# Patient Record
Sex: MALE | Race: WHITE | NOT HISPANIC OR LATINO | Employment: OTHER | URBAN - METROPOLITAN AREA
[De-identification: names, ages, dates, MRNs, and addresses within clinical notes are randomized per-mention and may not be internally consistent; named-entity substitution may affect disease eponyms.]

---

## 2020-01-22 ENCOUNTER — HOSPITAL ENCOUNTER (OUTPATIENT)
Dept: CARDIOLOGY | Facility: HOSPITAL | Age: 70
Discharge: HOME | End: 2020-01-22
Attending: NURSE PRACTITIONER

## 2021-07-23 ENCOUNTER — APPOINTMENT (OUTPATIENT)
Dept: GENERAL RADIOLOGY | Facility: HOSPITAL | Age: 71
End: 2021-07-23

## 2021-07-23 ENCOUNTER — HOSPITAL ENCOUNTER (INPATIENT)
Facility: HOSPITAL | Age: 71
LOS: 3 days | Discharge: HOME | End: 2021-07-26
Attending: EMERGENCY MEDICINE | Admitting: INTERNAL MEDICINE

## 2021-07-23 DIAGNOSIS — J44.9 CHRONIC OBSTRUCTIVE PULMONARY DISEASE, UNSPECIFIED COPD TYPE (HCC): Primary | ICD-10-CM

## 2021-07-23 DIAGNOSIS — A41.9 SEPSIS, DUE TO UNSPECIFIED ORGANISM, UNSPECIFIED WHETHER ACUTE ORGAN DYSFUNCTION PRESENT (HCC): ICD-10-CM

## 2021-07-23 PROBLEM — E11.9 TYPE 2 DIABETES MELLITUS: Status: ACTIVE | Noted: 2021-07-23

## 2021-07-23 PROBLEM — J96.21 ACUTE ON CHRONIC RESPIRATORY FAILURE WITH HYPOXIA AND HYPERCAPNIA: Status: ACTIVE | Noted: 2021-07-23

## 2021-07-23 PROBLEM — J96.22 ACUTE ON CHRONIC RESPIRATORY FAILURE WITH HYPOXIA AND HYPERCAPNIA: Status: ACTIVE | Noted: 2021-07-23

## 2021-07-23 PROBLEM — D72.829 LEUKOCYTOSIS: Status: ACTIVE | Noted: 2021-07-23

## 2021-07-23 PROBLEM — R53.83 LETHARGY: Status: ACTIVE | Noted: 2021-07-23

## 2021-07-23 PROBLEM — J44.1 COPD WITH ACUTE EXACERBATION (HCC): Status: ACTIVE | Noted: 2021-07-23

## 2021-07-23 LAB
ALBUMIN SERPL-MCNC: 3.8 G/DL (ref 3.5–5.2)
ALBUMIN/GLOB SERPL: 1.1 G/DL
ALP SERPL-CCNC: 101 U/L (ref 39–117)
ALT SERPL W P-5'-P-CCNC: 24 U/L (ref 1–41)
ANION GAP SERPL CALCULATED.3IONS-SCNC: 8.5 MMOL/L (ref 5–15)
ARTERIAL PATENCY WRIST A: POSITIVE
AST SERPL-CCNC: 30 U/L (ref 1–40)
BASE EXCESS BLDA CALC-SCNC: -0.2 MMOL/L (ref -2–2)
BASOPHILS # BLD AUTO: 0.09 10*3/MM3 (ref 0–0.2)
BASOPHILS NFR BLD AUTO: 0.7 % (ref 0–1.5)
BDY SITE: ABNORMAL
BILIRUB SERPL-MCNC: 0.5 MG/DL (ref 0–1.2)
BUN SERPL-MCNC: 17 MG/DL (ref 8–23)
BUN/CREAT SERPL: 17.2 (ref 7–25)
CALCIUM SPEC-SCNC: 8.7 MG/DL (ref 8.6–10.5)
CHLORIDE SERPL-SCNC: 100 MMOL/L (ref 98–107)
CO2 SERPL-SCNC: 28.5 MMOL/L (ref 22–29)
COHGB MFR BLD: 0.9 % (ref 0–1.5)
CREAT SERPL-MCNC: 0.99 MG/DL (ref 0.76–1.27)
D-LACTATE SERPL-SCNC: 0.9 MMOL/L (ref 0.5–2)
DEPRECATED RDW RBC AUTO: 46.4 FL (ref 37–54)
EOSINOPHIL # BLD AUTO: 1.72 10*3/MM3 (ref 0–0.4)
EOSINOPHIL NFR BLD AUTO: 12.8 % (ref 0.3–6.2)
ERYTHROCYTE [DISTWIDTH] IN BLOOD BY AUTOMATED COUNT: 14.5 % (ref 12.3–15.4)
FHHB: 4 % (ref 0–5)
GAS FLOW AIRWAY: 6 LPM
GFR SERPL CREATININE-BSD FRML MDRD: 74 ML/MIN/1.73
GLOBULIN UR ELPH-MCNC: 3.6 GM/DL
GLUCOSE SERPL-MCNC: 114 MG/DL (ref 65–99)
HCO3 BLDA-SCNC: 28.5 MMOL/L (ref 22–26)
HCT VFR BLD AUTO: 47.2 % (ref 37.5–51)
HGB BLD-MCNC: 14.1 G/DL (ref 13–17.7)
HGB BLDA-MCNC: 14.5 G/DL (ref 13.8–16.4)
HOLD SPECIMEN: NORMAL
HOLD SPECIMEN: NORMAL
IMM GRANULOCYTES # BLD AUTO: 0.06 10*3/MM3 (ref 0–0.05)
IMM GRANULOCYTES NFR BLD AUTO: 0.4 % (ref 0–0.5)
INHALED O2 CONCENTRATION: 44 %
LACTATE BLDA-SCNC: ABNORMAL MMOL/L
LYMPHOCYTES # BLD AUTO: 1.25 10*3/MM3 (ref 0.7–3.1)
LYMPHOCYTES NFR BLD AUTO: 9.3 % (ref 19.6–45.3)
M PNEUMO IGM SER QL: NEGATIVE
MCH RBC QN AUTO: 26.5 PG (ref 26.6–33)
MCHC RBC AUTO-ENTMCNC: 29.9 G/DL (ref 31.5–35.7)
MCV RBC AUTO: 88.6 FL (ref 79–97)
METHGB BLD QL: 0.3 % (ref 0–1.5)
MODALITY: ABNORMAL
MONOCYTES # BLD AUTO: 0.72 10*3/MM3 (ref 0.1–0.9)
MONOCYTES NFR BLD AUTO: 5.4 % (ref 5–12)
MRSA DNA SPEC QL NAA+PROBE: NORMAL
NEUTROPHILS NFR BLD AUTO: 71.4 % (ref 42.7–76)
NEUTROPHILS NFR BLD AUTO: 9.57 10*3/MM3 (ref 1.7–7)
NRBC BLD AUTO-RTO: 0 /100 WBC (ref 0–0.2)
NT-PROBNP SERPL-MCNC: 365.3 PG/ML (ref 0–900)
OXYHGB MFR BLDV: 94.8 % (ref 94–99)
PCO2 BLDA: 64.9 MM HG (ref 35–45)
PH BLDA: 7.26 PH UNITS (ref 7.35–7.45)
PLATELET # BLD AUTO: 217 10*3/MM3 (ref 140–450)
PMV BLD AUTO: 10.5 FL (ref 6–12)
PO2 BLD: 208 MM[HG] (ref 0–500)
PO2 BLDA: 91.6 MM HG (ref 80–100)
POTASSIUM SERPL-SCNC: 4.9 MMOL/L (ref 3.5–5.2)
PROT SERPL-MCNC: 7.4 G/DL (ref 6–8.5)
RBC # BLD AUTO: 5.33 10*6/MM3 (ref 4.14–5.8)
SAO2 % BLDCOA: 96 % (ref 95–99)
SODIUM SERPL-SCNC: 137 MMOL/L (ref 136–145)
TROPONIN I SERPL-MCNC: 0 NG/ML (ref 0–0.6)
TROPONIN T SERPL-MCNC: 0.01 NG/ML (ref 0–0.03)
WBC # BLD AUTO: 13.41 10*3/MM3 (ref 3.4–10.8)
WHOLE BLOOD HOLD SPECIMEN: NORMAL

## 2021-07-23 PROCEDURE — 87899 AGENT NOS ASSAY W/OPTIC: CPT | Performed by: INTERNAL MEDICINE

## 2021-07-23 PROCEDURE — 82805 BLOOD GASES W/O2 SATURATION: CPT | Performed by: EMERGENCY MEDICINE

## 2021-07-23 PROCEDURE — 87070 CULTURE OTHR SPECIMN AEROBIC: CPT | Performed by: INTERNAL MEDICINE

## 2021-07-23 PROCEDURE — 94799 UNLISTED PULMONARY SVC/PX: CPT

## 2021-07-23 PROCEDURE — 82375 ASSAY CARBOXYHB QUANT: CPT | Performed by: INTERNAL MEDICINE

## 2021-07-23 PROCEDURE — 36600 WITHDRAWAL OF ARTERIAL BLOOD: CPT | Performed by: INTERNAL MEDICINE

## 2021-07-23 PROCEDURE — 99284 EMERGENCY DEPT VISIT MOD MDM: CPT

## 2021-07-23 PROCEDURE — 83050 HGB METHEMOGLOBIN QUAN: CPT | Performed by: INTERNAL MEDICINE

## 2021-07-23 PROCEDURE — 82375 ASSAY CARBOXYHB QUANT: CPT | Performed by: EMERGENCY MEDICINE

## 2021-07-23 PROCEDURE — 83605 ASSAY OF LACTIC ACID: CPT | Performed by: EMERGENCY MEDICINE

## 2021-07-23 PROCEDURE — 87040 BLOOD CULTURE FOR BACTERIA: CPT | Performed by: EMERGENCY MEDICINE

## 2021-07-23 PROCEDURE — 87635 SARS-COV-2 COVID-19 AMP PRB: CPT | Performed by: INTERNAL MEDICINE

## 2021-07-23 PROCEDURE — 25010000002 AZITHROMYCIN PER 500 MG: Performed by: EMERGENCY MEDICINE

## 2021-07-23 PROCEDURE — 25010000002 ENOXAPARIN PER 10 MG: Performed by: INTERNAL MEDICINE

## 2021-07-23 PROCEDURE — 83050 HGB METHEMOGLOBIN QUAN: CPT | Performed by: EMERGENCY MEDICINE

## 2021-07-23 PROCEDURE — 83880 ASSAY OF NATRIURETIC PEPTIDE: CPT | Performed by: EMERGENCY MEDICINE

## 2021-07-23 PROCEDURE — 84484 ASSAY OF TROPONIN QUANT: CPT | Performed by: EMERGENCY MEDICINE

## 2021-07-23 PROCEDURE — 71045 X-RAY EXAM CHEST 1 VIEW: CPT

## 2021-07-23 PROCEDURE — 80053 COMPREHEN METABOLIC PANEL: CPT | Performed by: EMERGENCY MEDICINE

## 2021-07-23 PROCEDURE — 93010 ELECTROCARDIOGRAM REPORT: CPT | Performed by: INTERNAL MEDICINE

## 2021-07-23 PROCEDURE — 93005 ELECTROCARDIOGRAM TRACING: CPT | Performed by: INTERNAL MEDICINE

## 2021-07-23 PROCEDURE — 94660 CPAP INITIATION&MGMT: CPT

## 2021-07-23 PROCEDURE — 87641 MR-STAPH DNA AMP PROBE: CPT | Performed by: INTERNAL MEDICINE

## 2021-07-23 PROCEDURE — 87205 SMEAR GRAM STAIN: CPT | Performed by: INTERNAL MEDICINE

## 2021-07-23 PROCEDURE — 25010000002 METHYLPREDNISOLONE PER 125 MG: Performed by: EMERGENCY MEDICINE

## 2021-07-23 PROCEDURE — 94644 CONT INHLJ TX 1ST HOUR: CPT

## 2021-07-23 PROCEDURE — 36600 WITHDRAWAL OF ARTERIAL BLOOD: CPT | Performed by: EMERGENCY MEDICINE

## 2021-07-23 PROCEDURE — 25010000002 CEFTRIAXONE PER 250 MG: Performed by: EMERGENCY MEDICINE

## 2021-07-23 PROCEDURE — 99223 1ST HOSP IP/OBS HIGH 75: CPT | Performed by: INTERNAL MEDICINE

## 2021-07-23 PROCEDURE — 25010000002 CEFEPIME PER 500 MG: Performed by: INTERNAL MEDICINE

## 2021-07-23 PROCEDURE — 86738 MYCOPLASMA ANTIBODY: CPT | Performed by: INTERNAL MEDICINE

## 2021-07-23 PROCEDURE — 85025 COMPLETE CBC W/AUTO DIFF WBC: CPT | Performed by: EMERGENCY MEDICINE

## 2021-07-23 PROCEDURE — 84484 ASSAY OF TROPONIN QUANT: CPT

## 2021-07-23 PROCEDURE — 82805 BLOOD GASES W/O2 SATURATION: CPT | Performed by: INTERNAL MEDICINE

## 2021-07-23 PROCEDURE — 93005 ELECTROCARDIOGRAM TRACING: CPT | Performed by: EMERGENCY MEDICINE

## 2021-07-23 RX ORDER — ATORVASTATIN CALCIUM 10 MG/1
10 TABLET, FILM COATED ORAL DAILY
COMMUNITY
End: 2023-03-30 | Stop reason: HOSPADM

## 2021-07-23 RX ORDER — ALBUTEROL SULFATE 90 UG/1
2 AEROSOL, METERED RESPIRATORY (INHALATION) EVERY 6 HOURS PRN
Status: ON HOLD | COMMUNITY
Start: 2021-05-13 | End: 2021-12-11

## 2021-07-23 RX ORDER — METHYLPREDNISOLONE SODIUM SUCCINATE 125 MG/2ML
125 INJECTION, POWDER, LYOPHILIZED, FOR SOLUTION INTRAMUSCULAR; INTRAVENOUS ONCE
Status: COMPLETED | OUTPATIENT
Start: 2021-07-23 | End: 2021-07-23

## 2021-07-23 RX ORDER — BENZONATATE 100 MG/1
200 CAPSULE ORAL 3 TIMES DAILY PRN
Status: DISCONTINUED | OUTPATIENT
Start: 2021-07-23 | End: 2021-07-26 | Stop reason: HOSPADM

## 2021-07-23 RX ORDER — OMEPRAZOLE 20 MG/1
20 CAPSULE, DELAYED RELEASE ORAL DAILY
COMMUNITY
Start: 2021-05-13 | End: 2021-11-09

## 2021-07-23 RX ORDER — ASPIRIN 81 MG/1
81 TABLET, CHEWABLE ORAL DAILY
COMMUNITY

## 2021-07-23 RX ORDER — BUDESONIDE 0.5 MG/2ML
0.5 INHALANT ORAL
Status: DISCONTINUED | OUTPATIENT
Start: 2021-07-23 | End: 2021-07-26 | Stop reason: HOSPADM

## 2021-07-23 RX ORDER — DEXTROSE MONOHYDRATE 100 MG/ML
25 INJECTION, SOLUTION INTRAVENOUS
Status: DISCONTINUED | OUTPATIENT
Start: 2021-07-23 | End: 2021-07-26 | Stop reason: HOSPADM

## 2021-07-23 RX ORDER — IPRATROPIUM BROMIDE AND ALBUTEROL SULFATE 2.5; .5 MG/3ML; MG/3ML
3 SOLUTION RESPIRATORY (INHALATION)
Status: DISCONTINUED | OUTPATIENT
Start: 2021-07-23 | End: 2021-07-25

## 2021-07-23 RX ORDER — FAMOTIDINE 20 MG/1
20 TABLET, FILM COATED ORAL
Status: DISCONTINUED | OUTPATIENT
Start: 2021-07-24 | End: 2021-07-26 | Stop reason: HOSPADM

## 2021-07-23 RX ORDER — SODIUM CHLORIDE, SODIUM LACTATE, POTASSIUM CHLORIDE, CALCIUM CHLORIDE 600; 310; 30; 20 MG/100ML; MG/100ML; MG/100ML; MG/100ML
100 INJECTION, SOLUTION INTRAVENOUS CONTINUOUS
Status: DISCONTINUED | OUTPATIENT
Start: 2021-07-23 | End: 2021-07-24

## 2021-07-23 RX ORDER — AZITHROMYCIN 250 MG/1
250 TABLET, FILM COATED ORAL
Status: DISCONTINUED | OUTPATIENT
Start: 2021-07-24 | End: 2021-07-26 | Stop reason: HOSPADM

## 2021-07-23 RX ORDER — IPRATROPIUM BROMIDE AND ALBUTEROL SULFATE 2.5; .5 MG/3ML; MG/3ML
3 SOLUTION RESPIRATORY (INHALATION) EVERY 4 HOURS PRN
Status: ON HOLD | COMMUNITY
Start: 2021-05-13 | End: 2021-12-11

## 2021-07-23 RX ORDER — ARFORMOTEROL TARTRATE 15 UG/2ML
15 SOLUTION RESPIRATORY (INHALATION)
Status: DISCONTINUED | OUTPATIENT
Start: 2021-07-23 | End: 2021-07-26 | Stop reason: HOSPADM

## 2021-07-23 RX ORDER — GUAIFENESIN 600 MG/1
1200 TABLET, EXTENDED RELEASE ORAL EVERY 12 HOURS SCHEDULED
Status: DISCONTINUED | OUTPATIENT
Start: 2021-07-23 | End: 2021-07-26 | Stop reason: HOSPADM

## 2021-07-23 RX ORDER — NICOTINE POLACRILEX 4 MG
15 LOZENGE BUCCAL
Status: DISCONTINUED | OUTPATIENT
Start: 2021-07-23 | End: 2021-07-26 | Stop reason: HOSPADM

## 2021-07-23 RX ORDER — ALBUTEROL SULFATE 2.5 MG/3ML
7.5 SOLUTION RESPIRATORY (INHALATION) ONCE
Status: COMPLETED | OUTPATIENT
Start: 2021-07-23 | End: 2021-07-23

## 2021-07-23 RX ORDER — IPRATROPIUM BROMIDE AND ALBUTEROL SULFATE 2.5; .5 MG/3ML; MG/3ML
3 SOLUTION RESPIRATORY (INHALATION) EVERY 4 HOURS PRN
Status: DISCONTINUED | OUTPATIENT
Start: 2021-07-23 | End: 2021-07-25

## 2021-07-23 RX ORDER — METHYLPREDNISOLONE SODIUM SUCCINATE 40 MG/ML
40 INJECTION, POWDER, LYOPHILIZED, FOR SOLUTION INTRAMUSCULAR; INTRAVENOUS EVERY 6 HOURS SCHEDULED
Status: COMPLETED | OUTPATIENT
Start: 2021-07-24 | End: 2021-07-24

## 2021-07-23 RX ORDER — SODIUM CHLORIDE 0.9 % (FLUSH) 0.9 %
10 SYRINGE (ML) INJECTION AS NEEDED
Status: DISCONTINUED | OUTPATIENT
Start: 2021-07-23 | End: 2021-07-23

## 2021-07-23 RX ORDER — INSULIN DEGLUDEC INJECTION 100 U/ML
17 INJECTION, SOLUTION SUBCUTANEOUS DAILY
Status: ON HOLD | COMMUNITY
Start: 2021-05-13 | End: 2021-12-11

## 2021-07-23 RX ORDER — LISINOPRIL 2.5 MG/1
2.5 TABLET ORAL DAILY
COMMUNITY
End: 2023-01-12

## 2021-07-23 RX ADMIN — SODIUM CHLORIDE, POTASSIUM CHLORIDE, SODIUM LACTATE AND CALCIUM CHLORIDE 100 ML/HR: 600; 310; 30; 20 INJECTION, SOLUTION INTRAVENOUS at 21:23

## 2021-07-23 RX ADMIN — ENOXAPARIN SODIUM 40 MG: 40 INJECTION, SOLUTION INTRAVENOUS; SUBCUTANEOUS at 21:23

## 2021-07-23 RX ADMIN — GUAIFENESIN 1200 MG: 600 TABLET, EXTENDED RELEASE ORAL at 21:24

## 2021-07-23 RX ADMIN — CEFEPIME HYDROCHLORIDE 2 G: 2 INJECTION, POWDER, FOR SOLUTION INTRAVENOUS at 21:23

## 2021-07-23 RX ADMIN — AZITHROMYCIN 500 MG: 500 INJECTION, POWDER, LYOPHILIZED, FOR SOLUTION INTRAVENOUS at 17:23

## 2021-07-23 RX ADMIN — SODIUM CHLORIDE 1 G: 9 INJECTION INTRAMUSCULAR; INTRAVENOUS; SUBCUTANEOUS at 17:18

## 2021-07-23 RX ADMIN — METHYLPREDNISOLONE SODIUM SUCCINATE 125 MG: 125 INJECTION, POWDER, FOR SOLUTION INTRAMUSCULAR; INTRAVENOUS at 17:15

## 2021-07-23 RX ADMIN — ALBUTEROL SULFATE 7.5 MG: 2.5 SOLUTION RESPIRATORY (INHALATION) at 17:10

## 2021-07-23 NOTE — H&P
[facility]   HOSPITALIST HISTORY AND PHYSICAL  Date: 2021   Patient Name: Dilip Faulkner  : 1950  MRN: [id number]  Primary Care Physician:  Provider, No Known  Date of admission: 2021    Subjective   Subjective     Chief Complaint: Shortness of her for couple of days    HPI:    Dilip Faulkner is a 71 y.o. male with past medical history of diabetes mellitus, chronic hypoxemic respiratory failure on home oxygen and COPD although no formal PFTs done.  Patient is bit lethargic and history obtained by talking to the ED physician and the patient along with reviewing the old record.  Patient has been having shortness of air for couple of days gradually getting worse to the point that even at rest he is short of breath.  Does have cough with thick mucus not able to bring it up.  Denies any chest pain or fever or chills.  He  Has been wheezing with no relief from home nebulizer treatment.  Patient does not want to be vaccinated for Covid 19.  Denies any exposure to COVID-19.  Denies any loss of taste or smell, vomiting diarrhea or urinary complaints.  No dizziness.  Patient was found to be hypoxemic by EMS with saturation in low 80s.  In ER he was in respiratory distress with a rate of 34.  ABG showed pH of 7.2 with PCO2 of 65, PO2 of 228 on nonrebreather 15 L/min at 100% FiO2.  CMP and C BC within normal range.  Chest x-ray no acute finding, troponin T and BNP is negative.  Patient was given steroids, nebulizer treatment and IV Rocephin and Zithromax in the ED.  Hospitalist has been called to admit him further evaluation.  Patient quit smoking about a year ago but still dips.  Patient lives with his sister at home and is independent.    Personal History     Past Medical History:  Past Medical History:   Diagnosis Date   • Hernia, umbilical    • Requires continuous at home supplemental oxygen        Past Surgical History:  History reviewed. No pertinent surgical history.    Family History:   family  history is not on file.    Social History:    reports that he quit smoking about a year ago. His smoking use included cigarettes. He does not have any smokeless tobacco history on file. He reports that he does not drink alcohol and does not use drugs.    Home Medications:  albuterol sulfate HFA, insulin degludec, ipratropium-albuterol, metFORMIN, metoprolol tartrate, and omeprazole    Allergies:  No Known Allergies    Review of Systems   All systems were reviewed and negative except for: As per H&P    Objective   Objective     Vitals:   Temp:  [99 °F (37.2 °C)] 99 °F (37.2 °C)  Heart Rate:  [76-86] 76  Resp:  [28-34] 30  BP: (102-123)/(62-97) 102/62  Flow (L/min):  [15-60] 60    Physical Exam    Constitutional: Lethargic oriented x2.  Mild respiratory distress   Eyes: Pupils equal, sclerae anicteric, positive conjunctival injection.  Patient not opening his eyes   HENT: NCAT, dry mucosa with thick mucus coated tongue due to to pain   Neck: Supple, no thyromegaly, no lymphadenopathy, trachea midline   Respiratory: Extensive wheezing bilaterally, positive labored respirations    Cardiovascular: Tachycardic, no murmurs, rubs, or gallops, palpable pedal pulses bilaterally   Gastrointestinal: Positive bowel sounds, soft, nontender, nondistended   Musculoskeletal: No bilateral ankle edema, no clubbing or cyanosis to extremities   Psychiatric: Appropriate affect, cooperative   Neurologic: Oriented x 2, did not know the name of the place.  Strength symmetric in all extremities, Cranial Nerves grossly intact to confrontation, speech clear   Skin: No rashes     Result Review    Result Review:  I have personally reviewed the results from the time of this admission to 7/23/2021 18:57 EDT and agree with these findings:  [x]  Laboratory  [x]  Microbiology  [x]  Radiology  [x]  EKG/Telemetry   []  Cardiology/Vascular   []  Pathology  [x]  Old records  [x]  Other: Medications    Assessment/Plan   Assessment / Plan      Assessment:  Acute on chronic hypoxemic hypercapnic respiratory failure.  Patient on home oxygen but 2 L nasal cannula.  Acute metabolic encephalopathy due to above.  Acute exacerbation of COPD.  No formal PFT done.  NIDDM P  Mild leukocytosis.  Hypertension.  GERD  Rule out COVID-19    Plan:   Admit to unit for close monitoring and isolation.  NIPPV continues for now.  Repeat ABG in 2 hours.  Keep saturation 88 to 90%.  IV steroids.  IV cefepime and Zithromax.  CT of the chest.  DuoNeb, Pulmicort and Brovana neb.  Bronchopulmonary hygiene and bronchodilator protocol.  Sliding scale insulin.  IV fluids to bring the mucus  Mucinex  May need bronchoscopy  Discussed with pulmonary critical care  Discussed with ED physician  Lovenox for DVT prophylaxis  Sputum culture, blood culture, mycoplasma IgM, Legionella and strep pneumo antigen.  MRSA DNA PCR.  Discussed with nursing            DVT prophylaxis:  No DVT prophylaxis order currently exists.  Lovenox  CODE STATUS:    Level Of Support Discussed With: Patient  Code Status: CPR  Medical Interventions (Level of Support Prior to Arrest): Full      Admission Status:  I believe this patient meets inpatient status.    Part of this note may be an electronic transcription/translation of spoken language to printed text using the Dragon Dictation System.     Electronically signed by Sigifredo Mendes MD, 07/23/21, 6:57 PM EDT.

## 2021-07-23 NOTE — ED NOTES
"Pt presents to the ED via EMS from home today. SOA started yesterday and worsened today. States that he is on 2 L NC O2 all the time, but is unable to say why other than \"for my breathing.\" Denies hx of COPD and CHF. Pt was on his home o2 with sats in the 80s at home. Pt was placed on nonrebreather by EMS. Sats up to 100% at time of triage. Pt switched to Airvo. Iv initiated and labs drawn. ABG obtained by Rt. Chest xray performed. Pt appears more comfortable than initially, but still tachypneic with labored breathing.     Rosa Hidalgo, RN  07/23/21 [id number]    "

## 2021-07-23 NOTE — ED PROVIDER NOTES
Time: 4:46 PM EDT  Arrived by: EMS   Chief Complaint: SOB  History provided by: pt     History of Present Illness:  Patient is a 71 y.o. year old male that presents to the emergency department with SOB. This started today and is still present and constant It was acute in onset. It is described as moderate in severity. Nothing improves or exacerbates symptoms.     Pt has had cough. No fever or abdominal pain.     He states that he has home O2.       History provided by:  Patient  Shortness of Breath  Severity:  Moderate  Onset quality:  Sudden  Duration: today   Timing:  Constant  Progression:  Unchanged  Relieved by:  Nothing  Worsened by:  Nothing  Associated symptoms: cough    Associated symptoms: no abdominal pain, no chest pain, no fever, no headaches, no sore throat and no vomiting          Similar Symptoms Previously: Pt has had similar symptoms previously.   Recently seen: Pt was last evaluated in this ED on 5/4/21 for dyspnea. He was admitted for COPD exacerbation and dischagred on 5/5/21.     Patient Care Team  Primary Care Provider: Provider, No Known     Past Medical History:     No Known Allergies  Past Medical History:   Diagnosis Date   • Hernia, umbilical    • Requires continuous at home supplemental oxygen      History reviewed. No pertinent surgical history.  History reviewed. No pertinent family history.    Home Medications:  Prior to Admission medications    Not on File        Social History:   PT  reports that he quit smoking about a year ago. His smoking use included cigarettes. He does not have any smokeless tobacco history on file. He reports that he does not drink alcohol and does not use drugs.    Record Review:  I have reviewed the patient's records in [employer].     Review of Systems  Review of Systems   Constitutional: Negative for chills and fever.   HENT: Negative for congestion, rhinorrhea and sore throat.    Eyes: Negative for pain and visual disturbance.   Respiratory: Positive for cough  "and shortness of breath. Negative for apnea and chest tightness.    Cardiovascular: Negative for chest pain and palpitations.   Gastrointestinal: Negative for abdominal pain, diarrhea, nausea and vomiting.   Genitourinary: Negative for difficulty urinating and dysuria.   Musculoskeletal: Negative for joint swelling and myalgias.   Skin: Negative for color change.   Neurological: Negative for seizures and headaches.   Psychiatric/Behavioral: Negative.    All other systems reviewed and are negative.     Physical Exam  /62   Pulse 76   Temp 99 °F (37.2 °C) (Oral)   Resp (!) 30   Ht 185.4 cm (73\")   Wt 109 kg (240 lb 1.3 oz)   SpO2 95%   BMI 31.67 kg/m²     Physical Exam  Vitals and nursing note reviewed.   Constitutional:       General: He is not in acute distress.     Appearance: Normal appearance. He is not toxic-appearing.   HENT:      Head: Normocephalic and atraumatic.      Jaw: There is normal jaw occlusion.   Eyes:      General: Lids are normal.      Extraocular Movements: Extraocular movements intact.      Conjunctiva/sclera: Conjunctivae normal.      Pupils: Pupils are equal, round, and reactive to light.   Cardiovascular:      Rate and Rhythm: Normal rate and regular rhythm.      Pulses: Normal pulses.      Heart sounds: Normal heart sounds.   Pulmonary:      Effort: Pulmonary effort is normal. No respiratory distress.      Breath sounds: Normal breath sounds. No wheezing or rhonchi.   Abdominal:      General: Abdomen is flat.      Palpations: Abdomen is soft.      Tenderness: There is no abdominal tenderness. There is no guarding or rebound.   Musculoskeletal:         General: Normal range of motion.      Cervical back: Normal range of motion and neck supple.      Right lower leg: No edema.      Left lower leg: No edema.   Skin:     General: Skin is warm and dry.   Neurological:      Mental Status: He is alert and oriented to person, place, and time. Mental status is at baseline.   Psychiatric:  " "       Mood and Affect: Mood normal.                  ED Course  /62   Pulse 76   Temp 99 °F (37.2 °C) (Oral)   Resp (!) 30   Ht 185.4 cm (73\")   Wt 109 kg (240 lb 1.3 oz)   SpO2 95%   BMI 31.67 kg/m²   Results for orders placed or performed during the hospital encounter of 07/23/21   Comprehensive Metabolic Panel    Specimen: Blood   Result Value Ref Range    Glucose 114 (H) 65 - 99 mg/dL    BUN 17 8 - 23 mg/dL    Creatinine 0.99 0.76 - 1.27 mg/dL    Sodium 137 136 - 145 mmol/L    Potassium 4.9 3.5 - 5.2 mmol/L    Chloride 100 98 - 107 mmol/L    CO2 28.5 22.0 - 29.0 mmol/L    Calcium 8.7 8.6 - 10.5 mg/dL    Total Protein 7.4 6.0 - 8.5 g/dL    Albumin 3.80 3.50 - 5.20 g/dL    ALT (SGPT) 24 1 - 41 U/L    AST (SGOT) 30 1 - 40 U/L    Alkaline Phosphatase 101 39 - 117 U/L    Total Bilirubin 0.5 0.0 - 1.2 mg/dL    eGFR Non African Amer 74 >60 mL/min/1.73    Globulin 3.6 gm/dL    A/G Ratio 1.1 g/dL    BUN/Creatinine Ratio 17.2 7.0 - 25.0    Anion Gap 8.5 5.0 - 15.0 mmol/L   BNP    Specimen: Blood   Result Value Ref Range    proBNP 365.3 0.0 - 900.0 pg/mL   Troponin    Specimen: Blood   Result Value Ref Range    Troponin T 0.013 0.000 - 0.030 ng/mL   Blood Gas, Arterial -With Co-Ox Panel: Yes    Specimen: Arm, Right; Arterial Blood   Result Value Ref Range    pH, Arterial 7.273 (C) 7.350 - 7.450 pH units    pCO2, Arterial 65.7 (C) 35.0 - 45.0 mm Hg    pO2, Arterial 228.2 (H) 80.0 - 100.0 mm Hg    HCO3, Arterial 29.7 (H) 22.0 - 26.0 mmol/L    Base Excess, Arterial 1.0 -2.0 - 2.0 mmol/L    O2 Saturation, Arterial 99.2 (H) 95.0 - 99.0 %    Hemoglobin, Blood Gas 14.7 13.8 - 16.4 g/dL    Carboxyhemoglobin 1.4 0 - 1.5 %    Methemoglobin 0.20 0.00 - 1.50 %    Oxyhemoglobin 97.6 94 - 99 %    FHHB 0.8 0.0 - 5.0 %    Site Arterial: right brachial     Modality Non Rebreather     FIO2 100 %    Flow Rate 15 lpm    PO2/FIO2 228 0 - 500    Nael's Test N/A    CBC Auto Differential    Specimen: Blood   Result Value Ref Range "    WBC 13.41 (H) 3.40 - 10.80 10*3/mm3    RBC 5.33 4.14 - 5.80 10*6/mm3    Hemoglobin 14.1 13.0 - 17.7 g/dL    Hematocrit 47.2 37.5 - 51.0 %    MCV 88.6 79.0 - 97.0 fL    MCH 26.5 (L) 26.6 - 33.0 pg    MCHC 29.9 (L) 31.5 - 35.7 g/dL    RDW 14.5 12.3 - 15.4 %    RDW-SD 46.4 37.0 - 54.0 fl    MPV 10.5 6.0 - 12.0 fL    Platelets 217 140 - 450 10*3/mm3    Neutrophil % 71.4 42.7 - 76.0 %    Lymphocyte % 9.3 (L) 19.6 - 45.3 %    Monocyte % 5.4 5.0 - 12.0 %    Eosinophil % 12.8 (H) 0.3 - 6.2 %    Basophil % 0.7 0.0 - 1.5 %    Immature Grans % 0.4 0.0 - 0.5 %    Neutrophils, Absolute 9.57 (H) 1.70 - 7.00 10*3/mm3    Lymphocytes, Absolute 1.25 0.70 - 3.10 10*3/mm3    Monocytes, Absolute 0.72 0.10 - 0.90 10*3/mm3    Eosinophils, Absolute 1.72 (H) 0.00 - 0.40 10*3/mm3    Basophils, Absolute 0.09 0.00 - 0.20 10*3/mm3    Immature Grans, Absolute 0.06 (H) 0.00 - 0.05 10*3/mm3    nRBC 0.0 0.0 - 0.2 /100 WBC   Lactic Acid, Plasma    Specimen: Blood   Result Value Ref Range    Lactate 0.9 0.5 - 2.0 mmol/L   POC Troponin I    Specimen: Blood   Result Value Ref Range    Troponin I 0.00 0.00 - 0.60 ng/mL   ECG 12 Lead   Result Value Ref Range    QT Interval 434 ms   Green Top (Gel)   Result Value Ref Range    Extra Tube Hold for add-ons.    Lavender Top   Result Value Ref Range    Extra Tube hold for add-on    Gold Top - SST   Result Value Ref Range    Extra Tube Hold for add-ons.      Medications   sodium chloride 0.9 % flush 10 mL (has no administration in time range)   cefTRIAXone (ROCEPHIN) in NS 1 gram/10ml IV PUSH syringe (1 g Intravenous Given 7/23/21 1718)   azithromycin (ZITHROMAX) 500 mg/250 mL NS (500 mg Intravenous New Bag 7/23/21 1723)   methylPREDNISolone sodium succinate (SOLU-Medrol) injection 125 mg (125 mg Intravenous Given 7/23/21 1715)   albuterol (PROVENTIL) nebulizer solution 0.083% 2.5 mg/3mL (7.5 mg Nebulization Given 7/23/21 1710)     XR Chest 1 View    Result Date: 7/23/2021  Narrative: PROCEDURE: XR CHEST 1  VW  COMPARISON: Psychiatric, CR, CHEST AP/PA 1 VIEW, 5/04/2021, 10:05.  INDICATIONS: SOA Triage Protocol  FINDINGS:  The heart size is normal.  The pulmonary vascular markings are normal.  There is chronic left basilar pleural/parenchymal scarring.  The right lung and pleural space are clear.  There are chronic age-related changes involving the bony thorax and thoracic aorta.  CONCLUSION:  1. Chronic left basilar pleural/parenchymal scarring. 2. No superimposed active disease.       LALA GARIBAY MD       Electronically Signed and Approved By: LALA GARIBAY MD on 7/23/2021 at 16:47               Procedures/EKGs:  Procedures  EKG:    Rhythm: sinus  Rate: 79  Intervals: RBBB  T-wave: no changes  ST Segment: no elevations    EKG Comparison: unchanged    Interpreted by me    Medical Decision Making:                     MDM  Number of Diagnoses or Management Options  Diagnosis management comments: The patient´s CBC was reviewed and shows no abnormalities of critical concern. Of note, there is no anemia requiring a blood transfusion and the platelet count is acceptable.  The patient´s CMP was reviewed and shows no abnormalities of critical concern. Of note, the patient´s sodium and potassium are acceptable. The patient´s liver enzymes are unremarkable. The patient´s renal function (creatinine) is preserved. The patient has a normal anion gap.  ABG shows a pH of 7.2 and a PCO2 of 65.7.  Lactic acid is 0.9.  Patient was placed on a hour-long breathing treatment and given Solu-Medrol.  Case was discussed with Dr. Mendes who requested BiPAP.  Patient also given Rocephin and Zithromax.    Total Critical Care time of 45 minutes. Total critical care time documented does not include time spent on separately billed procedures for services of nurses or physician assistants. I personally saw and examined the patient. I have reviewed all diagnostic interpretations and treatment plans as written. I was present for the key  portions of any procedures performed and the inclusive time noted in any critical care statement. Critical care time includes patient management by me, time spent at the patients bedside,  time to review lab and imaging results, discussing patient care, documentation in the medical record, and time spent with family or caregiver.       Amount and/or Complexity of Data Reviewed  Clinical lab tests: reviewed  Tests in the radiology section of CPT®: reviewed  Tests in the medicine section of CPT®: reviewed  Discussion of test results with the performing providers: yes  Decide to obtain previous medical records or to obtain history from someone other than the patient: yes  Discuss the patient with other providers: yes  Independent visualization of images, tracings, or specimens: yes    Risk of Complications, Morbidity, and/or Mortality  Presenting problems: moderate  Management options: moderate    Patient Progress  Patient progress: stable       Final diagnoses:   Chronic obstructive pulmonary disease, unspecified COPD type (CMS/[facility])   Sepsis, due to unspecified organism, unspecified whether acute organ dysfunction present (CMS/[facility])        Disposition:  ED Disposition     ED Disposition Condition Comment    Decision to Admit  Level of Care: Critical Care [6]   Diagnosis: COPD with acute exacerbation (CMS/[facility]) [295680]   Isolate for COVID?: Yes [1]   Certification: I Certify That Inpatient Hospital Services Are Medically Necessary For Greater Than 2 Midnights            Documentation assistance provided by Key Rosas acting as scribe for Devon Moyer MD. Information recorded by the scribe was done at my direction and has been verified and validated by me.       Key Rosas  07/23/21 1701       Devon Moyer MD  07/23/21 [id number]

## 2021-07-24 ENCOUNTER — APPOINTMENT (OUTPATIENT)
Dept: CT IMAGING | Facility: HOSPITAL | Age: 71
End: 2021-07-24

## 2021-07-24 PROBLEM — R53.83 LETHARGY: Status: RESOLVED | Noted: 2021-07-23 | Resolved: 2021-07-24

## 2021-07-24 LAB
ANION GAP SERPL CALCULATED.3IONS-SCNC: 9.6 MMOL/L (ref 5–15)
ARTERIAL PATENCY WRIST A: POSITIVE
BASE EXCESS BLDA CALC-SCNC: 0.1 MMOL/L (ref -2–2)
BASOPHILS # BLD AUTO: 0.02 10*3/MM3 (ref 0–0.2)
BASOPHILS NFR BLD AUTO: 0.2 % (ref 0–1.5)
BDY SITE: ABNORMAL
BUN SERPL-MCNC: 21 MG/DL (ref 8–23)
BUN/CREAT SERPL: 19.1 (ref 7–25)
CALCIUM SPEC-SCNC: 8.7 MG/DL (ref 8.6–10.5)
CHLORIDE SERPL-SCNC: 100 MMOL/L (ref 98–107)
CK SERPL-CCNC: 183 U/L (ref 20–200)
CO2 SERPL-SCNC: 28.4 MMOL/L (ref 22–29)
COHGB MFR BLD: 1 % (ref 0–1.5)
CREAT SERPL-MCNC: 1.1 MG/DL (ref 0.76–1.27)
D-LACTATE SERPL-SCNC: 1.4 MMOL/L (ref 0.5–2)
DEPRECATED RDW RBC AUTO: 46 FL (ref 37–54)
EOSINOPHIL # BLD AUTO: 0.01 10*3/MM3 (ref 0–0.4)
EOSINOPHIL NFR BLD AUTO: 0.1 % (ref 0.3–6.2)
ERYTHROCYTE [DISTWIDTH] IN BLOOD BY AUTOMATED COUNT: 14.4 % (ref 12.3–15.4)
FHHB: 7.2 % (ref 0–5)
GAS FLOW AIRWAY: 4 LPM
GFR SERPL CREATININE-BSD FRML MDRD: 66 ML/MIN/1.73
GLUCOSE BLDC GLUCOMTR-MCNC: 106 MG/DL (ref 70–99)
GLUCOSE BLDC GLUCOMTR-MCNC: 107 MG/DL (ref 70–99)
GLUCOSE BLDC GLUCOMTR-MCNC: 139 MG/DL (ref 70–99)
GLUCOSE SERPL-MCNC: 151 MG/DL (ref 65–99)
HBA1C MFR BLD: 6.68 % (ref 4.8–5.6)
HCO3 BLDA-SCNC: 27.6 MMOL/L (ref 22–26)
HCT VFR BLD AUTO: 43.3 % (ref 37.5–51)
HGB BLD-MCNC: 12.7 G/DL (ref 13–17.7)
HGB BLDA-MCNC: 13.5 G/DL (ref 13.8–16.4)
IMM GRANULOCYTES # BLD AUTO: 0.04 10*3/MM3 (ref 0–0.05)
IMM GRANULOCYTES NFR BLD AUTO: 0.5 % (ref 0–0.5)
INHALED O2 CONCENTRATION: 36 %
L PNEUMO1 AG UR QL IA: NEGATIVE
LYMPHOCYTES # BLD AUTO: 0.64 10*3/MM3 (ref 0.7–3.1)
LYMPHOCYTES NFR BLD AUTO: 7.7 % (ref 19.6–45.3)
MAGNESIUM SERPL-MCNC: 2 MG/DL (ref 1.6–2.4)
MCH RBC QN AUTO: 25.9 PG (ref 26.6–33)
MCHC RBC AUTO-ENTMCNC: 29.3 G/DL (ref 31.5–35.7)
MCV RBC AUTO: 88.4 FL (ref 79–97)
METHGB BLD QL: 0.3 % (ref 0–1.5)
MODALITY: ABNORMAL
MONOCYTES # BLD AUTO: 0.04 10*3/MM3 (ref 0.1–0.9)
MONOCYTES NFR BLD AUTO: 0.5 % (ref 5–12)
NEUTROPHILS NFR BLD AUTO: 7.56 10*3/MM3 (ref 1.7–7)
NEUTROPHILS NFR BLD AUTO: 91 % (ref 42.7–76)
NRBC BLD AUTO-RTO: 0 /100 WBC (ref 0–0.2)
OXYHGB MFR BLDV: 91.5 % (ref 94–99)
PCO2 BLDA: 57.1 MM HG (ref 35–45)
PH BLDA: 7.3 PH UNITS (ref 7.35–7.45)
PHOSPHATE SERPL-MCNC: 3.7 MG/DL (ref 2.5–4.5)
PLATELET # BLD AUTO: 183 10*3/MM3 (ref 140–450)
PMV BLD AUTO: 10.3 FL (ref 6–12)
PO2 BLD: 194 MM[HG] (ref 0–500)
PO2 BLDA: 70 MM HG (ref 80–100)
POTASSIUM SERPL-SCNC: 5.2 MMOL/L (ref 3.5–5.2)
PROCALCITONIN SERPL-MCNC: 0.1 NG/ML (ref 0–0.25)
QT INTERVAL: 430 MS
QT INTERVAL: 434 MS
RBC # BLD AUTO: 4.9 10*6/MM3 (ref 4.14–5.8)
S PNEUM AG SPEC QL LA: NEGATIVE
SAO2 % BLDCOA: 92.7 % (ref 95–99)
SARS-COV-2 N GENE RESP QL NAA+PROBE: NOT DETECTED
SODIUM SERPL-SCNC: 138 MMOL/L (ref 136–145)
WBC # BLD AUTO: 8.31 10*3/MM3 (ref 3.4–10.8)

## 2021-07-24 PROCEDURE — 25010000002 ENOXAPARIN PER 10 MG: Performed by: INTERNAL MEDICINE

## 2021-07-24 PROCEDURE — 25010000002 METHYLPREDNISOLONE PER 40 MG: Performed by: INTERNAL MEDICINE

## 2021-07-24 PROCEDURE — 94799 UNLISTED PULMONARY SVC/PX: CPT

## 2021-07-24 PROCEDURE — 82550 ASSAY OF CK (CPK): CPT | Performed by: INTERNAL MEDICINE

## 2021-07-24 PROCEDURE — 84145 PROCALCITONIN (PCT): CPT | Performed by: INTERNAL MEDICINE

## 2021-07-24 PROCEDURE — 71250 CT THORAX DX C-: CPT

## 2021-07-24 PROCEDURE — 25010000002 CEFEPIME PER 500 MG: Performed by: INTERNAL MEDICINE

## 2021-07-24 PROCEDURE — 83050 HGB METHEMOGLOBIN QUAN: CPT | Performed by: PHYSICIAN ASSISTANT

## 2021-07-24 PROCEDURE — 94760 N-INVAS EAR/PLS OXIMETRY 1: CPT

## 2021-07-24 PROCEDURE — 94644 CONT INHLJ TX 1ST HOUR: CPT

## 2021-07-24 PROCEDURE — 36600 WITHDRAWAL OF ARTERIAL BLOOD: CPT | Performed by: PHYSICIAN ASSISTANT

## 2021-07-24 PROCEDURE — 83735 ASSAY OF MAGNESIUM: CPT | Performed by: INTERNAL MEDICINE

## 2021-07-24 PROCEDURE — 84100 ASSAY OF PHOSPHORUS: CPT | Performed by: INTERNAL MEDICINE

## 2021-07-24 PROCEDURE — 83036 HEMOGLOBIN GLYCOSYLATED A1C: CPT | Performed by: INTERNAL MEDICINE

## 2021-07-24 PROCEDURE — 80048 BASIC METABOLIC PNL TOTAL CA: CPT | Performed by: INTERNAL MEDICINE

## 2021-07-24 PROCEDURE — 82805 BLOOD GASES W/O2 SATURATION: CPT | Performed by: PHYSICIAN ASSISTANT

## 2021-07-24 PROCEDURE — 82962 GLUCOSE BLOOD TEST: CPT

## 2021-07-24 PROCEDURE — 85025 COMPLETE CBC W/AUTO DIFF WBC: CPT | Performed by: INTERNAL MEDICINE

## 2021-07-24 PROCEDURE — 83605 ASSAY OF LACTIC ACID: CPT | Performed by: INTERNAL MEDICINE

## 2021-07-24 PROCEDURE — 99291 CRITICAL CARE FIRST HOUR: CPT | Performed by: INTERNAL MEDICINE

## 2021-07-24 PROCEDURE — 99233 SBSQ HOSP IP/OBS HIGH 50: CPT | Performed by: INTERNAL MEDICINE

## 2021-07-24 PROCEDURE — 82375 ASSAY CARBOXYHB QUANT: CPT | Performed by: PHYSICIAN ASSISTANT

## 2021-07-24 PROCEDURE — 94640 AIRWAY INHALATION TREATMENT: CPT

## 2021-07-24 RX ORDER — PREDNISONE 20 MG/1
40 TABLET ORAL
Status: DISCONTINUED | OUTPATIENT
Start: 2021-07-25 | End: 2021-07-26 | Stop reason: HOSPADM

## 2021-07-24 RX ORDER — ASPIRIN 81 MG/1
81 TABLET, CHEWABLE ORAL DAILY
Status: DISCONTINUED | OUTPATIENT
Start: 2021-07-24 | End: 2021-07-26 | Stop reason: HOSPADM

## 2021-07-24 RX ORDER — ATORVASTATIN CALCIUM 10 MG/1
10 TABLET, FILM COATED ORAL NIGHTLY
Status: DISCONTINUED | OUTPATIENT
Start: 2021-07-24 | End: 2021-07-26 | Stop reason: HOSPADM

## 2021-07-24 RX ADMIN — IPRATROPIUM BROMIDE AND ALBUTEROL SULFATE 3 ML: .5; 2.5 SOLUTION RESPIRATORY (INHALATION) at 00:45

## 2021-07-24 RX ADMIN — BUDESONIDE 0.5 MG: 0.5 INHALANT ORAL at 06:47

## 2021-07-24 RX ADMIN — ARFORMOTEROL TARTRATE 15 MCG: 15 SOLUTION RESPIRATORY (INHALATION) at 06:47

## 2021-07-24 RX ADMIN — AZITHROMYCIN MONOHYDRATE 250 MG: 250 TABLET ORAL at 08:16

## 2021-07-24 RX ADMIN — ARFORMOTEROL TARTRATE 15 MCG: 15 SOLUTION RESPIRATORY (INHALATION) at 00:44

## 2021-07-24 RX ADMIN — IPRATROPIUM BROMIDE AND ALBUTEROL SULFATE 3 ML: .5; 2.5 SOLUTION RESPIRATORY (INHALATION) at 03:03

## 2021-07-24 RX ADMIN — CEFEPIME HYDROCHLORIDE 2 G: 2 INJECTION, POWDER, FOR SOLUTION INTRAVENOUS at 08:07

## 2021-07-24 RX ADMIN — IPRATROPIUM BROMIDE AND ALBUTEROL SULFATE 3 ML: .5; 2.5 SOLUTION RESPIRATORY (INHALATION) at 06:47

## 2021-07-24 RX ADMIN — ATORVASTATIN CALCIUM 10 MG: 10 TABLET, FILM COATED ORAL at 21:02

## 2021-07-24 RX ADMIN — GUAIFENESIN 1200 MG: 600 TABLET, EXTENDED RELEASE ORAL at 08:06

## 2021-07-24 RX ADMIN — IPRATROPIUM BROMIDE AND ALBUTEROL SULFATE 3 ML: .5; 2.5 SOLUTION RESPIRATORY (INHALATION) at 18:59

## 2021-07-24 RX ADMIN — BUDESONIDE 0.5 MG: 0.5 INHALANT ORAL at 18:59

## 2021-07-24 RX ADMIN — IPRATROPIUM BROMIDE AND ALBUTEROL SULFATE 3 ML: .5; 2.5 SOLUTION RESPIRATORY (INHALATION) at 15:43

## 2021-07-24 RX ADMIN — METHYLPREDNISOLONE SODIUM SUCCINATE 40 MG: 40 INJECTION, POWDER, FOR SOLUTION INTRAMUSCULAR; INTRAVENOUS at 00:53

## 2021-07-24 RX ADMIN — ENOXAPARIN SODIUM 40 MG: 40 INJECTION, SOLUTION INTRAVENOUS; SUBCUTANEOUS at 21:02

## 2021-07-24 RX ADMIN — FAMOTIDINE 20 MG: 20 TABLET ORAL at 08:07

## 2021-07-24 RX ADMIN — IPRATROPIUM BROMIDE AND ALBUTEROL SULFATE 3 ML: .5; 2.5 SOLUTION RESPIRATORY (INHALATION) at 11:28

## 2021-07-24 RX ADMIN — METOPROLOL TARTRATE 25 MG: 25 TABLET, FILM COATED ORAL at 14:06

## 2021-07-24 RX ADMIN — GUAIFENESIN 1200 MG: 600 TABLET, EXTENDED RELEASE ORAL at 21:02

## 2021-07-24 RX ADMIN — ASPIRIN 81 MG: 81 TABLET, CHEWABLE ORAL at 14:06

## 2021-07-24 RX ADMIN — ARFORMOTEROL TARTRATE 15 MCG: 15 SOLUTION RESPIRATORY (INHALATION) at 18:59

## 2021-07-24 RX ADMIN — BUDESONIDE 0.5 MG: 0.5 INHALANT ORAL at 00:44

## 2021-07-24 RX ADMIN — CEFEPIME HYDROCHLORIDE 2 G: 2 INJECTION, POWDER, FOR SOLUTION INTRAVENOUS at 21:02

## 2021-07-24 RX ADMIN — IPRATROPIUM BROMIDE AND ALBUTEROL SULFATE 3 ML: .5; 2.5 SOLUTION RESPIRATORY (INHALATION) at 23:23

## 2021-07-24 RX ADMIN — FAMOTIDINE 20 MG: 20 TABLET ORAL at 16:54

## 2021-07-24 RX ADMIN — METOPROLOL TARTRATE 25 MG: 25 TABLET, FILM COATED ORAL at 21:02

## 2021-07-24 RX ADMIN — SODIUM CHLORIDE, POTASSIUM CHLORIDE, SODIUM LACTATE AND CALCIUM CHLORIDE 100 ML/HR: 600; 310; 30; 20 INJECTION, SOLUTION INTRAVENOUS at 11:24

## 2021-07-24 NOTE — PLAN OF CARE
Goal Outcome Evaluation:  Plan of Care Reviewed With: patient        Progress: improving     Patient originally presented to ER with Increased Co2 levels requiring continuous bipap.  Once pt improved overnight pt refused to wear any longer.  Pt is tolerating 4 lpm nasal cannula well and ABG's were improved from admission.

## 2021-07-24 NOTE — PROGRESS NOTES
[facility]   Hospitalist Progress Note  Date: 2021  Patient Name: Dilip Faulkner  : 1950  MRN: [id number]  Date of admission: 2021      Subjective   Subjective     Chief Complaint: Shortness of air for couple of days    Summary:     Dilip Faulkner is a 71 y.o. male with past medical history of diabetes mellitus, chronic hypoxemic respiratory failure on home oxygen and COPD although no formal PFTs done.  Patient is bit lethargic and history obtained by talking to the ED physician and the patient along with reviewing the old record.  Patient has been having shortness of air for couple of days gradually getting worse to the point that even at rest he is short of breath.  Does have cough with thick mucus not able to bring it up.  Denies any chest pain or fever or chills.  He  Has been wheezing with no relief from home nebulizer treatment.  Patient does not want to be vaccinated for Covid 19.  Denies any exposure to COVID-19.  Denies any loss of taste or smell, vomiting diarrhea or urinary complaints.  No dizziness.  Patient was found to be hypoxemic by EMS with saturation in low 80s.  In ER he was in respiratory distress with a rate of 34.  ABG showed pH of 7.2 with PCO2 of 65, PO2 of 228 on nonrebreather 15 L/min at 100% FiO2.  CMP and C BC within normal range.  Chest x-ray no acute finding, troponin T and BNP is negative.  Patient was given steroids, nebulizer treatment and IV Rocephin and Zithromax in the ED.  Hospitalist has been called to admit him further evaluation.  Patient quit smoking about a year ago but still dips.  Patient lives with his sister at home and is independent.     Interval Followup:   Blood pressure soft.  Patient not dizzy.  On 3 L nasal cannula.  Patient does not want BiPAP and did not use it as it rick him.  Patient feels better with less shortness of air and cough.  Wondering when he can go home.    Review of Systems   All systems were reviewed and negative  except for: As per in summary and interval follow-up    Objective   Objective     Vitals:   Temp:  [97.4 °F (36.3 °C)-97.9 °F (36.6 °C)] 97.9 °F (36.6 °C)  Heart Rate:  [70-85] 74  Resp:  [16-28] 20  BP: ()/() 185/160  Flow (L/min):  [3-6] 4  Physical Exam      Constitutional:  Awake alert,     No respiratory distress              Eyes: Pupils equal, sclerae anicteric, positive conjunctival injection.  Patient not opening his eyes              HENT: NCAT, dry mucosa with thick mucus coated tongue due to to pain              Neck: Supple, no thyromegaly, no lymphadenopathy, trachea midline              Respiratory: Some wheezing bilaterally, positive labored respirations               Cardiovascular: Tachycardic, no murmurs, rubs, or gallops, palpable pedal pulses bilaterally              Gastrointestinal: Positive bowel sounds, soft, nontender, nondistended              Musculoskeletal: No bilateral ankle edema, no clubbing or cyanosis to extremities              Psychiatric: Appropriate affect, cooperative              Neurologic: Oriented x 3 Strength symmetric in all extremities, Cranial Nerves grossly intact to confrontation, speech clear              Skin: No rashes   Result Review    Result Review:  I have personally reviewed the results from the time of this admission to 7/24/2021 19:35 EDT and agree with these findings:  [x]  Laboratory  [x]  Microbiology  [x]  Radiology  [x]  EKG/Telemetry   []  Cardiology/Vascular   []  Pathology  [x]  Old records  [x]  Other: Medications    Assessment/Plan   Assessment / Plan     Assessment:  · Acute on chronic hypoxemic hypercapnic respiratory failure.  Patient on home oxygen but 2 L nasal cannula.  Improved  · Acute metabolic encephalopathy due to above.  Resolved  · Acute exacerbation of COPD.  No formal PFT done.  · NIDDM .  · Mild leukocytosis.  · Hypertension.  · GERD with air-fluid level in the esophagus  · Rule out COVID-19.  Negative         Plan:  Discussed with pulmonary critical care.  Appreciate input  CT chest noted no acute infiltrate  Continue IV cefepime and Zithromax  Await culture data  Agree with p.o. prednisone  Nebulizer treatment  Bronchopulmonary hygiene protocol  Patient refusing BiPAP.  Will not use it at home.  COVID-19 negative patient be isolated.  Sliding scale insulin DC IV fluid  Transfer out of the unit    Discussed plan with RN.    DVT prophylaxis:  Medical DVT prophylaxis orders are present.    CODE STATUS:   Level Of Support Discussed With: Patient  Code Status: CPR  Medical Interventions (Level of Support Prior to Arrest): Full      Part of this note may be an electronic transcription/translation of spoken language to printed text using the Dragon Dictation System.     Electronically signed by Sigifredo Mendes MD, 07/24/21, 7:35 PM EDT.

## 2021-07-24 NOTE — CONSULTS
Pulmonary / Critical Care Consult Note      Patient Name: Dilip Faulkner  : 1950  MRN: [id number]  Primary Care Physician:  Provider, No Known  Referring Physician: No ref. provider found  Date of admission: 2021    Subjective   Subjective     Reason for Consult/ Chief Complaint:   Hypoxic/hypercapnic respiratory failure    HPI:  Dilip Faulkner is a 71 y.o. male Past medical history significant for chronic tobacco abuse, no formal diagnosis of COPD, at home was having shortness of breath ongoing for the past few days, was having O2 saturations in the 80s at home and ended up calling EMS.  EMS found the patient with low saturations, placed him on nonrebreather and did have improvement in sats.  Patient has had associated cough with thick mucus and difficulties with producing it.  He had also been wheezing at home and tried taking nebulizer for improvement but did not help.  Patient has not been vaccinated for COVID-19.  ABGs from ED showed patient to have pH of 7.2, CO2 of 65 on nonrebreather.    Patient was placed on BiPAP, which he refused after a few hours.  He was then transitioned over to air Vo this morning and is tolerating well.  Chest x-ray was unremarkable, he was started on steroids, nebulizers, Rocephin and azithromycin and he was admitted by the hospitalist service.  Our service was consulted for critical care management of the patient.     Review of Systems  Constitutional symptoms: Fatigue, otherwise denied complaints   Ear, nose, throat: Denied complaints  Cardiovascular:  Denied complaints  Respiratory: Shortness of breath, cough, wheeze, otherwise denied complaints  Gastrointestinal: Denied complaints  Musculoskeletal: Weakness, otherwise denied complaints  Hematologic: Denied complaints  Neurologic: Denied complaints       Personal History     Past Medical History:   Diagnosis Date   • Hernia, umbilical    • Requires continuous at home supplemental oxygen        History reviewed.  No pertinent surgical history.    Family History: Cannot obtain due to confusion and lethargy with altered mental status    Social History:  reports that he quit smoking about a year ago. His smoking use included cigarettes. He does not have any smokeless tobacco history on file. He reports that he does not drink alcohol and does not use drugs.    Home Medications:  albuterol sulfate HFA, aspirin, atorvastatin, fluticasone-salmeterol, insulin degludec, ipratropium-albuterol, lisinopril, metFORMIN, metoprolol tartrate, and omeprazole    Allergies:  No Known Allergies    Objective    Objective     Vitals:   Temp:  [97.4 °F (36.3 °C)-99 °F (37.2 °C)] 97.9 °F (36.6 °C)  Heart Rate:  [70-86] 76  Resp:  [16-34] 20  BP: ()/(47-97) 100/70  Flow (L/min):  [3-60] 3    Physical Exam:  Vital Signs Reviewed   WDWN, Alert, NAD.    Groggy, easily falling asleep   HEENT:   PERRL, EOMI.  OP, nares clear  Neck:  Supple, no JVD, no thyromegaly  Chest: Diminished breath sounds bilaterally, some scattered crackles noted, tympanic to percussion bilaterally, no work of breathing noted on air Vo  CV: RRR, no MGR, pulses 2+, equal.  Abd:  Soft, NT, ND, + BS, no HSM  EXT:  no clubbing, no cyanosis, no edema  Neuro:  A&Ox3, CN grossly intact, no focal deficits.  Skin: No rashes or lesions noted      Result Review    Result Review:  I have personally reviewed the results from the time of this admission to 7/24/2021 13:50 EDT and agree with these findings:  [x]  Laboratory  [x]  Microbiology  [x]  Radiology  [x]  EKG/Telemetry   []  Cardiology/Vascular   []  Pathology  [x]  Old records  []  Other:    Assessment/Plan   Assessment / Plan     Active Hospital Problems:  Active Hospital Problems    Diagnosis    • **Acute on chronic respiratory failure with hypoxia and hypercapnia (CMS/HCC)    • COPD with acute exacerbation (CMS/HCC)    • Type 2 diabetes mellitus (CMS/HCC)    • Lethargy    • Leukocytosis         IMPRESSION  Acute hypoxic and  hypercapnic respiratory failure requiring NIPPV and air Vo  Altered mental status  Toxic/metabolic cephalopathy acute  COPD exacerbation, no formal PFTs performed  Non-insulin-dependent diabetes mellitus with hyperglycemia  Hypertension  GERD  Rule out COVID-19  ?  Esophageal dysmotility     PLAN  Antibiotics: Azithromycin and cefepime, day 1  Pancultures, de-escalate antibiotics based on cultures  Continue BiPAP 14/7 as needed, may use air Vo and wean to high flow, has been refusing BiPAP.  Continue air Vo.  Wean O2 to keep SPO2 greater than 90%  Nebulizers: DuoNeb, Pulmicort, Brovana  IV fluids: LR at 100 cc/h  Urine antigens negative, mycoplasma antibody, negative  Start prednisone 40 mg daily x7 days   Airway clearance: Bronchopulmonary hygiene protocol  Sliding scale insulin  GI prophylaxis: Pepcid        DVT prophylaxis:  Medical DVT prophylaxis orders are present.     Code Status and Medical Interventions:   Ordered at: 07/23/21 1854     Level Of Support Discussed With:    Patient     Code Status:    CPR     Medical Interventions (Level of Support Prior to Arrest):    Full      Okay to move out of ICU      The patient is critically ill in the ICU with COPD exacerbation, acute on chronic hypoxic and hypercapnic respiratory failure. Multidisciplinary bedside critical care rounds were performed with nursing staff, respiratory therapy, pharmacy, nutritional services, social work. I have personally reviewed the chart, labs and any pertinent imaging available.  I have spent 30 minutes of critical care time, excluding procedures, in the care of this patient.    Electronically signed by RIMA Lares, 07/24/21, 12:27 PM EDT.  Electronically signed by Mu Guardado MD, 07/24/21, 2:00 PM EDT.

## 2021-07-25 LAB
ALBUMIN SERPL-MCNC: 3.1 G/DL (ref 3.5–5.2)
ALBUMIN/GLOB SERPL: 1 G/DL
ALP SERPL-CCNC: 67 U/L (ref 39–117)
ALT SERPL W P-5'-P-CCNC: 14 U/L (ref 1–41)
ANION GAP SERPL CALCULATED.3IONS-SCNC: 9 MMOL/L (ref 5–15)
AST SERPL-CCNC: 19 U/L (ref 1–40)
BASOPHILS # BLD AUTO: 0.03 10*3/MM3 (ref 0–0.2)
BASOPHILS NFR BLD AUTO: 0.3 % (ref 0–1.5)
BILIRUB SERPL-MCNC: 0.3 MG/DL (ref 0–1.2)
BUN SERPL-MCNC: 25 MG/DL (ref 8–23)
BUN/CREAT SERPL: 23.8 (ref 7–25)
CALCIUM SPEC-SCNC: 8.7 MG/DL (ref 8.6–10.5)
CHLORIDE SERPL-SCNC: 102 MMOL/L (ref 98–107)
CO2 SERPL-SCNC: 28 MMOL/L (ref 22–29)
CREAT SERPL-MCNC: 1.05 MG/DL (ref 0.76–1.27)
DEPRECATED RDW RBC AUTO: 46.3 FL (ref 37–54)
EOSINOPHIL # BLD AUTO: 0.2 10*3/MM3 (ref 0–0.4)
EOSINOPHIL NFR BLD AUTO: 2 % (ref 0.3–6.2)
ERYTHROCYTE [DISTWIDTH] IN BLOOD BY AUTOMATED COUNT: 14.4 % (ref 12.3–15.4)
GFR SERPL CREATININE-BSD FRML MDRD: 69 ML/MIN/1.73
GLOBULIN UR ELPH-MCNC: 3 GM/DL
GLUCOSE BLDC GLUCOMTR-MCNC: 108 MG/DL (ref 70–99)
GLUCOSE BLDC GLUCOMTR-MCNC: 124 MG/DL (ref 70–99)
GLUCOSE BLDC GLUCOMTR-MCNC: 92 MG/DL (ref 70–99)
GLUCOSE SERPL-MCNC: 95 MG/DL (ref 65–99)
HCT VFR BLD AUTO: 39.9 % (ref 37.5–51)
HGB BLD-MCNC: 11.9 G/DL (ref 13–17.7)
IMM GRANULOCYTES # BLD AUTO: 0.03 10*3/MM3 (ref 0–0.05)
IMM GRANULOCYTES NFR BLD AUTO: 0.3 % (ref 0–0.5)
LYMPHOCYTES # BLD AUTO: 1.39 10*3/MM3 (ref 0.7–3.1)
LYMPHOCYTES NFR BLD AUTO: 13.8 % (ref 19.6–45.3)
MAGNESIUM SERPL-MCNC: 2.3 MG/DL (ref 1.6–2.4)
MCH RBC QN AUTO: 26.3 PG (ref 26.6–33)
MCHC RBC AUTO-ENTMCNC: 29.8 G/DL (ref 31.5–35.7)
MCV RBC AUTO: 88.3 FL (ref 79–97)
MONOCYTES # BLD AUTO: 0.69 10*3/MM3 (ref 0.1–0.9)
MONOCYTES NFR BLD AUTO: 6.8 % (ref 5–12)
NEUTROPHILS NFR BLD AUTO: 7.74 10*3/MM3 (ref 1.7–7)
NEUTROPHILS NFR BLD AUTO: 76.8 % (ref 42.7–76)
NRBC BLD AUTO-RTO: 0 /100 WBC (ref 0–0.2)
PLATELET # BLD AUTO: 173 10*3/MM3 (ref 140–450)
PMV BLD AUTO: 10.7 FL (ref 6–12)
POTASSIUM SERPL-SCNC: 4.2 MMOL/L (ref 3.5–5.2)
PROT SERPL-MCNC: 6.1 G/DL (ref 6–8.5)
RBC # BLD AUTO: 4.52 10*6/MM3 (ref 4.14–5.8)
SODIUM SERPL-SCNC: 139 MMOL/L (ref 136–145)
WBC # BLD AUTO: 10.08 10*3/MM3 (ref 3.4–10.8)

## 2021-07-25 PROCEDURE — 25010000002 ENOXAPARIN PER 10 MG: Performed by: INTERNAL MEDICINE

## 2021-07-25 PROCEDURE — 94799 UNLISTED PULMONARY SVC/PX: CPT

## 2021-07-25 PROCEDURE — 80053 COMPREHEN METABOLIC PANEL: CPT | Performed by: INTERNAL MEDICINE

## 2021-07-25 PROCEDURE — 63710000001 PREDNISONE PER 1 MG: Performed by: INTERNAL MEDICINE

## 2021-07-25 PROCEDURE — 82962 GLUCOSE BLOOD TEST: CPT

## 2021-07-25 PROCEDURE — 99233 SBSQ HOSP IP/OBS HIGH 50: CPT | Performed by: INTERNAL MEDICINE

## 2021-07-25 PROCEDURE — 99232 SBSQ HOSP IP/OBS MODERATE 35: CPT | Performed by: INTERNAL MEDICINE

## 2021-07-25 PROCEDURE — 83735 ASSAY OF MAGNESIUM: CPT | Performed by: INTERNAL MEDICINE

## 2021-07-25 PROCEDURE — 85025 COMPLETE CBC W/AUTO DIFF WBC: CPT | Performed by: INTERNAL MEDICINE

## 2021-07-25 RX ORDER — IPRATROPIUM BROMIDE AND ALBUTEROL SULFATE 2.5; .5 MG/3ML; MG/3ML
3 SOLUTION RESPIRATORY (INHALATION) EVERY 4 HOURS PRN
Status: DISCONTINUED | OUTPATIENT
Start: 2021-07-25 | End: 2021-07-26 | Stop reason: HOSPADM

## 2021-07-25 RX ADMIN — BUDESONIDE 0.5 MG: 0.5 INHALANT ORAL at 19:43

## 2021-07-25 RX ADMIN — ARFORMOTEROL TARTRATE 15 MCG: 15 SOLUTION RESPIRATORY (INHALATION) at 19:43

## 2021-07-25 RX ADMIN — BUDESONIDE 0.5 MG: 0.5 INHALANT ORAL at 07:06

## 2021-07-25 RX ADMIN — GUAIFENESIN 1200 MG: 600 TABLET, EXTENDED RELEASE ORAL at 21:32

## 2021-07-25 RX ADMIN — ASPIRIN 81 MG: 81 TABLET, CHEWABLE ORAL at 07:53

## 2021-07-25 RX ADMIN — FAMOTIDINE 20 MG: 20 TABLET ORAL at 17:17

## 2021-07-25 RX ADMIN — ARFORMOTEROL TARTRATE 15 MCG: 15 SOLUTION RESPIRATORY (INHALATION) at 07:05

## 2021-07-25 RX ADMIN — AZITHROMYCIN MONOHYDRATE 250 MG: 250 TABLET ORAL at 09:03

## 2021-07-25 RX ADMIN — ENOXAPARIN SODIUM 40 MG: 40 INJECTION, SOLUTION INTRAVENOUS; SUBCUTANEOUS at 21:31

## 2021-07-25 RX ADMIN — GUAIFENESIN 1200 MG: 600 TABLET, EXTENDED RELEASE ORAL at 09:03

## 2021-07-25 RX ADMIN — FAMOTIDINE 20 MG: 20 TABLET ORAL at 07:52

## 2021-07-25 RX ADMIN — METOPROLOL TARTRATE 25 MG: 25 TABLET, FILM COATED ORAL at 21:32

## 2021-07-25 RX ADMIN — PREDNISONE 40 MG: 20 TABLET ORAL at 07:52

## 2021-07-25 RX ADMIN — METOPROLOL TARTRATE 25 MG: 25 TABLET, FILM COATED ORAL at 07:52

## 2021-07-25 RX ADMIN — IPRATROPIUM BROMIDE AND ALBUTEROL SULFATE 3 ML: .5; 2.5 SOLUTION RESPIRATORY (INHALATION) at 07:06

## 2021-07-25 RX ADMIN — ATORVASTATIN CALCIUM 10 MG: 10 TABLET, FILM COATED ORAL at 21:31

## 2021-07-25 NOTE — PROGRESS NOTES
[facility]   Hospitalist Progress Note  Date: 2021  Patient Name: Dilip Faulkner  : 1950  MRN: [id number]  Date of admission: 2021      Subjective   Subjective     Chief Complaint: Shortness of air for couple of days    Summary:     Dilip Faulkner is a 71 y.o. male with past medical history of diabetes mellitus, chronic hypoxemic respiratory failure on home oxygen and COPD although no formal PFTs done.  Patient is bit lethargic and history obtained by talking to the ED physician and the patient along with reviewing the old record.  Patient has been having shortness of air for couple of days gradually getting worse to the point that even at rest he is short of breath.  Does have cough with thick mucus not able to bring it up.  Denies any chest pain or fever or chills.  He  Has been wheezing with no relief from home nebulizer treatment.  Patient does not want to be vaccinated for Covid 19.  Denies any exposure to COVID-19.  Denies any loss of taste or smell, vomiting diarrhea or urinary complaints.  No dizziness.  Patient was found to be hypoxemic by EMS with saturation in low 80s.  In ER he was in respiratory distress with a rate of 34.  ABG showed pH of 7.2 with PCO2 of 65, PO2 of 228 on nonrebreather 15 L/min at 100% FiO2.  CMP and C BC within normal range.  Chest x-ray no acute finding, troponin T and BNP is negative.  Patient was given steroids, nebulizer treatment and IV Rocephin and Zithromax in the ED.  Hospitalist has been called to admit him further evaluation.  Patient quit smoking about a year ago but still dips.  Patient lives with his sister at home and is independent.     Interval Followup:   Blood pressure soft.  Patient not dizzy.  On 3 L nasal cannula.  Patient does not want BiPAP and did not use it as it rick him.  Patient feels better with less shortness of air and cough.  Wondering when he can go home.    Review of Systems   All systems were reviewed and negative  except for: As per in summary and interval follow-up    Objective   Objective     Vitals:   Temp:  [97.5 °F (36.4 °C)-98.2 °F (36.8 °C)] 97.5 °F (36.4 °C)  Heart Rate:  [55-77] 65  Resp:  [16-30] 20  BP: ()/() 131/83  Flow (L/min):  [4] 4  Physical Exam      Constitutional:  Awake alert,     No respiratory distress              Eyes: Pupils equal, sclerae anicteric, positive conjunctival injection.  Patient not opening his eyes              HENT: NCAT, dry mucosa with thick mucus coated tongue due to to pain              Neck: Supple, no thyromegaly, no lymphadenopathy, trachea midline              Respiratory: Some wheezing bilaterally, positive labored respirations               Cardiovascular: Tachycardic, no murmurs, rubs, or gallops, palpable pedal pulses bilaterally              Gastrointestinal: Positive bowel sounds, soft, nontender, nondistended              Musculoskeletal: No bilateral ankle edema, no clubbing or cyanosis to extremities              Psychiatric: Appropriate affect, cooperative              Neurologic: Oriented x 3 Strength symmetric in all extremities, Cranial Nerves grossly intact to confrontation, speech clear              Skin: No rashes   Result Review    Result Review:  I have personally reviewed the results from the time of this admission to 7/25/2021 17:14 EDT and agree with these findings:  [x]  Laboratory  [x]  Microbiology  [x]  Radiology  [x]  EKG/Telemetry   []  Cardiology/Vascular   []  Pathology  [x]  Old records  [x]  Other: Medications    Assessment/Plan   Assessment / Plan     Assessment:  · Acute on chronic hypoxemic hypercapnic respiratory failure.  Patient on home oxygen but 2 L nasal cannula.  Improved  · Acute metabolic encephalopathy due to above.  Resolved  · Acute exacerbation of COPD.  No formal PFT done.  · NIDDM .  Hemoglobin A1c of 6.6%  · Mild leukocytosis.  · Hypertension.  · GERD with air-fluid level in the esophagus  · Rule out COVID-19.   Negative        Plan:  Discussed with pulmonary critical care.  Appreciate input  CT chest noted no acute infiltrate  Continue  Zithromax.  Cefepime DC'd  Await culture data.  Sputum culture pending   p.o. prednisone  Nebulizer treatment  Bronchopulmonary hygiene protocol  Patient refusing BiPAP.  Will not use it at home.  DC RT case management consult  COVID-19 negative patient be isolated.  Sliding scale insulin   Transfer out of the unit    Discussed plan with RN.  Likely DC home in the morning    DVT prophylaxis:  Medical DVT prophylaxis orders are present.    CODE STATUS:   Level Of Support Discussed With: Patient  Code Status: CPR  Medical Interventions (Level of Support Prior to Arrest): Full      Part of this note may be an electronic transcription/translation of spoken language to printed text using the Dragon Dictation System.       Electronically signed by Sigifredo Mendes MD, 07/25/21, 5:15 PM EDT.

## 2021-07-25 NOTE — PLAN OF CARE
Goal Outcome Evaluation:    Patient was transferred to floor during this shift. Has had no complaints. Currently sitting up in bed, on 4 liters NC. Will continue to monitor.

## 2021-07-25 NOTE — PROGRESS NOTES
Pulmonary / Critical Care Progress Note      Patient Name: Dilip Faulkner  : 1950  MRN: [id number]  Attending:  Sigifredo Mendes MD  Date of admission: 2021    Subjective   Subjective   Follow-up for COPD exacerbation, shortness of breath    Has been refusing bipap.   Sats around 94% this morning on 4L nasal cannula.   All clx’s negative thus far.   CT chest w/ some atelectasis/emphysema in lungs.   Had air fluid level in esophagus on CT.  Awaiting floor bed.  Dyspnea improved  Has scant dry cough no sputum production or hemoptysis  No chest pain or hemoptysis  No nausea, fevers or chills       Review of Systems  General: Denied complaints  Cardiovascular:  Denied complaints  Respiratory: Dyspnea, cough denied complaints  Gastrointestinal: Denied complaints      Objective   Objective     Vitals:   Temp:  [98.2 °F (36.8 °C)] 98.2 °F (36.8 °C)  Heart Rate:  [55-77] 58  Resp:  [16-30] 24  BP: ()/() 103/63  Flow (L/min):  [4] 4    Physical Exam   Vital Signs Reviewed   WDWN, Alert, NAD.   Sitting up in bed getting blood sugar taken.  HEENT:   PERRL, EOMI.  OP, nares clear  Neck:  Supple, no JVD, no thyromegaly  Chest: Barrel chested, diminished breath sounds bilaterally, some scattered crackles noted, tympanic to percussion bilaterally, on nasal cannula, pursed lip breathing noted  CV: RRR, no MGR, pulses 2+, equal.  Abd:  Soft, NT, ND, + BS, no HSM  EXT:  no clubbing, no cyanosis, no edema  Neuro:  A&Ox3, CN grossly intact, no focal deficits.  Skin: No rashes or lesions note      Result Review    Result Review:  I have personally reviewed the results from the time of this admission to 2021 14:03 EDT and agree with these findings:  [x]  Laboratory  [x]  Microbiology  [x]  Radiology  [x]  EKG/Telemetry   []  Cardiology/Vascular   []  Pathology  []  Old records  []  Other:      Assessment/Plan   Assessment / Plan     Active Hospital Problems:  Active Hospital Problems    Diagnosis    •  **Acute on chronic respiratory failure with hypoxia and hypercapnia (CMS/HCC)    • COPD with acute exacerbation (CMS/HCC)    • Type 2 diabetes mellitus (CMS/HCC)    • Leukocytosis      IMPRESSION  Acute hypoxic and hypercapnic respiratory failure requiring NIPPV and air Vo  Altered mental status  Toxic/metabolic cephalopathy acute  COPD exacerbation, no formal PFTs performed  Non-insulin-dependent diabetes mellitus with hyperglycemia  Hypertension  GERD  Rule out COVID-19  ?  Esophageal dysmotility     PLAN  · Antibiotics: Can stop cefepime, continue with azithromycin.  · Supplemental O2 to keep sats greater than 90%.  · Nebulizers: DuoNeb, Pulmicort, Brovana  · DC IV fluids  · Urine antigens negative, mycoplasma antibody, negative  · Continue prednisone 40 mg daily x7 days   · Airway clearance: Bronchopulmonary hygiene protocol  · Sliding scale insulin  · GI prophylaxis: Pepcid  · Can go out of the ICU at the discretion of the primary service.    DVT prophylaxis:  Medical DVT prophylaxis orders are present.    CODE STATUS:   Level Of Support Discussed With: Patient  Code Status: CPR  Medical Interventions (Level of Support Prior to Arrest): Full      Labs, microbiology, imaging and notes personally reviewed  Discussed on multidisciplinary critical care rounds    Electronically signed by RIMA Lares, 07/25/21, 8:46 AM EDT.  Electronically signed by Mu Guardado MD, 07/25/21, 2:05 PM EDT.

## 2021-07-26 VITALS
HEIGHT: 73 IN | WEIGHT: 234.79 LBS | DIASTOLIC BLOOD PRESSURE: 73 MMHG | SYSTOLIC BLOOD PRESSURE: 129 MMHG | BODY MASS INDEX: 31.12 KG/M2 | RESPIRATION RATE: 20 BRPM | TEMPERATURE: 97.3 F | HEART RATE: 54 BPM | OXYGEN SATURATION: 93 %

## 2021-07-26 PROBLEM — J96.22 ACUTE ON CHRONIC RESPIRATORY FAILURE WITH HYPOXIA AND HYPERCAPNIA: Status: RESOLVED | Noted: 2021-07-23 | Resolved: 2021-07-26

## 2021-07-26 PROBLEM — J44.1 COPD WITH ACUTE EXACERBATION: Status: RESOLVED | Noted: 2021-07-23 | Resolved: 2021-07-26

## 2021-07-26 PROBLEM — D72.829 LEUKOCYTOSIS: Status: RESOLVED | Noted: 2021-07-23 | Resolved: 2021-07-26

## 2021-07-26 PROBLEM — J96.21 ACUTE ON CHRONIC RESPIRATORY FAILURE WITH HYPOXIA AND HYPERCAPNIA: Status: RESOLVED | Noted: 2021-07-23 | Resolved: 2021-07-26

## 2021-07-26 LAB
ALBUMIN SERPL-MCNC: 3.6 G/DL (ref 3.5–5.2)
ANION GAP SERPL CALCULATED.3IONS-SCNC: 7.3 MMOL/L (ref 5–15)
BACTERIA SPEC RESP CULT: NORMAL
BASOPHILS # BLD AUTO: 0.02 10*3/MM3 (ref 0–0.2)
BASOPHILS NFR BLD AUTO: 0.3 % (ref 0–1.5)
BUN SERPL-MCNC: 21 MG/DL (ref 8–23)
BUN/CREAT SERPL: 17.6 (ref 7–25)
CALCIUM SPEC-SCNC: 9.1 MG/DL (ref 8.6–10.5)
CHLORIDE SERPL-SCNC: 101 MMOL/L (ref 98–107)
CO2 SERPL-SCNC: 30.7 MMOL/L (ref 22–29)
CREAT SERPL-MCNC: 1.19 MG/DL (ref 0.76–1.27)
DEPRECATED RDW RBC AUTO: 45.6 FL (ref 37–54)
EOSINOPHIL # BLD AUTO: 0.03 10*3/MM3 (ref 0–0.4)
EOSINOPHIL NFR BLD AUTO: 0.4 % (ref 0.3–6.2)
ERYTHROCYTE [DISTWIDTH] IN BLOOD BY AUTOMATED COUNT: 14.4 % (ref 12.3–15.4)
GFR SERPL CREATININE-BSD FRML MDRD: 60 ML/MIN/1.73
GLUCOSE BLDC GLUCOMTR-MCNC: 119 MG/DL (ref 70–99)
GLUCOSE BLDC GLUCOMTR-MCNC: 152 MG/DL (ref 70–99)
GLUCOSE SERPL-MCNC: 109 MG/DL (ref 65–99)
GRAM STN SPEC: NORMAL
GRAM STN SPEC: NORMAL
HCT VFR BLD AUTO: 43.5 % (ref 37.5–51)
HGB BLD-MCNC: 13.1 G/DL (ref 13–17.7)
IMM GRANULOCYTES # BLD AUTO: 0.03 10*3/MM3 (ref 0–0.05)
IMM GRANULOCYTES NFR BLD AUTO: 0.4 % (ref 0–0.5)
LYMPHOCYTES # BLD AUTO: 1.07 10*3/MM3 (ref 0.7–3.1)
LYMPHOCYTES NFR BLD AUTO: 13.8 % (ref 19.6–45.3)
MAGNESIUM SERPL-MCNC: 2.3 MG/DL (ref 1.6–2.4)
MCH RBC QN AUTO: 26.4 PG (ref 26.6–33)
MCHC RBC AUTO-ENTMCNC: 30.1 G/DL (ref 31.5–35.7)
MCV RBC AUTO: 87.5 FL (ref 79–97)
MONOCYTES # BLD AUTO: 0.52 10*3/MM3 (ref 0.1–0.9)
MONOCYTES NFR BLD AUTO: 6.7 % (ref 5–12)
NEUTROPHILS NFR BLD AUTO: 6.08 10*3/MM3 (ref 1.7–7)
NEUTROPHILS NFR BLD AUTO: 78.4 % (ref 42.7–76)
NRBC BLD AUTO-RTO: 0 /100 WBC (ref 0–0.2)
PHOSPHATE SERPL-MCNC: 2.6 MG/DL (ref 2.5–4.5)
PLATELET # BLD AUTO: 199 10*3/MM3 (ref 140–450)
PMV BLD AUTO: 10.9 FL (ref 6–12)
POTASSIUM SERPL-SCNC: 4.5 MMOL/L (ref 3.5–5.2)
RBC # BLD AUTO: 4.97 10*6/MM3 (ref 4.14–5.8)
SODIUM SERPL-SCNC: 139 MMOL/L (ref 136–145)
WBC # BLD AUTO: 7.75 10*3/MM3 (ref 3.4–10.8)

## 2021-07-26 PROCEDURE — 85025 COMPLETE CBC W/AUTO DIFF WBC: CPT | Performed by: PHYSICIAN ASSISTANT

## 2021-07-26 PROCEDURE — 63710000001 PREDNISONE PER 1 MG: Performed by: INTERNAL MEDICINE

## 2021-07-26 PROCEDURE — 63710000001 INSULIN LISPRO (HUMAN) PER 5 UNITS: Performed by: INTERNAL MEDICINE

## 2021-07-26 PROCEDURE — 94799 UNLISTED PULMONARY SVC/PX: CPT

## 2021-07-26 PROCEDURE — 99239 HOSP IP/OBS DSCHRG MGMT >30: CPT | Performed by: INTERNAL MEDICINE

## 2021-07-26 PROCEDURE — 25010000002 FUROSEMIDE PER 20 MG: Performed by: INTERNAL MEDICINE

## 2021-07-26 PROCEDURE — 99232 SBSQ HOSP IP/OBS MODERATE 35: CPT | Performed by: INTERNAL MEDICINE

## 2021-07-26 PROCEDURE — 82962 GLUCOSE BLOOD TEST: CPT

## 2021-07-26 PROCEDURE — 83735 ASSAY OF MAGNESIUM: CPT | Performed by: PHYSICIAN ASSISTANT

## 2021-07-26 PROCEDURE — 80069 RENAL FUNCTION PANEL: CPT | Performed by: PHYSICIAN ASSISTANT

## 2021-07-26 RX ORDER — PREDNISONE 20 MG/1
40 TABLET ORAL
Qty: 10 TABLET | Refills: 0 | Status: SHIPPED | OUTPATIENT
Start: 2021-07-27 | End: 2021-08-01

## 2021-07-26 RX ORDER — FUROSEMIDE 10 MG/ML
40 INJECTION INTRAMUSCULAR; INTRAVENOUS ONCE
Status: COMPLETED | OUTPATIENT
Start: 2021-07-26 | End: 2021-07-26

## 2021-07-26 RX ORDER — AZITHROMYCIN 250 MG/1
TABLET, FILM COATED ORAL
Qty: 3 TABLET | Refills: 0 | Status: SHIPPED | OUTPATIENT
Start: 2021-07-27 | End: 2021-10-22

## 2021-07-26 RX ADMIN — FUROSEMIDE 40 MG: 10 INJECTION, SOLUTION INTRAMUSCULAR; INTRAVENOUS at 07:50

## 2021-07-26 RX ADMIN — BUDESONIDE 0.5 MG: 0.5 INHALANT ORAL at 07:31

## 2021-07-26 RX ADMIN — FAMOTIDINE 20 MG: 20 TABLET ORAL at 07:50

## 2021-07-26 RX ADMIN — PREDNISONE 40 MG: 20 TABLET ORAL at 10:58

## 2021-07-26 RX ADMIN — INSULIN LISPRO 2 UNITS: 100 INJECTION, SOLUTION INTRAVENOUS; SUBCUTANEOUS at 12:27

## 2021-07-26 RX ADMIN — ARFORMOTEROL TARTRATE 15 MCG: 15 SOLUTION RESPIRATORY (INHALATION) at 07:31

## 2021-07-26 RX ADMIN — GUAIFENESIN 1200 MG: 600 TABLET, EXTENDED RELEASE ORAL at 10:59

## 2021-07-26 RX ADMIN — ASPIRIN 81 MG: 81 TABLET, CHEWABLE ORAL at 10:58

## 2021-07-26 RX ADMIN — METOPROLOL TARTRATE 25 MG: 25 TABLET, FILM COATED ORAL at 10:58

## 2021-07-26 RX ADMIN — AZITHROMYCIN MONOHYDRATE 250 MG: 250 TABLET ORAL at 10:58

## 2021-07-26 NOTE — PROGRESS NOTES
Pulmonary / Critical Care Progress Note      Patient Name: Dilip Faulkner  : 1950  MRN: [id number]  Attending:  Sigifredo Mendes MD  Date of admission: 2021    Subjective   Subjective   Follow-up for COPD exacerbation, shortness of breath    Moved out of ICU yesterday  Refusing BiPAP  Doing well on 4 L of oxygen  Has hacking cough with thin clear sputum  Dyspnea improved  No wheezing  Weak and fatigued  No chest pain or hemoptysis  No nausea, fevers or chills       Review of Systems  General: Denied complaints  Cardiovascular:  Denied complaints  Respiratory: Dyspnea, cough denied complaints  Gastrointestinal: Denied complaints      Objective   Objective     Vitals:   Temp:  [97.4 °F (36.3 °C)-97.8 °F (36.6 °C)] 97.8 °F (36.6 °C)  Heart Rate:  [53-66] 56  Resp:  [18-20] 18  BP: (115-145)/(72-83) 126/76  Flow (L/min):  [2.5-4] 4    Physical Exam   Vital Signs Reviewed   WDWN, Alert, NAD.   Sitting up in bed getting blood sugar taken.  HEENT:   PERRL, EOMI.  OP, nares clear  Neck:  Supple, no JVD, no thyromegaly  Chest: Barrel chested, diminished breath sounds bilaterally, rhonchi and crackles noted, tympanic to percussion bilaterally, on nasal cannula, pursed lip breathing noted  CV: RRR, no MGR, pulses 2+, equal.  Abd:  Soft, NT, ND, + BS, no HSM  EXT:  no clubbing, no cyanosis, no edema  Neuro:  A&Ox3, CN grossly intact, no focal deficits.  Skin: No rashes or lesions note      Result Review    Result Review:  I have personally reviewed the results from the time of this admission to 2021 10:54 EDT and agree with these findings:  [x]  Laboratory  [x]  Microbiology  []  Radiology  []  EKG/Telemetry   []  Cardiology/Vascular   []  Pathology  []  Old records  []  Other:      Assessment/Plan   Assessment / Plan     Active Hospital Problems:  Active Hospital Problems    Diagnosis    • **Acute on chronic respiratory failure with hypoxia and hypercapnia (CMS/HCC)    • COPD with acute exacerbation  (CMS/[facility])    • Type 2 diabetes mellitus (CMS/[facility])    • Leukocytosis      IMPRESSION  Acute hypoxic and hypercapnic respiratory failure requiring NIPPV and air Vo  Altered mental status  Toxic/metabolic cephalopathy acute  COPD exacerbation, no formal PFTs performed  Non-insulin-dependent diabetes mellitus with hyperglycemia  Hypertension  GERD  Rule out COVID-19  ?  Esophageal dysmotility     PLAN  · Finish Z-Farrukh finish 7 days of prednisone 40 mg daily  ·   · Give 40 mg IV Lasix x1 and monitor response  · Wean O2 to keep SPO2 greater than 90%  · Continue nebulizers and bronchopulmonary hygiene  · Uses BiPAP.  Refuses assessment for home NIPPV  · Encourage activity    DVT prophylaxis:  Medical DVT prophylaxis orders are present.    CODE STATUS:   Level Of Support Discussed With: Patient  Code Status: CPR  Medical Interventions (Level of Support Prior to Arrest): Full      Labs, microbiology and notes personally reviewed  Discussed with primary    Electronically signed by Mu Guardado MD, 07/26/21, 10:55 AM EDT.

## 2021-07-26 NOTE — PLAN OF CARE
Goal Outcome Evaluation:  Plan of Care Reviewed With: patient           Outcome Summary: Patient on home O2 requirement of 4l/nc.  no compalints this shift.  States he is ready for discharge.

## 2021-07-26 NOTE — DISCHARGE SUMMARY
[facility]        HOSPITALIST  DISCHARGE SUMMARY    Patient Name: Dilip Faulkner  : 1950  MRN: [id number]    Date of Admission: 2021  Date of Discharge:  2021  Primary Care Physician: Provider, No Known    Consults     Date and Time Order Name Status Description    2021  8:23 PM Inpatient Pulmonology Consult Completed     2021  5:22 PM Inpatient Hospitalist Consult Completed           Active and Resolved Hospital Problems:  Active Hospital Problems    Diagnosis POA   • Type 2 diabetes mellitus (CMS/HCC) [E11.9] Yes      Resolved Hospital Problems    Diagnosis POA   • **Acute on chronic respiratory failure with hypoxia and hypercapnia (CMS/HCC) [J96.21, J96.22] Yes   • COPD with acute exacerbation (CMS/HCC) [J44.1] Yes   • Lethargy [R53.83] Yes   • Leukocytosis [D72.829] Yes       Hospital Course     Hospital Course:  Dilip Faulkner is a 71 y.o. male with past medical history of diabetes mellitus, chronic hypoxemic respiratory failure on home oxygen and COPD although no formal PFTs done.  Patient is bit lethargic and history obtained by talking to the ED physician and the patient along with reviewing the old record.  Patient has been having shortness of air for couple of days gradually getting worse to the point that even at rest he is short of breath.  Does have cough with thick mucus not able to bring it up.  Denies any chest pain or fever or chills.  He  Has been wheezing with no relief from home nebulizer treatment.  Patient does not want to be vaccinated for Covid 19.  Denies any exposure to COVID-19.  Denies any loss of taste or smell, vomiting diarrhea or urinary complaints.  No dizziness.  Patient was found to be hypoxemic by EMS with saturation in low 80s.  In ER he was in respiratory distress with a rate of 34.  ABG showed pH of 7.2 with PCO2 of 65, PO2 of 228 on nonrebreather 15 L/min at 100% FiO2.  CMP and C BC within normal range.  Chest x-ray no acute  finding, troponin T and BNP is negative.  Patient was given steroids, nebulizer treatment and IV Rocephin and Zithromax in the ED.  Hospitalist has been called to admit him further evaluation.  Patient quit smoking about a year ago but still dips.  Patient lives with his sister at home and is independent.  Patient was admitted to CCU and did fine overnight.  Patient absolutely refused to use BiPAP as his mother him.  His ABG improved he was awake alert oriented cough and shortness of breath got better and was moved out of the unit  Interval Followup:   Blood pressure soft.  Patient not dizzy.  On 3 L nasal cannula.  Patient does not want BiPAP and did not use it as it rick him.  Patient feels better with less shortness of air and cough.  Patient is back to baseline  Wanting to go home.  Patient seen by pulmonary care.  IV antibiotics has been DC'd IV steroids been changed to p.o.  All culture remain negative including sputum culture           DISCHARGE Follow Up Recommendations for labs and diagnostics: PCP and lung doctor      Day of Discharge     Vital Signs:  Temp:  [97.3 °F (36.3 °C)-97.8 °F (36.6 °C)] 97.3 °F (36.3 °C)  Heart Rate:  [53-66] 54  Resp:  [18-20] 20  BP: (114-145)/(73-78) 129/73  Flow (L/min):  [2.5-4] 2.5    Physical Exam:   Constitutional:  Awake alert,     No respiratory distress              Eyes: Pupils equal, sclerae anicteric, positive conjunctival injection.  Patient not opening his eyes              HENT: NCAT, dry mucosa with thick mucus coated tongue due to to pain              Neck: Supple, no thyromegaly, no lymphadenopathy, trachea midline              Respiratory: Some wheezing bilaterally, positive labored respirations               Cardiovascular: Tachycardic, no murmurs, rubs, or gallops, palpable pedal pulses bilaterally              Gastrointestinal: Positive bowel sounds, soft, nontender, nondistended              Musculoskeletal: No bilateral ankle edema, no clubbing or  cyanosis to extremities              Psychiatric: Appropriate affect, cooperative              Neurologic: Oriented x 3 Strength symmetric in all extremities, Cranial Nerves grossly intact to confrontation, speech clear              Skin: No rashes     Discharge Details        Discharge Medications      New Medications      Instructions Start Date   azithromycin 250 MG tablet  Commonly known as: ZITHROMAX   Once a day for 3 days   Start Date: July 27, 2021     predniSONE 20 MG tablet  Commonly known as: DELTASONE   40 mg, Oral, Daily With Breakfast   Start Date: July 27, 2021        Continue These Medications      Instructions Start Date   Advair Diskus 250-50 MCG/DOSE DISKUS  Generic drug: fluticasone-salmeterol   1 puff, Inhalation, 2 Times Daily - RT      albuterol sulfate  (90 Base) MCG/ACT inhaler  Commonly known as: PROVENTIL HFA;VENTOLIN HFA;PROAIR HFA   2 puffs, Inhalation, Every 6 Hours PRN      aspirin 81 MG chewable tablet   81 mg, Oral, Daily      atorvastatin 10 MG tablet  Commonly known as: LIPITOR   10 mg, Oral, Daily      ipratropium-albuterol 0.5-2.5 mg/3 ml nebulizer  Commonly known as: DUO-NEB   3 mL, Inhalation, Every 4 Hours PRN      lisinopril 2.5 MG tablet  Commonly known as: PRINIVIL,ZESTRIL   2.5 mg, Oral, Daily      metFORMIN 500 MG tablet  Commonly known as: GLUCOPHAGE   500 mg, Oral, 2 times daily      metoprolol tartrate 25 MG tablet  Commonly known as: LOPRESSOR   25 mg, Oral, 2 times daily      omeprazole 20 MG capsule  Commonly known as: priLOSEC   20 mg, Oral, Daily      Tresiba FlexTouch 100 UNIT/ML solution pen-injector injection  Generic drug: insulin degludec   17 Units, Subcutaneous, Daily             No Known Allergies    Discharge Disposition:  Home or Self Care in a cab    Diet:  Diet Instructions     Diet: Consistent Carbohydrate      Discharge Diet: Consistent Carbohydrate          Discharge Activity:   Activity Instructions     Activity as Tolerated            CODE  STATUS:  Code Status and Medical Interventions:   Ordered at: 07/23/21 1854     Level Of Support Discussed With:    Patient     Code Status:    CPR     Medical Interventions (Level of Support Prior to Arrest):    Full         Future Appointments   Date Time Provider Department Center   11/22/2021  8:30 AM Leandro Galeano, DO [facility]       Additional Instructions for the Follow-ups that You Need to Schedule     Discharge Follow-up with PCP   As directed       Currently Documented PCP:    Provider, No Known    PCP Phone Number:    None     Follow Up Details: 1 week         Discharge Follow-up with Specialty: Pulmonary; 2 Weeks   As directed      Specialty: Pulmonary    Follow Up: 2 Weeks    Follow Up Details: COPD               Pertinent  and/or Most Recent Results     PROCEDURES:   * Cannot find OR case *     LAB RESULTS:      Lab 07/26/21  0406 07/25/21  0310 07/24/21  0350 07/23/21  1636 07/23/21  1634   WBC 7.75 10.08 8.31  --  13.41*   HEMOGLOBIN 13.1 11.9* 12.7*  --  14.1   HEMATOCRIT 43.5 39.9 43.3  --  47.2   PLATELETS 199 173 183  --  217   NEUTROS ABS 6.08 7.74* 7.56*  --  9.57*   IMMATURE GRANS (ABS) 0.03 0.03 0.04  --  0.06*   LYMPHS ABS 1.07 1.39 0.64*  --  1.25   MONOS ABS 0.52 0.69 0.04*  --  0.72   EOS ABS 0.03 0.20 0.01  --  1.72*   MCV 87.5 88.3 88.4  --  88.6   PROCALCITONIN  --   --  0.10  --   --    LACTATE  --   --  1.4 0.9  --          Lab 07/26/21  0406 07/25/21  0310 07/24/21  0350 07/23/21  1634   SODIUM 139 139 138 137   POTASSIUM 4.5 4.2 5.2 4.9   CHLORIDE 101 102 100 100   CO2 30.7* 28.0 28.4 28.5   ANION GAP 7.3 9.0 9.6 8.5   BUN 21 25* 21 17   CREATININE 1.19 1.05 1.10 0.99   GLUCOSE 109* 95 151* 114*   CALCIUM 9.1 8.7 8.7 8.7   MAGNESIUM 2.3 2.3 2.0  --    PHOSPHORUS 2.6  --  3.7  --    HEMOGLOBIN A1C  --   --  6.68*  --          Lab 07/26/21  0406 07/25/21  0310 07/23/21  1634   TOTAL PROTEIN  --  6.1 7.4   ALBUMIN 3.60 3.10* 3.80   GLOBULIN  --  3.0 3.6   ALT (SGPT)   --  14 24   AST (SGOT)  --  19 30   BILIRUBIN  --  0.3 0.5   ALK PHOS  --  67 101         Lab 07/23/21  1634   PROBNP 365.3   TROPONIN T 0.013                 Lab 07/24/21  0627 07/23/21  2115 07/23/21  1638   PH, ARTERIAL 7.302* 7.260* 7.273*   PCO2, ARTERIAL 57.1* 64.9* 65.7*   PO2 ART 70.0* 91.6 228.2*   O2 SATURATION ART 92.7* 96.0 99.2*   FIO2 36 44 100   HCO3 ART 27.6* 28.5* 29.7*   BASE EXCESS ART 0.1 -0.2 1.0   CARBOXYHEMOGLOBIN 1.0 0.9 1.4     Brief Urine Lab Results     None        Microbiology Results (last 10 days)     Procedure Component Value - Date/Time    Legionella Antigen, Urine - Urine, Urine, Clean Catch [[id number]]  (Normal) Collected: 07/23/21 2034    Lab Status: Final result Specimen: Urine, Clean Catch Updated: 07/24/21 0843     LEGIONELLA ANTIGEN, URINE Negative    S. Pneumo Ag Urine or CSF - Urine, Urine, Clean Catch [[id number]]  (Normal) Collected: 07/23/21 2034    Lab Status: Final result Specimen: Urine, Clean Catch Updated: 07/24/21 0844     Strep Pneumo Ag Negative    Respiratory Culture - Sputum, Cough [[id number]] Collected: 07/23/21 2030    Lab Status: Final result Specimen: Sputum from Cough Updated: 07/26/21 0927     Respiratory Culture Scant growth (1+) Normal Respiratory Dominga: NO S.aureus/MRSA or Pseudomonas aeruginosa     Gram Stain Greater than 25 WBCs per low power field      Less than 10 Epithelial cells per low power field    COVID-19, [facility] IN-HOUSE, NP SWAB IN TRANSPORT MEDIA 8-10 HR TAT - Swab, Nasopharynx [[id number]]  (Normal) Collected: 07/23/21 2029    Lab Status: Final result Specimen: Swab from Nasopharynx Updated: 07/24/21 0824     COVID19 Not Detected    Narrative:      Testing performed by Real Time RT-PCR  This test has not been approved by the [facility] but is authorized under the Emergency Use Act (EUA)    Fact sheet for providers: https://www.fda.gov/media/652118/download    Fact sheet for patients: https://www.fda.gov/media/278827/download        MRSA  Screen, PCR (Inpatient) - Swab, Nares [[id number]]  (Normal) Collected: 07/23/21 2029    Lab Status: Final result Specimen: Swab from Nares Updated: 07/23/21 2238     MRSA PCR No MRSA Detected    Blood Culture - Blood, Arm, Right [[id number]] Collected: 07/23/21 1636    Lab Status: Preliminary result Specimen: Blood from Arm, Right Updated: 07/25/21 1700     Blood Culture No growth at 2 days    Blood Culture - Blood, Arm, Left [[id number]] Collected: 07/23/21 1636    Lab Status: Preliminary result Specimen: Blood from Arm, Left Updated: 07/25/21 1700     Blood Culture No growth at 2 days    Mycoplasma Pneumoniae Antibody, IgM - Blood, [[id number]]  (Normal) Collected: 07/23/21 1634    Lab Status: Final result Specimen: Blood Updated: 07/23/21 2216     Mycoplasma pneumo IgM Negative                           Imaging Results (All)     Procedure Component Value Units Date/Time    CT Chest Without Contrast Diagnostic [[id number]] Collected: 07/24/21 0434     Updated: 07/24/21 0438    Narrative:      PROCEDURE: CT CHEST WO CONTRAST DIAGNOSTIC     COMPARISONS: [facility], CT, CHEST W/ CONTRAST, 11/21/2018, 21:40.  [facility], CR, XR CHEST 1 VW, 7/23/2021, 16:40.     INDICATIONS: Respiratory failure.     TECHNIQUE: 580 CT images were created without the administration of contrast material.       PROTOCOL:   Standard imaging protocol performed      RADIATION:   Automated exposure control was utilized to minimize radiation dose.      FINDINGS: Motion artifact obscures detail.  There are mild centrilobular emphysematous changes of   the lungs.  Atelectasis and/or fibrosis is (are) seen bilaterally, especially in the lung bases.    There is an air-fluid level involving the esophagus, as seen on image 21 of series 201 and adjacent   images.  The finding is nonspecific.  It may be related to esophageal dysmotility.    Gastroesophageal reflux disease (GERD) is possible.  There are postoperative changes  involving the   stomach, including those near the gastroesophageal (GE) junction.  These findings are thought to be   partially imaged on the study.  Diffuse hepatic steatosis is seen.  Cirrhosis cannot be excluded.    No splenomegaly is appreciated.  The liver is not completely imaged on the study.  No adrenal mass.    There may be some degree of pancreatic lipomatosis.  Surgical clips are seen near the pancreatic   head.  Please correlate with the surgical history.  No definite gallstones or acute cholecystitis.    The gallbladder is partially imaged, as well.  Coronary artery calcifications are seen.  There are   nonspecific small to moderate sized mediastinal lymph nodes.  Atherosclerotic change involves the   aortic arch.  Motion artifact particularly obscures the base the neck.  No pneumothorax or   pneumomediastinum is seen.  There is prominent extrapleural fat, greater on the left, as before.    No pleural effusion.  The previously seen small left pleural effusion has resolved.  No pericardial   effusion.  The central tracheobronchial tree is well aerated without filling defect.  No definite   suspicious pulmonary nodules are seen.  The degree of motion artifact may obscure subtle pulmonary   nodules.  There is thought to be stable ground-glass pulmonary nodule involving the minor   (right-sided) fissure, as seen on image 40 of series 204, image 64 of series 203, and image 59 of   series 202.  This finding was seen previously and is not significantly changed.  Consider low-dose   chest CT (LDCT) examination follow-up in 12 months for further imaging assessment if clinically   indicated.  Extensive ossification of the anterior longitudinal ligament is seen and may represent   diffuse idiopathic skeletal hyperostosis (DISH).  Degenerative changes of the thoracic spine are   suspected, as well.     CONCLUSION:      1. No acute infiltrate is identified.       2. Mild emphysematous changes of the lungs are  suspected.       3. There may be mild atelectasis and/or fibrosis in the lung bases.       4. Coronary artery calcifications are seen.       5. No cardiac enlargement.       6. No pleural or pericardial effusion is identified.       7. There is an air-fluid level in the esophagus.       8. There may be esophageal dysmotility and/or gastroesophageal reflux disease (GERD).       9. Please see above comments for further detail.                SEGUNDO MORA JR, MD         Electronically Signed and Approved By: SEGUNDO MORA JR, MD on 7/24/2021 at 4:34                     XR Chest 1 View [[id number]] Collected: 07/23/21 1647     Updated: 07/23/21 1651    Narrative:      PROCEDURE: XR CHEST 1 VW     COMPARISON: [facility], , CHEST AP/PA 1 VIEW, 5/04/2021, 10:05.     INDICATIONS: SOA Triage Protocol     FINDINGS:   The heart size is normal.  The pulmonary vascular markings are normal.  There is chronic left   basilar pleural/parenchymal scarring.  The right lung and pleural space are clear.  There are   chronic age-related changes involving the bony thorax and thoracic aorta.     CONCLUSION:   1. Chronic left basilar pleural/parenchymal scarring.  2. No superimposed active disease.                  LALA GARIBAY MD         Electronically Signed and Approved By: LALA GARIBAY MD on 7/23/2021 at 16:47                            Labs Pending at Discharge:  Pending Labs     Order Current Status    Blood Culture - Blood, Arm, Left Preliminary result    Blood Culture - Blood, Arm, Right Preliminary result              Time spent on Discharge including face to face service: More than 30 minutes  Part of this note may be an electronic transcription/translation of spoken language to printed text using the Dragon Dictation System.     TElectronically signed by Sigifredo Mendes MD, 07/26/21, 3:32 PM EDT.

## 2021-07-27 ENCOUNTER — READMISSION MANAGEMENT (OUTPATIENT)
Dept: CALL CENTER | Facility: HOSPITAL | Age: 71
End: 2021-07-27

## 2021-07-27 LAB
ARTERIAL PATENCY WRIST A: ABNORMAL
BASE EXCESS BLDA CALC-SCNC: 1 MMOL/L (ref -2–2)
BDY SITE: ABNORMAL
COHGB MFR BLD: 1.4 % (ref 0–1.5)
FHHB: 0.8 % (ref 0–5)
GAS FLOW AIRWAY: 15 LPM
HCO3 BLDA-SCNC: 29.7 MMOL/L (ref 22–26)
HGB BLDA-MCNC: 14.7 G/DL (ref 13.8–16.4)
INHALED O2 CONCENTRATION: 100 %
METHGB BLD QL: 0.2 % (ref 0–1.5)
MODALITY: ABNORMAL
OXYHGB MFR BLDV: 97.6 % (ref 94–99)
PCO2 BLDA: 65.7 MM HG (ref 35–45)
PH BLDA: 7.27 PH UNITS (ref 7.35–7.45)
PO2 BLD: 228 MM[HG] (ref 0–500)
PO2 BLDA: 228.2 MM HG (ref 80–100)
SAO2 % BLDCOA: 99.2 % (ref 95–99)

## 2021-07-27 NOTE — OUTREACH NOTE
Prep Survey      Responses   [facility] facility patient discharged from?  [facility]   Is LACE score < 7 ?  No   Emergency Room discharge w/ pulse ox?  No   Eligibility  Readm Mgmt   Discharge diagnosis  Acute on chronic respiratory failure with hypoxia and hypercapnia,  COPD with acute exacerbation   Does the patient have one of the following disease processes/diagnoses(primary or secondary)?  COPD/Pneumonia   Does the patient have Home health ordered?  No   Is there a DME ordered?  No   Prep survey completed?  Yes          Jennifer Tejeda RN

## 2021-07-28 LAB
BACTERIA SPEC AEROBE CULT: NORMAL
BACTERIA SPEC AEROBE CULT: NORMAL

## 2021-08-06 ENCOUNTER — READMISSION MANAGEMENT (OUTPATIENT)
Dept: CALL CENTER | Facility: HOSPITAL | Age: 71
End: 2021-08-06

## 2021-08-06 NOTE — OUTREACH NOTE
COPD/PN Week 2 Survey      Responses   [facility] patient discharged from?  [facility]   Does the patient have one of the following disease processes/diagnoses(primary or secondary)?  COPD/Pneumonia   Was the primary reason for admission:  COPD exacerbation   Week 2 attempt successful?  Yes   Call start time  1639   Call end time  1641   Discharge diagnosis  Acute on chronic respiratory failure with hypoxia and hypercapnia,  COPD with acute exacerbation   Meds reviewed with patient/caregiver?  Yes   Is the patient taking all medications as directed (includes completed medication regime)?  Yes   Has the patient kept scheduled appointments due by today?  N/A   Comments  appt coming up   Pulse Ox monitoring  None   What is the patient's perception of their health status since discharge?  Improving   Week 2 call completed?  Yes   Revoked  No further contact(revokes)-requires comment   Is the patient interested in additional calls from an ambulatory ?  NOTE:  applies to high risk patients requiring additional follow-up.  No   Graduated/Revoked comments  Very brief call, patient states that he is fine and has no needs.          Niurka Khoury RN

## 2021-09-15 ENCOUNTER — HOSPITAL ENCOUNTER (EMERGENCY)
Facility: HOSPITAL | Age: 71
Discharge: HOME | End: 2021-09-16
Attending: EMERGENCY MEDICINE | Admitting: EMERGENCY MEDICINE

## 2021-09-15 ENCOUNTER — APPOINTMENT (OUTPATIENT)
Dept: GENERAL RADIOLOGY | Facility: HOSPITAL | Age: 71
End: 2021-09-15

## 2021-09-15 DIAGNOSIS — J44.1 COPD WITH ACUTE EXACERBATION (HCC): Primary | ICD-10-CM

## 2021-09-15 LAB
ALBUMIN SERPL-MCNC: 3.5 G/DL (ref 3.5–5.2)
ALBUMIN/GLOB SERPL: 1 G/DL
ALP SERPL-CCNC: 95 U/L (ref 39–117)
ALT SERPL W P-5'-P-CCNC: 9 U/L (ref 1–41)
ANION GAP SERPL CALCULATED.3IONS-SCNC: 10.8 MMOL/L (ref 5–15)
ARTERIAL PATENCY WRIST A: POSITIVE
AST SERPL-CCNC: 13 U/L (ref 1–40)
BASE EXCESS BLDA CALC-SCNC: 1.8 MMOL/L (ref -2–2)
BASOPHILS # BLD AUTO: 0.08 10*3/MM3 (ref 0–0.2)
BASOPHILS NFR BLD AUTO: 0.5 % (ref 0–1.5)
BDY SITE: ABNORMAL
BILIRUB SERPL-MCNC: 0.4 MG/DL (ref 0–1.2)
BUN SERPL-MCNC: 21 MG/DL (ref 8–23)
BUN/CREAT SERPL: 23.1 (ref 7–25)
CA-I BLDA-SCNC: 1.11 MMOL/L (ref 1.13–1.32)
CALCIUM SPEC-SCNC: 8.6 MG/DL (ref 8.6–10.5)
CHLORIDE BLDA-SCNC: 103 MMOL/L (ref 98–106)
CHLORIDE SERPL-SCNC: 102 MMOL/L (ref 98–107)
CO2 SERPL-SCNC: 26.2 MMOL/L (ref 22–29)
COHGB MFR BLD: 1.1 % (ref 0–1.5)
CREAT SERPL-MCNC: 0.91 MG/DL (ref 0.76–1.27)
D-LACTATE SERPL-SCNC: 1.2 MMOL/L (ref 0.5–2)
DEPRECATED RDW RBC AUTO: 46.9 FL (ref 37–54)
EOSINOPHIL # BLD AUTO: 1.53 10*3/MM3 (ref 0–0.4)
EOSINOPHIL NFR BLD AUTO: 10.1 % (ref 0.3–6.2)
ERYTHROCYTE [DISTWIDTH] IN BLOOD BY AUTOMATED COUNT: 14.7 % (ref 12.3–15.4)
FHHB: 5.2 % (ref 0–5)
GAS FLOW AIRWAY: 4 LPM
GFR SERPL CREATININE-BSD FRML MDRD: 82 ML/MIN/1.73
GLOBULIN UR ELPH-MCNC: 3.5 GM/DL
GLUCOSE BLDA-MCNC: 105 MMOL/L (ref 70–99)
GLUCOSE SERPL-MCNC: 102 MG/DL (ref 65–99)
HCO3 BLDA-SCNC: 28.8 MMOL/L (ref 22–26)
HCT VFR BLD AUTO: 43.5 % (ref 37.5–51)
HGB BLD-MCNC: 13.1 G/DL (ref 13–17.7)
HGB BLDA-MCNC: 12.9 G/DL (ref 13.8–16.4)
HOLD SPECIMEN: NORMAL
HOLD SPECIMEN: NORMAL
IMM GRANULOCYTES # BLD AUTO: 0.04 10*3/MM3 (ref 0–0.05)
IMM GRANULOCYTES NFR BLD AUTO: 0.3 % (ref 0–0.5)
INHALED O2 CONCENTRATION: 36 %
LACTATE BLDA-SCNC: 2.19 MMOL/L (ref 0.5–2)
LYMPHOCYTES # BLD AUTO: 1.37 10*3/MM3 (ref 0.7–3.1)
LYMPHOCYTES NFR BLD AUTO: 9 % (ref 19.6–45.3)
MCH RBC QN AUTO: 26.5 PG (ref 26.6–33)
MCHC RBC AUTO-ENTMCNC: 30.1 G/DL (ref 31.5–35.7)
MCV RBC AUTO: 88.1 FL (ref 79–97)
METHGB BLD QL: 0.1 % (ref 0–1.5)
MODALITY: ABNORMAL
MONOCYTES # BLD AUTO: 1.13 10*3/MM3 (ref 0.1–0.9)
MONOCYTES NFR BLD AUTO: 7.5 % (ref 5–12)
NEUTROPHILS NFR BLD AUTO: 10.99 10*3/MM3 (ref 1.7–7)
NEUTROPHILS NFR BLD AUTO: 72.6 % (ref 42.7–76)
NRBC BLD AUTO-RTO: 0 /100 WBC (ref 0–0.2)
NT-PROBNP SERPL-MCNC: 359.8 PG/ML (ref 0–900)
OXYHGB MFR BLDV: 93.6 % (ref 94–99)
PCO2 BLDA: 55.3 MM HG (ref 35–45)
PH BLDA: 7.33 PH UNITS (ref 7.35–7.45)
PLATELET # BLD AUTO: 204 10*3/MM3 (ref 140–450)
PMV BLD AUTO: 10.1 FL (ref 6–12)
PO2 BLD: 226 MM[HG] (ref 0–500)
PO2 BLDA: 81.3 MM HG (ref 80–100)
POTASSIUM BLDA-SCNC: 4.52 MMOL/L (ref 3.5–5)
POTASSIUM SERPL-SCNC: 4.9 MMOL/L (ref 3.5–5.2)
PROT SERPL-MCNC: 7 G/DL (ref 6–8.5)
RBC # BLD AUTO: 4.94 10*6/MM3 (ref 4.14–5.8)
SAO2 % BLDCOA: 94.7 % (ref 95–99)
SODIUM BLDA-SCNC: 137.3 MMOL/L (ref 136–146)
SODIUM SERPL-SCNC: 139 MMOL/L (ref 136–145)
TROPONIN T SERPL-MCNC: 0.02 NG/ML (ref 0–0.03)
WBC # BLD AUTO: 15.14 10*3/MM3 (ref 3.4–10.8)
WHOLE BLOOD HOLD SPECIMEN: NORMAL
WHOLE BLOOD HOLD SPECIMEN: NORMAL

## 2021-09-15 PROCEDURE — 83605 ASSAY OF LACTIC ACID: CPT | Performed by: EMERGENCY MEDICINE

## 2021-09-15 PROCEDURE — 85025 COMPLETE CBC W/AUTO DIFF WBC: CPT

## 2021-09-15 PROCEDURE — 93005 ELECTROCARDIOGRAM TRACING: CPT

## 2021-09-15 PROCEDURE — 87040 BLOOD CULTURE FOR BACTERIA: CPT | Performed by: EMERGENCY MEDICINE

## 2021-09-15 PROCEDURE — 87635 SARS-COV-2 COVID-19 AMP PRB: CPT | Performed by: EMERGENCY MEDICINE

## 2021-09-15 PROCEDURE — 96374 THER/PROPH/DIAG INJ IV PUSH: CPT

## 2021-09-15 PROCEDURE — 83880 ASSAY OF NATRIURETIC PEPTIDE: CPT | Performed by: EMERGENCY MEDICINE

## 2021-09-15 PROCEDURE — 94640 AIRWAY INHALATION TREATMENT: CPT

## 2021-09-15 PROCEDURE — 93010 ELECTROCARDIOGRAM REPORT: CPT | Performed by: INTERNAL MEDICINE

## 2021-09-15 PROCEDURE — 80053 COMPREHEN METABOLIC PANEL: CPT | Performed by: EMERGENCY MEDICINE

## 2021-09-15 PROCEDURE — 36600 WITHDRAWAL OF ARTERIAL BLOOD: CPT | Performed by: EMERGENCY MEDICINE

## 2021-09-15 PROCEDURE — 99284 EMERGENCY DEPT VISIT MOD MDM: CPT

## 2021-09-15 PROCEDURE — 94799 UNLISTED PULMONARY SVC/PX: CPT

## 2021-09-15 PROCEDURE — 82805 BLOOD GASES W/O2 SATURATION: CPT | Performed by: EMERGENCY MEDICINE

## 2021-09-15 PROCEDURE — 84484 ASSAY OF TROPONIN QUANT: CPT | Performed by: EMERGENCY MEDICINE

## 2021-09-15 PROCEDURE — 82375 ASSAY CARBOXYHB QUANT: CPT | Performed by: EMERGENCY MEDICINE

## 2021-09-15 PROCEDURE — 83050 HGB METHEMOGLOBIN QUAN: CPT | Performed by: EMERGENCY MEDICINE

## 2021-09-15 PROCEDURE — 93005 ELECTROCARDIOGRAM TRACING: CPT | Performed by: EMERGENCY MEDICINE

## 2021-09-15 PROCEDURE — 71045 X-RAY EXAM CHEST 1 VIEW: CPT

## 2021-09-15 RX ORDER — METHYLPREDNISOLONE SODIUM SUCCINATE 125 MG/2ML
125 INJECTION, POWDER, LYOPHILIZED, FOR SOLUTION INTRAMUSCULAR; INTRAVENOUS ONCE
Status: COMPLETED | OUTPATIENT
Start: 2021-09-16 | End: 2021-09-16

## 2021-09-15 RX ORDER — IPRATROPIUM BROMIDE AND ALBUTEROL SULFATE 2.5; .5 MG/3ML; MG/3ML
3 SOLUTION RESPIRATORY (INHALATION)
Status: COMPLETED | OUTPATIENT
Start: 2021-09-15 | End: 2021-09-15

## 2021-09-15 RX ORDER — SODIUM CHLORIDE 0.9 % (FLUSH) 0.9 %
10 SYRINGE (ML) INJECTION AS NEEDED
Status: DISCONTINUED | OUTPATIENT
Start: 2021-09-15 | End: 2021-09-16 | Stop reason: HOSPADM

## 2021-09-15 RX ADMIN — IPRATROPIUM BROMIDE AND ALBUTEROL SULFATE 3 ML: .5; 3 SOLUTION RESPIRATORY (INHALATION) at 23:02

## 2021-09-15 RX ADMIN — IPRATROPIUM BROMIDE AND ALBUTEROL SULFATE 3 ML: .5; 3 SOLUTION RESPIRATORY (INHALATION) at 22:52

## 2021-09-15 RX ADMIN — IPRATROPIUM BROMIDE AND ALBUTEROL SULFATE 3 ML: .5; 3 SOLUTION RESPIRATORY (INHALATION) at 22:57

## 2021-09-16 VITALS
DIASTOLIC BLOOD PRESSURE: 68 MMHG | HEART RATE: 70 BPM | SYSTOLIC BLOOD PRESSURE: 100 MMHG | HEIGHT: 73 IN | BODY MASS INDEX: 30.98 KG/M2 | TEMPERATURE: 98 F | OXYGEN SATURATION: 94 % | RESPIRATION RATE: 24 BRPM

## 2021-09-16 LAB
QT INTERVAL: 410 MS
SARS-COV-2 N GENE RESP QL NAA+PROBE: NOT DETECTED

## 2021-09-16 PROCEDURE — 96374 THER/PROPH/DIAG INJ IV PUSH: CPT

## 2021-09-16 PROCEDURE — 25010000002 METHYLPREDNISOLONE PER 125 MG: Performed by: EMERGENCY MEDICINE

## 2021-09-16 RX ORDER — PREDNISONE 20 MG/1
60 TABLET ORAL DAILY
Qty: 12 TABLET | Refills: 0 | Status: SHIPPED | OUTPATIENT
Start: 2021-09-16 | End: 2021-09-20

## 2021-09-16 RX ADMIN — METHYLPREDNISOLONE SODIUM SUCCINATE 125 MG: 125 INJECTION, POWDER, FOR SOLUTION INTRAMUSCULAR; INTRAVENOUS at 00:05

## 2021-09-16 NOTE — ED PROVIDER NOTES
Subjective   Patient states he was well until this evening when he began to feel significant shortness of breath.  Had what he described as a coughing fit productive of green mucus.  He denies any fevers or chills.  No nausea or vomiting.  He denies any sick contacts.  Nursing documentation states recent COVID-19 infection although patient denies this and states he has never had Covid nor the vaccine.      Shortness of Breath  Severity:  Moderate  Onset quality:  Sudden  Timing:  Constant  Progression:  Partially resolved  Chronicity:  Recurrent  Context: activity    Relieved by:  Inhaler  Worsened by:  Activity  Ineffective treatments:  Rest  Associated symptoms: cough    Associated symptoms: no chest pain, no fever and no vomiting        Review of Systems   Constitutional: Positive for fatigue. Negative for fever.   HENT: Negative for congestion and rhinorrhea.    Eyes: Negative for pain and visual disturbance.   Respiratory: Positive for cough and shortness of breath.    Cardiovascular: Negative for chest pain and leg swelling.   Gastrointestinal: Negative for diarrhea, nausea and vomiting.   Genitourinary: Negative for dysuria.   All other systems reviewed and are negative.      Past Medical History:   Diagnosis Date   • Asthma    • COPD (chronic obstructive pulmonary disease) (CMS/[facility])    • Diabetes mellitus (CMS/[facility])    • Hernia, umbilical    • Hypertension    • Requires continuous at home supplemental oxygen        No Known Allergies    Past Surgical History:   Procedure Laterality Date   • ABDOMINAL SURGERY         No family history on file.    Social History     Socioeconomic History   • Marital status: Single     Spouse name: Not on file   • Number of children: Not on file   • Years of education: Not on file   • Highest education level: Not on file   Tobacco Use   • Smoking status: Former Smoker     Types: Cigarettes     Quit date: 2020     Years since quittin.1   Substance and Sexual Activity   •  Alcohol use: Never   • Drug use: Never           Objective   Physical Exam  Vitals and nursing note reviewed.   Constitutional:       General: He is not in acute distress.     Appearance: Normal appearance. He is not toxic-appearing.   HENT:      Head: Normocephalic and atraumatic.      Jaw: There is normal jaw occlusion.   Eyes:      General: Lids are normal.      Extraocular Movements: Extraocular movements intact.      Conjunctiva/sclera: Conjunctivae normal.      Pupils: Pupils are equal, round, and reactive to light.   Cardiovascular:      Rate and Rhythm: Normal rate and regular rhythm.      Pulses: Normal pulses.      Heart sounds: Normal heart sounds.   Pulmonary:      Effort: Tachypnea and respiratory distress present.      Breath sounds: Examination of the right-upper field reveals wheezing. Examination of the left-upper field reveals wheezing. Examination of the right-middle field reveals wheezing. Examination of the left-middle field reveals wheezing. Examination of the right-lower field reveals wheezing. Examination of the left-lower field reveals wheezing. Decreased breath sounds and wheezing present.   Abdominal:      General: Abdomen is flat.      Palpations: Abdomen is soft.      Tenderness: There is no abdominal tenderness. There is no guarding or rebound.   Musculoskeletal:         General: Normal range of motion.      Cervical back: Normal range of motion and neck supple.      Right lower leg: No edema.      Left lower leg: No edema.   Skin:     General: Skin is warm and dry.   Neurological:      Mental Status: He is alert and oriented to person, place, and time. Mental status is at baseline.   Psychiatric:         Mood and Affect: Mood normal.         Procedures           ED Course                                           MDM  CBC remarkable for leukocytosis of 15.  BNP and troponin within normal limits.ECG shows significant artifact and uninterpretable. ABG shows pH at baseline of 7.3 and PCO2  at 55 again at patient's baseline.  The patient´s CMP was reviewed and shows no abnormalities of critical concern. Of note, the patient´s sodium and potassium are acceptable. The patient´s liver enzymes are unremarkable. The patient´s renal function (creatinine) is preserved. The patient has a normal anion gap.  Patient had significant improvement in his respiratory exam after nebulizer treatment in the ED.  Chest x-ray shows no acute cardiopulmonary process.  Patient feels comfortable plan for discharge home with continued corticosteroid burst therapy and nebulizer treatments at home.  Patient agrees to follow-up with his PCP.  We discussed return precautions including worsening symptoms or any additional concerns.        Final diagnoses:   COPD with acute exacerbation (CMS/HCC)       ED Disposition  ED Disposition     ED Disposition Condition Comment    Discharge Stable           Provider, No Known  [facility]  [place] [id number]    Schedule an appointment as soon as possible for a visit            Medication List      New Prescriptions    predniSONE 20 MG tablet  Commonly known as: DELTASONE  Take 3 tablets by mouth Daily for 4 days.           Where to Get Your Medications      These medications were sent to [id number] - [place] - [address] AT [facility] - [phone]  - [phone]  [address] [address]    Phone: [phone]   · predniSONE 20 MG tablet          Roel Saldivar MD  09/16/21 2534

## 2021-09-20 LAB
BACTERIA SPEC AEROBE CULT: NORMAL
BACTERIA SPEC AEROBE CULT: NORMAL

## 2021-10-22 ENCOUNTER — HOSPITAL ENCOUNTER (INPATIENT)
Facility: HOSPITAL | Age: 71
LOS: 3 days | Discharge: HOME | End: 2021-10-25
Attending: EMERGENCY MEDICINE | Admitting: INTERNAL MEDICINE

## 2021-10-22 ENCOUNTER — APPOINTMENT (OUTPATIENT)
Dept: GENERAL RADIOLOGY | Facility: HOSPITAL | Age: 71
End: 2021-10-22

## 2021-10-22 DIAGNOSIS — J96.20 ACUTE ON CHRONIC RESPIRATORY FAILURE, UNSPECIFIED WHETHER WITH HYPOXIA OR HYPERCAPNIA (HCC): Primary | ICD-10-CM

## 2021-10-22 DIAGNOSIS — J18.9 MULTIFOCAL PNEUMONIA: ICD-10-CM

## 2021-10-22 DIAGNOSIS — J44.1 ACUTE EXACERBATION OF CHRONIC OBSTRUCTIVE PULMONARY DISEASE (COPD) (HCC): ICD-10-CM

## 2021-10-22 DIAGNOSIS — R26.2 DIFFICULTY WALKING: ICD-10-CM

## 2021-10-22 LAB
ALBUMIN SERPL-MCNC: 3.7 G/DL (ref 3.5–5.2)
ALBUMIN/GLOB SERPL: 1 G/DL
ALP SERPL-CCNC: 91 U/L (ref 39–117)
ALT SERPL W P-5'-P-CCNC: 12 U/L (ref 1–41)
ANION GAP SERPL CALCULATED.3IONS-SCNC: 9.4 MMOL/L (ref 5–15)
AST SERPL-CCNC: 23 U/L (ref 1–40)
BASOPHILS # BLD AUTO: 0.07 10*3/MM3 (ref 0–0.2)
BASOPHILS NFR BLD AUTO: 0.7 % (ref 0–1.5)
BILIRUB SERPL-MCNC: 0.4 MG/DL (ref 0–1.2)
BUN SERPL-MCNC: 19 MG/DL (ref 8–23)
BUN/CREAT SERPL: 20.4 (ref 7–25)
CALCIUM SPEC-SCNC: 8.6 MG/DL (ref 8.6–10.5)
CHLORIDE SERPL-SCNC: 100 MMOL/L (ref 98–107)
CO2 SERPL-SCNC: 28.6 MMOL/L (ref 22–29)
CREAT SERPL-MCNC: 0.93 MG/DL (ref 0.76–1.27)
D-LACTATE SERPL-SCNC: 2 MMOL/L (ref 0.5–2)
DEPRECATED RDW RBC AUTO: 48.9 FL (ref 37–54)
EOSINOPHIL # BLD AUTO: 1.24 10*3/MM3 (ref 0–0.4)
EOSINOPHIL NFR BLD AUTO: 13 % (ref 0.3–6.2)
ERYTHROCYTE [DISTWIDTH] IN BLOOD BY AUTOMATED COUNT: 15 % (ref 12.3–15.4)
FLUAV AG NPH QL: NEGATIVE
FLUBV AG NPH QL IA: NEGATIVE
GFR SERPL CREATININE-BSD FRML MDRD: 80 ML/MIN/1.73
GLOBULIN UR ELPH-MCNC: 3.6 GM/DL
GLUCOSE SERPL-MCNC: 125 MG/DL (ref 65–99)
HCT VFR BLD AUTO: 46.4 % (ref 37.5–51)
HGB BLD-MCNC: 13.6 G/DL (ref 13–17.7)
HOLD SPECIMEN: NORMAL
HOLD SPECIMEN: NORMAL
IMM GRANULOCYTES # BLD AUTO: 0.02 10*3/MM3 (ref 0–0.05)
IMM GRANULOCYTES NFR BLD AUTO: 0.2 % (ref 0–0.5)
LYMPHOCYTES # BLD AUTO: 1.39 10*3/MM3 (ref 0.7–3.1)
LYMPHOCYTES NFR BLD AUTO: 14.6 % (ref 19.6–45.3)
MCH RBC QN AUTO: 26 PG (ref 26.6–33)
MCHC RBC AUTO-ENTMCNC: 29.3 G/DL (ref 31.5–35.7)
MCV RBC AUTO: 88.7 FL (ref 79–97)
MONOCYTES # BLD AUTO: 0.73 10*3/MM3 (ref 0.1–0.9)
MONOCYTES NFR BLD AUTO: 7.7 % (ref 5–12)
NEUTROPHILS NFR BLD AUTO: 6.07 10*3/MM3 (ref 1.7–7)
NEUTROPHILS NFR BLD AUTO: 63.8 % (ref 42.7–76)
NRBC BLD AUTO-RTO: 0 /100 WBC (ref 0–0.2)
NT-PROBNP SERPL-MCNC: 270.1 PG/ML (ref 0–900)
PLATELET # BLD AUTO: 203 10*3/MM3 (ref 140–450)
PMV BLD AUTO: 10.1 FL (ref 6–12)
POTASSIUM SERPL-SCNC: 4.5 MMOL/L (ref 3.5–5.2)
PROT SERPL-MCNC: 7.3 G/DL (ref 6–8.5)
RBC # BLD AUTO: 5.23 10*6/MM3 (ref 4.14–5.8)
SODIUM SERPL-SCNC: 138 MMOL/L (ref 136–145)
TROPONIN T SERPL-MCNC: 0.02 NG/ML (ref 0–0.03)
WBC # BLD AUTO: 9.52 10*3/MM3 (ref 3.4–10.8)
WHOLE BLOOD HOLD SPECIMEN: NORMAL
WHOLE BLOOD HOLD SPECIMEN: NORMAL

## 2021-10-22 PROCEDURE — 83880 ASSAY OF NATRIURETIC PEPTIDE: CPT | Performed by: EMERGENCY MEDICINE

## 2021-10-22 PROCEDURE — 99223 1ST HOSP IP/OBS HIGH 75: CPT | Performed by: HOSPITALIST

## 2021-10-22 PROCEDURE — 87804 INFLUENZA ASSAY W/OPTIC: CPT | Performed by: EMERGENCY MEDICINE

## 2021-10-22 PROCEDURE — 84484 ASSAY OF TROPONIN QUANT: CPT | Performed by: EMERGENCY MEDICINE

## 2021-10-22 PROCEDURE — 82805 BLOOD GASES W/O2 SATURATION: CPT | Performed by: EMERGENCY MEDICINE

## 2021-10-22 PROCEDURE — 94799 UNLISTED PULMONARY SVC/PX: CPT

## 2021-10-22 PROCEDURE — 25010000002 CEFTRIAXONE PER 250 MG: Performed by: EMERGENCY MEDICINE

## 2021-10-22 PROCEDURE — 83605 ASSAY OF LACTIC ACID: CPT | Performed by: EMERGENCY MEDICINE

## 2021-10-22 PROCEDURE — 99285 EMERGENCY DEPT VISIT HI MDM: CPT

## 2021-10-22 PROCEDURE — 85025 COMPLETE CBC W/AUTO DIFF WBC: CPT | Performed by: EMERGENCY MEDICINE

## 2021-10-22 PROCEDURE — 83050 HGB METHEMOGLOBIN QUAN: CPT | Performed by: EMERGENCY MEDICINE

## 2021-10-22 PROCEDURE — 82375 ASSAY CARBOXYHB QUANT: CPT | Performed by: EMERGENCY MEDICINE

## 2021-10-22 PROCEDURE — 93005 ELECTROCARDIOGRAM TRACING: CPT | Performed by: EMERGENCY MEDICINE

## 2021-10-22 PROCEDURE — 94640 AIRWAY INHALATION TREATMENT: CPT

## 2021-10-22 PROCEDURE — 25010000002 AZITHROMYCIN PER 500 MG: Performed by: EMERGENCY MEDICINE

## 2021-10-22 PROCEDURE — 36600 WITHDRAWAL OF ARTERIAL BLOOD: CPT | Performed by: EMERGENCY MEDICINE

## 2021-10-22 PROCEDURE — 71045 X-RAY EXAM CHEST 1 VIEW: CPT

## 2021-10-22 PROCEDURE — 25010000002 METHYLPREDNISOLONE PER 125 MG: Performed by: EMERGENCY MEDICINE

## 2021-10-22 PROCEDURE — 87147 CULTURE TYPE IMMUNOLOGIC: CPT | Performed by: EMERGENCY MEDICINE

## 2021-10-22 PROCEDURE — 87040 BLOOD CULTURE FOR BACTERIA: CPT | Performed by: EMERGENCY MEDICINE

## 2021-10-22 PROCEDURE — 94660 CPAP INITIATION&MGMT: CPT

## 2021-10-22 PROCEDURE — 80053 COMPREHEN METABOLIC PANEL: CPT | Performed by: EMERGENCY MEDICINE

## 2021-10-22 PROCEDURE — 87635 SARS-COV-2 COVID-19 AMP PRB: CPT | Performed by: EMERGENCY MEDICINE

## 2021-10-22 RX ORDER — CEFTRIAXONE SODIUM 1 G/50ML
1 INJECTION, SOLUTION INTRAVENOUS ONCE
Status: COMPLETED | OUTPATIENT
Start: 2021-10-22 | End: 2021-10-22

## 2021-10-22 RX ORDER — IPRATROPIUM BROMIDE AND ALBUTEROL SULFATE 2.5; .5 MG/3ML; MG/3ML
3 SOLUTION RESPIRATORY (INHALATION)
Status: COMPLETED | OUTPATIENT
Start: 2021-10-22 | End: 2021-10-22

## 2021-10-22 RX ORDER — SODIUM CHLORIDE 0.9 % (FLUSH) 0.9 %
10 SYRINGE (ML) INJECTION AS NEEDED
Status: DISCONTINUED | OUTPATIENT
Start: 2021-10-22 | End: 2021-10-25 | Stop reason: HOSPADM

## 2021-10-22 RX ORDER — METHYLPREDNISOLONE SODIUM SUCCINATE 125 MG/2ML
125 INJECTION, POWDER, LYOPHILIZED, FOR SOLUTION INTRAMUSCULAR; INTRAVENOUS ONCE
Status: COMPLETED | OUTPATIENT
Start: 2021-10-22 | End: 2021-10-22

## 2021-10-22 RX ADMIN — METHYLPREDNISOLONE SODIUM SUCCINATE 125 MG: 125 INJECTION, POWDER, FOR SOLUTION INTRAMUSCULAR; INTRAVENOUS at 20:24

## 2021-10-22 RX ADMIN — IPRATROPIUM BROMIDE AND ALBUTEROL SULFATE 3 ML: .5; 3 SOLUTION RESPIRATORY (INHALATION) at 19:46

## 2021-10-22 RX ADMIN — CEFTRIAXONE SODIUM 1 G: 1 INJECTION, SOLUTION INTRAVENOUS at 22:09

## 2021-10-22 RX ADMIN — AZITHROMYCIN 500 MG: 500 INJECTION, POWDER, LYOPHILIZED, FOR SOLUTION INTRAVENOUS at 22:47

## 2021-10-22 RX ADMIN — IPRATROPIUM BROMIDE AND ALBUTEROL SULFATE 3 ML: .5; 3 SOLUTION RESPIRATORY (INHALATION) at 19:41

## 2021-10-22 RX ADMIN — IPRATROPIUM BROMIDE AND ALBUTEROL SULFATE 3 ML: .5; 3 SOLUTION RESPIRATORY (INHALATION) at 19:50

## 2021-10-23 PROBLEM — I10 ESSENTIAL HYPERTENSION: Status: ACTIVE | Noted: 2021-10-23

## 2021-10-23 PROBLEM — J18.9 PNEUMONIA DUE TO INFECTIOUS ORGANISM: Status: ACTIVE | Noted: 2021-10-23

## 2021-10-23 PROBLEM — R05.9 COUGH: Status: ACTIVE | Noted: 2021-10-23

## 2021-10-23 PROBLEM — E78.5 HYPERLIPIDEMIA: Status: ACTIVE | Noted: 2021-10-23

## 2021-10-23 LAB
ANION GAP SERPL CALCULATED.3IONS-SCNC: 8.2 MMOL/L (ref 5–15)
ARTERIAL PATENCY WRIST A: POSITIVE
B PARAPERT DNA SPEC QL NAA+PROBE: NOT DETECTED
B PERT DNA SPEC QL NAA+PROBE: NOT DETECTED
BASE EXCESS BLDA CALC-SCNC: 0.4 MMOL/L (ref -2–2)
BASOPHILS # BLD AUTO: 0.01 10*3/MM3 (ref 0–0.2)
BASOPHILS NFR BLD AUTO: 0.2 % (ref 0–1.5)
BDY SITE: ABNORMAL
BUN SERPL-MCNC: 19 MG/DL (ref 8–23)
BUN/CREAT SERPL: 20.2 (ref 7–25)
C PNEUM DNA NPH QL NAA+NON-PROBE: NOT DETECTED
CALCIUM SPEC-SCNC: 8.9 MG/DL (ref 8.6–10.5)
CHLORIDE SERPL-SCNC: 98 MMOL/L (ref 98–107)
CO2 SERPL-SCNC: 28.8 MMOL/L (ref 22–29)
COHGB MFR BLD: 0.7 % (ref 0–1.5)
CREAT SERPL-MCNC: 0.94 MG/DL (ref 0.76–1.27)
CRP SERPL-MCNC: 0.95 MG/DL (ref 0–0.5)
DEPRECATED RDW RBC AUTO: 47.8 FL (ref 37–54)
EOSINOPHIL # BLD AUTO: 0.01 10*3/MM3 (ref 0–0.4)
EOSINOPHIL NFR BLD AUTO: 0.2 % (ref 0.3–6.2)
ERYTHROCYTE [DISTWIDTH] IN BLOOD BY AUTOMATED COUNT: 14.9 % (ref 12.3–15.4)
FERRITIN SERPL-MCNC: 46.17 NG/ML (ref 30–400)
FHHB: 7.8 % (ref 0–5)
FLUAV SUBTYP SPEC NAA+PROBE: NOT DETECTED
FLUBV RNA ISLT QL NAA+PROBE: NOT DETECTED
GAS FLOW AIRWAY: 4 LPM
GFR SERPL CREATININE-BSD FRML MDRD: 79 ML/MIN/1.73
GLUCOSE BLDC GLUCOMTR-MCNC: 138 MG/DL (ref 70–99)
GLUCOSE BLDC GLUCOMTR-MCNC: 144 MG/DL (ref 70–99)
GLUCOSE BLDC GLUCOMTR-MCNC: 150 MG/DL (ref 70–99)
GLUCOSE BLDC GLUCOMTR-MCNC: 151 MG/DL (ref 70–99)
GLUCOSE SERPL-MCNC: 134 MG/DL (ref 65–99)
HADV DNA SPEC NAA+PROBE: NOT DETECTED
HBA1C MFR BLD: 6.87 % (ref 4.8–5.6)
HCO3 BLDA-SCNC: 27.5 MMOL/L (ref 22–26)
HCOV 229E RNA SPEC QL NAA+PROBE: NOT DETECTED
HCOV HKU1 RNA SPEC QL NAA+PROBE: NOT DETECTED
HCOV NL63 RNA SPEC QL NAA+PROBE: NOT DETECTED
HCOV OC43 RNA SPEC QL NAA+PROBE: NOT DETECTED
HCT VFR BLD AUTO: 45 % (ref 37.5–51)
HGB BLD-MCNC: 13.6 G/DL (ref 13–17.7)
HGB BLDA-MCNC: 13.6 G/DL (ref 13.8–16.4)
HMPV RNA NPH QL NAA+NON-PROBE: NOT DETECTED
HPIV1 RNA SPEC QL NAA+PROBE: NOT DETECTED
HPIV2 RNA SPEC QL NAA+PROBE: NOT DETECTED
HPIV3 RNA NPH QL NAA+PROBE: NOT DETECTED
HPIV4 P GENE NPH QL NAA+PROBE: NOT DETECTED
IMM GRANULOCYTES # BLD AUTO: 0.02 10*3/MM3 (ref 0–0.05)
IMM GRANULOCYTES NFR BLD AUTO: 0.4 % (ref 0–0.5)
INHALED O2 CONCENTRATION: 36 %
L PNEUMO1 AG UR QL IA: NEGATIVE
LACTATE BLDA-SCNC: 2.59 MMOL/L (ref 0.5–2)
LYMPHOCYTES # BLD AUTO: 0.63 10*3/MM3 (ref 0.7–3.1)
LYMPHOCYTES NFR BLD AUTO: 11.7 % (ref 19.6–45.3)
M PNEUMO IGG SER IA-ACNC: NOT DETECTED
MAGNESIUM SERPL-MCNC: 2 MG/DL (ref 1.6–2.4)
MCH RBC QN AUTO: 26.3 PG (ref 26.6–33)
MCHC RBC AUTO-ENTMCNC: 30.2 G/DL (ref 31.5–35.7)
MCV RBC AUTO: 87 FL (ref 79–97)
METHGB BLD QL: 0.2 % (ref 0–1.5)
MODALITY: ABNORMAL
MONOCYTES # BLD AUTO: 0.07 10*3/MM3 (ref 0.1–0.9)
MONOCYTES NFR BLD AUTO: 1.3 % (ref 5–12)
NEUTROPHILS NFR BLD AUTO: 4.66 10*3/MM3 (ref 1.7–7)
NEUTROPHILS NFR BLD AUTO: 86.2 % (ref 42.7–76)
NRBC BLD AUTO-RTO: 0 /100 WBC (ref 0–0.2)
OXYHGB MFR BLDV: 91.3 % (ref 94–99)
PCO2 BLDA: 54.8 MM HG (ref 35–45)
PH BLDA: 7.32 PH UNITS (ref 7.35–7.45)
PLATELET # BLD AUTO: 164 10*3/MM3 (ref 140–450)
PMV BLD AUTO: 11.1 FL (ref 6–12)
PO2 BLD: 189 MM[HG] (ref 0–500)
PO2 BLDA: 68.1 MM HG (ref 80–100)
POTASSIUM SERPL-SCNC: 5.3 MMOL/L (ref 3.5–5.2)
PROCALCITONIN SERPL-MCNC: 0.07 NG/ML (ref 0–0.25)
QT INTERVAL: 411 MS
QT INTERVAL: 436 MS
RBC # BLD AUTO: 5.17 10*6/MM3 (ref 4.14–5.8)
RHINOVIRUS RNA SPEC NAA+PROBE: NOT DETECTED
RSV RNA NPH QL NAA+NON-PROBE: NOT DETECTED
S PNEUM AG SPEC QL LA: NEGATIVE
SAO2 % BLDCOA: 92.1 % (ref 95–99)
SARS-COV-2 N GENE RESP QL NAA+PROBE: NOT DETECTED
SODIUM SERPL-SCNC: 135 MMOL/L (ref 136–145)
WBC # BLD AUTO: 5.4 10*3/MM3 (ref 3.4–10.8)

## 2021-10-23 PROCEDURE — 93005 ELECTROCARDIOGRAM TRACING: CPT | Performed by: INTERNAL MEDICINE

## 2021-10-23 PROCEDURE — 83050 HGB METHEMOGLOBIN QUAN: CPT | Performed by: HOSPITALIST

## 2021-10-23 PROCEDURE — 83036 HEMOGLOBIN GLYCOSYLATED A1C: CPT | Performed by: HOSPITALIST

## 2021-10-23 PROCEDURE — 82375 ASSAY CARBOXYHB QUANT: CPT | Performed by: HOSPITALIST

## 2021-10-23 PROCEDURE — 25010000002 ENOXAPARIN PER 10 MG: Performed by: HOSPITALIST

## 2021-10-23 PROCEDURE — 63710000001 INSULIN LISPRO (HUMAN) PER 5 UNITS: Performed by: HOSPITALIST

## 2021-10-23 PROCEDURE — 25010000002 AZITHROMYCIN PER 500 MG: Performed by: HOSPITALIST

## 2021-10-23 PROCEDURE — 99232 SBSQ HOSP IP/OBS MODERATE 35: CPT | Performed by: INTERNAL MEDICINE

## 2021-10-23 PROCEDURE — 83735 ASSAY OF MAGNESIUM: CPT | Performed by: HOSPITALIST

## 2021-10-23 PROCEDURE — 82805 BLOOD GASES W/O2 SATURATION: CPT | Performed by: HOSPITALIST

## 2021-10-23 PROCEDURE — 94799 UNLISTED PULMONARY SVC/PX: CPT

## 2021-10-23 PROCEDURE — 94640 AIRWAY INHALATION TREATMENT: CPT

## 2021-10-23 PROCEDURE — 86140 C-REACTIVE PROTEIN: CPT | Performed by: HOSPITALIST

## 2021-10-23 PROCEDURE — 87633 RESP VIRUS 12-25 TARGETS: CPT | Performed by: HOSPITALIST

## 2021-10-23 PROCEDURE — 82728 ASSAY OF FERRITIN: CPT | Performed by: HOSPITALIST

## 2021-10-23 PROCEDURE — 84145 PROCALCITONIN (PCT): CPT | Performed by: HOSPITALIST

## 2021-10-23 PROCEDURE — 80048 BASIC METABOLIC PNL TOTAL CA: CPT | Performed by: HOSPITALIST

## 2021-10-23 PROCEDURE — 25010000002 CEFTRIAXONE PER 250 MG: Performed by: HOSPITALIST

## 2021-10-23 PROCEDURE — 87899 AGENT NOS ASSAY W/OPTIC: CPT | Performed by: HOSPITALIST

## 2021-10-23 PROCEDURE — 82962 GLUCOSE BLOOD TEST: CPT

## 2021-10-23 PROCEDURE — 94760 N-INVAS EAR/PLS OXIMETRY 1: CPT

## 2021-10-23 PROCEDURE — 36600 WITHDRAWAL OF ARTERIAL BLOOD: CPT | Performed by: HOSPITALIST

## 2021-10-23 PROCEDURE — 25010000002 METHYLPREDNISOLONE PER 40 MG: Performed by: HOSPITALIST

## 2021-10-23 PROCEDURE — 85025 COMPLETE CBC W/AUTO DIFF WBC: CPT | Performed by: HOSPITALIST

## 2021-10-23 RX ORDER — ACETAMINOPHEN 160 MG/5ML
650 SOLUTION ORAL EVERY 4 HOURS PRN
Status: DISCONTINUED | OUTPATIENT
Start: 2021-10-23 | End: 2021-10-25 | Stop reason: HOSPADM

## 2021-10-23 RX ORDER — POLYETHYLENE GLYCOL 3350 17 G/17G
17 POWDER, FOR SOLUTION ORAL DAILY PRN
Status: DISCONTINUED | OUTPATIENT
Start: 2021-10-23 | End: 2021-10-25 | Stop reason: HOSPADM

## 2021-10-23 RX ORDER — METHYLPREDNISOLONE SODIUM SUCCINATE 40 MG/ML
40 INJECTION, POWDER, LYOPHILIZED, FOR SOLUTION INTRAMUSCULAR; INTRAVENOUS EVERY 12 HOURS
Status: DISCONTINUED | OUTPATIENT
Start: 2021-10-23 | End: 2021-10-25 | Stop reason: HOSPADM

## 2021-10-23 RX ORDER — DEXTROSE MONOHYDRATE 100 MG/ML
25 INJECTION, SOLUTION INTRAVENOUS
Status: DISCONTINUED | OUTPATIENT
Start: 2021-10-23 | End: 2021-10-25 | Stop reason: HOSPADM

## 2021-10-23 RX ORDER — DEXTROSE MONOHYDRATE 100 MG/ML
25 INJECTION, SOLUTION INTRAVENOUS
Status: DISCONTINUED | OUTPATIENT
Start: 2021-10-23 | End: 2021-10-23 | Stop reason: SDUPTHER

## 2021-10-23 RX ORDER — GUAIFENESIN/DEXTROMETHORPHAN 100-10MG/5
10 SYRUP ORAL EVERY 4 HOURS PRN
Status: DISCONTINUED | OUTPATIENT
Start: 2021-10-23 | End: 2021-10-25 | Stop reason: HOSPADM

## 2021-10-23 RX ORDER — ATORVASTATIN CALCIUM 10 MG/1
10 TABLET, FILM COATED ORAL DAILY
Status: DISCONTINUED | OUTPATIENT
Start: 2021-10-23 | End: 2021-10-25 | Stop reason: HOSPADM

## 2021-10-23 RX ORDER — ACETAMINOPHEN 325 MG/1
650 TABLET ORAL EVERY 4 HOURS PRN
Status: DISCONTINUED | OUTPATIENT
Start: 2021-10-23 | End: 2021-10-25 | Stop reason: HOSPADM

## 2021-10-23 RX ORDER — PANTOPRAZOLE SODIUM 40 MG/1
40 TABLET, DELAYED RELEASE ORAL EVERY MORNING
Status: DISCONTINUED | OUTPATIENT
Start: 2021-10-23 | End: 2021-10-25 | Stop reason: HOSPADM

## 2021-10-23 RX ORDER — CHOLECALCIFEROL (VITAMIN D3) 125 MCG
5 CAPSULE ORAL NIGHTLY PRN
Status: DISCONTINUED | OUTPATIENT
Start: 2021-10-23 | End: 2021-10-25 | Stop reason: HOSPADM

## 2021-10-23 RX ORDER — SODIUM CHLORIDE 0.9 % (FLUSH) 0.9 %
10 SYRINGE (ML) INJECTION EVERY 12 HOURS SCHEDULED
Status: DISCONTINUED | OUTPATIENT
Start: 2021-10-23 | End: 2021-10-25 | Stop reason: HOSPADM

## 2021-10-23 RX ORDER — IPRATROPIUM BROMIDE AND ALBUTEROL SULFATE 2.5; .5 MG/3ML; MG/3ML
3 SOLUTION RESPIRATORY (INHALATION)
Status: DISCONTINUED | OUTPATIENT
Start: 2021-10-23 | End: 2021-10-25 | Stop reason: HOSPADM

## 2021-10-23 RX ORDER — ONDANSETRON 4 MG/1
4 TABLET, FILM COATED ORAL EVERY 6 HOURS PRN
Status: DISCONTINUED | OUTPATIENT
Start: 2021-10-23 | End: 2021-10-25 | Stop reason: HOSPADM

## 2021-10-23 RX ORDER — LISINOPRIL 2.5 MG/1
2.5 TABLET ORAL DAILY
Status: DISCONTINUED | OUTPATIENT
Start: 2021-10-23 | End: 2021-10-25 | Stop reason: HOSPADM

## 2021-10-23 RX ORDER — ASPIRIN 81 MG/1
81 TABLET, CHEWABLE ORAL DAILY
Status: DISCONTINUED | OUTPATIENT
Start: 2021-10-23 | End: 2021-10-25 | Stop reason: HOSPADM

## 2021-10-23 RX ORDER — BISACODYL 10 MG
10 SUPPOSITORY, RECTAL RECTAL DAILY PRN
Status: DISCONTINUED | OUTPATIENT
Start: 2021-10-23 | End: 2021-10-25 | Stop reason: HOSPADM

## 2021-10-23 RX ORDER — CEFTRIAXONE SODIUM 1 G/50ML
1 INJECTION, SOLUTION INTRAVENOUS NIGHTLY
Status: DISCONTINUED | OUTPATIENT
Start: 2021-10-23 | End: 2021-10-25 | Stop reason: HOSPADM

## 2021-10-23 RX ORDER — ALBUTEROL SULFATE 90 UG/1
2 AEROSOL, METERED RESPIRATORY (INHALATION) EVERY 6 HOURS PRN
Status: DISCONTINUED | OUTPATIENT
Start: 2021-10-23 | End: 2021-10-25 | Stop reason: HOSPADM

## 2021-10-23 RX ORDER — NICOTINE POLACRILEX 4 MG
15 LOZENGE BUCCAL
Status: DISCONTINUED | OUTPATIENT
Start: 2021-10-23 | End: 2021-10-25 | Stop reason: HOSPADM

## 2021-10-23 RX ORDER — SODIUM CHLORIDE 9 MG/ML
100 INJECTION, SOLUTION INTRAVENOUS CONTINUOUS
Status: ACTIVE | OUTPATIENT
Start: 2021-10-23 | End: 2021-10-23

## 2021-10-23 RX ORDER — CEFTRIAXONE SODIUM 1 G/50ML
1 INJECTION, SOLUTION INTRAVENOUS NIGHTLY
Status: DISCONTINUED | OUTPATIENT
Start: 2021-10-23 | End: 2021-10-23

## 2021-10-23 RX ORDER — BISACODYL 5 MG/1
5 TABLET, DELAYED RELEASE ORAL DAILY PRN
Status: DISCONTINUED | OUTPATIENT
Start: 2021-10-23 | End: 2021-10-25 | Stop reason: HOSPADM

## 2021-10-23 RX ORDER — NICOTINE POLACRILEX 4 MG
15 LOZENGE BUCCAL
Status: DISCONTINUED | OUTPATIENT
Start: 2021-10-23 | End: 2021-10-23 | Stop reason: SDUPTHER

## 2021-10-23 RX ORDER — SODIUM CHLORIDE 0.9 % (FLUSH) 0.9 %
10 SYRINGE (ML) INJECTION AS NEEDED
Status: DISCONTINUED | OUTPATIENT
Start: 2021-10-23 | End: 2021-10-25 | Stop reason: HOSPADM

## 2021-10-23 RX ORDER — ACETAMINOPHEN 650 MG/1
650 SUPPOSITORY RECTAL EVERY 4 HOURS PRN
Status: DISCONTINUED | OUTPATIENT
Start: 2021-10-23 | End: 2021-10-25 | Stop reason: HOSPADM

## 2021-10-23 RX ORDER — AMOXICILLIN 250 MG
2 CAPSULE ORAL 2 TIMES DAILY
Status: DISCONTINUED | OUTPATIENT
Start: 2021-10-23 | End: 2021-10-25 | Stop reason: HOSPADM

## 2021-10-23 RX ORDER — ONDANSETRON 2 MG/ML
4 INJECTION INTRAMUSCULAR; INTRAVENOUS EVERY 6 HOURS PRN
Status: DISCONTINUED | OUTPATIENT
Start: 2021-10-23 | End: 2021-10-25 | Stop reason: HOSPADM

## 2021-10-23 RX ADMIN — INSULIN LISPRO 2 UNITS: 100 INJECTION, SOLUTION INTRAVENOUS; SUBCUTANEOUS at 12:13

## 2021-10-23 RX ADMIN — METOPROLOL TARTRATE 25 MG: 25 TABLET, FILM COATED ORAL at 20:42

## 2021-10-23 RX ADMIN — ASPIRIN 81 MG: 81 TABLET, CHEWABLE ORAL at 08:45

## 2021-10-23 RX ADMIN — IPRATROPIUM BROMIDE AND ALBUTEROL SULFATE 3 ML: .5; 2.5 SOLUTION RESPIRATORY (INHALATION) at 06:41

## 2021-10-23 RX ADMIN — METHYLPREDNISOLONE SODIUM SUCCINATE 40 MG: 40 INJECTION, POWDER, FOR SOLUTION INTRAMUSCULAR; INTRAVENOUS at 20:42

## 2021-10-23 RX ADMIN — SODIUM CHLORIDE, PRESERVATIVE FREE 10 ML: 5 INJECTION INTRAVENOUS at 20:45

## 2021-10-23 RX ADMIN — ENOXAPARIN SODIUM 40 MG: 40 INJECTION SUBCUTANEOUS at 08:45

## 2021-10-23 RX ADMIN — SODIUM CHLORIDE, PRESERVATIVE FREE 10 ML: 5 INJECTION INTRAVENOUS at 08:45

## 2021-10-23 RX ADMIN — IPRATROPIUM BROMIDE AND ALBUTEROL SULFATE 3 ML: .5; 2.5 SOLUTION RESPIRATORY (INHALATION) at 11:30

## 2021-10-23 RX ADMIN — LISINOPRIL 2.5 MG: 2.5 TABLET ORAL at 08:45

## 2021-10-23 RX ADMIN — ARFORMOTEROL TARTRATE: 15 SOLUTION RESPIRATORY (INHALATION) at 06:40

## 2021-10-23 RX ADMIN — CEFTRIAXONE SODIUM 1 G: 1 INJECTION, SOLUTION INTRAVENOUS at 22:24

## 2021-10-23 RX ADMIN — METOPROLOL TARTRATE 25 MG: 25 TABLET, FILM COATED ORAL at 01:59

## 2021-10-23 RX ADMIN — PANTOPRAZOLE SODIUM 40 MG: 40 TABLET, DELAYED RELEASE ORAL at 06:10

## 2021-10-23 RX ADMIN — SODIUM CHLORIDE 100 ML/HR: 9 INJECTION, SOLUTION INTRAVENOUS at 12:41

## 2021-10-23 RX ADMIN — IPRATROPIUM BROMIDE AND ALBUTEROL SULFATE 3 ML: .5; 2.5 SOLUTION RESPIRATORY (INHALATION) at 19:17

## 2021-10-23 RX ADMIN — SODIUM CHLORIDE, PRESERVATIVE FREE 10 ML: 5 INJECTION INTRAVENOUS at 02:00

## 2021-10-23 RX ADMIN — ARFORMOTEROL TARTRATE: 15 SOLUTION RESPIRATORY (INHALATION) at 07:00

## 2021-10-23 RX ADMIN — IPRATROPIUM BROMIDE AND ALBUTEROL SULFATE 3 ML: .5; 2.5 SOLUTION RESPIRATORY (INHALATION) at 14:34

## 2021-10-23 RX ADMIN — METHYLPREDNISOLONE SODIUM SUCCINATE 40 MG: 40 INJECTION, POWDER, FOR SOLUTION INTRAMUSCULAR; INTRAVENOUS at 08:44

## 2021-10-23 RX ADMIN — ARFORMOTEROL TARTRATE: 15 SOLUTION RESPIRATORY (INHALATION) at 19:17

## 2021-10-23 RX ADMIN — IPRATROPIUM BROMIDE AND ALBUTEROL SULFATE 3 ML: .5; 2.5 SOLUTION RESPIRATORY (INHALATION) at 03:03

## 2021-10-23 RX ADMIN — AZITHROMYCIN 500 MG: 500 INJECTION, POWDER, LYOPHILIZED, FOR SOLUTION INTRAVENOUS at 20:23

## 2021-10-23 RX ADMIN — SODIUM CHLORIDE 100 ML/HR: 9 INJECTION, SOLUTION INTRAVENOUS at 02:25

## 2021-10-23 RX ADMIN — ATORVASTATIN CALCIUM 10 MG: 10 TABLET, FILM COATED ORAL at 08:45

## 2021-10-23 RX ADMIN — METOPROLOL TARTRATE 25 MG: 25 TABLET, FILM COATED ORAL at 08:45

## 2021-10-24 LAB
ALBUMIN SERPL-MCNC: 3.6 G/DL (ref 3.5–5.2)
ALBUMIN/GLOB SERPL: 1.1 G/DL
ALP SERPL-CCNC: 70 U/L (ref 39–117)
ALT SERPL W P-5'-P-CCNC: 14 U/L (ref 1–41)
ANION GAP SERPL CALCULATED.3IONS-SCNC: 6.4 MMOL/L (ref 5–15)
AST SERPL-CCNC: 23 U/L (ref 1–40)
BASOPHILS # BLD AUTO: 0.01 10*3/MM3 (ref 0–0.2)
BASOPHILS NFR BLD AUTO: 0.1 % (ref 0–1.5)
BILIRUB SERPL-MCNC: <0.2 MG/DL (ref 0–1.2)
BUN SERPL-MCNC: 15 MG/DL (ref 8–23)
BUN/CREAT SERPL: 14.4 (ref 7–25)
CALCIUM SPEC-SCNC: 8.9 MG/DL (ref 8.6–10.5)
CHLORIDE SERPL-SCNC: 101 MMOL/L (ref 98–107)
CO2 SERPL-SCNC: 26.6 MMOL/L (ref 22–29)
CREAT SERPL-MCNC: 1.04 MG/DL (ref 0.76–1.27)
DEPRECATED RDW RBC AUTO: 47.5 FL (ref 37–54)
EOSINOPHIL # BLD AUTO: 0.01 10*3/MM3 (ref 0–0.4)
EOSINOPHIL NFR BLD AUTO: 0.1 % (ref 0.3–6.2)
ERYTHROCYTE [DISTWIDTH] IN BLOOD BY AUTOMATED COUNT: 15 % (ref 12.3–15.4)
GFR SERPL CREATININE-BSD FRML MDRD: 70 ML/MIN/1.73
GLOBULIN UR ELPH-MCNC: 3.3 GM/DL
GLUCOSE BLDC GLUCOMTR-MCNC: 126 MG/DL (ref 70–99)
GLUCOSE BLDC GLUCOMTR-MCNC: 132 MG/DL (ref 70–99)
GLUCOSE BLDC GLUCOMTR-MCNC: 173 MG/DL (ref 70–99)
GLUCOSE BLDC GLUCOMTR-MCNC: 179 MG/DL (ref 70–99)
GLUCOSE SERPL-MCNC: 174 MG/DL (ref 65–99)
HCT VFR BLD AUTO: 42.7 % (ref 37.5–51)
HGB BLD-MCNC: 12.8 G/DL (ref 13–17.7)
IMM GRANULOCYTES # BLD AUTO: 0.07 10*3/MM3 (ref 0–0.05)
IMM GRANULOCYTES NFR BLD AUTO: 0.6 % (ref 0–0.5)
LYMPHOCYTES # BLD AUTO: 0.7 10*3/MM3 (ref 0.7–3.1)
LYMPHOCYTES NFR BLD AUTO: 6.3 % (ref 19.6–45.3)
MAGNESIUM SERPL-MCNC: 2.3 MG/DL (ref 1.6–2.4)
MCH RBC QN AUTO: 26 PG (ref 26.6–33)
MCHC RBC AUTO-ENTMCNC: 30 G/DL (ref 31.5–35.7)
MCV RBC AUTO: 86.6 FL (ref 79–97)
MONOCYTES # BLD AUTO: 0.42 10*3/MM3 (ref 0.1–0.9)
MONOCYTES NFR BLD AUTO: 3.8 % (ref 5–12)
NEUTROPHILS NFR BLD AUTO: 89.1 % (ref 42.7–76)
NEUTROPHILS NFR BLD AUTO: 9.93 10*3/MM3 (ref 1.7–7)
NRBC BLD AUTO-RTO: 0 /100 WBC (ref 0–0.2)
PHOSPHATE SERPL-MCNC: 2.7 MG/DL (ref 2.5–4.5)
PLATELET # BLD AUTO: 183 10*3/MM3 (ref 140–450)
PMV BLD AUTO: 10.7 FL (ref 6–12)
POTASSIUM SERPL-SCNC: 4.6 MMOL/L (ref 3.5–5.2)
PROT SERPL-MCNC: 6.9 G/DL (ref 6–8.5)
RBC # BLD AUTO: 4.93 10*6/MM3 (ref 4.14–5.8)
SODIUM SERPL-SCNC: 134 MMOL/L (ref 136–145)
WBC # BLD AUTO: 11.14 10*3/MM3 (ref 3.4–10.8)

## 2021-10-24 PROCEDURE — 25010000002 METHYLPREDNISOLONE PER 40 MG: Performed by: HOSPITALIST

## 2021-10-24 PROCEDURE — 94760 N-INVAS EAR/PLS OXIMETRY 1: CPT

## 2021-10-24 PROCEDURE — 85025 COMPLETE CBC W/AUTO DIFF WBC: CPT | Performed by: INTERNAL MEDICINE

## 2021-10-24 PROCEDURE — 25010000002 CEFTRIAXONE PER 250 MG: Performed by: HOSPITALIST

## 2021-10-24 PROCEDURE — 82962 GLUCOSE BLOOD TEST: CPT

## 2021-10-24 PROCEDURE — 84100 ASSAY OF PHOSPHORUS: CPT | Performed by: INTERNAL MEDICINE

## 2021-10-24 PROCEDURE — 25010000002 AZITHROMYCIN PER 500 MG: Performed by: HOSPITALIST

## 2021-10-24 PROCEDURE — 63710000001 INSULIN LISPRO (HUMAN) PER 5 UNITS: Performed by: HOSPITALIST

## 2021-10-24 PROCEDURE — 94799 UNLISTED PULMONARY SVC/PX: CPT

## 2021-10-24 PROCEDURE — 80053 COMPREHEN METABOLIC PANEL: CPT | Performed by: INTERNAL MEDICINE

## 2021-10-24 PROCEDURE — 83735 ASSAY OF MAGNESIUM: CPT | Performed by: INTERNAL MEDICINE

## 2021-10-24 PROCEDURE — 25010000002 ENOXAPARIN PER 10 MG: Performed by: HOSPITALIST

## 2021-10-24 PROCEDURE — 99232 SBSQ HOSP IP/OBS MODERATE 35: CPT | Performed by: INTERNAL MEDICINE

## 2021-10-24 RX ADMIN — IPRATROPIUM BROMIDE AND ALBUTEROL SULFATE 3 ML: .5; 2.5 SOLUTION RESPIRATORY (INHALATION) at 14:58

## 2021-10-24 RX ADMIN — IPRATROPIUM BROMIDE AND ALBUTEROL SULFATE 3 ML: .5; 2.5 SOLUTION RESPIRATORY (INHALATION) at 18:42

## 2021-10-24 RX ADMIN — METHYLPREDNISOLONE SODIUM SUCCINATE 40 MG: 40 INJECTION, POWDER, FOR SOLUTION INTRAMUSCULAR; INTRAVENOUS at 20:10

## 2021-10-24 RX ADMIN — ARFORMOTEROL TARTRATE: 15 SOLUTION RESPIRATORY (INHALATION) at 06:36

## 2021-10-24 RX ADMIN — INSULIN LISPRO 2 UNITS: 100 INJECTION, SOLUTION INTRAVENOUS; SUBCUTANEOUS at 08:31

## 2021-10-24 RX ADMIN — ATORVASTATIN CALCIUM 10 MG: 10 TABLET, FILM COATED ORAL at 08:32

## 2021-10-24 RX ADMIN — DOCUSATE SODIUM 50MG AND SENNOSIDES 8.6MG 2 TABLET: 8.6; 5 TABLET, FILM COATED ORAL at 08:32

## 2021-10-24 RX ADMIN — METHYLPREDNISOLONE SODIUM SUCCINATE 40 MG: 40 INJECTION, POWDER, FOR SOLUTION INTRAMUSCULAR; INTRAVENOUS at 08:31

## 2021-10-24 RX ADMIN — PANTOPRAZOLE SODIUM 40 MG: 40 TABLET, DELAYED RELEASE ORAL at 05:56

## 2021-10-24 RX ADMIN — SODIUM CHLORIDE, PRESERVATIVE FREE 10 ML: 5 INJECTION INTRAVENOUS at 20:12

## 2021-10-24 RX ADMIN — PANTOPRAZOLE SODIUM 40 MG: 40 TABLET, DELAYED RELEASE ORAL at 08:30

## 2021-10-24 RX ADMIN — CEFTRIAXONE SODIUM 1 G: 1 INJECTION, SOLUTION INTRAVENOUS at 20:11

## 2021-10-24 RX ADMIN — ENOXAPARIN SODIUM 40 MG: 40 INJECTION SUBCUTANEOUS at 08:31

## 2021-10-24 RX ADMIN — AZITHROMYCIN 500 MG: 500 INJECTION, POWDER, LYOPHILIZED, FOR SOLUTION INTRAVENOUS at 22:27

## 2021-10-24 RX ADMIN — LISINOPRIL 2.5 MG: 2.5 TABLET ORAL at 08:31

## 2021-10-24 RX ADMIN — ARFORMOTEROL TARTRATE: 15 SOLUTION RESPIRATORY (INHALATION) at 18:39

## 2021-10-24 RX ADMIN — INSULIN LISPRO 2 UNITS: 100 INJECTION, SOLUTION INTRAVENOUS; SUBCUTANEOUS at 17:11

## 2021-10-24 RX ADMIN — IPRATROPIUM BROMIDE AND ALBUTEROL SULFATE 3 ML: .5; 2.5 SOLUTION RESPIRATORY (INHALATION) at 14:53

## 2021-10-24 RX ADMIN — METOPROLOL TARTRATE 25 MG: 25 TABLET, FILM COATED ORAL at 20:10

## 2021-10-24 RX ADMIN — METOPROLOL TARTRATE 25 MG: 25 TABLET, FILM COATED ORAL at 08:32

## 2021-10-24 RX ADMIN — SODIUM CHLORIDE, PRESERVATIVE FREE 10 ML: 5 INJECTION INTRAVENOUS at 08:32

## 2021-10-24 RX ADMIN — IPRATROPIUM BROMIDE AND ALBUTEROL SULFATE 3 ML: .5; 2.5 SOLUTION RESPIRATORY (INHALATION) at 00:04

## 2021-10-24 RX ADMIN — IPRATROPIUM BROMIDE AND ALBUTEROL SULFATE 3 ML: .5; 2.5 SOLUTION RESPIRATORY (INHALATION) at 03:25

## 2021-10-24 RX ADMIN — DOCUSATE SODIUM 50MG AND SENNOSIDES 8.6MG 2 TABLET: 8.6; 5 TABLET, FILM COATED ORAL at 20:11

## 2021-10-24 RX ADMIN — IPRATROPIUM BROMIDE AND ALBUTEROL SULFATE 3 ML: .5; 2.5 SOLUTION RESPIRATORY (INHALATION) at 23:52

## 2021-10-24 RX ADMIN — ASPIRIN 81 MG: 81 TABLET, CHEWABLE ORAL at 08:32

## 2021-10-24 RX ADMIN — IPRATROPIUM BROMIDE AND ALBUTEROL SULFATE 3 ML: .5; 2.5 SOLUTION RESPIRATORY (INHALATION) at 06:36

## 2021-10-25 VITALS
BODY MASS INDEX: 31.18 KG/M2 | HEART RATE: 78 BPM | SYSTOLIC BLOOD PRESSURE: 150 MMHG | TEMPERATURE: 97.8 F | WEIGHT: 235.23 LBS | DIASTOLIC BLOOD PRESSURE: 84 MMHG | OXYGEN SATURATION: 91 % | HEIGHT: 73 IN | RESPIRATION RATE: 18 BRPM

## 2021-10-25 LAB
ALBUMIN SERPL-MCNC: 3.4 G/DL (ref 3.5–5.2)
ALBUMIN/GLOB SERPL: 1.1 G/DL
ALP SERPL-CCNC: 65 U/L (ref 39–117)
ALT SERPL W P-5'-P-CCNC: 15 U/L (ref 1–41)
ANION GAP SERPL CALCULATED.3IONS-SCNC: 12.8 MMOL/L (ref 5–15)
AST SERPL-CCNC: 21 U/L (ref 1–40)
BACTERIA SPEC AEROBE CULT: ABNORMAL
BASOPHILS # BLD AUTO: 0.01 10*3/MM3 (ref 0–0.2)
BASOPHILS NFR BLD AUTO: 0.1 % (ref 0–1.5)
BILIRUB SERPL-MCNC: 0.2 MG/DL (ref 0–1.2)
BUN SERPL-MCNC: 17 MG/DL (ref 8–23)
BUN/CREAT SERPL: 16.2 (ref 7–25)
CALCIUM SPEC-SCNC: 8.6 MG/DL (ref 8.6–10.5)
CHLORIDE SERPL-SCNC: 101 MMOL/L (ref 98–107)
CO2 SERPL-SCNC: 23.2 MMOL/L (ref 22–29)
CREAT SERPL-MCNC: 1.05 MG/DL (ref 0.76–1.27)
DEPRECATED RDW RBC AUTO: 50.8 FL (ref 37–54)
EOSINOPHIL # BLD AUTO: 0 10*3/MM3 (ref 0–0.4)
EOSINOPHIL NFR BLD AUTO: 0 % (ref 0.3–6.2)
ERYTHROCYTE [DISTWIDTH] IN BLOOD BY AUTOMATED COUNT: 15.2 % (ref 12.3–15.4)
GFR SERPL CREATININE-BSD FRML MDRD: 69 ML/MIN/1.73
GLOBULIN UR ELPH-MCNC: 3 GM/DL
GLUCOSE BLDC GLUCOMTR-MCNC: 148 MG/DL (ref 70–99)
GLUCOSE BLDC GLUCOMTR-MCNC: 190 MG/DL (ref 70–99)
GLUCOSE SERPL-MCNC: 196 MG/DL (ref 65–99)
GRAM STN SPEC: ABNORMAL
HCT VFR BLD AUTO: 42.9 % (ref 37.5–51)
HGB BLD-MCNC: 12.6 G/DL (ref 13–17.7)
IMM GRANULOCYTES # BLD AUTO: 0.08 10*3/MM3 (ref 0–0.05)
IMM GRANULOCYTES NFR BLD AUTO: 0.7 % (ref 0–0.5)
ISOLATED FROM: ABNORMAL
LYMPHOCYTES # BLD AUTO: 0.6 10*3/MM3 (ref 0.7–3.1)
LYMPHOCYTES NFR BLD AUTO: 5.4 % (ref 19.6–45.3)
MAGNESIUM SERPL-MCNC: 2.1 MG/DL (ref 1.6–2.4)
MCH RBC QN AUTO: 26.8 PG (ref 26.6–33)
MCHC RBC AUTO-ENTMCNC: 29.4 G/DL (ref 31.5–35.7)
MCV RBC AUTO: 91.3 FL (ref 79–97)
MONOCYTES # BLD AUTO: 0.33 10*3/MM3 (ref 0.1–0.9)
MONOCYTES NFR BLD AUTO: 3 % (ref 5–12)
NEUTROPHILS NFR BLD AUTO: 10.02 10*3/MM3 (ref 1.7–7)
NEUTROPHILS NFR BLD AUTO: 90.8 % (ref 42.7–76)
NRBC BLD AUTO-RTO: 0 /100 WBC (ref 0–0.2)
PHOSPHATE SERPL-MCNC: 3 MG/DL (ref 2.5–4.5)
PLATELET # BLD AUTO: 179 10*3/MM3 (ref 140–450)
PMV BLD AUTO: 10.5 FL (ref 6–12)
POTASSIUM SERPL-SCNC: 5 MMOL/L (ref 3.5–5.2)
PROT SERPL-MCNC: 6.4 G/DL (ref 6–8.5)
RBC # BLD AUTO: 4.7 10*6/MM3 (ref 4.14–5.8)
SODIUM SERPL-SCNC: 137 MMOL/L (ref 136–145)
WBC # BLD AUTO: 11.04 10*3/MM3 (ref 3.4–10.8)

## 2021-10-25 PROCEDURE — 99239 HOSP IP/OBS DSCHRG MGMT >30: CPT | Performed by: INTERNAL MEDICINE

## 2021-10-25 PROCEDURE — 94799 UNLISTED PULMONARY SVC/PX: CPT

## 2021-10-25 PROCEDURE — 25010000002 ENOXAPARIN PER 10 MG: Performed by: HOSPITALIST

## 2021-10-25 PROCEDURE — 80053 COMPREHEN METABOLIC PANEL: CPT | Performed by: INTERNAL MEDICINE

## 2021-10-25 PROCEDURE — 85025 COMPLETE CBC W/AUTO DIFF WBC: CPT | Performed by: INTERNAL MEDICINE

## 2021-10-25 PROCEDURE — 25010000002 METHYLPREDNISOLONE PER 40 MG: Performed by: HOSPITALIST

## 2021-10-25 PROCEDURE — 63710000001 INSULIN LISPRO (HUMAN) PER 5 UNITS: Performed by: HOSPITALIST

## 2021-10-25 PROCEDURE — 94760 N-INVAS EAR/PLS OXIMETRY 1: CPT

## 2021-10-25 PROCEDURE — 83735 ASSAY OF MAGNESIUM: CPT | Performed by: INTERNAL MEDICINE

## 2021-10-25 PROCEDURE — 97161 PT EVAL LOW COMPLEX 20 MIN: CPT

## 2021-10-25 PROCEDURE — 82962 GLUCOSE BLOOD TEST: CPT

## 2021-10-25 PROCEDURE — 84100 ASSAY OF PHOSPHORUS: CPT | Performed by: INTERNAL MEDICINE

## 2021-10-25 RX ORDER — LEVOFLOXACIN 750 MG/1
750 TABLET ORAL DAILY
Qty: 5 TABLET | Refills: 0 | Status: SHIPPED | OUTPATIENT
Start: 2021-10-25 | End: 2021-10-30

## 2021-10-25 RX ORDER — PREDNISONE 20 MG/1
40 TABLET ORAL DAILY
Qty: 6 TABLET | Refills: 0 | Status: SHIPPED | OUTPATIENT
Start: 2021-10-25 | End: 2021-10-28

## 2021-10-25 RX ADMIN — ARFORMOTEROL TARTRATE: 15 SOLUTION RESPIRATORY (INHALATION) at 06:24

## 2021-10-25 RX ADMIN — ASPIRIN 81 MG: 81 TABLET, CHEWABLE ORAL at 08:37

## 2021-10-25 RX ADMIN — METOPROLOL TARTRATE 25 MG: 25 TABLET, FILM COATED ORAL at 08:37

## 2021-10-25 RX ADMIN — LISINOPRIL 2.5 MG: 2.5 TABLET ORAL at 08:37

## 2021-10-25 RX ADMIN — PANTOPRAZOLE SODIUM 40 MG: 40 TABLET, DELAYED RELEASE ORAL at 05:03

## 2021-10-25 RX ADMIN — SODIUM CHLORIDE, PRESERVATIVE FREE 10 ML: 5 INJECTION INTRAVENOUS at 08:37

## 2021-10-25 RX ADMIN — ATORVASTATIN CALCIUM 10 MG: 10 TABLET, FILM COATED ORAL at 08:37

## 2021-10-25 RX ADMIN — DOCUSATE SODIUM 50MG AND SENNOSIDES 8.6MG 2 TABLET: 8.6; 5 TABLET, FILM COATED ORAL at 08:37

## 2021-10-25 RX ADMIN — ENOXAPARIN SODIUM 40 MG: 40 INJECTION SUBCUTANEOUS at 08:36

## 2021-10-25 RX ADMIN — IPRATROPIUM BROMIDE AND ALBUTEROL SULFATE 3 ML: .5; 2.5 SOLUTION RESPIRATORY (INHALATION) at 06:25

## 2021-10-25 RX ADMIN — INSULIN LISPRO 2 UNITS: 100 INJECTION, SOLUTION INTRAVENOUS; SUBCUTANEOUS at 12:04

## 2021-10-25 RX ADMIN — IPRATROPIUM BROMIDE AND ALBUTEROL SULFATE 3 ML: .5; 2.5 SOLUTION RESPIRATORY (INHALATION) at 11:22

## 2021-10-25 RX ADMIN — METHYLPREDNISOLONE SODIUM SUCCINATE 40 MG: 40 INJECTION, POWDER, FOR SOLUTION INTRAMUSCULAR; INTRAVENOUS at 08:37

## 2021-10-26 ENCOUNTER — READMISSION MANAGEMENT (OUTPATIENT)
Dept: CALL CENTER | Facility: HOSPITAL | Age: 71
End: 2021-10-26

## 2021-10-26 NOTE — OUTREACH NOTE
Prep Survey      Responses   [employer] facility patient discharged from? [facility]   Is LACE score < 7 ? No   Emergency Room discharge w/ pulse ox? No   Eligibility Readm Mgmt   Discharge diagnosis Acute on chronic respiratory failure hypoxia and hypercapnia   Does the patient have one of the following disease processes/diagnoses(primary or secondary)? COPD/Pneumonia   Does the patient have Home health ordered? No   Is there a DME ordered? No   Comments regarding appointments Appts needed   Prep survey completed? Yes          Tamanna Hansen RN

## 2021-10-27 ENCOUNTER — READMISSION MANAGEMENT (OUTPATIENT)
Dept: CALL CENTER | Facility: HOSPITAL | Age: 71
End: 2021-10-27

## 2021-10-27 LAB — BACTERIA SPEC AEROBE CULT: NORMAL

## 2021-10-27 NOTE — OUTREACH NOTE
COPD/PN Week 1 Survey      Responses   [facility] patient discharged from? [facility]   Does the patient have one of the following disease processes/diagnoses(primary or secondary)? COPD/Pneumonia   Was the primary reason for admission: COPD exacerbation   Week 1 attempt successful? Yes   Call start time 1258   Call end time 1302   Discharge diagnosis Acute on chronic respiratory failure hypoxia and hypercapnia   Is patient permission given to speak with other caregiver? Yes   List who call center can speak with Sister Juana Russo reviewed with patient/caregiver? Yes   Is the patient having any side effects they believe may be caused by any medication additions or changes? No   Does the patient have all medications ordered at discharge? No   What is keeping the patient from filling the prescriptions? Transportation   Prescription comments Going to pick them up today   Is the patient taking all medications as directed (includes completed medication regime)? Yes   Does the patient have a primary care provider?  Yes   Does the patient have an appointment with their PCP or specialist within 7 days of discharge? No   Comments regarding PCP PCP office is to call pt.    What is preventing the patient from scheduling follow up appointments within 7 days of discharge? Waiting on return call   Has the patient kept scheduled appointments due by today? N/A   Pulse Ox monitoring None   Did the patient receive a copy of their discharge instructions? Yes   Nursing interventions Reviewed instructions with patient   What is the patient's perception of their health status since discharge? Improving   Are the patient's immunizations up to date?  No   Nursing interventions Educated on importance of maintaining up to date immunizations as advised by provider   If the patient is a current smoker, are they able to teach back resources for cessation? Not a smoker   Is the patient/caregiver able to teach back the hierarchy of who to  call/visit for symptoms/problems? PCP, Specialist, Home health nurse, Urgent Care, ED, 911 Yes   Is the patient able to teach back COPD zones? Yes   Nursing interventions Education provided on various zones   Patient reports what zone on this call? Green Zone   Green Zone Reports doing well,  Breathing without shortness of breath,  Usual activity and exercise level,  Usual amount of phlegm/mucus without difficulty coughing up   Green Zone interventions: Take daily medications,  Continue regular exercise/diet plan   Week 1 call completed? Yes   Wrap up additional comments States he is improving. No needs identified.           Yudelka Tanner, RN

## 2021-10-28 LAB
BASE EXCESS BLDA CALC-SCNC: -2 MMOL/L (ref -2–2)
BDY SITE: ABNORMAL
COHGB MFR BLD: 1.3 % (ref 0–1.5)
FHHB: 8.3 % (ref 0–5)
GAS FLOW AIRWAY: 5 LPM
HCO3 BLDA-SCNC: 26.1 MMOL/L (ref 22–26)
HGB BLDA-MCNC: 13.8 G/DL (ref 13.8–16.4)
METHGB BLD QL: 0.2 % (ref 0–1.5)
MODALITY: ABNORMAL
OXYHGB MFR BLDV: 90.2 % (ref 94–99)
PCO2 BLDA: 59.2 MM HG (ref 35–45)
PH BLDA: 7.26 PH UNITS (ref 7.35–7.45)
PO2 BLDA: 69.2 MM HG (ref 80–100)
SAO2 % BLDCOA: 91.6 % (ref 95–99)

## 2021-11-03 ENCOUNTER — READMISSION MANAGEMENT (OUTPATIENT)
Dept: CALL CENTER | Facility: HOSPITAL | Age: 71
End: 2021-11-03

## 2021-11-03 NOTE — OUTREACH NOTE
COPD/PN Week 2 Survey      Responses   [facility] patient discharged from? [facility]   Does the patient have one of the following disease processes/diagnoses(primary or secondary)? COPD/Pneumonia   Was the primary reason for admission: COPD exacerbation   Week 2 attempt successful? No   Unsuccessful attempts Attempt 1          Mariana Taylor LPN

## 2021-11-05 ENCOUNTER — READMISSION MANAGEMENT (OUTPATIENT)
Dept: CALL CENTER | Facility: HOSPITAL | Age: 71
End: 2021-11-05

## 2021-11-05 NOTE — OUTREACH NOTE
COPD/PN Week 2 Survey      Responses   [facility] patient discharged from? [facility]   Does the patient have one of the following disease processes/diagnoses(primary or secondary)? COPD/Pneumonia   Was the primary reason for admission: COPD exacerbation   Week 2 attempt successful? Yes   Call start time 1704   Call end time 1708   Discharge diagnosis Acute on chronic respiratory failure hypoxia and hypercapnia   Is patient permission given to speak with other caregiver? Yes   List who call center can speak with Sister Juana Russo reviewed with patient/caregiver? Yes   Is the patient having any side effects they believe may be caused by any medication additions or changes? No   Does the patient have all medications ordered at discharge? Yes   Is the patient taking all medications as directed (includes completed medication regime)? Yes   Does the patient have a primary care provider?  Yes   Does the patient have an appointment with their PCP or specialist within 7 days of discharge? No   What is preventing the patient from scheduling follow up appointments within 7 days of discharge? Haven't had time   Nursing Interventions Advised patient to make appointment,  Educated patient on importance of making appointment   Has the patient kept scheduled appointments due by today? N/A   Has home health visited the patient within 72 hours of discharge? N/A   Pulse Ox monitoring None   Psychosocial issues? No   Did the patient receive a copy of their discharge instructions? Yes   Nursing interventions Reviewed instructions with patient   What is the patient's perception of their health status since discharge? Improving   Nursing Interventions Nurse provided patient education   Are the patient's immunizations up to date?  No   Nursing interventions Educated on importance of maintaining up to date immunizations as advised by provider   If the patient is a current smoker, are they able to teach back resources for cessation?  Not a smoker   Is the patient/caregiver able to teach back the hierarchy of who to call/visit for symptoms/problems? PCP, Specialist, Home health nurse, Urgent Care, ED, 911 Yes   Is the patient able to teach back COPD zones? Yes   Nursing interventions Education provided on various zones   Patient reports what zone on this call? Green Zone   Green Zone Reports doing well,  Breathing without shortness of breath,  Usual activity and exercise level,  Usual amount of phlegm/mucus without difficulty coughing up   Green Zone interventions: Take daily medications,  Continue regular exercise/diet plan   Is the patient/caregiver able to teach back signs and symptoms of worsening condition: Fever/chills,  Shortness of breath,  Chest pain   Is the patient/caregiver able to teach back importance of completing antibiotic course of treatment? Yes   Week 2 call completed? Yes   Wrap up additional comments States he is improving. No needs identified.           Jennifer Tejeda RN

## 2021-11-11 ENCOUNTER — READMISSION MANAGEMENT (OUTPATIENT)
Dept: CALL CENTER | Facility: HOSPITAL | Age: 71
End: 2021-11-11

## 2021-11-11 NOTE — OUTREACH NOTE
COPD/PN Week 3 Survey      Responses   [facility] [place] patient discharged from? [facility]   Does the patient have one of the following disease processes/diagnoses(primary or secondary)? COPD/Pneumonia   Was the primary reason for admission: COPD exacerbation   Week 3 attempt successful? No   Unsuccessful attempts Attempt 1          Mariana Taylor LPN

## 2021-11-16 ENCOUNTER — READMISSION MANAGEMENT (OUTPATIENT)
Dept: CALL CENTER | Facility: HOSPITAL | Age: 71
End: 2021-11-16

## 2021-11-16 NOTE — OUTREACH NOTE
COPD/PN Week 3 Survey      Responses   [facility] patient discharged from? [facility]   Does the patient have one of the following disease processes/diagnoses(primary or secondary)? COPD/Pneumonia   Was the primary reason for admission: COPD exacerbation   Week 3 attempt successful? Yes   Call start time 1053   Call end time 1054   Discharge diagnosis Acute on chronic respiratory failure hypoxia and hypercapnia   Is the patient taking all medications as directed (includes completed medication regime)? Yes   Does the patient have a primary care provider?  Yes   Has the patient kept scheduled appointments due by today? N/A   Has home health visited the patient within 72 hours of discharge? N/A   Psychosocial issues? No   What is the patient's perception of their health status since discharge? Improving   Nursing Interventions Nurse provided patient education   Is the patient/caregiver able to teach back the hierarchy of who to call/visit for symptoms/problems? PCP, Specialist, Home health nurse, Urgent Care, ED, 911 Yes   Is the patient/caregiver able to teach back signs and symptoms of worsening condition: Fever/chills,  Shortness of breath,  Chest pain   Week 3 call completed? Yes   Revoked No further contact(revokes)-requires comment   Is the patient interested in additional calls from an ambulatory ?  NOTE:  applies to high risk patients requiring additional follow-up. No   Graduated/Revoked comments Says he is doing well, no further calls needed.          Tianna Lepe RN

## 2021-12-10 ENCOUNTER — APPOINTMENT (OUTPATIENT)
Dept: GENERAL RADIOLOGY | Facility: HOSPITAL | Age: 71
End: 2021-12-10

## 2021-12-10 ENCOUNTER — HOSPITAL ENCOUNTER (INPATIENT)
Facility: HOSPITAL | Age: 71
LOS: 2 days | Discharge: HOME | End: 2021-12-13
Attending: EMERGENCY MEDICINE | Admitting: STUDENT IN AN ORGANIZED HEALTH CARE EDUCATION/TRAINING PROGRAM

## 2021-12-10 DIAGNOSIS — J44.1 CHRONIC OBSTRUCTIVE PULMONARY DISEASE WITH ACUTE EXACERBATION (HCC): ICD-10-CM

## 2021-12-10 DIAGNOSIS — J96.21 ACUTE ON CHRONIC RESPIRATORY FAILURE WITH HYPOXIA AND HYPERCAPNIA (HCC): ICD-10-CM

## 2021-12-10 DIAGNOSIS — J44.1 ACUTE EXACERBATION OF CHRONIC OBSTRUCTIVE PULMONARY DISEASE (COPD) (HCC): Primary | ICD-10-CM

## 2021-12-10 DIAGNOSIS — E87.29 RESPIRATORY ACIDOSIS: ICD-10-CM

## 2021-12-10 DIAGNOSIS — J96.22 ACUTE ON CHRONIC RESPIRATORY FAILURE WITH HYPOXIA AND HYPERCAPNIA (HCC): ICD-10-CM

## 2021-12-10 LAB
ALBUMIN SERPL-MCNC: 4.1 G/DL (ref 3.5–5.2)
ALBUMIN/GLOB SERPL: 1.3 G/DL
ALP SERPL-CCNC: 102 U/L (ref 39–117)
ALT SERPL W P-5'-P-CCNC: 16 U/L (ref 1–41)
ANION GAP SERPL CALCULATED.3IONS-SCNC: 9 MMOL/L (ref 5–15)
AST SERPL-CCNC: 23 U/L (ref 1–40)
BASE EXCESS BLDA CALC-SCNC: -0.3 MMOL/L (ref -2–2)
BASOPHILS # BLD AUTO: 0.07 10*3/MM3 (ref 0–0.2)
BASOPHILS NFR BLD AUTO: 0.6 % (ref 0–1.5)
BDY SITE: ABNORMAL
BILIRUB SERPL-MCNC: 0.4 MG/DL (ref 0–1.2)
BUN SERPL-MCNC: 20 MG/DL (ref 8–23)
BUN/CREAT SERPL: 20 (ref 7–25)
CALCIUM SPEC-SCNC: 8.9 MG/DL (ref 8.6–10.5)
CHLORIDE SERPL-SCNC: 102 MMOL/L (ref 98–107)
CO2 SERPL-SCNC: 29 MMOL/L (ref 22–29)
COHGB MFR BLD: 1.3 % (ref 0–1.5)
CREAT SERPL-MCNC: 1 MG/DL (ref 0.76–1.27)
D-LACTATE SERPL-SCNC: 1 MMOL/L (ref 0.5–2)
DEPRECATED RDW RBC AUTO: 47.2 FL (ref 37–54)
EOSINOPHIL # BLD AUTO: 1.8 10*3/MM3 (ref 0–0.4)
EOSINOPHIL NFR BLD AUTO: 14.9 % (ref 0.3–6.2)
ERYTHROCYTE [DISTWIDTH] IN BLOOD BY AUTOMATED COUNT: 14.9 % (ref 12.3–15.4)
FHHB: 2.2 % (ref 0–5)
GAS FLOW AIRWAY: 6 LPM
GFR SERPL CREATININE-BSD FRML MDRD: 73 ML/MIN/1.73
GLOBULIN UR ELPH-MCNC: 3.2 GM/DL
GLUCOSE SERPL-MCNC: 125 MG/DL (ref 65–99)
HCO3 BLDA-SCNC: 28.9 MMOL/L (ref 22–26)
HCT VFR BLD AUTO: 46 % (ref 37.5–51)
HGB BLD-MCNC: 13.8 G/DL (ref 13–17.7)
HGB BLDA-MCNC: 13.9 G/DL (ref 13.8–16.4)
HOLD SPECIMEN: NORMAL
HOLD SPECIMEN: NORMAL
IMM GRANULOCYTES # BLD AUTO: 0.05 10*3/MM3 (ref 0–0.05)
IMM GRANULOCYTES NFR BLD AUTO: 0.4 % (ref 0–0.5)
INHALED O2 CONCENTRATION: 44 %
LACTATE BLDA-SCNC: ABNORMAL MMOL/L
LYMPHOCYTES # BLD AUTO: 1.43 10*3/MM3 (ref 0.7–3.1)
LYMPHOCYTES NFR BLD AUTO: 11.8 % (ref 19.6–45.3)
MCH RBC QN AUTO: 26.5 PG (ref 26.6–33)
MCHC RBC AUTO-ENTMCNC: 30 G/DL (ref 31.5–35.7)
MCV RBC AUTO: 88.3 FL (ref 79–97)
METHGB BLD QL: 0.3 % (ref 0–1.5)
MODALITY: ABNORMAL
MONOCYTES # BLD AUTO: 0.8 10*3/MM3 (ref 0.1–0.9)
MONOCYTES NFR BLD AUTO: 6.6 % (ref 5–12)
NEUTROPHILS NFR BLD AUTO: 65.7 % (ref 42.7–76)
NEUTROPHILS NFR BLD AUTO: 7.97 10*3/MM3 (ref 1.7–7)
NRBC BLD AUTO-RTO: 0 /100 WBC (ref 0–0.2)
NT-PROBNP SERPL-MCNC: 186 PG/ML (ref 0–900)
OXYHGB MFR BLDV: 96.2 % (ref 94–99)
PCO2 BLDA: 69.2 MM HG (ref 35–45)
PH BLDA: 7.24 PH UNITS (ref 7.35–7.45)
PLATELET # BLD AUTO: 179 10*3/MM3 (ref 140–450)
PMV BLD AUTO: 10.9 FL (ref 6–12)
PO2 BLD: 302 MM[HG] (ref 0–500)
PO2 BLDA: 133 MM HG (ref 80–100)
POTASSIUM SERPL-SCNC: 4.7 MMOL/L (ref 3.5–5.2)
PROT SERPL-MCNC: 7.3 G/DL (ref 6–8.5)
QT INTERVAL: 381 MS
RBC # BLD AUTO: 5.21 10*6/MM3 (ref 4.14–5.8)
SAO2 % BLDCOA: 97.8 % (ref 95–99)
SODIUM SERPL-SCNC: 140 MMOL/L (ref 136–145)
TROPONIN T SERPL-MCNC: 0.02 NG/ML (ref 0–0.03)
WBC NRBC COR # BLD: 12.12 10*3/MM3 (ref 3.4–10.8)
WHOLE BLOOD HOLD SPECIMEN: NORMAL
WHOLE BLOOD HOLD SPECIMEN: NORMAL

## 2021-12-10 PROCEDURE — 94640 AIRWAY INHALATION TREATMENT: CPT

## 2021-12-10 PROCEDURE — 84484 ASSAY OF TROPONIN QUANT: CPT | Performed by: EMERGENCY MEDICINE

## 2021-12-10 PROCEDURE — 83050 HGB METHEMOGLOBIN QUAN: CPT | Performed by: EMERGENCY MEDICINE

## 2021-12-10 PROCEDURE — 87635 SARS-COV-2 COVID-19 AMP PRB: CPT | Performed by: EMERGENCY MEDICINE

## 2021-12-10 PROCEDURE — 93005 ELECTROCARDIOGRAM TRACING: CPT | Performed by: EMERGENCY MEDICINE

## 2021-12-10 PROCEDURE — 85025 COMPLETE CBC W/AUTO DIFF WBC: CPT | Performed by: EMERGENCY MEDICINE

## 2021-12-10 PROCEDURE — 99285 EMERGENCY DEPT VISIT HI MDM: CPT

## 2021-12-10 PROCEDURE — 36600 WITHDRAWAL OF ARTERIAL BLOOD: CPT | Performed by: EMERGENCY MEDICINE

## 2021-12-10 PROCEDURE — 93010 ELECTROCARDIOGRAM REPORT: CPT | Performed by: INTERNAL MEDICINE

## 2021-12-10 PROCEDURE — 71045 X-RAY EXAM CHEST 1 VIEW: CPT

## 2021-12-10 PROCEDURE — 82805 BLOOD GASES W/O2 SATURATION: CPT | Performed by: EMERGENCY MEDICINE

## 2021-12-10 PROCEDURE — 83880 ASSAY OF NATRIURETIC PEPTIDE: CPT | Performed by: EMERGENCY MEDICINE

## 2021-12-10 PROCEDURE — 83605 ASSAY OF LACTIC ACID: CPT | Performed by: EMERGENCY MEDICINE

## 2021-12-10 PROCEDURE — 94799 UNLISTED PULMONARY SVC/PX: CPT

## 2021-12-10 PROCEDURE — 87040 BLOOD CULTURE FOR BACTERIA: CPT | Performed by: EMERGENCY MEDICINE

## 2021-12-10 PROCEDURE — 25010000002 METHYLPREDNISOLONE PER 125 MG: Performed by: EMERGENCY MEDICINE

## 2021-12-10 PROCEDURE — 80053 COMPREHEN METABOLIC PANEL: CPT | Performed by: EMERGENCY MEDICINE

## 2021-12-10 PROCEDURE — 82375 ASSAY CARBOXYHB QUANT: CPT | Performed by: EMERGENCY MEDICINE

## 2021-12-10 RX ORDER — IPRATROPIUM BROMIDE AND ALBUTEROL SULFATE 2.5; .5 MG/3ML; MG/3ML
3 SOLUTION RESPIRATORY (INHALATION) ONCE
Status: COMPLETED | OUTPATIENT
Start: 2021-12-10 | End: 2021-12-10

## 2021-12-10 RX ORDER — METHYLPREDNISOLONE SODIUM SUCCINATE 125 MG/2ML
125 INJECTION, POWDER, LYOPHILIZED, FOR SOLUTION INTRAMUSCULAR; INTRAVENOUS ONCE
Status: COMPLETED | OUTPATIENT
Start: 2021-12-10 | End: 2021-12-10

## 2021-12-10 RX ORDER — IPRATROPIUM BROMIDE AND ALBUTEROL SULFATE 2.5; .5 MG/3ML; MG/3ML
3 SOLUTION RESPIRATORY (INHALATION) ONCE
Status: COMPLETED | OUTPATIENT
Start: 2021-12-10 | End: 2021-12-11

## 2021-12-10 RX ORDER — SODIUM CHLORIDE 0.9 % (FLUSH) 0.9 %
10 SYRINGE (ML) INJECTION AS NEEDED
Status: DISCONTINUED | OUTPATIENT
Start: 2021-12-10 | End: 2021-12-13 | Stop reason: HOSPADM

## 2021-12-10 RX ADMIN — IPRATROPIUM BROMIDE AND ALBUTEROL SULFATE 3 ML: .5; 3 SOLUTION RESPIRATORY (INHALATION) at 19:39

## 2021-12-10 RX ADMIN — IPRATROPIUM BROMIDE AND ALBUTEROL SULFATE 3 ML: .5; 3 SOLUTION RESPIRATORY (INHALATION) at 19:38

## 2021-12-10 RX ADMIN — METHYLPREDNISOLONE SODIUM SUCCINATE 125 MG: 125 INJECTION, POWDER, FOR SOLUTION INTRAMUSCULAR; INTRAVENOUS at 20:13

## 2021-12-11 PROBLEM — J44.1 COPD EXACERBATION (HCC): Status: ACTIVE | Noted: 2021-12-11

## 2021-12-11 PROBLEM — E66.9 OBESITY (BMI 30-39.9): Status: ACTIVE | Noted: 2021-12-11

## 2021-12-11 LAB
ARTERIAL PATENCY WRIST A: POSITIVE
BASE EXCESS BLDA CALC-SCNC: 0.2 MMOL/L (ref -2–2)
BDY SITE: ABNORMAL
COHGB MFR BLD: 1.3 % (ref 0–1.5)
FHHB: 3.6 % (ref 0–5)
GLUCOSE BLDC GLUCOMTR-MCNC: 120 MG/DL (ref 70–99)
GLUCOSE BLDC GLUCOMTR-MCNC: 149 MG/DL (ref 70–99)
GLUCOSE BLDC GLUCOMTR-MCNC: 188 MG/DL (ref 70–99)
HCO3 BLDA-SCNC: 28.9 MMOL/L (ref 22–26)
HGB BLDA-MCNC: 13.8 G/DL (ref 13.8–16.4)
INHALED O2 CONCENTRATION: 32 %
LACTATE BLDA-SCNC: ABNORMAL MMOL/L
METHGB BLD QL: 0.2 % (ref 0–1.5)
MODALITY: ABNORMAL
NOTE: ABNORMAL
OXYHGB MFR BLDV: 94.9 % (ref 94–99)
PCO2 BLDA: 65.9 MM HG (ref 35–45)
PH BLDA: 7.26 PH UNITS (ref 7.35–7.45)
PO2 BLD: 300 MM[HG] (ref 0–500)
PO2 BLDA: 96 MM HG (ref 80–100)
SAO2 % BLDCOA: 96.3 % (ref 95–99)
SARS-COV-2 N GENE RESP QL NAA+PROBE: NOT DETECTED

## 2021-12-11 PROCEDURE — 36600 WITHDRAWAL OF ARTERIAL BLOOD: CPT | Performed by: PHYSICIAN ASSISTANT

## 2021-12-11 PROCEDURE — 94799 UNLISTED PULMONARY SVC/PX: CPT

## 2021-12-11 PROCEDURE — 82375 ASSAY CARBOXYHB QUANT: CPT | Performed by: PHYSICIAN ASSISTANT

## 2021-12-11 PROCEDURE — 25010000002 AZITHROMYCIN PER 500 MG: Performed by: PHYSICIAN ASSISTANT

## 2021-12-11 PROCEDURE — 82805 BLOOD GASES W/O2 SATURATION: CPT | Performed by: PHYSICIAN ASSISTANT

## 2021-12-11 PROCEDURE — 63710000001 INSULIN LISPRO (HUMAN) PER 5 UNITS: Performed by: STUDENT IN AN ORGANIZED HEALTH CARE EDUCATION/TRAINING PROGRAM

## 2021-12-11 PROCEDURE — 94660 CPAP INITIATION&MGMT: CPT

## 2021-12-11 PROCEDURE — 83050 HGB METHEMOGLOBIN QUAN: CPT | Performed by: PHYSICIAN ASSISTANT

## 2021-12-11 PROCEDURE — 82962 GLUCOSE BLOOD TEST: CPT

## 2021-12-11 PROCEDURE — 25010000002 HEPARIN (PORCINE) PER 1000 UNITS: Performed by: FAMILY MEDICINE

## 2021-12-11 PROCEDURE — 94760 N-INVAS EAR/PLS OXIMETRY 1: CPT

## 2021-12-11 PROCEDURE — 25010000002 METHYLPREDNISOLONE PER 125 MG: Performed by: STUDENT IN AN ORGANIZED HEALTH CARE EDUCATION/TRAINING PROGRAM

## 2021-12-11 PROCEDURE — 99223 1ST HOSP IP/OBS HIGH 75: CPT | Performed by: FAMILY MEDICINE

## 2021-12-11 PROCEDURE — 25010000002 LORAZEPAM PER 2 MG: Performed by: FAMILY MEDICINE

## 2021-12-11 PROCEDURE — 94761 N-INVAS EAR/PLS OXIMETRY MLT: CPT

## 2021-12-11 RX ORDER — ALBUTEROL SULFATE 2.5 MG/3ML
2.5 SOLUTION RESPIRATORY (INHALATION)
Status: DISCONTINUED | OUTPATIENT
Start: 2021-12-11 | End: 2021-12-11

## 2021-12-11 RX ORDER — BUDESONIDE 0.25 MG/2ML
0.25 INHALANT ORAL
Status: DISCONTINUED | OUTPATIENT
Start: 2021-12-11 | End: 2021-12-13 | Stop reason: HOSPADM

## 2021-12-11 RX ORDER — LORAZEPAM 2 MG/ML
0.25 INJECTION INTRAMUSCULAR ONCE
Status: COMPLETED | OUTPATIENT
Start: 2021-12-11 | End: 2021-12-11

## 2021-12-11 RX ORDER — ASPIRIN 81 MG/1
81 TABLET, CHEWABLE ORAL DAILY
Status: DISCONTINUED | OUTPATIENT
Start: 2021-12-11 | End: 2021-12-13 | Stop reason: HOSPADM

## 2021-12-11 RX ORDER — NICOTINE POLACRILEX 4 MG
15 LOZENGE BUCCAL
Status: DISCONTINUED | OUTPATIENT
Start: 2021-12-11 | End: 2021-12-13 | Stop reason: HOSPADM

## 2021-12-11 RX ORDER — DEXTROSE MONOHYDRATE 100 MG/ML
25 INJECTION, SOLUTION INTRAVENOUS
Status: DISCONTINUED | OUTPATIENT
Start: 2021-12-11 | End: 2021-12-13 | Stop reason: HOSPADM

## 2021-12-11 RX ORDER — IPRATROPIUM BROMIDE AND ALBUTEROL SULFATE 2.5; .5 MG/3ML; MG/3ML
3 SOLUTION RESPIRATORY (INHALATION)
Status: DISCONTINUED | OUTPATIENT
Start: 2021-12-11 | End: 2021-12-13 | Stop reason: HOSPADM

## 2021-12-11 RX ORDER — METHYLPREDNISOLONE SODIUM SUCCINATE 125 MG/2ML
80 INJECTION, POWDER, LYOPHILIZED, FOR SOLUTION INTRAMUSCULAR; INTRAVENOUS EVERY 6 HOURS
Status: DISCONTINUED | OUTPATIENT
Start: 2021-12-11 | End: 2021-12-13 | Stop reason: HOSPADM

## 2021-12-11 RX ORDER — BUDESONIDE 0.5 MG/2ML
0.5 INHALANT ORAL
Status: DISCONTINUED | OUTPATIENT
Start: 2021-12-11 | End: 2021-12-11

## 2021-12-11 RX ORDER — ATORVASTATIN CALCIUM 10 MG/1
10 TABLET, FILM COATED ORAL NIGHTLY
Status: DISCONTINUED | OUTPATIENT
Start: 2021-12-11 | End: 2021-12-13 | Stop reason: HOSPADM

## 2021-12-11 RX ORDER — HEPARIN SODIUM 5000 [USP'U]/ML
5000 INJECTION, SOLUTION INTRAVENOUS; SUBCUTANEOUS EVERY 8 HOURS SCHEDULED
Status: DISCONTINUED | OUTPATIENT
Start: 2021-12-11 | End: 2021-12-13 | Stop reason: HOSPADM

## 2021-12-11 RX ORDER — IPRATROPIUM BROMIDE AND ALBUTEROL SULFATE 2.5; .5 MG/3ML; MG/3ML
3 SOLUTION RESPIRATORY (INHALATION)
Status: DISCONTINUED | OUTPATIENT
Start: 2021-12-11 | End: 2021-12-11

## 2021-12-11 RX ADMIN — IPRATROPIUM BROMIDE AND ALBUTEROL SULFATE 3 ML: .5; 3 SOLUTION RESPIRATORY (INHALATION) at 01:18

## 2021-12-11 RX ADMIN — HEPARIN SODIUM 5000 UNITS: 5000 INJECTION, SOLUTION INTRAVENOUS; SUBCUTANEOUS at 21:16

## 2021-12-11 RX ADMIN — LORAZEPAM 0.25 MG: 2 INJECTION INTRAMUSCULAR; INTRAVENOUS at 01:48

## 2021-12-11 RX ADMIN — METOPROLOL TARTRATE 25 MG: 25 TABLET, FILM COATED ORAL at 21:15

## 2021-12-11 RX ADMIN — AZITHROMYCIN DIHYDRATE 500 MG: 500 INJECTION, POWDER, LYOPHILIZED, FOR SOLUTION INTRAVENOUS at 01:48

## 2021-12-11 RX ADMIN — METHYLPREDNISOLONE SODIUM SUCCINATE 80 MG: 125 INJECTION, POWDER, FOR SOLUTION INTRAMUSCULAR; INTRAVENOUS at 21:16

## 2021-12-11 RX ADMIN — INSULIN LISPRO 3 UNITS: 100 INJECTION, SOLUTION INTRAVENOUS; SUBCUTANEOUS at 17:06

## 2021-12-11 RX ADMIN — HEPARIN SODIUM 5000 UNITS: 5000 INJECTION, SOLUTION INTRAVENOUS; SUBCUTANEOUS at 08:44

## 2021-12-11 RX ADMIN — IPRATROPIUM BROMIDE AND ALBUTEROL SULFATE 3 ML: .5; 3 SOLUTION RESPIRATORY (INHALATION) at 19:40

## 2021-12-11 RX ADMIN — ALBUTEROL SULFATE 2.5 MG: 2.5 SOLUTION RESPIRATORY (INHALATION) at 06:31

## 2021-12-11 RX ADMIN — METHYLPREDNISOLONE SODIUM SUCCINATE 80 MG: 125 INJECTION, POWDER, FOR SOLUTION INTRAMUSCULAR; INTRAVENOUS at 11:30

## 2021-12-11 RX ADMIN — BUDESONIDE 0.25 MG: 0.25 INHALANT RESPIRATORY (INHALATION) at 19:40

## 2021-12-11 RX ADMIN — METHYLPREDNISOLONE SODIUM SUCCINATE 80 MG: 125 INJECTION, POWDER, FOR SOLUTION INTRAMUSCULAR; INTRAVENOUS at 16:32

## 2021-12-11 RX ADMIN — HEPARIN SODIUM 5000 UNITS: 5000 INJECTION, SOLUTION INTRAVENOUS; SUBCUTANEOUS at 14:45

## 2021-12-11 RX ADMIN — BUDESONIDE 0.25 MG: 0.25 INHALANT RESPIRATORY (INHALATION) at 06:31

## 2021-12-11 RX ADMIN — IPRATROPIUM BROMIDE AND ALBUTEROL SULFATE 3 ML: .5; 3 SOLUTION RESPIRATORY (INHALATION) at 12:55

## 2021-12-11 RX ADMIN — ATORVASTATIN CALCIUM 10 MG: 10 TABLET, FILM COATED ORAL at 21:15

## 2021-12-11 RX ADMIN — SODIUM CHLORIDE, PRESERVATIVE FREE 10 ML: 5 INJECTION INTRAVENOUS at 21:15

## 2021-12-11 NOTE — ED PROVIDER NOTES
Arrived by: EMS  Chief Complaint: I cannot breathe  History provided by: Patient    History of Present Illness:  Patient is a 71 y.o.  male that presents to the emergency department with difficulty breathing.  Patient states that his breathing became bad last night.  Patient is that he cannot get his breath.  Patient is still coughing and wheezing.  He denies any fevers.  Patient states he has oxygen at home but only wears it sometimes.  Patient denies any productive cough he denies being around anybody who has had Covid.  He has not been vaccinated for COVID-19 however.  He denies any chest pain he also denies any abdominal pain, vomiting or diarrhea.    HPI    Patient Care Team  Primary Care Provider: Rosalba Edwards APRN    Past Medical History:     No Known Allergies  Past Medical History:   Diagnosis Date   • Asthma    • COPD (chronic obstructive pulmonary disease) (HCC)    • Diabetes mellitus (HCC)    • Hernia, umbilical    • Hypertension    • Requires continuous at home supplemental oxygen      Past Surgical History:   Procedure Laterality Date   • ABDOMINAL SURGERY       History reviewed. No pertinent family history.    Home Medications:  Prior to Admission medications    Medication Sig Start Date End Date Taking? Authorizing Provider   albuterol sulfate  (90 Base) MCG/ACT inhaler Inhale 2 puffs Every 6 (Six) Hours As Needed. 5/13/21   Roosevelt Gaston MD   aspirin 81 MG chewable tablet Chew 81 mg Daily.    ProviderRoosevelt MD   atorvastatin (LIPITOR) 10 MG tablet Take 10 mg by mouth Daily.    Roosevelt Gaston MD   fluticasone-salmeterol (Advair Diskus) 250-50 MCG/DOSE DISKUS Inhale 1 puff 2 (Two) Times a Day.    Roosevelt Gaston MD   insulin degludec (Tresiba FlexTouch) 100 UNIT/ML solution pen-injector injection Inject 17 Units under the skin into the appropriate area as directed Daily. 5/13/21   Roosevelt Gaston MD   ipratropium-albuterol (DUO-NEB) 0.5-2.5 mg/3 ml  "nebulizer Inhale 3 mL Every 4 (Four) Hours As Needed. 21  Roosevelt Gaston MD   lisinopril (PRINIVIL,ZESTRIL) 2.5 MG tablet Take 2.5 mg by mouth Daily.    Roosevelt Gaston MD   metFORMIN (GLUCOPHAGE) 500 MG tablet Take 500 mg by mouth 2 (two) times a day. 21   Roosevelt Gaston MD   metoprolol tartrate (LOPRESSOR) 25 MG tablet Take 25 mg by mouth 2 (two) times a day. 21   Roosevelt Gaston MD        Social History:   Social History     Tobacco Use   • Smoking status: Former Smoker     Types: Cigarettes     Quit date: 2020     Years since quittin.3   • Smokeless tobacco: Current User     Types: Snuff   Vaping Use   • Vaping Use: Never used   Substance Use Topics   • Alcohol use: Never   • Drug use: Never       Review of Systems:  Review of Systems   Constitutional: Negative for chills and fever.   HENT: Negative for congestion, ear pain and sore throat.    Eyes: Negative for pain.   Respiratory: Negative for cough, chest tightness and shortness of breath.    Cardiovascular: Negative for chest pain.   Gastrointestinal: Negative for abdominal pain, diarrhea, nausea and vomiting.   Genitourinary: Negative for flank pain and hematuria.   Musculoskeletal: Negative for joint swelling.   Skin: Negative for pallor.   Neurological: Negative for seizures and headaches.   All other systems reviewed and are negative.       Physical Exam:  /65 (BP Location: Left arm, Patient Position: Sitting)   Pulse 81   Temp 97.3 °F (36.3 °C) (Oral)   Resp 18   Ht 185.4 cm (73\")   Wt 108 kg (238 lb 1.6 oz)   SpO2 92%   BMI 31.41 kg/m²     Physical Exam       Vital signs were reviewed under triage note.  General appearance - Patient appears well-developed and well-nourished.  Patient is in acute respiratory distress.  Head - Normocephalic, atraumatic.  Pupils - Equal, round, reactive to light.  Extraocular muscles are intact.  Conjunctive is clear.  Nasal - Normal inspection.  No " evidence of trauma or epistaxis.  Tympanic membranes - Gray, intact without erythema or retractions.  Oral mucosa - Pink and moist without lesions or erythema.  Uvula is midline.  Chest wall - Atraumatic.  Chest wall is nontender.  There is no vesicular rashes noted.  Neck - Supple.  Trachea was midline.  There is no palpable lymphadenopathy or thyromegaly.  There are no meningeal signs  Lungs -patient is in obvious respiratory distress with accessory muscle usage.  Patient also this time pain with respirations.  Auscultation of the lungs reveal coarse diffuse inspiratory wheezes.  Heart - Regular rate and rhythm without any murmurs, clicks, or gallops.  Abdomen - Soft.  Bowel sounds are present.  There is no palpable tenderness.  There is no rebound, guarding, or rigidity.  There are no palpable masses.  There are no pulsatile masses.  Back - Spine is straight and midline.  There is no CVA tenderness.  Extremities - Intact x4 with full range of motion.  There is no palpable edema.  Pulses are intact x4 and equal.  Neurologic - Patient is awake, alert, and oriented x3.  Cranial nerves II through XII are grossly intact.  Motor and sensory functions grossly intact.  Cerebellar function was normal.  Integument - There are no rashes.  There are no petechia or purpura lesions noted.  There are no vesicular lesions noted.      Medications in the Emergency Department:  Medications   sodium chloride 0.9 % flush 10 mL (has no administration in time range)   AZITHROMYCIN 500 MG/250 ML 0.9% NS IVPB (vial-mate) (0 mg Intravenous Stopped 12/11/21 0614)   budesonide (PULMICORT) nebulizer solution 0.25 mg (0.25 mg Nebulization Given 12/11/21 0631)   heparin (porcine) 5000 UNIT/ML injection 5,000 Units (5,000 Units Subcutaneous Given 12/11/21 1445)   aspirin chewable tablet 81 mg (0 mg Oral Hold 12/11/21 1129)   atorvastatin (LIPITOR) tablet 10 mg (has no administration in time range)   metoprolol tartrate (LOPRESSOR) tablet 25 mg  (0 mg Oral Hold 12/11/21 1130)   dextrose (GLUTOSE) oral gel 15 g (has no administration in time range)   dextrose 10 % infusion (has no administration in time range)   glucagon (human recombinant) (GLUCAGEN DIAGNOSTIC) injection 1 mg (has no administration in time range)   insulin lispro (humaLOG) injection 0-14 Units (0 Units Subcutaneous Not Given 12/11/21 1129)   methylPREDNISolone sodium succinate (SOLU-Medrol) injection 80 mg (80 mg Intravenous Given 12/11/21 1130)   ipratropium-albuterol (DUO-NEB) nebulizer solution 3 mL (3 mL Nebulization Given 12/11/21 1255)   methylPREDNISolone sodium succinate (SOLU-Medrol) injection 125 mg (125 mg Intravenous Given 12/10/21 2013)   ipratropium-albuterol (DUO-NEB) nebulizer solution 3 mL (3 mL Nebulization Given 12/10/21 1939)   ipratropium-albuterol (DUO-NEB) nebulizer solution 3 mL (3 mL Nebulization Given 12/10/21 1938)   ipratropium-albuterol (DUO-NEB) nebulizer solution 3 mL (3 mL Nebulization Given 12/11/21 0118)   ipratropium-albuterol (DUO-NEB) nebulizer solution 3 mL (3 mL Nebulization Given 12/11/21 0118)   LORazepam (ATIVAN) injection 0.25 mg (0.25 mg Intravenous Given 12/11/21 0148)        Labs  Lab Results (last 24 hours)     Procedure Component Value Units Date/Time    CBC & Differential [[id number]]  (Abnormal) Collected: 12/10/21 1904    Specimen: Blood Updated: 12/10/21 1916    Narrative:      The following orders were created for panel order CBC & Differential.  Procedure                               Abnormality         Status                     ---------                               -----------         ------                     CBC Auto Differential[[id number]]        Abnormal            Final result                 Please view results for these tests on the individual orders.    Comprehensive Metabolic Panel [[id number]]  (Abnormal) Collected: 12/10/21 1904    Specimen: Blood Updated: 12/10/21 1944     Glucose 125 mg/dL      BUN 20 mg/dL       Creatinine 1.00 mg/dL      Sodium 140 mmol/L      Potassium 4.7 mmol/L      Chloride 102 mmol/L      CO2 29.0 mmol/L      Calcium 8.9 mg/dL      Total Protein 7.3 g/dL      Albumin 4.10 g/dL      ALT (SGPT) 16 U/L      AST (SGOT) 23 U/L      Alkaline Phosphatase 102 U/L      Total Bilirubin 0.4 mg/dL      eGFR Non African Amer 73 mL/min/1.73      Globulin 3.2 gm/dL      A/G Ratio 1.3 g/dL      BUN/Creatinine Ratio 20.0     Anion Gap 9.0 mmol/L     Narrative:      GFR Normal >60  Chronic Kidney Disease <60  Kidney Failure <15      BNP [[id number]]  (Normal) Collected: 12/10/21 1904    Specimen: Blood Updated: 12/10/21 1943     proBNP 186.0 pg/mL     Narrative:      Among patients with dyspnea, NT-proBNP is highly sensitive for the detection of acute congestive heart failure. In addition NT-proBNP of <300 pg/ml effectively rules out acute congestive heart failure with 99% negative predictive value.    Results may be falsely decreased if patient taking Biotin.      Troponin [[id number]]  (Normal) Collected: 12/10/21 1904    Specimen: Blood Updated: 12/10/21 1944     Troponin T 0.021 ng/mL     Narrative:      Troponin T Reference Range:  <= 0.03 ng/mL-   Negative for AMI  >0.03 ng/mL-     Abnormal for myocardial necrosis.  Clinicians would have to utilize clinical acumen, EKG, Troponin and serial changes to determine if it is an Acute Myocardial Infarction or myocardial injury due to an underlying chronic condition.       Results may be falsely decreased if patient taking Biotin.      CBC Auto Differential [[id number]]  (Abnormal) Collected: 12/10/21 1904    Specimen: Blood Updated: 12/10/21 1916     WBC 12.12 10*3/mm3      RBC 5.21 10*6/mm3      Hemoglobin 13.8 g/dL      Hematocrit 46.0 %      MCV 88.3 fL      MCH 26.5 pg      MCHC 30.0 g/dL      RDW 14.9 %      RDW-SD 47.2 fl      MPV 10.9 fL      Platelets 179 10*3/mm3      Neutrophil % 65.7 %      Lymphocyte % 11.8 %      Monocyte % 6.6 %      Eosinophil % 14.9 %       Basophil % 0.6 %      Immature Grans % 0.4 %      Neutrophils, Absolute 7.97 10*3/mm3      Lymphocytes, Absolute 1.43 10*3/mm3      Monocytes, Absolute 0.80 10*3/mm3      Eosinophils, Absolute 1.80 10*3/mm3      Basophils, Absolute 0.07 10*3/mm3      Immature Grans, Absolute 0.05 10*3/mm3      nRBC 0.0 /100 WBC     Lactic Acid, Plasma [[id number]]  (Normal) Collected: 12/10/21 1906    Specimen: Blood Updated: 12/10/21 1942     Lactate 1.0 mmol/L     Blood Culture - Blood, Arm, Right [[id number]] Collected: 12/10/21 1906    Specimen: Blood from Arm, Right Updated: 12/10/21 1910    Blood Gas, Arterial -With Co-Ox Panel: Yes [[id number]]  (Abnormal) Collected: 12/10/21 1944    Specimen: Arterial Blood from Arm, Right Updated: 12/10/21 1945     pH, Arterial 7.238 pH units      pCO2, Arterial 69.2 mm Hg      pO2, Arterial 133.0 mm Hg      HCO3, Arterial 28.9 mmol/L      Base Excess, Arterial -0.3 mmol/L      O2 Saturation, Arterial 97.8 %      Hemoglobin, Blood Gas 13.9 g/dL      Carboxyhemoglobin 1.3 %      Methemoglobin 0.30 %      Oxyhemoglobin 96.2 %      FHHB 2.2 %      Site Arterial: right brachial     Modality Cannula - Nasal     FIO2 44 %      Flow Rate 6 lpm      PO2/FIO2 302     Lactate, Arterial --    Blood Culture - Blood, Arm, Left [[id number]] Collected: 12/10/21 2012    Specimen: Blood from Arm, Left Updated: 12/10/21 2258    COVID-19,CEPHEID/PERI/BDMAX,COR/KYLEE/PAD/CARMEN IN-HOUSE(OR EMERGENT/ADD-ON),NP SWAB IN TRANSPORT MEDIA 3-4 HR TAT, RT-PCR - Swab, Nasopharynx [[id number]]  (Normal) Collected: 12/10/21 2012    Specimen: Swab from Nasopharynx Updated: 12/11/21 1138     COVID19 Not Detected    Narrative:      Fact sheet for providers: https://www.fda.gov/media/724614/download     Fact sheet for patients: https://www.fda.gov/media/674754/download  Presumptive Positive: additional testing may be indicated if it is necessary to differentiate between SARS-CoV-2 and other Sarbecovirus, for epidemiological purposes,  or clinical management.    Blood Gas, Arterial -With Co-Ox Panel: Yes [[id number]]  (Abnormal) Collected: 12/11/21 0629    Specimen: Arterial Blood Updated: 12/11/21 0648     pH, Arterial 7.260 pH units      pCO2, Arterial 65.9 mm Hg      pO2, Arterial 96.0 mm Hg      HCO3, Arterial 28.9 mmol/L      Base Excess, Arterial 0.2 mmol/L      O2 Saturation, Arterial 96.3 %      Hemoglobin, Blood Gas 13.8 g/dL      Carboxyhemoglobin 1.3 %      Methemoglobin 0.20 %      Oxyhemoglobin 94.9 %      FHHB 3.6 %      Site Arterial: right radial     Modality BiPap     FIO2 32 %      PO2/FIO2 300     Nael's Test Positive     Note Bipap on 12/6      Lactate, Arterial --    POC Glucose Once [[id number]]  (Abnormal) Collected: 12/11/21 0846    Specimen: Blood Updated: 12/11/21 1137     Glucose 149 mg/dL      Comment: Serial Number: [id number]:  [id number]       POC Glucose Once [[id number]]  (Abnormal) Collected: 12/11/21 1128    Specimen: Blood Updated: 12/11/21 1137     Glucose 120 mg/dL      Comment: Serial Number: [id number]:  [id number]              Imaging:  XR Chest 1 View    Result Date: 12/10/2021  PROCEDURE: XR CHEST 1 VW  COMPARISON: [facility], CT, CT CHEST WO CONTRAST DIAGNOSTIC, 7/24/2021, 4:14.  [facility], CR, XR CHEST 1 VW, 7/23/2021, 16:40.  [facility], CR, XR CHEST 1 VW, 10/22/2021, 20:03.  INDICATIONS: SOA Triage Protocol  FINDINGS:  The patient is rotated.  There is stable blunting of the left lateral costophrenic angle and left basilar airspace opacities.  Lungs otherwise appear clear.  No pneumothorax is seen.  Cardiomediastinal contours are unchanged.  CONCLUSION: Stable chronic changes at the left lung base.  No acute cardiopulmonary abnormality or significant change.       MANAS TREVINO MD       Electronically Signed and Approved By: MANAS TREVINO MD on 12/10/2021 at 19:54                EKG:      Procedures:  Procedures    Progress                       The patient was seen and evaluated in the ED by me.  The above history and physical examination was performed as document.  Patient was noted to be in obvious respiratory distress.  Patient was given IV Solu-Medrol.  Patient was also given multiple breathing treatments in the ED.  Patient's blood gas showed a respiratory acidosis as well as hypercapnia.  Patient initially was refusing BiPAP.  After multiple breathing treatments patient continued to have labored respirations and even at times.  He is somewhat confused.  Eventually patient consented to BiPAP.  Patient had no pneumonia and is not septic.  Hospitalist service was consulted for admission.  Patient was seen in the ED by the hospitalist service.    40 minutes of critical care provided. This time excludes other billable procedures. Time does include preparation of documents, medical consultations, review of old records, and direct bedside care. Patient was at high risk for life-threatening deterioration due to acute respiratory failure with hypercapnia and respiratory acidosis due to exacerbation of COPD..         Medical Decision Making:  [facility]     Final diagnoses:   Acute exacerbation of chronic obstructive pulmonary disease (COPD) (HCC)   Respiratory acidosis   Acute on chronic respiratory failure with hypoxia and hypercapnia (HCC)        Disposition:  ED Disposition     ED Disposition Condition Comment    Decision to Admit  Level of Care: Telemetry [5]   Diagnosis: COPD exacerbation (HCC) [515326]   Admitting Physician: SHRUTI GONZALZE [[id number]]   Attending Physician: SHRUTI GONZALEZ [[id number]]   Isolate for COVID?: Yes [1]   Certification: I Certify That Inpatient Hospital Services Are Medically Necessary For Greater Than 2 Midnights            Documentation assistance provided by Alexi Mcleod DO acting as scribe for Dr. Alexi Mcleod DO. Information recorded by the scribe was done at my direction and has been verified and validated by me.      Alexi Mcleod  DO  12/11/21 1502

## 2021-12-11 NOTE — ED NOTES
Respiratory at bedside at this time to place pt on BiPAP. Pt tolerating well at this time.      Roseline Sky RN  12/11/21 0603

## 2021-12-11 NOTE — H&P
[facility]   HOSPITALIST HISTORY AND PHYSICAL  Date: 2021   Patient Name: Dilip Faulkner  : 1950  MRN: [id number]  Primary Care Physician:  Rosalba Edwards APRN  Date of admission: 12/10/2021    Subjective   Subjective     Chief Complaint: Shortness of air  HPI:    Dilip Faulkner is a 71 y.o. male with past medical history history significant for COPD, diabetes mellitus, hypertension, relieving, and chronic hypoxic respiratory failure requiring 2 L at home presents to the ED complaining of shortness of breath.  States that this started today and has progressively gotten worse.  Upon arrival to the ED patient was found to be in the low 80s on room air.  He was placed on 6 L of nasal cannula with improvement.  He was turned down to his home 2 L and dropped his oxygen saturations again.  ABG obtained in the ED showed the following results 7.23/69.2/133.  Chest x-ray did not show any acute findings.  Patient denies cough, sputum production, or fevers.  States that he is not vaccinated against COVID-19 but denies recent sick contacts.  Patient was given Solu-Medrol and duo nebs in the ED.  The hospitalist service was contacted for admission.  Requested the patient be started on BiPAP.  He was swabbed for COVID-19 in the ED and results are pending.    Personal History     Past Medical History:  Past Medical History:   Diagnosis Date   • Asthma    • COPD (chronic obstructive pulmonary disease) (HCC)    • Diabetes mellitus (HCC)    • Hernia, umbilical    • Hypertension    • Requires continuous at home supplemental oxygen        Past Surgical History:  Past Surgical History:   Procedure Laterality Date   • ABDOMINAL SURGERY         Family History:   History reviewed. No pertinent family history.      Social History:   Social History     Tobacco Use   • Smoking status: Former Smoker     Types: Cigarettes     Quit date: 2020     Years since quittin.3   • Smokeless tobacco: Not on file    Substance Use Topics   • Alcohol use: Never   • Drug use: Never       Home Medications:  albuterol sulfate HFA, aspirin, atorvastatin, fluticasone-salmeterol, insulin degludec, ipratropium-albuterol, lisinopril, metFORMIN, and metoprolol tartrate    Allergies:  No Known Allergies    Review of Systems  Constitutional: No fever, unintentional weight loss, malaise  HEENT: No vision changes, loss of vision, double vision, difficulty swallowing, painful swallowing, or tinnitus  Respiratory: Positive for shortness of breath, denies sputum production, or hemoptysis  Cardiovascular: No chest pain, palpitations, orthopnea, or paroxysmal nocturnal dyspnea  Gastrointestinal: No nausea, vomiting, diarrhea, hematochezia, bright red blood per rectum, dark tarry stools, bowel incontinence or constipation  Genitourinary: No dysuria, hematuria, bladder incontinence, frequency, nocturia, or hesitancy  Musculoskeletal: No arthritis, joint swelling, deformities or joint pain  Endocrine: No fatigue, heat or cold intolerance  Hematologic: No excessive bruising or bleeding  Psychiatric: No Anxiety or depression  Neurologic: No Confusion, numbness, or weakness  Skin: No rash or open wounds    Objective   Objective     Vitals:   Temp:  [98.1 °F (36.7 °C)] 98.1 °F (36.7 °C)  Heart Rate:  [] 72  Resp:  [18-42] 20  BP: ()/(53-87) 104/55  Flow (L/min):  [6] 6    Physical Exam    Constitutional: Awake, alert, respiratory distress   eyes: Pupils equal, sclerae anicteric, no conjunctival injection   HENT: NCAT, mucous membranes moist   Neck: Supple, no thyromegaly,  trachea midline   Respiratory: Diminished bilaterally with wheezing   Cardiovascular: RRR, no murmurs, rubs, or gallops, palpable pedal pulses bilaterally   Gastrointestinal: soft, nontender, nondistended   Musculoskeletal: No bilateral ankle edema, no clubbing or cyanosis to extremities   Psychiatric: Appropriate affect, cooperative   Neurologic: Oriented x 3, moves all  extremities spontaneously, speech clear   Skin: No rashes         Assessment/Plan   Assessment / Plan     Assessment:  Acute on chronic hypoxic hypercapnic respiratory failure  Acute COPD exacerbation  Respiratory acidosis  Rule out COVID-19  Diabetes mellitus  Hypertension  Hyperlipidemia    Plan:   -Admit to the hospitalist service  -Continue with BiPAP  -Repeat ABG in the a.m.  -Continue IV Solu-Medrol  -IV azithromycin  -Scheduled Brovana, Pulmicort, and duo nebs  -Patient swabbed for COVID-19 in the ED, results are pending, to remain in enhanced airborne isolation until this is ruled out  -Sliding scale insulin for diabetes mellitus  -Resume appropriate home medications once reconciled        Admission Status:  I believe this patient meets inpatient status.    Electronically signed by RIMA Ortez, 12/11/21, 1:22 AM EST.    I have interviewed and examined the patient independently and discussed with the physician assistant and I agree with the history and physical and have established the assessment as outlined above.

## 2021-12-12 LAB
ALBUMIN SERPL-MCNC: 3.6 G/DL (ref 3.5–5.2)
ALBUMIN/GLOB SERPL: 1.2 G/DL
ALP SERPL-CCNC: 76 U/L (ref 39–117)
ALT SERPL W P-5'-P-CCNC: 11 U/L (ref 1–41)
ANION GAP SERPL CALCULATED.3IONS-SCNC: 7.4 MMOL/L (ref 5–15)
AST SERPL-CCNC: 18 U/L (ref 1–40)
BASOPHILS # BLD AUTO: 0.01 10*3/MM3 (ref 0–0.2)
BASOPHILS NFR BLD AUTO: 0.1 % (ref 0–1.5)
BILIRUB SERPL-MCNC: 0.3 MG/DL (ref 0–1.2)
BUN SERPL-MCNC: 26 MG/DL (ref 8–23)
BUN/CREAT SERPL: 27.4 (ref 7–25)
CALCIUM SPEC-SCNC: 9 MG/DL (ref 8.6–10.5)
CHLORIDE SERPL-SCNC: 100 MMOL/L (ref 98–107)
CO2 SERPL-SCNC: 28.6 MMOL/L (ref 22–29)
CREAT SERPL-MCNC: 0.95 MG/DL (ref 0.76–1.27)
DEPRECATED RDW RBC AUTO: 44.8 FL (ref 37–54)
EOSINOPHIL # BLD AUTO: 0 10*3/MM3 (ref 0–0.4)
EOSINOPHIL NFR BLD AUTO: 0 % (ref 0.3–6.2)
ERYTHROCYTE [DISTWIDTH] IN BLOOD BY AUTOMATED COUNT: 14.6 % (ref 12.3–15.4)
GFR SERPL CREATININE-BSD FRML MDRD: 78 ML/MIN/1.73
GLOBULIN UR ELPH-MCNC: 3 GM/DL
GLUCOSE BLDC GLUCOMTR-MCNC: 147 MG/DL (ref 70–99)
GLUCOSE BLDC GLUCOMTR-MCNC: 168 MG/DL (ref 70–99)
GLUCOSE BLDC GLUCOMTR-MCNC: 171 MG/DL (ref 70–99)
GLUCOSE BLDC GLUCOMTR-MCNC: 187 MG/DL (ref 70–99)
GLUCOSE BLDC GLUCOMTR-MCNC: 197 MG/DL (ref 70–99)
GLUCOSE SERPL-MCNC: 196 MG/DL (ref 65–99)
HCT VFR BLD AUTO: 41.4 % (ref 37.5–51)
HGB BLD-MCNC: 12.5 G/DL (ref 13–17.7)
IMM GRANULOCYTES # BLD AUTO: 0.14 10*3/MM3 (ref 0–0.05)
IMM GRANULOCYTES NFR BLD AUTO: 1.1 % (ref 0–0.5)
LYMPHOCYTES # BLD AUTO: 0.65 10*3/MM3 (ref 0.7–3.1)
LYMPHOCYTES NFR BLD AUTO: 5 % (ref 19.6–45.3)
MAGNESIUM SERPL-MCNC: 2.4 MG/DL (ref 1.6–2.4)
MCH RBC QN AUTO: 25.7 PG (ref 26.6–33)
MCHC RBC AUTO-ENTMCNC: 30.2 G/DL (ref 31.5–35.7)
MCV RBC AUTO: 85 FL (ref 79–97)
MONOCYTES # BLD AUTO: 0.23 10*3/MM3 (ref 0.1–0.9)
MONOCYTES NFR BLD AUTO: 1.8 % (ref 5–12)
NEUTROPHILS NFR BLD AUTO: 11.86 10*3/MM3 (ref 1.7–7)
NEUTROPHILS NFR BLD AUTO: 92 % (ref 42.7–76)
NRBC BLD AUTO-RTO: 0 /100 WBC (ref 0–0.2)
PHOSPHATE SERPL-MCNC: 2.3 MG/DL (ref 2.5–4.5)
PLATELET # BLD AUTO: 170 10*3/MM3 (ref 140–450)
PMV BLD AUTO: 11.3 FL (ref 6–12)
POTASSIUM SERPL-SCNC: 4.8 MMOL/L (ref 3.5–5.2)
PROCALCITONIN SERPL-MCNC: 0.05 NG/ML (ref 0–0.25)
PROT SERPL-MCNC: 6.6 G/DL (ref 6–8.5)
RBC # BLD AUTO: 4.87 10*6/MM3 (ref 4.14–5.8)
SODIUM SERPL-SCNC: 136 MMOL/L (ref 136–145)
WBC NRBC COR # BLD: 12.89 10*3/MM3 (ref 3.4–10.8)

## 2021-12-12 PROCEDURE — 85025 COMPLETE CBC W/AUTO DIFF WBC: CPT | Performed by: STUDENT IN AN ORGANIZED HEALTH CARE EDUCATION/TRAINING PROGRAM

## 2021-12-12 PROCEDURE — 80053 COMPREHEN METABOLIC PANEL: CPT | Performed by: STUDENT IN AN ORGANIZED HEALTH CARE EDUCATION/TRAINING PROGRAM

## 2021-12-12 PROCEDURE — 99233 SBSQ HOSP IP/OBS HIGH 50: CPT | Performed by: STUDENT IN AN ORGANIZED HEALTH CARE EDUCATION/TRAINING PROGRAM

## 2021-12-12 PROCEDURE — 94799 UNLISTED PULMONARY SVC/PX: CPT

## 2021-12-12 PROCEDURE — 82962 GLUCOSE BLOOD TEST: CPT

## 2021-12-12 PROCEDURE — 83735 ASSAY OF MAGNESIUM: CPT | Performed by: STUDENT IN AN ORGANIZED HEALTH CARE EDUCATION/TRAINING PROGRAM

## 2021-12-12 PROCEDURE — 25010000002 HEPARIN (PORCINE) PER 1000 UNITS: Performed by: FAMILY MEDICINE

## 2021-12-12 PROCEDURE — 63710000001 INSULIN LISPRO (HUMAN) PER 5 UNITS: Performed by: STUDENT IN AN ORGANIZED HEALTH CARE EDUCATION/TRAINING PROGRAM

## 2021-12-12 PROCEDURE — 25010000002 AZITHROMYCIN PER 500 MG: Performed by: PHYSICIAN ASSISTANT

## 2021-12-12 PROCEDURE — 25010000002 METHYLPREDNISOLONE PER 125 MG: Performed by: STUDENT IN AN ORGANIZED HEALTH CARE EDUCATION/TRAINING PROGRAM

## 2021-12-12 PROCEDURE — 84100 ASSAY OF PHOSPHORUS: CPT | Performed by: STUDENT IN AN ORGANIZED HEALTH CARE EDUCATION/TRAINING PROGRAM

## 2021-12-12 PROCEDURE — 84145 PROCALCITONIN (PCT): CPT | Performed by: STUDENT IN AN ORGANIZED HEALTH CARE EDUCATION/TRAINING PROGRAM

## 2021-12-12 RX ADMIN — ATORVASTATIN CALCIUM 10 MG: 10 TABLET, FILM COATED ORAL at 21:03

## 2021-12-12 RX ADMIN — SODIUM CHLORIDE, PRESERVATIVE FREE 10 ML: 5 INJECTION INTRAVENOUS at 21:03

## 2021-12-12 RX ADMIN — IPRATROPIUM BROMIDE AND ALBUTEROL SULFATE 3 ML: .5; 3 SOLUTION RESPIRATORY (INHALATION) at 00:41

## 2021-12-12 RX ADMIN — IPRATROPIUM BROMIDE AND ALBUTEROL SULFATE 3 ML: .5; 3 SOLUTION RESPIRATORY (INHALATION) at 18:59

## 2021-12-12 RX ADMIN — IPRATROPIUM BROMIDE AND ALBUTEROL SULFATE 3 ML: .5; 3 SOLUTION RESPIRATORY (INHALATION) at 12:11

## 2021-12-12 RX ADMIN — METHYLPREDNISOLONE SODIUM SUCCINATE 80 MG: 125 INJECTION, POWDER, FOR SOLUTION INTRAMUSCULAR; INTRAVENOUS at 10:24

## 2021-12-12 RX ADMIN — ASPIRIN 81 MG: 81 TABLET, CHEWABLE ORAL at 08:31

## 2021-12-12 RX ADMIN — HEPARIN SODIUM 5000 UNITS: 5000 INJECTION, SOLUTION INTRAVENOUS; SUBCUTANEOUS at 21:03

## 2021-12-12 RX ADMIN — METHYLPREDNISOLONE SODIUM SUCCINATE 80 MG: 125 INJECTION, POWDER, FOR SOLUTION INTRAMUSCULAR; INTRAVENOUS at 21:03

## 2021-12-12 RX ADMIN — INSULIN LISPRO 3 UNITS: 100 INJECTION, SOLUTION INTRAVENOUS; SUBCUTANEOUS at 16:43

## 2021-12-12 RX ADMIN — HEPARIN SODIUM 5000 UNITS: 5000 INJECTION, SOLUTION INTRAVENOUS; SUBCUTANEOUS at 14:24

## 2021-12-12 RX ADMIN — AZITHROMYCIN DIHYDRATE 500 MG: 500 INJECTION, POWDER, LYOPHILIZED, FOR SOLUTION INTRAVENOUS at 00:46

## 2021-12-12 RX ADMIN — METHYLPREDNISOLONE SODIUM SUCCINATE 80 MG: 125 INJECTION, POWDER, FOR SOLUTION INTRAMUSCULAR; INTRAVENOUS at 16:27

## 2021-12-12 RX ADMIN — HEPARIN SODIUM 5000 UNITS: 5000 INJECTION, SOLUTION INTRAVENOUS; SUBCUTANEOUS at 05:11

## 2021-12-12 RX ADMIN — BUDESONIDE 0.25 MG: 0.25 INHALANT RESPIRATORY (INHALATION) at 19:00

## 2021-12-12 RX ADMIN — METHYLPREDNISOLONE SODIUM SUCCINATE 80 MG: 125 INJECTION, POWDER, FOR SOLUTION INTRAMUSCULAR; INTRAVENOUS at 05:11

## 2021-12-12 RX ADMIN — BUDESONIDE 0.25 MG: 0.25 INHALANT RESPIRATORY (INHALATION) at 07:39

## 2021-12-12 RX ADMIN — METOPROLOL TARTRATE 25 MG: 25 TABLET, FILM COATED ORAL at 08:31

## 2021-12-12 RX ADMIN — IPRATROPIUM BROMIDE AND ALBUTEROL SULFATE 3 ML: .5; 3 SOLUTION RESPIRATORY (INHALATION) at 07:39

## 2021-12-12 RX ADMIN — INSULIN LISPRO 3 UNITS: 100 INJECTION, SOLUTION INTRAVENOUS; SUBCUTANEOUS at 08:31

## 2021-12-12 RX ADMIN — METOPROLOL TARTRATE 25 MG: 25 TABLET, FILM COATED ORAL at 21:03

## 2021-12-12 NOTE — PLAN OF CARE
"Goal Outcome Evaluation:           Progress: improving  Outcome Summary: patient had no complaints of pain. he refused to wear bipap stating it \"almost killed him last time\" educated patient on importance of wearing it while RT was in room, he still refused. Will continue to educate patient. VSS. Call light within reach.  Bettina Nickerson RN   "

## 2021-12-12 NOTE — PROGRESS NOTES
[facility]   Hospitalist Progress Note  Date: 2021  Patient Name: Dilip Faulkner  : 1950  MRN: [id number]  Date of admission: 12/10/2021      Subjective   Subjective     Chief Complaint: Shortness of breath    Summary: 71-year-old male presented with shortness of breath simply being treated for acute respiratory failure with hypercapnia and hypoxia  and COPD exacerbation.    Interval Followup: Patient has been weaned off BiPAP and is currently on nasal cannula.  He is very happy today because he has been able to tolerate oral intake.  He currently denies any chest pain, chest pressure, palpitations, fever, chills.  He does tell me that he feels his CPAP machine is broken at home.  No headache or visual changes.      Review of Systems  All systems reviewed are negative except as above in HPI.    Objective   Objective     Vitals:   Temp:  [97.2 °F (36.2 °C)-97.7 °F (36.5 °C)] 97.7 °F (36.5 °C)  Heart Rate:  [64-90] 68  Resp:  [18-22] 20  BP: (101-135)/(64-87) 135/87  Flow (L/min):  [3] 3  Physical Exam   Constitutional: Alert, awake, appears acutely ill, no acute distress  HEENT:  PERRLA, EOMI; No Scleral icterus  Neck:  Supple; No Mass, No Lymphadenopathy  Cardiovascular:  No Rubs, No Edema, No JVD  Respiratory: Diffuse expiratory wheezing, rhonchi, diminished breath sounds, on nasal cannula   Abdomen:  Normal BS all 4 Quadrants; No Guarding, No Tenderness  Musculoskeletal:  Pulses Positive all 4 Ext; No Cyanosis, No Edema  Neurological:  Alert+Ox3, Mental status WNL; No Dysarthria  Skin:  No Rash, No Cellulitis, No Erythema    Result Review    Result Review:  I have personally reviewed the results from the time of this admission to 2021 08:49 EST and agree with these findings:  [x]  Laboratory  [x]  Microbiology  [x]  Radiology  [x]  EKG/Telemetry   []  Cardiology/Vascular   []  Pathology  [x]  Old records  []  Other:    Assessment/Plan   Assessment / Plan     Assessment:  Acute on chronic  hypoxic and hypercapnic respiratory failure  Acute COPD exacerbation  Respiratory acidosis  Type 2 diabetes mellitus  Essential hypertension  Hyperlipidemia  Morbid obesity, BMI 31.41    Plan:  Continue BiPAP as needed at night and with naps along with nasal cannula, maintain O2 saturation greater 90%  Continue with methylprednisolone 80 mg every 6 hours  As needed dry, Pulmicort, DuoNebs as needed  Continue azithromycin for now monitor, patient remains afebrile, no signs of bacterial pneumonia  Continue with heparin for DVT prophylaxis  Continue with correctional insulin, leukosis remained stable  Heart rate remains controlled, continue metoprolol 25 mg twice daily  Added physical therapy  Labs reviewed, records reviewed, image review, vital signs reviewed, medications reviewed  Other recommendations pending clinical course      Discussed plan with RN.    DVT prophylaxis:  Medical DVT prophylaxis orders are present.    CODE STATUS:   Code Status (Patient has no pulse and is not breathing): CPR (Attempt to Resuscitate)  Medical Interventions (Patient has pulse or is breathing): Full Support        Electronically signed by Ricci Trinidad DO, 12/12/21, 8:49 AM EST.

## 2021-12-12 NOTE — PLAN OF CARE
Goal Outcome Evaluation:  Plan of Care Reviewed With: patient        Progress: no change  Outcome Summary: Patient remains on 3 liters nasal cannula, baseline. His blood sugars have been controlled. He is looking forward to going home tomorrow.VSS

## 2021-12-13 VITALS
WEIGHT: 238.1 LBS | TEMPERATURE: 97.7 F | RESPIRATION RATE: 18 BRPM | HEART RATE: 65 BPM | OXYGEN SATURATION: 92 % | DIASTOLIC BLOOD PRESSURE: 74 MMHG | BODY MASS INDEX: 31.56 KG/M2 | SYSTOLIC BLOOD PRESSURE: 130 MMHG | HEIGHT: 73 IN

## 2021-12-13 LAB
GLUCOSE BLDC GLUCOMTR-MCNC: 183 MG/DL (ref 70–99)
GLUCOSE BLDC GLUCOMTR-MCNC: 216 MG/DL (ref 70–99)

## 2021-12-13 PROCEDURE — 99239 HOSP IP/OBS DSCHRG MGMT >30: CPT | Performed by: STUDENT IN AN ORGANIZED HEALTH CARE EDUCATION/TRAINING PROGRAM

## 2021-12-13 PROCEDURE — 94799 UNLISTED PULMONARY SVC/PX: CPT

## 2021-12-13 PROCEDURE — 82962 GLUCOSE BLOOD TEST: CPT

## 2021-12-13 PROCEDURE — 25010000002 HEPARIN (PORCINE) PER 1000 UNITS: Performed by: FAMILY MEDICINE

## 2021-12-13 PROCEDURE — 25010000002 METHYLPREDNISOLONE PER 125 MG: Performed by: STUDENT IN AN ORGANIZED HEALTH CARE EDUCATION/TRAINING PROGRAM

## 2021-12-13 PROCEDURE — 63710000001 INSULIN LISPRO (HUMAN) PER 5 UNITS: Performed by: STUDENT IN AN ORGANIZED HEALTH CARE EDUCATION/TRAINING PROGRAM

## 2021-12-13 RX ORDER — PREDNISONE 20 MG/1
40 TABLET ORAL DAILY
Qty: 10 TABLET | Refills: 0 | Status: SHIPPED | OUTPATIENT
Start: 2021-12-13 | End: 2021-12-18

## 2021-12-13 RX ORDER — IPRATROPIUM BROMIDE AND ALBUTEROL SULFATE 2.5; .5 MG/3ML; MG/3ML
3 SOLUTION RESPIRATORY (INHALATION) EVERY 4 HOURS PRN
Qty: 360 ML | Refills: 0 | Status: SHIPPED | OUTPATIENT
Start: 2021-12-13

## 2021-12-13 RX ADMIN — METOPROLOL TARTRATE 25 MG: 25 TABLET, FILM COATED ORAL at 09:08

## 2021-12-13 RX ADMIN — IPRATROPIUM BROMIDE AND ALBUTEROL SULFATE 3 ML: .5; 3 SOLUTION RESPIRATORY (INHALATION) at 12:25

## 2021-12-13 RX ADMIN — INSULIN LISPRO 3 UNITS: 100 INJECTION, SOLUTION INTRAVENOUS; SUBCUTANEOUS at 09:07

## 2021-12-13 RX ADMIN — METHYLPREDNISOLONE SODIUM SUCCINATE 80 MG: 125 INJECTION, POWDER, FOR SOLUTION INTRAMUSCULAR; INTRAVENOUS at 09:09

## 2021-12-13 RX ADMIN — INSULIN LISPRO 5 UNITS: 100 INJECTION, SOLUTION INTRAVENOUS; SUBCUTANEOUS at 12:23

## 2021-12-13 RX ADMIN — BUDESONIDE 0.25 MG: 0.25 INHALANT RESPIRATORY (INHALATION) at 07:19

## 2021-12-13 RX ADMIN — HEPARIN SODIUM 5000 UNITS: 5000 INJECTION, SOLUTION INTRAVENOUS; SUBCUTANEOUS at 06:10

## 2021-12-13 RX ADMIN — IPRATROPIUM BROMIDE AND ALBUTEROL SULFATE 3 ML: .5; 3 SOLUTION RESPIRATORY (INHALATION) at 07:19

## 2021-12-13 RX ADMIN — METHYLPREDNISOLONE SODIUM SUCCINATE 80 MG: 125 INJECTION, POWDER, FOR SOLUTION INTRAMUSCULAR; INTRAVENOUS at 05:06

## 2021-12-13 RX ADMIN — ASPIRIN 81 MG: 81 TABLET, CHEWABLE ORAL at 09:08

## 2021-12-13 NOTE — DISCHARGE SUMMARY
[facility]         HOSPITALIST  DISCHARGE SUMMARY    Patient Name: Dilip Faulkner  : 1950  MRN: [id number]    Date of Admission: 12/10/2021  Date of Discharge: 2021  Primary Care Physician: Rosalba Edwards APRN    Consults     Date and Time Order Name Status Description    12/10/2021 11:36 PM Hospitalist (on-call MD unless specified)            Active and Resolved Hospital Problems:  Active Hospital Problems    Diagnosis POA   • COPD exacerbation (HCC) [J44.1] Yes   • Obesity (BMI 30-39.9) [E66.9] Yes      Resolved Hospital Problems   No resolved problems to display.       Hospital Course     Hospital Course:  Dilip Faulkner is a 71 y.o. male who presented with chief plan shortness of breath.  Patient self-admitted upon initial work-up found to have evidence of acute on chronic hypoxic respiratory failure and acute COPD exacerbation.  Patient was started on corticosteroids and he was continued on submental oxygen.  Additionally he was given bronchodilators which improved his symptoms.  Upon review of the patient's home medication list he has a history of COPD, but did not appear to be on any type of bronchodilator therapy.  Ultimately patient's symptoms improved and he was discharged to continue with prednisone 40 mg for total 5 days.  He was given Advair and also was given a nebulizer machine with DuoNeb breathing treatments.  Patient will continue his other home medications as before.  He will follow-up with PCP in 1 week.  Patient has appointment with pulmonology in the next couple months and he will see them as scheduled. At the time of discharge patient had no chest pain, chest pressure, palpitations, fever, or chills.  He was discharged in stable condition.      DISCHARGE Follow Up Recommendations for labs and diagnostics: PCP 1 week.      Day of Discharge     Vital Signs:  Temp:  [97.3 °F (36.3 °C)-97.7 °F (36.5 °C)] 97.3 °F (36.3 °C)  Heart Rate:  [64-80] 64  Resp:   [18-24] 18  BP: (123-150)/(51-84) 126/65  Flow (L/min):  [3-3.5] 3.5  Physical Exam:   Constitutional: Alert, awake, no acute distress  HEENT:  PERRLA, EOMI; No Scleral icterus  Neck:  Supple; No Mass, No Lymphadenopathy  Cardiovascular:  No Rubs, No Edema, No JVD  Respiratory: No respiratory distress, unlabored breathing, saturating well on nasal cannula  Abdomen:  Normal BS all 4 Quadrants; No Guarding, No Tenderness  Musculoskeletal:  Pulses Positive all 4 Ext; No Cyanosis, No Edema  Neurological:  Alert+Ox3, Mental status WNL; No Dysarthria  Skin:  No Rash, No Cellulitis, No Erythema      Discharge Details        Discharge Medications      New Medications      Instructions Start Date   fluticasone-salmeterol 250-50 MCG/DOSE DISKUS  Commonly known as: Advair Diskus   1 puff, Inhalation, 2 Times Daily - RT      ipratropium-albuterol 0.5-2.5 mg/3 ml nebulizer  Commonly known as: DUO-NEB   3 mL, Nebulization, Every 4 Hours PRN      predniSONE 20 MG tablet  Commonly known as: DELTASONE   40 mg, Oral, Daily         Continue These Medications      Instructions Start Date   aspirin 81 MG chewable tablet   81 mg, Oral, Daily      atorvastatin 10 MG tablet  Commonly known as: LIPITOR   10 mg, Oral, Daily      lisinopril 2.5 MG tablet  Commonly known as: PRINIVIL,ZESTRIL   2.5 mg, Oral, Daily      metFORMIN 500 MG tablet  Commonly known as: GLUCOPHAGE   500 mg, Oral, 2 times daily      metoprolol tartrate 25 MG tablet  Commonly known as: LOPRESSOR   25 mg, Oral, 2 times daily             No Known Allergies    Discharge Disposition:  Home or Self Care    Diet:  Hospital:  Diet Order   Procedures   • Diet Regular; Consistent Carbohydrate       Discharge Activity:       CODE STATUS:  Code Status and Medical Interventions:   Ordered at: 12/11/21 0654     Code Status (Patient has no pulse and is not breathing):    CPR (Attempt to Resuscitate)     Medical Interventions (Patient has pulse or is breathing):    Full Support          Future Appointments   Date Time Provider Department Center   3/9/2022  2:00 PM Leandro Galeano, DO [facility]       Additional Instructions for the Follow-ups that You Need to Schedule     Discharge Follow-up with PCP   As directed       Currently Documented PCP:    Rosalba Edwards APRN    PCP Phone Number:    [phone]     Follow Up Details: 1 wk               Pertinent  and/or Most Recent Results     PROCEDURES:   XR Chest 1 View    Result Date: 12/10/2021  Narrative: PROCEDURE: XR CHEST 1 VW  COMPARISON: [facility], CT, CT CHEST WO CONTRAST DIAGNOSTIC, 7/24/2021, 4:14.  [facility], CR, XR CHEST 1 VW, 7/23/2021, 16:40.  [facility], CR, XR CHEST 1 VW, 10/22/2021, 20:03.  INDICATIONS: SOA Triage Protocol  FINDINGS:  The patient is rotated.  There is stable blunting of the left lateral costophrenic angle and left basilar airspace opacities.  Lungs otherwise appear clear.  No pneumothorax is seen.  Cardiomediastinal contours are unchanged.  CONCLUSION: Stable chronic changes at the left lung base.  No acute cardiopulmonary abnormality or significant change.       MANAS TREVINO MD       Electronically Signed and Approved By: MANAS TREVINO MD on 12/10/2021 at 19:54                 LAB RESULTS:      Lab 12/12/21  0940 12/10/21  1906 12/10/21  1904   WBC 12.89*  --  12.12*   HEMOGLOBIN 12.5*  --  13.8   HEMATOCRIT 41.4  --  46.0   PLATELETS 170  --  179   NEUTROS ABS 11.86*  --  7.97*   IMMATURE GRANS (ABS) 0.14*  --  0.05   LYMPHS ABS 0.65*  --  1.43   MONOS ABS 0.23  --  0.80   EOS ABS 0.00  --  1.80*   MCV 85.0  --  88.3   PROCALCITONIN 0.05  --   --    LACTATE  --  1.0  --          Lab 12/12/21  0940 12/10/21  1904   SODIUM 136 140   POTASSIUM 4.8 4.7   CHLORIDE 100 102   CO2 28.6 29.0   ANION GAP 7.4 9.0   BUN 26* 20   CREATININE 0.95 1.00   GLUCOSE 196* 125*   CALCIUM 9.0 8.9   MAGNESIUM 2.4  --    PHOSPHORUS 2.3*  --          Lab  12/12/21  0940 12/10/21  1904   TOTAL PROTEIN 6.6 7.3   ALBUMIN 3.60 4.10   GLOBULIN 3.0 3.2   ALT (SGPT) 11 16   AST (SGOT) 18 23   BILIRUBIN 0.3 0.4   ALK PHOS 76 102         Lab 12/10/21  1904   PROBNP 186.0   TROPONIN T 0.021                 Lab 12/11/21  0629 12/10/21  1944   PH, ARTERIAL 7.260* 7.238*   PCO2, ARTERIAL 65.9* 69.2*   PO2 ART 96.0 133.0*   O2 SATURATION ART 96.3 97.8   FIO2 32 44   HCO3 ART 28.9* 28.9*   BASE EXCESS ART 0.2 -0.3   CARBOXYHEMOGLOBIN 1.3 1.3     Brief Urine Lab Results     None        Microbiology Results (last 10 days)     Procedure Component Value - Date/Time    Blood Culture - Blood, Arm, Left [[id number]]  (Normal) Collected: 12/10/21 2012    Lab Status: Preliminary result Specimen: Blood from Arm, Left Updated: 12/12/21 2300     Blood Culture No growth at 2 days    COVID-19,CEPHEID/PERI/BDMAX,COR/KYLEE/PAD/CARMEN IN-HOUSE(OR EMERGENT/ADD-ON),NP SWAB IN TRANSPORT MEDIA 3-4 HR TAT, RT-PCR - Swab, Nasopharynx [[id number]]  (Normal) Collected: 12/10/21 2012    Lab Status: Final result Specimen: Swab from Nasopharynx Updated: 12/11/21 1138     COVID19 Not Detected    Narrative:      Fact sheet for providers: https://www.fda.gov/media/230369/download     Fact sheet for patients: https://www.fda.gov/media/443076/download  Presumptive Positive: additional testing may be indicated if it is necessary to differentiate between SARS-CoV-2 and other Sarbecovirus, for epidemiological purposes, or clinical management.    Blood Culture - Blood, Arm, Right [[id number]]  (Normal) Collected: 12/10/21 1906    Lab Status: Preliminary result Specimen: Blood from Arm, Right Updated: 12/12/21 1915     Blood Culture No growth at 2 days                           Labs Pending at Discharge:  Pending Labs     Order Current Status    Blood Culture - Blood, Arm, Left Preliminary result    Blood Culture - Blood, Arm, Right Preliminary result            Time spent on Discharge including face to face service:  32  minutes    Electronically signed by Ricci Trinidad DO, 12/13/21, 9:21 AM EST.

## 2021-12-13 NOTE — CASE MANAGEMENT/SOCIAL WORK
"RT CM briefly visited with pt for COPD education.  DPI administration and COPD action plan  was reviewed with pt. Pt declined \"Understanding COPD\" booklet, stating he already has one.  RT CM urged pt to keep his pulmonary appt  (3/09).  RT CM will email pulmonary office to see if appt can be moved to a closer date.   "

## 2021-12-14 ENCOUNTER — READMISSION MANAGEMENT (OUTPATIENT)
Dept: CALL CENTER | Facility: HOSPITAL | Age: 71
End: 2021-12-14

## 2021-12-14 NOTE — OUTREACH NOTE
Prep Survey      Responses   [facility] patient discharged from? [facility]   Is LACE score < 7 ? No   Emergency Room discharge w/ pulse ox? No   Eligibility Readm Mgmt   Discharge diagnosis COPD exacerbation   Does the patient have one of the following disease processes/diagnoses(primary or secondary)? COPD/Pneumonia   Does the patient have Home health ordered? No   Is there a DME ordered? Yes   What DME was ordered? Nebulizer   Comments regarding appointments Listed   Prep survey completed? Yes          Tamanna Hansen RN

## 2021-12-14 NOTE — PAYOR COMM NOTE
"Dilip Laws (71 y.o. Male)             Date of Birth Social Security Number Address Home Phone MRN    1950  [address]   [facility] [address] [phone] [id number]    M[employer]onism Marital Status             None Single       Admission Date Admission Type Admitting Provider Attending Provider Department, Room/Bed    12/10/21 [facility]    Discharge Date Discharge Disposition Discharge Destination          12/13/2021 Home or Self Care              Attending Provider: (none)   Allergies: No Known Allergies    Isolation: None   Infection: None   Code Status: Prior   Advance Care Planning Activity    Ht: 185.4 cm (73\")   Wt: 108 kg (238 lb 1.6 oz)    Admission Cmt: None   Principal Problem: None                Active Insurance as of 12/10/2021     Primary Coverage     Payor Plan Insurance Group Employer/Plan Group    ANTH MEDICARE REPLACEMENT ANTH MEDICARE ADVANTAGE [id number]     Payor Plan Address Payor Plan Phone Number Payor Plan Fax Number Effective Dates    [address] [phone]  1/1/2020 - None Entered    [facility] [address]       Subscriber Name Subscriber Birth Date Member ID       DILIP LWAS 1950 [id number]           Secondary Coverage     Payor Plan Insurance Group Employer/Plan Group    [employer] [place] WELL[employer] MEDICAID      Payor Plan Address Payor Plan Phone Number Payor Plan Fax Number Effective Dates    [address] [address] [phone]  7/23/2021 - None Entered    [facility] [id number]       Subscriber Name Subscriber Birth Date Member ID       DILIP LAWS 1950 [id number]                 Emergency Contacts      (Rel.) Home Phone Work Phone Mobile Phone    JOJO STALLINGS (Sister) -- -- [phone]        MA 12/13  #[id number] PENDING WITH DEIRDRE [facility]  #[id number] [facility] AUTH       CONTACT   GAVINO S UTILIZATION REVIEW    [facility]  [address] " [place] [address]  TAX ID [id number]  [facility]  [phone]       [phone]   [phone]   Discharge Summary      Ricci Trinidad DO at 21 0920                         [facility]         HOSPITALIST  DISCHARGE SUMMARY    Patient Name: Dilip Faulkner  : 1950  MRN: [id number]    Date of Admission: 12/10/2021  Date of Discharge: 2021  Primary Care Physician: Rosalba Edwards APRN    Consults     Date and Time Order Name Status Description    12/10/2021 11:36 PM Hospitalist (on-call MD unless specified)            Active and Resolved Hospital Problems:  Active Hospital Problems    Diagnosis POA   • COPD exacerbation (HCC) [J44.1] Yes   • Obesity (BMI 30-39.9) [E66.9] Yes      Resolved Hospital Problems   No resolved problems to display.       Hospital Course     Hospital Course:  Dilip Faulkner is a 71 y.o. male who presented with chief plan shortness of breath.  Patient self-admitted upon initial work-up found to have evidence of acute on chronic hypoxic respiratory failure and acute COPD exacerbation.  Patient was started on corticosteroids and he was continued on submental oxygen.  Additionally he was given bronchodilators which improved his symptoms.  Upon review of the patient's home medication list he has a history of COPD, but did not appear to be on any type of bronchodilator therapy.  Ultimately patient's symptoms improved and he was discharged to continue with prednisone 40 mg for total 5 days.  He was given Advair and also was given a nebulizer machine with DuoNeb breathing treatments.  Patient will continue his other home medications as before.  He will follow-up with PCP in 1 week.  Patient has appointment with pulmonology in the next couple months and he will see them as scheduled. At the time of discharge patient had no chest pain, chest pressure, palpitations, fever, or chills.  He was discharged in stable condition.      DISCHARGE Follow Up Recommendations  for labs and diagnostics: PCP 1 week.      Day of Discharge     Vital Signs:  Temp:  [97.3 °F (36.3 °C)-97.7 °F (36.5 °C)] 97.3 °F (36.3 °C)  Heart Rate:  [64-80] 64  Resp:  [18-24] 18  BP: (123-150)/(51-84) 126/65  Flow (L/min):  [3-3.5] 3.5  Physical Exam:   Constitutional: Alert, awake, no acute distress  HEENT:  PERRLA, EOMI; No Scleral icterus  Neck:  Supple; No Mass, No Lymphadenopathy  Cardiovascular:  No Rubs, No Edema, No JVD  Respiratory: No respiratory distress, unlabored breathing, saturating well on nasal cannula  Abdomen:  Normal BS all 4 Quadrants; No Guarding, No Tenderness  Musculoskeletal:  Pulses Positive all 4 Ext; No Cyanosis, No Edema  Neurological:  Alert+Ox3, Mental status WNL; No Dysarthria  Skin:  No Rash, No Cellulitis, No Erythema      Discharge Details        Discharge Medications      New Medications      Instructions Start Date   fluticasone-salmeterol 250-50 MCG/DOSE DISKUS  Commonly known as: Advair Diskus   1 puff, Inhalation, 2 Times Daily - RT      ipratropium-albuterol 0.5-2.5 mg/3 ml nebulizer  Commonly known as: DUO-NEB   3 mL, Nebulization, Every 4 Hours PRN      predniSONE 20 MG tablet  Commonly known as: DELTASONE   40 mg, Oral, Daily         Continue These Medications      Instructions Start Date   aspirin 81 MG chewable tablet   81 mg, Oral, Daily      atorvastatin 10 MG tablet  Commonly known as: LIPITOR   10 mg, Oral, Daily      lisinopril 2.5 MG tablet  Commonly known as: PRINIVIL,ZESTRIL   2.5 mg, Oral, Daily      metFORMIN 500 MG tablet  Commonly known as: GLUCOPHAGE   500 mg, Oral, 2 times daily      metoprolol tartrate 25 MG tablet  Commonly known as: LOPRESSOR   25 mg, Oral, 2 times daily             No Known Allergies    Discharge Disposition:  Home or Self Care    Diet:  Hospital:  Diet Order   Procedures   • Diet Regular; Consistent Carbohydrate       Discharge Activity:       CODE STATUS:  Code Status and Medical Interventions:   Ordered at: 12/11/21 0654      Code Status (Patient has no pulse and is not breathing):    CPR (Attempt to Resuscitate)     Medical Interventions (Patient has pulse or is breathing):    Full Support         Future Appointments   Date Time Provider Department Center   3/9/2022  2:00 PM Leandro Galeano,  [facility]       Additional Instructions for the Follow-ups that You Need to Schedule     Discharge Follow-up with PCP   As directed       Currently Documented PCP:    Rosalba Edwards APRN    PCP Phone Number:    [phone]     Follow Up Details: 1 wk               Pertinent  and/or Most Recent Results     PROCEDURES:   XR Chest 1 View    Result Date: 12/10/2021  Narrative: PROCEDURE: XR CHEST 1 VW  COMPARISON: [facility], CT, CT CHEST WO CONTRAST DIAGNOSTIC, 7/24/2021, 4:14.  [facility], CR, XR CHEST 1 VW, 7/23/2021, 16:40.  [facility], CR, XR CHEST 1 VW, 10/22/2021, 20:03.  INDICATIONS: SOA Triage Protocol  FINDINGS:  The patient is rotated.  There is stable blunting of the left lateral costophrenic angle and left basilar airspace opacities.  Lungs otherwise appear clear.  No pneumothorax is seen.  Cardiomediastinal contours are unchanged.  CONCLUSION: Stable chronic changes at the left lung base.  No acute cardiopulmonary abnormality or significant change.       MANAS TREVINO MD       Electronically Signed and Approved By: MANAS TREVINO MD on 12/10/2021 at 19:54                 LAB RESULTS:      Lab 12/12/21  0940 12/10/21  1906 12/10/21  1904   WBC 12.89*  --  12.12*   HEMOGLOBIN 12.5*  --  13.8   HEMATOCRIT 41.4  --  46.0   PLATELETS 170  --  179   NEUTROS ABS 11.86*  --  7.97*   IMMATURE GRANS (ABS) 0.14*  --  0.05   LYMPHS ABS 0.65*  --  1.43   MONOS ABS 0.23  --  0.80   EOS ABS 0.00  --  1.80*   MCV 85.0  --  88.3   PROCALCITONIN 0.05  --   --    LACTATE  --  1.0  --          Lab 12/12/21  0940 12/10/21  1904   SODIUM 136 140   POTASSIUM 4.8 4.7   CHLORIDE 100 102   CO2  28.6 29.0   ANION GAP 7.4 9.0   BUN 26* 20   CREATININE 0.95 1.00   GLUCOSE 196* 125*   CALCIUM 9.0 8.9   MAGNESIUM 2.4  --    PHOSPHORUS 2.3*  --          Lab 12/12/21  0940 12/10/21  1904   TOTAL PROTEIN 6.6 7.3   ALBUMIN 3.60 4.10   GLOBULIN 3.0 3.2   ALT (SGPT) 11 16   AST (SGOT) 18 23   BILIRUBIN 0.3 0.4   ALK PHOS 76 102         Lab 12/10/21  1904   PROBNP 186.0   TROPONIN T 0.021                 Lab 12/11/21  0629 12/10/21  1944   PH, ARTERIAL 7.260* 7.238*   PCO2, ARTERIAL 65.9* 69.2*   PO2 ART 96.0 133.0*   O2 SATURATION ART 96.3 97.8   FIO2 32 44   HCO3 ART 28.9* 28.9*   BASE EXCESS ART 0.2 -0.3   CARBOXYHEMOGLOBIN 1.3 1.3     Brief Urine Lab Results     None        Microbiology Results (last 10 days)     Procedure Component Value - Date/Time    Blood Culture - Blood, Arm, Left [[id number]]  (Normal) Collected: 12/10/21 2012    Lab Status: Preliminary result Specimen: Blood from Arm, Left Updated: 12/12/21 2300     Blood Culture No growth at 2 days    COVID-19,CEPHEID/PERI/BDMAX,COR/KYLEE/PAD/CARMEN IN-HOUSE(OR EMERGENT/ADD-ON),NP SWAB IN TRANSPORT MEDIA 3-4 HR TAT, RT-PCR - Swab, Nasopharynx [[id number]]  (Normal) Collected: 12/10/21 2012    Lab Status: Final result Specimen: Swab from Nasopharynx Updated: 12/11/21 1138     COVID19 Not Detected    Narrative:      Fact sheet for providers: https://www.fda.gov/media/823324/download     Fact sheet for patients: https://www.fda.gov/media/801450/download  Presumptive Positive: additional testing may be indicated if it is necessary to differentiate between SARS-CoV-2 and other Sarbecovirus, for epidemiological purposes, or clinical management.    Blood Culture - Blood, Arm, Right [[id number]]  (Normal) Collected: 12/10/21 1906    Lab Status: Preliminary result Specimen: Blood from Arm, Right Updated: 12/12/21 1915     Blood Culture No growth at 2 days                           Labs Pending at Discharge:  Pending Labs     Order Current Status    Blood Culture - Blood,  Arm, Left Preliminary result    Blood Culture - Blood, Arm, Right Preliminary result            Time spent on Discharge including face to face service:  32 minutes    Electronically signed by Ricci Trinidad DO, 12/13/21, 9:21 AM EST.        Electronically signed by Ricci Trinidad DO at 12/13/21 1713

## 2021-12-15 LAB
BACTERIA SPEC AEROBE CULT: NORMAL
BACTERIA SPEC AEROBE CULT: NORMAL

## 2021-12-20 ENCOUNTER — READMISSION MANAGEMENT (OUTPATIENT)
Dept: CALL CENTER | Facility: HOSPITAL | Age: 71
End: 2021-12-20

## 2021-12-20 NOTE — OUTREACH NOTE
"COPD/PN Week 1 Survey      Responses   [facility] patient discharged from? [facility]   Does the patient have one of the following disease processes/diagnoses(primary or secondary)? COPD/Pneumonia   Was the primary reason for admission: COPD exacerbation   Week 1 attempt successful? Yes   Call start time 0917   Call end time 0941   Discharge diagnosis COPD exacerbation   Meds reviewed with patient/caregiver? Yes   Is the patient having any side effects they believe may be caused by any medication additions or changes? No   Does the patient have all medications ordered at discharge? Yes   Is the patient taking all medications as directed (includes completed medication regime)? Yes   Does the patient have a primary care provider?  Yes   Does the patient have an appointment with their PCP or specialist within 7 days of discharge? Greater than 7 days   Comments regarding PCP PATIENT STATES HE HAS A FOLLOW UP APPOINTMENT WITH HIS PCP AT THE END OF THE MONTH.    What is preventing the patient from scheduling follow up appointments within 7 days of discharge? Earlier appointment not available   Has the patient kept scheduled appointments due by today? N/A   Has home health visited the patient within 72 hours of discharge? N/A   What DME was ordered? Nebulizer   DME comments PATIENT HAS HOME O2, BUT STATES, \"IT'S BROKE.\" HE DOES NOT REMEMBER THE NAME OF THE O2 COMPANY. THIS NURSE ASKED IF THERE IS A STICKER ON THE EQUIPMENT WITH THE NAME OF THE COMPANY, AND PATIENT STATES, \"NO.\" THIS NURSE SEARCHED HOSPITAL RECORDS INTENSIVELY FOR THE NAME OF THE COMPANY WITH NO SUCCESS. THIS NURSE THEN INTERNET SEARCHED MEDICAL SUPPLY COMPANIES IN [facility] AND READ THE NAMES TO PATIENT AND HE REMEMBERED IT IS [employer]. CALL TO [employer] FOR PATIENT AND THEY WILL GO OUT TO FIX HIS O2.    Pulse Ox monitoring None   Psychosocial issues? No   Did the patient receive a copy of their discharge instructions? Yes   Nursing interventions Reviewed " instructions with patient   What is the patient's perception of their health status since discharge? Improving   Nursing Interventions Nurse provided patient education   If the patient is a current smoker, are they able to teach back resources for cessation? Not a smoker  [PATIENT STATES HE QUIT SMOKING A YEAR AGO. ]   Is the patient/caregiver able to teach back the hierarchy of who to call/visit for symptoms/problems? PCP, Specialist, Home health nurse, Urgent Care, ED, 911 Yes   Is the patient able to teach back COPD zones? Yes   Nursing interventions Education provided on various zones   Patient reports what zone on this call? Green Zone   Green Zone Reports doing well,  Breathing without shortness of breath,  Usual activity and exercise level,  Usual amount of phlegm/mucus without difficulty coughing up,  Sleeping well,  Appetite is good   Green Zone interventions: Take daily medications,  Avoid indoor/outdoor triggers,  Use oxygen as prescribed,  Do not smoke   Week 1 call completed? Yes          Mariana Taylor LPN

## 2021-12-29 ENCOUNTER — READMISSION MANAGEMENT (OUTPATIENT)
Dept: CALL CENTER | Facility: HOSPITAL | Age: 71
End: 2021-12-29

## 2021-12-29 NOTE — OUTREACH NOTE
COPD/PN Week 2 Survey      Responses   [facility] patient discharged from? [facility]   Does the patient have one of the following disease processes/diagnoses(primary or secondary)? COPD/Pneumonia   Was the primary reason for admission: COPD exacerbation   Week 2 attempt successful? Yes   Call start time 1445   Call end time 1449   Discharge diagnosis COPD exacerbation   Is patient permission given to speak with other caregiver? No   Meds reviewed with patient/caregiver? Yes   Does the patient have all medications ordered at discharge? Yes   Is the patient taking all medications as directed (includes completed medication regime)? Yes   Does the patient have a primary care provider?  Yes   Comments regarding PCP PCP Rosalba SHORE.    Has the patient kept scheduled appointments due by today? No   What is preventing the patient from keeping their appointments? Transportation  [Patient reports that his car broke down. ]   Nursing Interventions Advised to reschedule appointment   Has home health visited the patient within 72 hours of discharge? N/A   What DME was ordered? Nebulizer   Has all DME been delivered? Yes   DME comments Patient has home nebulizer and home O2.    Pulse Ox monitoring None   Psychosocial issues? No   Did the patient receive a copy of their discharge instructions? Yes   Nursing interventions Reviewed instructions with patient   What is the patient's perception of their health status since discharge? Improving   If the patient is a current smoker, are they able to teach back resources for cessation? Not a smoker   Is the patient/caregiver able to teach back the hierarchy of who to call/visit for symptoms/problems? PCP, Specialist, Home health nurse, Urgent Care, ED, 911 Yes   Week 2 call completed? Yes   Wrap up additional comments Patient reports that he is doing well.           Yudelka Quiñonez RN

## 2022-01-07 ENCOUNTER — READMISSION MANAGEMENT (OUTPATIENT)
Dept: CALL CENTER | Facility: HOSPITAL | Age: 72
End: 2022-01-07

## 2022-01-07 NOTE — OUTREACH NOTE
COPD/PN Week 3 Survey      Responses   [facility] patient discharged from? [facility]   Does the patient have one of the following disease processes/diagnoses(primary or secondary)? COPD/Pneumonia   Was the primary reason for admission: COPD exacerbation   Week 3 attempt successful? No   Unsuccessful attempts Attempt 1          Terrie Rea RN

## 2022-01-11 ENCOUNTER — HOSPITAL ENCOUNTER (INPATIENT)
Facility: HOSPITAL | Age: 72
LOS: 1 days | Discharge: HOME | End: 2022-01-13
Attending: EMERGENCY MEDICINE | Admitting: GENERAL PRACTICE

## 2022-01-11 ENCOUNTER — APPOINTMENT (OUTPATIENT)
Dept: GENERAL RADIOLOGY | Facility: HOSPITAL | Age: 72
End: 2022-01-11

## 2022-01-11 DIAGNOSIS — R26.2 DIFFICULTY WALKING: ICD-10-CM

## 2022-01-11 DIAGNOSIS — J44.1 COPD EXACERBATION: Primary | ICD-10-CM

## 2022-01-11 DIAGNOSIS — J96.02 ACUTE RESPIRATORY FAILURE WITH HYPOXIA AND HYPERCAPNIA: ICD-10-CM

## 2022-01-11 DIAGNOSIS — J18.9 PNEUMONIA OF BOTH LUNGS DUE TO INFECTIOUS ORGANISM, UNSPECIFIED PART OF LUNG: ICD-10-CM

## 2022-01-11 DIAGNOSIS — J96.01 ACUTE RESPIRATORY FAILURE WITH HYPOXIA AND HYPERCAPNIA: ICD-10-CM

## 2022-01-11 LAB
ALBUMIN SERPL-MCNC: 4.1 G/DL (ref 3.5–5.2)
ALBUMIN/GLOB SERPL: 1.2 G/DL
ALP SERPL-CCNC: 97 U/L (ref 39–117)
ALT SERPL W P-5'-P-CCNC: 11 U/L (ref 1–41)
ANION GAP SERPL CALCULATED.3IONS-SCNC: 10.1 MMOL/L (ref 5–15)
ARTERIAL PATENCY WRIST A: POSITIVE
AST SERPL-CCNC: 15 U/L (ref 1–40)
BASE EXCESS BLDA CALC-SCNC: -0.9 MMOL/L (ref -2–2)
BASE EXCESS BLDA CALC-SCNC: -5.7 MMOL/L (ref -2–2)
BASOPHILS # BLD AUTO: 0.05 10*3/MM3 (ref 0–0.2)
BASOPHILS NFR BLD AUTO: 0.5 % (ref 0–1.5)
BDY SITE: ABNORMAL
BDY SITE: ABNORMAL
BILIRUB SERPL-MCNC: 0.3 MG/DL (ref 0–1.2)
BUN SERPL-MCNC: 21 MG/DL (ref 8–23)
BUN/CREAT SERPL: 22.1 (ref 7–25)
CALCIUM SPEC-SCNC: 8.7 MG/DL (ref 8.6–10.5)
CHLORIDE SERPL-SCNC: 101 MMOL/L (ref 98–107)
CO2 SERPL-SCNC: 26.9 MMOL/L (ref 22–29)
COHGB MFR BLD: 1.2 % (ref 0–1.5)
COHGB MFR BLD: 1.3 % (ref 0–1.5)
CREAT SERPL-MCNC: 0.95 MG/DL (ref 0.76–1.27)
D-LACTATE SERPL-SCNC: 1.5 MMOL/L (ref 0.5–2)
D-LACTATE SERPL-SCNC: 2.3 MMOL/L (ref 0.5–2)
DEPRECATED RDW RBC AUTO: 47.3 FL (ref 37–54)
EOSINOPHIL # BLD AUTO: 1.25 10*3/MM3 (ref 0–0.4)
EOSINOPHIL NFR BLD AUTO: 13 % (ref 0.3–6.2)
ERYTHROCYTE [DISTWIDTH] IN BLOOD BY AUTOMATED COUNT: 14.8 % (ref 12.3–15.4)
FHHB: 12.1 % (ref 0–5)
FHHB: 5.1 % (ref 0–5)
GAS FLOW AIRWAY: 6 LPM
GFR SERPL CREATININE-BSD FRML MDRD: 78 ML/MIN/1.73
GLOBULIN UR ELPH-MCNC: 3.3 GM/DL
GLUCOSE SERPL-MCNC: 153 MG/DL (ref 65–99)
HCO3 BLDA-SCNC: 24 MMOL/L (ref 22–26)
HCO3 BLDA-SCNC: 30.4 MMOL/L (ref 22–26)
HCT VFR BLD AUTO: 44.7 % (ref 37.5–51)
HGB BLD-MCNC: 13.4 G/DL (ref 13–17.7)
HGB BLDA-MCNC: 12.8 G/DL (ref 13.8–16.4)
HGB BLDA-MCNC: 13.8 G/DL (ref 13.8–16.4)
HOLD SPECIMEN: NORMAL
HOLD SPECIMEN: NORMAL
IMM GRANULOCYTES # BLD AUTO: 0.04 10*3/MM3 (ref 0–0.05)
IMM GRANULOCYTES NFR BLD AUTO: 0.4 % (ref 0–0.5)
INHALED O2 CONCENTRATION: 35 %
LACTATE BLDA-SCNC: ABNORMAL MMOL/L
LACTATE BLDA-SCNC: ABNORMAL MMOL/L
LYMPHOCYTES # BLD AUTO: 1.67 10*3/MM3 (ref 0.7–3.1)
LYMPHOCYTES NFR BLD AUTO: 17.4 % (ref 19.6–45.3)
MCH RBC QN AUTO: 26 PG (ref 26.6–33)
MCHC RBC AUTO-ENTMCNC: 30 G/DL (ref 31.5–35.7)
MCV RBC AUTO: 86.6 FL (ref 79–97)
METHGB BLD QL: 0.1 % (ref 0–1.5)
METHGB BLD QL: 0.1 % (ref 0–1.5)
MODALITY: ABNORMAL
MODALITY: ABNORMAL
MONOCYTES # BLD AUTO: 0.8 10*3/MM3 (ref 0.1–0.9)
MONOCYTES NFR BLD AUTO: 8.4 % (ref 5–12)
NEUTROPHILS NFR BLD AUTO: 5.77 10*3/MM3 (ref 1.7–7)
NEUTROPHILS NFR BLD AUTO: 60.3 % (ref 42.7–76)
NOTE: ABNORMAL
NRBC BLD AUTO-RTO: 0 /100 WBC (ref 0–0.2)
NT-PROBNP SERPL-MCNC: 108.9 PG/ML (ref 0–900)
OXYHGB MFR BLDV: 86.5 % (ref 94–99)
OXYHGB MFR BLDV: 93.6 % (ref 94–99)
PCO2 BLDA: 68.2 MM HG (ref 35–45)
PCO2 BLDA: 88 MM HG (ref 35–45)
PH BLDA: 7.16 PH UNITS (ref 7.35–7.45)
PH BLDA: 7.16 PH UNITS (ref 7.35–7.45)
PLATELET # BLD AUTO: 190 10*3/MM3 (ref 140–450)
PMV BLD AUTO: 10.3 FL (ref 6–12)
PO2 BLD: 261 MM[HG] (ref 0–500)
PO2 BLDA: 65.9 MM HG (ref 80–100)
PO2 BLDA: 91.3 MM HG (ref 80–100)
POTASSIUM SERPL-SCNC: 4.4 MMOL/L (ref 3.5–5.2)
PROT SERPL-MCNC: 7.4 G/DL (ref 6–8.5)
QT INTERVAL: 374 MS
RBC # BLD AUTO: 5.16 10*6/MM3 (ref 4.14–5.8)
SAO2 % BLDCOA: 87.7 % (ref 95–99)
SAO2 % BLDCOA: 94.8 % (ref 95–99)
SODIUM SERPL-SCNC: 138 MMOL/L (ref 136–145)
TROPONIN T SERPL-MCNC: <0.01 NG/ML (ref 0–0.03)
WBC NRBC COR # BLD: 9.58 10*3/MM3 (ref 3.4–10.8)
WHOLE BLOOD HOLD SPECIMEN: NORMAL
WHOLE BLOOD HOLD SPECIMEN: NORMAL

## 2022-01-11 PROCEDURE — 87150 DNA/RNA AMPLIFIED PROBE: CPT | Performed by: EMERGENCY MEDICINE

## 2022-01-11 PROCEDURE — 25010000002 METHYLPREDNISOLONE PER 125 MG: Performed by: EMERGENCY MEDICINE

## 2022-01-11 PROCEDURE — U0004 COV-19 TEST NON-CDC HGH THRU: HCPCS | Performed by: EMERGENCY MEDICINE

## 2022-01-11 PROCEDURE — 87040 BLOOD CULTURE FOR BACTERIA: CPT | Performed by: EMERGENCY MEDICINE

## 2022-01-11 PROCEDURE — 71045 X-RAY EXAM CHEST 1 VIEW: CPT

## 2022-01-11 PROCEDURE — 83880 ASSAY OF NATRIURETIC PEPTIDE: CPT | Performed by: EMERGENCY MEDICINE

## 2022-01-11 PROCEDURE — 83050 HGB METHEMOGLOBIN QUAN: CPT | Performed by: EMERGENCY MEDICINE

## 2022-01-11 PROCEDURE — 94640 AIRWAY INHALATION TREATMENT: CPT

## 2022-01-11 PROCEDURE — 36600 WITHDRAWAL OF ARTERIAL BLOOD: CPT | Performed by: EMERGENCY MEDICINE

## 2022-01-11 PROCEDURE — 87147 CULTURE TYPE IMMUNOLOGIC: CPT | Performed by: EMERGENCY MEDICINE

## 2022-01-11 PROCEDURE — 99223 1ST HOSP IP/OBS HIGH 75: CPT | Performed by: GENERAL PRACTICE

## 2022-01-11 PROCEDURE — 94799 UNLISTED PULMONARY SVC/PX: CPT

## 2022-01-11 PROCEDURE — 25010000002 CEFTRIAXONE PER 250 MG: Performed by: EMERGENCY MEDICINE

## 2022-01-11 PROCEDURE — 93010 ELECTROCARDIOGRAM REPORT: CPT | Performed by: INTERNAL MEDICINE

## 2022-01-11 PROCEDURE — 83605 ASSAY OF LACTIC ACID: CPT | Performed by: EMERGENCY MEDICINE

## 2022-01-11 PROCEDURE — 86738 MYCOPLASMA ANTIBODY: CPT | Performed by: GENERAL PRACTICE

## 2022-01-11 PROCEDURE — 94660 CPAP INITIATION&MGMT: CPT

## 2022-01-11 PROCEDURE — 5A09357 ASSISTANCE WITH RESPIRATORY VENTILATION, LESS THAN 24 CONSECUTIVE HOURS, CONTINUOUS POSITIVE AIRWAY PRESSURE: ICD-10-PCS | Performed by: EMERGENCY MEDICINE

## 2022-01-11 PROCEDURE — 85025 COMPLETE CBC W/AUTO DIFF WBC: CPT | Performed by: EMERGENCY MEDICINE

## 2022-01-11 PROCEDURE — 25010000002 AZITHROMYCIN PER 500 MG: Performed by: EMERGENCY MEDICINE

## 2022-01-11 PROCEDURE — 99285 EMERGENCY DEPT VISIT HI MDM: CPT

## 2022-01-11 PROCEDURE — 25010000002 LORAZEPAM PER 2 MG: Performed by: GENERAL PRACTICE

## 2022-01-11 PROCEDURE — 84484 ASSAY OF TROPONIN QUANT: CPT | Performed by: EMERGENCY MEDICINE

## 2022-01-11 PROCEDURE — 82375 ASSAY CARBOXYHB QUANT: CPT | Performed by: EMERGENCY MEDICINE

## 2022-01-11 PROCEDURE — 93005 ELECTROCARDIOGRAM TRACING: CPT

## 2022-01-11 PROCEDURE — 80053 COMPREHEN METABOLIC PANEL: CPT | Performed by: EMERGENCY MEDICINE

## 2022-01-11 PROCEDURE — 93005 ELECTROCARDIOGRAM TRACING: CPT | Performed by: EMERGENCY MEDICINE

## 2022-01-11 PROCEDURE — 82805 BLOOD GASES W/O2 SATURATION: CPT | Performed by: EMERGENCY MEDICINE

## 2022-01-11 RX ORDER — METHYLPREDNISOLONE SODIUM SUCCINATE 125 MG/2ML
125 INJECTION, POWDER, LYOPHILIZED, FOR SOLUTION INTRAMUSCULAR; INTRAVENOUS ONCE
Status: COMPLETED | OUTPATIENT
Start: 2022-01-11 | End: 2022-01-11

## 2022-01-11 RX ORDER — INSULIN DEGLUDEC INJECTION 100 U/ML
20 INJECTION, SOLUTION SUBCUTANEOUS DAILY
COMMUNITY

## 2022-01-11 RX ORDER — IPRATROPIUM BROMIDE AND ALBUTEROL SULFATE 2.5; .5 MG/3ML; MG/3ML
3 SOLUTION RESPIRATORY (INHALATION)
Status: COMPLETED | OUTPATIENT
Start: 2022-01-11 | End: 2022-01-11

## 2022-01-11 RX ORDER — OMEPRAZOLE 20 MG/1
20 CAPSULE, DELAYED RELEASE ORAL DAILY
COMMUNITY

## 2022-01-11 RX ORDER — LORAZEPAM 2 MG/ML
0.5 INJECTION INTRAMUSCULAR ONCE
Status: COMPLETED | OUTPATIENT
Start: 2022-01-11 | End: 2022-01-11

## 2022-01-11 RX ORDER — CEFTRIAXONE SODIUM 1 G/50ML
1 INJECTION, SOLUTION INTRAVENOUS ONCE
Status: COMPLETED | OUTPATIENT
Start: 2022-01-11 | End: 2022-01-11

## 2022-01-11 RX ORDER — IPRATROPIUM BROMIDE AND ALBUTEROL SULFATE 2.5; .5 MG/3ML; MG/3ML
SOLUTION RESPIRATORY (INHALATION)
Status: COMPLETED
Start: 2022-01-11 | End: 2022-01-11

## 2022-01-11 RX ORDER — IPRATROPIUM BROMIDE AND ALBUTEROL SULFATE 2.5; .5 MG/3ML; MG/3ML
6 SOLUTION RESPIRATORY (INHALATION) ONCE
Status: COMPLETED | OUTPATIENT
Start: 2022-01-11 | End: 2022-01-11

## 2022-01-11 RX ORDER — SODIUM CHLORIDE 0.9 % (FLUSH) 0.9 %
10 SYRINGE (ML) INJECTION AS NEEDED
Status: DISCONTINUED | OUTPATIENT
Start: 2022-01-11 | End: 2022-01-13 | Stop reason: HOSPADM

## 2022-01-11 RX ADMIN — SODIUM CHLORIDE 2397 ML: 9 INJECTION, SOLUTION INTRAVENOUS at 19:17

## 2022-01-11 RX ADMIN — CEFTRIAXONE SODIUM 1 G: 1 INJECTION, SOLUTION INTRAVENOUS at 19:18

## 2022-01-11 RX ADMIN — IPRATROPIUM BROMIDE AND ALBUTEROL SULFATE 3 ML: .5; 3 SOLUTION RESPIRATORY (INHALATION) at 19:15

## 2022-01-11 RX ADMIN — LORAZEPAM 0.5 MG: 2 INJECTION INTRAMUSCULAR; INTRAVENOUS at 20:32

## 2022-01-11 RX ADMIN — IPRATROPIUM BROMIDE AND ALBUTEROL SULFATE 3 ML: .5; 3 SOLUTION RESPIRATORY (INHALATION) at 19:22

## 2022-01-11 RX ADMIN — IPRATROPIUM BROMIDE AND ALBUTEROL SULFATE 6 ML: 2.5; .5 SOLUTION RESPIRATORY (INHALATION) at 16:47

## 2022-01-11 RX ADMIN — AZITHROMYCIN DIHYDRATE 500 MG: 500 INJECTION, POWDER, LYOPHILIZED, FOR SOLUTION INTRAVENOUS at 19:35

## 2022-01-11 RX ADMIN — METHYLPREDNISOLONE SODIUM SUCCINATE 125 MG: 125 INJECTION, POWDER, FOR SOLUTION INTRAMUSCULAR; INTRAVENOUS at 19:15

## 2022-01-11 RX ADMIN — IPRATROPIUM BROMIDE AND ALBUTEROL SULFATE 3 ML: .5; 3 SOLUTION RESPIRATORY (INHALATION) at 19:39

## 2022-01-11 NOTE — ED PROVIDER NOTES
Time: 6:48 PM EST  Arrived by: private car  Chief Complaint: Short of breath     history provided by: Patient and EMS     history is limited by: Nothing      History of Present Illness:  Patient is a 71 y.o. year old male that presents to the emergency department with a complaint of severe shortness of breath.  The patient has a history of COPD and he is on home oxygen at 2 L.  Today the patient has had increasing coughing which is nonproductive but his primary complaint is shortness of breath.  He denies fever he denies chest pain.  The patient is not vaccinated for coronavirus    HPI    Similar Symptoms Previously: Yes   recently seen: Admitted 1 month ago.    Patient Care Team  Primary Care Provider: Rosalba Edwards APRN    Past Medical History:     No Known Allergies  Past Medical History:   Diagnosis Date   • Asthma    • COPD (chronic obstructive pulmonary disease) (HCC)    • Diabetes mellitus (HCC)    • Hernia, umbilical    • Hypertension    • Requires continuous at home supplemental oxygen      Past Surgical History:   Procedure Laterality Date   • ABDOMINAL SURGERY       History reviewed. No pertinent family history.    Home Medications:  Prior to Admission medications    Medication Sig Start Date End Date Taking? Authorizing Provider   aspirin 81 MG chewable tablet Chew 81 mg Daily.    ProviderRoosevelt MD   atorvastatin (LIPITOR) 10 MG tablet Take 10 mg by mouth Daily.    Provider, MD Roosevelt   fluticasone-salmeterol (Advair Diskus) 250-50 MCG/DOSE DISKUS Inhale 1 puff 2 (Two) Times a Day for 30 days. 12/13/21 1/12/22  Ricci Trinidad DO   ipratropium-albuterol (DUO-NEB) 0.5-2.5 mg/3 ml nebulizer Take 3 mL by nebulization Every 4 (Four) Hours As Needed for Wheezing or Shortness of Air. 12/13/21   Ricci Trinidad DO   lisinopril (PRINIVIL,ZESTRIL) 2.5 MG tablet Take 2.5 mg by mouth Daily.    ProviderRoosevelt MD   metFORMIN (GLUCOPHAGE) 500 MG tablet Take 500 mg by mouth 2 (two) times a  "day. 21   ProviderRoosevelt MD   metoprolol tartrate (LOPRESSOR) 25 MG tablet Take 25 mg by mouth 2 (two) times a day. 21   ProviderRoosevelt MD        Social History:   Social History     Tobacco Use   • Smoking status: Former Smoker     Types: Cigarettes     Quit date: 2020     Years since quittin.4   • Smokeless tobacco: Current User     Types: Snuff   Vaping Use   • Vaping Use: Never used   Substance Use Topics   • Alcohol use: Never   • Drug use: Never     Recent travel: no     Review of Systems:  Review of Systems   Constitutional: Positive for activity change and fatigue. Negative for appetite change, chills, diaphoresis and fever.   HENT: Negative for ear pain, mouth sores, nosebleeds, sinus pressure, sore throat, trouble swallowing and voice change.    Eyes: Negative for pain, discharge and redness.   Respiratory: Positive for cough, shortness of breath and wheezing. Negative for choking and chest tightness.    Cardiovascular: Negative for chest pain, palpitations and leg swelling.   Gastrointestinal: Negative for abdominal pain, constipation, diarrhea, nausea and vomiting.   Endocrine: Negative for cold intolerance, polydipsia and polyuria.   Genitourinary: Negative for difficulty urinating, flank pain, hematuria and urgency.   Musculoskeletal: Negative for arthralgias, gait problem, myalgias and neck stiffness.   Skin: Negative.    Allergic/Immunologic: Negative.    Neurological: Positive for weakness. Negative for dizziness, facial asymmetry, light-headedness and headaches.   Hematological: Negative.    Psychiatric/Behavioral: Negative.         Physical Exam:  /63   Pulse 92   Temp 98.2 °F (36.8 °C) (Oral)   Resp (!) 34   Ht 185.4 cm (73\")   Wt 113 kg (250 lb)   SpO2 93%   BMI 32.98 kg/m²     Physical Exam  Vitals and nursing note reviewed.   Constitutional:       General: He is in acute distress.      Appearance: He is well-developed. He is ill-appearing.   HENT: "      Head: Normocephalic and atraumatic.      Mouth/Throat:      Mouth: Mucous membranes are moist.      Pharynx: Oropharynx is clear.   Eyes:      Extraocular Movements: Extraocular movements intact.      Pupils: Pupils are equal, round, and reactive to light.   Cardiovascular:      Rate and Rhythm: Regular rhythm. Tachycardia present.   Pulmonary:      Effort: Tachypnea, accessory muscle usage and respiratory distress present.      Breath sounds: Examination of the right-middle field reveals wheezing. Examination of the left-middle field reveals wheezing. Wheezing present.   Chest:      Chest wall: No mass, deformity, tenderness or crepitus.   Abdominal:      General: Bowel sounds are normal.      Palpations: Abdomen is soft.   Musculoskeletal:         General: Normal range of motion.      Cervical back: Normal range of motion and neck supple.   Skin:     General: Skin is warm and dry.      Capillary Refill: Capillary refill takes less than 2 seconds.   Neurological:      General: No focal deficit present.      Mental Status: He is alert and oriented to person, place, and time.   Psychiatric:         Mood and Affect: Mood normal.         Behavior: Behavior normal.                Medications in the Emergency Department:  Medications   sodium chloride 0.9 % flush 10 mL (has no administration in time range)   ipratropium-albuterol (DUO-NEB) nebulizer solution 6 mL (6 mL Nebulization Given 1/11/22 1647)   sodium chloride 0.9% - IBW for BMI > 30 bolus 2,397 mL (0 mL/kg × 79.9 kg (Ideal) Intravenous Stopped 1/11/22 2024)   cefTRIAXone (ROCEPHIN) IVPB 1 g (0 g Intravenous Stopped 1/11/22 1936)   AZITHROMYCIN 500 MG/250 ML 0.9% NS IVPB (vial-mate) (0 mg Intravenous Stopped 1/11/22 2035)   ipratropium-albuterol (DUO-NEB) nebulizer solution 3 mL (3 mL Nebulization Given 1/11/22 1939)   methylPREDNISolone sodium succinate (SOLU-Medrol) injection 125 mg (125 mg Intravenous Given 1/11/22 1915)   LORazepam (ATIVAN) injection  0.5 mg (0.5 mg Intravenous Given 1/11/22 2032)        Labs  Lab Results (last 24 hours)     Procedure Component Value Units Date/Time    CBC & Differential [[id number]]  (Abnormal) Collected: 01/11/22 1645    Specimen: Blood Updated: 01/11/22 1711    Narrative:      The following orders were created for panel order CBC & Differential.  Procedure                               Abnormality         Status                     ---------                               -----------         ------                     CBC Auto Differential[[id number]]        Abnormal            Final result                 Please view results for these tests on the individual orders.    Comprehensive Metabolic Panel [[id number]]  (Abnormal) Collected: 01/11/22 1645    Specimen: Blood Updated: 01/11/22 1738     Glucose 153 mg/dL      BUN 21 mg/dL      Creatinine 0.95 mg/dL      Sodium 138 mmol/L      Potassium 4.4 mmol/L      Chloride 101 mmol/L      CO2 26.9 mmol/L      Calcium 8.7 mg/dL      Total Protein 7.4 g/dL      Albumin 4.10 g/dL      ALT (SGPT) 11 U/L      AST (SGOT) 15 U/L      Alkaline Phosphatase 97 U/L      Total Bilirubin 0.3 mg/dL      eGFR Non African Amer 78 mL/min/1.73      Globulin 3.3 gm/dL      A/G Ratio 1.2 g/dL      BUN/Creatinine Ratio 22.1     Anion Gap 10.1 mmol/L     Narrative:      GFR Normal >60  Chronic Kidney Disease <60  Kidney Failure <15      BNP [[id number]]  (Normal) Collected: 01/11/22 1645    Specimen: Blood Updated: 01/11/22 1734     proBNP 108.9 pg/mL     Narrative:      Among patients with dyspnea, NT-proBNP is highly sensitive for the detection of acute congestive heart failure. In addition NT-proBNP of <300 pg/ml effectively rules out acute congestive heart failure with 99% negative predictive value.    Results may be falsely decreased if patient taking Biotin.      Troponin [[id number]]  (Normal) Collected: 01/11/22 1645    Specimen: Blood Updated: 01/11/22 1738     Troponin T <0.010 ng/mL     Narrative:       Troponin T Reference Range:  <= 0.03 ng/mL-   Negative for AMI  >0.03 ng/mL-     Abnormal for myocardial necrosis.  Clinicians would have to utilize clinical acumen, EKG, Troponin and serial changes to determine if it is an Acute Myocardial Infarction or myocardial injury due to an underlying chronic condition.       Results may be falsely decreased if patient taking Biotin.      CBC Auto Differential [[id number]]  (Abnormal) Collected: 01/11/22 1645    Specimen: Blood Updated: 01/11/22 1711     WBC 9.58 10*3/mm3      RBC 5.16 10*6/mm3      Hemoglobin 13.4 g/dL      Hematocrit 44.7 %      MCV 86.6 fL      MCH 26.0 pg      MCHC 30.0 g/dL      RDW 14.8 %      RDW-SD 47.3 fl      MPV 10.3 fL      Platelets 190 10*3/mm3      Neutrophil % 60.3 %      Lymphocyte % 17.4 %      Monocyte % 8.4 %      Eosinophil % 13.0 %      Basophil % 0.5 %      Immature Grans % 0.4 %      Neutrophils, Absolute 5.77 10*3/mm3      Lymphocytes, Absolute 1.67 10*3/mm3      Monocytes, Absolute 0.80 10*3/mm3      Eosinophils, Absolute 1.25 10*3/mm3      Basophils, Absolute 0.05 10*3/mm3      Immature Grans, Absolute 0.04 10*3/mm3      nRBC 0.0 /100 WBC     Lactic Acid, Plasma [[id number]]  (Abnormal) Collected: 01/11/22 1915    Specimen: Blood Updated: 01/11/22 2010     Lactate 2.3 mmol/L     Blood Culture - Blood, Arm, Left [[id number]] Collected: 01/11/22 1915    Specimen: Blood from Arm, Left Updated: 01/11/22 1927    Blood Culture - Blood, Blood, Central Line [[id number]] Collected: 01/11/22 1915    Specimen: Blood, Central Line Updated: 01/11/22 1928    Blood Gas, Arterial -With Co-Ox Panel: Yes [[id number]]  (Abnormal) Collected: 01/11/22 1918    Specimen: Arterial Blood from Arm, Right Updated: 01/11/22 1921     pH, Arterial 7.156 pH units      pCO2, Arterial 88.0 mm Hg      pO2, Arterial 65.9 mm Hg      HCO3, Arterial 30.4 mmol/L      Base Excess, Arterial -0.9 mmol/L      O2 Saturation, Arterial 87.7 %      Hemoglobin, Blood Gas 13.8 g/dL       Carboxyhemoglobin 1.3 %      Methemoglobin 0.10 %      Oxyhemoglobin 86.5 %      FHHB 12.1 %      Site Arterial: right radial     Modality Nasal Cannula     Flow Rate 6 lpm      Lactate, Arterial --    COVID-19,APTIMA PANTHER(SRAVANTHI),[facility], NP/OP SWAB IN UTM/VTM/[facility] TRANSPORT MEDIA,24 HR TAT - Swab, Nasopharynx [[id number]] Collected: 01/11/22 1921    Specimen: Swab from Nasopharynx Updated: 01/11/22 1927    Blood Gas, Arterial -With Co-Ox Panel: Yes [[id number]]  (Abnormal) Collected: 01/11/22 2053    Specimen: Arterial Blood Updated: 01/11/22 2056     pH, Arterial 7.164 pH units      pCO2, Arterial 68.2 mm Hg      pO2, Arterial 91.3 mm Hg      HCO3, Arterial 24.0 mmol/L      Base Excess, Arterial -5.7 mmol/L      O2 Saturation, Arterial 94.8 %      Hemoglobin, Blood Gas 12.8 g/dL      Carboxyhemoglobin 1.2 %      Methemoglobin 0.10 %      Oxyhemoglobin 93.6 %      FHHB 5.1 %      Site Arterial: right radial     Modality BiPap     FIO2 35 %      PO2/FIO2 261     Nael's Test Positive     Note 18/10     Lactate, Arterial --    STAT Lactic Acid, Reflex [[id number]]  (Normal) Collected: 01/11/22 2235    Specimen: Blood Updated: 01/11/22 2304     Lactate 1.5 mmol/L           Sepsis was noted at 1850 and the patient will be ordered IV antibiotics and IV fluid bolus.  The patient will also have a COVID test performed.       Imaging:  XR Chest 1 View    Result Date: 1/11/2022  PROCEDURE: XR CHEST 1 VW  COMPARISON: [facility], CT, CT CHEST WO CONTRAST DIAGNOSTIC, 7/24/2021, 4:14.  [facility], CR, XR CHEST 1 VW, 9/15/2021, 22:06.  [facility], CR, XR CHEST 1 VW, 12/10/2021, 19:19.  INDICATIONS: SOA Triage Protocol  FINDINGS:  Moderate airspace opacity is noted in the mid and lower lung fields bilaterally, worse in the interval.  Chronic opacity is noted in the left lung base suggesting underlying scarring or atelectasis.  Left pleural thickening near the base is suspected.   The cardiac and mediastinal silhouettes appear normal.        1. Chronic left basilar opacity consistent with scarring or atelectasis 2. Worsening airspace opacity predominantly in the right lung base.  Pulmonary edema and pneumonia are both in the differential.       Chirag Lopez M.D.       Electronically Signed and Approved By: Chirag Lopez M.D. on 1/11/2022 at 17:43               Procedures:  Procedures    Progress                            Medical Decision Making:  MDM  Number of Diagnoses or Management Options  COPD exacerbation (HCC)  Pneumonia of both lungs due to infectious organism, unspecified part of lung  Diagnosis management comments: The patient was started on BiPAP in the emergency department due to acute respiratory acidosis.       Amount and/or Complexity of Data Reviewed  Clinical lab tests: reviewed  Tests in the radiology section of CPT®: reviewed  Tests in the medicine section of CPT®: reviewed  Decide to obtain previous medical records or to obtain history from someone other than the patient: yes  Review and summarize past medical records: yes  Discuss the patient with other providers: yes  Independent visualization of images, tracings, or specimens: yes    Critical Care  Total time providing critical care: 30-74 minutes (50 minutes including direct patient care, review of medical records, review of ancillary studies, discussion with admitting physician and patient.)    Patient Progress  Patient progress: improved       Final diagnoses:   COPD exacerbation (HCC)   Pneumonia of both lungs due to infectious organism, unspecified part of lung   Acute respiratory failure with hypoxia and hypercapnia (HCC)        Disposition:  ED Disposition     ED Disposition Condition Comment    Decision to Admit            This medical record created using voice recognition software and may contain unintended errors.           Khoa Rodgers,   01/11/22 5589

## 2022-01-12 ENCOUNTER — READMISSION MANAGEMENT (OUTPATIENT)
Dept: CALL CENTER | Facility: HOSPITAL | Age: 72
End: 2022-01-12

## 2022-01-12 PROBLEM — J96.90 RESPIRATORY FAILURE: Status: ACTIVE | Noted: 2022-01-12

## 2022-01-12 LAB
BACTERIA BLD CULT: ABNORMAL
BOTTLE TYPE: ABNORMAL
GLUCOSE BLDC GLUCOMTR-MCNC: 178 MG/DL (ref 70–99)
GLUCOSE BLDC GLUCOMTR-MCNC: 190 MG/DL (ref 70–99)
GLUCOSE BLDC GLUCOMTR-MCNC: 207 MG/DL (ref 70–99)
GLUCOSE BLDC GLUCOMTR-MCNC: 214 MG/DL (ref 70–99)
M PNEUMO IGM SER QL: NEGATIVE
PROCALCITONIN SERPL-MCNC: 0.07 NG/ML (ref 0–0.25)
S PNEUM AG SPEC QL LA: NEGATIVE
SARS-COV-2 RNA PNL SPEC NAA+PROBE: NOT DETECTED

## 2022-01-12 PROCEDURE — 25010000002 METHYLPREDNISOLONE PER 125 MG: Performed by: GENERAL PRACTICE

## 2022-01-12 PROCEDURE — 82962 GLUCOSE BLOOD TEST: CPT

## 2022-01-12 PROCEDURE — 86713 LEGIONELLA ANTIBODY: CPT | Performed by: GENERAL PRACTICE

## 2022-01-12 PROCEDURE — 63710000001 INSULIN DETEMIR PER 5 UNITS: Performed by: INTERNAL MEDICINE

## 2022-01-12 PROCEDURE — 36415 COLL VENOUS BLD VENIPUNCTURE: CPT | Performed by: GENERAL PRACTICE

## 2022-01-12 PROCEDURE — 99233 SBSQ HOSP IP/OBS HIGH 50: CPT | Performed by: INTERNAL MEDICINE

## 2022-01-12 PROCEDURE — 94799 UNLISTED PULMONARY SVC/PX: CPT

## 2022-01-12 PROCEDURE — 25010000002 METHYLPREDNISOLONE PER 125 MG: Performed by: INTERNAL MEDICINE

## 2022-01-12 PROCEDURE — 63710000001 INSULIN LISPRO (HUMAN) PER 5 UNITS: Performed by: GENERAL PRACTICE

## 2022-01-12 PROCEDURE — 87899 AGENT NOS ASSAY W/OPTIC: CPT | Performed by: GENERAL PRACTICE

## 2022-01-12 PROCEDURE — 25010000002 CEFTRIAXONE PER 250 MG: Performed by: GENERAL PRACTICE

## 2022-01-12 PROCEDURE — 84145 PROCALCITONIN (PCT): CPT | Performed by: INTERNAL MEDICINE

## 2022-01-12 PROCEDURE — 94660 CPAP INITIATION&MGMT: CPT

## 2022-01-12 RX ORDER — METHYLPREDNISOLONE SODIUM SUCCINATE 125 MG/2ML
80 INJECTION, POWDER, LYOPHILIZED, FOR SOLUTION INTRAMUSCULAR; INTRAVENOUS EVERY 8 HOURS
Status: DISCONTINUED | OUTPATIENT
Start: 2022-01-12 | End: 2022-01-13 | Stop reason: HOSPADM

## 2022-01-12 RX ORDER — METHYLPREDNISOLONE SODIUM SUCCINATE 125 MG/2ML
80 INJECTION, POWDER, LYOPHILIZED, FOR SOLUTION INTRAMUSCULAR; INTRAVENOUS EVERY 6 HOURS
Status: DISCONTINUED | OUTPATIENT
Start: 2022-01-12 | End: 2022-01-12

## 2022-01-12 RX ORDER — IPRATROPIUM BROMIDE AND ALBUTEROL SULFATE 2.5; .5 MG/3ML; MG/3ML
3 SOLUTION RESPIRATORY (INHALATION) EVERY 4 HOURS PRN
Status: DISCONTINUED | OUTPATIENT
Start: 2022-01-12 | End: 2022-01-13 | Stop reason: HOSPADM

## 2022-01-12 RX ORDER — ASPIRIN 81 MG/1
81 TABLET, CHEWABLE ORAL DAILY
Status: DISCONTINUED | OUTPATIENT
Start: 2022-01-12 | End: 2022-01-13 | Stop reason: HOSPADM

## 2022-01-12 RX ORDER — BUDESONIDE 0.5 MG/2ML
0.5 INHALANT ORAL
Status: DISCONTINUED | OUTPATIENT
Start: 2022-01-12 | End: 2022-01-13 | Stop reason: HOSPADM

## 2022-01-12 RX ORDER — ATORVASTATIN CALCIUM 10 MG/1
10 TABLET, FILM COATED ORAL NIGHTLY
Status: DISCONTINUED | OUTPATIENT
Start: 2022-01-12 | End: 2022-01-13 | Stop reason: HOSPADM

## 2022-01-12 RX ORDER — LISINOPRIL 2.5 MG/1
2.5 TABLET ORAL DAILY
Status: DISCONTINUED | OUTPATIENT
Start: 2022-01-12 | End: 2022-01-13 | Stop reason: HOSPADM

## 2022-01-12 RX ORDER — DEXTROSE MONOHYDRATE 100 MG/ML
25 INJECTION, SOLUTION INTRAVENOUS
Status: DISCONTINUED | OUTPATIENT
Start: 2022-01-12 | End: 2022-01-13 | Stop reason: HOSPADM

## 2022-01-12 RX ORDER — NICOTINE POLACRILEX 4 MG
15 LOZENGE BUCCAL
Status: DISCONTINUED | OUTPATIENT
Start: 2022-01-12 | End: 2022-01-13 | Stop reason: HOSPADM

## 2022-01-12 RX ORDER — IPRATROPIUM BROMIDE AND ALBUTEROL SULFATE 2.5; .5 MG/3ML; MG/3ML
3 SOLUTION RESPIRATORY (INHALATION)
Status: DISCONTINUED | OUTPATIENT
Start: 2022-01-12 | End: 2022-01-13 | Stop reason: HOSPADM

## 2022-01-12 RX ORDER — CEFTRIAXONE SODIUM 1 G/50ML
1 INJECTION, SOLUTION INTRAVENOUS EVERY 12 HOURS
Status: DISCONTINUED | OUTPATIENT
Start: 2022-01-12 | End: 2022-01-13 | Stop reason: HOSPADM

## 2022-01-12 RX ORDER — ARFORMOTEROL TARTRATE 15 UG/2ML
15 SOLUTION RESPIRATORY (INHALATION)
Status: DISCONTINUED | OUTPATIENT
Start: 2022-01-12 | End: 2022-01-13 | Stop reason: HOSPADM

## 2022-01-12 RX ORDER — PANTOPRAZOLE SODIUM 40 MG/1
40 TABLET, DELAYED RELEASE ORAL EVERY MORNING
Status: DISCONTINUED | OUTPATIENT
Start: 2022-01-12 | End: 2022-01-13 | Stop reason: HOSPADM

## 2022-01-12 RX ADMIN — INSULIN DETEMIR 15 UNITS: 100 INJECTION, SOLUTION SUBCUTANEOUS at 09:38

## 2022-01-12 RX ADMIN — METOPROLOL TARTRATE 25 MG: 25 TABLET, FILM COATED ORAL at 09:37

## 2022-01-12 RX ADMIN — ARFORMOTEROL TARTRATE 15 MCG: 15 SOLUTION RESPIRATORY (INHALATION) at 19:00

## 2022-01-12 RX ADMIN — INSULIN LISPRO 2 UNITS: 100 INJECTION, SOLUTION INTRAVENOUS; SUBCUTANEOUS at 17:38

## 2022-01-12 RX ADMIN — INSULIN LISPRO 2 UNITS: 100 INJECTION, SOLUTION INTRAVENOUS; SUBCUTANEOUS at 12:05

## 2022-01-12 RX ADMIN — ASPIRIN 81 MG CHEWABLE TABLET 81 MG: 81 TABLET CHEWABLE at 09:37

## 2022-01-12 RX ADMIN — ATORVASTATIN CALCIUM 10 MG: 10 TABLET, FILM COATED ORAL at 23:47

## 2022-01-12 RX ADMIN — IPRATROPIUM BROMIDE AND ALBUTEROL SULFATE 3 ML: .5; 3 SOLUTION RESPIRATORY (INHALATION) at 12:26

## 2022-01-12 RX ADMIN — METOPROLOL TARTRATE 25 MG: 25 TABLET, FILM COATED ORAL at 23:47

## 2022-01-12 RX ADMIN — BUDESONIDE 0.5 MG: 0.5 SUSPENSION RESPIRATORY (INHALATION) at 19:00

## 2022-01-12 RX ADMIN — CEFTRIAXONE SODIUM 1 G: 1 INJECTION, SOLUTION INTRAVENOUS at 23:47

## 2022-01-12 RX ADMIN — METHYLPREDNISOLONE SODIUM SUCCINATE 80 MG: 125 INJECTION, POWDER, FOR SOLUTION INTRAMUSCULAR; INTRAVENOUS at 17:38

## 2022-01-12 RX ADMIN — CEFTRIAXONE SODIUM 1 G: 1 INJECTION, SOLUTION INTRAVENOUS at 06:25

## 2022-01-12 RX ADMIN — IPRATROPIUM BROMIDE AND ALBUTEROL SULFATE 3 ML: .5; 3 SOLUTION RESPIRATORY (INHALATION) at 19:00

## 2022-01-12 RX ADMIN — LISINOPRIL 2.5 MG: 2.5 TABLET ORAL at 09:37

## 2022-01-12 RX ADMIN — METHYLPREDNISOLONE SODIUM SUCCINATE 80 MG: 125 INJECTION, POWDER, FOR SOLUTION INTRAMUSCULAR; INTRAVENOUS at 09:37

## 2022-01-12 RX ADMIN — ARFORMOTEROL TARTRATE 15 MCG: 15 SOLUTION RESPIRATORY (INHALATION) at 06:29

## 2022-01-12 RX ADMIN — IPRATROPIUM BROMIDE AND ALBUTEROL SULFATE 3 ML: .5; 3 SOLUTION RESPIRATORY (INHALATION) at 06:29

## 2022-01-12 RX ADMIN — METHYLPREDNISOLONE SODIUM SUCCINATE 80 MG: 125 INJECTION, POWDER, FOR SOLUTION INTRAMUSCULAR; INTRAVENOUS at 05:31

## 2022-01-12 RX ADMIN — BUDESONIDE 0.5 MG: 0.5 SUSPENSION RESPIRATORY (INHALATION) at 06:29

## 2022-01-12 RX ADMIN — INSULIN LISPRO 3 UNITS: 100 INJECTION, SOLUTION INTRAVENOUS; SUBCUTANEOUS at 09:38

## 2022-01-12 NOTE — H&P
[facility]  Date: [facility]   Patient Name: Dilip Faulkner  : 1950  MRN: [id number]  Primary Care Physician:  Rosalba Edwards, MONE  Date of admission: 2022    Subjective   Subjective     Chief Complaint:     HPI: Dilip Faulkner is a 71 y.o. male who presents to the hospital complaining of shortness of breath.  Patient has a history of COPD he is on home oxygen 2 L.  Patient states he has been coughing, nonproductive, no fevers no chills, patient is not vaccinated for COVID-19.  He denies shortness of breath nausea vomiting on arrival to the ED patient was having difficulty providing a history secondary to severe respiratory distress.  His respiratory rate was over 40 and he was lethargic.  His pH was 7.164 his CO2 was 88 his oxygen was 65.9.  Patient was placed on BiPAP in the emergency room and had severe improvement on the blood gas and his mental status.  During my initial examination patient was lethargic and difficult to arouse.  Repeat blood gas was consistent with a pH of 7.164, CO2 of 68.2 and oxygen of 91.3.  On repeat exam patient was breathing more comfortable and provided further history.  He states that for the last few days he has had more difficulty with shortness of breath and increased difficulty with ambulation.  We were asked to admit the patient for further monitoring.    Personal History   ROS     Constitutional: No fever, unintentional weight loss, malaise  HEENT: No vision changes, loss of vision, double vision, difficulty swallowing, painful swallowing, or tinnitus  Respiratory: Severe respiratory distress decreased breath sounds bilateral, bilateral wheezes  cardiovascular: No chest pain, palpitations, orthopnea, or paroxysmal nocturnal dyspnea  Gastrointestinal: No nausea, vomiting, diarrhea, hematochezia, bright red blood per rectum, dark tarry stools, bowel incontinence or constipation  Genitourinary: No dysuria, hematuria,  bladder incontinence, frequency, nocturia, or hesitancy  Musculoskeletal: No arthritis, joint swelling, deformities or joint pain  Endocrine: No fatigue, heat or cold intolerance  Hematologic: No excessive bruising or bleeding  Psychiatric: No Anxiety or depression  Neurologic: No Confusion, numbness, or weakness  Skin: No rash or open wounds         PMH  Past Medical History:   Diagnosis Date   • Asthma    • COPD (chronic obstructive pulmonary disease) (HCC)    • Diabetes mellitus (HCC)    • Hernia, umbilical    • Hypertension    • Requires continuous at home supplemental oxygen          PSH  Past Surgical History:   Procedure Laterality Date   • ABDOMINAL SURGERY         FH  History reviewed. No pertinent family history.    SOCIAL HISTORY   reports that he quit smoking about 17 months ago. His smoking use included cigarettes. His smokeless tobacco use includes snuff. He reports that he does not drink alcohol and does not use drugs.     ALLERGIES   No Known Allergies    HOME MEDICINES  Prior to Admission Medications   Prescriptions Last Dose Informant Patient Reported? Taking?   aspirin 81 MG chewable tablet Unknown at Unknown time Family Member Yes No   Sig: Chew 81 mg Daily.   atorvastatin (LIPITOR) 10 MG tablet Unknown at Unknown time Family Member Yes No   Sig: Take 10 mg by mouth Daily.   fluticasone-salmeterol (Advair Diskus) 250-50 MCG/DOSE DISKUS Unknown at Unknown time Family Member No No   Sig: Inhale 1 puff 2 (Two) Times a Day for 30 days.   insulin degludec (Tresiba FlexTouch) 100 UNIT/ML solution pen-injector injection Unknown at Unknown time Family Member Yes No   Sig: Inject 17 Units under the skin into the appropriate area as directed Daily.   ipratropium-albuterol (DUO-NEB) 0.5-2.5 mg/3 ml nebulizer Unknown at Unknown time Family Member No No   Sig: Take 3 mL by nebulization Every 4 (Four) Hours As Needed for Wheezing or Shortness of Air.   lisinopril (PRINIVIL,ZESTRIL) 2.5 MG tablet Unknown at  Unknown time Family Member Yes No   Sig: Take 2.5 mg by mouth Daily.   metFORMIN (GLUCOPHAGE) 500 MG tablet Unknown at Unknown time Family Member Yes No   Sig: Take 500 mg by mouth 2 (two) times a day.   metoprolol tartrate (LOPRESSOR) 25 MG tablet Unknown at Unknown time Family Member Yes No   Sig: Take 25 mg by mouth 2 (two) times a day.   omeprazole (priLOSEC) 20 MG capsule Unknown at Unknown time Family Member Yes No   Sig: Take 20 mg by mouth Daily.      Facility-Administered Medications: None                     Objective     Vitals:   Temp:  [98.2 °F (36.8 °C)] 98.2 °F (36.8 °C)  Heart Rate:  [] 90  Resp:  [24-34] 24  BP: ()/() 121/68  Flow (L/min):  [2-6] 6    Physical Exam  Vital signs were reviewed.  General appearance - Patient appears well-developed and well-nourished.    Head - Normocephalic, atraumatic.  Pupils - Equal, round, reactive to light.  Extraocular muscles are intact.  Conjunctiva is clear  Oral mucosa - Pink and moist without lesions or erythema.  Uvula is midline.  Neck - Supple.  Trachea was midline.  There is no palpable lymphadenopathy or thyromegaly.  There are no meningeal signs  Lungs -Breath sounds equal bilateral.  No crackles no rales.  Heart -Regular rate and rhythm no murmurs no gallops.  Abdomen -     There is no rebound, guarding, or rigidity.  There are no palpable masses.  There are no pulsatile masses.  Back - Spine is straight and midline.  There is no CVA tenderness.  Extremities - Intact x4 with full range of motion.  There is no palpable edema.  Neurologic - Cranial nerves II through XII are grossly intact.    Integument - There are no rashes.  There are no petechia or purpura lesions noted.  There are no vesicular lesions noted.           Assessment / Plan     Assessment/Plan:  Acute on chronic hypoxic hypercapnic respiratory failure  -Labs/images/EKG viewed.  -Patient requires admission   -Continue IV steroids for now  -STAT ABG ordered and  reviewed  -Continue BiPAP  -Scheduled and prn nebulizer  -Antibiotics Rocephin and azithromycin      Respiratory acidosis  -Continue BiPAP    Rule out COVID-19  -COVID-19 studies ordered     History of diabetes mellitus  -Sliding scale insulin  -Diabetes mellitus.  -Continue lisinopril    Hypertension  -Restart home medicines for hypertension , continue lisinopril and metoprolol    Hyperlipidemia  -Restart statin        DVT prophylaxis:  No DVT prophylaxis order currently exists.    CODE STATUS:       Result Review:    I have personally reviewed the results from the time of this admission to 6/11/2021 06:16 EDT and agree with these findings:  [x]  Laboratory  [x]  Microbiology  [x]  Radiology  [x]  EKG/Telemetry   []  Cardiology/Vascular   []  Pathology  []  Old records  []  Other:    Admission Status:  I believe this patient meets inpatient status.    Electronically signed by Beti Mccall MD, 01/12/22, 3:19 AM EST.

## 2022-01-12 NOTE — OUTREACH NOTE
COPD/PN Week 2 Survey      Responses   [facility] patient discharged from? [facility]   Does the patient have one of the following disease processes/diagnoses(primary or secondary)? COPD/Pneumonia   Was the primary reason for admission: COPD exacerbation   Revoke Readmitted          Anabella Sena RN

## 2022-01-12 NOTE — PLAN OF CARE
Goal Outcome Evaluation:   Patient was a new ER admit this shift. Currently on Bipap, tolerating well. Awaiting diet orders from Hospitialist. Covid is pending. No complaints or follow up needs at this time.

## 2022-01-12 NOTE — PROGRESS NOTES
[facility]   Hospitalist Progress Note  Date: 2022  Patient Name: Dilip Faulkner  : 1950  MRN: [id number]  Date of admission: 2022      Subjective   Subjective     Chief Complaint: follow up for shortness of breath    Summary: 70 y/o M with COPD, chronic oxygen use 2 L presented with shortness of breath and cough.  PCO2 88 with pH 7.1.  Met SIRS criteria.  Chest x-ray with bibasilar opacities.  Treating for COPD exacerbation.  CO2 levels improving after BiPAP.  On empiric antibiotics.    Interval Followup: No fevers.  Tolerated BiPAP overnight.  Switched to nasal cannula 6 L this morning with O2 saturation 91%.  Feeling better.  Reports less SOA, wheezing.  Denies productive cough or chest pain.  Reports he has never seen a pulmonologist nor will follow up with one and discussed the possible use of NIPPV at home but he adamantly stated he would not use this at home despite educating him about the increased likelihood of exacerbations and trouble breathing with need to return to the hospital for treatment.    Review of Systems  Constitutional:  No Fever, No Chills  HEENT: Denied complaints  Cardiovascular:  No Chest Pain,  No Palpitations  Respiratory:  +Cough, +Dyspnea, +Wheezing, No Hemoptysis  Gastrointestinal:  No Nausea, No Vomiting  Genitourinary:  Denied complaints  Musculoskeletal:  Denied complaints  Neuro:  Denied complaints  Heme/Lymph:  Denied complaints  Endocrine: Denied complaints    Objective   Objective     Vitals:   Temp:  [97.5 °F (36.4 °C)-98.2 °F (36.8 °C)] 97.5 °F (36.4 °C)  Heart Rate:  [] 88  Resp:  [22-34] 26  BP: ()/() 126/83  Flow (L/min):  [2-6] 6  Physical Exam    Constitutional: Elderly  male, alert and conversant, NAD   Eyes: Pupils equal and reactive, no conjunctival injection   HENT: NCAT, nares patent, moist mucous membranes   Neck: Supple, trachea midline   Respiratory: 2L NC, equal aeration with bilateral rhonchi and expiratory  wheezings, nonlabored respirations   Cardiovascular: RRR, no murmurs, no pedal edema   Gastrointestinal: Positive bowel sounds, soft, nontender, nondistended   Musculoskeletal: No gross deformities, no joint swelling   Neurologic: Oriented x 3, Cranial Nerves grossly intact speech clear   Skin: Warm and dry, no rashes     Result Review    Result Review:  I have personally reviewed the results from the time of this admission to 1/12/2022 07:10 EST and agree with these findings:  [x]  Laboratory   CBC    CBC 12/10/21 12/12/21 1/11/22   WBC 12.12 (A) 12.89 (A) 9.58   RBC 5.21 4.87 5.16   Hemoglobin 13.8 12.5 (A) 13.4   Hematocrit 46.0 41.4 44.7   MCV 88.3 85.0 86.6   MCH 26.5 (A) 25.7 (A) 26.0 (A)   MCHC 30.0 (A) 30.2 (A) 30.0 (A)   RDW 14.9 14.6 14.8   Platelets 179 170 190   (A) Abnormal value            BMP    BMP 12/10/21 12/12/21 1/11/22   BUN 20 26 (A) 21   Creatinine 1.00 0.95 0.95   Sodium 140 136 138   Potassium 4.7 4.8 4.4   Chloride 102 100 101   CO2 29.0 28.6 26.9   Calcium 8.9 9.0 8.7   (A) Abnormal value              [x]  Microbiology COVID-19 pending  []  Radiology  [x]  EKG/Telemetry NSR, no events  []  Cardiology/Vascular   []  Pathology  []  Old records  []  Other:    Assessment/Plan   Assessment / Plan     Assessment:  Active Hospital Problems    Diagnosis  POA   • **Acute on chronic respiratory failure with hypoxia and hypercapnia (HCC) [J96.21, J96.22]  Unknown   • COPD with acute exacerbation (HCC) [J44.1]  Unknown   • Essential hypertension [I10]  Yes   • Hyperlipidemia [E78.5]  Yes   • Type 2 diabetes mellitus (HCC) [E11.9]  Yes       Plan:    Continue enhanced airborne isolate pending results of COVID swab  Continue Brovana and Pulmicort nebs twice a day  Continue scheduled DuoNebs  Adjust IV methylprednisone to 80 mg every 8 hours  Wean supplemental oxygen to maintain SPO2 greater than 90%  Check procalcitonin.  Continue azithromycin and Rocephin.  Add Levemir 15 units daily.  Continue  low-dose SSI   Continue lisinopril 2.5 mg daily  Continue Lopressor 25 mg every 12 hours  Continue statin  Activity ad juliocesar.  Up to chair daily  Place SCDs  Discontinue telemetry  A.m. labs    Discussed plan with RN.    DVT prophylaxis:  No DVT prophylaxis order currently exists.    CODE STATUS:   Level Of Support Discussed With: Patient  Code Status (Patient has no pulse and is not breathing): CPR (Attempt to Resuscitate)  Medical Interventions (Patient has pulse or is breathing): Full Support      Electronically signed by Arthur Arzola DO, 01/12/22, 7:10 AM EST.

## 2022-01-12 NOTE — PLAN OF CARE
Goal Outcome Evaluation: Vss, weaned to room air, up in chair, positive blood cultures.

## 2022-01-12 NOTE — CASE MANAGEMENT/SOCIAL WORK
Discharge Planning Assessment   [facility]     Patient Name: Dilip Faulkner  MRN: [id number]  Today's Date: 1/12/2022    Admit Date: 1/11/2022     Discharge Needs Assessment     Row Name 01/12/22 1418       Living Environment    Lives With sibling(s)    Unique Family Situation Pt lives with younger sisters    Current Living Arrangements home/apartment/condo    Primary Care Provided by self    Family Caregiver if Needed none    Quality of Family Relationships helpful    Able to Return to Prior Arrangements yes       Resource/Environmental Concerns    Resource/Environmental Concerns reliable transportation  Pt states his car recentlly broke down and has no other transportation.    Transportation Concerns other (see comments)  Pts care recently broke down.       Transition Planning    Patient/Family Anticipates Transition to home    Patient/Family Anticipated Services at Transition none    Transportation Anticipated public transportation  Pt states he will need cab at [place]. He states he can pay himself.       Discharge Needs Assessment    Readmission Within the Last 30 Days other (see comments)  See re-admission assessment    Current Outpatient/Agency/Support Group other (see comments)  [facility] for home O2.    Equipment Currently Used at Home nebulizer; oxygen; glucometer    Concerns to be Addressed discharge planning    Equipment Needed After Discharge none  Pt denies any dme needs.    Outpatient/Agency/Support Group Needs --  Pt refuses hhc though pt would benefit from services.               Discharge Plan     Row Name 01/12/22 3767       Plan    Plan Covid negative. Pt alert and oriented. Pt lives with his sisters, pt states he is independent at home. Pt has home O2 through [facility]. Pt states his concentrator has been broken and has reached out to [facility]. CM confirmed that [facility] is aware that device needs to be looked at. [facility] states work order has been placed before pt came into the hosptial. RT cm  "following for COPD. Pt refuses hhc or rehab at [place]. Pt is re-admission. Pt has had frenquent admissions in the last few months. Pt recently admitted 12/10-12/13 for COPD. Cm asked pt what he thinks is causing frenquent admissions, pt states \"I don't know\". Pt states his car recently broke down and will need a cab at [place]. Pt sates he can afford cab fare himself. Cm changed pharmacy to [facility] due to needing cab at [place].              Continued Care and Services - Admitted Since 1/11/2022    Coordination has not been started for this encounter.          Demographic Summary     Row Name 01/12/22 1414       General Information    Admission Type inpatient    Arrived From emergency department    Reason for Consult discharge planning    Preferred Language English     Used During This Interaction no       Contact Information    Permission Granted to Share Info With family/designee               Functional Status     Row Name 01/12/22 1414       Functional Status    Usual Activity Tolerance good    Current Activity Tolerance good       Functional Status, IADL    Medications independent    Meal Preparation independent    Housekeeping independent    Laundry independent    Shopping independent       Mental Status Summary    Recent Changes in Mental Status/Cognitive Functioning no changes               Psychosocial    No documentation.                Abuse/Neglect    No documentation.                Legal    No documentation.                Substance Abuse    No documentation.                Patient Forms    No documentation.                   Jennifer Cordova RN    "

## 2022-01-12 NOTE — CASE MANAGEMENT/SOCIAL WORK
RT CM triggered to see readmitted pt with LACE of 13, 4 admissions over past year and hx of COPD.  Pt states he lives with his 2 sisters and does the cooking and the driving.  PT quit smoking a year ago but does have a 53 pack yr hx.  He is not under the care of a pulmonologist, and has never had a PFT or PSG.  Pt has chronic hypercapnic respiratory failure with PCO2 levels steadily increasing with each admission.  RT CM has spoken with pt on several occasions about the need to treat his hypercapnia with NIV, but pt is adamant that he does not want this treatment.  RT CM explained that he is at high risk of respiratory failure and may require intubation and mechanical ventilation in the future if pt continues to refuse treatment with NIV. Pt states she would not want that.  RT CM urged pt to have advance directives in place and offered palliative consult; however, pt declined at this time.  RT CM also urged pt to consider COVID vaccination, as he has so many comorbidities.

## 2022-01-13 VITALS
OXYGEN SATURATION: 90 % | RESPIRATION RATE: 16 BRPM | HEIGHT: 73 IN | SYSTOLIC BLOOD PRESSURE: 141 MMHG | TEMPERATURE: 97.7 F | HEART RATE: 79 BPM | BODY MASS INDEX: 33.13 KG/M2 | WEIGHT: 250 LBS | DIASTOLIC BLOOD PRESSURE: 72 MMHG

## 2022-01-13 LAB
ANION GAP SERPL CALCULATED.3IONS-SCNC: 9.5 MMOL/L (ref 5–15)
BACTERIA SPEC AEROBE CULT: ABNORMAL
BUN SERPL-MCNC: 24 MG/DL (ref 8–23)
BUN/CREAT SERPL: 24.7 (ref 7–25)
CALCIUM SPEC-SCNC: 8.4 MG/DL (ref 8.6–10.5)
CHLORIDE SERPL-SCNC: 102 MMOL/L (ref 98–107)
CO2 SERPL-SCNC: 25.5 MMOL/L (ref 22–29)
CREAT SERPL-MCNC: 0.97 MG/DL (ref 0.76–1.27)
DEPRECATED RDW RBC AUTO: 45.2 FL (ref 37–54)
ERYTHROCYTE [DISTWIDTH] IN BLOOD BY AUTOMATED COUNT: 14.6 % (ref 12.3–15.4)
GFR SERPL CREATININE-BSD FRML MDRD: 76 ML/MIN/1.73
GLUCOSE BLDC GLUCOMTR-MCNC: 210 MG/DL (ref 70–99)
GLUCOSE BLDC GLUCOMTR-MCNC: 289 MG/DL (ref 70–99)
GLUCOSE SERPL-MCNC: 228 MG/DL (ref 65–99)
GRAM STN SPEC: ABNORMAL
HCT VFR BLD AUTO: 38.3 % (ref 37.5–51)
HGB BLD-MCNC: 11.8 G/DL (ref 13–17.7)
ISOLATED FROM: ABNORMAL
MCH RBC QN AUTO: 26.3 PG (ref 26.6–33)
MCHC RBC AUTO-ENTMCNC: 30.8 G/DL (ref 31.5–35.7)
MCV RBC AUTO: 85.3 FL (ref 79–97)
PLATELET # BLD AUTO: 167 10*3/MM3 (ref 140–450)
PMV BLD AUTO: 10.5 FL (ref 6–12)
POTASSIUM SERPL-SCNC: 4.7 MMOL/L (ref 3.5–5.2)
RBC # BLD AUTO: 4.49 10*6/MM3 (ref 4.14–5.8)
SODIUM SERPL-SCNC: 137 MMOL/L (ref 136–145)
WBC NRBC COR # BLD: 12.82 10*3/MM3 (ref 3.4–10.8)

## 2022-01-13 PROCEDURE — 99239 HOSP IP/OBS DSCHRG MGMT >30: CPT | Performed by: INTERNAL MEDICINE

## 2022-01-13 PROCEDURE — 80048 BASIC METABOLIC PNL TOTAL CA: CPT | Performed by: INTERNAL MEDICINE

## 2022-01-13 PROCEDURE — 94799 UNLISTED PULMONARY SVC/PX: CPT

## 2022-01-13 PROCEDURE — 25010000002 CEFTRIAXONE PER 250 MG: Performed by: GENERAL PRACTICE

## 2022-01-13 PROCEDURE — 85027 COMPLETE CBC AUTOMATED: CPT | Performed by: INTERNAL MEDICINE

## 2022-01-13 PROCEDURE — 25010000002 METHYLPREDNISOLONE PER 125 MG: Performed by: INTERNAL MEDICINE

## 2022-01-13 PROCEDURE — 97161 PT EVAL LOW COMPLEX 20 MIN: CPT

## 2022-01-13 PROCEDURE — 63710000001 INSULIN LISPRO (HUMAN) PER 5 UNITS: Performed by: GENERAL PRACTICE

## 2022-01-13 PROCEDURE — 63710000001 INSULIN DETEMIR PER 5 UNITS: Performed by: INTERNAL MEDICINE

## 2022-01-13 PROCEDURE — 82962 GLUCOSE BLOOD TEST: CPT

## 2022-01-13 PROCEDURE — 25010000002 AZITHROMYCIN PER 500 MG: Performed by: GENERAL PRACTICE

## 2022-01-13 RX ORDER — PREDNISONE 10 MG/1
TABLET ORAL
Qty: 30 TABLET | Refills: 0 | Status: SHIPPED | OUTPATIENT
Start: 2022-01-13 | End: 2022-01-25

## 2022-01-13 RX ADMIN — AZITHROMYCIN 500 MG: 500 INJECTION, POWDER, LYOPHILIZED, FOR SOLUTION INTRAVENOUS at 00:28

## 2022-01-13 RX ADMIN — LISINOPRIL 2.5 MG: 2.5 TABLET ORAL at 08:17

## 2022-01-13 RX ADMIN — PANTOPRAZOLE SODIUM 40 MG: 40 TABLET, DELAYED RELEASE ORAL at 06:18

## 2022-01-13 RX ADMIN — ASPIRIN 81 MG CHEWABLE TABLET 81 MG: 81 TABLET CHEWABLE at 08:17

## 2022-01-13 RX ADMIN — ARFORMOTEROL TARTRATE 15 MCG: 15 SOLUTION RESPIRATORY (INHALATION) at 06:33

## 2022-01-13 RX ADMIN — METOPROLOL TARTRATE 25 MG: 25 TABLET, FILM COATED ORAL at 08:17

## 2022-01-13 RX ADMIN — INSULIN LISPRO 4 UNITS: 100 INJECTION, SOLUTION INTRAVENOUS; SUBCUTANEOUS at 12:12

## 2022-01-13 RX ADMIN — CEFTRIAXONE SODIUM 1 G: 1 INJECTION, SOLUTION INTRAVENOUS at 06:18

## 2022-01-13 RX ADMIN — INSULIN DETEMIR 15 UNITS: 100 INJECTION, SOLUTION SUBCUTANEOUS at 08:17

## 2022-01-13 RX ADMIN — INSULIN LISPRO 3 UNITS: 100 INJECTION, SOLUTION INTRAVENOUS; SUBCUTANEOUS at 08:17

## 2022-01-13 RX ADMIN — IPRATROPIUM BROMIDE AND ALBUTEROL SULFATE 3 ML: .5; 3 SOLUTION RESPIRATORY (INHALATION) at 11:41

## 2022-01-13 RX ADMIN — BUDESONIDE 0.5 MG: 0.5 SUSPENSION RESPIRATORY (INHALATION) at 06:33

## 2022-01-13 RX ADMIN — IPRATROPIUM BROMIDE AND ALBUTEROL SULFATE 3 ML: .5; 3 SOLUTION RESPIRATORY (INHALATION) at 06:33

## 2022-01-13 RX ADMIN — METHYLPREDNISOLONE SODIUM SUCCINATE 80 MG: 125 INJECTION, POWDER, FOR SOLUTION INTRAMUSCULAR; INTRAVENOUS at 11:06

## 2022-01-13 RX ADMIN — IPRATROPIUM BROMIDE AND ALBUTEROL SULFATE 3 ML: .5; 3 SOLUTION RESPIRATORY (INHALATION) at 00:25

## 2022-01-13 RX ADMIN — METHYLPREDNISOLONE SODIUM SUCCINATE 80 MG: 125 INJECTION, POWDER, FOR SOLUTION INTRAMUSCULAR; INTRAVENOUS at 02:14

## 2022-01-13 NOTE — PLAN OF CARE
Goal Outcome Evaluation:  Plan of Care Reviewed With: (P) patient        Progress: (P) no change  Outcome Summary: (P) The patient is independent with ambulation and transfers.  There are no problems identified that require skilled physical therapy.  The patient will be discharged at this time.

## 2022-01-13 NOTE — SIGNIFICANT NOTE
01/13/22 1316   Plan   Final Discharge Disposition Code 01 - home or self-care   Final Note Pt discharging home today. Pt states he will be paying for a cab ride home. Pt needing oxygen tank for transport. Rahat provided pt with oxygen tank.

## 2022-01-13 NOTE — THERAPY EVALUATION
Acute Care - Physical Therapy Initial Evaluation   [facility]     Patient Name: Dilip Faulkner  : 1950  MRN: [id number]  Today's Date: 2022      Visit Dx:     ICD-10-CM ICD-9-CM   1. COPD exacerbation (HCC)  J44.1 491.21   2. Pneumonia of both lungs due to infectious organism, unspecified part of lung  J18.9 483.8   3. Acute respiratory failure with hypoxia and hypercapnia (HCC)  J96.01 518.81    J96.02    4. Difficulty walking  R26.2 719.7     Patient Active Problem List   Diagnosis   • Chronic obstructive pulmonary disease with acute exacerbation (HCC)   • Acute on chronic respiratory failure with hypoxia and hypercapnia (HCC)   • Type 2 diabetes mellitus (HCC)   • Acute on chronic respiratory failure (HCC)   • Essential hypertension   • Hyperlipidemia   • Cough   • Pneumonia due to infectious organism   • COPD with acute exacerbation (HCC)   • Obesity (BMI 30-39.9)     Past Medical History:   Diagnosis Date   • Asthma    • COPD (chronic obstructive pulmonary disease) (HCC)    • Diabetes mellitus (HCC)    • Hernia, umbilical    • Hypertension    • Requires continuous at home supplemental oxygen      Past Surgical History:   Procedure Laterality Date   • ABDOMINAL SURGERY       PT Assessment (last 12 hours)     PT Evaluation and Treatment     Row Name 22 1420          Physical Therapy Time and Intention    Subjective Information no complaints (P)   -RV     Document Type evaluation (P)   -RV     Mode of Treatment individual therapy; physical therapy (P)   -RV     Patient Effort excellent (P)   -RV     Row Name 22 1420          General Information    Patient Profile Reviewed yes (P)   -RV     Patient Observations alert; cooperative; agree to therapy (P)   -RV     Prior Level of Function independent:; gait; transfer; ADL's; bed mobility (P)   -RV     Equipment Currently Used at Home none (P)   -RV     Row Name 22 1420          Living Environment    Current Living Arrangements  home/apartment/condo (P)   -RV     Lives With sibling(s) (P)   -RV     Row Name 01/13/22 1420          Range of Motion (ROM)    Range of Motion ROM is WFL (P)   -RV     [facility] Name 01/13/22 1420          Strength (Manual Muscle Testing)    Strength (Manual Muscle Testing) strength is WFL (P)   -RV     Row Name 01/13/22 1420          Bed Mobility    Comment (Bed Mobility) Pt was already in seated (P)   -RV     Row Name 01/13/22 1420          Transfers    Transfers sit-stand transfer; stand-sit transfer (P)   -RV     Sit-Stand [place] (Transfers) independent (P)   -RV     Stand-Sit [place] (Transfers) independent (P)   -RV     Row Name 01/13/22 1420          Sit-Stand Transfer    Assistive Device (Sit-Stand Transfers) other (see comments) (P)   None  -RV     Row Name 01/13/22 1420          Stand-Sit Transfer    Assistive Device (Stand-Sit Transfers) other (see comments) (P)   No AD  -RV     Row Name 01/13/22 1420          Gait/Stairs (Locomotion)    Gait/Stairs Locomotion gait/ambulation independence (P)   -RV     [place] Level (Gait) independent (P)   -RV     Assistive Device (Gait) other (see comments) (P)   No AD  -RV     Distance in Feet (Gait) 50 (P)   -RV     Row Name 01/13/22 1420          Plan of Care Review    Plan of Care Reviewed With patient (P)   -RV     Progress no change (P)   -RV     Outcome Summary The patient is independent with ambulation and transfers.  There are no problems identified that require skilled physical therapy.  The patient will be discharged at this time. (P)   -RV     Row Name 01/13/22 1420          PT Evaluation Complexity    History, PT Evaluation Complexity no personal factors and/or comorbidities (P)   -RV     Examination of Body Systems (PT Eval Complexity) total of 4 or more elements (P)   -RV     Clinical Presentation (PT Evaluation Complexity) stable (P)   -RV     Clinical Decision Making (PT Evaluation Complexity) low complexity (P)   -RV     Overall Complexity  (PT Evaluation Complexity) low complexity (P)   -RV     Row Name 01/13/22 1420          Therapy Plan Review/Discharge Plan (PT)    Therapy Plan Review (PT) patient (P)   -           User Key  (r) = Recorded By, (t) = Taken By, (c) = Cosigned By    Initials Name Provider Type    RV Renate Melgar, PT Student PT Student                Physical Therapy Education                 Title: PT OT SLP Therapies (Done)     Topic: Physical Therapy (Done)     Point: Mobility training (Done)     Learning Progress Summary           Patient Acceptance, E,TB, VU by  at 1/13/2022 1426                   Point: Home exercise program (Done)     Learning Progress Summary           Patient Acceptance, E,TB, VU by  at 1/13/2022 1426                   Point: Body mechanics (Done)     Learning Progress Summary           Patient Acceptance, E,TB, VU by  at 1/13/2022 1426                   Point: Precautions (Done)     Learning Progress Summary           Patient Acceptance, E,TB, VU by  at 1/13/2022 1426                               User Key     Initials Effective Dates Name Provider Type Discipline     08/19/21 -  Renate Melgar, PT Student PT Student PT              PT Recommendation and Plan  Anticipated Discharge Disposition (PT): (P) home  Plan of Care Reviewed With: (P) patient  Progress: (P) no change  Outcome Summary: (P) The patient is independent with ambulation and transfers.  There are no problems identified that require skilled physical therapy.  The patient will be discharged at this time.   Outcome Measures     Row Name 01/13/22 1426             How much help from another person do you currently need...    Turning from your back to your side while in flat bed without using bedrails? 4 (P)   -RV      Moving from lying on back to sitting on the side of a flat bed without bedrails? 4 (P)   -RV      Moving to and from a bed to a chair (including a wheelchair)? 4 (P)   -RV      Standing up from a chair using  your arms (e.g., wheelchair, bedside chair)? 4 (P)   -RV      Climbing 3-5 steps with a railing? 4 (P)   -RV      To walk in hospital room? 4 (P)   -RV      AM-PAC 6 Clicks Score (PT) 24 (P)   -RV              Functional Assessment    Outcome Measure Options AM-PAC 6 Clicks Basic Mobility (PT) (P)   -RV            User Key  (r) = Recorded By, (t) = Taken By, (c) = Cosigned By    Initials Name Provider Type    Renate Whitlock, PT Student PT Student                 Time Calculation:    PT Charges     Row Name 01/13/22 1418             Time Calculation    PT Received On 01/13/22 (P)   -RV      PT Goal Re-Cert Due Date 01/22/22 (P)   -RV              Untimed Charges    PT Eval/Re-eval Minutes 25 (P)   -RV              Total Minutes    Untimed Charges Total Minutes 25 (P)   -RV       Total Minutes 25 (P)   -RV            User Key  (r) = Recorded By, (t) = Taken By, (c) = Cosigned By    Initials Name Provider Type    Renate Whitlock, PT Student PT Student              Therapy Charges for Today     Code Description Service Date Service Provider Modifiers Qty    [id number] HC PT EVAL LOW COMPLEXITY 2 1/13/2022 Renate Melgar, PT Student GP 1          PT G-Codes  Outcome Measure Options: (P) AM-PAC 6 Clicks Basic Mobility (PT)  AM-PAC 6 Clicks Score (PT): (P) 24    Renate Melgar PT Student  1/13/2022

## 2022-01-13 NOTE — DISCHARGE SUMMARY
[facility]         HOSPITALIST  DISCHARGE SUMMARY    Patient Name: Dilip Faulkner  : 1950  MRN: [id number]    Date of Admission: 2022  Date of Discharge:  2022  Primary Care Physician: Rosalba Edwards APRN    Consults     Date and Time Order Name Status Description    2022  7:34 PM Hospitalist (on-call MD unless specified)            Active and Resolved Hospital Problems:  Active Hospital Problems    Diagnosis POA   • **Acute on chronic respiratory failure with hypoxia and hypercapnia (HCC) [J96.21, J96.22] Unknown   • COPD with acute exacerbation (HCC) [J44.1] Unknown   • Essential hypertension [I10] Yes   • Hyperlipidemia [E78.5] Yes   • Type 2 diabetes mellitus (HCC) [E11.9] Yes   Noncompliant with NIPPV  Hospital Course     Hospital Course:  Dilip Faulkner is a 71 y.o. male with chronic obstructive pulmonary disease, chronic hypoxic respiratory failure on 2 L at baseline who presented with shortness of breath, cough. He was in respiratory distress on presentation with pH of 7.156 and PCO2 of 88 with O2 saturation 88% on 6 L nasal cannula. COPD exacerbation was suspected given wheezing on exam. He was placed on BiPAP, steroids, antibiotics, nebulizers. Infectious work-up was negative, COVID-negative, procalcitonin low. No evidence of volume overload, proBNP low. The following day patient was feeling better. He denied respiratory distress and was weaned to baseline 2 L nasal cannula. Tolerating oral intake and ambulating independently. He was requesting to be discharged.    I discussed with him in detail the need for NIPPV at home given CO2 retention and previous hospital admission for similar symptoms. Patient refused to have this set up and was adamant that he would not wear this at home. I educated him that without this device he will continue to have episodes of difficulty breathing with CO2 retention which could lead him to be confused, stop breathing and die.  He understood these risks and despite this still did not want to have this set up. I recommended he continue to wear 2 L of oxygen at all times as well as complete prednisone and continue inhaler/nebulizer regimen. Return to hospital precautions were discussed. He has a follow-up with pulmonology on 1/22 which I encouraged him to keep. He has high risk for frequent hospital admissions as well as increased morbidity/mortality in the setting of his underlying lung disease and noncompliance.    Day of Discharge     Vital Signs:  Temp:  [97.3 °F (36.3 °C)-97.9 °F (36.6 °C)] 97.7 °F (36.5 °C)  Heart Rate:  [75-86] 79  Resp:  [16-22] 16  BP: (107-141)/(51-83) 141/72  Flow (L/min):  [1-4] 2  Physical Exam:   GENERAL: The patient is elderly, conversant and nontoxic.  HEENT: PERRLA, EOMI. Oropharynx clear. Moist mucous membranes.   NECK: Supple, trachea midline  HEART: Regular rate and rhythm without murmurs.  LUNGS: 2 L nasal cannula, good aeration bilaterally, mild expiratory wheezing, nonlabored respirations  ABDOMEN: Soft, positive bowel sounds, nontender, nondistended  SKIN: No rash or open wounds   NEUROLOGIC: Alert, cranial nerves grossly intact    Discharge Details        Discharge Medications      New Medications      Instructions Start Date   predniSONE 10 MG tablet  Commonly known as: DELTASONE   Take 4 tablets by mouth Daily for 3 days, THEN 3 tablets Daily for 3 days, THEN 2 tablets Daily for 3 days, THEN 1 tablet Daily for 3 days.   Start Date: January 13, 2022        Continue These Medications      Instructions Start Date   aspirin 81 MG chewable tablet   81 mg, Oral, Daily      atorvastatin 10 MG tablet  Commonly known as: LIPITOR   10 mg, Oral, Daily      fluticasone-salmeterol 250-50 MCG/DOSE DISKUS  Commonly known as: Advair Diskus   1 puff, Inhalation, 2 Times Daily - RT      ipratropium-albuterol 0.5-2.5 mg/3 ml nebulizer  Commonly known as: DUO-NEB   3 mL, Nebulization, Every 4 Hours PRN      lisinopril  2.5 MG tablet  Commonly known as: PRINIVIL,ZESTRIL   2.5 mg, Oral, Daily      metFORMIN 500 MG tablet  Commonly known as: GLUCOPHAGE   500 mg, Oral, 2 times daily      metoprolol tartrate 25 MG tablet  Commonly known as: LOPRESSOR   25 mg, Oral, 2 times daily      omeprazole 20 MG capsule  Commonly known as: priLOSEC   20 mg, Oral, Daily      Tresiba FlexTouch 100 UNIT/ML solution pen-injector injection  Generic drug: insulin degludec   17 Units, Subcutaneous, Daily             No Known Allergies    Discharge Disposition:  Home or Self Care    Diet:  Hospital:  Diet Order   Procedures   • Diet Regular; Consistent Carbohydrate       Discharge Activity:   Activity Instructions     Activity as Tolerated            CODE STATUS:  Code Status and Medical Interventions:   Ordered at: 01/12/22 0344     Level Of Support Discussed With:    Patient     Code Status (Patient has no pulse and is not breathing):    CPR (Attempt to Resuscitate)     Medical Interventions (Patient has pulse or is breathing):    Full Support         Future Appointments   Date Time Provider Department Center   1/24/2022  3:00 PM Chandu Vail MD [facility]       Additional Instructions for the Follow-ups that You Need to Schedule     Discharge Follow-up with PCP   As directed       Currently Documented PCP:    Rosalba Edwards APRN    PCP Phone Number:    [phone]     Follow Up Details: 1 WEEK         Discharge Follow-up with Specified Provider: Dr Galeano; 2 Months   As directed      To: Dr Galeano    Follow Up: 2 Months               Pertinent  and/or Most Recent Results     PROCEDURES: NONE    IMAGING:    LAB RESULTS:      Lab 01/13/22  0415 01/12/22  1120 01/11/22  2235 01/11/22  1915 01/11/22  1645   WBC 12.82*  --   --   --  9.58   HEMOGLOBIN 11.8*  --   --   --  13.4   HEMATOCRIT 38.3  --   --   --  44.7   PLATELETS 167  --   --   --  190   NEUTROS ABS  --   --   --   --  5.77   IMMATURE GRANS (ABS)  --   --   --   --  0.04    LYMPHS ABS  --   --   --   --  1.67   MONOS ABS  --   --   --   --  0.80   EOS ABS  --   --   --   --  1.25*   MCV 85.3  --   --   --  86.6   PROCALCITONIN  --  0.07  --   --   --    LACTATE  --   --  1.5 2.3*  --          Lab 01/13/22  0415 01/11/22  1645   SODIUM 137 138   POTASSIUM 4.7 4.4   CHLORIDE 102 101   CO2 25.5 26.9   ANION GAP 9.5 10.1   BUN 24* 21   CREATININE 0.97 0.95   GLUCOSE 228* 153*   CALCIUM 8.4* 8.7         Lab 01/11/22  1645   TOTAL PROTEIN 7.4   ALBUMIN 4.10   GLOBULIN 3.3   ALT (SGPT) 11   AST (SGOT) 15   BILIRUBIN 0.3   ALK PHOS 97         Lab 01/11/22  1645   PROBNP 108.9   TROPONIN T <0.010                 Lab 01/11/22 2053 01/11/22 1918   PH, ARTERIAL 7.164* 7.156*   PCO2, ARTERIAL 68.2* 88.0*   PO2 ART 91.3 65.9*   O2 SATURATION ART 94.8* 87.7*   FIO2 35  --    HCO3 ART 24.0 30.4*   BASE EXCESS ART -5.7* -0.9   CARBOXYHEMOGLOBIN 1.2 1.3     Brief Urine Lab Results     None        Microbiology Results (last 10 days)     Procedure Component Value - Date/Time    S. Pneumo Ag Urine or CSF - Urine, Urine, Clean Catch [[id number]]  (Normal) Collected: 01/12/22 1145    Lab Status: Final result Specimen: Urine, Clean Catch Updated: 01/12/22 1253     Strep Pneumo Ag Negative    COVID-19,APTIMA PANTHER(SRAVANTHI),[facility], NP/OP SWAB IN UTM/VTM/[facility] TRANSPORT MEDIA,24 HR TAT - Swab, Nasopharynx [[id number]]  (Normal) Collected: 01/11/22 1921    Lab Status: Final result Specimen: Swab from Nasopharynx Updated: 01/12/22 1136     COVID19 Not Detected    Narrative:      Fact sheet for providers: https://www.fda.gov/media/903986/download     Fact sheet for patients: https://www.fda.gov/media/714316/download    Test performed by RT PCR.    Blood Culture - Blood, Arm, Left [[id number]]  (Normal) Collected: 01/11/22 1915    Lab Status: Preliminary result Specimen: Blood from Arm, Left Updated: 01/12/22 1931     Blood Culture No growth at 24 hours    Blood Culture - Blood, Blood, Central Line  [[id number]]  (Abnormal) Collected: 01/11/22 1915    Lab Status: Final result Specimen: Blood, Central Line Updated: 01/13/22 0618     Blood Culture Staphylococcus, coagulase negative     Isolated from Aerobic Bottle     Gram Stain Aerobic Bottle Gram positive cocci in clusters    Narrative:      Probable contaminant requires clinical correlation, susceptibility not performed unless requested by physician.      Blood Culture ID, PCR - Blood, Blood, Central Line [[id number]]  (Abnormal) Collected: 01/11/22 1915    Lab Status: Final result Specimen: Blood, Central Line Updated: 01/12/22 1623     BCID, PCR Staph spp, not aureus or lugdunesis. Identification by BCID2 PCR.     BOTTLE TYPE Aerobic Bottle    Mycoplasma Pneumoniae Antibody, IgM - Blood, [[id number]]  (Normal) Collected: 01/11/22 1645    Lab Status: Final result Specimen: Blood Updated: 01/12/22 0758     Mycoplasma pneumo IgM Negative          XR Chest 1 View    Result Date: 1/11/2022  Impression:   1. Chronic left basilar opacity consistent with scarring or atelectasis 2. Worsening airspace opacity predominantly in the right lung base.  Pulmonary edema and pneumonia are both in the differential.       Chirag Lopez M.D.       Electronically Signed and Approved By: Chirag Lopez M.D. on 1/11/2022 at 17:43                 Labs Pending at Discharge:  Pending Labs     Order Current Status    Legionella Pneumophila 1 - 6,IgM In process    Blood Culture - Blood, Arm, Left Preliminary result            Time spent on Discharge including face to face service: >30 minutes    Electronically signed by Arthur Arzola DO, 01/13/22, 12:14 PM EST.

## 2022-01-13 NOTE — PLAN OF CARE
Goal Outcome Evaluation:  Plan of Care Reviewed With: patient           Outcome Summary: Patient has had no actue issues since arriving to the unit from [address] [place] [address], patient has been sleeping, no other changes to report, will continue to monitor.

## 2022-01-14 ENCOUNTER — READMISSION MANAGEMENT (OUTPATIENT)
Dept: CALL CENTER | Facility: HOSPITAL | Age: 72
End: 2022-01-14

## 2022-01-14 NOTE — OUTREACH NOTE
Prep Survey      Responses   [facility] facility patient discharged from? [facility]   Is LACE score < 7 ? No   Emergency Room discharge w/ pulse ox? No   Eligibility Readm Mgmt   Discharge diagnosis COPD with acute exacerbation, Acute on chronic respiratory failure with hypoxia and hypercapnia    Does the patient have one of the following disease processes/diagnoses(primary or secondary)? COPD/Pneumonia   Does the patient have Home health ordered? No   Is there a DME ordered? Yes   What DME was ordered? current with [employer] for home O2   Prep survey completed? Yes          Yessica Root RN

## 2022-01-16 LAB — BACTERIA SPEC AEROBE CULT: NORMAL

## 2022-01-18 ENCOUNTER — READMISSION MANAGEMENT (OUTPATIENT)
Dept: CALL CENTER | Facility: HOSPITAL | Age: 72
End: 2022-01-18

## 2022-01-18 LAB — L PNEUMO POOL 1-6 IGM SER QL: NORMAL

## 2022-01-18 NOTE — OUTREACH NOTE
COPD/PN Week 1 Survey      Responses   [facility] [place] patient discharged from? [facility]   Does the patient have one of the following disease processes/diagnoses(primary or secondary)? COPD/Pneumonia   Was the primary reason for admission: COPD exacerbation   Week 1 attempt successful? Yes   Call start time 0911   Call end time 0916   Discharge diagnosis COPD with acute exacerbation, Acute on chronic respiratory failure with hypoxia and hypercapnia    Is patient permission given to speak with other caregiver? No   Meds reviewed with patient/caregiver? Yes  [taking steroid taper]   Does the patient have all medications ordered at discharge? Yes   Is the patient taking all medications as directed (includes completed medication regime)? Yes   Does the patient have a primary care provider?  Yes   Does the patient have an appointment with their PCP or specialist within 7 days of discharge? No   Comments regarding PCP PCP Rosalba SHORE. Patient has not followed up yet with PCP or called to make appt.    Nursing Interventions Educated patient on importance of making appointment,  Advised patient to make appointment   Has the patient kept scheduled appointments due by today? N/A   Comments New Patient with Chandu Vail MD PULMONARY Monday Jan 24, 2022 3:00 PM   Has home health visited the patient within 72 hours of discharge? N/A   What DME was ordered? current with [employer] for home O2   Has all DME been delivered? Yes   DME comments Patient has home nebulizer and home O2.    Pulse Ox monitoring None   Psychosocial issues? No   Did the patient receive a copy of their discharge instructions? Yes   Nursing interventions Reviewed instructions with patient   What is the patient's perception of their health status since discharge? Improving  [Patient reports breathing is easy. ]   Is the patient/caregiver able to teach back the hierarchy of who to call/visit for symptoms/problems? PCP, Specialist, Home health nurse,  Urgent Care, ED, 911 Yes   Is the patient able to teach back COPD zones? --  [Unable to engage patient to do COPD zone teaching. Patient with only short yes/no answers to any survey questions. ]   Week 1 call completed? Yes          Yudelka Quiñonez RN

## 2022-01-25 ENCOUNTER — READMISSION MANAGEMENT (OUTPATIENT)
Dept: CALL CENTER | Facility: HOSPITAL | Age: 72
End: 2022-01-25

## 2022-01-25 NOTE — OUTREACH NOTE
COPD/PN Week 2 Survey      Responses   [facility] patient discharged from? [facility]   Does the patient have one of the following disease processes/diagnoses(primary or secondary)? COPD/Pneumonia   Week 2 attempt successful? Yes   Call start time 1457   Call end time 1500   Meds reviewed with patient/caregiver? Yes   Is the patient taking all medications as directed (includes completed medication regime)? Yes   Has the patient kept scheduled appointments due by today? N/A   Pulse Ox monitoring None   What is the patient's perception of their health status since discharge? Improving   Are the patient's immunizations up to date?  Yes   If the patient is a current smoker, are they able to teach back resources for cessation? Not a smoker   Is the patient/caregiver able to teach back the hierarchy of who to call/visit for symptoms/problems? PCP, Specialist, Home health nurse, Urgent Care, ED, 911 Yes   Is the patient able to teach back COPD zones? No  [He says yes and no, not much conversations ]   Patient reports what zone on this call? --  [He does not wan tto talk about zones days is better, no distress]   Is the patient/caregiver able to teach back signs and symptoms of worsening condition: Fever/chills,  Shortness of breath,  Chest pain   Is the patient/caregiver able to teach back importance of completing antibiotic course of treatment? Yes   Week 2 call completed? Yes   Wrap up additional comments Pt. says he si feeling better, nothing happening righ tnow, no distress          Ruthann Regan RN

## 2022-01-31 ENCOUNTER — HOSPITAL ENCOUNTER (EMERGENCY)
Facility: HOSPITAL | Age: 72
Discharge: HOME | End: 2022-01-31
Attending: EMERGENCY MEDICINE | Admitting: EMERGENCY MEDICINE

## 2022-01-31 ENCOUNTER — APPOINTMENT (OUTPATIENT)
Dept: GENERAL RADIOLOGY | Facility: HOSPITAL | Age: 72
End: 2022-01-31

## 2022-01-31 VITALS
HEART RATE: 64 BPM | TEMPERATURE: 97.8 F | OXYGEN SATURATION: 92 % | BODY MASS INDEX: 30.27 KG/M2 | WEIGHT: 228.4 LBS | SYSTOLIC BLOOD PRESSURE: 102 MMHG | DIASTOLIC BLOOD PRESSURE: 55 MMHG | HEIGHT: 73 IN | RESPIRATION RATE: 18 BRPM

## 2022-01-31 DIAGNOSIS — R10.84 GENERALIZED ABDOMINAL PAIN: Primary | ICD-10-CM

## 2022-01-31 LAB
ALBUMIN SERPL-MCNC: 3.3 G/DL (ref 3.5–5.2)
ALBUMIN/GLOB SERPL: 1 G/DL
ALP SERPL-CCNC: 77 U/L (ref 39–117)
ALT SERPL W P-5'-P-CCNC: 12 U/L (ref 1–41)
ANION GAP SERPL CALCULATED.3IONS-SCNC: 10.5 MMOL/L (ref 5–15)
AST SERPL-CCNC: 14 U/L (ref 1–40)
BASOPHILS # BLD AUTO: 0.01 10*3/MM3 (ref 0–0.2)
BASOPHILS NFR BLD AUTO: 0.2 % (ref 0–1.5)
BILIRUB SERPL-MCNC: 0.6 MG/DL (ref 0–1.2)
BUN SERPL-MCNC: 16 MG/DL (ref 8–23)
BUN/CREAT SERPL: 17 (ref 7–25)
CALCIUM SPEC-SCNC: 8.6 MG/DL (ref 8.6–10.5)
CHLORIDE SERPL-SCNC: 102 MMOL/L (ref 98–107)
CO2 SERPL-SCNC: 25.5 MMOL/L (ref 22–29)
CREAT SERPL-MCNC: 0.94 MG/DL (ref 0.76–1.27)
DEPRECATED RDW RBC AUTO: 45.8 FL (ref 37–54)
EOSINOPHIL # BLD AUTO: 0.08 10*3/MM3 (ref 0–0.4)
EOSINOPHIL NFR BLD AUTO: 1.4 % (ref 0.3–6.2)
ERYTHROCYTE [DISTWIDTH] IN BLOOD BY AUTOMATED COUNT: 15 % (ref 12.3–15.4)
GFR SERPL CREATININE-BSD FRML MDRD: 79 ML/MIN/1.73
GLOBULIN UR ELPH-MCNC: 3.3 GM/DL
GLUCOSE SERPL-MCNC: 113 MG/DL (ref 65–99)
HCT VFR BLD AUTO: 40.3 % (ref 37.5–51)
HGB BLD-MCNC: 12.4 G/DL (ref 13–17.7)
HOLD SPECIMEN: NORMAL
HOLD SPECIMEN: NORMAL
IMM GRANULOCYTES # BLD AUTO: 0.02 10*3/MM3 (ref 0–0.05)
IMM GRANULOCYTES NFR BLD AUTO: 0.4 % (ref 0–0.5)
LIPASE SERPL-CCNC: 17 U/L (ref 13–60)
LYMPHOCYTES # BLD AUTO: 0.86 10*3/MM3 (ref 0.7–3.1)
LYMPHOCYTES NFR BLD AUTO: 15.4 % (ref 19.6–45.3)
MCH RBC QN AUTO: 25.8 PG (ref 26.6–33)
MCHC RBC AUTO-ENTMCNC: 30.8 G/DL (ref 31.5–35.7)
MCV RBC AUTO: 84 FL (ref 79–97)
MONOCYTES # BLD AUTO: 0.42 10*3/MM3 (ref 0.1–0.9)
MONOCYTES NFR BLD AUTO: 7.5 % (ref 5–12)
NEUTROPHILS NFR BLD AUTO: 4.2 10*3/MM3 (ref 1.7–7)
NEUTROPHILS NFR BLD AUTO: 75.1 % (ref 42.7–76)
NRBC BLD AUTO-RTO: 0 /100 WBC (ref 0–0.2)
PLATELET # BLD AUTO: 175 10*3/MM3 (ref 140–450)
PMV BLD AUTO: 9.9 FL (ref 6–12)
POTASSIUM SERPL-SCNC: 3.8 MMOL/L (ref 3.5–5.2)
PROT SERPL-MCNC: 6.6 G/DL (ref 6–8.5)
RBC # BLD AUTO: 4.8 10*6/MM3 (ref 4.14–5.8)
SARS-COV-2 RNA PNL SPEC NAA+PROBE: DETECTED
SODIUM SERPL-SCNC: 138 MMOL/L (ref 136–145)
WBC NRBC COR # BLD: 5.59 10*3/MM3 (ref 3.4–10.8)
WHOLE BLOOD HOLD SPECIMEN: NORMAL
WHOLE BLOOD HOLD SPECIMEN: NORMAL

## 2022-01-31 PROCEDURE — U0004 COV-19 TEST NON-CDC HGH THRU: HCPCS

## 2022-01-31 PROCEDURE — C9803 HOPD COVID-19 SPEC COLLECT: HCPCS

## 2022-01-31 PROCEDURE — 85025 COMPLETE CBC W/AUTO DIFF WBC: CPT

## 2022-01-31 PROCEDURE — 99283 EMERGENCY DEPT VISIT LOW MDM: CPT

## 2022-01-31 PROCEDURE — 80053 COMPREHEN METABOLIC PANEL: CPT

## 2022-01-31 PROCEDURE — 83690 ASSAY OF LIPASE: CPT

## 2022-01-31 RX ORDER — ONDANSETRON 8 MG/1
8 TABLET, ORALLY DISINTEGRATING ORAL EVERY 8 HOURS PRN
Qty: 20 TABLET | Refills: 0 | Status: SHIPPED | OUTPATIENT
Start: 2022-01-31 | End: 2022-06-14

## 2022-01-31 RX ORDER — SODIUM CHLORIDE 0.9 % (FLUSH) 0.9 %
10 SYRINGE (ML) INJECTION AS NEEDED
Status: DISCONTINUED | OUTPATIENT
Start: 2022-01-31 | End: 2022-01-31 | Stop reason: HOSPADM

## 2022-02-01 ENCOUNTER — READMISSION MANAGEMENT (OUTPATIENT)
Dept: CALL CENTER | Facility: HOSPITAL | Age: 72
End: 2022-02-01

## 2022-02-01 NOTE — OUTREACH NOTE
COPD/PN Week 3 Survey      Responses   [facility] patient discharged from? [facility]   Does the patient have one of the following disease processes/diagnoses(primary or secondary)? COPD/Pneumonia   Was the primary reason for admission: COPD exacerbation   Week 3 attempt successful? No   Unsuccessful attempts Attempt 1          Yudelka Quiñonez RN

## 2022-02-03 ENCOUNTER — READMISSION MANAGEMENT (OUTPATIENT)
Dept: CALL CENTER | Facility: HOSPITAL | Age: 72
End: 2022-02-03

## 2022-02-03 NOTE — OUTREACH NOTE
COPD/PN Week 3 Survey      Responses   [facility] patient discharged from? [facility]   Does the patient have one of the following disease processes/diagnoses(primary or secondary)? COPD/Pneumonia   Week 3 attempt successful? Yes   Call start time 1132   Call end time 1135   Meds reviewed with patient/caregiver? Yes   Is the patient taking all medications as directed (includes completed medication regime)? Yes   Has the patient kept scheduled appointments due by today? N/A   Comments Making new appts. today, sister helping him   Pulse Ox monitoring None   Comments 2 liters oxygen   What is the patient's perception of their health status since discharge? Improving   Is the patient able to teach back COPD zones? Yes   Patient reports what zone on this call? Green Zone   Green Zone Reports doing well   Green Zone interventions: Use oxygen as prescribed,  Take daily medications,  Continue regular exercise/diet plan,  Avoid indoor/outdoor triggers   Week 3 call completed? Yes   Revoked No further contact(revokes)-requires comment   Is the patient interested in additional calls from an ambulatory ?  NOTE:  applies to high risk patients requiring additional follow-up. No   Graduated/Revoked comments He says he is doing fine does not need calls, his sister is helping him.           Ruthann Regan RN

## 2022-03-19 ENCOUNTER — APPOINTMENT (OUTPATIENT)
Dept: GENERAL RADIOLOGY | Facility: HOSPITAL | Age: 72
End: 2022-03-19

## 2022-03-19 ENCOUNTER — HOSPITAL ENCOUNTER (INPATIENT)
Facility: HOSPITAL | Age: 72
LOS: 2 days | Discharge: HOME | End: 2022-03-21
Attending: EMERGENCY MEDICINE | Admitting: INTERNAL MEDICINE

## 2022-03-19 ENCOUNTER — APPOINTMENT (OUTPATIENT)
Dept: CT IMAGING | Facility: HOSPITAL | Age: 72
End: 2022-03-19

## 2022-03-19 DIAGNOSIS — R13.12 OROPHARYNGEAL DYSPHAGIA: ICD-10-CM

## 2022-03-19 DIAGNOSIS — J96.22 ACUTE ON CHRONIC RESPIRATORY FAILURE WITH HYPOXIA AND HYPERCAPNIA: Primary | ICD-10-CM

## 2022-03-19 DIAGNOSIS — J44.1 CHRONIC OBSTRUCTIVE PULMONARY DISEASE WITH ACUTE EXACERBATION: ICD-10-CM

## 2022-03-19 DIAGNOSIS — J96.21 ACUTE ON CHRONIC RESPIRATORY FAILURE WITH HYPOXIA AND HYPERCAPNIA: Primary | ICD-10-CM

## 2022-03-19 DIAGNOSIS — R26.2 DIFFICULTY WALKING: ICD-10-CM

## 2022-03-19 PROBLEM — D72.829 LEUKOCYTOSIS: Status: ACTIVE | Noted: 2022-03-19

## 2022-03-19 PROBLEM — I45.10 RIGHT BUNDLE BRANCH BLOCK: Status: ACTIVE | Noted: 2022-03-19

## 2022-03-19 PROBLEM — J96.02 ACUTE HYPERCAPNIC RESPIRATORY FAILURE: Status: ACTIVE | Noted: 2022-03-19

## 2022-03-19 LAB
ALBUMIN SERPL-MCNC: 4 G/DL (ref 3.5–5.2)
ALBUMIN/GLOB SERPL: 1.1 G/DL
ALP SERPL-CCNC: 88 U/L (ref 39–117)
ALT SERPL W P-5'-P-CCNC: 10 U/L (ref 1–41)
ANION GAP SERPL CALCULATED.3IONS-SCNC: 8.5 MMOL/L (ref 5–15)
ARTERIAL PATENCY WRIST A: ABNORMAL
ARTERIAL PATENCY WRIST A: POSITIVE
AST SERPL-CCNC: 18 U/L (ref 1–40)
BASE EXCESS BLDA CALC-SCNC: -4.5 MMOL/L (ref -2–2)
BASE EXCESS BLDA CALC-SCNC: -6.8 MMOL/L (ref -2–2)
BASOPHILS # BLD AUTO: 0.12 10*3/MM3 (ref 0–0.2)
BASOPHILS NFR BLD AUTO: 0.8 % (ref 0–1.5)
BDY SITE: ABNORMAL
BDY SITE: ABNORMAL
BILIRUB SERPL-MCNC: 0.3 MG/DL (ref 0–1.2)
BUN SERPL-MCNC: 19 MG/DL (ref 8–23)
BUN/CREAT SERPL: 17 (ref 7–25)
CA-I BLDA-SCNC: 1.18 MMOL/L (ref 1.13–1.32)
CALCIUM SPEC-SCNC: 8.9 MG/DL (ref 8.6–10.5)
CHLORIDE BLDA-SCNC: 104 MMOL/L (ref 98–106)
CHLORIDE SERPL-SCNC: 103 MMOL/L (ref 98–107)
CO2 SERPL-SCNC: 26.5 MMOL/L (ref 22–29)
COHGB MFR BLD: 1.1 % (ref 0–1.5)
COHGB MFR BLD: 1.1 % (ref 0–1.5)
CREAT SERPL-MCNC: 1.12 MG/DL (ref 0.76–1.27)
D-LACTATE SERPL-SCNC: 1.5 MMOL/L (ref 0.5–2)
DEPRECATED RDW RBC AUTO: 48 FL (ref 37–54)
EGFRCR SERPLBLD CKD-EPI 2021: 69.8 ML/MIN/1.73
EOSINOPHIL # BLD AUTO: 2.06 10*3/MM3 (ref 0–0.4)
EOSINOPHIL NFR BLD AUTO: 14.2 % (ref 0.3–6.2)
ERYTHROCYTE [DISTWIDTH] IN BLOOD BY AUTOMATED COUNT: 15.1 % (ref 12.3–15.4)
FHHB: 7.5 % (ref 0–5)
FHHB: 9.6 % (ref 0–5)
FLUAV AG NPH QL: NEGATIVE
FLUBV AG NPH QL IA: NEGATIVE
GAS FLOW AIRWAY: 2 LPM
GAS FLOW AIRWAY: ABNORMAL L/MIN
GLOBULIN UR ELPH-MCNC: 3.7 GM/DL
GLUCOSE BLDA-MCNC: 139 MMOL/L (ref 70–99)
GLUCOSE BLDC GLUCOMTR-MCNC: 201 MG/DL (ref 70–99)
GLUCOSE SERPL-MCNC: 173 MG/DL (ref 65–99)
HCO3 BLDA-SCNC: 21.8 MMOL/L (ref 22–26)
HCO3 BLDA-SCNC: 21.9 MMOL/L (ref 22–26)
HCT VFR BLD AUTO: 44.2 % (ref 37.5–51)
HGB BLD-MCNC: 13.1 G/DL (ref 13–17.7)
HGB BLDA-MCNC: 13.6 G/DL (ref 13.8–16.4)
HGB BLDA-MCNC: 13.6 G/DL (ref 13.8–16.4)
HOLD SPECIMEN: NORMAL
HOLD SPECIMEN: NORMAL
IMM GRANULOCYTES # BLD AUTO: 0.05 10*3/MM3 (ref 0–0.05)
IMM GRANULOCYTES NFR BLD AUTO: 0.3 % (ref 0–0.5)
INHALED O2 CONCENTRATION: 28 %
INHALED O2 CONCENTRATION: 28 %
LACTATE BLDA-SCNC: 0.77 MMOL/L (ref 0.5–2)
LYMPHOCYTES # BLD AUTO: 1.95 10*3/MM3 (ref 0.7–3.1)
LYMPHOCYTES NFR BLD AUTO: 13.5 % (ref 19.6–45.3)
MCH RBC QN AUTO: 26.1 PG (ref 26.6–33)
MCHC RBC AUTO-ENTMCNC: 29.6 G/DL (ref 31.5–35.7)
MCV RBC AUTO: 88.2 FL (ref 79–97)
METHGB BLD QL: 0 % (ref 0–1.5)
METHGB BLD QL: 0.2 % (ref 0–1.5)
MODALITY: ABNORMAL
MODALITY: ABNORMAL
MONOCYTES # BLD AUTO: 0.96 10*3/MM3 (ref 0.1–0.9)
MONOCYTES NFR BLD AUTO: 6.6 % (ref 5–12)
NEUTROPHILS NFR BLD AUTO: 64.6 % (ref 42.7–76)
NEUTROPHILS NFR BLD AUTO: 9.33 10*3/MM3 (ref 1.7–7)
NOTE: ABNORMAL
NRBC BLD AUTO-RTO: 0 /100 WBC (ref 0–0.2)
NT-PROBNP SERPL-MCNC: 163.2 PG/ML (ref 0–900)
OXYHGB MFR BLDV: 89.1 % (ref 94–99)
OXYHGB MFR BLDV: 91.4 % (ref 94–99)
PCO2 BLDA: 45.3 MM HG (ref 35–45)
PCO2 BLDA: 56.8 MM HG (ref 35–45)
PH BLDA: 7.2 PH UNITS (ref 7.35–7.45)
PH BLDA: 7.3 PH UNITS (ref 7.35–7.45)
PLATELET # BLD AUTO: 224 10*3/MM3 (ref 140–450)
PMV BLD AUTO: 10.8 FL (ref 6–12)
PO2 BLD: 239 MM[HG] (ref 0–500)
PO2 BLD: 241 MM[HG] (ref 0–500)
PO2 BLDA: 67 MM HG (ref 80–100)
PO2 BLDA: 67.6 MM HG (ref 80–100)
POTASSIUM BLDA-SCNC: 4.54 MMOL/L (ref 3.5–5)
POTASSIUM SERPL-SCNC: 4.8 MMOL/L (ref 3.5–5.2)
PROT SERPL-MCNC: 7.7 G/DL (ref 6–8.5)
QT INTERVAL: 390 MS
RBC # BLD AUTO: 5.01 10*6/MM3 (ref 4.14–5.8)
SAO2 % BLDCOA: 90.3 % (ref 95–99)
SAO2 % BLDCOA: 92.4 % (ref 95–99)
SARS-COV-2 RNA PNL SPEC NAA+PROBE: NOT DETECTED
SODIUM BLDA-SCNC: 138.8 MMOL/L (ref 136–146)
SODIUM SERPL-SCNC: 138 MMOL/L (ref 136–145)
TROPONIN T SERPL-MCNC: 0.02 NG/ML (ref 0–0.03)
WBC NRBC COR # BLD: 14.47 10*3/MM3 (ref 3.4–10.8)
WHOLE BLOOD HOLD SPECIMEN: NORMAL
WHOLE BLOOD HOLD SPECIMEN: NORMAL

## 2022-03-19 PROCEDURE — 99223 1ST HOSP IP/OBS HIGH 75: CPT | Performed by: INTERNAL MEDICINE

## 2022-03-19 PROCEDURE — 82375 ASSAY CARBOXYHB QUANT: CPT | Performed by: INTERNAL MEDICINE

## 2022-03-19 PROCEDURE — 25010000002 ENOXAPARIN PER 10 MG: Performed by: INTERNAL MEDICINE

## 2022-03-19 PROCEDURE — 83050 HGB METHEMOGLOBIN QUAN: CPT | Performed by: INTERNAL MEDICINE

## 2022-03-19 PROCEDURE — 94799 UNLISTED PULMONARY SVC/PX: CPT

## 2022-03-19 PROCEDURE — 94660 CPAP INITIATION&MGMT: CPT

## 2022-03-19 PROCEDURE — 25010000002 METHYLPREDNISOLONE PER 125 MG: Performed by: INTERNAL MEDICINE

## 2022-03-19 PROCEDURE — 25010000002 CEFTRIAXONE PER 250 MG: Performed by: EMERGENCY MEDICINE

## 2022-03-19 PROCEDURE — 85025 COMPLETE CBC W/AUTO DIFF WBC: CPT | Performed by: EMERGENCY MEDICINE

## 2022-03-19 PROCEDURE — 84484 ASSAY OF TROPONIN QUANT: CPT | Performed by: EMERGENCY MEDICINE

## 2022-03-19 PROCEDURE — 82805 BLOOD GASES W/O2 SATURATION: CPT | Performed by: EMERGENCY MEDICINE

## 2022-03-19 PROCEDURE — 0 IOPAMIDOL PER 1 ML: Performed by: INTERNAL MEDICINE

## 2022-03-19 PROCEDURE — 94640 AIRWAY INHALATION TREATMENT: CPT

## 2022-03-19 PROCEDURE — U0004 COV-19 TEST NON-CDC HGH THRU: HCPCS | Performed by: EMERGENCY MEDICINE

## 2022-03-19 PROCEDURE — 36600 WITHDRAWAL OF ARTERIAL BLOOD: CPT | Performed by: EMERGENCY MEDICINE

## 2022-03-19 PROCEDURE — 83605 ASSAY OF LACTIC ACID: CPT | Performed by: EMERGENCY MEDICINE

## 2022-03-19 PROCEDURE — 87040 BLOOD CULTURE FOR BACTERIA: CPT | Performed by: EMERGENCY MEDICINE

## 2022-03-19 PROCEDURE — 25010000002 AZITHROMYCIN PER 500 MG: Performed by: EMERGENCY MEDICINE

## 2022-03-19 PROCEDURE — 36415 COLL VENOUS BLD VENIPUNCTURE: CPT

## 2022-03-19 PROCEDURE — 71275 CT ANGIOGRAPHY CHEST: CPT

## 2022-03-19 PROCEDURE — 93005 ELECTROCARDIOGRAM TRACING: CPT

## 2022-03-19 PROCEDURE — 93005 ELECTROCARDIOGRAM TRACING: CPT | Performed by: EMERGENCY MEDICINE

## 2022-03-19 PROCEDURE — 63710000001 INSULIN LISPRO (HUMAN) PER 5 UNITS: Performed by: INTERNAL MEDICINE

## 2022-03-19 PROCEDURE — 80053 COMPREHEN METABOLIC PANEL: CPT | Performed by: EMERGENCY MEDICINE

## 2022-03-19 PROCEDURE — 82375 ASSAY CARBOXYHB QUANT: CPT | Performed by: EMERGENCY MEDICINE

## 2022-03-19 PROCEDURE — 83050 HGB METHEMOGLOBIN QUAN: CPT | Performed by: EMERGENCY MEDICINE

## 2022-03-19 PROCEDURE — 83880 ASSAY OF NATRIURETIC PEPTIDE: CPT | Performed by: EMERGENCY MEDICINE

## 2022-03-19 PROCEDURE — 82805 BLOOD GASES W/O2 SATURATION: CPT | Performed by: INTERNAL MEDICINE

## 2022-03-19 PROCEDURE — 71045 X-RAY EXAM CHEST 1 VIEW: CPT

## 2022-03-19 PROCEDURE — 99285 EMERGENCY DEPT VISIT HI MDM: CPT

## 2022-03-19 PROCEDURE — 82962 GLUCOSE BLOOD TEST: CPT

## 2022-03-19 PROCEDURE — 87804 INFLUENZA ASSAY W/OPTIC: CPT | Performed by: INTERNAL MEDICINE

## 2022-03-19 RX ORDER — METHYLPREDNISOLONE SODIUM SUCCINATE 125 MG/2ML
62.5 INJECTION, POWDER, LYOPHILIZED, FOR SOLUTION INTRAMUSCULAR; INTRAVENOUS DAILY
Status: DISCONTINUED | OUTPATIENT
Start: 2022-03-19 | End: 2022-03-21 | Stop reason: HOSPADM

## 2022-03-19 RX ORDER — IPRATROPIUM BROMIDE AND ALBUTEROL SULFATE 2.5; .5 MG/3ML; MG/3ML
3 SOLUTION RESPIRATORY (INHALATION) EVERY 4 HOURS PRN
Status: DISCONTINUED | OUTPATIENT
Start: 2022-03-19 | End: 2022-03-21 | Stop reason: HOSPADM

## 2022-03-19 RX ORDER — BENZONATATE 100 MG/1
200 CAPSULE ORAL 3 TIMES DAILY PRN
Status: DISCONTINUED | OUTPATIENT
Start: 2022-03-19 | End: 2022-03-21 | Stop reason: HOSPADM

## 2022-03-19 RX ORDER — IPRATROPIUM BROMIDE AND ALBUTEROL SULFATE 2.5; .5 MG/3ML; MG/3ML
3 SOLUTION RESPIRATORY (INHALATION) ONCE
Status: COMPLETED | OUTPATIENT
Start: 2022-03-19 | End: 2022-03-19

## 2022-03-19 RX ORDER — GUAIFENESIN 600 MG/1
1200 TABLET, EXTENDED RELEASE ORAL EVERY 12 HOURS SCHEDULED
Status: DISCONTINUED | OUTPATIENT
Start: 2022-03-19 | End: 2022-03-21 | Stop reason: HOSPADM

## 2022-03-19 RX ORDER — CEFTRIAXONE SODIUM 1 G/50ML
1 INJECTION, SOLUTION INTRAVENOUS ONCE
Status: COMPLETED | OUTPATIENT
Start: 2022-03-19 | End: 2022-03-19

## 2022-03-19 RX ORDER — ALBUTEROL SULFATE 90 UG/1
2 AEROSOL, METERED RESPIRATORY (INHALATION) EVERY 6 HOURS PRN
COMMUNITY

## 2022-03-19 RX ORDER — SODIUM CHLORIDE 9 MG/ML
75 INJECTION, SOLUTION INTRAVENOUS CONTINUOUS
Status: DISCONTINUED | OUTPATIENT
Start: 2022-03-19 | End: 2022-03-20

## 2022-03-19 RX ORDER — FAMOTIDINE 20 MG/1
20 TABLET, FILM COATED ORAL NIGHTLY
Status: DISCONTINUED | OUTPATIENT
Start: 2022-03-19 | End: 2022-03-21 | Stop reason: HOSPADM

## 2022-03-19 RX ORDER — ONDANSETRON 4 MG/1
4 TABLET, FILM COATED ORAL EVERY 8 HOURS PRN
COMMUNITY
End: 2022-05-20

## 2022-03-19 RX ORDER — OMEPRAZOLE 20 MG/1
20 CAPSULE, DELAYED RELEASE ORAL DAILY
COMMUNITY
End: 2022-05-20

## 2022-03-19 RX ORDER — ONDANSETRON 4 MG/1
4 TABLET, FILM COATED ORAL EVERY 8 HOURS PRN
Status: DISCONTINUED | OUTPATIENT
Start: 2022-03-19 | End: 2022-03-21 | Stop reason: HOSPADM

## 2022-03-19 RX ORDER — AZITHROMYCIN 250 MG/1
500 TABLET, FILM COATED ORAL EVERY 24 HOURS
Status: DISCONTINUED | OUTPATIENT
Start: 2022-03-20 | End: 2022-03-21 | Stop reason: HOSPADM

## 2022-03-19 RX ORDER — SODIUM CHLORIDE 0.9 % (FLUSH) 0.9 %
10 SYRINGE (ML) INJECTION AS NEEDED
Status: DISCONTINUED | OUTPATIENT
Start: 2022-03-19 | End: 2022-03-19

## 2022-03-19 RX ORDER — SODIUM CHLORIDE FOR INHALATION 3 %
4 VIAL, NEBULIZER (ML) INHALATION
Status: DISCONTINUED | OUTPATIENT
Start: 2022-03-19 | End: 2022-03-21 | Stop reason: HOSPADM

## 2022-03-19 RX ORDER — NICOTINE POLACRILEX 4 MG
15 LOZENGE BUCCAL
Status: DISCONTINUED | OUTPATIENT
Start: 2022-03-19 | End: 2022-03-21 | Stop reason: HOSPADM

## 2022-03-19 RX ORDER — IPRATROPIUM BROMIDE AND ALBUTEROL SULFATE 2.5; .5 MG/3ML; MG/3ML
3 SOLUTION RESPIRATORY (INHALATION)
Status: DISCONTINUED | OUTPATIENT
Start: 2022-03-19 | End: 2022-03-21 | Stop reason: HOSPADM

## 2022-03-19 RX ORDER — DEXTROSE MONOHYDRATE 100 MG/ML
25 INJECTION, SOLUTION INTRAVENOUS
Status: DISCONTINUED | OUTPATIENT
Start: 2022-03-19 | End: 2022-03-21 | Stop reason: HOSPADM

## 2022-03-19 RX ADMIN — INSULIN LISPRO 3 UNITS: 100 INJECTION, SOLUTION INTRAVENOUS; SUBCUTANEOUS at 17:18

## 2022-03-19 RX ADMIN — SODIUM CHLORIDE SOLN NEBU 3% 4 ML: 3 NEBU SOLN at 18:43

## 2022-03-19 RX ADMIN — CEFTRIAXONE SODIUM 1 G: 1 INJECTION, SOLUTION INTRAVENOUS at 09:40

## 2022-03-19 RX ADMIN — IPRATROPIUM BROMIDE AND ALBUTEROL SULFATE 3 ML: 2.5; .5 SOLUTION RESPIRATORY (INHALATION) at 08:28

## 2022-03-19 RX ADMIN — GUAIFENESIN 1200 MG: 600 TABLET ORAL at 21:59

## 2022-03-19 RX ADMIN — ENOXAPARIN SODIUM 40 MG: 100 INJECTION SUBCUTANEOUS at 15:39

## 2022-03-19 RX ADMIN — AZITHROMYCIN 500 MG: 500 INJECTION, POWDER, LYOPHILIZED, FOR SOLUTION INTRAVENOUS at 09:43

## 2022-03-19 RX ADMIN — METHYLPREDNISOLONE SODIUM SUCCINATE 62.5 MG: 125 INJECTION, POWDER, FOR SOLUTION INTRAMUSCULAR; INTRAVENOUS at 15:39

## 2022-03-19 RX ADMIN — SODIUM CHLORIDE 75 ML/HR: 9 INJECTION, SOLUTION INTRAVENOUS at 16:32

## 2022-03-19 RX ADMIN — FAMOTIDINE 20 MG: 20 TABLET ORAL at 21:59

## 2022-03-19 RX ADMIN — IOPAMIDOL 100 ML: 755 INJECTION, SOLUTION INTRAVENOUS at 14:39

## 2022-03-19 RX ADMIN — IPRATROPIUM BROMIDE AND ALBUTEROL SULFATE 3 ML: 2.5; .5 SOLUTION RESPIRATORY (INHALATION) at 18:42

## 2022-03-19 RX ADMIN — IPRATROPIUM BROMIDE AND ALBUTEROL SULFATE 3 ML: 2.5; .5 SOLUTION RESPIRATORY (INHALATION) at 23:37

## 2022-03-19 RX ADMIN — IPRATROPIUM BROMIDE AND ALBUTEROL SULFATE 3 ML: 2.5; .5 SOLUTION RESPIRATORY (INHALATION) at 16:07

## 2022-03-20 LAB
ANION GAP SERPL CALCULATED.3IONS-SCNC: 8.6 MMOL/L (ref 5–15)
BASOPHILS # BLD AUTO: 0.02 10*3/MM3 (ref 0–0.2)
BASOPHILS NFR BLD AUTO: 0.2 % (ref 0–1.5)
BILIRUB UR QL STRIP: NEGATIVE
BUN SERPL-MCNC: 21 MG/DL (ref 8–23)
BUN/CREAT SERPL: 20 (ref 7–25)
CALCIUM SPEC-SCNC: 8.7 MG/DL (ref 8.6–10.5)
CHLORIDE SERPL-SCNC: 104 MMOL/L (ref 98–107)
CLARITY UR: CLEAR
CO2 SERPL-SCNC: 27.4 MMOL/L (ref 22–29)
COLOR UR: YELLOW
CREAT SERPL-MCNC: 1.05 MG/DL (ref 0.76–1.27)
DEPRECATED RDW RBC AUTO: 46.2 FL (ref 37–54)
EGFRCR SERPLBLD CKD-EPI 2021: 75.4 ML/MIN/1.73
EOSINOPHIL # BLD AUTO: 0.02 10*3/MM3 (ref 0–0.4)
EOSINOPHIL NFR BLD AUTO: 0.2 % (ref 0.3–6.2)
ERYTHROCYTE [DISTWIDTH] IN BLOOD BY AUTOMATED COUNT: 14.7 % (ref 12.3–15.4)
GLUCOSE BLDC GLUCOMTR-MCNC: 131 MG/DL (ref 70–99)
GLUCOSE BLDC GLUCOMTR-MCNC: 153 MG/DL (ref 70–99)
GLUCOSE BLDC GLUCOMTR-MCNC: 157 MG/DL (ref 70–99)
GLUCOSE SERPL-MCNC: 139 MG/DL (ref 65–99)
GLUCOSE UR STRIP-MCNC: NEGATIVE MG/DL
HBA1C MFR BLD: 7.5 % (ref 4.8–5.6)
HCT VFR BLD AUTO: 41.1 % (ref 37.5–51)
HGB BLD-MCNC: 12 G/DL (ref 13–17.7)
HGB UR QL STRIP.AUTO: NEGATIVE
IMM GRANULOCYTES # BLD AUTO: 0.07 10*3/MM3 (ref 0–0.05)
IMM GRANULOCYTES NFR BLD AUTO: 0.6 % (ref 0–0.5)
KETONES UR QL STRIP: NEGATIVE
LEUKOCYTE ESTERASE UR QL STRIP.AUTO: NEGATIVE
LYMPHOCYTES # BLD AUTO: 1.05 10*3/MM3 (ref 0.7–3.1)
LYMPHOCYTES NFR BLD AUTO: 9.2 % (ref 19.6–45.3)
MAGNESIUM SERPL-MCNC: 2.3 MG/DL (ref 1.6–2.4)
MCH RBC QN AUTO: 24.9 PG (ref 26.6–33)
MCHC RBC AUTO-ENTMCNC: 29.2 G/DL (ref 31.5–35.7)
MCV RBC AUTO: 85.4 FL (ref 79–97)
MONOCYTES # BLD AUTO: 1 10*3/MM3 (ref 0.1–0.9)
MONOCYTES NFR BLD AUTO: 8.8 % (ref 5–12)
NEUTROPHILS NFR BLD AUTO: 81 % (ref 42.7–76)
NEUTROPHILS NFR BLD AUTO: 9.22 10*3/MM3 (ref 1.7–7)
NITRITE UR QL STRIP: NEGATIVE
NRBC BLD AUTO-RTO: 0 /100 WBC (ref 0–0.2)
PH UR STRIP.AUTO: 7 [PH] (ref 5–8)
PHOSPHATE SERPL-MCNC: 2.5 MG/DL (ref 2.5–4.5)
PLATELET # BLD AUTO: 179 10*3/MM3 (ref 140–450)
PMV BLD AUTO: 10.7 FL (ref 6–12)
POTASSIUM SERPL-SCNC: 4.8 MMOL/L (ref 3.5–5.2)
PROCALCITONIN SERPL-MCNC: 0.07 NG/ML (ref 0–0.25)
PROT UR QL STRIP: NEGATIVE
RBC # BLD AUTO: 4.81 10*6/MM3 (ref 4.14–5.8)
SODIUM SERPL-SCNC: 140 MMOL/L (ref 136–145)
SP GR UR STRIP: 1.01 (ref 1–1.03)
UROBILINOGEN UR QL STRIP: NORMAL
WBC NRBC COR # BLD: 11.38 10*3/MM3 (ref 3.4–10.8)

## 2022-03-20 PROCEDURE — 80048 BASIC METABOLIC PNL TOTAL CA: CPT | Performed by: INTERNAL MEDICINE

## 2022-03-20 PROCEDURE — 87070 CULTURE OTHR SPECIMN AEROBIC: CPT | Performed by: INTERNAL MEDICINE

## 2022-03-20 PROCEDURE — 63710000001 INSULIN LISPRO (HUMAN) PER 5 UNITS: Performed by: INTERNAL MEDICINE

## 2022-03-20 PROCEDURE — 84145 PROCALCITONIN (PCT): CPT | Performed by: INTERNAL MEDICINE

## 2022-03-20 PROCEDURE — 94799 UNLISTED PULMONARY SVC/PX: CPT

## 2022-03-20 PROCEDURE — 82962 GLUCOSE BLOOD TEST: CPT

## 2022-03-20 PROCEDURE — 87205 SMEAR GRAM STAIN: CPT | Performed by: INTERNAL MEDICINE

## 2022-03-20 PROCEDURE — 99233 SBSQ HOSP IP/OBS HIGH 50: CPT | Performed by: INTERNAL MEDICINE

## 2022-03-20 PROCEDURE — 83735 ASSAY OF MAGNESIUM: CPT | Performed by: INTERNAL MEDICINE

## 2022-03-20 PROCEDURE — 81003 URINALYSIS AUTO W/O SCOPE: CPT | Performed by: INTERNAL MEDICINE

## 2022-03-20 PROCEDURE — 85025 COMPLETE CBC W/AUTO DIFF WBC: CPT | Performed by: INTERNAL MEDICINE

## 2022-03-20 PROCEDURE — 25010000002 METHYLPREDNISOLONE PER 125 MG: Performed by: INTERNAL MEDICINE

## 2022-03-20 PROCEDURE — 25010000002 ENOXAPARIN PER 10 MG: Performed by: INTERNAL MEDICINE

## 2022-03-20 PROCEDURE — 83036 HEMOGLOBIN GLYCOSYLATED A1C: CPT | Performed by: INTERNAL MEDICINE

## 2022-03-20 PROCEDURE — 84100 ASSAY OF PHOSPHORUS: CPT | Performed by: INTERNAL MEDICINE

## 2022-03-20 RX ORDER — BUDESONIDE 0.5 MG/2ML
0.5 INHALANT ORAL
Status: DISCONTINUED | OUTPATIENT
Start: 2022-03-20 | End: 2022-03-21 | Stop reason: HOSPADM

## 2022-03-20 RX ORDER — ARFORMOTEROL TARTRATE 15 UG/2ML
15 SOLUTION RESPIRATORY (INHALATION)
Status: DISCONTINUED | OUTPATIENT
Start: 2022-03-20 | End: 2022-03-21 | Stop reason: HOSPADM

## 2022-03-20 RX ADMIN — SODIUM CHLORIDE SOLN NEBU 3% 4 ML: 3 NEBU SOLN at 07:32

## 2022-03-20 RX ADMIN — BUDESONIDE 0.5 MG: 0.5 SUSPENSION RESPIRATORY (INHALATION) at 11:33

## 2022-03-20 RX ADMIN — GUAIFENESIN 1200 MG: 600 TABLET ORAL at 20:01

## 2022-03-20 RX ADMIN — GUAIFENESIN 1200 MG: 600 TABLET ORAL at 08:17

## 2022-03-20 RX ADMIN — IPRATROPIUM BROMIDE AND ALBUTEROL SULFATE 3 ML: 2.5; .5 SOLUTION RESPIRATORY (INHALATION) at 03:47

## 2022-03-20 RX ADMIN — IPRATROPIUM BROMIDE AND ALBUTEROL SULFATE 3 ML: 2.5; .5 SOLUTION RESPIRATORY (INHALATION) at 07:32

## 2022-03-20 RX ADMIN — ENOXAPARIN SODIUM 40 MG: 100 INJECTION SUBCUTANEOUS at 15:11

## 2022-03-20 RX ADMIN — METHYLPREDNISOLONE SODIUM SUCCINATE 62.5 MG: 125 INJECTION, POWDER, FOR SOLUTION INTRAMUSCULAR; INTRAVENOUS at 08:17

## 2022-03-20 RX ADMIN — INSULIN LISPRO 2 UNITS: 100 INJECTION, SOLUTION INTRAVENOUS; SUBCUTANEOUS at 17:08

## 2022-03-20 RX ADMIN — IPRATROPIUM BROMIDE AND ALBUTEROL SULFATE 3 ML: 2.5; .5 SOLUTION RESPIRATORY (INHALATION) at 14:46

## 2022-03-20 RX ADMIN — LINAGLIPTIN 5 MG: 5 TABLET, FILM COATED ORAL at 15:11

## 2022-03-20 RX ADMIN — BENZONATATE 200 MG: 100 CAPSULE ORAL at 03:12

## 2022-03-20 RX ADMIN — ARFORMOTEROL TARTRATE 15 MCG: 15 SOLUTION RESPIRATORY (INHALATION) at 11:33

## 2022-03-20 RX ADMIN — ARFORMOTEROL TARTRATE 15 MCG: 15 SOLUTION RESPIRATORY (INHALATION) at 19:43

## 2022-03-20 RX ADMIN — IPRATROPIUM BROMIDE AND ALBUTEROL SULFATE 3 ML: 2.5; .5 SOLUTION RESPIRATORY (INHALATION) at 11:32

## 2022-03-20 RX ADMIN — IPRATROPIUM BROMIDE AND ALBUTEROL SULFATE 3 ML: 2.5; .5 SOLUTION RESPIRATORY (INHALATION) at 23:55

## 2022-03-20 RX ADMIN — SODIUM CHLORIDE SOLN NEBU 3% 4 ML: 3 NEBU SOLN at 19:43

## 2022-03-20 RX ADMIN — IPRATROPIUM BROMIDE AND ALBUTEROL SULFATE 3 ML: 2.5; .5 SOLUTION RESPIRATORY (INHALATION) at 19:43

## 2022-03-20 RX ADMIN — BUDESONIDE 0.5 MG: 0.5 SUSPENSION RESPIRATORY (INHALATION) at 19:43

## 2022-03-20 RX ADMIN — FAMOTIDINE 20 MG: 20 TABLET ORAL at 20:01

## 2022-03-20 RX ADMIN — INSULIN LISPRO 2 UNITS: 100 INJECTION, SOLUTION INTRAVENOUS; SUBCUTANEOUS at 11:16

## 2022-03-20 RX ADMIN — AZITHROMYCIN MONOHYDRATE 500 MG: 250 TABLET ORAL at 08:17

## 2022-03-21 VITALS
HEART RATE: 74 BPM | WEIGHT: 223.33 LBS | HEIGHT: 72 IN | SYSTOLIC BLOOD PRESSURE: 133 MMHG | BODY MASS INDEX: 30.25 KG/M2 | OXYGEN SATURATION: 89 % | RESPIRATION RATE: 15 BRPM | TEMPERATURE: 97.7 F | DIASTOLIC BLOOD PRESSURE: 85 MMHG

## 2022-03-21 PROBLEM — J44.1 ACUTE EXACERBATION OF CHRONIC OBSTRUCTIVE PULMONARY DISEASE (COPD): Status: RESOLVED | Noted: 2022-03-19 | Resolved: 2022-03-21

## 2022-03-21 PROBLEM — J96.22 ACUTE ON CHRONIC RESPIRATORY FAILURE WITH HYPERCAPNIA: Status: RESOLVED | Noted: 2022-03-19 | Resolved: 2022-03-21

## 2022-03-21 PROBLEM — H02.403: Status: ACTIVE | Noted: 2022-03-21

## 2022-03-21 PROBLEM — Z79.4 TYPE 2 DIABETES MELLITUS, WITH LONG-TERM CURRENT USE OF INSULIN: Status: ACTIVE | Noted: 2022-03-21

## 2022-03-21 PROBLEM — J96.22 ACUTE ON CHRONIC RESPIRATORY FAILURE WITH HYPOXIA AND HYPERCAPNIA: Status: RESOLVED | Noted: 2022-03-19 | Resolved: 2022-03-21

## 2022-03-21 PROBLEM — J96.21 ACUTE ON CHRONIC RESPIRATORY FAILURE WITH HYPOXIA AND HYPERCAPNIA: Status: RESOLVED | Noted: 2022-03-19 | Resolved: 2022-03-21

## 2022-03-21 PROBLEM — D72.829 LEUKOCYTOSIS: Status: RESOLVED | Noted: 2022-03-19 | Resolved: 2022-03-21

## 2022-03-21 PROBLEM — J44.1 COPD EXACERBATION: Status: RESOLVED | Noted: 2022-03-19 | Resolved: 2022-03-21

## 2022-03-21 PROBLEM — J96.02 ACUTE HYPERCAPNIC RESPIRATORY FAILURE: Status: RESOLVED | Noted: 2022-03-19 | Resolved: 2022-03-21

## 2022-03-21 PROBLEM — E11.9 TYPE 2 DIABETES MELLITUS, WITH LONG-TERM CURRENT USE OF INSULIN: Status: ACTIVE | Noted: 2022-03-21

## 2022-03-21 LAB
GLUCOSE BLDC GLUCOMTR-MCNC: 124 MG/DL (ref 70–99)
GLUCOSE BLDC GLUCOMTR-MCNC: 162 MG/DL (ref 70–99)
HOLD SPECIMEN: NORMAL
WHOLE BLOOD HOLD SPECIMEN: NORMAL

## 2022-03-21 PROCEDURE — 92610 EVALUATE SWALLOWING FUNCTION: CPT

## 2022-03-21 PROCEDURE — 86255 FLUORESCENT ANTIBODY SCREEN: CPT | Performed by: INTERNAL MEDICINE

## 2022-03-21 PROCEDURE — 63710000001 INSULIN LISPRO (HUMAN) PER 5 UNITS: Performed by: INTERNAL MEDICINE

## 2022-03-21 PROCEDURE — 82962 GLUCOSE BLOOD TEST: CPT

## 2022-03-21 PROCEDURE — 25010000002 METHYLPREDNISOLONE PER 125 MG: Performed by: INTERNAL MEDICINE

## 2022-03-21 PROCEDURE — 94799 UNLISTED PULMONARY SVC/PX: CPT

## 2022-03-21 PROCEDURE — 97161 PT EVAL LOW COMPLEX 20 MIN: CPT

## 2022-03-21 PROCEDURE — 83519 RIA NONANTIBODY: CPT | Performed by: INTERNAL MEDICINE

## 2022-03-21 PROCEDURE — 99239 HOSP IP/OBS DSCHRG MGMT >30: CPT | Performed by: INTERNAL MEDICINE

## 2022-03-21 RX ORDER — PREDNISONE 20 MG/1
40 TABLET ORAL DAILY
Qty: 10 TABLET | Refills: 0 | Status: SHIPPED | OUTPATIENT
Start: 2022-03-21 | End: 2022-03-26

## 2022-03-21 RX ORDER — BUDESONIDE AND FORMOTEROL FUMARATE DIHYDRATE 160; 4.5 UG/1; UG/1
2 AEROSOL RESPIRATORY (INHALATION)
Qty: 1 EACH | Refills: 0 | Status: SHIPPED | OUTPATIENT
Start: 2022-03-21 | End: 2022-06-14

## 2022-03-21 RX ORDER — AZITHROMYCIN 250 MG/1
250 TABLET, FILM COATED ORAL DAILY
Qty: 4 TABLET | Refills: 0 | Status: SHIPPED | OUTPATIENT
Start: 2022-03-22 | End: 2022-03-26

## 2022-03-21 RX ADMIN — LINAGLIPTIN 5 MG: 5 TABLET, FILM COATED ORAL at 09:39

## 2022-03-21 RX ADMIN — SODIUM CHLORIDE SOLN NEBU 3% 4 ML: 3 NEBU SOLN at 09:05

## 2022-03-21 RX ADMIN — INSULIN LISPRO 2 UNITS: 100 INJECTION, SOLUTION INTRAVENOUS; SUBCUTANEOUS at 12:20

## 2022-03-21 RX ADMIN — ARFORMOTEROL TARTRATE 15 MCG: 15 SOLUTION RESPIRATORY (INHALATION) at 07:03

## 2022-03-21 RX ADMIN — METHYLPREDNISOLONE SODIUM SUCCINATE 62.5 MG: 125 INJECTION, POWDER, FOR SOLUTION INTRAMUSCULAR; INTRAVENOUS at 09:39

## 2022-03-21 RX ADMIN — IPRATROPIUM BROMIDE AND ALBUTEROL SULFATE 3 ML: 2.5; .5 SOLUTION RESPIRATORY (INHALATION) at 02:57

## 2022-03-21 RX ADMIN — AZITHROMYCIN MONOHYDRATE 500 MG: 250 TABLET ORAL at 09:40

## 2022-03-21 RX ADMIN — IPRATROPIUM BROMIDE AND ALBUTEROL SULFATE 3 ML: 2.5; .5 SOLUTION RESPIRATORY (INHALATION) at 11:36

## 2022-03-21 RX ADMIN — BUDESONIDE 0.5 MG: 0.5 SUSPENSION RESPIRATORY (INHALATION) at 07:03

## 2022-03-21 RX ADMIN — GUAIFENESIN 1200 MG: 600 TABLET ORAL at 09:39

## 2022-03-21 RX ADMIN — IPRATROPIUM BROMIDE AND ALBUTEROL SULFATE 3 ML: 2.5; .5 SOLUTION RESPIRATORY (INHALATION) at 07:03

## 2022-03-22 ENCOUNTER — READMISSION MANAGEMENT (OUTPATIENT)
Dept: CALL CENTER | Facility: HOSPITAL | Age: 72
End: 2022-03-22

## 2022-03-22 LAB
BACTERIA SPEC RESP CULT: NORMAL
GRAM STN SPEC: NORMAL

## 2022-03-22 NOTE — OUTREACH NOTE
Prep Survey    Flowsheet Row Responses   [facility] patient discharged from? [facility]   Is LACE score < 7 ? No   Emergency Room discharge w/ pulse ox? No   Eligibility Readm Mgmt   Discharge diagnosis COPD exacerbation    Does the patient have one of the following disease processes/diagnoses(primary or secondary)? COPD/Pneumonia   Does the patient have Home health ordered? No   Is there a DME ordered? No   Prep survey completed? Yes          MARIANNA HERNANDEZ - Registered Nurse

## 2022-03-24 LAB
BACTERIA SPEC AEROBE CULT: NORMAL
BACTERIA SPEC AEROBE CULT: NORMAL

## 2022-03-25 ENCOUNTER — READMISSION MANAGEMENT (OUTPATIENT)
Dept: CALL CENTER | Facility: HOSPITAL | Age: 72
End: 2022-03-25

## 2022-03-25 NOTE — OUTREACH NOTE
COPD/PN Week 1 Survey    Flowsheet Row Responses   [facility] patient discharged from? [facility]   Does the patient have one of the following disease processes/diagnoses(primary or secondary)? COPD/Pneumonia   Was the primary reason for admission: COPD exacerbation   Week 1 attempt successful? Yes   Call start time 0946   Call end time 0950   Discharge diagnosis COPD exacerbation    Meds reviewed with patient/caregiver? Yes   Is the patient having any side effects they believe may be caused by any medication additions or changes? No   Does the patient have all medications ordered at discharge? Yes   Is the patient taking all medications as directed (includes completed medication regime)? Yes   Does the patient have a primary care provider?  Yes   Does the patient have an appointment with their PCP or specialist within 7 days of discharge? No   What is preventing the patient from scheduling follow up appointments within 7 days of discharge? Haven't had time   Nursing Interventions Urgent appointment needed   Has the patient kept scheduled appointments due by today? N/A   Has home health visited the patient within 72 hours of discharge? N/A   Pulse Ox monitoring None   Psychosocial issues? No   Did the patient receive a copy of their discharge instructions? Yes   Nursing interventions Reviewed instructions with patient   What is the patient's perception of their health status since discharge? Improving   Nursing Interventions Nurse provided patient education   Is the patient/caregiver able to teach back the hierarchy of who to call/visit for symptoms/problems? PCP, Specialist, Home health nurse, Urgent Care, ED, 911 Yes   Additional teach back comments states free of SOB, wears O2 as needed   Is the patient able to teach back COPD zones? Yes   Patient reports what zone on this call? Green Zone   Green Zone Reports doing well, Breathing without shortness of breath, Usual activity and exercise level, Usual amount of  phlegm/mucus without difficulty coughing up, Sleeping well, Appetite is good   Green Zone interventions: Take daily medications, Use oxygen as prescribed, Avoid indoor/outdoor triggers, Continue regular exercise/diet plan   Week 1 call completed? Yes          SANJU BECKAHM - Registered Nurse

## 2022-03-28 LAB — ACETYLCHOLINE MODULATING AB: 11 %

## 2022-03-31 LAB
ACHR BIND AB SER-SCNC: <0.03 NMOL/L (ref 0–0.24)
ACHR BLOCK AB SER-ACNC: 21 % (ref 0–25)
ACHR MOD AB/ACHR TOTAL SFR SER: NORMAL %
MUSK AB SER IA-ACNC: <1 U/ML
REFLEX INFORMATION: NORMAL
STRIA MUS AB TITR SER IF: NORMAL {TITER}

## 2022-04-04 ENCOUNTER — READMISSION MANAGEMENT (OUTPATIENT)
Dept: CALL CENTER | Facility: HOSPITAL | Age: 72
End: 2022-04-04

## 2022-04-04 NOTE — OUTREACH NOTE
COPD/PN Week 2 Survey    Flowsheet Row Responses   [facility] patient discharged from? [facility]   Does the patient have one of the following disease processes/diagnoses(primary or secondary)? COPD/Pneumonia   Was the primary reason for admission: COPD exacerbation   Week 2 attempt successful? Yes   Call start time 1539   Call end time 1541   Discharge diagnosis COPD exacerbation    Meds reviewed with patient/caregiver? Yes   Is the patient having any side effects they believe may be caused by any medication additions or changes? No   Is the patient taking all medications as directed (includes completed medication regime)? Yes   Does the patient have a primary care provider?  Yes   Does the patient have an appointment with their PCP or specialist within 7 days of discharge? No   Has the patient kept scheduled appointments due by today? N/A   Has home health visited the patient within 72 hours of discharge? N/A   DME comments home oxygen prior to this admission, wears 2LO2 prn   Pulse Ox monitoring None   Psychosocial issues? No   What is the patient's perception of their health status since discharge? Same   Nursing Interventions Nurse provided patient education   If the patient is a current smoker, are they able to teach back resources for cessation? Not a smoker   Is the patient/caregiver able to teach back the hierarchy of who to call/visit for symptoms/problems? PCP, Specialist, Home health nurse, Urgent Care, ED, 911 Yes   Is the patient able to teach back COPD zones? Yes   Patient reports what zone on this call? Green Zone   Green Zone Reports doing well, Breathing without shortness of breath, Usual activity and exercise level, Appetite is good   Green Zone interventions: Take daily medications, Use oxygen as prescribed, Avoid indoor/outdoor triggers   Week 2 call completed? Yes          NEFTALI BECKHAM - Registered Nurse

## 2022-04-13 ENCOUNTER — READMISSION MANAGEMENT (OUTPATIENT)
Dept: CALL CENTER | Facility: HOSPITAL | Age: 72
End: 2022-04-13

## 2022-04-13 NOTE — OUTREACH NOTE
COPD/PN Week 3 Survey    Flowsheet Row Responses   [facility] [place] patient discharged from? [facility]   Does the patient have one of the following disease processes/diagnoses(primary or secondary)? COPD/Pneumonia   Was the primary reason for admission: COPD exacerbation   Week 3 attempt successful? Yes   Call start time 1321   Call end time 1327   Meds reviewed with patient/caregiver? Yes   Is the patient having any side effects they believe may be caused by any medication additions or changes? No   Does the patient have all medications ordered at discharge? Yes   Is the patient taking all medications as directed (includes completed medication regime)? Yes   Does the patient have a primary care provider?  Yes   Does the patient have an appointment with their PCP or specialist within 7 days of discharge? No   What is preventing the patient from scheduling follow up appointments within 7 days of discharge? Haven't had time   Nursing Interventions Advised patient to make appointment, Educated patient on importance of making appointment   Has the patient kept scheduled appointments due by today? N/A   Has home health visited the patient within 72 hours of discharge? N/A   Pulse Ox monitoring None   Psychosocial issues? No   What is the patient's perception of their health status since discharge? Improving   Nursing Interventions Nurse provided patient education   Are the patient's immunizations up to date?  Yes   Nursing interventions Educated on importance of maintaining up to date immunizations as advised by provider, Advised patient to discuss with provider at next visit   If the patient is a current smoker, are they able to teach back resources for cessation? Not a smoker   Is the patient/caregiver able to teach back the hierarchy of who to call/visit for symptoms/problems? PCP, Specialist, Home health nurse, Urgent Care, ED, 911 Yes   Is the patient able to teach back COPD zones? No   Nursing interventions Education  provided on various zones   Patient reports what zone on this call? Green Zone   Green Zone Reports doing well, Appetite is good, Sleeping well, Usual activity and exercise level, Breathing without shortness of breath, Usual amount of phlegm/mucus without difficulty coughing up   Green Zone interventions: Take daily medications, Use oxygen as prescribed, Continue regular exercise/diet plan, Avoid indoor/outdoor triggers   Week 3 call completed? Yes   Wrap up additional comments States is feeling better-denies any concerns/needs today. Encouraged to call PCP and pulmonologist for appts as soon as possible.          KORY MILLER - Registered Nurse

## 2022-04-26 ENCOUNTER — TRANSCRIBE ORDERS (OUTPATIENT)
Dept: ADMINISTRATIVE | Facility: HOSPITAL | Age: 72
End: 2022-04-26

## 2022-04-26 DIAGNOSIS — I71.40 AAA (ABDOMINAL AORTIC ANEURYSM) WITHOUT RUPTURE: Primary | ICD-10-CM

## 2022-05-20 ENCOUNTER — APPOINTMENT (OUTPATIENT)
Dept: GENERAL RADIOLOGY | Facility: HOSPITAL | Age: 72
End: 2022-05-20

## 2022-05-20 ENCOUNTER — HOSPITAL ENCOUNTER (INPATIENT)
Facility: HOSPITAL | Age: 72
LOS: 3 days | Discharge: HOME | End: 2022-05-23
Attending: EMERGENCY MEDICINE | Admitting: FAMILY MEDICINE

## 2022-05-20 DIAGNOSIS — J45.901 ACUTE EXACERBATION OF COPD WITH ASTHMA: Primary | ICD-10-CM

## 2022-05-20 DIAGNOSIS — R26.2 DIFFICULTY WALKING: ICD-10-CM

## 2022-05-20 DIAGNOSIS — J44.1 ACUTE EXACERBATION OF COPD WITH ASTHMA: Primary | ICD-10-CM

## 2022-05-20 LAB
ALBUMIN SERPL-MCNC: 4.1 G/DL (ref 3.5–5.2)
ALBUMIN/GLOB SERPL: 1.3 G/DL
ALP SERPL-CCNC: 102 U/L (ref 39–117)
ALT SERPL W P-5'-P-CCNC: 9 U/L (ref 1–41)
ANION GAP SERPL CALCULATED.3IONS-SCNC: 9.9 MMOL/L (ref 5–15)
ARTERIAL PATENCY WRIST A: POSITIVE
AST SERPL-CCNC: 15 U/L (ref 1–40)
BASE EXCESS BLDA CALC-SCNC: -3.1 MMOL/L (ref -2–2)
BASOPHILS # BLD AUTO: 0.1 10*3/MM3 (ref 0–0.2)
BASOPHILS NFR BLD AUTO: 1 % (ref 0–1.5)
BDY SITE: ABNORMAL
BILIRUB SERPL-MCNC: 0.4 MG/DL (ref 0–1.2)
BUN SERPL-MCNC: 20 MG/DL (ref 8–23)
BUN/CREAT SERPL: 19 (ref 7–25)
CALCIUM SPEC-SCNC: 8.8 MG/DL (ref 8.6–10.5)
CHLORIDE SERPL-SCNC: 102 MMOL/L (ref 98–107)
CO2 SERPL-SCNC: 26.1 MMOL/L (ref 22–29)
COHGB MFR BLD: 1 % (ref 0–1.5)
CREAT SERPL-MCNC: 1.05 MG/DL (ref 0.76–1.27)
D-LACTATE SERPL-SCNC: 1.2 MMOL/L (ref 0.5–2)
DEPRECATED RDW RBC AUTO: 45 FL (ref 37–54)
EGFRCR SERPLBLD CKD-EPI 2021: 75 ML/MIN/1.73
EOSINOPHIL # BLD AUTO: 1.76 10*3/MM3 (ref 0–0.4)
EOSINOPHIL NFR BLD AUTO: 16.7 % (ref 0.3–6.2)
ERYTHROCYTE [DISTWIDTH] IN BLOOD BY AUTOMATED COUNT: 14.6 % (ref 12.3–15.4)
FHHB: 9.4 % (ref 0–5)
GAS FLOW AIRWAY: 6 LPM
GLOBULIN UR ELPH-MCNC: 3.2 GM/DL
GLUCOSE SERPL-MCNC: 128 MG/DL (ref 65–99)
HCO3 BLDA-SCNC: 24 MMOL/L (ref 22–26)
HCT VFR BLD AUTO: 43 % (ref 37.5–51)
HGB BLD-MCNC: 12.7 G/DL (ref 13–17.7)
HGB BLDA-MCNC: 13.8 G/DL (ref 13.8–16.4)
HOLD SPECIMEN: NORMAL
HOLD SPECIMEN: NORMAL
IMM GRANULOCYTES # BLD AUTO: 0.02 10*3/MM3 (ref 0–0.05)
IMM GRANULOCYTES NFR BLD AUTO: 0.2 % (ref 0–0.5)
INHALED O2 CONCENTRATION: 31 %
LYMPHOCYTES # BLD AUTO: 2.22 10*3/MM3 (ref 0.7–3.1)
LYMPHOCYTES NFR BLD AUTO: 21.1 % (ref 19.6–45.3)
MCH RBC QN AUTO: 25 PG (ref 26.6–33)
MCHC RBC AUTO-ENTMCNC: 29.5 G/DL (ref 31.5–35.7)
MCV RBC AUTO: 84.5 FL (ref 79–97)
METHGB BLD QL: 0 % (ref 0–1.5)
MODALITY: ABNORMAL
MONOCYTES # BLD AUTO: 0.91 10*3/MM3 (ref 0.1–0.9)
MONOCYTES NFR BLD AUTO: 8.7 % (ref 5–12)
NEUTROPHILS NFR BLD AUTO: 5.51 10*3/MM3 (ref 1.7–7)
NEUTROPHILS NFR BLD AUTO: 52.3 % (ref 42.7–76)
NOTE: ABNORMAL
NRBC BLD AUTO-RTO: 0 /100 WBC (ref 0–0.2)
NT-PROBNP SERPL-MCNC: 134.9 PG/ML (ref 0–900)
OXYHGB MFR BLDV: 89.6 % (ref 94–99)
PCO2 BLDA: 51.3 MM HG (ref 35–45)
PH BLDA: 7.29 PH UNITS (ref 7.35–7.45)
PLATELET # BLD AUTO: 177 10*3/MM3 (ref 140–450)
PMV BLD AUTO: 9.7 FL (ref 6–12)
PO2 BLD: 211 MM[HG] (ref 0–500)
PO2 BLDA: 65.5 MM HG (ref 80–100)
POTASSIUM SERPL-SCNC: 4.2 MMOL/L (ref 3.5–5.2)
PROT SERPL-MCNC: 7.3 G/DL (ref 6–8.5)
RBC # BLD AUTO: 5.09 10*6/MM3 (ref 4.14–5.8)
SAO2 % BLDCOA: 90.5 % (ref 95–99)
SODIUM SERPL-SCNC: 138 MMOL/L (ref 136–145)
TROPONIN I SERPL-MCNC: 0.02 NG/ML (ref 0–0.6)
TROPONIN T SERPL-MCNC: 0.02 NG/ML (ref 0–0.03)
TROPONIN T SERPL-MCNC: 0.04 NG/ML (ref 0–0.03)
WBC NRBC COR # BLD: 10.52 10*3/MM3 (ref 3.4–10.8)
WHOLE BLOOD HOLD COAG: NORMAL
WHOLE BLOOD HOLD SPECIMEN: NORMAL

## 2022-05-20 PROCEDURE — 83036 HEMOGLOBIN GLYCOSYLATED A1C: CPT | Performed by: INTERNAL MEDICINE

## 2022-05-20 PROCEDURE — 93005 ELECTROCARDIOGRAM TRACING: CPT | Performed by: EMERGENCY MEDICINE

## 2022-05-20 PROCEDURE — 36415 COLL VENOUS BLD VENIPUNCTURE: CPT

## 2022-05-20 PROCEDURE — 85025 COMPLETE CBC W/AUTO DIFF WBC: CPT

## 2022-05-20 PROCEDURE — 82375 ASSAY CARBOXYHB QUANT: CPT | Performed by: EMERGENCY MEDICINE

## 2022-05-20 PROCEDURE — 84484 ASSAY OF TROPONIN QUANT: CPT

## 2022-05-20 PROCEDURE — 94640 AIRWAY INHALATION TREATMENT: CPT

## 2022-05-20 PROCEDURE — 93005 ELECTROCARDIOGRAM TRACING: CPT

## 2022-05-20 PROCEDURE — 99285 EMERGENCY DEPT VISIT HI MDM: CPT

## 2022-05-20 PROCEDURE — 83880 ASSAY OF NATRIURETIC PEPTIDE: CPT

## 2022-05-20 PROCEDURE — 94799 UNLISTED PULMONARY SVC/PX: CPT

## 2022-05-20 PROCEDURE — 99222 1ST HOSP IP/OBS MODERATE 55: CPT | Performed by: INTERNAL MEDICINE

## 2022-05-20 PROCEDURE — 93010 ELECTROCARDIOGRAM REPORT: CPT | Performed by: INTERNAL MEDICINE

## 2022-05-20 PROCEDURE — 80053 COMPREHEN METABOLIC PANEL: CPT

## 2022-05-20 PROCEDURE — 83050 HGB METHEMOGLOBIN QUAN: CPT | Performed by: EMERGENCY MEDICINE

## 2022-05-20 PROCEDURE — 82805 BLOOD GASES W/O2 SATURATION: CPT | Performed by: EMERGENCY MEDICINE

## 2022-05-20 PROCEDURE — 83605 ASSAY OF LACTIC ACID: CPT

## 2022-05-20 PROCEDURE — 87040 BLOOD CULTURE FOR BACTERIA: CPT

## 2022-05-20 PROCEDURE — 36600 WITHDRAWAL OF ARTERIAL BLOOD: CPT | Performed by: EMERGENCY MEDICINE

## 2022-05-20 PROCEDURE — 84484 ASSAY OF TROPONIN QUANT: CPT | Performed by: EMERGENCY MEDICINE

## 2022-05-20 PROCEDURE — 71045 X-RAY EXAM CHEST 1 VIEW: CPT

## 2022-05-20 RX ORDER — HEPARIN SODIUM 5000 [USP'U]/ML
5000 INJECTION, SOLUTION INTRAVENOUS; SUBCUTANEOUS EVERY 8 HOURS SCHEDULED
Status: DISCONTINUED | OUTPATIENT
Start: 2022-05-20 | End: 2022-05-23 | Stop reason: HOSPADM

## 2022-05-20 RX ORDER — IPRATROPIUM BROMIDE AND ALBUTEROL SULFATE 2.5; .5 MG/3ML; MG/3ML
3 SOLUTION RESPIRATORY (INHALATION) EVERY 4 HOURS PRN
Status: DISCONTINUED | OUTPATIENT
Start: 2022-05-20 | End: 2022-05-23

## 2022-05-20 RX ORDER — SODIUM CHLORIDE 0.9 % (FLUSH) 0.9 %
10 SYRINGE (ML) INJECTION AS NEEDED
Status: DISCONTINUED | OUTPATIENT
Start: 2022-05-20 | End: 2022-05-23 | Stop reason: HOSPADM

## 2022-05-20 RX ORDER — NICOTINE POLACRILEX 4 MG
24 LOZENGE BUCCAL
Status: DISCONTINUED | OUTPATIENT
Start: 2022-05-20 | End: 2022-05-23 | Stop reason: HOSPADM

## 2022-05-20 RX ORDER — GUAIFENESIN 600 MG/1
600 TABLET, EXTENDED RELEASE ORAL EVERY 12 HOURS SCHEDULED
Status: COMPLETED | OUTPATIENT
Start: 2022-05-20 | End: 2022-05-23

## 2022-05-20 RX ORDER — IPRATROPIUM BROMIDE AND ALBUTEROL SULFATE 2.5; .5 MG/3ML; MG/3ML
3 SOLUTION RESPIRATORY (INHALATION) ONCE
Status: COMPLETED | OUTPATIENT
Start: 2022-05-20 | End: 2022-05-20

## 2022-05-20 RX ORDER — DEXTROSE MONOHYDRATE 25 G/50ML
25 INJECTION, SOLUTION INTRAVENOUS
Status: DISCONTINUED | OUTPATIENT
Start: 2022-05-20 | End: 2022-05-23 | Stop reason: HOSPADM

## 2022-05-20 RX ORDER — INSULIN LISPRO 100 [IU]/ML
0-7 INJECTION, SOLUTION INTRAVENOUS; SUBCUTANEOUS
Status: DISCONTINUED | OUTPATIENT
Start: 2022-05-21 | End: 2022-05-23 | Stop reason: HOSPADM

## 2022-05-20 RX ORDER — METHYLPREDNISOLONE SODIUM SUCCINATE 125 MG/2ML
60 INJECTION, POWDER, LYOPHILIZED, FOR SOLUTION INTRAMUSCULAR; INTRAVENOUS EVERY 6 HOURS
Status: DISCONTINUED | OUTPATIENT
Start: 2022-05-20 | End: 2022-05-21

## 2022-05-20 RX ADMIN — IPRATROPIUM BROMIDE AND ALBUTEROL SULFATE 3 ML: .5; 3 SOLUTION RESPIRATORY (INHALATION) at 20:42

## 2022-05-21 ENCOUNTER — APPOINTMENT (OUTPATIENT)
Dept: CT IMAGING | Facility: HOSPITAL | Age: 72
End: 2022-05-21

## 2022-05-21 LAB
GLUCOSE BLDC GLUCOMTR-MCNC: 149 MG/DL (ref 70–99)
GLUCOSE BLDC GLUCOMTR-MCNC: 185 MG/DL (ref 70–99)
GLUCOSE BLDC GLUCOMTR-MCNC: 192 MG/DL (ref 70–99)
GLUCOSE BLDC GLUCOMTR-MCNC: 203 MG/DL (ref 70–99)
HBA1C MFR BLD: 8.1 % (ref 4.8–5.6)
TROPONIN T SERPL-MCNC: 0.01 NG/ML (ref 0–0.03)
TROPONIN T SERPL-MCNC: 0.01 NG/ML (ref 0–0.03)

## 2022-05-21 PROCEDURE — 94799 UNLISTED PULMONARY SVC/PX: CPT

## 2022-05-21 PROCEDURE — 25010000002 METHYLPREDNISOLONE PER 40 MG: Performed by: FAMILY MEDICINE

## 2022-05-21 PROCEDURE — 82962 GLUCOSE BLOOD TEST: CPT

## 2022-05-21 PROCEDURE — 84484 ASSAY OF TROPONIN QUANT: CPT | Performed by: FAMILY MEDICINE

## 2022-05-21 PROCEDURE — 25010000002 FUROSEMIDE PER 20 MG: Performed by: FAMILY MEDICINE

## 2022-05-21 PROCEDURE — 71250 CT THORAX DX C-: CPT

## 2022-05-21 PROCEDURE — 99233 SBSQ HOSP IP/OBS HIGH 50: CPT | Performed by: FAMILY MEDICINE

## 2022-05-21 PROCEDURE — 63710000001 INSULIN LISPRO (HUMAN) PER 5 UNITS: Performed by: INTERNAL MEDICINE

## 2022-05-21 PROCEDURE — 25010000002 HEPARIN (PORCINE) PER 1000 UNITS: Performed by: INTERNAL MEDICINE

## 2022-05-21 PROCEDURE — 25010000002 METHYLPREDNISOLONE PER 125 MG: Performed by: INTERNAL MEDICINE

## 2022-05-21 PROCEDURE — 99223 1ST HOSP IP/OBS HIGH 75: CPT | Performed by: INTERNAL MEDICINE

## 2022-05-21 RX ORDER — LISINOPRIL 2.5 MG/1
2.5 TABLET ORAL DAILY
Status: DISCONTINUED | OUTPATIENT
Start: 2022-05-21 | End: 2022-05-23 | Stop reason: HOSPADM

## 2022-05-21 RX ORDER — IPRATROPIUM BROMIDE AND ALBUTEROL SULFATE 2.5; .5 MG/3ML; MG/3ML
3 SOLUTION RESPIRATORY (INHALATION)
Status: DISCONTINUED | OUTPATIENT
Start: 2022-05-21 | End: 2022-05-23

## 2022-05-21 RX ORDER — METHYLPREDNISOLONE SODIUM SUCCINATE 40 MG/ML
40 INJECTION, POWDER, LYOPHILIZED, FOR SOLUTION INTRAMUSCULAR; INTRAVENOUS EVERY 8 HOURS
Status: DISCONTINUED | OUTPATIENT
Start: 2022-05-21 | End: 2022-05-22

## 2022-05-21 RX ORDER — ASPIRIN 81 MG/1
81 TABLET, CHEWABLE ORAL DAILY
Status: DISCONTINUED | OUTPATIENT
Start: 2022-05-21 | End: 2022-05-23 | Stop reason: HOSPADM

## 2022-05-21 RX ORDER — ARFORMOTEROL TARTRATE 15 UG/2ML
15 SOLUTION RESPIRATORY (INHALATION)
Status: DISCONTINUED | OUTPATIENT
Start: 2022-05-21 | End: 2022-05-23 | Stop reason: HOSPADM

## 2022-05-21 RX ORDER — BUDESONIDE 0.5 MG/2ML
0.5 INHALANT ORAL
Status: DISCONTINUED | OUTPATIENT
Start: 2022-05-21 | End: 2022-05-23 | Stop reason: HOSPADM

## 2022-05-21 RX ORDER — ATORVASTATIN CALCIUM 10 MG/1
10 TABLET, FILM COATED ORAL DAILY
Status: DISCONTINUED | OUTPATIENT
Start: 2022-05-21 | End: 2022-05-23 | Stop reason: HOSPADM

## 2022-05-21 RX ORDER — PANTOPRAZOLE SODIUM 40 MG/1
40 TABLET, DELAYED RELEASE ORAL
Status: DISCONTINUED | OUTPATIENT
Start: 2022-05-21 | End: 2022-05-23 | Stop reason: HOSPADM

## 2022-05-21 RX ORDER — FUROSEMIDE 10 MG/ML
40 INJECTION INTRAMUSCULAR; INTRAVENOUS
Status: DISCONTINUED | OUTPATIENT
Start: 2022-05-21 | End: 2022-05-23 | Stop reason: HOSPADM

## 2022-05-21 RX ADMIN — LISINOPRIL 2.5 MG: 2.5 TABLET ORAL at 09:32

## 2022-05-21 RX ADMIN — IPRATROPIUM BROMIDE AND ALBUTEROL SULFATE 3 ML: .5; 3 SOLUTION RESPIRATORY (INHALATION) at 15:17

## 2022-05-21 RX ADMIN — HEPARIN SODIUM 5000 UNITS: 5000 INJECTION, SOLUTION INTRAVENOUS; SUBCUTANEOUS at 21:02

## 2022-05-21 RX ADMIN — INSULIN LISPRO 3 UNITS: 100 INJECTION, SOLUTION INTRAVENOUS; SUBCUTANEOUS at 12:49

## 2022-05-21 RX ADMIN — IPRATROPIUM BROMIDE AND ALBUTEROL SULFATE 3 ML: .5; 3 SOLUTION RESPIRATORY (INHALATION) at 11:12

## 2022-05-21 RX ADMIN — HEPARIN SODIUM 5000 UNITS: 5000 INJECTION, SOLUTION INTRAVENOUS; SUBCUTANEOUS at 01:17

## 2022-05-21 RX ADMIN — METOPROLOL TARTRATE 25 MG: 25 TABLET, FILM COATED ORAL at 20:58

## 2022-05-21 RX ADMIN — ASPIRIN 81 MG 81 MG: 81 TABLET ORAL at 09:32

## 2022-05-21 RX ADMIN — HEPARIN SODIUM 5000 UNITS: 5000 INJECTION, SOLUTION INTRAVENOUS; SUBCUTANEOUS at 14:54

## 2022-05-21 RX ADMIN — FUROSEMIDE 40 MG: 10 INJECTION, SOLUTION INTRAMUSCULAR; INTRAVENOUS at 17:05

## 2022-05-21 RX ADMIN — IPRATROPIUM BROMIDE AND ALBUTEROL SULFATE 3 ML: .5; 3 SOLUTION RESPIRATORY (INHALATION) at 19:09

## 2022-05-21 RX ADMIN — BUDESONIDE 0.5 MG: 0.5 SUSPENSION RESPIRATORY (INHALATION) at 19:09

## 2022-05-21 RX ADMIN — ATORVASTATIN CALCIUM 10 MG: 10 TABLET, FILM COATED ORAL at 09:32

## 2022-05-21 RX ADMIN — METHYLPREDNISOLONE SODIUM SUCCINATE 40 MG: 40 INJECTION, POWDER, FOR SOLUTION INTRAMUSCULAR; INTRAVENOUS at 21:20

## 2022-05-21 RX ADMIN — PANTOPRAZOLE SODIUM 40 MG: 40 TABLET, DELAYED RELEASE ORAL at 09:32

## 2022-05-21 RX ADMIN — METHYLPREDNISOLONE SODIUM SUCCINATE 40 MG: 40 INJECTION, POWDER, FOR SOLUTION INTRAMUSCULAR; INTRAVENOUS at 14:54

## 2022-05-21 RX ADMIN — GUAIFENESIN 600 MG: 600 TABLET ORAL at 01:18

## 2022-05-21 RX ADMIN — GUAIFENESIN 600 MG: 600 TABLET ORAL at 20:58

## 2022-05-21 RX ADMIN — ARFORMOTEROL TARTRATE 15 MCG: 15 SOLUTION RESPIRATORY (INHALATION) at 19:09

## 2022-05-21 RX ADMIN — INSULIN LISPRO 2 UNITS: 100 INJECTION, SOLUTION INTRAVENOUS; SUBCUTANEOUS at 08:21

## 2022-05-21 RX ADMIN — METHYLPREDNISOLONE SODIUM SUCCINATE 60 MG: 125 INJECTION, POWDER, FOR SOLUTION INTRAMUSCULAR; INTRAVENOUS at 01:17

## 2022-05-21 RX ADMIN — GUAIFENESIN 600 MG: 600 TABLET ORAL at 08:21

## 2022-05-21 RX ADMIN — METHYLPREDNISOLONE SODIUM SUCCINATE 60 MG: 125 INJECTION, POWDER, FOR SOLUTION INTRAMUSCULAR; INTRAVENOUS at 06:15

## 2022-05-21 RX ADMIN — METOPROLOL TARTRATE 25 MG: 25 TABLET, FILM COATED ORAL at 09:32

## 2022-05-21 RX ADMIN — Medication 10 ML: at 08:21

## 2022-05-21 RX ADMIN — DOXYCYCLINE 100 MG: 100 INJECTION, POWDER, LYOPHILIZED, FOR SOLUTION INTRAVENOUS at 09:32

## 2022-05-21 RX ADMIN — DOXYCYCLINE 100 MG: 100 INJECTION, POWDER, LYOPHILIZED, FOR SOLUTION INTRAVENOUS at 20:57

## 2022-05-22 LAB
ALBUMIN SERPL-MCNC: 4 G/DL (ref 3.5–5.2)
ALP SERPL-CCNC: 94 U/L (ref 39–117)
ALT SERPL W P-5'-P-CCNC: 10 U/L (ref 1–41)
ANION GAP SERPL CALCULATED.3IONS-SCNC: 10.3 MMOL/L (ref 5–15)
AST SERPL-CCNC: 16 U/L (ref 1–40)
BASOPHILS # BLD AUTO: 0.02 10*3/MM3 (ref 0–0.2)
BASOPHILS NFR BLD AUTO: 0.1 % (ref 0–1.5)
BILIRUB CONJ SERPL-MCNC: <0.2 MG/DL (ref 0–0.3)
BILIRUB INDIRECT SERPL-MCNC: NORMAL MG/DL
BILIRUB SERPL-MCNC: 0.3 MG/DL (ref 0–1.2)
BUN SERPL-MCNC: 30 MG/DL (ref 8–23)
BUN/CREAT SERPL: 23.4 (ref 7–25)
CALCIUM SPEC-SCNC: 9.3 MG/DL (ref 8.6–10.5)
CHLORIDE SERPL-SCNC: 97 MMOL/L (ref 98–107)
CO2 SERPL-SCNC: 26.7 MMOL/L (ref 22–29)
CREAT SERPL-MCNC: 1.28 MG/DL (ref 0.76–1.27)
DEPRECATED RDW RBC AUTO: 43.8 FL (ref 37–54)
EGFRCR SERPLBLD CKD-EPI 2021: 59.1 ML/MIN/1.73
EOSINOPHIL # BLD AUTO: 0 10*3/MM3 (ref 0–0.4)
EOSINOPHIL NFR BLD AUTO: 0 % (ref 0.3–6.2)
ERYTHROCYTE [DISTWIDTH] IN BLOOD BY AUTOMATED COUNT: 14.5 % (ref 12.3–15.4)
GLUCOSE BLDC GLUCOMTR-MCNC: 159 MG/DL (ref 70–99)
GLUCOSE BLDC GLUCOMTR-MCNC: 203 MG/DL (ref 70–99)
GLUCOSE BLDC GLUCOMTR-MCNC: 216 MG/DL (ref 70–99)
GLUCOSE SERPL-MCNC: 176 MG/DL (ref 65–99)
HCT VFR BLD AUTO: 42.6 % (ref 37.5–51)
HGB BLD-MCNC: 12.6 G/DL (ref 13–17.7)
IMM GRANULOCYTES # BLD AUTO: 0.13 10*3/MM3 (ref 0–0.05)
IMM GRANULOCYTES NFR BLD AUTO: 0.8 % (ref 0–0.5)
LYMPHOCYTES # BLD AUTO: 0.88 10*3/MM3 (ref 0.7–3.1)
LYMPHOCYTES NFR BLD AUTO: 5.2 % (ref 19.6–45.3)
MAGNESIUM SERPL-MCNC: 2.4 MG/DL (ref 1.6–2.4)
MCH RBC QN AUTO: 24.9 PG (ref 26.6–33)
MCHC RBC AUTO-ENTMCNC: 29.6 G/DL (ref 31.5–35.7)
MCV RBC AUTO: 84 FL (ref 79–97)
MONOCYTES # BLD AUTO: 0.73 10*3/MM3 (ref 0.1–0.9)
MONOCYTES NFR BLD AUTO: 4.3 % (ref 5–12)
NEUTROPHILS NFR BLD AUTO: 15.16 10*3/MM3 (ref 1.7–7)
NEUTROPHILS NFR BLD AUTO: 89.6 % (ref 42.7–76)
NRBC BLD AUTO-RTO: 0 /100 WBC (ref 0–0.2)
PHOSPHATE SERPL-MCNC: 3.4 MG/DL (ref 2.5–4.5)
PLATELET # BLD AUTO: 167 10*3/MM3 (ref 140–450)
PMV BLD AUTO: 10.3 FL (ref 6–12)
POTASSIUM SERPL-SCNC: 4.3 MMOL/L (ref 3.5–5.2)
PROT SERPL-MCNC: 7.1 G/DL (ref 6–8.5)
RBC # BLD AUTO: 5.07 10*6/MM3 (ref 4.14–5.8)
SODIUM SERPL-SCNC: 134 MMOL/L (ref 136–145)
TROPONIN T SERPL-MCNC: 0.01 NG/ML (ref 0–0.03)
WBC NRBC COR # BLD: 16.92 10*3/MM3 (ref 3.4–10.8)

## 2022-05-22 PROCEDURE — 25010000002 HEPARIN (PORCINE) PER 1000 UNITS: Performed by: INTERNAL MEDICINE

## 2022-05-22 PROCEDURE — 25010000002 FUROSEMIDE PER 20 MG: Performed by: FAMILY MEDICINE

## 2022-05-22 PROCEDURE — 99232 SBSQ HOSP IP/OBS MODERATE 35: CPT | Performed by: FAMILY MEDICINE

## 2022-05-22 PROCEDURE — 63710000001 INSULIN LISPRO (HUMAN) PER 5 UNITS: Performed by: INTERNAL MEDICINE

## 2022-05-22 PROCEDURE — 94799 UNLISTED PULMONARY SVC/PX: CPT

## 2022-05-22 PROCEDURE — 83735 ASSAY OF MAGNESIUM: CPT | Performed by: FAMILY MEDICINE

## 2022-05-22 PROCEDURE — 82962 GLUCOSE BLOOD TEST: CPT

## 2022-05-22 PROCEDURE — 80076 HEPATIC FUNCTION PANEL: CPT | Performed by: FAMILY MEDICINE

## 2022-05-22 PROCEDURE — 99232 SBSQ HOSP IP/OBS MODERATE 35: CPT | Performed by: INTERNAL MEDICINE

## 2022-05-22 PROCEDURE — 80048 BASIC METABOLIC PNL TOTAL CA: CPT | Performed by: FAMILY MEDICINE

## 2022-05-22 PROCEDURE — 25010000002 METHYLPREDNISOLONE PER 40 MG: Performed by: FAMILY MEDICINE

## 2022-05-22 PROCEDURE — 85025 COMPLETE CBC W/AUTO DIFF WBC: CPT | Performed by: FAMILY MEDICINE

## 2022-05-22 PROCEDURE — 84100 ASSAY OF PHOSPHORUS: CPT | Performed by: FAMILY MEDICINE

## 2022-05-22 RX ORDER — PREDNISONE 20 MG/1
40 TABLET ORAL
Status: DISCONTINUED | OUTPATIENT
Start: 2022-05-23 | End: 2022-05-23 | Stop reason: HOSPADM

## 2022-05-22 RX ADMIN — IPRATROPIUM BROMIDE AND ALBUTEROL SULFATE 3 ML: .5; 3 SOLUTION RESPIRATORY (INHALATION) at 07:21

## 2022-05-22 RX ADMIN — METHYLPREDNISOLONE SODIUM SUCCINATE 40 MG: 40 INJECTION, POWDER, FOR SOLUTION INTRAMUSCULAR; INTRAVENOUS at 06:12

## 2022-05-22 RX ADMIN — ASPIRIN 81 MG 81 MG: 81 TABLET ORAL at 08:08

## 2022-05-22 RX ADMIN — METOPROLOL TARTRATE 25 MG: 25 TABLET, FILM COATED ORAL at 08:08

## 2022-05-22 RX ADMIN — GUAIFENESIN 600 MG: 600 TABLET ORAL at 21:59

## 2022-05-22 RX ADMIN — INSULIN LISPRO 2 UNITS: 100 INJECTION, SOLUTION INTRAVENOUS; SUBCUTANEOUS at 08:06

## 2022-05-22 RX ADMIN — DOXYCYCLINE 100 MG: 100 INJECTION, POWDER, LYOPHILIZED, FOR SOLUTION INTRAVENOUS at 08:08

## 2022-05-22 RX ADMIN — IPRATROPIUM BROMIDE AND ALBUTEROL SULFATE 3 ML: .5; 3 SOLUTION RESPIRATORY (INHALATION) at 11:08

## 2022-05-22 RX ADMIN — ATORVASTATIN CALCIUM 10 MG: 10 TABLET, FILM COATED ORAL at 08:08

## 2022-05-22 RX ADMIN — HEPARIN SODIUM 5000 UNITS: 5000 INJECTION, SOLUTION INTRAVENOUS; SUBCUTANEOUS at 13:17

## 2022-05-22 RX ADMIN — FUROSEMIDE 40 MG: 10 INJECTION, SOLUTION INTRAMUSCULAR; INTRAVENOUS at 08:07

## 2022-05-22 RX ADMIN — BUDESONIDE 0.5 MG: 0.5 SUSPENSION RESPIRATORY (INHALATION) at 20:37

## 2022-05-22 RX ADMIN — ARFORMOTEROL TARTRATE 15 MCG: 15 SOLUTION RESPIRATORY (INHALATION) at 20:37

## 2022-05-22 RX ADMIN — HEPARIN SODIUM 5000 UNITS: 5000 INJECTION, SOLUTION INTRAVENOUS; SUBCUTANEOUS at 21:59

## 2022-05-22 RX ADMIN — BUDESONIDE 0.5 MG: 0.5 SUSPENSION RESPIRATORY (INHALATION) at 07:16

## 2022-05-22 RX ADMIN — IPRATROPIUM BROMIDE AND ALBUTEROL SULFATE 3 ML: .5; 3 SOLUTION RESPIRATORY (INHALATION) at 14:20

## 2022-05-22 RX ADMIN — HEPARIN SODIUM 5000 UNITS: 5000 INJECTION, SOLUTION INTRAVENOUS; SUBCUTANEOUS at 06:12

## 2022-05-22 RX ADMIN — ARFORMOTEROL TARTRATE 15 MCG: 15 SOLUTION RESPIRATORY (INHALATION) at 07:26

## 2022-05-22 RX ADMIN — FUROSEMIDE 40 MG: 10 INJECTION, SOLUTION INTRAMUSCULAR; INTRAVENOUS at 17:09

## 2022-05-22 RX ADMIN — IPRATROPIUM BROMIDE AND ALBUTEROL SULFATE 3 ML: .5; 3 SOLUTION RESPIRATORY (INHALATION) at 20:37

## 2022-05-22 RX ADMIN — METOPROLOL TARTRATE 25 MG: 25 TABLET, FILM COATED ORAL at 21:59

## 2022-05-22 RX ADMIN — INSULIN LISPRO 3 UNITS: 100 INJECTION, SOLUTION INTRAVENOUS; SUBCUTANEOUS at 12:13

## 2022-05-22 RX ADMIN — LISINOPRIL 2.5 MG: 2.5 TABLET ORAL at 08:08

## 2022-05-22 RX ADMIN — INSULIN LISPRO 3 UNITS: 100 INJECTION, SOLUTION INTRAVENOUS; SUBCUTANEOUS at 17:09

## 2022-05-22 RX ADMIN — METHYLPREDNISOLONE SODIUM SUCCINATE 40 MG: 40 INJECTION, POWDER, FOR SOLUTION INTRAMUSCULAR; INTRAVENOUS at 13:17

## 2022-05-22 RX ADMIN — PANTOPRAZOLE SODIUM 40 MG: 40 TABLET, DELAYED RELEASE ORAL at 06:12

## 2022-05-22 RX ADMIN — GUAIFENESIN 600 MG: 600 TABLET ORAL at 08:07

## 2022-05-22 RX ADMIN — DOXYCYCLINE 100 MG: 100 INJECTION, POWDER, LYOPHILIZED, FOR SOLUTION INTRAVENOUS at 21:59

## 2022-05-23 ENCOUNTER — APPOINTMENT (OUTPATIENT)
Dept: ULTRASOUND IMAGING | Facility: HOSPITAL | Age: 72
End: 2022-05-23

## 2022-05-23 ENCOUNTER — READMISSION MANAGEMENT (OUTPATIENT)
Dept: CALL CENTER | Facility: HOSPITAL | Age: 72
End: 2022-05-23

## 2022-05-23 VITALS
RESPIRATION RATE: 18 BRPM | WEIGHT: 246.91 LBS | OXYGEN SATURATION: 92 % | TEMPERATURE: 98.6 F | BODY MASS INDEX: 32.72 KG/M2 | HEART RATE: 78 BPM | SYSTOLIC BLOOD PRESSURE: 108 MMHG | HEIGHT: 73 IN | DIASTOLIC BLOOD PRESSURE: 58 MMHG

## 2022-05-23 LAB
GLUCOSE BLDC GLUCOMTR-MCNC: 128 MG/DL (ref 70–99)
GLUCOSE BLDC GLUCOMTR-MCNC: 153 MG/DL (ref 70–99)
QT INTERVAL: 421 MS

## 2022-05-23 PROCEDURE — 63710000001 INSULIN LISPRO (HUMAN) PER 5 UNITS: Performed by: INTERNAL MEDICINE

## 2022-05-23 PROCEDURE — 94664 DEMO&/EVAL PT USE INHALER: CPT

## 2022-05-23 PROCEDURE — 94799 UNLISTED PULMONARY SVC/PX: CPT

## 2022-05-23 PROCEDURE — 25010000002 FUROSEMIDE PER 20 MG: Performed by: FAMILY MEDICINE

## 2022-05-23 PROCEDURE — 99232 SBSQ HOSP IP/OBS MODERATE 35: CPT | Performed by: INTERNAL MEDICINE

## 2022-05-23 PROCEDURE — 97116 GAIT TRAINING THERAPY: CPT

## 2022-05-23 PROCEDURE — 97161 PT EVAL LOW COMPLEX 20 MIN: CPT

## 2022-05-23 PROCEDURE — 63710000001 PREDNISONE PER 1 MG: Performed by: INTERNAL MEDICINE

## 2022-05-23 PROCEDURE — 82962 GLUCOSE BLOOD TEST: CPT

## 2022-05-23 PROCEDURE — 99239 HOSP IP/OBS DSCHRG MGMT >30: CPT | Performed by: FAMILY MEDICINE

## 2022-05-23 PROCEDURE — 25010000002 HEPARIN (PORCINE) PER 1000 UNITS: Performed by: INTERNAL MEDICINE

## 2022-05-23 RX ORDER — PREDNISONE 20 MG/1
40 TABLET ORAL
Qty: 10 TABLET | Refills: 0 | Status: SHIPPED | OUTPATIENT
Start: 2022-05-24 | End: 2022-05-29

## 2022-05-23 RX ORDER — IPRATROPIUM BROMIDE AND ALBUTEROL SULFATE 2.5; .5 MG/3ML; MG/3ML
3 SOLUTION RESPIRATORY (INHALATION) EVERY 4 HOURS PRN
Status: DISCONTINUED | OUTPATIENT
Start: 2022-05-23 | End: 2022-05-23 | Stop reason: HOSPADM

## 2022-05-23 RX ORDER — DOXYCYCLINE HYCLATE 100 MG/1
100 CAPSULE ORAL 2 TIMES DAILY
Qty: 14 CAPSULE | Refills: 0 | Status: SHIPPED | OUTPATIENT
Start: 2022-05-23 | End: 2022-05-30

## 2022-05-23 RX ADMIN — GUAIFENESIN 600 MG: 600 TABLET ORAL at 08:44

## 2022-05-23 RX ADMIN — METOPROLOL TARTRATE 25 MG: 25 TABLET, FILM COATED ORAL at 08:45

## 2022-05-23 RX ADMIN — INSULIN LISPRO 2 UNITS: 100 INJECTION, SOLUTION INTRAVENOUS; SUBCUTANEOUS at 12:12

## 2022-05-23 RX ADMIN — HEPARIN SODIUM 5000 UNITS: 5000 INJECTION, SOLUTION INTRAVENOUS; SUBCUTANEOUS at 05:29

## 2022-05-23 RX ADMIN — IPRATROPIUM BROMIDE AND ALBUTEROL SULFATE 3 ML: .5; 3 SOLUTION RESPIRATORY (INHALATION) at 07:13

## 2022-05-23 RX ADMIN — ATORVASTATIN CALCIUM 10 MG: 10 TABLET, FILM COATED ORAL at 09:18

## 2022-05-23 RX ADMIN — PREDNISONE 40 MG: 20 TABLET ORAL at 08:44

## 2022-05-23 RX ADMIN — BUDESONIDE 0.5 MG: 0.5 SUSPENSION RESPIRATORY (INHALATION) at 07:13

## 2022-05-23 RX ADMIN — Medication 10 ML: at 08:45

## 2022-05-23 RX ADMIN — DOXYCYCLINE 100 MG: 100 INJECTION, POWDER, LYOPHILIZED, FOR SOLUTION INTRAVENOUS at 08:45

## 2022-05-23 RX ADMIN — ASPIRIN 81 MG 81 MG: 81 TABLET ORAL at 08:44

## 2022-05-23 RX ADMIN — ARFORMOTEROL TARTRATE 15 MCG: 15 SOLUTION RESPIRATORY (INHALATION) at 07:13

## 2022-05-23 RX ADMIN — LISINOPRIL 2.5 MG: 2.5 TABLET ORAL at 08:44

## 2022-05-23 RX ADMIN — FUROSEMIDE 40 MG: 10 INJECTION, SOLUTION INTRAMUSCULAR; INTRAVENOUS at 08:44

## 2022-05-23 RX ADMIN — PANTOPRAZOLE SODIUM 40 MG: 40 TABLET, DELAYED RELEASE ORAL at 05:29

## 2022-05-24 NOTE — OUTREACH NOTE
Prep Survey    Flowsheet Row Responses   [facility] patient discharged from? [facility]   Is LACE score < 7 ? No   Emergency Room discharge w/ pulse ox? No   Eligibility Readm Mgmt   Discharge diagnosis Acute COPD exacerbation   Does the patient have one of the following disease processes/diagnoses(primary or secondary)? COPD/Pneumonia   Does the patient have Home health ordered? No   Is there a DME ordered? Yes   What DME was ordered? [facility]- [employer] already established   Prep survey completed? Yes          EVA JOHNSON - Registered Nurse

## 2022-05-25 LAB
BACTERIA SPEC AEROBE CULT: NORMAL
BACTERIA SPEC AEROBE CULT: NORMAL

## 2022-05-26 ENCOUNTER — READMISSION MANAGEMENT (OUTPATIENT)
Dept: CALL CENTER | Facility: HOSPITAL | Age: 72
End: 2022-05-26

## 2022-05-26 NOTE — OUTREACH NOTE
COPD/PN Week 1 Survey    Flowsheet Row Responses   [facility] patient discharged from? [facility]   Does the patient have one of the following disease processes/diagnoses(primary or secondary)? COPD/Pneumonia   Was the primary reason for admission: COPD exacerbation   Week 1 attempt successful? Yes   Call start time 1148   Call end time 1150   Discharge diagnosis Acute COPD exacerbation   Meds reviewed with patient/caregiver? Yes   Is the patient having any side effects they believe may be caused by any medication additions or changes? No   Does the patient have all medications ordered at discharge? Yes   Is the patient taking all medications as directed (includes completed medication regime)? Yes   Does the patient have a primary care provider?  Yes   Comments regarding PCP Will call   Has the patient kept scheduled appointments due by today? N/A   Has home health visited the patient within 72 hours of discharge? N/A   What DME was ordered? [facility]- [employer] already established   Pulse Ox monitoring None   Psychosocial issues? No   Did the patient receive a copy of their discharge instructions? Yes   Nursing interventions Reviewed instructions with patient   What is the patient's perception of their health status since discharge? Improving   Nursing Interventions Nurse provided patient education   Is the patient/caregiver able to teach back the hierarchy of who to call/visit for symptoms/problems? PCP, Specialist, Home health nurse, Urgent Care, ED, 911 Yes   Week 1 call completed? Yes   Wrap up additional comments Call short Pt reports he has meds and is feeling better. Encouraged pt to call PCP for appt. PT LYNN JHA - Registered Nurse

## 2022-06-14 ENCOUNTER — APPOINTMENT (OUTPATIENT)
Dept: GENERAL RADIOLOGY | Facility: HOSPITAL | Age: 72
End: 2022-06-14

## 2022-06-14 ENCOUNTER — HOSPITAL ENCOUNTER (INPATIENT)
Facility: HOSPITAL | Age: 72
LOS: 2 days | Discharge: HOME | End: 2022-06-16
Attending: EMERGENCY MEDICINE | Admitting: FAMILY MEDICINE

## 2022-06-14 DIAGNOSIS — J44.1 COPD EXACERBATION: ICD-10-CM

## 2022-06-14 DIAGNOSIS — J44.1 ACUTE EXACERBATION OF CHRONIC OBSTRUCTIVE PULMONARY DISEASE (COPD): Primary | ICD-10-CM

## 2022-06-14 DIAGNOSIS — J96.22 ACUTE ON CHRONIC RESPIRATORY FAILURE WITH HYPOXIA AND HYPERCAPNIA: ICD-10-CM

## 2022-06-14 DIAGNOSIS — J96.21 ACUTE ON CHRONIC RESPIRATORY FAILURE WITH HYPOXIA AND HYPERCAPNIA: ICD-10-CM

## 2022-06-14 LAB
ALBUMIN SERPL-MCNC: 4 G/DL (ref 3.5–5.2)
ALBUMIN/GLOB SERPL: 1.3 G/DL
ALP SERPL-CCNC: 85 U/L (ref 39–117)
ALT SERPL W P-5'-P-CCNC: 12 U/L (ref 1–41)
ANION GAP SERPL CALCULATED.3IONS-SCNC: 12.4 MMOL/L (ref 5–15)
ARTERIAL PATENCY WRIST A: POSITIVE
AST SERPL-CCNC: 15 U/L (ref 1–40)
BASE EXCESS BLDA CALC-SCNC: -2.1 MMOL/L (ref -2–2)
BASOPHILS # BLD AUTO: 0.06 10*3/MM3 (ref 0–0.2)
BASOPHILS NFR BLD AUTO: 0.6 % (ref 0–1.5)
BDY SITE: ABNORMAL
BILIRUB SERPL-MCNC: 0.3 MG/DL (ref 0–1.2)
BUN SERPL-MCNC: 16 MG/DL (ref 8–23)
BUN/CREAT SERPL: 16.3 (ref 7–25)
CA-I BLDA-SCNC: 1.16 MMOL/L (ref 1.13–1.32)
CALCIUM SPEC-SCNC: 8.8 MG/DL (ref 8.6–10.5)
CHLORIDE BLDA-SCNC: 104 MMOL/L (ref 98–106)
CHLORIDE SERPL-SCNC: 102 MMOL/L (ref 98–107)
CO2 SERPL-SCNC: 25.6 MMOL/L (ref 22–29)
COHGB MFR BLD: 0.8 % (ref 0–1.5)
CREAT SERPL-MCNC: 0.98 MG/DL (ref 0.76–1.27)
D-LACTATE SERPL-SCNC: 1.4 MMOL/L (ref 0.5–2)
DEPRECATED RDW RBC AUTO: 49.8 FL (ref 37–54)
EGFRCR SERPLBLD CKD-EPI 2021: 81.4 ML/MIN/1.73
EOSINOPHIL # BLD AUTO: 1.43 10*3/MM3 (ref 0–0.4)
EOSINOPHIL NFR BLD AUTO: 14 % (ref 0.3–6.2)
ERYTHROCYTE [DISTWIDTH] IN BLOOD BY AUTOMATED COUNT: 15.8 % (ref 12.3–15.4)
FHHB: 7.3 % (ref 0–5)
FLUAV AG NPH QL: NEGATIVE
FLUBV AG NPH QL IA: NEGATIVE
GAS FLOW AIRWAY: ABNORMAL L/MIN
GLOBULIN UR ELPH-MCNC: 3.2 GM/DL
GLUCOSE BLDA-MCNC: 141 MMOL/L (ref 70–99)
GLUCOSE BLDC GLUCOMTR-MCNC: 259 MG/DL (ref 70–99)
GLUCOSE SERPL-MCNC: 142 MG/DL (ref 65–99)
HCO3 BLDA-SCNC: 26.8 MMOL/L (ref 22–26)
HCT VFR BLD AUTO: 43.4 % (ref 37.5–51)
HGB BLD-MCNC: 12.6 G/DL (ref 13–17.7)
HGB BLDA-MCNC: 13.5 G/DL (ref 13.8–16.4)
HOLD SPECIMEN: NORMAL
HOLD SPECIMEN: NORMAL
IMM GRANULOCYTES # BLD AUTO: 0.06 10*3/MM3 (ref 0–0.05)
IMM GRANULOCYTES NFR BLD AUTO: 0.6 % (ref 0–0.5)
INHALED O2 CONCENTRATION: 30 %
LACTATE BLDA-SCNC: 1.37 MMOL/L (ref 0.5–2)
LYMPHOCYTES # BLD AUTO: 1.69 10*3/MM3 (ref 0.7–3.1)
LYMPHOCYTES NFR BLD AUTO: 16.6 % (ref 19.6–45.3)
MCH RBC QN AUTO: 25.3 PG (ref 26.6–33)
MCHC RBC AUTO-ENTMCNC: 29 G/DL (ref 31.5–35.7)
MCV RBC AUTO: 87 FL (ref 79–97)
METHGB BLD QL: 0.1 % (ref 0–1.5)
MODALITY: ABNORMAL
MONOCYTES # BLD AUTO: 0.85 10*3/MM3 (ref 0.1–0.9)
MONOCYTES NFR BLD AUTO: 8.3 % (ref 5–12)
NEUTROPHILS NFR BLD AUTO: 59.9 % (ref 42.7–76)
NEUTROPHILS NFR BLD AUTO: 6.11 10*3/MM3 (ref 1.7–7)
NOTE: ABNORMAL
NRBC BLD AUTO-RTO: 0 /100 WBC (ref 0–0.2)
NT-PROBNP SERPL-MCNC: 155.9 PG/ML (ref 0–900)
OXYHGB MFR BLDV: 91.8 % (ref 94–99)
PCO2 BLDA: 65.5 MM HG (ref 35–45)
PH BLDA: 7.23 PH UNITS (ref 7.35–7.45)
PLATELET # BLD AUTO: 156 10*3/MM3 (ref 140–450)
PMV BLD AUTO: 11.4 FL (ref 6–12)
PO2 BLD: 246 MM[HG] (ref 0–500)
PO2 BLDA: 73.8 MM HG (ref 80–100)
POTASSIUM BLDA-SCNC: 4.49 MMOL/L (ref 3.5–5)
POTASSIUM SERPL-SCNC: 4.6 MMOL/L (ref 3.5–5.2)
PROT SERPL-MCNC: 7.2 G/DL (ref 6–8.5)
QT INTERVAL: 364 MS
RBC # BLD AUTO: 4.99 10*6/MM3 (ref 4.14–5.8)
SAO2 % BLDCOA: 92.6 % (ref 95–99)
SARS-COV-2 RNA PNL SPEC NAA+PROBE: NOT DETECTED
SODIUM BLDA-SCNC: 136.9 MMOL/L (ref 136–146)
SODIUM SERPL-SCNC: 140 MMOL/L (ref 136–145)
TROPONIN T SERPL-MCNC: 0.01 NG/ML (ref 0–0.03)
WBC NRBC COR # BLD: 10.2 10*3/MM3 (ref 3.4–10.8)
WHOLE BLOOD HOLD COAG: NORMAL
WHOLE BLOOD HOLD SPECIMEN: NORMAL

## 2022-06-14 PROCEDURE — 94640 AIRWAY INHALATION TREATMENT: CPT

## 2022-06-14 PROCEDURE — 94799 UNLISTED PULMONARY SVC/PX: CPT

## 2022-06-14 PROCEDURE — 82962 GLUCOSE BLOOD TEST: CPT

## 2022-06-14 PROCEDURE — 94660 CPAP INITIATION&MGMT: CPT

## 2022-06-14 PROCEDURE — 25010000002 METHYLPREDNISOLONE PER 125 MG: Performed by: EMERGENCY MEDICINE

## 2022-06-14 PROCEDURE — 36600 WITHDRAWAL OF ARTERIAL BLOOD: CPT | Performed by: EMERGENCY MEDICINE

## 2022-06-14 PROCEDURE — 93010 ELECTROCARDIOGRAM REPORT: CPT | Performed by: INTERNAL MEDICINE

## 2022-06-14 PROCEDURE — 83880 ASSAY OF NATRIURETIC PEPTIDE: CPT | Performed by: EMERGENCY MEDICINE

## 2022-06-14 PROCEDURE — 99223 1ST HOSP IP/OBS HIGH 75: CPT | Performed by: HOSPITALIST

## 2022-06-14 PROCEDURE — 83605 ASSAY OF LACTIC ACID: CPT | Performed by: EMERGENCY MEDICINE

## 2022-06-14 PROCEDURE — 63710000001 INSULIN DETEMIR PER 5 UNITS: Performed by: HOSPITALIST

## 2022-06-14 PROCEDURE — 25010000002 ENOXAPARIN PER 10 MG: Performed by: HOSPITALIST

## 2022-06-14 PROCEDURE — 83050 HGB METHEMOGLOBIN QUAN: CPT | Performed by: EMERGENCY MEDICINE

## 2022-06-14 PROCEDURE — 82805 BLOOD GASES W/O2 SATURATION: CPT | Performed by: EMERGENCY MEDICINE

## 2022-06-14 PROCEDURE — 36415 COLL VENOUS BLD VENIPUNCTURE: CPT

## 2022-06-14 PROCEDURE — 85025 COMPLETE CBC W/AUTO DIFF WBC: CPT | Performed by: EMERGENCY MEDICINE

## 2022-06-14 PROCEDURE — 25010000002 CEFTRIAXONE PER 250 MG: Performed by: HOSPITALIST

## 2022-06-14 PROCEDURE — 99284 EMERGENCY DEPT VISIT MOD MDM: CPT

## 2022-06-14 PROCEDURE — 84484 ASSAY OF TROPONIN QUANT: CPT | Performed by: EMERGENCY MEDICINE

## 2022-06-14 PROCEDURE — 84145 PROCALCITONIN (PCT): CPT | Performed by: INTERNAL MEDICINE

## 2022-06-14 PROCEDURE — 93005 ELECTROCARDIOGRAM TRACING: CPT | Performed by: EMERGENCY MEDICINE

## 2022-06-14 PROCEDURE — 80053 COMPREHEN METABOLIC PANEL: CPT | Performed by: EMERGENCY MEDICINE

## 2022-06-14 PROCEDURE — 25010000002 AZITHROMYCIN PER 500 MG: Performed by: HOSPITALIST

## 2022-06-14 PROCEDURE — 87804 INFLUENZA ASSAY W/OPTIC: CPT | Performed by: EMERGENCY MEDICINE

## 2022-06-14 PROCEDURE — U0004 COV-19 TEST NON-CDC HGH THRU: HCPCS | Performed by: EMERGENCY MEDICINE

## 2022-06-14 PROCEDURE — 82375 ASSAY CARBOXYHB QUANT: CPT | Performed by: EMERGENCY MEDICINE

## 2022-06-14 PROCEDURE — 71045 X-RAY EXAM CHEST 1 VIEW: CPT

## 2022-06-14 PROCEDURE — 87040 BLOOD CULTURE FOR BACTERIA: CPT | Performed by: EMERGENCY MEDICINE

## 2022-06-14 RX ORDER — ATORVASTATIN CALCIUM 10 MG/1
10 TABLET, FILM COATED ORAL NIGHTLY
Status: DISCONTINUED | OUTPATIENT
Start: 2022-06-14 | End: 2022-06-16 | Stop reason: HOSPADM

## 2022-06-14 RX ORDER — GUAIFENESIN 600 MG/1
1200 TABLET, EXTENDED RELEASE ORAL EVERY 12 HOURS SCHEDULED
Status: DISCONTINUED | OUTPATIENT
Start: 2022-06-14 | End: 2022-06-16 | Stop reason: HOSPADM

## 2022-06-14 RX ORDER — METHYLPREDNISOLONE SODIUM SUCCINATE 125 MG/2ML
125 INJECTION, POWDER, LYOPHILIZED, FOR SOLUTION INTRAMUSCULAR; INTRAVENOUS ONCE
Status: COMPLETED | OUTPATIENT
Start: 2022-06-14 | End: 2022-06-14

## 2022-06-14 RX ORDER — IPRATROPIUM BROMIDE AND ALBUTEROL SULFATE 2.5; .5 MG/3ML; MG/3ML
3 SOLUTION RESPIRATORY (INHALATION) ONCE
Status: COMPLETED | OUTPATIENT
Start: 2022-06-14 | End: 2022-06-14

## 2022-06-14 RX ORDER — ENOXAPARIN SODIUM 100 MG/ML
40 INJECTION SUBCUTANEOUS EVERY 24 HOURS
Status: DISCONTINUED | OUTPATIENT
Start: 2022-06-14 | End: 2022-06-16 | Stop reason: HOSPADM

## 2022-06-14 RX ORDER — ONDANSETRON 2 MG/ML
4 INJECTION INTRAMUSCULAR; INTRAVENOUS EVERY 6 HOURS PRN
Status: DISCONTINUED | OUTPATIENT
Start: 2022-06-14 | End: 2022-06-16 | Stop reason: HOSPADM

## 2022-06-14 RX ORDER — INSULIN LISPRO 100 [IU]/ML
0-14 INJECTION, SOLUTION INTRAVENOUS; SUBCUTANEOUS
Status: DISCONTINUED | OUTPATIENT
Start: 2022-06-15 | End: 2022-06-16 | Stop reason: HOSPADM

## 2022-06-14 RX ORDER — SODIUM CHLORIDE 0.9 % (FLUSH) 0.9 %
10 SYRINGE (ML) INJECTION EVERY 12 HOURS SCHEDULED
Status: DISCONTINUED | OUTPATIENT
Start: 2022-06-14 | End: 2022-06-16 | Stop reason: HOSPADM

## 2022-06-14 RX ORDER — NITROGLYCERIN 0.4 MG/1
0.4 TABLET SUBLINGUAL
Status: DISCONTINUED | OUTPATIENT
Start: 2022-06-14 | End: 2022-06-16 | Stop reason: HOSPADM

## 2022-06-14 RX ORDER — NICOTINE POLACRILEX 4 MG
24 LOZENGE BUCCAL
Status: DISCONTINUED | OUTPATIENT
Start: 2022-06-14 | End: 2022-06-16 | Stop reason: HOSPADM

## 2022-06-14 RX ORDER — BISACODYL 10 MG
10 SUPPOSITORY, RECTAL RECTAL DAILY PRN
Status: DISCONTINUED | OUTPATIENT
Start: 2022-06-14 | End: 2022-06-16 | Stop reason: HOSPADM

## 2022-06-14 RX ORDER — HYDROCODONE BITARTRATE AND ACETAMINOPHEN 5; 325 MG/1; MG/1
1 TABLET ORAL EVERY 4 HOURS PRN
Status: DISCONTINUED | OUTPATIENT
Start: 2022-06-14 | End: 2022-06-16 | Stop reason: HOSPADM

## 2022-06-14 RX ORDER — ARFORMOTEROL TARTRATE 15 UG/2ML
15 SOLUTION RESPIRATORY (INHALATION)
Status: DISCONTINUED | OUTPATIENT
Start: 2022-06-14 | End: 2022-06-16 | Stop reason: HOSPADM

## 2022-06-14 RX ORDER — DEXTROSE MONOHYDRATE 25 G/50ML
25 INJECTION, SOLUTION INTRAVENOUS
Status: DISCONTINUED | OUTPATIENT
Start: 2022-06-14 | End: 2022-06-16 | Stop reason: HOSPADM

## 2022-06-14 RX ORDER — LISINOPRIL 2.5 MG/1
2.5 TABLET ORAL DAILY
Status: DISCONTINUED | OUTPATIENT
Start: 2022-06-15 | End: 2022-06-16 | Stop reason: HOSPADM

## 2022-06-14 RX ORDER — SODIUM CHLORIDE 0.9 % (FLUSH) 0.9 %
10 SYRINGE (ML) INJECTION AS NEEDED
Status: DISCONTINUED | OUTPATIENT
Start: 2022-06-14 | End: 2022-06-16 | Stop reason: HOSPADM

## 2022-06-14 RX ORDER — BISACODYL 5 MG/1
5 TABLET, DELAYED RELEASE ORAL DAILY PRN
Status: DISCONTINUED | OUTPATIENT
Start: 2022-06-14 | End: 2022-06-16 | Stop reason: HOSPADM

## 2022-06-14 RX ORDER — ACETAMINOPHEN 325 MG/1
650 TABLET ORAL EVERY 4 HOURS PRN
Status: DISCONTINUED | OUTPATIENT
Start: 2022-06-14 | End: 2022-06-16 | Stop reason: HOSPADM

## 2022-06-14 RX ORDER — CHOLECALCIFEROL (VITAMIN D3) 125 MCG
5 CAPSULE ORAL NIGHTLY PRN
Status: DISCONTINUED | OUTPATIENT
Start: 2022-06-14 | End: 2022-06-16 | Stop reason: HOSPADM

## 2022-06-14 RX ORDER — ASPIRIN 81 MG/1
81 TABLET, CHEWABLE ORAL DAILY
Status: DISCONTINUED | OUTPATIENT
Start: 2022-06-15 | End: 2022-06-16 | Stop reason: HOSPADM

## 2022-06-14 RX ORDER — PANTOPRAZOLE SODIUM 40 MG/1
40 TABLET, DELAYED RELEASE ORAL EVERY MORNING
Status: DISCONTINUED | OUTPATIENT
Start: 2022-06-15 | End: 2022-06-16 | Stop reason: HOSPADM

## 2022-06-14 RX ORDER — CEFTRIAXONE SODIUM 1 G/50ML
1 INJECTION, SOLUTION INTRAVENOUS EVERY 24 HOURS
Status: DISCONTINUED | OUTPATIENT
Start: 2022-06-14 | End: 2022-06-16 | Stop reason: HOSPADM

## 2022-06-14 RX ORDER — BUDESONIDE 0.5 MG/2ML
0.5 INHALANT ORAL
Status: DISCONTINUED | OUTPATIENT
Start: 2022-06-14 | End: 2022-06-16 | Stop reason: HOSPADM

## 2022-06-14 RX ORDER — IPRATROPIUM BROMIDE AND ALBUTEROL SULFATE 2.5; .5 MG/3ML; MG/3ML
3 SOLUTION RESPIRATORY (INHALATION)
Status: DISCONTINUED | OUTPATIENT
Start: 2022-06-15 | End: 2022-06-16 | Stop reason: HOSPADM

## 2022-06-14 RX ORDER — ACETAMINOPHEN 160 MG/5ML
650 SOLUTION ORAL EVERY 4 HOURS PRN
Status: DISCONTINUED | OUTPATIENT
Start: 2022-06-14 | End: 2022-06-16 | Stop reason: HOSPADM

## 2022-06-14 RX ORDER — AMOXICILLIN 250 MG
2 CAPSULE ORAL 2 TIMES DAILY
Status: DISCONTINUED | OUTPATIENT
Start: 2022-06-14 | End: 2022-06-16 | Stop reason: HOSPADM

## 2022-06-14 RX ORDER — POLYETHYLENE GLYCOL 3350 17 G/17G
17 POWDER, FOR SOLUTION ORAL DAILY PRN
Status: DISCONTINUED | OUTPATIENT
Start: 2022-06-14 | End: 2022-06-16 | Stop reason: HOSPADM

## 2022-06-14 RX ORDER — METHYLPREDNISOLONE SODIUM SUCCINATE 40 MG/ML
40 INJECTION, POWDER, LYOPHILIZED, FOR SOLUTION INTRAMUSCULAR; INTRAVENOUS EVERY 24 HOURS
Status: DISCONTINUED | OUTPATIENT
Start: 2022-06-15 | End: 2022-06-16

## 2022-06-14 RX ORDER — ACETAMINOPHEN 650 MG/1
650 SUPPOSITORY RECTAL EVERY 4 HOURS PRN
Status: DISCONTINUED | OUTPATIENT
Start: 2022-06-14 | End: 2022-06-16 | Stop reason: HOSPADM

## 2022-06-14 RX ORDER — ONDANSETRON 4 MG/1
4 TABLET, FILM COATED ORAL EVERY 6 HOURS PRN
Status: DISCONTINUED | OUTPATIENT
Start: 2022-06-14 | End: 2022-06-16 | Stop reason: HOSPADM

## 2022-06-14 RX ORDER — ALUMINA, MAGNESIA, AND SIMETHICONE 2400; 2400; 240 MG/30ML; MG/30ML; MG/30ML
15 SUSPENSION ORAL EVERY 6 HOURS PRN
Status: DISCONTINUED | OUTPATIENT
Start: 2022-06-14 | End: 2022-06-16 | Stop reason: HOSPADM

## 2022-06-14 RX ADMIN — GUAIFENESIN 1200 MG: 600 TABLET ORAL at 21:01

## 2022-06-14 RX ADMIN — ENOXAPARIN SODIUM 40 MG: 100 INJECTION SUBCUTANEOUS at 21:04

## 2022-06-14 RX ADMIN — INSULIN DETEMIR 15 UNITS: 100 INJECTION, SOLUTION SUBCUTANEOUS at 22:42

## 2022-06-14 RX ADMIN — CEFTRIAXONE SODIUM 1 G: 1 INJECTION, SOLUTION INTRAVENOUS at 22:27

## 2022-06-14 RX ADMIN — METHYLPREDNISOLONE SODIUM SUCCINATE 125 MG: 125 INJECTION, POWDER, FOR SOLUTION INTRAMUSCULAR; INTRAVENOUS at 17:03

## 2022-06-14 RX ADMIN — IPRATROPIUM BROMIDE AND ALBUTEROL SULFATE 3 ML: .5; 3 SOLUTION RESPIRATORY (INHALATION) at 16:42

## 2022-06-14 RX ADMIN — SENNOSIDES AND DOCUSATE SODIUM 2 TABLET: 50; 8.6 TABLET ORAL at 21:01

## 2022-06-14 RX ADMIN — ATORVASTATIN CALCIUM 10 MG: 10 TABLET, FILM COATED ORAL at 21:01

## 2022-06-14 RX ADMIN — AZITHROMYCIN 500 MG: 500 INJECTION, POWDER, LYOPHILIZED, FOR SOLUTION INTRAVENOUS at 23:29

## 2022-06-14 RX ADMIN — METOPROLOL TARTRATE 25 MG: 25 TABLET, FILM COATED ORAL at 21:01

## 2022-06-14 RX ADMIN — Medication 10 ML: at 23:31

## 2022-06-14 RX ADMIN — ARFORMOTEROL TARTRATE 15 MCG: 15 SOLUTION RESPIRATORY (INHALATION) at 22:20

## 2022-06-14 RX ADMIN — BUDESONIDE 0.5 MG: 0.5 SUSPENSION RESPIRATORY (INHALATION) at 22:20

## 2022-06-14 NOTE — ED PROVIDER NOTES
Time: 4:24 PM EDT  Arrived by: ambulance  Chief Complaint: SOA  History provided by: EMS  History is limited by: Acuity of patient's condition      History of Present Illness:  Patient is a 72 y.o. male that presents to the emergency department with SOA for 1 day. Per EMS, pt was 79% O2 sat on 2 L O2 at home. Pt also reports mild anterior CP, significant wheezing, productive yellow-green cough. He denies fevers. EMS administered a breathing treatment en route. Pt was put on bipap upon arrival. He sick contacts. He has not had the covid vaccine.       History provided by:  EMS personnel  History limited by:  Acuity of condition   used: No        Similar Symptoms Previously: Yes, Hx of COPD  Recently seen: Yes      Patient Care Team  Primary Care Provider: Rosalba Edwards APRN    Past Medical History:     No Known Allergies  Past Medical History:   Diagnosis Date   • Asthma    • COPD (chronic obstructive pulmonary disease) (HCC)    • Diabetes mellitus (HCC)    • Hernia, umbilical    • Hypertension    • Requires continuous at home supplemental oxygen      Past Surgical History:   Procedure Laterality Date   • ABDOMINAL SURGERY       History reviewed. No pertinent family history.    Home Medications:  Prior to Admission medications    Medication Sig Start Date End Date Taking? Authorizing Provider   albuterol sulfate  (90 Base) MCG/ACT inhaler Inhale 2 puffs Every 4 (Four) Hours As Needed for Wheezing.    ProviderRoosevelt MD   aspirin 81 MG chewable tablet Chew 81 mg Daily.    ProviderRoosevelt MD   atorvastatin (LIPITOR) 10 MG tablet Take 10 mg by mouth Daily.    ProviderRoosevelt MD   budesonide-formoterol (Symbicort) 160-4.5 MCG/ACT inhaler Inhale 2 puffs 2 (Two) Times a Day for 30 days. 3/21/22 4/20/22  Sigifredo Mendes MD   fluticasone-salmeterol (Advair Diskus) 250-50 MCG/DOSE DISKUS Inhale 1 puff 2 (Two) Times a Day for 30 days. 1/13/22 2/12/22  Arthur Arzola DO  "  insulin degludec (Tresiba FlexTouch) 100 UNIT/ML solution pen-injector injection Inject 17 Units under the skin into the appropriate area as directed Daily.    ProviderRoosevelt MD   ipratropium-albuterol (DUO-NEB) 0.5-2.5 mg/3 ml nebulizer Take 3 mL by nebulization Every 4 (Four) Hours As Needed for Wheezing or Shortness of Air. 21   Ricci Trinidad DO   lisinopril (PRINIVIL,ZESTRIL) 2.5 MG tablet Take 2.5 mg by mouth Daily.    ProviderRoosevelt MD   metFORMIN (GLUCOPHAGE) 500 MG tablet Take 500 mg by mouth 2 (two) times a day. 21   Roosevelt Gaston MD   metoprolol tartrate (LOPRESSOR) 25 MG tablet Take 25 mg by mouth 2 (two) times a day. 21   Roosevelt Gaston MD   omeprazole (priLOSEC) 20 MG capsule Take 20 mg by mouth Daily.    Roosevelt Gaston MD   ondansetron ODT (ZOFRAN-ODT) 8 MG disintegrating tablet Place 1 tablet on the tongue Every 8 (Eight) Hours As Needed for Nausea or Vomiting. 22   Alexi Mcleod DO        Social History:   Social History     Tobacco Use   • Smoking status: Former Smoker     Types: Cigarettes     Quit date: 3/19/2021     Years since quittin.2   • Smokeless tobacco: Never Used   Vaping Use   • Vaping Use: Never used   Substance Use Topics   • Alcohol use: Never   • Drug use: Never     Recent travel: not applicable     Review of Systems:  Review of Systems   Unable to perform ROS: Acuity of condition        Physical Exam:  /68   Pulse 93   Temp 98.8 °F (37.1 °C) (Oral)   Resp (!) 29   Ht 185.4 cm (73\")   Wt 115 kg (253 lb 8.5 oz)   SpO2 95%   BMI 33.45 kg/m²     Physical Exam   General: Awake alert and in moderate respiratory distress. Slightly lethargic. On bipap.      HEENT: Head normocephalic atraumatic, eyes PERRLA EOMI, nose normal, oropharynx normal.     Neck: Supple full range of motion, no meningismus, no lymphadenopathy     Heart: Tachycardic. Regular rhythm, no murmurs or rubs, 2+ radial pulses bilaterally     Lungs: " Moderate respiratory distress. Audible moderate to severe wheezing bilaterally. No crackles.      Abdomen: Soft, nontender, nondistended, no rebound or guarding     Skin: Warm, dry, no rash     Musculoskeletal: Normal range of motion, trace pitting edema BLE, no calf tenderness      Neurologic: Oriented x3, no motor deficits no sensory deficits     Psychiatric: Mood appears stable, no psychosis        Medications in the Emergency Department:  Medications   sodium chloride 0.9 % flush 10 mL (has no administration in time range)   ipratropium-albuterol (DUO-NEB) nebulizer solution 3 mL (3 mL Nebulization Given 6/14/22 1642)   ipratropium-albuterol (DUO-NEB) nebulizer solution 3 mL (3 mL Nebulization Given 6/14/22 1642)   methylPREDNISolone sodium succinate (SOLU-Medrol) injection 125 mg (125 mg Intravenous Given 6/14/22 1703)        Labs  Lab Results (last 24 hours)     Procedure Component Value Units Date/Time    CBC & Differential [[id number]]  (Abnormal) Collected: 06/14/22 1625    Specimen: Blood Updated: 06/14/22 1637    Narrative:      The following orders were created for panel order CBC & Differential.  Procedure                               Abnormality         Status                     ---------                               -----------         ------                     CBC Auto Differential[[id number]]        Abnormal            Final result               Scan Slide[[id number]]                                                                    Please view results for these tests on the individual orders.    Comprehensive Metabolic Panel [[id number]]  (Abnormal) Collected: 06/14/22 1625    Specimen: Blood Updated: 06/14/22 1653     Glucose 142 mg/dL      BUN 16 mg/dL      Creatinine 0.98 mg/dL      Sodium 140 mmol/L      Potassium 4.6 mmol/L      Chloride 102 mmol/L      CO2 25.6 mmol/L      Calcium 8.8 mg/dL      Total Protein 7.2 g/dL      Albumin 4.00 g/dL      ALT (SGPT) 12 U/L      AST (SGOT) 15 U/L       Alkaline Phosphatase 85 U/L      Total Bilirubin 0.3 mg/dL      Globulin 3.2 gm/dL      A/G Ratio 1.3 g/dL      BUN/Creatinine Ratio 16.3     Anion Gap 12.4 mmol/L      eGFR 81.4 mL/min/1.73      Comment: National Kidney Foundation and American Society of Nephrology (ASN) Task Force recommended calculation based on the Chronic Kidney Disease Epidemiology Collaboration (CKD-EPI) equation refit without adjustment for race.       Narrative:      GFR Normal >60  Chronic Kidney Disease <60  Kidney Failure <15      BNP [[id number]]  (Normal) Collected: 06/14/22 1625    Specimen: Blood Updated: 06/14/22 1651     proBNP 155.9 pg/mL     Narrative:      Among patients with dyspnea, NT-proBNP is highly sensitive for the detection of acute congestive heart failure. In addition NT-proBNP of <300 pg/ml effectively rules out acute congestive heart failure with 99% negative predictive value.    Results may be falsely decreased if patient taking Biotin.      Troponin [[id number]]  (Normal) Collected: 06/14/22 1625    Specimen: Blood Updated: 06/14/22 1653     Troponin T 0.012 ng/mL     Narrative:      Troponin T Reference Range:  <= 0.03 ng/mL-   Negative for AMI  >0.03 ng/mL-     Abnormal for myocardial necrosis.  Clinicians would have to utilize clinical acumen, EKG, Troponin and serial changes to determine if it is an Acute Myocardial Infarction or myocardial injury due to an underlying chronic condition.       Results may be falsely decreased if patient taking Biotin.      CBC Auto Differential [[id number]]  (Abnormal) Collected: 06/14/22 1625    Specimen: Blood Updated: 06/14/22 1637     WBC 10.20 10*3/mm3      RBC 4.99 10*6/mm3      Hemoglobin 12.6 g/dL      Hematocrit 43.4 %      MCV 87.0 fL      MCH 25.3 pg      MCHC 29.0 g/dL      RDW 15.8 %      RDW-SD 49.8 fl      MPV 11.4 fL      Platelets 156 10*3/mm3      Neutrophil % 59.9 %      Lymphocyte % 16.6 %      Monocyte % 8.3 %      Eosinophil % 14.0 %      Basophil % 0.6 %       Immature Grans % 0.6 %      Neutrophils, Absolute 6.11 10*3/mm3      Lymphocytes, Absolute 1.69 10*3/mm3      Monocytes, Absolute 0.85 10*3/mm3      Eosinophils, Absolute 1.43 10*3/mm3      Basophils, Absolute 0.06 10*3/mm3      Immature Grans, Absolute 0.06 10*3/mm3      nRBC 0.0 /100 WBC     Lactic Acid, Plasma [[id number]]  (Normal) Collected: 06/14/22 1625    Specimen: Blood Updated: 06/14/22 1649     Lactate 1.4 mmol/L     ABG with Co-Ox and Electrolytes [[id number]]  (Abnormal) Collected: 06/14/22 1632    Specimen: Arterial Blood from Arm, Left Updated: 06/14/22 1634     pH, Arterial 7.230 pH units      pCO2, Arterial 65.5 mm Hg      pO2, Arterial 73.8 mm Hg      HCO3, Arterial 26.8 mmol/L      Base Excess, Arterial -2.1 mmol/L      O2 Saturation, Arterial 92.6 %      Hemoglobin, Blood Gas 13.5 g/dL      Carboxyhemoglobin 0.8 %      Methemoglobin 0.10 %      Oxyhemoglobin 91.8 %      FHHB 7.3 %      Nael's Test Positive     Note 12/6 r4      Site Arterial: left radial     Modality BiPap     FIO2 30 %      Flow Rate --     Sodium, Arterial 136.9 mmol/L      Potassium, Arterial 4.49 mmol/L      Ionized Calcium, Arterial 1.16 mmol/L      Chloride, Arterial 104 mmol/L      Glucose, Arterial 141 mmol/L      Lactate, Arterial 1.37 mmol/L      PO2/FIO2 246    Blood Culture - Blood, Arm, Left [[id number]] Collected: 06/14/22 1640    Specimen: Blood from Arm, Left Updated: 06/14/22 1643    Blood Culture - Blood, Arm, Left [[id number]] Collected: 06/14/22 1640    Specimen: Blood from Arm, Left Updated: 06/14/22 1643    COVID PRE-OP / PRE-PROCEDURE SCREENING ORDER (NO ISOLATION) - Swab, Nasopharynx [[id number]] Collected: 06/14/22 1728    Specimen: Swab from Nasopharynx Updated: 06/14/22 2504    Narrative:      The following orders were created for panel order COVID PRE-OP / PRE-PROCEDURE SCREENING ORDER (NO ISOLATION) - Swab, Nasopharynx.  Procedure                               Abnormality         Status                      ---------                               -----------         ------                     COVID-19,APTIMA PANTHER(...[[id number]]                      In process                   Please view results for these tests on the individual orders.    COVID-19,APTIMA PANTHER(SRAVANTHI), GUERA/ CARMEN, NP/OP SWAB IN UTM/VTM/[facility]ALINE TRANSPORT MEDIA,24 HR TAT - Swab, Nasopharynx [[id number]] Collected: 06/14/22 1728    Specimen: Swab from Nasopharynx Updated: 06/14/22 1734    Influenza Antigen, Rapid - Swab, Nasopharynx [[id number]]  (Normal) Collected: 06/14/22 1728    Specimen: Swab from Nasopharynx Updated: 06/14/22 1759     Influenza A Ag, EIA Negative     Influenza B Ag, EIA Negative           Imaging:  XR Chest 1 View    Result Date: 6/14/2022  PROCEDURE: XR CHEST 1 VW  COMPARISON: [facility], CT, CT CHEST WO CONTRAST DIAGNOSTIC, 5/21/2022, 13:56.  [facility], CR, XR CHEST 1 VW, 5/20/2022, 18:25.  INDICATIONS: short of breath, copd, wheezing  FINDINGS:  The patient is rotated.  The heart is not enlarged.  It looks like there may be some chronic parenchymal pleural changes in the left lower chest and chronic changes within right basilar area.  The upper lung zones seem clear.        1. Chronic appearing changes in the lower lungs.       ADA PERALES MD       Electronically Signed and Approved By: ADA PERALES MD on 6/14/2022 at 16:50               Procedures:  Procedures    EKG:    Rhythm: Sinus tach  Rate: 101  Intervals: RBBB, left anterior fascicular block  Waves: 1st degree AV block, Normal T waves, Q waves in inferior leads  ST Segment: No ST elevation. Some artifact present.     EKG Comparison: Unchanged from 5/22    Interpreted by me      Progress                            Medical Decision Making:  MDM  Number of Diagnoses or Management Options     Amount and/or Complexity of Data Reviewed  Clinical lab tests: reviewed  Tests in the radiology section of CPT®: reviewed  Tests in the medicine  section of CPT®: reviewed    Critical Care  Total time providing critical care: 30-74 minutes (I spent greater than 35 minutes in critical care for this patient, excluding any time spent on any billable procedures.    I reviewed the blood work and vital signs including pulse oximetry, cardiac output, chest x-ray and EKG.    I gave him DuoNeb breathing treatments and supplemental oxygen here and IV steroids for COPD exacerbation.    I reviewed his ABG interpreting as respiratory acidosis and acute on chronic hypoxemia, and started on BiPAP for ventilatory support.    I reassessed the patient at the bedside and he showed signs of improvement on BiPAP, and I consulted the admitting hospitalist physician to be involved in this patient's medical care.)               Differential diagnosis for this patient with dyspnea includes asthma or COPD exacerbation, CHF exacerbation, bronchitis or pneumonia, pneumothorax, pleural effusion or pulmonary edema, or less likely MI or PE.        This patient is a pleasant 72-year-old male with history of oxygen dependent COPD now presenting by ambulance for worsening shortness of breath, wheezing, coughing, and sats in the 70s per EMS on his home 2 L of oxygen.    He has already been given 1 DuoNeb breathing treatment prior to arrival by EMS, and I ordered 2 more DuoNeb breathing treatments here for what appears to be acute COPD exacerbation.    I also gave him IV Solu-Medrol here and started him on BiPAP for ventilatory support after his ABG showed some acute hypoxic and hypercapnic respiratory failure.    Chest x-ray shows no obvious infiltrate and he is not febrile nor any white count and I do not think he has bacterial pneumonia.    We will also test for COVID.    He will need to be admitted for further stabilization for his COPD exacerbation and hypoxia.      Final diagnoses:   Acute exacerbation of chronic obstructive pulmonary disease (COPD) (HCC)   Acute on chronic respiratory  failure with hypoxia and hypercapnia (HCC)        Disposition:  ED Disposition     ED Disposition   Decision to Admit    Condition   --    Comment   Level of Care: Telemetry [5]   Diagnosis: Acute exacerbation of chronic obstructive pulmonary disease (COPD) (HCC) [605595]   Admitting Physician: NELSON ORTEGA [[id number]]   Attending Physician: NELSON ORTEGA [[id number]]   Certification: I Certify That Inpatient Hospital Services Are Medically Necessary For Greater Than 2 Midnights               Documentation assistance provided by Doron Almaguer acting as scribe for Dr. Jason Elam MD. Information recorded by the scribe was done at my direction and has been verified and validated by me.          Doron Almaguer  06/14/22 4721       Jason Elam MD  06/14/22 4255

## 2022-06-14 NOTE — H&P
[facility]  Date: 2022   Patient Name: Dilip Faulkner  : 1950  MRN: [id number]  Primary Care Physician:  Rosalba Edwards, MONE  Date of admission: 2022    Subjective Short of breath  Subjective     Chief Complaint: Short of breath    HPI: Patient is a 72-year-old male who presents emergency room with 1 day of shortness of breath.  He was saturating 79% on 2 L of oxygen at home.  He reports some chest pain, significant wheezing, and productive yellowish-green cough.  He received a breathing treatment on route.  He has BiPAP on.  He has no sick contacts.  He has not had the COVID-vaccine.    History is limited because patient is on BiPAP.  Insistent that he wants to take it off.    Patient's temperature is 98.8, pulses 104, respiratory rate is 30, blood pressure is 170/87, and he is on NIV.    His chest x-ray shows chronic appearing lower lungs.    His ABG shows a pH of  7.2/65/73/26.  His troponin T is 0.012.  His proBNP is 155.9.  His lactic is 1.4.  Negative for influenza.  COVID test is pending.  He does not have leukocytosis.    Personal History     Past Medical History:  Past Medical History:   Diagnosis Date   • Asthma    • COPD (chronic obstructive pulmonary disease) (HCC)    • Diabetes mellitus (HCC)    • Hernia, umbilical    • Hypertension    • Requires continuous at home supplemental oxygen          Past Surgical History:  Past Surgical History:   Procedure Laterality Date   • ABDOMINAL SURGERY         Family History:   History reviewed. No pertinent family history.    Social History:   Social History     Tobacco Use   • Smoking status: Former Smoker     Types: Cigarettes     Quit date: 3/19/2021     Years since quittin.2   • Smokeless tobacco: Never Used   Vaping Use   • Vaping Use: Never used   Substance Use Topics   • Alcohol use: Never   • Drug use: Never       Home Medications:  Fluticasone-Salmeterol, albuterol sulfate HFA, aspirin,  atorvastatin, insulin degludec, ipratropium-albuterol, lisinopril, metFORMIN, metoprolol tartrate, and omeprazole    Allergies:  No Known Allergies    Review of Systems   All systems were reviewed and negative except for: Shortness of breath, wheezing, cough    Objective   Objective     Vitals:   Temp:  [98.8 °F (37.1 °C)] 98.8 °F (37.1 °C)  Heart Rate:  [] 93  Resp:  [28-30] 29  BP: (124-170)/(68-87) 124/68    Physical Exam    Constitutional: On BiPAP, somnolent   Eyes: Pupils equal, sclerae anicteric, no conjunctival injection   HENT: NCAT, mucous membranes moist   Neck: Supple, no thyromegaly, no lymphadenopathy, trachea midline   Respiratory: Wheezing   Cardiovascular: RRR, no murmurs, rubs, or gallops, palpable pedal pulses bilaterally   Gastrointestinal: Positive bowel sounds, soft, nontender, nondistended   Musculoskeletal: No bilateral ankle edema, no clubbing or cyanosis to extremities   Psychiatric: Appropriate affect, cooperative   Neurologic: Oriented x 3, strength symmetric in all extremities, Cranial Nerves grossly intact to confrontation, speech clear   Skin: No rashes     Result Review    Result Review:  I have personally reviewed the results from the time of this admission to 2022 18:39 EDT and agree with these findings:  [x]  Laboratory  []  Microbiology  [x]  Radiology  []  EKG/Telemetry   []  Cardiology/Vascular   []  Pathology  [x]  Old records  []  Other:      Assessment & Plan   Assessment / Plan   #1 COPD exacerbation with hypercapnic and hypoxic failure  -AB.2/65/73/26  -chest Xray:  chronic appearing lower lungs.  -On Home O2 or nebulizer on 2 L  -BMI: 33.45  -Started on antibiotics because of worsening dyspnea, sputum  -ARSH,SLOAN,inhaled steriod, mucinex, oral steroids  -Pulmonary hygiene  -Pulmonary consulted    #2 diabetes mellitus  -Basal dose plus insulin sliding scale    #3 hypertension  -Continue lisinopril, metoprolol,    #4 hyperlipidemia CAD  -Continue aspirin,  Lipitor    #5 GERD continue PPI    Low threshold to send to the ICU if he does not keep on BiPAP.    DVT prophylaxis:  No DVT prophylaxis order currently exists.    CODE STATUS:    Level Of Support Discussed With: Patient  Code Status (Patient has no pulse and is not breathing): CPR (Attempt to Resuscitate)  Medical Interventions (Patient has pulse or is breathing): Full Support      Admission Status:  I believe this patient meets inpatient status.    Electronically signed by Driss Miguel DO, 06/14/22, 6:39 PM EDT.

## 2022-06-14 NOTE — ED NOTES
This RN called to give report and 4th floor does not have nurse assigned for patient at this time. 4th floor stated they would call ED back for report. This RN to follow-up

## 2022-06-15 ENCOUNTER — APPOINTMENT (OUTPATIENT)
Dept: CT IMAGING | Facility: HOSPITAL | Age: 72
End: 2022-06-15

## 2022-06-15 LAB
GLUCOSE BLDC GLUCOMTR-MCNC: 117 MG/DL (ref 70–99)
GLUCOSE BLDC GLUCOMTR-MCNC: 258 MG/DL (ref 70–99)
GLUCOSE BLDC GLUCOMTR-MCNC: 291 MG/DL (ref 70–99)
L PNEUMO1 AG UR QL IA: NEGATIVE
PROCALCITONIN SERPL-MCNC: 0.07 NG/ML (ref 0–0.25)
S PNEUM AG SPEC QL LA: NEGATIVE

## 2022-06-15 PROCEDURE — 87205 SMEAR GRAM STAIN: CPT | Performed by: HOSPITALIST

## 2022-06-15 PROCEDURE — 87449 NOS EACH ORGANISM AG IA: CPT | Performed by: INTERNAL MEDICINE

## 2022-06-15 PROCEDURE — 71250 CT THORAX DX C-: CPT

## 2022-06-15 PROCEDURE — 25010000002 ENOXAPARIN PER 10 MG: Performed by: HOSPITALIST

## 2022-06-15 PROCEDURE — 94799 UNLISTED PULMONARY SVC/PX: CPT

## 2022-06-15 PROCEDURE — 25010000002 AZITHROMYCIN PER 500 MG: Performed by: HOSPITALIST

## 2022-06-15 PROCEDURE — 87070 CULTURE OTHR SPECIMN AEROBIC: CPT | Performed by: HOSPITALIST

## 2022-06-15 PROCEDURE — 99223 1ST HOSP IP/OBS HIGH 75: CPT | Performed by: INTERNAL MEDICINE

## 2022-06-15 PROCEDURE — 25010000002 CEFTRIAXONE PER 250 MG: Performed by: HOSPITALIST

## 2022-06-15 PROCEDURE — 63710000001 INSULIN LISPRO (HUMAN) PER 5 UNITS: Performed by: HOSPITALIST

## 2022-06-15 PROCEDURE — 87899 AGENT NOS ASSAY W/OPTIC: CPT | Performed by: INTERNAL MEDICINE

## 2022-06-15 PROCEDURE — 82962 GLUCOSE BLOOD TEST: CPT

## 2022-06-15 PROCEDURE — 94761 N-INVAS EAR/PLS OXIMETRY MLT: CPT

## 2022-06-15 PROCEDURE — 94660 CPAP INITIATION&MGMT: CPT

## 2022-06-15 PROCEDURE — 99233 SBSQ HOSP IP/OBS HIGH 50: CPT | Performed by: FAMILY MEDICINE

## 2022-06-15 PROCEDURE — 25010000002 METHYLPREDNISOLONE PER 40 MG: Performed by: HOSPITALIST

## 2022-06-15 PROCEDURE — 63710000001 INSULIN DETEMIR PER 5 UNITS: Performed by: HOSPITALIST

## 2022-06-15 RX ADMIN — METOPROLOL TARTRATE 25 MG: 25 TABLET, FILM COATED ORAL at 08:40

## 2022-06-15 RX ADMIN — ASPIRIN 81 MG CHEWABLE TABLET 81 MG: 81 TABLET CHEWABLE at 08:40

## 2022-06-15 RX ADMIN — ARFORMOTEROL TARTRATE 15 MCG: 15 SOLUTION RESPIRATORY (INHALATION) at 07:06

## 2022-06-15 RX ADMIN — METHYLPREDNISOLONE SODIUM SUCCINATE 40 MG: 40 INJECTION, POWDER, FOR SOLUTION INTRAMUSCULAR; INTRAVENOUS at 08:39

## 2022-06-15 RX ADMIN — INSULIN DETEMIR 15 UNITS: 100 INJECTION, SOLUTION SUBCUTANEOUS at 21:08

## 2022-06-15 RX ADMIN — CEFTRIAXONE SODIUM 1 G: 1 INJECTION, SOLUTION INTRAVENOUS at 21:07

## 2022-06-15 RX ADMIN — ATORVASTATIN CALCIUM 10 MG: 10 TABLET, FILM COATED ORAL at 21:09

## 2022-06-15 RX ADMIN — PANTOPRAZOLE SODIUM 40 MG: 40 TABLET, DELAYED RELEASE ORAL at 08:40

## 2022-06-15 RX ADMIN — IPRATROPIUM BROMIDE AND ALBUTEROL SULFATE 3 ML: 2.5; .5 SOLUTION RESPIRATORY (INHALATION) at 19:24

## 2022-06-15 RX ADMIN — INSULIN LISPRO 8 UNITS: 100 INJECTION, SOLUTION INTRAVENOUS; SUBCUTANEOUS at 08:48

## 2022-06-15 RX ADMIN — INSULIN LISPRO 8 UNITS: 100 INJECTION, SOLUTION INTRAVENOUS; SUBCUTANEOUS at 18:00

## 2022-06-15 RX ADMIN — IPRATROPIUM BROMIDE AND ALBUTEROL SULFATE 3 ML: 2.5; .5 SOLUTION RESPIRATORY (INHALATION) at 12:55

## 2022-06-15 RX ADMIN — METOPROLOL TARTRATE 25 MG: 25 TABLET, FILM COATED ORAL at 21:08

## 2022-06-15 RX ADMIN — GUAIFENESIN 1200 MG: 600 TABLET ORAL at 21:08

## 2022-06-15 RX ADMIN — SENNOSIDES AND DOCUSATE SODIUM 2 TABLET: 50; 8.6 TABLET ORAL at 08:40

## 2022-06-15 RX ADMIN — BUDESONIDE 0.5 MG: 0.5 SUSPENSION RESPIRATORY (INHALATION) at 19:24

## 2022-06-15 RX ADMIN — Medication 10 ML: at 08:39

## 2022-06-15 RX ADMIN — Medication 10 ML: at 21:12

## 2022-06-15 RX ADMIN — AZITHROMYCIN 500 MG: 500 INJECTION, POWDER, LYOPHILIZED, FOR SOLUTION INTRAVENOUS at 21:07

## 2022-06-15 RX ADMIN — LISINOPRIL 2.5 MG: 2.5 TABLET ORAL at 08:40

## 2022-06-15 RX ADMIN — IPRATROPIUM BROMIDE AND ALBUTEROL SULFATE 3 ML: 2.5; .5 SOLUTION RESPIRATORY (INHALATION) at 07:06

## 2022-06-15 RX ADMIN — ARFORMOTEROL TARTRATE 15 MCG: 15 SOLUTION RESPIRATORY (INHALATION) at 19:24

## 2022-06-15 RX ADMIN — SENNOSIDES AND DOCUSATE SODIUM 2 TABLET: 50; 8.6 TABLET ORAL at 21:09

## 2022-06-15 RX ADMIN — GUAIFENESIN 1200 MG: 600 TABLET ORAL at 09:28

## 2022-06-15 RX ADMIN — IPRATROPIUM BROMIDE AND ALBUTEROL SULFATE 3 ML: 2.5; .5 SOLUTION RESPIRATORY (INHALATION) at 00:50

## 2022-06-15 RX ADMIN — ENOXAPARIN SODIUM 40 MG: 100 INJECTION SUBCUTANEOUS at 21:08

## 2022-06-15 RX ADMIN — BUDESONIDE 0.5 MG: 0.5 SUSPENSION RESPIRATORY (INHALATION) at 07:06

## 2022-06-15 NOTE — PROGRESS NOTES
[facility]   Hospitalist Progress Note  Date: 6/15/2022  Patient Name: Dilip Faulkner  : 1950  MRN: [id number]  Date of admission: 2022      Subjective   Subjective     Chief complaint: Shortness of breath    Summary:  72-year-old male ex-smoker with history of COPD, diabetes, hypertension, on supplemental oxygen, hospitalized on 2022 with acute on chronic hypoxemic respiratory failure, COPD exacerbation with hypercapnia, requiring BiPAP, pulmonary consulted    Interval follow-up: Patient seen and examined this morning, no acute distress, no acute major night events, patient still has coarse breath sounds, cough, shortness of breath, significant wheezing, oxygen requirement remains at 3 L nasal cannula.  Difficulty clearing sputum.  Denied fevers, chills, sweats.  Reports weakness and fatigue.  Telemetry monitoring reviewed, PVCs, tachycardic at times up to the 150s, baseline sinus rhythm in the 70s.  Denies chest pain or palpitations      Review of systems:  All systems reviewed and negative except for generalized fatigue, generalized weakness, cough, shortness of breath    Objective   Objective     Vitals:   Temp:  [97.2 °F (36.2 °C)-97.7 °F (36.5 °C)] 97.2 °F (36.2 °C)  Heart Rate:  [69-93] 79  Resp:  [16-30] 18  BP: (106-137)/(57-82) 137/64  Flow (L/min):  [3] 3  Physical Exam    Constitutional: Alert and oriented, mild somnolence              Eyes: Pupils equal, sclerae anicteric, no conjunctival injection              HENT: NCAT, mucous membranes moist              Neck: Supple, no thyromegaly, no lymphadenopathy, trachea midline              Respiratory: Wheezing, coarse breath sounds throughout, wearing nasal cannula oxygen              Cardiovascular: RRR, no murmurs, rubs, or gallops, palpable pedal pulses bilaterally              Gastrointestinal: Positive bowel sounds, soft, nontender, nondistended              Musculoskeletal: No bilateral ankle edema, no clubbing or cyanosis to  extremities              Psychiatric: Appropriate affect, cooperative              Neurologic: Oriented x 3, strength symmetric in all extremities, Cranial Nerves grossly intact to confrontation, speech garbled              Skin: No rashes     Result Review    Result Review:  I have personally reviewed the results from the time of this admission to 6/15/2022 18:08 EDT and agree with these findings:  [x]  Laboratory   CBC    CBC 5/20/22 5/22/22 6/14/22   WBC 10.52 16.92 (A) 10.20   RBC 5.09 5.07 4.99   Hemoglobin 12.7 (A) 12.6 (A) 12.6 (A)   Hematocrit 43.0 42.6 43.4   MCV 84.5 84.0 87.0   MCH 25.0 (A) 24.9 (A) 25.3 (A)   MCHC 29.5 (A) 29.6 (A) 29.0 (A)   RDW 14.6 14.5 15.8 (A)   Platelets 177 167 156   (A) Abnormal value            BMP    BMP 5/20/22 5/22/22 6/14/22 6/14/22      1625 1632   BUN 20 30 (A) 16    Creatinine 1.05 1.28 (A) 0.98    Sodium 138 134 (A) 140 136.9   Potassium 4.2 4.3 4.6    Chloride 102 97 (A) 102    CO2 26.1 26.7 25.6    Calcium 8.8 9.3 8.8    (A) Abnormal value            LIVER FUNCTION TESTS:      Lab 06/14/22  1625   TOTAL PROTEIN 7.2   ALBUMIN 4.00   GLOBULIN 3.2   ALT (SGPT) 12   AST (SGOT) 15   BILIRUBIN 0.3   ALK PHOS 85       [x]  Microbiology   Blood Culture   Date Value Ref Range Status   06/14/2022 No growth at 24 hours  Preliminary   06/14/2022 No growth at 24 hours  Preliminary     No results found for: BCIDPCR, CXREFLEX, CSFCX, CULTURETIS  No results found for: CULTURES, HSVCX, URCX  No results found for: EYECULTURE, GCCX, HSVCULTURE, LABHSV  No results found for: LEGIONELLA, MRSACX, MUMPSCX, MYCOPLASCX  No results found for: NOCARDIACX, STOOLCX  No results found for: THROATCX, UNSTIMCULT, URINECX, CULTURE, VZVCULTUR  No results found for: VIRALCULTU, WOUNDCX    [x]  Radiology CT Chest Without Contrast Diagnostic    Result Date: 6/15/2022  PROCEDURE: CT CHEST WO CONTRAST DIAGNOSTIC  COMPARISON: [facility], CT, CT CHEST WO CONTRAST DIAGNOSTIC, 7/24/2021, 4:14.   [facility], CT, CHEST W/ CONTRAST, 11/21/2018, 21:40.  [facility], CT, CT CHEST PULMONARY EMBOLISM, 3/19/2022, 14:36.  [facility], CT, CT CHEST WO CONTRAST DIAGNOSTIC, 5/21/2022, 13:56.  INDICATIONS: hypoxia, lung infiltrates,pt. was unable to suspend respiration for scan  PROTOCOL:   Standard imaging protocol performed    RADIATION:   DLP: 630mGy*cm   Automated exposure control was utilized to minimize radiation dose.  TECHNIQUE: Axial images of the chest without intravenous contrast.  FINDINGS: Motion artifact obscures images.  Moderate emphysema is present.  There is coronary artery calcification.  No evidence of pleural or pericardial effusion.  The esophagus is mildly dilated with an air-fluid level.  There is mild scarring in the lingula and left lower lobe.  There are areas of bronchial wall thickening with endobronchial secretion.  There is no mediastinal, axillary or hilar adenopathy.  Images of the upper abdomen are unremarkable.  The thoracic aorta has a normal caliber.  IMPRESSION:  1. Motion artifact.   2. Moderate emphysema.  Coronary artery calcification.  3. Mildly dilated esophagus with an air-fluid level.  Areas of bronchial wall thickening and endobronchial secretion.  Suggest correlation with any history aspiration.   AALIYAH BRIONES MD       Electronically Signed and Approved By: AALIYAH BRIONES MD on 6/15/2022 at 9:11             CT Chest Without Contrast Diagnostic    Result Date: 5/21/2022  PROCEDURE: CT CHEST WO CONTRAST DIAGNOSTIC  COMPARISON: [facility], CT, CT CHEST PULMONARY EMBOLISM, 3/19/2022, 14:36.  [facility], CR, XR CHEST 1 VW, 5/20/2022, 18:25.  INDICATIONS: Respiratory illness, nondiagnostic xray  TECHNIQUE: CT images were created without the administration of contrast material.   PROTOCOL:   Standard imaging protocol performed    RADIATION:   DLP: 500.1 mGy*cm   Automated exposure control was utilized to  minimize radiation dose.  FINDINGS:  The central tracheobronchial tree is clear.  There is mild emphysema.  There is left basilar atelectasis.  There is no pleural effusion.  The heart size appears normal, with partially calcified atherosclerotic disease in the coronary arteries.  The great vessels are normal in caliber.  No abnormally enlarged lymph nodes are identified.  There is debris within the esophagus, suggesting GE reflux.  Partial evaluation of the upper abdomen is unremarkable.  No aggressive osseous lesions are identified.        1. Left basilar atelectasis. 2. Mild emphysema. 3. Evidence of coronary artery disease. 4. Debris within esophagus, suggesting GE reflux.     SHRUTI MENA MD       Electronically Signed and Approved By: SHRUTI MENA MD on 5/21/2022 at 14:12             XR Chest 1 View    Result Date: 6/14/2022  PROCEDURE: XR CHEST 1 VW  COMPARISON: [facility], CT, CT CHEST WO CONTRAST DIAGNOSTIC, 5/21/2022, 13:56.  [facility], CR, XR CHEST 1 VW, 5/20/2022, 18:25.  INDICATIONS: short of breath, copd, wheezing  FINDINGS:  The patient is rotated.  The heart is not enlarged.  It looks like there may be some chronic parenchymal pleural changes in the left lower chest and chronic changes within right basilar area.  The upper lung zones seem clear.        1. Chronic appearing changes in the lower lungs.       ADA PERALES MD       Electronically Signed and Approved By: ADA PERALES MD on 6/14/2022 at 16:50             XR Chest 1 View    Result Date: 5/20/2022  PROCEDURE: XR CHEST 1 VW  COMPARISON: [facility], CT, CT CHEST WO CONTRAST DIAGNOSTIC, 7/24/2021, 4:14.  [facility], CR, CHEST AP/PA 1 VIEW, 2/11/2017, 2:29.  [facility], CR, XR CHEST 1 VW, 7/23/2021, 16:40.  [facility], CT, CT CHEST PULMONARY EMBOLISM, 3/19/2022, 14:36.  [facility], CR, XR CHEST 1 VW, 3/19/2022, 8:05.  INDICATIONS: SOA  Triage Protocol/shortness of breath  FINDINGS:   The lungs are well-expanded. The heart and pulmonary vasculature are within normal limits. No pleural effusions are identified. There are no active appearing infiltrates.  Emphysema is present.  There are chronic appearing changes in the lung bases.  IMPRESSION: No active disease.  AALIYAH BRIONES MD       Electronically Signed and Approved By: AALIYAH BRIONES MD on 5/20/2022 at 18:46               [x]  EKG/Telemetry   []  Cardiology/Vascular   []  Pathology  [x]  Old records  []  Other:    Assessment & Plan   Assessment / Plan     Assessment/Plan:  Assessment:  COPD with acute exacerbation  Acute hypoxemic and hypercapnic with underlying chronic hypoxemic respiratory failure  Diabetes mellitus  Essential hypertension  GERD  Ex-smoker  Medical noncompliance    Plan:  Labs and imaging reviewed  Continue NIPPV per pulmonary recommendations  Discussed with Dr. Aquino, consulted from pulmonary standpoint, recommendations appreciated  Follow-up sputum culture  Continue azithromycin and ceftriaxone  Continue Solu-Medrol 40 mg IV daily  Continue bronchopulmonary hygiene protocol, incentive spirometer  Continue Brovana and Pulmicort nebs twice daily  Continue DuoNebs every 6 hours  Continue telemetry monitoring for the next 24 hours  Insulin sliding scale coverage  Reconcile home medications, resumed accordingly  Continue with Levemir 15 units nightly  Continue lisinopril and metoprolol and atorvastatin  A.m. labs  Full code  VTE prophylaxis with Lovenox  Clinical course to dictate further management  Discussed with nurse at the bedside    DVT prophylaxis:  Medical DVT prophylaxis orders are present.    CODE STATUS:   Level Of Support Discussed With: Patient  Code Status (Patient has no pulse and is not breathing): CPR (Attempt to Resuscitate)  Medical Interventions (Patient has pulse or is breathing): Full Support        Electronically signed by Oralia Hardy MD,  06/15/22, 6:08 PM EDT.

## 2022-06-15 NOTE — CONSULTS
Pulmonary / Critical Care Consult Note      Patient Name: Dilip Faulkner  : 1950  MRN: [id number]  Primary Care Physician:  Rosalba Edwards APRN  Referring Physician: Oralia Hardy MD  Date of admission: 2022    Subjective   Subjective     Reason for Consult/ Chief Complaint: Acute on chronic hypoxic hypercarbic respiratory failure.    HPI:  Dilip Faulkner is a 72 y.o. male with past medical history COPD, chronic respiratory failure requiring O2 2 L nasal cannula, noncompliant with NIPPV, hypertension and diabetes presented to the ED with complaints of worsening shortness of breath over the last 2 days.  In the ED, ABG 7.23, 65.6, 73, 26 so patient was placed on NIPPV.  Chest x-ray revealed chronic bilateral changes.  Because of the above our services was consulted for further evaluation and treatment.  Upon exam, patient is lying in bed on 3 L nasal cannula in no acute distress.  He states he does have shortness of breath and dry hacking cough over the last 2 days.  Denies being around any sick contacts.  He denies any fever, chills, hemoptysis, chest pain, nausea, vomiting, or diarrhea.  He is very adamant that he does not like the NIPPV and will not use it upon discharge.  He quit smoking approximately 1 year ago.  He has failed to follow-up in our clinic as outpatient.     Review of Systems  Constitutional symptoms:   Fatigue, otherwise denied complaints   Ear, nose, throat: Denied complaints  Cardiovascular:   With apnea, otherwise denied complaints  Respiratory: Dyspnea, wheeze, cough, otherwise denied complaints  Gastrointestinal: Denied complaints  Musculoskeletal: Fatigue, otherwise denied complaints  Genitourinary: Denied complaints  Allergy / Immunology: Denied complaints  Hematologic: Denied complaints  Neurologic: Denied complaints  Skin: Denied complaints  Endocrine: Denied complaints  Psychiatric: Denied complaints    Personal History     Past Medical History:   Diagnosis Date    • Asthma    • COPD (chronic obstructive pulmonary disease) (HCC)    • Diabetes mellitus (HCC)    • Hernia, umbilical    • Hypertension    • Requires continuous at home supplemental oxygen        Past Surgical History:   Procedure Laterality Date   • ABDOMINAL SURGERY         Family History:   Denies any family history of lung disease or cancer.    Social History:  reports that he quit smoking about 14 months ago. His smoking use included cigarettes. He has never used smokeless tobacco. He reports that he does not drink alcohol and does not use drugs.  Admits to quitting smoking approximately 1 year ago    Home Medications:  Fluticasone-Salmeterol, albuterol sulfate HFA, aspirin, atorvastatin, insulin degludec, ipratropium-albuterol, lisinopril, metFORMIN, metoprolol tartrate, and omeprazole    Allergies:  No Known Allergies    Objective    Objective     Vitals:   Temp:  [97.2 °F (36.2 °C)-97.7 °F (36.5 °C)] 97.2 °F (36.2 °C)  Heart Rate:  [69-99] 79  Resp:  [16-30] 18  BP: (106-137)/(57-82) 137/64  Flow (L/min):  [3] 3    Physical Exam:  Vital Signs Reviewed   General:  WDWN male, awake and alert, NAD on 3 L NC    HEENT:  PERRL, EOMI.  OP, nares clear, no sinus tenderness  Neck:  Supple, no JVD, no thyromegaly  Lymph: no axillary, cervical, supraclavicular lymphadenopathy noted bilaterally  Chest: poor aeration, wheezes and rhonchi bilaterally, tympanic to percussion bilaterally, no work of breathing noted  CV: RRR, no MGR, pulses 2+, equal  Abd:  Soft, NT, ND, + BS, no HSM  EXT:  no clubbing, no cyanosis, no edema, no joint tenderness  Neuro:  A&Ox3, CN grossly intact, no focal deficits  Skin: No rashes or lesions noted    I performed physical exam at the bedside along with my nurse practitioner.  Agree with above-mentioned findings.    Result Review    Result Review:  I have personally reviewed the results from the time of this admission to 6/15/2022 16:38 EDT and agree with these findings:  [x]  Laboratory  [x]   Microbiology  [x]  Radiology  [x]  EKG/Telemetry   []  Cardiology/Vascular   []  Pathology  [x]  Old records  []  Other:  Most notable findings include: proBNP 155  WBC 10.2, lactic 1.4, Pro-Rigoberto 0.07  Potassium 4.6, creatinine 0.98    6/14 ABG 7.23, 65.5, 73.8, 26.8 on BiPAP FiO2 30%    6/15 sputum culture pending  Legionella and strep negative  COVID-negative  Flu negative  Blood cultures pending    6/14 CXR chronic changes    6/15 CT chest iron emphysema, mildly dilated esophagus    3/2019 echo EF 60%, technical difficulty study    Assessment & Plan   Assessment / Plan     Active Hospital Problems:  Active Hospital Problems    Diagnosis    • Acute exacerbation of chronic obstructive pulmonary disease (COPD) ([facility])      Impression:  Acute on chronic hypoxic hypercarbic respiratory failure requiring NIPPV  Acute exacerbation of COPD  Question community-acquired pneumonia from unspecified organism  Hypertension  Diabetes  GERD  Medical noncompliance     Plan:  May transition off NIPPV to O2 to keep sats greater than 90%.  Continue NIPPV at night and as needed days.  Patient is very adamant that he will not use NIPPV at home.  Obtain CT of the chest  Pro-Rigoberto 0.07.  Obtain sputum culture and urinary antigen for strep and Legionella.  Continue azithromycin and ceftriaxone.  Can de-escalate based on cultures.  Continue Solu-Medrol 40 mg IV daily.  Continue Brovana, Pulmicort, and DuoNebs.  Start on bronchopulmonary hygiene.  We will add I-S and flutter valve.  Encourage activity.  Up to chair as tolerated.     DVT prophylaxis:  Medical DVT prophylaxis orders are present.     Code Status and Medical Interventions:   Ordered at: 06/14/22 2832     Level Of Support Discussed With:    Patient     Code Status (Patient has no pulse and is not breathing):    CPR (Attempt to Resuscitate)     Medical Interventions (Patient has pulse or is breathing):    Full Support      Labs, microbiology, radiology, medications, and provider  notes personally reviewed.  Discussed with primary services and bedside RN.    Thank you for this consult and allowing me to participate in the care of Mr. Faulkner.    Electronically signed by MONE Mcbride, 06/15/22, 4:20 PM EDT.    I, Dr. Mu Guardado, have spent more than 50% of the total time managing the patient in this encounter today.  This included personally reviewing all pertinent labs, imaging, microbiology and documentation. Also discussing the case with the patient and any available family, the admitting physician and any available ancillary staff.    Electronically signed by Mu Guardado MD, 06/15/22, 4:38 PM EDT.

## 2022-06-15 NOTE — CASE MANAGEMENT/SOCIAL WORK
RT CM triggered to see obese pt with hx of chronic respiratory failure secondary to COPD with multiple admissions for such.  RT CM has had 4 previous encounters with pt for education regarding the need for NIV and each time the pt refuses therapy and no shows to scheduled pulmonary follow ups. This admission Mr Faulkner again presents with hypercapnia, PCO2- 65.5. RT CM offered again to arrange for home NIV and pt still refuses and denies any needs at this time.

## 2022-06-15 NOTE — PLAN OF CARE
Goal Outcome Evaluation:  Plan of Care Reviewed With: patient           Outcome Evaluation: Patient refuses to wear the bipap today. He has been in the low 90s on 3 liters of oxygen.  Patient has had no complaints of pain and VSS.

## 2022-06-16 ENCOUNTER — READMISSION MANAGEMENT (OUTPATIENT)
Dept: CALL CENTER | Facility: HOSPITAL | Age: 72
End: 2022-06-16

## 2022-06-16 VITALS
SYSTOLIC BLOOD PRESSURE: 138 MMHG | RESPIRATION RATE: 16 BRPM | OXYGEN SATURATION: 93 % | DIASTOLIC BLOOD PRESSURE: 81 MMHG | HEIGHT: 73 IN | WEIGHT: 241.18 LBS | TEMPERATURE: 97.5 F | HEART RATE: 73 BPM | BODY MASS INDEX: 31.96 KG/M2

## 2022-06-16 LAB
ALBUMIN SERPL-MCNC: 3.7 G/DL (ref 3.5–5.2)
ALP SERPL-CCNC: 71 U/L (ref 39–117)
ALT SERPL W P-5'-P-CCNC: 11 U/L (ref 1–41)
ANION GAP SERPL CALCULATED.3IONS-SCNC: 8 MMOL/L (ref 5–15)
AST SERPL-CCNC: 16 U/L (ref 1–40)
BASOPHILS # BLD AUTO: 0.04 10*3/MM3 (ref 0–0.2)
BASOPHILS NFR BLD AUTO: 0.3 % (ref 0–1.5)
BILIRUB CONJ SERPL-MCNC: <0.2 MG/DL (ref 0–0.3)
BILIRUB INDIRECT SERPL-MCNC: NORMAL MG/DL
BILIRUB SERPL-MCNC: 0.2 MG/DL (ref 0–1.2)
BUN SERPL-MCNC: 22 MG/DL (ref 8–23)
BUN/CREAT SERPL: 20.2 (ref 7–25)
CALCIUM SPEC-SCNC: 9.8 MG/DL (ref 8.6–10.5)
CHLORIDE SERPL-SCNC: 102 MMOL/L (ref 98–107)
CO2 SERPL-SCNC: 28 MMOL/L (ref 22–29)
CREAT SERPL-MCNC: 1.09 MG/DL (ref 0.76–1.27)
DEPRECATED RDW RBC AUTO: 47.8 FL (ref 37–54)
EGFRCR SERPLBLD CKD-EPI 2021: 71.7 ML/MIN/1.73
EOSINOPHIL # BLD AUTO: 0.22 10*3/MM3 (ref 0–0.4)
EOSINOPHIL NFR BLD AUTO: 1.7 % (ref 0.3–6.2)
ERYTHROCYTE [DISTWIDTH] IN BLOOD BY AUTOMATED COUNT: 15.6 % (ref 12.3–15.4)
GLUCOSE BLDC GLUCOMTR-MCNC: 133 MG/DL (ref 70–99)
GLUCOSE BLDC GLUCOMTR-MCNC: 175 MG/DL (ref 70–99)
GLUCOSE SERPL-MCNC: 125 MG/DL (ref 65–99)
HCT VFR BLD AUTO: 40.4 % (ref 37.5–51)
HGB BLD-MCNC: 11.9 G/DL (ref 13–17.7)
IMM GRANULOCYTES # BLD AUTO: 0.07 10*3/MM3 (ref 0–0.05)
IMM GRANULOCYTES NFR BLD AUTO: 0.5 % (ref 0–0.5)
LYMPHOCYTES # BLD AUTO: 1.7 10*3/MM3 (ref 0.7–3.1)
LYMPHOCYTES NFR BLD AUTO: 13.3 % (ref 19.6–45.3)
MAGNESIUM SERPL-MCNC: 2.2 MG/DL (ref 1.6–2.4)
MCH RBC QN AUTO: 25.2 PG (ref 26.6–33)
MCHC RBC AUTO-ENTMCNC: 29.5 G/DL (ref 31.5–35.7)
MCV RBC AUTO: 85.6 FL (ref 79–97)
MONOCYTES # BLD AUTO: 0.9 10*3/MM3 (ref 0.1–0.9)
MONOCYTES NFR BLD AUTO: 7.1 % (ref 5–12)
NEUTROPHILS NFR BLD AUTO: 77.1 % (ref 42.7–76)
NEUTROPHILS NFR BLD AUTO: 9.81 10*3/MM3 (ref 1.7–7)
NRBC BLD AUTO-RTO: 0 /100 WBC (ref 0–0.2)
PHOSPHATE SERPL-MCNC: 2.8 MG/DL (ref 2.5–4.5)
PLATELET # BLD AUTO: 190 10*3/MM3 (ref 140–450)
PMV BLD AUTO: 10.2 FL (ref 6–12)
POTASSIUM SERPL-SCNC: 5.2 MMOL/L (ref 3.5–5.2)
PROT SERPL-MCNC: 6.5 G/DL (ref 6–8.5)
RBC # BLD AUTO: 4.72 10*6/MM3 (ref 4.14–5.8)
SODIUM SERPL-SCNC: 138 MMOL/L (ref 136–145)
WBC NRBC COR # BLD: 12.74 10*3/MM3 (ref 3.4–10.8)

## 2022-06-16 PROCEDURE — 85025 COMPLETE CBC W/AUTO DIFF WBC: CPT | Performed by: FAMILY MEDICINE

## 2022-06-16 PROCEDURE — 83735 ASSAY OF MAGNESIUM: CPT | Performed by: FAMILY MEDICINE

## 2022-06-16 PROCEDURE — 94664 DEMO&/EVAL PT USE INHALER: CPT

## 2022-06-16 PROCEDURE — 94799 UNLISTED PULMONARY SVC/PX: CPT

## 2022-06-16 PROCEDURE — 25010000002 METHYLPREDNISOLONE PER 40 MG: Performed by: HOSPITALIST

## 2022-06-16 PROCEDURE — 94761 N-INVAS EAR/PLS OXIMETRY MLT: CPT

## 2022-06-16 PROCEDURE — 99239 HOSP IP/OBS DSCHRG MGMT >30: CPT | Performed by: FAMILY MEDICINE

## 2022-06-16 PROCEDURE — 84100 ASSAY OF PHOSPHORUS: CPT | Performed by: FAMILY MEDICINE

## 2022-06-16 PROCEDURE — 99232 SBSQ HOSP IP/OBS MODERATE 35: CPT | Performed by: INTERNAL MEDICINE

## 2022-06-16 PROCEDURE — 80048 BASIC METABOLIC PNL TOTAL CA: CPT | Performed by: FAMILY MEDICINE

## 2022-06-16 PROCEDURE — 82962 GLUCOSE BLOOD TEST: CPT

## 2022-06-16 PROCEDURE — 80076 HEPATIC FUNCTION PANEL: CPT | Performed by: FAMILY MEDICINE

## 2022-06-16 RX ORDER — PREDNISONE 20 MG/1
40 TABLET ORAL
Status: DISCONTINUED | OUTPATIENT
Start: 2022-06-17 | End: 2022-06-16 | Stop reason: HOSPADM

## 2022-06-16 RX ORDER — CEFDINIR 300 MG/1
300 CAPSULE ORAL 2 TIMES DAILY
Qty: 10 CAPSULE | Refills: 0 | Status: SHIPPED | OUTPATIENT
Start: 2022-06-16 | End: 2022-06-21

## 2022-06-16 RX ORDER — PREDNISONE 20 MG/1
40 TABLET ORAL
Qty: 10 TABLET | Refills: 0 | Status: SHIPPED | OUTPATIENT
Start: 2022-06-17 | End: 2022-06-22

## 2022-06-16 RX ADMIN — PANTOPRAZOLE SODIUM 40 MG: 40 TABLET, DELAYED RELEASE ORAL at 08:15

## 2022-06-16 RX ADMIN — IPRATROPIUM BROMIDE AND ALBUTEROL SULFATE 3 ML: 2.5; .5 SOLUTION RESPIRATORY (INHALATION) at 06:33

## 2022-06-16 RX ADMIN — METOPROLOL TARTRATE 25 MG: 25 TABLET, FILM COATED ORAL at 08:15

## 2022-06-16 RX ADMIN — SENNOSIDES AND DOCUSATE SODIUM 2 TABLET: 50; 8.6 TABLET ORAL at 08:15

## 2022-06-16 RX ADMIN — ARFORMOTEROL TARTRATE 15 MCG: 15 SOLUTION RESPIRATORY (INHALATION) at 06:33

## 2022-06-16 RX ADMIN — IPRATROPIUM BROMIDE AND ALBUTEROL SULFATE 3 ML: 2.5; .5 SOLUTION RESPIRATORY (INHALATION) at 12:10

## 2022-06-16 RX ADMIN — METHYLPREDNISOLONE SODIUM SUCCINATE 40 MG: 40 INJECTION, POWDER, FOR SOLUTION INTRAMUSCULAR; INTRAVENOUS at 08:15

## 2022-06-16 RX ADMIN — BUDESONIDE 0.5 MG: 0.5 SUSPENSION RESPIRATORY (INHALATION) at 06:33

## 2022-06-16 RX ADMIN — Medication 10 ML: at 08:15

## 2022-06-16 RX ADMIN — LISINOPRIL 2.5 MG: 2.5 TABLET ORAL at 08:15

## 2022-06-16 RX ADMIN — IPRATROPIUM BROMIDE AND ALBUTEROL SULFATE 3 ML: 2.5; .5 SOLUTION RESPIRATORY (INHALATION) at 01:31

## 2022-06-16 RX ADMIN — GUAIFENESIN 1200 MG: 600 TABLET ORAL at 08:15

## 2022-06-16 RX ADMIN — ASPIRIN 81 MG CHEWABLE TABLET 81 MG: 81 TABLET CHEWABLE at 08:15

## 2022-06-16 NOTE — PROGRESS NOTES
Pulmonary / Critical Care Progress Note      Patient Name: Dilip Faulkner  : 1950  MRN: [id number]  Attending:  Oralia Hardy MD  Date of admission: 2022    Subjective   Subjective   Follow-up for acute on chronic hypoxic hypercarbic respiratory failure.    Over past 24 hours, breathing continues to improve.  Has been weaned off oxygen.  Continues on steroids, antibiotics, and nebulizers.    No acute events overnight.  Refuses to wear NIPPV.     This morning,  Sitting up in bed eating breakfast  On room air  Coarse nonproductive cough  Using flutter valve  Feels breathing has improved  No chest pain  No fever or chills     Review of Systems  General: Fatigue, otherwise denied complaints  Cardiovascular:   ALBARRAN, otherwise denied complaints  Respiratory: Dyspnea, wheeze, cough improved, otherwise denied complaints  Gastrointestinal: Denied complaints       Objective   Objective     Vitals:   Temp:  [97.2 °F (36.2 °C)-97.3 °F (36.3 °C)] 97.3 °F (36.3 °C)  Heart Rate:  [68-85] 70  Resp:  [16-18] 16  BP: (117-137)/(64-82) 137/68  Flow (L/min):  [3] 3    Physical Exam   Vital Signs Reviewed   General:  WDWN male, Awake and Alert, NAD on room air  HEENT:  PERRL, EOMI.  OP, nares clear  Chest: poor aeration, decreasing wheezing and rhonchi bilaterally, tympanic to percussion bilaterally, no work of breathing noted  CV: RRR, no MGR, pulses 2+, equal  Abd:  Soft, NT, ND, + BS, no HSM  EXT:  no clubbing, no cyanosis, no edema  Neuro:  A&Ox3, CN grossly intact, no focal deficits  Skin: No rashes or lesions noted    Result Review    Result Review:  I have personally reviewed the results from the time of this admission to 2022 08:24 EDT and agree with these findings:  [x]  Laboratory  [x]  Microbiology  [x]  Radiology  [x]  EKG/Telemetry   []  Cardiology/Vascular   []  Pathology  []  Old records  []  Other:  Most notable findings include: WBC 12.74, magnesium 2.2, potassium 5.2, creatinine  1.09    Assessment & Plan   Assessment / Plan     Active Hospital Problems:  Active Hospital Problems    Diagnosis    • Acute exacerbation of chronic obstructive pulmonary disease (COPD) ([facility])      Impression:  Acute on chronic hypoxic hypercarbic respiratory failure requiring NIPPV  Acute exacerbation of COPD  Question community-acquired pneumonia from unspecified organism  Hypertension  Diabetes  GERD  Medical noncompliance     Plan:  Continue wean O2 to keep sats greater than 90%.  Wears O2 as needed at home.  Continue NIPPV at night and as needed days.  Refused last night.  Patient is very adamant that he will not use NIPPV at home.  Continue azithromycin and ceftriaxone.  Can de-escalate based on cultures.  Change steroids to prednisone 40 mg daily.  Day 2/7  Continue Brovana, Pulmicort, and DuoNebs.  Continue bronchopulmonary hygiene.  Encourage I-S and flutter valve.  Encourage activity.  Up to chair as tolerated.     Consult RT Case management need outpatient PFTs, nebs, and flutter valve at discharge.  Refuses NIPPV set up.       DVT prophylaxis:  Medical DVT prophylaxis orders are present.    CODE STATUS:   Level Of Support Discussed With: Patient  Code Status (Patient has no pulse and is not breathing): CPR (Attempt to Resuscitate)  Medical Interventions (Patient has pulse or is breathing): Full Support    Labs, microbiology, radiology, medications, and provider notes personally reviewed.  Discussed with primary services and bedside RN.    Electronically signed by MONE Mcbride, 06/16/22, 11:11 AM EDT.    I, Dr. Mu Guardado, have spent more than 50% of the total time managing the patient in this encounter today.  This included personally reviewing all pertinent labs, imaging, microbiology and documentation. Also discussing the case with the patient and any available family, the admitting physician and any available ancillary staff.    Electronically signed by Mu Guardado MD, 06/16/22, 1:42 PM  EDT.

## 2022-06-16 NOTE — DISCHARGE SUMMARY
[facility]         HOSPITALIST  DISCHARGE SUMMARY    Patient Name: Dilip Faulkner  : 1950  MRN: [id number]    Date of Admission: 2022  Date of Discharge:  2022    Primary Care Physician: Rosalba Edwards APRN    Consults     Date and Time Order Name Status Description    2022  7:38 PM Inpatient Pulmonology Consult Completed     2022  5:01 PM Hospitalist (on-call MD unless specified)            Active and Resolved Hospital Problems:  COPD with acute exacerbation  Acute hypoxemic and hypercapnic with underlying chronic hypoxemic respiratory failure  Diabetes mellitus  Essential hypertension  GERD  Ex-smoker  Medical noncompliance       Active Hospital Problems    Diagnosis POA   • Acute exacerbation of chronic obstructive pulmonary disease (COPD) (HCC) [J44.1] Yes      Resolved Hospital Problems   No resolved problems to display.       Hospital Course     Hospital Course:  72-year-old male ex-smoker with history of COPD, diabetes, hypertension, on supplemental oxygen, hospitalized on 2022 with acute on chronic hypoxemic respiratory failure, COPD exacerbation with hypercapnia, requiring BiPAP, pulmonary consulted, patient rapidly improved with interventions, unexpectedly, he was weaned off of oxygen and able to maintain adequate saturations on room air.  His wheezing improved, he declined further intervention or management and patient, his sputum culture had a Gram stain that showed gram-negative bacilli, he asked to be discharged home, he was discharged in hemodynamically stable condition to continue steroids and antibiotics to cover gram-negative's, is encouraged to use his inhalers at home and to follow-up with his PCP within 1 week.  He refused NIPPV use during hospitalization course and refused outpatient NIPPV set up.  High risk for readmission due to noncompliance.    Day of Discharge     Vital Signs:  Temp:  [97.2 °F (36.2 °C)-97.5 °F (36.4 °C)] 97.5 °F  (36.4 °C)  Heart Rate:  [68-79] 73  Resp:  [16-18] 16  BP: (111-138)/(59-81) 138/81  Flow (L/min):  [3] 3    Review of systems:  All systems reviewed and negative except for generalized fatigue, generalized weakness, cough, shortness of breath       Physical Exam                         Constitutional: Alert and oriented              Eyes: Pupils equal, sclerae anicteric, no conjunctival injection              HENT: NCAT, mucous membranes moist              Neck: Supple, no thyromegaly, no lymphadenopathy, trachea midline              Respiratory: Air entry bilaterally, mild wheeze              Cardiovascular: RRR, no murmurs, rubs, or gallops, palpable pedal pulses bilaterally              Gastrointestinal: Positive bowel sounds, soft, nontender, nondistended              Musculoskeletal: No bilateral ankle edema, no clubbing or cyanosis to extremities              Psychiatric: Appropriate affect, cooperative              Neurologic: Oriented x 3, strength symmetric in all extremities, Cranial Nerves grossly intact to confrontation, speech clear              Skin: No rashes       Discharge Details        Discharge Medications      New Medications      Instructions Start Date   cefdinir 300 MG capsule  Commonly known as: OMNICEF   300 mg, Oral, 2 Times Daily      predniSONE 20 MG tablet  Commonly known as: DELTASONE   40 mg, Oral, Daily With Breakfast   Start Date: June 17, 2022        Continue These Medications      Instructions Start Date   Advair Diskus 250-50 MCG/ACT DISKUS  Generic drug: Fluticasone-Salmeterol   1 puff, Inhalation, 2 Times Daily - RT      albuterol sulfate  (90 Base) MCG/ACT inhaler  Commonly known as: PROVENTIL HFA;VENTOLIN HFA;PROAIR HFA   2 puffs, Inhalation, Every 6 Hours PRN      aspirin 81 MG chewable tablet   81 mg, Oral, Daily      atorvastatin 10 MG tablet  Commonly known as: LIPITOR   10 mg, Oral, Daily      ipratropium-albuterol 0.5-2.5 mg/3 ml nebulizer  Commonly known as:  DUO-NEB   3 mL, Nebulization, Every 4 Hours PRN      lisinopril 2.5 MG tablet  Commonly known as: PRINIVIL,ZESTRIL   2.5 mg, Oral, Daily      metFORMIN 500 MG tablet  Commonly known as: GLUCOPHAGE   500 mg, Oral, 2 times daily      metoprolol tartrate 25 MG tablet  Commonly known as: LOPRESSOR   25 mg, Oral, 2 times daily      omeprazole 20 MG capsule  Commonly known as: priLOSEC   20 mg, Oral, Daily      Tresiba FlexTouch 100 UNIT/ML solution pen-injector injection  Generic drug: insulin degludec   17 Units, Subcutaneous, Daily             No Known Allergies    Discharge Disposition:  Home-Health Care [facility]    Diet:  Hospital:  Diet Order   Procedures   • Diet Regular; Consistent Carbohydrate       Discharge Activity:   Activity Instructions    As tolerated           CODE STATUS:  Code Status and Medical Interventions:   Ordered at: 06/14/22 5183     Level Of Support Discussed With:    Patient     Code Status (Patient has no pulse and is not breathing):    CPR (Attempt to Resuscitate)     Medical Interventions (Patient has pulse or is breathing):    Full Support         Future Appointments   Date Time Provider Department Center   7/6/2022 10:00 AM Chandu Vail MD [facility]       Additional Instructions for the Follow-ups that You Need to Schedule     Discharge Follow-up with PCP   As directed       Currently Documented PCP:    Rosalba Edwards APRN    PCP Phone Number:    [phone]     Follow Up Details: 3 to 7 days               Pertinent  and/or Most Recent Results     PROCEDURES:   CT Chest Without Contrast Diagnostic    Result Date: 6/15/2022  PROCEDURE: CT CHEST WO CONTRAST DIAGNOSTIC  COMPARISON: [facility], CT, CT CHEST WO CONTRAST DIAGNOSTIC, 7/24/2021, 4:14.  [facility], CT, CHEST W/ CONTRAST, 11/21/2018, 21:40.  [facility], CT, CT CHEST PULMONARY EMBOLISM, 3/19/2022, 14:36.  [facility], CT, CT CHEST WO CONTRAST DIAGNOSTIC, 5/21/2022,  13:56.  INDICATIONS: hypoxia, lung infiltrates,pt. was unable to suspend respiration for scan  PROTOCOL:   Standard imaging protocol performed    RADIATION:   DLP: 630mGy*cm   Automated exposure control was utilized to minimize radiation dose.  TECHNIQUE: Axial images of the chest without intravenous contrast.  FINDINGS: Motion artifact obscures images.  Moderate emphysema is present.  There is coronary artery calcification.  No evidence of pleural or pericardial effusion.  The esophagus is mildly dilated with an air-fluid level.  There is mild scarring in the lingula and left lower lobe.  There are areas of bronchial wall thickening with endobronchial secretion.  There is no mediastinal, axillary or hilar adenopathy.  Images of the upper abdomen are unremarkable.  The thoracic aorta has a normal caliber.  IMPRESSION:  1. Motion artifact.   2. Moderate emphysema.  Coronary artery calcification.  3. Mildly dilated esophagus with an air-fluid level.  Areas of bronchial wall thickening and endobronchial secretion.  Suggest correlation with any history aspiration.   AALIYAH BRIONES MD       Electronically Signed and Approved By: AALIYAH BRIONES MD on 6/15/2022 at 9:11             CT Chest Without Contrast Diagnostic    Result Date: 5/21/2022  PROCEDURE: CT CHEST WO CONTRAST DIAGNOSTIC  COMPARISON: [facility], CT, CT CHEST PULMONARY EMBOLISM, 3/19/2022, 14:36.  [facility], CR, XR CHEST 1 VW, 5/20/2022, 18:25.  INDICATIONS: Respiratory illness, nondiagnostic xray  TECHNIQUE: CT images were created without the administration of contrast material.   PROTOCOL:   Standard imaging protocol performed    RADIATION:   DLP: 500.1 mGy*cm   Automated exposure control was utilized to minimize radiation dose.  FINDINGS:  The central tracheobronchial tree is clear.  There is mild emphysema.  There is left basilar atelectasis.  There is no pleural effusion.  The heart size appears normal, with partially  calcified atherosclerotic disease in the coronary arteries.  The great vessels are normal in caliber.  No abnormally enlarged lymph nodes are identified.  There is debris within the esophagus, suggesting GE reflux.  Partial evaluation of the upper abdomen is unremarkable.  No aggressive osseous lesions are identified.        1. Left basilar atelectasis. 2. Mild emphysema. 3. Evidence of coronary artery disease. 4. Debris within esophagus, suggesting GE reflux.     SHRUTI MENA MD       Electronically Signed and Approved By: SHRUTI MENA MD on 5/21/2022 at 14:12             XR Chest 1 View    Result Date: 6/14/2022  PROCEDURE: XR CHEST 1 VW  COMPARISON: [facility], CT, CT CHEST WO CONTRAST DIAGNOSTIC, 5/21/2022, 13:56.  [facility], CR, XR CHEST 1 VW, 5/20/2022, 18:25.  INDICATIONS: short of breath, copd, wheezing  FINDINGS:  The patient is rotated.  The heart is not enlarged.  It looks like there may be some chronic parenchymal pleural changes in the left lower chest and chronic changes within right basilar area.  The upper lung zones seem clear.        1. Chronic appearing changes in the lower lungs.       ADA PERALES MD       Electronically Signed and Approved By: ADA PERALES MD on 6/14/2022 at 16:50             XR Chest 1 View    Result Date: 5/20/2022  PROCEDURE: XR CHEST 1 VW  COMPARISON: [facility], CT, CT CHEST WO CONTRAST DIAGNOSTIC, 7/24/2021, 4:14.  [facility], CR, CHEST AP/PA 1 VIEW, 2/11/2017, 2:29.  [facility], CR, XR CHEST 1 VW, 7/23/2021, 16:40.  [facility], CT, CT CHEST PULMONARY EMBOLISM, 3/19/2022, 14:36.  [facility], CR, XR CHEST 1 VW, 3/19/2022, 8:05.  INDICATIONS: SOA Triage Protocol/shortness of breath  FINDINGS:   The lungs are well-expanded. The heart and pulmonary vasculature are within normal limits. No pleural effusions are identified. There are no active appearing infiltrates.   Emphysema is present.  There are chronic appearing changes in the lung bases.  IMPRESSION: No active disease.  AALIYAH BRIONES MD       Electronically Signed and Approved By: AALIYAH BRIONES MD on 5/20/2022 at 18:46               LAB RESULTS:      Lab 06/16/22  0608 06/14/22  1632 06/14/22  1625   WBC 12.74*  --  10.20   HEMOGLOBIN 11.9*  --  12.6*   HEMATOCRIT 40.4  --  43.4   PLATELETS 190  --  156   NEUTROS ABS 9.81*  --  6.11   IMMATURE GRANS (ABS) 0.07*  --  0.06*   LYMPHS ABS 1.70  --  1.69   MONOS ABS 0.90  --  0.85   EOS ABS 0.22  --  1.43*   MCV 85.6  --  87.0   PROCALCITONIN  --   --  0.07   LACTATE  --   --  1.4   LACTATE, ARTERIAL  --  1.37  --          Lab 06/16/22  0608 06/14/22  1632 06/14/22  1625   SODIUM 138  --  140   SODIUM, ARTERIAL  --  136.9  --    POTASSIUM 5.2  --  4.6   CHLORIDE 102  --  102   CO2 28.0  --  25.6   ANION GAP 8.0  --  12.4   BUN 22  --  16   CREATININE 1.09  --  0.98   EGFR 71.7  --  81.4   GLUCOSE 125*  --  142*   GLUCOSE, ARTERIAL  --  141*  --    CALCIUM 9.8  --  8.8   IONIZED CALCIUM  --  1.16  --    MAGNESIUM 2.2  --   --    PHOSPHORUS 2.8  --   --          Lab 06/16/22  0608 06/14/22  1625   TOTAL PROTEIN 6.5 7.2   ALBUMIN 3.70 4.00   GLOBULIN  --  3.2   ALT (SGPT) 11 12   AST (SGOT) 16 15   BILIRUBIN 0.2 0.3   BILIRUBIN DIRECT <0.2  --    ALK PHOS 71 85         Lab 06/14/22  1625   PROBNP 155.9   TROPONIN T 0.012                 Lab 06/14/22  1632   PH, ARTERIAL 7.230*   PCO2, ARTERIAL 65.5*   PO2 ART 73.8*   O2 SATURATION ART 92.6*   FIO2 30   HCO3 ART 26.8*   BASE EXCESS ART -2.1*   CARBOXYHEMOGLOBIN 0.8     Brief Urine Lab Results  (Last result in the past 365 days)      Color   Clarity   Blood   Leuk Est   Nitrite   Protein   CREAT   Urine HCG        03/20/22 1511 Yellow   Clear   Negative   Negative   Negative   Negative               Microbiology Results (last 10 days)     Procedure Component Value - Date/Time    Respiratory Culture - Sputum, Throat [[id number]]  Collected: 06/15/22 1403    Lab Status: Preliminary result Specimen: Sputum from Throat Updated: 06/16/22 1038     Respiratory Culture Rare The culture consists of normal respiratory chris. This is a preliminary report; final report to follow.     Gram Stain Many (4+) WBCs per low power field      Rare (1+) Epithelial cells per low power field      Rare (1+) Gram negative bacilli    Legionella Antigen, Urine - Urine, Urine, Clean Catch [[id number]]  (Normal) Collected: 06/15/22 1401    Lab Status: Final result Specimen: Urine, Clean Catch Updated: 06/15/22 1436     LEGIONELLA ANTIGEN, URINE Negative    S. Pneumo Ag Urine or CSF - Urine, Urine, Clean Catch [[id number]]  (Normal) Collected: 06/15/22 1401    Lab Status: Final result Specimen: Urine, Clean Catch Updated: 06/15/22 1436     Strep Pneumo Ag Negative    COVID PRE-OP / PRE-PROCEDURE SCREENING ORDER (NO ISOLATION) - Swab, Nasopharynx [[id number]]  (Normal) Collected: 06/14/22 1728    Lab Status: Final result Specimen: Swab from Nasopharynx Updated: 06/14/22 2348    Narrative:      The following orders were created for panel order COVID PRE-OP / PRE-PROCEDURE SCREENING ORDER (NO ISOLATION) - Swab, Nasopharynx.  Procedure                               Abnormality         Status                     ---------                               -----------         ------                     COVID-19,APTIMA PANTHER(...[[id number]]  Normal              Final result                 Please view results for these tests on the individual orders.    COVID-19,APTIMA PANTHER(SRAVANTHI),[facility], NP/OP SWAB IN UTM/VTM/SALINE TRANSPORT MEDIA,24 HR TAT - Swab, Nasopharynx [[id number]]  (Normal) Collected: 06/14/22 1728    Lab Status: Final result Specimen: Swab from Nasopharynx Updated: 06/14/22 2348     COVID19 Not Detected    Narrative:      Fact sheet for providers: https://www.fda.gov/media/143513/download     Fact sheet for patients:  https://www.fda.gov/media/665370/download    Test performed by RT PCR.    Influenza Antigen, Rapid - Swab, Nasopharynx [[id number]]  (Normal) Collected: 06/14/22 1728    Lab Status: Final result Specimen: Swab from Nasopharynx Updated: 06/14/22 1759     Influenza A Ag, EIA Negative     Influenza B Ag, EIA Negative    Blood Culture - Blood, Arm, Left [[id number]]  (Normal) Collected: 06/14/22 1640    Lab Status: Preliminary result Specimen: Blood from Arm, Left Updated: 06/15/22 1648     Blood Culture No growth at 24 hours    Blood Culture - Blood, Arm, Left [[id number]]  (Normal) Collected: 06/14/22 1640    Lab Status: Preliminary result Specimen: Blood from Arm, Left Updated: 06/15/22 1648     Blood Culture No growth at 24 hours          CT Chest Without Contrast Diagnostic    Result Date: 5/21/2022  Impression:   1. Left basilar atelectasis. 2. Mild emphysema. 3. Evidence of coronary artery disease. 4. Debris within esophagus, suggesting GE reflux.     SHRUTI MENA MD       Electronically Signed and Approved By: SHRUTI MENA MD on 5/21/2022 at 14:12             XR Chest 1 View    Result Date: 6/14/2022  Impression:   1. Chronic appearing changes in the lower lungs.       ADA PERALES MD       Electronically Signed and Approved By: ADA PERALES MD on 6/14/2022 at 16:50                 Labs Pending at Discharge:  Pending Labs     Order Current Status    Blood Culture - Blood, Arm, Left Preliminary result    Blood Culture - Blood, Arm, Left Preliminary result    Respiratory Culture - Sputum, Throat Preliminary result            Time spent on Discharge including face to face service:  35 minutes    Electronically signed by Oralia Hardy MD, 06/16/22, 3:48 PM EDT.

## 2022-06-16 NOTE — PLAN OF CARE
Goal Outcome Evaluation:      Patient had no acute events overnight. Slept intermittently. Up to bedside to use urinal multiple times. Resting comfortably at end of shift.   Zaira Frias RN

## 2022-06-17 LAB
BACTERIA SPEC RESP CULT: NORMAL
GRAM STN SPEC: NORMAL

## 2022-06-17 NOTE — OUTREACH NOTE
Prep Survey    Flowsheet Row Responses   [facility] facility patient discharged from? [facility]   Is LACE score < 7 ? No   Emergency Room discharge w/ pulse ox? No   Eligibility Readm Mgmt   Discharge diagnosis COPD with acute exacerbation   Does the patient have one of the following disease processes/diagnoses(primary or secondary)? COPD/Pneumonia   Does the patient have Home health ordered? No   Is there a DME ordered? No   Comments regarding appointments Follow up with Mu Guardado MD and [facility]   Prep survey completed? Yes          PARK ABDULLAHI - Registered Nurse

## 2022-06-19 LAB
BACTERIA SPEC AEROBE CULT: NORMAL
BACTERIA SPEC AEROBE CULT: NORMAL

## 2022-06-24 ENCOUNTER — READMISSION MANAGEMENT (OUTPATIENT)
Dept: CALL CENTER | Facility: HOSPITAL | Age: 72
End: 2022-06-24

## 2022-06-24 NOTE — OUTREACH NOTE
COPD/PN Week 2 Survey    Flowsheet Row Responses   [facility] patient discharged from? [facility]   Does the patient have one of the following disease processes/diagnoses(primary or secondary)? COPD/Pneumonia   Was the primary reason for admission: COPD exacerbation   Week 2 attempt successful? Yes   Call start time 0852   Call end time 0859   Meds reviewed with patient/caregiver? Yes   Is the patient having any side effects they believe may be caused by any medication additions or changes? No   Does the patient have all medications ordered at discharge? Yes   Is the patient taking all medications as directed (includes completed medication regime)? Yes   Comments regarding appointments States has appt with PCP in 3 months-encouraged to f/u with PCP as soon as possible.    Does the patient have a primary care provider?  Yes   Does the patient have an appointment with their PCP or specialist within 7 days of discharge? No   What is preventing the patient from scheduling follow up appointments within 7 days of discharge? Haven't had time   Nursing Interventions Advised patient to make appointment, Educated patient on importance of making appointment   Has the patient kept scheduled appointments due by today? N/A   Has home health visited the patient within 72 hours of discharge? N/A   Has all DME been delivered? Yes   DME comments Wears home O2 PRN.   Pulse Ox monitoring Intermittent   Pulse Ox device source Patient, Hospital, Current Health   O2 Sat comments States O2 sats staying in the 90s.   O2 Sat: education provided Sat levels, Monitoring frequency, When to seek care   O2 Sat education comments Advised to return to ER if O2 sats remain below 88-90% on home O2.   Psychosocial issues? No   Did the patient receive a copy of their discharge instructions? Yes   Nursing interventions Reviewed instructions with patient   What is the patient's perception of their health status since discharge? Improving   Nursing  Interventions Nurse provided patient education   If the patient is a current smoker, are they able to teach back resources for cessation? Not a smoker   Is the patient/caregiver able to teach back the hierarchy of who to call/visit for symptoms/problems? PCP, Specialist, Home health nurse, Urgent Care, ED, 911 Yes   Is the patient able to teach back COPD zones? No   Nursing interventions Education provided on various zones   Patient reports what zone on this call? Green Zone   Green Zone Reports doing well, Sleeping well, Appetite is good   Green Zone interventions: Take daily medications, Use oxygen as prescribed, Continue regular exercise/diet plan, Avoid indoor/outdoor triggers   Week 2 call completed? Yes   Wrap up additional comments States is improving. Denies any fever, chest pain or worsening SOA. Denies any needs today.          KORY MILLER - Registered Nurse

## 2022-07-01 ENCOUNTER — READMISSION MANAGEMENT (OUTPATIENT)
Dept: CALL CENTER | Facility: HOSPITAL | Age: 72
End: 2022-07-01

## 2022-07-01 NOTE — OUTREACH NOTE
COPD/PN Week 3 Survey    Flowsheet Row Responses   [facility] facility patient discharged from? [facility]   Does the patient have one of the following disease processes/diagnoses(primary or secondary)? COPD/Pneumonia   Was the primary reason for admission: COPD exacerbation   Week 3 attempt successful? No   Unsuccessful attempts Attempt 1          JUAN SEXTON - Registered Nurse

## 2022-07-05 ENCOUNTER — READMISSION MANAGEMENT (OUTPATIENT)
Dept: CALL CENTER | Facility: HOSPITAL | Age: 72
End: 2022-07-05

## 2022-07-05 NOTE — OUTREACH NOTE
COPD/PN Week 3 Survey    Flowsheet Row Responses   [facility] facility patient discharged from? [facility]   Does the patient have one of the following disease processes/diagnoses(primary or secondary)? COPD/Pneumonia   Was the primary reason for admission: COPD exacerbation   Week 3 attempt successful? Yes   Call start time 1102   Week 3 call completed? Yes   Wrap up additional comments Brief call . Pt EDMAR verduzco - Registered Nurse

## 2022-07-10 ENCOUNTER — HOSPITAL ENCOUNTER (INPATIENT)
Facility: HOSPITAL | Age: 72
LOS: 2 days | Discharge: HOME | End: 2022-07-12
Attending: EMERGENCY MEDICINE | Admitting: INTERNAL MEDICINE

## 2022-07-10 ENCOUNTER — READMISSION MANAGEMENT (OUTPATIENT)
Dept: CALL CENTER | Facility: HOSPITAL | Age: 72
End: 2022-07-10

## 2022-07-10 ENCOUNTER — APPOINTMENT (OUTPATIENT)
Dept: GENERAL RADIOLOGY | Facility: HOSPITAL | Age: 72
End: 2022-07-10

## 2022-07-10 DIAGNOSIS — J44.1 COPD WITH ACUTE EXACERBATION: ICD-10-CM

## 2022-07-10 DIAGNOSIS — J96.01 ACUTE RESPIRATORY FAILURE WITH HYPOXIA: Primary | ICD-10-CM

## 2022-07-10 DIAGNOSIS — R26.2 DIFFICULTY WALKING: ICD-10-CM

## 2022-07-10 DIAGNOSIS — A41.9 SEPSIS, DUE TO UNSPECIFIED ORGANISM, UNSPECIFIED WHETHER ACUTE ORGAN DYSFUNCTION PRESENT: ICD-10-CM

## 2022-07-10 LAB
ALBUMIN SERPL-MCNC: 3.9 G/DL (ref 3.5–5.2)
ALBUMIN/GLOB SERPL: 1.1 G/DL
ALP SERPL-CCNC: 90 U/L (ref 39–117)
ALT SERPL W P-5'-P-CCNC: 12 U/L (ref 1–41)
ANION GAP SERPL CALCULATED.3IONS-SCNC: 8.8 MMOL/L (ref 5–15)
ARTERIAL PATENCY WRIST A: POSITIVE
AST SERPL-CCNC: 15 U/L (ref 1–40)
BASE EXCESS BLDA CALC-SCNC: -2.6 MMOL/L (ref -2–2)
BASOPHILS # BLD AUTO: 0.06 10*3/MM3 (ref 0–0.2)
BASOPHILS NFR BLD AUTO: 0.6 % (ref 0–1.5)
BDY SITE: ABNORMAL
BILIRUB SERPL-MCNC: 0.4 MG/DL (ref 0–1.2)
BUN SERPL-MCNC: 19 MG/DL (ref 8–23)
BUN/CREAT SERPL: 17.8 (ref 7–25)
CA-I BLDA-SCNC: 1.13 MMOL/L (ref 1.13–1.32)
CALCIUM SPEC-SCNC: 8.9 MG/DL (ref 8.6–10.5)
CHLORIDE BLDA-SCNC: 101 MMOL/L (ref 98–106)
CHLORIDE SERPL-SCNC: 102 MMOL/L (ref 98–107)
CO2 SERPL-SCNC: 28.2 MMOL/L (ref 22–29)
COHGB MFR BLD: 1.2 % (ref 0–1.5)
CREAT SERPL-MCNC: 1.07 MG/DL (ref 0.76–1.27)
D-LACTATE SERPL-SCNC: 1 MMOL/L (ref 0.5–2)
DEPRECATED RDW RBC AUTO: 51.3 FL (ref 37–54)
EGFRCR SERPLBLD CKD-EPI 2021: 73.3 ML/MIN/1.73
EOSINOPHIL # BLD AUTO: 1.35 10*3/MM3 (ref 0–0.4)
EOSINOPHIL NFR BLD AUTO: 13 % (ref 0.3–6.2)
ERYTHROCYTE [DISTWIDTH] IN BLOOD BY AUTOMATED COUNT: 16.1 % (ref 12.3–15.4)
FHHB: 5.9 % (ref 0–5)
GAS FLOW AIRWAY: ABNORMAL L/MIN
GLOBULIN UR ELPH-MCNC: 3.4 GM/DL
GLUCOSE BLDA-MCNC: 270 MMOL/L (ref 70–99)
GLUCOSE BLDC GLUCOMTR-MCNC: 194 MG/DL (ref 70–99)
GLUCOSE BLDC GLUCOMTR-MCNC: 249 MG/DL (ref 70–99)
GLUCOSE BLDC GLUCOMTR-MCNC: 293 MG/DL (ref 70–99)
GLUCOSE SERPL-MCNC: 257 MG/DL (ref 65–99)
HCO3 BLDA-SCNC: 27 MMOL/L (ref 22–26)
HCT VFR BLD AUTO: 45.4 % (ref 37.5–51)
HGB BLD-MCNC: 13.3 G/DL (ref 13–17.7)
HGB BLDA-MCNC: 13.8 G/DL (ref 13.8–16.4)
HOLD SPECIMEN: 11
HOLD SPECIMEN: 11
IMM GRANULOCYTES # BLD AUTO: 0.06 10*3/MM3 (ref 0–0.05)
IMM GRANULOCYTES NFR BLD AUTO: 0.6 % (ref 0–0.5)
INHALED O2 CONCENTRATION: 30 %
LACTATE BLDA-SCNC: 0.89 MMOL/L (ref 0.5–2)
LYMPHOCYTES # BLD AUTO: 2.03 10*3/MM3 (ref 0.7–3.1)
LYMPHOCYTES NFR BLD AUTO: 19.6 % (ref 19.6–45.3)
MCH RBC QN AUTO: 25.5 PG (ref 26.6–33)
MCHC RBC AUTO-ENTMCNC: 29.3 G/DL (ref 31.5–35.7)
MCV RBC AUTO: 87 FL (ref 79–97)
METHGB BLD QL: 0.2 % (ref 0–1.5)
MODALITY: ABNORMAL
MONOCYTES # BLD AUTO: 0.73 10*3/MM3 (ref 0.1–0.9)
MONOCYTES NFR BLD AUTO: 7 % (ref 5–12)
NEUTROPHILS NFR BLD AUTO: 59.2 % (ref 42.7–76)
NEUTROPHILS NFR BLD AUTO: 6.14 10*3/MM3 (ref 1.7–7)
NOTE: ABNORMAL
NRBC BLD AUTO-RTO: 0 /100 WBC (ref 0–0.2)
NT-PROBNP SERPL-MCNC: 77.8 PG/ML (ref 0–900)
OXYHGB MFR BLDV: 92.7 % (ref 94–99)
PCO2 BLDA: 70.2 MM HG (ref 35–45)
PH BLDA: 7.2 PH UNITS (ref 7.35–7.45)
PLATELET # BLD AUTO: 185 10*3/MM3 (ref 140–450)
PMV BLD AUTO: 10.2 FL (ref 6–12)
PO2 BLD: 275 MM[HG] (ref 0–500)
PO2 BLDA: 82.6 MM HG (ref 80–100)
POTASSIUM BLDA-SCNC: 4.2 MMOL/L (ref 3.5–5)
POTASSIUM SERPL-SCNC: 4.4 MMOL/L (ref 3.5–5.2)
PROCALCITONIN SERPL-MCNC: 0.07 NG/ML (ref 0–0.25)
PROT SERPL-MCNC: 7.3 G/DL (ref 6–8.5)
QT INTERVAL: 376 MS
RBC # BLD AUTO: 5.22 10*6/MM3 (ref 4.14–5.8)
SAO2 % BLDCOA: 94 % (ref 95–99)
SARS-COV-2 RNA PNL SPEC NAA+PROBE: NOT DETECTED
SODIUM BLDA-SCNC: 134.9 MMOL/L (ref 136–146)
SODIUM SERPL-SCNC: 139 MMOL/L (ref 136–145)
TROPONIN T SERPL-MCNC: 0.01 NG/ML (ref 0–0.03)
WBC NRBC COR # BLD: 10.37 10*3/MM3 (ref 3.4–10.8)
WHOLE BLOOD HOLD COAG: 11
WHOLE BLOOD HOLD SPECIMEN: 11

## 2022-07-10 PROCEDURE — 25010000002 HEPARIN (PORCINE) PER 1000 UNITS: Performed by: INTERNAL MEDICINE

## 2022-07-10 PROCEDURE — 71045 X-RAY EXAM CHEST 1 VIEW: CPT

## 2022-07-10 PROCEDURE — 25010000002 METHYLPREDNISOLONE PER 125 MG: Performed by: EMERGENCY MEDICINE

## 2022-07-10 PROCEDURE — 87040 BLOOD CULTURE FOR BACTERIA: CPT | Performed by: EMERGENCY MEDICINE

## 2022-07-10 PROCEDURE — U0004 COV-19 TEST NON-CDC HGH THRU: HCPCS | Performed by: EMERGENCY MEDICINE

## 2022-07-10 PROCEDURE — 82805 BLOOD GASES W/O2 SATURATION: CPT | Performed by: EMERGENCY MEDICINE

## 2022-07-10 PROCEDURE — 83880 ASSAY OF NATRIURETIC PEPTIDE: CPT | Performed by: EMERGENCY MEDICINE

## 2022-07-10 PROCEDURE — 99285 EMERGENCY DEPT VISIT HI MDM: CPT

## 2022-07-10 PROCEDURE — 93010 ELECTROCARDIOGRAM REPORT: CPT | Performed by: INTERNAL MEDICINE

## 2022-07-10 PROCEDURE — 80053 COMPREHEN METABOLIC PANEL: CPT | Performed by: EMERGENCY MEDICINE

## 2022-07-10 PROCEDURE — 84145 PROCALCITONIN (PCT): CPT | Performed by: STUDENT IN AN ORGANIZED HEALTH CARE EDUCATION/TRAINING PROGRAM

## 2022-07-10 PROCEDURE — 25010000002 METHYLPREDNISOLONE PER 125 MG: Performed by: INTERNAL MEDICINE

## 2022-07-10 PROCEDURE — 94761 N-INVAS EAR/PLS OXIMETRY MLT: CPT

## 2022-07-10 PROCEDURE — 82962 GLUCOSE BLOOD TEST: CPT

## 2022-07-10 PROCEDURE — 94799 UNLISTED PULMONARY SVC/PX: CPT

## 2022-07-10 PROCEDURE — 63710000001 INSULIN LISPRO (HUMAN) PER 5 UNITS: Performed by: INTERNAL MEDICINE

## 2022-07-10 PROCEDURE — 36600 WITHDRAWAL OF ARTERIAL BLOOD: CPT | Performed by: EMERGENCY MEDICINE

## 2022-07-10 PROCEDURE — 25010000002 VANCOMYCIN 5 G RECONSTITUTED SOLUTION: Performed by: EMERGENCY MEDICINE

## 2022-07-10 PROCEDURE — 93005 ELECTROCARDIOGRAM TRACING: CPT | Performed by: EMERGENCY MEDICINE

## 2022-07-10 PROCEDURE — 85025 COMPLETE CBC W/AUTO DIFF WBC: CPT | Performed by: EMERGENCY MEDICINE

## 2022-07-10 PROCEDURE — 94640 AIRWAY INHALATION TREATMENT: CPT

## 2022-07-10 PROCEDURE — 63710000001 INSULIN DETEMIR PER 5 UNITS: Performed by: INTERNAL MEDICINE

## 2022-07-10 PROCEDURE — 94660 CPAP INITIATION&MGMT: CPT

## 2022-07-10 PROCEDURE — 63710000001 INSULIN LISPRO (HUMAN) PER 5 UNITS: Performed by: STUDENT IN AN ORGANIZED HEALTH CARE EDUCATION/TRAINING PROGRAM

## 2022-07-10 PROCEDURE — 99222 1ST HOSP IP/OBS MODERATE 55: CPT | Performed by: INTERNAL MEDICINE

## 2022-07-10 PROCEDURE — 84484 ASSAY OF TROPONIN QUANT: CPT | Performed by: EMERGENCY MEDICINE

## 2022-07-10 PROCEDURE — 83605 ASSAY OF LACTIC ACID: CPT | Performed by: EMERGENCY MEDICINE

## 2022-07-10 PROCEDURE — 25010000002 METHYLPREDNISOLONE PER 125 MG: Performed by: STUDENT IN AN ORGANIZED HEALTH CARE EDUCATION/TRAINING PROGRAM

## 2022-07-10 PROCEDURE — 83050 HGB METHEMOGLOBIN QUAN: CPT | Performed by: EMERGENCY MEDICINE

## 2022-07-10 PROCEDURE — 25010000002 CEFEPIME PER 500 MG: Performed by: EMERGENCY MEDICINE

## 2022-07-10 PROCEDURE — 82375 ASSAY CARBOXYHB QUANT: CPT | Performed by: EMERGENCY MEDICINE

## 2022-07-10 RX ORDER — CETIRIZINE HYDROCHLORIDE 10 MG/1
10 TABLET ORAL DAILY
COMMUNITY

## 2022-07-10 RX ORDER — ONDANSETRON 2 MG/ML
4 INJECTION INTRAMUSCULAR; INTRAVENOUS EVERY 6 HOURS PRN
Status: DISCONTINUED | OUTPATIENT
Start: 2022-07-10 | End: 2022-07-12 | Stop reason: HOSPADM

## 2022-07-10 RX ORDER — ASPIRIN 81 MG/1
81 TABLET, CHEWABLE ORAL DAILY
Status: DISCONTINUED | OUTPATIENT
Start: 2022-07-10 | End: 2022-07-12 | Stop reason: HOSPADM

## 2022-07-10 RX ORDER — METHYLPREDNISOLONE SODIUM SUCCINATE 125 MG/2ML
60 INJECTION, POWDER, LYOPHILIZED, FOR SOLUTION INTRAMUSCULAR; INTRAVENOUS EVERY 6 HOURS
Status: COMPLETED | OUTPATIENT
Start: 2022-07-10 | End: 2022-07-10

## 2022-07-10 RX ORDER — IPRATROPIUM BROMIDE AND ALBUTEROL SULFATE 2.5; .5 MG/3ML; MG/3ML
3 SOLUTION RESPIRATORY (INHALATION) EVERY 4 HOURS PRN
Status: DISCONTINUED | OUTPATIENT
Start: 2022-07-10 | End: 2022-07-12 | Stop reason: HOSPADM

## 2022-07-10 RX ORDER — DEXTROSE MONOHYDRATE 25 G/50ML
25 INJECTION, SOLUTION INTRAVENOUS
Status: DISCONTINUED | OUTPATIENT
Start: 2022-07-10 | End: 2022-07-10

## 2022-07-10 RX ORDER — LISINOPRIL 2.5 MG/1
2.5 TABLET ORAL DAILY
Status: DISCONTINUED | OUTPATIENT
Start: 2022-07-10 | End: 2022-07-12 | Stop reason: HOSPADM

## 2022-07-10 RX ORDER — METHYLPREDNISOLONE SODIUM SUCCINATE 125 MG/2ML
60 INJECTION, POWDER, LYOPHILIZED, FOR SOLUTION INTRAMUSCULAR; INTRAVENOUS EVERY 6 HOURS
Status: DISCONTINUED | OUTPATIENT
Start: 2022-07-10 | End: 2022-07-10

## 2022-07-10 RX ORDER — PANTOPRAZOLE SODIUM 40 MG/1
40 TABLET, DELAYED RELEASE ORAL EVERY MORNING
Status: DISCONTINUED | OUTPATIENT
Start: 2022-07-10 | End: 2022-07-12 | Stop reason: HOSPADM

## 2022-07-10 RX ORDER — DEXTROSE MONOHYDRATE 25 G/50ML
25 INJECTION, SOLUTION INTRAVENOUS
Status: DISCONTINUED | OUTPATIENT
Start: 2022-07-10 | End: 2022-07-12 | Stop reason: HOSPADM

## 2022-07-10 RX ORDER — IPRATROPIUM BROMIDE AND ALBUTEROL SULFATE 2.5; .5 MG/3ML; MG/3ML
3 SOLUTION RESPIRATORY (INHALATION)
Status: COMPLETED | OUTPATIENT
Start: 2022-07-10 | End: 2022-07-10

## 2022-07-10 RX ORDER — SODIUM CHLORIDE 0.9 % (FLUSH) 0.9 %
10 SYRINGE (ML) INJECTION AS NEEDED
Status: DISCONTINUED | OUTPATIENT
Start: 2022-07-10 | End: 2022-07-12 | Stop reason: HOSPADM

## 2022-07-10 RX ORDER — INSULIN LISPRO 100 [IU]/ML
0-7 INJECTION, SOLUTION INTRAVENOUS; SUBCUTANEOUS
Status: DISCONTINUED | OUTPATIENT
Start: 2022-07-10 | End: 2022-07-10

## 2022-07-10 RX ORDER — SODIUM CHLORIDE 0.9 % (FLUSH) 0.9 %
10 SYRINGE (ML) INJECTION AS NEEDED
Status: DISCONTINUED | OUTPATIENT
Start: 2022-07-10 | End: 2022-07-10

## 2022-07-10 RX ORDER — HEPARIN SODIUM 5000 [USP'U]/ML
5000 INJECTION, SOLUTION INTRAVENOUS; SUBCUTANEOUS EVERY 8 HOURS SCHEDULED
Status: DISCONTINUED | OUTPATIENT
Start: 2022-07-10 | End: 2022-07-12 | Stop reason: HOSPADM

## 2022-07-10 RX ORDER — INSULIN LISPRO 100 [IU]/ML
0-9 INJECTION, SOLUTION INTRAVENOUS; SUBCUTANEOUS
Status: DISCONTINUED | OUTPATIENT
Start: 2022-07-10 | End: 2022-07-10

## 2022-07-10 RX ORDER — METHYLPREDNISOLONE SODIUM SUCCINATE 125 MG/2ML
125 INJECTION, POWDER, LYOPHILIZED, FOR SOLUTION INTRAMUSCULAR; INTRAVENOUS ONCE
Status: COMPLETED | OUTPATIENT
Start: 2022-07-10 | End: 2022-07-10

## 2022-07-10 RX ORDER — NICOTINE POLACRILEX 4 MG
24 LOZENGE BUCCAL
Status: DISCONTINUED | OUTPATIENT
Start: 2022-07-10 | End: 2022-07-12 | Stop reason: HOSPADM

## 2022-07-10 RX ORDER — IPRATROPIUM BROMIDE AND ALBUTEROL SULFATE 2.5; .5 MG/3ML; MG/3ML
SOLUTION RESPIRATORY (INHALATION)
Status: COMPLETED
Start: 2022-07-10 | End: 2022-07-10

## 2022-07-10 RX ORDER — NICOTINE POLACRILEX 4 MG
24 LOZENGE BUCCAL
Status: DISCONTINUED | OUTPATIENT
Start: 2022-07-10 | End: 2022-07-10

## 2022-07-10 RX ORDER — INSULIN LISPRO 100 [IU]/ML
0-14 INJECTION, SOLUTION INTRAVENOUS; SUBCUTANEOUS
Status: DISCONTINUED | OUTPATIENT
Start: 2022-07-10 | End: 2022-07-12 | Stop reason: HOSPADM

## 2022-07-10 RX ORDER — PREDNISONE 20 MG/1
40 TABLET ORAL DAILY
Status: DISCONTINUED | OUTPATIENT
Start: 2022-07-11 | End: 2022-07-12 | Stop reason: HOSPADM

## 2022-07-10 RX ORDER — CEFEPIME 1 G/50ML
2 INJECTION, SOLUTION INTRAVENOUS ONCE
Status: COMPLETED | OUTPATIENT
Start: 2022-07-10 | End: 2022-07-10

## 2022-07-10 RX ORDER — FLUTICASONE PROPIONATE AND SALMETEROL 250; 50 UG/1; UG/1
1 POWDER RESPIRATORY (INHALATION)
COMMUNITY
End: 2022-11-09 | Stop reason: HOSPADM

## 2022-07-10 RX ORDER — ACETAMINOPHEN 325 MG/1
650 TABLET ORAL EVERY 6 HOURS PRN
Status: DISCONTINUED | OUTPATIENT
Start: 2022-07-10 | End: 2022-07-12 | Stop reason: HOSPADM

## 2022-07-10 RX ORDER — ATORVASTATIN CALCIUM 10 MG/1
10 TABLET, FILM COATED ORAL NIGHTLY
Status: DISCONTINUED | OUTPATIENT
Start: 2022-07-10 | End: 2022-07-12 | Stop reason: HOSPADM

## 2022-07-10 RX ADMIN — IPRATROPIUM BROMIDE AND ALBUTEROL SULFATE 3 ML: .5; 3 SOLUTION RESPIRATORY (INHALATION) at 02:55

## 2022-07-10 RX ADMIN — HEPARIN SODIUM 5000 UNITS: 5000 INJECTION, SOLUTION INTRAVENOUS; SUBCUTANEOUS at 14:49

## 2022-07-10 RX ADMIN — INSULIN LISPRO 3 UNITS: 100 INJECTION, SOLUTION INTRAVENOUS; SUBCUTANEOUS at 07:51

## 2022-07-10 RX ADMIN — METOPROLOL TARTRATE 25 MG: 25 TABLET, FILM COATED ORAL at 21:16

## 2022-07-10 RX ADMIN — METHYLPREDNISOLONE SODIUM SUCCINATE 60 MG: 125 INJECTION, POWDER, FOR SOLUTION INTRAMUSCULAR; INTRAVENOUS at 21:15

## 2022-07-10 RX ADMIN — METHYLPREDNISOLONE SODIUM SUCCINATE 125 MG: 125 INJECTION, POWDER, FOR SOLUTION INTRAMUSCULAR; INTRAVENOUS at 03:30

## 2022-07-10 RX ADMIN — SODIUM CHLORIDE 1000 ML: 9 INJECTION, SOLUTION INTRAVENOUS at 04:30

## 2022-07-10 RX ADMIN — HEPARIN SODIUM 5000 UNITS: 5000 INJECTION, SOLUTION INTRAVENOUS; SUBCUTANEOUS at 06:27

## 2022-07-10 RX ADMIN — METHYLPREDNISOLONE SODIUM SUCCINATE 60 MG: 125 INJECTION, POWDER, FOR SOLUTION INTRAMUSCULAR; INTRAVENOUS at 14:49

## 2022-07-10 RX ADMIN — METOPROLOL TARTRATE 25 MG: 25 TABLET, FILM COATED ORAL at 09:03

## 2022-07-10 RX ADMIN — CEFEPIME 2 G: 1 INJECTION, SOLUTION INTRAVENOUS at 03:31

## 2022-07-10 RX ADMIN — ASPIRIN 81 MG CHEWABLE TABLET 81 MG: 81 TABLET CHEWABLE at 09:04

## 2022-07-10 RX ADMIN — METHYLPREDNISOLONE SODIUM SUCCINATE 60 MG: 125 INJECTION, POWDER, FOR SOLUTION INTRAMUSCULAR; INTRAVENOUS at 09:03

## 2022-07-10 RX ADMIN — INSULIN LISPRO 3 UNITS: 100 INJECTION, SOLUTION INTRAVENOUS; SUBCUTANEOUS at 17:40

## 2022-07-10 RX ADMIN — Medication 10 ML: at 21:16

## 2022-07-10 RX ADMIN — SODIUM CHLORIDE 1000 ML: 9 INJECTION, SOLUTION INTRAVENOUS at 03:30

## 2022-07-10 RX ADMIN — INSULIN DETEMIR 17 UNITS: 100 INJECTION, SOLUTION SUBCUTANEOUS at 09:04

## 2022-07-10 RX ADMIN — ATORVASTATIN CALCIUM 10 MG: 10 TABLET, FILM COATED ORAL at 21:16

## 2022-07-10 RX ADMIN — VANCOMYCIN HYDROCHLORIDE 2250 MG: 5 INJECTION, POWDER, LYOPHILIZED, FOR SOLUTION INTRAVENOUS at 03:57

## 2022-07-10 RX ADMIN — IPRATROPIUM BROMIDE AND ALBUTEROL SULFATE 3 ML: .5; 3 SOLUTION RESPIRATORY (INHALATION) at 02:45

## 2022-07-10 RX ADMIN — HEPARIN SODIUM 5000 UNITS: 5000 INJECTION, SOLUTION INTRAVENOUS; SUBCUTANEOUS at 21:15

## 2022-07-10 RX ADMIN — LISINOPRIL 2.5 MG: 2.5 TABLET ORAL at 09:03

## 2022-07-10 RX ADMIN — IPRATROPIUM BROMIDE AND ALBUTEROL SULFATE 3 ML: .5; 3 SOLUTION RESPIRATORY (INHALATION) at 02:50

## 2022-07-10 RX ADMIN — INSULIN LISPRO 6 UNITS: 100 INJECTION, SOLUTION INTRAVENOUS; SUBCUTANEOUS at 12:11

## 2022-07-10 NOTE — H&P
[facility]   HOSPITALIST HISTORY AND PHYSICAL  Date: 7/10/2022   Patient Name: Dilip Faulkner  : 1950  MRN: [id number]    Primary Care Physician:  Rosalba Edwards APRN  Family Point of Contact:  Date of admission: 7/10/2022      Subjective     Chief Complaint: SOB    HPI:  Dilip Faulkner is an obese, diabetic, asthmatic and possibly O2 dependent 72 former smoker with advanced COPD. Presents via EMS 2/2 24 hrs of SOB.    He required NIPPV en route 2/2 hypoxia and upon arrival, bed bugs needed to be washed from his person.    Arrived with O2 sat 82% --> 90% with BiPAP. Empiric abx given, but no PNA and no ACS/PE suspicion. Respiratory acidosis appreciated via ABG.    Due to lethargy, a ROM is unobtainable.    Personal History     Past Medical History:  Past Medical History:   Diagnosis Date   • Asthma    • COPD (chronic obstructive pulmonary disease) (HCC)    • Diabetes mellitus (HCC)    • Hernia, umbilical    • Hypertension    • Requires continuous at home supplemental oxygen      Obesity-induced DMT2; glargine & metformin  COPD/asthma/former smoker who ceased in 3/2021  HTN; polypharmacy  HLP; atorva  RBBB    Past Surgical History:  Past Surgical History:   Procedure Laterality Date   • ABDOMINAL SURGERY           Family History:   History reviewed. No pertinent family history.   Non-contributory    Social History:   Uncertain    Home Medications:  Fluticasone-Salmeterol, albuterol sulfate HFA, aspirin, atorvastatin, insulin degludec, ipratropium-albuterol, lisinopril, metFORMIN, metoprolol tartrate, and omeprazole    Allergies:  No Known Allergies    Review of Systems  Please see HPI. All else asked and negative.    Objective     Vitals:   Temp:  [97.6 °F (36.4 °C)] 97.6 °F (36.4 °C)  Heart Rate:  [103-111] 108  Resp:  [26-44] 26  BP: (106-162)/(68-90) 106/68    Physical Exam    Constitutional: Obese, lethargic, non-septic appearing, NAD   Eyes: PERRLA, EOMI, sclerae anicteric, no conjunctival  injection   HENT: NC/AT, mucous membranes moist   Neck: Supple, no JVD, thyromegaly, or lymphadenopathy; trachea midline   Respiratory: Diffuse end expiratory wheezes   Cardiovascular: Tachy but regular without murmurs, rubs, or gallops   Gastrointestinal: Soft, corpulent, NT/ND with NABS x4; no ascites   Musculoskeletal: No c/c/e; palpable pedal pulses bilaterally; pes planus bilat.   Neurologic: Can't comply  Skin: No rashes, breakdown, or bleeding from any orifice.  Rectal: deferred    Result Review    Result Review:  I have personally reviewed the results from the time of this admission to 7/10/2022 04:00 EDT and agree with these findings:  [x]  Laboratory  [x]  Microbiology  [x]  Radiology  [x]  EKG/Telemetry   []  Cardiology/Vascular   []  Pathology  []  Old records  []  Other:      Assessment & Plan   Assessment / Plan     Assessment:  Acute on chronic resp failure without sepsis or ACS/PE suspicion  DMT2  Unkempt state  Uncertain COVID status    Plan:   Admit to medicine for NIPPV, solumedrol, pulmonary toilet, and isolation status.  Rx reconciliation; hold LABA  Cardiac and ADA diet with insulin SS  VTE prophy via sc heparin  Case management input    CODE STATUS:    Level Of Support Discussed With: Patient  Code Status (Patient has no pulse and is not breathing): CPR (Attempt to Resuscitate)  Medical Interventions (Patient has pulse or is breathing): Full Support      Admission Status:  I believe this patient meets inpatient status.    Electronically signed by Timoteo Beauchamp DO, 07/10/22, 4:00 AM EDT.

## 2022-07-10 NOTE — PROGRESS NOTES
[facility]   Hospitalist Progress Note  Date: 7/10/2022  Patient Name: Dilip Faulkner  : 1950  MRN: [id number]  Date of admission: 7/10/2022      Subjective   Subjective     Chief Complaint: Shortness of breath    Summary: 72-year-old male presented with shortness of breath and being treated for acute hypoxic respiratory failure and COPD exacerbation.    Interval Followup: No events overnight.  Patient on nasal cannula upon into the room.  He states he is feeling better than initial presentation.  He does tell me he has an appetite today and would like to eat.  He denies any episodes of nausea or emesis.  He denies any current chest pain or chest pressure.  He still has intermittent cough.  He remains afebrile.     Review of Systems  All systems reviewed and negative septa as above in HPI.    Objective   Objective     Vitals:   Temp:  [97.2 °F (36.2 °C)-97.6 °F (36.4 °C)] 97.3 °F (36.3 °C)  Heart Rate:  [] 89  Resp:  [20-44] 20  BP: ()/(52-90) 113/63  Physical Exam   Constitutional: Appears weak, acutely ill, laying in bed  HEENT:  PERRLA, EOMI; No Scleral icterus  Neck:  Supple; No Mass, No Lymphadenopathy  Cardiovascular:  No Rubs, No Edema, No JVD  Respiratory: Rhonchi bilaterally, wheezing, diminished breath sounds, diminished inspiratory effort  Abdomen:  Normal BS all 4 Quadrants; No Guarding, No Tenderness  Musculoskeletal:  Pulses Positive all 4 Ext; No Cyanosis, No Edema  Neurological:  Alert+Ox3, Mental status WNL; No Dysarthria  Skin:  No Rash, No Cellulitis, No Erythema    Result Review    Result Review:  I have personally reviewed the results from the time of this admission to 7/10/2022 08:02 EDT and agree with these findings:  [x]  Laboratory  [x]  Microbiology  [x]  Radiology  []  EKG/Telemetry   []  Cardiology/Vascular   []  Pathology  [x]  Old records  []  Other:     Assessment & Plan   Assessment / Plan     Assessment:  Acute on chronic hypercarbic respiratory  failure  Acute COPD exacerbation  Respiratory acidosis  Type 2 diabetes mellitus  Morbid obesity, BMI 34.1.  Former tobacco abuse  Hyperlipidemia  Hypertension    Plan:  Continue submental oxygen, wean as tolerated, maintain O2 saturation greater 90%  Patient has BiPAP as needed  Continue with methylprednisolone and switch to prednisone 40 mg daily for 5 days in a.m.  Blood glucose elevated, will change correctional insulin to moderate high-dose, titrate as needed  Continue with Levemir 17 units daily  Continue with aspirin, statin, beta-blocker  Heparin for DVT prophylaxis  Continue to monitor on telemetry  Continue with Protonix for reflux  Labs reviewed, image reviewed, medications reviewed, vital signs reviewed  Further recommendations pending clinical course     Discussed plan with RN.    DVT prophylaxis:  Medical DVT prophylaxis orders are present.    CODE STATUS:   Level Of Support Discussed With: Patient  Code Status (Patient has no pulse and is not breathing): CPR (Attempt to Resuscitate)  Medical Interventions (Patient has pulse or is breathing): Full Support        Electronically signed by Ricci Trinidad DO, 07/10/22, 8:02 AM EDT.

## 2022-07-10 NOTE — PAYOR COMM NOTE
"Dilip Laws (72 y.o. Male)             Date of Birth   1950    Social Security Number       Address   [address]  [place] [facility] [address]    Home Phone   [phone]    MRN   [id number]       Taoism   None    Marital Status   Single                            Admission Date   7/10/22    Admission Type   Emergency    Admitting Provider   Timoteo Beauchamp DO    Attending Provider   Ricci Trinidad DO    Department, Room/Bed   [facility] 4TH FLOOR MEDICAL TELEMETRY UNIT, 415/1       Discharge Date       Discharge Disposition       Discharge Destination                               Attending Provider: Ricci Trinidad DO    Allergies: No Known Allergies    Isolation: Contact, Airborne   Infection: COVID (rule out) (07/10/22)   Code Status: CPR   Advance Care Planning Activity    Ht: 185.4 cm (73\")   Wt: 117 kg (258 lb 13.1 oz)    Admission Cmt: None   Principal Problem: None                Active Insurance as of 7/10/2022     Primary Coverage     Payor Plan Insurance Group Employer/Plan Group    HUMANA MEDICARE REPLACEMENT HUMANA MEDICARE REPLACEMENT [id number]     Payor Plan Address Payor Plan Phone Number Payor Plan Fax Number Effective Dates    [address] [address] [phone]  4/1/2022 - None Entered    [facility] [id number]       Subscriber Name Subscriber Birth Date Member ID       DILIP LAWS 1950 [id number]           Secondary Coverage     Payor Plan Insurance Group Employer/Plan Group    [facility] MEDICAID      Payor Plan Address Payor Plan Phone Number Payor Plan Fax Number Effective Dates    [address] [phone]  3/19/2022 - None Entered    [facility] [id number]       Subscriber Name Subscriber Birth Date Member ID       DILIP LAWS 1950 [id number]                 Emergency Contacts      (Rel.) Home Phone Work Phone Mobile Phone    Jojo Stallings (Sister) [phone] -- --    JOJO STALLINGS (Sister) -- -- " [phone]            Arnoldo: MIRA [id number] Tax ID [id number]     History & Physical      Timoteo Beauchamp DO at 07/10/22 0400           [facility]  Date: 7/10/2022   Patient Name: Dilip Faulkner  : 1950  MRN: [id number]    Primary Care Physician:  Rosalba Edwards APRN  Family Point of Contact:  Date of admission: 7/10/2022      Subjective     Chief Complaint: SOB    HPI:  Dilip Faulkner is an obese, diabetic, asthmatic and possibly O2 dependent 72 former smoker with advanced COPD. Presents via EMS 2/2 24 hrs of SOB.    He required NIPPV en route 2/2 hypoxia and upon arrival, bed bugs needed to be washed from his person.    Arrived with O2 sat 82% --> 90% with BiPAP. Empiric abx given, but no PNA and no ACS/PE suspicion. Respiratory acidosis appreciated via ABG.    Due to lethargy, a ROM is unobtainable.    Personal History     Past Medical History:  Past Medical History:   Diagnosis Date   • Asthma    • COPD (chronic obstructive pulmonary disease) (HCC)    • Diabetes mellitus (HCC)    • Hernia, umbilical    • Hypertension    • Requires continuous at home supplemental oxygen      Obesity-induced DMT2; glargine & metformin  COPD/asthma/former smoker who ceased in 3/2021  HTN; polypharmacy  HLP; atorva  RBBB    Past Surgical History:  Past Surgical History:   Procedure Laterality Date   • ABDOMINAL SURGERY           Family History:   History reviewed. No pertinent family history.   Non-contributory    Social History:   Uncertain    Home Medications:  Fluticasone-Salmeterol, albuterol sulfate HFA, aspirin, atorvastatin, insulin degludec, ipratropium-albuterol, lisinopril, metFORMIN, metoprolol tartrate, and omeprazole    Allergies:  No Known Allergies    Review of Systems  Please see HPI. All else asked and negative.    Objective     Vitals:   Temp:  [97.6 °F (36.4 °C)] 97.6 °F (36.4 °C)  Heart Rate:  [103-111] 108  Resp:  [26-44] 26  BP: (106-162)/(68-90)  106/68    Physical Exam    Constitutional: Obese, lethargic, non-septic appearing, NAD   Eyes: PERRLA, EOMI, sclerae anicteric, no conjunctival injection   HENT: NC/AT, mucous membranes moist   Neck: Supple, no JVD, thyromegaly, or lymphadenopathy; trachea midline   Respiratory: Diffuse end expiratory wheezes   Cardiovascular: Tachy but regular without murmurs, rubs, or gallops   Gastrointestinal: Soft, corpulent, NT/ND with NABS x4; no ascites   Musculoskeletal: No c/c/e; palpable pedal pulses bilaterally; pes planus bilat.   Neurologic: Can't comply  Skin: No rashes, breakdown, or bleeding from any orifice.  Rectal: deferred    Result Review    Result Review:  I have personally reviewed the results from the time of this admission to 7/10/2022 04:00 EDT and agree with these findings:  [x]  Laboratory  [x]  Microbiology  [x]  Radiology  [x]  EKG/Telemetry   []  Cardiology/Vascular   []  Pathology  []  Old records  []  Other:      Assessment & Plan   Assessment / Plan     Assessment:  Acute on chronic resp failure without sepsis or ACS/PE suspicion  DMT2  Unkempt state  Uncertain COVID status    Plan:   Admit to medicine for NIPPV, solumedrol, pulmonary toilet, and isolation status.  Rx reconciliation; hold LABA  Cardiac and ADA diet with insulin SS  VTE prophy via sc heparin  Case management input    CODE STATUS:    Level Of Support Discussed With: Patient  Code Status (Patient has no pulse and is not breathing): CPR (Attempt to Resuscitate)  Medical Interventions (Patient has pulse or is breathing): Full Support      Admission Status:  I believe this patient meets inpatient status.    Electronically signed by Timoteo Beauchamp DO, 07/10/22, 4:00 AM EDT.             Electronically signed by Timoteo Beauchamp DO at 07/10/22 0409          Emergency Department Notes      Roel Saldivar MD at 07/10/22 0240      Procedure Orders    1. ECG 12 Lead [[id number]] ordered by Roel Saldivar MD               Time: 2:40 AM  EDT    Chief Complaint: shortness of breath    History of Present Illness:  Patient is a 72 y.o. year old male that presents to the emergency department via EMS for increasing shortness of breath greater than 24 hours, but worse tonight. EMS were called and per EMS, found the pateint in respiratory distress. Patient's initial SpO2 was 82% on room air, so he was placed on a CPAP and his SpO2 increased to the high 80s. EMS reports after they increased the pressure, the patient's stats went to low 90s. No other treatment done en route. EMS states patient has a history of COPD and CAD.       History provided by:  EMS personnel  History limited by:  Severe respiratory distress      Similar Symptoms Previously: no  Recently seen: no      Patient Care Team  Primary Care Provider: Rosalba Edwards APRN    Past Medical History:     No Known Allergies  Past Medical History:   Diagnosis Date   • Asthma    • COPD (chronic obstructive pulmonary disease) (HCC)    • Diabetes mellitus (HCC)    • Hernia, umbilical    • Hypertension    • Requires continuous at home supplemental oxygen      Past Surgical History:   Procedure Laterality Date   • ABDOMINAL SURGERY       No family history on file.    Home Medications:  Prior to Admission medications    Medication Sig Start Date End Date Taking? Authorizing Provider   albuterol sulfate  (90 Base) MCG/ACT inhaler Inhale 2 puffs Every 6 (Six) Hours As Needed for Wheezing.    Provider, MD Roosevelt   aspirin 81 MG chewable tablet Chew 81 mg Daily.    Provider, MD Roosevelt   atorvastatin (LIPITOR) 10 MG tablet Take 10 mg by mouth Daily.    Provider, MD Roosevelt   fluticasone-salmeterol (Advair Diskus) 250-50 MCG/DOSE DISKUS Inhale 1 puff 2 (Two) Times a Day for 30 days. 1/13/22 2/12/22  Arthur Arzola,    insulin degludec (Tresiba FlexTouch) 100 UNIT/ML solution pen-injector injection Inject 17 Units under the skin into the appropriate area as directed Daily.    Provider  MD Roosevelt   ipratropium-albuterol (DUO-NEB) 0.5-2.5 mg/3 ml nebulizer Take 3 mL by nebulization Every 4 (Four) Hours As Needed for Wheezing or Shortness of Air. 21   Ricci Trinidad DO   lisinopril (PRINIVIL,ZESTRIL) 2.5 MG tablet Take 2.5 mg by mouth Daily.    Roosevelt Gaston MD   metFORMIN (GLUCOPHAGE) 500 MG tablet Take 500 mg by mouth 2 (two) times a day. 21   Roosevelt Gaston MD   metoprolol tartrate (LOPRESSOR) 25 MG tablet Take 25 mg by mouth 2 (two) times a day. 21   Roosevelt Gaston MD   omeprazole (priLOSEC) 20 MG capsule Take 20 mg by mouth Daily.    ProviderRoosevelt MD        Social History:   Social History     Tobacco Use   • Smoking status: Former Smoker     Types: Cigarettes     Quit date: 3/19/2021     Years since quittin.3   • Smokeless tobacco: Never Used   Vaping Use   • Vaping Use: Never used   Substance Use Topics   • Alcohol use: Never   • Drug use: Never       Record Review:  I have reviewed the patient's records in [facility].     Review of Systems:  Review of Systems   Unable to perform ROS: Severe respiratory distress        Physical Exam:  /80   Pulse 111   Temp 97.6 °F (36.4 °C) (Rectal)   Resp 28   Wt 115 kg (252 lb 10.4 oz)   SpO2 91%   BMI 33.34 kg/m²     Physical Exam  Vitals and nursing note reviewed.   Constitutional:       General: He is in acute distress.      Appearance: He is ill-appearing.      Comments: Multiple bed bugs on the patient.    HENT:      Head: Normocephalic and atraumatic.      Jaw: There is normal jaw occlusion.   Eyes:      General: Lids are normal.      Extraocular Movements: Extraocular movements intact.      Conjunctiva/sclera: Conjunctivae normal.      Pupils: Pupils are equal, round, and reactive to light.   Cardiovascular:      Rate and Rhythm: Normal rate and regular rhythm.      Pulses: Normal pulses.      Heart sounds: Normal heart sounds.   Pulmonary:      Effort: Tachypnea and respiratory distress  present.      Breath sounds: Wheezing (bilaterally) present. No rhonchi.      Comments: Decreased air movement bilaterally.  Abdominal:      General: Abdomen is flat. There is distension.      Palpations: Abdomen is soft.      Tenderness: There is no abdominal tenderness. There is no guarding or rebound.   Musculoskeletal:         General: Normal range of motion.      Cervical back: Normal range of motion and neck supple.      Right lower leg: Edema present.      Left lower leg: Edema present.   Skin:     General: Skin is warm and dry.   Neurological:      Mental Status: He is alert.   Psychiatric:      Comments: Patient is in respiratory distress.                     Medications in the Emergency Department:  Medications   sodium chloride 0.9 % flush 10 mL (has no administration in time range)   sodium chloride 0.9 % flush 10 mL (has no administration in time range)   vancomycin 2250 mg/500 mL 0.9% NS IVPB ([facility]) (has no administration in time range)   sodium chloride 0.9 % bolus 1,000 mL (1,000 mL Intravenous New Bag 7/10/22 0330)   ipratropium-albuterol (DUO-NEB) nebulizer solution 3 mL (3 mL Nebulization Given 7/10/22 0255)   methylPREDNISolone sodium succinate (SOLU-Medrol) injection 125 mg (125 mg Intravenous Given 7/10/22 0330)   cefepime (MAXIPIME) IVPB 2 g (premix) in D5 (2 g Intravenous New Bag 7/10/22 0331)        Labs  Lab Results (last 24 hours)     Procedure Component Value Units Date/Time    CBC & Differential [[id number]]  (Abnormal) Collected: 07/10/22 0239    Specimen: Blood Updated: 07/10/22 0250    Narrative:      The following orders were created for panel order CBC & Differential.  Procedure                               Abnormality         Status                     ---------                               -----------         ------                     CBC Auto Differential[[id number]]        Abnormal            Final result                 Please view results for these tests on the individual  orders.    Comprehensive Metabolic Panel [[id number]]  (Abnormal) Collected: 07/10/22 0239    Specimen: Blood Updated: 07/10/22 0315     Glucose 257 mg/dL      BUN 19 mg/dL      Creatinine 1.07 mg/dL      Sodium 139 mmol/L      Potassium 4.4 mmol/L      Chloride 102 mmol/L      CO2 28.2 mmol/L      Calcium 8.9 mg/dL      Total Protein 7.3 g/dL      Albumin 3.90 g/dL      ALT (SGPT) 12 U/L      AST (SGOT) 15 U/L      Alkaline Phosphatase 90 U/L      Total Bilirubin 0.4 mg/dL      Globulin 3.4 gm/dL      A/G Ratio 1.1 g/dL      BUN/Creatinine Ratio 17.8     Anion Gap 8.8 mmol/L      eGFR 73.3 mL/min/1.73      Comment: National Kidney Foundation and American Society of Nephrology (ASN) Task Force recommended calculation based on the Chronic Kidney Disease Epidemiology Collaboration (CKD-EPI) equation refit without adjustment for race.       Narrative:      GFR Normal >60  Chronic Kidney Disease <60  Kidney Failure <15      Lactic Acid, Plasma [[id number]]  (Normal) Collected: 07/10/22 0239    Specimen: Blood Updated: 07/10/22 0310     Lactate 1.0 mmol/L     Blood Culture - Blood, Arm, Right [[id number]] Collected: 07/10/22 0239    Specimen: Blood from Arm, Right Updated: 07/10/22 0250    Blood Culture - Blood, Arm, Right [[id number]] Collected: 07/10/22 0239    Specimen: Blood from Arm, Right Updated: 07/10/22 0250    BNP [[id number]]  (Normal) Collected: 07/10/22 0239    Specimen: Blood Updated: 07/10/22 0312     proBNP 77.8 pg/mL     Narrative:      Among patients with dyspnea, NT-proBNP is highly sensitive for the detection of acute congestive heart failure. In addition NT-proBNP of <300 pg/ml effectively rules out acute congestive heart failure with 99% negative predictive value.    Results may be falsely decreased if patient taking Biotin.      Troponin [[id number]]  (Normal) Collected: 07/10/22 0239    Specimen: Blood Updated: 07/10/22 0314     Troponin T 0.013 ng/mL     Narrative:      Troponin T Reference Range:  <=  0.03 ng/mL-   Negative for AMI  >0.03 ng/mL-     Abnormal for myocardial necrosis.  Clinicians would have to utilize clinical acumen, EKG, Troponin and serial changes to determine if it is an Acute Myocardial Infarction or myocardial injury due to an underlying chronic condition.       Results may be falsely decreased if patient taking Biotin.      CBC Auto Differential [[id number]]  (Abnormal) Collected: 07/10/22 0239    Specimen: Blood Updated: 07/10/22 0250     WBC 10.37 10*3/mm3      RBC 5.22 10*6/mm3      Hemoglobin 13.3 g/dL      Hematocrit 45.4 %      MCV 87.0 fL      MCH 25.5 pg      MCHC 29.3 g/dL      RDW 16.1 %      RDW-SD 51.3 fl      MPV 10.2 fL      Platelets 185 10*3/mm3      Neutrophil % 59.2 %      Lymphocyte % 19.6 %      Monocyte % 7.0 %      Eosinophil % 13.0 %      Basophil % 0.6 %      Immature Grans % 0.6 %      Neutrophils, Absolute 6.14 10*3/mm3      Lymphocytes, Absolute 2.03 10*3/mm3      Monocytes, Absolute 0.73 10*3/mm3      Eosinophils, Absolute 1.35 10*3/mm3      Basophils, Absolute 0.06 10*3/mm3      Immature Grans, Absolute 0.06 10*3/mm3      nRBC 0.0 /100 WBC     ABG with Co-Ox and Electrolytes [[id number]]  (Abnormal) Collected: 07/10/22 0256    Specimen: Arterial Blood from Arm, Right Updated: 07/10/22 0305     pH, Arterial 7.203 pH units      pCO2, Arterial 70.2 mm Hg      pO2, Arterial 82.6 mm Hg      HCO3, Arterial 27.0 mmol/L      Base Excess, Arterial -2.6 mmol/L      O2 Saturation, Arterial 94.0 %      Hemoglobin, Blood Gas 13.8 g/dL      Carboxyhemoglobin 1.2 %      Methemoglobin 0.20 %      Oxyhemoglobin 92.7 %      FHHB 5.9 %      Nael's Test Positive     Note Bipap Settings: 14/7     Site Arterial: right radial     Modality BiPap     FIO2 30 %      Flow Rate --     Sodium, Arterial 134.9 mmol/L      Potassium, Arterial 4.20 mmol/L      Ionized Calcium, Arterial 1.13 mmol/L      Chloride, Arterial 101 mmol/L      Glucose, Arterial 270 mmol/L      Lactate, Arterial 0.89  mmol/L      PO2/FIO2 275           Imaging:  XR Chest 1 View    Result Date: 7/10/2022  PROCEDURE: XR CHEST 1 VW  COMPARISON: 6/14/2022.  INDICATIONS: Shortness of breath.  FINDINGS:  A single AP upright portable chest radiograph was performed.  Probably no cardiac enlargement is seen.  No acute infiltrate is appreciated.  No pleural effusion or pneumothorax is identified.  Chronic pleural-parenchymal scarring is suspected in the inferior left thorax.  There may be mild atelectasis, as well.  External artifacts obscure detail.  The thoracic aorta is atherosclerotic.  There is emphysematous contour of the lungs.  Degenerative changes involve the shoulders and the spine.  No significant interval change is seen since the prior study.        No acute infiltrate is appreciated.      COMMENT:  Part of this note is an electronic transcription of spoken language to printed text. The electronic translation/transcription may permit erroneous, or at times, nonsensical (or even sensical) words or phrases to be inadvertently transcribed or omitted; this  has reviewed the note for such errors (as well as additional errors); however, some may still exist.  SEGUNDO MORA JR, MD       Electronically Signed and Approved By: SEGUNDO MORA JR, MD on 7/10/2022 at 3:04                Procedures:  ECG 12 Lead      Date/Time: 7/10/2022 3:45 AM  Performed by: Roel Saldivar MD  Authorized by: Roel Saldivar MD   Comparison: compared with previous ECG from 6/14/2022  Similar to previous ECG  Rhythm: sinus tachycardia  BPM: 103  Conduction: right bundle branch block  Comments: Normal P wave. Nonspecific ST changes. Normal QT.          Progress                            Medical Decision Making:  MDM  Number of Diagnoses or Management Options  Diagnosis management comments: Sepsis criteria was met in the emergency department and the Sepsis protocol (including antibiotic administration) was initiated.      SIRS criteria considered:    1.  Temperature > 100.4 or <98.6    2.  Heart Rate > 90    3.  Respiratory Rate > 22    4.  WBC > 12K or <4K.             Severe Sepsis:     Respiratory: Mechanical Ventilation or Bipap  Hypotension: SBP > 90 or MAP < 65  Renal: Creatinine > 2  Metabolic: Lactic Acid > 2  Hematologic: Platelets < 100K or INR > 1.5  Hepatic: BILI  >  2  CNS: Sudden AMS     Septic Shock:     Severe Sepsis + Persistent hypotension or Lactic Acid > 4     Normal saline bolus, Antibiotics, and final disposition was based on these definitions.        Sepsis was recognized at 0240    Antibiotics were ordered.       30 cc/kg bolus was not indicated.       Patient did not receive the recommended 30ml/kg fluid bolus for sepsis because it would be harmful or detrimental to the patient.    The patient has concern for fluid overload.   The patient was ordered 1L of fluids.    Total Critical Care time of 35 minutes. Total critical care time documented does not include time spent on separately billed procedures for services of nurses or physician assistants. I personally saw and examined the patient. I have reviewed all diagnostic interpretations and treatment plans as written. I was present for the key portions of any procedures performed and the inclusive time noted in any critical care statement. Critical care time includes patient management by me, time spent at the patients bedside,  time to review lab and imaging results, discussing patient care, documentation in the medical record, and time spent with family or caregiver.         Amount and/or Complexity of Data Reviewed  Discuss the patient with other providers: yes    Risk of Complications, Morbidity, and/or Mortality  General comments: 03:42 EDT: Discussed patient's case with Dr. Farris who agrees to admit the patient.            Final diagnoses:   Acute respiratory failure with hypoxia (HCC)   COPD with acute exacerbation (HCC)   Sepsis, due to unspecified organism, unspecified whether  acute organ dysfunction present (HCC)        Disposition:  ED Disposition     ED Disposition   Decision to Admit    Condition   --    Comment   --             Dictated Utilizing Dragon Dictation    Documentation assistance provided by Roseline Suh acting as scribe for Roel Saldivar MD. Information recorded by the scribe was done at my direction and has been verified and validated by me.             Roseline Suh  07/10/22 0347       Roel Saldivar MD  07/10/22 0724      Electronically signed by Roel Saldivar MD at 07/10/22 0724         Facility-Administered Medications as of 7/10/2022   Medication Dose Route Frequency Provider Last Rate Last Admin   • acetaminophen (TYLENOL) tablet 650 mg  650 mg Oral Q6H PRN Timoteo Beauchamp P DO       • aspirin chewable tablet 81 mg  81 mg Oral Daily Timoteo Beauchamp P DO   81 mg at 07/10/22 0904   • atorvastatin (LIPITOR) tablet 10 mg  10 mg Oral Nightly Timoteo Beauchamp DO       • [COMPLETED] cefepime (MAXIPIME) IVPB 2 g (premix) in D5  2 g Intravenous Once Roel Saldivar MD   Stopped at 07/10/22 0423   • dextrose (D50W) (25 g/50 mL) IV injection 25 g  25 g Intravenous Q15 Min PRN Ricci Trinidad DO       • dextrose (GLUTOSE) oral gel 24 g  24 g Oral Q15 Min PRN Ricci Trinidad DO       • glucagon (human recombinant) (GLUCAGEN DIAGNOSTIC) injection 1 mg  1 mg Intramuscular Q15 Min PRN Ricci Trinidad DO       • heparin (porcine) 5000 UNIT/ML injection 5,000 Units  5,000 Units Subcutaneous Q8H Timoteo Beauchamp DO   5,000 Units at 07/10/22 0627   • insulin detemir (LEVEMIR) injection 17 Units  17 Units Subcutaneous Daily Timoteo Beauchamp DO   17 Units at 07/10/22 0904   • Insulin Lispro (humaLOG) injection 0-9 Units  0-9 Units Subcutaneous 4x Daily With Meals & Nightly Ricci Trinidad DO       • [COMPLETED] ipratropium-albuterol (DUO-NEB) nebulizer solution 3 mL  3 mL Nebulization Q5 Min Roel Saldivar MD   3 mL at 07/10/22 0255   • ipratropium-albuterol  (DUO-NEB) nebulizer solution 3 mL  3 mL Nebulization Q4H PRN Rizos, Timoteo P, DO       • lisinopril (PRINIVIL,ZESTRIL) tablet 2.5 mg  2.5 mg Oral Daily Rizos, Timoteo P, DO   2.5 mg at 07/10/22 0903   • [COMPLETED] methylPREDNISolone sodium succinate (SOLU-Medrol) injection 125 mg  125 mg Intravenous Once Roel Saldivar MD   125 mg at 07/10/22 0330   • methylPREDNISolone sodium succinate (SOLU-Medrol) injection 60 mg  60 mg Intravenous Q6H Rizos, Timoteo P, DO   60 mg at 07/10/22 0903   • metoprolol tartrate (LOPRESSOR) tablet 25 mg  25 mg Oral Q12H Rizos, Timoteo P, DO   25 mg at 07/10/22 0903   • ondansetron (ZOFRAN) injection 4 mg  4 mg Intravenous Q6H PRN Rizos, Timoteo P, DO       • pantoprazole (PROTONIX) EC tablet 40 mg  40 mg Oral QAM Rizos, Timoteo P, DO       • [COMPLETED] sodium chloride 0.9 % bolus 1,000 mL  1,000 mL Intravenous Once Roel Saldivar MD   Stopped at 07/10/22 0424   • [COMPLETED] sodium chloride 0.9 % bolus 1,000 mL  1,000 mL Intravenous Once Roel Saldivar MD 2,000 mL/hr at 07/10/22 0430 1,000 mL at 07/10/22 0430   • sodium chloride 0.9 % flush 10 mL  10 mL Intravenous PRN Roel Saldivar MD       • [COMPLETED] vancomycin 2250 mg/500 mL 0.9% NS IVPB ([facility])  20 mg/kg Intravenous Once Roel Saldivar MD   2,250 mg at 07/10/22 0357     Physician Progress Notes (last 24 hours)  Notes from 07/09/22 1047 through 07/10/22 1047   No notes of this type exist for this encounter.       [facility] DEPART[facility]    Gloria De Jessu RN  Phone [phone]  Fax [phone]    [address] [place] [address]    NPI [id number]  TAX ID [id number]    PHYSICIAN NAME AND NPI - TIMOTEO PAGAN  [id number]    BED TYPE INPATIENT TELEMETRY    TYPE OF ADMISSION: ED ADMISSION MEDICAL    ICD 10 CODE J44.1., J96.01, A41.9          Consult Notes (last 24 hours)  Notes from 07/09/22 1047 through 07/10/22 1047   No notes of this type exist for this encounter.

## 2022-07-10 NOTE — ED PROVIDER NOTES
Time: 2:40 AM EDT    Chief Complaint: shortness of breath    History of Present Illness:  Patient is a 72 y.o. year old male that presents to the emergency department via EMS for increasing shortness of breath greater than 24 hours, but worse tonight. EMS were called and per EMS, found the pateint in respiratory distress. Patient's initial SpO2 was 82% on room air, so he was placed on a CPAP and his SpO2 increased to the high 80s. EMS reports after they increased the pressure, the patient's stats went to low 90s. No other treatment done en route. EMS states patient has a history of COPD and CAD.       History provided by:  EMS personnel  History limited by:  Severe respiratory distress      Similar Symptoms Previously: no  Recently seen: no      Patient Care Team  Primary Care Provider: Rosalba Edwards APRN    Past Medical History:     No Known Allergies  Past Medical History:   Diagnosis Date   • Asthma    • COPD (chronic obstructive pulmonary disease) (HCC)    • Diabetes mellitus (HCC)    • Hernia, umbilical    • Hypertension    • Requires continuous at home supplemental oxygen      Past Surgical History:   Procedure Laterality Date   • ABDOMINAL SURGERY       No family history on file.    Home Medications:  Prior to Admission medications    Medication Sig Start Date End Date Taking? Authorizing Provider   albuterol sulfate  (90 Base) MCG/ACT inhaler Inhale 2 puffs Every 6 (Six) Hours As Needed for Wheezing.    Provider, MD Roosevelt   aspirin 81 MG chewable tablet Chew 81 mg Daily.    Provider, MD Roosevelt   atorvastatin (LIPITOR) 10 MG tablet Take 10 mg by mouth Daily.    Provider, MD Roosevelt   fluticasone-salmeterol (Advair Diskus) 250-50 MCG/DOSE DISKUS Inhale 1 puff 2 (Two) Times a Day for 30 days. 1/13/22 2/12/22  Arthur Arzola,    insulin degludec (Tresiba FlexTouch) 100 UNIT/ML solution pen-injector injection Inject 17 Units under the skin into the appropriate area as directed Daily.     ProviderRoosevelt MD   ipratropium-albuterol (DUO-NEB) 0.5-2.5 mg/3 ml nebulizer Take 3 mL by nebulization Every 4 (Four) Hours As Needed for Wheezing or Shortness of Air. 21   Ricci Trinidad DO   lisinopril (PRINIVIL,ZESTRIL) 2.5 MG tablet Take 2.5 mg by mouth Daily.    Roosevelt Gaston MD   metFORMIN (GLUCOPHAGE) 500 MG tablet Take 500 mg by mouth 2 (two) times a day. 21   Roosevelt Gaston MD   metoprolol tartrate (LOPRESSOR) 25 MG tablet Take 25 mg by mouth 2 (two) times a day. 21   Roosevelt Gaston MD   omeprazole (priLOSEC) 20 MG capsule Take 20 mg by mouth Daily.    ProviderRoosevelt MD        Social History:   Social History     Tobacco Use   • Smoking status: Former Smoker     Types: Cigarettes     Quit date: 3/19/2021     Years since quittin.3   • Smokeless tobacco: Never Used   Vaping Use   • Vaping Use: Never used   Substance Use Topics   • Alcohol use: Never   • Drug use: Never       Record Review:  I have reviewed the patient's records in [employer].     Review of Systems:  Review of Systems   Unable to perform ROS: Severe respiratory distress        Physical Exam:  /80   Pulse 111   Temp 97.6 °F (36.4 °C) (Rectal)   Resp 28   Wt 115 kg (252 lb 10.4 oz)   SpO2 91%   BMI 33.34 kg/m²     Physical Exam  Vitals and nursing note reviewed.   Constitutional:       General: He is in acute distress.      Appearance: He is ill-appearing.      Comments: Multiple bed bugs on the patient.    HENT:      Head: Normocephalic and atraumatic.      Jaw: There is normal jaw occlusion.   Eyes:      General: Lids are normal.      Extraocular Movements: Extraocular movements intact.      Conjunctiva/sclera: Conjunctivae normal.      Pupils: Pupils are equal, round, and reactive to light.   Cardiovascular:      Rate and Rhythm: Normal rate and regular rhythm.      Pulses: Normal pulses.      Heart sounds: Normal heart sounds.   Pulmonary:      Effort: Tachypnea and respiratory  distress present.      Breath sounds: Wheezing (bilaterally) present. No rhonchi.      Comments: Decreased air movement bilaterally.  Abdominal:      General: Abdomen is flat. There is distension.      Palpations: Abdomen is soft.      Tenderness: There is no abdominal tenderness. There is no guarding or rebound.   Musculoskeletal:         General: Normal range of motion.      Cervical back: Normal range of motion and neck supple.      Right lower leg: Edema present.      Left lower leg: Edema present.   Skin:     General: Skin is warm and dry.   Neurological:      Mental Status: He is alert.   Psychiatric:      Comments: Patient is in respiratory distress.                     Medications in the Emergency Department:  Medications   sodium chloride 0.9 % flush 10 mL (has no administration in time range)   sodium chloride 0.9 % flush 10 mL (has no administration in time range)   vancomycin 2250 mg/500 mL 0.9% NS IVPB ([facility]) (has no administration in time range)   sodium chloride 0.9 % bolus 1,000 mL (1,000 mL Intravenous New Bag 7/10/22 0330)   ipratropium-albuterol (DUO-NEB) nebulizer solution 3 mL (3 mL Nebulization Given 7/10/22 0255)   methylPREDNISolone sodium succinate (SOLU-Medrol) injection 125 mg (125 mg Intravenous Given 7/10/22 0330)   cefepime (MAXIPIME) IVPB 2 g (premix) in D5 (2 g Intravenous New Bag 7/10/22 0331)        Labs  Lab Results (last 24 hours)     Procedure Component Value Units Date/Time    CBC & Differential [[id number]]  (Abnormal) Collected: 07/10/22 0239    Specimen: Blood Updated: 07/10/22 0250    Narrative:      The following orders were created for panel order CBC & Differential.  Procedure                               Abnormality         Status                     ---------                               -----------         ------                     CBC Auto Differential[[id number]]        Abnormal            Final result                 Please view results for these tests on the  individual orders.    Comprehensive Metabolic Panel [[id number]]  (Abnormal) Collected: 07/10/22 0239    Specimen: Blood Updated: 07/10/22 0315     Glucose 257 mg/dL      BUN 19 mg/dL      Creatinine 1.07 mg/dL      Sodium 139 mmol/L      Potassium 4.4 mmol/L      Chloride 102 mmol/L      CO2 28.2 mmol/L      Calcium 8.9 mg/dL      Total Protein 7.3 g/dL      Albumin 3.90 g/dL      ALT (SGPT) 12 U/L      AST (SGOT) 15 U/L      Alkaline Phosphatase 90 U/L      Total Bilirubin 0.4 mg/dL      Globulin 3.4 gm/dL      A/G Ratio 1.1 g/dL      BUN/Creatinine Ratio 17.8     Anion Gap 8.8 mmol/L      eGFR 73.3 mL/min/1.73      Comment: National Kidney Foundation and American Society of Nephrology (ASN) Task Force recommended calculation based on the Chronic Kidney Disease Epidemiology Collaboration (CKD-EPI) equation refit without adjustment for race.       Narrative:      GFR Normal >60  Chronic Kidney Disease <60  Kidney Failure <15      Lactic Acid, Plasma [[id number]]  (Normal) Collected: 07/10/22 0239    Specimen: Blood Updated: 07/10/22 0310     Lactate 1.0 mmol/L     Blood Culture - Blood, Arm, Right [[id number]] Collected: 07/10/22 0239    Specimen: Blood from Arm, Right Updated: 07/10/22 0250    Blood Culture - Blood, Arm, Right [[id number]] Collected: 07/10/22 0239    Specimen: Blood from Arm, Right Updated: 07/10/22 0250    BNP [[id number]]  (Normal) Collected: 07/10/22 0239    Specimen: Blood Updated: 07/10/22 0312     proBNP 77.8 pg/mL     Narrative:      Among patients with dyspnea, NT-proBNP is highly sensitive for the detection of acute congestive heart failure. In addition NT-proBNP of <300 pg/ml effectively rules out acute congestive heart failure with 99% negative predictive value.    Results may be falsely decreased if patient taking Biotin.      Troponin [[id number]]  (Normal) Collected: 07/10/22 0239    Specimen: Blood Updated: 07/10/22 0314     Troponin T 0.013 ng/mL     Narrative:      Troponin T Reference  Range:  <= 0.03 ng/mL-   Negative for AMI  >0.03 ng/mL-     Abnormal for myocardial necrosis.  Clinicians would have to utilize clinical acumen, EKG, Troponin and serial changes to determine if it is an Acute Myocardial Infarction or myocardial injury due to an underlying chronic condition.       Results may be falsely decreased if patient taking Biotin.      CBC Auto Differential [[id number]]  (Abnormal) Collected: 07/10/22 0239    Specimen: Blood Updated: 07/10/22 0250     WBC 10.37 10*3/mm3      RBC 5.22 10*6/mm3      Hemoglobin 13.3 g/dL      Hematocrit 45.4 %      MCV 87.0 fL      MCH 25.5 pg      MCHC 29.3 g/dL      RDW 16.1 %      RDW-SD 51.3 fl      MPV 10.2 fL      Platelets 185 10*3/mm3      Neutrophil % 59.2 %      Lymphocyte % 19.6 %      Monocyte % 7.0 %      Eosinophil % 13.0 %      Basophil % 0.6 %      Immature Grans % 0.6 %      Neutrophils, Absolute 6.14 10*3/mm3      Lymphocytes, Absolute 2.03 10*3/mm3      Monocytes, Absolute 0.73 10*3/mm3      Eosinophils, Absolute 1.35 10*3/mm3      Basophils, Absolute 0.06 10*3/mm3      Immature Grans, Absolute 0.06 10*3/mm3      nRBC 0.0 /100 WBC     ABG with Co-Ox and Electrolytes [[id number]]  (Abnormal) Collected: 07/10/22 0256    Specimen: Arterial Blood from Arm, Right Updated: 07/10/22 0305     pH, Arterial 7.203 pH units      pCO2, Arterial 70.2 mm Hg      pO2, Arterial 82.6 mm Hg      HCO3, Arterial 27.0 mmol/L      Base Excess, Arterial -2.6 mmol/L      O2 Saturation, Arterial 94.0 %      Hemoglobin, Blood Gas 13.8 g/dL      Carboxyhemoglobin 1.2 %      Methemoglobin 0.20 %      Oxyhemoglobin 92.7 %      FHHB 5.9 %      Nael's Test Positive     Note Bipap Settings: 14/7     Site Arterial: right radial     Modality BiPap     FIO2 30 %      Flow Rate --     Sodium, Arterial 134.9 mmol/L      Potassium, Arterial 4.20 mmol/L      Ionized Calcium, Arterial 1.13 mmol/L      Chloride, Arterial 101 mmol/L      Glucose, Arterial 270 mmol/L      Lactate,  Arterial 0.89 mmol/L      PO2/FIO2 275           Imaging:  XR Chest 1 View    Result Date: 7/10/2022  PROCEDURE: XR CHEST 1 VW  COMPARISON: 6/14/2022.  INDICATIONS: Shortness of breath.  FINDINGS:  A single AP upright portable chest radiograph was performed.  Probably no cardiac enlargement is seen.  No acute infiltrate is appreciated.  No pleural effusion or pneumothorax is identified.  Chronic pleural-parenchymal scarring is suspected in the inferior left thorax.  There may be mild atelectasis, as well.  External artifacts obscure detail.  The thoracic aorta is atherosclerotic.  There is emphysematous contour of the lungs.  Degenerative changes involve the shoulders and the spine.  No significant interval change is seen since the prior study.        No acute infiltrate is appreciated.      COMMENT:  Part of this note is an electronic transcription of spoken language to printed text. The electronic translation/transcription may permit erroneous, or at times, nonsensical (or even sensical) words or phrases to be inadvertently transcribed or omitted; this  has reviewed the note for such errors (as well as additional errors); however, some may still exist.  SEGUNDO MORA JR, MD       Electronically Signed and Approved By: SEGUNDO MORA JR, MD on 7/10/2022 at 3:04                Procedures:  ECG 12 Lead      Date/Time: 7/10/2022 3:45 AM  Performed by: Roel Saldivar MD  Authorized by: Roel Saldivar MD   Comparison: compared with previous ECG from 6/14/2022  Similar to previous ECG  Rhythm: sinus tachycardia  BPM: 103  Conduction: right bundle branch block  Comments: Normal P wave. Nonspecific ST changes. Normal QT.          Progress                            Medical Decision Making:  MDM  Number of Diagnoses or Management Options  Diagnosis management comments: Sepsis criteria was met in the emergency department and the Sepsis protocol (including antibiotic administration) was initiated.      SIRS  criteria considered:   1.  Temperature > 100.4 or <98.6    2.  Heart Rate > 90    3.  Respiratory Rate > 22    4.  WBC > 12K or <4K.             Severe Sepsis:     Respiratory: Mechanical Ventilation or Bipap  Hypotension: SBP > 90 or MAP < 65  Renal: Creatinine > 2  Metabolic: Lactic Acid > 2  Hematologic: Platelets < 100K or INR > 1.5  Hepatic: BILI  >  2  CNS: Sudden AMS     Septic Shock:     Severe Sepsis + Persistent hypotension or Lactic Acid > 4     Normal saline bolus, Antibiotics, and final disposition was based on these definitions.        Sepsis was recognized at 0240    Antibiotics were ordered.       30 cc/kg bolus was not indicated.       Patient did not receive the recommended 30ml/kg fluid bolus for sepsis because it would be harmful or detrimental to the patient.    The patient has concern for fluid overload.   The patient was ordered 1L of fluids.    Total Critical Care time of 35 minutes. Total critical care time documented does not include time spent on separately billed procedures for services of nurses or physician assistants. I personally saw and examined the patient. I have reviewed all diagnostic interpretations and treatment plans as written. I was present for the key portions of any procedures performed and the inclusive time noted in any critical care statement. Critical care time includes patient management by me, time spent at the patients bedside,  time to review lab and imaging results, discussing patient care, documentation in the medical record, and time spent with family or caregiver.         Amount and/or Complexity of Data Reviewed  Discuss the patient with other providers: yes    Risk of Complications, Morbidity, and/or Mortality  General comments: 03:42 EDT: Discussed patient's case with Dr. Farris who agrees to admit the patient.            Final diagnoses:   Acute respiratory failure with hypoxia (HCC)   COPD with acute exacerbation (HCC)   Sepsis, due to unspecified organism,  unspecified whether acute organ dysfunction present (HCC)        Disposition:  ED Disposition     ED Disposition   Decision to Admit    Condition   --    Comment   --             Dictated Utilizing Dragon Dictation    Documentation assistance provided by Roseline Suh acting as scribe for Roel Saldivar MD. Information recorded by the scribe was done at my direction and has been verified and validated by me.             Roseline Suh  07/10/22 0347       Roel Saldivar MD  07/10/22 6195

## 2022-07-10 NOTE — OUTREACH NOTE
COPD/PN Week 4 Survey    Flowsheet Row Responses   [facility] facility patient discharged from? [facility]   Does the patient have one of the following disease processes/diagnoses(primary or secondary)? COPD/Pneumonia   Was the primary reason for admission: COPD exacerbation   Week 4 attempt successful? No   Revoke Readmitted          RONY RINALDI - Registered Nurse

## 2022-07-10 NOTE — PLAN OF CARE
Problem: Adult Inpatient Plan of Care  Goal: Plan of Care Review  Outcome: Ongoing, Progressing  Flowsheets (Taken 7/10/2022 1636)  Progress: improving  Plan of Care Reviewed With: patient  Outcome Evaluation: Pt alert and oriented this morning. Pt has continued on 2L nasal cannula since breakfast this morning and sats have remained above 90%. VSS. No significant changes at this time. Continuing to monitor and provide patient care.   Goal Outcome Evaluation:  Plan of Care Reviewed With: patient        Progress: improving  Outcome Evaluation: Pt alert and oriented this morning. Pt has continued on 2L nasal cannula since breakfast this morning and sats have remained above 90%. VSS. No significant changes at this time. Continuing to monitor and provide patient care.

## 2022-07-10 NOTE — PLAN OF CARE
Problem: Adult Inpatient Plan of Care  Goal: Plan of Care Review  Outcome: Ongoing, Progressing  Flowsheets (Taken 7/10/2022 0649)  Progress: no change  Plan of Care Reviewed With: patient  Outcome Evaluation: patient admitted to telemetry unit  Goal: Patient-Specific Goal (Individualized)  Outcome: Ongoing, Progressing  Goal: Absence of Hospital-Acquired Illness or Injury  Outcome: Ongoing, Progressing  Goal: Optimal Comfort and Wellbeing  Outcome: Ongoing, Progressing  Goal: Readiness for Transition of Care  Outcome: Ongoing, Progressing  Intervention: Mutually Develop Transition Plan  Recent Flowsheet Documentation  Taken 7/10/2022 0554 by Lauren Dhillon RN  Transportation Anticipated: family or friend will provide  Patient/Family Anticipated Services at Transition: none  Patient/Family Anticipates Transition to: home  Taken 7/10/2022 0548 by Lauren Dhillon RN  Equipment Currently Used at Home: none     Problem: Fall Injury Risk  Goal: Absence of Fall and Fall-Related Injury  Outcome: Ongoing, Progressing     Problem: COPD (Chronic Obstructive Pulmonary Disease) Comorbidity  Goal: Maintenance of COPD Symptom Control  Outcome: Ongoing, Progressing     Problem: Diabetes Comorbidity  Goal: Blood Glucose Level Within Targeted Range  Outcome: Ongoing, Progressing     Problem: Hypertension Comorbidity  Goal: Blood Pressure in Desired Range  Outcome: Ongoing, Progressing     Problem: Skin Injury Risk Increased  Goal: Skin Health and Integrity  Outcome: Ongoing, Progressing   Goal Outcome Evaluation:  Plan of Care Reviewed With: patient        Progress: no change  Outcome Evaluation: patient admitted to telemetry unit

## 2022-07-11 LAB
ALBUMIN SERPL-MCNC: 3.7 G/DL (ref 3.5–5.2)
ALBUMIN/GLOB SERPL: 1.3 G/DL
ALP SERPL-CCNC: 74 U/L (ref 39–117)
ALT SERPL W P-5'-P-CCNC: 14 U/L (ref 1–41)
ANION GAP SERPL CALCULATED.3IONS-SCNC: 12.5 MMOL/L (ref 5–15)
AST SERPL-CCNC: 17 U/L (ref 1–40)
BASOPHILS # BLD AUTO: 0.02 10*3/MM3 (ref 0–0.2)
BASOPHILS NFR BLD AUTO: 0.1 % (ref 0–1.5)
BILIRUB SERPL-MCNC: 0.2 MG/DL (ref 0–1.2)
BUN SERPL-MCNC: 21 MG/DL (ref 8–23)
BUN/CREAT SERPL: 19.6 (ref 7–25)
CALCIUM SPEC-SCNC: 8.9 MG/DL (ref 8.6–10.5)
CHLORIDE SERPL-SCNC: 101 MMOL/L (ref 98–107)
CO2 SERPL-SCNC: 21.5 MMOL/L (ref 22–29)
CREAT SERPL-MCNC: 1.07 MG/DL (ref 0.76–1.27)
DEPRECATED RDW RBC AUTO: 48.5 FL (ref 37–54)
EGFRCR SERPLBLD CKD-EPI 2021: 73.3 ML/MIN/1.73
EOSINOPHIL # BLD AUTO: 0 10*3/MM3 (ref 0–0.4)
EOSINOPHIL NFR BLD AUTO: 0 % (ref 0.3–6.2)
ERYTHROCYTE [DISTWIDTH] IN BLOOD BY AUTOMATED COUNT: 15.7 % (ref 12.3–15.4)
GLOBULIN UR ELPH-MCNC: 2.9 GM/DL
GLUCOSE BLDC GLUCOMTR-MCNC: 138 MG/DL (ref 70–99)
GLUCOSE BLDC GLUCOMTR-MCNC: 180 MG/DL (ref 70–99)
GLUCOSE BLDC GLUCOMTR-MCNC: 205 MG/DL (ref 70–99)
GLUCOSE SERPL-MCNC: 269 MG/DL (ref 65–99)
HCT VFR BLD AUTO: 40 % (ref 37.5–51)
HGB BLD-MCNC: 11.9 G/DL (ref 13–17.7)
IMM GRANULOCYTES # BLD AUTO: 0.14 10*3/MM3 (ref 0–0.05)
IMM GRANULOCYTES NFR BLD AUTO: 0.9 % (ref 0–0.5)
LYMPHOCYTES # BLD AUTO: 0.87 10*3/MM3 (ref 0.7–3.1)
LYMPHOCYTES NFR BLD AUTO: 5.5 % (ref 19.6–45.3)
MAGNESIUM SERPL-MCNC: 1.9 MG/DL (ref 1.6–2.4)
MCH RBC QN AUTO: 25.3 PG (ref 26.6–33)
MCHC RBC AUTO-ENTMCNC: 29.8 G/DL (ref 31.5–35.7)
MCV RBC AUTO: 85.1 FL (ref 79–97)
MONOCYTES # BLD AUTO: 0.41 10*3/MM3 (ref 0.1–0.9)
MONOCYTES NFR BLD AUTO: 2.6 % (ref 5–12)
NEUTROPHILS NFR BLD AUTO: 14.49 10*3/MM3 (ref 1.7–7)
NEUTROPHILS NFR BLD AUTO: 90.9 % (ref 42.7–76)
NRBC BLD AUTO-RTO: 0 /100 WBC (ref 0–0.2)
PHOSPHATE SERPL-MCNC: 3.2 MG/DL (ref 2.5–4.5)
PLATELET # BLD AUTO: 175 10*3/MM3 (ref 140–450)
PMV BLD AUTO: 10.6 FL (ref 6–12)
POTASSIUM SERPL-SCNC: 4.5 MMOL/L (ref 3.5–5.2)
PROT SERPL-MCNC: 6.6 G/DL (ref 6–8.5)
RBC # BLD AUTO: 4.7 10*6/MM3 (ref 4.14–5.8)
SODIUM SERPL-SCNC: 135 MMOL/L (ref 136–145)
WBC NRBC COR # BLD: 15.93 10*3/MM3 (ref 3.4–10.8)

## 2022-07-11 PROCEDURE — 85025 COMPLETE CBC W/AUTO DIFF WBC: CPT | Performed by: STUDENT IN AN ORGANIZED HEALTH CARE EDUCATION/TRAINING PROGRAM

## 2022-07-11 PROCEDURE — 25010000002 ONDANSETRON PER 1 MG: Performed by: INTERNAL MEDICINE

## 2022-07-11 PROCEDURE — 63710000001 PREDNISONE PER 1 MG: Performed by: STUDENT IN AN ORGANIZED HEALTH CARE EDUCATION/TRAINING PROGRAM

## 2022-07-11 PROCEDURE — 94761 N-INVAS EAR/PLS OXIMETRY MLT: CPT

## 2022-07-11 PROCEDURE — 63710000001 INSULIN DETEMIR PER 5 UNITS: Performed by: INTERNAL MEDICINE

## 2022-07-11 PROCEDURE — 97161 PT EVAL LOW COMPLEX 20 MIN: CPT

## 2022-07-11 PROCEDURE — 25010000002 HEPARIN (PORCINE) PER 1000 UNITS: Performed by: INTERNAL MEDICINE

## 2022-07-11 PROCEDURE — 83735 ASSAY OF MAGNESIUM: CPT | Performed by: STUDENT IN AN ORGANIZED HEALTH CARE EDUCATION/TRAINING PROGRAM

## 2022-07-11 PROCEDURE — 82962 GLUCOSE BLOOD TEST: CPT

## 2022-07-11 PROCEDURE — 63710000001 INSULIN LISPRO (HUMAN) PER 5 UNITS: Performed by: STUDENT IN AN ORGANIZED HEALTH CARE EDUCATION/TRAINING PROGRAM

## 2022-07-11 PROCEDURE — 99233 SBSQ HOSP IP/OBS HIGH 50: CPT | Performed by: STUDENT IN AN ORGANIZED HEALTH CARE EDUCATION/TRAINING PROGRAM

## 2022-07-11 PROCEDURE — 94799 UNLISTED PULMONARY SVC/PX: CPT

## 2022-07-11 PROCEDURE — 80053 COMPREHEN METABOLIC PANEL: CPT | Performed by: STUDENT IN AN ORGANIZED HEALTH CARE EDUCATION/TRAINING PROGRAM

## 2022-07-11 PROCEDURE — 84100 ASSAY OF PHOSPHORUS: CPT | Performed by: STUDENT IN AN ORGANIZED HEALTH CARE EDUCATION/TRAINING PROGRAM

## 2022-07-11 RX ADMIN — ATORVASTATIN CALCIUM 10 MG: 10 TABLET, FILM COATED ORAL at 21:50

## 2022-07-11 RX ADMIN — PANTOPRAZOLE SODIUM 40 MG: 40 TABLET, DELAYED RELEASE ORAL at 06:07

## 2022-07-11 RX ADMIN — INSULIN DETEMIR 17 UNITS: 100 INJECTION, SOLUTION SUBCUTANEOUS at 08:33

## 2022-07-11 RX ADMIN — PREDNISONE 40 MG: 20 TABLET ORAL at 08:34

## 2022-07-11 RX ADMIN — LISINOPRIL 2.5 MG: 2.5 TABLET ORAL at 09:48

## 2022-07-11 RX ADMIN — HEPARIN SODIUM 5000 UNITS: 5000 INJECTION, SOLUTION INTRAVENOUS; SUBCUTANEOUS at 16:10

## 2022-07-11 RX ADMIN — METOPROLOL TARTRATE 25 MG: 25 TABLET, FILM COATED ORAL at 08:34

## 2022-07-11 RX ADMIN — INSULIN LISPRO 3 UNITS: 100 INJECTION, SOLUTION INTRAVENOUS; SUBCUTANEOUS at 07:54

## 2022-07-11 RX ADMIN — INSULIN LISPRO 5 UNITS: 100 INJECTION, SOLUTION INTRAVENOUS; SUBCUTANEOUS at 17:02

## 2022-07-11 RX ADMIN — HEPARIN SODIUM 5000 UNITS: 5000 INJECTION, SOLUTION INTRAVENOUS; SUBCUTANEOUS at 06:07

## 2022-07-11 RX ADMIN — ONDANSETRON 4 MG: 2 INJECTION INTRAMUSCULAR; INTRAVENOUS at 07:54

## 2022-07-11 RX ADMIN — HEPARIN SODIUM 5000 UNITS: 5000 INJECTION, SOLUTION INTRAVENOUS; SUBCUTANEOUS at 21:50

## 2022-07-11 RX ADMIN — ASPIRIN 81 MG CHEWABLE TABLET 81 MG: 81 TABLET CHEWABLE at 08:33

## 2022-07-11 NOTE — PLAN OF CARE
Goal Outcome Evaluation:  Plan of Care Reviewed With: patient           Outcome Evaluation: Patient presents with decreased functional mobility and endurance.  He will benefit from inpatient PT services and home health services upon return home.

## 2022-07-11 NOTE — THERAPY EVALUATION
Acute Care - Physical Therapy Initial Evaluation   [place]     Patient Name: Dilip Faulkner  : 1950  MRN: [id number]  Today's Date: 2022      Visit Dx:     ICD-10-CM ICD-9-CM   1. Acute respiratory failure with hypoxia (HCC)  J96.01 518.81   2. COPD with acute exacerbation (HCC)  J44.1 491.21   3. Sepsis, due to unspecified organism, unspecified whether acute organ dysfunction present (HCC)  A41.9 038.9     995.91   4. Difficulty walking  R26.2 719.7     Patient Active Problem List   Diagnosis   • Chronic obstructive pulmonary disease with acute exacerbation (HCC)   • Acute on chronic respiratory failure with hypoxia and hypercapnia (HCC)   • Type 2 diabetes mellitus (HCC)   • Acute on chronic respiratory failure (HCC)   • Essential hypertension   • Hyperlipidemia   • Cough   • Pneumonia due to infectious organism   • COPD with acute exacerbation (HCC)   • Obesity (BMI 30-39.9)   • Right bundle branch block   • Acquired ptosis of eyelid, bilateral   • Type 2 diabetes mellitus, with long-term current use of insulin (HCC)   • COPD exacerbation (HCC)   • Acute exacerbation of chronic obstructive pulmonary disease (COPD) (HCC)   • Acute respiratory failure with hypoxia (HCC)     Past Medical History:   Diagnosis Date   • Asthma    • COPD (chronic obstructive pulmonary disease) (HCC)    • Diabetes mellitus (HCC)    • Hernia, umbilical    • Hypertension    • Requires continuous at home supplemental oxygen      Past Surgical History:   Procedure Laterality Date   • ABDOMINAL SURGERY       PT Assessment (last 12 hours)     PT Evaluation and Treatment     Row Name 22 1400          Physical Therapy Time and Intention    Subjective Information no complaints  -DP     Document Type evaluation  -DP     Mode of Treatment individual therapy;physical therapy  -DP     Patient Effort adequate  -DP     Row Name 22 1400          General Information    Patient Profile Reviewed yes  -DP     Patient Observations  alert;cooperative;agree to therapy  -DP     Prior Level of Function independent:;gait;transfer;bed mobility;ADL's  -DP     Equipment Currently Used at Home oxygen  -DP     Existing Precautions/Restrictions fall  -DP     Row Name 07/11/22 1400          Living Environment    Current Living Arrangements home  -DP     Home Accessibility stairs to enter home  -DP     People in Home sibling(s)  sisteer  -DP     Row Name 07/11/22 1400          Home Main Entrance    Number of Stairs, Main Entrance none  -DP     Row Name 07/11/22 1400          Home Use of Assistive/Adaptive Equipment    Equipment Currently Used at Home none  -DP     Row Name 07/11/22 1400          Range of Motion (ROM)    Range of Motion bilateral lower extremities;ROM is WFL  -DP     Row Name 07/11/22 1400          Strength (Manual Muscle Testing)    Strength (Manual Muscle Testing) bilateral lower extremities;strength is WFL  -DP     Row Name 07/11/22 1400          Bed Mobility    Bed Mobility supine-sit  -DP     Supine-Sit [place] (Bed Mobility) standby assist  -DP     Row Name 07/11/22 1400          Transfers    Transfers sit-stand transfer  -DP     Sit-Stand [place] (Transfers) standby assist  -DP     Row Name 07/11/22 1400          Gait/Stairs (Locomotion)    Gait/Stairs Locomotion gait/ambulation assistive device  -DP     [place] Level (Gait) contact guard  -DP     Assistive Device (Gait) other (see comments)  no AD  -DP     Distance in Feet (Gait) 20  -DP     Comment, (Gait/Stairs) spO2- dropped to 80%  -DP     Row Name 07/11/22 1400          Balance    Balance Assessment standing dynamic balance  -DP     Dynamic Standing Balance contact guard  -DP     Row Name 07/11/22 1400          Plan of Care Review    Plan of Care Reviewed With patient  -DP     Outcome Evaluation Patient presents with decreased functional mobility and endurance.  He will benefit from inpatient PT services and home health services upon return home.  -DP     Row  Name 07/11/22 1400          Therapy Assessment/Plan (PT)    Rehab Potential (PT) good, to achieve stated therapy goals  -DP     Criteria for Skilled Interventions Met (PT) yes;meets criteria  -DP     Therapy Frequency (PT) daily  -DP     Predicted Duration of Therapy Intervention (PT) 10 days  -DP     Problem List (PT) problems related to;mobility;strength  -DP     Activity Limitations Related to Problem List (PT) unable to ambulate safely  -DP     Row Name 07/11/22 1400          PT Evaluation Complexity    History, PT Evaluation Complexity no personal factors and/or comorbidities  -DP     Examination of Body Systems (PT Eval Complexity) total of 4 or more elements  -DP     Clinical Presentation (PT Evaluation Complexity) stable  -DP     Clinical Decision Making (PT Evaluation Complexity) low complexity  -DP     Overall Complexity (PT Evaluation Complexity) low complexity  -DP     Row Name 07/11/22 1500          Physical Therapy Goals    Gait Training Goal Selection (PT) gait training, PT goal 1  -DP     Row Name 07/11/22 1500          Gait Training Goal 1 (PT)    Activity/Assistive Device (Gait Training Goal 1, PT) assistive device use;walker, rolling  -DP     [place] Level (Gait Training Goal 1, PT) supervision required  -DP     Distance (Gait Training Goal 1, PT) 200  -DP     Time Frame (Gait Training Goal 1, PT) 10 days  -DP           User Key  (r) = Recorded By, (t) = Taken By, (c) = Cosigned By    Initials Name Provider Type    Elza Nicole, PT Physical Therapist                  PT Recommendation and Plan  Anticipated Discharge Disposition (PT): home with home health  Planned Therapy Interventions (PT): balance training, bed mobility training, gait training, strengthening, transfer training  Therapy Frequency (PT): daily  Plan of Care Reviewed With: patient  Outcome Evaluation: Patient presents with decreased functional mobility and endurance.  He will benefit from inpatient PT services and home  health services upon return home.   Outcome Measures     Row Name 07/11/22 1500             How much help from another person do you currently need...    Turning from your back to your side while in flat bed without using bedrails? 4  -DP      Moving from lying on back to sitting on the side of a flat bed without bedrails? 4  -DP      Moving to and from a bed to a chair (including a wheelchair)? 3  -DP      Standing up from a chair using your arms (e.g., wheelchair, bedside chair)? 3  -DP      Climbing 3-5 steps with a railing? 3  -DP      To walk in hospital room? 3  -DP      AM-PAC 6 Clicks Score (PT) 20  -DP              Functional Assessment    Outcome Measure Options AM-PAC 6 Clicks Basic Mobility (PT)  -DP            User Key  (r) = Recorded By, (t) = Taken By, (c) = Cosigned By    Initials Name Provider Type    Elza Nicole, PT Physical Therapist                 Time Calculation:    PT Charges     Row Name 07/11/22 1511             Time Calculation    PT Received On 07/11/22  -DP      PT Goal Re-Cert Due Date 07/20/22  -DP              Untimed Charges    PT Eval/Re-eval Minutes 40  -DP              Total Minutes    Untimed Charges Total Minutes 40  -DP       Total Minutes 40  -DP            User Key  (r) = Recorded By, (t) = Taken By, (c) = Cosigned By    Initials Name Provider Type    Elza Nicole, PT Physical Therapist              Therapy Charges for Today     Code Description Service Date Service Provider Modifiers Qty    [id number] HC PT EVAL LOW COMPLEXITY 3 7/11/2022 Elza Miguel, PT GP 1          PT G-Codes  Outcome Measure Options: AM-PAC 6 Clicks Basic Mobility (PT)  AM-PAC 6 Clicks Score (PT): 20    Elza Miguel, PT  7/11/2022

## 2022-07-11 NOTE — PLAN OF CARE
Goal Outcome Evaluation:   VSS throughout shift, education on Bipap use, No new orders. No new complaints.

## 2022-07-11 NOTE — PLAN OF CARE
Problem: Adult Inpatient Plan of Care  Goal: Plan of Care Review  Outcome: Ongoing, Progressing  Flowsheets (Taken 7/11/2022 0413)  Progress: no change  Plan of Care Reviewed With: patient  Outcome Evaluation: Patient refused to wear bipap overnight. Educated patient on the importance of this. VSS.  Goal: Patient-Specific Goal (Individualized)  Outcome: Ongoing, Progressing  Goal: Absence of Hospital-Acquired Illness or Injury  Outcome: Ongoing, Progressing  Intervention: Identify and Manage Fall Risk  Recent Flowsheet Documentation  Taken 7/11/2022 0155 by Lauren Dhillon RN  Safety Promotion/Fall Prevention:   assistive device/personal items within reach   clutter free environment maintained   safety round/check completed   room organization consistent  Taken 7/11/2022 0012 by Lauren Dhillon RN  Safety Promotion/Fall Prevention:   assistive device/personal items within reach   clutter free environment maintained   safety round/check completed   room organization consistent  Taken 7/10/2022 2247 by Lauren Dhillon RN  Safety Promotion/Fall Prevention:   assistive device/personal items within reach   clutter free environment maintained   safety round/check completed   room organization consistent  Taken 7/10/2022 2116 by Lauren Dhillon RN  Safety Promotion/Fall Prevention:   assistive device/personal items within reach   clutter free environment maintained   safety round/check completed   room organization consistent  Taken 7/10/2022 1920 by Lauren Dhillon RN  Safety Promotion/Fall Prevention:   safety round/check completed   room organization consistent   assistive device/personal items within reach   clutter free environment maintained  Intervention: Prevent Infection  Recent Flowsheet Documentation  Taken 7/11/2022 0155 by Lauren Dhillon RN  Infection Prevention:   visitors restricted/screened   single patient room provided   rest/sleep promoted   environmental surveillance performed   equipment surfaces disinfected  Taken  7/11/2022 0012 by Lauren Dhillon RN  Infection Prevention:   visitors restricted/screened   single patient room provided   rest/sleep promoted   environmental surveillance performed   equipment surfaces disinfected  Taken 7/10/2022 2247 by Lauren Dhillon RN  Infection Prevention:   visitors restricted/screened   single patient room provided   environmental surveillance performed   equipment surfaces disinfected  Taken 7/10/2022 2116 by Lauren Dhillon RN  Infection Prevention:   visitors restricted/screened   single patient room provided   rest/sleep promoted   environmental surveillance performed   equipment surfaces disinfected  Taken 7/10/2022 1920 by Lauren Dhillon RN  Infection Prevention:   visitors restricted/screened   single patient room provided   rest/sleep promoted   environmental surveillance performed   equipment surfaces disinfected  Goal: Optimal Comfort and Wellbeing  Outcome: Ongoing, Progressing  Intervention: Monitor Pain and Promote Comfort  Recent Flowsheet Documentation  Taken 7/10/2022 1920 by Lauren Dhillon RN  Pain Management Interventions:   quiet environment facilitated   relaxation techniques promoted  Intervention: Provide Person-Centered Care  Recent Flowsheet Documentation  Taken 7/10/2022 1920 by Lauren Dhillon RN  Trust Relationship/Rapport:   care explained   choices provided   emotional support provided   empathic listening provided   questions answered   questions encouraged   reassurance provided   thoughts/feelings acknowledged  Goal: Readiness for Transition of Care  Outcome: Ongoing, Progressing     Problem: Fall Injury Risk  Goal: Absence of Fall and Fall-Related Injury  Outcome: Ongoing, Progressing  Intervention: Identify and Manage Contributors  Recent Flowsheet Documentation  Taken 7/11/2022 0155 by Lauren Dhillon RN  Medication Review/Management: medications reviewed  Taken 7/11/2022 0012 by Lauren Dhillon RN  Medication Review/Management: medications reviewed  Taken 7/10/2022 2247 by  Alejo, Ada, RN  Medication Review/Management: medications reviewed  Taken 7/10/2022 2116 by Lauren Dhillon RN  Medication Review/Management: medications reviewed  Taken 7/10/2022 1920 by Lauren Dhillon RN  Medication Review/Management: medications reviewed  Intervention: Promote Injury-Free Environment  Recent Flowsheet Documentation  Taken 7/11/2022 0155 by Lauren Dhillon RN  Safety Promotion/Fall Prevention:   assistive device/personal items within reach   clutter free environment maintained   safety round/check completed   room organization consistent  Taken 7/11/2022 0012 by Lauren Dhillon RN  Safety Promotion/Fall Prevention:   assistive device/personal items within reach   clutter free environment maintained   safety round/check completed   room organization consistent  Taken 7/10/2022 2247 by Lauren Dhillon RN  Safety Promotion/Fall Prevention:   assistive device/personal items within reach   clutter free environment maintained   safety round/check completed   room organization consistent  Taken 7/10/2022 2116 by Lauren Dhillon RN  Safety Promotion/Fall Prevention:   assistive device/personal items within reach   clutter free environment maintained   safety round/check completed   room organization consistent  Taken 7/10/2022 1920 by Lauren Dhillon RN  Safety Promotion/Fall Prevention:   safety round/check completed   room organization consistent   assistive device/personal items within reach   clutter free environment maintained     Problem: COPD (Chronic Obstructive Pulmonary Disease) Comorbidity  Goal: Maintenance of COPD Symptom Control  Outcome: Ongoing, Progressing  Intervention: Maintain COPD-Symptom Control  Recent Flowsheet Documentation  Taken 7/11/2022 0155 by Lauren Dhillon RN  Medication Review/Management: medications reviewed  Taken 7/11/2022 0012 by Lauren Dhillon RN  Medication Review/Management: medications reviewed  Taken 7/10/2022 2247 by Lauren Dhillon RN  Medication Review/Management: medications  reviewed  Taken 7/10/2022 2116 by Lauren Dhillon RN  Medication Review/Management: medications reviewed  Taken 7/10/2022 1920 by Lauren Dhillon RN  Medication Review/Management: medications reviewed     Problem: Diabetes Comorbidity  Goal: Blood Glucose Level Within Targeted Range  Outcome: Ongoing, Progressing     Problem: Hypertension Comorbidity  Goal: Blood Pressure in Desired Range  Outcome: Ongoing, Progressing  Intervention: Maintain Blood Pressure Management  Recent Flowsheet Documentation  Taken 7/11/2022 0155 by Lauren Dhillon RN  Medication Review/Management: medications reviewed  Taken 7/11/2022 0012 by Lauren Dhillon RN  Medication Review/Management: medications reviewed  Taken 7/10/2022 2247 by Lauren Dhillon RN  Medication Review/Management: medications reviewed  Taken 7/10/2022 2116 by Lauren Dhillon RN  Medication Review/Management: medications reviewed  Taken 7/10/2022 1920 by Lauren Dhillon RN  Medication Review/Management: medications reviewed     Problem: Skin Injury Risk Increased  Goal: Skin Health and Integrity  Outcome: Ongoing, Progressing   Goal Outcome Evaluation:  Plan of Care Reviewed With: patient        Progress: no change  Outcome Evaluation: Patient refused to wear bipap overnight. Educated patient on the importance of this. VSS.

## 2022-07-11 NOTE — PROGRESS NOTES
[facility]   Hospitalist Progress Note  Date: 2022  Patient Name: Dilip Faulkner  : 1950  MRN: [id number]  Date of admission: 7/10/2022      Subjective   Subjective     Chief Complaint: Shortness of breath    Summary: 72-year-old male presented with shortness of breath and being treated for acute hypoxic respiratory failure and COPD exacerbation.    Interval Followup: No events overnight.  Patient laying in bed upon entering room.  He tells me he is feeling better.  He still has some audible wheezing and crackles.  He tells me he is severely weak.  He denies any nausea episodes emesis.  He is tolerating oral intake.  I did encourage him to participate with physical therapy.  PT recommending home health services at discharge.    Review of Systems  All systems reviewed and negative septa as above in HPI.    Objective   Objective     Vitals:   Temp:  [97.3 °F (36.3 °C)-97.9 °F (36.6 °C)] 97.4 °F (36.3 °C)  Heart Rate:  [70-92] 79  Resp:  [18-20] 20  BP: (108-124)/(53-68) 119/53  Flow (L/min):  [2] 2  Physical Exam   Constitutional: More awake, still weak, chronically ill  HEENT:  PERRLA, EOMI; No Scleral icterus  Neck:  Supple; No Mass, No Lymphadenopathy  Cardiovascular:  No Rubs, No Edema, No JVD  Respiratory: Still with rhonchi, faint wheezing, faint bibasilar crackles, on supplemental oxygen   Abdomen:  Normal BS all 4 Quadrants; No Guarding, No Tenderness  Musculoskeletal:  Pulses Positive all 4 Ext; No Cyanosis, No Edema  Neurological:  Alert+Ox3, Mental status WNL; No Dysarthria  Skin:  No Rash, No Cellulitis, No Erythema    Result Review    Result Review:  I have personally reviewed the results from the time of this admission to 2022 07:04 EDT and agree with these findings:  [x]  Laboratory  [x]  Microbiology  [x]  Radiology  []  EKG/Telemetry   []  Cardiology/Vascular   []  Pathology  [x]  Old records  []  Other:     Assessment & Plan   Assessment / Plan     Assessment:  Acute on  chronic hypercarbic respiratory failure  Acute COPD exacerbation  Respiratory acidosis  Type 2 diabetes mellitus  Morbid obesity, BMI 34.1.  Former tobacco abuse  Hyperlipidemia  Hypertension    Plan:   Continue supplemental oxygen, wean as tolerated, maintain O2 saturation greater 90%  We will perform a 61 ptosis patient may need supplemental oxygen as outpatient  Patient has BiPAP as needed  Start prednisone 40 mg daily for 5 days in a.m.  Blood glucose elevated, will change correctional insulin to moderate high-dose, titrate as needed  Continue with Levemir 17 units daily  Continue with aspirin, statin, beta-blocker  Heparin for DVT prophylaxis  Continue to monitor on telemetry  Continue with Protonix for reflux   Case management assistance appreciated, discharge plan notes reviewed, apparently patient has been noncompliant in the past, refuses services at discharge  Labs reviewed, image reviewed, medications reviewed, vital signs reviewed  Further recommendations pending clinical course     Discussed plan with RN.    DVT prophylaxis:  Medical DVT prophylaxis orders are present.    CODE STATUS:   Level Of Support Discussed With: Patient  Code Status (Patient has no pulse and is not breathing): CPR (Attempt to Resuscitate)  Medical Interventions (Patient has pulse or is breathing): Full Support        Electronically signed by Ricci Trinidad DO, 07/11/22, 7:04 AM EDT.

## 2022-07-12 VITALS
BODY MASS INDEX: 34.3 KG/M2 | SYSTOLIC BLOOD PRESSURE: 127 MMHG | RESPIRATION RATE: 20 BRPM | HEART RATE: 56 BPM | TEMPERATURE: 97.2 F | HEIGHT: 73 IN | WEIGHT: 258.82 LBS | DIASTOLIC BLOOD PRESSURE: 64 MMHG | OXYGEN SATURATION: 93 %

## 2022-07-12 PROBLEM — J96.01 ACUTE RESPIRATORY FAILURE WITH HYPOXIA: Status: RESOLVED | Noted: 2022-07-10 | Resolved: 2022-07-12

## 2022-07-12 PROBLEM — J44.1 COPD WITH ACUTE EXACERBATION: Status: ACTIVE | Noted: 2022-07-12

## 2022-07-12 PROBLEM — J44.1 COPD WITH ACUTE EXACERBATION: Status: RESOLVED | Noted: 2022-07-12 | Resolved: 2022-07-12

## 2022-07-12 LAB
ALBUMIN SERPL-MCNC: 3.6 G/DL (ref 3.5–5.2)
ALBUMIN/GLOB SERPL: 1.3 G/DL
ALP SERPL-CCNC: 65 U/L (ref 39–117)
ALT SERPL W P-5'-P-CCNC: 11 U/L (ref 1–41)
ANION GAP SERPL CALCULATED.3IONS-SCNC: 4.2 MMOL/L (ref 5–15)
AST SERPL-CCNC: 14 U/L (ref 1–40)
BASOPHILS # BLD AUTO: 0.01 10*3/MM3 (ref 0–0.2)
BASOPHILS NFR BLD AUTO: 0.1 % (ref 0–1.5)
BILIRUB SERPL-MCNC: 0.2 MG/DL (ref 0–1.2)
BUN SERPL-MCNC: 23 MG/DL (ref 8–23)
BUN/CREAT SERPL: 24.7 (ref 7–25)
CALCIUM SPEC-SCNC: 9.1 MG/DL (ref 8.6–10.5)
CHLORIDE SERPL-SCNC: 101 MMOL/L (ref 98–107)
CO2 SERPL-SCNC: 32.8 MMOL/L (ref 22–29)
CREAT SERPL-MCNC: 0.93 MG/DL (ref 0.76–1.27)
DEPRECATED RDW RBC AUTO: 48.4 FL (ref 37–54)
EGFRCR SERPLBLD CKD-EPI 2021: 86.7 ML/MIN/1.73
EOSINOPHIL # BLD AUTO: 0.01 10*3/MM3 (ref 0–0.4)
EOSINOPHIL NFR BLD AUTO: 0.1 % (ref 0.3–6.2)
ERYTHROCYTE [DISTWIDTH] IN BLOOD BY AUTOMATED COUNT: 15.5 % (ref 12.3–15.4)
GLOBULIN UR ELPH-MCNC: 2.7 GM/DL
GLUCOSE BLDC GLUCOMTR-MCNC: 184 MG/DL (ref 70–99)
GLUCOSE BLDC GLUCOMTR-MCNC: 211 MG/DL (ref 70–99)
GLUCOSE SERPL-MCNC: 220 MG/DL (ref 65–99)
HCT VFR BLD AUTO: 39.3 % (ref 37.5–51)
HGB BLD-MCNC: 11.6 G/DL (ref 13–17.7)
IMM GRANULOCYTES # BLD AUTO: 0.05 10*3/MM3 (ref 0–0.05)
IMM GRANULOCYTES NFR BLD AUTO: 0.5 % (ref 0–0.5)
LYMPHOCYTES # BLD AUTO: 0.99 10*3/MM3 (ref 0.7–3.1)
LYMPHOCYTES NFR BLD AUTO: 9.6 % (ref 19.6–45.3)
MAGNESIUM SERPL-MCNC: 2.1 MG/DL (ref 1.6–2.4)
MCH RBC QN AUTO: 25.1 PG (ref 26.6–33)
MCHC RBC AUTO-ENTMCNC: 29.5 G/DL (ref 31.5–35.7)
MCV RBC AUTO: 85.1 FL (ref 79–97)
MONOCYTES # BLD AUTO: 0.52 10*3/MM3 (ref 0.1–0.9)
MONOCYTES NFR BLD AUTO: 5.1 % (ref 5–12)
NEUTROPHILS NFR BLD AUTO: 8.69 10*3/MM3 (ref 1.7–7)
NEUTROPHILS NFR BLD AUTO: 84.6 % (ref 42.7–76)
NRBC BLD AUTO-RTO: 0 /100 WBC (ref 0–0.2)
PHOSPHATE SERPL-MCNC: 2.6 MG/DL (ref 2.5–4.5)
PLATELET # BLD AUTO: 152 10*3/MM3 (ref 140–450)
PMV BLD AUTO: 10.4 FL (ref 6–12)
POTASSIUM SERPL-SCNC: 4.8 MMOL/L (ref 3.5–5.2)
PROT SERPL-MCNC: 6.3 G/DL (ref 6–8.5)
RBC # BLD AUTO: 4.62 10*6/MM3 (ref 4.14–5.8)
SODIUM SERPL-SCNC: 138 MMOL/L (ref 136–145)
WBC NRBC COR # BLD: 10.27 10*3/MM3 (ref 3.4–10.8)

## 2022-07-12 PROCEDURE — 85025 COMPLETE CBC W/AUTO DIFF WBC: CPT | Performed by: STUDENT IN AN ORGANIZED HEALTH CARE EDUCATION/TRAINING PROGRAM

## 2022-07-12 PROCEDURE — 97116 GAIT TRAINING THERAPY: CPT

## 2022-07-12 PROCEDURE — 84100 ASSAY OF PHOSPHORUS: CPT | Performed by: STUDENT IN AN ORGANIZED HEALTH CARE EDUCATION/TRAINING PROGRAM

## 2022-07-12 PROCEDURE — 94799 UNLISTED PULMONARY SVC/PX: CPT

## 2022-07-12 PROCEDURE — 63710000001 PREDNISONE PER 1 MG: Performed by: STUDENT IN AN ORGANIZED HEALTH CARE EDUCATION/TRAINING PROGRAM

## 2022-07-12 PROCEDURE — 94664 DEMO&/EVAL PT USE INHALER: CPT

## 2022-07-12 PROCEDURE — 80053 COMPREHEN METABOLIC PANEL: CPT | Performed by: STUDENT IN AN ORGANIZED HEALTH CARE EDUCATION/TRAINING PROGRAM

## 2022-07-12 PROCEDURE — 99239 HOSP IP/OBS DSCHRG MGMT >30: CPT | Performed by: INTERNAL MEDICINE

## 2022-07-12 PROCEDURE — 82962 GLUCOSE BLOOD TEST: CPT

## 2022-07-12 PROCEDURE — 63710000001 INSULIN LISPRO (HUMAN) PER 5 UNITS: Performed by: STUDENT IN AN ORGANIZED HEALTH CARE EDUCATION/TRAINING PROGRAM

## 2022-07-12 PROCEDURE — 63710000001 INSULIN DETEMIR PER 5 UNITS: Performed by: INTERNAL MEDICINE

## 2022-07-12 PROCEDURE — 83735 ASSAY OF MAGNESIUM: CPT | Performed by: STUDENT IN AN ORGANIZED HEALTH CARE EDUCATION/TRAINING PROGRAM

## 2022-07-12 PROCEDURE — 25010000002 HEPARIN (PORCINE) PER 1000 UNITS: Performed by: INTERNAL MEDICINE

## 2022-07-12 RX ORDER — PREDNISONE 20 MG/1
40 TABLET ORAL DAILY
Qty: 6 TABLET | Refills: 0 | Status: SHIPPED | OUTPATIENT
Start: 2022-07-12 | End: 2022-07-15

## 2022-07-12 RX ORDER — CETIRIZINE HYDROCHLORIDE 10 MG/1
10 TABLET ORAL DAILY
Status: DISCONTINUED | OUTPATIENT
Start: 2022-07-12 | End: 2022-07-12 | Stop reason: HOSPADM

## 2022-07-12 RX ORDER — BUDESONIDE 0.5 MG/2ML
0.5 INHALANT ORAL
Status: DISCONTINUED | OUTPATIENT
Start: 2022-07-12 | End: 2022-07-12 | Stop reason: HOSPADM

## 2022-07-12 RX ORDER — ARFORMOTEROL TARTRATE 15 UG/2ML
15 SOLUTION RESPIRATORY (INHALATION)
Status: DISCONTINUED | OUTPATIENT
Start: 2022-07-12 | End: 2022-07-12 | Stop reason: HOSPADM

## 2022-07-12 RX ADMIN — BUDESONIDE 0.5 MG: 0.5 SUSPENSION RESPIRATORY (INHALATION) at 11:46

## 2022-07-12 RX ADMIN — HEPARIN SODIUM 5000 UNITS: 5000 INJECTION, SOLUTION INTRAVENOUS; SUBCUTANEOUS at 14:58

## 2022-07-12 RX ADMIN — METOPROLOL TARTRATE 25 MG: 25 TABLET, FILM COATED ORAL at 08:28

## 2022-07-12 RX ADMIN — INSULIN LISPRO 5 UNITS: 100 INJECTION, SOLUTION INTRAVENOUS; SUBCUTANEOUS at 11:47

## 2022-07-12 RX ADMIN — INSULIN LISPRO 3 UNITS: 100 INJECTION, SOLUTION INTRAVENOUS; SUBCUTANEOUS at 08:27

## 2022-07-12 RX ADMIN — INSULIN DETEMIR 17 UNITS: 100 INJECTION, SOLUTION SUBCUTANEOUS at 08:28

## 2022-07-12 RX ADMIN — PREDNISONE 40 MG: 20 TABLET ORAL at 08:28

## 2022-07-12 RX ADMIN — ASPIRIN 81 MG CHEWABLE TABLET 81 MG: 81 TABLET CHEWABLE at 08:28

## 2022-07-12 RX ADMIN — ARFORMOTEROL TARTRATE 15 MCG: 15 SOLUTION RESPIRATORY (INHALATION) at 11:46

## 2022-07-12 RX ADMIN — PANTOPRAZOLE SODIUM 40 MG: 40 TABLET, DELAYED RELEASE ORAL at 06:07

## 2022-07-12 RX ADMIN — CETIRIZINE HYDROCHLORIDE 10 MG: 10 TABLET, FILM COATED ORAL at 11:16

## 2022-07-12 RX ADMIN — LISINOPRIL 2.5 MG: 2.5 TABLET ORAL at 08:28

## 2022-07-12 RX ADMIN — HEPARIN SODIUM 5000 UNITS: 5000 INJECTION, SOLUTION INTRAVENOUS; SUBCUTANEOUS at 06:07

## 2022-07-12 NOTE — PLAN OF CARE
Goal Outcome Evaluation:  Plan of Care Reviewed With: patient        Progress: no change  Outcome Evaluation: pt rested well during the night. lopressor held due to low bp. no other changes during the shift. Key Frye RN

## 2022-07-12 NOTE — THERAPY TREATMENT NOTE
Acute Care - Physical Therapy Treatment Note  [facility]     Patient Name: Dilip Faulkner  : 1950  MRN: [id number]  Today's Date: 2022      Visit Dx:     ICD-10-CM ICD-9-CM   1. Acute respiratory failure with hypoxia (HCC)  J96.01 518.81   2. COPD with acute exacerbation (HCC)  J44.1 491.21   3. Sepsis, due to unspecified organism, unspecified whether acute organ dysfunction present (HCC)  A41.9 038.9     995.91   4. Difficulty walking  R26.2 719.7     Patient Active Problem List   Diagnosis   • Chronic obstructive pulmonary disease with acute exacerbation (HCC)   • Acute on chronic respiratory failure with hypoxia and hypercapnia (HCC)   • Type 2 diabetes mellitus (HCC)   • Acute on chronic respiratory failure (HCC)   • Essential hypertension   • Hyperlipidemia   • Cough   • Pneumonia due to infectious organism   • COPD with acute exacerbation (HCC)   • Obesity (BMI 30-39.9)   • Right bundle branch block   • Acquired ptosis of eyelid, bilateral   • Type 2 diabetes mellitus, with long-term current use of insulin (HCC)   • COPD exacerbation (HCC)   • Acute exacerbation of chronic obstructive pulmonary disease (COPD) (HCC)   • Acute respiratory failure with hypoxia (HCC)     Past Medical History:   Diagnosis Date   • Asthma    • COPD (chronic obstructive pulmonary disease) (HCC)    • Diabetes mellitus (HCC)    • Hernia, umbilical    • Hypertension    • Requires continuous at home supplemental oxygen      Past Surgical History:   Procedure Laterality Date   • ABDOMINAL SURGERY       PT Assessment (last 12 hours)     PT Evaluation and Treatment     Row Name 22 1300          Physical Therapy Time and Intention    Subjective Information no complaints (P)   -JS     Document Type therapy note (daily note) (P)   -JS     Mode of Treatment individual therapy;physical therapy (P)   -JS     Patient Effort good (P)   -JS     Symptoms Noted During/After Treatment shortness of breath (P)   -JS     Row Name  07/12/22 1300          General Information    Patient Profile Reviewed yes (P)   -JS     Patient Observations alert;cooperative;agree to therapy (P)   -JS     Existing Precautions/Restrictions fall (P)   -JS     Barriers to Rehab none identified (P)   -[facility] Name 07/12/22 1300          Range of Motion (ROM)    Range of Motion bilateral lower extremities;ROM is WFL (P)   -[facility] Name 07/12/22 1300          Bed Mobility    Bed Mobility supine-sit;sit-supine (P)   -JS     Supine-Sit [place] (Bed Mobility) standby assist (P)   -JS     Sit-Supine [place] (Bed Mobility) standby assist (P)   -[facility] Name 07/12/22 1300          Transfers    Transfers sit-stand transfer;stand-sit transfer (P)   -JS     Sit-Stand [place] (Transfers) contact guard (P)   -JS     Stand-Sit [place] (Transfers) contact guard (P)   -JS     Row Name 07/12/22 1300          Sit-Stand Transfer    Assistive Device (Sit-Stand Transfers) walker, front-wheeled (P)   -[facility] Name 07/12/22 1300          Stand-Sit Transfer    Assistive Device (Stand-Sit Transfers) walker, front-wheeled (P)   -[facility] Name 07/12/22 1300          Gait/Stairs (Locomotion)    Gait/Stairs Locomotion gait/ambulation assistive device (P)   -     [place] Level (Gait) contact guard (P)   -JS     Assistive Device (Gait) other (see comments) (P)   no AD  -JS     Distance in Feet (Gait) 100 (P)   -JS     Pattern (Gait) step-through (P)   -JS     Deviations/Abnormal Patterns (Gait) stride length decreased;gait speed decreased (P)   -JS     Bilateral Gait Deviations forward flexed posture (P)   -[facility] Name 07/12/22 1300          Balance    Dynamic Standing Balance contact guard (P)   -JS     Position/Device Used, Standing Balance walker, front-wheeled (P)   -[facility] Name 07/12/22 1300          Plan of Care Review    Plan of Care Reviewed With patient (P)   -JS     Progress improving (P)   -[facility] Name 07/12/22 1300          Vital  Signs    Pre SpO2 (%) 94 (P)   -JS     O2 Delivery Pre Treatment nasal cannula (P)   -JS     Intra SpO2 (%) 90 (P)   -JS     O2 Delivery Intra Treatment nasal cannula (P)   -JS     Post SpO2 (%) 93 (P)   -JS     O2 Delivery Post Treatment nasal cannula (P)   -JS     Row Name 07/12/22 1300          Positioning and Restraints    Pre-Treatment Position in bed (P)   -JS     Post Treatment Position bed (P)   -JS     In Bed supine;call light within reach;exit alarm on (P)   -JS     Row Name 07/12/22 1300          Progress Summary (PT)    Progress Toward Functional Goals (PT) progress toward functional goals is good (P)   -JS     Daily Progress Summary (PT) Patient tolerated transfers and ambulation well this date. Patient still reports shortness of breath with exertion and has decreased activity tolerance. Continue with plan of care. (P)   -JS     Row Name 07/12/22 1300          Therapy Plan Review/Discharge Plan (PT)    Therapy Plan Review (PT) care plan/treatment goals reviewed;patient (P)   -JS           User Key  (r) = Recorded By, (t) = Taken By, (c) = Cosigned By    Initials Name Provider Type    Donald Lou, PT Student PT Student                  PT Recommendation and Plan     Progress Summary (PT)  Progress Toward Functional Goals (PT): (P) progress toward functional goals is good  Daily Progress Summary (PT): (P) Patient tolerated transfers and ambulation well this date. Patient still reports shortness of breath with exertion and has decreased activity tolerance. Continue with plan of care.  Plan of Care Reviewed With: (P) patient  Progress: (P) improving   Outcome Measures     Row Name 07/12/22 1300 07/11/22 1500          How much help from another person do you currently need...    Turning from your back to your side while in flat bed without using bedrails? 4 (P)   -JS 4  -DP     Moving from lying on back to sitting on the side of a flat bed without bedrails? 4 (P)   -JS 4  -DP     Moving to and from a  bed to a chair (including a wheelchair)? 3 (P)   -JS 3  -DP     Standing up from a chair using your arms (e.g., wheelchair, bedside chair)? 3 (P)   -JS 3  -DP     Climbing 3-5 steps with a railing? 3 (P)   -JS 3  -DP     To walk in hospital room? 3 (P)   -JS 3  -DP     AM-PAC 6 Clicks Score (PT) 20 (P)   -JS 20  -DP            Functional Assessment    Outcome Measure Options -- AM-PAC 6 Clicks Basic Mobility (PT)  -DP           User Key  (r) = Recorded By, (t) = Taken By, (c) = Cosigned By    Initials Name Provider Type    Elza Nicole, PT Physical Therapist    Donald Lou, PT Student PT Student                 Time Calculation:    PT Charges     Row Name 07/12/22 1301             Time Calculation    PT Received On 07/12/22 (P)   -JS              Timed Charges    04554 - Gait Training Minutes  18 (P)   -JS              Total Minutes    Timed Charges Total Minutes 18 (P)   -JS       Total Minutes 18 (P)   -JS            User Key  (r) = Recorded By, (t) = Taken By, (c) = Cosigned By    Initials Name Provider Type    Donald Lou, PT Student PT Student              Therapy Charges for Today     Code Description Service Date Service Provider Modifiers Qty    [id number] HC GAIT TRAINING EA 15 MIN 7/12/2022 Donald Brush PT Student GP 1          PT G-Codes  Outcome Measure Options: AM-PAC 6 Clicks Basic Mobility (PT)  AM-PAC 6 Clicks Score (PT): (P) 20    SERGIO Diaz  7/12/2022

## 2022-07-12 NOTE — CONSULTS
RT CM triggered to see obese pt with hx of chronic respiratory failure secondary to COPD with multiple admissions for such.  RT CM has had 5 previous encounters with pt for education regarding the need for NIV and each time the pt refuses therapy and no shows to scheduled pulmonary follow ups. This admission Mr Faulkner again presents with hypercapnia, PCO2- 70.2.  RT CM notes pt was covered in bed bugs on admission.  DME will not set up NIPPV in a home with infestation due to risk of device contamination. Mr Faulkner will need to provide proof of treatment by qualified  and pass home inspection prior to set up.

## 2022-07-12 NOTE — DISCHARGE SUMMARY
[facility]        HOSPITALIST  DISCHARGE SUMMARY    Patient Name: Dilip Faulkner  : 1950  MRN: [id number]    Date of Admission: 7/10/2022  Date of Discharge:  2022  Primary Care Physician: Rosalba Edwards APRN    Consults     Date and Time Order Name Status Description    7/10/2022  3:28 AM Hospitalist (on-call MD unless specified)      2022  7:38 PM Inpatient Pulmonology Consult Completed           Active and Resolved Hospital Problems:  Active Hospital Problems   No active problems to display.      Resolved Hospital Problems    Diagnosis POA   • COPD with acute exacerbation (HCC) [J44.1] Yes   • Acute respiratory failure with hypoxia (HCC) [J96.01] Yes     Acute on chronic hypercarbic respiratory failure.  Improved  Acute COPD exacerbation.  Improved  Respiratory acidosis.  Refused NIPPV.  Bedbug infestation  Type 2 diabetes mellitus  Morbid obesity, BMI 34.1.  Former tobacco abuse  Hyperlipidemia  Hypertension  Hospital Course     Hospital Course:  Dilip Faulkner is a 72 y.o. male  72-year-old male presented with shortness of breath and being treated for acute hypoxic respiratory failure and COPD exacerbation.     Interval Followup: No events overnight.  Patient laying in bed upon entering room.  He tells me he is feeling better.  He wheezing is better.    Denies any weakness He denies any nausea episodes emesis.  He is tolerating oral intake.  I did encourage him to participate with physical therapy.  PT recommending home health services at discharge.  Patient was again seen by respiratory therapy .  Patient again refusing NIPPV for home use.  Patient is high risk for rebound and admission due to noncompliance.  Does have home oxygen.  Patient is wanting to go home today  Has chronic ptosis bilaterally      DISCHARGE Follow Up Recommendations for labs and diagnostics:       Day of Discharge     Vital Signs:  Temp:  [97.2 °F (36.2 °C)-97.5 °F (36.4 °C)] 97.2  °F (36.2 °C)  Heart Rate:  [56-82] 56  Resp:  [20] 20  BP: ()/(57-91) 127/64  Flow (L/min):  [2] 2    Physical Exam:   Constitutional: More awake, still weak, chronically ill  HEENT:  PERRLA, EOMI; No Scleral icterus.  Bilateral chronic ptosis.  Neck:  Supple; No Mass, No Lymphadenopathy  Cardiovascular:  No Rubs, No Edema, No JVD  Respiratory:  Clear to auscultation bilaterally, faint bibasilar crackles, on supplemental oxygen   Abdomen:  Normal BS all 4 Quadrants; No Guarding, No Tenderness  Musculoskeletal:  Pulses Positive all 4 Ext; No Cyanosis, No Edema  Neurological:  Alert+Ox3, Mental status WNL; No Dysarthria  Skin:  No Rash, No Cellulitis, No Erythema    Discharge Details        Discharge Medications      New Medications      Instructions Start Date   predniSONE 20 MG tablet  Commonly known as: DELTASONE   40 mg, Oral, Daily         Changes to Medications      Instructions Start Date   metoprolol tartrate 25 MG tablet  Commonly known as: LOPRESSOR  What changed:   · how much to take  · when to take this   12.5 mg, Oral, 2 Times Daily         Continue These Medications      Instructions Start Date   albuterol sulfate  (90 Base) MCG/ACT inhaler  Commonly known as: PROVENTIL HFA;VENTOLIN HFA;PROAIR HFA   2 puffs, Inhalation, Every 6 Hours PRN      aspirin 81 MG chewable tablet   81 mg, Oral, Daily      atorvastatin 10 MG tablet  Commonly known as: LIPITOR   10 mg, Oral, Daily      cetirizine 10 MG tablet  Commonly known as: zyrTEC   10 mg, Oral, Daily      Fluticasone-Salmeterol 250-50 MCG/ACT DISKUS  Commonly known as: ADVAIR   1 puff, Inhalation, 2 Times Daily - RT      ipratropium-albuterol 0.5-2.5 mg/3 ml nebulizer  Commonly known as: DUO-NEB   3 mL, Nebulization, Every 4 Hours PRN      lisinopril 2.5 MG tablet  Commonly known as: PRINIVIL,ZESTRIL   2.5 mg, Oral, Daily      metFORMIN 500 MG tablet  Commonly known as: GLUCOPHAGE   500 mg, Oral, 2 times daily      omeprazole 20 MG  capsule  Commonly known as: priLOSEC   20 mg, Oral, Daily      Tresiba FlexTouch 100 UNIT/ML solution pen-injector injection  Generic drug: insulin degludec   17 Units, Subcutaneous, Daily             No Known Allergies    Discharge Disposition:  Home or Self Care.    Diet:  Diet Instructions     Diet: Consistent Carbohydrate      Discharge Diet: Consistent Carbohydrate          Discharge Activity:   Activity Instructions     Activity as Tolerated            CODE STATUS:  Code Status and Medical Interventions:   Ordered at: 07/10/22 0358     Level Of Support Discussed With:    Patient     Code Status (Patient has no pulse and is not breathing):    CPR (Attempt to Resuscitate)     Medical Interventions (Patient has pulse or is breathing):    Full Support         No future appointments.    Additional Instructions for the Follow-ups that You Need to Schedule     Discharge Follow-up with PCP   As directed       Currently Documented PCP:    Rosalba Edwards APRN    PCP Phone Number:    [phone]     Follow Up Details: 1 week         Discharge Follow-up with Specified Provider: Dr. Guardado; 2 Weeks   As directed      To: Dr. Guardado    Follow Up: 2 Weeks               Pertinent  and/or Most Recent Results     PROCEDURES:   * Cannot find OR case *     LAB RESULTS:      Lab 07/12/22  0542 07/11/22  0541 07/10/22  0256 07/10/22  0239   WBC 10.27 15.93*  --  10.37   HEMOGLOBIN 11.6* 11.9*  --  13.3   HEMATOCRIT 39.3 40.0  --  45.4   PLATELETS 152 175  --  185   NEUTROS ABS 8.69* 14.49*  --  6.14   IMMATURE GRANS (ABS) 0.05 0.14*  --  0.06*   LYMPHS ABS 0.99 0.87  --  2.03   MONOS ABS 0.52 0.41  --  0.73   EOS ABS 0.01 0.00  --  1.35*   MCV 85.1 85.1  --  87.0   PROCALCITONIN  --   --   --  0.07   LACTATE  --   --   --  1.0   LACTATE, ARTERIAL  --   --  0.89  --          Lab 07/12/22  0541 07/11/22  0541 07/10/22  0256 07/10/22  0239   SODIUM 138 135*  --  139   SODIUM, ARTERIAL  --   --  134.9*  --    POTASSIUM 4.8 4.5   --  4.4   CHLORIDE 101 101  --  102   CO2 32.8* 21.5*  --  28.2   ANION GAP 4.2* 12.5  --  8.8   BUN 23 21  --  19   CREATININE 0.93 1.07  --  1.07   EGFR 86.7 73.3  --  73.3   GLUCOSE 220* 269*  --  257*   GLUCOSE, ARTERIAL  --   --  270*  --    CALCIUM 9.1 8.9  --  8.9   IONIZED CALCIUM  --   --  1.13  --    MAGNESIUM 2.1 1.9  --   --    PHOSPHORUS 2.6 3.2  --   --          Lab 07/12/22  0541 07/11/22  0541 07/10/22  0239   TOTAL PROTEIN 6.3 6.6 7.3   ALBUMIN 3.60 3.70 3.90   GLOBULIN 2.7 2.9 3.4   ALT (SGPT) 11 14 12   AST (SGOT) 14 17 15   BILIRUBIN 0.2 0.2 0.4   ALK PHOS 65 74 90         Lab 07/10/22  0239   PROBNP 77.8   TROPONIN T 0.013                 Lab 07/10/22  0256   PH, ARTERIAL 7.203*   PCO2, ARTERIAL 70.2*   PO2 ART 82.6   O2 SATURATION ART 94.0*   FIO2 30   HCO3 ART 27.0*   BASE EXCESS ART -2.6*   CARBOXYHEMOGLOBIN 1.2     Brief Urine Lab Results  (Last result in the past 365 days)      Color   Clarity   Blood   Leuk Est   Nitrite   Protein   CREAT   Urine HCG        03/20/22 1511 Yellow   Clear   Negative   Negative   Negative   Negative               Microbiology Results (last 10 days)     Procedure Component Value - Date/Time    COVID-19,APTIMA PANTHER(SRAVANTHI), GUERA/ CARMEN, NP/OP SWAB IN UTM/VTM/SALINE TRANSPORT MEDIA,24 HR TAT - Swab, Nasopharynx [[id number]]  (Normal) Collected: 07/10/22 0357    Lab Status: Final result Specimen: Swab from Nasopharynx Updated: 07/10/22 1346     COVID19 Not Detected    Narrative:      Fact sheet for providers: https://www.fda.gov/media/463549/download     Fact sheet for patients: https://www.fda.gov/media/788762/download    Test performed by RT PCR.    Blood Culture - Blood, Arm, Right [[id number]]  (Normal) Collected: 07/10/22 0239    Lab Status: Preliminary result Specimen: Blood from Arm, Right Updated: 07/12/22 0300     Blood Culture No growth at 2 days    Blood Culture - Blood, Arm, Right [[id number]]  (Normal) Collected: 07/10/22 0239    Lab Status: Preliminary  result Specimen: Blood from Arm, Right Updated: 07/12/22 0300     Blood Culture No growth at 2 days          XR Chest 1 View    Result Date: 7/10/2022  Impression:   No acute infiltrate is appreciated.      COMMENT:  Part of this note is an electronic transcription of spoken language to printed text. The electronic translation/transcription may permit erroneous, or at times, nonsensical (or even sensical) words or phrases to be inadvertently transcribed or omitted; this  has reviewed the note for such errors (as well as additional errors); however, some may still exist.  SEGUNDO MORA JR, MD       Electronically Signed and Approved By: SEGUNDO MORA JR, MD on 7/10/2022 at 3:04              XR Chest 1 View    Result Date: 6/14/2022  Impression:   1. Chronic appearing changes in the lower lungs.       ADA PERALES MD       Electronically Signed and Approved By: ADA PERALES MD on 6/14/2022 at 16:50                           Imaging Results (All)     Procedure Component Value Units Date/Time    XR Chest 1 View [[id number]] Collected: 07/10/22 0304     Updated: 07/10/22 0307    Narrative:      PROCEDURE: XR CHEST 1 VW     COMPARISON: 6/14/2022.     INDICATIONS: Shortness of breath.     FINDINGS:  A single AP upright portable chest radiograph was performed.  Probably no cardiac   enlargement is seen.  No acute infiltrate is appreciated.  No pleural effusion or pneumothorax is   identified.  Chronic pleural-parenchymal scarring is suspected in the inferior left thorax.  There   may be mild atelectasis, as well.  External artifacts obscure detail.  The thoracic aorta is   atherosclerotic.  There is emphysematous contour of the lungs.  Degenerative changes involve the   shoulders and the spine.  No significant interval change is seen since the prior study.       Impression:        No acute infiltrate is appreciated.                 COMMENT:  Part of this note is an electronic transcription of spoken language to  printed text. The   electronic translation/transcription may permit erroneous, or at times, nonsensical (or even   sensical) words or phrases to be inadvertently transcribed or omitted; this  has   reviewed the note for such errors (as well as additional errors); however, some may still exist.     SEGUNDO MORA JR, MD         Electronically Signed and Approved By: SEGUNDO MORA JR, MD on 7/10/2022 at 3:04                               Labs Pending at Discharge:  Pending Labs     Order Current Status    Blood Culture - Blood, Arm, Right Preliminary result    Blood Culture - Blood, Arm, Right Preliminary result              Time spent on Discharge including face to face service: More than 30 minutes  Part of this note may be an electronic transcription/translation of spoken language to printed text using the Dragon Dictation System.     TElectronically signed by Sigifredo Mendes MD, 07/12/22, 3:21 PM EDT.

## 2022-07-13 ENCOUNTER — READMISSION MANAGEMENT (OUTPATIENT)
Dept: CALL CENTER | Facility: HOSPITAL | Age: 72
End: 2022-07-13

## 2022-07-13 NOTE — OUTREACH NOTE
Prep Survey    Flowsheet Row Responses   [facility] facility patient discharged from? [facility]   Is LACE score < 7 ? No   Emergency Room discharge w/ pulse ox? No   Eligibility Readm Mgmt   Discharge diagnosis COPD with acute exacerbation   Does the patient have one of the following disease processes/diagnoses(primary or secondary)? COPD/Pneumonia   Does the patient have Home health ordered? No   Is there a DME ordered? No   Prep survey completed? Yes          LATONYA GEORGE - Registered Nurse

## 2022-07-15 LAB
BACTERIA SPEC AEROBE CULT: NORMAL
BACTERIA SPEC AEROBE CULT: NORMAL

## 2022-07-22 ENCOUNTER — READMISSION MANAGEMENT (OUTPATIENT)
Dept: CALL CENTER | Facility: HOSPITAL | Age: 72
End: 2022-07-22

## 2022-07-22 NOTE — OUTREACH NOTE
COPD/PN Week 2 Survey    Flowsheet Row Responses   [facility] [place] patient discharged from? [facility]   Does the patient have one of the following disease processes/diagnoses(primary or secondary)? COPD/Pneumonia   Was the primary reason for admission: COPD exacerbation   Week 2 attempt successful? Yes   Call start time 1349   Call end time 1353   Discharge diagnosis COPD with acute exacerbation   Person spoke with today (if not patient) and relationship patient   Meds reviewed with patient/caregiver? Yes   Is the patient taking all medications as directed (includes completed medication regime)? Yes   Does the patient have a primary care provider?  Yes   Does the patient have an appointment with their PCP or specialist within 7 days of discharge? Greater than 7 days   Comments regarding PCP Pt  will see PCP in august   Has all DME been delivered? Yes   DME comments Wears home O2 PRN.   Pulse Ox monitoring Intermittent   Pulse Ox device source Patient   O2 Sat comments Pt states he is usually in the 90's   O2 Sat: education provided Sat levels, Monitoring frequency   Psychosocial issues? No   Did the patient receive a copy of their discharge instructions? Yes   Nursing interventions Reviewed instructions with patient   What is the patient's perception of their health status since discharge? Improving   Week 2 call completed? Yes   Revoked No further contact(revokes)-requires comment   Is the patient interested in additional calls from an ambulatory ?  NOTE:  applies to high risk patients requiring additional follow-up. No   Graduated/Revoked comments Pt reports he is fine.  Very limited engagement during conversation.          LYNDA FIELDS - Registered Nurse

## 2022-08-01 ENCOUNTER — HOSPITAL ENCOUNTER (INPATIENT)
Facility: HOSPITAL | Age: 72
LOS: 1 days | Discharge: HOME | End: 2022-08-02
Attending: EMERGENCY MEDICINE | Admitting: FAMILY MEDICINE

## 2022-08-01 ENCOUNTER — APPOINTMENT (OUTPATIENT)
Dept: GENERAL RADIOLOGY | Facility: HOSPITAL | Age: 72
End: 2022-08-01

## 2022-08-01 DIAGNOSIS — J44.1 COPD EXACERBATION: Primary | ICD-10-CM

## 2022-08-01 DIAGNOSIS — J96.01 ACUTE RESPIRATORY FAILURE WITH HYPOXIA: ICD-10-CM

## 2022-08-01 LAB
ALBUMIN SERPL-MCNC: 4 G/DL (ref 3.5–5.2)
ALBUMIN/GLOB SERPL: 1.3 G/DL
ALP SERPL-CCNC: 91 U/L (ref 39–117)
ALT SERPL W P-5'-P-CCNC: 12 U/L (ref 1–41)
ANION GAP SERPL CALCULATED.3IONS-SCNC: 9.7 MMOL/L (ref 5–15)
ARTERIAL PATENCY WRIST A: POSITIVE
AST SERPL-CCNC: 12 U/L (ref 1–40)
BASE EXCESS BLDA CALC-SCNC: -1 MMOL/L (ref -2–2)
BASOPHILS # BLD AUTO: 0.07 10*3/MM3 (ref 0–0.2)
BASOPHILS NFR BLD AUTO: 0.6 % (ref 0–1.5)
BDY SITE: ABNORMAL
BILIRUB SERPL-MCNC: 0.4 MG/DL (ref 0–1.2)
BILIRUB UR QL STRIP: NEGATIVE
BUN SERPL-MCNC: 14 MG/DL (ref 8–23)
BUN/CREAT SERPL: 11 (ref 7–25)
CA-I BLDA-SCNC: 1.14 MMOL/L (ref 1.13–1.32)
CALCIUM SPEC-SCNC: 9.2 MG/DL (ref 8.6–10.5)
CHLORIDE BLDA-SCNC: 102 MMOL/L (ref 98–106)
CHLORIDE SERPL-SCNC: 103 MMOL/L (ref 98–107)
CLARITY UR: CLEAR
CO2 SERPL-SCNC: 25.3 MMOL/L (ref 22–29)
COHGB MFR BLD: 1.4 % (ref 0–1.5)
COLOR UR: YELLOW
CREAT SERPL-MCNC: 1.27 MG/DL (ref 0.76–1.27)
D-LACTATE SERPL-SCNC: 1.2 MMOL/L (ref 0.5–2)
D-LACTATE SERPL-SCNC: 2.4 MMOL/L (ref 0.5–2)
DEPRECATED RDW RBC AUTO: 50.1 FL (ref 37–54)
EGFRCR SERPLBLD CKD-EPI 2021: 59.7 ML/MIN/1.73
EOSINOPHIL # BLD AUTO: 1.26 10*3/MM3 (ref 0–0.4)
EOSINOPHIL NFR BLD AUTO: 10.8 % (ref 0.3–6.2)
ERYTHROCYTE [DISTWIDTH] IN BLOOD BY AUTOMATED COUNT: 16.2 % (ref 12.3–15.4)
FHHB: 3 % (ref 0–5)
FLUAV AG NPH QL: NEGATIVE
FLUBV AG NPH QL IA: NEGATIVE
GAS FLOW AIRWAY: 4 LPM
GLOBULIN UR ELPH-MCNC: 3.1 GM/DL
GLUCOSE BLDA-MCNC: 361 MMOL/L (ref 70–99)
GLUCOSE BLDC GLUCOMTR-MCNC: 307 MG/DL (ref 70–99)
GLUCOSE BLDC GLUCOMTR-MCNC: 314 MG/DL (ref 70–99)
GLUCOSE BLDC GLUCOMTR-MCNC: 334 MG/DL (ref 70–99)
GLUCOSE BLDC GLUCOMTR-MCNC: 340 MG/DL (ref 70–99)
GLUCOSE BLDC GLUCOMTR-MCNC: 342 MG/DL (ref 70–99)
GLUCOSE SERPL-MCNC: 373 MG/DL (ref 65–99)
GLUCOSE UR STRIP-MCNC: ABNORMAL MG/DL
HCO3 BLDA-SCNC: 25.9 MMOL/L (ref 22–26)
HCT VFR BLD AUTO: 44.8 % (ref 37.5–51)
HGB BLD-MCNC: 13.3 G/DL (ref 13–17.7)
HGB BLDA-MCNC: 13.3 G/DL (ref 13.8–16.4)
HGB UR QL STRIP.AUTO: NEGATIVE
HOLD SPECIMEN: NORMAL
HOLD SPECIMEN: NORMAL
IMM GRANULOCYTES # BLD AUTO: 0.05 10*3/MM3 (ref 0–0.05)
IMM GRANULOCYTES NFR BLD AUTO: 0.4 % (ref 0–0.5)
INHALED O2 CONCENTRATION: 36 %
KETONES UR QL STRIP: NEGATIVE
L PNEUMO1 AG UR QL IA: NEGATIVE
LACTATE BLDA-SCNC: 1.46 MMOL/L (ref 0.5–2)
LEUKOCYTE ESTERASE UR QL STRIP.AUTO: NEGATIVE
LYMPHOCYTES # BLD AUTO: 2.03 10*3/MM3 (ref 0.7–3.1)
LYMPHOCYTES NFR BLD AUTO: 17.4 % (ref 19.6–45.3)
M PNEUMO IGM SER QL: NEGATIVE
MCH RBC QN AUTO: 25.2 PG (ref 26.6–33)
MCHC RBC AUTO-ENTMCNC: 29.7 G/DL (ref 31.5–35.7)
MCV RBC AUTO: 85 FL (ref 79–97)
METHGB BLD QL: 0 % (ref 0–1.5)
MODALITY: ABNORMAL
MONOCYTES # BLD AUTO: 0.75 10*3/MM3 (ref 0.1–0.9)
MONOCYTES NFR BLD AUTO: 6.4 % (ref 5–12)
MRSA DNA SPEC QL NAA+PROBE: NORMAL
NEUTROPHILS NFR BLD AUTO: 64.4 % (ref 42.7–76)
NEUTROPHILS NFR BLD AUTO: 7.54 10*3/MM3 (ref 1.7–7)
NITRITE UR QL STRIP: NEGATIVE
NOTE: ABNORMAL
NRBC BLD AUTO-RTO: 0 /100 WBC (ref 0–0.2)
NT-PROBNP SERPL-MCNC: 91.4 PG/ML (ref 0–900)
OXYHGB MFR BLDV: 95.6 % (ref 94–99)
PCO2 BLDA: 52.2 MM HG (ref 35–45)
PH BLDA: 7.31 PH UNITS (ref 7.35–7.45)
PH UR STRIP.AUTO: <=5 [PH] (ref 5–8)
PLATELET # BLD AUTO: 206 10*3/MM3 (ref 140–450)
PMV BLD AUTO: 11 FL (ref 6–12)
PO2 BLD: 270 MM[HG] (ref 0–500)
PO2 BLDA: 97.3 MM HG (ref 80–100)
POTASSIUM BLDA-SCNC: 4.51 MMOL/L (ref 3.5–5)
POTASSIUM SERPL-SCNC: 4.6 MMOL/L (ref 3.5–5.2)
PROCALCITONIN SERPL-MCNC: 0.09 NG/ML (ref 0–0.25)
PROT SERPL-MCNC: 7.1 G/DL (ref 6–8.5)
PROT UR QL STRIP: NEGATIVE
RBC # BLD AUTO: 5.27 10*6/MM3 (ref 4.14–5.8)
S PNEUM AG SPEC QL LA: NEGATIVE
SAO2 % BLDCOA: 97 % (ref 95–99)
SARS-COV-2 RNA PNL SPEC NAA+PROBE: NOT DETECTED
SODIUM BLDA-SCNC: 134.5 MMOL/L (ref 136–146)
SODIUM SERPL-SCNC: 138 MMOL/L (ref 136–145)
SP GR UR STRIP: >1.03 (ref 1–1.03)
TROPONIN T SERPL-MCNC: 0.03 NG/ML (ref 0–0.03)
UROBILINOGEN UR QL STRIP: ABNORMAL
WBC NRBC COR # BLD: 11.7 10*3/MM3 (ref 3.4–10.8)
WHOLE BLOOD HOLD COAG: NORMAL
WHOLE BLOOD HOLD SPECIMEN: NORMAL

## 2022-08-01 PROCEDURE — 87899 AGENT NOS ASSAY W/OPTIC: CPT | Performed by: PHYSICIAN ASSISTANT

## 2022-08-01 PROCEDURE — 25010000002 METHYLPREDNISOLONE PER 40 MG: Performed by: PHYSICIAN ASSISTANT

## 2022-08-01 PROCEDURE — 94799 UNLISTED PULMONARY SVC/PX: CPT

## 2022-08-01 PROCEDURE — 85025 COMPLETE CBC W/AUTO DIFF WBC: CPT | Performed by: EMERGENCY MEDICINE

## 2022-08-01 PROCEDURE — 25010000002 CEFEPIME PER 500 MG: Performed by: EMERGENCY MEDICINE

## 2022-08-01 PROCEDURE — 25010000002 CEFEPIME PER 500 MG: Performed by: PHYSICIAN ASSISTANT

## 2022-08-01 PROCEDURE — 87040 BLOOD CULTURE FOR BACTERIA: CPT

## 2022-08-01 PROCEDURE — 99285 EMERGENCY DEPT VISIT HI MDM: CPT

## 2022-08-01 PROCEDURE — 81003 URINALYSIS AUTO W/O SCOPE: CPT | Performed by: PHYSICIAN ASSISTANT

## 2022-08-01 PROCEDURE — 71045 X-RAY EXAM CHEST 1 VIEW: CPT

## 2022-08-01 PROCEDURE — 84484 ASSAY OF TROPONIN QUANT: CPT | Performed by: EMERGENCY MEDICINE

## 2022-08-01 PROCEDURE — 83605 ASSAY OF LACTIC ACID: CPT

## 2022-08-01 PROCEDURE — 63710000001 INSULIN LISPRO (HUMAN) PER 5 UNITS: Performed by: PHYSICIAN ASSISTANT

## 2022-08-01 PROCEDURE — 36600 WITHDRAWAL OF ARTERIAL BLOOD: CPT | Performed by: EMERGENCY MEDICINE

## 2022-08-01 PROCEDURE — 87449 NOS EACH ORGANISM AG IA: CPT | Performed by: PHYSICIAN ASSISTANT

## 2022-08-01 PROCEDURE — 86738 MYCOPLASMA ANTIBODY: CPT | Performed by: PHYSICIAN ASSISTANT

## 2022-08-01 PROCEDURE — U0004 COV-19 TEST NON-CDC HGH THRU: HCPCS

## 2022-08-01 PROCEDURE — 94640 AIRWAY INHALATION TREATMENT: CPT

## 2022-08-01 PROCEDURE — 63710000001 INSULIN DETEMIR PER 5 UNITS: Performed by: FAMILY MEDICINE

## 2022-08-01 PROCEDURE — 82375 ASSAY CARBOXYHB QUANT: CPT | Performed by: EMERGENCY MEDICINE

## 2022-08-01 PROCEDURE — 87804 INFLUENZA ASSAY W/OPTIC: CPT

## 2022-08-01 PROCEDURE — 99223 1ST HOSP IP/OBS HIGH 75: CPT | Performed by: PHYSICIAN ASSISTANT

## 2022-08-01 PROCEDURE — 82962 GLUCOSE BLOOD TEST: CPT

## 2022-08-01 PROCEDURE — 25010000002 VANCOMYCIN 5 G RECONSTITUTED SOLUTION: Performed by: EMERGENCY MEDICINE

## 2022-08-01 PROCEDURE — 80053 COMPREHEN METABOLIC PANEL: CPT | Performed by: EMERGENCY MEDICINE

## 2022-08-01 PROCEDURE — 83880 ASSAY OF NATRIURETIC PEPTIDE: CPT | Performed by: EMERGENCY MEDICINE

## 2022-08-01 PROCEDURE — 82805 BLOOD GASES W/O2 SATURATION: CPT | Performed by: EMERGENCY MEDICINE

## 2022-08-01 PROCEDURE — 25010000002 METHYLPREDNISOLONE PER 125 MG: Performed by: EMERGENCY MEDICINE

## 2022-08-01 PROCEDURE — 93005 ELECTROCARDIOGRAM TRACING: CPT | Performed by: EMERGENCY MEDICINE

## 2022-08-01 PROCEDURE — 83050 HGB METHEMOGLOBIN QUAN: CPT | Performed by: EMERGENCY MEDICINE

## 2022-08-01 PROCEDURE — 84145 PROCALCITONIN (PCT): CPT | Performed by: PHYSICIAN ASSISTANT

## 2022-08-01 PROCEDURE — 87641 MR-STAPH DNA AMP PROBE: CPT | Performed by: FAMILY MEDICINE

## 2022-08-01 RX ORDER — ARFORMOTEROL TARTRATE 15 UG/2ML
15 SOLUTION RESPIRATORY (INHALATION)
Status: DISCONTINUED | OUTPATIENT
Start: 2022-08-01 | End: 2022-08-02 | Stop reason: HOSPADM

## 2022-08-01 RX ORDER — SODIUM CHLORIDE 0.9 % (FLUSH) 0.9 %
10 SYRINGE (ML) INJECTION AS NEEDED
Status: DISCONTINUED | OUTPATIENT
Start: 2022-08-01 | End: 2022-08-02 | Stop reason: HOSPADM

## 2022-08-01 RX ORDER — PREDNISONE 20 MG/1
40 TABLET ORAL DAILY
Status: DISCONTINUED | OUTPATIENT
Start: 2022-08-02 | End: 2022-08-02 | Stop reason: HOSPADM

## 2022-08-01 RX ORDER — METHYLPREDNISOLONE SODIUM SUCCINATE 125 MG/2ML
125 INJECTION, POWDER, LYOPHILIZED, FOR SOLUTION INTRAMUSCULAR; INTRAVENOUS ONCE
Status: COMPLETED | OUTPATIENT
Start: 2022-08-01 | End: 2022-08-01

## 2022-08-01 RX ORDER — ALBUTEROL SULFATE 2.5 MG/3ML
2.5 SOLUTION RESPIRATORY (INHALATION)
Status: DISCONTINUED | OUTPATIENT
Start: 2022-08-01 | End: 2022-08-02 | Stop reason: HOSPADM

## 2022-08-01 RX ORDER — DEXTROSE MONOHYDRATE 25 G/50ML
25 INJECTION, SOLUTION INTRAVENOUS
Status: DISCONTINUED | OUTPATIENT
Start: 2022-08-01 | End: 2022-08-02 | Stop reason: HOSPADM

## 2022-08-01 RX ORDER — GUAIFENESIN 600 MG/1
1200 TABLET, EXTENDED RELEASE ORAL EVERY 12 HOURS SCHEDULED
Status: DISCONTINUED | OUTPATIENT
Start: 2022-08-01 | End: 2022-08-02 | Stop reason: HOSPADM

## 2022-08-01 RX ORDER — INSULIN LISPRO 100 [IU]/ML
0-9 INJECTION, SOLUTION INTRAVENOUS; SUBCUTANEOUS
Status: DISCONTINUED | OUTPATIENT
Start: 2022-08-01 | End: 2022-08-02 | Stop reason: HOSPADM

## 2022-08-01 RX ORDER — ATORVASTATIN CALCIUM 10 MG/1
10 TABLET, FILM COATED ORAL NIGHTLY
Status: DISCONTINUED | OUTPATIENT
Start: 2022-08-01 | End: 2022-08-02 | Stop reason: HOSPADM

## 2022-08-01 RX ORDER — IPRATROPIUM BROMIDE AND ALBUTEROL SULFATE 2.5; .5 MG/3ML; MG/3ML
3 SOLUTION RESPIRATORY (INHALATION)
Status: DISCONTINUED | OUTPATIENT
Start: 2022-08-01 | End: 2022-08-02 | Stop reason: HOSPADM

## 2022-08-01 RX ORDER — CEFEPIME 1 G/50ML
2 INJECTION, SOLUTION INTRAVENOUS EVERY 8 HOURS
Status: DISCONTINUED | OUTPATIENT
Start: 2022-08-01 | End: 2022-08-02 | Stop reason: HOSPADM

## 2022-08-01 RX ORDER — CEFEPIME 1 G/50ML
2 INJECTION, SOLUTION INTRAVENOUS ONCE
Status: COMPLETED | OUTPATIENT
Start: 2022-08-01 | End: 2022-08-01

## 2022-08-01 RX ORDER — NICOTINE POLACRILEX 4 MG
24 LOZENGE BUCCAL
Status: DISCONTINUED | OUTPATIENT
Start: 2022-08-01 | End: 2022-08-02 | Stop reason: HOSPADM

## 2022-08-01 RX ORDER — LISINOPRIL 2.5 MG/1
2.5 TABLET ORAL DAILY
Status: DISCONTINUED | OUTPATIENT
Start: 2022-08-01 | End: 2022-08-02 | Stop reason: HOSPADM

## 2022-08-01 RX ORDER — IPRATROPIUM BROMIDE AND ALBUTEROL SULFATE 2.5; .5 MG/3ML; MG/3ML
3 SOLUTION RESPIRATORY (INHALATION) ONCE
Status: COMPLETED | OUTPATIENT
Start: 2022-08-01 | End: 2022-08-01

## 2022-08-01 RX ORDER — METHYLPREDNISOLONE SODIUM SUCCINATE 40 MG/ML
40 INJECTION, POWDER, LYOPHILIZED, FOR SOLUTION INTRAMUSCULAR; INTRAVENOUS EVERY 6 HOURS
Status: DISCONTINUED | OUTPATIENT
Start: 2022-08-01 | End: 2022-08-01

## 2022-08-01 RX ORDER — BENZONATATE 100 MG/1
100 CAPSULE ORAL 3 TIMES DAILY PRN
Status: DISCONTINUED | OUTPATIENT
Start: 2022-08-01 | End: 2022-08-02 | Stop reason: HOSPADM

## 2022-08-01 RX ORDER — CETIRIZINE HYDROCHLORIDE 10 MG/1
10 TABLET ORAL DAILY
Status: DISCONTINUED | OUTPATIENT
Start: 2022-08-01 | End: 2022-08-02 | Stop reason: HOSPADM

## 2022-08-01 RX ORDER — BUDESONIDE 0.5 MG/2ML
0.5 INHALANT ORAL
Status: DISCONTINUED | OUTPATIENT
Start: 2022-08-01 | End: 2022-08-02 | Stop reason: HOSPADM

## 2022-08-01 RX ADMIN — CEFEPIME 2 G: 1 INJECTION, SOLUTION INTRAVENOUS at 06:16

## 2022-08-01 RX ADMIN — GUAIFENESIN 1200 MG: 600 TABLET ORAL at 20:16

## 2022-08-01 RX ADMIN — LISINOPRIL 2.5 MG: 2.5 TABLET ORAL at 10:33

## 2022-08-01 RX ADMIN — INSULIN LISPRO 7 UNITS: 100 INJECTION, SOLUTION INTRAVENOUS; SUBCUTANEOUS at 20:19

## 2022-08-01 RX ADMIN — CEFEPIME 2 G: 1 INJECTION, SOLUTION INTRAVENOUS at 12:13

## 2022-08-01 RX ADMIN — ATORVASTATIN CALCIUM 10 MG: 10 TABLET, FILM COATED ORAL at 20:16

## 2022-08-01 RX ADMIN — ARFORMOTEROL TARTRATE 15 MCG: 15 SOLUTION RESPIRATORY (INHALATION) at 20:14

## 2022-08-01 RX ADMIN — BUDESONIDE 0.5 MG: 0.5 SUSPENSION RESPIRATORY (INHALATION) at 10:40

## 2022-08-01 RX ADMIN — METHYLPREDNISOLONE SODIUM SUCCINATE 40 MG: 40 INJECTION, POWDER, FOR SOLUTION INTRAMUSCULAR; INTRAVENOUS at 17:10

## 2022-08-01 RX ADMIN — SODIUM CHLORIDE 1000 ML: 9 INJECTION, SOLUTION INTRAVENOUS at 05:53

## 2022-08-01 RX ADMIN — IPRATROPIUM BROMIDE AND ALBUTEROL SULFATE 3 ML: 2.5; .5 SOLUTION RESPIRATORY (INHALATION) at 10:40

## 2022-08-01 RX ADMIN — VANCOMYCIN HYDROCHLORIDE 2250 MG: 5 INJECTION, POWDER, LYOPHILIZED, FOR SOLUTION INTRAVENOUS at 07:38

## 2022-08-01 RX ADMIN — IPRATROPIUM BROMIDE AND ALBUTEROL SULFATE 3 ML: .5; 3 SOLUTION RESPIRATORY (INHALATION) at 05:30

## 2022-08-01 RX ADMIN — METOPROLOL TARTRATE 12.5 MG: 25 TABLET, FILM COATED ORAL at 10:58

## 2022-08-01 RX ADMIN — CEFEPIME 2 G: 1 INJECTION, SOLUTION INTRAVENOUS at 19:32

## 2022-08-01 RX ADMIN — INSULIN LISPRO 7 UNITS: 100 INJECTION, SOLUTION INTRAVENOUS; SUBCUTANEOUS at 17:10

## 2022-08-01 RX ADMIN — GUAIFENESIN 1200 MG: 600 TABLET ORAL at 10:35

## 2022-08-01 RX ADMIN — METHYLPREDNISOLONE SODIUM SUCCINATE 125 MG: 125 INJECTION, POWDER, FOR SOLUTION INTRAMUSCULAR; INTRAVENOUS at 05:54

## 2022-08-01 RX ADMIN — IPRATROPIUM BROMIDE AND ALBUTEROL SULFATE 3 ML: 2.5; .5 SOLUTION RESPIRATORY (INHALATION) at 20:13

## 2022-08-01 RX ADMIN — INSULIN DETEMIR 17 UNITS: 100 INJECTION, SOLUTION SUBCUTANEOUS at 11:06

## 2022-08-01 RX ADMIN — IPRATROPIUM BROMIDE AND ALBUTEROL SULFATE 3 ML: .5; 3 SOLUTION RESPIRATORY (INHALATION) at 05:25

## 2022-08-01 RX ADMIN — CETIRIZINE HYDROCHLORIDE 10 MG: 10 TABLET, FILM COATED ORAL at 10:34

## 2022-08-01 RX ADMIN — BUDESONIDE 0.5 MG: 0.5 SUSPENSION RESPIRATORY (INHALATION) at 20:15

## 2022-08-01 RX ADMIN — IPRATROPIUM BROMIDE AND ALBUTEROL SULFATE 3 ML: 2.5; .5 SOLUTION RESPIRATORY (INHALATION) at 14:09

## 2022-08-01 RX ADMIN — METOPROLOL TARTRATE 12.5 MG: 25 TABLET, FILM COATED ORAL at 20:17

## 2022-08-01 RX ADMIN — INSULIN LISPRO 7 UNITS: 100 INJECTION, SOLUTION INTRAVENOUS; SUBCUTANEOUS at 09:10

## 2022-08-01 RX ADMIN — INSULIN LISPRO 7 UNITS: 100 INJECTION, SOLUTION INTRAVENOUS; SUBCUTANEOUS at 12:20

## 2022-08-01 RX ADMIN — ARFORMOTEROL TARTRATE 15 MCG: 15 SOLUTION RESPIRATORY (INHALATION) at 10:40

## 2022-08-01 RX ADMIN — METHYLPREDNISOLONE SODIUM SUCCINATE 40 MG: 40 INJECTION, POWDER, FOR SOLUTION INTRAMUSCULAR; INTRAVENOUS at 12:11

## 2022-08-01 NOTE — H&P
[facility]   HOSPITALIST HISTORY AND PHYSICAL  Date: 2022   Patient Name: Dilip Faulkner  : 1950  MRN: [id number]  Primary Care Physician:  Rosalba Edwards APRN  Date of admission: 2022    Subjective   Subjective     Chief Complaint: Shortness of breath    HPI:    Dilip Faulkner is a 72 y.o. male who  has a past medical history of Asthma, COPD (chronic obstructive pulmonary disease) (HCC), Diabetes mellitus (HCC), Hernia, umbilical, Hypertension, and Requires continuous at home supplemental oxygen.  Patient was recently admitted to our facility from 7/10/2022 to 2022 with a similar complaint.  It was recommended that he be discharged home with NIPPV, however patient refused.  He came back to emergency department due to a reported 1 day history of worsening shortness of breath, dry cough, and wheezing.  Denies any fevers, chills, chest pain, nausea, or vomiting.  He does chronically wear 2 L of oxygen at home.    In the emergency department, patient required 4 L nasal cannula to maintain saturations above 90%. He was given 3 DuoNeb's, 125 of Solu-Medrol, and still wheezing.  Due to recent admission he was given Vanco and cefepime in the ED.  Because of the above, the hospitalist team was contacted to admit for further management.        Personal History     Past Medical History:  Past Medical History:   Diagnosis Date   • Asthma    • COPD (chronic obstructive pulmonary disease) (HCC)    • Diabetes mellitus (HCC)    • Hernia, umbilical    • Hypertension    • Requires continuous at home supplemental oxygen        Past Surgical History:  Past Surgical History:   Procedure Laterality Date   • ABDOMINAL SURGERY         Family History:   History reviewed. No pertinent family history.    Social History:    reports that he quit smoking about 16 months ago. His smoking use included cigarettes. He quit after 2.00 years of use. He has never used smokeless tobacco. He reports that he does not  drink alcohol and does not use drugs.    Home Medications:  Fluticasone-Salmeterol, albuterol sulfate HFA, aspirin, atorvastatin, cetirizine, insulin degludec, ipratropium-albuterol, lisinopril, metFORMIN, metoprolol tartrate, and omeprazole    Allergies:  No Known Allergies    Review of Systems   All systems were reviewed and negative except for: shortness of breath, cough    Objective   Objective     Vitals:   Temp:  [98.3 °F (36.8 °C)] 98.3 °F (36.8 °C)  Heart Rate:  [84-94] 93  Resp:  [24-27] 24  BP: (117-119)/(53-75) 117/53  Flow (L/min):  [2-4] 4    Physical Exam    Constitutional: Awake, alert, no acute distress   Eyes: Pupils equal, sclerae anicteric, no conjunctival injection   HENT: NCAT, mucous membranes moist   Neck: Supple, no thyromegaly, no lymphadenopathy, trachea midline   Respiratory: Diminished, scattered inspiratory and expiratory wheezes bilaterally, conversational dyspnea   Cardiovascular: RRR, no murmurs, rubs, or gallops, palpable pedal pulses bilaterally   Gastrointestinal: Positive bowel sounds, soft, nontender, nondistended   Musculoskeletal: Trace BLE edema, no clubbing or cyanosis to extremities   Psychiatric: Appropriate affect, cooperative   Neurologic: Oriented x 3, strength symmetric in all extremities, Cranial Nerves grossly intact to confrontation, speech clear   Skin: No rashes     Imaging:   XR Chest 1 View    Result Date: 8/1/2022  PROCEDURE: XR CHEST 1 VW  COMPARISON: [facility], CT, CT CHEST WO CONTRAST DIAGNOSTIC, 6/15/2022, 8:07.  [facility], CR, XR CHEST 1 VW, 7/10/2022, 2:45.  INDICATIONS: SOA  FINDINGS:  Chronic pleural thickening with chronic scarring and opacities in the left lung base.  Right lung is clear.  Cardiac and mediastinal contours are within normal limits.  Regional skeleton is unremarkable.        1. Chronic changes in the left lung base. 2. No acute cardiopulmonary disease.       JEF BAEZ MD       Electronically Signed and  Approved By: JEF BAEZ MD on 8/01/2022 at 4:51             XR Chest 1 View    Result Date: 7/10/2022  PROCEDURE: XR CHEST 1 VW  COMPARISON: 6/14/2022.  INDICATIONS: Shortness of breath.  FINDINGS:  A single AP upright portable chest radiograph was performed.  Probably no cardiac enlargement is seen.  No acute infiltrate is appreciated.  No pleural effusion or pneumothorax is identified.  Chronic pleural-parenchymal scarring is suspected in the inferior left thorax.  There may be mild atelectasis, as well.  External artifacts obscure detail.  The thoracic aorta is atherosclerotic.  There is emphysematous contour of the lungs.  Degenerative changes involve the shoulders and the spine.  No significant interval change is seen since the prior study.        No acute infiltrate is appreciated.      COMMENT:  Part of this note is an electronic transcription of spoken language to printed text. The electronic translation/transcription may permit erroneous, or at times, nonsensical (or even sensical) words or phrases to be inadvertently transcribed or omitted; this  has reviewed the note for such errors (as well as additional errors); however, some may still exist.  SEGUNDO MORA JR, MD       Electronically Signed and Approved By: SEGUNDO MORA JR, MD on 7/10/2022 at 3:04                Result Review    Result Review:  I have personally reviewed the results from the time of this admission to 8/1/2022 06:18 EDT and agree with these findings:  [x]  Laboratory  [x]  Microbiology  [x]  Radiology  [x]  EKG/Telemetry   []  Cardiology/Vascular   []  Pathology  [x]  Old records  []  Other:      Assessment & Plan   Assessment / Plan     Assessment:   Acute on chronic hypoxic hypercarbic respiratory failure  Respiratory acidosis  Acute exacerbation of COPD  COVID-19 rule out  Lactic acidosis  Type 2 diabetes mellitus  Hyperlipidemia  Essential hypertension  Bedbug infestation    Plan:      Admit to hospitalist  service  Labs and imaging reviewed  Given recent admission continue vancomycin and cefepime  Start Brovana and Pulmicort twice daily  DuoNebs every 4 times daily  Albuterol available every 2 hours as needed  Obtain pro-Rigoberto, sputum culture, strep pneumo, Legionella  Follow blood cultures, COVID swab  Obtain UA  Sliding scale insulin  Reconcile resume appropriate home meds    Patient's clinical course will dictate further management  Discussed with ED physician, RN         DVT prophylaxis:  No DVT prophylaxis order currently exists.    CODE STATUS:    Code Status (Patient has no pulse and is not breathing): CPR (Attempt to Resuscitate)  Medical Interventions (Patient has pulse or is breathing): Full Support      Admission Status:  I believe this patient meets inpatient status.    Electronically signed by RIMA Chapin, 08/01/22, 6:06 AM EDT.

## 2022-08-01 NOTE — PLAN OF CARE
Goal Outcome Evaluation:  Plan of Care Reviewed With: patient        Progress: no change  Outcome Evaluation: Patient was admitted to unit today. Patient is alert and oriented x4, patient's VSS. Patient was able to answer all admission questions and was able to ambulate to bathroom. Bed alert activated and audible. Patient's VSS. Patient is a blood glucose check. Patient has had no complaints of pain. No other significant changes. Estrellita Lipscomb RN

## 2022-08-01 NOTE — ED NOTES
Took patient lunch tray and he stated does not want to eat right now, patient sleeping comfortably at this time

## 2022-08-01 NOTE — PROGRESS NOTES
"Pharmacy to Dose Vancomycin: Day 1  Consulting Provider: Domitila Palacios  Clinical Indication: PNA/URTI - MRSA snare ordered  Pertinent Past Medical History: Asthma, COPD, recent PNA with gram bacillli  Goal -600 mg/L.hr  Consult Duration of therapy: 7 days     72 y.o.male  185.4 cm (73\")       08/01/22 0430      Weight: 111 kg (244 lb 4.3 oz)       Estimated Creatinine Clearance: 67.6 mL/min (by C-G formula based on SCr of 1.27 mg/dL).  Results from last 7 days   Lab Units 08/01/22  0433   BUN mg/dL 14   CREATININE mg/dL 1.27   HD/PD/CRRT?: No  Contrast Administered: No  Lab Results   Component Value Date    WBC 11.70 (H) 08/01/2022      Temperature    08/01/22 0428 08/01/22 0740 08/01/22 1030   Temp: 98.3 °F (36.8 °C) 98.4 °F (36.9 °C) 98.1 °F (36.7 °C)     Relevant Micro:   Microbiology Results (last 10 days)       Procedure Component Value - Date/Time    Influenza Antigen, Rapid - Swab, Nasopharynx [[id number]]  (Normal) Collected: 08/01/22 0451    Lab Status: Final result Specimen: Swab from Nasopharynx Updated: 08/01/22 0516     Influenza A Ag, EIA Negative     Influenza B Ag, EIA Negative    Mycoplasma Pneumoniae Antibody, IgM - Blood, [[id number]]  (Normal) Collected: 08/01/22 0433    Lab Status: Final result Specimen: Blood Updated: 08/01/22 0739     Mycoplasma pneumo IgM Negative           Relevant Radiology:   8/1 Chest Xray: IMPRESSION:  1. Chronic changes in the left lung base.  2. No acute cardiopulmonary disease.    Other Antimicrobial Therapy: Zosyn    Assessment/Plan  Dilip Faulkner is a 72 y.o.male admitted with PNA/URTI sputum culture pending. MRSA snare ordered. Pharmacy has been consulted to dose IV Vancomycin + Zosyn. Vancomycin 2250 mg loading dose followed by vancomycin 1750 mg q 24 hours with the parameters below.    Loading dose: 2250 mg IV  Regimen: 1750 mg q 24 H  Exposure Target: AUC24 (range) 400-600 mg/L.hr  AUC24,ss: 531 mg/L.hr  PAUC*: 79 %  Ctrough,ss: 15.7 mg/L  Pconc*: 31 " %  Tox.: 11 %    Level due:Vanc Random 8/2 at noon  Labs ordered: BMP X 3 days, MRSA snare    Thank you for this consult. Pharmacy will continue to monitor.   Gisel Milian RPH

## 2022-08-01 NOTE — ED PROVIDER NOTES
Time: 4:39 AM EDT  Arrived by: ambulance  Chief Complaint:   Chief Complaint   Patient presents with   • Shortness of Breath     Per EMS patient called for shortness of air. States that he has COPD usually wears 2L of home oxygen. Per EMS on arrival patient was 89% on room air. Received a neb treatment from EMS.     History provided by: patient, EMS  History is limited by: N/A     History of Present Illness:    Patient is a 72 y.o. year old male that presents to the emergency department with shortness of breath onset about 3 am today. Pt states he has a dry cough, but denies chest pain, nausea, fever, diarrhea, vomiting.   Pt has a history of COPD and wears 2 L of oxygen at home. Pt received a neb treatment from EMS. Pt is a former smoker and quit a year ago. Pt had a surgery for ulcers a few years ago.       History provided by:  Patient and EMS personnel   used: No    Shortness of Breath  Severity:  Moderate  Onset quality:  Gradual  Duration:  1 day  Timing:  Constant  Progression:  Worsening  Chronicity:  New  Relieved by:  Nothing  Worsened by:  Nothing  Ineffective treatments:  None tried  Associated symptoms: cough    Associated symptoms: no abdominal pain, no chest pain, no ear pain, no fever, no headaches, no sore throat and no vomiting        Similar Symptoms Previously: N/A  Recently seen: N/A      Patient Care Team  Primary Care Provider: Rosalba Edwards APRN    Past Medical History:     No Known Allergies  Past Medical History:   Diagnosis Date   • Asthma    • COPD (chronic obstructive pulmonary disease) (HCC)    • Diabetes mellitus (HCC)    • Hernia, umbilical    • Hypertension    • Requires continuous at home supplemental oxygen      Past Surgical History:   Procedure Laterality Date   • ABDOMINAL SURGERY       History reviewed. No pertinent family history.    Home Medications:  Prior to Admission medications    Medication Sig Start Date End Date Taking? Authorizing Provider    albuterol sulfate  (90 Base) MCG/ACT inhaler Inhale 2 puffs Every 6 (Six) Hours As Needed for Wheezing.    Roosevelt Gaston MD   aspirin 81 MG chewable tablet Chew 81 mg Daily.    Roosevelt Gaston MD   atorvastatin (LIPITOR) 10 MG tablet Take 10 mg by mouth Daily.    Roosevelt Gaston MD   cetirizine (zyrTEC) 10 MG tablet Take 10 mg by mouth Daily.    Roosevelt Gaston MD   Fluticasone-Salmeterol (ADVAIR) 250-50 MCG/ACT DISKUS Inhale 1 puff 2 (Two) Times a Day.    Roosevelt Gaston MD   insulin degludec (Tresiba FlexTouch) 100 UNIT/ML solution pen-injector injection Inject 17 Units under the skin into the appropriate area as directed Daily.    Roosevelt Gaston MD   ipratropium-albuterol (DUO-NEB) 0.5-2.5 mg/3 ml nebulizer Take 3 mL by nebulization Every 4 (Four) Hours As Needed for Wheezing or Shortness of Air. 21   Ricci Trinidad DO   lisinopril (PRINIVIL,ZESTRIL) 2.5 MG tablet Take 2.5 mg by mouth Daily.    Roosevelt Gaston MD   metFORMIN (GLUCOPHAGE) 500 MG tablet Take 500 mg by mouth 2 (two) times a day. 21   Roosevelt Gaston MD   metoprolol tartrate (LOPRESSOR) 25 MG tablet Take 0.5 (1/2) tablet by mouth 2 (Two) Times a Day for 30 days. 22  Sigifredo Mendes MD   omeprazole (priLOSEC) 20 MG capsule Take 20 mg by mouth Daily.    Roosevelt Gaston MD        Social History:   Social History     Tobacco Use   • Smoking status: Former Smoker     Years: 2.00     Types: Cigarettes     Quit date: 3/19/2021     Years since quittin.3   • Smokeless tobacco: Never Used   Vaping Use   • Vaping Use: Never used   Substance Use Topics   • Alcohol use: Never   • Drug use: Never     Recent travel: not applicable     Review of Systems:  Review of Systems   Constitutional: Negative for chills and fever.   HENT: Negative for congestion, ear pain and sore throat.    Eyes: Negative for pain.   Respiratory: Positive for cough and shortness of breath.  "Negative for chest tightness.    Cardiovascular: Negative for chest pain.   Gastrointestinal: Negative for abdominal pain, diarrhea, nausea and vomiting.   Genitourinary: Negative for flank pain and hematuria.   Musculoskeletal: Negative for joint swelling.   Skin: Negative for pallor.   Neurological: Negative for seizures and headaches.        Physical Exam:  /71 (BP Location: Right arm, Patient Position: Sitting)   Pulse 93   Temp 97.8 °F (36.6 °C) (Oral)   Resp 20   Ht 185.4 cm (73\")   Wt 111 kg (244 lb 4.3 oz)   SpO2 95%   BMI 32.23 kg/m²     Physical Exam  Vitals and nursing note reviewed.   Constitutional:       General: He is not in acute distress.     Appearance: Normal appearance. He is not toxic-appearing.   HENT:      Head: Normocephalic and atraumatic.      Nose: Nose normal.      Mouth/Throat:      Mouth: Mucous membranes are moist.   Eyes:      General: No scleral icterus.     Extraocular Movements: Extraocular movements intact.      Conjunctiva/sclera: Conjunctivae normal.      Pupils: Pupils are equal, round, and reactive to light.   Cardiovascular:      Rate and Rhythm: Normal rate and regular rhythm.      Pulses: Normal pulses.      Heart sounds: Normal heart sounds.   Pulmonary:      Effort: Pulmonary effort is normal. Tachypnea present. No respiratory distress.      Breath sounds: Decreased breath sounds and wheezing (diffuse ) present.   Abdominal:      General: Abdomen is flat. There is no distension.      Palpations: Abdomen is soft.      Tenderness: There is no abdominal tenderness.   Musculoskeletal:         General: Normal range of motion.      Cervical back: Normal range of motion and neck supple.      Right lower leg: Edema (mild) present.      Left lower leg: Edema (mild) present.   Skin:     General: Skin is warm and dry.      Capillary Refill: Capillary refill takes less than 2 seconds.   Neurological:      General: No focal deficit present.      Mental Status: He is alert " and oriented to person, place, and time. Mental status is at baseline.   Psychiatric:         Mood and Affect: Mood normal.         Behavior: Behavior normal.                Medications in the Emergency Department:  Medications   sodium chloride 0.9 % flush 10 mL (has no administration in time range)   atorvastatin (LIPITOR) tablet 10 mg (10 mg Oral Given 8/1/22 2016)   cetirizine (zyrTEC) tablet 10 mg (10 mg Oral Given 8/1/22 1034)   lisinopril (PRINIVIL,ZESTRIL) tablet 2.5 mg (2.5 mg Oral Given 8/1/22 1033)   metoprolol tartrate (LOPRESSOR) tablet 12.5 mg (12.5 mg Oral Given 8/1/22 2017)   albuterol (PROVENTIL) nebulizer solution 0.083% 2.5 mg/3mL (has no administration in time range)   ipratropium-albuterol (DUO-NEB) nebulizer solution 3 mL (3 mL Nebulization Given 8/1/22 2013)   arformoterol (BROVANA) nebulizer solution 15 mcg ( Nebulization Canceled Entry 8/1/22 2130)   budesonide (PULMICORT) nebulizer solution 0.5 mg ( Nebulization Canceled Entry 8/1/22 2130)   Pharmacy to Dose Cefepime (has no administration in time range)   dextrose (GLUTOSE) oral gel 24 g (has no administration in time range)   dextrose (D50W) (25 g/50 mL) IV injection 25 g (has no administration in time range)   glucagon (human recombinant) (GLUCAGEN DIAGNOSTIC) injection 1 mg (has no administration in time range)   Insulin Lispro (humaLOG) injection 0-9 Units (7 Units Subcutaneous Given 8/1/22 2019)   guaiFENesin (MUCINEX) 12 hr tablet 1,200 mg (1,200 mg Oral Given 8/1/22 2016)   benzonatate (TESSALON) capsule 100 mg (has no administration in time range)   insulin detemir (LEVEMIR) injection 17 Units (17 Units Subcutaneous Given 8/1/22 1106)   cefepime (MAXIPIME) IVPB 2 g (premix) in D5 (2 g Intravenous New Bag 8/1/22 1932)   predniSONE (DELTASONE) tablet 40 mg (has no administration in time range)   ipratropium-albuterol (DUO-NEB) nebulizer solution 3 mL (3 mL Nebulization Given 8/1/22 0530)   ipratropium-albuterol (DUO-NEB) nebulizer  solution 3 mL (3 mL Nebulization Given 8/1/22 0525)   methylPREDNISolone sodium succinate (SOLU-Medrol) injection 125 mg (125 mg Intravenous Given 8/1/22 0554)   sodium chloride 0.9 % bolus 1,000 mL (0 mL Intravenous Stopped 8/1/22 1050)   vancomycin 2250 mg/500 mL 0.9% NS IVPB ([facility]) (0 mg Intravenous Stopped 8/1/22 1213)   cefepime (MAXIPIME) IVPB 2 g (premix) in D5 (0 g Intravenous Stopped 8/1/22 0737)        Labs  Lab Results (last 24 hours)     Procedure Component Value Units Date/Time    CBC & Differential [[id number]]  (Abnormal) Collected: 08/01/22 0433    Specimen: Blood Updated: 08/01/22 0459    Narrative:      The following orders were created for panel order CBC & Differential.  Procedure                               Abnormality         Status                     ---------                               -----------         ------                     CBC Auto Differential[[id number]]        Abnormal            Final result                 Please view results for these tests on the individual orders.    Comprehensive Metabolic Panel [[id number]]  (Abnormal) Collected: 08/01/22 0433    Specimen: Blood Updated: 08/01/22 0520     Glucose 373 mg/dL      BUN 14 mg/dL      Creatinine 1.27 mg/dL      Sodium 138 mmol/L      Potassium 4.6 mmol/L      Chloride 103 mmol/L      CO2 25.3 mmol/L      Calcium 9.2 mg/dL      Total Protein 7.1 g/dL      Albumin 4.00 g/dL      ALT (SGPT) 12 U/L      AST (SGOT) 12 U/L      Alkaline Phosphatase 91 U/L      Total Bilirubin 0.4 mg/dL      Globulin 3.1 gm/dL      A/G Ratio 1.3 g/dL      BUN/Creatinine Ratio 11.0     Anion Gap 9.7 mmol/L      eGFR 59.7 mL/min/1.73      Comment: National Kidney Foundation and American Society of Nephrology (ASN) Task Force recommended calculation based on the Chronic Kidney Disease Epidemiology Collaboration (CKD-EPI) equation refit without adjustment for race.       Narrative:      GFR Normal >60  Chronic Kidney Disease <60  Kidney Failure <15       BNP [[id number]]  (Normal) Collected: 08/01/22 0433    Specimen: Blood Updated: 08/01/22 0518     proBNP 91.4 pg/mL     Narrative:      Among patients with dyspnea, NT-proBNP is highly sensitive for the detection of acute congestive heart failure. In addition NT-proBNP of <300 pg/ml effectively rules out acute congestive heart failure with 99% negative predictive value.    Results may be falsely decreased if patient taking Biotin.      Troponin [[id number]]  (Normal) Collected: 08/01/22 0433    Specimen: Blood Updated: 08/01/22 0520     Troponin T 0.025 ng/mL     Narrative:      Troponin T Reference Range:  <= 0.03 ng/mL-   Negative for AMI  >0.03 ng/mL-     Abnormal for myocardial necrosis.  Clinicians would have to utilize clinical acumen, EKG, Troponin and serial changes to determine if it is an Acute Myocardial Infarction or myocardial injury due to an underlying chronic condition.       Results may be falsely decreased if patient taking Biotin.      CBC Auto Differential [[id number]]  (Abnormal) Collected: 08/01/22 0433    Specimen: Blood Updated: 08/01/22 0459     WBC 11.70 10*3/mm3      RBC 5.27 10*6/mm3      Hemoglobin 13.3 g/dL      Hematocrit 44.8 %      MCV 85.0 fL      MCH 25.2 pg      MCHC 29.7 g/dL      RDW 16.2 %      RDW-SD 50.1 fl      MPV 11.0 fL      Platelets 206 10*3/mm3      Neutrophil % 64.4 %      Lymphocyte % 17.4 %      Monocyte % 6.4 %      Eosinophil % 10.8 %      Basophil % 0.6 %      Immature Grans % 0.4 %      Neutrophils, Absolute 7.54 10*3/mm3      Lymphocytes, Absolute 2.03 10*3/mm3      Monocytes, Absolute 0.75 10*3/mm3      Eosinophils, Absolute 1.26 10*3/mm3      Basophils, Absolute 0.07 10*3/mm3      Immature Grans, Absolute 0.05 10*3/mm3      nRBC 0.0 /100 WBC     Mycoplasma Pneumoniae Antibody, IgM - Blood, [[id number]]  (Normal) Collected: 08/01/22 0433    Specimen: Blood Updated: 08/01/22 0739     Mycoplasma pneumo IgM Negative    Procalcitonin [[id number]]  (Normal) Collected:  "08/01/22 0433    Specimen: Blood Updated: 08/01/22 0914     Procalcitonin 0.09 ng/mL     Narrative:      As a Marker for Sepsis (Non-Neonates):    1. <0.5 ng/mL represents a low risk of severe sepsis and/or septic shock.  2. >2 ng/mL represents a high risk of severe sepsis and/or septic shock.    As a Marker for Lower Respiratory Tract Infections that require antibiotic therapy:    PCT on Admission    Antibiotic Therapy       6-12 Hrs later    >0.5                Strongly Recommended  >0.25 - <0.5        Recommended  0.1 - 0.25          Discouraged              Remeasure/reassess PCT  <0.1                Strongly Discouraged     Remeasure/reassess PCT    As 28 day mortality risk marker: \"Change in Procalcitonin Result\" (>80% or <=80%) if Day 0 (or Day 1) and Day 4 values are available. Refer to http://www.[facility]pct[facility]calculator.com    Change in PCT <=80%  A decrease of PCT levels below or equal to 80% defines a positive change in PCT test result representing a higher risk for 28-day all-cause mortality of patients diagnosed with severe sepsis for septic shock.    Change in PCT >80%  A decrease of PCT levels of more than 80% defines a negative change in PCT result representing a lower risk for 28-day all-cause mortality of patients diagnosed with severe sepsis or septic shock.    This test is Prognostic not Diagnostic, if elevated correlate with clinical findings before administering antibiotic treatment.        Blood Culture - Blood, Arm, Left [[id number]] Collected: 08/01/22 0434    Specimen: Blood from Arm, Left Updated: 08/01/22 0455    Blood Culture - Blood, Arm, Left [[id number]] Collected: 08/01/22 0434    Specimen: Blood from Arm, Left Updated: 08/01/22 0455    Lactic Acid, Plasma [[id number]]  (Abnormal) Collected: 08/01/22 0434    Specimen: Blood Updated: 08/01/22 0533     Lactate 2.4 mmol/L     COVID-19,APTIMA PANTHER(SRAVANTHI),[facility], NP/OP SWAB IN UTM/VTM/[facility] [employer]SPORT MEDIA,24 HR TAT - Swab, " Nasopharynx [[id number]]  (Normal) Collected: 08/01/22 0450    Specimen: Swab from Nasopharynx Updated: 08/01/22 1324     COVID19 Not Detected    Narrative:      Fact sheet for providers: https://www.fda.gov/media/897109/download     Fact sheet for patients: https://www.fda.gov/media/585472/download    Test performed by RT PCR.    Influenza Antigen, Rapid - Swab, Nasopharynx [[id number]]  (Normal) Collected: 08/01/22 0451    Specimen: Swab from Nasopharynx Updated: 08/01/22 0516     Influenza A Ag, EIA Negative     Influenza B Ag, EIA Negative    ABG with Co-Ox and Electrolytes [[id number]]  (Abnormal) Collected: 08/01/22 0532    Specimen: Arterial Blood from Arm, Right Updated: 08/01/22 0537     pH, Arterial 7.314 pH units      pCO2, Arterial 52.2 mm Hg      pO2, Arterial 97.3 mm Hg      HCO3, Arterial 25.9 mmol/L      Base Excess, Arterial -1.0 mmol/L      O2 Saturation, Arterial 97.0 %      Hemoglobin, Blood Gas 13.3 g/dL      Carboxyhemoglobin 1.4 %      Methemoglobin 0.00 %      Oxyhemoglobin 95.6 %      FHHB 3.0 %      Nael's Test Positive     Note --     Site Arterial: right radial     Modality Cannula - Nasal     FIO2 36 %      Flow Rate 4 lpm      Sodium, Arterial 134.5 mmol/L      Potassium, Arterial 4.51 mmol/L      Ionized Calcium, Arterial 1.14 mmol/L      Chloride, Arterial 102 mmol/L      Glucose, Arterial 361 mmol/L      Lactate, Arterial 1.46 mmol/L      PO2/FIO2 270    STAT Lactic Acid, Reflex [[id number]]  (Normal) Collected: 08/01/22 0750    Specimen: Blood from Arm, Right Updated: 08/01/22 0820     Lactate 1.2 mmol/L     POC Glucose Once [[id number]]  (Abnormal) Collected: 08/01/22 0829    Specimen: Blood Updated: 08/01/22 0830     Glucose 314 mg/dL      Comment: Serial Number: [id number]:  [id number]       POC Glucose Once [[id number]]  (Abnormal) Collected: 08/01/22 1102    Specimen: Blood Updated: 08/01/22 1103     Glucose 340 mg/dL      Comment: Serial Number: [id number]:  [id number]        POC Glucose Once [[id number]]  (Abnormal) Collected: 08/01/22 1217    Specimen: Blood Updated: 08/01/22 1219     Glucose 307 mg/dL      Comment: Serial Number: [id number]:  [id number]       Urinalysis With Microscopic If Indicated (No Culture) - Urine, Clean Catch [[id number]]  (Abnormal) Collected: 08/01/22 1250    Specimen: Urine, Clean Catch Updated: 08/01/22 1305     Color, UA Yellow     Appearance, UA Clear     pH, UA <=5.0     Specific Gravity, UA >1.030     Glucose, UA >=1000 mg/dL (3+)     Ketones, UA Negative     Bilirubin, UA Negative     Blood, UA Negative     Protein, UA Negative     Leuk Esterase, UA Negative     Nitrite, UA Negative     Urobilinogen, UA 0.2 E.U./dL    Narrative:      Urine microscopic not indicated.    S. Pneumo Ag Urine or CSF - Urine, Urine, Clean Catch [[id number]]  (Normal) Collected: 08/01/22 1250    Specimen: Urine, Clean Catch Updated: 08/01/22 1326     Strep Pneumo Ag Negative    Legionella Antigen, Urine - Urine, Urine, Clean Catch [[id number]]  (Normal) Collected: 08/01/22 1250    Specimen: Urine, Clean Catch Updated: 08/01/22 1326     LEGIONELLA ANTIGEN, URINE Negative    MRSA Screen, PCR (Inpatient) - Swab, Nares [[id number]]  (Normal) Collected: 08/01/22 1252    Specimen: Swab from Nares Updated: 08/01/22 1416     MRSA PCR No MRSA Detected    Narrative:      The negative predictive value of this diagnostic test is high and should only be used to consider de-escalating anti-MRSA therapy. A positive result may indicate colonization with MRSA and must be correlated clinically.    POC Glucose Once [[id number]]  (Abnormal) Collected: 08/01/22 1645    Specimen: Blood Updated: 08/01/22 1646     Glucose 334 mg/dL      Comment: Serial Number: [id number]:  [id number]       POC Glucose Once [[id number]]  (Abnormal) Collected: 08/01/22 2008    Specimen: Blood Updated: 08/01/22 2009     Glucose 342 mg/dL      Comment: Serial Number: [id number]:  [id number]               Imaging:  XR Chest 1 View    Result Date: 8/1/2022  PROCEDURE: XR CHEST 1 VW  COMPARISON: [facility], CT, CT CHEST WO CONTRAST DIAGNOSTIC, 6/15/2022, 8:07.  [facility], CR, XR CHEST 1 VW, 7/10/2022, 2:45.  INDICATIONS: SOA  FINDINGS:  Chronic pleural thickening with chronic scarring and opacities in the left lung base.  Right lung is clear.  Cardiac and mediastinal contours are within normal limits.  Regional skeleton is unremarkable.        1. Chronic changes in the left lung base. 2. No acute cardiopulmonary disease.       JEF BAEZ MD       Electronically Signed and Approved By: JEF BAEZ MD on 8/01/2022 at 4:51               Procedures:  Procedures    Progress  ED Course as of 08/01/22 2109   Mon Aug 01, 2022   0445 ECG 12 Lead  Normal sinus rhythm with rate of 93. QRS normal. RI interval normal. QTc interval is normal. No ST elevation or depression. Non specific T wave abnormalities. This EKG was interpreted by me.  [LD]      ED Course User Index  [LD] Milo Pereira MD                            Medical Decision Making:  MDM  Number of Diagnoses or Management Options  Acute respiratory failure with hypoxia (HCC)  COPD exacerbation (HCC)  Diagnosis management comments: Patient is afebrile and nontoxic-appearing.  On initial exam he is in respiratory distress with tachypnea.  He has diffuse wheezes.  O2 sat in the mid 80s on 2 L nasal cannula. Supplemental O2 was increased to 4 L.  At baseline patient is on 2 L nasal cannula.  He said symptoms started today.  He denies any alleviating or aggravating factors.  He has a history of COPD.  Patient was given nebulizer treatment with mild improvement.  Labs showed elevated lactic acid of 2.4.  Chest x-ray showed no acute finding.  On reevaluation patient still has persistent wheezes.  Discussed patient with hospitalist and he will be admitted for further care.       Amount and/or Complexity of Data Reviewed  Clinical lab  tests: ordered and reviewed  Tests in the radiology section of CPT®: ordered and reviewed  Review and summarize past medical records: yes  Independent visualization of images, tracings, or specimens: yes    Risk of Complications, Morbidity, and/or Mortality  Presenting problems: moderate  Management options: moderate         Final diagnoses:   COPD exacerbation (HCC)   Acute respiratory failure with hypoxia (HCC)        Disposition:  ED Disposition     ED Disposition   Decision to Admit    Condition   --    Comment   Level of Care: Remote Telemetry [26]   Diagnosis: COPD exacerbation (HCC) [834341]   Admitting Physician: NELSON ORTEGA [[id number]]   Attending Physician: NELSON ORTEGA [[id number]]   Isolate for COVID?: Yes [1]   Certification: I Certify That Inpatient Hospital Services Are Medically Necessary For Greater Than 2 Midnights               This medical record created using voice recognition software and may contain unintended errors.    Documentation assistance provided by Mariaa Clifford acting as scribe for Milo Pereira MD. Information recorded by the scribe was done at my direction and has been verified and validated by me.          Mariaa Clifford  08/01/22 8435       Milo Pereira MD  08/01/22 5806

## 2022-08-02 ENCOUNTER — READMISSION MANAGEMENT (OUTPATIENT)
Dept: CALL CENTER | Facility: HOSPITAL | Age: 72
End: 2022-08-02

## 2022-08-02 VITALS
HEART RATE: 64 BPM | RESPIRATION RATE: 18 BRPM | BODY MASS INDEX: 32.37 KG/M2 | WEIGHT: 244.27 LBS | SYSTOLIC BLOOD PRESSURE: 132 MMHG | HEIGHT: 73 IN | OXYGEN SATURATION: 95 % | TEMPERATURE: 98.5 F | DIASTOLIC BLOOD PRESSURE: 73 MMHG

## 2022-08-02 LAB
ANION GAP SERPL CALCULATED.3IONS-SCNC: 7.5 MMOL/L (ref 5–15)
BUN SERPL-MCNC: 18 MG/DL (ref 8–23)
BUN/CREAT SERPL: 15.7 (ref 7–25)
CALCIUM SPEC-SCNC: 8.8 MG/DL (ref 8.6–10.5)
CHLORIDE SERPL-SCNC: 102 MMOL/L (ref 98–107)
CO2 SERPL-SCNC: 25.5 MMOL/L (ref 22–29)
CREAT SERPL-MCNC: 1.15 MG/DL (ref 0.76–1.27)
DEPRECATED RDW RBC AUTO: 49.1 FL (ref 37–54)
EGFRCR SERPLBLD CKD-EPI 2021: 67.2 ML/MIN/1.73
ERYTHROCYTE [DISTWIDTH] IN BLOOD BY AUTOMATED COUNT: 16 % (ref 12.3–15.4)
GLUCOSE BLDC GLUCOMTR-MCNC: 305 MG/DL (ref 70–99)
GLUCOSE BLDC GLUCOMTR-MCNC: 380 MG/DL (ref 70–99)
GLUCOSE SERPL-MCNC: 321 MG/DL (ref 65–99)
HCT VFR BLD AUTO: 39.5 % (ref 37.5–51)
HGB BLD-MCNC: 11.9 G/DL (ref 13–17.7)
MAGNESIUM SERPL-MCNC: 2.1 MG/DL (ref 1.6–2.4)
MCH RBC QN AUTO: 25.2 PG (ref 26.6–33)
MCHC RBC AUTO-ENTMCNC: 30.1 G/DL (ref 31.5–35.7)
MCV RBC AUTO: 83.7 FL (ref 79–97)
PLATELET # BLD AUTO: 166 10*3/MM3 (ref 140–450)
PMV BLD AUTO: 11 FL (ref 6–12)
POTASSIUM SERPL-SCNC: 4.8 MMOL/L (ref 3.5–5.2)
PROCALCITONIN SERPL-MCNC: 0.08 NG/ML (ref 0–0.25)
RBC # BLD AUTO: 4.72 10*6/MM3 (ref 4.14–5.8)
SODIUM SERPL-SCNC: 135 MMOL/L (ref 136–145)
WBC NRBC COR # BLD: 14.35 10*3/MM3 (ref 3.4–10.8)

## 2022-08-02 PROCEDURE — 80048 BASIC METABOLIC PNL TOTAL CA: CPT | Performed by: PHYSICIAN ASSISTANT

## 2022-08-02 PROCEDURE — 63710000001 INSULIN LISPRO (HUMAN) PER 5 UNITS: Performed by: PHYSICIAN ASSISTANT

## 2022-08-02 PROCEDURE — 85027 COMPLETE CBC AUTOMATED: CPT | Performed by: PHYSICIAN ASSISTANT

## 2022-08-02 PROCEDURE — 25010000002 CEFEPIME PER 500 MG: Performed by: PHYSICIAN ASSISTANT

## 2022-08-02 PROCEDURE — 99239 HOSP IP/OBS DSCHRG MGMT >30: CPT | Performed by: FAMILY MEDICINE

## 2022-08-02 PROCEDURE — 83735 ASSAY OF MAGNESIUM: CPT | Performed by: PHYSICIAN ASSISTANT

## 2022-08-02 PROCEDURE — 94760 N-INVAS EAR/PLS OXIMETRY 1: CPT

## 2022-08-02 PROCEDURE — 94799 UNLISTED PULMONARY SVC/PX: CPT

## 2022-08-02 PROCEDURE — 84145 PROCALCITONIN (PCT): CPT | Performed by: FAMILY MEDICINE

## 2022-08-02 PROCEDURE — 82962 GLUCOSE BLOOD TEST: CPT

## 2022-08-02 PROCEDURE — 63710000001 INSULIN DETEMIR PER 5 UNITS: Performed by: FAMILY MEDICINE

## 2022-08-02 PROCEDURE — 63710000001 PREDNISONE PER 1 MG: Performed by: FAMILY MEDICINE

## 2022-08-02 PROCEDURE — 36415 COLL VENOUS BLD VENIPUNCTURE: CPT | Performed by: PHYSICIAN ASSISTANT

## 2022-08-02 RX ORDER — GUAIFENESIN 600 MG/1
1200 TABLET, EXTENDED RELEASE ORAL EVERY 12 HOURS SCHEDULED
Qty: 120 TABLET | Refills: 0 | Status: SHIPPED | OUTPATIENT
Start: 2022-08-02 | End: 2022-09-01

## 2022-08-02 RX ORDER — PREDNISONE 20 MG/1
40 TABLET ORAL DAILY
Qty: 8 TABLET | Refills: 0 | Status: SHIPPED | OUTPATIENT
Start: 2022-08-03 | End: 2022-08-07

## 2022-08-02 RX ORDER — AMOXICILLIN AND CLAVULANATE POTASSIUM 875; 125 MG/1; MG/1
1 TABLET, FILM COATED ORAL 2 TIMES DAILY
Qty: 8 TABLET | Refills: 0 | Status: SHIPPED | OUTPATIENT
Start: 2022-08-02 | End: 2022-08-06

## 2022-08-02 RX ADMIN — CEFEPIME 2 G: 1 INJECTION, SOLUTION INTRAVENOUS at 03:10

## 2022-08-02 RX ADMIN — CEFEPIME 2 G: 1 INJECTION, SOLUTION INTRAVENOUS at 11:00

## 2022-08-02 RX ADMIN — INSULIN DETEMIR 17 UNITS: 100 INJECTION, SOLUTION SUBCUTANEOUS at 08:45

## 2022-08-02 RX ADMIN — METOPROLOL TARTRATE 12.5 MG: 25 TABLET, FILM COATED ORAL at 07:45

## 2022-08-02 RX ADMIN — INSULIN LISPRO 4 UNITS: 100 INJECTION, SOLUTION INTRAVENOUS; SUBCUTANEOUS at 11:00

## 2022-08-02 RX ADMIN — LISINOPRIL 2.5 MG: 2.5 TABLET ORAL at 07:44

## 2022-08-02 RX ADMIN — BUDESONIDE 0.5 MG: 0.5 SUSPENSION RESPIRATORY (INHALATION) at 07:39

## 2022-08-02 RX ADMIN — CETIRIZINE HYDROCHLORIDE 10 MG: 10 TABLET, FILM COATED ORAL at 07:44

## 2022-08-02 RX ADMIN — PREDNISONE 40 MG: 20 TABLET ORAL at 08:45

## 2022-08-02 RX ADMIN — INSULIN LISPRO 8 UNITS: 100 INJECTION, SOLUTION INTRAVENOUS; SUBCUTANEOUS at 07:44

## 2022-08-02 RX ADMIN — GUAIFENESIN 1200 MG: 600 TABLET ORAL at 07:44

## 2022-08-02 RX ADMIN — IPRATROPIUM BROMIDE AND ALBUTEROL SULFATE 3 ML: 2.5; .5 SOLUTION RESPIRATORY (INHALATION) at 07:39

## 2022-08-02 RX ADMIN — ARFORMOTEROL TARTRATE 15 MCG: 15 SOLUTION RESPIRATORY (INHALATION) at 07:39

## 2022-08-02 NOTE — DISCHARGE SUMMARY
[facility]         HOSPITALIST  DISCHARGE SUMMARY    Patient Name: Dilip Faulkner  : 1950  MRN: [id number]    Date of Admission: 2022  Date of Discharge: 2022  Primary Care Physician: Rosalba Edwards APRN    Consults     No orders found for last 30 day(s).          Active and Resolved Hospital Problems:  Acute on chronic hypoxic hypercarbic respiratory failure  Respiratory acidosis  Acute exacerbation of COPD  Lactic acidosis  Type 2 diabetes mellitus  Hyperlipidemia  Essential hypertension  Bedbug infestation  Active Hospital Problems    Diagnosis POA   • COPD exacerbation (HCC) [J44.1] Yes      Resolved Hospital Problems   No resolved problems to display.       Hospital Course     Hospital Course:  Dilip Faulkner is a 72 y.o. male who  has a past medical history of Asthma, COPD (chronic obstructive pulmonary disease) (HCC), Diabetes mellitus (HCC), Hernia, umbilical, Hypertension, and Requires continuous at home supplemental oxygen.  Patient was recently admitted to our facility from 7/10/2022 to 2022 with a similar complaint.  It was recommended that he be discharged home with NIPPV, however patient refused.  He came back to emergency department due to a reported 1 day history of worsening shortness of breath, dry cough, and wheezing.  Denies any fevers, chills, chest pain, nausea, or vomiting.  He does chronically wear 2 L of oxygen at home.     In the emergency department, patient required 4 L nasal cannula to maintain saturations above 90%. He was given 3 DuoNeb's, 125 of Solu-Medrol, and still wheezing.  Due to recent admission he was given Vanco and cefepime in the ED.  Because of the above, the hospitalist team was contacted to admit for further management.  Continue on systemic steroids inhaled bronchodilators and empiric antibiotics.  Pro-Rigoberto within normal limits.  Antibiotics de-escalated.  Shortness of breath improved.  Patient able to be weaned to  home O2 satting well on 2 L nasal cannula.  Patient seen evaluated on day discharge and thought stable for discharge home to complete course of empiric antibiotics for COPD exacerbation with increased purulent sputum as well as course of prednisone and follow-up with his primary care provider in 1 to 2 weeks.        DISCHARGE Follow Up Recommendations for labs and diagnostics: As above      Day of Discharge     Vital Signs:  Temp:  [97.6 °F (36.4 °C)-98.5 °F (36.9 °C)] 98.5 °F (36.9 °C)  Heart Rate:  [64-93] 64  Resp:  [16-20] 18  BP: (119-144)/(63-73) 132/73  Flow (L/min):  [2-3] 2  Physical Exam:   Gen. well-developed appearing stated age in no acute distress obese BMI 32  HEENT: Normocephalic atraumatic moist membranes pupils equal round reactive light, no scleral icterus no conjunctival injection  Cardiovascular: regular rate and rhythm no murmurs rubs or gallops S1-S2, no lower extremity edema appreciated  Pulmonary: Clear to auscultation bilaterally scant expiratory wheezes no rales or rhonchi symmetric chest expansion, unlabored, no conversational dyspnea appreciated  Gastrointestinal: Soft nontender nondistended positive bowel sounds all 4 quadrants no rebound or guarding  Musculoskeletal: No clubbing cyanosis, warm and well-perfused, calves soft symmetric nontender bilaterally  Skin: Clean dry without rashs  Neuro: Cranial nerves II through XII intact grossly no sensorimotor deficits appreciated bilateral upper and lower extremities  Psych: Patient is calm cooperative and appropriate with exam not responding to internal stimuli  : No Dias catheter no bladder distention no suprapubic tenderness      Discharge Details        Discharge Medications      New Medications      Instructions Start Date   amoxicillin-clavulanate 875-125 MG per tablet  Commonly known as: Augmentin   1 tablet, Oral, 2 Times Daily      guaiFENesin 600 MG 12 hr tablet  Commonly known as: MUCINEX   1,200 mg, Oral, Every 12 Hours  Scheduled      predniSONE 20 MG tablet  Commonly known as: DELTASONE   40 mg, Oral, Daily   Start Date: August 3, 2022        Continue These Medications      Instructions Start Date   albuterol sulfate  (90 Base) MCG/ACT inhaler  Commonly known as: PROVENTIL HFA;VENTOLIN HFA;PROAIR HFA   2 puffs, Inhalation, Every 6 Hours PRN      aspirin 81 MG chewable tablet   81 mg, Oral, Daily      atorvastatin 10 MG tablet  Commonly known as: LIPITOR   10 mg, Oral, Daily      cetirizine 10 MG tablet  Commonly known as: zyrTEC   10 mg, Oral, Daily      Fluticasone-Salmeterol 250-50 MCG/ACT DISKUS  Commonly known as: ADVAIR   1 puff, Inhalation, 2 Times Daily - RT      ipratropium-albuterol 0.5-2.5 mg/3 ml nebulizer  Commonly known as: DUO-NEB   3 mL, Nebulization, Every 4 Hours PRN      lisinopril 2.5 MG tablet  Commonly known as: PRINIVIL,ZESTRIL   2.5 mg, Oral, Daily      metFORMIN 500 MG tablet  Commonly known as: GLUCOPHAGE   500 mg, Oral, 2 times daily      metoprolol tartrate 25 MG tablet  Commonly known as: LOPRESSOR   Take 0.5 (1/2) tablet by mouth 2 (Two) Times a Day for 30 days.      omeprazole 20 MG capsule  Commonly known as: priLOSEC   20 mg, Oral, Daily      Tresiba FlexTouch 100 UNIT/ML solution pen-injector injection  Generic drug: insulin degludec   17 Units, Subcutaneous, Daily             No Known Allergies    Discharge Disposition:      Diet:  Hospital:  Diet Order   Procedures   • Diet Regular; Consistent Carbohydrate       Discharge Activity:   Activity Instructions     Gradually Increase Activity Until at Pre-Hospitalization Level            CODE STATUS:  Code Status and Medical Interventions:   Ordered at: 08/01/22 0613     Code Status (Patient has no pulse and is not breathing):    CPR (Attempt to Resuscitate)     Medical Interventions (Patient has pulse or is breathing):    Full Support         No future appointments.    Additional Instructions for the Follow-ups that You Need to Schedule      Discharge Follow-up with PCP   As directed       Currently Documented PCP:    Rosalba Edwards APRN    PCP Phone Number:    [phone]     Follow Up Details: Hospital discharge follow-up COPD with acute exacerbation               Pertinent  and/or Most Recent Results     PROCEDURES:   None    LAB RESULTS:      Lab 08/02/22 0438 08/01/22  0750 08/01/22  0532 08/01/22  0434 08/01/22 0433   WBC 14.35*  --   --   --  11.70*   HEMOGLOBIN 11.9*  --   --   --  13.3   HEMATOCRIT 39.5  --   --   --  44.8   PLATELETS 166  --   --   --  206   NEUTROS ABS  --   --   --   --  7.54*   IMMATURE GRANS (ABS)  --   --   --   --  0.05   LYMPHS ABS  --   --   --   --  2.03   MONOS ABS  --   --   --   --  0.75   EOS ABS  --   --   --   --  1.26*   MCV 83.7  --   --   --  85.0   PROCALCITONIN 0.08  --   --   --  0.09   LACTATE  --  1.2  --  2.4*  --    LACTATE, ARTERIAL  --   --  1.46  --   --          Lab 08/02/22  0438 08/01/22  0532 08/01/22 0433   SODIUM 135*  --  138   SODIUM, ARTERIAL  --  134.5*  --    POTASSIUM 4.8  --  4.6   CHLORIDE 102  --  103   CO2 25.5  --  25.3   ANION GAP 7.5  --  9.7   BUN 18  --  14   CREATININE 1.15  --  1.27   EGFR 67.2  --  59.7*   GLUCOSE 321*  --  373*   GLUCOSE, ARTERIAL  --  361*  --    CALCIUM 8.8  --  9.2   IONIZED CALCIUM  --  1.14  --    MAGNESIUM 2.1  --   --          Lab 08/01/22 0433   TOTAL PROTEIN 7.1   ALBUMIN 4.00   GLOBULIN 3.1   ALT (SGPT) 12   AST (SGOT) 12   BILIRUBIN 0.4   ALK PHOS 91         Lab 08/01/22 0433   PROBNP 91.4   TROPONIN T 0.025                 Lab 08/01/22 0532   PH, ARTERIAL 7.314*   PCO2, ARTERIAL 52.2*   PO2 ART 97.3   O2 SATURATION ART 97.0   FIO2 36   HCO3 ART 25.9   BASE EXCESS ART -1.0   CARBOXYHEMOGLOBIN 1.4     Brief Urine Lab Results  (Last result in the past 365 days)      Color   Clarity   Blood   Leuk Est   Nitrite   Protein   CREAT   Urine HCG        08/01/22 1250 Yellow   Clear   Negative   Negative   Negative   Negative                Microbiology Results (last 10 days)     Procedure Component Value - Date/Time    MRSA Screen, PCR (Inpatient) - Swab, Nares [[id number]]  (Normal) Collected: 08/01/22 1252    Lab Status: Final result Specimen: Swab from Nares Updated: 08/01/22 1416     MRSA PCR No MRSA Detected    Narrative:      The negative predictive value of this diagnostic test is high and should only be used to consider de-escalating anti-MRSA therapy. A positive result may indicate colonization with MRSA and must be correlated clinically.    S. Pneumo Ag Urine or CSF - Urine, Urine, Clean Catch [[id number]]  (Normal) Collected: 08/01/22 1250    Lab Status: Final result Specimen: Urine, Clean Catch Updated: 08/01/22 1326     Strep Pneumo Ag Negative    Legionella Antigen, Urine - Urine, Urine, Clean Catch [[id number]]  (Normal) Collected: 08/01/22 1250    Lab Status: Final result Specimen: Urine, Clean Catch Updated: 08/01/22 1326     LEGIONELLA ANTIGEN, URINE Negative    Influenza Antigen, Rapid - Swab, Nasopharynx [[id number]]  (Normal) Collected: 08/01/22 0451    Lab Status: Final result Specimen: Swab from Nasopharynx Updated: 08/01/22 0516     Influenza A Ag, EIA Negative     Influenza B Ag, EIA Negative    COVID-19,APTIMA PANTHER(SRAVANTHI),[facility], NP/OP SWAB IN UTM/VTM/[facility] TRANSPORT MEDIA,24 HR TAT - Swab, Nasopharynx [[id number]]  (Normal) Collected: 08/01/22 0450    Lab Status: Final result Specimen: Swab from Nasopharynx Updated: 08/01/22 1324     COVID19 Not Detected    Narrative:      Fact sheet for providers: https://www.fda.gov/media/464386/download     Fact sheet for patients: https://www.fda.gov/media/887282/download    Test performed by RT PCR.    Blood Culture - Blood, Arm, Left [[id number]]  (Normal) Collected: 08/01/22 0434    Lab Status: Preliminary result Specimen: Blood from Arm, Left Updated: 08/02/22 0502     Blood Culture No growth at 24 hours    Blood Culture - Blood, Arm, Left [[id number]]  (Normal) Collected:  08/01/22 0434    Lab Status: Preliminary result Specimen: Blood from Arm, Left Updated: 08/02/22 0502     Blood Culture No growth at 24 hours    Mycoplasma Pneumoniae Antibody, IgM - Blood, [[id number]]  (Normal) Collected: 08/01/22 0433    Lab Status: Final result Specimen: Blood Updated: 08/01/22 0739     Mycoplasma pneumo IgM Negative          XR Chest 1 View    Result Date: 8/1/2022  Impression:   1. Chronic changes in the left lung base. 2. No acute cardiopulmonary disease.       JEF BAEZ MD       Electronically Signed and Approved By: JEF BAEZ MD on 8/01/2022 at 4:51             XR Chest 1 View    Result Date: 7/10/2022  Impression:   No acute infiltrate is appreciated.      COMMENT:  Part of this note is an electronic transcription of spoken language to printed text. The electronic translation/transcription may permit erroneous, or at times, nonsensical (or even sensical) words or phrases to be inadvertently transcribed or omitted; this  has reviewed the note for such errors (as well as additional errors); however, some may still exist.  SEGUNDO MORA JR, MD       Electronically Signed and Approved By: SEGUNDO MORA JR, MD on 7/10/2022 at 3:04                            Labs Pending at Discharge:  Pending Labs     Order Current Status    Blood Culture - Blood, Arm, Left Preliminary result    Blood Culture - Blood, Arm, Left Preliminary result            Time spent on Discharge including face to face service: Greater than 35 minutes    Electronically signed by Danny Dye MD, 08/02/22, 2:06 PM EDT.

## 2022-08-02 NOTE — CASE MANAGEMENT/SOCIAL WORK
Discharge Planning Assessment   [facility]     Patient Name: Dilip Faulkner  MRN: [id number]  Today's Date: 8/2/2022    Admit Date: 8/1/2022     Discharge Needs Assessment     Row Name 08/02/22 0940       Living Environment    People in Home sibling(s)    Name(s) of People in Home LIVES WITH SISTER MELITA    Current Living Arrangements apartment    Primary Care Provided by self    Provides Primary Care For no one    Family Caregiver if Needed sibling(s)    Family Caregiver Names MELITA    Quality of Family Relationships helpful;involved;supportive    Able to Return to Prior Arrangements yes       Resource/Environmental Concerns    Resource/Environmental Concerns none    Transportation Concerns none  PATIENT STATES DRIVES A CAR       Transition Planning    Patient/Family Anticipates Transition to home with family    Patient/Family Anticipated Services at Transition none    Transportation Anticipated public transportation  llkm       Discharge Needs Assessment    Readmission Within the Last 30 Days other (see comments)  PATIENT STATES WAS READY FOR DISCHARGE AND PICKED UP NEW MEDICATION.    Equipment Currently Used at Home oxygen;glucometer;pulse ox;nebulizer  USES [facility] FOR OXYGEN, USES 2L/NC AS NEEDED AT HOME    Concerns to be Addressed discharge planning    Anticipated Changes Related to Illness none    Equipment Needed After Discharge none    Current Discharge Risk chronically ill               Discharge Plan     Row Name 08/02/22 1015       Plan    Plan CM assessment completed at bedside with patient.  CM role explained and discharge planning discussed. Face sheet, PCP and Pharmacy verified and updated. Patient states is current with PCP. Patient lives in apartment with sister and is independent. Patient drives self to appointments. Patient has home O2 with [facility] and wears 2L/NC as needed. Patient discharged 3 weeks ago. Was admitted at that time for same issue. Patient has all needed equipment at home.  Patient quit smoking two years ago but continues to dip. Patient plans to take a taxi home at discharge.              Continued Care and Services - Admitted Since 8/1/2022    Coordination has not been started for this encounter.          Demographic Summary     Row Name 08/02/22 0935       General Information    Admission Type inpatient    Arrived From emergency department    Referral Source admission list    Reason for Consult discharge planning    Preferred Language English               Functional Status     Row Name 08/02/22 0935       Functional Status    Usual Activity Tolerance good    Current Activity Tolerance moderate       Functional Status, IADL    Medications independent    Meal Preparation independent    Housekeeping independent    Laundry independent    Shopping independent       Mental Status    General Appearance WDL WDL       Mental Status Summary    Recent Changes in Mental Status/Cognitive Functioning no changes       Employment/    Employment Status disabled               Psychosocial    No documentation.                Abuse/Neglect    No documentation.                Legal    No documentation.                Substance Abuse    No documentation.                Patient Forms    No documentation.                   Magaly Hough

## 2022-08-02 NOTE — PLAN OF CARE
Goal Outcome Evaluation:  Plan of Care Reviewed With: patient        Progress: no change  Outcome Evaluation: No problems or concerns this shift. continue plan of care.

## 2022-08-02 NOTE — NURSING NOTE
Spoke with patient about his belongings. Patient stated he came fully dressed to ER by EMS. Patient was given a shower in the ER. When patient arrived to the floor on [address], patient only had his wallet and no clothes. RN called ER and security to check if they knew where patient's belongings were, they did not. Patient notified. Patient will be given paper scrubs to go home with.     Estrellita Lipscomb RN

## 2022-08-03 NOTE — OUTREACH NOTE
Prep Survey    Flowsheet Row Responses   [facility] patient discharged from? [facility]   Is LACE score < 7 ? No   Emergency Room discharge w/ pulse ox? No   Eligibility Readm Mgmt   Discharge diagnosis Acute on chronic hypoxic hypercarbic respiratory failure   Does the patient have one of the following disease processes/diagnoses(primary or secondary)? Other   Does the patient have Home health ordered? No   Is there a DME ordered? No   Prep survey completed? Yes          RONY RINALDI - Registered Nurse

## 2022-08-06 LAB
BACTERIA SPEC AEROBE CULT: NORMAL
BACTERIA SPEC AEROBE CULT: NORMAL

## 2022-08-09 LAB — QT INTERVAL: 360 MS

## 2022-08-12 ENCOUNTER — READMISSION MANAGEMENT (OUTPATIENT)
Dept: CALL CENTER | Facility: HOSPITAL | Age: 72
End: 2022-08-12

## 2022-08-12 NOTE — OUTREACH NOTE
Medical Week 2 Survey    Flowsheet Row Responses   [facility] facility patient discharged from? [facility]   Does the patient have one of the following disease processes/diagnoses(primary or secondary)? Other   Week 2 attempt successful? Yes   Call start time 0853   Rescheduled Rescheduled-pt requested   Call end time 0853          EDMAR RAWLS - Registered Nurse

## 2022-08-16 ENCOUNTER — READMISSION MANAGEMENT (OUTPATIENT)
Dept: CALL CENTER | Facility: HOSPITAL | Age: 72
End: 2022-08-16

## 2022-08-16 NOTE — OUTREACH NOTE
Medical Week 2 Survey    Flowsheet Row Responses   [facility] patient discharged from? [facility]   Does the patient have one of the following disease processes/diagnoses(primary or secondary)? Other   Week 2 attempt successful? Yes   Call start time 1011   Discharge diagnosis Acute on chronic hypoxic hypercarbic respiratory failure   Call end time 1000   Meds reviewed with patient/caregiver? Yes   Is the patient having any side effects they believe may be caused by any medication additions or changes? No   Does the patient have all medications ordered at discharge? Yes   Is the patient taking all medications as directed (includes completed medication regime)? Yes   Does the patient have a primary care provider?  Yes   Comments regarding PCP PATIENT STATES HE HAS AN APPOINTMENT WITH HIS PCP THIS MONTH   Has the patient kept scheduled appointments due by today? N/A   Has home health visited the patient within 72 hours of discharge? N/A   Psychosocial issues? No   Did the patient receive a copy of their discharge instructions? Yes   Nursing interventions Reviewed instructions with patient   What is the patient's perception of their health status since discharge? Improving   Is the patient/caregiver able to teach back signs and symptoms related to disease process for when to call PCP? Yes   Is the patient/caregiver able to teach back signs and symptoms related to disease process for when to call 911? Yes   Is the patient/caregiver able to teach back the hierarchy of who to call/visit for symptoms/problems? PCP, Specialist, Home health nurse, Urgent Care, ED, 911 Yes   If the patient is a current smoker, are they able to teach back resources for cessation? Not a smoker   Week 2 Call Completed? Yes          PATTI BECKHAM - Licensed Nurse

## 2022-08-22 ENCOUNTER — HOSPITAL ENCOUNTER (INPATIENT)
Facility: HOSPITAL | Age: 72
LOS: 3 days | Discharge: HOME | End: 2022-08-25
Attending: EMERGENCY MEDICINE | Admitting: FAMILY MEDICINE

## 2022-08-22 ENCOUNTER — APPOINTMENT (OUTPATIENT)
Dept: GENERAL RADIOLOGY | Facility: HOSPITAL | Age: 72
End: 2022-08-22

## 2022-08-22 DIAGNOSIS — J96.22 ACUTE ON CHRONIC RESPIRATORY FAILURE WITH HYPOXIA AND HYPERCAPNIA: Primary | ICD-10-CM

## 2022-08-22 DIAGNOSIS — J96.21 ACUTE ON CHRONIC RESPIRATORY FAILURE WITH HYPOXIA AND HYPERCAPNIA: Primary | ICD-10-CM

## 2022-08-22 DIAGNOSIS — R26.2 DIFFICULTY IN WALKING: ICD-10-CM

## 2022-08-22 DIAGNOSIS — J44.1 COPD EXACERBATION: ICD-10-CM

## 2022-08-22 PROBLEM — J96.91 RESPIRATORY FAILURE WITH HYPOXIA (HCC): Status: ACTIVE | Noted: 2022-08-22

## 2022-08-22 LAB
ALBUMIN SERPL-MCNC: 3.8 G/DL (ref 3.5–5.2)
ALBUMIN/GLOB SERPL: 1.2 G/DL
ALP SERPL-CCNC: 88 U/L (ref 39–117)
ALT SERPL W P-5'-P-CCNC: 14 U/L (ref 1–41)
ANION GAP SERPL CALCULATED.3IONS-SCNC: 10.2 MMOL/L (ref 5–15)
ARTERIAL PATENCY WRIST A: POSITIVE
AST SERPL-CCNC: 22 U/L (ref 1–40)
BASE EXCESS BLDA CALC-SCNC: -0.9 MMOL/L (ref -2–2)
BASOPHILS # BLD AUTO: 0.07 10*3/MM3 (ref 0–0.2)
BASOPHILS NFR BLD AUTO: 0.8 % (ref 0–1.5)
BDY SITE: ABNORMAL
BILIRUB SERPL-MCNC: 0.4 MG/DL (ref 0–1.2)
BUN SERPL-MCNC: 12 MG/DL (ref 8–23)
BUN/CREAT SERPL: 12.1 (ref 7–25)
CALCIUM SPEC-SCNC: 9 MG/DL (ref 8.6–10.5)
CHLORIDE SERPL-SCNC: 103 MMOL/L (ref 98–107)
CO2 SERPL-SCNC: 25.8 MMOL/L (ref 22–29)
COHGB MFR BLD: 1.3 % (ref 0–1.5)
CREAT SERPL-MCNC: 0.99 MG/DL (ref 0.76–1.27)
D-LACTATE SERPL-SCNC: 1 MMOL/L (ref 0.5–2)
DEPRECATED RDW RBC AUTO: 49.5 FL (ref 37–54)
EGFRCR SERPLBLD CKD-EPI 2021: 80.4 ML/MIN/1.73
EOSINOPHIL # BLD AUTO: 1.08 10*3/MM3 (ref 0–0.4)
EOSINOPHIL NFR BLD AUTO: 12.3 % (ref 0.3–6.2)
ERYTHROCYTE [DISTWIDTH] IN BLOOD BY AUTOMATED COUNT: 15.8 % (ref 12.3–15.4)
FHHB: 7.4 % (ref 0–5)
FLUAV AG NPH QL: NEGATIVE
FLUBV AG NPH QL IA: NEGATIVE
GLOBULIN UR ELPH-MCNC: 3.3 GM/DL
GLUCOSE BLDC GLUCOMTR-MCNC: 189 MG/DL (ref 70–99)
GLUCOSE SERPL-MCNC: 116 MG/DL (ref 65–99)
HCO3 BLDA-SCNC: 27.3 MMOL/L (ref 22–26)
HCT VFR BLD AUTO: 44.1 % (ref 37.5–51)
HGB BLD-MCNC: 13.1 G/DL (ref 13–17.7)
HGB BLDA-MCNC: 12.9 G/DL (ref 13.8–16.4)
HOLD SPECIMEN: NORMAL
HOLD SPECIMEN: NORMAL
IMM GRANULOCYTES # BLD AUTO: 0.03 10*3/MM3 (ref 0–0.05)
IMM GRANULOCYTES NFR BLD AUTO: 0.3 % (ref 0–0.5)
INHALED O2 CONCENTRATION: 40 %
LYMPHOCYTES # BLD AUTO: 1.64 10*3/MM3 (ref 0.7–3.1)
LYMPHOCYTES NFR BLD AUTO: 18.7 % (ref 19.6–45.3)
MCH RBC QN AUTO: 25.6 PG (ref 26.6–33)
MCHC RBC AUTO-ENTMCNC: 29.7 G/DL (ref 31.5–35.7)
MCV RBC AUTO: 86.3 FL (ref 79–97)
METHGB BLD QL: 0.1 % (ref 0–1.5)
MODALITY: ABNORMAL
MONOCYTES # BLD AUTO: 0.66 10*3/MM3 (ref 0.1–0.9)
MONOCYTES NFR BLD AUTO: 7.5 % (ref 5–12)
NEUTROPHILS NFR BLD AUTO: 5.28 10*3/MM3 (ref 1.7–7)
NEUTROPHILS NFR BLD AUTO: 60.4 % (ref 42.7–76)
NOTE: ABNORMAL
NRBC BLD AUTO-RTO: 0 /100 WBC (ref 0–0.2)
NT-PROBNP SERPL-MCNC: 231.4 PG/ML (ref 0–900)
OXYHGB MFR BLDV: 91.2 % (ref 94–99)
PCO2 BLDA: 62.2 MM HG (ref 35–45)
PH BLDA: 7.26 PH UNITS (ref 7.35–7.45)
PLATELET # BLD AUTO: 201 10*3/MM3 (ref 140–450)
PMV BLD AUTO: 10.6 FL (ref 6–12)
PO2 BLD: 178 MM[HG] (ref 0–500)
PO2 BLDA: 71.1 MM HG (ref 80–100)
POTASSIUM SERPL-SCNC: 3.8 MMOL/L (ref 3.5–5.2)
PROT SERPL-MCNC: 7.1 G/DL (ref 6–8.5)
QT INTERVAL: 401 MS
RBC # BLD AUTO: 5.11 10*6/MM3 (ref 4.14–5.8)
SAO2 % BLDCOA: 92.5 % (ref 95–99)
SARS-COV-2 RNA PNL SPEC NAA+PROBE: NOT DETECTED
SODIUM SERPL-SCNC: 139 MMOL/L (ref 136–145)
TROPONIN T SERPL-MCNC: 0.01 NG/ML (ref 0–0.03)
WBC NRBC COR # BLD: 8.76 10*3/MM3 (ref 3.4–10.8)
WHOLE BLOOD HOLD COAG: NORMAL
WHOLE BLOOD HOLD SPECIMEN: NORMAL

## 2022-08-22 PROCEDURE — 63710000001 INSULIN LISPRO (HUMAN) PER 5 UNITS: Performed by: FAMILY MEDICINE

## 2022-08-22 PROCEDURE — 94799 UNLISTED PULMONARY SVC/PX: CPT

## 2022-08-22 PROCEDURE — 99223 1ST HOSP IP/OBS HIGH 75: CPT | Performed by: FAMILY MEDICINE

## 2022-08-22 PROCEDURE — 83880 ASSAY OF NATRIURETIC PEPTIDE: CPT | Performed by: EMERGENCY MEDICINE

## 2022-08-22 PROCEDURE — 63710000001 INSULIN DETEMIR PER 5 UNITS: Performed by: FAMILY MEDICINE

## 2022-08-22 PROCEDURE — 87804 INFLUENZA ASSAY W/OPTIC: CPT | Performed by: EMERGENCY MEDICINE

## 2022-08-22 PROCEDURE — 84484 ASSAY OF TROPONIN QUANT: CPT | Performed by: EMERGENCY MEDICINE

## 2022-08-22 PROCEDURE — 94660 CPAP INITIATION&MGMT: CPT

## 2022-08-22 PROCEDURE — 82375 ASSAY CARBOXYHB QUANT: CPT | Performed by: EMERGENCY MEDICINE

## 2022-08-22 PROCEDURE — 87040 BLOOD CULTURE FOR BACTERIA: CPT | Performed by: EMERGENCY MEDICINE

## 2022-08-22 PROCEDURE — 25010000002 METHYLPREDNISOLONE PER 125 MG: Performed by: EMERGENCY MEDICINE

## 2022-08-22 PROCEDURE — 71045 X-RAY EXAM CHEST 1 VIEW: CPT

## 2022-08-22 PROCEDURE — 99284 EMERGENCY DEPT VISIT MOD MDM: CPT

## 2022-08-22 PROCEDURE — 83605 ASSAY OF LACTIC ACID: CPT | Performed by: EMERGENCY MEDICINE

## 2022-08-22 PROCEDURE — 83050 HGB METHEMOGLOBIN QUAN: CPT | Performed by: EMERGENCY MEDICINE

## 2022-08-22 PROCEDURE — 85025 COMPLETE CBC W/AUTO DIFF WBC: CPT | Performed by: EMERGENCY MEDICINE

## 2022-08-22 PROCEDURE — 82962 GLUCOSE BLOOD TEST: CPT

## 2022-08-22 PROCEDURE — 82805 BLOOD GASES W/O2 SATURATION: CPT | Performed by: EMERGENCY MEDICINE

## 2022-08-22 PROCEDURE — 94664 DEMO&/EVAL PT USE INHALER: CPT

## 2022-08-22 PROCEDURE — 80053 COMPREHEN METABOLIC PANEL: CPT | Performed by: EMERGENCY MEDICINE

## 2022-08-22 PROCEDURE — 94640 AIRWAY INHALATION TREATMENT: CPT

## 2022-08-22 PROCEDURE — 93005 ELECTROCARDIOGRAM TRACING: CPT | Performed by: EMERGENCY MEDICINE

## 2022-08-22 PROCEDURE — U0004 COV-19 TEST NON-CDC HGH THRU: HCPCS | Performed by: EMERGENCY MEDICINE

## 2022-08-22 PROCEDURE — 36600 WITHDRAWAL OF ARTERIAL BLOOD: CPT | Performed by: EMERGENCY MEDICINE

## 2022-08-22 RX ORDER — INSULIN LISPRO 100 [IU]/ML
0-7 INJECTION, SOLUTION INTRAVENOUS; SUBCUTANEOUS
Status: DISCONTINUED | OUTPATIENT
Start: 2022-08-22 | End: 2022-08-25 | Stop reason: HOSPADM

## 2022-08-22 RX ORDER — CETIRIZINE HYDROCHLORIDE 10 MG/1
10 TABLET ORAL DAILY
Status: DISCONTINUED | OUTPATIENT
Start: 2022-08-23 | End: 2022-08-25 | Stop reason: HOSPADM

## 2022-08-22 RX ORDER — PANTOPRAZOLE SODIUM 40 MG/1
40 TABLET, DELAYED RELEASE ORAL EVERY MORNING
Status: DISCONTINUED | OUTPATIENT
Start: 2022-08-23 | End: 2022-08-25 | Stop reason: HOSPADM

## 2022-08-22 RX ORDER — IPRATROPIUM BROMIDE AND ALBUTEROL SULFATE 2.5; .5 MG/3ML; MG/3ML
3 SOLUTION RESPIRATORY (INHALATION) ONCE
Status: COMPLETED | OUTPATIENT
Start: 2022-08-22 | End: 2022-08-22

## 2022-08-22 RX ORDER — GUAIFENESIN 600 MG/1
1200 TABLET, EXTENDED RELEASE ORAL EVERY 12 HOURS SCHEDULED
Status: DISCONTINUED | OUTPATIENT
Start: 2022-08-22 | End: 2022-08-25 | Stop reason: HOSPADM

## 2022-08-22 RX ORDER — IPRATROPIUM BROMIDE AND ALBUTEROL SULFATE 2.5; .5 MG/3ML; MG/3ML
3 SOLUTION RESPIRATORY (INHALATION) EVERY 4 HOURS PRN
Status: DISCONTINUED | OUTPATIENT
Start: 2022-08-22 | End: 2022-08-25 | Stop reason: HOSPADM

## 2022-08-22 RX ORDER — METHYLPREDNISOLONE SODIUM SUCCINATE 125 MG/2ML
125 INJECTION, POWDER, LYOPHILIZED, FOR SOLUTION INTRAMUSCULAR; INTRAVENOUS ONCE
Status: COMPLETED | OUTPATIENT
Start: 2022-08-22 | End: 2022-08-22

## 2022-08-22 RX ORDER — ACETAMINOPHEN 325 MG/1
650 TABLET ORAL EVERY 4 HOURS PRN
Status: DISCONTINUED | OUTPATIENT
Start: 2022-08-22 | End: 2022-08-25 | Stop reason: HOSPADM

## 2022-08-22 RX ORDER — NICOTINE POLACRILEX 4 MG
15 LOZENGE BUCCAL
Status: DISCONTINUED | OUTPATIENT
Start: 2022-08-22 | End: 2022-08-25 | Stop reason: HOSPADM

## 2022-08-22 RX ORDER — IPRATROPIUM BROMIDE AND ALBUTEROL SULFATE 2.5; .5 MG/3ML; MG/3ML
3 SOLUTION RESPIRATORY (INHALATION)
Status: COMPLETED | OUTPATIENT
Start: 2022-08-22 | End: 2022-08-22

## 2022-08-22 RX ORDER — BISACODYL 10 MG
10 SUPPOSITORY, RECTAL RECTAL DAILY PRN
Status: DISCONTINUED | OUTPATIENT
Start: 2022-08-22 | End: 2022-08-25 | Stop reason: HOSPADM

## 2022-08-22 RX ORDER — ASPIRIN 81 MG/1
81 TABLET, CHEWABLE ORAL DAILY
Status: DISCONTINUED | OUTPATIENT
Start: 2022-08-23 | End: 2022-08-25 | Stop reason: HOSPADM

## 2022-08-22 RX ORDER — LISINOPRIL 2.5 MG/1
2.5 TABLET ORAL DAILY
Status: DISCONTINUED | OUTPATIENT
Start: 2022-08-23 | End: 2022-08-25 | Stop reason: HOSPADM

## 2022-08-22 RX ORDER — SODIUM CHLORIDE 0.9 % (FLUSH) 0.9 %
10 SYRINGE (ML) INJECTION AS NEEDED
Status: DISCONTINUED | OUTPATIENT
Start: 2022-08-22 | End: 2022-08-25 | Stop reason: HOSPADM

## 2022-08-22 RX ORDER — ACETAMINOPHEN 160 MG/5ML
650 SOLUTION ORAL EVERY 4 HOURS PRN
Status: DISCONTINUED | OUTPATIENT
Start: 2022-08-22 | End: 2022-08-25 | Stop reason: HOSPADM

## 2022-08-22 RX ORDER — DEXTROSE MONOHYDRATE 25 G/50ML
25 INJECTION, SOLUTION INTRAVENOUS
Status: DISCONTINUED | OUTPATIENT
Start: 2022-08-22 | End: 2022-08-25 | Stop reason: HOSPADM

## 2022-08-22 RX ORDER — SODIUM CHLORIDE 9 MG/ML
40 INJECTION, SOLUTION INTRAVENOUS AS NEEDED
Status: DISCONTINUED | OUTPATIENT
Start: 2022-08-22 | End: 2022-08-25 | Stop reason: HOSPADM

## 2022-08-22 RX ORDER — CHOLECALCIFEROL (VITAMIN D3) 125 MCG
5 CAPSULE ORAL NIGHTLY PRN
Status: DISCONTINUED | OUTPATIENT
Start: 2022-08-22 | End: 2022-08-25 | Stop reason: HOSPADM

## 2022-08-22 RX ORDER — ACETAMINOPHEN 650 MG/1
650 SUPPOSITORY RECTAL EVERY 4 HOURS PRN
Status: DISCONTINUED | OUTPATIENT
Start: 2022-08-22 | End: 2022-08-25 | Stop reason: HOSPADM

## 2022-08-22 RX ORDER — ATORVASTATIN CALCIUM 10 MG/1
10 TABLET, FILM COATED ORAL NIGHTLY
Status: DISCONTINUED | OUTPATIENT
Start: 2022-08-22 | End: 2022-08-25 | Stop reason: HOSPADM

## 2022-08-22 RX ORDER — ENOXAPARIN SODIUM 100 MG/ML
40 INJECTION SUBCUTANEOUS EVERY 24 HOURS
Status: DISCONTINUED | OUTPATIENT
Start: 2022-08-23 | End: 2022-08-25 | Stop reason: HOSPADM

## 2022-08-22 RX ORDER — CALCIUM CARBONATE 200(500)MG
2 TABLET,CHEWABLE ORAL 2 TIMES DAILY PRN
Status: DISCONTINUED | OUTPATIENT
Start: 2022-08-22 | End: 2022-08-25 | Stop reason: HOSPADM

## 2022-08-22 RX ORDER — BISACODYL 5 MG/1
5 TABLET, DELAYED RELEASE ORAL DAILY PRN
Status: DISCONTINUED | OUTPATIENT
Start: 2022-08-22 | End: 2022-08-25 | Stop reason: HOSPADM

## 2022-08-22 RX ORDER — SODIUM CHLORIDE 0.9 % (FLUSH) 0.9 %
10 SYRINGE (ML) INJECTION EVERY 12 HOURS SCHEDULED
Status: DISCONTINUED | OUTPATIENT
Start: 2022-08-22 | End: 2022-08-25 | Stop reason: HOSPADM

## 2022-08-22 RX ORDER — POLYETHYLENE GLYCOL 3350 17 G/17G
17 POWDER, FOR SOLUTION ORAL DAILY PRN
Status: DISCONTINUED | OUTPATIENT
Start: 2022-08-22 | End: 2022-08-25 | Stop reason: HOSPADM

## 2022-08-22 RX ORDER — SODIUM CHLORIDE 0.9 % (FLUSH) 0.9 %
10 SYRINGE (ML) INJECTION AS NEEDED
Status: DISCONTINUED | OUTPATIENT
Start: 2022-08-22 | End: 2022-08-22

## 2022-08-22 RX ORDER — PREDNISONE 20 MG/1
40 TABLET ORAL
Status: DISCONTINUED | OUTPATIENT
Start: 2022-08-23 | End: 2022-08-25 | Stop reason: HOSPADM

## 2022-08-22 RX ORDER — ONDANSETRON 4 MG/1
4 TABLET, FILM COATED ORAL EVERY 6 HOURS PRN
Status: DISCONTINUED | OUTPATIENT
Start: 2022-08-22 | End: 2022-08-25 | Stop reason: HOSPADM

## 2022-08-22 RX ORDER — AMOXICILLIN 250 MG
2 CAPSULE ORAL 2 TIMES DAILY
Status: DISCONTINUED | OUTPATIENT
Start: 2022-08-22 | End: 2022-08-25 | Stop reason: HOSPADM

## 2022-08-22 RX ORDER — ONDANSETRON 2 MG/ML
4 INJECTION INTRAMUSCULAR; INTRAVENOUS EVERY 6 HOURS PRN
Status: DISCONTINUED | OUTPATIENT
Start: 2022-08-22 | End: 2022-08-25 | Stop reason: HOSPADM

## 2022-08-22 RX ADMIN — METHYLPREDNISOLONE SODIUM SUCCINATE 125 MG: 125 INJECTION, POWDER, FOR SOLUTION INTRAMUSCULAR; INTRAVENOUS at 17:34

## 2022-08-22 RX ADMIN — INSULIN DETEMIR 10 UNITS: 100 INJECTION, SOLUTION SUBCUTANEOUS at 23:19

## 2022-08-22 RX ADMIN — ATORVASTATIN CALCIUM 10 MG: 10 TABLET, FILM COATED ORAL at 23:27

## 2022-08-22 RX ADMIN — IPRATROPIUM BROMIDE AND ALBUTEROL SULFATE 3 ML: 2.5; .5 SOLUTION RESPIRATORY (INHALATION) at 18:49

## 2022-08-22 RX ADMIN — METOPROLOL TARTRATE 12.5 MG: 25 TABLET, FILM COATED ORAL at 23:18

## 2022-08-22 RX ADMIN — Medication 10 ML: at 23:17

## 2022-08-22 RX ADMIN — INSULIN LISPRO 2 UNITS: 100 INJECTION, SOLUTION INTRAVENOUS; SUBCUTANEOUS at 23:19

## 2022-08-22 RX ADMIN — IPRATROPIUM BROMIDE AND ALBUTEROL SULFATE 3 ML: 2.5; .5 SOLUTION RESPIRATORY (INHALATION) at 18:37

## 2022-08-22 RX ADMIN — SENNOSIDES AND DOCUSATE SODIUM 2 TABLET: 8.6; 5 TABLET ORAL at 23:18

## 2022-08-22 RX ADMIN — GUAIFENESIN 1200 MG: 600 TABLET ORAL at 23:18

## 2022-08-22 RX ADMIN — IPRATROPIUM BROMIDE AND ALBUTEROL SULFATE 3 ML: 2.5; .5 SOLUTION RESPIRATORY (INHALATION) at 16:30

## 2022-08-22 NOTE — H&P
History and Physical   Dilip Faulkner , :  1950, MRN:  [id number]    Chief complaint: Shortness of breath    Assessment/Plan       Acute on chronic respiratory failure with hypoxia and hypercapnia (HCC)    Chronic obstructive pulmonary disease with acute exacerbation (HCC)    Essential hypertension    Hyperlipidemia    Obesity (BMI 30-39.9)    Type 2 diabetes mellitus, with long-term current use of insulin (HCC)      • Acute on chronic respiratory failure with hypoxia and hypercapnia: Patient wears 2 L nasal cannula at baseline.  Reports shortness of breath for the past 36 hours.  Per EMS oxygen saturation was 70% on his home 2 L nasal cannula.  Transition to nonrebreather.  ABG showed a pH of 7.26 and a PCO2 of 62.  Patient transitioned to BiPAP.  Continue on BiPAP overnight, wean as tolerated.    • Acute exacerbation of chronic obstructive pulmonary disease: No longer smoker.  Wheezing on exam.  Increased work of breathing.  Resume home respiratory medications including Advair, guaifenesin.  Continue on duo nebs.  Given Solu-Medrol in the emergency department, will continue on oral prednisone.  Wean oxygen to 2 L baseline as tolerated.    • Insulin-dependent diabetes mellitus: Glucose of 116.  Will resume home long-acting insulin at decreased dose.  Start on low-dose sliding scale insulin while hospitalized.    • Hypertension: Blood pressure well controlled on arrival.  Resume home metoprolol, lisinopril.    • Obesity: BMI of 31.35.  Recommend improve diet and activity resolution of acute illness.    • Hyperlipidemia: Resume home atorvastatin.    • Clinical course will dictate further medical management.    DVT Prophylaxis: Lovenox  Code Status: Full    Reviewed patients labs and imaging, and discussed with patient and nurse at bedside.    Patient risk level:  Patient comorbidities and risk factors make patient a poor candidate for outpatient management due to concerns of deterioration.    Total time  spent on admission: 70 minutes    History of Present illness     Dilip Faulkner is a 72 y.o. old male patient who presents with complaints of shortness of breath that started on the night prior to presentation.  Patient has a history of chronic obstructive pulmonary disease, chronic respiratory failure on 2 L nasal cannula, diabetes, hypertension, obesity.    Patient states that he was in his usual state of health until the day prior to presentation.  He states he developed shortness of breath, increased work of breathing.  He denies associated chest pain.  He denies productive cough.  He denies nausea or vomiting.  He denies fevers or chills.  He states he is compliant with his home 2 L nasal cannula and his home respiratory medications.  He no longer smokes.    ED course: On arrival to the ED the patient was noted to be tachypneic, saturating 95% on 15 L nonrebreather.  Blood work showed a pH of 7.26, PCO2 of 62.  Chest x-ray showed no acute abnormalities.  The patient was given DuoNeb, Solu-Medrol and placed on BiPAP and the hospitalist service was asked to admit the patient for further evaluation and management.    Old records were reviewed which revealed last admission to the hospital was on 8/1/2022 for respiratory failure and COPD exacerbation.    Review of Systems     General:  Denies fevers, chills, weight loss/gain or night sweats.  HEENT: Denies dysphagia, hearing changes, rhinorrhea, visual changes or nasal congestion.  Respiratory: Denies cough.  Reports dyspnea without sputum changes.  Reports wheezing without hemoptysis.  Denies pleuritic chest pain.  Cardiovascular: Denies chest pain, palpitations, dyspnea on exertion or orthopnea. Denies chest pressure. Denies lower extremity edema.  Gastrointestinal: Denies abdominal pain, nausea, vomiting or diarrhea.  Denies bright red blood per rectum, melena or cramping.  Genitourinary: Denies dysuria, frequency or hesitancy. Denies incontinence, urgency or  hematuria.  Musculoskeletal: Denies joint effusions, joint tenderness, joint stiffness or myalgias.  Endocrine: Denies heat or cold intolerance.  Reports fatigue. Reports well controlled blood sugar.   Hematologic: Denies bleeding, bruising or swollen glands.  Psychiatric: Denies anxiety or depression.  Neurologic: Denies confusion, headaches, numbness or visual changes.  Skin: Denies rash, edema or open wounds.    Past Medical/Surgical/Social/Family History/Allergies     Past Medical History:   Diagnosis Date   • Asthma    • COPD (chronic obstructive pulmonary disease) (HCC)    • Diabetes mellitus (HCC)    • Hernia, umbilical    • Hypertension    • Requires continuous at home supplemental oxygen        Past Surgical History:   Procedure Laterality Date   • ABDOMINAL SURGERY         Social History     Socioeconomic History   • Marital status: Single   Tobacco Use   • Smoking status: Former Smoker     Years: 2.00     Types: Cigarettes     Quit date: 3/19/2021     Years since quittin.4   • Smokeless tobacco: Never Used   Vaping Use   • Vaping Use: Never used   Substance and Sexual Activity   • Alcohol use: Never   • Drug use: Never   • Sexual activity: Defer       History reviewed. No pertinent family history.    No Known Allergies    The above past medical history, past surgical history, social history, family history allergies and home medications were personally reviewed by me today and corrected as needed  Home Medications   (Not in a hospital admission)    Physical Exam   Vital signs:  Temperature Blood Pressure Heart Rate Resp Rate SpO2   Temp: 98.9 °F (37.2 °C) BP: 106/61 Heart Rate: 51 Resp: (!) 29 SpO2: 95 %     HEENT: Head is atraumatic, extraocular movements are intact. Oropharynx is normal.  Neck: Supple, no cervical lymphadenopathy.  Cardiovascular: Regular rate and rhythm. No murmurs, gallops or rubs. No peripheral edema.   Lungs: Diminished breath sounds in bilateral bases, scattered expiratory  wheezes.  Abdomen: Normal bowel sounds in all 4 quadrants. No rebound, guarding or tenderness.  Obese abdomen.  Chest: Normal inspection. Equal rise and fall.  Mild retractions noted.  Constitutional: Chronically ill-appearing, appears stated age.  Lymphatic: No cervical lymphadenopathy.  Extremities: 5/5 upper and lower extremity strength. Normal range of motion.  No clubbing, cyanosis or edema.  Neurological: Alert and oriented x3. No numbness or tingling.  Skin: Linear excoriations of the lower extremity.    Labs     Results from last 7 days   Lab Units 08/22/22  1633   WBC 10*3/mm3 8.76   HEMOGLOBIN g/dL 13.1   HEMATOCRIT % 44.1   PLATELETS 10*3/mm3 201     Results from last 7 days   Lab Units 08/22/22  1633   SODIUM mmol/L 139   POTASSIUM mmol/L 3.8   CHLORIDE mmol/L 103   CO2 mmol/L 25.8   BUN mg/dL 12   CREATININE mg/dL 0.99   CALCIUM mg/dL 9.0     Results from last 7 days   Lab Units 08/22/22  1633   ALT (SGPT) U/L 14   AST (SGOT) U/L 22   ALK PHOS U/L 88   BILIRUBIN mg/dL 0.4                   Invalid input(s): CPK, MASSCKMB  Results from last 7 days   Lab Units 08/22/22  1747   PH, ARTERIAL pH units 7.261*   PO2 ART mm Hg 71.1*   PCO2, ARTERIAL mm Hg 62.2*       I reviewed patient's labs from today and also previous labs while reviewing old records   Imaging and Other procedures     Chest X ray: My independent assessment showed no infiltrates or effusions  EKG: My independent evaluation showed heart rate 86, sinus rhythm, right bundle branch block, no ST or T wave changes    I reviewed patient's imaging and other testing from today and also in the past including but not limited to imaging, previous imaging, EKG, previous EKGs, echocardiogram, and other procedures if any and previously done by reviewing old records  Current Medications   Scheduled Meds:   Continuous Infusions:     Prior to Admission Medications     Prescriptions Last Dose Informant Patient Reported? Taking?    albuterol sulfate  (90  Base) MCG/ACT inhaler  Pharmacy Yes No    Inhale 2 puffs Every 6 (Six) Hours As Needed for Wheezing.    aspirin 81 MG chewable tablet  Pharmacy Yes No    Chew 81 mg Daily.    atorvastatin (LIPITOR) 10 MG tablet  Pharmacy Yes No    Take 10 mg by mouth Daily.    cetirizine (zyrTEC) 10 MG tablet  Pharmacy Yes No    Take 10 mg by mouth Daily.    Fluticasone-Salmeterol (ADVAIR) 250-50 MCG/ACT DISKUS  Pharmacy Yes No    Inhale 1 puff 2 (Two) Times a Day.    guaiFENesin (MUCINEX) 600 MG 12 hr tablet   No No    Take 2 tablets by mouth Every 12 (Twelve) Hours for 30 days.    insulin degludec (Tresiba FlexTouch) 100 UNIT/ML solution pen-injector injection  Pharmacy Yes No    Inject 17 Units under the skin into the appropriate area as directed Daily.    ipratropium-albuterol (DUO-NEB) 0.5-2.5 mg/3 ml nebulizer  Pharmacy No No    Take 3 mL by nebulization Every 4 (Four) Hours As Needed for Wheezing or Shortness of Air.    lisinopril (PRINIVIL,ZESTRIL) 2.5 MG tablet  Pharmacy Yes No    Take 2.5 mg by mouth Daily.    metFORMIN (GLUCOPHAGE) 500 MG tablet  Pharmacy Yes No    Take 500 mg by mouth 2 (two) times a day.    metoprolol tartrate (LOPRESSOR) 25 MG tablet  Pharmacy No No    Take 0.5 (1/2) tablet by mouth 2 (Two) Times a Day for 30 days.    omeprazole (priLOSEC) 20 MG capsule  Pharmacy Yes No    Take 20 mg by mouth Daily.        08/22/22  19:00 EDT

## 2022-08-22 NOTE — ED PROVIDER NOTES
Time: 5:06 PM EDT  Arrived by: private car  Chief Complaint: SOB  History provided by: Pt, EMS  History is limited by: N/A     History of Present Illness:  Patient is a 72 y.o. year old male that presents to the emergency department via EMS with increased dyspnea with onset yesterday. Pt lives at home and is on 2L O2 at home. Pt admits to dry cough. Pt denies any fever or chest pain, leg swelling and abdominal pain. Pt has a hx of COPD. Pt denies any recent history of smoking. HPI is limited as Pt is a poor historian.    HPI    Similar Symptoms Previously: no  Recently seen: no      Patient Care Team  Primary Care Provider: Rosalba Edwards APRN    Past Medical History:     No Known Allergies  Past Medical History:   Diagnosis Date   • Asthma    • COPD (chronic obstructive pulmonary disease) (HCC)    • Diabetes mellitus (HCC)    • Hernia, umbilical    • Hypertension    • Requires continuous at home supplemental oxygen      Past Surgical History:   Procedure Laterality Date   • ABDOMINAL SURGERY       History reviewed. No pertinent family history.    Home Medications:  Prior to Admission medications    Medication Sig Start Date End Date Taking? Authorizing Provider   albuterol sulfate  (90 Base) MCG/ACT inhaler Inhale 2 puffs Every 6 (Six) Hours As Needed for Wheezing.    ProviderRoosevelt MD   aspirin 81 MG chewable tablet Chew 81 mg Daily.    ProviderRoosevelt MD   atorvastatin (LIPITOR) 10 MG tablet Take 10 mg by mouth Daily.    Roosevelt Gaston MD   cetirizine (zyrTEC) 10 MG tablet Take 10 mg by mouth Daily.    ProviderRoosevelt MD   Fluticasone-Salmeterol (ADVAIR) 250-50 MCG/ACT DISKUS Inhale 1 puff 2 (Two) Times a Day.    Roosevelt Gaston MD   guaiFENesin (MUCINEX) 600 MG 12 hr tablet Take 2 tablets by mouth Every 12 (Twelve) Hours for 30 days. 8/2/22 9/1/22  Danny Dye MD   insulin degludec (Tresiba FlexTouch) 100 UNIT/ML solution pen-injector injection Inject 17 Units under  the skin into the appropriate area as directed Daily.    ProviderRoosevelt MD   ipratropium-albuterol (DUO-NEB) 0.5-2.5 mg/3 ml nebulizer Take 3 mL by nebulization Every 4 (Four) Hours As Needed for Wheezing or Shortness of Air. 21   Ricci Trinidad DO   lisinopril (PRINIVIL,ZESTRIL) 2.5 MG tablet Take 2.5 mg by mouth Daily.    ProviderRoosevelt MD   metFORMIN (GLUCOPHAGE) 500 MG tablet Take 500 mg by mouth 2 (two) times a day. 21   Roosevelt Gaston MD   metoprolol tartrate (LOPRESSOR) 25 MG tablet Take 0.5 (1/2) tablet by mouth 2 (Two) Times a Day for 30 days. 22  Sigifredo Mendes MD   omeprazole (priLOSEC) 20 MG capsule Take 20 mg by mouth Daily.    ProviderRoosevelt MD        Social History:   Social History     Tobacco Use   • Smoking status: Former Smoker     Years: 2.00     Types: Cigarettes     Quit date: 3/19/2021     Years since quittin.4   • Smokeless tobacco: Never Used   Vaping Use   • Vaping Use: Never used   Substance Use Topics   • Alcohol use: Never   • Drug use: Never         Review of Systems:  Review of Systems   Constitutional: Negative for chills, diaphoresis and fever.   HENT: Negative for congestion, postnasal drip, rhinorrhea and sore throat.    Eyes: Negative for photophobia.   Respiratory: Positive for cough and shortness of breath. Negative for chest tightness.    Cardiovascular: Negative for chest pain, palpitations and leg swelling.   Gastrointestinal: Negative for abdominal pain, diarrhea, nausea and vomiting.   Genitourinary: Negative for difficulty urinating, dysuria, flank pain, frequency, hematuria and urgency.   Musculoskeletal: Negative for neck pain and neck stiffness.   Skin: Negative for pallor and rash.   Neurological: Negative for dizziness, syncope, weakness, numbness and headaches.   Hematological: Negative for adenopathy. Does not bruise/bleed easily.   Psychiatric/Behavioral: Negative.         Physical Exam:  /49 (BP  "Location: Right arm, Patient Position: Lying)   Pulse 64   Temp 97.6 °F (36.4 °C) (Oral)   Resp 20   Ht 185.4 cm (73\")   Wt 120 kg (263 lb 14.3 oz)   SpO2 97%   BMI 34.82 kg/m²     Physical Exam  Vitals and nursing note reviewed.   Constitutional:       General: He is not in acute distress.     Appearance: Normal appearance. He is not ill-appearing, toxic-appearing or diaphoretic.   HENT:      Head: Normocephalic and atraumatic.      Mouth/Throat:      Mouth: Mucous membranes are moist.   Eyes:      Pupils: Pupils are equal, round, and reactive to light.   Cardiovascular:      Rate and Rhythm: Normal rate and regular rhythm.      Pulses: Normal pulses.           Carotid pulses are 2+ on the right side and 2+ on the left side.       Radial pulses are 2+ on the right side and 2+ on the left side.        Femoral pulses are 2+ on the right side and 2+ on the left side.       Popliteal pulses are 2+ on the right side and 2+ on the left side.        Dorsalis pedis pulses are 2+ on the right side and 2+ on the left side.        Posterior tibial pulses are 2+ on the right side and 2+ on the left side.      Heart sounds: Normal heart sounds. No murmur heard.  Pulmonary:      Effort: Accessory muscle usage and retractions present. No respiratory distress.      Breath sounds: Decreased breath sounds and wheezing (diffuse) present. No rhonchi or rales.      Comments: decresed breath sounds in bases  accessory muscle use  intercostal retractions  Abdominal:      General: Abdomen is flat. There is no distension.      Palpations: Abdomen is soft. There is no mass.      Tenderness: There is no abdominal tenderness. There is no right CVA tenderness, left CVA tenderness, guarding or rebound.      Comments: No rigidity   Musculoskeletal:         General: No swelling, tenderness or deformity.      Cervical back: Neck supple. No tenderness.      Right lower leg: Edema (trace) present.      Left lower leg: Edema (trace) present. "   Skin:     General: Skin is warm and dry.      Capillary Refill: Capillary refill takes less than 2 seconds.      Coloration: Skin is not jaundiced or pale.      Findings: No erythema.   Neurological:      General: No focal deficit present.      Mental Status: He is alert and oriented to person, place, and time. Mental status is at baseline.      Sensory: No sensory deficit.      Motor: No weakness.   Psychiatric:         Mood and Affect: Mood normal.         Behavior: Behavior normal.                Medications in the Emergency Department:  Medications   sodium chloride 0.9 % flush 10 mL (has no administration in time range)   ipratropium-albuterol (DUO-NEB) nebulizer solution 3 mL (has no administration in time range)   aspirin chewable tablet 81 mg (81 mg Oral Given 8/23/22 0818)   atorvastatin (LIPITOR) tablet 10 mg (10 mg Oral Given 8/23/22 2050)   cetirizine (zyrTEC) tablet 10 mg (10 mg Oral Given 8/23/22 0818)   budesonide (PULMICORT) 0.5 mg ( Inhalation Given 8/23/22 2105)   guaiFENesin (MUCINEX) 12 hr tablet 1,200 mg (1,200 mg Oral Given 8/23/22 2051)   insulin detemir (LEVEMIR) injection 10 Units (10 Units Subcutaneous Given 8/23/22 2052)   lisinopril (PRINIVIL,ZESTRIL) tablet 2.5 mg (2.5 mg Oral Given 8/23/22 0818)   pantoprazole (PROTONIX) EC tablet 40 mg (40 mg Oral Given 8/23/22 0818)   sodium chloride 0.9 % flush 10 mL (has no administration in time range)   sodium chloride 0.9 % flush 10 mL (10 mL Intravenous Given 8/23/22 2053)   sodium chloride 0.9 % infusion 40 mL (has no administration in time range)   acetaminophen (TYLENOL) tablet 650 mg (650 mg Oral Given 8/23/22 0017)     Or   acetaminophen (TYLENOL) 160 MG/5ML solution 650 mg ( Oral Not Given:  See Alt 8/23/22 0017)     Or   acetaminophen (TYLENOL) suppository 650 mg ( Rectal Not Given:  See Alt 8/23/22 0017)   calcium carbonate (TUMS) chewable tablet 500 mg (200 mg elemental) (has no administration in time range)   sennosides-docusate  (PERICOLACE) 8.6-50 MG per tablet 2 tablet (2 tablets Oral Given 8/23/22 2050)     And   polyethylene glycol (MIRALAX) packet 17 g (has no administration in time range)     And   bisacodyl (DULCOLAX) EC tablet 5 mg (has no administration in time range)     And   bisacodyl (DULCOLAX) suppository 10 mg (has no administration in time range)   ondansetron (ZOFRAN) tablet 4 mg (has no administration in time range)     Or   ondansetron (ZOFRAN) injection 4 mg (has no administration in time range)   melatonin tablet 5 mg (5 mg Oral Given 8/23/22 0017)   predniSONE (DELTASONE) tablet 40 mg (40 mg Oral Given 8/23/22 0818)   dextrose (GLUTOSE) oral gel 15 g (has no administration in time range)   dextrose (D50W) (25 g/50 mL) IV injection 25 g (has no administration in time range)   glucagon (human recombinant) (GLUCAGEN DIAGNOSTIC) injection 1 mg (has no administration in time range)   Insulin Lispro (humaLOG) injection 0-7 Units (2 Units Subcutaneous Given 8/23/22 1738)   metoprolol tartrate (LOPRESSOR) tablet 12.5 mg (12.5 mg Oral Given 8/23/22 2051)   Enoxaparin Sodium (LOVENOX) syringe 40 mg (40 mg Subcutaneous Given 8/23/22 0818)   arformoterol (BROVANA) nebulizer solution 15 mcg (15 mcg Nebulization Given 8/23/22 2105)   azithromycin (ZITHROMAX) tablet 500 mg (500 mg Oral Given 8/23/22 1159)   ipratropium-albuterol (DUO-NEB) nebulizer solution 3 mL (3 mL Nebulization Given 8/22/22 1630)   methylPREDNISolone sodium succinate (SOLU-Medrol) injection 125 mg (125 mg Intravenous Given 8/22/22 1734)   ipratropium-albuterol (DUO-NEB) nebulizer solution 3 mL (3 mL Nebulization Given 8/22/22 1849)        Labs  Lab Results (last 24 hours)     Procedure Component Value Units Date/Time    CBC Auto Differential [[id number]]  (Abnormal) Collected: 08/23/22 0507    Specimen: Blood Updated: 08/23/22 0624     WBC 6.15 10*3/mm3      RBC 4.87 10*6/mm3      Hemoglobin 12.4 g/dL      Hematocrit 42.0 %      MCV 86.2 fL      MCH 25.5 pg       MCHC 29.5 g/dL      RDW 15.6 %      RDW-SD 48.9 fl      MPV 11.2 fL      Platelets 147 10*3/mm3      Neutrophil % 85.0 %      Lymphocyte % 12.7 %      Monocyte % 1.3 %      Eosinophil % 0.2 %      Basophil % 0.3 %      Immature Grans % 0.5 %      Neutrophils, Absolute 5.23 10*3/mm3      Lymphocytes, Absolute 0.78 10*3/mm3      Monocytes, Absolute 0.08 10*3/mm3      Eosinophils, Absolute 0.01 10*3/mm3      Basophils, Absolute 0.02 10*3/mm3      Immature Grans, Absolute 0.03 10*3/mm3      nRBC 0.0 /100 WBC     Comprehensive Metabolic Panel [[id number]]  (Abnormal) Collected: 08/23/22 0507    Specimen: Blood Updated: 08/23/22 0657     Glucose 244 mg/dL      BUN 14 mg/dL      Creatinine 0.95 mg/dL      Sodium 137 mmol/L      Potassium 4.9 mmol/L      Chloride 102 mmol/L      CO2 23.0 mmol/L      Calcium 8.6 mg/dL      Total Protein 6.5 g/dL      Albumin 3.50 g/dL      ALT (SGPT) 12 U/L      AST (SGOT) 23 U/L      Alkaline Phosphatase 84 U/L      Total Bilirubin 0.4 mg/dL      Globulin 3.0 gm/dL      A/G Ratio 1.2 g/dL      BUN/Creatinine Ratio 14.7     Anion Gap 12.0 mmol/L      eGFR 84.5 mL/min/1.73      Comment: National Kidney Foundation and American Society of Nephrology (ASN) Task Force recommended calculation based on the Chronic Kidney Disease Epidemiology Collaboration (CKD-EPI) equation refit without adjustment for race.       Narrative:      GFR Normal >60  Chronic Kidney Disease <60  Kidney Failure <15      POC Glucose Once [[id number]]  (Abnormal) Collected: 08/23/22 0817    Specimen: Blood Updated: 08/23/22 0820     Glucose 209 mg/dL      Comment: Serial Number: [id number]:  [id number]       Blood Gas, Arterial -With Co-Ox Panel: Yes [[id number]]  (Abnormal) Collected: 08/23/22 1005    Specimen: Arterial Blood Updated: 08/23/22 1014     pH, Arterial 7.351 pH units      pCO2, Arterial 46.2 mm Hg      pO2, Arterial 88.1 mm Hg      HCO3, Arterial 25.0 mmol/L      Base Excess, Arterial -0.9 mmol/L      O2  Saturation, Arterial 95.6 %      Hemoglobin, Blood Gas 12.6 g/dL      Carboxyhemoglobin 0.8 %      Methemoglobin 0.10 %      Oxyhemoglobin 94.7 %      FHHB 4.4 %      Site Arterial: right radial     Modality Cannula - Nasal     Flow Rate 5 lpm      Nael's Test Positive    POC Glucose Once [[id number]]  (Abnormal) Collected: 08/23/22 1140    Specimen: Blood Updated: 08/23/22 1141     Glucose 114 mg/dL      Comment: Serial Number: [id number]:  [id number]       POC Glucose Once [[id number]]  (Abnormal) Collected: 08/23/22 1655    Specimen: Blood Updated: 08/23/22 1655     Glucose 181 mg/dL      Comment: Serial Number: [id number]:  [id number]       POC Glucose Once [[id number]]  (Abnormal) Collected: 08/23/22 2048    Specimen: Blood Updated: 08/23/22 2049     Glucose 194 mg/dL      Comment: Serial Number: [id number]:  [id number]              Imaging:  No Radiology Exams Resulted Within Past 24 Hours    Procedures:  Procedures    Progress  ED Course as of 08/24/22 0240   Mon Aug 22, 2022   1827 EKG:    Rhythm: Sinus rhythm with artifact  Rate: 86  Right bundle branch block  Intervals: Normal MD and QT interval  T-wave: T wave inversion in V2, motion artifact obscures detail, probable T wave inversion aVL  ST Segment: No obvious pathological ST elevation or ST depressions to suggest STEMI    EKG Comparison: Overall the QRS and ST morphology are probably unchanged from EKG performed August 1, 2022    Interpreted by me   [SD]      ED Course User Index  [SD] Kane Cox DO                            Medical Decision Making:  MDM  Number of Diagnoses or Management Options  Acute on chronic respiratory failure with hypoxia and hypercapnia (HCC)  COPD exacerbation (HCC)  Diagnosis management comments: Patient has a known history of COPD.  Patient presented in moderate distress today.  Patient was given breathing treatments and Solu-Medrol.  The patient continued to require 40% oxygen.  A blood gas was  performed which demonstrated pH of 7.26 and a CO2 of 62.  This indicates the patient had acute on chronic respiratory failure with hypoxia.  Patient's chest x-ray was clear.  The patient was resting comfortably at the time of admission on BiPAP.  The patient's pulse ox was normal.  The patient was subsequently admitted to the hospital for acute on chronic respiratory failure with hypoxia and hypercapnia       Amount and/or Complexity of Data Reviewed  Clinical lab tests: reviewed  Tests in the radiology section of CPT®: reviewed  Tests in the medicine section of CPT®: reviewed  Discuss the patient with other providers: yes (I discussed the case with the hospitalist who will evaluate the patient in the emergency room and admit the patient to the hospital)    Critical Care  Total time providing critical care: 30-74 minutes (Total critical care time of.  Total critical care time documented does not include time spent on separately billed procedures for services of nurses or physician assistants.  I personally saw and examined the patient.  I have reviewed all diagnostic interpretations and treatment plans as written.  I was present for the key portions of any procedures performed and the inclusive time noted in any critical care statement.  Critical care time includes patient management by me, time spent at the patient bedside, time to review labs/ ABG's, imaging results, discussing patient care, documentation in the medical record and time spent with family or caregiver)               Final diagnoses:   Acute on chronic respiratory failure with hypoxia and hypercapnia (HCC)   COPD exacerbation (HCC)        Disposition:  ED Disposition     ED Disposition   Decision to Admit    Condition   --    Comment   Level of Care: Telemetry [5]   Diagnosis: Respiratory failure with hypoxia (HCC) [920372]   Admitting Physician: PABLITO SAWYER [[id number]]   Attending Physician: PABLITO SAWYER [[id number]]    Certification: I Certify That Inpatient Hospital Services Are Medically Necessary For Greater Than 2 Midnights               Documentation assistance provided by Rah Cardenas acting as scribe for Kane Cox DO. Information recorded by the scribe was done at my direction and has been verified and validated by me.        Rah Cardenas  08/22/22 1735       Kane Cox DO  08/24/22 [id number]

## 2022-08-23 ENCOUNTER — READMISSION MANAGEMENT (OUTPATIENT)
Dept: CALL CENTER | Facility: HOSPITAL | Age: 72
End: 2022-08-23

## 2022-08-23 LAB
ALBUMIN SERPL-MCNC: 3.5 G/DL (ref 3.5–5.2)
ALBUMIN/GLOB SERPL: 1.2 G/DL
ALP SERPL-CCNC: 84 U/L (ref 39–117)
ALT SERPL W P-5'-P-CCNC: 12 U/L (ref 1–41)
ANION GAP SERPL CALCULATED.3IONS-SCNC: 12 MMOL/L (ref 5–15)
ARTERIAL PATENCY WRIST A: POSITIVE
AST SERPL-CCNC: 23 U/L (ref 1–40)
BASE EXCESS BLDA CALC-SCNC: -0.9 MMOL/L (ref -2–2)
BASOPHILS # BLD AUTO: 0.02 10*3/MM3 (ref 0–0.2)
BASOPHILS NFR BLD AUTO: 0.3 % (ref 0–1.5)
BDY SITE: ABNORMAL
BILIRUB SERPL-MCNC: 0.4 MG/DL (ref 0–1.2)
BUN SERPL-MCNC: 14 MG/DL (ref 8–23)
BUN/CREAT SERPL: 14.7 (ref 7–25)
CALCIUM SPEC-SCNC: 8.6 MG/DL (ref 8.6–10.5)
CHLORIDE SERPL-SCNC: 102 MMOL/L (ref 98–107)
CO2 SERPL-SCNC: 23 MMOL/L (ref 22–29)
COHGB MFR BLD: 0.8 % (ref 0–1.5)
CREAT SERPL-MCNC: 0.95 MG/DL (ref 0.76–1.27)
DEPRECATED RDW RBC AUTO: 48.9 FL (ref 37–54)
EGFRCR SERPLBLD CKD-EPI 2021: 84.5 ML/MIN/1.73
EOSINOPHIL # BLD AUTO: 0.01 10*3/MM3 (ref 0–0.4)
EOSINOPHIL NFR BLD AUTO: 0.2 % (ref 0.3–6.2)
ERYTHROCYTE [DISTWIDTH] IN BLOOD BY AUTOMATED COUNT: 15.6 % (ref 12.3–15.4)
FHHB: 4.4 % (ref 0–5)
GAS FLOW AIRWAY: 5 LPM
GLOBULIN UR ELPH-MCNC: 3 GM/DL
GLUCOSE BLDC GLUCOMTR-MCNC: 114 MG/DL (ref 70–99)
GLUCOSE BLDC GLUCOMTR-MCNC: 181 MG/DL (ref 70–99)
GLUCOSE BLDC GLUCOMTR-MCNC: 194 MG/DL (ref 70–99)
GLUCOSE BLDC GLUCOMTR-MCNC: 209 MG/DL (ref 70–99)
GLUCOSE SERPL-MCNC: 244 MG/DL (ref 65–99)
HCO3 BLDA-SCNC: 25 MMOL/L (ref 22–26)
HCT VFR BLD AUTO: 42 % (ref 37.5–51)
HGB BLD-MCNC: 12.4 G/DL (ref 13–17.7)
HGB BLDA-MCNC: 12.6 G/DL (ref 13.8–16.4)
IMM GRANULOCYTES # BLD AUTO: 0.03 10*3/MM3 (ref 0–0.05)
IMM GRANULOCYTES NFR BLD AUTO: 0.5 % (ref 0–0.5)
LYMPHOCYTES # BLD AUTO: 0.78 10*3/MM3 (ref 0.7–3.1)
LYMPHOCYTES NFR BLD AUTO: 12.7 % (ref 19.6–45.3)
MCH RBC QN AUTO: 25.5 PG (ref 26.6–33)
MCHC RBC AUTO-ENTMCNC: 29.5 G/DL (ref 31.5–35.7)
MCV RBC AUTO: 86.2 FL (ref 79–97)
METHGB BLD QL: 0.1 % (ref 0–1.5)
MODALITY: ABNORMAL
MONOCYTES # BLD AUTO: 0.08 10*3/MM3 (ref 0.1–0.9)
MONOCYTES NFR BLD AUTO: 1.3 % (ref 5–12)
NEUTROPHILS NFR BLD AUTO: 5.23 10*3/MM3 (ref 1.7–7)
NEUTROPHILS NFR BLD AUTO: 85 % (ref 42.7–76)
NRBC BLD AUTO-RTO: 0 /100 WBC (ref 0–0.2)
OXYHGB MFR BLDV: 94.7 % (ref 94–99)
PCO2 BLDA: 46.2 MM HG (ref 35–45)
PH BLDA: 7.35 PH UNITS (ref 7.35–7.45)
PLATELET # BLD AUTO: 147 10*3/MM3 (ref 140–450)
PMV BLD AUTO: 11.2 FL (ref 6–12)
PO2 BLDA: 88.1 MM HG (ref 80–100)
POTASSIUM SERPL-SCNC: 4.9 MMOL/L (ref 3.5–5.2)
PROT SERPL-MCNC: 6.5 G/DL (ref 6–8.5)
RBC # BLD AUTO: 4.87 10*6/MM3 (ref 4.14–5.8)
SAO2 % BLDCOA: 95.6 % (ref 95–99)
SODIUM SERPL-SCNC: 137 MMOL/L (ref 136–145)
WBC NRBC COR # BLD: 6.15 10*3/MM3 (ref 3.4–10.8)

## 2022-08-23 PROCEDURE — 94761 N-INVAS EAR/PLS OXIMETRY MLT: CPT

## 2022-08-23 PROCEDURE — 94799 UNLISTED PULMONARY SVC/PX: CPT

## 2022-08-23 PROCEDURE — 83050 HGB METHEMOGLOBIN QUAN: CPT | Performed by: INTERNAL MEDICINE

## 2022-08-23 PROCEDURE — 99233 SBSQ HOSP IP/OBS HIGH 50: CPT | Performed by: FAMILY MEDICINE

## 2022-08-23 PROCEDURE — 82805 BLOOD GASES W/O2 SATURATION: CPT | Performed by: INTERNAL MEDICINE

## 2022-08-23 PROCEDURE — 99222 1ST HOSP IP/OBS MODERATE 55: CPT | Performed by: INTERNAL MEDICINE

## 2022-08-23 PROCEDURE — 36600 WITHDRAWAL OF ARTERIAL BLOOD: CPT | Performed by: INTERNAL MEDICINE

## 2022-08-23 PROCEDURE — 82962 GLUCOSE BLOOD TEST: CPT

## 2022-08-23 PROCEDURE — 63710000001 INSULIN DETEMIR PER 5 UNITS: Performed by: FAMILY MEDICINE

## 2022-08-23 PROCEDURE — 63710000001 INSULIN LISPRO (HUMAN) PER 5 UNITS: Performed by: FAMILY MEDICINE

## 2022-08-23 PROCEDURE — 63710000001 PREDNISONE PER 1 MG: Performed by: FAMILY MEDICINE

## 2022-08-23 PROCEDURE — 25010000002 ENOXAPARIN PER 10 MG: Performed by: FAMILY MEDICINE

## 2022-08-23 PROCEDURE — 80053 COMPREHEN METABOLIC PANEL: CPT | Performed by: FAMILY MEDICINE

## 2022-08-23 PROCEDURE — 94660 CPAP INITIATION&MGMT: CPT

## 2022-08-23 PROCEDURE — 82375 ASSAY CARBOXYHB QUANT: CPT | Performed by: INTERNAL MEDICINE

## 2022-08-23 PROCEDURE — 85025 COMPLETE CBC W/AUTO DIFF WBC: CPT | Performed by: FAMILY MEDICINE

## 2022-08-23 RX ORDER — AZITHROMYCIN 250 MG/1
500 TABLET, FILM COATED ORAL
Status: DISCONTINUED | OUTPATIENT
Start: 2022-08-23 | End: 2022-08-23

## 2022-08-23 RX ORDER — ARFORMOTEROL TARTRATE 15 UG/2ML
15 SOLUTION RESPIRATORY (INHALATION)
Status: DISCONTINUED | OUTPATIENT
Start: 2022-08-23 | End: 2022-08-25 | Stop reason: HOSPADM

## 2022-08-23 RX ORDER — AZITHROMYCIN 250 MG/1
500 TABLET, FILM COATED ORAL
Status: DISCONTINUED | OUTPATIENT
Start: 2022-08-23 | End: 2022-08-25 | Stop reason: HOSPADM

## 2022-08-23 RX ADMIN — ARFORMOTEROL TARTRATE 15 MCG: 15 SOLUTION RESPIRATORY (INHALATION) at 01:03

## 2022-08-23 RX ADMIN — LISINOPRIL 2.5 MG: 2.5 TABLET ORAL at 08:18

## 2022-08-23 RX ADMIN — ATORVASTATIN CALCIUM 10 MG: 10 TABLET, FILM COATED ORAL at 20:50

## 2022-08-23 RX ADMIN — CETIRIZINE HYDROCHLORIDE TABLETS 10 MG: 10 TABLET, FILM COATED ORAL at 08:18

## 2022-08-23 RX ADMIN — INSULIN LISPRO 2 UNITS: 100 INJECTION, SOLUTION INTRAVENOUS; SUBCUTANEOUS at 17:38

## 2022-08-23 RX ADMIN — ACETAMINOPHEN 325MG 650 MG: 325 TABLET ORAL at 00:17

## 2022-08-23 RX ADMIN — GUAIFENESIN 1200 MG: 600 TABLET ORAL at 08:18

## 2022-08-23 RX ADMIN — Medication 5 MG: at 00:17

## 2022-08-23 RX ADMIN — BUDESONIDE: 0.5 SUSPENSION RESPIRATORY (INHALATION) at 01:03

## 2022-08-23 RX ADMIN — BUDESONIDE: 0.5 SUSPENSION RESPIRATORY (INHALATION) at 21:05

## 2022-08-23 RX ADMIN — Medication 10 ML: at 20:53

## 2022-08-23 RX ADMIN — BUDESONIDE: 0.5 SUSPENSION RESPIRATORY (INHALATION) at 08:32

## 2022-08-23 RX ADMIN — Medication 10 ML: at 09:10

## 2022-08-23 RX ADMIN — ARFORMOTEROL TARTRATE 15 MCG: 15 SOLUTION RESPIRATORY (INHALATION) at 08:31

## 2022-08-23 RX ADMIN — ARFORMOTEROL TARTRATE 15 MCG: 15 SOLUTION RESPIRATORY (INHALATION) at 21:05

## 2022-08-23 RX ADMIN — METOPROLOL TARTRATE 12.5 MG: 25 TABLET, FILM COATED ORAL at 08:18

## 2022-08-23 RX ADMIN — ENOXAPARIN SODIUM 40 MG: 100 INJECTION SUBCUTANEOUS at 08:18

## 2022-08-23 RX ADMIN — ASPIRIN 81 MG CHEWABLE TABLET 81 MG: 81 TABLET CHEWABLE at 08:18

## 2022-08-23 RX ADMIN — INSULIN DETEMIR 10 UNITS: 100 INJECTION, SOLUTION SUBCUTANEOUS at 20:52

## 2022-08-23 RX ADMIN — SENNOSIDES AND DOCUSATE SODIUM 2 TABLET: 8.6; 5 TABLET ORAL at 20:50

## 2022-08-23 RX ADMIN — METOPROLOL TARTRATE 12.5 MG: 25 TABLET, FILM COATED ORAL at 20:51

## 2022-08-23 RX ADMIN — AZITHROMYCIN MONOHYDRATE 500 MG: 250 TABLET ORAL at 11:59

## 2022-08-23 RX ADMIN — PANTOPRAZOLE SODIUM 40 MG: 40 TABLET, DELAYED RELEASE ORAL at 08:18

## 2022-08-23 RX ADMIN — SENNOSIDES AND DOCUSATE SODIUM 2 TABLET: 8.6; 5 TABLET ORAL at 08:18

## 2022-08-23 RX ADMIN — INSULIN LISPRO 3 UNITS: 100 INJECTION, SOLUTION INTRAVENOUS; SUBCUTANEOUS at 08:27

## 2022-08-23 RX ADMIN — GUAIFENESIN 1200 MG: 600 TABLET ORAL at 20:51

## 2022-08-23 RX ADMIN — PREDNISONE 40 MG: 20 TABLET ORAL at 08:18

## 2022-08-23 NOTE — PLAN OF CARE
Goal Outcome Evaluation:   Patient was transfer from [facility] this shift. Patient arrived with no belongings but stated he came in with clothes and a wallet. Contacted, YESENIA Justice, who stated she had the same issue and had contacted both ED and security, neither having answers. Contacted security again upon arrival to [facility], they stated they would look in to it and let us know if they found the patients belongings tonight. Patient made aware and calmly showed understanding. Also received report that patient had bedbugs upon arrival, no decontamination process or ER nurse notes charted, patient confirms however, that upon arrival to the ED on 8/22 that he was showered, had his clothes put into a bag, and was told he had bedbugs. Patient was negative for covid as of 8/22, deisolated but will remain in contact isolation.

## 2022-08-23 NOTE — PROGRESS NOTES
[facility]   Hospitalist Progress Note  Date: 2022  Patient Name: Dilip Faulkner  : 1950  MRN: [id number]  Date of admission: 2022      Subjective   Subjective     Chief complaint: Shortness of breath    Summary:  72-year male with history of COPD, diabetes melitis with insulin use, essential hypertension, chronic hypoxemic respiratory failure on 2 L nasal cannula, asthma overlap, hospitalized on 2022 with cough and shortness of breath symptoms, had significant respiratory failure requiring NIPPV/BiPAP, breathing stabilized after, transitioned to high flow nasal cannula, pulmonary consulted    Interval follow-up: Patient seen and examined this morning, no acute stress, no acute major night events, patient seen to be on 6 L high flow nasal cannula oxygen, with cough and shortness with symptoms while I was in the room, conversational dyspnea, denies chest pain or palpitations.  Appeared weak and debilitated.  Could not clear his sputum.  Had significant elevation in his PCO2 which has since corrected.  He does not like wearing BiPAP.    Review of systems:  All systems reviewed and negative except for cough, shortness of breath, generalized fatigue, generalized weakness    Objective   Objective     Vitals:   Temp:  [97.3 °F (36.3 °C)-98.9 °F (37.2 °C)] 97.3 °F (36.3 °C)  Heart Rate:  [51-99] 67  Resp:  [18-29] 18  BP: (103-122)/(59-71) 118/70  Flow (L/min):  [5-15] 5  Physical Exam    Constitutional: Awake, alert, no acute distress heavyset male, no acute distress const extended high flow nasal cannula   Eyes: Pupils equal, sclerae anicteric, no conjunctival injection   HENT: NCAT, mucous membranes moist   Neck: Supple, no thyromegaly, no lymphadenopathy, trachea midline   Respiratory: Air entry bilaterally, diminished breath sounds throughout, scattered wheezing   Cardiovascular: RRR, no murmurs, rubs, or gallops, palpable pedal pulses bilaterally   Gastrointestinal: Positive bowel  sounds, soft, nontender, nondistended   Musculoskeletal: Trace to +1 bilateral ankle edema, no clubbing or cyanosis to extremities   Psychiatric: Appropriate affect, cooperative   Neurologic: Oriented x 3, strength symmetric in all extremities, Cranial Nerves grossly intact to confrontation, speech clear   Skin: No rashes   Result Review    Result Review:  I have personally reviewed the pertinent results from the past 24 hours to 8/23/2022 13:56 EDT and agree with these findings:  [x]  Laboratory   CBC    CBC 8/2/22 8/22/22 8/23/22   WBC 14.35 (A) 8.76 6.15   RBC 4.72 5.11 4.87   Hemoglobin 11.9 (A) 13.1 12.4 (A)   Hematocrit 39.5 44.1 42.0   MCV 83.7 86.3 86.2   MCH 25.2 (A) 25.6 (A) 25.5 (A)   MCHC 30.1 (A) 29.7 (A) 29.5 (A)   RDW 16.0 (A) 15.8 (A) 15.6 (A)   Platelets 166 201 147   (A) Abnormal value            BMP    BMP 8/2/22 8/22/22 8/23/22   BUN 18 12 14   Creatinine 1.15 0.99 0.95   Sodium 135 (A) 139 137   Potassium 4.8 3.8 4.9   Chloride 102 103 102   CO2 25.5 25.8 23.0   Calcium 8.8 9.0 8.6   (A) Abnormal value       Comments are available for some flowsheets but are not being displayed.           LIVER FUNCTION TESTS:      Lab 08/23/22  0507 08/22/22  1633   TOTAL PROTEIN 6.5 7.1   ALBUMIN 3.50 3.80   GLOBULIN 3.0 3.3   ALT (SGPT) 12 14   AST (SGOT) 23 22   BILIRUBIN 0.4 0.4   ALK PHOS 84 88       [x]  Microbiology No results found for: ACANTHNAEG, AFBCX, BPERTUSSISCX, BLOODCX  No results found for: BCIDPCR, CXREFLEX, CSFCX, CULTURETIS  No results found for: CULTURES, HSVCX, URCX  No results found for: EYECULTURE, GCCX, HSVCULTURE, LABHSV  No results found for: LEGIONELLA, MRSACX, MUMPSCX, MYCOPLASCX  No results found for: NOCARDIACX, STOOLCX  No results found for: THROATCX, UNSTIMCULT, URINECX, CULTURE, VZVCULTUR  No results found for: VIRALCULTU, WOUNDCX    [x]  Radiology XR Chest 1 View    Result Date: 8/22/2022  PROCEDURE: XR CHEST 1 VW  COMPARISON: [facility], CR, XR CHEST 1 VW,  8/01/2022, 4:38.  INDICATIONS: SOA Triage Protocol  FINDINGS:  The heart size is normal.  There is no interval change in the left basilar pleural/parenchymal scarring.  The right lung and pleural space are clear.  The pulmonary vascular markings are normal.  There is degenerative spondylosis of the thoracic spine.       No active disease, stable chest x-ray.       LALA GARIBAY MD       Electronically Signed and Approved By: LALA GARIBAY MD on 8/22/2022 at 17:02             XR Chest 1 View    Result Date: 8/1/2022  PROCEDURE: XR CHEST 1 VW  COMPARISON: [facility], CT, CT CHEST WO CONTRAST DIAGNOSTIC, 6/15/2022, 8:07.  [facility], CR, XR CHEST 1 VW, 7/10/2022, 2:45.  INDICATIONS: SOA  FINDINGS:  Chronic pleural thickening with chronic scarring and opacities in the left lung base.  Right lung is clear.  Cardiac and mediastinal contours are within normal limits.  Regional skeleton is unremarkable.        1. Chronic changes in the left lung base. 2. No acute cardiopulmonary disease.       JEF BAEZ MD       Electronically Signed and Approved By: JEF BAEZ MD on 8/01/2022 at 4:51               []  EKG/Telemetry   []  Cardiology/Vascular   []  Pathology  [x]  Old records  []  Other:    Assessment & Plan   Assessment / Plan     Assessment/Plan:  Assessment:  Acute on chronic hypoxemic respite failure with hypercapnia  Acute exacerbation of COPD  Asthma overlap  Insulin-dependent diabetes mellitus  Essential hypertension  Dyslipidemia    Plan:  Labs and imaging reviewed  Consulted pulmonary discussed with Dr. Vail, recommendations appreciated  Continue supplemental oxygen to maintain sats greater 90%  BiPAP per pulmonary  Started on Brovana Pulmicort nebs twice daily  Continue Levemir 10 units nightly  Insulin sliding scale coverage  Continue prednisone 40 mg daily which was a change from methylprednisolone which he initially received  Continue azithromycin 500 mg for 3 days total  Continue  metoprolol 12.5 mg p.o. twice daily  Follow cultures  Continue lisinopril 2.5 mg daily  Continue Protonix 40 mg daily  A.m. labs  Full code  DVT prophylaxis with Lovenox  Clinical course dictate further management  Discussed with nurse at the bedside    DVT prophylaxis:  Medical and mechanical DVT prophylaxis orders are present.    CODE STATUS:   Level Of Support Discussed With: Patient  Code Status (Patient has no pulse and is not breathing): CPR (Attempt to Resuscitate)  Medical Interventions (Patient has pulse or is breathing): Full      Dragon disclaimer:  Much of this encounter note is an electronic transcription/translation of spoken language to printed text. The electronic translation of spoken language may permit erroneous, or at times, nonsensical words or phrases to be inadvertently transcribed. Although I have reviewed the note for such errors, some may still exist.    Electronically signed by Oralia Hardy MD, 08/23/22, 1:56 PM EDT.

## 2022-08-23 NOTE — OUTREACH NOTE
Medical Week 3 Survey    Flowsheet Row Responses   [facility] facility patient discharged from? [facility]   Does the patient have one of the following disease processes/diagnoses(primary or secondary)? Other   Week 3 attempt successful? No   Revoke Readmitted          RONY RINALDI - Registered Nurse

## 2022-08-23 NOTE — CONSULTS
PULMONARY CONSULT NOTE      [facility] 4TH FLOOR MEDICAL TELEMETRY UNIT  8/23/2022            Dilip Faulkner  72 y.o. male  1950  [id number]            Requesting physician: No ref. provider found    Reason for Consultation: Respiratory failure    CC:     Subjective     History of Present Illness:  Dilip Faulkner is a 72 y.o. male with PMH significant for COPD with tobacco abuse chronic respiratory failure on home oxygen hypertension diabetes and obesity presented with worsening breathlessness with wheeze and dry cough patient complains of smothering spells along with chest congestion there is no history of fever chest pain hemoptysis or productive cough patient is on nebulizer treatments and home oxygen and has already quit smoking there is no recent history of calf pain or swelling or pleuritic or central chest pain patient was evaluated and noted to be in respiratory failure  Blood gas showed evidence of respiratory failure with respiratory acidosis  Patient was started on BiPAP and admitted and started on IV steroids along with nebulizer treatments  Patient seems to be doing better and feels that his wheeze and shortness of breath is improving    Review of Systems:    Review of Systems   Constitutional: Positive for fatigue.   HENT: Positive for rhinorrhea.    Eyes: Negative.    Respiratory: Positive for cough, shortness of breath and wheezing.    Cardiovascular: Negative.    Gastrointestinal: Negative.    Endocrine: Negative.    Genitourinary: Negative.    Musculoskeletal: Negative.    Skin: Negative.    Allergic/Immunologic: Negative.    Neurological: Negative.    Hematological: Negative.    Psychiatric/Behavioral: Negative.    All other systems reviewed and are negative.    All systems were reviewed and negative except as noted above in the HPI.    Home Meds:  Medications Prior to Admission   Medication Sig Dispense Refill Last Dose   • albuterol sulfate  (90 Base) MCG/ACT  inhaler Inhale 2 puffs Every 6 (Six) Hours As Needed for Wheezing.      • aspirin 81 MG chewable tablet Chew 81 mg Daily.      • atorvastatin (LIPITOR) 10 MG tablet Take 10 mg by mouth Daily.      • cetirizine (zyrTEC) 10 MG tablet Take 10 mg by mouth Daily.      • Fluticasone-Salmeterol (ADVAIR) 250-50 MCG/ACT DISKUS Inhale 1 puff 2 (Two) Times a Day.      • guaiFENesin (MUCINEX) 600 MG 12 hr tablet Take 2 tablets by mouth Every 12 (Twelve) Hours for 30 days. 120 tablet 0    • insulin degludec (Tresiba FlexTouch) 100 UNIT/ML solution pen-injector injection Inject 17 Units under the skin into the appropriate area as directed Daily.      • ipratropium-albuterol (DUO-NEB) 0.5-2.5 mg/3 ml nebulizer Take 3 mL by nebulization Every 4 (Four) Hours As Needed for Wheezing or Shortness of Air. 360 mL 0    • lisinopril (PRINIVIL,ZESTRIL) 2.5 MG tablet Take 2.5 mg by mouth Daily.      • metFORMIN (GLUCOPHAGE) 500 MG tablet Take 500 mg by mouth 2 (two) times a day.      • metoprolol tartrate (LOPRESSOR) 25 MG tablet Take 0.5 (1/2) tablet by mouth 2 (Two) Times a Day for 30 days. 30 tablet 0    • omeprazole (priLOSEC) 20 MG capsule Take 20 mg by mouth Daily.          Inpatient Meds:    Current Facility-Administered Medications:   •  acetaminophen (TYLENOL) tablet 650 mg, 650 mg, Oral, Q4H PRN, 650 mg at 08/23/22 0017 **OR** acetaminophen (TYLENOL) 160 MG/5ML solution 650 mg, 650 mg, Oral, Q4H PRN **OR** acetaminophen (TYLENOL) suppository 650 mg, 650 mg, Rectal, Q4H PRN, Golden Finney MD  •  arformoterol (BROVANA) nebulizer solution 15 mcg, 15 mcg, Nebulization, BID - RT, Golden Finney MD, 15 mcg at 08/23/22 0831  •  aspirin chewable tablet 81 mg, 81 mg, Oral, Daily, Golden Finney MD, 81 mg at 08/23/22 0818  •  atorvastatin (LIPITOR) tablet 10 mg, 10 mg, Oral, Nightly, Golden Finney MD, 10 mg at 08/22/22 4306  •  sennosides-docusate (PERICOLACE) 8.6-50 MG per tablet 2  tablet, 2 tablet, Oral, BID, 2 tablet at 08/23/22 0818 **AND** polyethylene glycol (MIRALAX) packet 17 g, 17 g, Oral, Daily PRN **AND** bisacodyl (DULCOLAX) EC tablet 5 mg, 5 mg, Oral, Daily PRN **AND** bisacodyl (DULCOLAX) suppository 10 mg, 10 mg, Rectal, Daily PRN, Golden Finney MD  •  budesonide (PULMICORT) 0.5 mg, , Inhalation, BID - RT, Golden Finney MD, Given at 08/23/22 0832  •  calcium carbonate (TUMS) chewable tablet 500 mg (200 mg elemental), 2 tablet, Oral, BID PRN, Golden Finney MD  •  cetirizine (zyrTEC) tablet 10 mg, 10 mg, Oral, Daily, Golden Finney MD, 10 mg at 08/23/22 0818  •  dextrose (D50W) (25 g/50 mL) IV injection 25 g, 25 g, Intravenous, Q15 Min PRN, Golden Finney MD  •  dextrose (GLUTOSE) oral gel 15 g, 15 g, Oral, Q15 Min PRN, Golden Finney MD  •  Enoxaparin Sodium (LOVENOX) syringe 40 mg, 40 mg, Subcutaneous, Q24H, Golden Finney MD, 40 mg at 08/23/22 0818  •  glucagon (human recombinant) (GLUCAGEN DIAGNOSTIC) injection 1 mg, 1 mg, Intramuscular, Q15 Min PRN, Golden Finney MD  •  guaiFENesin (MUCINEX) 12 hr tablet 1,200 mg, 1,200 mg, Oral, Q12H, Golden Finney MD, 1,200 mg at 08/23/22 0818  •  insulin detemir (LEVEMIR) injection 10 Units, 10 Units, Subcutaneous, Nightly, Golden Finney MD, 10 Units at 08/22/22 2319  •  Insulin Lispro (humaLOG) injection 0-7 Units, 0-7 Units, Subcutaneous, TID AC, Golden Finney MD, 3 Units at 08/23/22 0827  •  ipratropium-albuterol (DUO-NEB) nebulizer solution 3 mL, 3 mL, Nebulization, Q4H PRN, Golden Finney MD  •  lisinopril (PRINIVIL,ZESTRIL) tablet 2.5 mg, 2.5 mg, Oral, Daily, Golden Finney MD, 2.5 mg at 08/23/22 0818  •  melatonin tablet 5 mg, 5 mg, Oral, Nightly PRN, Golden Finney MD, 5 mg at 08/23/22 0017  •  metoprolol tartrate (LOPRESSOR) tablet  12.5 mg, 12.5 mg, Oral, BID, Golden Finney MD, 12.5 mg at 22 0818  •  ondansetron (ZOFRAN) tablet 4 mg, 4 mg, Oral, Q6H PRN **OR** ondansetron (ZOFRAN) injection 4 mg, 4 mg, Intravenous, Q6H PRN, Golden Finney MD  •  pantoprazole (PROTONIX) EC tablet 40 mg, 40 mg, Oral, QAM, Golden Finney MD, 40 mg at 22 08  •  Pharmacy to Dose enoxaparin (LOVENOX), , Does not apply, Continuous PRN, Golden Finney MD  •  predniSONE (DELTASONE) tablet 40 mg, 40 mg, Oral, Daily With Breakfast, Golden Finney MD, 40 mg at 22 08  •  sodium chloride 0.9 % flush 10 mL, 10 mL, Intravenous, PRN, Golden Finney MD  •  sodium chloride 0.9 % flush 10 mL, 10 mL, Intravenous, PRN, Golden Finney MD  •  sodium chloride 0.9 % flush 10 mL, 10 mL, Intravenous, Q12H, Golden Finney MD, 10 mL at 22 0910  •  sodium chloride 0.9 % infusion 40 mL, 40 mL, Intravenous, PRN, Golden Finney MD    Allergies:  No Known Allergies    Past Medical History:  Past Medical History:   Diagnosis Date   • Asthma    • COPD (chronic obstructive pulmonary disease) (HCC)    • Diabetes mellitus (HCC)    • Hernia, umbilical    • Hypertension    • Requires continuous at home supplemental oxygen        Past Surgical History:  Past Surgical History:   Procedure Laterality Date   • ABDOMINAL SURGERY          Social History:   Social History     Socioeconomic History   • Marital status: Single   Tobacco Use   • Smoking status: Former Smoker     Years: 2.00     Types: Cigarettes     Quit date: 3/19/2021     Years since quittin.4   • Smokeless tobacco: Never Used   Vaping Use   • Vaping Use: Never used   Substance and Sexual Activity   • Alcohol use: Never   • Drug use: Never   • Sexual activity: Defer       Family History:  History reviewed. No pertinent family history.    Objective     Vital Sign Min/Max for last 24  hours:  Temp  Min: 97.3 °F (36.3 °C)  Max: 98.9 °F (37.2 °C)   BP  Min: 103/59  Max: 122/71   Pulse  Min: 51  Max: 99   Resp  Min: 18  Max: 29   SpO2  Min: 92 %  Max: 100 %   Flow (L/min)  Min: 5  Max: 15   Weight  Min: 108 kg (237 lb 10.5 oz)  Max: 108 kg (237 lb 10.5 oz)     Physical Exam:  97.5 °F (36.4 °C) (Oral) 67 122/71 20 96% 108 kg (237 lb 10.5 oz) Body mass index is 31.35 kg/m².  Physical Exam  Vitals and nursing note reviewed.   Constitutional:       Appearance: He is ill-appearing.   HENT:      Head: Normocephalic and atraumatic.      Nose: Congestion present.      Mouth/Throat:      Mouth: Mucous membranes are moist.      Pharynx: Oropharynx is clear.   Eyes:      Extraocular Movements: Extraocular movements intact.      Conjunctiva/sclera: Conjunctivae normal.      Pupils: Pupils are equal, round, and reactive to light.   Cardiovascular:      Rate and Rhythm: Normal rate and regular rhythm.      Pulses: Normal pulses.      Heart sounds: Normal heart sounds.   Pulmonary:      Effort: Pulmonary effort is normal.      Breath sounds: Wheezing and rhonchi present.   Abdominal:      General: Abdomen is flat. Bowel sounds are normal.      Palpations: Abdomen is soft.   Musculoskeletal:         General: Normal range of motion.      Cervical back: Normal range of motion and neck supple.   Skin:     General: Skin is warm.      Capillary Refill: Capillary refill takes less than 2 seconds.   Neurological:      General: No focal deficit present.      Mental Status: He is alert and oriented to person, place, and time.   Psychiatric:         Mood and Affect: Mood normal.         Behavior: Behavior normal.         Data Review-     Labs: I personally reviewed latest laboratory results.  Lab Results (last 24 hours)     Procedure Component Value Units Date/Time    POC Glucose Once [[id number]]  (Abnormal) Collected: 08/23/22 0817    Specimen: Blood Updated: 08/23/22 0820     Glucose 209 mg/dL      Comment: Serial Number:  [id number]:  [id number]       Comprehensive Metabolic Panel [[id number]]  (Abnormal) Collected: 08/23/22 0507    Specimen: Blood Updated: 08/23/22 0657     Glucose 244 mg/dL      BUN 14 mg/dL      Creatinine 0.95 mg/dL      Sodium 137 mmol/L      Potassium 4.9 mmol/L      Chloride 102 mmol/L      CO2 23.0 mmol/L      Calcium 8.6 mg/dL      Total Protein 6.5 g/dL      Albumin 3.50 g/dL      ALT (SGPT) 12 U/L      AST (SGOT) 23 U/L      Alkaline Phosphatase 84 U/L      Total Bilirubin 0.4 mg/dL      Globulin 3.0 gm/dL      A/G Ratio 1.2 g/dL      BUN/Creatinine Ratio 14.7     Anion Gap 12.0 mmol/L      eGFR 84.5 mL/min/1.73      Comment: National Kidney Foundation and American Society of Nephrology (ASN) Task Force recommended calculation based on the Chronic Kidney Disease Epidemiology Collaboration (CKD-EPI) equation refit without adjustment for race.       Narrative:      GFR Normal >60  Chronic Kidney Disease <60  Kidney Failure <15      CBC Auto Differential [[id number]]  (Abnormal) Collected: 08/23/22 0507    Specimen: Blood Updated: 08/23/22 0624     WBC 6.15 10*3/mm3      RBC 4.87 10*6/mm3      Hemoglobin 12.4 g/dL      Hematocrit 42.0 %      MCV 86.2 fL      MCH 25.5 pg      MCHC 29.5 g/dL      RDW 15.6 %      RDW-SD 48.9 fl      MPV 11.2 fL      Platelets 147 10*3/mm3      Neutrophil % 85.0 %      Lymphocyte % 12.7 %      Monocyte % 1.3 %      Eosinophil % 0.2 %      Basophil % 0.3 %      Immature Grans % 0.5 %      Neutrophils, Absolute 5.23 10*3/mm3      Lymphocytes, Absolute 0.78 10*3/mm3      Monocytes, Absolute 0.08 10*3/mm3      Eosinophils, Absolute 0.01 10*3/mm3      Basophils, Absolute 0.02 10*3/mm3      Immature Grans, Absolute 0.03 10*3/mm3      nRBC 0.0 /100 WBC     COVID-19,APTIMA PANTHER(SRAVANTHI),[facility], NP/OP SWAB IN UTM/VTM/[facility] TRANSPORT MEDIA,24 HR TAT - Swab, Nasal Cavity [[id number]]  (Normal) Collected: 08/22/22 1803    Specimen: Swab from Nasal Cavity Updated: 08/22/22 8907      COVID19 Not Detected    Narrative:      Fact sheet for providers: https://www.fda.gov/media/719861/download     Fact sheet for patients: https://www.fda.gov/media/960673/download    Test performed by RT PCR.    POC Glucose Once [[id number]]  (Abnormal) Collected: 08/22/22 2300    Specimen: Blood Updated: 08/22/22 2301     Glucose 189 mg/dL      Comment: Serial Number: [id number]:  [id number]       Influenza Antigen, Rapid - Swab, Nasopharynx [[id number]]  (Normal) Collected: 08/22/22 1803    Specimen: Swab from Nasopharynx Updated: 08/22/22 1835     Influenza A Ag, EIA Negative     Influenza B Ag, EIA Negative    Blood Gas, Arterial -With Co-Ox Panel: Yes [[id number]]  (Abnormal) Collected: 08/22/22 1747    Specimen: Arterial Blood Updated: 08/22/22 1800     pH, Arterial 7.261 pH units      pCO2, Arterial 62.2 mm Hg      pO2, Arterial 71.1 mm Hg      HCO3, Arterial 27.3 mmol/L      Base Excess, Arterial -0.9 mmol/L      O2 Saturation, Arterial 92.5 %      Hemoglobin, Blood Gas 12.9 g/dL      Carboxyhemoglobin 1.3 %      Methemoglobin 0.10 %      Oxyhemoglobin 91.2 %      FHHB 7.4 %      Site Arterial: right radial     Modality Mask - Venti     FIO2 40 %      PO2/FIO2 178     Nael's Test Positive     Note 40 % venti mask     Comprehensive Metabolic Panel [[id number]]  (Abnormal) Collected: 08/22/22 1633    Specimen: Blood from Arm, Left Updated: 08/22/22 1711     Glucose 116 mg/dL      BUN 12 mg/dL      Creatinine 0.99 mg/dL      Sodium 139 mmol/L      Potassium 3.8 mmol/L      Comment: Slight hemolysis detected by analyzer. Results may be affected.        Chloride 103 mmol/L      CO2 25.8 mmol/L      Calcium 9.0 mg/dL      Total Protein 7.1 g/dL      Albumin 3.80 g/dL      ALT (SGPT) 14 U/L      AST (SGOT) 22 U/L      Alkaline Phosphatase 88 U/L      Total Bilirubin 0.4 mg/dL      Globulin 3.3 gm/dL      A/G Ratio 1.2 g/dL      BUN/Creatinine Ratio 12.1     Anion Gap 10.2 mmol/L      eGFR 80.4 mL/min/1.73       Comment: National Kidney Foundation and American Society of Nephrology (ASN) Task Force recommended calculation based on the Chronic Kidney Disease Epidemiology Collaboration (CKD-EPI) equation refit without adjustment for race.       Narrative:      GFR Normal >60  Chronic Kidney Disease <60  Kidney Failure <15      Troponin [[id number]]  (Normal) Collected: 08/22/22 1633    Specimen: Blood from Arm, Left Updated: 08/22/22 1711     Troponin T 0.013 ng/mL     Narrative:      Troponin T Reference Range:  <= 0.03 ng/mL-   Negative for AMI  >0.03 ng/mL-     Abnormal for myocardial necrosis.  Clinicians would have to utilize clinical acumen, EKG, Troponin and serial changes to determine if it is an Acute Myocardial Infarction or myocardial injury due to an underlying chronic condition.       Results may be falsely decreased if patient taking Biotin.      BNP [[id number]]  (Normal) Collected: 08/22/22 1633    Specimen: Blood from Arm, Left Updated: 08/22/22 1704     proBNP 231.4 pg/mL     Narrative:      Among patients with dyspnea, NT-proBNP is highly sensitive for the detection of acute congestive heart failure. In addition NT-proBNP of <300 pg/ml effectively rules out acute congestive heart failure with 99% negative predictive value.    Results may be falsely decreased if patient taking Biotin.      Lactic Acid, Plasma [[id number]]  (Normal) Collected: 08/22/22 1633    Specimen: Blood from Arm, Left Updated: 08/22/22 1701     Lactate 1.0 mmol/L     CBC & Differential [[id number]]  (Abnormal) Collected: 08/22/22 1633    Specimen: Blood from Arm, Left Updated: 08/22/22 1647    Narrative:      The following orders were created for panel order CBC & Differential.  Procedure                               Abnormality         Status                     ---------                               -----------         ------                     CBC Auto Differential[[id number]]        Abnormal            Final result                  Please view results for these tests on the individual orders.    CBC Auto Differential [[id number]]  (Abnormal) Collected: 08/22/22 1633    Specimen: Blood from Arm, Left Updated: 08/22/22 1647     WBC 8.76 10*3/mm3      RBC 5.11 10*6/mm3      Hemoglobin 13.1 g/dL      Hematocrit 44.1 %      MCV 86.3 fL      MCH 25.6 pg      MCHC 29.7 g/dL      RDW 15.8 %      RDW-SD 49.5 fl      MPV 10.6 fL      Platelets 201 10*3/mm3      Neutrophil % 60.4 %      Lymphocyte % 18.7 %      Monocyte % 7.5 %      Eosinophil % 12.3 %      Basophil % 0.8 %      Immature Grans % 0.3 %      Neutrophils, Absolute 5.28 10*3/mm3      Lymphocytes, Absolute 1.64 10*3/mm3      Monocytes, Absolute 0.66 10*3/mm3      Eosinophils, Absolute 1.08 10*3/mm3      Basophils, Absolute 0.07 10*3/mm3      Immature Grans, Absolute 0.03 10*3/mm3      nRBC 0.0 /100 WBC     [place] Draw [[id number]] Collected: 08/22/22 1633    Specimen: Blood from Arm, Left Updated: 08/22/22 1645    Narrative:      The following orders were created for panel order [place] Draw.  Procedure                               Abnormality         Status                     ---------                               -----------         ------                     Green Top (Gel)[[id number]]                                  Final result               Lavender Top[[id number]]                                     Final result               Gold Top - SST[[id number]]                                   Final result               Light Blue Top[[id number]]                                   Final result                 Please view results for these tests on the individual orders.    Gold Top - SST [[id number]] Collected: 08/22/22 1633    Specimen: Blood from Arm, Left Updated: 08/22/22 1645     Extra Tube Hold for add-ons.     Comment: Auto resulted.       Light Blue Top [[id number]] Collected: 08/22/22 1633    Specimen: Blood from Arm, Left Updated: 08/22/22 1645     Extra Tube Hold for add-ons.      Comment: Auto resulted       Green Top (Gel) [[id number]] Collected: 08/22/22 1633    Specimen: Blood from Arm, Left Updated: 08/22/22 1645     Extra Tube Hold for add-ons.     Comment: Auto resulted.       Lavender Top [[id number]] Collected: 08/22/22 1633    Specimen: Blood from Arm, Left Updated: 08/22/22 1645     Extra Tube hold for add-on     Comment: Auto resulted       Blood Culture - Blood, Arm, Left [[id number]] Collected: 08/22/22 1633    Specimen: Blood from Arm, Left Updated: 08/22/22 1641    Blood Culture - Blood, Arm, Right [[id number]] Collected: 08/22/22 1636    Specimen: Blood from Arm, Right Updated: 08/22/22 1641           Imaging: I personally visualized the relevant images of scans/x-rays performed.  Imaging Results (Last 24 Hours)     Procedure Component Value Units Date/Time    XR Chest 1 View [[id number]] Collected: 08/22/22 1702     Updated: 08/22/22 1706    Narrative:      PROCEDURE: XR CHEST 1 VW     COMPARISON: [facility], , XR CHEST 1 VW, 8/01/2022, 4:38.     INDICATIONS: SOA Triage Protocol     FINDINGS:   The heart size is normal.  There is no interval change in the left basilar pleural/parenchymal   scarring.  The right lung and pleural space are clear.  The pulmonary vascular markings are normal.    There is degenerative spondylosis of the thoracic spine.       Impression:       No active disease, stable chest x-ray.                  LALA GARIBAY MD         Electronically Signed and Approved By: LALA GARIBAY MD on 8/22/2022 at 17:02                           [unfilled]    Assessment:  Acute on chronic respiratory failure with hypoxemia and respiratory acidosis  COPD exacerbation  Hypertension  Diabetes mellitus  Obesity  History of tobacco abuse      Recommendations:  Agree with the current medications we will start him on p.o. Zithromax  Continue p.o. steroids along with nebulizer treatments  Will repeat blood gas  BiPAP on a as needed basis and nightly  DVT  prophylaxis  Continue blood sugar monitoring with sliding scale insulin      Thank you for allowing me to participate in the care of Dilip ANGELA Faulkner. Please contact me with any questions.     Total time spent: 32minutes    Electronically signed by Chandu Vail MD, 08/23/22, 12:36 PM EDT.  This document has been electronically signed by Chandu Vail MD on August 23, 2022 09:41 EDT

## 2022-08-24 LAB
ALBUMIN SERPL-MCNC: 3.4 G/DL (ref 3.5–5.2)
ALP SERPL-CCNC: 74 U/L (ref 39–117)
ALT SERPL W P-5'-P-CCNC: 14 U/L (ref 1–41)
ANION GAP SERPL CALCULATED.3IONS-SCNC: 5.3 MMOL/L (ref 5–15)
AST SERPL-CCNC: 18 U/L (ref 1–40)
BASOPHILS # BLD AUTO: 0.02 10*3/MM3 (ref 0–0.2)
BASOPHILS NFR BLD AUTO: 0.2 % (ref 0–1.5)
BILIRUB CONJ SERPL-MCNC: <0.2 MG/DL (ref 0–0.3)
BILIRUB INDIRECT SERPL-MCNC: ABNORMAL MG/DL
BILIRUB SERPL-MCNC: 0.3 MG/DL (ref 0–1.2)
BUN SERPL-MCNC: 17 MG/DL (ref 8–23)
BUN/CREAT SERPL: 16.3 (ref 7–25)
CALCIUM SPEC-SCNC: 9.3 MG/DL (ref 8.6–10.5)
CHLORIDE SERPL-SCNC: 101 MMOL/L (ref 98–107)
CO2 SERPL-SCNC: 31.7 MMOL/L (ref 22–29)
CREAT SERPL-MCNC: 1.04 MG/DL (ref 0.76–1.27)
DEPRECATED RDW RBC AUTO: 47.9 FL (ref 37–54)
EGFRCR SERPLBLD CKD-EPI 2021: 75.8 ML/MIN/1.73
EOSINOPHIL # BLD AUTO: 0.25 10*3/MM3 (ref 0–0.4)
EOSINOPHIL NFR BLD AUTO: 2.5 % (ref 0.3–6.2)
ERYTHROCYTE [DISTWIDTH] IN BLOOD BY AUTOMATED COUNT: 15.7 % (ref 12.3–15.4)
GLUCOSE BLDC GLUCOMTR-MCNC: 103 MG/DL (ref 70–99)
GLUCOSE BLDC GLUCOMTR-MCNC: 146 MG/DL (ref 70–99)
GLUCOSE BLDC GLUCOMTR-MCNC: 172 MG/DL (ref 70–99)
GLUCOSE BLDC GLUCOMTR-MCNC: 271 MG/DL (ref 70–99)
GLUCOSE SERPL-MCNC: 168 MG/DL (ref 65–99)
HCT VFR BLD AUTO: 39.5 % (ref 37.5–51)
HGB BLD-MCNC: 12 G/DL (ref 13–17.7)
IMM GRANULOCYTES # BLD AUTO: 0.02 10*3/MM3 (ref 0–0.05)
IMM GRANULOCYTES NFR BLD AUTO: 0.2 % (ref 0–0.5)
LYMPHOCYTES # BLD AUTO: 1.11 10*3/MM3 (ref 0.7–3.1)
LYMPHOCYTES NFR BLD AUTO: 11.2 % (ref 19.6–45.3)
MAGNESIUM SERPL-MCNC: 1.9 MG/DL (ref 1.6–2.4)
MCH RBC QN AUTO: 25.7 PG (ref 26.6–33)
MCHC RBC AUTO-ENTMCNC: 30.4 G/DL (ref 31.5–35.7)
MCV RBC AUTO: 84.6 FL (ref 79–97)
MONOCYTES # BLD AUTO: 0.65 10*3/MM3 (ref 0.1–0.9)
MONOCYTES NFR BLD AUTO: 6.6 % (ref 5–12)
NEUTROPHILS NFR BLD AUTO: 7.85 10*3/MM3 (ref 1.7–7)
NEUTROPHILS NFR BLD AUTO: 79.3 % (ref 42.7–76)
NRBC BLD AUTO-RTO: 0 /100 WBC (ref 0–0.2)
PHOSPHATE SERPL-MCNC: 2.3 MG/DL (ref 2.5–4.5)
PLATELET # BLD AUTO: 180 10*3/MM3 (ref 140–450)
PMV BLD AUTO: 10.6 FL (ref 6–12)
POTASSIUM SERPL-SCNC: 4.5 MMOL/L (ref 3.5–5.2)
PROT SERPL-MCNC: 5.9 G/DL (ref 6–8.5)
RBC # BLD AUTO: 4.67 10*6/MM3 (ref 4.14–5.8)
SODIUM SERPL-SCNC: 138 MMOL/L (ref 136–145)
WBC NRBC COR # BLD: 9.9 10*3/MM3 (ref 3.4–10.8)

## 2022-08-24 PROCEDURE — 82962 GLUCOSE BLOOD TEST: CPT

## 2022-08-24 PROCEDURE — 94799 UNLISTED PULMONARY SVC/PX: CPT

## 2022-08-24 PROCEDURE — 94664 DEMO&/EVAL PT USE INHALER: CPT

## 2022-08-24 PROCEDURE — 85025 COMPLETE CBC W/AUTO DIFF WBC: CPT | Performed by: FAMILY MEDICINE

## 2022-08-24 PROCEDURE — 25010000002 ENOXAPARIN PER 10 MG: Performed by: FAMILY MEDICINE

## 2022-08-24 PROCEDURE — 80076 HEPATIC FUNCTION PANEL: CPT | Performed by: FAMILY MEDICINE

## 2022-08-24 PROCEDURE — 63710000001 INSULIN DETEMIR PER 5 UNITS: Performed by: FAMILY MEDICINE

## 2022-08-24 PROCEDURE — 63710000001 INSULIN LISPRO (HUMAN) PER 5 UNITS: Performed by: FAMILY MEDICINE

## 2022-08-24 PROCEDURE — 99232 SBSQ HOSP IP/OBS MODERATE 35: CPT | Performed by: FAMILY MEDICINE

## 2022-08-24 PROCEDURE — 94761 N-INVAS EAR/PLS OXIMETRY MLT: CPT

## 2022-08-24 PROCEDURE — 99232 SBSQ HOSP IP/OBS MODERATE 35: CPT | Performed by: INTERNAL MEDICINE

## 2022-08-24 PROCEDURE — 83735 ASSAY OF MAGNESIUM: CPT | Performed by: FAMILY MEDICINE

## 2022-08-24 PROCEDURE — 84100 ASSAY OF PHOSPHORUS: CPT | Performed by: FAMILY MEDICINE

## 2022-08-24 PROCEDURE — 80048 BASIC METABOLIC PNL TOTAL CA: CPT | Performed by: FAMILY MEDICINE

## 2022-08-24 PROCEDURE — 63710000001 PREDNISONE PER 1 MG: Performed by: FAMILY MEDICINE

## 2022-08-24 RX ADMIN — SENNOSIDES AND DOCUSATE SODIUM 2 TABLET: 8.6; 5 TABLET ORAL at 08:13

## 2022-08-24 RX ADMIN — ENOXAPARIN SODIUM 40 MG: 100 INJECTION SUBCUTANEOUS at 08:13

## 2022-08-24 RX ADMIN — Medication 10 ML: at 08:13

## 2022-08-24 RX ADMIN — INSULIN DETEMIR 10 UNITS: 100 INJECTION, SOLUTION SUBCUTANEOUS at 21:07

## 2022-08-24 RX ADMIN — ARFORMOTEROL TARTRATE 15 MCG: 15 SOLUTION RESPIRATORY (INHALATION) at 21:41

## 2022-08-24 RX ADMIN — BUDESONIDE: 0.5 SUSPENSION RESPIRATORY (INHALATION) at 21:41

## 2022-08-24 RX ADMIN — PREDNISONE 40 MG: 20 TABLET ORAL at 08:13

## 2022-08-24 RX ADMIN — CETIRIZINE HYDROCHLORIDE TABLETS 10 MG: 10 TABLET, FILM COATED ORAL at 08:10

## 2022-08-24 RX ADMIN — PANTOPRAZOLE SODIUM 40 MG: 40 TABLET, DELAYED RELEASE ORAL at 06:09

## 2022-08-24 RX ADMIN — INSULIN LISPRO 2 UNITS: 100 INJECTION, SOLUTION INTRAVENOUS; SUBCUTANEOUS at 12:24

## 2022-08-24 RX ADMIN — ARFORMOTEROL TARTRATE 15 MCG: 15 SOLUTION RESPIRATORY (INHALATION) at 07:35

## 2022-08-24 RX ADMIN — GUAIFENESIN 1200 MG: 600 TABLET ORAL at 08:13

## 2022-08-24 RX ADMIN — AZITHROMYCIN MONOHYDRATE 500 MG: 250 TABLET ORAL at 08:10

## 2022-08-24 RX ADMIN — GUAIFENESIN 1200 MG: 600 TABLET ORAL at 21:06

## 2022-08-24 RX ADMIN — Medication 10 ML: at 21:06

## 2022-08-24 RX ADMIN — ATORVASTATIN CALCIUM 10 MG: 10 TABLET, FILM COATED ORAL at 21:06

## 2022-08-24 RX ADMIN — BUDESONIDE: 0.5 SUSPENSION RESPIRATORY (INHALATION) at 07:35

## 2022-08-24 RX ADMIN — METOPROLOL TARTRATE 12.5 MG: 25 TABLET, FILM COATED ORAL at 21:06

## 2022-08-24 RX ADMIN — ASPIRIN 81 MG CHEWABLE TABLET 81 MG: 81 TABLET CHEWABLE at 08:10

## 2022-08-24 NOTE — PROGRESS NOTES
[facility]   Hospitalist Progress Note  Date: 2022  Patient Name: Dilip Faulkner  : 1950  MRN: [id number]  Date of admission: 2022      Subjective   Subjective     Chief complaint: Shortness of breath     Summary:  72-year male with history of COPD, diabetes melitis with insulin use, essential hypertension, chronic hypoxemic respiratory failure on 2 L nasal cannula, asthma overlap, hospitalized on 2022 with cough and shortness of breath symptoms, had significant respiratory failure requiring NIPPV/BiPAP, breathing stabilized after, transitioned to high flow nasal cannula, pulmonary consulted     Interval follow-up: Patient seen and examined this morning, no acute stress, no acute major night events, down to 3L nasal cannula oxygen, improved cough and shortness symptoms, no conversational dyspnea, denies chest pain or palpitations.  Remains weak and debilitated but has improving symptoms.     Review of systems:  All systems reviewed and negative except for cough, improving shortness of breath, generalized fatigue, generalized weakness    Objective   Objective     Vitals:   Temp:  [97.3 °F (36.3 °C)-97.9 °F (36.6 °C)] 97.6 °F (36.4 °C)  Heart Rate:  [55-70] 55  Resp:  [18-20] 18  BP: ()/(49-74) 114/74  Flow (L/min):  [2-5] 3  Physical Exam     Constitutional: Awake, alert, no acute distress heavyset male, no acute distress, NC O2              Eyes: Pupils equal, sclerae anicteric, no conjunctival injection              HENT: NCAT, mucous membranes moist              Neck: Supple, FROM              Respiratory: Air entry bilaterally, diminished breath sounds throughout, scattered wheezing              Cardiovascular: RRR, no murmurs, rubs, or gallops, palpable pedal pulses bilaterally              Gastrointestinal: Positive bowel sounds, soft, nontender, nondistended              Musculoskeletal: Trace to +1 bilateral ankle edema, no clubbing or cyanosis to extremities               Psychiatric: Appropriate affect, cooperative              Neurologic: Oriented x 3, strength symmetric in all extremities, Cranial Nerves grossly intact to confrontation, speech clear              Skin: No rashes     Result Review    Result Review:  I have personally reviewed the pertinent results from the past 24 hours to 8/24/2022 14:08 EDT and agree with these findings:  [x]  Laboratory   CBC    CBC 8/22/22 8/23/22 8/24/22   WBC 8.76 6.15 9.90   RBC 5.11 4.87 4.67   Hemoglobin 13.1 12.4 (A) 12.0 (A)   Hematocrit 44.1 42.0 39.5   MCV 86.3 86.2 84.6   MCH 25.6 (A) 25.5 (A) 25.7 (A)   MCHC 29.7 (A) 29.5 (A) 30.4 (A)   RDW 15.8 (A) 15.6 (A) 15.7 (A)   Platelets 201 147 180   (A) Abnormal value            BMP    BMP 8/22/22 8/23/22 8/24/22   BUN 12 14 17   Creatinine 0.99 0.95 1.04   Sodium 139 137 138   Potassium 3.8 4.9 4.5   Chloride 103 102 101   CO2 25.8 23.0 31.7 (A)   Calcium 9.0 8.6 9.3   (A) Abnormal value       Comments are available for some flowsheets but are not being displayed.           LIVER FUNCTION TESTS:      Lab 08/24/22  1137 08/23/22  0507 08/22/22  1633   TOTAL PROTEIN 5.9* 6.5 7.1   ALBUMIN 3.40* 3.50 3.80   GLOBULIN  --  3.0 3.3   ALT (SGPT) 14 12 14   AST (SGOT) 18 23 22   BILIRUBIN 0.3 0.4 0.4   BILIRUBIN DIRECT <0.2  --   --    ALK PHOS 74 84 88       [x]  Microbiology   Blood Culture   Date Value Ref Range Status   08/22/2022 No growth at 24 hours  Preliminary     No results found for: BCIDPCR, CXREFLEX, CSFCX, CULTURETIS  No results found for: CULTURES, HSVCX, URCX  No results found for: EYECULTURE, GCCX, HSVCULTURE, LABHSV  No results found for: LEGIONELLA, MRSACX, MUMPSCX, MYCOPLASCX  No results found for: NOCARDIACX, STOOLCX  No results found for: THROATCX, UNSTIMCULT, URINECX, CULTURE, VZVCULTUR  No results found for: VIRALCULTU, WOUNDCX    [x]  Radiology XR Chest 1 View    Result Date: 8/22/2022  PROCEDURE: XR CHEST 1 VW  COMPARISON: [facility], CR, XR CHEST 1 VW,  8/01/2022, 4:38.  INDICATIONS: SOA Triage Protocol  FINDINGS:  The heart size is normal.  There is no interval change in the left basilar pleural/parenchymal scarring.  The right lung and pleural space are clear.  The pulmonary vascular markings are normal.  There is degenerative spondylosis of the thoracic spine.       No active disease, stable chest x-ray.       LALA GARIBAY MD       Electronically Signed and Approved By: LALA GARIBAY MD on 8/22/2022 at 17:02             XR Chest 1 View    Result Date: 8/1/2022  PROCEDURE: XR CHEST 1 VW  COMPARISON: [facility], CT, CT CHEST WO CONTRAST DIAGNOSTIC, 6/15/2022, 8:07.  [facility], CR, XR CHEST 1 VW, 7/10/2022, 2:45.  INDICATIONS: SOA  FINDINGS:  Chronic pleural thickening with chronic scarring and opacities in the left lung base.  Right lung is clear.  Cardiac and mediastinal contours are within normal limits.  Regional skeleton is unremarkable.        1. Chronic changes in the left lung base. 2. No acute cardiopulmonary disease.       JEF BAEZ MD       Electronically Signed and Approved By: JEF BAEZ MD on 8/01/2022 at 4:51               [x]  EKG/Telemetry   []  Cardiology/Vascular   []  Pathology  [x]  Old records  []  Other:    Assessment & Plan   Assessment / Plan     Assessment/Plan:  Assessment:  Acute on chronic hypoxemic respite failure with hypercapnia  Acute exacerbation of COPD  Asthma overlap  Insulin-dependent diabetes mellitus  Essential hypertension  Dyslipidemia     Plan:  Labs and imaging reviewed  Transferred to regular floor  Consulted pulmonary discussed with Dr. Vail, recommendations appreciated  Continue supplemental oxygen to maintain sats greater 90%  BiPAP per pulmonary  Continue with Jillian Bai nebs twice daily  Continue Levemir 10 units nightly  Insulin sliding scale coverage  Continue prednisone 40 mg daily to complete 5 days  Continue azithromycin 500 mg for 3 days total  Continue metoprolol 12.5  mg p.o. twice daily  Follow cultures  Continue lisinopril 2.5 mg daily  Continue Protonix 40 mg daily  A.m. labs  Full code  DVT prophylaxis with Lovenox  Clinical course dictate further management  Discussed with nurse at the bedside    DVT prophylaxis:  Medical and mechanical DVT prophylaxis orders are present.    CODE STATUS:   Level Of Support Discussed With: Patient  Code Status (Patient has no pulse and is not breathing): CPR (Attempt to Resuscitate)  Medical Interventions (Patient has pulse or is breathing): Full      [place]agon disclaimer:  Much of this encounter note is an electronic transcription/translation of spoken language to printed text. The electronic translation of spoken language may permit erroneous, or at times, nonsensical words or phrases to be inadvertently transcribed. Although I have reviewed the note for such errors, some may still exist.    Electronically signed by Oralia Hardy MD, 08/24/22, 2:08 PM EDT.

## 2022-08-24 NOTE — SIGNIFICANT NOTE
08/24/22 1130   Coping/Psychosocial   Observed Emotional State calm   Verbalized Emotional State hopefulness   Trust Relationship/Rapport empathic listening provided   Involvement in Care interacting with patient   Additional Documentation Spiritual Care (Group)   Spiritual Care   Use of Spiritual Resources non-Scientology use of spiritual care   Spiritual Care Source  initiative   Spiritual Care Follow-Up follow-up, none required as presently assessed   Response to Spiritual Care engagement, unsatisfactory   Spiritual Care Visit Type initial   Receptivity to Spiritual Care unresponsive;other (see comments)  (Pt doesn't talk much. just answer with yes and no.)      Quality 226: Preventive Care And Screening: Tobacco Use: Screening And Cessation Intervention: Patient screened for tobacco use and is an ex/non-smoker Detail Level: Detailed Quality 431: Preventive Care And Screening: Unhealthy Alcohol Use - Screening: Patient not identified as an unhealthy alcohol user when screened for unhealthy alcohol use using a systematic screening method Quality 130: Documentation Of Current Medications In The Medical Record: Current Medications Documented

## 2022-08-24 NOTE — PROGRESS NOTES
Pulmonary / Critical Care Progress Note      Patient Name: Dilip Faulkner  : 1950  MRN: [id number]  Primary Care Physician:  Rosalba Edwards, MONE  Date of admission: 2022    Subjective   Subjective   Follow-up for acute on chronic respiratory failure.    Over past 24 hours, has been able to come off BiPAP to 3 L nasal cannula.    No acute events over night.    This morning,  Sitting up in bed on 3 L nasal cannula with O2 sats 96%  Eating breakfast  Feels breathing has improved since admission  Nonproductive cough  Decreased wheezing  Episode of low BP this morning 90/52 BP meds were held  No chest pain  No fever or chills  Weak and fatigued    Review of Systems  General: Fatigue, otherwise denied complaints  HEENT: Denied complaints  Respiratory: Dyspnea, cough, wheeze, otherwise denied complaints  Cardiovascular: Denied complaints  GI: Denied complaints  : Denied complaints  MSK: Weakness, otherwise denied complaints    Objective   Objective     Vitals:   Temp:  [97.3 °F (36.3 °C)-97.9 °F (36.6 °C)] 97.9 °F (36.6 °C)  Heart Rate:  [57-70] 58  Resp:  [18-20] 18  BP: ()/(49-71) 96/58  Flow (L/min):  [2-5] 3    Physical Exam   Vital Signs Reviewed   General: Obese male, Awake and Alert, NAD on 3 L NC  HEENT:  PERRL, EOMI.  OP, nares clear, no sinus tenderness  Neck:  Supple, no JVD, no thyromegaly  Chest:  good aeration, rhonchi bilaterally, tympanic to percussion bilaterally, no work of breathing noted  CV: RRR, no MGR, pulses 2+, equal  Abd:  Soft, NT, ND, + BS, no HSM, obese  EXT:  no clubbing, no cyanosis, no edema  Neuro:  A&Ox3, CN grossly intact, no focal deficits  Skin: No rashes or lesions noted    Result Review    Result Review:  I have personally reviewed the results from the time of this admission to 2022 09:25 EDT and agree with these findings:  [x]  Laboratory  [x]  Microbiology  [x]  Radiology  [x]  EKG/Telemetry   []  Cardiology/Vascular   []  Pathology  []  Old  records  []  Other:  Most notable findings include: No a.m. labs    8/23 ABG 7.35, 46.2, 88.1, 25 on 5 L nasal cannula    8/22 CXR no acute disease process    8/22 COVID-negative  Flu negative  Blood cultures no growth at 24 hours    Assessment & Plan   Assessment / Plan     Active Hospital Problems:  Active Hospital Problems    Diagnosis    • **Acute on chronic respiratory failure with hypoxia and hypercapnia (HCC)    • Respiratory failure with hypoxia (HCC)    • COPD exacerbation (HCC)    • Type 2 diabetes mellitus, with long-term current use of insulin (HCC)    • Obesity (BMI 30-39.9)    • Essential hypertension    • Hyperlipidemia    • Chronic obstructive pulmonary disease with acute exacerbation (HCC)      Impression:  Acute on chronic respiratory failure with hypoxemia and respiratory acidosis  COPD exacerbation  Hypertension  Diabetes mellitus  Obesity  History of tobacco abuse    Plan:  Continue wean O2 to keep sats greater than 90%.  Continue NIPPV at night and as needed days.  Continue prednisone 40 mg p.o. daily, day 2.  Continue azithromycin p.o.  Continue nebulizers and bronchopulmonary hygiene.  Encourage I-S and flutter valve.  Continue Mucinex.  Continue Lovenox for DVT prophylaxis.  Continue glucose control with sliding scale insulin.  Encourage activity.  Out of bed to chair.    DVT prophylaxis:  Medical and mechanical DVT prophylaxis orders are present.    CODE STATUS:   Level Of Support Discussed With: Patient  Code Status (Patient has no pulse and is not breathing): CPR (Attempt to Resuscitate)  Medical Interventions (Patient has pulse or is breathing): Full    Labs, microbiology, radiology, medications, and provider notes personally reviewed.  Discussed with primary services and bedside RN.    Electronically signed by MONE Mcbride, 08/24/22, 9:25 AM EDT.    I, Dr. Vail, have spent more than 50% of the total time managing the patient in this encounter today.  This included personally  reviewing all pertinent labs, imaging, microbiology and documentation. Also discussing the case with the patient and any available family, the admitting physician and any available ancillary staff.  Electronically signed by Chandu Vail MD, 08/24/22, 12:10 PM EDT.

## 2022-08-24 NOTE — PLAN OF CARE
Goal Outcome Evaluation:   VSS. BP meds held this am, MD aware. No other complaints or changes this shift.

## 2022-08-25 ENCOUNTER — READMISSION MANAGEMENT (OUTPATIENT)
Dept: CALL CENTER | Facility: HOSPITAL | Age: 72
End: 2022-08-25

## 2022-08-25 VITALS
HEIGHT: 73 IN | RESPIRATION RATE: 18 BRPM | BODY MASS INDEX: 32.02 KG/M2 | TEMPERATURE: 97.5 F | WEIGHT: 241.62 LBS | SYSTOLIC BLOOD PRESSURE: 109 MMHG | HEART RATE: 58 BPM | DIASTOLIC BLOOD PRESSURE: 63 MMHG | OXYGEN SATURATION: 94 %

## 2022-08-25 PROBLEM — I10 ESSENTIAL HYPERTENSION: Status: ACTIVE | Noted: 2020-10-29

## 2022-08-25 PROBLEM — J44.1 COPD EXACERBATION: Status: RESOLVED | Noted: 2022-05-20 | Resolved: 2022-08-25

## 2022-08-25 PROBLEM — K21.9 GASTROESOPHAGEAL REFLUX DISEASE WITHOUT ESOPHAGITIS: Status: ACTIVE | Noted: 2020-10-29

## 2022-08-25 PROBLEM — E11.65 TYPE 2 DIABETES MELLITUS WITH HYPERGLYCEMIA, WITH LONG-TERM CURRENT USE OF INSULIN (HCC): Status: ACTIVE | Noted: 2020-10-29

## 2022-08-25 PROBLEM — Z79.4 TYPE 2 DIABETES MELLITUS WITH HYPERGLYCEMIA, WITH LONG-TERM CURRENT USE OF INSULIN: Status: ACTIVE | Noted: 2020-10-29

## 2022-08-25 LAB
ALBUMIN SERPL-MCNC: 3.5 G/DL (ref 3.5–5.2)
ALP SERPL-CCNC: 74 U/L (ref 39–117)
ALT SERPL W P-5'-P-CCNC: 14 U/L (ref 1–41)
ANION GAP SERPL CALCULATED.3IONS-SCNC: 9.5 MMOL/L (ref 5–15)
AST SERPL-CCNC: 15 U/L (ref 1–40)
BASOPHILS # BLD AUTO: 0.01 10*3/MM3 (ref 0–0.2)
BASOPHILS NFR BLD AUTO: 0.1 % (ref 0–1.5)
BILIRUB CONJ SERPL-MCNC: <0.2 MG/DL (ref 0–0.3)
BILIRUB INDIRECT SERPL-MCNC: NORMAL MG/DL
BILIRUB SERPL-MCNC: 0.2 MG/DL (ref 0–1.2)
BUN SERPL-MCNC: 20 MG/DL (ref 8–23)
BUN/CREAT SERPL: 19.2 (ref 7–25)
CALCIUM SPEC-SCNC: 9.1 MG/DL (ref 8.6–10.5)
CHLORIDE SERPL-SCNC: 99 MMOL/L (ref 98–107)
CO2 SERPL-SCNC: 25.5 MMOL/L (ref 22–29)
CREAT SERPL-MCNC: 1.04 MG/DL (ref 0.76–1.27)
DEPRECATED RDW RBC AUTO: 46.9 FL (ref 37–54)
EGFRCR SERPLBLD CKD-EPI 2021: 75.8 ML/MIN/1.73
EOSINOPHIL # BLD AUTO: 0.04 10*3/MM3 (ref 0–0.4)
EOSINOPHIL NFR BLD AUTO: 0.5 % (ref 0.3–6.2)
ERYTHROCYTE [DISTWIDTH] IN BLOOD BY AUTOMATED COUNT: 15.6 % (ref 12.3–15.4)
GLUCOSE BLDC GLUCOMTR-MCNC: 126 MG/DL (ref 70–99)
GLUCOSE BLDC GLUCOMTR-MCNC: 211 MG/DL (ref 70–99)
GLUCOSE SERPL-MCNC: 303 MG/DL (ref 65–99)
HCT VFR BLD AUTO: 41.4 % (ref 37.5–51)
HGB BLD-MCNC: 12.4 G/DL (ref 13–17.7)
IMM GRANULOCYTES # BLD AUTO: 0.04 10*3/MM3 (ref 0–0.05)
IMM GRANULOCYTES NFR BLD AUTO: 0.5 % (ref 0–0.5)
LYMPHOCYTES # BLD AUTO: 1.22 10*3/MM3 (ref 0.7–3.1)
LYMPHOCYTES NFR BLD AUTO: 13.9 % (ref 19.6–45.3)
MAGNESIUM SERPL-MCNC: 1.9 MG/DL (ref 1.6–2.4)
MCH RBC QN AUTO: 25.2 PG (ref 26.6–33)
MCHC RBC AUTO-ENTMCNC: 30 G/DL (ref 31.5–35.7)
MCV RBC AUTO: 84 FL (ref 79–97)
MONOCYTES # BLD AUTO: 0.44 10*3/MM3 (ref 0.1–0.9)
MONOCYTES NFR BLD AUTO: 5 % (ref 5–12)
NEUTROPHILS NFR BLD AUTO: 7.04 10*3/MM3 (ref 1.7–7)
NEUTROPHILS NFR BLD AUTO: 80 % (ref 42.7–76)
NRBC BLD AUTO-RTO: 0 /100 WBC (ref 0–0.2)
PHOSPHATE SERPL-MCNC: 3 MG/DL (ref 2.5–4.5)
PLATELET # BLD AUTO: 190 10*3/MM3 (ref 140–450)
PMV BLD AUTO: 10.5 FL (ref 6–12)
POTASSIUM SERPL-SCNC: 4.8 MMOL/L (ref 3.5–5.2)
PROT SERPL-MCNC: 6.2 G/DL (ref 6–8.5)
RBC # BLD AUTO: 4.93 10*6/MM3 (ref 4.14–5.8)
SODIUM SERPL-SCNC: 134 MMOL/L (ref 136–145)
WBC NRBC COR # BLD: 8.79 10*3/MM3 (ref 3.4–10.8)

## 2022-08-25 PROCEDURE — 97161 PT EVAL LOW COMPLEX 20 MIN: CPT

## 2022-08-25 PROCEDURE — 25010000002 ENOXAPARIN PER 10 MG: Performed by: FAMILY MEDICINE

## 2022-08-25 PROCEDURE — 99239 HOSP IP/OBS DSCHRG MGMT >30: CPT | Performed by: FAMILY MEDICINE

## 2022-08-25 PROCEDURE — 99232 SBSQ HOSP IP/OBS MODERATE 35: CPT | Performed by: INTERNAL MEDICINE

## 2022-08-25 PROCEDURE — 85025 COMPLETE CBC W/AUTO DIFF WBC: CPT | Performed by: FAMILY MEDICINE

## 2022-08-25 PROCEDURE — 80048 BASIC METABOLIC PNL TOTAL CA: CPT | Performed by: FAMILY MEDICINE

## 2022-08-25 PROCEDURE — 80076 HEPATIC FUNCTION PANEL: CPT | Performed by: FAMILY MEDICINE

## 2022-08-25 PROCEDURE — 84100 ASSAY OF PHOSPHORUS: CPT | Performed by: FAMILY MEDICINE

## 2022-08-25 PROCEDURE — 83735 ASSAY OF MAGNESIUM: CPT | Performed by: FAMILY MEDICINE

## 2022-08-25 PROCEDURE — 82962 GLUCOSE BLOOD TEST: CPT

## 2022-08-25 PROCEDURE — 94761 N-INVAS EAR/PLS OXIMETRY MLT: CPT

## 2022-08-25 PROCEDURE — 94799 UNLISTED PULMONARY SVC/PX: CPT

## 2022-08-25 PROCEDURE — 63710000001 PREDNISONE PER 1 MG: Performed by: FAMILY MEDICINE

## 2022-08-25 PROCEDURE — 63710000001 INSULIN LISPRO (HUMAN) PER 5 UNITS: Performed by: FAMILY MEDICINE

## 2022-08-25 RX ORDER — AZITHROMYCIN 250 MG/1
500 TABLET, FILM COATED ORAL DAILY
Qty: 6 TABLET | Refills: 0 | Status: SHIPPED | OUTPATIENT
Start: 2022-08-25 | End: 2022-08-28

## 2022-08-25 RX ORDER — PREDNISONE 20 MG/1
40 TABLET ORAL
Qty: 10 TABLET | Refills: 0 | Status: SHIPPED | OUTPATIENT
Start: 2022-08-26 | End: 2022-08-31

## 2022-08-25 RX ADMIN — LISINOPRIL 2.5 MG: 2.5 TABLET ORAL at 09:25

## 2022-08-25 RX ADMIN — SENNOSIDES AND DOCUSATE SODIUM 2 TABLET: 8.6; 5 TABLET ORAL at 09:25

## 2022-08-25 RX ADMIN — CETIRIZINE HYDROCHLORIDE TABLETS 10 MG: 10 TABLET, FILM COATED ORAL at 09:25

## 2022-08-25 RX ADMIN — Medication 10 ML: at 09:26

## 2022-08-25 RX ADMIN — GUAIFENESIN 1200 MG: 600 TABLET ORAL at 09:26

## 2022-08-25 RX ADMIN — INSULIN LISPRO 3 UNITS: 100 INJECTION, SOLUTION INTRAVENOUS; SUBCUTANEOUS at 09:25

## 2022-08-25 RX ADMIN — BUDESONIDE: 0.5 SUSPENSION RESPIRATORY (INHALATION) at 07:01

## 2022-08-25 RX ADMIN — PANTOPRAZOLE SODIUM 40 MG: 40 TABLET, DELAYED RELEASE ORAL at 05:48

## 2022-08-25 RX ADMIN — AZITHROMYCIN MONOHYDRATE 500 MG: 250 TABLET ORAL at 09:26

## 2022-08-25 RX ADMIN — ASPIRIN 81 MG CHEWABLE TABLET 81 MG: 81 TABLET CHEWABLE at 09:25

## 2022-08-25 RX ADMIN — METOPROLOL TARTRATE 12.5 MG: 25 TABLET, FILM COATED ORAL at 09:26

## 2022-08-25 RX ADMIN — ARFORMOTEROL TARTRATE 15 MCG: 15 SOLUTION RESPIRATORY (INHALATION) at 07:01

## 2022-08-25 RX ADMIN — ENOXAPARIN SODIUM 40 MG: 100 INJECTION SUBCUTANEOUS at 09:25

## 2022-08-25 RX ADMIN — PREDNISONE 40 MG: 20 TABLET ORAL at 09:25

## 2022-08-25 NOTE — DISCHARGE SUMMARY
[facility]         HOSPITALIST  DISCHARGE SUMMARY    Patient Name: Dilip Faulkner  : 1950  MRN: [id number]    Date of Admission: 2022  Date of Discharge:  2022    Primary Care Physician: Rosalba Edwards APRN    Consults     No orders found from 2022 to 2022.          Active and Resolved Hospital Problems:  Acute on chronic hypoxemic respite failure with hypercapnia  Acute exacerbation of COPD  Asthma overlap  Insulin-dependent diabetes mellitus  Essential hypertension  Dyslipidemia       Active Hospital Problems    Diagnosis POA   • **Acute on chronic respiratory failure with hypoxia and hypercapnia (HCC) [J96.21, J96.22] Yes   • Respiratory failure with hypoxia (HCC) [J96.91] Yes   • Obesity (BMI 30-39.9) [E66.9] Yes   • Hyperlipidemia [E78.5] Yes   • Chronic obstructive pulmonary disease with acute exacerbation (HCC) [J44.1] Yes   • Essential hypertension [I10] Yes   • Type 2 diabetes mellitus with hyperglycemia, with long-term current use of insulin (HCC) [E11.65, Z79.4] Not Applicable      Resolved Hospital Problems    Diagnosis POA   • COPD exacerbation (HCC) [J44.1] Yes       Hospital Course     Hospital Course:  72-year male with history of COPD, diabetes melitis with insulin use, essential hypertension, chronic hypoxemic respiratory failure on 2 L nasal cannula, asthma overlap, hospitalized on 2022 with cough and shortness of breath symptoms, had significant respiratory failure requiring NIPPV/BiPAP, breathing stabilized after, transitioned to high flow nasal cannula, pulmonary consulted.  Refused to be set up with BiPAP device.  Wean down oxygen down to his baseline level.  Was stable on 3 L.  Was able to ambulate safely.  Had tolerable cough and shortness of breath with exertion.  Adamant about going home on 2022.  Discharged in hemodynamically stable condition on 2022 to follow-up with pulmonary in 2 weeks, to continue steroids and  antibiotics on discharge.  COPD meds reviewed upon discharge.  Refused home health, high risk for readmission due to noncompliance.      Day of Discharge     Vital Signs:  Temp:  [97.3 °F (36.3 °C)-98.2 °F (36.8 °C)] 97.5 °F (36.4 °C)  Heart Rate:  [58-74] 58  Resp:  [18-20] 18  BP: (109-141)/(54-93) 109/63  Flow (L/min):  [2-3] 2  Review of systems:  All systems reviewed and negative except for improving shortness of breath, generalized fatigue, generalized weakness     Physical Exam                          Constitutional: Awake, alert, no acute distress heavyset male, no acute distress, NC O2              Eyes: Pupils equal, sclerae anicteric, no conjunctival injection              HENT: NCAT, mucous membranes moist              Neck: Supple, FROM              Respiratory: Air entry bilaterally, diminished breath sounds throughout, scattered wheezing              Cardiovascular: RRR, no murmurs, rubs, or gallops, palpable pedal pulses bilaterally              Gastrointestinal: Positive bowel sounds, soft, nontender, nondistended              Musculoskeletal: Trace bilateral ankle edema, no clubbing or cyanosis to extremities              Psychiatric: Appropriate affect, cooperative              Neurologic: Oriented x 3, strength symmetric in all extremities, Cranial Nerves grossly intact to confrontation, speech clear              Skin: No rashes            Discharge Details        Discharge Medications      New Medications      Instructions Start Date   azithromycin 250 MG tablet  Commonly known as: ZITHROMAX   500 mg, Oral, Daily      predniSONE 20 MG tablet  Commonly known as: DELTASONE   40 mg, Oral, Daily With Breakfast   Start Date: August 26, 2022        Continue These Medications      Instructions Start Date   albuterol sulfate  (90 Base) MCG/ACT inhaler  Commonly known as: PROVENTIL HFA;VENTOLIN HFA;PROAIR HFA   2 puffs, Inhalation, Every 6 Hours PRN      aspirin 81 MG chewable tablet   81 mg,  Oral, Daily      atorvastatin 10 MG tablet  Commonly known as: LIPITOR   10 mg, Oral, Daily      cetirizine 10 MG tablet  Commonly known as: zyrTEC   10 mg, Oral, Daily      Fluticasone-Salmeterol 250-50 MCG/ACT DISKUS  Commonly known as: ADVAIR/WIXELA   1 puff, Inhalation, 2 Times Daily - RT      ipratropium-albuterol 0.5-2.5 mg/3 ml nebulizer  Commonly known as: DUO-NEB   3 mL, Nebulization, Every 4 Hours PRN      lisinopril 2.5 MG tablet  Commonly known as: PRINIVIL,ZESTRIL   2.5 mg, Oral, Daily      metFORMIN 500 MG tablet  Commonly known as: GLUCOPHAGE   500 mg, Oral, 2 times daily      metoprolol tartrate 25 MG tablet  Commonly known as: LOPRESSOR   Take 0.5 (1/2) tablet by mouth 2 (Two) Times a Day for 30 days.      Mucus Relief 600 MG 12 hr tablet  Generic drug: guaiFENesin   1,200 mg, Oral, Every 12 Hours Scheduled      omeprazole 20 MG capsule  Commonly known as: priLOSEC   20 mg, Oral, Daily      Tresiba FlexTouch 100 UNIT/ML solution pen-injector injection  Generic drug: insulin degludec   17 Units, Subcutaneous, Daily             No Known Allergies    Discharge Disposition:  Home or Self Care    Diet:  Hospital:No active diet order      Discharge Activity: as tolerates      CODE STATUS:  Code Status and Medical Interventions:   Ordered at: 08/22/22 1906     Level Of Support Discussed With:    Patient     Code Status (Patient has no pulse and is not breathing):    CPR (Attempt to Resuscitate)     Medical Interventions (Patient has pulse or is breathing):    Full         Future Appointments   Date Time Provider Department Center   9/9/2022  9:00 AM Vivien Jacobs APRN [facility] [place] [facility]       Additional Instructions for the Follow-ups that You Need to Schedule     Discharge Follow-up with PCP   As directed       Currently Documented PCP:    Rosalba Edwards APRN    PCP Phone Number:    [phone]     Follow Up Details: 3 to 7 days         Discharge Follow-up with Specified Provider: Pulm clinic in 2  weeks   As directed      To: Pulm clinic in 2 weeks               Pertinent  and/or Most Recent Results     PROCEDURES:   XR Chest 1 View    Result Date: 8/22/2022  PROCEDURE: XR CHEST 1 VW  COMPARISON: [facility], CR, XR CHEST 1 VW, 8/01/2022, 4:38.  INDICATIONS: SOA Triage Protocol  FINDINGS:  The heart size is normal.  There is no interval change in the left basilar pleural/parenchymal scarring.  The right lung and pleural space are clear.  The pulmonary vascular markings are normal.  There is degenerative spondylosis of the thoracic spine.       No active disease, stable chest x-ray.       LALA GARIBAY MD       Electronically Signed and Approved By: LALA GARIBAY MD on 8/22/2022 at 17:02             XR Chest 1 View    Result Date: 8/1/2022  PROCEDURE: XR CHEST 1 VW  COMPARISON: [facility], CT, CT CHEST WO CONTRAST DIAGNOSTIC, 6/15/2022, 8:07.  [facility], CR, XR CHEST 1 VW, 7/10/2022, 2:45.  INDICATIONS: SOA  FINDINGS:  Chronic pleural thickening with chronic scarring and opacities in the left lung base.  Right lung is clear.  Cardiac and mediastinal contours are within normal limits.  Regional skeleton is unremarkable.        1. Chronic changes in the left lung base. 2. No acute cardiopulmonary disease.       JEF BAEZ MD       Electronically Signed and Approved By: JEF BAEZ MD on 8/01/2022 at 4:51               LAB RESULTS:      Lab 08/25/22 0518 08/24/22 1137 08/23/22  0507 08/22/22  1633   WBC 8.79 9.90 6.15 8.76   HEMOGLOBIN 12.4* 12.0* 12.4* 13.1   HEMATOCRIT 41.4 39.5 42.0 44.1   PLATELETS 190 180 147 201   NEUTROS ABS 7.04* 7.85* 5.23 5.28   IMMATURE GRANS (ABS) 0.04 0.02 0.03 0.03   LYMPHS ABS 1.22 1.11 0.78 1.64   MONOS ABS 0.44 0.65 0.08* 0.66   EOS ABS 0.04 0.25 0.01 1.08*   MCV 84.0 84.6 86.2 86.3   LACTATE  --   --   --  1.0         Lab 08/25/22  0518 08/24/22  1137 08/23/22  0507 08/22/22  1633   SODIUM 134* 138 137 139   POTASSIUM 4.8 4.5 4.9 3.8    CHLORIDE 99 101 102 103   CO2 25.5 31.7* 23.0 25.8   ANION GAP 9.5 5.3 12.0 10.2   BUN 20 17 14 12   CREATININE 1.04 1.04 0.95 0.99   EGFR 75.8 75.8 84.5 80.4   GLUCOSE 303* 168* 244* 116*   CALCIUM 9.1 9.3 8.6 9.0   MAGNESIUM 1.9 1.9  --   --    PHOSPHORUS 3.0 2.3*  --   --          Lab 08/25/22  0518 08/24/22  1137 08/23/22  0507 08/22/22  1633   TOTAL PROTEIN 6.2 5.9* 6.5 7.1   ALBUMIN 3.50 3.40* 3.50 3.80   GLOBULIN  --   --  3.0 3.3   ALT (SGPT) 14 14 12 14   AST (SGOT) 15 18 23 22   BILIRUBIN 0.2 0.3 0.4 0.4   BILIRUBIN DIRECT <0.2 <0.2  --   --    ALK PHOS 74 74 84 88         Lab 08/22/22  1633   PROBNP 231.4   TROPONIN T 0.013                 Lab 08/23/22  1005 08/22/22  1747   PH, ARTERIAL 7.351 7.261*   PCO2, ARTERIAL 46.2* 62.2*   PO2 ART 88.1 71.1*   O2 SATURATION ART 95.6 92.5*   FIO2  --  40   HCO3 ART 25.0 27.3*   BASE EXCESS ART -0.9 -0.9   CARBOXYHEMOGLOBIN 0.8 1.3     Brief Urine Lab Results  (Last result in the past 365 days)      Color   Clarity   Blood   Leuk Est   Nitrite   Protein   CREAT   Urine HCG        08/01/22 1250 Yellow   Clear   Negative   Negative   Negative   Negative               Microbiology Results (last 10 days)     Procedure Component Value - Date/Time    COVID-19,APTIMA PANTHER(SRAVANTHI),[facility], NP/OP SWAB IN UTM/VTM/[facility] TRANSPORT MEDIA,24 HR TAT - Swab, Nasal Cavity [[id number]]  (Normal) Collected: 08/22/22 1803    Lab Status: Final result Specimen: Swab from Nasal Cavity Updated: 08/22/22 2311     COVID19 Not Detected    Narrative:      Fact sheet for providers: https://www.fda.gov/media/012413/download     Fact sheet for patients: https://www.fda.gov/media/632665/download    Test performed by RT PCR.    Influenza Antigen, Rapid - Swab, Nasopharynx [[id number]]  (Normal) Collected: 08/22/22 1803    Lab Status: Final result Specimen: Swab from Nasopharynx Updated: 08/22/22 1835     Influenza A Ag, EIA Negative     Influenza B Ag, EIA Negative    Blood Culture - Blood,  Arm, Right [[id number]]  (Normal) Collected: 08/22/22 1636    Lab Status: Preliminary result Specimen: Blood from Arm, Right Updated: 08/25/22 1647     Blood Culture No growth at 3 days    Blood Culture - Blood, Arm, Left [[id number]]  (Normal) Collected: 08/22/22 1633    Lab Status: Preliminary result Specimen: Blood from Arm, Left Updated: 08/25/22 1647     Blood Culture No growth at 3 days          XR Chest 1 View    Result Date: 8/22/2022  Impression:  No active disease, stable chest x-ray.       LALA GARIBAY MD       Electronically Signed and Approved By: LALA GARIBAY MD on 8/22/2022 at 17:02             XR Chest 1 View    Result Date: 8/1/2022  Impression:   1. Chronic changes in the left lung base. 2. No acute cardiopulmonary disease.       JEF BAEZ MD       Electronically Signed and Approved By: JEF BAEZ MD on 8/01/2022 at 4:51                           Labs Pending at Discharge:  Pending Labs     Order Current Status    Blood Culture - Blood, Arm, Left Preliminary result    Blood Culture - Blood, Arm, Right Preliminary result            Time spent on Discharge including face to face service:  35 minutes    Electronically signed by Oralia Hardy MD, 08/25/22, 5:47 PM EDT.    [place] disclaimer:  Much of this encounter note is an electronic transcription/translation of spoken language to printed text. The electronic translation of spoken language may permit erroneous, or at times, nonsensical words or phrases to be inadvertently transcribed. Although I have reviewed the note for such errors, some may still exist.

## 2022-08-25 NOTE — CONSULTS
Discharge Planning Assessment   [facility]     Patient Name: Dilip Faulkner  MRN: [id number]  Today's Date: 8/25/2022    Admit Date: 8/22/2022     Discharge Plan     Row Name 08/25/22 0923       Plan    Final Discharge Disposition Code 01 - home or self-care    Final Note Pt to discharge home today. SW followed up with pt regarding needs. Discussed home health care with pt who denies the need at this time.                ZAINAB Shultz

## 2022-08-25 NOTE — PLAN OF CARE
Goal Outcome Evaluation:              Outcome Evaluation: Called security and ED, no belongings found.  notified- safe report to be filed. Gave patient the number to call [facility]. SW to provide cab voucher, clothes, and oxygen tank. Alert and oriented x4. Discharge home.

## 2022-08-25 NOTE — PLAN OF CARE
Goal Outcome Evaluation:  Plan of Care Reviewed With: patient        Progress: no change  Outcome Evaluation: Was a transfer from 3NT this shift. Pt is currently on 3L nc and wearing continuous pulse ox at bedside as ordered. Denies pain/discomfort this shift. Pt is resting well this shift. No new issues or needs at this time.

## 2022-08-25 NOTE — PLAN OF CARE
Goal Outcome Evaluation:  Plan of Care Reviewed With: patient           Outcome Evaluation: Patient presents with decreased strength, transfers and ambulation.  Skilled physical therapy services will be required to address these mobility deficits.  Recommend home health services upon discharge from the hospital

## 2022-08-25 NOTE — THERAPY EVALUATION
Acute Care - Physical Therapy Initial Evaluation   [facility]     Patient Name: Dilip Faulkner  : 1950  MRN: [id number]  Today's Date: 2022      Visit Dx:     ICD-10-CM ICD-9-CM   1. Acute on chronic respiratory failure with hypoxia and hypercapnia (HCC)  J96.21 518.84    J96.22 786.09     799.02   2. COPD exacerbation (HCC)  J44.1 491.21   3. Difficulty in walking  R26.2 719.7     Patient Active Problem List   Diagnosis   • Chronic obstructive pulmonary disease with acute exacerbation (HCC)   • Acute on chronic respiratory failure with hypoxia and hypercapnia (HCC)   • Type 2 diabetes mellitus (HCC)   • Acute on chronic respiratory failure (HCC)   • Essential hypertension   • Hyperlipidemia   • Cough   • Pneumonia due to infectious organism   • COPD with acute exacerbation (HCC)   • Obesity (BMI 30-39.9)   • Right bundle branch block   • Acquired ptosis of eyelid, bilateral   • Type 2 diabetes mellitus with hyperglycemia, with long-term current use of insulin (HCC)   • Acute exacerbation of chronic obstructive pulmonary disease (COPD) (HCC)   • Respiratory failure with hypoxia (HCC)   • Gastroesophageal reflux disease without esophagitis     Past Medical History:   Diagnosis Date   • Asthma    • COPD (chronic obstructive pulmonary disease) (HCC)    • Diabetes mellitus (HCC)    • Hernia, umbilical    • Hypertension    • Requires continuous at home supplemental oxygen      Past Surgical History:   Procedure Laterality Date   • ABDOMINAL SURGERY       PT Assessment (last 12 hours)     PT Evaluation and Treatment     Row Name 22 1242          Physical Therapy Time and Intention    Subjective Information no complaints  -CHARLY     Document Type evaluation  -CHARLY     Mode of Treatment individual therapy;physical therapy  -CHARLY     Patient Effort good  -CHARLY     Row Name 22 1242          General Information    Patient Observations alert;cooperative;agree to therapy  -CHARLY     Prior Level of Function  independent:;all household mobility;community mobility  -CHARLY     Existing Precautions/Restrictions fall  -CHARLY     Barriers to Rehab none identified  -CHARLY     Row Name 08/25/22 1242          Living Environment    Current Living Arrangements home  -CHARLY     People in Home sibling(s)  -CHARLY     Row Name 08/25/22 1242          Range of Motion (ROM)    Range of Motion ROM is WFL  -CHARLY     Row Name 08/25/22 1242          Strength (Manual Muscle Testing)    Strength (Manual Muscle Testing) strength is WFL  -CHARLY     Row Name 08/25/22 1242          Bed Mobility    Bed Mobility bed mobility (all) activities;supine-sit  -CHARLY     All Activities, [place] (Bed Mobility) contact guard  -CHARLY     Supine-Sit [place] (Bed Mobility) contact guard  -CHARLY     Row Name 08/25/22 1242          Transfers    Transfers bed-chair transfer;sit-stand transfer  -CHARLY     Bed-Chair [place] (Transfers) contact guard  -CHARLY     Assistive Device (Bed-Chair Transfers) walker, front-wheeled  -CHARLY     Sit-Stand [place] (Transfers) contact guard  -CHARLY     Row Name 08/25/22 1242          Sit-Stand Transfer    Assistive Device (Sit-Stand Transfers) walker, front-wheeled  -CHARLY     Row Name 08/25/22 1242          Gait/Stairs (Locomotion)    Gait/Stairs Locomotion gait/ambulation assistive device  -CHARLY     [place] Level (Gait) contact guard  -CHARLY     Assistive Device (Gait) walker, front-wheeled  -CHARLY     Distance in Feet (Gait) 20  -CHARLY     Row Name 08/25/22 1242          Safety Issues, Functional Mobility    Impairments Affecting Function (Mobility) balance;strength  -CHARLY     Row Name 08/25/22 1242          Balance    Balance Assessment standing dynamic balance  -CHARLY     Dynamic Standing Balance contact guard  -CHARLY     Row Name             Wound 08/23/22 0027 Right lower leg Other (comment)    Wound - Properties Group Placement Date: 08/23/22  -SW Placement Time: 0027  -SW Present on Hospital Admission: Y  -SW Side: Right  -SW Orientation: lower  - Location:  leg  -SW Primary Wound Type: Other  -SW Additional Comments: Possible bed bug bites  -SW     Retired Wound - Properties Group Placement Date: 08/23/22 -SW Placement Time: 0027 -SW Present on Hospital Admission: Y  -SW Side: Right  -SW Orientation: lower  -SW Location: leg  -SW Primary Wound Type: Other  -SW Additional Comments: Possible bed bug bites  -SW     Retired Wound - Properties Group Date first assessed: 08/23/22 -SW Time first assessed: 0027 -SW Present on Hospital Admission: Y  -SW Side: Right  -SW Location: leg  -SW Primary Wound Type: Other  -SW Additional Comments: Possible bed bug bites  -SW     Row Name             Wound 08/23/22 0029 Left lower leg Other (comment)    Wound - Properties Group Placement Date: 08/23/22 -SW Placement Time: 0029 -SW Present on Hospital Admission: Y  -SW Side: Left  -SW Orientation: lower  -SW Location: leg  -SW Primary Wound Type: Other  -SW Additional Comments: Possible bed bug bites  -SW     Retired Wound - Properties Group Placement Date: 08/23/22 -SW Placement Time: 0029 -SW Present on Hospital Admission: Y  -SW Side: Left  -SW Orientation: lower  -SW Location: leg  -SW Primary Wound Type: Other  -SW Additional Comments: Possible bed bug bites  -SW     Retired Wound - Properties Group Date first assessed: 08/23/22 -SW Time first assessed: 0029 -SW Present on Hospital Admission: Y  -SW Side: Left  -SW Location: leg  -SW Primary Wound Type: Other  -SW Additional Comments: Possible bed bug bites  -SW     Row Name 08/25/22 1302 08/25/22 1242       Plan of Care Review    Plan of Care Reviewed With -- patient  -CHARLY    Outcome Evaluation Patient presents with decreased strength, transfers and ambulation.  Skilled physical therapy services will be required to address these mobility deficits.  Recommend home health services upon discharge from the hospital  -CHARLY --    Row Name 08/25/22 1302          Therapy Assessment/Plan (PT)    Rehab Potential (PT) good, to achieve  stated therapy goals  -CHARLY     Criteria for Skilled Interventions Met (PT) skilled treatment is necessary  -CHARLY     Therapy Frequency (PT) daily  -CHARLY     Predicted Duration of Therapy Intervention (PT) 10 days  -CHARLY     Problem List (PT) problems related to;balance;mobility  -CHARLY     Activity Limitations Related to Problem List (PT) unable to ambulate safely  -CHARLY     Row Name 08/25/22 1302          PT Evaluation Complexity    History, PT Evaluation Complexity no personal factors and/or comorbidities  -CHARLY     Examination of Body Systems (PT Eval Complexity) total of 4 or more elements  -CHARLY     Clinical Presentation (PT Evaluation Complexity) stable  -CHARLY     Clinical Decision Making (PT Evaluation Complexity) low complexity  -CHARLY     Overall Complexity (PT Evaluation Complexity) low complexity  -CHARLY     Row Name 08/25/22 1302          Physical Therapy Goals    Transfer Goal Selection (PT) transfer, PT goal 1  -CHARLY     Gait Training Goal Selection (PT) gait training, PT goal 1  -CHARLY     Row Name 08/25/22 1302          Transfer Goal 1 (PT)    Activity/Assistive Device (Transfer Goal 1, PT) transfers, all  -CHARLY     [place] Level/Cues Needed (Transfer Goal 1, PT) independent  -CHARLY     Time Frame (Transfer Goal 1, PT) long term goal (LTG);10 days  -CHARLY     Row Name 08/25/22 1302          Gait Training Goal 1 (PT)    Activity/Assistive Device (Gait Training Goal 1, PT) gait (walking locomotion);assistive device use;walker, rolling  -CHARLY     [place] Level (Gait Training Goal 1, PT) independent  -CHARLY     Distance (Gait Training Goal 1, PT) 300  -CHARLY     Time Frame (Gait Training Goal 1, PT) long term goal (LTG);10 days  -CHARLY           User Key  (r) = Recorded By, (t) = Taken By, (c) = Cosigned By    Initials Name Provider Type    CHARLY Jose Oliva, PT Physical Therapist    Anderson Cameron, RN Registered Nurse                  PT Recommendation and Plan  Anticipated Discharge Disposition (PT): home with home health  Planned  Therapy Interventions (PT): balance training, gait training, stair training, strengthening  Therapy Frequency (PT): daily  Plan of Care Reviewed With: patient  Outcome Evaluation: Patient presents with decreased strength, transfers and ambulation.  Skilled physical therapy services will be required to address these mobility deficits.  Recommend home health services upon discharge from the hospital   Outcome Measures     Row Name 08/25/22 1300             How much help from another person do you currently need...    Turning from your back to your side while in flat bed without using bedrails? 4  -CHARLY      Moving from lying on back to sitting on the side of a flat bed without bedrails? 4  -CHARLY      Moving to and from a bed to a chair (including a wheelchair)? 3  -CHARLY      Standing up from a chair using your arms (e.g., wheelchair, bedside chair)? 3  -CHARLY      Climbing 3-5 steps with a railing? 3  -CHARLY      To walk in hospital room? 3  -CHARLY      AM-PAC 6 Clicks Score (PT) 20  -CHARLY              Functional Assessment    Outcome Measure Options AM-PAC 6 Clicks Basic Mobility (PT)  -CHARLY            User Key  (r) = Recorded By, (t) = Taken By, (c) = Cosigned By    Initials Name Provider Type    Jose Kellogg PT Physical Therapist                 Time Calculation:    PT Charges     Row Name 08/25/22 1258             Time Calculation    PT Received On 08/25/22  -CHARLY      PT Goal Re-Cert Due Date 09/03/22  -CHARLY              Untimed Charges    PT Eval/Re-eval Minutes 20  -CHARLY              Total Minutes    Untimed Charges Total Minutes 20  -CHARLY       Total Minutes 20  -CHARLY            User Key  (r) = Recorded By, (t) = Taken By, (c) = Cosigned By    Initials Name Provider Type    Jose Kellogg PT Physical Therapist              Therapy Charges for Today     Code Description Service Date Service Provider Modifiers Qty    95440906239 HC PT EVAL LOW COMPLEXITY 2 8/25/2022 Jose Oliva PT GP 1          PT G-Codes  Outcome  Measure Options: AM-PAC 6 Clicks Basic Mobility (PT)  -[facility] 6 Clicks Score (PT): 20    Jose Oliva, PT  8/25/2022

## 2022-08-25 NOTE — PROGRESS NOTES
Pulmonary / Critical Care Progress Note      Patient Name: Dilip Faulkner  : 1950  MRN: [id number]  Primary Care Physician:  Rosalba Edwards, MONE  Date of admission: 2022    Subjective   Subjective   Follow-up for acute on chronic respiratory failure.    No acute events over night.    This morning,  Sitting up in bed on 2 L nasal cannula with O2 sats 94%  Dyspnea improving  Feels breathing is close to baseline  Nonproductive cough  No chest pain  No fever or chills  Weak and fatigued  Getting up in room without difficulty  Overall feeling much better    Review of Systems  General: Fatigue, otherwise denied complaints  HEENT: Denied complaints  Respiratory: Dyspnea, cough, wheeze, otherwise denied complaints  Cardiovascular: Denied complaints  GI: Denied complaints  : Denied complaints  MSK: Weakness, otherwise denied complaints    Objective   Objective     Vitals:   Temp:  [97.3 °F (36.3 °C)-98.2 °F (36.8 °C)] 97.8 °F (36.6 °C)  Heart Rate:  [55-74] 60  Resp:  [18-20] 18  BP: (110-141)/(54-93) 128/80  Flow (L/min):  [2-3] 2    Physical Exam   Vital Signs Reviewed   General: Obese male, Awake and Alert, NAD on 2 L NC  HEENT:  PERRL, EOMI.  OP, nares clear, no sinus tenderness  Neck:  Supple, no JVD, no thyromegaly  Chest:  good aeration, improving rhonchi bilaterally, tympanic to percussion bilaterally, no work of breathing noted  CV: RRR, no MGR, pulses 2+, equal  Abd:  Soft, NT, ND, + BS, no HSM, obese  EXT:  no clubbing, no cyanosis, no edema  Neuro:  A&Ox3, CN grossly intact, no focal deficits  Skin: No rashes or lesions noted    Result Review    Result Review:  I have personally reviewed the results from the time of this admission to 2022 10:09 EDT and agree with these findings:  [x]  Laboratory  [x]  Microbiology  [x]  Radiology  [x]  EKG/Telemetry   []  Cardiology/Vascular   []  Pathology  []  Old records  []  Other:  Most notable findings include: WBC 8.79, Mg 1.9, K 4.8, Cr  1.04    8/23 ABG 7.35, 46.2, 88.1, 25 on 5 L nasal cannula    8/22 CXR no acute disease process    8/22 COVID-negative  Flu negative  Blood cultures no growth at 24 hours    Assessment & Plan   Assessment / Plan     Active Hospital Problems:  Active Hospital Problems    Diagnosis    • **Acute on chronic respiratory failure with hypoxia and hypercapnia (HCC)    • Respiratory failure with hypoxia (HCC)    • COPD exacerbation (HCC)    • Type 2 diabetes mellitus, with long-term current use of insulin (HCC)    • Obesity (BMI 30-39.9)    • Essential hypertension    • Hyperlipidemia    • Chronic obstructive pulmonary disease with acute exacerbation (HCC)      Impression:  Acute on chronic respiratory failure with hypoxemia and respiratory acidosis  COPD exacerbation  Hypertension  Diabetes mellitus  Obesity  History of tobacco abuse    Plan:  On baseline home O2 requirement of 2 L NC.   Continue NIPPV at night and as needed days.  Continue prednisone 40 mg p.o. daily, day 3.  Continue azithromycin p.o., day 3 of 5.  Continue nebulizers and bronchopulmonary hygiene.  Encourage I-S and flutter valve.  Continue Mucinex.  Continue Lovenox for DVT prophylaxis.  Continue glucose control with sliding scale insulin.  Encourage activity.  Out of bed to chair.    Ok from pulmonary standpoint to discharge home.  Follow up in our clinic in 2 weeks.    DVT prophylaxis:  Medical and mechanical DVT prophylaxis orders are present.    CODE STATUS:   Level Of Support Discussed With: Patient  Code Status (Patient has no pulse and is not breathing): CPR (Attempt to Resuscitate)  Medical Interventions (Patient has pulse or is breathing): Full    Labs, microbiology, radiology, medications, and provider notes personally reviewed.  Discussed with primary services and bedside RN.    Electronically signed by MONE Mcbride, 08/25/22, 10:09 AM EDT.    I, Dr. Vail, have spent more than 50% of the total time managing the patient in this  encounter today.  This included personally reviewing all pertinent labs, imaging, microbiology and documentation. Also discussing the case with the patient and any available family, the admitting physician and any available ancillary staff.  Electronically signed by Chandu Vail MD, 08/25/22, 1:14 PM EDT.

## 2022-08-26 NOTE — OUTREACH NOTE
Prep Survey    Flowsheet Row Responses   [facility] patient discharged from? [facility]   Is LACE score < 7 ? No   Emergency Room discharge w/ pulse ox? No   Eligibility Readm Mgmt   Discharge diagnosis Acute on chronic respiratory failure with hypoxia and hypercapnia   Does the patient have one of the following disease processes/diagnoses(primary or secondary)? COPD/Pneumonia   Does the patient have Home health ordered? No   Is there a DME ordered? No   Prep survey completed? Yes          PARK ABDULLAHI - Registered Nurse

## 2022-08-27 LAB
BACTERIA SPEC AEROBE CULT: NORMAL
BACTERIA SPEC AEROBE CULT: NORMAL

## 2022-08-29 ENCOUNTER — READMISSION MANAGEMENT (OUTPATIENT)
Dept: CALL CENTER | Facility: HOSPITAL | Age: 72
End: 2022-08-29

## 2022-08-29 NOTE — OUTREACH NOTE
COPD/PN Week 1 Survey    Flowsheet Row Responses   [facility] patient discharged from? [facility]   Does the patient have one of the following disease processes/diagnoses(primary or secondary)? COPD/Pneumonia   Was the primary reason for admission: COPD exacerbation   Week 1 attempt successful? Yes   Call start time 1306   Call end time 1309   Discharge diagnosis Acute on chronic respiratory failure with hypoxia and hypercapnia   Is patient permission given to speak with other caregiver? Yes   Person spoke with today (if not patient) and relationship patient   Meds reviewed with patient/caregiver? Yes   Is the patient having any side effects they believe may be caused by any medication additions or changes? No   Does the patient have all medications ordered at discharge? Yes   Is the patient taking all medications as directed (includes completed medication regime)? Yes   Does the patient have a primary care provider?  Yes   Does the patient have an appointment with their PCP or specialist within 7 days of discharge? Yes   Has the patient kept scheduled appointments due by today? N/A   Has home health visited the patient within 72 hours of discharge? N/A   Has all DME been delivered? Yes   DME comments Wears home O2 PRN.   Pulse Ox monitoring Intermittent   Pulse Ox device source Patient   O2 Sat comments Above 95% he says   O2 Sat: education provided Sat levels, Monitoring frequency, When to seek care   Psychosocial issues? No   Did the patient receive a copy of their discharge instructions? Yes   Nursing interventions Reviewed instructions with patient   What is the patient's perception of their health status since discharge? Improving   Nursing Interventions Nurse provided patient education   If the patient is a current smoker, are they able to teach back resources for cessation? Not a smoker   Is the patient/caregiver able to teach back the hierarchy of who to call/visit for symptoms/problems? PCP, Specialist,  Home health nurse, Urgent Care, ED, 911 Yes   Is the patient able to teach back COPD zones? Yes   Nursing interventions Education provided on various zones   Patient reports what zone on this call? Green Zone   Green Zone Reports doing well, Breathing without shortness of breath   Week 1 call completed? Yes   Wrap up additional comments Doing ok encouraged appt this week.          CHERELLE ABDULLAHI - Registered Nurse

## 2022-09-07 ENCOUNTER — READMISSION MANAGEMENT (OUTPATIENT)
Dept: CALL CENTER | Facility: HOSPITAL | Age: 72
End: 2022-09-07

## 2022-09-07 NOTE — OUTREACH NOTE
COPD/PN Week 2 Survey    Flowsheet Row Responses   [facility] patient discharged from? [facility]   Does the patient have one of the following disease processes/diagnoses(primary or secondary)? COPD   Week 2 attempt successful? Yes   Call start time 1252   Call end time 1258   Discharge diagnosis Acute on chronic respiratory failure with hypoxia and hypercapnia   Person spoke with today (if not patient) and relationship patient   Meds reviewed with patient/caregiver? Yes   Does the patient have all medications ordered at discharge? Yes   Is the patient taking all medications as directed (includes completed medication regime)? Yes   Does the patient have a primary care provider?  Yes   Does the patient have an appointment with their PCP or specialist within 7 days of discharge? Greater than 7 days  [PULMONARY appt 9/9/22  9am]   Has the patient kept scheduled appointments due by today? N/A   Has home health visited the patient within 72 hours of discharge? N/A   DME comments Wears home O2 PRN.   Pulse Ox monitoring Intermittent   Pulse Ox device source Patient   O2 Sat comments 97% on room air   O2 Sat: education provided Sat levels, Monitoring frequency, When to seek care   Psychosocial issues? No   Did the patient receive a copy of their discharge instructions? Yes   Nursing interventions Reviewed instructions with patient   What is the patient's perception of their health status since discharge? Improving   If the patient is a current smoker, are they able to teach back resources for cessation? Not a smoker   Is the patient/caregiver able to teach back the hierarchy of who to call/visit for symptoms/problems? PCP, Specialist, Home health nurse, Urgent Care, ED, 911 Yes   Patient reports what zone on this call? Green Zone   Green Zone Reports doing well, Breathing without shortness of breath, Usual activity and exercise level   Green Zone interventions: Take daily medications, Use oxygen as prescribed   Week 2  call completed? Yes          JUAN SEXTON - Registered Nurse

## 2022-09-15 ENCOUNTER — READMISSION MANAGEMENT (OUTPATIENT)
Dept: CALL CENTER | Facility: HOSPITAL | Age: 72
End: 2022-09-15

## 2022-09-15 NOTE — OUTREACH NOTE
COPD/PN Week 3 Survey    Flowsheet Row Responses   [facility] patient discharged from? [facility]   Does the patient have one of the following disease processes/diagnoses(primary or secondary)? COPD   Week 3 attempt successful? No   Unsuccessful attempts Attempt 1          MIGUEL Wiggins Registered Nurse

## 2022-09-16 ENCOUNTER — APPOINTMENT (OUTPATIENT)
Dept: GENERAL RADIOLOGY | Facility: HOSPITAL | Age: 72
End: 2022-09-16

## 2022-09-16 ENCOUNTER — HOSPITAL ENCOUNTER (INPATIENT)
Facility: HOSPITAL | Age: 72
LOS: 1 days | Discharge: HOME | End: 2022-09-18
Attending: EMERGENCY MEDICINE | Admitting: HOSPITALIST

## 2022-09-16 DIAGNOSIS — J44.1 ACUTE EXACERBATION OF CHRONIC OBSTRUCTIVE PULMONARY DISEASE (COPD): Primary | ICD-10-CM

## 2022-09-16 DIAGNOSIS — J96.21 ACUTE ON CHRONIC RESPIRATORY FAILURE WITH HYPOXIA AND HYPERCAPNIA: ICD-10-CM

## 2022-09-16 DIAGNOSIS — J96.22 ACUTE ON CHRONIC RESPIRATORY FAILURE WITH HYPOXIA AND HYPERCAPNIA: ICD-10-CM

## 2022-09-16 LAB
ALBUMIN SERPL-MCNC: 3.8 G/DL (ref 3.5–5.2)
ALBUMIN/GLOB SERPL: 1.3 G/DL
ALP SERPL-CCNC: 84 U/L (ref 39–117)
ALT SERPL W P-5'-P-CCNC: 11 U/L (ref 1–41)
ANION GAP SERPL CALCULATED.3IONS-SCNC: 7.1 MMOL/L (ref 5–15)
ARTERIAL PATENCY WRIST A: POSITIVE
AST SERPL-CCNC: 17 U/L (ref 1–40)
BASE EXCESS BLDA CALC-SCNC: 0.7 MMOL/L (ref -2–2)
BASOPHILS # BLD AUTO: 0.06 10*3/MM3 (ref 0–0.2)
BASOPHILS NFR BLD AUTO: 0.8 % (ref 0–1.5)
BDY SITE: ABNORMAL
BILIRUB SERPL-MCNC: 0.4 MG/DL (ref 0–1.2)
BUN SERPL-MCNC: 16 MG/DL (ref 8–23)
BUN/CREAT SERPL: 16.2 (ref 7–25)
CA-I BLDA-SCNC: 1.12 MMOL/L (ref 1.13–1.32)
CALCIUM SPEC-SCNC: 8.8 MG/DL (ref 8.6–10.5)
CHLORIDE BLDA-SCNC: 103 MMOL/L (ref 98–106)
CHLORIDE SERPL-SCNC: 103 MMOL/L (ref 98–107)
CO2 SERPL-SCNC: 28.9 MMOL/L (ref 22–29)
COHGB MFR BLD: 1.3 % (ref 0–1.5)
CREAT SERPL-MCNC: 0.99 MG/DL (ref 0.76–1.27)
D-LACTATE SERPL-SCNC: 1.1 MMOL/L (ref 0.5–2)
DEPRECATED RDW RBC AUTO: 46.1 FL (ref 37–54)
EGFRCR SERPLBLD CKD-EPI 2021: 80.4 ML/MIN/1.73
EOSINOPHIL # BLD AUTO: 1.14 10*3/MM3 (ref 0–0.4)
EOSINOPHIL NFR BLD AUTO: 14.5 % (ref 0.3–6.2)
ERYTHROCYTE [DISTWIDTH] IN BLOOD BY AUTOMATED COUNT: 15.2 % (ref 12.3–15.4)
FHHB: 6.3 % (ref 0–5)
GAS FLOW AIRWAY: ABNORMAL L/MIN
GLOBULIN UR ELPH-MCNC: 2.9 GM/DL
GLUCOSE BLDA-MCNC: 148 MMOL/L (ref 70–99)
GLUCOSE SERPL-MCNC: 145 MG/DL (ref 65–99)
HCO3 BLDA-SCNC: 26.8 MMOL/L (ref 22–26)
HCT VFR BLD AUTO: 41.3 % (ref 37.5–51)
HGB BLD-MCNC: 12.3 G/DL (ref 13–17.7)
HGB BLDA-MCNC: 13.3 G/DL (ref 13.8–16.4)
HOLD SPECIMEN: NORMAL
HOLD SPECIMEN: NORMAL
IMM GRANULOCYTES # BLD AUTO: 0.01 10*3/MM3 (ref 0–0.05)
IMM GRANULOCYTES NFR BLD AUTO: 0.1 % (ref 0–0.5)
INHALED O2 CONCENTRATION: 24 %
LACTATE BLDA-SCNC: 0.79 MMOL/L (ref 0.5–2)
LYMPHOCYTES # BLD AUTO: 1.48 10*3/MM3 (ref 0.7–3.1)
LYMPHOCYTES NFR BLD AUTO: 18.8 % (ref 19.6–45.3)
MCH RBC QN AUTO: 25.2 PG (ref 26.6–33)
MCHC RBC AUTO-ENTMCNC: 29.8 G/DL (ref 31.5–35.7)
MCV RBC AUTO: 84.5 FL (ref 79–97)
METHGB BLD QL: 0 % (ref 0–1.5)
MODALITY: ABNORMAL
MONOCYTES # BLD AUTO: 0.62 10*3/MM3 (ref 0.1–0.9)
MONOCYTES NFR BLD AUTO: 7.9 % (ref 5–12)
NEUTROPHILS NFR BLD AUTO: 4.56 10*3/MM3 (ref 1.7–7)
NEUTROPHILS NFR BLD AUTO: 57.9 % (ref 42.7–76)
NOTE: ABNORMAL
NRBC BLD AUTO-RTO: 0 /100 WBC (ref 0–0.2)
NT-PROBNP SERPL-MCNC: 99.8 PG/ML (ref 0–900)
OXYHGB MFR BLDV: 92.4 % (ref 94–99)
PCO2 BLDA: 48.8 MM HG (ref 35–45)
PH BLDA: 7.36 PH UNITS (ref 7.35–7.45)
PLATELET # BLD AUTO: 190 10*3/MM3 (ref 140–450)
PMV BLD AUTO: 10.3 FL (ref 6–12)
PO2 BLD: 299 MM[HG] (ref 0–500)
PO2 BLDA: 71.8 MM HG (ref 80–100)
POTASSIUM BLDA-SCNC: 4.2 MMOL/L (ref 3.5–5)
POTASSIUM SERPL-SCNC: 4.3 MMOL/L (ref 3.5–5.2)
PROT SERPL-MCNC: 6.7 G/DL (ref 6–8.5)
RBC # BLD AUTO: 4.89 10*6/MM3 (ref 4.14–5.8)
SAO2 % BLDCOA: 93.6 % (ref 95–99)
SODIUM BLDA-SCNC: 136.3 MMOL/L (ref 136–146)
SODIUM SERPL-SCNC: 139 MMOL/L (ref 136–145)
TROPONIN T SERPL-MCNC: 0.01 NG/ML (ref 0–0.03)
WBC NRBC COR # BLD: 7.87 10*3/MM3 (ref 3.4–10.8)
WHOLE BLOOD HOLD COAG: NORMAL
WHOLE BLOOD HOLD SPECIMEN: NORMAL

## 2022-09-16 PROCEDURE — 36415 COLL VENOUS BLD VENIPUNCTURE: CPT

## 2022-09-16 PROCEDURE — 71045 X-RAY EXAM CHEST 1 VIEW: CPT

## 2022-09-16 PROCEDURE — 85025 COMPLETE CBC W/AUTO DIFF WBC: CPT

## 2022-09-16 PROCEDURE — 94640 AIRWAY INHALATION TREATMENT: CPT

## 2022-09-16 PROCEDURE — 83735 ASSAY OF MAGNESIUM: CPT | Performed by: FAMILY MEDICINE

## 2022-09-16 PROCEDURE — 84484 ASSAY OF TROPONIN QUANT: CPT

## 2022-09-16 PROCEDURE — 94799 UNLISTED PULMONARY SVC/PX: CPT

## 2022-09-16 PROCEDURE — G0378 HOSPITAL OBSERVATION PER HR: HCPCS

## 2022-09-16 PROCEDURE — 36600 WITHDRAWAL OF ARTERIAL BLOOD: CPT | Performed by: EMERGENCY MEDICINE

## 2022-09-16 PROCEDURE — 93005 ELECTROCARDIOGRAM TRACING: CPT | Performed by: EMERGENCY MEDICINE

## 2022-09-16 PROCEDURE — 87040 BLOOD CULTURE FOR BACTERIA: CPT

## 2022-09-16 PROCEDURE — 80053 COMPREHEN METABOLIC PANEL: CPT

## 2022-09-16 PROCEDURE — 25010000002 METHYLPREDNISOLONE PER 125 MG: Performed by: EMERGENCY MEDICINE

## 2022-09-16 PROCEDURE — 94760 N-INVAS EAR/PLS OXIMETRY 1: CPT

## 2022-09-16 PROCEDURE — 83880 ASSAY OF NATRIURETIC PEPTIDE: CPT

## 2022-09-16 PROCEDURE — 99285 EMERGENCY DEPT VISIT HI MDM: CPT

## 2022-09-16 PROCEDURE — 82375 ASSAY CARBOXYHB QUANT: CPT | Performed by: EMERGENCY MEDICINE

## 2022-09-16 PROCEDURE — 93010 ELECTROCARDIOGRAM REPORT: CPT | Performed by: INTERNAL MEDICINE

## 2022-09-16 PROCEDURE — 82805 BLOOD GASES W/O2 SATURATION: CPT | Performed by: EMERGENCY MEDICINE

## 2022-09-16 PROCEDURE — 93005 ELECTROCARDIOGRAM TRACING: CPT

## 2022-09-16 PROCEDURE — 83605 ASSAY OF LACTIC ACID: CPT

## 2022-09-16 PROCEDURE — 83050 HGB METHEMOGLOBIN QUAN: CPT | Performed by: EMERGENCY MEDICINE

## 2022-09-16 PROCEDURE — 94660 CPAP INITIATION&MGMT: CPT

## 2022-09-16 RX ORDER — SODIUM CHLORIDE 0.9 % (FLUSH) 0.9 %
10 SYRINGE (ML) INJECTION AS NEEDED
Status: DISCONTINUED | OUTPATIENT
Start: 2022-09-16 | End: 2022-09-18 | Stop reason: HOSPADM

## 2022-09-16 RX ORDER — METHYLPREDNISOLONE SODIUM SUCCINATE 125 MG/2ML
125 INJECTION, POWDER, LYOPHILIZED, FOR SOLUTION INTRAMUSCULAR; INTRAVENOUS ONCE
Status: COMPLETED | OUTPATIENT
Start: 2022-09-16 | End: 2022-09-16

## 2022-09-16 RX ORDER — IPRATROPIUM BROMIDE AND ALBUTEROL SULFATE 2.5; .5 MG/3ML; MG/3ML
3 SOLUTION RESPIRATORY (INHALATION)
Status: COMPLETED | OUTPATIENT
Start: 2022-09-16 | End: 2022-09-16

## 2022-09-16 RX ADMIN — IPRATROPIUM BROMIDE AND ALBUTEROL SULFATE 3 ML: 2.5; .5 SOLUTION RESPIRATORY (INHALATION) at 21:54

## 2022-09-16 RX ADMIN — IPRATROPIUM BROMIDE AND ALBUTEROL SULFATE 3 ML: 2.5; .5 SOLUTION RESPIRATORY (INHALATION) at 21:55

## 2022-09-16 RX ADMIN — IPRATROPIUM BROMIDE AND ALBUTEROL SULFATE 3 ML: 2.5; .5 SOLUTION RESPIRATORY (INHALATION) at 21:53

## 2022-09-16 RX ADMIN — METHYLPREDNISOLONE SODIUM SUCCINATE 125 MG: 125 INJECTION, POWDER, FOR SOLUTION INTRAMUSCULAR; INTRAVENOUS at 21:29

## 2022-09-17 ENCOUNTER — READMISSION MANAGEMENT (OUTPATIENT)
Dept: CALL CENTER | Facility: HOSPITAL | Age: 72
End: 2022-09-17

## 2022-09-17 LAB
GLUCOSE BLDC GLUCOMTR-MCNC: 333 MG/DL (ref 70–99)
GLUCOSE BLDC GLUCOMTR-MCNC: 370 MG/DL (ref 70–99)
GLUCOSE BLDC GLUCOMTR-MCNC: 96 MG/DL (ref 70–99)
MAGNESIUM SERPL-MCNC: 1.9 MG/DL (ref 1.6–2.4)

## 2022-09-17 PROCEDURE — 63710000001 INSULIN LISPRO (HUMAN) PER 5 UNITS: Performed by: HOSPITALIST

## 2022-09-17 PROCEDURE — 94799 UNLISTED PULMONARY SVC/PX: CPT

## 2022-09-17 PROCEDURE — 94760 N-INVAS EAR/PLS OXIMETRY 1: CPT

## 2022-09-17 PROCEDURE — 63710000001 INSULIN LISPRO (HUMAN) PER 5 UNITS: Performed by: FAMILY MEDICINE

## 2022-09-17 PROCEDURE — 99220 PR INITIAL OBSERVATION CARE/DAY 70 MINUTES: CPT | Performed by: HOSPITALIST

## 2022-09-17 PROCEDURE — 87205 SMEAR GRAM STAIN: CPT | Performed by: FAMILY MEDICINE

## 2022-09-17 PROCEDURE — 94664 DEMO&/EVAL PT USE INHALER: CPT

## 2022-09-17 PROCEDURE — 82962 GLUCOSE BLOOD TEST: CPT

## 2022-09-17 PROCEDURE — 63710000001 INSULIN DETEMIR PER 5 UNITS: Performed by: HOSPITALIST

## 2022-09-17 PROCEDURE — 25010000002 CEFTRIAXONE PER 250 MG: Performed by: FAMILY MEDICINE

## 2022-09-17 PROCEDURE — 25010000002 ENOXAPARIN PER 10 MG: Performed by: HOSPITALIST

## 2022-09-17 PROCEDURE — 87070 CULTURE OTHR SPECIMN AEROBIC: CPT | Performed by: FAMILY MEDICINE

## 2022-09-17 PROCEDURE — 63710000001 PREDNISONE PER 1 MG: Performed by: FAMILY MEDICINE

## 2022-09-17 RX ORDER — CEFTRIAXONE SODIUM 1 G/50ML
1 INJECTION, SOLUTION INTRAVENOUS DAILY
Status: DISCONTINUED | OUTPATIENT
Start: 2022-09-17 | End: 2022-09-18 | Stop reason: HOSPADM

## 2022-09-17 RX ORDER — ARFORMOTEROL TARTRATE 15 UG/2ML
15 SOLUTION RESPIRATORY (INHALATION)
Status: DISCONTINUED | OUTPATIENT
Start: 2022-09-17 | End: 2022-09-18 | Stop reason: HOSPADM

## 2022-09-17 RX ORDER — ALBUTEROL SULFATE 2.5 MG/3ML
2.5 SOLUTION RESPIRATORY (INHALATION)
Status: DISCONTINUED | OUTPATIENT
Start: 2022-09-17 | End: 2022-09-18 | Stop reason: HOSPADM

## 2022-09-17 RX ORDER — ATORVASTATIN CALCIUM 10 MG/1
10 TABLET, FILM COATED ORAL NIGHTLY
Status: DISCONTINUED | OUTPATIENT
Start: 2022-09-17 | End: 2022-09-18 | Stop reason: HOSPADM

## 2022-09-17 RX ORDER — ASPIRIN 81 MG/1
81 TABLET, CHEWABLE ORAL DAILY
Status: DISCONTINUED | OUTPATIENT
Start: 2022-09-17 | End: 2022-09-18 | Stop reason: HOSPADM

## 2022-09-17 RX ORDER — ENOXAPARIN SODIUM 100 MG/ML
40 INJECTION SUBCUTANEOUS DAILY
Status: DISCONTINUED | OUTPATIENT
Start: 2022-09-17 | End: 2022-09-18 | Stop reason: HOSPADM

## 2022-09-17 RX ORDER — CETIRIZINE HYDROCHLORIDE 10 MG/1
10 TABLET ORAL DAILY
Status: DISCONTINUED | OUTPATIENT
Start: 2022-09-17 | End: 2022-09-18 | Stop reason: HOSPADM

## 2022-09-17 RX ORDER — INSULIN LISPRO 100 [IU]/ML
0-14 INJECTION, SOLUTION INTRAVENOUS; SUBCUTANEOUS
Status: DISCONTINUED | OUTPATIENT
Start: 2022-09-17 | End: 2022-09-18 | Stop reason: HOSPADM

## 2022-09-17 RX ORDER — INSULIN LISPRO 100 [IU]/ML
0-9 INJECTION, SOLUTION INTRAVENOUS; SUBCUTANEOUS
Status: DISCONTINUED | OUTPATIENT
Start: 2022-09-17 | End: 2022-09-17

## 2022-09-17 RX ORDER — GUAIFENESIN 600 MG/1
600 TABLET, EXTENDED RELEASE ORAL EVERY 12 HOURS SCHEDULED
Status: DISCONTINUED | OUTPATIENT
Start: 2022-09-17 | End: 2022-09-18 | Stop reason: HOSPADM

## 2022-09-17 RX ORDER — IPRATROPIUM BROMIDE AND ALBUTEROL SULFATE 2.5; .5 MG/3ML; MG/3ML
3 SOLUTION RESPIRATORY (INHALATION) EVERY 4 HOURS PRN
Status: DISCONTINUED | OUTPATIENT
Start: 2022-09-17 | End: 2022-09-17

## 2022-09-17 RX ORDER — BISACODYL 10 MG
10 SUPPOSITORY, RECTAL RECTAL DAILY PRN
Status: DISCONTINUED | OUTPATIENT
Start: 2022-09-17 | End: 2022-09-18 | Stop reason: HOSPADM

## 2022-09-17 RX ORDER — IPRATROPIUM BROMIDE AND ALBUTEROL SULFATE 2.5; .5 MG/3ML; MG/3ML
3 SOLUTION RESPIRATORY (INHALATION) EVERY 4 HOURS PRN
Status: DISCONTINUED | OUTPATIENT
Start: 2022-09-17 | End: 2022-09-18 | Stop reason: HOSPADM

## 2022-09-17 RX ORDER — ONDANSETRON 2 MG/ML
4 INJECTION INTRAMUSCULAR; INTRAVENOUS EVERY 6 HOURS PRN
Status: DISCONTINUED | OUTPATIENT
Start: 2022-09-17 | End: 2022-09-18 | Stop reason: HOSPADM

## 2022-09-17 RX ORDER — LISINOPRIL 2.5 MG/1
2.5 TABLET ORAL DAILY
Status: DISCONTINUED | OUTPATIENT
Start: 2022-09-17 | End: 2022-09-18 | Stop reason: HOSPADM

## 2022-09-17 RX ORDER — SODIUM CHLORIDE 9 MG/ML
40 INJECTION, SOLUTION INTRAVENOUS AS NEEDED
Status: DISCONTINUED | OUTPATIENT
Start: 2022-09-17 | End: 2022-09-18 | Stop reason: HOSPADM

## 2022-09-17 RX ORDER — POLYETHYLENE GLYCOL 3350 17 G/17G
17 POWDER, FOR SOLUTION ORAL DAILY PRN
Status: DISCONTINUED | OUTPATIENT
Start: 2022-09-17 | End: 2022-09-18 | Stop reason: HOSPADM

## 2022-09-17 RX ORDER — DEXTROSE MONOHYDRATE 25 G/50ML
25 INJECTION, SOLUTION INTRAVENOUS
Status: DISCONTINUED | OUTPATIENT
Start: 2022-09-17 | End: 2022-09-18 | Stop reason: HOSPADM

## 2022-09-17 RX ORDER — BUDESONIDE 0.5 MG/2ML
0.5 INHALANT ORAL
Status: DISCONTINUED | OUTPATIENT
Start: 2022-09-17 | End: 2022-09-18 | Stop reason: HOSPADM

## 2022-09-17 RX ORDER — ACETAMINOPHEN 325 MG/1
650 TABLET ORAL EVERY 6 HOURS PRN
Status: DISCONTINUED | OUTPATIENT
Start: 2022-09-17 | End: 2022-09-18 | Stop reason: HOSPADM

## 2022-09-17 RX ORDER — IPRATROPIUM BROMIDE AND ALBUTEROL SULFATE 2.5; .5 MG/3ML; MG/3ML
3 SOLUTION RESPIRATORY (INHALATION)
Status: DISCONTINUED | OUTPATIENT
Start: 2022-09-17 | End: 2022-09-17

## 2022-09-17 RX ORDER — BISACODYL 5 MG/1
5 TABLET, DELAYED RELEASE ORAL DAILY PRN
Status: DISCONTINUED | OUTPATIENT
Start: 2022-09-17 | End: 2022-09-18 | Stop reason: HOSPADM

## 2022-09-17 RX ORDER — SODIUM CHLORIDE 0.9 % (FLUSH) 0.9 %
10 SYRINGE (ML) INJECTION EVERY 12 HOURS SCHEDULED
Status: DISCONTINUED | OUTPATIENT
Start: 2022-09-17 | End: 2022-09-18 | Stop reason: HOSPADM

## 2022-09-17 RX ORDER — PREDNISONE 20 MG/1
40 TABLET ORAL
Status: DISCONTINUED | OUTPATIENT
Start: 2022-09-17 | End: 2022-09-18 | Stop reason: HOSPADM

## 2022-09-17 RX ORDER — PANTOPRAZOLE SODIUM 40 MG/1
40 TABLET, DELAYED RELEASE ORAL EVERY MORNING
Status: DISCONTINUED | OUTPATIENT
Start: 2022-09-17 | End: 2022-09-18 | Stop reason: HOSPADM

## 2022-09-17 RX ORDER — SODIUM CHLORIDE 0.9 % (FLUSH) 0.9 %
10 SYRINGE (ML) INJECTION AS NEEDED
Status: DISCONTINUED | OUTPATIENT
Start: 2022-09-17 | End: 2022-09-18 | Stop reason: HOSPADM

## 2022-09-17 RX ORDER — AMOXICILLIN 250 MG
2 CAPSULE ORAL 2 TIMES DAILY
Status: DISCONTINUED | OUTPATIENT
Start: 2022-09-17 | End: 2022-09-18 | Stop reason: HOSPADM

## 2022-09-17 RX ORDER — NICOTINE POLACRILEX 4 MG
15 LOZENGE BUCCAL
Status: DISCONTINUED | OUTPATIENT
Start: 2022-09-17 | End: 2022-09-18 | Stop reason: HOSPADM

## 2022-09-17 RX ADMIN — Medication 10 ML: at 20:19

## 2022-09-17 RX ADMIN — ASPIRIN 81 MG CHEWABLE TABLET 81 MG: 81 TABLET CHEWABLE at 08:34

## 2022-09-17 RX ADMIN — BUDESONIDE 0.5 MG: 0.5 SUSPENSION RESPIRATORY (INHALATION) at 20:44

## 2022-09-17 RX ADMIN — GUAIFENESIN 600 MG: 600 TABLET ORAL at 09:12

## 2022-09-17 RX ADMIN — GUAIFENESIN 600 MG: 600 TABLET ORAL at 20:19

## 2022-09-17 RX ADMIN — Medication 10 ML: at 08:34

## 2022-09-17 RX ADMIN — INSULIN LISPRO 10 UNITS: 100 INJECTION, SOLUTION INTRAVENOUS; SUBCUTANEOUS at 11:47

## 2022-09-17 RX ADMIN — CETIRIZINE HYDROCHLORIDE 10 MG: 10 TABLET, FILM COATED ORAL at 08:34

## 2022-09-17 RX ADMIN — ARFORMOTEROL TARTRATE 15 MCG: 15 SOLUTION RESPIRATORY (INHALATION) at 20:44

## 2022-09-17 RX ADMIN — INSULIN LISPRO 8 UNITS: 100 INJECTION, SOLUTION INTRAVENOUS; SUBCUTANEOUS at 07:53

## 2022-09-17 RX ADMIN — IPRATROPIUM BROMIDE AND ALBUTEROL SULFATE 3 ML: 2.5; .5 SOLUTION RESPIRATORY (INHALATION) at 12:16

## 2022-09-17 RX ADMIN — CEFTRIAXONE SODIUM 1 G: 1 INJECTION, SOLUTION INTRAVENOUS at 09:12

## 2022-09-17 RX ADMIN — INSULIN DETEMIR 17 UNITS: 100 INJECTION, SOLUTION SUBCUTANEOUS at 08:33

## 2022-09-17 RX ADMIN — SENNOSIDES AND DOCUSATE SODIUM 2 TABLET: 8.6; 5 TABLET ORAL at 09:11

## 2022-09-17 RX ADMIN — PREDNISONE 40 MG: 20 TABLET ORAL at 09:12

## 2022-09-17 RX ADMIN — ATORVASTATIN CALCIUM 10 MG: 10 TABLET, FILM COATED ORAL at 03:02

## 2022-09-17 RX ADMIN — LISINOPRIL 2.5 MG: 2.5 TABLET ORAL at 09:12

## 2022-09-17 RX ADMIN — BUDESONIDE 0.5 MG: 0.5 SUSPENSION RESPIRATORY (INHALATION) at 08:58

## 2022-09-17 RX ADMIN — PANTOPRAZOLE SODIUM 40 MG: 40 TABLET, DELAYED RELEASE ORAL at 06:04

## 2022-09-17 RX ADMIN — METOPROLOL TARTRATE 25 MG: 25 TABLET, FILM COATED ORAL at 20:19

## 2022-09-17 RX ADMIN — ARFORMOTEROL TARTRATE 15 MCG: 15 SOLUTION RESPIRATORY (INHALATION) at 08:58

## 2022-09-17 RX ADMIN — Medication 10 ML: at 03:02

## 2022-09-17 RX ADMIN — METOPROLOL TARTRATE 25 MG: 25 TABLET, FILM COATED ORAL at 08:34

## 2022-09-17 RX ADMIN — ENOXAPARIN SODIUM 40 MG: 100 INJECTION SUBCUTANEOUS at 08:34

## 2022-09-17 RX ADMIN — ATORVASTATIN CALCIUM 10 MG: 10 TABLET, FILM COATED ORAL at 20:19

## 2022-09-17 RX ADMIN — SENNOSIDES AND DOCUSATE SODIUM 2 TABLET: 8.6; 5 TABLET ORAL at 20:19

## 2022-09-17 NOTE — CASE MANAGEMENT/SOCIAL WORK
Discharge Planning Assessment   [place]     Patient Name: Dilip Faulkner  MRN: [id number]  Today's Date: 9/17/2022    Admit Date: 9/16/2022     Discharge Needs Assessment     Row Name 09/17/22 0903       Living Environment    People in Home sibling(s)    Name(s) of People in Home Pt lives in [place] with his sister.    Current Living Arrangements apartment    Primary Care Provided by self    Provides Primary Care For no one    Family Caregiver if Needed sibling(s)    Quality of Family Relationships supportive    Able to Return to Prior Arrangements yes       Resource/Environmental Concerns    Resource/Environmental Concerns none       Transition Planning    Patient/Family Anticipates Transition to home with family    Patient/Family Anticipated Services at Transition none    Transportation Anticipated car, drives self;public transportation       Discharge Needs Assessment    Equipment Currently Used at Home oxygen    Concerns to be Addressed denies needs/concerns at this time    Anticipated Changes Related to Illness none    Equipment Needed After Discharge none               Discharge Plan     Row Name 09/17/22 0905       Plan    Plan Pt admitted due to shortness of breath. SW spoke with pt to assess needs. Pt lives at home with his sister, [place]. Reports to be independent in ADLs. Pt wears 2L 02 at home baseline, unsure of DME agency he uses. Pt plans to return home at discharge and denies any need for rehab/HHC. Pharmacy and PCP confirmed. Pt denies any isseus with affording medications or with transportation- still drives self. SW will follow for any needs that may arise.    Patient/Family in Agreement with Plan yes              Continued Care and Services - Admitted Since 9/16/2022    Coordination has not been started for this encounter.          Demographic Summary     Row Name 09/17/22 0902       General Information    Admission Type observation    Arrived From emergency department    Referral Source  admission list    Reason for Consult discharge planning    Preferred Language English       Contact Information    Permission Granted to Share Info With family/designee               Functional Status     Row Name 09/17/22 0902       Functional Status    Usual Activity Tolerance good       Functional Status, IADL    Medications independent    Meal Preparation independent    Housekeeping independent    Laundry independent    Shopping independent       Mental Status Summary    Recent Changes in Mental Status/Cognitive Functioning no changes               Psychosocial     Row Name 09/17/22 0903       Behavior WDL    Behavior WDL WDL       Emotion Mood WDL    Emotion/Mood/Affect WDL WDL       Speech WDL    Speech WDL WDL       Perceptual State WDL    Perceptual State WDL WDL       Thought Process WDL    Thought Process WDL WDL       Intellectual Performance WDL    Intellectual Performance WDL WDL       Coping/Stress    Sources of Support sibling(s)    Techniques to [place] with Loss/Stress/Change not applicable    Reaction to Health Status unable to assess    Understanding of Condition and Treatment unable to assess       Developmental Stage (Eriksson's)    Developmental Stage Stage 8 (64 years-death/Late Adulthood) Integrity vs. Despair                ZAINAB Shultz

## 2022-09-17 NOTE — H&P
[facility] HISTORY AND PHYSICAL  Date: 2022   Patient Name: Dilip Faulkner  : 1950  MRN: [id number]  Primary Care Physician:  Rosalba Edwards APRN  Date of admission: 2022    Subjective   Subjective     Chief Complaint: Shortness of breath x1 day.    HPI:    Dilip Faulkner is a 72 y.o. male with medical history significant for COPD, type 2 diabetes, hypertension; presents with shortness of breath x1 day.  History obtained from patient and EMR.  Patient is a resident at a nursing home and was noted to be short of breath.  EMS was called and on arrival, patient was noted to have low oxygenation.  Patient typically wears oxygen.  Patient had to be placed on CPAP in order to help with his respiratory distress.  During my interview, patient noted to be without BiPAP and breathing without any distress.  Patient states he had been breathing worse on arrival to the ED.  Patient states he has had coughing, wheezing along with shortness of breath.  Patient also notes having a decrease in his level of activity secondary to the shortness of breath.  No fever, no chills, no nausea, no vomiting, no URI or UTI type symptoms, no back pain, no neck pain, no dizziness, no headache, no chest pain, no lower extremity swelling, no diarrhea, no constipation.      Personal History     Past Medical History:  Past Medical History:   Diagnosis Date   • Asthma    • COPD (chronic obstructive pulmonary disease) (HCC)    • Diabetes mellitus (HCC)    • Hernia, umbilical    • Hypertension    • Requires continuous at home supplemental oxygen         Past Surgical History:  Past Surgical History:   Procedure Laterality Date   • ABDOMINAL SURGERY          Family History:   No family history on file.     Social History:   Social History     Socioeconomic History   • Marital status: Single   Tobacco Use   • Smoking status: Former Smoker     Years: 2.00     Types: Cigarettes     Quit date: 3/19/2021     Years  since quittin.4   • Smokeless tobacco: Never Used   Vaping Use   • Vaping Use: Never used   Substance and Sexual Activity   • Alcohol use: Never   • Drug use: Never   • Sexual activity: Defer        Home Medications:  Prior to Admission medications    Medication Sig Start Date End Date Taking? Authorizing Provider   albuterol sulfate  (90 Base) MCG/ACT inhaler Inhale 2 puffs Every 6 (Six) Hours As Needed for Wheezing.   Yes Roosevelt Gaston MD   aspirin 81 MG chewable tablet Chew 81 mg Daily.   Yes Roosevelt Gaston MD   atorvastatin (LIPITOR) 10 MG tablet Take 10 mg by mouth Daily.   Yes Roosevelt Gaston MD   cetirizine (zyrTEC) 10 MG tablet Take 10 mg by mouth Daily.   Yes Roosevelt Gaston MD   Fluticasone-Salmeterol (ADVAIR) 250-50 MCG/ACT DISKUS Inhale 1 puff 2 (Two) Times a Day.   Yes Roosevelt Gaston MD   insulin degludec (Tresiba FlexTouch) 100 UNIT/ML solution pen-injector injection Inject 17 Units under the skin into the appropriate area as directed Daily.   Yes Roosevelt Gaston MD   ipratropium-albuterol (DUO-NEB) 0.5-2.5 mg/3 ml nebulizer Take 3 mL by nebulization Every 4 (Four) Hours As Needed for Wheezing or Shortness of Air. 21  Yes Ricci Trinidad DO   lisinopril (PRINIVIL,ZESTRIL) 2.5 MG tablet Take 2.5 mg by mouth Daily.   Yes Roosevelt Gaston MD   metFORMIN (GLUCOPHAGE) 500 MG tablet Take 500 mg by mouth 2 (two) times a day. 21  Yes Roosevelt Gaston MD   metoprolol tartrate (LOPRESSOR) 25 MG tablet Take 25 mg by mouth 2 (Two) Times a Day.   Yes Roosevelt Gaston MD   omeprazole (priLOSEC) 20 MG capsule Take 20 mg by mouth Daily.   Yes Roosevelt Gaston MD        Allergies:  No Known Allergies    Review of Systems  Review of Systems   Constitutional: Positive for activity change. Negative for appetite change, chills, fatigue and fever.   HENT: Negative for congestion, ear pain, postnasal drip, rhinorrhea, sinus pressure, sinus  pain, sneezing and sore throat.    Eyes: Negative for itching.   Respiratory: Positive for cough, shortness of breath and wheezing.    Cardiovascular: Negative for chest pain and leg swelling.   Gastrointestinal: Negative for abdominal pain, constipation, diarrhea, nausea and vomiting.   Endocrine: Negative for polydipsia and polyuria.   Genitourinary: Negative for decreased urine volume, difficulty urinating, dysuria, flank pain, frequency and urgency.   Musculoskeletal: Negative for arthralgias, back pain, gait problem, myalgias and neck pain.   Neurological: Negative for dizziness and headaches.   Psychiatric/Behavioral: Negative for agitation and confusion.           Objective   Objective     Vitals:   Temp:  [97.6 °F (36.4 °C)-97.7 °F (36.5 °C)] 97.6 °F (36.4 °C)  Heart Rate:  [83-95] 95  Resp:  [20-29] 20  BP: ()/() 99/57  Flow (L/min):  [3-10] 3    Physical Exam   Physical Exam  Constitutional:       Appearance: Normal appearance.   HENT:      Head: Normocephalic and atraumatic.      Right Ear: External ear normal.      Left Ear: External ear normal.      Nose: Nose normal.      Mouth/Throat:      Pharynx: Oropharynx is clear.   Eyes:      Conjunctiva/sclera: Conjunctivae normal.   Cardiovascular:      Rate and Rhythm: Normal rate and regular rhythm.      Pulses: Normal pulses.      Heart sounds: Normal heart sounds. No murmur heard.    No friction rub. No gallop.   Pulmonary:      Effort: Pulmonary effort is normal. No respiratory distress.      Breath sounds: Normal breath sounds. No wheezing, rhonchi or rales.      Comments: Diminished breath sounds heard throughout all lung fields  Abdominal:      General: Bowel sounds are normal. There is no distension.      Tenderness: There is no abdominal tenderness.   Musculoskeletal:         General: No swelling. Normal range of motion.      Cervical back: Normal range of motion and neck supple.   Skin:     General: Skin is warm.      Coloration: Skin is  not jaundiced.   Neurological:      Mental Status: He is alert. Mental status is at baseline.          Result Review    Result Review:  Glucose   Date Value Ref Range Status   09/16/2022 145 (H) 65 - 99 mg/dL Final     Glucose, Arterial   Date Value Ref Range Status   09/16/2022 148 (H) 70 - 99 mmol/L Final     BUN   Date Value Ref Range Status   09/16/2022 16 8 - 23 mg/dL Final     Creatinine   Date Value Ref Range Status   09/16/2022 0.99 0.76 - 1.27 mg/dL Final     Sodium   Date Value Ref Range Status   09/16/2022 139 136 - 145 mmol/L Final     Sodium, Arterial   Date Value Ref Range Status   09/16/2022 136.3 136 - 146 mmol/L Final     Potassium   Date Value Ref Range Status   09/16/2022 4.3 3.5 - 5.2 mmol/L Final     Chloride   Date Value Ref Range Status   09/16/2022 103 98 - 107 mmol/L Final     CO2   Date Value Ref Range Status   09/16/2022 28.9 22.0 - 29.0 mmol/L Final     Calcium   Date Value Ref Range Status   09/16/2022 8.8 8.6 - 10.5 mg/dL Final     Total Protein   Date Value Ref Range Status   09/16/2022 6.7 6.0 - 8.5 g/dL Final     Albumin   Date Value Ref Range Status   09/16/2022 3.80 3.50 - 5.20 g/dL Final     ALT (SGPT)   Date Value Ref Range Status   09/16/2022 11 1 - 41 U/L Final     AST (SGOT)   Date Value Ref Range Status   09/16/2022 17 1 - 40 U/L Final     Alkaline Phosphatase   Date Value Ref Range Status   09/16/2022 84 39 - 117 U/L Final     Total Bilirubin   Date Value Ref Range Status   09/16/2022 0.4 0.0 - 1.2 mg/dL Final     BUN/Creatinine Ratio   Date Value Ref Range Status   09/16/2022 16.2 7.0 - 25.0 Final     Anion Gap   Date Value Ref Range Status   09/16/2022 7.1 5.0 - 15.0 mmol/L Final      WBC   Date Value Ref Range Status   09/16/2022 7.87 3.40 - 10.80 10*3/mm3 Final     RBC   Date Value Ref Range Status   09/16/2022 4.89 4.14 - 5.80 10*6/mm3 Final     Hemoglobin   Date Value Ref Range Status   09/16/2022 12.3 (L) 13.0 - 17.7 g/dL Final     Hematocrit   Date Value Ref Range  Status   09/16/2022 41.3 37.5 - 51.0 % Final     MCV   Date Value Ref Range Status   09/16/2022 84.5 79.0 - 97.0 fL Final     MCH   Date Value Ref Range Status   09/16/2022 25.2 (L) 26.6 - 33.0 pg Final     MCHC   Date Value Ref Range Status   09/16/2022 29.8 (L) 31.5 - 35.7 g/dL Final     RDW   Date Value Ref Range Status   09/16/2022 15.2 12.3 - 15.4 % Final     RDW-SD   Date Value Ref Range Status   09/16/2022 46.1 37.0 - 54.0 fl Final     MPV   Date Value Ref Range Status   09/16/2022 10.3 6.0 - 12.0 fL Final     Platelets   Date Value Ref Range Status   09/16/2022 190 140 - 450 10*3/mm3 Final     Neutrophil %   Date Value Ref Range Status   09/16/2022 57.9 42.7 - 76.0 % Final     Lymphocyte %   Date Value Ref Range Status   09/16/2022 18.8 (L) 19.6 - 45.3 % Final     Monocyte %   Date Value Ref Range Status   09/16/2022 7.9 5.0 - 12.0 % Final     Eosinophil %   Date Value Ref Range Status   09/16/2022 14.5 (H) 0.3 - 6.2 % Final     Basophil %   Date Value Ref Range Status   09/16/2022 0.8 0.0 - 1.5 % Final     Immature Grans %   Date Value Ref Range Status   09/16/2022 0.1 0.0 - 0.5 % Final     Neutrophils, Absolute   Date Value Ref Range Status   09/16/2022 4.56 1.70 - 7.00 10*3/mm3 Final     Lymphocytes, Absolute   Date Value Ref Range Status   09/16/2022 1.48 0.70 - 3.10 10*3/mm3 Final     Monocytes, Absolute   Date Value Ref Range Status   09/16/2022 0.62 0.10 - 0.90 10*3/mm3 Final     Eosinophils, Absolute   Date Value Ref Range Status   09/16/2022 1.14 (H) 0.00 - 0.40 10*3/mm3 Final     Basophils, Absolute   Date Value Ref Range Status   09/16/2022 0.06 0.00 - 0.20 10*3/mm3 Final     Immature Grans, Absolute   Date Value Ref Range Status   09/16/2022 0.01 0.00 - 0.05 10*3/mm3 Final     nRBC   Date Value Ref Range Status   09/16/2022 0.0 0.0 - 0.2 /100 WBC Final      UA    Urinalysis 3/20/22 8/1/22   Specific [place], UA 1.015 >1.030 (A)   Ketones, UA Negative Negative   Blood, UA Negative Negative    Leukocytes, UA Negative Negative   Nitrite, UA Negative Negative   (A) Abnormal value               Imaging:  XR Chest 1 View    Result Date: 9/16/2022  PROCEDURE: XR CHEST 1 VW  COMPARISON: 8/22/2022.  INDICATIONS: Shortness of breath.  FINDINGS: Two AP upright portable views of the chest are provided for review.  Pleural-parenchymal scarring is suggested in the left lung base with volume loss, seen previously.  External artifacts obscure detail.  No pneumothorax is seen.  There may be mild cardiomegaly.  The thoracic aorta is atherosclerotic.  There is emphysematous contour of the lungs.  Degenerative changes involve the imaged spine and the bilateral shoulders.  No pneumothorax.  Probably no pleural effusion.  Overall, little interval change is suggested radiographically since the 8/22/2022 study.       No definite acute infiltrate.  No significant interval change is suggested radiographically since the 8/22/2022 study.      Please note that portions of this note were completed with a voice recognition program.  SEGUNDO MORA JR, MD       Electronically Signed and Approved By: SEGUNDO MORA JR, MD on 9/16/2022 at 22:02              XR Chest 1 View    Result Date: 8/22/2022  PROCEDURE: XR CHEST 1 VW  COMPARISON: [facility], , XR CHEST 1 VW, 8/01/2022, 4:38.  INDICATIONS: SOA Triage Protocol  FINDINGS:  The heart size is normal.  There is no interval change in the left basilar pleural/parenchymal scarring.  The right lung and pleural space are clear.  The pulmonary vascular markings are normal.  There is degenerative spondylosis of the thoracic spine.       No active disease, stable chest x-ray.       LALA GARIBAY MD       Electronically Signed and Approved By: LALA GARIBAY MD on 8/22/2022 at 17:02                  Assessment & Plan   Assessment / Plan     Active Hospital Problems    Diagnosis  POA   • Acute exacerbation of chronic obstructive pulmonary disease (COPD) ([facility]) [J44.1]  Yes      Priority: High   • Acute on chronic respiratory failure (HCC) [J96.20]  Yes     Priority: High   • Obesity (BMI 30-39.9) [E66.9]  Yes     Priority: Low   • Hyperlipidemia [E78.5]  Yes     Priority: Low   • Type 2 diabetes mellitus (HCC) [E11.9]  Yes     Priority: Low   • Essential hypertension [I10]  Yes     Priority: Low        Assessment/Plan:   Acute COPD exacerbation-patient with history of COPD.  Patient noted to have shortness of breath x1 day.  Patient is a resident at the nursing home and noted to be short of breath along with hypoxic.  Patient needed to be placed on CPAP secondary to respiratory distress.  On arrival to the ED, patient noted to have a respiratory rate of 29 and in significant respiratory distress.  Patient also noted to be hypoxic despite being placed on his home oxygen.  Patient will be treated with nebulizer treatments which will include DuoNeb, Brovana and Pulmicort.  Patient will also be placed on IV steroids.  Acute on chronic respiratory failure-patient noted to be on home oxygen.  Patient was in significant respiratory distress when EMS arrived to nursing home.  Patient needed to be placed on CPAP in route to the ER.  Patient placed on BiPAP in the ED.  Patient will be weaned off of BiPAP as able.  Patient will be treated for COPD exacerbation as outlined above.  Hyperlipidemia-patient with history of hyperlipidemia.  Patient to be continued on his home statin.  Type 2 diabetes-patient with history of type 2 diabetes.  Patient will be placed on insulin sliding scale.  Accu-Cheks as scheduled.  Primary hypertension-patient with history of hypertension.  Patient will be continued on his home blood pressure medications.  Obesity-BMI 32.95      DVT prophylaxis:  Medical and mechanical DVT prophylaxis orders are present.    CODE STATUS:    Level Of Support Discussed With: Patient  Code Status (Patient has no pulse and is not breathing): CPR (Attempt to Resuscitate)  Medical Interventions  (Patient has pulse or is breathing): Full Support  Release to patient: Routine Release      Admission Status:  I believe this patient meets observation status.    Electronically signed by Rosita Love MD, 09/17/22, 1:55 AM EDT.

## 2022-09-17 NOTE — PLAN OF CARE
Goal Outcome Evaluation:      Pt arrive to the floor from ED. Vital signs are stable and A/O x4. Had no complaints of pain.

## 2022-09-17 NOTE — ED PROVIDER NOTES
Time: 9:21 PM EDT    Chief Complaint: SOB    History of Present Illness:  Patient is a 72 y.o. year old male that presents to the emergency department with SOB. Per nursing staff, pt started having SOB approximately one hours ago. Upon EMS arrival, pt was hypoxic and requiring CPAP to improve spO2.        History provided by: Nursing staff.  History limited by:  Severe respiratory distress   used: No        Similar Symptoms Previously: yes  Recently seen: yes, 8/22/22      Patient Care Team  Primary Care Provider: Rosalba Edwards APRN    Past Medical History:     No Known Allergies  Past Medical History:   Diagnosis Date   • Asthma    • COPD (chronic obstructive pulmonary disease) (HCC)    • Diabetes mellitus (HCC)    • Hernia, umbilical    • Hypertension    • Requires continuous at home supplemental oxygen      Past Surgical History:   Procedure Laterality Date   • ABDOMINAL SURGERY       No family history on file.    Home Medications:  Prior to Admission medications    Medication Sig Start Date End Date Taking? Authorizing Provider   albuterol sulfate  (90 Base) MCG/ACT inhaler Inhale 2 puffs Every 6 (Six) Hours As Needed for Wheezing.    ProviderRoosevelt MD   aspirin 81 MG chewable tablet Chew 81 mg Daily.    ProviderRoosevelt MD   atorvastatin (LIPITOR) 10 MG tablet Take 10 mg by mouth Daily.    Roosevelt Gaston MD   cetirizine (zyrTEC) 10 MG tablet Take 10 mg by mouth Daily.    ProviderRoosevelt MD   Fluticasone-Salmeterol (ADVAIR) 250-50 MCG/ACT DISKUS Inhale 1 puff 2 (Two) Times a Day.    Roosevelt Gaston MD   insulin degludec (Tresiba FlexTouch) 100 UNIT/ML solution pen-injector injection Inject 17 Units under the skin into the appropriate area as directed Daily.    Roosevelt Gaston MD   ipratropium-albuterol (DUO-NEB) 0.5-2.5 mg/3 ml nebulizer Take 3 mL by nebulization Every 4 (Four) Hours As Needed for Wheezing or Shortness of Air. 12/13/21    "Ricci Trinidad,    lisinopril (PRINIVIL,ZESTRIL) 2.5 MG tablet Take 2.5 mg by mouth Daily.    ProviderRoosevelt MD   metFORMIN (GLUCOPHAGE) 500 MG tablet Take 500 mg by mouth 2 (two) times a day. 21   Roosevelt Gaston MD   metoprolol tartrate (LOPRESSOR) 25 MG tablet Take 0.5 (1/2) tablet by mouth 2 (Two) Times a Day for 30 days. 22  Sigifredo Mendes MD   omeprazole (priLOSEC) 20 MG capsule Take 20 mg by mouth Daily.    Provider, MD Roosevelt        Social History:   Social History     Tobacco Use   • Smoking status: Former Smoker     Years: 2.00     Types: Cigarettes     Quit date: 3/19/2021     Years since quittin.4   • Smokeless tobacco: Never Used   Vaping Use   • Vaping Use: Never used   Substance Use Topics   • Alcohol use: Never   • Drug use: Never       Record Review:  I have reviewed the patient's records in [place].     Review of Systems:  Review of Systems   Unable to perform ROS: Severe respiratory distress        Physical Exam:  /62 (BP Location: Right arm, Patient Position: Lying)   Pulse 86   Temp 97.3 °F (36.3 °C) (Oral)   Resp 20   Ht 185.4 cm (73\")   Wt 113 kg (249 lb 12.5 oz)   SpO2 98%   BMI 32.95 kg/m²     Physical Exam  Vitals and nursing note reviewed.   Constitutional:       General: He is not in acute distress.     Appearance: Normal appearance. He is not toxic-appearing.   HENT:      Head: Normocephalic and atraumatic.      Jaw: There is normal jaw occlusion.   Eyes:      General: Lids are normal.      Extraocular Movements: Extraocular movements intact.      Conjunctiva/sclera: Conjunctivae normal.      Pupils: Pupils are equal, round, and reactive to light.   Cardiovascular:      Rate and Rhythm: Normal rate and regular rhythm.      Pulses: Normal pulses.      Heart sounds: Normal heart sounds.   Pulmonary:      Effort: Tachypnea present.      Breath sounds: Examination of the right-upper field reveals wheezing. Examination of the left-upper " field reveals wheezing. Examination of the right-middle field reveals wheezing. Examination of the left-middle field reveals wheezing. Examination of the right-lower field reveals wheezing. Examination of the left-lower field reveals wheezing. Wheezing present. No rhonchi.   Abdominal:      General: Abdomen is flat.      Palpations: Abdomen is soft.      Tenderness: There is no abdominal tenderness. There is no guarding or rebound.   Musculoskeletal:         General: Normal range of motion.      Cervical back: Normal range of motion and neck supple.      Right lower leg: No edema.      Left lower leg: No edema.   Skin:     General: Skin is warm and dry.   Neurological:      Mental Status: He is alert and oriented to person, place, and time. Mental status is at baseline.      Comments: Mildly lethargic   Psychiatric:         Mood and Affect: Mood normal.                  Medications in the Emergency Department:  Medications   sodium chloride 0.9 % flush 10 mL (has no administration in time range)   aspirin chewable tablet 81 mg (has no administration in time range)   atorvastatin (LIPITOR) tablet 10 mg (10 mg Oral Given 9/17/22 0302)   cetirizine (zyrTEC) tablet 10 mg (has no administration in time range)   insulin detemir (LEVEMIR) injection 17 Units (has no administration in time range)   ipratropium-albuterol (DUO-NEB) nebulizer solution 3 mL (has no administration in time range)   lisinopril (PRINIVIL,ZESTRIL) tablet 2.5 mg (has no administration in time range)   metoprolol tartrate (LOPRESSOR) tablet 25 mg (has no administration in time range)   pantoprazole (PROTONIX) EC tablet 40 mg (40 mg Oral Given 9/17/22 0604)   sodium chloride 0.9 % flush 10 mL (has no administration in time range)   sodium chloride 0.9 % flush 10 mL (10 mL Intravenous Given 9/17/22 0302)   sodium chloride 0.9 % infusion 40 mL (has no administration in time range)   Enoxaparin Sodium (LOVENOX) syringe 40 mg (has no administration in time  range)   dextrose (GLUTOSE) oral gel 15 g (has no administration in time range)   dextrose (D50W) (25 g/50 mL) IV injection 25 g (has no administration in time range)   glucagon (human recombinant) (GLUCAGEN DIAGNOSTIC) injection 1 mg (has no administration in time range)   Insulin Lispro (humaLOG) injection 0-9 Units (8 Units Subcutaneous Given 9/17/22 3373)   ipratropium-albuterol (DUO-NEB) nebulizer solution 3 mL (3 mL Nebulization Given 9/16/22 2155)   methylPREDNISolone sodium succinate (SOLU-Medrol) injection 125 mg (125 mg Intravenous Given 9/16/22 2129)        Labs  Lab Results (last 24 hours)     Procedure Component Value Units Date/Time    CBC & Differential [[id number]]  (Abnormal) Collected: 09/16/22 2025    Specimen: Blood Updated: 09/16/22 2040    Narrative:      The following orders were created for panel order CBC & Differential.  Procedure                               Abnormality         Status                     ---------                               -----------         ------                     CBC Auto Differential[[id number]]        Abnormal            Final result                 Please view results for these tests on the individual orders.    Comprehensive Metabolic Panel [[id number]]  (Abnormal) Collected: 09/16/22 2025    Specimen: Blood Updated: 09/16/22 2105     Glucose 145 mg/dL      BUN 16 mg/dL      Creatinine 0.99 mg/dL      Sodium 139 mmol/L      Potassium 4.3 mmol/L      Chloride 103 mmol/L      CO2 28.9 mmol/L      Calcium 8.8 mg/dL      Total Protein 6.7 g/dL      Albumin 3.80 g/dL      ALT (SGPT) 11 U/L      AST (SGOT) 17 U/L      Alkaline Phosphatase 84 U/L      Total Bilirubin 0.4 mg/dL      Globulin 2.9 gm/dL      A/G Ratio 1.3 g/dL      BUN/Creatinine Ratio 16.2     Anion Gap 7.1 mmol/L      eGFR 80.4 mL/min/1.73      Comment: National Kidney Foundation and American Society of Nephrology (ASN) Task Force recommended calculation based on the Chronic Kidney Disease Epidemiology  Collaboration (CKD-EPI) equation refit without adjustment for race.       Narrative:      GFR Normal >60  Chronic Kidney Disease <60  Kidney Failure <15      BNP [[id number]]  (Normal) Collected: 09/16/22 2025    Specimen: Blood Updated: 09/16/22 2100     proBNP 99.8 pg/mL     Narrative:      Among patients with dyspnea, NT-proBNP is highly sensitive for the detection of acute congestive heart failure. In addition NT-proBNP of <300 pg/ml effectively rules out acute congestive heart failure with 99% negative predictive value.    Results may be falsely decreased if patient taking Biotin.      Troponin [[id number]]  (Normal) Collected: 09/16/22 2025    Specimen: Blood Updated: 09/16/22 2105     Troponin T 0.011 ng/mL     Narrative:      Troponin T Reference Range:  <= 0.03 ng/mL-   Negative for AMI  >0.03 ng/mL-     Abnormal for myocardial necrosis.  Clinicians would have to utilize clinical acumen, EKG, Troponin and serial changes to determine if it is an Acute Myocardial Infarction or myocardial injury due to an underlying chronic condition.       Results may be falsely decreased if patient taking Biotin.      CBC Auto Differential [[id number]]  (Abnormal) Collected: 09/16/22 2025    Specimen: Blood Updated: 09/16/22 2040     WBC 7.87 10*3/mm3      RBC 4.89 10*6/mm3      Hemoglobin 12.3 g/dL      Hematocrit 41.3 %      MCV 84.5 fL      MCH 25.2 pg      MCHC 29.8 g/dL      RDW 15.2 %      RDW-SD 46.1 fl      MPV 10.3 fL      Platelets 190 10*3/mm3      Neutrophil % 57.9 %      Lymphocyte % 18.8 %      Monocyte % 7.9 %      Eosinophil % 14.5 %      Basophil % 0.8 %      Immature Grans % 0.1 %      Neutrophils, Absolute 4.56 10*3/mm3      Lymphocytes, Absolute 1.48 10*3/mm3      Monocytes, Absolute 0.62 10*3/mm3      Eosinophils, Absolute 1.14 10*3/mm3      Basophils, Absolute 0.06 10*3/mm3      Immature Grans, Absolute 0.01 10*3/mm3      nRBC 0.0 /100 WBC     Lactic Acid, Plasma [[id number]]  (Normal) Collected: 09/16/22  2025    Specimen: Blood Updated: 09/16/22 2103     Lactate 1.1 mmol/L     Blood Culture - Blood, Arm, Left [[id number]] Collected: 09/16/22 2025    Specimen: Blood from Arm, Left Updated: 09/16/22 2040    Blood Culture - Blood, Arm, Left [[id number]] Collected: 09/16/22 2025    Specimen: Blood from Arm, Left Updated: 09/16/22 2040    ABG with Co-Ox and Electrolytes [[id number]]  (Abnormal) Collected: 09/16/22 2208    Specimen: Arterial Blood from Arm, Right Updated: 09/16/22 2212     pH, Arterial 7.357 pH units      pCO2, Arterial 48.8 mm Hg      pO2, Arterial 71.8 mm Hg      HCO3, Arterial 26.8 mmol/L      Base Excess, Arterial 0.7 mmol/L      O2 Saturation, Arterial 93.6 %      Hemoglobin, Blood Gas 13.3 g/dL      Carboxyhemoglobin 1.3 %      Methemoglobin 0.00 %      Oxyhemoglobin 92.4 %      FHHB 6.3 %      Nael's Test Positive     Note 14/7      Site Arterial: right radial     Modality BiPap     FIO2 24 %      Flow Rate --     Sodium, Arterial 136.3 mmol/L      Potassium, Arterial 4.20 mmol/L      Ionized Calcium, Arterial 1.12 mmol/L      Chloride, Arterial 103 mmol/L      Glucose, Arterial 148 mmol/L      Lactate, Arterial 0.79 mmol/L      PO2/FIO2 299    POC Glucose Once [[id number]]  (Abnormal) Collected: 09/17/22 0714    Specimen: Blood Updated: 09/17/22 0715     Glucose 370 mg/dL      Comment: Serial Number: [id number]:  [id number]              Imaging:  XR Chest 1 View    Result Date: 9/16/2022  PROCEDURE: XR CHEST 1 VW  COMPARISON: 8/22/2022.  INDICATIONS: Shortness of breath.  FINDINGS: Two AP upright portable views of the chest are provided for review.  Pleural-parenchymal scarring is suggested in the left lung base with volume loss, seen previously.  External artifacts obscure detail.  No pneumothorax is seen.  There may be mild cardiomegaly.  The thoracic aorta is atherosclerotic.  There is emphysematous contour of the lungs.  Degenerative changes involve the imaged spine and the bilateral  shoulders.  No pneumothorax.  Probably no pleural effusion.  Overall, little interval change is suggested radiographically since the 8/22/2022 study.       No definite acute infiltrate.  No significant interval change is suggested radiographically since the 8/22/2022 study.      Please note that portions of this note were completed with a voice recognition program.  SEGUNDO MORA JR, MD       Electronically Signed and Approved By: SEGUNDO MORA JR, MD on 9/16/2022 at 22:02                Procedures:  Procedures    Progress                            Medical Decision Making:  MDM  Number of Diagnoses or Management Options     Amount and/or Complexity of Data Reviewed  Clinical lab tests: ordered and reviewed  Tests in the radiology section of CPT®: ordered and reviewed  Decide to obtain previous medical records or to obtain history from someone other than the patient: yes  Discuss the patient with other providers: yes  Independent visualization of images, tracings, or specimens: yes       Patient continues to require noninvasive ventilation for acute respiratory failure, in addition to nebulizer treatments Solu-Medrol.  Patient discussed with hospitalist for admission.  The patient's airway is intact, vital signs, and respiratory status are safe for admission at this time.    Final diagnoses:   Acute exacerbation of chronic obstructive pulmonary disease (COPD) (HCC)   Acute on chronic respiratory failure with hypoxia and hypercapnia (HCC)        Disposition:  ED Disposition     ED Disposition   Decision to Admit    Condition   --    Comment   Level of Care: Med/Surg [1]   Diagnosis: Acute exacerbation of chronic obstructive pulmonary disease (COPD) (HCC) [[id number]]               Dictated Utilizing Dragon Dictation    Documentation assistance provided by Jayden Espinosa acting as scribe for Roel Saldivar MD. Information recorded by the scribe was done at my direction and has been verified and validated by me.         Jayden Espinosa  09/16/22 2124       Roel Saldivar MD  09/17/22 1124

## 2022-09-17 NOTE — PLAN OF CARE
Goal Outcome Evaluation:                   Patient is alert and oriented x4.  Vital signs stable.  No complaints of pain this shift.  Patient remains on oxygen at 3 L per nasal cannula with continuous pulse ox.  Continuing plan of care.

## 2022-09-17 NOTE — OUTREACH NOTE
COPD/PN Week 3 Survey    Flowsheet Row Responses   [employer] facility patient discharged from? [facility]   Does the patient have one of the following disease processes/diagnoses(primary or secondary)? COPD   Week 3 attempt successful? No   Unsuccessful attempts Attempt 1   Revoke Readmitted          PARK ABDULLAHI - Registered Nurse

## 2022-09-18 ENCOUNTER — READMISSION MANAGEMENT (OUTPATIENT)
Dept: CALL CENTER | Facility: HOSPITAL | Age: 72
End: 2022-09-18

## 2022-09-18 VITALS
OXYGEN SATURATION: 97 % | WEIGHT: 249.78 LBS | RESPIRATION RATE: 16 BRPM | DIASTOLIC BLOOD PRESSURE: 64 MMHG | TEMPERATURE: 97.3 F | SYSTOLIC BLOOD PRESSURE: 121 MMHG | BODY MASS INDEX: 33.1 KG/M2 | HEIGHT: 73 IN | HEART RATE: 56 BPM

## 2022-09-18 LAB
GLUCOSE BLDC GLUCOMTR-MCNC: 169 MG/DL (ref 70–99)
GLUCOSE BLDC GLUCOMTR-MCNC: 190 MG/DL (ref 70–99)
QT INTERVAL: 355 MS

## 2022-09-18 PROCEDURE — 82962 GLUCOSE BLOOD TEST: CPT

## 2022-09-18 PROCEDURE — 63710000001 PREDNISONE PER 1 MG: Performed by: FAMILY MEDICINE

## 2022-09-18 PROCEDURE — 99239 HOSP IP/OBS DSCHRG MGMT >30: CPT | Performed by: FAMILY MEDICINE

## 2022-09-18 PROCEDURE — 25010000002 ENOXAPARIN PER 10 MG: Performed by: HOSPITALIST

## 2022-09-18 PROCEDURE — 63710000001 INSULIN DETEMIR PER 5 UNITS: Performed by: HOSPITALIST

## 2022-09-18 PROCEDURE — 94799 UNLISTED PULMONARY SVC/PX: CPT

## 2022-09-18 PROCEDURE — 63710000001 INSULIN LISPRO (HUMAN) PER 5 UNITS: Performed by: FAMILY MEDICINE

## 2022-09-18 PROCEDURE — 25010000002 CEFTRIAXONE PER 250 MG: Performed by: FAMILY MEDICINE

## 2022-09-18 RX ORDER — AMOXICILLIN AND CLAVULANATE POTASSIUM 875; 125 MG/1; MG/1
1 TABLET, FILM COATED ORAL 2 TIMES DAILY
Qty: 6 TABLET | Refills: 0 | Status: SHIPPED | OUTPATIENT
Start: 2022-09-18 | End: 2022-09-18 | Stop reason: SDUPTHER

## 2022-09-18 RX ORDER — PREDNISONE 20 MG/1
40 TABLET ORAL
Qty: 6 TABLET | Refills: 0 | Status: SHIPPED | OUTPATIENT
Start: 2022-09-19 | End: 2022-09-22

## 2022-09-18 RX ORDER — AMOXICILLIN AND CLAVULANATE POTASSIUM 875; 125 MG/1; MG/1
1 TABLET, FILM COATED ORAL 2 TIMES DAILY
Qty: 6 TABLET | Refills: 0 | Status: SHIPPED | OUTPATIENT
Start: 2022-09-19 | End: 2022-09-22

## 2022-09-18 RX ORDER — PREDNISONE 20 MG/1
40 TABLET ORAL
Qty: 6 TABLET | Refills: 0 | Status: SHIPPED | OUTPATIENT
Start: 2022-09-19 | End: 2022-09-18 | Stop reason: SDUPTHER

## 2022-09-18 RX ADMIN — PREDNISONE 40 MG: 20 TABLET ORAL at 07:54

## 2022-09-18 RX ADMIN — SENNOSIDES AND DOCUSATE SODIUM 2 TABLET: 8.6; 5 TABLET ORAL at 08:47

## 2022-09-18 RX ADMIN — ARFORMOTEROL TARTRATE 15 MCG: 15 SOLUTION RESPIRATORY (INHALATION) at 07:48

## 2022-09-18 RX ADMIN — INSULIN LISPRO 3 UNITS: 100 INJECTION, SOLUTION INTRAVENOUS; SUBCUTANEOUS at 07:54

## 2022-09-18 RX ADMIN — PANTOPRAZOLE SODIUM 40 MG: 40 TABLET, DELAYED RELEASE ORAL at 06:01

## 2022-09-18 RX ADMIN — GUAIFENESIN 600 MG: 600 TABLET ORAL at 08:47

## 2022-09-18 RX ADMIN — Medication 10 ML: at 08:46

## 2022-09-18 RX ADMIN — ASPIRIN 81 MG CHEWABLE TABLET 81 MG: 81 TABLET CHEWABLE at 08:47

## 2022-09-18 RX ADMIN — METOPROLOL TARTRATE 25 MG: 25 TABLET, FILM COATED ORAL at 08:47

## 2022-09-18 RX ADMIN — INSULIN DETEMIR 17 UNITS: 100 INJECTION, SOLUTION SUBCUTANEOUS at 08:46

## 2022-09-18 RX ADMIN — CETIRIZINE HYDROCHLORIDE 10 MG: 10 TABLET, FILM COATED ORAL at 08:47

## 2022-09-18 RX ADMIN — CEFTRIAXONE SODIUM 1 G: 1 INJECTION, SOLUTION INTRAVENOUS at 08:46

## 2022-09-18 RX ADMIN — INSULIN LISPRO 3 UNITS: 100 INJECTION, SOLUTION INTRAVENOUS; SUBCUTANEOUS at 11:57

## 2022-09-18 RX ADMIN — LISINOPRIL 2.5 MG: 2.5 TABLET ORAL at 08:47

## 2022-09-18 RX ADMIN — BUDESONIDE 0.5 MG: 0.5 SUSPENSION RESPIRATORY (INHALATION) at 07:48

## 2022-09-18 RX ADMIN — ENOXAPARIN SODIUM 40 MG: 100 INJECTION SUBCUTANEOUS at 08:46

## 2022-09-18 NOTE — DISCHARGE SUMMARY
[facility]         HOSPITALIST  DISCHARGE SUMMARY    Patient Name: Dilip Fauklner  : 1950  MRN: [id number]    Date of Admission: 2022  Date of Discharge: 2022  Primary Care Physician: Rosalba Edwards APRN    Consults     Date and Time Order Name Status Description    2022 10:37 PM Hospitalist (on-call MD unless specified)            Active and Resolved Hospital Problems:  Active Hospital Problems    Diagnosis POA   • Acute exacerbation of chronic obstructive pulmonary disease (COPD) (HCC) [J44.1] Yes   • Obesity (BMI 30-39.9) [E66.9] Yes   • Hyperlipidemia [E78.5] Yes   • Acute on chronic respiratory failure (HCC) [J96.20] Yes   • Type 2 diabetes mellitus (HCC) [E11.9] Yes   • Acute on chronic respiratory failure with hypoxia and hypercapnia (HCC) [J96.21, J96.22] Yes   • Essential hypertension [I10] Yes      Resolved Hospital Problems   No resolved problems to display.       Hospital Course     Hospital Course:  Dilip Faulkner is a 72 y.o. male with medical history significant for COPD, type 2 diabetes, hypertension; presents with shortness of breath x1 day.  History obtained from patient and EMR.  Patient is a resident at a nursing home and was noted to be short of breath.  EMS was called and on arrival, patient was noted to have low oxygenation.  Patient typically wears 2 L nasal cannula oxygen continuously.  Patient had to be placed on CPAP in order to help with his respiratory distress.  Patient states he has had coughing, wheezing along with shortness of breath.  Patient also notes having a decrease in his level of activity secondary to the shortness of breath.  Patient afebrile mildly tachycardic, tachypneic respiratory rate of 29, blood pressure within normal limits satting well on 10 L CPAP initially able to be weaned to 3 L nasal cannula.   On exam patient noted to be in severe respiratory distress with wheezing on auscultation and mild lethargy.  ABG  reveals chronic mild hypercapnia 7.3 5/48/71/26 on BiPAP with a 24% FiO2.  Troponin and BNP within normal limits.  White blood cell count within normal limits.  Blood cultures obtained and negative to date.  Chest x-ray negative for acute infiltrate or effusion.  Patient given Solu-Medrol duo nebs with some improvement in his symptoms.  Patient admitted for further evaluation and treatment of acute on chronic hypoxic with chronic hypercapnic respiratory failure due to severe COPD with acute exacerbation.  Patient continued on systemic steroids inhaled bronchodilators and antimuscarinics as well as bronchopulmonary hygiene protocol.  Patient started on empiric antibiotics due to need for hospitalization increase purulent sputum production and dyspnea during COPD exacerbation.  Patient's respiratory function slowly improved.  Patient stated he feels comfortable going home.  Patient seen and evaluated on day discharge and thought stable for discharge home to complete course of empiric antibiotics, systemic steroids and continue home inhaler regimen and follow-up with his primary care provider and pulmonologist as an outpatient.      DISCHARGE Follow Up Recommendations for labs and diagnostics: As above      Day of Discharge     Vital Signs:  Temp:  [97.2 °F (36.2 °C)-97.9 °F (36.6 °C)] 97.3 °F (36.3 °C)  Heart Rate:  [56-80] 56  Resp:  [15-20] 16  BP: (109-140)/(55-84) 121/64  Flow (L/min):  [3] 3  Physical Exam:   Gen. well-developed appearing stated age in no acute distress  HEENT: Normocephalic atraumatic moist membranes  Cardiovascular: regular rate and rhythm no murmurs rubs or gallops S1-S2, no lower extremity edema appreciated  Pulmonary: Clear to auscultation bilaterally no wheezes rales or rhonchi symmetric chest expansion, unlabored, no conversational dyspnea appreciated, increased AP diameter  Gastrointestinal: Soft nontender nondistended positive bowel sounds all 4 quadrants no rebound or  guarding  Musculoskeletal: No clubbing cyanosis, warm and well-perfused, calves soft symmetric nontender bilaterally  Skin: Clean dry without rashs  Neuro: Cranial nerves II through XII intact grossly no sensorimotor deficits appreciated bilateral upper and lower extremities  Psych: Patient is calm cooperative and appropriate with exam not responding to internal stimuli  : No Dias catheter no bladder distention no suprapubic tenderness      Discharge Details        Discharge Medications      New Medications      Instructions Start Date   amoxicillin-clavulanate 875-125 MG per tablet  Commonly known as: Augmentin   1 tablet, Oral, 2 Times Daily      predniSONE 20 MG tablet  Commonly known as: DELTASONE   40 mg, Oral, Daily With Breakfast   Start Date: September 19, 2022        Continue These Medications      Instructions Start Date   albuterol sulfate  (90 Base) MCG/ACT inhaler  Commonly known as: PROVENTIL HFA;VENTOLIN HFA;PROAIR HFA   2 puffs, Inhalation, Every 6 Hours PRN      aspirin 81 MG chewable tablet   81 mg, Oral, Daily      atorvastatin 10 MG tablet  Commonly known as: LIPITOR   10 mg, Oral, Daily      cetirizine 10 MG tablet  Commonly known as: zyrTEC   10 mg, Oral, Daily      Fluticasone-Salmeterol 250-50 MCG/ACT DISKUS  Commonly known as: ADVAIR/WIXELA   1 puff, Inhalation, 2 Times Daily - RT      ipratropium-albuterol 0.5-2.5 mg/3 ml nebulizer  Commonly known as: DUO-NEB   3 mL, Nebulization, Every 4 Hours PRN      lisinopril 2.5 MG tablet  Commonly known as: PRINIVIL,ZESTRIL   2.5 mg, Oral, Daily      metFORMIN 500 MG tablet  Commonly known as: GLUCOPHAGE   500 mg, Oral, 2 times daily      metoprolol tartrate 25 MG tablet  Commonly known as: LOPRESSOR   25 mg, Oral, 2 Times Daily      omeprazole 20 MG capsule  Commonly known as: priLOSEC   20 mg, Oral, Daily      Tresiba FlexTouch 100 UNIT/ML solution pen-injector injection  Generic drug: insulin degludec   17 Units, Subcutaneous, Daily              No Known Allergies    Discharge Disposition:  Home or Self Care    Diet:  Hospital:  Diet Order   Procedures   • Diet Regular; Consistent Carbohydrate       Discharge Activity:   Activity Instructions     Gradually Increase Activity Until at Pre-Hospitalization Level            CODE STATUS:  Code Status and Medical Interventions:   Ordered at: 09/17/22 0451     Level Of Support Discussed With:    Patient     Code Status (Patient has no pulse and is not breathing):    CPR (Attempt to Resuscitate)     Medical Interventions (Patient has pulse or is breathing):    Full Support     Release to patient:    Routine Release         No future appointments.    Additional Instructions for the Follow-ups that You Need to Schedule     Discharge Follow-up with PCP   As directed       Currently Documented PCP:    Rosalba Edwards APRN    PCP Phone Number:    [phone]     Follow Up Details: Hospital discharge follow-up 1 to 2 weeks COPD with acute exacerbation         Discharge Follow-up with Specialty: Follow-up with your pulmonologist as scheduled on 9/27/22   As directed      Specialty: Follow-up with your pulmonologist as scheduled on 9/27/22               Pertinent  and/or Most Recent Results     PROCEDURES:   None    LAB RESULTS:      Lab 09/16/22 2208 09/16/22 2025   WBC  --  7.87   HEMOGLOBIN  --  12.3*   HEMATOCRIT  --  41.3   PLATELETS  --  190   NEUTROS ABS  --  4.56   IMMATURE GRANS (ABS)  --  0.01   LYMPHS ABS  --  1.48   MONOS ABS  --  0.62   EOS ABS  --  1.14*   MCV  --  84.5   LACTATE  --  1.1   LACTATE, ARTERIAL 0.79  --          Lab 09/16/22 2208 09/16/22 2025   SODIUM  --  139   SODIUM, ARTERIAL 136.3  --    POTASSIUM  --  4.3   CHLORIDE  --  103   CO2  --  28.9   ANION GAP  --  7.1   BUN  --  16   CREATININE  --  0.99   EGFR  --  80.4   GLUCOSE  --  145*   GLUCOSE, ARTERIAL 148*  --    CALCIUM  --  8.8   IONIZED CALCIUM 1.12*  --    MAGNESIUM  --  1.9         Lab 09/16/22 2025   TOTAL PROTEIN  6.7   ALBUMIN 3.80   GLOBULIN 2.9   ALT (SGPT) 11   AST (SGOT) 17   BILIRUBIN 0.4   ALK PHOS 84         Lab 09/16/22 2025   PROBNP 99.8   TROPONIN T 0.011                 Lab 09/16/22 2208   PH, ARTERIAL 7.357   PCO2, ARTERIAL 48.8*   PO2 ART 71.8*   O2 SATURATION ART 93.6*   FIO2 24   HCO3 ART 26.8*   BASE EXCESS ART 0.7   CARBOXYHEMOGLOBIN 1.3     Brief Urine Lab Results  (Last result in the past 365 days)      Color   Clarity   Blood   Leuk Est   Nitrite   Protein   CREAT   Urine HCG        08/01/22 1250 Yellow   Clear   Negative   Negative   Negative   Negative               Microbiology Results (last 10 days)     Procedure Component Value - Date/Time    Respiratory Culture - Sputum, Cough [[id number]] Collected: 09/17/22 1526    Lab Status: Preliminary result Specimen: Sputum from Cough Updated: 09/17/22 1715     Gram Stain Rare (1+) Epithelial cells seen      Moderate (3+) WBCs seen      Few (2+) Gram positive cocci in pairs, chains and clusters    Blood Culture - Blood, Arm, Left [[id number]]  (Normal) Collected: 09/16/22 2025    Lab Status: Preliminary result Specimen: Blood from Arm, Left Updated: 09/17/22 2048     Blood Culture No growth at 24 hours    Blood Culture - Blood, Arm, Left [[id number]]  (Normal) Collected: 09/16/22 2025    Lab Status: Preliminary result Specimen: Blood from Arm, Left Updated: 09/17/22 2048     Blood Culture No growth at 24 hours          XR Chest 1 View    Result Date: 9/16/2022  Impression:  No definite acute infiltrate.  No significant interval change is suggested radiographically since the 8/22/2022 study.      Please note that portions of this note were completed with a voice recognition program.  SEGUNDO MORA JR, MD       Electronically Signed and Approved By: SEGUNDO MORA JR, MD on 9/16/2022 at 22:02              XR Chest 1 View    Result Date: 8/22/2022  Impression:  No active disease, stable chest x-ray.       LALA GARIBAY MD       Electronically Signed and Approved  By: LALA GARIBAY MD on 8/22/2022 at 17:02                           Labs Pending at Discharge:  Pending Labs     Order Current Status    Blood Culture - Blood, Arm, Left Preliminary result    Blood Culture - Blood, Arm, Left Preliminary result    Respiratory Culture - Sputum, Cough Preliminary result            Time spent on Discharge including face to face service: Greater than 35 minutes    Electronically signed by Danny Dye MD, 09/18/22, 11:40 AM EDT.

## 2022-09-18 NOTE — PLAN OF CARE
Goal Outcome Evaluation:  No significant changes this shift.   Patient is alert and oriented x4.  Vital signs stable. Continues on oxygen at 3 L per nasal cannula with continuous pulse ox.  Continuing plan of care.

## 2022-09-18 NOTE — PLAN OF CARE
Goal Outcome Evaluation:                   Patient to be discharged home.  Portable oxygen provided to patient due to patient being on 2 L at home.  [facility] to provide transportation home.  Patient given cab voucher.  Discharge instructions provided to patient.  Patient verbalized understanding of discharge instructions.  IV removed with tip intact.

## 2022-09-19 NOTE — OUTREACH NOTE
Prep Survey    Flowsheet Row Responses   [facility] facility patient discharged from? [facility]   Is LACE score < 7 ? No   Emergency Room discharge w/ pulse ox? No   Eligibility Readm Mgmt   Discharge diagnosis Acute exacerbation of chronic obstructive pulmonary disease    Does the patient have one of the following disease processes/diagnoses(primary or secondary)? COPD   Does the patient have Home health ordered? No   Is there a DME ordered? No   Prep survey completed? Yes          RONY RINALDI - Registered Nurse

## 2022-09-20 LAB
BACTERIA SPEC RESP CULT: NORMAL
GRAM STN SPEC: NORMAL

## 2022-09-21 LAB
BACTERIA SPEC AEROBE CULT: NORMAL
BACTERIA SPEC AEROBE CULT: NORMAL

## 2022-09-22 ENCOUNTER — READMISSION MANAGEMENT (OUTPATIENT)
Dept: CALL CENTER | Facility: HOSPITAL | Age: 72
End: 2022-09-22

## 2022-09-22 NOTE — OUTREACH NOTE
COPD/PN Week 1 Survey    Flowsheet Row Responses   [facility] patient discharged from? [facility]   Does the patient have one of the following disease processes/diagnoses(primary or secondary)? COPD   Week 1 attempt successful? Yes   Call start time 1056   Call end time 1057   Discharge diagnosis Acute exacerbation of chronic obstructive pulmonary disease    Does the patient have all medications ordered at discharge? Yes   Is the patient taking all medications as directed (includes completed medication regime)? Yes   Does the patient have a primary care provider?  Yes   Comments regarding PCP says he has a f/u appt with PCP   Has the patient kept scheduled appointments due by today? N/A   Has home health visited the patient within 72 hours of discharge? N/A   Psychosocial issues? No   Comments uses 2L of O2 at all times   Did the patient receive a copy of their discharge instructions? Yes   What is the patient's perception of their health status since discharge? Improving   Nursing Interventions Nurse provided patient education   Is the patient/caregiver able to teach back the hierarchy of who to call/visit for symptoms/problems? PCP, Specialist, Home health nurse, Urgent Care, ED, 911 Yes   Patient reports what zone on this call? Green Zone   Green Zone Reports doing well   Week 1 call completed? Yes   Wrap up additional comments Doing ok, limited information given, no questions.          MIGUEL WARREN - Registered Nurse

## 2022-09-29 ENCOUNTER — READMISSION MANAGEMENT (OUTPATIENT)
Dept: CALL CENTER | Facility: HOSPITAL | Age: 72
End: 2022-09-29

## 2022-09-29 NOTE — OUTREACH NOTE
COPD/PN Week 2 Survey    Flowsheet Row Responses   [facility] patient discharged from? [facility]   Does the patient have one of the following disease processes/diagnoses(primary or secondary)? COPD   Week 2 attempt successful? Yes   Call start time 0827   Call end time 0830   Meds reviewed with patient/caregiver? Yes   Is the patient taking all medications as directed (includes completed medication regime)? Yes   Comments regarding appointments Pt did follow up with pcp on September 28, 2022.   Does the patient have a primary care provider?  Yes   Has the patient kept scheduled appointments due by today? Yes   DME comments Pt reports using 02 @ 2 L as needed.   What is the patient's perception of their health status since discharge? Improving   Patient reports what zone on this call? Green Zone   Green Zone Reports doing well, Breathing without shortness of breath   Green Zone interventions: Take daily medications   Week 2 call completed? Yes   Wrap up additional comments Pt reports feeling well on this day. Pt not presently using 02. Pt reports seeing pcp yesterday.          EDMAR RAWLS - Registered Nurse

## 2022-10-05 ENCOUNTER — HOSPITAL ENCOUNTER (INPATIENT)
Facility: HOSPITAL | Age: 72
LOS: 3 days | Discharge: HOME HEALTH | End: 2022-10-08
Attending: EMERGENCY MEDICINE | Admitting: HOSPITALIST

## 2022-10-05 ENCOUNTER — READMISSION MANAGEMENT (OUTPATIENT)
Dept: CALL CENTER | Facility: HOSPITAL | Age: 72
End: 2022-10-05

## 2022-10-05 ENCOUNTER — APPOINTMENT (OUTPATIENT)
Dept: GENERAL RADIOLOGY | Facility: HOSPITAL | Age: 72
End: 2022-10-05

## 2022-10-05 DIAGNOSIS — J44.1 COPD WITH ACUTE EXACERBATION: Primary | ICD-10-CM

## 2022-10-05 DIAGNOSIS — Z78.9 DECREASED ACTIVITIES OF DAILY LIVING (ADL): ICD-10-CM

## 2022-10-05 DIAGNOSIS — R26.2 DIFFICULTY WALKING: ICD-10-CM

## 2022-10-05 DIAGNOSIS — J96.02 ACUTE RESPIRATORY FAILURE WITH HYPERCAPNIA: ICD-10-CM

## 2022-10-05 PROBLEM — J44.9 COPD (CHRONIC OBSTRUCTIVE PULMONARY DISEASE): Status: ACTIVE | Noted: 2022-10-05

## 2022-10-05 LAB
ALBUMIN SERPL-MCNC: 4 G/DL (ref 3.5–5.2)
ALBUMIN/GLOB SERPL: 1.2 G/DL
ALP SERPL-CCNC: 90 U/L (ref 39–117)
ALT SERPL W P-5'-P-CCNC: 12 U/L (ref 1–41)
ANION GAP SERPL CALCULATED.3IONS-SCNC: 7.6 MMOL/L (ref 5–15)
APTT PPP: 26.6 SECONDS (ref 24.2–34.2)
ARTERIAL PATENCY WRIST A: ABNORMAL
ARTERIAL PATENCY WRIST A: POSITIVE
AST SERPL-CCNC: 13 U/L (ref 1–40)
B PARAPERT DNA SPEC QL NAA+PROBE: NOT DETECTED
B PERT DNA SPEC QL NAA+PROBE: NOT DETECTED
BASE EXCESS BLDA CALC-SCNC: -3.9 MMOL/L (ref -2–2)
BASE EXCESS BLDA CALC-SCNC: -5.7 MMOL/L (ref -2–2)
BASOPHILS # BLD AUTO: 0.09 10*3/MM3 (ref 0–0.2)
BASOPHILS NFR BLD AUTO: 0.6 % (ref 0–1.5)
BDY SITE: ABNORMAL
BDY SITE: ABNORMAL
BILIRUB SERPL-MCNC: 0.3 MG/DL (ref 0–1.2)
BUN SERPL-MCNC: 12 MG/DL (ref 8–23)
BUN/CREAT SERPL: 12.1 (ref 7–25)
C PNEUM DNA NPH QL NAA+NON-PROBE: NOT DETECTED
CA-I BLDA-SCNC: 1.17 MMOL/L (ref 1.13–1.32)
CA-I BLDA-SCNC: 1.25 MMOL/L (ref 1.13–1.32)
CALCIUM SPEC-SCNC: 9.1 MG/DL (ref 8.6–10.5)
CHLORIDE BLDA-SCNC: 102 MMOL/L (ref 98–106)
CHLORIDE BLDA-SCNC: 103 MMOL/L (ref 98–106)
CHLORIDE SERPL-SCNC: 103 MMOL/L (ref 98–107)
CO2 SERPL-SCNC: 28.4 MMOL/L (ref 22–29)
COHGB MFR BLD: 0.9 % (ref 0–1.5)
COHGB MFR BLD: 1 % (ref 0–1.5)
CREAT SERPL-MCNC: 0.99 MG/DL (ref 0.76–1.27)
D-LACTATE SERPL-SCNC: 1.4 MMOL/L (ref 0.5–2)
DEPRECATED RDW RBC AUTO: 47.2 FL (ref 37–54)
EGFRCR SERPLBLD CKD-EPI 2021: 80.4 ML/MIN/1.73
EOSINOPHIL # BLD AUTO: 1.17 10*3/MM3 (ref 0–0.4)
EOSINOPHIL NFR BLD AUTO: 7.6 % (ref 0.3–6.2)
ERYTHROCYTE [DISTWIDTH] IN BLOOD BY AUTOMATED COUNT: 15.2 % (ref 12.3–15.4)
FERRITIN SERPL-MCNC: 48.44 NG/ML (ref 30–400)
FHHB: 1.7 % (ref 0–5)
FHHB: 2.3 % (ref 0–5)
FLUAV SUBTYP SPEC NAA+PROBE: NOT DETECTED
FLUBV RNA ISLT QL NAA+PROBE: NOT DETECTED
GAS FLOW AIRWAY: 15 LPM
GAS FLOW AIRWAY: ABNORMAL L/MIN
GLOBULIN UR ELPH-MCNC: 3.4 GM/DL
GLUCOSE BLDA-MCNC: 246 MMOL/L (ref 70–99)
GLUCOSE BLDA-MCNC: 256 MMOL/L (ref 70–99)
GLUCOSE BLDC GLUCOMTR-MCNC: 156 MG/DL (ref 70–99)
GLUCOSE BLDC GLUCOMTR-MCNC: 272 MG/DL (ref 70–99)
GLUCOSE BLDC GLUCOMTR-MCNC: 281 MG/DL (ref 70–99)
GLUCOSE SERPL-MCNC: 260 MG/DL (ref 65–99)
HADV DNA SPEC NAA+PROBE: NOT DETECTED
HCO3 BLDA-SCNC: 23.7 MMOL/L (ref 22–26)
HCO3 BLDA-SCNC: 25.6 MMOL/L (ref 22–26)
HCOV 229E RNA SPEC QL NAA+PROBE: NOT DETECTED
HCOV HKU1 RNA SPEC QL NAA+PROBE: NOT DETECTED
HCOV NL63 RNA SPEC QL NAA+PROBE: NOT DETECTED
HCOV OC43 RNA SPEC QL NAA+PROBE: NOT DETECTED
HCT VFR BLD AUTO: 43.3 % (ref 37.5–51)
HGB BLD-MCNC: 12.7 G/DL (ref 13–17.7)
HGB BLDA-MCNC: 12.6 G/DL (ref 13.8–16.4)
HGB BLDA-MCNC: 13.3 G/DL (ref 13.8–16.4)
HMPV RNA NPH QL NAA+NON-PROBE: NOT DETECTED
HOLD SPECIMEN: NORMAL
HOLD SPECIMEN: NORMAL
HPIV1 RNA ISLT QL NAA+PROBE: NOT DETECTED
HPIV2 RNA SPEC QL NAA+PROBE: NOT DETECTED
HPIV3 RNA NPH QL NAA+PROBE: NOT DETECTED
HPIV4 P GENE NPH QL NAA+PROBE: NOT DETECTED
IMM GRANULOCYTES # BLD AUTO: 0.11 10*3/MM3 (ref 0–0.05)
IMM GRANULOCYTES NFR BLD AUTO: 0.7 % (ref 0–0.5)
INHALED O2 CONCENTRATION: 100 %
INHALED O2 CONCENTRATION: 40 %
INR PPP: 0.97 (ref 0.86–1.15)
IRON 24H UR-MRATE: 40 MCG/DL (ref 59–158)
IRON SATN MFR SERPL: 10 % (ref 20–50)
LACTATE BLDA-SCNC: 0.7 MMOL/L (ref 0.5–2)
LACTATE BLDA-SCNC: 0.87 MMOL/L (ref 0.5–2)
LYMPHOCYTES # BLD AUTO: 2.42 10*3/MM3 (ref 0.7–3.1)
LYMPHOCYTES NFR BLD AUTO: 15.7 % (ref 19.6–45.3)
M PNEUMO IGG SER IA-ACNC: NOT DETECTED
MAGNESIUM SERPL-MCNC: 1.7 MG/DL (ref 1.6–2.4)
MCH RBC QN AUTO: 25 PG (ref 26.6–33)
MCHC RBC AUTO-ENTMCNC: 29.3 G/DL (ref 31.5–35.7)
MCV RBC AUTO: 85.1 FL (ref 79–97)
METHGB BLD QL: 0.1 % (ref 0–1.5)
METHGB BLD QL: 0.3 % (ref 0–1.5)
MODALITY: ABNORMAL
MODALITY: ABNORMAL
MONOCYTES # BLD AUTO: 1.09 10*3/MM3 (ref 0.1–0.9)
MONOCYTES NFR BLD AUTO: 7.1 % (ref 5–12)
MRSA DNA SPEC QL NAA+PROBE: NORMAL
NEUTROPHILS NFR BLD AUTO: 10.57 10*3/MM3 (ref 1.7–7)
NEUTROPHILS NFR BLD AUTO: 68.3 % (ref 42.7–76)
NOTE: ABNORMAL
NOTE: ABNORMAL
NRBC BLD AUTO-RTO: 0 /100 WBC (ref 0–0.2)
NT-PROBNP SERPL-MCNC: 78.9 PG/ML (ref 0–900)
OXYHGB MFR BLDV: 96.7 % (ref 94–99)
OXYHGB MFR BLDV: 97 % (ref 94–99)
PCO2 BLDA: 54 MM HG (ref 35–45)
PCO2 BLDA: 82.4 MM HG (ref 35–45)
PH BLDA: 7.11 PH UNITS (ref 7.35–7.45)
PH BLDA: 7.26 PH UNITS (ref 7.35–7.45)
PHOSPHATE SERPL-MCNC: 2.9 MG/DL (ref 2.5–4.5)
PLATELET # BLD AUTO: 223 10*3/MM3 (ref 140–450)
PMV BLD AUTO: 10.4 FL (ref 6–12)
PO2 BLD: 142 MM[HG] (ref 0–500)
PO2 BLD: 374 MM[HG] (ref 0–500)
PO2 BLDA: 141.8 MM HG (ref 80–100)
PO2 BLDA: 149.4 MM HG (ref 80–100)
POTASSIUM BLDA-SCNC: 4.39 MMOL/L (ref 3.5–5)
POTASSIUM BLDA-SCNC: 4.59 MMOL/L (ref 3.5–5)
POTASSIUM SERPL-SCNC: 4.7 MMOL/L (ref 3.5–5.2)
PROCALCITONIN SERPL-MCNC: 0.08 NG/ML (ref 0–0.25)
PROT SERPL-MCNC: 7.4 G/DL (ref 6–8.5)
PROTHROMBIN TIME: 13 SECONDS (ref 11.8–14.9)
QT INTERVAL: 353 MS
RBC # BLD AUTO: 5.09 10*6/MM3 (ref 4.14–5.8)
RHINOVIRUS RNA SPEC NAA+PROBE: NOT DETECTED
RSV RNA NPH QL NAA+NON-PROBE: NOT DETECTED
SAO2 % BLDCOA: 97.7 % (ref 95–99)
SAO2 % BLDCOA: 98.3 % (ref 95–99)
SARS-COV-2 RNA PNL SPEC NAA+PROBE: NOT DETECTED
SODIUM BLDA-SCNC: 136.7 MMOL/L (ref 136–146)
SODIUM BLDA-SCNC: 137.9 MMOL/L (ref 136–146)
SODIUM SERPL-SCNC: 139 MMOL/L (ref 136–145)
TIBC SERPL-MCNC: 419 MCG/DL (ref 298–536)
TRANSFERRIN SERPL-MCNC: 281 MG/DL (ref 200–360)
TROPONIN T SERPL-MCNC: 0.02 NG/ML (ref 0–0.03)
WBC NRBC COR # BLD: 15.45 10*3/MM3 (ref 3.4–10.8)
WHOLE BLOOD HOLD COAG: NORMAL
WHOLE BLOOD HOLD SPECIMEN: NORMAL

## 2022-10-05 PROCEDURE — 25010000002 VANCOMYCIN 5 G RECONSTITUTED SOLUTION: Performed by: HOSPITALIST

## 2022-10-05 PROCEDURE — 85025 COMPLETE CBC W/AUTO DIFF WBC: CPT | Performed by: EMERGENCY MEDICINE

## 2022-10-05 PROCEDURE — 25010000002 CEFEPIME PER 500 MG: Performed by: EMERGENCY MEDICINE

## 2022-10-05 PROCEDURE — 87040 BLOOD CULTURE FOR BACTERIA: CPT | Performed by: EMERGENCY MEDICINE

## 2022-10-05 PROCEDURE — 0202U NFCT DS 22 TRGT SARS-COV-2: CPT | Performed by: HOSPITALIST

## 2022-10-05 PROCEDURE — 99285 EMERGENCY DEPT VISIT HI MDM: CPT

## 2022-10-05 PROCEDURE — 63710000001 INSULIN DETEMIR PER 5 UNITS: Performed by: HOSPITALIST

## 2022-10-05 PROCEDURE — 25010000002 ENOXAPARIN PER 10 MG: Performed by: HOSPITALIST

## 2022-10-05 PROCEDURE — 36415 COLL VENOUS BLD VENIPUNCTURE: CPT | Performed by: HOSPITALIST

## 2022-10-05 PROCEDURE — 94761 N-INVAS EAR/PLS OXIMETRY MLT: CPT

## 2022-10-05 PROCEDURE — 85730 THROMBOPLASTIN TIME PARTIAL: CPT | Performed by: EMERGENCY MEDICINE

## 2022-10-05 PROCEDURE — 36600 WITHDRAWAL OF ARTERIAL BLOOD: CPT | Performed by: EMERGENCY MEDICINE

## 2022-10-05 PROCEDURE — 94799 UNLISTED PULMONARY SVC/PX: CPT

## 2022-10-05 PROCEDURE — 99223 1ST HOSP IP/OBS HIGH 75: CPT | Performed by: HOSPITALIST

## 2022-10-05 PROCEDURE — 82375 ASSAY CARBOXYHB QUANT: CPT | Performed by: EMERGENCY MEDICINE

## 2022-10-05 PROCEDURE — 97161 PT EVAL LOW COMPLEX 20 MIN: CPT

## 2022-10-05 PROCEDURE — 87641 MR-STAPH DNA AMP PROBE: CPT | Performed by: HOSPITALIST

## 2022-10-05 PROCEDURE — 82728 ASSAY OF FERRITIN: CPT | Performed by: HOSPITALIST

## 2022-10-05 PROCEDURE — 25010000002 METHYLPREDNISOLONE PER 125 MG: Performed by: EMERGENCY MEDICINE

## 2022-10-05 PROCEDURE — 83605 ASSAY OF LACTIC ACID: CPT | Performed by: EMERGENCY MEDICINE

## 2022-10-05 PROCEDURE — 63710000001 INSULIN LISPRO (HUMAN) PER 5 UNITS: Performed by: HOSPITALIST

## 2022-10-05 PROCEDURE — 25010000002 VANCOMYCIN 5 G RECONSTITUTED SOLUTION: Performed by: EMERGENCY MEDICINE

## 2022-10-05 PROCEDURE — 85610 PROTHROMBIN TIME: CPT | Performed by: EMERGENCY MEDICINE

## 2022-10-05 PROCEDURE — 84466 ASSAY OF TRANSFERRIN: CPT | Performed by: HOSPITALIST

## 2022-10-05 PROCEDURE — 82962 GLUCOSE BLOOD TEST: CPT

## 2022-10-05 PROCEDURE — 83880 ASSAY OF NATRIURETIC PEPTIDE: CPT | Performed by: EMERGENCY MEDICINE

## 2022-10-05 PROCEDURE — 94640 AIRWAY INHALATION TREATMENT: CPT

## 2022-10-05 PROCEDURE — 99223 1ST HOSP IP/OBS HIGH 75: CPT | Performed by: INTERNAL MEDICINE

## 2022-10-05 PROCEDURE — 94660 CPAP INITIATION&MGMT: CPT

## 2022-10-05 PROCEDURE — 93005 ELECTROCARDIOGRAM TRACING: CPT | Performed by: EMERGENCY MEDICINE

## 2022-10-05 PROCEDURE — 71045 X-RAY EXAM CHEST 1 VIEW: CPT

## 2022-10-05 PROCEDURE — 25010000002 METHYLPREDNISOLONE PER 40 MG: Performed by: HOSPITALIST

## 2022-10-05 PROCEDURE — 83735 ASSAY OF MAGNESIUM: CPT | Performed by: HOSPITALIST

## 2022-10-05 PROCEDURE — 93010 ELECTROCARDIOGRAM REPORT: CPT | Performed by: SPECIALIST

## 2022-10-05 PROCEDURE — 84484 ASSAY OF TROPONIN QUANT: CPT | Performed by: EMERGENCY MEDICINE

## 2022-10-05 PROCEDURE — 84145 PROCALCITONIN (PCT): CPT | Performed by: HOSPITALIST

## 2022-10-05 PROCEDURE — 84100 ASSAY OF PHOSPHORUS: CPT | Performed by: HOSPITALIST

## 2022-10-05 PROCEDURE — 83540 ASSAY OF IRON: CPT | Performed by: HOSPITALIST

## 2022-10-05 PROCEDURE — 25010000002 CEFEPIME PER 500 MG: Performed by: HOSPITALIST

## 2022-10-05 PROCEDURE — 80053 COMPREHEN METABOLIC PANEL: CPT | Performed by: EMERGENCY MEDICINE

## 2022-10-05 PROCEDURE — 82805 BLOOD GASES W/O2 SATURATION: CPT | Performed by: EMERGENCY MEDICINE

## 2022-10-05 PROCEDURE — 83050 HGB METHEMOGLOBIN QUAN: CPT | Performed by: EMERGENCY MEDICINE

## 2022-10-05 PROCEDURE — 97165 OT EVAL LOW COMPLEX 30 MIN: CPT

## 2022-10-05 RX ORDER — ONDANSETRON 4 MG/1
4 TABLET, FILM COATED ORAL EVERY 6 HOURS PRN
Status: DISCONTINUED | OUTPATIENT
Start: 2022-10-05 | End: 2022-10-08 | Stop reason: HOSPADM

## 2022-10-05 RX ORDER — SODIUM CHLORIDE 0.9 % (FLUSH) 0.9 %
10 SYRINGE (ML) INJECTION EVERY 12 HOURS SCHEDULED
Status: DISCONTINUED | OUTPATIENT
Start: 2022-10-05 | End: 2022-10-08 | Stop reason: HOSPADM

## 2022-10-05 RX ORDER — ASPIRIN 81 MG/1
81 TABLET, CHEWABLE ORAL DAILY
Status: DISCONTINUED | OUTPATIENT
Start: 2022-10-05 | End: 2022-10-08 | Stop reason: HOSPADM

## 2022-10-05 RX ORDER — NITROGLYCERIN 0.4 MG/1
0.4 TABLET SUBLINGUAL
Status: DISCONTINUED | OUTPATIENT
Start: 2022-10-05 | End: 2022-10-08 | Stop reason: HOSPADM

## 2022-10-05 RX ORDER — ACETAMINOPHEN 160 MG/5ML
650 SOLUTION ORAL EVERY 4 HOURS PRN
Status: DISCONTINUED | OUTPATIENT
Start: 2022-10-05 | End: 2022-10-08 | Stop reason: HOSPADM

## 2022-10-05 RX ORDER — FAMOTIDINE 20 MG/1
40 TABLET, FILM COATED ORAL DAILY
Status: DISCONTINUED | OUTPATIENT
Start: 2022-10-05 | End: 2022-10-05

## 2022-10-05 RX ORDER — MONTELUKAST SODIUM 10 MG/1
10 TABLET ORAL NIGHTLY
Status: DISCONTINUED | OUTPATIENT
Start: 2022-10-05 | End: 2022-10-08 | Stop reason: HOSPADM

## 2022-10-05 RX ORDER — ONDANSETRON 2 MG/ML
4 INJECTION INTRAMUSCULAR; INTRAVENOUS EVERY 6 HOURS PRN
Status: DISCONTINUED | OUTPATIENT
Start: 2022-10-05 | End: 2022-10-08 | Stop reason: HOSPADM

## 2022-10-05 RX ORDER — SODIUM CHLORIDE 0.9 % (FLUSH) 0.9 %
10 SYRINGE (ML) INJECTION AS NEEDED
Status: DISCONTINUED | OUTPATIENT
Start: 2022-10-05 | End: 2022-10-08 | Stop reason: HOSPADM

## 2022-10-05 RX ORDER — ATORVASTATIN CALCIUM 10 MG/1
10 TABLET, FILM COATED ORAL NIGHTLY
Status: DISCONTINUED | OUTPATIENT
Start: 2022-10-05 | End: 2022-10-08 | Stop reason: HOSPADM

## 2022-10-05 RX ORDER — ACETAMINOPHEN 325 MG/1
650 TABLET ORAL EVERY 4 HOURS PRN
Status: DISCONTINUED | OUTPATIENT
Start: 2022-10-05 | End: 2022-10-08 | Stop reason: HOSPADM

## 2022-10-05 RX ORDER — CHOLECALCIFEROL (VITAMIN D3) 125 MCG
5 CAPSULE ORAL NIGHTLY PRN
Status: DISCONTINUED | OUTPATIENT
Start: 2022-10-05 | End: 2022-10-08 | Stop reason: HOSPADM

## 2022-10-05 RX ORDER — CETIRIZINE HYDROCHLORIDE 10 MG/1
10 TABLET ORAL DAILY
Status: DISCONTINUED | OUTPATIENT
Start: 2022-10-05 | End: 2022-10-08 | Stop reason: HOSPADM

## 2022-10-05 RX ORDER — HYDROCODONE BITARTRATE AND ACETAMINOPHEN 5; 325 MG/1; MG/1
1 TABLET ORAL EVERY 4 HOURS PRN
Status: DISCONTINUED | OUTPATIENT
Start: 2022-10-05 | End: 2022-10-08 | Stop reason: HOSPADM

## 2022-10-05 RX ORDER — METHYLPREDNISOLONE SODIUM SUCCINATE 125 MG/2ML
125 INJECTION, POWDER, LYOPHILIZED, FOR SOLUTION INTRAMUSCULAR; INTRAVENOUS ONCE
Status: COMPLETED | OUTPATIENT
Start: 2022-10-05 | End: 2022-10-05

## 2022-10-05 RX ORDER — BUDESONIDE 0.5 MG/2ML
0.5 INHALANT ORAL
Status: DISCONTINUED | OUTPATIENT
Start: 2022-10-05 | End: 2022-10-08 | Stop reason: HOSPADM

## 2022-10-05 RX ORDER — DEXTROSE MONOHYDRATE 25 G/50ML
25 INJECTION, SOLUTION INTRAVENOUS
Status: DISCONTINUED | OUTPATIENT
Start: 2022-10-05 | End: 2022-10-08 | Stop reason: HOSPADM

## 2022-10-05 RX ORDER — INSULIN LISPRO 100 [IU]/ML
0-14 INJECTION, SOLUTION INTRAVENOUS; SUBCUTANEOUS
Status: DISCONTINUED | OUTPATIENT
Start: 2022-10-05 | End: 2022-10-08 | Stop reason: HOSPADM

## 2022-10-05 RX ORDER — METHYLPREDNISOLONE SODIUM SUCCINATE 40 MG/ML
40 INJECTION, POWDER, LYOPHILIZED, FOR SOLUTION INTRAMUSCULAR; INTRAVENOUS EVERY 12 HOURS
Status: DISCONTINUED | OUTPATIENT
Start: 2022-10-05 | End: 2022-10-07

## 2022-10-05 RX ORDER — HYDROCODONE BITARTRATE AND ACETAMINOPHEN 7.5; 325 MG/1; MG/1
2 TABLET ORAL EVERY 4 HOURS PRN
Status: DISCONTINUED | OUTPATIENT
Start: 2022-10-05 | End: 2022-10-08 | Stop reason: HOSPADM

## 2022-10-05 RX ORDER — GUAIFENESIN 600 MG/1
1200 TABLET, EXTENDED RELEASE ORAL EVERY 12 HOURS SCHEDULED
Status: DISCONTINUED | OUTPATIENT
Start: 2022-10-05 | End: 2022-10-08 | Stop reason: HOSPADM

## 2022-10-05 RX ORDER — IPRATROPIUM BROMIDE AND ALBUTEROL SULFATE 2.5; .5 MG/3ML; MG/3ML
SOLUTION RESPIRATORY (INHALATION)
Status: COMPLETED
Start: 2022-10-05 | End: 2022-10-05

## 2022-10-05 RX ORDER — ACETAMINOPHEN 650 MG/1
650 SUPPOSITORY RECTAL EVERY 4 HOURS PRN
Status: DISCONTINUED | OUTPATIENT
Start: 2022-10-05 | End: 2022-10-08 | Stop reason: HOSPADM

## 2022-10-05 RX ORDER — LISINOPRIL 2.5 MG/1
2.5 TABLET ORAL DAILY
Status: DISCONTINUED | OUTPATIENT
Start: 2022-10-05 | End: 2022-10-08 | Stop reason: HOSPADM

## 2022-10-05 RX ORDER — NICOTINE POLACRILEX 4 MG
15 LOZENGE BUCCAL
Status: DISCONTINUED | OUTPATIENT
Start: 2022-10-05 | End: 2022-10-08 | Stop reason: HOSPADM

## 2022-10-05 RX ORDER — ENOXAPARIN SODIUM 100 MG/ML
40 INJECTION SUBCUTANEOUS DAILY
Status: DISCONTINUED | OUTPATIENT
Start: 2022-10-05 | End: 2022-10-08 | Stop reason: HOSPADM

## 2022-10-05 RX ORDER — ARFORMOTEROL TARTRATE 15 UG/2ML
15 SOLUTION RESPIRATORY (INHALATION)
Status: DISCONTINUED | OUTPATIENT
Start: 2022-10-05 | End: 2022-10-08 | Stop reason: HOSPADM

## 2022-10-05 RX ORDER — BISACODYL 5 MG/1
5 TABLET, DELAYED RELEASE ORAL DAILY PRN
Status: DISCONTINUED | OUTPATIENT
Start: 2022-10-05 | End: 2022-10-08 | Stop reason: HOSPADM

## 2022-10-05 RX ORDER — POLYETHYLENE GLYCOL 3350 17 G/17G
17 POWDER, FOR SOLUTION ORAL DAILY PRN
Status: DISCONTINUED | OUTPATIENT
Start: 2022-10-05 | End: 2022-10-08 | Stop reason: HOSPADM

## 2022-10-05 RX ORDER — IPRATROPIUM BROMIDE AND ALBUTEROL SULFATE 2.5; .5 MG/3ML; MG/3ML
3 SOLUTION RESPIRATORY (INHALATION)
Status: DISCONTINUED | OUTPATIENT
Start: 2022-10-05 | End: 2022-10-08 | Stop reason: HOSPADM

## 2022-10-05 RX ORDER — AMOXICILLIN 250 MG
2 CAPSULE ORAL 2 TIMES DAILY
Status: DISCONTINUED | OUTPATIENT
Start: 2022-10-05 | End: 2022-10-08 | Stop reason: HOSPADM

## 2022-10-05 RX ORDER — SODIUM CHLORIDE 9 MG/ML
40 INJECTION, SOLUTION INTRAVENOUS AS NEEDED
Status: DISCONTINUED | OUTPATIENT
Start: 2022-10-05 | End: 2022-10-08 | Stop reason: HOSPADM

## 2022-10-05 RX ORDER — PANTOPRAZOLE SODIUM 40 MG/1
40 TABLET, DELAYED RELEASE ORAL EVERY MORNING
Status: DISCONTINUED | OUTPATIENT
Start: 2022-10-05 | End: 2022-10-08 | Stop reason: HOSPADM

## 2022-10-05 RX ORDER — BISACODYL 10 MG
10 SUPPOSITORY, RECTAL RECTAL DAILY PRN
Status: DISCONTINUED | OUTPATIENT
Start: 2022-10-05 | End: 2022-10-08 | Stop reason: HOSPADM

## 2022-10-05 RX ORDER — NALOXONE HCL 0.4 MG/ML
0.4 VIAL (ML) INJECTION
Status: DISCONTINUED | OUTPATIENT
Start: 2022-10-05 | End: 2022-10-08 | Stop reason: HOSPADM

## 2022-10-05 RX ADMIN — ENOXAPARIN SODIUM 40 MG: 100 INJECTION SUBCUTANEOUS at 09:30

## 2022-10-05 RX ADMIN — CEFEPIME HYDROCHLORIDE 2 G: 2 INJECTION, POWDER, FOR SOLUTION INTRAMUSCULAR; INTRAVENOUS at 01:35

## 2022-10-05 RX ADMIN — SODIUM CHLORIDE 1000 ML: 9 INJECTION, SOLUTION INTRAVENOUS at 02:35

## 2022-10-05 RX ADMIN — GUAIFENESIN 1200 MG: 600 TABLET, EXTENDED RELEASE ORAL at 10:05

## 2022-10-05 RX ADMIN — Medication 10 ML: at 09:31

## 2022-10-05 RX ADMIN — SENNOSIDES AND DOCUSATE SODIUM 2 TABLET: 8.6; 5 TABLET ORAL at 09:30

## 2022-10-05 RX ADMIN — IPRATROPIUM BROMIDE AND ALBUTEROL SULFATE 9 ML: 2.5; .5 SOLUTION RESPIRATORY (INHALATION) at 01:06

## 2022-10-05 RX ADMIN — INSULIN LISPRO 8 UNITS: 100 INJECTION, SOLUTION INTRAVENOUS; SUBCUTANEOUS at 11:51

## 2022-10-05 RX ADMIN — ARFORMOTEROL TARTRATE 15 MCG: 15 SOLUTION RESPIRATORY (INHALATION) at 18:25

## 2022-10-05 RX ADMIN — BUDESONIDE 0.5 MG: 0.5 SUSPENSION RESPIRATORY (INHALATION) at 18:25

## 2022-10-05 RX ADMIN — CEFEPIME HYDROCHLORIDE 2 G: 2 INJECTION, POWDER, FOR SOLUTION INTRAMUSCULAR; INTRAVENOUS at 09:30

## 2022-10-05 RX ADMIN — LISINOPRIL 2.5 MG: 2.5 TABLET ORAL at 10:06

## 2022-10-05 RX ADMIN — VANCOMYCIN HYDROCHLORIDE 2250 MG: 5 INJECTION, POWDER, LYOPHILIZED, FOR SOLUTION INTRAVENOUS at 02:35

## 2022-10-05 RX ADMIN — METOPROLOL TARTRATE 25 MG: 25 TABLET, FILM COATED ORAL at 20:56

## 2022-10-05 RX ADMIN — PANTOPRAZOLE SODIUM 40 MG: 40 TABLET, DELAYED RELEASE ORAL at 06:17

## 2022-10-05 RX ADMIN — INSULIN LISPRO 8 UNITS: 100 INJECTION, SOLUTION INTRAVENOUS; SUBCUTANEOUS at 07:58

## 2022-10-05 RX ADMIN — ATORVASTATIN CALCIUM 10 MG: 10 TABLET, FILM COATED ORAL at 20:55

## 2022-10-05 RX ADMIN — METOPROLOL TARTRATE 25 MG: 25 TABLET, FILM COATED ORAL at 09:30

## 2022-10-05 RX ADMIN — METHYLPREDNISOLONE SODIUM SUCCINATE 125 MG: 125 INJECTION, POWDER, FOR SOLUTION INTRAMUSCULAR; INTRAVENOUS at 01:35

## 2022-10-05 RX ADMIN — Medication 10 ML: at 20:56

## 2022-10-05 RX ADMIN — IPRATROPIUM BROMIDE AND ALBUTEROL SULFATE 3 ML: 2.5; .5 SOLUTION RESPIRATORY (INHALATION) at 18:25

## 2022-10-05 RX ADMIN — INSULIN DETEMIR 15 UNITS: 100 INJECTION, SOLUTION SUBCUTANEOUS at 04:48

## 2022-10-05 RX ADMIN — IPRATROPIUM BROMIDE AND ALBUTEROL SULFATE 3 ML: 2.5; .5 SOLUTION RESPIRATORY (INHALATION) at 12:13

## 2022-10-05 RX ADMIN — INSULIN LISPRO 2 UNITS: 100 INJECTION, SOLUTION INTRAVENOUS; SUBCUTANEOUS at 18:15

## 2022-10-05 RX ADMIN — GUAIFENESIN 1200 MG: 600 TABLET, EXTENDED RELEASE ORAL at 20:56

## 2022-10-05 RX ADMIN — CEFEPIME HYDROCHLORIDE 2 G: 2 INJECTION, POWDER, FOR SOLUTION INTRAMUSCULAR; INTRAVENOUS at 17:10

## 2022-10-05 RX ADMIN — INSULIN DETEMIR 15 UNITS: 100 INJECTION, SOLUTION SUBCUTANEOUS at 20:56

## 2022-10-05 RX ADMIN — CETIRIZINE HYDROCHLORIDE 10 MG: 10 TABLET, FILM COATED ORAL at 09:30

## 2022-10-05 RX ADMIN — ASPIRIN 81 MG CHEWABLE TABLET 81 MG: 81 TABLET CHEWABLE at 09:30

## 2022-10-05 RX ADMIN — VANCOMYCIN HYDROCHLORIDE 1000 MG: 5 INJECTION, POWDER, LYOPHILIZED, FOR SOLUTION INTRAVENOUS at 14:03

## 2022-10-05 RX ADMIN — MONTELUKAST 10 MG: 10 TABLET, FILM COATED ORAL at 20:56

## 2022-10-05 RX ADMIN — METHYLPREDNISOLONE SODIUM SUCCINATE 40 MG: 40 INJECTION, POWDER, FOR SOLUTION INTRAMUSCULAR; INTRAVENOUS at 13:52

## 2022-10-05 NOTE — CONSULTS
Pulmonary / Critical Care Consult Note      Patient Name: Dilip Faulkner  : 1950  MRN: [id number]  Primary Care Physician:  Rosalba Edwards APRN  Referring Physician: No Known Provider  Date of admission: 10/5/2022    Subjective   Subjective     Reason for Consult/ Chief Complaint:   AECOPD again    HPI:  Dilip Faulkner is a 72 y.o. male with end-stage COPD and diabetes with hypertension who presents to the emergency department  From nursing home with increasing shortness of breath.  He was found to be in severe acute on chronic hypercapnic respiratory failure the pH is 7.1 and PCO2 of 82.  With each admission his admitting ABG seems to be worse.  This was his worst initial pH and PCO2. He has had multiple admissions in the past year.  With increasing frequency and severity.  Patient's mental status is challenging difficult to obtain a history.  Uncertain as to the etiology of his frequent exacerbations.  He does have a risk of aspiration with dilated esophagus with air-fluid levels on a recent CT scan in .        Review of Systems  Unable to obtain due to the patient condition      Personal History     Past Medical History:   Diagnosis Date   • Asthma    • COPD (chronic obstructive pulmonary disease) (HCC)    • Diabetes mellitus (HCC)    • Hernia, umbilical    • Hypertension    • Requires continuous at home supplemental oxygen        Past Surgical History:   Procedure Laterality Date   • ABDOMINAL SURGERY         Family History: family history is not on file. Otherwise pertinent FHx was reviewed and not pertinent to current issue.    Social History:  reports that he quit smoking about 18 months ago. His smoking use included cigarettes. He quit after 2.00 years of use. He has never used smokeless tobacco. He reports that he does not drink alcohol and does not use drugs.    Home Medications:  Fluticasone-Salmeterol, albuterol sulfate HFA, aspirin, atorvastatin, cetirizine, insulin degludec,  ipratropium-albuterol, lisinopril, metFORMIN, metoprolol tartrate, and omeprazole    Allergies:  No Known Allergies    Objective    Objective     Vitals:   Temp:  [97.3 °F (36.3 °C)-98.1 °F (36.7 °C)] 97.3 °F (36.3 °C)  Heart Rate:  [] 79  Resp:  [14-34] 20  BP: ()/() 119/67  Flow (L/min):  [5-15] 5    Physical Exam:  Vital Signs Reviewed   General:  WDWN, somnolent, moderate distress due to dyspnea   HEENT:  PERRL, EOMI.  OP, nares clear, no sinus tenderness  Neck:  Supple, no JVD, no thyromegaly  Lymph: no axillary, cervical, supraclavicular lymphadenopathy noted bilaterally  Chest: Severely prolonged expiratory phase no active wheezing rales or rhonchi bilaterally  CV: RRR, no MGR, pulses 2+, equal.  Abd:  Soft, NT, ND, + BS, no HSM  EXT:  no clubbing, no cyanosis, no edema, no joint tenderness  Neuro:  A&Ox3, CN grossly intact, no focal deficits.  Skin: No rashes or lesions noted      Result Review    Result Review:  I have personally reviewed the results from the time of this admission to 10/5/2022 13:57 EDT and agree with these findings:  [x]  Laboratory  [x]  Microbiology  [x]  Radiology  [x]  EKG/Telemetry   []  Cardiology/Vascular   []  Pathology  []  Old records  []  Other:  Most notable findings include: Moderate to severe emphysema acute on chronic hypoxic and hypercapnic respiratory failure.  With dilated esophagus at risk for aspiration and pneumonia pneumonitis  Assessment & Plan   Assessment / Plan     Active Hospital Problems:  Active Hospital Problems    Diagnosis    • COPD (chronic obstructive pulmonary disease) (HCC)    • COPD exacerbation (HCC)    • Obesity (BMI 30-39.9)          Impression:  72-year-old male with end-stage COPD with 12+ admissions in the last 12 months.    Plan:  Code status needs to be addressed.  From a pulmonary perspective ACLS is not gong to benefit this patient to any great extent. Yes physiological success may be attained but QOL after ACLS will be far  worse than it is now. I do not see any discussion of EOL in primary care notes the few times he has made it to her office.     I believe it is clear that this patient cannot reside safely at home after 13 admissions in the last 12 months with. His primary care provider is from [place] and has no reason to prevent this patient from having readmissions [facility].    AECOPD with acute hypoxic and hypercapnic respiratory failure.   Recommend overnight pulse oximetry study to qualify for noninvasive ventilation  Either BiPAP or NIV.  It is difficult to state that he has failed BiPAP since he improves quite rapidly on BiPAP in the hospital.    Otherwise standard acute exacerbation of COPD medications.  Prednisone 40 mg daily  With the rapid improvement I do not believe that he needs broad-spectrum antibiotics.  Believe it is okay to de-escalate to ceftriaxone and azithromycin.  His cultures have been negative except for a few blood culture contaminants with strep and COVID positivity and in January 22  Nebulized bronchodilators antimuscarinic and beta agonists short acting while in-house.  While I do not have the benefit of seeing this patient in optimal mental status.  I do not believe that he is able to appropriately take his inhalers.  Nebulized regimen will be beneficial.    Blood sugar goal less than 180 his A1c has progressively been increasing over time suggesting that he is not managing his diabetes well in the outpatient setting further evidence that he is not able to manage himself at home safely.    DVT prophylaxis is important for patient's COPD exacerbations    70 minutes critical care time spent on evaluating and managing this patient not including time spent in teaching or procedures.    TOMMY NORTON M.D. [facility] [place] 13:57 EDT 10/05/22

## 2022-10-05 NOTE — NURSING NOTE
Pt has removed bipap and is refusing to wear it, MD aware, Pt is now on 5L o2. Will continue to monitor.

## 2022-10-05 NOTE — PLAN OF CARE
Goal Outcome Evaluation:  Plan of Care Reviewed With: patient        Progress: no change  Outcome Evaluation: Patient presents with deficits in balance, endurance, transfers, and ambulation. Patient will benefit from skilled PT services to address these mobility deficits and decrease risk of falls. Recommend home health following hospital discharge.

## 2022-10-05 NOTE — CASE MANAGEMENT/SOCIAL WORK
RT CM triggered to see obese pt with hx of chronic respiratory failure secondary to COPD with multiple admissions for such. RT PATY has had numerous  encounters with pt for education regarding the need for NIV and each time the pt refuses therapy and no shows to scheduled pulmonary follow ups. In fact since 7/23/2021, Mr Faulkner has admitted almost monthly for COPD exacerbation; this is makes the 13th admission since 7/2021. This admission Mr Faulkner again presents with hypercapnia, PCO2- 82.4. RT CM notes pt has hx of being covered in bed bugs on admission. DME providers will not set up NIPPV in a home with infestation due to risk of device contamination. Mr Faulkner will need to provide proof of treatment by qualified  and pass home inspection prior to set up.  RT CM reminded Mr Faulkner that the only way to stop these frequent admissions is to use NIV at home. RT CM asked Mr Faulkner if he would want to be intubated and placed on mechanical ventilation if was found to be necessary and he could not speak for himself. He states he would not want that. RT CM points out that his code status is full code and that the above scenario could occur here or at home if the ambulance is called. He would like to meet with palliative care for goals of care discussion as he states he does not ever want NIV at home and does not want intubation. RT CM will reach out to hospitalist for palliative consultation.

## 2022-10-05 NOTE — PROGRESS NOTES
"PHARMACY TO DOSE VANCOMYCIN DAY: 1  DURATION OF THERAPY: 10-10-22    INDICATION: PNEUMONIA  GOAL AUC / TROUGH: 400-600 MG/L.HR    HPI: 72 y.o. year old male that presented to the emergency department via EMS with shortness of breath onset the morning of presentation and progressively worsened. Pt denied chest pain, nausea, vomiting, and cough. Pt denied any other symptoms.   Pt has a history of COPD, and reported similar previous episodes.      Ht Readings from Last 1 Encounters:   10/05/22 185.4 cm (73\")            10/05/22  0332      Weight: 116 kg (256 lb 2.8 oz)           Estimated Creatinine Clearance: 88.6 mL/min (by C-G formula based on SCr of 0.99 mg/dL).  HD/CRRT/PD? NO          CONTRAST ADMINISTERED? NO  I/O last 3 completed shifts:  In: 1100 [IV Piggyback:1100]  Out: -      Lab Results   Component Value Date    WBC 15.45 (H) 10/05/2022       Temp (24hrs), Av °F (36.7 °C), Min:97.8 °F (36.6 °C), Max:98.1 °F (36.7 °C)             Microbiology Results (last 10 days)       Procedure Component Value - Date/Time    MRSA Screen, PCR (Inpatient) - Swab, Nares [[id number]]  (Normal) Collected: 10/05/22 0447    Lab Status: Final result Specimen: Swab from Nares Updated: 10/05/22 0627     MRSA PCR No MRSA Detected    Narrative:      The negative predictive value of this diagnostic test is high and should only be used to consider de-escalating anti-MRSA therapy. A positive result may indicate colonization with MRSA and must be correlated clinically.           MRSA PCR NOT DETECTED; RECOMMEND STOPPING VANCOMYCIN    XR Chest 1 View    Result Date: 10/5/2022    1. Chronic changes are noted.  There is no definitive evidence for acute cardiopulmonary process.         SHRUTI CUTLER MD       Electronically Signed and Approved By: SHRUTI CUTLER MD on 10/05/2022 at 1:31               OTHER ANTIMICROBIAL THERAPY: Cefepime    ASSESSMENT / PLAN:  Vancomycin 2250 mg administered at 0235 this a.m. followed by an order for " 1000 mg q12hr. Per [facility], this regimen should provide:    AUC24,ss: 478 mg/L.hr  PAUC*: 69 %  Ctrough,ss: 16.1 mg/L  Pconc*: 30 %  Tox.: 11 %    Random vancomycin level ordered for a.m.  Labs ordered: BMP ordered for a.m.

## 2022-10-05 NOTE — PLAN OF CARE
Problem: Adult Inpatient Plan of Care  Goal: Patient-Specific Goal (Individualized)  Outcome: Ongoing, Progressing     Problem: Adult Inpatient Plan of Care  Goal: Absence of Hospital-Acquired Illness or Injury  Outcome: Ongoing, Progressing  Intervention: Identify and Manage Fall Risk  Recent Flowsheet Documentation  Taken 10/5/2022 1000 by Reshma Bo RN  Safety Promotion/Fall Prevention: safety round/check completed  Intervention: Prevent Skin Injury  Recent Flowsheet Documentation  Taken 10/5/2022 1000 by Reshma Bo RN  Body Position: position changed independently  Intervention: Prevent and Manage VTE (Venous Thromboembolism) Risk  Recent Flowsheet Documentation  Taken 10/5/2022 1000 by Reshma Bo RN  Activity Management: activity adjusted per tolerance  Range of Motion: active ROM (range of motion) encouraged  Intervention: Prevent Infection  Recent Flowsheet Documentation  Taken 10/5/2022 1000 by Reshma Bo RN  Infection Prevention: single patient room provided   Goal Outcome Evaluation:           Progress: no change  Outcome Evaluation: no changes throughout shift today, pt removed bipap and refused to put it back on, sating 94% on 5L MD STEVIE aware. VSS, blood sugars monitored, sliding scale insulin given per MAR, no complaints at this time. will continue to monitor.

## 2022-10-05 NOTE — ED PROVIDER NOTES
Time: 1:00 AM EDT  Arrived by: ambulance  Chief Complaint:   Chief Complaint   Patient presents with   • Shortness of Breath     History provided by: patient  History is limited by: N/A     History of Present Illness:    Patient is a 72 y.o. year old male that presents to the emergency department via EMS with shortness of breath onset this morning and progressively worsening. Pt denies chest pain, nausea, vomiting, and cough. Pt denies any other symptoms.   Pt has a history of COPD, and reports similar previous episodes.       History provided by:  Patient   used: No        Similar Symptoms Previously: yes  Recently seen: N/A      Patient Care Team  Primary Care Provider: Rosalba Edwards APRN    Past Medical History:     No Known Allergies  Past Medical History:   Diagnosis Date   • Asthma    • COPD (chronic obstructive pulmonary disease) (HCC)    • Diabetes mellitus (HCC)    • Hernia, umbilical    • Hypertension    • Requires continuous at home supplemental oxygen      Past Surgical History:   Procedure Laterality Date   • ABDOMINAL SURGERY       History reviewed. No pertinent family history.    Home Medications:  Prior to Admission medications    Medication Sig Start Date End Date Taking? Authorizing Provider   albuterol sulfate  (90 Base) MCG/ACT inhaler Inhale 2 puffs Every 6 (Six) Hours As Needed for Wheezing.    Roosevelt Gaston MD   aspirin 81 MG chewable tablet Chew 81 mg Daily.    Roosevelt Gaston MD   atorvastatin (LIPITOR) 10 MG tablet Take 10 mg by mouth Daily.    Roosevelt Gaston MD   cetirizine (zyrTEC) 10 MG tablet Take 10 mg by mouth Daily.    Roosevetl Gaston MD   Fluticasone-Salmeterol (ADVAIR) 250-50 MCG/ACT DISKUS Inhale 1 puff 2 (Two) Times a Day.    Roosevelt Gaston MD   insulin degludec (Tresiba FlexTouch) 100 UNIT/ML solution pen-injector injection Inject 17 Units under the skin into the appropriate area as directed Daily.    Provider  MD Roosevelt   ipratropium-albuterol (DUO-NEB) 0.5-2.5 mg/3 ml nebulizer Take 3 mL by nebulization Every 4 (Four) Hours As Needed for Wheezing or Shortness of Air. 21   Ricci Trinidad DO   lisinopril (PRINIVIL,ZESTRIL) 2.5 MG tablet Take 2.5 mg by mouth Daily.    Roosevelt Gaston MD   metFORMIN (GLUCOPHAGE) 500 MG tablet Take 500 mg by mouth 2 (two) times a day. 21   Roosevelt Gaston MD   metoprolol tartrate (LOPRESSOR) 25 MG tablet Take 25 mg by mouth 2 (Two) Times a Day.    Roosevelt Gaston MD   omeprazole (priLOSEC) 20 MG capsule Take 20 mg by mouth Daily.    Roosevelt Gaston MD        Social History:   Social History     Tobacco Use   • Smoking status: Former Smoker     Years: 2.00     Types: Cigarettes     Quit date: 3/19/2021     Years since quittin.5   • Smokeless tobacco: Never Used   Vaping Use   • Vaping Use: Never used   Substance Use Topics   • Alcohol use: Never   • Drug use: Never     Recent travel: not applicable     Review of Systems:  Review of Systems   Constitutional: Negative for chills and fever.   HENT: Negative for congestion, ear pain and sore throat.    Eyes: Negative for pain.   Respiratory: Positive for shortness of breath. Negative for cough and chest tightness.    Cardiovascular: Negative for chest pain.   Gastrointestinal: Negative for abdominal pain, diarrhea, nausea and vomiting.   Genitourinary: Negative for flank pain and hematuria.   Musculoskeletal: Negative for joint swelling.   Skin: Negative for pallor.   Neurological: Negative for seizures and headaches.        Physical Exam:  /77   Pulse 105   Temp 97.8 °F (36.6 °C) (Axillary)   Resp 28   Wt 114 kg (252 lb 3.3 oz)   SpO2 100%   BMI 33.27 kg/m²     Physical Exam  Vitals and nursing note reviewed.   Constitutional:       General: He is in acute distress.      Appearance: Normal appearance. He is not toxic-appearing.   HENT:      Head: Normocephalic and atraumatic.       Mouth/Throat:      Mouth: Mucous membranes are moist.   Eyes:      General: No scleral icterus.  Cardiovascular:      Rate and Rhythm: Regular rhythm. Tachycardia present.      Pulses: Normal pulses.      Heart sounds: Normal heart sounds.   Pulmonary:      Effort: Tachypnea, accessory muscle usage, respiratory distress and retractions present.      Breath sounds: Decreased breath sounds and wheezing (BL) present.   Abdominal:      General: Abdomen is flat.      Palpations: Abdomen is soft.      Tenderness: There is no abdominal tenderness.   Musculoskeletal:         General: Normal range of motion.      Cervical back: Normal range of motion and neck supple.      Right lower leg: Edema (mild) present.      Left lower leg: Edema (mild) present.   Skin:     General: Skin is warm and dry.   Neurological:      Mental Status: He is alert and oriented to person, place, and time. Mental status is at baseline.                Medications in the Emergency Department:  Medications   sodium chloride 0.9 % flush 10 mL (has no administration in time range)   nitroglycerin (NITROSTAT) SL tablet 0.4 mg (has no administration in time range)   sodium chloride 0.9 % flush 10 mL (has no administration in time range)   sodium chloride 0.9 % flush 10 mL (has no administration in time range)   sodium chloride 0.9 % infusion 40 mL (has no administration in time range)   Enoxaparin Sodium (LOVENOX) syringe 40 mg (has no administration in time range)   acetaminophen (TYLENOL) tablet 650 mg (has no administration in time range)     Or   acetaminophen (TYLENOL) 160 MG/5ML solution 650 mg (has no administration in time range)     Or   acetaminophen (TYLENOL) suppository 650 mg (has no administration in time range)   HYDROcodone-acetaminophen (NORCO) 5-325 MG per tablet 1 tablet (has no administration in time range)   HYDROcodone-acetaminophen (NORCO) 7.5-325 MG per tablet 2 tablet (has no administration in time range)   HYDROmorphone (DILAUDID)  injection 0.5 mg (has no administration in time range)     And   naloxone (NARCAN) injection 0.4 mg (has no administration in time range)   sennosides-docusate (PERICOLACE) 8.6-50 MG per tablet 2 tablet (has no administration in time range)     And   polyethylene glycol (MIRALAX) packet 17 g (has no administration in time range)     And   bisacodyl (DULCOLAX) EC tablet 5 mg (has no administration in time range)     And   bisacodyl (DULCOLAX) suppository 10 mg (has no administration in time range)   melatonin tablet 5 mg (has no administration in time range)   ondansetron (ZOFRAN) tablet 4 mg (has no administration in time range)     Or   ondansetron (ZOFRAN) injection 4 mg (has no administration in time range)   ipratropium-albuterol (DUO-NEB) nebulizer solution 3 mL (has no administration in time range)   arformoterol (BROVANA) nebulizer solution 15 mcg (has no administration in time range)   budesonide (PULMICORT) nebulizer solution 0.5 mg (has no administration in time range)   guaiFENesin (MUCINEX) 12 hr tablet 1,200 mg (has no administration in time range)   Pharmacy to Dose Cefepime (has no administration in time range)   Pharmacy to dose vancomycin (has no administration in time range)   methylPREDNISolone sodium succinate (SOLU-Medrol) injection 40 mg (has no administration in time range)   aspirin chewable tablet 81 mg (has no administration in time range)   atorvastatin (LIPITOR) tablet 10 mg (has no administration in time range)   cetirizine (zyrTEC) tablet 10 mg (has no administration in time range)   insulin detemir (LEVEMIR) injection 15 Units (has no administration in time range)   dextrose (GLUTOSE) oral gel 15 g (has no administration in time range)   dextrose (D50W) (25 g/50 mL) IV injection 25 g (has no administration in time range)   glucagon (human recombinant) (GLUCAGEN DIAGNOSTIC) injection 1 mg (has no administration in time range)   Insulin Lispro (humaLOG) injection 0-14 Units (has no  administration in time range)   lisinopril (PRINIVIL,ZESTRIL) tablet 2.5 mg (has no administration in time range)   metoprolol tartrate (LOPRESSOR) tablet 25 mg (has no administration in time range)   pantoprazole (PROTONIX) EC tablet 40 mg (has no administration in time range)   montelukast (SINGULAIR) tablet 10 mg (has no administration in time range)   ipratropium-albuterol (DUO-NEB) 0.5-2.5 mg/3 ml nebulizer solution  - ADS Override Pull (9 mL  Given 10/5/22 0106)   methylPREDNISolone sodium succinate (SOLU-Medrol) injection 125 mg (125 mg Intravenous Given 10/5/22 0135)   vancomycin 2250 mg/500 mL 0.9% NS IVPB ([facility]) (2,250 mg Intravenous New Bag 10/5/22 0235)   cefepime (MAXIPIME) 2 g/100 mL 0.9% NS (mbp) (0 g Intravenous Stopped 10/5/22 0225)   sodium chloride 0.9 % bolus 1,000 mL (0 mL Intravenous Stopped 10/5/22 0332)        Labs  Lab Results (last 24 hours)     Procedure Component Value Units Date/Time    CBC & Differential [[id number]]  (Abnormal) Collected: 10/05/22 0108    Specimen: Blood Updated: 10/05/22 0128    Narrative:      The following orders were created for panel order CBC & Differential.  Procedure                               Abnormality         Status                     ---------                               -----------         ------                     CBC Auto Differential[[id number]]        Abnormal            Final result                 Please view results for these tests on the individual orders.    Comprehensive Metabolic Panel [[id number]]  (Abnormal) Collected: 10/05/22 0108    Specimen: Blood Updated: 10/05/22 0148     Glucose 260 mg/dL      BUN 12 mg/dL      Creatinine 0.99 mg/dL      Sodium 139 mmol/L      Potassium 4.7 mmol/L      Comment: Slight hemolysis detected by analyzer. Results may be affected.        Chloride 103 mmol/L      CO2 28.4 mmol/L      Calcium 9.1 mg/dL      Total Protein 7.4 g/dL      Albumin 4.00 g/dL      ALT (SGPT) 12 U/L      AST (SGOT) 13 U/L       Alkaline Phosphatase 90 U/L      Total Bilirubin 0.3 mg/dL      Globulin 3.4 gm/dL      A/G Ratio 1.2 g/dL      BUN/Creatinine Ratio 12.1     Anion Gap 7.6 mmol/L      eGFR 80.4 mL/min/1.73      Comment: National Kidney Foundation and American Society of Nephrology (ASN) Task Force recommended calculation based on the Chronic Kidney Disease Epidemiology Collaboration (CKD-EPI) equation refit without adjustment for race.       Narrative:      GFR Normal >60  Chronic Kidney Disease <60  Kidney Failure <15      BNP [[id number]]  (Normal) Collected: 10/05/22 0108    Specimen: Blood Updated: 10/05/22 0146     proBNP 78.9 pg/mL     Narrative:      Among patients with dyspnea, NT-proBNP is highly sensitive for the detection of acute congestive heart failure. In addition NT-proBNP of <300 pg/ml effectively rules out acute congestive heart failure with 99% negative predictive value.    Results may be falsely decreased if patient taking Biotin.      Troponin [[id number]]  (Normal) Collected: 10/05/22 0108    Specimen: Blood Updated: 10/05/22 0148     Troponin T 0.018 ng/mL     Narrative:      Troponin T Reference Range:  <= 0.03 ng/mL-   Negative for AMI  >0.03 ng/mL-     Abnormal for myocardial necrosis.  Clinicians would have to utilize clinical acumen, EKG, Troponin and serial changes to determine if it is an Acute Myocardial Infarction or myocardial injury due to an underlying chronic condition.       Results may be falsely decreased if patient taking Biotin.      CBC Auto Differential [[id number]]  (Abnormal) Collected: 10/05/22 0108    Specimen: Blood Updated: 10/05/22 0128     WBC 15.45 10*3/mm3      RBC 5.09 10*6/mm3      Hemoglobin 12.7 g/dL      Hematocrit 43.3 %      MCV 85.1 fL      MCH 25.0 pg      MCHC 29.3 g/dL      RDW 15.2 %      RDW-SD 47.2 fl      MPV 10.4 fL      Platelets 223 10*3/mm3      Neutrophil % 68.3 %      Lymphocyte % 15.7 %      Monocyte % 7.1 %      Eosinophil % 7.6 %      Basophil % 0.6 %       Immature Grans % 0.7 %      Neutrophils, Absolute 10.57 10*3/mm3      Lymphocytes, Absolute 2.42 10*3/mm3      Monocytes, Absolute 1.09 10*3/mm3      Eosinophils, Absolute 1.17 10*3/mm3      Basophils, Absolute 0.09 10*3/mm3      Immature Grans, Absolute 0.11 10*3/mm3      nRBC 0.0 /100 WBC     Blood Culture - Blood, Arm, Left [[id number]] Collected: 10/05/22 0108    Specimen: Blood from Arm, Left Updated: 10/05/22 0126    Blood Culture - Blood, Arm, Left [[id number]] Collected: 10/05/22 0108    Specimen: Blood from Arm, Left Updated: 10/05/22 0126    Lactic Acid, Plasma [[id number]]  (Normal) Collected: 10/05/22 0108    Specimen: Blood Updated: 10/05/22 0141     Lactate 1.4 mmol/L     Protime-INR [[id number]]  (Normal) Collected: 10/05/22 0108    Specimen: Blood Updated: 10/05/22 0138     Protime 13.0 Seconds      INR 0.97    Narrative:      Suggested Therapeutic Ranges For Oral Anticoagulant Therapy:  Level of Therapy                      INR Target Range  Standard Dose                            2.0-3.0  High Dose                                2.5-3.5  Patients not receiving anticoagulant  Therapy Normal Range                     0.86-1.15    aPTT [[id number]]  (Normal) Collected: 10/05/22 0108    Specimen: Blood Updated: 10/05/22 0138     PTT 26.6 seconds     Ferritin [[id number]]  (Normal) Collected: 10/05/22 0108    Specimen: Blood Updated: 10/05/22 0325     Ferritin 48.44 ng/mL     Narrative:      <12 ng/mL usually associated with Iron Deficiency Anemia. Above normal range levels may be due to Hepatic and/or Chronic Inflammatory Disease.  Results may be falsely decreased if patient taking Biotin.      Iron Profile [[id number]]  (Abnormal) Collected: 10/05/22 0108    Specimen: Blood Updated: 10/05/22 0319     Iron 40 mcg/dL      Iron Saturation 10 %      Transferrin 281 mg/dL      TIBC 419 mcg/dL     Procalcitonin [[id number]]  (Normal) Collected: 10/05/22 0108    Specimen: Blood Updated: 10/05/22 0326      "Procalcitonin 0.08 ng/mL     Narrative:      As a Marker for Sepsis (Non-Neonates):    1. <0.5 ng/mL represents a low risk of severe sepsis and/or septic shock.  2. >2 ng/mL represents a high risk of severe sepsis and/or septic shock.    As a Marker for Lower Respiratory Tract Infections that require antibiotic therapy:    PCT on Admission    Antibiotic Therapy       6-12 Hrs later    >0.5                Strongly Recommended  >0.25 - <0.5        Recommended  0.1 - 0.25          Discouraged              Remeasure/reassess PCT  <0.1                Strongly Discouraged     Remeasure/reassess PCT    As 28 day mortality risk marker: \"Change in Procalcitonin Result\" (>80% or <=80%) if Day 0 (or Day 1) and Day 4 values are available. Refer to http://www.[employer]-pct-calculator.com    Change in PCT <=80%  A decrease of PCT levels below or equal to 80% defines a positive change in PCT test result representing a higher risk for 28-day all-cause mortality of patients diagnosed with severe sepsis for septic shock.    Change in PCT >80%  A decrease of PCT levels of more than 80% defines a negative change in PCT result representing a lower risk for 28-day all-cause mortality of patients diagnosed with severe sepsis or septic shock.    This test is Prognostic not Diagnostic, if elevated correlate with clinical findings before administering antibiotic treatment.        ABG with Co-Ox and Electrolytes [[id number]]  (Abnormal) Collected: 10/05/22 0113    Specimen: Arterial Blood from Arm, Left Updated: 10/05/22 0116     pH, Arterial 7.110 pH units      pCO2, Arterial 82.4 mm Hg      pO2, Arterial 141.8 mm Hg      HCO3, Arterial 25.6 mmol/L      Base Excess, Arterial -5.7 mmol/L      O2 Saturation, Arterial 97.7 %      Hemoglobin, Blood Gas 13.3 g/dL      Carboxyhemoglobin 0.9 %      Methemoglobin 0.10 %      Oxyhemoglobin 96.7 %      FHHB 2.3 %      Nael's Test Positive     Note --     Site Arterial: left radial     Modality Mask - " Nonbreather     FIO2 100 %      Flow Rate 15 lpm      Sodium, Arterial 137.9 mmol/L      Potassium, Arterial 4.59 mmol/L      Ionized Calcium, Arterial 1.25 mmol/L      Chloride, Arterial 102 mmol/L      Glucose, Arterial 246 mmol/L      Lactate, Arterial 0.87 mmol/L      PO2/FIO2 142    ABG with Co-Ox and Electrolytes [[id number]]  (Abnormal) Collected: 10/05/22 0229    Specimen: Arterial Blood from Arm, Right Updated: 10/05/22 0244     pH, Arterial 7.260 pH units      pCO2, Arterial 54.0 mm Hg      pO2, Arterial 149.4 mm Hg      HCO3, Arterial 23.7 mmol/L      Base Excess, Arterial -3.9 mmol/L      O2 Saturation, Arterial 98.3 %      Hemoglobin, Blood Gas 12.6 g/dL      Carboxyhemoglobin 1.0 %      Methemoglobin 0.30 %      Oxyhemoglobin 97.0 %      FHHB 1.7 %      Nael's Test N/A     Note BIPAP 16/5     Site Arterial: right brachial     Modality BiPap     FIO2 40 %      Flow Rate --     Sodium, Arterial 136.7 mmol/L      Potassium, Arterial 4.39 mmol/L      Ionized Calcium, Arterial 1.17 mmol/L      Chloride, Arterial 103 mmol/L      Glucose, Arterial 256 mmol/L      Lactate, Arterial 0.70 mmol/L      PO2/FIO2 374           Imaging:  XR Chest 1 View    Result Date: 10/5/2022  PROCEDURE: XR CHEST 1 VW  COMPARISON: [facility], CR, XR CHEST 1 VW, 7/10/2022, 2:45.  [facility], CR, XR CHEST 1 VW, 9/16/2022, 20:45.  INDICATIONS: shortness of breath  FINDINGS:  Chronic changes are noted.  No new consolidations or pleural effusions are observed. The cardiac silhouette and mediastinum are unchanged. No definitive acute osseous abnormalities are seen on this single view.        1. Chronic changes are noted.  There is no definitive evidence for acute cardiopulmonary process.         SHRUTI CUTELR MD       Electronically Signed and Approved By: SHRUTI CUTLER MD on 10/05/2022 at 1:31               Procedures:  Procedures    Progress  ED Course as of 10/05/22 0347   Wed Oct 05, 2022   0120 ECG 12  Lead  Sinus tachycardia with rate of 109.  Right bundle branch block.  Artifact present.  EKG difficult to interpret due to artifact.  Does not appear to have any acute ST elevation.  EKG interpreted by me. [LD]      ED Course User Index  [LD] Milo Pereira MD                            Medical Decision Making:  MDM  Number of Diagnoses or Management Options  Diagnosis management comments: Patient presents emergency department shortness of breath.  On arrival he is tachypneic, tachycardic and appears to be in respiratory distress.  Patient does not have a lot of air movement.  He was given DuoNeb and placed on BiPAP.  He denies any chest pain.  Labs were obtained that showed elevated white count of 15.  ABG showed a pH of 7.1 and a PCO2 of 82.  Patient was also started on antibiotics.  Discussed patient with hospitalist and he will be admitted for further care.       Amount and/or Complexity of Data Reviewed  Clinical lab tests: ordered and reviewed  Tests in the radiology section of CPT®: ordered and reviewed  Decide to obtain previous medical records or to obtain history from someone other than the patient: yes  Review and summarize past medical records: yes  Discuss the patient with other providers: yes  Independent visualization of images, tracings, or specimens: yes    Risk of Complications, Morbidity, and/or Mortality  Presenting problems: moderate  Management options: moderate    Critical Care  Total time providing critical care: 30-74 minutes (I spent 45 minutes providing critical care exclusive of procedures.   Time includes: direct patient care, patient reassessment, coordination of patient care, interpretation of data (laboratory data and imaging), review of patient's medical records, medical consultation, family consultation regarding treatment decisions and documentation of patient care.)       Final diagnoses:   COPD with acute exacerbation (HCC)   Acute respiratory failure with hypercapnia  ([facility])        Disposition:  ED Disposition     ED Disposition   Decision to Admit    Condition   --    Comment   Level of Care: Telemetry [5]   Diagnosis: COPD exacerbation ([facility]) [[id number]]   Admitting Physician: NELSON ORTEGA [[id number]]   Attending Physician: NELSON ORTEGA [[id number]]   Isolate for COVID?: No [0]   Certification: I Certify That Inpatient Hospital Services Are Medically Necessary For Greater Than 2 Midnights               This medical record created using voice recognition software and may contain unintended errors.    Documentation assistance provided by Mariaa Clifford acting as scribe for Milo Pereira MD. Information recorded by the scribe was done at my direction and has been verified and validated by me.          Mariaa Clifford  10/05/22 0106       Mariaa lCifford  10/05/22 0106       Mariaa Clifford  10/05/22 0111       Milo Pereira MD  10/05/22 0347

## 2022-10-05 NOTE — H&P
[facility] HISTORY AND PHYSICAL  Date: 10/5/2022   Patient Name: Dilip Faulkner  : 1950  MRN: [id number]  Primary Care Physician:  Rosalba Edwards APRN  Date of admission: 10/5/2022    Subjective     Shortness of Breath       Subjective     Chief Complaint:    Shortness of Breath     HPI: Patient is a 69-year-old man with a past medical history of COPD, type 2 diabetes, hypertension who presents to the emergency room via EMS with shortness of breath that started this morning and that is gotten worse.  He denies any nausea, vomiting, cough or fevers.    Patient is a resident at a nursing home.  Patient typically wears 2 L of oxygen continuously.Patient was just in the hospital from 2022 to 2022 for COPD exacerbation.  Patient has multiple hospitalizations for COPD exacerbation.    On arrival to the ED, patient's temperature is 97.8, pulse 109, respiratory rate 34, blood pressure 132/19, and he is on 10 L via nonrebreather mask saturating 98%.    Eventually, patient moved to BiPAP.  Initial AB.1/82/141/25.  Repeat AB.2/54/149/23.7.  Chest x-ray is clear.    On labs, white blood cell count is 15.45, hemoglobin is 12.7, eosinophil is 1.17.  Patient is a blood glucose of 260.  Personal History     Past Medical History:  Past Medical History:   Diagnosis Date   • Asthma    • COPD (chronic obstructive pulmonary disease) (HCC)    • Diabetes mellitus (HCC)    • Hernia, umbilical    • Hypertension    • Requires continuous at home supplemental oxygen          Past Surgical History:  Past Surgical History:   Procedure Laterality Date   • ABDOMINAL SURGERY           Family History:   Breast Cancer-related family history is not on file.      Social History:   Social History     Socioeconomic History   • Marital status: Single   Tobacco Use   • Smoking status: Former Smoker     Years: 2.00     Types: Cigarettes     Quit date: 3/19/2021     Years since quittin.5   • Smokeless  tobacco: Never Used   Vaping Use   • Vaping Use: Never used   Substance and Sexual Activity   • Alcohol use: Never   • Drug use: Never   • Sexual activity: Defer         Home Medications:  Fluticasone-Salmeterol, albuterol sulfate HFA, aspirin, atorvastatin, cetirizine, insulin degludec, ipratropium-albuterol, lisinopril, metFORMIN, metoprolol tartrate, and omeprazole    Allergies:  No Known Allergies    Review of Systems   All systems were reviewed and negative except for: Shortness of breath and wheezing    Objective   Objective     Vitals:   Temp:  [97.8 °F (36.6 °C)] 97.8 °F (36.6 °C)  Heart Rate:  [103-112] 103  Resp:  [32-34] 32  BP: ()/() 94/57  Flow (L/min):  [10-15] 15    Physical Exam    Constitutional: Awake, alert, no acute distress   Eyes: Pupils equal, sclerae anicteric, no conjunctival injection   HENT: NCAT, mucous membranes moist   Neck: Supple, no thyromegaly, no lymphadenopathy, trachea midline   Respiratory: Wheezing   Cardiovascular: Tachycardia   Gastrointestinal: Positive bowel sounds, soft, nontender, nondistended   Musculoskeletal: No bilateral ankle edema, no clubbing or cyanosis to extremities   Psychiatric: Appropriate affect, cooperative   Neurologic: Oriented x 3, strength symmetric in all extremities, Cranial Nerves grossly intact to confrontation, speech clear   Skin: No rashes     Result Review    Result Review:  I have personally reviewed the results from the time of this admission to 10/5/2022 02:41 EDT and agree with these findings:  [x]  Laboratory  []  Microbiology  [x]  Radiology  []  EKG/Telemetry   []  Cardiology/Vascular   []  Pathology  [x]  Old records  []  Other:      Assessment & Plan   Assessment / Plan   #1 COPD exacerbation  -Continue BiPAP  -Repeat ABG  -Steroids, DuoNeb, Brovana, Pulmicort, Mucinex, Singulair, bronchopulmonary hygiene.  -Started empirically on vancomycin and cefepime in the ED.  We will continue.  Low threshold to stop.  -Check  respiratory panel, respiratory culture, COVID, MRSA, procalcitonin, Legionella, strep    #2 insulin-dependent diabetes  -Cover with basal and insulin sliding scale    #3 hyperlipidemia-continue statin, ASA    #4 essential hypertension- continue lisinopril, metoprolol    #5 GERD continue PPI      DVT prophylaxis:  Medical DVT prophylaxis orders are present.    CODE STATUS:    Level Of Support Discussed With: Patient  Code Status (Patient has no pulse and is not breathing): CPR (Attempt to Resuscitate)  Medical Interventions (Patient has pulse or is breathing): Full Support      Admission Status:  I believe this patient meets inpatient status.    Electronically signed by Driss Miguel DO, 10/05/22, 2:41 AM EDT.

## 2022-10-05 NOTE — OUTREACH NOTE
COPD/PN Week 3 Survey    Flowsheet Row Responses   [employer] facility patient discharged from? [facility]   Does the patient have one of the following disease processes/diagnoses(primary or secondary)? COPD   Week 3 attempt successful? No   Unsuccessful attempts Attempt 1   Revoke Readmitted          KRISTEL H - Registered Nurse

## 2022-10-05 NOTE — THERAPY EVALUATION
Acute Care - Physical Therapy Initial Evaluation   [facility]     Patient Name: Dilip Faulkner  : 1950  MRN: [id number]  Today's Date: 10/5/2022      Visit Dx:     ICD-10-CM ICD-9-CM   1. COPD with acute exacerbation (HCC)  J44.1 491.21   2. Acute respiratory failure with hypercapnia (HCC)  J96.02 518.81   3. Difficulty walking  R26.2 719.7     Patient Active Problem List   Diagnosis   • Chronic obstructive pulmonary disease with acute exacerbation (HCC)   • Acute on chronic respiratory failure with hypoxia and hypercapnia (HCC)   • Type 2 diabetes mellitus (HCC)   • Acute on chronic respiratory failure (HCC)   • Essential hypertension   • Hyperlipidemia   • Cough   • Pneumonia due to infectious organism   • COPD with acute exacerbation (HCC)   • Obesity (BMI 30-39.9)   • Right bundle branch block   • Acquired ptosis of eyelid, bilateral   • Type 2 diabetes mellitus with hyperglycemia, with long-term current use of insulin (HCC)   • Acute exacerbation of chronic obstructive pulmonary disease (COPD) (HCC)   • Respiratory failure with hypoxia (HCC)   • Gastroesophageal reflux disease without esophagitis   • COPD (chronic obstructive pulmonary disease) (HCC)   • COPD exacerbation (HCC)     Past Medical History:   Diagnosis Date   • Asthma    • COPD (chronic obstructive pulmonary disease) (HCC)    • Diabetes mellitus (HCC)    • Hernia, umbilical    • Hypertension    • Requires continuous at home supplemental oxygen      Past Surgical History:   Procedure Laterality Date   • ABDOMINAL SURGERY       PT Assessment (last 12 hours)     PT Evaluation and Treatment     Row Name 10/05/22 2262          Physical Therapy Time and Intention    Document Type evaluation  -AV     Mode of Treatment individual therapy;physical therapy  -AV     Row Name 10/05/22 9376          General Information    Patient Profile Reviewed yes  -AV     Patient Observations alert;cooperative;agree to therapy  -AV     Prior Level of Function --   Reports sister assists with ADLs. Ambulated without an assistive device. 2 L O2 as needed.  -AV     Equipment Currently Used at Home oxygen  -AV     Existing Precautions/Restrictions fall  -AV     Row Name 10/05/22 1500          Living Environment    Current Living Arrangements home  -AV     People in Home sibling(s)  -AV     Row Name 10/05/22 1500          Range of Motion (ROM)    Range of Motion bilateral lower extremities;ROM is WFL  -AV     Row Name 10/05/22 1500          Strength (Manual Muscle Testing)    Strength (Manual Muscle Testing) bilateral lower extremities;strength is WFL  -AV     Row Name 10/05/22 1500          Bed Mobility    Bed Mobility supine-sit-supine  -AV     Supine-Sit-Supine [place] (Bed Mobility) standby assist  -AV     Row Name 10/05/22 1500          Transfers    Transfers sit-stand transfer  -AV     Sit-Stand [place] (Transfers) contact guard  -AV     Row Name 10/05/22 1500          Sit-Stand Transfer    Assistive Device (Sit-Stand Transfers) --  HHA  -AV     Row Name 10/05/22 1500          Gait/Stairs (Locomotion)    Gait/Stairs Locomotion gait/ambulation independence;gait/ambulation assistive device;distance ambulated  -AV     [place] Level (Gait) contact guard  -AV     Assistive Device (Gait) --  HHA  -AV     Distance in Feet (Gait) 6  sidestepping along EOB  -AV     Row Name 10/05/22 1500          Safety Issues, Functional Mobility    Impairments Affecting Function (Mobility) balance;endurance/activity tolerance  -AV     Row Name 10/05/22 1500          Balance    Balance Assessment standing dynamic balance  -AV     Dynamic Standing Balance contact guard  -AV     Position/Device Used, Standing Balance supported  HHA  -AV     Row Name             Wound 08/23/22 0029 Left lower leg Other (comment)    Wound - Properties Group Placement Date: 08/23/22 -SW Placement Time: 0029 -SW Present on Hospital Admission: Y  -SW Side: Left  -SW Orientation: lower  -SW Location: leg   -SW Primary Wound Type: Other  -SW Additional Comments: Possible bed bug bites  -SW     Retired Wound - Properties Group Placement Date: 08/23/22 -SW Placement Time: 0029 -SW Present on Hospital Admission: Y  -SW Side: Left  -SW Orientation: lower  -SW Location: leg  -SW Primary Wound Type: Other  -SW Additional Comments: Possible bed bug bites  -SW     Retired Wound - Properties Group Date first assessed: 08/23/22 -SW Time first assessed: 0029 -SW Present on Hospital Admission: Y  -SW Side: Left  -SW Location: leg  -SW Primary Wound Type: Other  -SW Additional Comments: Possible bed bug bites  -SW     Row Name 10/05/22 1500          Plan of Care Review    Plan of Care Reviewed With patient  -AV     Progress no change  -AV     Outcome Evaluation Patient presents with deficits in balance, endurance, transfers, and ambulation. Patient will benefit from skilled PT services to address these mobility deficits and decrease risk of falls. Recommend home health following hospital discharge.  -AV     Row Name 10/05/22 1500          Therapy Assessment/Plan (PT)    Rehab Potential (PT) good, to achieve stated therapy goals  -AV     Criteria for Skilled Interventions Met (PT) yes;meets criteria  -AV     Therapy Frequency (PT) daily  -AV     Problem List (PT) problems related to;balance;mobility  -AV     Activity Limitations Related to Problem List (PT) unable to ambulate safely  -AV     Row Name 10/05/22 1500          PT Evaluation Complexity    History, PT Evaluation Complexity 1-2 personal factors and/or comorbidities  -AV     Examination of Body Systems (PT Eval Complexity) total of 4 or more elements  -AV     Clinical Presentation (PT Evaluation Complexity) stable  -AV     Clinical Decision Making (PT Evaluation Complexity) low complexity  -AV     Overall Complexity (PT Evaluation Complexity) low complexity  -AV     Row Name 10/05/22 1500          Therapy Plan Review/Discharge Plan (PT)    Therapy Plan Review (PT)  evaluation/treatment results reviewed;patient  -AV     Row Name 10/05/22 1500          Physical Therapy Goals    Transfer Goal Selection (PT) transfer, PT goal 1  -AV     Gait Training Goal Selection (PT) gait training, PT goal 1  -AV     Row Name 10/05/22 1500          Transfer Goal 1 (PT)    Activity/Assistive Device (Transfer Goal 1, PT) transfers, all  -AV     Person Level/Cues Needed (Transfer Goal 1, PT) independent  -AV     Time Frame (Transfer Goal 1, PT) 10 days  -AV     Row Name 10/05/22 1500          Gait Training Goal 1 (PT)    Activity/Assistive Device (Gait Training Goal 1, PT) gait (walking locomotion)  -AV     Person Level (Gait Training Goal 1, PT) independent  -AV     Distance (Gait Training Goal 1, PT) 300  -AV     Time Frame (Gait Training Goal 1, PT) 10 days  -AV           User Key  (r) = Recorded By, (t) = Taken By, (c) = Cosigned By    Initials Name Provider Type    AV Dilip Hill, PT Physical Therapist    Anderson Cameron RN Registered Nurse                Physical Therapy Education                 Title: PT OT SLP Therapies (In Progress)     Topic: Physical Therapy (In Progress)     Point: Mobility training (Done)     Learning Progress Summary           Patient Acceptance, E,TB, VU by AV at 10/5/2022 1531                   Point: Home exercise program (Not Started)     Learner Progress:  Not documented in this visit.          Point: Body mechanics (Done)     Learning Progress Summary           Patient Acceptance, E,TB, VU by AV at 10/5/2022 1531                   Point: Precautions (Done)     Learning Progress Summary           Patient Acceptance, E,TB, VU by AV at 10/5/2022 1531                               User Key     Initials Effective Dates Name Provider Type Discipline     06/11/21 -  Dilip Hill, PT Physical Therapist PT              PT Recommendation and Plan  Anticipated Discharge Disposition (PT): home with home health  Planned Therapy Interventions  (PT): balance training, bed mobility training, gait training, neuromuscular re-education, strengthening, transfer training  Therapy Frequency (PT): daily  Plan of Care Reviewed With: patient  Progress: no change  Outcome Evaluation: Patient presents with deficits in balance, endurance, transfers, and ambulation. Patient will benefit from skilled PT services to address these mobility deficits and decrease risk of falls. Recommend home health following hospital discharge.   Outcome Measures     Row Name 10/05/22 1500             How much help from another person do you currently need...    Turning from your back to your side while in flat bed without using bedrails? 4  -AV      Moving from lying on back to sitting on the side of a flat bed without bedrails? 4  -AV      Moving to and from a bed to a chair (including a wheelchair)? 3  -AV      Standing up from a chair using your arms (e.g., wheelchair, bedside chair)? 3  -AV      Climbing 3-5 steps with a railing? 3  -AV      To walk in hospital room? 3  -AV      AM-PAC 6 Clicks Score (PT) 20  -AV              Functional Assessment    Outcome Measure Options AM-PAC 6 Clicks Basic Mobility (PT)  -AV            User Key  (r) = Recorded By, (t) = Taken By, (c) = Cosigned By    Initials Name Provider Type    AV Dilip Hill, PT Physical Therapist                 Time Calculation:    PT Charges     Row Name 10/05/22 1530             Time Calculation    PT Received On 10/05/22  -AV      PT Goal Re-Cert Due Date 10/14/22  -AV              Untimed Charges    PT Eval/Re-eval Minutes 32  -AV              Total Minutes    Untimed Charges Total Minutes 32  -AV       Total Minutes 32  -AV            User Key  (r) = Recorded By, (t) = Taken By, (c) = Cosigned By    Initials Name Provider Type    Dilip Santa, PT Physical Therapist              Therapy Charges for Today     Code Description Service Date Service Provider Modifiers Qty    40549979089 HC PT EVAL LOW  COMPLEXITY 3 10/5/2022 Dilip Hill, PT GP 1          PT G-Codes  Outcome Measure Options: AM-PAC 6 Clicks Basic Mobility (PT)  AM-PAC 6 Clicks Score (PT): 20    Dilip Hill, PT  10/5/2022

## 2022-10-05 NOTE — PLAN OF CARE
Goal Outcome Evaluation:      Patient new admission from emergency department. He is on bipap. Mental status improved since arrival to ED. He rested well. No complaint of pain. Diet advanced to consistent carb. He is in  isolation pending covid  results.

## 2022-10-06 LAB
ANION GAP SERPL CALCULATED.3IONS-SCNC: 10.5 MMOL/L (ref 5–15)
BASOPHILS # BLD AUTO: 0.03 10*3/MM3 (ref 0–0.2)
BASOPHILS NFR BLD AUTO: 0.2 % (ref 0–1.5)
BUN SERPL-MCNC: 18 MG/DL (ref 8–23)
BUN/CREAT SERPL: 18.2 (ref 7–25)
CALCIUM SPEC-SCNC: 8.9 MG/DL (ref 8.6–10.5)
CHLORIDE SERPL-SCNC: 103 MMOL/L (ref 98–107)
CO2 SERPL-SCNC: 23.5 MMOL/L (ref 22–29)
CREAT SERPL-MCNC: 0.99 MG/DL (ref 0.76–1.27)
DEPRECATED RDW RBC AUTO: 48.2 FL (ref 37–54)
EGFRCR SERPLBLD CKD-EPI 2021: 80.4 ML/MIN/1.73
EOSINOPHIL # BLD AUTO: 0.02 10*3/MM3 (ref 0–0.4)
EOSINOPHIL NFR BLD AUTO: 0.1 % (ref 0.3–6.2)
ERYTHROCYTE [DISTWIDTH] IN BLOOD BY AUTOMATED COUNT: 15.4 % (ref 12.3–15.4)
GLUCOSE BLDC GLUCOMTR-MCNC: 165 MG/DL (ref 70–99)
GLUCOSE BLDC GLUCOMTR-MCNC: 168 MG/DL (ref 70–99)
GLUCOSE BLDC GLUCOMTR-MCNC: 294 MG/DL (ref 70–99)
GLUCOSE SERPL-MCNC: 242 MG/DL (ref 65–99)
HCT VFR BLD AUTO: 39.1 % (ref 37.5–51)
HGB BLD-MCNC: 11.4 G/DL (ref 13–17.7)
IMM GRANULOCYTES # BLD AUTO: 0.12 10*3/MM3 (ref 0–0.05)
IMM GRANULOCYTES NFR BLD AUTO: 0.8 % (ref 0–0.5)
LYMPHOCYTES # BLD AUTO: 0.74 10*3/MM3 (ref 0.7–3.1)
LYMPHOCYTES NFR BLD AUTO: 5.1 % (ref 19.6–45.3)
MAGNESIUM SERPL-MCNC: 2.2 MG/DL (ref 1.6–2.4)
MCH RBC QN AUTO: 25.2 PG (ref 26.6–33)
MCHC RBC AUTO-ENTMCNC: 29.2 G/DL (ref 31.5–35.7)
MCV RBC AUTO: 86.5 FL (ref 79–97)
MONOCYTES # BLD AUTO: 0.32 10*3/MM3 (ref 0.1–0.9)
MONOCYTES NFR BLD AUTO: 2.2 % (ref 5–12)
NEUTROPHILS NFR BLD AUTO: 13.32 10*3/MM3 (ref 1.7–7)
NEUTROPHILS NFR BLD AUTO: 91.6 % (ref 42.7–76)
NRBC BLD AUTO-RTO: 0 /100 WBC (ref 0–0.2)
PHOSPHATE SERPL-MCNC: 2.8 MG/DL (ref 2.5–4.5)
PLATELET # BLD AUTO: 179 10*3/MM3 (ref 140–450)
PMV BLD AUTO: 10.8 FL (ref 6–12)
POTASSIUM SERPL-SCNC: 4.8 MMOL/L (ref 3.5–5.2)
RBC # BLD AUTO: 4.52 10*6/MM3 (ref 4.14–5.8)
SODIUM SERPL-SCNC: 137 MMOL/L (ref 136–145)
VANCOMYCIN SERPL-MCNC: 16.8 MCG/ML (ref 5–40)
WBC NRBC COR # BLD: 14.55 10*3/MM3 (ref 3.4–10.8)

## 2022-10-06 PROCEDURE — 94664 DEMO&/EVAL PT USE INHALER: CPT

## 2022-10-06 PROCEDURE — 94799 UNLISTED PULMONARY SVC/PX: CPT

## 2022-10-06 PROCEDURE — 25010000002 METHYLPREDNISOLONE PER 40 MG: Performed by: HOSPITALIST

## 2022-10-06 PROCEDURE — 85025 COMPLETE CBC W/AUTO DIFF WBC: CPT | Performed by: HOSPITALIST

## 2022-10-06 PROCEDURE — 99233 SBSQ HOSP IP/OBS HIGH 50: CPT | Performed by: FAMILY MEDICINE

## 2022-10-06 PROCEDURE — 84100 ASSAY OF PHOSPHORUS: CPT | Performed by: HOSPITALIST

## 2022-10-06 PROCEDURE — 82962 GLUCOSE BLOOD TEST: CPT

## 2022-10-06 PROCEDURE — 25010000002 VANCOMYCIN 5 G RECONSTITUTED SOLUTION: Performed by: HOSPITALIST

## 2022-10-06 PROCEDURE — 99233 SBSQ HOSP IP/OBS HIGH 50: CPT | Performed by: INTERNAL MEDICINE

## 2022-10-06 PROCEDURE — 80048 BASIC METABOLIC PNL TOTAL CA: CPT | Performed by: HOSPITALIST

## 2022-10-06 PROCEDURE — 36415 COLL VENOUS BLD VENIPUNCTURE: CPT | Performed by: HOSPITALIST

## 2022-10-06 PROCEDURE — 63710000001 INSULIN LISPRO (HUMAN) PER 5 UNITS: Performed by: HOSPITALIST

## 2022-10-06 PROCEDURE — 94761 N-INVAS EAR/PLS OXIMETRY MLT: CPT

## 2022-10-06 PROCEDURE — 97116 GAIT TRAINING THERAPY: CPT

## 2022-10-06 PROCEDURE — 25010000002 CEFEPIME PER 500 MG: Performed by: HOSPITALIST

## 2022-10-06 PROCEDURE — 25010000002 ENOXAPARIN PER 10 MG: Performed by: HOSPITALIST

## 2022-10-06 PROCEDURE — 80202 ASSAY OF VANCOMYCIN: CPT | Performed by: HOSPITALIST

## 2022-10-06 PROCEDURE — 63710000001 INSULIN DETEMIR PER 5 UNITS: Performed by: FAMILY MEDICINE

## 2022-10-06 PROCEDURE — 83735 ASSAY OF MAGNESIUM: CPT | Performed by: HOSPITALIST

## 2022-10-06 RX ORDER — HYDROXYZINE HYDROCHLORIDE 25 MG/1
25 TABLET, FILM COATED ORAL 3 TIMES DAILY PRN
Status: DISCONTINUED | OUTPATIENT
Start: 2022-10-06 | End: 2022-10-08 | Stop reason: HOSPADM

## 2022-10-06 RX ADMIN — IPRATROPIUM BROMIDE AND ALBUTEROL SULFATE 3 ML: 2.5; .5 SOLUTION RESPIRATORY (INHALATION) at 11:50

## 2022-10-06 RX ADMIN — LISINOPRIL 2.5 MG: 2.5 TABLET ORAL at 08:03

## 2022-10-06 RX ADMIN — CEFEPIME HYDROCHLORIDE 2 G: 2 INJECTION, POWDER, FOR SOLUTION INTRAMUSCULAR; INTRAVENOUS at 16:09

## 2022-10-06 RX ADMIN — SENNOSIDES AND DOCUSATE SODIUM 2 TABLET: 8.6; 5 TABLET ORAL at 08:03

## 2022-10-06 RX ADMIN — METHYLPREDNISOLONE SODIUM SUCCINATE 40 MG: 40 INJECTION, POWDER, FOR SOLUTION INTRAMUSCULAR; INTRAVENOUS at 14:14

## 2022-10-06 RX ADMIN — ATORVASTATIN CALCIUM 10 MG: 10 TABLET, FILM COATED ORAL at 20:48

## 2022-10-06 RX ADMIN — IPRATROPIUM BROMIDE AND ALBUTEROL SULFATE 3 ML: 2.5; .5 SOLUTION RESPIRATORY (INHALATION) at 18:11

## 2022-10-06 RX ADMIN — Medication 10 ML: at 20:49

## 2022-10-06 RX ADMIN — ASPIRIN 81 MG CHEWABLE TABLET 81 MG: 81 TABLET CHEWABLE at 08:03

## 2022-10-06 RX ADMIN — VANCOMYCIN HYDROCHLORIDE 1000 MG: 5 INJECTION, POWDER, LYOPHILIZED, FOR SOLUTION INTRAVENOUS at 14:14

## 2022-10-06 RX ADMIN — ARFORMOTEROL TARTRATE 15 MCG: 15 SOLUTION RESPIRATORY (INHALATION) at 18:11

## 2022-10-06 RX ADMIN — INSULIN LISPRO 3 UNITS: 100 INJECTION, SOLUTION INTRAVENOUS; SUBCUTANEOUS at 17:11

## 2022-10-06 RX ADMIN — INSULIN DETEMIR 25 UNITS: 100 INJECTION, SOLUTION SUBCUTANEOUS at 20:48

## 2022-10-06 RX ADMIN — INSULIN LISPRO 8 UNITS: 100 INJECTION, SOLUTION INTRAVENOUS; SUBCUTANEOUS at 08:03

## 2022-10-06 RX ADMIN — METHYLPREDNISOLONE SODIUM SUCCINATE 40 MG: 40 INJECTION, POWDER, FOR SOLUTION INTRAMUSCULAR; INTRAVENOUS at 01:34

## 2022-10-06 RX ADMIN — GUAIFENESIN 1200 MG: 600 TABLET, EXTENDED RELEASE ORAL at 08:03

## 2022-10-06 RX ADMIN — ENOXAPARIN SODIUM 40 MG: 100 INJECTION SUBCUTANEOUS at 08:03

## 2022-10-06 RX ADMIN — MONTELUKAST 10 MG: 10 TABLET, FILM COATED ORAL at 20:48

## 2022-10-06 RX ADMIN — METOPROLOL TARTRATE 25 MG: 25 TABLET, FILM COATED ORAL at 08:03

## 2022-10-06 RX ADMIN — IPRATROPIUM BROMIDE AND ALBUTEROL SULFATE 3 ML: 2.5; .5 SOLUTION RESPIRATORY (INHALATION) at 06:18

## 2022-10-06 RX ADMIN — CETIRIZINE HYDROCHLORIDE 10 MG: 10 TABLET, FILM COATED ORAL at 08:03

## 2022-10-06 RX ADMIN — BUDESONIDE 0.5 MG: 0.5 SUSPENSION RESPIRATORY (INHALATION) at 18:11

## 2022-10-06 RX ADMIN — CEFEPIME HYDROCHLORIDE 2 G: 2 INJECTION, POWDER, FOR SOLUTION INTRAMUSCULAR; INTRAVENOUS at 01:35

## 2022-10-06 RX ADMIN — GUAIFENESIN 1200 MG: 600 TABLET, EXTENDED RELEASE ORAL at 20:48

## 2022-10-06 RX ADMIN — BUDESONIDE 0.5 MG: 0.5 SUSPENSION RESPIRATORY (INHALATION) at 06:18

## 2022-10-06 RX ADMIN — Medication 10 ML: at 08:04

## 2022-10-06 RX ADMIN — INSULIN LISPRO 3 UNITS: 100 INJECTION, SOLUTION INTRAVENOUS; SUBCUTANEOUS at 12:00

## 2022-10-06 RX ADMIN — CEFEPIME HYDROCHLORIDE 2 G: 2 INJECTION, POWDER, FOR SOLUTION INTRAMUSCULAR; INTRAVENOUS at 08:08

## 2022-10-06 RX ADMIN — PANTOPRAZOLE SODIUM 40 MG: 40 TABLET, DELAYED RELEASE ORAL at 06:16

## 2022-10-06 RX ADMIN — METOPROLOL TARTRATE 25 MG: 25 TABLET, FILM COATED ORAL at 20:48

## 2022-10-06 RX ADMIN — VANCOMYCIN HYDROCHLORIDE 1000 MG: 5 INJECTION, POWDER, LYOPHILIZED, FOR SOLUTION INTRAVENOUS at 01:35

## 2022-10-06 RX ADMIN — ARFORMOTEROL TARTRATE 15 MCG: 15 SOLUTION RESPIRATORY (INHALATION) at 06:18

## 2022-10-06 NOTE — CASE MANAGEMENT/SOCIAL WORK
RT CM reviewed overnight oximetry results performed on [facility].  Study length was 6 hrs and 18 min.  Mr Faulkner had a total of 17 desaturation events lasting a minimum of 10 seconds. His SpO2 was <89% for 11 min. The average of duration of desaturation was 34 seconds in length; the longest desaturation was 109 seconds.  The deepest desaturation value was 74%.  Given his diagnosis, chronic hypercapnic respiratory failure and overnight results, Mr Faulkner would qualify for bilevel use at home.  Unfortunately, Mr Faulkner has been offered this option on multiple occassions and has refused each time.  RT CM will speak to patient again once he has seen palliative if necessary.

## 2022-10-06 NOTE — THERAPY TREATMENT NOTE
Acute Care - Physical Therapy Treatment Note  [facility]     Patient Name: Dilip Faulkner  : 1950  MRN: [id number]  Today's Date: 10/6/2022      Visit Dx:     ICD-10-CM ICD-9-CM   1. COPD with acute exacerbation (HCC)  J44.1 491.21   2. Acute respiratory failure with hypercapnia (HCC)  J96.02 518.81   3. Difficulty walking  R26.2 719.7   4. Decreased activities of daily living (ADL)  Z78.9 V49.89     Patient Active Problem List   Diagnosis   • Chronic obstructive pulmonary disease with acute exacerbation (HCC)   • Acute on chronic respiratory failure with hypoxia and hypercapnia (HCC)   • Type 2 diabetes mellitus (HCC)   • Acute on chronic respiratory failure (HCC)   • Essential hypertension   • Hyperlipidemia   • Cough   • Pneumonia due to infectious organism   • COPD with acute exacerbation (HCC)   • Obesity (BMI 30-39.9)   • Right bundle branch block   • Acquired ptosis of eyelid, bilateral   • Type 2 diabetes mellitus with hyperglycemia, with long-term current use of insulin (HCC)   • Acute exacerbation of chronic obstructive pulmonary disease (COPD) (HCC)   • Respiratory failure with hypoxia (HCC)   • Gastroesophageal reflux disease without esophagitis   • COPD (chronic obstructive pulmonary disease) (HCC)   • COPD exacerbation (HCC)     Past Medical History:   Diagnosis Date   • Asthma    • COPD (chronic obstructive pulmonary disease) (HCC)    • Diabetes mellitus (HCC)    • Hernia, umbilical    • Hypertension    • Requires continuous at home supplemental oxygen      Past Surgical History:   Procedure Laterality Date   • ABDOMINAL SURGERY       PT Assessment (last 12 hours)     PT Evaluation and Treatment     Row Name 10/06/22 1100          Physical Therapy Time and Intention    Subjective Information no complaints (P)   -BN     Document Type therapy note (daily note) (P)   -BN     Mode of Treatment individual therapy;physical therapy (P)   -BN     Patient Effort good (P)   -BN     Symptoms Noted  During/After Treatment none (P)   -     Row Name 10/06/22 1100          General Information    Patient Profile Reviewed yes (P)   -     Patient Observations alert;agree to therapy;cooperative (P)   -     Row Name 10/06/22 1100          Transfers    Transfers sit-stand transfer;stand-sit transfer (P)   -BN     Sit-Stand [place] (Transfers) standby assist (P)   -BN     Stand-Sit [place] (Transfers) standby assist (P)   -     Row Name 10/06/22 1100          Gait/Stairs (Locomotion)    Gait/Stairs Locomotion gait/ambulation independence;gait/ambulation assistive device (P)   -     [place] Level (Gait) contact guard (P)   -     Distance in Feet (Gait) 70 (P)   -     Row Name 10/06/22 1100          Safety Issues, Functional Mobility    Impairments Affecting Function (Mobility) balance;endurance/activity tolerance (P)   -     Row Name 10/06/22 1100          Balance    Balance Assessment standing dynamic balance (P)   -     Dynamic Standing Balance contact guard (P)   -     Row Name             Wound 08/23/22 0029 Left lower leg Other (comment)    Wound - Properties Group Placement Date: 08/23/22 -SW Placement Time: 0029 -SW Present on Hospital Admission: Y  -SW Side: Left  -SW Orientation: lower  -SW Location: leg  -SW Primary Wound Type: Other  -SW Additional Comments: Possible bed bug bites  -SW     Retired Wound - Properties Group Placement Date: 08/23/22 -SW Placement Time: 0029 -SW Present on Hospital Admission: Y  -SW Side: Left  -SW Orientation: lower  -SW Location: leg  -SW Primary Wound Type: Other  -SW Additional Comments: Possible bed bug bites  -SW     Retired Wound - Properties Group Date first assessed: 08/23/22 -SW Time first assessed: 0029 -SW Present on Hospital Admission: Y  -SW Side: Left  -SW Location: leg  -SW Primary Wound Type: Other  -SW Additional Comments: Possible bed bug bites  -SW     Row Name 10/06/22 1100          Progress Summary (PT)    Daily  Progress Summary (PT) Patient tolerated ambulation well but was slightly unsteady. Patient is progressing well. Continue with current treatment plan. (P)   -BN           User Key  (r) = Recorded By, (t) = Taken By, (c) = Cosigned By    Initials Name Provider Type    Anderson Cameron, RN Registered Nurse    Tri Winter, PT Student PT Student                Physical Therapy Education                 Title: PT OT SLP Therapies (In Progress)     Topic: Physical Therapy (In Progress)     Point: Mobility training (Done)     Learning Progress Summary           Patient Acceptance, E,TB, VU by AV at 10/5/2022 1531                   Point: Home exercise program (Not Started)     Learner Progress:  Not documented in this visit.          Point: Body mechanics (Done)     Learning Progress Summary           Patient Acceptance, E,TB, VU by AV at 10/5/2022 1531                   Point: Precautions (Done)     Learning Progress Summary           Patient Acceptance, E,TB, VU by AV at 10/5/2022 1531                               User Key     Initials Effective Dates Name Provider Type Discipline     06/11/21 -  Dilip Hill, PT Physical Therapist PT              PT Recommendation and Plan     Progress Summary (PT)  Daily Progress Summary (PT): (P) Patient tolerated ambulation well but was slightly unsteady. Patient is progressing well. Continue with current treatment plan.   Outcome Measures     Row Name 10/06/22 1100 10/05/22 1500          How much help from another person do you currently need...    Turning from your back to your side while in flat bed without using bedrails? 4 (P)   -BN 4  -AV     Moving from lying on back to sitting on the side of a flat bed without bedrails? 4 (P)   -BN 4  -AV     Moving to and from a bed to a chair (including a wheelchair)? 3 (P)   -BN 3  -AV     Standing up from a chair using your arms (e.g., wheelchair, bedside chair)? 3 (P)   -BN 3  -AV     Climbing 3-5 steps with a railing? 3 (P)    -BN 3  -AV     To walk in hospital room? 3 (P)   -BN 3  -AV     AM-PAC 6 Clicks Score (PT) 20 (P)   -BN 20  -AV            Functional Assessment    Outcome Measure Options AM-PAC 6 Clicks Basic Mobility (PT) (P)   -BN AM-PAC 6 Clicks Basic Mobility (PT)  -AV           User Key  (r) = Recorded By, (t) = Taken By, (c) = Cosigned By    Initials Name Provider Type    AV Dilip Hill, PT Physical Therapist    Tri Winter, PT Student PT Student                 Time Calculation:    PT Charges     Row Name 10/06/22 1110             Time Calculation    PT Received On 10/06/22 (P)   -BN              Timed Charges    03732 - Gait Training Minutes  10 (P)   -BN              Total Minutes    Timed Charges Total Minutes 10 (P)   -BN       Total Minutes 10 (P)   -BN            User Key  (r) = Recorded By, (t) = Taken By, (c) = Cosigned By    Initials Name Provider Type    Tri Winter, PT Student PT Student              Therapy Charges for Today     Code Description Service Date Service Provider Modifiers Qty    [id number] HC GAIT TRAINING EA 15 MIN 10/6/2022 Tir Ureña PT Student GP 1          PT G-Codes  Outcome Measure Options: (P) AM-PAC 6 Clicks Basic Mobility (PT)  AM-PAC 6 Clicks Score (PT): (P) 20  AM-PAC 6 Clicks Score (OT): 21    SERGIO Herron  10/6/2022

## 2022-10-06 NOTE — CONSULTS
Purpose of the visit was to evaluate for: goals of care/advanced care planning. Spoke with MD, RN and patient and discussed palliative care, goals of care, care options, resuscitation status and advanced care planning.      Assessment:  Patient is currently located on 4mtu, palliative care met with the patient and introduce self and explained my role.  He is alert and oriented, appears to have his eyes closed during the visit.  He denied pain or discomfort during the visit.  He is receiving oxygen via nasal cannula at 3.5 liters.  He has declined to wear bipap or have one ordered for home.    We discussed his condition, progressive respiratory disease, treatment options and goals of care.  Patient lives with his sister, he reported he does not have a living will.  We discussed his code status and the difference in FULL CODE versus DNR DNI and he reported he does not want CPR or be on the ventilator.  Assisted in completing a living will and EMS DNR with him.  Provided him with the original and placed a copy on his chart and sent to medical records.    Discussed services available to follow him at home to assist with comfort and prevent readmissions.  After discussing expected decline when he doesn't wear the bipap, attempted to educate and make referrals to pallitus or hospice and he declined any services at home.  He declined an explanation of their services visit.      Notified Dr. Hardy of his request for a DNR DNI.  He will enter the new orders.     Recommendations/Plan: Encouraged patient to have services to assist at home such as home health, pallitus or hospice since he declines a bipap and has had frequent hospital readmissions.    Tasks Completed: EMS DNR, Livingwill, Code Status clarification and Emotional Support.      JOSÉ MIGUEL CandelarioN, RN, CHPN

## 2022-10-06 NOTE — ACP (ADVANCE CARE PLANNING)
Living will and EMS DNR completed with patient, copy placed on his chart and sent to medical records.    JOSÉ MIGUEL CandelarioN, RN, CHPN

## 2022-10-06 NOTE — PLAN OF CARE
Goal Outcome Evaluation:  Plan of Care Reviewed With: patient        Progress: no change    Pt condition stable, VSS. Tolerating antibiotics well. Was able to rest.

## 2022-10-06 NOTE — PLAN OF CARE
Goal Outcome Evaluation:           Progress: no change  Outcome Evaluation: no changes throughout shift today, palliative met with pt today, pt wishes to be DNR/ DNI. no complaints at this time.

## 2022-10-06 NOTE — PLAN OF CARE
Goal Outcome Evaluation:  Plan of Care Reviewed With: patient        Progress: no change  Outcome Evaluation: Patient presents with limitations in self-care, functional transfers, balance, and endurance. He would benefit from continued skilled occupational therapy services to maximize independence with ADLs/functional transfers. Home health therapy services recommended upon discharge from hospital.

## 2022-10-06 NOTE — PROGRESS NOTES
"PHARMACY TO DOSE VANCOMYCIN DAY: 2  DURATION OF THERAPY: 10-10-22    INDICATION: PNEUMONIA  GOAL AUC / TROUGH: 400-600 MG/L.HR      Ht Readings from Last 1 Encounters:   10/05/22 185.4 cm (73\")            10/05/22  0332      Weight: 116 kg (256 lb 2.8 oz)           Estimated Creatinine Clearance: 88.6 mL/min (by C-G formula based on SCr of 0.99 mg/dL).  HD/CRRT/PD? NO          CONTRAST ADMINISTERED? NO  I/O last 3 completed shifts:  In: 1820 [P.O.:720; IV Piggyback:1100]  Out: 825 [Urine:825]     Lab Results   Component Value Date    WBC 14.55 (H) 10/06/2022       Temp (24hrs), Av.7 °F (36.5 °C), Min:97.3 °F (36.3 °C), Max:98.2 °F (36.8 °C)             Microbiology Results (last 10 days)       Procedure Component Value - Date/Time    Respiratory Panel PCR w/COVID-19(SARS-CoV-2) GUERA/ANIL/KYLEE/PAD/COR/MAD/ANA In-House, NP Swab in UTM/VTM, 3-4 HR TAT - Swab, Nasopharynx [[id number]]  (Normal) Collected: 10/05/22 0447    Lab Status: Final result Specimen: Swab from Nasopharynx Updated: 10/05/22 0750     ADENOVIRUS, PCR Not Detected     Coronavirus 229E Not Detected     Coronavirus HKU1 Not Detected     Coronavirus NL63 Not Detected     Coronavirus OC43 Not Detected     COVID19 Not Detected     Human Metapneumovirus Not Detected     Human Rhinovirus/Enterovirus Not Detected     Influenza A PCR Not Detected     Influenza B PCR Not Detected     Parainfluenza Virus 1 Not Detected     Parainfluenza Virus 2 Not Detected     Parainfluenza Virus 3 Not Detected     Parainfluenza Virus 4 Not Detected     RSV, PCR Not Detected     Bordetella pertussis pcr Not Detected     Bordetella parapertussis PCR Not Detected     Chlamydophila pneumoniae PCR Not Detected     Mycoplasma pneumo by PCR Not Detected    Narrative:      In the setting of a positive respiratory panel with a viral infection PLUS a negative procalcitonin without other underlying concern for bacterial infection, consider observing off antibiotics or discontinuation of " antibiotics and continue supportive care. If the respiratory panel is positive for atypical bacterial infection (Bordetella pertussis, Chlamydophila pneumoniae, or Mycoplasma pneumoniae), consider antibiotic de-escalation to target atypical bacterial infection.    MRSA Screen, PCR (Inpatient) - Swab, Nares [[id number]]  (Normal) Collected: 10/05/22 0447    Lab Status: Final result Specimen: Swab from Nares Updated: 10/05/22 0627     MRSA PCR No MRSA Detected    Narrative:      The negative predictive value of this diagnostic test is high and should only be used to consider de-escalating anti-MRSA therapy. A positive result may indicate colonization with MRSA and must be correlated clinically.    Blood Culture - Blood, Arm, Left [[id number]]  (Normal) Collected: 10/05/22 0108    Lab Status: Preliminary result Specimen: Blood from Arm, Left Updated: 10/06/22 0132     Blood Culture No growth at 24 hours    Blood Culture - Blood, Arm, Left [[id number]]  (Normal) Collected: 10/05/22 0108    Lab Status: Preliminary result Specimen: Blood from Arm, Left Updated: 10/06/22 0132     Blood Culture No growth at 24 hours           MRSA PCR NOT DETECTED; RECOMMEND STOPPING VANCOMYCIN    XR Chest 1 View    Result Date: 10/5/2022    1. Chronic changes are noted.  There is no definitive evidence for acute cardiopulmonary process.         SHRUTI CUTLER MD       Electronically Signed and Approved By: SHRUTI CUTLER MD on 10/05/2022 at 1:31               OTHER ANTIMICROBIAL THERAPY: Cefepime    ASSESSMENT / PLAN:  Lab Results   Component Value Date    VANCORANDOM 16.80 10/06/2022     Random vancomycin level at 0456 this a.m. is 16.8 mcg/ml. Current regimen is 1000 mg q12hr. Per [facility], this regimen should provide:    AUC24,ss: 457 mg/L.hr  PAUC*: 70 %  Ctrough,ss: 16.0 mg/L  Pconc*: 24 %  Tox.: 11 %    No change in dose needed.

## 2022-10-06 NOTE — THERAPY EVALUATION
Patient Name: Dilip Faulkner  : 1950    MRN: [id number]                              Today's Date: 10/6/2022       Admit Date: 10/5/2022    Visit Dx:     ICD-10-CM ICD-9-CM   1. COPD with acute exacerbation (HCC)  J44.1 491.21   2. Acute respiratory failure with hypercapnia (HCC)  J96.02 518.81   3. Difficulty walking  R26.2 719.7   4. Decreased activities of daily living (ADL)  Z78.9 V49.89     Patient Active Problem List   Diagnosis   • Chronic obstructive pulmonary disease with acute exacerbation (HCC)   • Acute on chronic respiratory failure with hypoxia and hypercapnia (HCC)   • Type 2 diabetes mellitus (HCC)   • Acute on chronic respiratory failure (HCC)   • Essential hypertension   • Hyperlipidemia   • Cough   • Pneumonia due to infectious organism   • COPD with acute exacerbation (HCC)   • Obesity (BMI 30-39.9)   • Right bundle branch block   • Acquired ptosis of eyelid, bilateral   • Type 2 diabetes mellitus with hyperglycemia, with long-term current use of insulin (HCC)   • Acute exacerbation of chronic obstructive pulmonary disease (COPD) (HCC)   • Respiratory failure with hypoxia (HCC)   • Gastroesophageal reflux disease without esophagitis   • COPD (chronic obstructive pulmonary disease) (HCC)   • COPD exacerbation (HCC)     Past Medical History:   Diagnosis Date   • Asthma    • COPD (chronic obstructive pulmonary disease) (HCC)    • Diabetes mellitus (HCC)    • Hernia, umbilical    • Hypertension    • Requires continuous at home supplemental oxygen      Past Surgical History:   Procedure Laterality Date   • ABDOMINAL SURGERY        10/05/22 1145   OT Time and Intention   Document Type evaluation   Mode of Treatment individual therapy;occupational therapy   General Information   Patient Profile Reviewed yes   Prior Level of Function   (Assist with ADLs from his sister, ambulated w/o a device, and 2L home O2.)   Existing Precautions/Restrictions fall   Barriers to Rehab none identified   Living  Environment   People in Home sibling(s)  (sister)   Occupational Profile   Reason for Services/Referral (Occupational Profile) Patient is a 72 year old male admitted to [facility] for shortness of breath on October 5th, 2022. Occupational therapy consulted due to recent decline in ADLs/functional transfers. No previous occupational therapy services for current condition.   Pain Assessment   Additional Documentation Pain Scale: FACES Pre/Post-Treatment (Group)   Pain Scale: FACES Pre/Post-Treatment   Pain: FACES Scale, Pretreatment 0-->no hurt   Posttreatment Pain Rating 0-->no hurt   Cognition   Orientation Status (Cognition) oriented x 4   Range of Motion Comprehensive   General Range of Motion bilateral upper extremity ROM WFL   Strength Comprehensive (MMT)   Comment, General Manual Muscle Testing (MMT) Assessment 5/5 BUEs   Vision Assessment/Intervention   Visual Impairment/Limitations WFL  (Acquired ptosis of eyelids bilaterally since childhood. WFL for ADLs/functional mobility per patient report)   Sensory Assessment (Somatosensory)   Sensory Assessment (Somatosensory) UE sensation intact   Activities of Daily Living   BADL Assessment/Intervention bathing;upper body dressing;lower body dressing;grooming;feeding;toileting   Bathing Assessment/Intervention   Will Level (Bathing) bathing skills;upper body;set up;lower body;contact guard assist   Upper Body Dressing Assessment/Training   Will Level (Upper Body Dressing) upper body dressing skills;set up   Lower Body Dressing Assessment/Training   Will Level (Lower Body Dressing) lower body dressing skills;contact guard assist   Grooming Assessment/Training   Will Level (Grooming) grooming skills;set up   Self-Feeding Assessment/Training   Will Level (Feeding) feeding skills;set up   Toileting Assessment/Training   Will Level (Toileting) toileting skills;contact guard assist   Bed Mobility   Bed Mobility  supine-sit-supine   Supine-Sit-Supine [place] (Bed Mobility) standby assist   Transfer Assessment/Treatment   Transfers sit-stand transfer;stand-sit transfer   Transfers   Sit-Stand [place] (Transfers) contact guard   Stand-Sit [place] (Transfers) contact guard   Sit-Stand Transfer   Assistive Device (Sit-Stand Transfers) other (see comments)  (handheld assist)   Stand-Sit Transfer   Assistive Device (Stand-Sit Transfers) other (see comments)  (handheld assist)   Safety Issues, Functional Mobility   Impairments Affecting Function (Mobility) balance;endurance/activity tolerance;shortness of breath   Motor Skills   Motor Skills coordination;functional endurance   Coordination WFL   Functional Endurance Fair-   Balance   Balance Assessment sitting dynamic balance;standing dynamic balance   Dynamic Sitting Balance independent   Position, Sitting Balance unsupported;sitting edge of bed   Dynamic Standing Balance contact guard   Position/Device Used, Standing Balance supported;other (see comments)  (handheld assist)   Plan of Care Review   Plan of Care Reviewed With patient   Progress no change   Outcome Evaluation Patient presents with limitations in self-care, functional transfers, balance, and endurance. He would benefit from continued skilled occupational therapy services to maximize independence with ADLs/functional transfers. Home health therapy services recommended upon discharge from hospital.   Vital Signs   O2 Delivery Pre Treatment nasal cannula   O2 Delivery Intra Treatment nasal cannula   O2 Delivery Post Treatment nasal cannula   Therapy Assessment/Plan (OT)   Patient/Family Therapy Goal Statement (OT) To maximize independence.   Rehab Potential (OT) good, to achieve stated therapy goals   Criteria for Skilled Therapeutic Interventions Met (OT) yes;meets criteria;skilled treatment is necessary   Therapy Frequency (OT) 5 times/wk   Planned Therapy Interventions (OT) activity tolerance  training;patient/caregiver education/training;BADL retraining;functional balance retraining;occupation/activity based interventions;transfer/mobility retraining;strengthening exercise   Evaluation Complexity (OT)   Review Occupational Profile/Medical/Therapy History Complexity brief/low complexity   Assessment, Occupational Performance/Identification of Deficit Complexity 3-5 performance deficits   Clinical Decision Making Complexity (OT) problem focused assessment/low complexity   Overall Complexity of Evaluation (OT) low complexity   Therapy Plan Review/Discharge Plan (OT)   Equipment Needs Upon Discharge (OT) walker, rolling;commode chair   Anticipated Discharge Disposition (OT) home with home health;home with assist   Bed Mobility Goal 1 (OT)   Activity/Assistive Device (Bed Mobility Goal 1, OT) bed mobility activities, all   Time Frame (Bed Mobility Goal 1, OT) long term goal (LTG);10 days   Transfer Goal 1 (OT)   Activity/Assistive Device (Transfer Goal 1, OT) transfers, all;walker, rolling   [place] Level/Cues Needed (Transfer Goal 1, OT) modified independence   Time Frame (Transfer Goal 1, OT) long term goal (LTG);10 days   Bathing Goal 1 (OT)   Activity/Device (Bathing Goal 1, OT) bathing skills, all   [place] Level/Cues Needed (Bathing Goal 1, OT) modified independence   Time Frame (Bathing Goal 1, OT) long term goal (LTG);10 days   Dressing Goal 1 (OT)   Activity/Device (Dressing Goal 1, OT) dressing skills, all   [place]/Cues Needed (Dressing Goal 1, OT) modified independence   Time Frame (Dressing Goal 1, OT) long term goal (LTG);10 days   Toileting Goal 1 (OT)   Activity/Device (Toileting Goal 1, OT) toileting skills, all   [place] Level/Cues Needed (Toileting Goal 1, OT) modified independence   Time Frame (Toileting Goal 1, OT) long term goal (LTG);10 days   Problem Specific Goal 1 (OT)   Problem Specific Goal 1 (OT) Patient will demonstrate fair/fair+ endurance to support  ADLs/functional transfers.   Time Frame (Problem Specific Goal 1, OT) long term goal (LTG);10 days     Occupational Therapy Education                 Title: PT OT SLP Therapies (In Progress)     Topic: Occupational Therapy (In Progress)     Point: ADL training (Done)     Description:   Instruct learner(s) on proper safety adaptation and remediation techniques during self care or transfers.   Instruct in proper use of assistive devices.              Learning Progress Summary           Patient Acceptance, E,TB, VU,NR by  at 10/5/2022 1145                   Point: Home exercise program (Not Started)     Description:   Instruct learner(s) on appropriate technique for monitoring, assisting and/or progressing therapeutic exercises/activities.              Learner Progress:  Not documented in this visit.          Point: Precautions (Done)     Description:   Instruct learner(s) on prescribed precautions during self-care and functional transfers.              Learning Progress Summary           Patient Acceptance, E,TB, VU,NR by  at 10/5/2022 1145                   Point: Body mechanics (Done)     Description:   Instruct learner(s) on proper positioning and spine alignment during self-care, functional mobility activities and/or exercises.              Learning Progress Summary           Patient Acceptance, E,TB, VU,NR by  at 10/5/2022 1145                               User Key     Initials Effective Dates Name Provider Type Discipline     06/16/21 -  Carley Gacria OT Occupational Therapist OT           OT Recommendation and Plan  Planned Therapy Interventions (OT): activity tolerance training, patient/caregiver education/training, BADL retraining, functional balance retraining, occupation/activity based interventions, transfer/mobility retraining, strengthening exercise  Therapy Frequency (OT): 5 times/wk  Plan of Care Review  Plan of Care Reviewed With: patient  Progress: no change  Outcome Evaluation: Patient  presents with limitations in self-care, functional transfers, balance, and endurance. He would benefit from continued skilled occupational therapy services to maximize independence with ADLs/functional transfers. Home health therapy services recommended upon discharge from hospital.     Time Calculation:     Therapy Charges for Today     Code Description Service Date Service Provider Modifiers Qty    [id number]  OT EVAL LOW COMPLEXITY 2 10/5/2022 Carley Garcia OT GO 1               Carley Garcia OT  10/6/2022

## 2022-10-06 NOTE — PROGRESS NOTES
Pulmonary / Critical Care Progress Note      Patient Name: Dilip Faulkner  : 1950  MRN: [id number]  Primary Care Physician:  Rosalba Edwards APRN  Referring Physician: No Known Provider  Date of admission: 10/5/2022    Subjective   Subjective     Reason for Consult/ Chief Complaint:   AECOPD again    Interval examination:  Patient is more awake this morning and talkative.  He is certainly of his own mind and not likely to participate significantly in therapy.  States he feels fine at this time.            HPI:  Dilip Faulkner is a 72 y.o. male with end-stage COPD and diabetes with hypertension who presents to the emergency department  From nursing home with increasing shortness of breath.  He was found to be in severe acute on chronic hypercapnic respiratory failure the pH is 7.1 and PCO2 of 82.  With each admission his admitting ABG seems to be worse.  This was his worst initial pH and PCO2. He has had multiple admissions in the past year.  With increasing frequency and severity.  Patient's mental status is challenging difficult to obtain a history.  Uncertain as to the etiology of his frequent exacerbations.  He does have a risk of aspiration with dilated esophagus with air-fluid levels on a recent CT scan in .        Review of Systems  Unable to obtain due to the patient condition      Personal History     Past Medical History:   Diagnosis Date   • Asthma    • COPD (chronic obstructive pulmonary disease) (HCC)    • Diabetes mellitus (HCC)    • Hernia, umbilical    • Hypertension    • Requires continuous at home supplemental oxygen        Past Surgical History:   Procedure Laterality Date   • ABDOMINAL SURGERY         Family History: family history is not on file. Otherwise pertinent FHx was reviewed and not pertinent to current issue.    Social History:  reports that he quit smoking about 18 months ago. His smoking use included cigarettes. He quit after 2.00 years of use. He has never used  smokeless tobacco. He reports that he does not drink alcohol and does not use drugs.    Home Medications:  Fluticasone-Salmeterol, albuterol sulfate HFA, aspirin, atorvastatin, cetirizine, insulin degludec, ipratropium-albuterol, lisinopril, metFORMIN, metoprolol tartrate, and omeprazole    Allergies:  No Known Allergies    Objective    Objective     Vitals:   Temp:  [97.5 °F (36.4 °C)-98.6 °F (37 °C)] 97.6 °F (36.4 °C)  Heart Rate:  [65-94] 65  Resp:  [18-20] 20  BP: (112-139)/(59-83) 112/83  Flow (L/min):  [3.5-5] 3.5    Physical Exam:  Vital Signs Reviewed   General:  WDWN, somnolent, moderate distress due to dyspnea   HEENT:  PERRL, EOMI.  OP, nares clear, no sinus tenderness  Neck:  Supple, no JVD, no thyromegaly  Lymph: no axillary, cervical, supraclavicular lymphadenopathy noted bilaterally  Chest: Severely prolonged expiratory phase no active wheezing rales or rhonchi bilaterally  CV: RRR, no MGR, pulses 2+, equal.  Abd:  Soft, NT, ND, + BS, no HSM  EXT:  no clubbing, no cyanosis, no edema, no joint tenderness  Neuro:  A&Ox3, CN grossly intact, no focal deficits.  Skin: No rashes or lesions noted      Result Review    Result Review:  I have personally reviewed the results from the time of this admission to 10/6/2022 14:21 EDT and agree with these findings:  [x]  Laboratory  [x]  Microbiology  [x]  Radiology  [x]  EKG/Telemetry   []  Cardiology/Vascular   []  Pathology  []  Old records  []  Other:  Most notable findings include: Moderate to severe emphysema acute on chronic hypoxic and hypercapnic respiratory failure.  With dilated esophagus at risk for aspiration and pneumonia pneumonitis  Assessment & Plan   Assessment / Plan     Active Hospital Problems:  Active Hospital Problems    Diagnosis    • COPD (chronic obstructive pulmonary disease) (HCC)    • COPD exacerbation (HCC)    • Obesity (BMI 30-39.9)          Impression:  72-year-old male with end-stage COPD with 12+ admissions in the last 12  months.    Plan:    Continue to treat as acute exacerbation of COPD.  5 days of prednisone  5 to 7 days of antibiotics  Nebulized bronchodilators.  From a purely respiratory standpoint he is likely stable for discharge.  However he is at high risk of readmission because we have not addressed his lack of social support.    Had a yanni discussion with the sister who lives across the way from him.  He certainly is a man of his own mind and is not likely to participate in therapeutic interventions.  She states that he has issues with anxiety and has panic attacks and that makes it difficult for him to breathe and he usually calls 911.  He is reluctant to take his insulin, she agreed to attempt to monitor his use of his nebulizer therapy.     I am reluctant to recommend benzodiazepines and unsupervised setting.  I think it will be difficult to appropriately dose morphine elixir and avoid overdose.  Perhaps a low-dose novel antipsychotic agent such as quetiapine may have some benefit       Blood sugar goal less than 180 his A1c has progressively been increasing over time suggesting that he is not managing his diabetes well in the outpatient setting further evidence that he is not able to manage himself at home safely.    DVT prophylaxis is important for patient's COPD exacerbations    70 minutes critical care time spent on evaluating and managing this patient not including time spent in teaching or procedures.    TOMMY NORTON M.D. [facility] 14:21 EDT 10/06/22

## 2022-10-06 NOTE — NURSING NOTE
Report received from YESENIA Justice at 1630. No significant changes from prior assessment. VSS, no c/o pain, no s/s of distress noted. Will continue to monitor.

## 2022-10-07 LAB
ALBUMIN SERPL-MCNC: 3.5 G/DL (ref 3.5–5.2)
ALP SERPL-CCNC: 73 U/L (ref 39–117)
ALT SERPL W P-5'-P-CCNC: 11 U/L (ref 1–41)
ANION GAP SERPL CALCULATED.3IONS-SCNC: 11.7 MMOL/L (ref 5–15)
AST SERPL-CCNC: 12 U/L (ref 1–40)
BASOPHILS # BLD AUTO: 0.01 10*3/MM3 (ref 0–0.2)
BASOPHILS NFR BLD AUTO: 0.1 % (ref 0–1.5)
BILIRUB CONJ SERPL-MCNC: <0.2 MG/DL (ref 0–0.3)
BILIRUB INDIRECT SERPL-MCNC: NORMAL MG/DL
BILIRUB SERPL-MCNC: 0.2 MG/DL (ref 0–1.2)
BUN SERPL-MCNC: 23 MG/DL (ref 8–23)
BUN/CREAT SERPL: 21.1 (ref 7–25)
CALCIUM SPEC-SCNC: 8.7 MG/DL (ref 8.6–10.5)
CHLORIDE SERPL-SCNC: 99 MMOL/L (ref 98–107)
CO2 SERPL-SCNC: 21.3 MMOL/L (ref 22–29)
CREAT SERPL-MCNC: 1.09 MG/DL (ref 0.76–1.27)
DEPRECATED RDW RBC AUTO: 46.5 FL (ref 37–54)
EGFRCR SERPLBLD CKD-EPI 2021: 71.7 ML/MIN/1.73
EOSINOPHIL # BLD AUTO: 0 10*3/MM3 (ref 0–0.4)
EOSINOPHIL NFR BLD AUTO: 0 % (ref 0.3–6.2)
ERYTHROCYTE [DISTWIDTH] IN BLOOD BY AUTOMATED COUNT: 15.2 % (ref 12.3–15.4)
GLUCOSE BLDC GLUCOMTR-MCNC: 169 MG/DL (ref 70–99)
GLUCOSE BLDC GLUCOMTR-MCNC: 239 MG/DL (ref 70–99)
GLUCOSE BLDC GLUCOMTR-MCNC: 326 MG/DL (ref 70–99)
GLUCOSE BLDC GLUCOMTR-MCNC: 385 MG/DL (ref 70–99)
GLUCOSE SERPL-MCNC: 363 MG/DL (ref 65–99)
HCT VFR BLD AUTO: 39.3 % (ref 37.5–51)
HGB BLD-MCNC: 11.8 G/DL (ref 13–17.7)
IMM GRANULOCYTES # BLD AUTO: 0.08 10*3/MM3 (ref 0–0.05)
IMM GRANULOCYTES NFR BLD AUTO: 0.7 % (ref 0–0.5)
LYMPHOCYTES # BLD AUTO: 0.66 10*3/MM3 (ref 0.7–3.1)
LYMPHOCYTES NFR BLD AUTO: 5.5 % (ref 19.6–45.3)
MAGNESIUM SERPL-MCNC: 2.1 MG/DL (ref 1.6–2.4)
MCH RBC QN AUTO: 25.4 PG (ref 26.6–33)
MCHC RBC AUTO-ENTMCNC: 30 G/DL (ref 31.5–35.7)
MCV RBC AUTO: 84.5 FL (ref 79–97)
MONOCYTES # BLD AUTO: 0.2 10*3/MM3 (ref 0.1–0.9)
MONOCYTES NFR BLD AUTO: 1.7 % (ref 5–12)
NEUTROPHILS NFR BLD AUTO: 11.07 10*3/MM3 (ref 1.7–7)
NEUTROPHILS NFR BLD AUTO: 92 % (ref 42.7–76)
NRBC BLD AUTO-RTO: 0 /100 WBC (ref 0–0.2)
PHOSPHATE SERPL-MCNC: 2.9 MG/DL (ref 2.5–4.5)
PLATELET # BLD AUTO: 199 10*3/MM3 (ref 140–450)
PMV BLD AUTO: 11 FL (ref 6–12)
POTASSIUM SERPL-SCNC: 5.1 MMOL/L (ref 3.5–5.2)
PROT SERPL-MCNC: 6.7 G/DL (ref 6–8.5)
RBC # BLD AUTO: 4.65 10*6/MM3 (ref 4.14–5.8)
SODIUM SERPL-SCNC: 132 MMOL/L (ref 136–145)
WBC NRBC COR # BLD: 12.02 10*3/MM3 (ref 3.4–10.8)

## 2022-10-07 PROCEDURE — 94799 UNLISTED PULMONARY SVC/PX: CPT

## 2022-10-07 PROCEDURE — 25010000002 METHYLPREDNISOLONE PER 40 MG: Performed by: HOSPITALIST

## 2022-10-07 PROCEDURE — 99233 SBSQ HOSP IP/OBS HIGH 50: CPT | Performed by: FAMILY MEDICINE

## 2022-10-07 PROCEDURE — 80048 BASIC METABOLIC PNL TOTAL CA: CPT | Performed by: FAMILY MEDICINE

## 2022-10-07 PROCEDURE — 82962 GLUCOSE BLOOD TEST: CPT

## 2022-10-07 PROCEDURE — 25010000002 ENOXAPARIN PER 10 MG: Performed by: HOSPITALIST

## 2022-10-07 PROCEDURE — 63710000001 INSULIN LISPRO (HUMAN) PER 5 UNITS: Performed by: HOSPITALIST

## 2022-10-07 PROCEDURE — 85025 COMPLETE CBC W/AUTO DIFF WBC: CPT | Performed by: FAMILY MEDICINE

## 2022-10-07 PROCEDURE — 94664 DEMO&/EVAL PT USE INHALER: CPT

## 2022-10-07 PROCEDURE — 83735 ASSAY OF MAGNESIUM: CPT | Performed by: FAMILY MEDICINE

## 2022-10-07 PROCEDURE — 63710000001 INSULIN DETEMIR PER 5 UNITS: Performed by: FAMILY MEDICINE

## 2022-10-07 PROCEDURE — 99233 SBSQ HOSP IP/OBS HIGH 50: CPT | Performed by: INTERNAL MEDICINE

## 2022-10-07 PROCEDURE — 94761 N-INVAS EAR/PLS OXIMETRY MLT: CPT

## 2022-10-07 PROCEDURE — 80076 HEPATIC FUNCTION PANEL: CPT | Performed by: FAMILY MEDICINE

## 2022-10-07 PROCEDURE — 84100 ASSAY OF PHOSPHORUS: CPT | Performed by: FAMILY MEDICINE

## 2022-10-07 RX ORDER — QUETIAPINE FUMARATE 25 MG/1
25 TABLET, FILM COATED ORAL NIGHTLY
Status: DISCONTINUED | OUTPATIENT
Start: 2022-10-07 | End: 2022-10-08 | Stop reason: HOSPADM

## 2022-10-07 RX ORDER — PREDNISONE 20 MG/1
40 TABLET ORAL
Status: DISCONTINUED | OUTPATIENT
Start: 2022-10-08 | End: 2022-10-08 | Stop reason: HOSPADM

## 2022-10-07 RX ADMIN — BUDESONIDE 0.5 MG: 0.5 SUSPENSION RESPIRATORY (INHALATION) at 06:25

## 2022-10-07 RX ADMIN — METOPROLOL TARTRATE 25 MG: 25 TABLET, FILM COATED ORAL at 08:28

## 2022-10-07 RX ADMIN — ARFORMOTEROL TARTRATE 15 MCG: 15 SOLUTION RESPIRATORY (INHALATION) at 06:25

## 2022-10-07 RX ADMIN — LISINOPRIL 2.5 MG: 2.5 TABLET ORAL at 08:28

## 2022-10-07 RX ADMIN — METHYLPREDNISOLONE SODIUM SUCCINATE 40 MG: 40 INJECTION, POWDER, FOR SOLUTION INTRAMUSCULAR; INTRAVENOUS at 00:36

## 2022-10-07 RX ADMIN — ASPIRIN 81 MG CHEWABLE TABLET 81 MG: 81 TABLET CHEWABLE at 08:28

## 2022-10-07 RX ADMIN — IPRATROPIUM BROMIDE AND ALBUTEROL SULFATE 3 ML: 2.5; .5 SOLUTION RESPIRATORY (INHALATION) at 00:48

## 2022-10-07 RX ADMIN — INSULIN LISPRO 5 UNITS: 100 INJECTION, SOLUTION INTRAVENOUS; SUBCUTANEOUS at 12:04

## 2022-10-07 RX ADMIN — INSULIN LISPRO 3 UNITS: 100 INJECTION, SOLUTION INTRAVENOUS; SUBCUTANEOUS at 17:17

## 2022-10-07 RX ADMIN — ENOXAPARIN SODIUM 40 MG: 100 INJECTION SUBCUTANEOUS at 08:28

## 2022-10-07 RX ADMIN — INSULIN LISPRO 10 UNITS: 100 INJECTION, SOLUTION INTRAVENOUS; SUBCUTANEOUS at 07:33

## 2022-10-07 RX ADMIN — MONTELUKAST 10 MG: 10 TABLET, FILM COATED ORAL at 21:47

## 2022-10-07 RX ADMIN — QUETIAPINE FUMARATE 25 MG: 25 TABLET ORAL at 21:46

## 2022-10-07 RX ADMIN — Medication 10 ML: at 21:47

## 2022-10-07 RX ADMIN — IPRATROPIUM BROMIDE AND ALBUTEROL SULFATE 3 ML: 2.5; .5 SOLUTION RESPIRATORY (INHALATION) at 06:25

## 2022-10-07 RX ADMIN — METOPROLOL TARTRATE 25 MG: 25 TABLET, FILM COATED ORAL at 21:46

## 2022-10-07 RX ADMIN — BUDESONIDE 0.5 MG: 0.5 SUSPENSION RESPIRATORY (INHALATION) at 18:25

## 2022-10-07 RX ADMIN — PANTOPRAZOLE SODIUM 40 MG: 40 TABLET, DELAYED RELEASE ORAL at 06:33

## 2022-10-07 RX ADMIN — GUAIFENESIN 1200 MG: 600 TABLET, EXTENDED RELEASE ORAL at 08:28

## 2022-10-07 RX ADMIN — IPRATROPIUM BROMIDE AND ALBUTEROL SULFATE 3 ML: 2.5; .5 SOLUTION RESPIRATORY (INHALATION) at 18:25

## 2022-10-07 RX ADMIN — IPRATROPIUM BROMIDE AND ALBUTEROL SULFATE 3 ML: 2.5; .5 SOLUTION RESPIRATORY (INHALATION) at 12:26

## 2022-10-07 RX ADMIN — SENNOSIDES AND DOCUSATE SODIUM 2 TABLET: 8.6; 5 TABLET ORAL at 08:28

## 2022-10-07 RX ADMIN — METHYLPREDNISOLONE SODIUM SUCCINATE 40 MG: 40 INJECTION, POWDER, FOR SOLUTION INTRAMUSCULAR; INTRAVENOUS at 14:15

## 2022-10-07 RX ADMIN — INSULIN DETEMIR 30 UNITS: 100 INJECTION, SOLUTION SUBCUTANEOUS at 21:50

## 2022-10-07 RX ADMIN — ATORVASTATIN CALCIUM 10 MG: 10 TABLET, FILM COATED ORAL at 21:46

## 2022-10-07 RX ADMIN — CETIRIZINE HYDROCHLORIDE 10 MG: 10 TABLET, FILM COATED ORAL at 08:28

## 2022-10-07 RX ADMIN — ARFORMOTEROL TARTRATE 15 MCG: 15 SOLUTION RESPIRATORY (INHALATION) at 18:25

## 2022-10-07 RX ADMIN — Medication 10 ML: at 08:28

## 2022-10-07 RX ADMIN — GUAIFENESIN 1200 MG: 600 TABLET, EXTENDED RELEASE ORAL at 21:46

## 2022-10-07 NOTE — PLAN OF CARE
Goal Outcome Evaluation:           Progress: no change  Outcome Evaluation: no changes throughout shift today, vss, bs require supplemental insulin, no complaints at this time. will continue to monitor.

## 2022-10-07 NOTE — PLAN OF CARE
Goal Outcome Evaluation:  Plan of Care Reviewed With: patient        Progress: no change    No change in condition. VSS.

## 2022-10-07 NOTE — PROGRESS NOTES
Pulmonary / Critical Care Progress Note      Patient Name: Dilip Faulkner  : 1950  MRN: [id number]  Primary Care Physician:  Rosalba Edwards APRN  Referring Physician: No Known Provider  Date of admission: 10/5/2022    Subjective   Subjective     Reason for Consult/ Chief Complaint:   AECOPD again    Interval examination:  Patient is even more awake more awake this morning and talkative.  Expresses desire to go home  He is certainly of his own mind and not likely to participate significantly in therapy.  States he feels fine at this time.            HPI:  Dilip Faulkner is a 72 y.o. male with end-stage COPD and diabetes with hypertension who presents to the emergency department  From nursing home with increasing shortness of breath.  He was found to be in severe acute on chronic hypercapnic respiratory failure the pH is 7.1 and PCO2 of 82.  With each admission his admitting ABG seems to be worse.  This was his worst initial pH and PCO2. He has had multiple admissions in the past year.  With increasing frequency and severity.  Patient's mental status is challenging difficult to obtain a history.  Uncertain as to the etiology of his frequent exacerbations.  He does have a risk of aspiration with dilated esophagus with air-fluid levels on a recent CT scan in .        Review of Systems  Unable to obtain due to the patient condition      Personal History     Past Medical History:   Diagnosis Date   • Asthma    • COPD (chronic obstructive pulmonary disease) (HCC)    • Diabetes mellitus (HCC)    • Hernia, umbilical    • Hypertension    • Requires continuous at home supplemental oxygen        Past Surgical History:   Procedure Laterality Date   • ABDOMINAL SURGERY         Family History: family history is not on file. Otherwise pertinent FHx was reviewed and not pertinent to current issue.    Social History:  reports that he quit smoking about 18 months ago. His smoking use included cigarettes. He quit  after 2.00 years of use. He has never used smokeless tobacco. He reports that he does not drink alcohol and does not use drugs.    Home Medications:  Fluticasone-Salmeterol, albuterol sulfate HFA, aspirin, atorvastatin, cetirizine, insulin degludec, ipratropium-albuterol, lisinopril, metFORMIN, metoprolol tartrate, and omeprazole    Allergies:  No Known Allergies    Objective    Objective     Vitals:   Temp:  [97.2 °F (36.2 °C)-97.7 °F (36.5 °C)] 97.7 °F (36.5 °C)  Heart Rate:  [65-83] 65  Resp:  [18-21] 18  BP: (125-145)/(58-82) 125/71  Flow (L/min):  [3-3.5] 3    Physical Exam:  Vital Signs Reviewed   General:  WDWN, somnolent, moderate distress due to dyspnea   HEENT:  PERRL, EOMI.  OP, nares clear, no sinus tenderness  Neck:  Supple, no JVD, no thyromegaly  Lymph: no axillary, cervical, supraclavicular lymphadenopathy noted bilaterally  Chest: Severely prolonged expiratory phase no active wheezing rales or rhonchi bilaterally  CV: RRR, no MGR, pulses 2+, equal.  Abd:  Soft, NT, ND, + BS, no HSM  EXT:  no clubbing, no cyanosis, no edema, no joint tenderness  Neuro:  A&Ox3, CN grossly intact, no focal deficits.  Skin: No rashes or lesions noted      Result Review    Result Review:  I have personally reviewed the results from the time of this admission to 10/7/2022 13:02 EDT and agree with these findings:  [x]  Laboratory  [x]  Microbiology  [x]  Radiology  [x]  EKG/Telemetry   []  Cardiology/Vascular   []  Pathology  []  Old records  []  Other:  Most notable findings include: Moderate to severe emphysema acute on chronic hypoxic and hypercapnic respiratory failure.  With dilated esophagus at risk for aspiration and pneumonia pneumonitis  Assessment & Plan   Assessment / Plan     Active Hospital Problems:  Active Hospital Problems    Diagnosis    • COPD (chronic obstructive pulmonary disease) (HCC)    • COPD exacerbation (HCC)    • Obesity (BMI 30-39.9)          Impression:  72-year-old male with end-stage COPD  with 12+ admissions in the last 12 months.    Plan:    Continue to treat as acute exacerbation of COPD.  5 days of prednisone  5 to 7 days of antibiotics  Nebulized bronchodilators.  From a purely respiratory standpoint he is likely stable for discharge.  However he is at high risk of readmission because we have not addressed his lack of social support.    Consider low-dose Seroquel nightly 25 to 50 mg  If you were to participate in hospice I would be more willing to give him 2 mg of morphine to quell his dyspnea.    Home visit would be useful to evaluate the patient's home environment if the services available at home survey.    Is probably reasonable to discharge this patient because were probably not likely able to prevent his readmission in the next 5 to 10 days.    35 minutes care time spent on evaluating and managing this patient not including time spent in teaching or procedures.    TOMMY NORTON M.D. [facility] 13:02 EDT 10/07/22

## 2022-10-07 NOTE — PROGRESS NOTES
[facility]   Hospitalist Progress Note  Date: 10/7/2022  Patient Name: Dilip Faulkner  : 1950  MRN: [id number]  Date of admission: 10/5/2022      Subjective   Subjective     Chief complaint: Shortness of breath     Summary:  72-year-old male with history of COPD with recurrent hospitalizations for COPD exacerbations, chronic hypoxemic respiratory failure on 2 L nasal cannula, diabetes mellitus, essential pretension, hospitalized on 10/5/2020 with shortness of breath symptoms, found to be in acute COPD exacerbated state, transition to BiPAP which she has previously refused on previous admissions, breathing stabilized, pulmonary consulted, etiology remains uncertain for his frequent exacerbations, suspect medical noncompliance     Interval follow-up: Patient seen and examined this morning, no acute distress, no acute major overnight events, patient is down to 2-1/2 L nasal cannula oxygen with sats in the low to mid to high 90s, denies fevers, chills, sweats.  Still has cough and shortness of breath symptoms with exertion.  Blood sugars remain uncontrolled with blood sugars this morning in the 300 range.  With mild metabolic acidosis.  White blood cell count at 12,000.  Telemetry reviewed, no acute major arrhythmic events, sinus rhythm with few periods of ventricular bigeminy and tachycardia.  Baseline heart rate in the 60s.  Denies chest pain or palpitations.  Still is not interested in a home BiPAP machine.    Review of systems:  All systems reviewed and negative except for cough, shortness of breath, generalized fatigue, generalized weakness    Objective   Objective     Vitals:   Temp:  [97.2 °F (36.2 °C)-97.7 °F (36.5 °C)] 97.3 °F (36.3 °C)  Heart Rate:  [65-83] 66  Resp:  [18-21] 18  BP: (116-134)/(58-71) 116/69  Flow (L/min):  [2.5-3.5] 2.5  Physical Exam    Constitutional: Awake, alert, no acute distress heavyset male, no acute distress, NC O2 sitting upright in the edge of bed, listening to the  TV              Eyes: Eyes closed, sclerae anicteric, no conjunctival injection              HENT: NCAT, mucous membranes moist              Neck: Supple, FROM              Respiratory: Air entry bilaterally, diminished breath sounds throughout, scattered wheezing mostly expiratory              Cardiovascular: RRR, no murmurs, rubs, or gallops, palpable pedal pulses bilaterally              Gastrointestinal: Positive bowel sounds, soft, nontender, nondistended              Musculoskeletal: Trace bilateral ankle edema, no clubbing or cyanosis to extremities              Psychiatric: Appropriate affect, cooperative              Neurologic: Oriented x 3, strength symmetric in all extremities, Cranial Nerves grossly intact to confrontation, speech clear              Skin: No rashes      Result Review    Result Review:  I have personally reviewed the pertinent results from the past 24 hours to 10/7/2022 17:32 EDT and agree with these findings:  [x]  Laboratory   CBC    CBC 10/5/22 10/6/22 10/7/22   WBC 15.45 (A) 14.55 (A) 12.02 (A)   RBC 5.09 4.52 4.65   Hemoglobin 12.7 (A) 11.4 (A) 11.8 (A)   Hematocrit 43.3 39.1 39.3   MCV 85.1 86.5 84.5   MCH 25.0 (A) 25.2 (A) 25.4 (A)   MCHC 29.3 (A) 29.2 (A) 30.0 (A)   RDW 15.2 15.4 15.2   Platelets 223 179 199   (A) Abnormal value            BMP    BMP 10/5/22 10/5/22 10/5/22 10/6/22 10/7/22    0108 0113 0229     BUN 12   18 23   Creatinine 0.99   0.99 1.09   Sodium 139 137.9 136.7 137 132 (A)   Potassium 4.7   4.8 5.1   Chloride 103   103 99   CO2 28.4   23.5 21.3 (A)   Calcium 9.1   8.9 8.7   (A) Abnormal value       Comments are available for some flowsheets but are not being displayed.           LIVER FUNCTION TESTS:      Lab 10/07/22  0516 10/05/22  0108   TOTAL PROTEIN 6.7 7.4   ALBUMIN 3.50 4.00   GLOBULIN  --  3.4   ALT (SGPT) 11 12   AST (SGOT) 12 13   BILIRUBIN 0.2 0.3   BILIRUBIN DIRECT <0.2  --    ALK PHOS 73 90       [x]  Microbiology   Blood Culture   Date Value Ref  Range Status   10/05/2022 No growth at 2 days  Preliminary   10/05/2022 No growth at 2 days  Preliminary     No results found for: BCIDPCR, CXREFLEX, CSFCX, CULTURETIS  No results found for: CULTURES, HSVCX, URCX  No results found for: EYECULTURE, GCCX, HSVCULTURE, LABHSV  No results found for: LEGIONELLA, MRSACX, MUMPSCX, MYCOPLASCX  No results found for: NOCARDIACX, STOOLCX  No results found for: THROATCX, UNSTIMCULT, URINECX, CULTURE, VZVCULTUR  No results found for: VIRALCULTU, WOUNDCX    [x]  Radiology XR Chest 1 View    Result Date: 10/5/2022  PROCEDURE: XR CHEST 1 VW  COMPARISON: [facility], CR, XR CHEST 1 VW, 7/10/2022, 2:45.  [facility], CR, XR CHEST 1 VW, 9/16/2022, 20:45.  INDICATIONS: shortness of breath  FINDINGS:  Chronic changes are noted.  No new consolidations or pleural effusions are observed. The cardiac silhouette and mediastinum are unchanged. No definitive acute osseous abnormalities are seen on this single view.        1. Chronic changes are noted.  There is no definitive evidence for acute cardiopulmonary process.         SHRUTI CUTLER MD       Electronically Signed and Approved By: SHRUTI CUTLER MD on 10/05/2022 at 1:31             XR Chest 1 View    Result Date: 9/16/2022  PROCEDURE: XR CHEST 1 VW  COMPARISON: 8/22/2022.  INDICATIONS: Shortness of breath.  FINDINGS: Two AP upright portable views of the chest are provided for review.  Pleural-parenchymal scarring is suggested in the left lung base with volume loss, seen previously.  External artifacts obscure detail.  No pneumothorax is seen.  There may be mild cardiomegaly.  The thoracic aorta is atherosclerotic.  There is emphysematous contour of the lungs.  Degenerative changes involve the imaged spine and the bilateral shoulders.  No pneumothorax.  Probably no pleural effusion.  Overall, little interval change is suggested radiographically since the 8/22/2022 study.       No definite acute infiltrate.  No  significant interval change is suggested radiographically since the 8/22/2022 study.      Please note that portions of this note were completed with a voice recognition program.  SEGUNDO MORA JR, MD       Electronically Signed and Approved By: SEGUNDO MORA JR, MD on 9/16/2022 at 22:02                [x]  EKG/Telemetry   []  Cardiology/Vascular   []  Pathology  [x]  Old records  []  Other:    Assessment & Plan   Assessment / Plan     Assessment/Plan:  Assessment:  Acute on chronic hypoxemic respiratory failure with hypercapnia  Acute exacerbation of COPD  Asthma overlap  Insulin-dependent diabetes mellitus with poor glycemic control  Essential hypertension  Dyslipidemia  Lacks social support  Anxiety  Panic attacks     Plan:  Labs and imaging reviewed  Continue Atarax 25 mg p.o. 3 times daily For anxiety  Start Seroquel 25 mg nightly  CODE STATUS changed to DNR/DNI  DC Solu-Medrol  Start prednisone 40 mg daily with plans for slow taper on disposition   Continue Brovana and Pulmicort nebs twice daily  Levemir dose increased to 30 units at night  Insulin sliding scale coverage  Recommendations appreciated from Dr. Castelan pulmonary service  Continue supplemental oxygen to maintain sats greater 90%  Continue metoprolol 25 mg p.o. twice daily  Continue lisinopril 2.5 mg daily  Continue Protonix 40 mg daily  Palliative care consultation appreciated  A.m. labs  Full code  DVT prophylaxis with Lovenox  Clinical course dictate further management  Discussed with nurse at the bedside  Overall prognosis poor, recurrent readmissions troublesome    DVT prophylaxis:  Medical DVT prophylaxis orders are present.    CODE STATUS:   Medical Intervention Limits: NO intubation (DNI)  Code Status (Patient has no pulse and is not breathing): No CPR (Do Not Attempt to Resuscitate)  Medical Interventions (Patient has pulse or is breathing): Limited Support  Release to patient: Routine Release        Electronically signed by Oralia FRANCE  MD Hayley, 10/07/22, 5:32 PM EDT.    Portions of this documentation were transcribed electronically from a voice recognition software.  I confirm all data accurately represents the service(s) I performed at today's visit.

## 2022-10-08 VITALS
HEART RATE: 72 BPM | TEMPERATURE: 97.7 F | HEIGHT: 73 IN | BODY MASS INDEX: 33.95 KG/M2 | RESPIRATION RATE: 20 BRPM | SYSTOLIC BLOOD PRESSURE: 100 MMHG | WEIGHT: 256.17 LBS | OXYGEN SATURATION: 97 % | DIASTOLIC BLOOD PRESSURE: 61 MMHG

## 2022-10-08 PROBLEM — J44.1 COPD EXACERBATION: Status: RESOLVED | Noted: 2022-10-05 | Resolved: 2022-10-08

## 2022-10-08 LAB
ALBUMIN SERPL-MCNC: 3.8 G/DL (ref 3.5–5.2)
ALP SERPL-CCNC: 75 U/L (ref 39–117)
ALT SERPL W P-5'-P-CCNC: 14 U/L (ref 1–41)
ANION GAP SERPL CALCULATED.3IONS-SCNC: 10.3 MMOL/L (ref 5–15)
AST SERPL-CCNC: 14 U/L (ref 1–40)
BASOPHILS # BLD AUTO: 0.03 10*3/MM3 (ref 0–0.2)
BASOPHILS NFR BLD AUTO: 0.3 % (ref 0–1.5)
BILIRUB CONJ SERPL-MCNC: <0.2 MG/DL (ref 0–0.3)
BILIRUB INDIRECT SERPL-MCNC: NORMAL MG/DL
BILIRUB SERPL-MCNC: 0.2 MG/DL (ref 0–1.2)
BUN SERPL-MCNC: 23 MG/DL (ref 8–23)
BUN/CREAT SERPL: 20 (ref 7–25)
CALCIUM SPEC-SCNC: 9.4 MG/DL (ref 8.6–10.5)
CHLORIDE SERPL-SCNC: 97 MMOL/L (ref 98–107)
CO2 SERPL-SCNC: 27.7 MMOL/L (ref 22–29)
CREAT SERPL-MCNC: 1.15 MG/DL (ref 0.76–1.27)
DEPRECATED RDW RBC AUTO: 45.9 FL (ref 37–54)
EGFRCR SERPLBLD CKD-EPI 2021: 67.2 ML/MIN/1.73
EOSINOPHIL # BLD AUTO: 0.08 10*3/MM3 (ref 0–0.4)
EOSINOPHIL NFR BLD AUTO: 0.7 % (ref 0.3–6.2)
ERYTHROCYTE [DISTWIDTH] IN BLOOD BY AUTOMATED COUNT: 15.2 % (ref 12.3–15.4)
GLUCOSE BLDC GLUCOMTR-MCNC: 213 MG/DL (ref 70–99)
GLUCOSE BLDC GLUCOMTR-MCNC: 305 MG/DL (ref 70–99)
GLUCOSE SERPL-MCNC: 277 MG/DL (ref 65–99)
HCT VFR BLD AUTO: 41.5 % (ref 37.5–51)
HGB BLD-MCNC: 12.3 G/DL (ref 13–17.7)
IMM GRANULOCYTES # BLD AUTO: 0.05 10*3/MM3 (ref 0–0.05)
IMM GRANULOCYTES NFR BLD AUTO: 0.5 % (ref 0–0.5)
LYMPHOCYTES # BLD AUTO: 2.05 10*3/MM3 (ref 0.7–3.1)
LYMPHOCYTES NFR BLD AUTO: 18.8 % (ref 19.6–45.3)
MAGNESIUM SERPL-MCNC: 2.1 MG/DL (ref 1.6–2.4)
MCH RBC QN AUTO: 25 PG (ref 26.6–33)
MCHC RBC AUTO-ENTMCNC: 29.6 G/DL (ref 31.5–35.7)
MCV RBC AUTO: 84.3 FL (ref 79–97)
MONOCYTES # BLD AUTO: 0.84 10*3/MM3 (ref 0.1–0.9)
MONOCYTES NFR BLD AUTO: 7.7 % (ref 5–12)
NEUTROPHILS NFR BLD AUTO: 7.85 10*3/MM3 (ref 1.7–7)
NEUTROPHILS NFR BLD AUTO: 72 % (ref 42.7–76)
NRBC BLD AUTO-RTO: 0 /100 WBC (ref 0–0.2)
PHOSPHATE SERPL-MCNC: 2.8 MG/DL (ref 2.5–4.5)
PLATELET # BLD AUTO: 209 10*3/MM3 (ref 140–450)
PMV BLD AUTO: 11.3 FL (ref 6–12)
POTASSIUM SERPL-SCNC: 4 MMOL/L (ref 3.5–5.2)
PROT SERPL-MCNC: 6.9 G/DL (ref 6–8.5)
RBC # BLD AUTO: 4.92 10*6/MM3 (ref 4.14–5.8)
SODIUM SERPL-SCNC: 135 MMOL/L (ref 136–145)
WBC NRBC COR # BLD: 10.9 10*3/MM3 (ref 3.4–10.8)

## 2022-10-08 PROCEDURE — 99239 HOSP IP/OBS DSCHRG MGMT >30: CPT | Performed by: FAMILY MEDICINE

## 2022-10-08 PROCEDURE — 80048 BASIC METABOLIC PNL TOTAL CA: CPT | Performed by: FAMILY MEDICINE

## 2022-10-08 PROCEDURE — 63710000001 INSULIN LISPRO (HUMAN) PER 5 UNITS: Performed by: HOSPITALIST

## 2022-10-08 PROCEDURE — 94799 UNLISTED PULMONARY SVC/PX: CPT

## 2022-10-08 PROCEDURE — 94761 N-INVAS EAR/PLS OXIMETRY MLT: CPT

## 2022-10-08 PROCEDURE — 85025 COMPLETE CBC W/AUTO DIFF WBC: CPT | Performed by: FAMILY MEDICINE

## 2022-10-08 PROCEDURE — 84100 ASSAY OF PHOSPHORUS: CPT | Performed by: FAMILY MEDICINE

## 2022-10-08 PROCEDURE — 63710000001 PREDNISONE PER 1 MG: Performed by: FAMILY MEDICINE

## 2022-10-08 PROCEDURE — 90662 IIV NO PRSV INCREASED AG IM: CPT | Performed by: FAMILY MEDICINE

## 2022-10-08 PROCEDURE — 94664 DEMO&/EVAL PT USE INHALER: CPT

## 2022-10-08 PROCEDURE — G0008 ADMIN INFLUENZA VIRUS VAC: HCPCS | Performed by: FAMILY MEDICINE

## 2022-10-08 PROCEDURE — 83735 ASSAY OF MAGNESIUM: CPT | Performed by: FAMILY MEDICINE

## 2022-10-08 PROCEDURE — 82962 GLUCOSE BLOOD TEST: CPT

## 2022-10-08 PROCEDURE — 25010000002 ENOXAPARIN PER 10 MG: Performed by: HOSPITALIST

## 2022-10-08 PROCEDURE — 25010000002 INFLUENZA VAC HIGH-DOSE QUAD 0.7 ML SUSPENSION PREFILLED SYRINGE: Performed by: FAMILY MEDICINE

## 2022-10-08 PROCEDURE — 99233 SBSQ HOSP IP/OBS HIGH 50: CPT | Performed by: INTERNAL MEDICINE

## 2022-10-08 PROCEDURE — 80076 HEPATIC FUNCTION PANEL: CPT | Performed by: FAMILY MEDICINE

## 2022-10-08 RX ORDER — PREDNISONE 20 MG/1
TABLET ORAL
Qty: 25 TABLET | Refills: 0 | Status: SHIPPED | OUTPATIENT
Start: 2022-10-08 | End: 2022-10-28

## 2022-10-08 RX ORDER — QUETIAPINE FUMARATE 25 MG/1
25 TABLET, FILM COATED ORAL NIGHTLY
Qty: 30 TABLET | Refills: 0 | Status: ON HOLD | OUTPATIENT
Start: 2022-10-08 | End: 2023-03-29

## 2022-10-08 RX ADMIN — Medication 10 ML: at 08:58

## 2022-10-08 RX ADMIN — INSULIN LISPRO 5 UNITS: 100 INJECTION, SOLUTION INTRAVENOUS; SUBCUTANEOUS at 12:37

## 2022-10-08 RX ADMIN — ARFORMOTEROL TARTRATE 15 MCG: 15 SOLUTION RESPIRATORY (INHALATION) at 06:11

## 2022-10-08 RX ADMIN — IPRATROPIUM BROMIDE AND ALBUTEROL SULFATE 3 ML: 2.5; .5 SOLUTION RESPIRATORY (INHALATION) at 06:11

## 2022-10-08 RX ADMIN — METOPROLOL TARTRATE 25 MG: 25 TABLET, FILM COATED ORAL at 08:58

## 2022-10-08 RX ADMIN — INSULIN LISPRO 10 UNITS: 100 INJECTION, SOLUTION INTRAVENOUS; SUBCUTANEOUS at 08:59

## 2022-10-08 RX ADMIN — IPRATROPIUM BROMIDE AND ALBUTEROL SULFATE 3 ML: 2.5; .5 SOLUTION RESPIRATORY (INHALATION) at 00:43

## 2022-10-08 RX ADMIN — PREDNISONE 40 MG: 20 TABLET ORAL at 08:58

## 2022-10-08 RX ADMIN — ENOXAPARIN SODIUM 40 MG: 100 INJECTION SUBCUTANEOUS at 08:59

## 2022-10-08 RX ADMIN — LISINOPRIL 2.5 MG: 2.5 TABLET ORAL at 08:58

## 2022-10-08 RX ADMIN — PANTOPRAZOLE SODIUM 40 MG: 40 TABLET, DELAYED RELEASE ORAL at 07:02

## 2022-10-08 RX ADMIN — ASPIRIN 81 MG CHEWABLE TABLET 81 MG: 81 TABLET CHEWABLE at 08:58

## 2022-10-08 RX ADMIN — BUDESONIDE 0.5 MG: 0.5 SUSPENSION RESPIRATORY (INHALATION) at 06:11

## 2022-10-08 RX ADMIN — GUAIFENESIN 1200 MG: 600 TABLET, EXTENDED RELEASE ORAL at 08:58

## 2022-10-08 RX ADMIN — CETIRIZINE HYDROCHLORIDE 10 MG: 10 TABLET, FILM COATED ORAL at 08:58

## 2022-10-08 RX ADMIN — INFLUENZA A VIRUS A/VICTORIA/2570/2019 IVR-215 (H1N1) ANTIGEN (FORMALDEHYDE INACTIVATED), INFLUENZA A VIRUS A/DARWIN/9/2021 SAN-010 (H3N2) ANTIGEN (FORMALDEHYDE INACTIVATED), INFLUENZA B VIRUS B/PHUKET/3073/2013 ANTIGEN (FORMALDEHYDE INACTIVATED), AND INFLUENZA B VIRUS B/MICHIGAN/01/2021 ANTIGEN (FORMALDEHYDE INACTIVATED) 0.7 ML: 60; 60; 60; 60 INJECTION, SUSPENSION INTRAMUSCULAR at 12:37

## 2022-10-08 NOTE — PROGRESS NOTES
Pulmonary / Critical Care Progress Note      Patient Name: Dilip Faulkner  : 1950  MRN: [id number]  Primary Care Physician:  Rosalba Edwards APRN  Referring Physician: No Known Provider  Date of admission: 10/5/2022    Subjective   Subjective     Reason for Consult/ Chief Complaint:   AECOPD again    Interval examination:  Patient is now awake enough to refuse therapy.   Expresses desire to go home and is uncooperative.  He is certainly of his own mind and not likely to participate significantly in therapy.  States he feels fine at this time.            HPI:  Dilip Faulkner is a 72 y.o. male with end-stage COPD and diabetes with hypertension who presents to the emergency department  From nursing home with increasing shortness of breath.  He was found to be in severe acute on chronic hypercapnic respiratory failure the pH is 7.1 and PCO2 of 82.  With each admission his admitting ABG seems to be worse.  This was his worst initial pH and PCO2. He has had multiple admissions in the past year.  With increasing frequency and severity.  Patient's mental status is challenging difficult to obtain a history.  Uncertain as to the etiology of his frequent exacerbations.  He does have a risk of aspiration with dilated esophagus with air-fluid levels on a recent CT scan in .        Review of Systems  Unable to obtain due to the patient condition      Personal History     Past Medical History:   Diagnosis Date   • Asthma    • COPD (chronic obstructive pulmonary disease) (HCC)    • Diabetes mellitus (HCC)    • Hernia, umbilical    • Hypertension    • Requires continuous at home supplemental oxygen        Past Surgical History:   Procedure Laterality Date   • ABDOMINAL SURGERY         Family History: family history is not on file. Otherwise pertinent FHx was reviewed and not pertinent to current issue.    Social History:  reports that he quit smoking about 18 months ago. His smoking use included cigarettes. He has  never used smokeless tobacco. He reports that he does not drink alcohol and does not use drugs.    Home Medications:  Fluticasone-Salmeterol, albuterol sulfate HFA, aspirin, atorvastatin, cetirizine, insulin degludec, ipratropium-albuterol, lisinopril, metFORMIN, metoprolol tartrate, and omeprazole    Allergies:  No Known Allergies    Objective    Objective     Vitals:   Temp:  [97.2 °F (36.2 °C)-97.7 °F (36.5 °C)] 97.4 °F (36.3 °C)  Heart Rate:  [65-79] 72  Resp:  [18-20] 18  BP: (116-135)/(52-74) 135/74  Flow (L/min):  [2-3] 2    Physical Exam:  Vital Signs Reviewed   General:  WDWN, somnolent, moderate distress due to dyspnea   HEENT:  PERRL, EOMI.  OP, nares clear, no sinus tenderness  Neck:  Supple, no JVD, no thyromegaly  Lymph: no axillary, cervical, supraclavicular lymphadenopathy noted bilaterally  Chest: Severely prolonged expiratory phase scattered exp  Wheezing,  bilaterally  CV: RRR, no MGR, pulses 2+, equal.  Abd:  Soft, NT, ND, + BS, no HSM  EXT:  no clubbing, no cyanosis, no edema, no joint tenderness  Neuro:  A&Ox3, CN grossly intact, no focal deficits.  Skin: No rashes or lesions noted      Result Review    Result Review:  I have personally reviewed the results from the time of this admission to 10/8/2022 06:55 EDT and agree with these findings:  [x]  Laboratory  [x]  Microbiology  [x]  Radiology  [x]  EKG/Telemetry   []  Cardiology/Vascular   []  Pathology  []  Old records  []  Other:  Most notable findings include: Moderate to severe emphysema acute on chronic hypoxic and hypercapnic respiratory failure.  With dilated esophagus at risk for aspiration and pneumonia pneumonitis  Assessment & Plan   Assessment / Plan     Active Hospital Problems:  Active Hospital Problems    Diagnosis    • COPD (chronic obstructive pulmonary disease) (HCC)    • COPD exacerbation (HCC)    • Obesity (BMI 30-39.9)          Impression:  72-year-old male with end-stage COPD with 12+ admissions in the last 12 months and  uncontrolled DM.    Plan:    From a purely respiratory standpoint he is likely stable for discharge.  However he is at high risk of readmission because we have not addressed his lack of social support.  Continue to treat as acute exacerbation of COPD.  5 days of prednisone  5 days of ABX  Nebulized bronchodilators.    I do not believe his is a reliable candidate to use morphine for dyspnea at home unsupervised.     Sister says anxiety is an issue.  Home visit would be useful to evaluate the patient's home environment to survey for COPD triggers.    Is probably reasonable to discharge this patient because we are probably not likely able to prevent his readmission in the next 5 to 10 days.    35 minutes care time spent on evaluating and managing this patient not including time spent in teaching or procedures.    TOMMY NORTON M.D. [facility] 06:55 EDT 10/08/22

## 2022-10-08 NOTE — DISCHARGE SUMMARY
[facility]         HOSPITALIST  DISCHARGE SUMMARY    Patient Name: Dilip Faulkner  : 1950  MRN: [id number]    Date of Admission: 10/5/2022  Date of Discharge:  10/8/2022    Primary Care Physician: Rosalba Edwards APRN    Consults     Date and Time Order Name Status Description    10/5/2022  7:23 AM Inpatient Pulmonology Consult Completed     10/5/2022  2:04 AM Inpatient Hospitalist Consult            Active and Resolved Hospital Problems:  Acute on chronic hypoxemic respiratory failure with hypercapnia  Acute exacerbation of COPD  Asthma overlap  Insulin-dependent diabetes mellitus with poor glycemic control  Essential hypertension  Dyslipidemia  Lacks social support  Anxiety  Panic attacks  Active Hospital Problems    Diagnosis POA   • COPD (chronic obstructive pulmonary disease) (HCC) [J44.9] Yes   • Obesity (BMI 30-39.9) [E66.9] Yes      Resolved Hospital Problems    Diagnosis POA   • COPD exacerbation (HCC) [J44.1] Yes       Hospital Course     Hospital Course:  72-year-old male with history of COPD with recurrent hospitalizations for COPD exacerbations, chronic hypoxemic respiratory failure on 2 L nasal cannula, diabetes mellitus, essential pretension, hospitalized on 10/5/2020 with shortness of breath symptoms, found to be in acute COPD exacerbated state, transition to BiPAP which she has previously refused on previous admissions, breathing stabilized, pulmonary consulted, etiology remains uncertain for his frequent exacerbations, more than likely he has medical noncompliance, his breathing improved, down to 2 L nasal cannula which is his baseline with improved wheezing and work of breathing.  Discharged in hemodynamically stable condition on 10/8/2022 to follow-up with COPD clinic within 1 week, on his day of discharge she was awake and refused therapy and was uncooperative.  He was started on Seroquel to help with anxiety.  High risk for readmission within the next 30 days,  pulmonary noting high risk for readmission within the next 5 to 10 days.    Day of Discharge     Vital Signs:  Temp:  [97.2 °F (36.2 °C)-97.7 °F (36.5 °C)] 97.7 °F (36.5 °C)  Heart Rate:  [69-79] 72  Resp:  [16-20] 20  BP: (100-135)/(52-83) 100/61  Flow (L/min):  [2-3] 2  Review of systems:  All systems reviewed and negative except for generalized fatigue, generalized weakness    Physical Exam                         Constitutional: Awake, alert, no acute distress heavyset male, no acute distress              Eyes: Eyes closed, sclerae anicteric, no conjunctival injection              HENT: NCAT, mucous membranes moist              Neck: Supple, FROM              Respiratory: Air entry bilaterally, diminished breath sounds throughout, scattered wheezing mostly expiratory              Cardiovascular: RRR, no murmurs, rubs, or gallops, palpable pedal pulses bilaterally              Gastrointestinal: Positive bowel sounds, soft, nontender, nondistended              Musculoskeletal: Trace bilateral ankle edema, no clubbing or cyanosis to extremities              Psychiatric: Appropriate affect, cooperative              Neurologic: Oriented x 3, strength symmetric in all extremities, Cranial Nerves grossly intact to confrontation, speech clear              Skin: No rashes           Discharge Details        Discharge Medications      New Medications      Instructions Start Date   predniSONE 20 MG tablet  Commonly known as: DELTASONE   Take 2 tablets by mouth Daily for 5 days, THEN 1.5 tablets Daily for 5 days, THEN 1 tablet Daily for 5 days, THEN 0.5 tablets Daily for 5 days.   Start Date: October 8, 2022     QUEtiapine 25 MG tablet  Commonly known as: SEROquel   25 mg, Oral, Nightly         Continue These Medications      Instructions Start Date   albuterol sulfate  (90 Base) MCG/ACT inhaler  Commonly known as: PROVENTIL HFA;VENTOLIN HFA;PROAIR HFA   2 puffs, Inhalation, Every 6 Hours PRN      aspirin 81 MG  chewable tablet   81 mg, Oral, Daily      atorvastatin 10 MG tablet  Commonly known as: LIPITOR   10 mg, Oral, Daily      cetirizine 10 MG tablet  Commonly known as: zyrTEC   10 mg, Oral, Daily      Fluticasone-Salmeterol 250-50 MCG/ACT DISKUS  Commonly known as: ADVAIR/WIXELA   1 puff, Inhalation, 2 Times Daily - RT      ipratropium-albuterol 0.5-2.5 mg/3 ml nebulizer  Commonly known as: DUO-NEB   3 mL, Nebulization, Every 4 Hours PRN      lisinopril 2.5 MG tablet  Commonly known as: PRINIVIL,ZESTRIL   2.5 mg, Oral, Daily      metFORMIN 500 MG tablet  Commonly known as: GLUCOPHAGE   500 mg, Oral, 2 times daily      metoprolol tartrate 25 MG tablet  Commonly known as: LOPRESSOR   25 mg, Oral, 2 Times Daily      omeprazole 20 MG capsule  Commonly known as: priLOSEC   20 mg, Oral, Daily      Tresiba FlexTouch 100 UNIT/ML solution pen-injector injection  Generic drug: insulin degludec   17 Units, Subcutaneous, Daily             No Known Allergies    Discharge Disposition:  [place] [facility]    Diet:  Hospital:  Diet Order   Procedures   • Diet Regular; Consistent Carbohydrate       Discharge Activity:   Activity Instructions    As tolerated           CODE STATUS:  Code Status and Medical Interventions:   Ordered at: 10/06/22 1743     Medical Intervention Limits:    NO intubation (DNI)     Code Status (Patient has no pulse and is not breathing):    No CPR (Do Not Attempt to Resuscitate)     Medical Interventions (Patient has pulse or is breathing):    Limited Support     Release to patient:    Routine Release         No future appointments.    Additional Instructions for the Follow-ups that You Need to Schedule     Discharge Follow-up with PCP   As directed       Currently Documented PCP:    Rosalba Edwards, MONE    PCP Phone Number:    [phone]     Follow Up Details: 3 to 7 days         Discharge Follow-up with Specified Provider: COPD clinic in 1 week   As directed      To: COPD clinic in 1 week                Pertinent  and/or Most Recent Results     PROCEDURES:   XR Chest 1 View    Result Date: 10/5/2022  PROCEDURE: XR CHEST 1 VW  COMPARISON: [facility], CR, XR CHEST 1 VW, 7/10/2022, 2:45.  [facility], CR, XR CHEST 1 VW, 9/16/2022, 20:45.  INDICATIONS: shortness of breath  FINDINGS:  Chronic changes are noted.  No new consolidations or pleural effusions are observed. The cardiac silhouette and mediastinum are unchanged. No definitive acute osseous abnormalities are seen on this single view.        1. Chronic changes are noted.  There is no definitive evidence for acute cardiopulmonary process.         SHRUTI CUTLER MD       Electronically Signed and Approved By: SHRUTI CUTLER MD on 10/05/2022 at 1:31             XR Chest 1 View    Result Date: 9/16/2022  PROCEDURE: XR CHEST 1 VW  COMPARISON: 8/22/2022.  INDICATIONS: Shortness of breath.  FINDINGS: Two AP upright portable views of the chest are provided for review.  Pleural-parenchymal scarring is suggested in the left lung base with volume loss, seen previously.  External artifacts obscure detail.  No pneumothorax is seen.  There may be mild cardiomegaly.  The thoracic aorta is atherosclerotic.  There is emphysematous contour of the lungs.  Degenerative changes involve the imaged spine and the bilateral shoulders.  No pneumothorax.  Probably no pleural effusion.  Overall, little interval change is suggested radiographically since the 8/22/2022 study.       No definite acute infiltrate.  No significant interval change is suggested radiographically since the 8/22/2022 study.      Please note that portions of this note were completed with a voice recognition program.  SEGUNDO MORA JR, MD       Electronically Signed and Approved By: SEGUNDO MORA JR, MD on 9/16/2022 at 22:02                LAB RESULTS:      Lab 10/08/22  0816 10/07/22  0515 10/06/22  0456 10/05/22  0229 10/05/22  0113 10/05/22  0108   WBC 10.90* 12.02* 14.55*  --   --   15.45*   HEMOGLOBIN 12.3* 11.8* 11.4*  --   --  12.7*   HEMATOCRIT 41.5 39.3 39.1  --   --  43.3   PLATELETS 209 199 179  --   --  223   NEUTROS ABS 7.85* 11.07* 13.32*  --   --  10.57*   IMMATURE GRANS (ABS) 0.05 0.08* 0.12*  --   --  0.11*   LYMPHS ABS 2.05 0.66* 0.74  --   --  2.42   MONOS ABS 0.84 0.20 0.32  --   --  1.09*   EOS ABS 0.08 0.00 0.02  --   --  1.17*   MCV 84.3 84.5 86.5  --   --  85.1   PROCALCITONIN  --   --   --   --   --  0.08   LACTATE  --   --   --   --   --  1.4   LACTATE, ARTERIAL  --   --   --  0.70 0.87  --    PROTIME  --   --   --   --   --  13.0   APTT  --   --   --   --   --  26.6         Lab 10/08/22  0816 10/07/22  0516 10/06/22  0456 10/05/22  0510 10/05/22  0229 10/05/22  0113 10/05/22  0108   SODIUM 135* 132* 137  --   --   --  139   SODIUM, ARTERIAL  --   --   --   --  136.7 137.9  --    POTASSIUM 4.0 5.1 4.8  --   --   --  4.7   CHLORIDE 97* 99 103  --   --   --  103   CO2 27.7 21.3* 23.5  --   --   --  28.4   ANION GAP 10.3 11.7 10.5  --   --   --  7.6   BUN 23 23 18  --   --   --  12   CREATININE 1.15 1.09 0.99  --   --   --  0.99   EGFR 67.2 71.7 80.4  --   --   --  80.4   GLUCOSE 277* 363* 242*  --   --   --  260*   GLUCOSE, ARTERIAL  --   --   --   --  256* 246*  --    CALCIUM 9.4 8.7 8.9  --   --   --  9.1   IONIZED CALCIUM  --   --   --   --  1.17 1.25  --    MAGNESIUM 2.1 2.1 2.2 1.7  --   --   --    PHOSPHORUS 2.8 2.9 2.8 2.9  --   --   --          Lab 10/08/22  0816 10/07/22  0516 10/05/22  0108   TOTAL PROTEIN 6.9 6.7 7.4   ALBUMIN 3.80 3.50 4.00   GLOBULIN  --   --  3.4   ALT (SGPT) 14 11 12   AST (SGOT) 14 12 13   BILIRUBIN 0.2 0.2 0.3   BILIRUBIN DIRECT <0.2 <0.2  --    ALK PHOS 75 73 90         Lab 10/05/22  0108   PROBNP 78.9   TROPONIN T 0.018   PROTIME 13.0   INR 0.97             Lab 10/05/22  0108   IRON 40*   IRON SATURATION 10*   TIBC 419   TRANSFERRIN 281   FERRITIN 48.44         Lab 10/05/22  0229 10/05/22  0113   PH, ARTERIAL 7.260* 7.110*   PCO2, ARTERIAL  54.0* 82.4*   PO2 .4* 141.8*   O2 SATURATION ART 98.3 97.7   FIO2 40 100   HCO3 ART 23.7 25.6   BASE EXCESS ART -3.9* -5.7*   CARBOXYHEMOGLOBIN 1.0 0.9     Brief Urine Lab Results  (Last result in the past 365 days)      Color   Clarity   Blood   Leuk Est   Nitrite   Protein   CREAT   Urine HCG        08/01/22 1250 Yellow   Clear   Negative   Negative   Negative   Negative               Microbiology Results (last 10 days)     Procedure Component Value - Date/Time    Respiratory Panel PCR w/COVID-19(SARS-CoV-2) [facility] In-House, NP Swab in UTM/VTM, 3-4 HR TAT - Swab, Nasopharynx [[id number]]  (Normal) Collected: 10/05/22 0447    Lab Status: Final result Specimen: Swab from Nasopharynx Updated: 10/05/22 0750     ADENOVIRUS, PCR Not Detected     Coronavirus 229E Not Detected     Coronavirus HKU1 Not Detected     Coronavirus NL63 Not Detected     Coronavirus OC43 Not Detected     COVID19 Not Detected     Human Metapneumovirus Not Detected     Human Rhinovirus/Enterovirus Not Detected     Influenza A PCR Not Detected     Influenza B PCR Not Detected     Parainfluenza Virus 1 Not Detected     Parainfluenza Virus 2 Not Detected     Parainfluenza Virus 3 Not Detected     Parainfluenza Virus 4 Not Detected     RSV, PCR Not Detected     Bordetella pertussis pcr Not Detected     Bordetella parapertussis PCR Not Detected     Chlamydophila pneumoniae PCR Not Detected     Mycoplasma pneumo by PCR Not Detected    Narrative:      In the setting of a positive respiratory panel with a viral infection PLUS a negative procalcitonin without other underlying concern for bacterial infection, consider observing off antibiotics or discontinuation of antibiotics and continue supportive care. If the respiratory panel is positive for atypical bacterial infection (Bordetella pertussis, Chlamydophila pneumoniae, or Mycoplasma pneumoniae), consider antibiotic de-escalation to target atypical bacterial infection.    MRSA  Screen, PCR (Inpatient) - Swab, Nares [[id number]]  (Normal) Collected: 10/05/22 0447    Lab Status: Final result Specimen: Swab from Nares Updated: 10/05/22 0627     MRSA PCR No MRSA Detected    Narrative:      The negative predictive value of this diagnostic test is high and should only be used to consider de-escalating anti-MRSA therapy. A positive result may indicate colonization with MRSA and must be correlated clinically.    Blood Culture - Blood, Arm, Left [[id number]]  (Normal) Collected: 10/05/22 0108    Lab Status: Preliminary result Specimen: Blood from Arm, Left Updated: 10/08/22 0132     Blood Culture No growth at 3 days    Blood Culture - Blood, Arm, Left [[id number]]  (Normal) Collected: 10/05/22 0108    Lab Status: Preliminary result Specimen: Blood from Arm, Left Updated: 10/08/22 0132     Blood Culture No growth at 3 days          XR Chest 1 View    Result Date: 10/5/2022  Impression:   1. Chronic changes are noted.  There is no definitive evidence for acute cardiopulmonary process.         SHRUTI CUTLER MD       Electronically Signed and Approved By: SHRUTI CUTLER MD on 10/05/2022 at 1:31             XR Chest 1 View    Result Date: 9/16/2022  Impression:  No definite acute infiltrate.  No significant interval change is suggested radiographically since the 8/22/2022 study.      Please note that portions of this note were completed with a voice recognition program.  SEGUNDO MORA JR, MD       Electronically Signed and Approved By: SEGUNDO MORA JR, MD on 9/16/2022 at 22:02                Labs Pending at Discharge:  Pending Labs     Order Current Status    Blood Culture - Blood, Arm, Left Preliminary result    Blood Culture - Blood, Arm, Left Preliminary result            Time spent on Discharge including face to face service:  35 minutes    Electronically signed by Oralia Hardy MD, 10/08/22, 3:44 PM EDT.    Portions of this documentation were transcribed electronically from a voice recognition  software.  I confirm all data accurately represents the service(s) I performed at today's visit.

## 2022-10-08 NOTE — PLAN OF CARE
Goal Outcome Evaluation:  Plan of Care Reviewed With: patient   Continue to refuse BiPAP. No acute respiratory distress this shift.Will continue to monitor.

## 2022-10-09 ENCOUNTER — READMISSION MANAGEMENT (OUTPATIENT)
Dept: CALL CENTER | Facility: HOSPITAL | Age: 72
End: 2022-10-09

## 2022-10-09 NOTE — OUTREACH NOTE
Prep Survey    Flowsheet Row Responses   [facility] facility patient discharged from? [facility]   Is LACE score < 7 ? No   Emergency Room discharge w/ pulse ox? No   Eligibility Readm Mgmt   Discharge diagnosis COPD exacerbation   Does the patient have one of the following disease processes/diagnoses(primary or secondary)? COVID-19   Does the patient have Home health ordered? No   Is there a DME ordered? No   Prep survey completed? Yes          RONY RINALDI - Registered Nurse

## 2022-10-10 ENCOUNTER — READMISSION MANAGEMENT (OUTPATIENT)
Dept: CALL CENTER | Facility: HOSPITAL | Age: 72
End: 2022-10-10

## 2022-10-10 LAB
BACTERIA SPEC AEROBE CULT: NORMAL
BACTERIA SPEC AEROBE CULT: NORMAL

## 2022-10-10 NOTE — OUTREACH NOTE
COVID-19 Week 1 Survey    Flowsheet Row Responses   [facility] patient discharged from? [facility]   Does the patient have one of the following disease processes/diagnoses(primary or secondary)? COVID-19   COVID-19 underlying condition? None   Call Number Call 1   Week 1 Call successful? Yes   Call start time 1315   Call end time 1319   Person spoke with today (if not patient) and relationship sister Vargas   Meds reviewed with patient/caregiver? Yes   Is the patient having any side effects they believe may be caused by any medication additions or changes? No   Does the patient have all medications ordered at discharge? Yes   Is the patient taking all medications as directed (includes completed medication regime)? Yes   Does the patient have a primary care provider?  Yes   Does the patient have an appointment with their PCP or specialist within 7 days of discharge? No   What is preventing the patient from scheduling follow up appointments within 7 days of discharge? Waiting on return call   Has home health visited the patient within 72 hours of discharge? N/A   Psychosocial issues? No   Did the patient receive a copy of their discharge instructions? Yes   Did the patient receive a copy of COVID-19 specific instructions? Yes   Nursing interventions Reviewed instructions with patient   What is the patient's perception of their health status since discharge? Improving   Does the patient have any of the following symptoms? Cough   Nursing Interventions Nurse provided patient education   Pulse Ox monitoring Intermittent   Pulse Ox device source Patient   O2 Sat comments 92% on RA   O2 Sat: education provided When to seek care   Is the patient/caregiver able to teach back steps to recovery at home? Set small, achievable goals for return to baseline health, Rest and rebuild strength, gradually increase activity, Eat a well-balance diet   Is the patient/caregiver able to teach back the hierarchy of who to call/visit  for symptoms/problems? PCP, Specialist, Home health nurse, Urgent Care, ED, 911 Yes   COVID-19 call completed? Yes          SANJU BECKHAM - Registered Nurse

## 2022-10-12 ENCOUNTER — READMISSION MANAGEMENT (OUTPATIENT)
Dept: CALL CENTER | Facility: HOSPITAL | Age: 72
End: 2022-10-12

## 2022-10-12 NOTE — OUTREACH NOTE
COVID-19 Week 1 Survey    Flowsheet Row Responses   [facility] patient discharged from? [facility]   Does the patient have one of the following disease processes/diagnoses(primary or secondary)? COVID-19   COVID-19 underlying condition? None   Call Number Call 2   Week 1 Call successful? Yes   Call start time 1130   Call end time 1131   Discharge diagnosis COPD exacerbation   Meds reviewed with patient/caregiver? Yes   Is the patient having any side effects they believe may be caused by any medication additions or changes? No   Does the patient have all medications ordered at discharge? Yes   Is the patient taking all medications as directed (includes completed medication regime)? Yes   Does the patient have a primary care provider?  Yes   Does the patient have an appointment with their PCP or specialist within 7 days of discharge? No   What is preventing the patient from scheduling follow up appointments within 7 days of discharge? Haven't had time   Nursing Interventions Educated patient on importance of making appointment   Has the patient kept scheduled appointments due by today? N/A   Has home health visited the patient within 72 hours of discharge? N/A   What DME was ordered? ALREADY HAS O2 AT HOME   Psychosocial issues? No   Did the patient receive a copy of their discharge instructions? Yes   Did the patient receive a copy of COVID-19 specific instructions? Yes   Nursing interventions Reviewed instructions with patient   What is the patient's perception of their health status since discharge? Improving   Does the patient have any of the following symptoms? None   Nursing Interventions Nurse provided patient education   Pulse Ox monitoring Intermittent   Pulse Ox device source Patient   O2 Sat: education provided Sat levels   Is the patient/caregiver able to teach back steps to recovery at home? Set small, achievable goals for return to baseline health, Rest and rebuild strength, gradually increase activity    If the patient is a current smoker, are they able to teach back resources for cessation? Not a smoker   Is the patient/caregiver able to teach back the hierarchy of who to call/visit for symptoms/problems? PCP, Specialist, Home health nurse, Urgent Care, ED, 911 Yes   COVID-19 call completed? Yes   Wrap up additional comments PATIENT KEPT CALL BRIEF          PATTI Mayorga Nurse

## 2022-10-19 ENCOUNTER — READMISSION MANAGEMENT (OUTPATIENT)
Dept: CALL CENTER | Facility: HOSPITAL | Age: 72
End: 2022-10-19

## 2022-10-27 ENCOUNTER — READMISSION MANAGEMENT (OUTPATIENT)
Dept: CALL CENTER | Facility: HOSPITAL | Age: 72
End: 2022-10-27

## 2022-10-27 NOTE — OUTREACH NOTE
COVID-19 Week 3 Survey    Flowsheet Row Responses   [facility] patient discharged from? [facility]   Does the patient have one of the following disease processes/diagnoses(primary or secondary)? COVID-19   COVID-19 underlying condition? None   Call Number Call 1   COVID-19 Week 3: Call 1 attempt successful? No   Discharge diagnosis COPD exacerbation          PATTI BECKHAM - Licensed Nurse

## 2022-11-03 ENCOUNTER — READMISSION MANAGEMENT (OUTPATIENT)
Dept: CALL CENTER | Facility: HOSPITAL | Age: 72
End: 2022-11-03

## 2022-11-03 NOTE — OUTREACH NOTE
COVID-19 Week 4 Survey    Flowsheet Row Responses   [facility] patient discharged from? [facility]   Does the patient have one of the following disease processes/diagnoses(primary or secondary)? COVID-19   COVID-19 underlying condition? None   Call Number Call 1   COVID-19 Week 4: Call 1 attempt successful? No   Discharge diagnosis COPD exacerbation          NEFTALI BECKHAM - Registered Nurse

## 2022-11-05 ENCOUNTER — APPOINTMENT (OUTPATIENT)
Dept: GENERAL RADIOLOGY | Facility: HOSPITAL | Age: 72
End: 2022-11-05

## 2022-11-05 ENCOUNTER — HOSPITAL ENCOUNTER (INPATIENT)
Facility: HOSPITAL | Age: 72
LOS: 4 days | Discharge: HOME | End: 2022-11-09
Attending: EMERGENCY MEDICINE | Admitting: FAMILY MEDICINE

## 2022-11-05 DIAGNOSIS — J96.01 ACUTE RESPIRATORY FAILURE WITH HYPOXIA: Primary | ICD-10-CM

## 2022-11-05 DIAGNOSIS — J44.1 CHRONIC OBSTRUCTIVE PULMONARY DISEASE WITH ACUTE EXACERBATION: ICD-10-CM

## 2022-11-05 DIAGNOSIS — J44.1 ACUTE EXACERBATION OF CHRONIC OBSTRUCTIVE PULMONARY DISEASE (COPD): ICD-10-CM

## 2022-11-05 DIAGNOSIS — R26.2 DIFFICULTY WALKING: ICD-10-CM

## 2022-11-05 PROBLEM — Z91.199 NONCOMPLIANCE WITH CPAP TREATMENT: Chronic | Status: ACTIVE | Noted: 2022-11-05

## 2022-11-05 PROBLEM — Z91.14 NONCOMPLIANCE WITH CPAP TREATMENT: Chronic | Status: ACTIVE | Noted: 2022-11-05

## 2022-11-05 LAB
ALBUMIN SERPL-MCNC: 4.2 G/DL (ref 3.5–5.2)
ALBUMIN/GLOB SERPL: 1.4 G/DL
ALP SERPL-CCNC: 89 U/L (ref 39–117)
ALT SERPL W P-5'-P-CCNC: 12 U/L (ref 1–41)
ANION GAP SERPL CALCULATED.3IONS-SCNC: 8.7 MMOL/L (ref 5–15)
AST SERPL-CCNC: 13 U/L (ref 1–40)
BASE EXCESS BLDA CALC-SCNC: -2.7 MMOL/L (ref -2–2)
BASOPHILS # BLD AUTO: 0.05 10*3/MM3 (ref 0–0.2)
BASOPHILS NFR BLD AUTO: 0.5 % (ref 0–1.5)
BDY SITE: ABNORMAL
BILIRUB SERPL-MCNC: 0.3 MG/DL (ref 0–1.2)
BUN SERPL-MCNC: 17 MG/DL (ref 8–23)
BUN/CREAT SERPL: 15.7 (ref 7–25)
CALCIUM SPEC-SCNC: 8.6 MG/DL (ref 8.6–10.5)
CHLORIDE SERPL-SCNC: 99 MMOL/L (ref 98–107)
CO2 SERPL-SCNC: 27.3 MMOL/L (ref 22–29)
COHGB MFR BLD: 1.2 % (ref 0–1.5)
CREAT SERPL-MCNC: 1.08 MG/DL (ref 0.76–1.27)
D-LACTATE SERPL-SCNC: 2 MMOL/L (ref 0.5–2)
D-LACTATE SERPL-SCNC: 2.1 MMOL/L (ref 0.5–2)
D-LACTATE SERPL-SCNC: 2.2 MMOL/L (ref 0.5–2)
D-LACTATE SERPL-SCNC: 3.7 MMOL/L (ref 0.5–2)
DEPRECATED RDW RBC AUTO: 50 FL (ref 37–54)
EGFRCR SERPLBLD CKD-EPI 2021: 72.5 ML/MIN/1.73
EOSINOPHIL # BLD AUTO: 0.82 10*3/MM3 (ref 0–0.4)
EOSINOPHIL NFR BLD AUTO: 8.5 % (ref 0.3–6.2)
ERYTHROCYTE [DISTWIDTH] IN BLOOD BY AUTOMATED COUNT: 15.8 % (ref 12.3–15.4)
FHHB: 5.2 % (ref 0–5)
GLOBULIN UR ELPH-MCNC: 3.1 GM/DL
GLUCOSE BLDC GLUCOMTR-MCNC: 400 MG/DL (ref 70–99)
GLUCOSE BLDC GLUCOMTR-MCNC: 416 MG/DL (ref 70–99)
GLUCOSE SERPL-MCNC: 277 MG/DL (ref 65–99)
HCO3 BLDA-SCNC: 24.5 MMOL/L (ref 22–26)
HCT VFR BLD AUTO: 44.9 % (ref 37.5–51)
HGB BLD-MCNC: 13.1 G/DL (ref 13–17.7)
HGB BLDA-MCNC: 13.4 G/DL (ref 13.8–16.4)
HOLD SPECIMEN: NORMAL
HOLD SPECIMEN: NORMAL
IMM GRANULOCYTES # BLD AUTO: 0.1 10*3/MM3 (ref 0–0.05)
IMM GRANULOCYTES NFR BLD AUTO: 1 % (ref 0–0.5)
INHALED O2 CONCENTRATION: 30 %
L PNEUMO1 AG UR QL IA: NEGATIVE
LACTATE BLDA-SCNC: ABNORMAL MMOL/L
LYMPHOCYTES # BLD AUTO: 2.26 10*3/MM3 (ref 0.7–3.1)
LYMPHOCYTES NFR BLD AUTO: 23.4 % (ref 19.6–45.3)
MCH RBC QN AUTO: 25.3 PG (ref 26.6–33)
MCHC RBC AUTO-ENTMCNC: 29.2 G/DL (ref 31.5–35.7)
MCV RBC AUTO: 86.8 FL (ref 79–97)
METHGB BLD QL: 0.3 % (ref 0–1.5)
MODALITY: ABNORMAL
MONOCYTES # BLD AUTO: 0.74 10*3/MM3 (ref 0.1–0.9)
MONOCYTES NFR BLD AUTO: 7.7 % (ref 5–12)
NEUTROPHILS NFR BLD AUTO: 5.67 10*3/MM3 (ref 1.7–7)
NEUTROPHILS NFR BLD AUTO: 58.9 % (ref 42.7–76)
NRBC BLD AUTO-RTO: 0 /100 WBC (ref 0–0.2)
OXYHGB MFR BLDV: 93.3 % (ref 94–99)
PCO2 BLDA: 52.4 MM HG (ref 35–45)
PH BLDA: 7.29 PH UNITS (ref 7.35–7.45)
PLATELET # BLD AUTO: 120 10*3/MM3 (ref 140–450)
PMV BLD AUTO: 10.6 FL (ref 6–12)
PO2 BLD: 283 MM[HG] (ref 0–500)
PO2 BLDA: 84.8 MM HG (ref 80–100)
POTASSIUM SERPL-SCNC: 4 MMOL/L (ref 3.5–5.2)
PROCALCITONIN SERPL-MCNC: 0.06 NG/ML (ref 0–0.25)
PROT SERPL-MCNC: 7.3 G/DL (ref 6–8.5)
RBC # BLD AUTO: 5.17 10*6/MM3 (ref 4.14–5.8)
S PNEUM AG SPEC QL LA: NEGATIVE
SAO2 % BLDCOA: 94.7 % (ref 95–99)
SARS-COV-2 RNA PNL SPEC NAA+PROBE: NOT DETECTED
SODIUM SERPL-SCNC: 135 MMOL/L (ref 136–145)
WBC NRBC COR # BLD: 9.64 10*3/MM3 (ref 3.4–10.8)
WHOLE BLOOD HOLD COAG: NORMAL
WHOLE BLOOD HOLD SPECIMEN: NORMAL

## 2022-11-05 PROCEDURE — 87070 CULTURE OTHR SPECIMN AEROBIC: CPT | Performed by: INTERNAL MEDICINE

## 2022-11-05 PROCEDURE — 85025 COMPLETE CBC W/AUTO DIFF WBC: CPT | Performed by: EMERGENCY MEDICINE

## 2022-11-05 PROCEDURE — 80053 COMPREHEN METABOLIC PANEL: CPT | Performed by: EMERGENCY MEDICINE

## 2022-11-05 PROCEDURE — 83050 HGB METHEMOGLOBIN QUAN: CPT | Performed by: EMERGENCY MEDICINE

## 2022-11-05 PROCEDURE — 94660 CPAP INITIATION&MGMT: CPT

## 2022-11-05 PROCEDURE — 94640 AIRWAY INHALATION TREATMENT: CPT

## 2022-11-05 PROCEDURE — 83605 ASSAY OF LACTIC ACID: CPT | Performed by: EMERGENCY MEDICINE

## 2022-11-05 PROCEDURE — 63710000001 INSULIN DETEMIR PER 5 UNITS: Performed by: INTERNAL MEDICINE

## 2022-11-05 PROCEDURE — 36415 COLL VENOUS BLD VENIPUNCTURE: CPT | Performed by: EMERGENCY MEDICINE

## 2022-11-05 PROCEDURE — 84145 PROCALCITONIN (PCT): CPT | Performed by: INTERNAL MEDICINE

## 2022-11-05 PROCEDURE — 25010000002 METHYLPREDNISOLONE PER 125 MG: Performed by: EMERGENCY MEDICINE

## 2022-11-05 PROCEDURE — 99223 1ST HOSP IP/OBS HIGH 75: CPT | Performed by: INTERNAL MEDICINE

## 2022-11-05 PROCEDURE — 99285 EMERGENCY DEPT VISIT HI MDM: CPT

## 2022-11-05 PROCEDURE — 71045 X-RAY EXAM CHEST 1 VIEW: CPT

## 2022-11-05 PROCEDURE — 87205 SMEAR GRAM STAIN: CPT | Performed by: INTERNAL MEDICINE

## 2022-11-05 PROCEDURE — U0004 COV-19 TEST NON-CDC HGH THRU: HCPCS | Performed by: INTERNAL MEDICINE

## 2022-11-05 PROCEDURE — 87449 NOS EACH ORGANISM AG IA: CPT | Performed by: INTERNAL MEDICINE

## 2022-11-05 PROCEDURE — 93010 ELECTROCARDIOGRAM REPORT: CPT | Performed by: INTERNAL MEDICINE

## 2022-11-05 PROCEDURE — 94761 N-INVAS EAR/PLS OXIMETRY MLT: CPT

## 2022-11-05 PROCEDURE — 94799 UNLISTED PULMONARY SVC/PX: CPT

## 2022-11-05 PROCEDURE — 82805 BLOOD GASES W/O2 SATURATION: CPT | Performed by: EMERGENCY MEDICINE

## 2022-11-05 PROCEDURE — 63710000001 INSULIN LISPRO (HUMAN) PER 5 UNITS: Performed by: INTERNAL MEDICINE

## 2022-11-05 PROCEDURE — 82375 ASSAY CARBOXYHB QUANT: CPT | Performed by: EMERGENCY MEDICINE

## 2022-11-05 PROCEDURE — 87899 AGENT NOS ASSAY W/OPTIC: CPT | Performed by: INTERNAL MEDICINE

## 2022-11-05 PROCEDURE — 94760 N-INVAS EAR/PLS OXIMETRY 1: CPT

## 2022-11-05 PROCEDURE — 93005 ELECTROCARDIOGRAM TRACING: CPT | Performed by: EMERGENCY MEDICINE

## 2022-11-05 PROCEDURE — 87040 BLOOD CULTURE FOR BACTERIA: CPT | Performed by: EMERGENCY MEDICINE

## 2022-11-05 PROCEDURE — 82962 GLUCOSE BLOOD TEST: CPT

## 2022-11-05 PROCEDURE — 25010000002 ENOXAPARIN PER 10 MG: Performed by: INTERNAL MEDICINE

## 2022-11-05 PROCEDURE — 25010000002 CEFEPIME PER 500 MG: Performed by: INTERNAL MEDICINE

## 2022-11-05 RX ORDER — CEFEPIME 1 G/50ML
2 INJECTION, SOLUTION INTRAVENOUS EVERY 8 HOURS
Status: DISCONTINUED | OUTPATIENT
Start: 2022-11-05 | End: 2022-11-08

## 2022-11-05 RX ORDER — ENOXAPARIN SODIUM 100 MG/ML
40 INJECTION SUBCUTANEOUS DAILY
Status: DISCONTINUED | OUTPATIENT
Start: 2022-11-05 | End: 2022-11-09 | Stop reason: HOSPADM

## 2022-11-05 RX ORDER — METHYLPREDNISOLONE SODIUM SUCCINATE 125 MG/2ML
125 INJECTION, POWDER, LYOPHILIZED, FOR SOLUTION INTRAMUSCULAR; INTRAVENOUS ONCE
Status: COMPLETED | OUTPATIENT
Start: 2022-11-05 | End: 2022-11-05

## 2022-11-05 RX ORDER — SODIUM CHLORIDE 0.9 % (FLUSH) 0.9 %
10 SYRINGE (ML) INJECTION EVERY 12 HOURS SCHEDULED
Status: DISCONTINUED | OUTPATIENT
Start: 2022-11-05 | End: 2022-11-09 | Stop reason: HOSPADM

## 2022-11-05 RX ORDER — INSULIN LISPRO 100 [IU]/ML
7 INJECTION, SOLUTION INTRAVENOUS; SUBCUTANEOUS ONCE
Status: COMPLETED | OUTPATIENT
Start: 2022-11-05 | End: 2022-11-05

## 2022-11-05 RX ORDER — ACETAMINOPHEN 325 MG/1
650 TABLET ORAL EVERY 4 HOURS PRN
Status: DISCONTINUED | OUTPATIENT
Start: 2022-11-05 | End: 2022-11-09 | Stop reason: HOSPADM

## 2022-11-05 RX ORDER — SODIUM CHLORIDE 0.9 % (FLUSH) 0.9 %
10 SYRINGE (ML) INJECTION AS NEEDED
Status: DISCONTINUED | OUTPATIENT
Start: 2022-11-05 | End: 2022-11-09 | Stop reason: HOSPADM

## 2022-11-05 RX ORDER — ONDANSETRON 2 MG/ML
4 INJECTION INTRAMUSCULAR; INTRAVENOUS EVERY 6 HOURS PRN
Status: DISCONTINUED | OUTPATIENT
Start: 2022-11-05 | End: 2022-11-09 | Stop reason: HOSPADM

## 2022-11-05 RX ORDER — INSULIN LISPRO 100 [IU]/ML
2-7 INJECTION, SOLUTION INTRAVENOUS; SUBCUTANEOUS
Status: DISCONTINUED | OUTPATIENT
Start: 2022-11-05 | End: 2022-11-06

## 2022-11-05 RX ORDER — IPRATROPIUM BROMIDE AND ALBUTEROL SULFATE 2.5; .5 MG/3ML; MG/3ML
3 SOLUTION RESPIRATORY (INHALATION) ONCE
Status: COMPLETED | OUTPATIENT
Start: 2022-11-05 | End: 2022-11-05

## 2022-11-05 RX ORDER — DEXTROSE MONOHYDRATE 25 G/50ML
25 INJECTION, SOLUTION INTRAVENOUS
Status: DISCONTINUED | OUTPATIENT
Start: 2022-11-05 | End: 2022-11-06

## 2022-11-05 RX ORDER — SODIUM CHLORIDE 9 MG/ML
40 INJECTION, SOLUTION INTRAVENOUS AS NEEDED
Status: DISCONTINUED | OUTPATIENT
Start: 2022-11-05 | End: 2022-11-09 | Stop reason: HOSPADM

## 2022-11-05 RX ORDER — CEFEPIME 1 G/50ML
2 INJECTION, SOLUTION INTRAVENOUS ONCE
Status: COMPLETED | OUTPATIENT
Start: 2022-11-05 | End: 2022-11-05

## 2022-11-05 RX ORDER — NICOTINE POLACRILEX 4 MG
15 LOZENGE BUCCAL
Status: DISCONTINUED | OUTPATIENT
Start: 2022-11-05 | End: 2022-11-06

## 2022-11-05 RX ADMIN — INSULIN LISPRO 7 UNITS: 100 INJECTION, SOLUTION INTRAVENOUS; SUBCUTANEOUS at 21:08

## 2022-11-05 RX ADMIN — IPRATROPIUM BROMIDE AND ALBUTEROL SULFATE 3 ML: 2.5; .5 SOLUTION RESPIRATORY (INHALATION) at 11:23

## 2022-11-05 RX ADMIN — SODIUM CHLORIDE 500 ML: 9 INJECTION, SOLUTION INTRAVENOUS at 20:13

## 2022-11-05 RX ADMIN — INSULIN LISPRO 7 UNITS: 100 INJECTION, SOLUTION INTRAVENOUS; SUBCUTANEOUS at 17:32

## 2022-11-05 RX ADMIN — CEFEPIME 2 G: 1 INJECTION, SOLUTION INTRAVENOUS at 15:19

## 2022-11-05 RX ADMIN — INSULIN DETEMIR 10 UNITS: 100 INJECTION, SOLUTION SUBCUTANEOUS at 21:08

## 2022-11-05 RX ADMIN — ENOXAPARIN SODIUM 40 MG: 100 INJECTION SUBCUTANEOUS at 15:17

## 2022-11-05 RX ADMIN — Medication 10 ML: at 21:58

## 2022-11-05 RX ADMIN — METHYLPREDNISOLONE SODIUM SUCCINATE 125 MG: 125 INJECTION, POWDER, FOR SOLUTION INTRAMUSCULAR; INTRAVENOUS at 11:14

## 2022-11-05 RX ADMIN — CEFEPIME 2 G: 1 INJECTION, SOLUTION INTRAVENOUS at 21:08

## 2022-11-05 NOTE — H&P
[facility]  [facility]: [facility]   Patient Name: Dilip Faulkner  : 1950  MRN: [id number]  Primary Care Physician:  Rosalba Edwards APRN  Date of admission: 2022    Subjective   Subjective     Chief Complaint: Shortness of breath    HPI:    Dilip Faulkner is a 72 y.o. male past medical history of COPD on home oxygen, type 2 diabetes on insulin, dyslipidemia and hypertension who presents in respiratory distress    Patient is on BiPAP currently.  He states that this started feeling poorly last couple days.  He has not been sick just short of breath.  No fevers.  No chills.  No nausea.  No vomiting.  Patient has been admitted to the hospital for COPD exacerbation almost once a month for the last year.  He states this is the exact same thing.  As result he came the ER for further evaluation.      In the emergency department the patient's vital signs are as follows temperature 98.8, pulse was 92, respirations 29, blood pressure 91/55.  CBC shows platelets of 120 but no other abnormalities.  CMP has a glucose of 277 sodium 135.  ABG shows 7.28/52.  Patient is placed on BiPAP.  He was given breathing treatment and methylprednisolone.  Patient be admitted to hospital for acute hypercapnic and respiratory failure due to COPD exacerbation    All systems reviewed abnormal as noted above    Personal History     Past Medical History:  Severe COPD  Type 2 diabetes  Hypertension  Chronic hypoxic respiratory failure    Past Surgical History:    Abdominal surgery    Family History:   Family history of lung disease    Social History:   Quit smoking .  No alcohol.    Home Medications:  Fluticasone-Salmeterol, QUEtiapine, albuterol sulfate HFA, aspirin, atorvastatin, cetirizine, insulin degludec, ipratropium-albuterol, lisinopril, metFORMIN, metoprolol tartrate, and omeprazole    Allergies:  No Known Allergies      Objective   Objective     Vitals:   Temp:  [98.8 °F (37.1 °C)]  98.8 °F (37.1 °C)  Heart Rate:  [] 92  Resp:  [28-29] 29  BP: ()/(55-74) 91/55  Flow (L/min):  [8] 8    Physical Exam    Constitutional: Mild respiratory distress.  BiPAP in place.   Eyes: Pupils equal, sclerae anicteric, no conjunctival injection   HENT: NCAT, mucous membranes moist   Neck: Supple, no thyromegaly, no lymphadenopathy, trachea midline   Respiratory: Wheezing and rhonchi   Cardiovascular: RRR, no murmurs, rubs, or gallops, palpable pedal pulses bilaterally   Gastrointestinal: Positive bowel sounds, soft, nontender, nondistended   Musculoskeletal: No bilateral ankle edema, no clubbing or cyanosis to extremities   Psychiatric: Appropriate affect, cooperative   Neurologic: Oriented x 3, strength symmetric in all extremities, Cranial Nerves grossly intact to confrontation, speech clear   Skin: No rashes     Result Review    Result Review:  I have personally reviewed the results from the time of this admission to 11/5/2022 13:03 EDT and agree with these findings:  ABG is 7.288/52      Assessment & Plan   Assessment / Plan     Assessment/Plan:   Acute hypoxemic respiratory failure  Acute COPD exacerbation  Hypercapnic respiratory failure  Type 2 diabetes requiring insulin  Dyslipidemia  Hypertension  GERD  Thrombocytopenia  Probable chronic pleural thickening in the left lung base and small effusion is considered less likely      Patient's repeat lactic came back at 3.7.  I felt this is most likely due to the breathing treatments and not sepsis or any other type of infectious etiology.  He does not appear to be volume overloaded so we will give him 500 cc of normal saline.    Blood sugars have been extremely elevated most likely due to steroids.  Recheck sugars at 730/400 I will give him 6 more units of insulin at this time.  He seems to be extremely responsive to steroids and hyperglycemia so may need insulin drip in the near future.      Plan:  -- Admit to hospital service  -- Consult  pulmonary due to the fact he is on BiPAP  -- Continue BiPAP but can remove for short amount of time to eat.  Goal needed with naps and sleeping for  -- Brovana/Pulmicort/DuoNeb  -- Methylprednisolone while on BiPAP  -- Cefepime due to concern for possible Pseudomonas  -- Respiratory culture  -- Blood culture  ---Legionella urine antigen  -- ProCalcitonin now and in the  -- Levemir 10 unit (home doses Tresiba 17)  -- Correction dose insulin    DVT prophylaxis:  Lovenox    CODE STATUS:     Full code    Admission Status:  I believe this patient meets admission status.    Electronically signed by Rob Vincent MD, 11/05/22, 1:03 PM EDT.

## 2022-11-05 NOTE — PAYOR COMM NOTE
"Dilip Faulkner (72 y.o. Male)     Inpatient Auth request & Initial Clinicals.   DOS: 11/5/22  Emergent Medical admission from the ED.   [facility] NPI: [id number]  Dr. Rob Vincent: NPI:  [id number]    [employer] Medicare Primary over [employer].       Dilip Faulkner ANGELA    Date of Birth   1950    Social Security Number       Address   [address] [place] [address]  [facility] [address]    Home Phone   [phone]    MRN   [id number]       Confucianism   None    Marital Status   Single                            Admission Date   11/5/22    Admission Type   Emergency    Admitting Provider   Rob Vincent MD    Attending Provider   Rob Vincent MD    Department, Room/Bed   [facility] 4TH FLOOR MEDICAL TELEMETRY UNIT, 408/1       Discharge Date       Discharge Disposition       Discharge Destination                               Attending Provider: Rob Vincent MD    Allergies: No Known Allergies    Isolation: Contact, Airborne   Infection: COVID (rule out) (11/05/22)   Code Status: CPR    Ht: 185.4 cm (73\")   Wt: 111 kg (245 lb 9.5 oz)    Admission Cmt: None   Principal Problem: None                Active Insurance as of 11/5/2022     Primary Coverage     Payor Plan Insurance Group Employer/Plan Group    HUMANA MEDICARE REPLACEMENT HUMANA MEDICARE REPLACEMENT [id number]     Payor Plan Address Payor Plan Phone Number Payor Plan Fax Number Effective Dates    [address] [address] [phone]  4/1/2022 - None Entered    [facility] [id number]       Subscriber Name Subscriber Birth Date Member ID       DILIP FAULKNER 1950 [id number]           Secondary Coverage     Payor Plan Insurance Group Employer/Plan Group    [facility]GN     Payor Plan Address Payor Plan Phone Number Payor Plan Fax Number Effective Dates    [address] [address] [phone]  5/20/2022 - None Entered    [facility] [id number]       Subscriber Name Subscriber Birth Date Member ID       DILIP FAULKNER 1950 [id number] "                 Emergency Contacts      (Rel.) Home Phone Work Phone Mobile Phone    Juana Sandoval (Sister) [phone] -- [phone]            [place]: MIRA [id number] Tax ID [id number]        Chronic Obstructive Pulmonary Disease RRG - Inpatient Care by Bianka Fang, RN       Met (Selected): Reviewed on 11/5/2022 by Bianka Fang, RN       Created Using Review Status Review Entered   Indicia® Completed 11/5/2022 1543       Created By   Bianka Fang, RN       Criteria Set Name - Subset   Chronic Obstructive Pulmonary Disease RRG - Inpatient Care       Selected?   Yes - Inpatient Care is selected for the Chronic Obstructive Pulmonary Disease RRG criteria set.      Criteria Review      Inpatient Care    Most Recent : Bianka Fang Most Recent Date: 11/5/2022 15:43:46 EDST    (X) Admission is indicated for  1 or more  of the following :       (X) Acute respiratory failure (eg, new need for invasive or noninvasive mechanical ventilation)       11/5/2022 15:43:46 EDST by Bianka Fang         Started BIPAP upon arrival to ED. O2 Sats in 70s on pt's home O2. pt not on home BIPAP. Sats 93% & CO2 52.4 after BIPAP & O2 @ 30% fio2  initiated.    Notes:    11/5/2022 15:43:46 EDST by Bianka Fang    Subject: Admission    72 y.o. male past medical history of COPD on home oxygen, type 2 diabetes on insulin, dyslipidemia and hypertension who presents in respiratory distress w/O2 sats in mid 70s on home O2. Minimal air movement. + Wheezing. Resp 28-29.      In ED: Glucose 277; Na+ 135; Lactate 2.2; ABGs pH 7.288; CO2 52.4; Sats 84% (on BIPAP with FIO2 30%).  COVID test pending.      In ED: Bipap, O2, Duoneb X 1; SoluMedrol IV      Admit: Telemetry. Pulmonology consult- manage BIPAP; O2; Lovenox SQ; Cefepime IV q8h; Diabetic education. Sputum C&S;  Additional orders pending.                                   History & Physical      Rob Vincent MD at 11/05/22 1303           [facility]  Date: [facility]   Patient Name: Dilip Faulkner  : 1950  MRN: [id number]  Primary Care Physician:  Rosalba Edwards APRN  Date of admission: 2022    Subjective    Subjective     Chief Complaint: Shortness of breath    HPI:    Dilip Faulkner is a 72 y.o. male past medical history of COPD on home oxygen, type 2 diabetes on insulin, dyslipidemia and hypertension who presents in respiratory distress    Patient is on BiPAP currently.  He states that this started feeling poorly last couple days.  He has not been sick just short of breath.  No fevers.  No chills.  No nausea.  No vomiting.  Patient has been admitted to the hospital for COPD exacerbation almost once a month for the last year.  He states this is the exact same thing.  As result he came the ER for further evaluation.      In the emergency department the patient's vital signs are as follows temperature 98.8, pulse was 92, respirations 29, blood pressure 91/55.  CBC shows platelets of 120 but no other abnormalities.  CMP has a glucose of 277 sodium 135.  ABG shows 7.28/52.  Patient is placed on BiPAP.  He was given breathing treatment and methylprednisolone.  Patient be admitted to hospital for acute hypercapnic and respiratory failure due to COPD exacerbation    All systems reviewed abnormal as noted above    Personal History     Past Medical History:  Severe COPD  Type 2 diabetes  Hypertension  Chronic hypoxic respiratory failure    Past Surgical History:    Abdominal surgery    Family History:   Family history of lung disease    Social History:   Quit smoking .  No alcohol.    Home Medications:  Fluticasone-Salmeterol, QUEtiapine, albuterol sulfate HFA, aspirin, atorvastatin, cetirizine, insulin degludec, ipratropium-albuterol, lisinopril, metFORMIN, metoprolol tartrate, and omeprazole    Allergies:  No Known Allergies      Objective    Objective     Vitals:   Temp:  [98.8 °F (37.1 °C)] 98.8 °F (37.1 °C)  Heart  Rate:  [] 92  Resp:  [28-29] 29  BP: ()/(55-74) 91/55  Flow (L/min):  [8] 8    Physical Exam    Constitutional: Mild respiratory distress.  BiPAP in place.   Eyes: Pupils equal, sclerae anicteric, no conjunctival injection   HENT: NCAT, mucous membranes moist   Neck: Supple, no thyromegaly, no lymphadenopathy, trachea midline   Respiratory: Wheezing and rhonchi   Cardiovascular: RRR, no murmurs, rubs, or gallops, palpable pedal pulses bilaterally   Gastrointestinal: Positive bowel sounds, soft, nontender, nondistended   Musculoskeletal: No bilateral ankle edema, no clubbing or cyanosis to extremities   Psychiatric: Appropriate affect, cooperative   Neurologic: Oriented x 3, strength symmetric in all extremities, Cranial Nerves grossly intact to confrontation, speech clear   Skin: No rashes     Result Review    Result Review:  I have personally reviewed the results from the time of this admission to 11/5/2022 13:03 EDT and agree with these findings:  ABG is 7.288/52      Assessment & Plan   Assessment / Plan     Assessment/Plan:   Acute hypoxemic respiratory failure  Acute COPD exacerbation  Hypercapnic respiratory failure  Type 2 diabetes requiring insulin  Dyslipidemia  Hypertension  GERD  Thrombocytopenia  Probable chronic pleural thickening in the left lung base and small effusion is considered less likely    Plan:  -- Admit to hospital service  -- Consult pulmonary due to the fact he is on BiPAP  -- Continue BiPAP but can remove for short amount of time to eat.  Goal needed with naps and sleeping for  -- Brovana/Pulmicort/DuoNeb  -- Methylprednisolone while on BiPAP  -- Cefepime due to concern for possible Pseudomonas  -- Respiratory culture  -- Blood culture  ---Legionella urine antigen  -- ProCalcitonin now and in the  -- Levemir 10 unit (home doses Tresiba 17)  -- Correction dose insulin    DVT prophylaxis:  Lovenox    CODE STATUS:     Full code    Admission Status:  I believe this patient meets  admission status.    Electronically signed by Rob Vincent MD, 11/05/22, 1:03 PM EDT.             Electronically signed by Rob Vincent MD at 11/05/22 1345          Emergency Department Notes      Jonathan Sanchez MD at 11/05/22 1147          Time: 11:47 AM EDT    Chief Complaint: Shortness of breath  History provided by: Patient, EMS  History is limited by: N/A     History of Present Illness:  Patient is a 72 y.o. year old male who presents to the emergency department with history of COPD complaining of severe shortness of breath onset today.  Upon EMS arrival patient was hypoxic with oxygen saturations in the 70s.  Upon arrival to the emergency department patient was still in respiratory distress, tachypneic with minimal air movement and was immediately placed on BiPAP.  He denies fever, cough    HPI    Similar Symptoms Previously: Yes  Recently seen: No      Patient Care Team  Primary Care Provider: Rosalba Edwards APRN    Past Medical History:     No Known Allergies  Past Medical History:   Diagnosis Date   • Asthma    • COPD (chronic obstructive pulmonary disease) (HCC)    • Diabetes mellitus (HCC)    • Hernia, umbilical    • Hypertension    • Requires continuous at home supplemental oxygen      Past Surgical History:   Procedure Laterality Date   • ABDOMINAL SURGERY       History reviewed. No pertinent family history.    Home Medications:  Prior to Admission medications    Medication Sig Start Date End Date Taking? Authorizing Provider   albuterol sulfate  (90 Base) MCG/ACT inhaler Inhale 2 puffs Every 6 (Six) Hours As Needed for Wheezing.    Roosevelt Gaston MD   aspirin 81 MG chewable tablet Chew 81 mg Daily.    Roosevelt Gaston MD   atorvastatin (LIPITOR) 10 MG tablet Take 10 mg by mouth Daily.    Roosevelt Gaston MD   cetirizine (zyrTEC) 10 MG tablet Take 10 mg by mouth Daily.    Roosevelt Gaston MD   Fluticasone-Salmeterol (ADVAIR) 250-50 MCG/ACT DISKUS Inhale 1 puff 2 (Two)  Times a Day.    Roosevelt Gaston MD   insulin degludec (Tresiba FlexTouch) 100 UNIT/ML solution pen-injector injection Inject 17 Units under the skin into the appropriate area as directed Daily.    Roosevelt Gaston MD   ipratropium-albuterol (DUO-NEB) 0.5-2.5 mg/3 ml nebulizer Take 3 mL by nebulization Every 4 (Four) Hours As Needed for Wheezing or Shortness of Air. 21   Ricci Trinidad DO   lisinopril (PRINIVIL,ZESTRIL) 2.5 MG tablet Take 2.5 mg by mouth Daily.    Roosevelt Gaston MD   metFORMIN (GLUCOPHAGE) 500 MG tablet Take 500 mg by mouth 2 (two) times a day. 21   Roosevelt Gaston MD   metoprolol tartrate (LOPRESSOR) 25 MG tablet Take 25 mg by mouth 2 (Two) Times a Day.    Roosevelt Gaston MD   omeprazole (priLOSEC) 20 MG capsule Take 20 mg by mouth Daily.    Roosevelt Gaston MD   QUEtiapine (SEROquel) 25 MG tablet Take 1 tablet by mouth Every Night for 30 days. 10/8/22 11/7/22  Oralia Hardy MD        Social History:   Social History     Tobacco Use   • Smoking status: Former     Years: 2.00     Types: Cigarettes     Quit date: 3/19/2021     Years since quittin.6   • Smokeless tobacco: Never   Vaping Use   • Vaping Use: Never used   Substance Use Topics   • Alcohol use: Never   • Drug use: Never         Review of Systems:  Review of Systems   Constitutional: Negative for chills and fever.   HENT: Negative for congestion, rhinorrhea and sore throat.    Eyes: Negative for pain and visual disturbance.   Respiratory: Positive for shortness of breath. Negative for apnea, cough and chest tightness.    Cardiovascular: Negative for chest pain and palpitations.   Gastrointestinal: Negative for abdominal pain, diarrhea, nausea and vomiting.   Genitourinary: Negative for difficulty urinating and dysuria.   Musculoskeletal: Negative for joint swelling and myalgias.   Skin: Negative for color change.   Neurological: Negative for seizures and headaches.  "  Psychiatric/Behavioral: Negative.    All other systems reviewed and are negative.       Physical Exam:  BP 91/55   Pulse 92   Temp 98.8 °F (37.1 °C) (Oral)   Resp (!) 29   Ht 185.4 cm (73\")   Wt 111 kg (245 lb 9.5 oz)   SpO2 93%   BMI 32.40 kg/m²     Physical Exam  Vitals and nursing note reviewed.   Constitutional:       General: He is not in acute distress.     Appearance: Normal appearance. He is not toxic-appearing.   HENT:      Head: Normocephalic and atraumatic.      Jaw: There is normal jaw occlusion.   Eyes:      General: Lids are normal.      Extraocular Movements: Extraocular movements intact.      Conjunctiva/sclera: Conjunctivae normal.      Pupils: Pupils are equal, round, and reactive to light.   Cardiovascular:      Rate and Rhythm: Normal rate and regular rhythm.      Pulses: Normal pulses.      Heart sounds: Normal heart sounds.   Pulmonary:      Effort: Tachypnea and respiratory distress present.      Breath sounds: Wheezing present. No rhonchi.      Comments: Decreased air movement bilateral  Abdominal:      General: Abdomen is flat.      Palpations: Abdomen is soft.      Tenderness: There is no abdominal tenderness. There is no guarding or rebound.   Musculoskeletal:         General: Normal range of motion.      Cervical back: Normal range of motion and neck supple.      Right lower leg: No edema.      Left lower leg: No edema.   Skin:     General: Skin is warm and dry.   Neurological:      Mental Status: He is alert and oriented to person, place, and time. Mental status is at baseline.   Psychiatric:         Mood and Affect: Mood normal.               Medications in the Emergency Department:  Medications   sodium chloride 0.9 % flush 10 mL (has no administration in time range)   ipratropium-albuterol (DUO-NEB) nebulizer solution 3 mL (3 mL Nebulization Given 11/5/22 1123)   methylPREDNISolone sodium succinate (SOLU-Medrol) injection 125 mg (125 mg Intravenous Given 11/5/22 1114)    "     Labs  Lab Results (last 24 hours)     Procedure Component Value Units Date/Time    CBC & Differential [[id number]]  (Abnormal) Collected: 11/05/22 1103    Specimen: Blood Updated: 11/05/22 1138    Narrative:      The following orders were created for panel order CBC & Differential.  Procedure                               Abnormality         Status                     ---------                               -----------         ------                     CBC Auto Differential[[id number]]        Abnormal            Final result               Scan Slide[[id number]]                                                                    Please view results for these tests on the individual orders.    Comprehensive Metabolic Panel [[id number]]  (Abnormal) Collected: 11/05/22 1103    Specimen: Blood Updated: 11/05/22 1154     Glucose 277 mg/dL      BUN 17 mg/dL      Creatinine 1.08 mg/dL      Sodium 135 mmol/L      Potassium 4.0 mmol/L      Chloride 99 mmol/L      CO2 27.3 mmol/L      Calcium 8.6 mg/dL      Total Protein 7.3 g/dL      Albumin 4.20 g/dL      ALT (SGPT) 12 U/L      AST (SGOT) 13 U/L      Alkaline Phosphatase 89 U/L      Total Bilirubin 0.3 mg/dL      Globulin 3.1 gm/dL      A/G Ratio 1.4 g/dL      BUN/Creatinine Ratio 15.7     Anion Gap 8.7 mmol/L      eGFR 72.5 mL/min/1.73      Comment: National Kidney Foundation and American Society of Nephrology (ASN) Task Force recommended calculation based on the Chronic Kidney Disease Epidemiology Collaboration (CKD-EPI) equation refit without adjustment for race.       Narrative:      GFR Normal >60  Chronic Kidney Disease <60  Kidney Failure <15    The GFR formula is only valid for adults with stable renal function between ages 18 and 70.    Lactic Acid, Plasma [[id number]]  (Abnormal) Collected: 11/05/22 1103    Specimen: Blood Updated: 11/05/22 1157     Lactate 2.1 mmol/L     Blood Culture - Blood, Arm, Left [[id number]] Collected: 11/05/22 1103    Specimen: Blood from  Arm, Left Updated: 11/05/22 1114    Blood Culture - Blood, Arm, Left [[id number]] Collected: 11/05/22 1103    Specimen: Blood from Arm, Left Updated: 11/05/22 1114    CBC Auto Differential [[id number]]  (Abnormal) Collected: 11/05/22 1103    Specimen: Blood Updated: 11/05/22 1138     WBC 9.64 10*3/mm3      RBC 5.17 10*6/mm3      Hemoglobin 13.1 g/dL      Hematocrit 44.9 %      MCV 86.8 fL      MCH 25.3 pg      MCHC 29.2 g/dL      RDW 15.8 %      RDW-SD 50.0 fl      MPV 10.6 fL      Platelets 120 10*3/mm3      Neutrophil % 58.9 %      Lymphocyte % 23.4 %      Monocyte % 7.7 %      Eosinophil % 8.5 %      Basophil % 0.5 %      Immature Grans % 1.0 %      Neutrophils, Absolute 5.67 10*3/mm3      Lymphocytes, Absolute 2.26 10*3/mm3      Monocytes, Absolute 0.74 10*3/mm3      Eosinophils, Absolute 0.82 10*3/mm3      Basophils, Absolute 0.05 10*3/mm3      Immature Grans, Absolute 0.10 10*3/mm3      nRBC 0.0 /100 WBC     Blood Gas, Arterial -With Co-Ox Panel: Yes [[id number]]  (Abnormal) Collected: 11/05/22 1107    Specimen: Arterial Blood Updated: 11/05/22 1113     pH, Arterial 7.288 pH units      pCO2, Arterial 52.4 mm Hg      pO2, Arterial 84.8 mm Hg      HCO3, Arterial 24.5 mmol/L      Base Excess, Arterial -2.7 mmol/L      O2 Saturation, Arterial 94.7 %      Hemoglobin, Blood Gas 13.4 g/dL      Carboxyhemoglobin 1.2 %      Methemoglobin 0.30 %      Oxyhemoglobin 93.3 %      FHHB 5.2 %      Site Arterial: right radial     Modality BiPap     FIO2 30 %      PO2/FIO2 283     Lactate, Arterial --    Narrative:      14/7           Imaging:  XR Chest 1 View    Result Date: 11/5/2022  PROCEDURE: XR CHEST 1 VW  COMPARISON: [facility], CR, XR CHEST 1 VW, 9/15/2021, 22:06.  [facility], CR, XR CHEST 1 VW, 8/01/2022, 4:38.  [facility], CR, XR CHEST 1 VW, 10/05/2022, 1:18.  INDICATIONS: SHORTNESS OF BREATH  FINDINGS:  Irregular density is noted in the mid to lower left lung field, not  significantly changed in the interval.  The right lung appears clear.  The cardiac and mediastinal silhouettes appear normal.  In the lung base, there is mild pleural thickening versus a small effusion, stable in the interval        1. Chronic scarring or atelectasis in the mid to lower left lung 2. Probable chronic pleural thickening in the left lung base.  A small effusion is considered less likely.       Chirag Lopez M.D.       Electronically Signed and Approved By: Chirag Lopez M.D. on 11/05/2022 at 11:35               Procedures:  Procedures    Progress     Total Critical Care time of 40 minutes. Total critical care time documented does not include time spent on separately billed procedures for services of nurses or physician assistants. I personally saw and examined the patient. I have reviewed all diagnostic interpretations and treatment plans as written. I was present for the key portions of any procedures performed and the inclusive time noted in any critical care statement. Critical care time includes patient management by me, time spent at the patients bedside,  time to review lab and imaging results, discussing patient care, documentation in the medical record, and time spent with family or caregiver.                         Medical Decision Making:  MDM  Number of Diagnoses or Management Options  Acute exacerbation of chronic obstructive pulmonary disease (COPD) (HCC)  Acute respiratory failure with hypoxia (HCC)  Diagnosis management comments: In summary this is a 72-year-old male who presents to the emergency department for evaluation for evaluation of shortness of air.  He arrives via EMS.  They report upon arrival patient was hypoxic in respiratory distress.  Upon arrival patient is tachypneic, tachycardic with intercostal retractions and in respiratory distress.  He was immediately placed on BiPAP, settings of 14/7.  His respiratory distress has improved on BiPAP.  Does have a history of COPD and  has been given DuoNeb, Solu-Medrol in the emergency department.  CBC independently reviewed by me and shows no critical abnormalities.  CMP independently reviewed by me and shows no critical abnormalities except hyperglycemia.  ABG reveals pH of 7.2.  Chest x-ray unremarkable.  Patient case has been discussed with the hospitalist team who will admit to the hospital for further evaluation and continuation of treatment.       Final diagnoses:   Acute respiratory failure with hypoxia (HCC)   Acute exacerbation of chronic obstructive pulmonary disease (COPD) ([facility])        Disposition:  ED Disposition     ED Disposition   Decision to Admit    Condition   --    Comment   --             This medical record created using voice recognition software.           Jonathan Sanchez MD  11/05/22 1328      Electronically signed by Jonathan Sanchez MD at 11/05/22 1328         Facility-Administered Medications as of 11/5/2022   Medication Dose Route Frequency Provider Last Rate Last Admin   • acetaminophen (TYLENOL) tablet 650 mg  650 mg Oral Q4H PRN Rob Vincent MD       • [COMPLETED] cefepime (MAXIPIME) IVPB 2 g (premix) in D5  2 g Intravenous Once Rob Vincent MD   2 g at 11/05/22 1519   • cefepime (MAXIPIME) IVPB 2 g (premix) in D5  2 g Intravenous Q8H Rob Vincent MD       • dextrose (D50W) (25 g/50 mL) IV injection 25 g  25 g Intravenous Q15 Min PRN Rob Vincent MD       • dextrose (GLUTOSE) oral gel 15 g  15 g Oral Q15 Min PRN Rob Vincent MD       • Enoxaparin Sodium (LOVENOX) syringe 40 mg  40 mg Subcutaneous Daily Rob Vincent MD   40 mg at 11/05/22 1517   • glucagon (human recombinant) (GLUCAGEN DIAGNOSTIC) injection 1 mg  1 mg Intramuscular Q15 Min PRN Rob iVncent MD       • insulin detemir (LEVEMIR) injection 10 Units  10 Units Subcutaneous Nightly Rob Vincent MD       • Insulin Lispro (humaLOG) injection 2-7 Units  2-7 Units Subcutaneous TID With Meals Rob Vincent MD       • [COMPLETED] ipratropium-albuterol (DUO-NEB) nebulizer  solution 3 mL  3 mL Nebulization Once Jonathan Sanchez MD   3 mL at 11/05/22 1123   • [COMPLETED] methylPREDNISolone sodium succinate (SOLU-Medrol) injection 125 mg  125 mg Intravenous Once Jonathan Sanchez MD   125 mg at 11/05/22 1114   • ondansetron (ZOFRAN) injection 4 mg  4 mg Intravenous Q6H PRN Rob Vincent MD       • Pharmacy to Dose Cefepime   Does not apply Continuous PRN Rob Vincent MD       • sodium chloride 0.9 % flush 10 mL  10 mL Intravenous PRN Rob Vincent MD       • sodium chloride 0.9 % flush 10 mL  10 mL Intravenous PRN Rob Vincent MD       • sodium chloride 0.9 % flush 10 mL  10 mL Intravenous Q12H Rob Vincent MD       • sodium chloride 0.9 % infusion 40 mL  40 mL Intravenous PRN Rob Vincent MD           Lab Results (last 24 hours)     Procedure Component Value Units Date/Time    S. Pneumo Ag Urine or CSF - Urine, Urine, Clean Catch [[id number]] Collected: 11/05/22 1514    Specimen: Urine, Clean Catch Updated: 11/05/22 1535    Legionella Antigen, Urine - Urine, Urine, Clean Catch [[id number]] Collected: 11/05/22 1514    Specimen: Urine, Clean Catch Updated: 11/05/22 1535    COVID-19,APTIMA PANTHER(SRAVANTHI),[facility], NP/OP SWAB IN UTM/VTM/SALINE TRANSPORT MEDIA,24 HR TAT - Swab, Nasopharynx [[id number]] Collected: 11/05/22 1447    Specimen: Swab from Nasopharynx Updated: 11/05/22 1529    Procalcitonin [[id number]]  (Normal) Collected: 11/05/22 1103    Specimen: Blood Updated: 11/05/22 1519     Procalcitonin 0.06 ng/mL     Narrative:      As a Marker for Sepsis (Non-Neonates):    1. <0.5 ng/mL represents a low risk of severe sepsis and/or septic shock.  2. >2 ng/mL represents a high risk of severe sepsis and/or septic shock.    As a Marker for Lower Respiratory Tract Infections that require antibiotic therapy:    PCT on Admission    Antibiotic Therapy       6-12 Hrs later    >0.5                Strongly Recommended  >0.25 - <0.5        Recommended  0.1 - 0.25          Discouraged               "Remeasure/reassess PCT  <0.1                Strongly Discouraged     Remeasure/reassess PCT    As 28 day mortality risk marker: \"Change in Procalcitonin Result\" (>80% or <=80%) if Day 0 (or Day 1) and Day 4 values are available. Refer to http://www.[facility]-pct-calculator.com    Change in PCT <=80%  A decrease of PCT levels below or equal to 80% defines a positive change in PCT test result representing a higher risk for 28-day all-cause mortality of patients diagnosed with severe sepsis for septic shock.    Change in PCT >80%  A decrease of PCT levels of more than 80% defines a negative change in PCT result representing a lower risk for 28-day all-cause mortality of patients diagnosed with severe sepsis or septic shock.    This test is Prognostic not Diagnostic, if elevated correlate with clinical findings before administering antibiotic treatment.        STAT Lactic Acid, Reflex [[id number]]  (Abnormal) Collected: 11/05/22 1436    Specimen: Blood Updated: 11/05/22 1508     Lactate 2.2 mmol/L     [facility] Draw [[id number]] Collected: 11/05/22 1103    Specimen: Blood Updated: 11/05/22 1217    Narrative:      The following orders were created for panel order [facility] Draw.  Procedure                               Abnormality         Status                     ---------                               -----------         ------                     Green Top (Gel)[[id number]]                                  Final result               Lavender Top[[id number]]                                     Final result               Gold Top - SST[[id number]]                                   Final result               Light Blue Top[[id number]]                                   Final result                 Please view results for these tests on the individual orders.    Lavender Top [[id number]] Collected: 11/05/22 1103    Specimen: Blood Updated: 11/05/22 1217     Extra Tube hold for add-on     Comment: Auto resulted       Light Blue Top " [[id number]] Collected: 11/05/22 1103    Specimen: Blood Updated: 11/05/22 1217     Extra Tube Hold for add-ons.     Comment: Auto resulted       Green Top (Gel) [[id number]] Collected: 11/05/22 1103    Specimen: Blood Updated: 11/05/22 1217     Extra Tube Hold for add-ons.     Comment: Auto resulted.       Gold Top - SST [[id number]] Collected: 11/05/22 1103    Specimen: Blood Updated: 11/05/22 1217     Extra Tube Hold for add-ons.     Comment: Auto resulted.       Lactic Acid, Plasma [[id number]]  (Abnormal) Collected: 11/05/22 1103    Specimen: Blood Updated: 11/05/22 1157     Lactate 2.1 mmol/L     Comprehensive Metabolic Panel [[id number]]  (Abnormal) Collected: 11/05/22 1103    Specimen: Blood Updated: 11/05/22 1154     Glucose 277 mg/dL      BUN 17 mg/dL      Creatinine 1.08 mg/dL      Sodium 135 mmol/L      Potassium 4.0 mmol/L      Chloride 99 mmol/L      CO2 27.3 mmol/L      Calcium 8.6 mg/dL      Total Protein 7.3 g/dL      Albumin 4.20 g/dL      ALT (SGPT) 12 U/L      AST (SGOT) 13 U/L      Alkaline Phosphatase 89 U/L      Total Bilirubin 0.3 mg/dL      Globulin 3.1 gm/dL      A/G Ratio 1.4 g/dL      BUN/Creatinine Ratio 15.7     Anion Gap 8.7 mmol/L      eGFR 72.5 mL/min/1.73      Comment: National Kidney Foundation and American Society of Nephrology (ASN) Task Force recommended calculation based on the Chronic Kidney Disease Epidemiology Collaboration (CKD-EPI) equation refit without adjustment for race.       Narrative:      GFR Normal >60  Chronic Kidney Disease <60  Kidney Failure <15    The GFR formula is only valid for adults with stable renal function between ages 18 and 70.    CBC & Differential [[id number]]  (Abnormal) Collected: 11/05/22 1103    Specimen: Blood Updated: 11/05/22 1138    Narrative:      The following orders were created for panel order CBC & Differential.  Procedure                               Abnormality         Status                     ---------                                -----------         ------                     CBC Auto Differential[[id number]]        Abnormal            Final result               Scan Slide[[id number]]                                                                    Please view results for these tests on the individual orders.    CBC Auto Differential [[id number]]  (Abnormal) Collected: 11/05/22 1103    Specimen: Blood Updated: 11/05/22 1138     WBC 9.64 10*3/mm3      RBC 5.17 10*6/mm3      Hemoglobin 13.1 g/dL      Hematocrit 44.9 %      MCV 86.8 fL      MCH 25.3 pg      MCHC 29.2 g/dL      RDW 15.8 %      RDW-SD 50.0 fl      MPV 10.6 fL      Platelets 120 10*3/mm3      Neutrophil % 58.9 %      Lymphocyte % 23.4 %      Monocyte % 7.7 %      Eosinophil % 8.5 %      Basophil % 0.5 %      Immature Grans % 1.0 %      Neutrophils, Absolute 5.67 10*3/mm3      Lymphocytes, Absolute 2.26 10*3/mm3      Monocytes, Absolute 0.74 10*3/mm3      Eosinophils, Absolute 0.82 10*3/mm3      Basophils, Absolute 0.05 10*3/mm3      Immature Grans, Absolute 0.10 10*3/mm3      nRBC 0.0 /100 WBC     Blood Culture - Blood, Arm, Left [[id number]] Collected: 11/05/22 1103    Specimen: Blood from Arm, Left Updated: 11/05/22 1114    Blood Culture - Blood, Arm, Left [[id number]] Collected: 11/05/22 1103    Specimen: Blood from Arm, Left Updated: 11/05/22 1114    Blood Gas, Arterial -With Co-Ox Panel: Yes [[id number]]  (Abnormal) Collected: 11/05/22 1107    Specimen: Arterial Blood Updated: 11/05/22 1113     pH, Arterial 7.288 pH units      pCO2, Arterial 52.4 mm Hg      pO2, Arterial 84.8 mm Hg      HCO3, Arterial 24.5 mmol/L      Base Excess, Arterial -2.7 mmol/L      O2 Saturation, Arterial 94.7 %      Hemoglobin, Blood Gas 13.4 g/dL      Carboxyhemoglobin 1.2 %      Methemoglobin 0.30 %      Oxyhemoglobin 93.3 %      FHHB 5.2 %      Site Arterial: right radial     Modality BiPap     FIO2 30 %      PO2/FIO2 283     Lactate, Arterial --    Narrative:      14/7        Imaging Results (Last 24  Hours)     Procedure Component Value Units Date/Time    XR Chest 1 View [[id number]] Collected: 11/05/22 1135     Updated: 11/05/22 1138    Narrative:      PROCEDURE: XR CHEST 1 VW     COMPARISON: [facility], CR, XR CHEST 1 VW, 9/15/2021, 22:06.  [facility], CR, XR CHEST 1 VW, 8/01/2022, 4:38.  [facility], CR, XR CHEST 1 VW,   10/05/2022, 1:18.     INDICATIONS: SHORTNESS OF BREATH     FINDINGS:   Irregular density is noted in the mid to lower left lung field, not significantly changed in the   interval.  The right lung appears clear.  The cardiac and mediastinal silhouettes appear normal.    In the lung base, there is mild pleural thickening versus a small effusion, stable in the interval       Impression:         1. Chronic scarring or atelectasis in the mid to lower left lung  2. Probable chronic pleural thickening in the left lung base.  A small effusion is considered less   likely.                  Chirag Lopez M.D.         Electronically Signed and Approved By: Chirag Lopez M.D. on 11/05/2022 at 11:35                         Orders (last 24 hrs)      Start     Ordered    11/06/22 0600  CBC Auto Differential  Morning Draw         11/05/22 1419    11/06/22 0600  Comprehensive Metabolic Panel  Morning Draw         11/05/22 1419    11/06/22 0600  Magnesium  Morning Draw         11/05/22 1419    11/06/22 0600  Procalcitonin  Morning Draw         11/05/22 1419    11/05/22 2200  cefepime (MAXIPIME) IVPB 2 g (premix) in D5  Every 8 Hours         11/05/22 1426    11/05/22 2100  sodium chloride 0.9 % flush 10 mL  Every 12 Hours Scheduled         11/05/22 1419    11/05/22 2100  insulin detemir (LEVEMIR) injection 10 Units  Nightly         11/05/22 1344    11/05/22 2000  POC Glucose NIGHTLY 8PM  Nightly      Comments: For Patients on Basal InsulinNotify Provider if >350      11/05/22 1344    11/05/22 1800  Oral Care  2 Times Daily       11/05/22 1419    11/05/22 1736  STAT  Lactic Acid, Reflex  PROCEDURE ONCE         11/05/22 1507    11/05/22 1700  POC Glucose TID AC  3 Times Daily Before Meals       11/05/22 1344    11/05/22 1600  Vital Signs  Every 4 Hours       11/05/22 1419    11/05/22 1515  Enoxaparin Sodium (LOVENOX) syringe 40 mg  Daily         11/05/22 1419    11/05/22 1515  cefepime (MAXIPIME) IVPB 2 g (premix) in D5  Once         11/05/22 1426    11/05/22 1500  Strict Intake & Output  Every Hour       11/05/22 1419    11/05/22 1456  Inpatient Pulmonology Consult  Once        Specialty:  Pulmonary Disease  Provider:  Harrison Bauer MD    11/05/22 1456    11/05/22 1445  Inpatient Case Management  Consult  Once        Provider:  (Not yet assigned)    11/05/22 1445    11/05/22 1445  Inpatient Diabetes Educator Consult  Once        Provider:  (Not yet assigned)    11/05/22 1445    11/05/22 1424  COVID-19,APTIMA PANTHER(SRAVANTHI),[facility], NP/OP SWAB IN UTM/VTM/SALINE TRANSPORT MEDIA,24 HR TAT - Swab, Nasopharynx  Once         11/05/22 1423    11/05/22 1420  Intake & Output  Every Shift       11/05/22 1419    11/05/22 1420  Weigh Patient  Once         11/05/22 1419    11/05/22 1420  Oxygen Therapy- Nasal Cannula; Titrate for SPO2: 90% - 95%  Continuous         11/05/22 1419    11/05/22 1420  Insert Peripheral IV  Once         11/05/22 1419    11/05/22 1420  Saline Lock & Maintain IV Access  Continuous,   Status:  Canceled         11/05/22 1419    11/05/22 1420  Telemetry - Maintain IV Access  Continuous         11/05/22 1419    11/05/22 1420  Continuous Cardiac Monitoring  Continuous         11/05/22 1419    11/05/22 1420  Daily Weights  Daily       11/05/22 1419    11/05/22 1420  Diet Regular; Consistent Carbohydrate  Diet Effective Now         11/05/22 1419    11/05/22 1420  S. Pneumo Ag Urine or CSF - Urine, Urine, Clean Catch  Once         11/05/22 1419    11/05/22 1420  Legionella Antigen, Urine - Urine, Urine, Clean Catch  Once         11/05/22 1419     11/05/22 1420  Respiratory Culture - Sputum, Cough  Once         11/05/22 1419    11/05/22 1419  sodium chloride 0.9 % flush 10 mL  As Needed         11/05/22 1419    11/05/22 1419  sodium chloride 0.9 % infusion 40 mL  As Needed         11/05/22 1419    11/05/22 1419  acetaminophen (TYLENOL) tablet 650 mg  Every 4 Hours PRN         11/05/22 1419    11/05/22 1419  ondansetron (ZOFRAN) injection 4 mg  Every 6 Hours PRN         11/05/22 1419    11/05/22 1419  Pharmacy to Dose Cefepime  Continuous PRN         11/05/22 1419    11/05/22 1403  STAT Lactic Acid, Reflex  PROCEDURE ONCE         11/05/22 1157    11/05/22 1400  Insulin Lispro (humaLOG) injection 2-7 Units  3 Times Daily With Meals         11/05/22 1344    11/05/22 1344  dextrose (GLUTOSE) oral gel 15 g  Every 15 Minutes PRN         11/05/22 1344    11/05/22 1344  dextrose (D50W) (25 g/50 mL) IV injection 25 g  Every 15 Minutes PRN         11/05/22 1344    11/05/22 1344  glucagon (human recombinant) (GLUCAGEN DIAGNOSTIC) injection 1 mg  Every 15 Minutes PRN         11/05/22 1344    11/05/22 1333  Procalcitonin  Once         11/05/22 1332    11/05/22 1329  Inpatient Admission  Once         11/05/22 1331    11/05/22 1329  Code Status and Medical Interventions:  Continuous         11/05/22 1331    11/05/22 1233  Inpatient Hospitalist Consult  Once        Specialty:  Hospitalist  Provider:  Rob Vincent MD    11/05/22 1253    11/05/22 1120  Scan Slide  Once,   Status:  Canceled         11/05/22 1119    11/05/22 1115  ipratropium-albuterol (DUO-NEB) nebulizer solution 3 mL  Once         11/05/22 1059    11/05/22 1115  methylPREDNISolone sodium succinate (SOLU-Medrol) injection 125 mg  Once         11/05/22 1059    11/05/22 1100  XR Chest 1 View  1 Time Imaging         11/05/22 1100    11/05/22 1059  Blood Gas, Arterial -With Co-Ox Panel: Yes  STAT         11/05/22 1059    11/05/22 1052  Undress & Gown  Once         11/05/22 1051    11/05/22 1052  Continuous Pulse  Oximetry  Continuous         11/05/22 1051    11/05/22 1052  Vital Signs  Every 30 Minutes         11/05/22 1051    11/05/22 1052  Insert Peripheral IV  Once         11/05/22 1051    11/05/22 1052  CBC & Differential  Once         11/05/22 1051    11/05/22 1052  Comprehensive Metabolic Panel  Once         11/05/22 1051    11/05/22 1052  Lactic Acid, Plasma  Once         11/05/22 1051    11/05/22 1052  [place] Draw  Once         11/05/22 1051    11/05/22 1052  Blood Culture - Blood, Arm, Left  Once         11/05/22 1051    11/05/22 1052  Blood Culture - Blood, Arm, Left  Once         11/05/22 1051    11/05/22 1052  ECG 12 Lead Dyspnea  STAT         11/05/22 1051    11/05/22 1052  CBC Auto Differential  PROCEDURE ONCE         11/05/22 1052    11/05/22 1052  Green Top (Gel)  PROCEDURE ONCE         11/05/22 1052    11/05/22 1052  Lavender Top  PROCEDURE ONCE         11/05/22 1052    11/05/22 1052  Gold Top - SST  PROCEDURE ONCE         11/05/22 1052    11/05/22 1052  Light Blue Top  PROCEDURE ONCE         11/05/22 1052    11/05/22 1051  Oxygen Therapy- Nasal Cannula; 2 LPM; Titrate for SPO2: 92%, Greater Than or Equal To  Continuous PRN,   Status:  Canceled       11/05/22 1051    11/05/22 1051  sodium chloride 0.9 % flush 10 mL  As Needed         11/05/22 1051    Unscheduled  Follow Hypoglycemia Standing Orders For Blood Glucose <70 & Notify Provider of Treatment  As Needed      Comments: Follow Hypoglycemia Orders As Outlined in Process Instructions (Open Order Report to View Full Instructions)  Notify Provider Any Time Hypoglycemia Treatment is Administered    11/05/22 5294

## 2022-11-05 NOTE — ED PROVIDER NOTES
Time: 11:47 AM EDT    Chief Complaint: Shortness of breath  History provided by: Patient, EMS  History is limited by: N/A     History of Present Illness:  Patient is a 72 y.o. year old male who presents to the emergency department with history of COPD complaining of severe shortness of breath onset today.  Upon EMS arrival patient was hypoxic with oxygen saturations in the 70s.  Upon arrival to the emergency department patient was still in respiratory distress, tachypneic with minimal air movement and was immediately placed on BiPAP.  He denies fever, cough    HPI    Similar Symptoms Previously: Yes  Recently seen: No      Patient Care Team  Primary Care Provider: Rosalba Edwards APRN    Past Medical History:     No Known Allergies  Past Medical History:   Diagnosis Date   • Asthma    • COPD (chronic obstructive pulmonary disease) (HCC)    • Diabetes mellitus (HCC)    • Hernia, umbilical    • Hypertension    • Requires continuous at home supplemental oxygen      Past Surgical History:   Procedure Laterality Date   • ABDOMINAL SURGERY       History reviewed. No pertinent family history.    Home Medications:  Prior to Admission medications    Medication Sig Start Date End Date Taking? Authorizing Provider   albuterol sulfate  (90 Base) MCG/ACT inhaler Inhale 2 puffs Every 6 (Six) Hours As Needed for Wheezing.    Roosevelt Gaston MD   aspirin 81 MG chewable tablet Chew 81 mg Daily.    Roosevelt Gaston MD   atorvastatin (LIPITOR) 10 MG tablet Take 10 mg by mouth Daily.    Roosevelt Gaston MD   cetirizine (zyrTEC) 10 MG tablet Take 10 mg by mouth Daily.    Roosevelt Gaston MD   Fluticasone-Salmeterol (ADVAIR) 250-50 MCG/ACT DISKUS Inhale 1 puff 2 (Two) Times a Day.    Roosevelt Gaston MD   insulin degludec (Tresiba FlexTouch) 100 UNIT/ML solution pen-injector injection Inject 17 Units under the skin into the appropriate area as directed Daily.    Roosevelt Gaston MD  "  ipratropium-albuterol (DUO-NEB) 0.5-2.5 mg/3 ml nebulizer Take 3 mL by nebulization Every 4 (Four) Hours As Needed for Wheezing or Shortness of Air. 21   Ricci Trinidad DO   lisinopril (PRINIVIL,ZESTRIL) 2.5 MG tablet Take 2.5 mg by mouth Daily.    Roosevelt Gaston MD   metFORMIN (GLUCOPHAGE) 500 MG tablet Take 500 mg by mouth 2 (two) times a day. 21   Roosevelt Gaston MD   metoprolol tartrate (LOPRESSOR) 25 MG tablet Take 25 mg by mouth 2 (Two) Times a Day.    Roosevelt Gaston MD   omeprazole (priLOSEC) 20 MG capsule Take 20 mg by mouth Daily.    Roosevelt Gaston MD   QUEtiapine (SEROquel) 25 MG tablet Take 1 tablet by mouth Every Night for 30 days. 10/8/22 11/7/22  Oralia Hardy MD        Social History:   Social History     Tobacco Use   • Smoking status: Former     Years: 2.00     Types: Cigarettes     Quit date: 3/19/2021     Years since quittin.6   • Smokeless tobacco: Never   Vaping Use   • Vaping Use: Never used   Substance Use Topics   • Alcohol use: Never   • Drug use: Never         Review of Systems:  Review of Systems   Constitutional: Negative for chills and fever.   HENT: Negative for congestion, rhinorrhea and sore throat.    Eyes: Negative for pain and visual disturbance.   Respiratory: Positive for shortness of breath. Negative for apnea, cough and chest tightness.    Cardiovascular: Negative for chest pain and palpitations.   Gastrointestinal: Negative for abdominal pain, diarrhea, nausea and vomiting.   Genitourinary: Negative for difficulty urinating and dysuria.   Musculoskeletal: Negative for joint swelling and myalgias.   Skin: Negative for color change.   Neurological: Negative for seizures and headaches.   Psychiatric/Behavioral: Negative.    All other systems reviewed and are negative.       Physical Exam:  BP 91/55   Pulse 92   Temp 98.8 °F (37.1 °C) (Oral)   Resp (!) 29   Ht 185.4 cm (73\")   Wt 111 kg (245 lb 9.5 oz)   SpO2 93%   BMI " 32.40 kg/m²     Physical Exam  Vitals and nursing note reviewed.   Constitutional:       General: He is not in acute distress.     Appearance: Normal appearance. He is not toxic-appearing.   HENT:      Head: Normocephalic and atraumatic.      Jaw: There is normal jaw occlusion.   Eyes:      General: Lids are normal.      Extraocular Movements: Extraocular movements intact.      Conjunctiva/sclera: Conjunctivae normal.      Pupils: Pupils are equal, round, and reactive to light.   Cardiovascular:      Rate and Rhythm: Normal rate and regular rhythm.      Pulses: Normal pulses.      Heart sounds: Normal heart sounds.   Pulmonary:      Effort: Tachypnea and respiratory distress present.      Breath sounds: Wheezing present. No rhonchi.      Comments: Decreased air movement bilateral  Abdominal:      General: Abdomen is flat.      Palpations: Abdomen is soft.      Tenderness: There is no abdominal tenderness. There is no guarding or rebound.   Musculoskeletal:         General: Normal range of motion.      Cervical back: Normal range of motion and neck supple.      Right lower leg: No edema.      Left lower leg: No edema.   Skin:     General: Skin is warm and dry.   Neurological:      Mental Status: He is alert and oriented to person, place, and time. Mental status is at baseline.   Psychiatric:         Mood and Affect: Mood normal.                Medications in the Emergency Department:  Medications   sodium chloride 0.9 % flush 10 mL (has no administration in time range)   ipratropium-albuterol (DUO-NEB) nebulizer solution 3 mL (3 mL Nebulization Given 11/5/22 1123)   methylPREDNISolone sodium succinate (SOLU-Medrol) injection 125 mg (125 mg Intravenous Given 11/5/22 1114)        Labs  Lab Results (last 24 hours)     Procedure Component Value Units Date/Time    CBC & Differential [[id number]]  (Abnormal) Collected: 11/05/22 1103    Specimen: Blood Updated: 11/05/22 1138    Narrative:      The following orders were  created for panel order CBC & Differential.  Procedure                               Abnormality         Status                     ---------                               -----------         ------                     CBC Auto Differential[[id number]]        Abnormal            Final result               Scan Slide[[id number]]                                                                    Please view results for these tests on the individual orders.    Comprehensive Metabolic Panel [[id number]]  (Abnormal) Collected: 11/05/22 1103    Specimen: Blood Updated: 11/05/22 1154     Glucose 277 mg/dL      BUN 17 mg/dL      Creatinine 1.08 mg/dL      Sodium 135 mmol/L      Potassium 4.0 mmol/L      Chloride 99 mmol/L      CO2 27.3 mmol/L      Calcium 8.6 mg/dL      Total Protein 7.3 g/dL      Albumin 4.20 g/dL      ALT (SGPT) 12 U/L      AST (SGOT) 13 U/L      Alkaline Phosphatase 89 U/L      Total Bilirubin 0.3 mg/dL      Globulin 3.1 gm/dL      A/G Ratio 1.4 g/dL      BUN/Creatinine Ratio 15.7     Anion Gap 8.7 mmol/L      eGFR 72.5 mL/min/1.73      Comment: National Kidney Foundation and American Society of Nephrology (ASN) Task Force recommended calculation based on the Chronic Kidney Disease Epidemiology Collaboration (CKD-EPI) equation refit without adjustment for race.       Narrative:      GFR Normal >60  Chronic Kidney Disease <60  Kidney Failure <15    The GFR formula is only valid for adults with stable renal function between ages 18 and 70.    Lactic Acid, Plasma [[id number]]  (Abnormal) Collected: 11/05/22 1103    Specimen: Blood Updated: 11/05/22 1157     Lactate 2.1 mmol/L     Blood Culture - Blood, Arm, Left [[id number]] Collected: 11/05/22 1103    Specimen: Blood from Arm, Left Updated: 11/05/22 1114    Blood Culture - Blood, Arm, Left [[id number]] Collected: 11/05/22 1103    Specimen: Blood from Arm, Left Updated: 11/05/22 1114    CBC Auto Differential [[id number]]  (Abnormal) Collected: 11/05/22 1103     Specimen: Blood Updated: 11/05/22 1138     WBC 9.64 10*3/mm3      RBC 5.17 10*6/mm3      Hemoglobin 13.1 g/dL      Hematocrit 44.9 %      MCV 86.8 fL      MCH 25.3 pg      MCHC 29.2 g/dL      RDW 15.8 %      RDW-SD 50.0 fl      MPV 10.6 fL      Platelets 120 10*3/mm3      Neutrophil % 58.9 %      Lymphocyte % 23.4 %      Monocyte % 7.7 %      Eosinophil % 8.5 %      Basophil % 0.5 %      Immature Grans % 1.0 %      Neutrophils, Absolute 5.67 10*3/mm3      Lymphocytes, Absolute 2.26 10*3/mm3      Monocytes, Absolute 0.74 10*3/mm3      Eosinophils, Absolute 0.82 10*3/mm3      Basophils, Absolute 0.05 10*3/mm3      Immature Grans, Absolute 0.10 10*3/mm3      nRBC 0.0 /100 WBC     Blood Gas, Arterial -With Co-Ox Panel: Yes [[id number]]  (Abnormal) Collected: 11/05/22 1107    Specimen: Arterial Blood Updated: 11/05/22 1113     pH, Arterial 7.288 pH units      pCO2, Arterial 52.4 mm Hg      pO2, Arterial 84.8 mm Hg      HCO3, Arterial 24.5 mmol/L      Base Excess, Arterial -2.7 mmol/L      O2 Saturation, Arterial 94.7 %      Hemoglobin, Blood Gas 13.4 g/dL      Carboxyhemoglobin 1.2 %      Methemoglobin 0.30 %      Oxyhemoglobin 93.3 %      FHHB 5.2 %      Site Arterial: right radial     Modality BiPap     FIO2 30 %      PO2/FIO2 283     Lactate, Arterial --    Narrative:      14/7           Imaging:  XR Chest 1 View    Result Date: 11/5/2022  PROCEDURE: XR CHEST 1 VW  COMPARISON: [facility], CR, XR CHEST 1 VW, 9/15/2021, 22:06.  [facility], CR, XR CHEST 1 VW, 8/01/2022, 4:38.  [facility], CR, XR CHEST 1 VW, 10/05/2022, 1:18.  INDICATIONS: SHORTNESS OF BREATH  FINDINGS:  Irregular density is noted in the mid to lower left lung field, not significantly changed in the interval.  The right lung appears clear.  The cardiac and mediastinal silhouettes appear normal.  In the lung base, there is mild pleural thickening versus a small effusion, stable in the interval        1. Chronic  scarring or atelectasis in the mid to lower left lung 2. Probable chronic pleural thickening in the left lung base.  A small effusion is considered less likely.       Chirag Lopez M.D.       Electronically Signed and Approved By: Chirag Lopez M.D. on 11/05/2022 at 11:35               Procedures:  Procedures    Progress     Total Critical Care time of 40 minutes. Total critical care time documented does not include time spent on separately billed procedures for services of nurses or physician assistants. I personally saw and examined the patient. I have reviewed all diagnostic interpretations and treatment plans as written. I was present for the key portions of any procedures performed and the inclusive time noted in any critical care statement. Critical care time includes patient management by me, time spent at the patients bedside,  time to review lab and imaging results, discussing patient care, documentation in the medical record, and time spent with family or caregiver.                         Medical Decision Making:  MDM  Number of Diagnoses or Management Options  Acute exacerbation of chronic obstructive pulmonary disease (COPD) (HCC)  Acute respiratory failure with hypoxia (HCC)  Diagnosis management comments: In summary this is a 72-year-old male who presents to the emergency department for evaluation for evaluation of shortness of air.  He arrives via EMS.  They report upon arrival patient was hypoxic in respiratory distress.  Upon arrival patient is tachypneic, tachycardic with intercostal retractions and in respiratory distress.  He was immediately placed on BiPAP, settings of 14/7.  His respiratory distress has improved on BiPAP.  Does have a history of COPD and has been given DuoNeb, Solu-Medrol in the emergency department.  CBC independently reviewed by me and shows no critical abnormalities.  CMP independently reviewed by me and shows no critical abnormalities except hyperglycemia.  ABG reveals pH  of 7.2.  Chest x-ray unremarkable.  Patient case has been discussed with the hospitalist team who will admit to the hospital for further evaluation and continuation of treatment.       Final diagnoses:   Acute respiratory failure with hypoxia (HCC)   Acute exacerbation of chronic obstructive pulmonary disease (COPD) (HCC)        Disposition:  ED Disposition     ED Disposition   Decision to Admit    Condition   --    Comment   --             This medical record created using voice recognition software.           Jonathan Sanchez MD  11/05/22 0872

## 2022-11-05 NOTE — PLAN OF CARE
Goal Outcome Evaluation:         Patient arrived to the floor this afternoon. Pt been off bipap and on 3.5L Nasal cannula. O2 sats tolerating well. Pt in isolation for r/o covid. Swab sent. Urine and sputum also sent down. Pt sister informed us of pt having bed bugs at home. Pt bathed head to toe and clothing isolated and searched, no bugs found in pt room or bed. Vitals stable throughout shift.

## 2022-11-05 NOTE — CONSULTS
"Pulmonary / Critical Care Consult Note      Patient Name: Dilip Faulkner  : 1950  MRN: [id number]  Primary Care Physician:  Rosalba Edwards APRN  Referring Physician: No ref. provider found  Date of admission: 2022    Subjective   Subjective     Reason for Consult/ Chief Complaint: shortness of breath, end stage COPD    HPI:  Dilip Faulkner is a 72 y.o. male with a past medical history significant for COPD on 2 L of home oxygen, type 2 diabetes on insulin, dyslipidemia, hypertension, noncompliance, obesity, chronic respiratory failure with hypercapnia.  He presents emergency department with a chief complaint of shortness of breath.  He lives at home with his sisters and states that he has been feeling poorly and was short of breath today.  While in the emergency department, he was placed on BiPAP and an ABG was performed, pH 7.28, PCO2 52, PO2 84, HCO3 24.5.  He was recently hospitalized on  for the same complaint.  He does not have a home NIPPV machine and refuses to wear it.  He states \"I do not need it\".  During his last hospitalization, his ABG was pH of 7.260, PCO2 54, PO2 149, HCO3 23.7.  He has been admitted to the [facility] on BiPAP and pulmonology has been consulted to manage his COPD.  Of note, multiple attempts to have patient follow-up in pulmonary clinic has been attempted.  Patient states that he has no barriers preventing him from attending clinic appointments, he just does not show up for his appointments. He lives with his sister. Does not have family with chronic lung issues or mary ng cancer.     Review of Systems  General:  No Fatigue, No Fever. No weight loss  HEENT: No dysphagia, No Visual Changes, no rhinorrhea  Respiratory:  No Productive cough,no Dyspnea, no phlegm, No Pleuritic Pain, + wheezing, no hemoptysis  Cardiovascular: Denies chest pain, denies palpitations,+ALBARRAN, No Chest Pressure  Gastrointestinal:  No Abdominal Pain, No Nausea, No Vomiting, No " Diarrhea  Genitourinary:  No Dysuria, No Frequency, No Hesitancy  Musculoskeletal: No muscle pain or swelling  Endocrine:  No Heat Intolerance, No Cold Intolerance  Hematologic:  No Bleeding, No Bruising  Psychiatric:  No Anxiety, No Depression  Neurologic:  No Confusion, no Dysarthria, No Headaches  Skin:  No Rash, No Open Wounds        Personal History     Past Medical History:   Diagnosis Date   • Asthma    • COPD (chronic obstructive pulmonary disease) (HCC)    • Diabetes mellitus (HCC)    • Hernia, umbilical    • Hypertension    • Requires continuous at home supplemental oxygen        Past Surgical History:   Procedure Laterality Date   • ABDOMINAL SURGERY         Family History:  No family history of chronic lung or heart disease.     Social History:  reports that he quit smoking about 19 months ago. His smoking use included cigarettes. His smokeless tobacco use includes chew. He reports that he does not drink alcohol and does not use drugs.    Home Medications:  Fluticasone-Salmeterol, QUEtiapine, albuterol sulfate HFA, aspirin, atorvastatin, cetirizine, insulin degludec, ipratropium-albuterol, lisinopril, metFORMIN, metoprolol tartrate, and omeprazole    Allergies:  No Known Allergies    Objective    Objective     Vitals:   Temp:  [97.7 °F (36.5 °C)-98.8 °F (37.1 °C)] 97.7 °F (36.5 °C)  Heart Rate:  [] 92  Resp:  [20-29] 22  BP: ()/(55-74) 121/64  Flow (L/min):  [3-8] 3    Physical Exam:  Vital Signs Reviewed   General: Older adult man, obese, WDWN, Alert, NAD.    HEENT:  PERRL, EOMI.  OP, nares clear, no sinus tenderness  Neck:  Supple, no JVD, no thyromegaly  Lymph: no axillary, cervical, supraclavicular lymphadenopathy noted bilaterally  Chest:  poor aeration, diminished to auscultation bilaterally, tympanic to percussion bilaterally, no work of breathing noted, 2L NC  CV: RRR, no MGR, pulses 2+, equal.  Abd:  Soft, NT, ND, + BS, no HSM  EXT:  no clubbing, no cyanosis, no edema, no joint  tenderness  Neuro:  A&Ox3, CN grossly intact, no focal deficits. Maintains eyes closed during most of exam  Skin: No rashes or lesions noted      Result Review    Result Review:  I have personally reviewed the results from the time of this admission to 11/5/2022 14:55 EDT and agree with these findings:  [x]  Laboratory  []  Microbiology  [x]  Radiology  []  EKG/Telemetry   []  Cardiology/Vascular   []  Pathology  [x]  Old records  []  Other:  Most notable findings include: Hyperglycemia, Lactate 2.2, blood culture pending    Assessment & Plan   Assessment / Plan     Active Hospital Problems:  Active Hospital Problems    Diagnosis    • Acute respiratory failure with hypoxia (HCC)    COPD  Noncompliance  Hypercarbia  Obesity  Diabetes      Plan:  -Utilize BiPAP at current settings during naps and at nighttime  -Maintain oxygen saturations greater than 88%, wears home oxygen of 2 L nasal cannula  -Nebulizers of Brovana, Pulmicort and DuoNebs as ordered  -Will consult respiratory  to assist patient with obtaining home NIPPV machine  -Will need copious amounts of encouragement, rationalization, and assistance for NIPPV compliance. Patient does not have insight regarding need of NIPPV with sleep and naps.   He will have recurrent hospitalization with not using the nebs and NIPPV.   - Will need outpatient follow up in pulm office and PFT.   -Will benefit from pulm rehab too.   -Continue cefepime for urinary tract infection, possible Pseudomonas, tailor antibiotics as cultures result  -Rest of care per primary team    This visit was performed by BOTH a physician and an APC. I personally evaluated and examined the patient. I performed all aspects of MDM as documented. , I have reviewed and confirmed the accuracy of the patient's history as documented in this note. and I have reexamined the patient and the results are consistent with the previously documented exam. I have updated the documentation as necessary.      Electronically signed by Harrison Bauer MD, 11/05/22, 3:58 PM EDT.        I, Krissy Aldana [facility], am scribing for & in the presence of Dr. Bauer on 11/05/2022, who was present during rounds with me.    Part of this note may be an electronic transcription/translation of spoken language to printed text using the Dragon Dictation System.      Electronically signed by Harrison Bauer MD, 11/5/2022, 14:55 EDT.

## 2022-11-06 LAB
ALBUMIN SERPL-MCNC: 3.4 G/DL (ref 3.5–5.2)
ALBUMIN/GLOB SERPL: 1.2 G/DL
ALP SERPL-CCNC: 77 U/L (ref 39–117)
ALT SERPL W P-5'-P-CCNC: 11 U/L (ref 1–41)
ANION GAP SERPL CALCULATED.3IONS-SCNC: 10.8 MMOL/L (ref 5–15)
AST SERPL-CCNC: 12 U/L (ref 1–40)
BASOPHILS # BLD AUTO: 0.02 10*3/MM3 (ref 0–0.2)
BASOPHILS NFR BLD AUTO: 0.2 % (ref 0–1.5)
BILIRUB SERPL-MCNC: 0.2 MG/DL (ref 0–1.2)
BUN SERPL-MCNC: 17 MG/DL (ref 8–23)
BUN/CREAT SERPL: 15.7 (ref 7–25)
CALCIUM SPEC-SCNC: 8.4 MG/DL (ref 8.6–10.5)
CHLORIDE SERPL-SCNC: 99 MMOL/L (ref 98–107)
CO2 SERPL-SCNC: 25.2 MMOL/L (ref 22–29)
CREAT SERPL-MCNC: 1.08 MG/DL (ref 0.76–1.27)
DEPRECATED RDW RBC AUTO: 47.7 FL (ref 37–54)
EGFRCR SERPLBLD CKD-EPI 2021: 72.5 ML/MIN/1.73
EOSINOPHIL # BLD AUTO: 0.01 10*3/MM3 (ref 0–0.4)
EOSINOPHIL NFR BLD AUTO: 0.1 % (ref 0.3–6.2)
ERYTHROCYTE [DISTWIDTH] IN BLOOD BY AUTOMATED COUNT: 15.5 % (ref 12.3–15.4)
GLOBULIN UR ELPH-MCNC: 2.9 GM/DL
GLUCOSE BLDC GLUCOMTR-MCNC: 120 MG/DL (ref 70–99)
GLUCOSE BLDC GLUCOMTR-MCNC: 161 MG/DL (ref 70–99)
GLUCOSE BLDC GLUCOMTR-MCNC: 162 MG/DL (ref 70–99)
GLUCOSE BLDC GLUCOMTR-MCNC: 272 MG/DL (ref 70–99)
GLUCOSE BLDC GLUCOMTR-MCNC: 60 MG/DL (ref 70–99)
GLUCOSE BLDC GLUCOMTR-MCNC: 68 MG/DL (ref 70–99)
GLUCOSE SERPL-MCNC: 460 MG/DL (ref 65–99)
HCT VFR BLD AUTO: 39.8 % (ref 37.5–51)
HGB BLD-MCNC: 11.9 G/DL (ref 13–17.7)
IMM GRANULOCYTES # BLD AUTO: 0.11 10*3/MM3 (ref 0–0.05)
IMM GRANULOCYTES NFR BLD AUTO: 1.1 % (ref 0–0.5)
LYMPHOCYTES # BLD AUTO: 0.8 10*3/MM3 (ref 0.7–3.1)
LYMPHOCYTES NFR BLD AUTO: 7.7 % (ref 19.6–45.3)
MAGNESIUM SERPL-MCNC: 2 MG/DL (ref 1.6–2.4)
MCH RBC QN AUTO: 25.4 PG (ref 26.6–33)
MCHC RBC AUTO-ENTMCNC: 29.9 G/DL (ref 31.5–35.7)
MCV RBC AUTO: 85 FL (ref 79–97)
MONOCYTES # BLD AUTO: 0.33 10*3/MM3 (ref 0.1–0.9)
MONOCYTES NFR BLD AUTO: 3.2 % (ref 5–12)
NEUTROPHILS NFR BLD AUTO: 87.7 % (ref 42.7–76)
NEUTROPHILS NFR BLD AUTO: 9.16 10*3/MM3 (ref 1.7–7)
NRBC BLD AUTO-RTO: 0 /100 WBC (ref 0–0.2)
PLATELET # BLD AUTO: 126 10*3/MM3 (ref 140–450)
PMV BLD AUTO: 11 FL (ref 6–12)
POTASSIUM SERPL-SCNC: 4.9 MMOL/L (ref 3.5–5.2)
PROCALCITONIN SERPL-MCNC: 0.06 NG/ML (ref 0–0.25)
PROT SERPL-MCNC: 6.3 G/DL (ref 6–8.5)
QT INTERVAL: 375 MS
RBC # BLD AUTO: 4.68 10*6/MM3 (ref 4.14–5.8)
SODIUM SERPL-SCNC: 135 MMOL/L (ref 136–145)
WBC NRBC COR # BLD: 10.43 10*3/MM3 (ref 3.4–10.8)

## 2022-11-06 PROCEDURE — 99233 SBSQ HOSP IP/OBS HIGH 50: CPT | Performed by: INTERNAL MEDICINE

## 2022-11-06 PROCEDURE — 25010000002 ENOXAPARIN PER 10 MG: Performed by: INTERNAL MEDICINE

## 2022-11-06 PROCEDURE — 84145 PROCALCITONIN (PCT): CPT | Performed by: INTERNAL MEDICINE

## 2022-11-06 PROCEDURE — 85025 COMPLETE CBC W/AUTO DIFF WBC: CPT | Performed by: INTERNAL MEDICINE

## 2022-11-06 PROCEDURE — 63710000001 INSULIN LISPRO (HUMAN) PER 5 UNITS: Performed by: INTERNAL MEDICINE

## 2022-11-06 PROCEDURE — 94799 UNLISTED PULMONARY SVC/PX: CPT

## 2022-11-06 PROCEDURE — 25010000002 CEFEPIME PER 500 MG: Performed by: INTERNAL MEDICINE

## 2022-11-06 PROCEDURE — 63710000001 INSULIN LISPRO (HUMAN) PER 5 UNITS: Performed by: PHYSICIAN ASSISTANT

## 2022-11-06 PROCEDURE — 83735 ASSAY OF MAGNESIUM: CPT | Performed by: INTERNAL MEDICINE

## 2022-11-06 PROCEDURE — 80053 COMPREHEN METABOLIC PANEL: CPT | Performed by: INTERNAL MEDICINE

## 2022-11-06 PROCEDURE — 63710000001 INSULIN DETEMIR PER 5 UNITS: Performed by: INTERNAL MEDICINE

## 2022-11-06 PROCEDURE — 82962 GLUCOSE BLOOD TEST: CPT

## 2022-11-06 PROCEDURE — 63710000001 PREDNISONE PER 1 MG: Performed by: INTERNAL MEDICINE

## 2022-11-06 RX ORDER — ARFORMOTEROL TARTRATE 15 UG/2ML
15 SOLUTION RESPIRATORY (INHALATION)
Status: DISCONTINUED | OUTPATIENT
Start: 2022-11-06 | End: 2022-11-09 | Stop reason: HOSPADM

## 2022-11-06 RX ORDER — IPRATROPIUM BROMIDE AND ALBUTEROL SULFATE 2.5; .5 MG/3ML; MG/3ML
3 SOLUTION RESPIRATORY (INHALATION)
Status: DISCONTINUED | OUTPATIENT
Start: 2022-11-06 | End: 2022-11-09 | Stop reason: HOSPADM

## 2022-11-06 RX ORDER — PANTOPRAZOLE SODIUM 40 MG/1
40 TABLET, DELAYED RELEASE ORAL EVERY MORNING
Status: DISCONTINUED | OUTPATIENT
Start: 2022-11-07 | End: 2022-11-09 | Stop reason: HOSPADM

## 2022-11-06 RX ORDER — INSULIN LISPRO 100 [IU]/ML
1-200 INJECTION, SOLUTION INTRAVENOUS; SUBCUTANEOUS
Status: DISCONTINUED | OUTPATIENT
Start: 2022-11-06 | End: 2022-11-07

## 2022-11-06 RX ORDER — NICOTINE POLACRILEX 4 MG
15 LOZENGE BUCCAL
Status: DISCONTINUED | OUTPATIENT
Start: 2022-11-06 | End: 2022-11-07

## 2022-11-06 RX ORDER — ASPIRIN 81 MG/1
81 TABLET, CHEWABLE ORAL DAILY
Status: DISCONTINUED | OUTPATIENT
Start: 2022-11-06 | End: 2022-11-09 | Stop reason: HOSPADM

## 2022-11-06 RX ORDER — ATORVASTATIN CALCIUM 10 MG/1
10 TABLET, FILM COATED ORAL NIGHTLY
Status: DISCONTINUED | OUTPATIENT
Start: 2022-11-06 | End: 2022-11-09 | Stop reason: HOSPADM

## 2022-11-06 RX ORDER — ALBUTEROL SULFATE 2.5 MG/3ML
2.5 SOLUTION RESPIRATORY (INHALATION) EVERY 4 HOURS PRN
Status: DISCONTINUED | OUTPATIENT
Start: 2022-11-06 | End: 2022-11-09 | Stop reason: HOSPADM

## 2022-11-06 RX ORDER — QUETIAPINE FUMARATE 25 MG/1
25 TABLET, FILM COATED ORAL NIGHTLY
Status: DISCONTINUED | OUTPATIENT
Start: 2022-11-06 | End: 2022-11-09 | Stop reason: HOSPADM

## 2022-11-06 RX ORDER — BUDESONIDE 0.5 MG/2ML
0.5 INHALANT ORAL
Status: DISCONTINUED | OUTPATIENT
Start: 2022-11-06 | End: 2022-11-09 | Stop reason: HOSPADM

## 2022-11-06 RX ORDER — INSULIN LISPRO 100 [IU]/ML
1-200 INJECTION, SOLUTION INTRAVENOUS; SUBCUTANEOUS AS NEEDED
Status: DISCONTINUED | OUTPATIENT
Start: 2022-11-06 | End: 2022-11-07

## 2022-11-06 RX ORDER — DEXTROSE MONOHYDRATE 25 G/50ML
10-50 INJECTION, SOLUTION INTRAVENOUS
Status: DISCONTINUED | OUTPATIENT
Start: 2022-11-06 | End: 2022-11-07

## 2022-11-06 RX ORDER — PREDNISONE 20 MG/1
40 TABLET ORAL
Status: DISCONTINUED | OUTPATIENT
Start: 2022-11-06 | End: 2022-11-08

## 2022-11-06 RX ORDER — INSULIN LISPRO 100 [IU]/ML
2-7 INJECTION, SOLUTION INTRAVENOUS; SUBCUTANEOUS
Status: DISCONTINUED | OUTPATIENT
Start: 2022-11-06 | End: 2022-11-06

## 2022-11-06 RX ADMIN — PREDNISONE 40 MG: 20 TABLET ORAL at 14:01

## 2022-11-06 RX ADMIN — INSULIN LISPRO 6 UNITS: 100 INJECTION, SOLUTION INTRAVENOUS; SUBCUTANEOUS at 20:39

## 2022-11-06 RX ADMIN — ATORVASTATIN CALCIUM 10 MG: 10 TABLET, FILM COATED ORAL at 20:39

## 2022-11-06 RX ADMIN — CEFEPIME 2 G: 1 INJECTION, SOLUTION INTRAVENOUS at 14:01

## 2022-11-06 RX ADMIN — BUDESONIDE 0.5 MG: 0.5 INHALANT ORAL at 18:27

## 2022-11-06 RX ADMIN — INSULIN DETEMIR 28 UNITS: 100 INJECTION, SOLUTION SUBCUTANEOUS at 20:40

## 2022-11-06 RX ADMIN — ASPIRIN 81 MG 81 MG: 81 TABLET ORAL at 14:01

## 2022-11-06 RX ADMIN — CEFEPIME 2 G: 1 INJECTION, SOLUTION INTRAVENOUS at 06:15

## 2022-11-06 RX ADMIN — ENOXAPARIN SODIUM 40 MG: 100 INJECTION SUBCUTANEOUS at 09:08

## 2022-11-06 RX ADMIN — INSULIN LISPRO 7 UNITS: 100 INJECTION, SOLUTION INTRAVENOUS; SUBCUTANEOUS at 06:11

## 2022-11-06 RX ADMIN — CEFEPIME 2 G: 1 INJECTION, SOLUTION INTRAVENOUS at 21:33

## 2022-11-06 RX ADMIN — IPRATROPIUM BROMIDE AND ALBUTEROL SULFATE 3 ML: 2.5; .5 SOLUTION RESPIRATORY (INHALATION) at 15:33

## 2022-11-06 RX ADMIN — Medication 10 ML: at 09:08

## 2022-11-06 RX ADMIN — INSULIN LISPRO 9 UNITS: 100 INJECTION, SOLUTION INTRAVENOUS; SUBCUTANEOUS at 09:07

## 2022-11-06 RX ADMIN — QUETIAPINE FUMARATE 25 MG: 25 TABLET ORAL at 20:39

## 2022-11-06 RX ADMIN — METOPROLOL TARTRATE 25 MG: 25 TABLET, FILM COATED ORAL at 20:39

## 2022-11-06 RX ADMIN — ARFORMOTEROL TARTRATE 15 MCG: 15 SOLUTION RESPIRATORY (INHALATION) at 18:26

## 2022-11-06 RX ADMIN — IPRATROPIUM BROMIDE AND ALBUTEROL SULFATE 3 ML: 2.5; .5 SOLUTION RESPIRATORY (INHALATION) at 18:27

## 2022-11-06 RX ADMIN — INSULIN LISPRO 10 UNITS: 100 INJECTION, SOLUTION INTRAVENOUS; SUBCUTANEOUS at 17:08

## 2022-11-06 RX ADMIN — Medication 10 ML: at 20:39

## 2022-11-06 RX ADMIN — DEXTROSE MONOHYDRATE 16 ML: 25 INJECTION, SOLUTION INTRAVENOUS at 12:28

## 2022-11-06 NOTE — PROGRESS NOTES
"Pulmonary / Critical Care Progress Note      Patient Name: Dilip Faulkner  : 1950  MRN: [id number]  Primary Care Physician:  Rosalba Edwards, MONE  Date of admission: 2022    Subjective   Subjective   Follow-up for shortness of breath, end stage COPD    Over past 24 hours: Wore Bipap yesterday for a few hours but not overnight. Refuses to comply with medical plan of care. States he \"does not need bipap\".      No acute events overnight.     This morning,   Sitting up in bed, on 2L NC, sats 95%  Refusing Rehab and refusing to discuss home bipap use  Blood sugars over 400    HPI:  Dilip Faulkner is a 72 y.o. male with a past medical history significant for COPD on 2 L of home oxygen, type 2 diabetes on insulin, dyslipidemia, hypertension, noncompliance, obesity, chronic respiratory failure with hypercapnia.  He presents emergency department with a chief complaint of shortness of breath.  He lives at home with his sisters and states that he has been feeling poorly and was short of breath today.  While in the emergency department, he was placed on BiPAP and an ABG was performed, pH 7.28, PCO2 52, PO2 84, HCO3 24.5.  He was recently hospitalized on  for the same complaint.  He does not have a home NIPPV machine and refuses to wear it.  He states \"I do not need it\".  During his last hospitalization, his ABG was pH of 7.260, PCO2 54, PO2 149, HCO3 23.7.  He has been admitted to the medical [facility] on BiPAP and pulmonology has been consulted to manage his COPD.  Of note, multiple attempts to have patient follow-up in pulmonary clinic has been attempted.  Patient states that he has no barriers preventing him from attending clinic appointments, he just does not show up for his appointments.      Review of Systems  General:  No Fatigue, No Fever. No weight loss  HEENT: No dysphagia, No Visual Changes, no rhinorrhea  Respiratory:  No Productive cough,no Dyspnea, no phlegm, No Pleuritic Pain, + wheezing, " no hemoptysis  Cardiovascular: Denies chest pain, denies palpitations,+[place], No Chest Pressure  Gastrointestinal:  No Abdominal Pain, No Nausea, No Vomiting, No Diarrhea  Genitourinary:  No Dysuria, No Frequency, No Hesitancy  Musculoskeletal: No muscle pain or swelling  Endocrine:  No Heat Intolerance, No Cold Intolerance  Hematologic:  No Bleeding, No Bruising  Psychiatric:  No Anxiety, No Depression  Neurologic:  No Confusion, no Dysarthria, No Headaches  Skin:  No Rash, No Open Wounds            Objective   Objective     Vitals:   Temp:  [97.3 °F (36.3 °C)-98.8 °F (37.1 °C)] 97.4 °F (36.3 °C)  Heart Rate:  [] 72  Resp:  [18-29] 18  BP: ()/(55-76) 127/76  Flow (L/min):  [3-8] 3  Physical Exam   Vital Signs Reviewed   General:  WDWN, Alert, NAD.    HEENT:  PERRL, EOMI.  OP, nares clear, no sinus tenderness  Neck:  Supple, no JVD, no thyromegaly  Chest:  good aeration, clear to auscultation bilaterally, tympanic to percussion bilaterally, no work of breathing noted  CV: RRR, no MGR, pulses 2+, equal.  Abd:  Soft, NT, ND, + BS, no HSM  EXT:  no clubbing, no cyanosis, no edema  Neuro:  A&Ox3, CN grossly intact, no focal deficits.  Skin: No rashes or lesions noted      Result Review    Result Review:  I have personally reviewed the results from the time of this admission to 11/6/2022 06:08 EST and agree with these findings:  [x]  Laboratory  []  Microbiology  [x]  Radiology  []  EKG/Telemetry   []  Cardiology/Vascular   []  Pathology  []  Old records  []  Other:  Most notable findings include: serum co2 25, Glucose 460, Plt 126    Assessment & Plan   Assessment / Plan     Active Hospital Problems:  Active Hospital Problems    Diagnosis    • Acute respiratory failure with hypoxia (HCC)    • Noncompliance with CPAP treatment          Impression:   COPD  Noncompliance  Hypercarbia  Obesity  Diabetes       Plan:   -Utilize BiPAP at current settings during naps and at nighttime  -Maintain oxygen saturations  greater than 88%, wears home oxygen of 2 L nasal cannula. On home dose currently  -Nebulizers of Brovana, Pulmicort and DuoNebs as ordered  -Will consult respiratory  to assist patient with obtaining home NIPPV machine, despite patient saying he will not wear it, perhaps RT SW can find a device that patient will agree to.   -Will need copious amounts of encouragement, rationalization, and assistance for NIPPV compliance. Patient does not have insight regarding need of NIPPV with sleep and naps.   He will have recurrent hospitalization with not using the nebs and NIPPV.   - Will need outpatient follow up in pulm office and PFT.   -Will benefit from pulm rehab too. Patient refuses such at this time.  -Continue cefepime for urinary tract infection, possible Pseudomonas, tailor antibiotics as cultures result  -Rest of care per primary team       DVT prophylaxis:  Medical DVT prophylaxis orders are present.    CODE STATUS:   Level Of Support Discussed With: Patient  Code Status (Patient has no pulse and is not breathing): CPR (Attempt to Resuscitate)  Medical Interventions (Patient has pulse or is breathing): Full Support      I personally reviewed pertinent labs, imaging and provider notes.   Discussed with bedside nurse and will discuss with primary service.     This visit was performed by BOTH a physician and an APC. I personally evaluated and examined the patient. I performed all aspects of MDM as documented. , I have reviewed and confirmed the accuracy of the patient's history as documented in this note. and I have reexamined the patient and the results are consistent with the previously documented exam. I have updated the documentation as necessary.     Electronically signed by Harrison Bauer MD, 11/06/22, 3:19 PM EST.       Electronically signed by Harrison Bauer MD, 11/6/2022, 06:08 EST.

## 2022-11-06 NOTE — PROGRESS NOTES
[facility]   Hospitalist Progress Note  Date: 2022  Patient Name: Dilip Faulknre  : 1950  MRN: [id number]  Date of admission: 2022  Consultants:   -Pulmonology/Critical Care: Dr. Harrison Bauer    Subjective   Subjective     Chief Complaint: Shortness of breath    Summary:   Dilip Faulkner is a 72 y.o. male with COPD, type 2 diabetes mellitus, dyslipidemia and hypertension who presented with shortness of breath. Eval in ED significant for patient being in respiratory distress. Patient placed on BiPAP. Hospitalist service contacted for further evaluation management. Pulmonology consulted to assist in care.    Interval Followup:   No acute events overnight.  Patient stated feeling his shortness of breath that improved.  He denied any chest pain, shortness of breath, abdominal pain, nausea or vomiting.  Blood glucose level improved overall.  Nursing with no additional acute issues to report.    Antibiotics:   -Cefepime    Review of Systems   All systems reviewed and negative unless stated otherwise under subjective.    Objective   Objective     Vitals:   Temp:  [97.3 °F (36.3 °C)-98.8 °F (37.1 °C)] 97.4 °F (36.3 °C)  Heart Rate:  [] 72  Resp:  [18-29] 18  BP: ()/(55-76) 127/76  Flow (L/min):  [2-8] 2  Physical Exam   Gen: No acute distress, Conversant, Pleasant, sitting up in bed watching TV  HEENT: MMM, Atraumatic  Neck: Supple, Trachea midline  Resp: Poor aeration, equal chest rise bilaterally, diffuse expiratory wheezing noted, equal chest rise bilaterally, normal respiratory effort  Card: RRR, No m/r/g  Abd: Soft, Nontender, Nondistended, + bowel sounds  Ext: No cyanosis, No clubbing  Neuro: CN II-XII grossly intact, No focal deficits appreciated  Psych: AAO x 3, Normal mood, Normal affect    Result Review    Result Review:  I have personally reviewed the results as below and agree with these findings:  []  Laboratory:   CMP    CMP 10/8/22 10/8/22 11/5/22 11/6/22    0816 0816      Glucose  277 (A) 277 (A) 460 (A)   BUN  23 17 17   Creatinine  1.15 1.08 1.08   Sodium  135 (A) 135 (A) 135 (A)   Potassium  4.0 4.0 4.9   Chloride  97 (A) 99 99   Calcium  9.4 8.6 8.4 (A)   Albumin 3.80  4.20 3.40 (A)   Total Bilirubin 0.2  0.3 0.2   Alkaline Phosphatase 75  89 77   AST (SGOT) 14  13 12   ALT (SGPT) 14  12 11   (A) Abnormal value            CBC    CBC 10/8/22 11/5/22 11/6/22   WBC 10.90 (A) 9.64 10.43   RBC 4.92 5.17 4.68   Hemoglobin 12.3 (A) 13.1 11.9 (A)   Hematocrit 41.5 44.9 39.8   MCV 84.3 86.8 85.0   MCH 25.0 (A) 25.3 (A) 25.4 (A)   MCHC 29.6 (A) 29.2 (A) 29.9 (A)   RDW 15.2 15.8 (A) 15.5 (A)   Platelets 209 120 (A) 126 (A)   (A) Abnormal value            [x]  Microbiology: Blood culture (11/05/2022): No growth to date.  COVID-19 (11/05/2022): Not detected.  Strep pneumo and Legionella urinary antigens (11/05/2022): Negative.  Sputum culture (11/05/2022): No growth to date  []  Radiology:   []  EKG/Telemetry:    []  Cardiology/Vascular:    []  Pathology:  []  Old records:  []  Other:    Assessment & Plan   Assessment / Plan     Assessment:  Acute on chronic hypoxemic and hypercapnic respiratory failure  Acute COPD exacerbation  Lactic acidosis  Type 2 diabetes mellitus with hyperglycemia  Dyslipidemia  Essential hypertension  Thrombocytopenia  GERD    Plan:  -Pulmonology/Critical Care consulted and following, appreciate assistance and recommendations in the care of this patient.  -Continue supplemental O2 to maintain sats greater than 90%, wean as tolerated  -Start Brovana, Pulmicort, duo nebs and as needed albuterol  -NIPPV with sleep and naps  -Prednisone 40 mg daily  -Due to concern for possible Pseudomonas continue cefepime  -Start appropriate home medications  -RT case management consulted  -Will monitor electrolytes and renal function with BMP and magnesium level in the AM  -Will monitor WBC and Hgb with CBC in the AM  -Clinical course will dictate further management     DVT  Prophylaxis: Lovenox  GI Prophylaxis: Pantoprazole  Diet: Diabetic  Dispo: PT consulted  Code Status: Full code     Personally reviewed patients labs and imaging, discussed with patient and nurse at bedside. Discussed case with the following consultants: Pulmonology/Critical Care.     Part of this note may be an electronic transcription/translation of spoken language to printed text using the Dragon dictation system.    DVT prophylaxis:  Medical DVT prophylaxis orders are present.    CODE STATUS:   Level Of Support Discussed With: Patient  Code Status (Patient has no pulse and is not breathing): CPR (Attempt to Resuscitate)  Medical Interventions (Patient has pulse or is breathing): Full Support        Electronically signed by Ori Patrick MD, 11/06/22, 7:42 AM EST.

## 2022-11-06 NOTE — PLAN OF CARE
Goal Outcome Evaluation:   Pt stable throughout day. Episode of asymptomatic hypoglycemia (BG at 60). Treated with dextrose and resolved. Vitals stable. Still requiring oxygen. Pt rested most the day and refuses bipap still

## 2022-11-07 LAB
ANION GAP SERPL CALCULATED.3IONS-SCNC: 6.5 MMOL/L (ref 5–15)
BUN SERPL-MCNC: 15 MG/DL (ref 8–23)
BUN/CREAT SERPL: 13.6 (ref 7–25)
CALCIUM SPEC-SCNC: 9.3 MG/DL (ref 8.6–10.5)
CHLORIDE SERPL-SCNC: 99 MMOL/L (ref 98–107)
CO2 SERPL-SCNC: 29.5 MMOL/L (ref 22–29)
CREAT SERPL-MCNC: 1.1 MG/DL (ref 0.76–1.27)
DEPRECATED RDW RBC AUTO: 47.4 FL (ref 37–54)
EGFRCR SERPLBLD CKD-EPI 2021: 70.9 ML/MIN/1.73
ERYTHROCYTE [DISTWIDTH] IN BLOOD BY AUTOMATED COUNT: 15.5 % (ref 12.3–15.4)
GLUCOSE BLDC GLUCOMTR-MCNC: 133 MG/DL (ref 70–99)
GLUCOSE BLDC GLUCOMTR-MCNC: 144 MG/DL (ref 70–99)
GLUCOSE BLDC GLUCOMTR-MCNC: 147 MG/DL (ref 70–99)
GLUCOSE BLDC GLUCOMTR-MCNC: 164 MG/DL (ref 70–99)
GLUCOSE BLDC GLUCOMTR-MCNC: 190 MG/DL (ref 70–99)
GLUCOSE BLDC GLUCOMTR-MCNC: 193 MG/DL (ref 70–99)
GLUCOSE BLDC GLUCOMTR-MCNC: 208 MG/DL (ref 70–99)
GLUCOSE BLDC GLUCOMTR-MCNC: 278 MG/DL (ref 70–99)
GLUCOSE BLDC GLUCOMTR-MCNC: 299 MG/DL (ref 70–99)
GLUCOSE BLDC GLUCOMTR-MCNC: 308 MG/DL (ref 70–99)
GLUCOSE BLDC GLUCOMTR-MCNC: 335 MG/DL (ref 70–99)
GLUCOSE BLDC GLUCOMTR-MCNC: 374 MG/DL (ref 70–99)
GLUCOSE BLDC GLUCOMTR-MCNC: 409 MG/DL (ref 70–99)
GLUCOSE BLDC GLUCOMTR-MCNC: 423 MG/DL (ref 70–99)
GLUCOSE SERPL-MCNC: 375 MG/DL (ref 65–99)
HCT VFR BLD AUTO: 40.6 % (ref 37.5–51)
HGB BLD-MCNC: 12.3 G/DL (ref 13–17.7)
MAGNESIUM SERPL-MCNC: 2.1 MG/DL (ref 1.6–2.4)
MCH RBC QN AUTO: 25.5 PG (ref 26.6–33)
MCHC RBC AUTO-ENTMCNC: 30.3 G/DL (ref 31.5–35.7)
MCV RBC AUTO: 84.1 FL (ref 79–97)
PLATELET # BLD AUTO: 162 10*3/MM3 (ref 140–450)
PMV BLD AUTO: 10.7 FL (ref 6–12)
POTASSIUM SERPL-SCNC: 5.9 MMOL/L (ref 3.5–5.2)
RBC # BLD AUTO: 4.83 10*6/MM3 (ref 4.14–5.8)
SODIUM SERPL-SCNC: 135 MMOL/L (ref 136–145)
WBC NRBC COR # BLD: 8.36 10*3/MM3 (ref 3.4–10.8)

## 2022-11-07 PROCEDURE — 94799 UNLISTED PULMONARY SVC/PX: CPT

## 2022-11-07 PROCEDURE — 25010000002 ENOXAPARIN PER 10 MG: Performed by: INTERNAL MEDICINE

## 2022-11-07 PROCEDURE — 80048 BASIC METABOLIC PNL TOTAL CA: CPT | Performed by: INTERNAL MEDICINE

## 2022-11-07 PROCEDURE — 85027 COMPLETE CBC AUTOMATED: CPT | Performed by: INTERNAL MEDICINE

## 2022-11-07 PROCEDURE — 83735 ASSAY OF MAGNESIUM: CPT | Performed by: INTERNAL MEDICINE

## 2022-11-07 PROCEDURE — 25010000002 FUROSEMIDE PER 20 MG: Performed by: INTERNAL MEDICINE

## 2022-11-07 PROCEDURE — 97161 PT EVAL LOW COMPLEX 20 MIN: CPT

## 2022-11-07 PROCEDURE — 99233 SBSQ HOSP IP/OBS HIGH 50: CPT | Performed by: INTERNAL MEDICINE

## 2022-11-07 PROCEDURE — 63710000001 INSULIN DETEMIR PER 5 UNITS: Performed by: INTERNAL MEDICINE

## 2022-11-07 PROCEDURE — 82962 GLUCOSE BLOOD TEST: CPT

## 2022-11-07 PROCEDURE — 25010000002 CEFEPIME PER 500 MG: Performed by: INTERNAL MEDICINE

## 2022-11-07 PROCEDURE — 63710000001 PREDNISONE PER 1 MG: Performed by: INTERNAL MEDICINE

## 2022-11-07 PROCEDURE — 94760 N-INVAS EAR/PLS OXIMETRY 1: CPT

## 2022-11-07 PROCEDURE — 63710000001 INSULIN LISPRO (HUMAN) PER 5 UNITS: Performed by: INTERNAL MEDICINE

## 2022-11-07 RX ORDER — SODIUM CHLORIDE 9 MG/ML
40 INJECTION, SOLUTION INTRAVENOUS AS NEEDED
Status: DISCONTINUED | OUTPATIENT
Start: 2022-11-07 | End: 2022-11-09 | Stop reason: HOSPADM

## 2022-11-07 RX ORDER — SODIUM CHLORIDE 0.9 % (FLUSH) 0.9 %
10 SYRINGE (ML) INJECTION EVERY 12 HOURS SCHEDULED
Status: DISCONTINUED | OUTPATIENT
Start: 2022-11-07 | End: 2022-11-09 | Stop reason: HOSPADM

## 2022-11-07 RX ORDER — FUROSEMIDE 10 MG/ML
40 INJECTION INTRAMUSCULAR; INTRAVENOUS ONCE
Status: COMPLETED | OUTPATIENT
Start: 2022-11-07 | End: 2022-11-07

## 2022-11-07 RX ORDER — INSULIN LISPRO 100 [IU]/ML
2-9 INJECTION, SOLUTION INTRAVENOUS; SUBCUTANEOUS
Status: DISCONTINUED | OUTPATIENT
Start: 2022-11-07 | End: 2022-11-07

## 2022-11-07 RX ORDER — NICOTINE POLACRILEX 4 MG
15 LOZENGE BUCCAL
Status: DISCONTINUED | OUTPATIENT
Start: 2022-11-07 | End: 2022-11-07

## 2022-11-07 RX ORDER — NICOTINE POLACRILEX 4 MG
15 LOZENGE BUCCAL
Status: DISCONTINUED | OUTPATIENT
Start: 2022-11-07 | End: 2022-11-08 | Stop reason: SDUPTHER

## 2022-11-07 RX ORDER — DEXTROSE MONOHYDRATE 25 G/50ML
10-50 INJECTION, SOLUTION INTRAVENOUS
Status: DISCONTINUED | OUTPATIENT
Start: 2022-11-07 | End: 2022-11-08 | Stop reason: SDUPTHER

## 2022-11-07 RX ORDER — SODIUM CHLORIDE 0.9 % (FLUSH) 0.9 %
10 SYRINGE (ML) INJECTION AS NEEDED
Status: DISCONTINUED | OUTPATIENT
Start: 2022-11-07 | End: 2022-11-09 | Stop reason: HOSPADM

## 2022-11-07 RX ORDER — DEXTROSE MONOHYDRATE 25 G/50ML
25 INJECTION, SOLUTION INTRAVENOUS
Status: DISCONTINUED | OUTPATIENT
Start: 2022-11-07 | End: 2022-11-07

## 2022-11-07 RX ADMIN — PANTOPRAZOLE SODIUM 40 MG: 40 TABLET, DELAYED RELEASE ORAL at 06:06

## 2022-11-07 RX ADMIN — QUETIAPINE FUMARATE 25 MG: 25 TABLET ORAL at 21:15

## 2022-11-07 RX ADMIN — Medication 10 ML: at 21:15

## 2022-11-07 RX ADMIN — METOPROLOL TARTRATE 25 MG: 25 TABLET, FILM COATED ORAL at 09:26

## 2022-11-07 RX ADMIN — BUDESONIDE 0.5 MG: 0.5 INHALANT ORAL at 06:29

## 2022-11-07 RX ADMIN — ATORVASTATIN CALCIUM 10 MG: 10 TABLET, FILM COATED ORAL at 21:15

## 2022-11-07 RX ADMIN — Medication 10 ML: at 09:26

## 2022-11-07 RX ADMIN — SODIUM ZIRCONIUM CYCLOSILICATE 5 G: 5 POWDER, FOR SUSPENSION ORAL at 10:26

## 2022-11-07 RX ADMIN — Medication 10 ML: at 12:11

## 2022-11-07 RX ADMIN — IPRATROPIUM BROMIDE AND ALBUTEROL SULFATE 3 ML: 2.5; .5 SOLUTION RESPIRATORY (INHALATION) at 12:00

## 2022-11-07 RX ADMIN — ARFORMOTEROL TARTRATE 15 MCG: 15 SOLUTION RESPIRATORY (INHALATION) at 18:23

## 2022-11-07 RX ADMIN — INSULIN HUMAN 7.3 UNITS/HR: 1 INJECTION, SOLUTION INTRAVENOUS at 12:09

## 2022-11-07 RX ADMIN — BUDESONIDE 0.5 MG: 0.5 INHALANT ORAL at 18:23

## 2022-11-07 RX ADMIN — IPRATROPIUM BROMIDE AND ALBUTEROL SULFATE 3 ML: 2.5; .5 SOLUTION RESPIRATORY (INHALATION) at 18:23

## 2022-11-07 RX ADMIN — CEFEPIME 2 G: 1 INJECTION, SOLUTION INTRAVENOUS at 21:15

## 2022-11-07 RX ADMIN — CEFEPIME 2 G: 1 INJECTION, SOLUTION INTRAVENOUS at 05:25

## 2022-11-07 RX ADMIN — CEFEPIME 2 G: 1 INJECTION, SOLUTION INTRAVENOUS at 15:11

## 2022-11-07 RX ADMIN — ASPIRIN 81 MG 81 MG: 81 TABLET ORAL at 09:26

## 2022-11-07 RX ADMIN — ENOXAPARIN SODIUM 40 MG: 100 INJECTION SUBCUTANEOUS at 09:25

## 2022-11-07 RX ADMIN — IPRATROPIUM BROMIDE AND ALBUTEROL SULFATE 3 ML: 2.5; .5 SOLUTION RESPIRATORY (INHALATION) at 06:29

## 2022-11-07 RX ADMIN — INSULIN DETEMIR 15 UNITS: 100 INJECTION, SOLUTION SUBCUTANEOUS at 09:25

## 2022-11-07 RX ADMIN — ARFORMOTEROL TARTRATE 15 MCG: 15 SOLUTION RESPIRATORY (INHALATION) at 06:29

## 2022-11-07 RX ADMIN — FUROSEMIDE 40 MG: 10 INJECTION, SOLUTION INTRAMUSCULAR; INTRAVENOUS at 09:32

## 2022-11-07 RX ADMIN — PREDNISONE 40 MG: 20 TABLET ORAL at 09:26

## 2022-11-07 RX ADMIN — INSULIN LISPRO 9 UNITS: 100 INJECTION, SOLUTION INTRAVENOUS; SUBCUTANEOUS at 09:32

## 2022-11-07 NOTE — PLAN OF CARE
Goal Outcome Evaluation:  Plan of Care Reviewed With: (P) patient           Outcome Evaluation: (P) Pt presents with ambulation, transfer, and balance deficits. Skilled physical therapy is necessary to address these deficits in order for pt to ambulate safely and optimize pt function. Reccomendation home with home health when medically released.

## 2022-11-07 NOTE — PROGRESS NOTES
[facility]   Hospitalist Progress Note  Date: 2022  Patient Name: Dilip Faulkner  : 1950  MRN: [id number]  Date of admission: 2022  Consultants:   -Pulmonology/Critical Care: Dr. Harrison Bauer    Subjective   Subjective     Chief Complaint: Shortness of breath    Summary:   Dilip Faulkner is a 72 y.o. male with COPD, type 2 diabetes mellitus, dyslipidemia and hypertension who presented with shortness of breath. Eval in ED significant for patient being in respiratory distress. Patient placed on BiPAP. Hospitalist service contacted for further evaluation management. Pulmonology consulted to assist in care.  Nebulizer breathing treatments and steroids initiated.  Issues with glucose control during admission, insulin drip initiated.    Interval Followup:   No acute events overnight.  Patient stated feeling his breathing has improved some.  Blood glucose elevated and very difficult to control.  Patient denied any chest pain, abdominal pain, nausea or vomit.  Nursing with no additional acute issues to report.    Antibiotics:   -Cefepime    Review of Systems   All systems reviewed and negative unless stated otherwise under subjective.    Objective   Objective     Vitals:   Temp:  [97.2 °F (36.2 °C)-98 °F (36.7 °C)] 97.3 °F (36.3 °C)  Heart Rate:  [62-77] 70  Resp:  [18-20] 18  BP: (106-160)/(53-89) 160/85  Flow (L/min):  [2.5-4] 2.5  Physical Exam   Gen: No acute distress, Conversant, Pleasant, sitting up in bed resting comfortably, easily arousable  HEENT: MMM, Atraumatic  Neck: Supple, Trachea midline  Resp: Improved aeration, equal chest rise bilaterally, diffuse expiratory wheezing, no increase in work of breathing, equal chest rise bilaterally  Card: RRR, No m/r/g  Abd: Soft, Nontender, Nondistended, + bowel sounds  Ext: No cyanosis, No clubbing  Neuro: CN II-XII grossly intact, No focal deficits appreciated  Psych: AAO x 3, Normal mood, Normal affect    Result Review    Result Review:  I have  personally reviewed the results as below and agree with these findings:  []  Laboratory:   CMP    CMP 11/5/22 11/6/22 11/7/22   Glucose 277 (A) 460 (A) 375 (A)   BUN 17 17 15   Creatinine 1.08 1.08 1.10   Sodium 135 (A) 135 (A) 135 (A)   Potassium 4.0 4.9 5.9 (A)   Chloride 99 99 99   Calcium 8.6 8.4 (A) 9.3   Albumin 4.20 3.40 (A)    Total Bilirubin 0.3 0.2    Alkaline Phosphatase 89 77    AST (SGOT) 13 12    ALT (SGPT) 12 11    (A) Abnormal value            CBC    CBC 11/5/22 11/6/22 11/7/22   WBC 9.64 10.43 8.36   RBC 5.17 4.68 4.83   Hemoglobin 13.1 11.9 (A) 12.3 (A)   Hematocrit 44.9 39.8 40.6   MCV 86.8 85.0 84.1   MCH 25.3 (A) 25.4 (A) 25.5 (A)   MCHC 29.2 (A) 29.9 (A) 30.3 (A)   RDW 15.8 (A) 15.5 (A) 15.5 (A)   Platelets 120 (A) 126 (A) 162   (A) Abnormal value            [x]  Microbiology: Blood culture (11/05/2022): No growth to date. Sputum culture (11/05/2022): No growth to date  []  Radiology:   [x]  EKG/Telemetry: Sinus rhythm. No acute events.  []  Cardiology/Vascular:    []  Pathology:  []  Old records:  []  Other:    Assessment & Plan   Assessment / Plan     Assessment:  Acute on chronic hypoxemic and hypercapnic respiratory failure  Acute COPD exacerbation  Lactic acidosis  Hyperkalemia, new  Type 2 diabetes mellitus with hyperglycemia  Dyslipidemia  Essential hypertension  Thrombocytopenia  GERD    Plan:  -Pulmonology/Critical Care consulted and following, appreciate assistance and recommendations in the care of this patient.  -Continue supplemental O2 to maintain sats greater than 90%, wean as tolerated  -Continue Brovana, Pulmicort, duo nebs and as needed albuterol  -NIPPV with sleep and naps  -Continue prednisone 40 mg daily  -Due to concern for possible Pseudomonas continue cefepime  -IV Lasix 40 mg x 1 ordered.  -Dose of Lokelma ordered  -Blood glucose continues to be difficult to control.  Will start patient on insulin drip, plan to continue for at least 24 hours to monitor requirement and  then will adjust insulin regimen accordingly.  -Continue appropriate home medications  -RT case management consulted  -Monitor electrolytes and renal function with BMP and magnesium level in the AM  -Monitor WBC and Hgb with CBC in the AM  -Clinical course will dictate further management     DVT Prophylaxis: Lovenox  GI Prophylaxis: Pantoprazole  Diet: Diabetic/low potassium  Dispo: PT consulted  Code Status: Full code     Personally reviewed patients labs and imaging, discussed with patient and nurse at bedside. Discussed case with the following consultants: Pulmonology/Critical Care.     Part of this note may be an electronic transcription/translation of spoken language to printed text using the Dragon dictation system.    DVT prophylaxis:  Medical DVT prophylaxis orders are present.    CODE STATUS:   Level Of Support Discussed With: Patient  Code Status (Patient has no pulse and is not breathing): CPR (Attempt to Resuscitate)  Medical Interventions (Patient has pulse or is breathing): Full Support      Electronically signed by Ori Patrick MD, 11/07/22, 7:40 AM EST.

## 2022-11-07 NOTE — THERAPY EVALUATION
Acute Care - Physical Therapy Initial Evaluation   [place]     Patient Name: Dilip Faulkner  : 1950  MRN: [id number]  Today's Date: 2022      Visit Dx:     ICD-10-CM ICD-9-CM   1. Acute respiratory failure with hypoxia (HCC)  J96.01 518.81   2. Acute exacerbation of chronic obstructive pulmonary disease (COPD) (HCC)  J44.1 491.21   3. Difficulty walking  R26.2 719.7     Patient Active Problem List   Diagnosis   • Chronic obstructive pulmonary disease with acute exacerbation (HCC)   • Acute on chronic respiratory failure with hypoxia and hypercapnia (HCC)   • Type 2 diabetes mellitus (HCC)   • Acute on chronic respiratory failure (HCC)   • Essential hypertension   • Hyperlipidemia   • Cough   • Pneumonia due to infectious organism   • COPD with acute exacerbation (HCC)   • Obesity (BMI 30-39.9)   • Right bundle branch block   • Acquired ptosis of eyelid, bilateral   • Type 2 diabetes mellitus with hyperglycemia, with long-term current use of insulin (HCC)   • Acute exacerbation of chronic obstructive pulmonary disease (COPD) (HCC)   • Respiratory failure with hypoxia (HCC)   • Gastroesophageal reflux disease without esophagitis   • COPD (chronic obstructive pulmonary disease) (HCC)   • Acute respiratory failure with hypoxia (HCC)   • Noncompliance with CPAP treatment     Past Medical History:   Diagnosis Date   • Asthma    • COPD (chronic obstructive pulmonary disease) (HCC)    • Diabetes mellitus (HCC)    • Hernia, umbilical    • Hypertension    • Requires continuous at home supplemental oxygen      Past Surgical History:   Procedure Laterality Date   • ABDOMINAL SURGERY       PT Assessment (last 12 hours)     PT Evaluation and Treatment     Row Name 22 1300          Physical Therapy Time and Intention    Subjective Information no complaints (P)   -AD     Document Type evaluation (P)   -AD     Mode of Treatment individual therapy;physical therapy (P)   -AD     Patient Effort good (P)   -AD      Symptoms Noted During/After Treatment none (P)   -AD     Row Name 11/07/22 1300          General Information    Patient Profile Reviewed yes (P)   -AD     Prior Level of Function independent:;all household mobility;community mobility;gait;transfer (P)   -AD     Equipment Currently Used at Home oxygen (P)   2L  -AD     Benefits Reviewed patient:;improve function;increase independence;increase strength (P)   -AD     Row Name 11/07/22 1300          Range of Motion (ROM)    Range of Motion ROM is WFL;bilateral lower extremities (P)   -AD     Row Name 11/07/22 1300          Strength (Manual Muscle Testing)    Strength (Manual Muscle Testing) strength is WFL;bilateral lower extremities (P)   -AD     Row Name 11/07/22 1300          Bed Mobility    Bed Mobility rolling left (P)   -AD     Rolling Left [place] (Bed Mobility) standby assist (P)   -AD     Assistive Device (Bed Mobility) bed rails (P)   -AD     Row Name 11/07/22 1300          Transfers    Transfers sit-stand transfer;stand-sit transfer (P)   -AD     Row Name 11/07/22 1300          Sit-Stand Transfer    Sit-Stand [place] (Transfers) standby assist (P)   -AD     Assistive Device (Sit-Stand Transfers) walker, front-wheeled (P)   -AD     Row Name 11/07/22 1300          Stand-Sit Transfer    Stand-Sit [place] (Transfers) standby assist (P)   -AD     Assistive Device (Stand-Sit Transfers) walker, front-wheeled (P)   -AD     Row Name 11/07/22 1300          Gait/Stairs (Locomotion)    Gait/Stairs Locomotion gait/ambulation independence;gait/ambulation assistive device (P)   -AD     [place] Level (Gait) contact guard (P)   -AD     Assistive Device (Gait) walker, front-wheeled (P)   -AD     Distance in Feet (Gait) 25 (P)   -AD     Pattern (Gait) 3-point;step-to (P)   -AD     Row Name 11/07/22 1300          Balance    Balance Assessment sitting dynamic balance;standing dynamic balance (P)   -AD     Dynamic Sitting Balance standby assist (P)   -AD      Position, Sitting Balance unsupported;sitting edge of bed (P)   -AD     Dynamic Standing Balance contact guard (P)   -AD     Position/Device Used, Standing Balance walker, front-wheeled (P)   -AD     Row Name 11/07/22 1300          Plan of Care Review    Plan of Care Reviewed With patient (P)   -AD     Outcome Evaluation Pt presents with ambulation, transfer, and balance deficits. Skilled physical therapy is necessary to address these deficits in order for pt to ambulate safely and optimize pt function. Reccomendation home with home health when medically released. (P)   -AD     Row Name 11/07/22 1300          Therapy Assessment/Plan (PT)    Criteria for Skilled Interventions Met (PT) yes;skilled treatment is necessary (P)   -AD     Therapy Frequency (PT) daily (P)   -AD     Problem List (PT) balance;coordination;mobility;strength (P)   -AD     Row Name 11/07/22 1300          PT Evaluation Complexity    History, PT Evaluation Complexity 1-2 personal factors and/or comorbidities (P)   -AD     Examination of Body Systems (PT Eval Complexity) total of 4 or more elements (P)   -AD     Clinical Presentation (PT Evaluation Complexity) stable (P)   -AD     Clinical Decision Making (PT Evaluation Complexity) low complexity (P)   -AD     Overall Complexity (PT Evaluation Complexity) low complexity (P)   -AD     Row Name 11/07/22 1300          Therapy Plan Review/Discharge Plan (PT)    Therapy Plan Review (PT) evaluation/treatment results reviewed;patient (P)   -AD     Row Name 11/07/22 1300          Physical Therapy Goals    Transfer Goal Selection (PT) transfer, PT goal 1 (P)   -AD     Gait Training Goal Selection (PT) gait training, PT goal 1 (P)   -AD     Balance Goal Selection (PT) balance, PT goal 1 (P)   -AD     Row Name 11/07/22 1300          Transfer Goal 1 (PT)    Activity/Assistive Device (Transfer Goal 1, PT) sit-to-stand/stand-to-sit (P)   -AD     [place] Level/Cues Needed (Transfer Goal 1, PT) independent  (P)   -AD     Time Frame (Transfer Goal 1, PT) 10 days (P)   -AD     Row Name 11/07/22 1300          Gait Training Goal 1 (PT)    Activity/Assistive Device (Gait Training Goal 1, PT) gait (walking locomotion);assistive device use (P)   -AD     [place] Level (Gait Training Goal 1, PT) modified independence (P)   -AD     Distance (Gait Training Goal 1, PT) 200 (P)   -AD     Time Frame (Gait Training Goal 1, PT) 10 days (P)   -AD     Row Name 11/07/22 1300          Balance Goal 1 (PT)    Activity/Assistive Device (Balance Goal 1, PT) standing, dynamic (P)   -AD     [place] Level/Cues Needed (Balance Goal 1, PT) conditional independence (P)   -AD     Time Frame (Balance Goal 1, PT) 10 days (P)   -AD           User Key  (r) = Recorded By, (t) = Taken By, (c) = Cosigned By    Initials Name Provider Type    AD Uli Perez, PT Student PT Student                Physical Therapy Education     Title: PT OT SLP Therapies (In Progress)     Topic: Physical Therapy (In Progress)     Point: Mobility training (Done)     Learning Progress Summary           Patient Acceptance, E,TB, VU by AD at 11/7/2022 1343                   Point: Home exercise program (Not Started)     Learner Progress:  Not documented in this visit.          Point: Body mechanics (Done)     Learning Progress Summary           Patient Acceptance, E,TB, VU by AD at 11/7/2022 1343                   Point: Precautions (Done)     Learning Progress Summary           Patient Acceptance, E,TB, VU by AD at 11/7/2022 1343                               User Key     Initials Effective Dates Name Provider Type Discipline     10/18/22 -  Uli Perez, PT Student PT Student PT              PT Recommendation and Plan  Anticipated Discharge Disposition (PT): (P) home with home health  Planned Therapy Interventions (PT): (P) balance training, gait training, bed mobility training, neuromuscular re-education, motor coordination training, strengthening, transfer  training  Therapy Frequency (PT): (P) daily  Plan of Care Reviewed With: (P) patient  Outcome Evaluation: (P) Pt presents with ambulation, transfer, and balance deficits. Skilled physical therapy is necessary to address these deficits in order for pt to ambulate safely and optimize pt function. Reccomendation home with home health when medically released.   Outcome Measures     Row Name 11/07/22 1300             How much help from another person do you currently need...    Turning from your back to your side while in flat bed without using bedrails? 4 (P)   -AD      Moving from lying on back to sitting on the side of a flat bed without bedrails? 4 (P)   -AD      Moving to and from a bed to a chair (including a wheelchair)? 4 (P)   -AD      Standing up from a chair using your arms (e.g., wheelchair, bedside chair)? 4 (P)   -AD      Climbing 3-5 steps with a railing? 3 (P)   -AD      To walk in hospital room? 3 (P)   -AD      AM-PAC 6 Clicks Score (PT) 22 (P)   -AD         Functional Assessment    Outcome Measure Options AM-PAC 6 Clicks Basic Mobility (PT) (P)   -AD            User Key  (r) = Recorded By, (t) = Taken By, (c) = Cosigned By    Initials Name Provider Type    Uli Huffman PT Student PT Student                 Time Calculation:    PT Charges     Row Name 11/07/22 1343             Time Calculation    PT Received On 11/07/22 (P)   -AD      PT Goal Re-Cert Due Date 11/16/22 (P)   -AD         Untimed Charges    PT Eval/Re-eval Minutes 35 (P)   -AD         Total Minutes    Untimed Charges Total Minutes 35 (P)   -AD       Total Minutes 35 (P)   -AD            User Key  (r) = Recorded By, (t) = Taken By, (c) = Cosigned By    Initials Name Provider Type    Uli Huffman, PT Student PT Student              Therapy Charges for Today     Code Description Service Date Service Provider Modifiers Qty    [id number] HC PT EVAL LOW COMPLEXITY 3 11/7/2022 Uli Perez, PT Student GP 1          PT G-Codes  Outcome  Measure Options: (P) AM-PAC 6 Clicks Basic Mobility (PT)  AM-PAC 6 Clicks Score (PT): (P) 22    Uli Perez, PT Student  11/7/2022

## 2022-11-07 NOTE — PLAN OF CARE
Problem: Adult Inpatient Plan of Care  Goal: Plan of Care Review  Outcome: Ongoing, Progressing  Flowsheets (Taken 11/7/2022 1700)  Progress: improving  Plan of Care Reviewed With: patient  Outcome Evaluation: Pt alert and oriented throughout shift. Nurse educated pt on use of bipap and pt continues to refuse bipap. Insulin drip started per protocol, pt has been NPO since start of insulin drip. VSS. Continuing to monitor and provide patient care.   Goal Outcome Evaluation:  Plan of Care Reviewed With: patient        Progress: improving  Outcome Evaluation: Pt alert and oriented throughout shift. Nurse educated pt on use of bipap and pt continues to refuse bipap. Insulin drip started per protocol, pt has been NPO since start of insulin drip. VSS. Continuing to monitor and provide patient care.

## 2022-11-07 NOTE — PLAN OF CARE
Problem: Adult Inpatient Plan of Care  Goal: Plan of Care Review  Outcome: Ongoing, Progressing  Flowsheets (Taken 11/7/2022 0411)  Progress: no change  Plan of Care Reviewed With: patient  Outcome Evaluation: Pt refuses to use Bipap at bedtime or naping. O2 weaned to 2.5L and tolerating well. VSS, BS are controlled this shift. Pt has transitioned to Glucommander SubQ for control of blood sugar. At this time, resting with eyes shut. Continue with POC.  Goal: Patient-Specific Goal (Individualized)  Outcome: Ongoing, Progressing  Goal: Absence of Hospital-Acquired Illness or Injury  Outcome: Ongoing, Progressing  Intervention: Identify and Manage Fall Risk  Recent Flowsheet Documentation  Taken 11/7/2022 0400 by Thorn, Erinne, RN  Safety Promotion/Fall Prevention: safety round/check completed  Taken 11/7/2022 0200 by Thorn, Erinne, RN  Safety Promotion/Fall Prevention: safety round/check completed  Taken 11/7/2022 0000 by Thorn, Erinne, RN  Safety Promotion/Fall Prevention: safety round/check completed  Taken 11/6/2022 2200 by Thorn, Erinne, RN  Safety Promotion/Fall Prevention: safety round/check completed  Taken 11/6/2022 2000 by Thorn, Erinne, RN  Safety Promotion/Fall Prevention: safety round/check completed  Intervention: Prevent Skin Injury  Recent Flowsheet Documentation  Taken 11/6/2022 1940 by Thorn, Erinne, RN  Body Position:   position changed independently   sitting up in bed  Skin Protection:   adhesive use limited   tubing/devices free from skin contact  Intervention: Prevent and Manage VTE (Venous Thromboembolism) Risk  Recent Flowsheet Documentation  Taken 11/6/2022 1940 by Thorn, Erinne, RN  Activity Management:   activity adjusted per tolerance   activity encouraged   ambulated in room   back to bed  VTE Prevention/Management: (see MAR) other (see comments)  Range of Motion: active ROM (range of motion) encouraged  Intervention: Prevent Infection  Recent Flowsheet Documentation  Taken 11/7/2022  0400 by Thorn, Erinne, RN  Infection Prevention:   single patient room provided   rest/sleep promoted   hand hygiene promoted  Taken 11/7/2022 0200 by Thorn, Erinne, RN  Infection Prevention:   single patient room provided   rest/sleep promoted   hand hygiene promoted  Taken 11/7/2022 0000 by Thorn, Erinne, RN  Infection Prevention:   single patient room provided   rest/sleep promoted   hand hygiene promoted  Taken 11/6/2022 2200 by Thorn, Erinne, RN  Infection Prevention:   single patient room provided   rest/sleep promoted   hand hygiene promoted  Taken 11/6/2022 2000 by Thorn, Erinne, RN  Infection Prevention:   single patient room provided   rest/sleep promoted   hand hygiene promoted  Goal: Optimal Comfort and Wellbeing  Outcome: Ongoing, Progressing  Intervention: Provide Person-Centered Care  Recent Flowsheet Documentation  Taken 11/6/2022 1940 by Thorn, Erinne, RN  Trust Relationship/Rapport:   thoughts/feelings acknowledged   reassurance provided  Goal: Readiness for Transition of Care  Outcome: Ongoing, Progressing     Problem: Skin Injury Risk Increased  Goal: Skin Health and Integrity  Outcome: Ongoing, Progressing  Intervention: Optimize Skin Protection  Recent Flowsheet Documentation  Taken 11/6/2022 1940 by Thorn, Erinne, RN  Pressure Reduction Techniques: frequent weight shift encouraged  Head of Bed (HOB) Positioning:   HOB elevated   HOB at 45 degrees  Pressure Reduction Devices:   pressure-redistributing mattress utilized   positioning supports utilized  Skin Protection:   adhesive use limited   tubing/devices free from skin contact     Problem: Gas Exchange Impaired  Goal: Optimal Gas Exchange  Outcome: Ongoing, Progressing  Intervention: Optimize Oxygenation and Ventilation  Recent Flowsheet Documentation  Taken 11/6/2022 1940 by Thorn, Erinne, RN  Head of Bed (HOB) Positioning:   HOB elevated   HOB at 45 degrees     Problem: [place]  Goal: Collaboration with the Healthcare Team  Outcome:  Ongoing, Progressing  Intervention: [place]  Recent Flowsheet Documentation  Taken 11/6/2022 1940 by Thorn, Erinne, RN  Family/Support System Care: self-care encouraged     Problem: Adjustment to Illness (Sepsis/Septic Shock)  Goal: Optimal Coping  Outcome: Ongoing, Progressing  Intervention: Optimize Psychosocial Adjustment to Illness  Recent Flowsheet Documentation  Taken 11/6/2022 1940 by Thorn, Erinne, RN  Family/Support System Care: self-care encouraged     Problem: Bleeding (Sepsis/Septic Shock)  Goal: Absence of Bleeding  Outcome: Ongoing, Progressing     Problem: Glycemic Control Impaired (Sepsis/Septic Shock)  Goal: Blood Glucose Level Within Desired Range  Outcome: Ongoing, Progressing  Intervention: Optimize Glycemic Control  Recent Flowsheet Documentation  Taken 11/6/2022 1940 by Thorn, Erinne, RN  Glycemic Management:   blood glucose monitored   insulin dose matched to carbohydrate intake   supplemental insulin given     Problem: Infection Progression (Sepsis/Septic Shock)  Goal: Absence of Infection Signs and Symptoms  Outcome: Ongoing, Progressing  Intervention: Initiate Sepsis Management  Recent Flowsheet Documentation  Taken 11/7/2022 0400 by Thorn, Erinne, RN  Infection Prevention:   single patient room provided   rest/sleep promoted   hand hygiene promoted  Taken 11/7/2022 0200 by Thorn, Erinne, RN  Infection Prevention:   single patient room provided   rest/sleep promoted   hand hygiene promoted  Taken 11/7/2022 0000 by Thorn, Erinne, RN  Infection Prevention:   single patient room provided   rest/sleep promoted   hand hygiene promoted  Taken 11/6/2022 2200 by Thorn, Erinne, RN  Infection Prevention:   single patient room provided   rest/sleep promoted   hand hygiene promoted  Taken 11/6/2022 2000 by Thorn, Erinne, RN  Infection Prevention:   single patient room provided   rest/sleep promoted   hand hygiene promoted  Intervention: Promote Recovery  Recent Flowsheet Documentation  Taken  11/6/2022 1940 by Thorn, Erinne, RN  Activity Management:   activity adjusted per tolerance   activity encouraged   ambulated in room   back to bed     Problem: Nutrition Impaired (Sepsis/Septic Shock)  Goal: Optimal Nutrition Intake  Outcome: Ongoing, Progressing   Goal Outcome Evaluation:  Plan of Care Reviewed With: patient        Progress: no change  Outcome Evaluation: Pt refuses to use Bipap at bedtime or naping. O2 weaned to 2.5L and tolerating well. VSS, BS are controlled this shift. Pt has transitioned to Glucommander SubQ for control of blood sugar. At this time, resting with eyes shut. Continue with POC.

## 2022-11-07 NOTE — PROGRESS NOTES
Pulmonary / Critical Care Progress Note      Patient Name: Dilip Faulkner  : 1950  MRN: [id number]  Primary Care Physician:  Rosalba Edwards, APRN  Date of admission: 2022    Subjective   Subjective   Follow-up for shortness of breath, end stage COPD.      No acute events overnight. Refused NIPPV.    This morning,   Sitting up in bed on 2 L NC  Feels breathing is near baseline  Nonproductive cough  Blood sugars elevated so steroids stopped yesterday  Blood sugars improving  Continues to refuse NIPPV for home  No chest pain  No fever or chills  Weak and fatigued  Wants to go home    Review of Systems  General:  + Fatigue, No Fever. No weight loss  HEENT: No dysphagia, No Visual Changes, no rhinorrhea  Respiratory:  No Productive cough, improving Dyspnea, no phlegm, No Pleuritic Pain, + wheezing improving, no hemoptysis  Cardiovascular: Denies chest pain, denies palpitations, + ALBARRAN, No Chest Pressure  Gastrointestinal:  No Abdominal Pain, No Nausea, No Vomiting, No Diarrhea  Genitourinary:  No Dysuria, No Frequency, No Hesitancy  Musculoskeletal: No muscle pain or swelling    Objective   Objective     Vitals:   Temp:  [97.2 °F (36.2 °C)-98 °F (36.7 °C)] 97.3 °F (36.3 °C)  Heart Rate:  [62-77] 70  Resp:  [18-20] 18  BP: (106-160)/(53-89) 160/85  Flow (L/min):  [2.5-4] 2.5    Physical Exam   Vital Signs Reviewed   General:  WDWN male, Awake and Alert, NAD on 2 L NC    HEENT:  PERRL, EOMI.  OP, nares clear, no sinus tenderness  Neck:  Supple, no JVD, no thyromegaly  Chest:  good aeration, improving wheezing, tympanic to percussion bilaterally, no work of breathing noted  CV: RRR, no MGR, pulses 2+, equal  Abd:  Soft, NT, ND, + BS, no HSM  EXT:  no clubbing, no cyanosis, no edema  Neuro:  A&Ox3, CN grossly intact, no focal deficits  Skin: No rashes or lesions noted    Result Review    Result Review:  I have personally reviewed the results from the time of this admission to 2022 07:30 EST and agree  with these findings:  [x]  Laboratory  [x]  Microbiology  [x]  Radiology  []  EKG/Telemetry   []  Cardiology/Vascular   []  Pathology  []  Old records  []  Other:  Most notable findings include: WBC 8.36, magnesium 2.1, potassium 5.9, creatinine 1.10    Assessment & Plan   Assessment / Plan     Active Hospital Problems:  Active Hospital Problems    Diagnosis    • Acute respiratory failure with hypoxia (HCC)    • Noncompliance with CPAP treatment      Impression:   Acute exacerbation of end-stage COPD  Acute on chronic hypercapnic respiratory failure requiring NIPPV  Noncompliance with home NIPPV  CO2 narcosis  Concern for UTI of unspecified organism  Obesity: BMI 32.31  Diabetes     Plan:   -Continue wean O2 to keep sats greater than 90%.  On baseline home O2 of 2 L nasal cannula.  -Continue NIPPV at night and as needed days.  At this time patient is adamantly stating he will not wear NIPPV here or at home.  Risk of not wearing NIPPV have been discussed and patient is aware that readmissions and death are likely.  -Steroids have been discontinued for hyperglycemia.  -Continue Brovana, Pulmicort and DuoNebs.  -Will need to discharge home on Trelegy 200 daily and as needed DuoNebs.  Consult RT  to check on cost.  -Continue bronchopulmonary hygiene.  Encourage I-S and flutter valve.  -Continue cefepime for possible UTI, day 3.  Cultures have remained negative.  -Encourage activity.  Out of bed to chair.  - Will need follow up in COPD clinic on Thursday November 10 at 0930.  - Will need outpatient PFTs.  - Would benefit from pulmonary rehab but at this time patient refuses.  -Has issues with noncompliance.     DVT prophylaxis:  Medical DVT prophylaxis orders are present.    CODE STATUS:   Level Of Support Discussed With: Patient  Code Status (Patient has no pulse and is not breathing): CPR (Attempt to Resuscitate)  Medical Interventions (Patient has pulse or is breathing): Full Support    I personally  reviewed pertinent labs, imaging and provider notes.   Discussed with bedside nurse and will discuss with primary service.     Electronically signed by MONE Mcbride, 11/07/22, 10:18 AM EST.    This visit was performed by BOTH a physician and an APC. I personally evaluated and examined the patient. I performed all aspects of MDM as documented. , I have reviewed and confirmed the accuracy of the patient's history as documented in this note. and I have reexamined the patient and the results are consistent with the previously documented exam. I have updated the documentation as necessary.     Electronically signed by Harrison Bauer MD, 11/07/22, 12:45 PM EST.   Electronically signed by Harrison Bauer MD, 11/7/2022, 07:30 EST.

## 2022-11-07 NOTE — CASE MANAGEMENT/SOCIAL WORK
RT cm consulted to arrange home NIPPV for home oxygen dependent patient with history of COPD.  Mr Faulkner is well known to case management staff for chronic hospital readmissions related to respiratory failure. This admission will be Mr Faulkner's 14th admission since July 2021.  This admission Mr Faulkner agrees to try NIV with nasal pillows.  RT CM has [facility] to see if bilevel can be set up without proof of home being treated for insects. Ritika RAWLS, with [employer] states bilevel can be set up with proof, but NIV with [employer] will need proof of treatment by professional .  RT CM will place order for overnight pulse oximetry to see if patient can meet critieria for home bilevel.  RT CM provided Mr Faulkner with written instruction and demonstration on how to use Trelegy inhaler.  Mr Faulkner states he has a nebulizer and has been using it at least once daily.  RT CM reminded Mr Faulkner that rescue medication like albuterol or duoneb can be used in the nebulizer every four hours.  RT CM will follow up after overnight has been completed.      Mr Faulkner had an overnight oximetry (10/6/2022 ordered last admission with 11 min desaturation on 4L oxygen.  This can be used to qualify for bilevel with ABG and statement of CPAP rule out.

## 2022-11-08 LAB
ANION GAP SERPL CALCULATED.3IONS-SCNC: 15.1 MMOL/L (ref 5–15)
BACTERIA SPEC RESP CULT: NORMAL
BUN SERPL-MCNC: 20 MG/DL (ref 8–23)
BUN/CREAT SERPL: 16.9 (ref 7–25)
CALCIUM SPEC-SCNC: 9.4 MG/DL (ref 8.6–10.5)
CHLORIDE SERPL-SCNC: 98 MMOL/L (ref 98–107)
CO2 SERPL-SCNC: 25.9 MMOL/L (ref 22–29)
CREAT SERPL-MCNC: 1.18 MG/DL (ref 0.76–1.27)
DEPRECATED RDW RBC AUTO: 47.3 FL (ref 37–54)
EGFRCR SERPLBLD CKD-EPI 2021: 65.2 ML/MIN/1.73
ERYTHROCYTE [DISTWIDTH] IN BLOOD BY AUTOMATED COUNT: 15.8 % (ref 12.3–15.4)
GLUCOSE BLDC GLUCOMTR-MCNC: 100 MG/DL (ref 70–99)
GLUCOSE BLDC GLUCOMTR-MCNC: 106 MG/DL (ref 70–99)
GLUCOSE BLDC GLUCOMTR-MCNC: 121 MG/DL (ref 70–99)
GLUCOSE BLDC GLUCOMTR-MCNC: 129 MG/DL (ref 70–99)
GLUCOSE BLDC GLUCOMTR-MCNC: 132 MG/DL (ref 70–99)
GLUCOSE BLDC GLUCOMTR-MCNC: 157 MG/DL (ref 70–99)
GLUCOSE BLDC GLUCOMTR-MCNC: 161 MG/DL (ref 70–99)
GLUCOSE BLDC GLUCOMTR-MCNC: 170 MG/DL (ref 70–99)
GLUCOSE BLDC GLUCOMTR-MCNC: 184 MG/DL (ref 70–99)
GLUCOSE BLDC GLUCOMTR-MCNC: 189 MG/DL (ref 70–99)
GLUCOSE BLDC GLUCOMTR-MCNC: 197 MG/DL (ref 70–99)
GLUCOSE BLDC GLUCOMTR-MCNC: 198 MG/DL (ref 70–99)
GLUCOSE BLDC GLUCOMTR-MCNC: 199 MG/DL (ref 70–99)
GLUCOSE BLDC GLUCOMTR-MCNC: 203 MG/DL (ref 70–99)
GLUCOSE BLDC GLUCOMTR-MCNC: 216 MG/DL (ref 70–99)
GLUCOSE BLDC GLUCOMTR-MCNC: 229 MG/DL (ref 70–99)
GLUCOSE BLDC GLUCOMTR-MCNC: 234 MG/DL (ref 70–99)
GLUCOSE BLDC GLUCOMTR-MCNC: 247 MG/DL (ref 70–99)
GLUCOSE BLDC GLUCOMTR-MCNC: 247 MG/DL (ref 70–99)
GLUCOSE BLDC GLUCOMTR-MCNC: 252 MG/DL (ref 70–99)
GLUCOSE BLDC GLUCOMTR-MCNC: 71 MG/DL (ref 70–99)
GLUCOSE BLDC GLUCOMTR-MCNC: 83 MG/DL (ref 70–99)
GLUCOSE BLDC GLUCOMTR-MCNC: 98 MG/DL (ref 70–99)
GLUCOSE SERPL-MCNC: 209 MG/DL (ref 65–99)
GRAM STN SPEC: NORMAL
GRAM STN SPEC: NORMAL
HCT VFR BLD AUTO: 42.8 % (ref 37.5–51)
HGB BLD-MCNC: 12.8 G/DL (ref 13–17.7)
MAGNESIUM SERPL-MCNC: 2.3 MG/DL (ref 1.6–2.4)
MCH RBC QN AUTO: 25 PG (ref 26.6–33)
MCHC RBC AUTO-ENTMCNC: 29.9 G/DL (ref 31.5–35.7)
MCV RBC AUTO: 83.6 FL (ref 79–97)
PLATELET # BLD AUTO: 204 10*3/MM3 (ref 140–450)
PMV BLD AUTO: 10.7 FL (ref 6–12)
POTASSIUM SERPL-SCNC: 4.4 MMOL/L (ref 3.5–5.2)
PROCALCITONIN SERPL-MCNC: 0.06 NG/ML (ref 0–0.25)
RBC # BLD AUTO: 5.12 10*6/MM3 (ref 4.14–5.8)
SODIUM SERPL-SCNC: 139 MMOL/L (ref 136–145)
WBC NRBC COR # BLD: 11.29 10*3/MM3 (ref 3.4–10.8)

## 2022-11-08 PROCEDURE — 99233 SBSQ HOSP IP/OBS HIGH 50: CPT | Performed by: FAMILY MEDICINE

## 2022-11-08 PROCEDURE — 25010000002 ENOXAPARIN PER 10 MG: Performed by: INTERNAL MEDICINE

## 2022-11-08 PROCEDURE — 25010000002 CEFEPIME PER 500 MG: Performed by: INTERNAL MEDICINE

## 2022-11-08 PROCEDURE — 80048 BASIC METABOLIC PNL TOTAL CA: CPT | Performed by: INTERNAL MEDICINE

## 2022-11-08 PROCEDURE — 85027 COMPLETE CBC AUTOMATED: CPT | Performed by: INTERNAL MEDICINE

## 2022-11-08 PROCEDURE — 94799 UNLISTED PULMONARY SVC/PX: CPT

## 2022-11-08 PROCEDURE — 63710000001 INSULIN DETEMIR PER 5 UNITS: Performed by: PHYSICIAN ASSISTANT

## 2022-11-08 PROCEDURE — 99233 SBSQ HOSP IP/OBS HIGH 50: CPT | Performed by: INTERNAL MEDICINE

## 2022-11-08 PROCEDURE — 63710000001 PREDNISONE PER 1 MG: Performed by: INTERNAL MEDICINE

## 2022-11-08 PROCEDURE — 83735 ASSAY OF MAGNESIUM: CPT | Performed by: INTERNAL MEDICINE

## 2022-11-08 PROCEDURE — 84145 PROCALCITONIN (PCT): CPT | Performed by: FAMILY MEDICINE

## 2022-11-08 PROCEDURE — 82962 GLUCOSE BLOOD TEST: CPT

## 2022-11-08 RX ORDER — NICOTINE POLACRILEX 4 MG
15 LOZENGE BUCCAL
Status: DISCONTINUED | OUTPATIENT
Start: 2022-11-08 | End: 2022-11-09

## 2022-11-08 RX ORDER — INSULIN LISPRO 100 [IU]/ML
1-200 INJECTION, SOLUTION INTRAVENOUS; SUBCUTANEOUS AS NEEDED
Status: DISCONTINUED | OUTPATIENT
Start: 2022-11-08 | End: 2022-11-09

## 2022-11-08 RX ORDER — DEXTROSE MONOHYDRATE 25 G/50ML
10-50 INJECTION, SOLUTION INTRAVENOUS
Status: DISCONTINUED | OUTPATIENT
Start: 2022-11-08 | End: 2022-11-09

## 2022-11-08 RX ORDER — CEFDINIR 300 MG/1
300 CAPSULE ORAL EVERY 12 HOURS SCHEDULED
Status: DISCONTINUED | OUTPATIENT
Start: 2022-11-08 | End: 2022-11-09 | Stop reason: HOSPADM

## 2022-11-08 RX ORDER — INSULIN LISPRO 100 [IU]/ML
1-200 INJECTION, SOLUTION INTRAVENOUS; SUBCUTANEOUS
Status: DISCONTINUED | OUTPATIENT
Start: 2022-11-08 | End: 2022-11-09

## 2022-11-08 RX ADMIN — CEFEPIME 2 G: 1 INJECTION, SOLUTION INTRAVENOUS at 06:20

## 2022-11-08 RX ADMIN — IPRATROPIUM BROMIDE AND ALBUTEROL SULFATE 3 ML: 2.5; .5 SOLUTION RESPIRATORY (INHALATION) at 18:40

## 2022-11-08 RX ADMIN — PREDNISONE 40 MG: 20 TABLET ORAL at 08:39

## 2022-11-08 RX ADMIN — ATORVASTATIN CALCIUM 10 MG: 10 TABLET, FILM COATED ORAL at 20:59

## 2022-11-08 RX ADMIN — Medication 10 ML: at 20:59

## 2022-11-08 RX ADMIN — CEFEPIME 2 G: 1 INJECTION, SOLUTION INTRAVENOUS at 13:21

## 2022-11-08 RX ADMIN — INSULIN DETEMIR 20 UNITS: 100 INJECTION, SOLUTION SUBCUTANEOUS at 21:03

## 2022-11-08 RX ADMIN — IPRATROPIUM BROMIDE AND ALBUTEROL SULFATE 3 ML: 2.5; .5 SOLUTION RESPIRATORY (INHALATION) at 06:40

## 2022-11-08 RX ADMIN — CEFDINIR 300 MG: 300 CAPSULE ORAL at 20:58

## 2022-11-08 RX ADMIN — ARFORMOTEROL TARTRATE 15 MCG: 15 SOLUTION RESPIRATORY (INHALATION) at 06:40

## 2022-11-08 RX ADMIN — BUDESONIDE 0.5 MG: 0.5 INHALANT ORAL at 06:40

## 2022-11-08 RX ADMIN — QUETIAPINE FUMARATE 25 MG: 25 TABLET ORAL at 20:59

## 2022-11-08 RX ADMIN — ARFORMOTEROL TARTRATE 15 MCG: 15 SOLUTION RESPIRATORY (INHALATION) at 18:39

## 2022-11-08 RX ADMIN — PANTOPRAZOLE SODIUM 40 MG: 40 TABLET, DELAYED RELEASE ORAL at 06:20

## 2022-11-08 RX ADMIN — METOPROLOL TARTRATE 25 MG: 25 TABLET, FILM COATED ORAL at 08:39

## 2022-11-08 RX ADMIN — METOPROLOL TARTRATE 25 MG: 25 TABLET, FILM COATED ORAL at 20:59

## 2022-11-08 RX ADMIN — INSULIN HUMAN 14.1 UNITS/HR: 1 INJECTION, SOLUTION INTRAVENOUS at 10:01

## 2022-11-08 RX ADMIN — ASPIRIN 81 MG 81 MG: 81 TABLET ORAL at 08:39

## 2022-11-08 RX ADMIN — IPRATROPIUM BROMIDE AND ALBUTEROL SULFATE 3 ML: 2.5; .5 SOLUTION RESPIRATORY (INHALATION) at 11:33

## 2022-11-08 RX ADMIN — ENOXAPARIN SODIUM 40 MG: 100 INJECTION SUBCUTANEOUS at 08:39

## 2022-11-08 RX ADMIN — BUDESONIDE 0.5 MG: 0.5 INHALANT ORAL at 18:40

## 2022-11-08 NOTE — PROGRESS NOTES
[facility]   Hospitalist Progress Note  Date: 2022  Patient Name: Dilip Faulkner  : 1950  MRN: [id number]  Date of admission: 2022  Consultants:   -Pulmonology/Critical Care: Dr. Harrison Bauer    Subjective   Subjective     Chief Complaint: Shortness of breath    Summary:   Dilip Faulkner is a 72 y.o. male with COPD, type 2 diabetes mellitus, dyslipidemia and hypertension who presented with shortness of breath. Eval in ED significant for patient being in respiratory distress. Patient placed on BiPAP. Hospitalist service contacted for further evaluation management. Pulmonology consulted to assist in care.  Nebulizer breathing treatments and steroids initiated.  Issues with glucose control during admission, insulin drip initiated.    Interval Followup:   Patient sitting up in bed resting comfortably.  Patient states shortness of breath much improved.  Satting well on 2 L nasal cannula which he stays he uses at home.  Blood sugars remain improved on insulin drip.  No acute events overnight. Patient denied any chest pain, abdominal pain, nausea or vomit.  Nursing with no additional acute issues to report.    Antibiotics:   -Cefdinir    Review of Systems   All systems reviewed and negative unless stated otherwise under subjective.    Objective   Objective     Vitals:   Temp:  [97.2 °F (36.2 °C)-97.7 °F (36.5 °C)] 97.2 °F (36.2 °C)  Heart Rate:  [67-84] 68  Resp:  [16-19] 19  BP: (105-120)/(54-71) 120/67  Flow (L/min):  [2] 2  Physical Exam   Gen: No acute distress, Conversant, Pleasant, sitting up in bed resting comfortably, easily arousable  HEENT: MMM, Atraumatic  Neck: Supple, Trachea midline  Resp: Good bilateral aeration, equal chest rise bilaterally, scant expiratory wheezing, no increase in work of breathing, equal chest rise bilaterally  Card: RRR, No m/r/g  Abd: Soft, Nontender, Nondistended, + bowel sounds  Ext: No cyanosis, No clubbing  Neuro: CN II-XII grossly intact, No focal deficits  appreciated  Psych: AAO x 3, Normal mood, Normal affect    Result Review    Result Review:  I have personally reviewed the results as below and agree with these findings:  [x]  Laboratory:   CMP    CMP 11/6/22 11/7/22 11/8/22   Glucose 460 (A) 375 (A) 209 (A)   BUN 17 15 20   Creatinine 1.08 1.10 1.18   Sodium 135 (A) 135 (A) 139   Potassium 4.9 5.9 (A) 4.4   Chloride 99 99 98   Calcium 8.4 (A) 9.3 9.4   Albumin 3.40 (A)     Total Bilirubin 0.2     Alkaline Phosphatase 77     AST (SGOT) 12     ALT (SGPT) 11     (A) Abnormal value            CBC    CBC 11/6/22 11/7/22 11/8/22   WBC 10.43 8.36 11.29 (A)   RBC 4.68 4.83 5.12   Hemoglobin 11.9 (A) 12.3 (A) 12.8 (A)   Hematocrit 39.8 40.6 42.8   MCV 85.0 84.1 83.6   MCH 25.4 (A) 25.5 (A) 25.0 (A)   MCHC 29.9 (A) 30.3 (A) 29.9 (A)   RDW 15.5 (A) 15.5 (A) 15.8 (A)   Platelets 126 (A) 162 204   (A) Abnormal value            [x]  Microbiology: Blood culture (11/05/2022): No growth to date. Sputum culture (11/05/2022): No growth to date  Microbiology Results (last 10 days)     Procedure Component Value - Date/Time    Respiratory Culture - Sputum, Cough [[id number]] Collected: 11/05/22 1708    Lab Status: Final result Specimen: Sputum from Cough Updated: 11/08/22 0830     Respiratory Culture Scant growth (1+) Normal respiratory chris. No S. aureus or Pseudomonas aeruginosa detected. Final report.     Gram Stain Moderate (3+) WBCs seen      Rare (1+) Epithelial cells seen    S. Pneumo Ag Urine or CSF - Urine, Urine, Clean Catch [[id number]]  (Normal) Collected: 11/05/22 1514    Lab Status: Final result Specimen: Urine, Clean Catch Updated: 11/05/22 1640     Strep Pneumo Ag Negative    Legionella Antigen, Urine - Urine, Urine, Clean Catch [[id number]]  (Normal) Collected: 11/05/22 1514    Lab Status: Final result Specimen: Urine, Clean Catch Updated: 11/05/22 1626     LEGIONELLA ANTIGEN, URINE Negative    COVID-19,APTIMA PANTHER(SRAVANTHI), GUERA/ CARMEN, NP/OP SWAB IN UTM/VTM/SALINE  TRANSPORT MEDIA,24 HR TAT - Swab, Nasopharynx [[id number]]  (Normal) Collected: 11/05/22 1447    Lab Status: Final result Specimen: Swab from Nasopharynx Updated: 11/05/22 2212     COVID19 Not Detected    Narrative:      Fact sheet for providers: https://www.fda.gov/media/891321/download     Fact sheet for patients: https://www.fda.gov/media/815709/download    Test performed by RT PCR.    Blood Culture - Blood, Arm, Left [[id number]]  (Normal) Collected: 11/05/22 1103    Lab Status: Preliminary result Specimen: Blood from Arm, Left Updated: 11/08/22 1015     Blood Culture No growth at 3 days    Blood Culture - Blood, Arm, Left [[id number]]  (Normal) Collected: 11/05/22 1103    Lab Status: Preliminary result Specimen: Blood from Arm, Left Updated: 11/08/22 1015     Blood Culture No growth at 3 days          [x]  Radiology:    XR Chest 1 View    Result Date: 11/5/2022    1. Chronic scarring or atelectasis in the mid to lower left lung 2. Probable chronic pleural thickening in the left lung base.  A small effusion is considered less likely.       Chirag Lopez M.D.       Electronically Signed and Approved By: Chirag Lopez M.D. on 11/05/2022 at 11:35               [x]  EKG/Telemetry: Sinus rhythm. No acute events.  []  Cardiology/Vascular:    []  Pathology:  []  Old records:  [x]  Other:  Scheduled Meds:arformoterol, 15 mcg, Nebulization, BID - RT  aspirin, 81 mg, Oral, Daily  atorvastatin, 10 mg, Oral, Nightly  budesonide, 0.5 mg, Nebulization, BID - RT  cefepime, 2 g, Intravenous, Q8H  enoxaparin, 40 mg, Subcutaneous, Daily  insulin detemir, 1-200 Units, Subcutaneous, BID - Glucommander  insulin lispro, 1-200 Units, Subcutaneous, 4x Daily With Meals & Nightly  ipratropium-albuterol, 3 mL, Nebulization, 4x Daily - RT  metoprolol tartrate, 25 mg, Oral, BID  pantoprazole, 40 mg, Oral, QAM  QUEtiapine, 25 mg, Oral, Nightly  sodium chloride, 10 mL, Intravenous, Q12H  sodium chloride, 10 mL, Intravenous, Q12H      Continuous  Infusions:insulin, 0-100 Units/hr, Last Rate: 2.8 Units/hr (11/08/22 1816)  Pharmacy to Dose Cefepime,       PRN Meds:.•  acetaminophen  •  albuterol  •  dextrose  •  dextrose  •  dextrose  •  dextrose  •  glucagon (human recombinant)  •  glucagon (human recombinant)  •  insulin lispro  •  ondansetron  •  Pharmacy to Dose Cefepime  •  sodium chloride  •  sodium chloride  •  sodium chloride  •  sodium chloride  •  sodium chloride      Assessment & Plan   Assessment / Plan     Assessment:  Acute on chronic hypoxemic and hypercapnic respiratory failure  Acute COPD exacerbation  Lactic acidosis  Hyperkalemia, new  Type 2 diabetes mellitus with hyperglycemia  Dyslipidemia  Essential hypertension  Thrombocytopenia  GERD    Plan:  -Pulmonology/Critical Care consulted and following, appreciate assistance and recommendations in the care of this patient.  -Continue supplemental O2 to maintain sats greater than 90%, wean as tolerated  -Continue Brovana, Pulmicort, duo nebs and as needed albuterol  -Patient refusing NIPPV with sleep and naps  -Stop prednisone per pulmonology  -Discussed antibiotics with pulmonology and agreed to de-escalate to cefdinir  -Transition back to basal bolus insulin per Glucomander protocol  -Continue appropriate home medications  -RT case management consulted.  Patient does not want to use an NIMV or bipap at home. Risks and benefits discussed with pulmonology.   -Clinical course will dictate further management         Dispo: Home once glucose control is improved       Personally reviewed patients labs and imaging, discussed with patient and nurse at bedside. Discussed case with the following consultants: Pulmonology/Critical Care.     Part of this note may be an electronic transcription/translation of spoken language to printed text using the Dragon dictation system.    DVT prophylaxis:  Medical DVT prophylaxis orders are present.    CODE STATUS:   Level Of Support Discussed With: Patient  Code  Status (Patient has no pulse and is not breathing): CPR (Attempt to Resuscitate)  Medical Interventions (Patient has pulse or is breathing): Full Support

## 2022-11-08 NOTE — PLAN OF CARE
Problem: Adult Inpatient Plan of Care  Goal: Plan of Care Review  Outcome: Ongoing, Progressing  Flowsheets (Taken 11/8/2022 6284)  Progress: improving  Plan of Care Reviewed With: patient  Outcome Evaluation: Pt alert and oriented throughout shift. Insulin drip continued throughout shift, VSS. MD spoke with nurse and patient about switching to sliding scale insulin if blood sugars meet requirements. Pt to discharge tomorrow.   Goal Outcome Evaluation:  Plan of Care Reviewed With: patient        Progress: improving  Outcome Evaluation: Pt alert and oriented throughout shift. Insulin drip continued throughout shift, VSS. MD spoke with nurse and patient about switching to sliding scale insulin if blood sugars meet requirements. Pt to discharge tomorrow.

## 2022-11-08 NOTE — PROGRESS NOTES
Pulmonary / Critical Care Progress Note      Patient Name: Dilip Faulkner  : 1950  MRN: [id number]  Primary Care Physician:  Rosalba Edwards, MONE  Date of admission: 2022    Subjective   Subjective   Follow-up for acute exacerbation of COPD.    Over last 24 hrs, remained on nebs, steroids stopped due to hyperglycemia. RT  discussed with patient and he is agreeable to use bipap with sleep.     No acute events overnight. Refused NIPPV.    This morning,   Sitting up in bed on 2 L NC  Feels breathing is near baseline  Nonproductive cough  Blood sugars elevated and started on insulin drip  Prednisone not discontinued yesterday will be today  No chest pain  No fever or chills  Weak and fatigued  Wants to go home    Review of Systems  General:  + Fatigue, No Fever. No weight loss  HEENT: No dysphagia, No Visual Changes, no rhinorrhea  Respiratory:  No Productive cough, improving Dyspnea, no phlegm, No Pleuritic Pain, + wheezing improving, no hemoptysis  Cardiovascular: Denies chest pain, denies palpitations, + ALBARRAN, No Chest Pressure  Gastrointestinal:  No Abdominal Pain, No Nausea, No Vomiting, No Diarrhea  Genitourinary:  No Dysuria, No Frequency, No Hesitancy  Musculoskeletal: No muscle pain or swelling    Objective   Objective     Vitals:   Temp:  [97.3 °F (36.3 °C)-97.9 °F (36.6 °C)] 97.3 °F (36.3 °C)  Heart Rate:  [62-76] 67  Resp:  [16-18] 16  BP: (108-117)/(59-71) 108/59  Flow (L/min):  [2] 2    Physical Exam   Vital Signs Reviewed   General:  WDWN male, Awake and Alert, NAD on 2 L NC    HEENT:  PERRL, EOMI.  OP, nares clear, no sinus tenderness  Neck:  Supple, no JVD, no thyromegaly  Chest:  good aeration, decreasing wheezing and rhonchi bilaterally, tympanic to percussion bilaterally, no work of breathing noted  CV: RRR, no MGR, pulses 2+, equal  Abd:  Soft, NT, ND, + BS, no HSM  EXT:  no clubbing, no cyanosis, no edema  Neuro:  A&Ox3, CN grossly intact, no focal deficits  Skin: No  rashes or lesions noted    Result Review    Result Review:  I have personally reviewed the results from the time of this admission to 11/8/2022 07:29 EST and agree with these findings:  [x]  Laboratory  [x]  Microbiology  [x]  Radiology  []  EKG/Telemetry   []  Cardiology/Vascular   []  Pathology  []  Old records  []  Other:  Most notable findings include: WBC 11.29, magnesium 2.3, potassium 4.4, creatinine 1.18    Assessment & Plan   Assessment / Plan     Active Hospital Problems:  Active Hospital Problems    Diagnosis    • Acute respiratory failure with hypoxia (HCC)    • Noncompliance with CPAP treatment      Impression:   Acute exacerbation of end-stage COPD  Acute on chronic hypercapnic respiratory failure requiring NIPPV  Noncompliance with home NIPPV  CO2 narcosis  Concern for UTI of unspecified organism  Obesity: BMI 32.31  Diabetes with hyperglycemia     Plan:   -On baseline home O2 2 L nasal cannula.  -Continue NIPPV at night and as needed days.  At this time patient is adamantly stating he will not wear NIPPV here but is now willing to try NIPPV at home with nasal pillows.  -Patient will require noninvasive positive pressure ventilation for COPD and chronic respiratory failure.  CPAP was ruled out as does not decrease CO2 levels.  NIPPV will decrease work of breathing and improve patient's overall pulmonary status, prevent further readmissions, decreased risk of sudden cardiac death.  Appreciate RT  assistance in helping to arrange this for home. He is willing to try bipap at home.   -Steroids supposed to have been discontinued yesterday for hyperglycemia but remained on MAR this AM.  Patient was started on insulin drip overnight due to hyperglycemia.  -Will discontinue prednisone now.  -Continue insulin drip per primary.  Can hopefully transition to basal bolus insulin today.  -Continue Brovana, Pulmicort and DuoNebs.  -Will need to discharge home on Trelegy 200 daily and as needed DuoNebs.   Consult RT  to check on cost.  -Continue bronchopulmonary hygiene.  Encourage I-S and flutter valve.  -Continue cefepime for possible UTI, day 4/7.  Cultures have remained negative.  -Encourage activity.  Out of bed to chair.  - Will need follow up in COPD clinic on Thursday November 10 at 0930.  - Will need outpatient PFTs.  - Would benefit from pulmonary rehab but at this time patient refuses.  - Has issues with noncompliance.     DVT prophylaxis:  Medical DVT prophylaxis orders are present.    CODE STATUS:   Level Of Support Discussed With: Patient  Code Status (Patient has no pulse and is not breathing): CPR (Attempt to Resuscitate)  Medical Interventions (Patient has pulse or is breathing): Full Support    I personally reviewed pertinent labs, imaging and provider notes.   Discussed with bedside nurse and will discuss with primary service.     Electronically signed by MONE Mcbride, 11/08/22, 10:40 AM EST.    This visit was performed by BOTH a physician and an APC. I personally evaluated and examined the patient. I performed all aspects of MDM as documented. , I have reviewed and confirmed the accuracy of the patient's history as documented in this note. and I have reexamined the patient and the results are consistent with the previously documented exam. I have updated the documentation as necessary.     Electronically signed by Harrison Bauer MD, 11/08/22, 1:16 PM EST.       Electronically signed by Harrison Bauer MD, 11/8/2022, 07:29 EST.

## 2022-11-08 NOTE — CASE MANAGEMENT/SOCIAL WORK
Patient presents with: Eye Visual Problem (opthalmic)      HPI:     Vika Corrales is a 66year old female who presents with for chief complaint of bilateral eye redness and watery discharge. Also noted small scant yellow discharge earlier today.   Also RT CM followed up with patient at bedside to discuss use of home nebulized medications when in the yellow zone.  Pt previously reported only using nebulizer once daily. Mr Faulkner states his breathing is more better now that he is taking duoneb four times daily.  RT CM encouraged Mr Faulkner to continue use of duoneb four times/day after discharge at least until he has followed up with the pulmonary clinic.  RT CM also encouraged patient to be compliant with use of home bilevel and to go to follow up appointments.  RT CM has requested [employer] deliver bilevel to patient's room prior to discharge.    No chest wall retractions. No respiratory distress.  No tachypnea noted  CARDIOVASCULAR:   Heart: S1, S2 normal, no murmur, click, rub or gallop, regular rate and rhythm  Skin: Skin color, texture, turgor normal. No rashes or lesions      Assessment/Plan:

## 2022-11-08 NOTE — PLAN OF CARE
Goal Outcome Evaluation:   Pt did not stay in therapeutic range tonight. No other acute events tonight.   Problem: Glycemic Control Impaired (Sepsis/Septic Shock)  Goal: Blood Glucose Level Within Desired Range  Outcome: Ongoing, Not Progressing

## 2022-11-09 ENCOUNTER — READMISSION MANAGEMENT (OUTPATIENT)
Dept: CALL CENTER | Facility: HOSPITAL | Age: 72
End: 2022-11-09

## 2022-11-09 VITALS
TEMPERATURE: 97.5 F | WEIGHT: 244.93 LBS | RESPIRATION RATE: 18 BRPM | HEIGHT: 73 IN | DIASTOLIC BLOOD PRESSURE: 73 MMHG | HEART RATE: 72 BPM | OXYGEN SATURATION: 96 % | SYSTOLIC BLOOD PRESSURE: 120 MMHG | BODY MASS INDEX: 32.46 KG/M2

## 2022-11-09 LAB
ALBUMIN SERPL-MCNC: 3.6 G/DL (ref 3.5–5.2)
ANION GAP SERPL CALCULATED.3IONS-SCNC: 8.6 MMOL/L (ref 5–15)
BUN SERPL-MCNC: 20 MG/DL (ref 8–23)
BUN/CREAT SERPL: 16.4 (ref 7–25)
CALCIUM SPEC-SCNC: 9.2 MG/DL (ref 8.6–10.5)
CHLORIDE SERPL-SCNC: 99 MMOL/L (ref 98–107)
CO2 SERPL-SCNC: 27.4 MMOL/L (ref 22–29)
CREAT SERPL-MCNC: 1.22 MG/DL (ref 0.76–1.27)
DEPRECATED RDW RBC AUTO: 47 FL (ref 37–54)
EGFRCR SERPLBLD CKD-EPI 2021: 62.6 ML/MIN/1.73
ERYTHROCYTE [DISTWIDTH] IN BLOOD BY AUTOMATED COUNT: 15.5 % (ref 12.3–15.4)
GLUCOSE BLDC GLUCOMTR-MCNC: 233 MG/DL (ref 70–99)
GLUCOSE BLDC GLUCOMTR-MCNC: 298 MG/DL (ref 70–99)
GLUCOSE BLDC GLUCOMTR-MCNC: 317 MG/DL (ref 70–99)
GLUCOSE BLDC GLUCOMTR-MCNC: 66 MG/DL (ref 70–99)
GLUCOSE SERPL-MCNC: 363 MG/DL (ref 65–99)
HCT VFR BLD AUTO: 40.3 % (ref 37.5–51)
HGB BLD-MCNC: 12.1 G/DL (ref 13–17.7)
MCH RBC QN AUTO: 25.1 PG (ref 26.6–33)
MCHC RBC AUTO-ENTMCNC: 30 G/DL (ref 31.5–35.7)
MCV RBC AUTO: 83.6 FL (ref 79–97)
PHOSPHATE SERPL-MCNC: 3 MG/DL (ref 2.5–4.5)
PLATELET # BLD AUTO: 190 10*3/MM3 (ref 140–450)
PMV BLD AUTO: 10.3 FL (ref 6–12)
POTASSIUM SERPL-SCNC: 4.5 MMOL/L (ref 3.5–5.2)
RBC # BLD AUTO: 4.82 10*6/MM3 (ref 4.14–5.8)
SODIUM SERPL-SCNC: 135 MMOL/L (ref 136–145)
WBC NRBC COR # BLD: 10.72 10*3/MM3 (ref 3.4–10.8)

## 2022-11-09 PROCEDURE — 85027 COMPLETE CBC AUTOMATED: CPT | Performed by: FAMILY MEDICINE

## 2022-11-09 PROCEDURE — 80069 RENAL FUNCTION PANEL: CPT | Performed by: FAMILY MEDICINE

## 2022-11-09 PROCEDURE — 63710000001 INSULIN DETEMIR PER 5 UNITS: Performed by: FAMILY MEDICINE

## 2022-11-09 PROCEDURE — 97116 GAIT TRAINING THERAPY: CPT

## 2022-11-09 PROCEDURE — 99239 HOSP IP/OBS DSCHRG MGMT >30: CPT | Performed by: FAMILY MEDICINE

## 2022-11-09 PROCEDURE — 25010000002 ENOXAPARIN PER 10 MG: Performed by: INTERNAL MEDICINE

## 2022-11-09 PROCEDURE — 94799 UNLISTED PULMONARY SVC/PX: CPT

## 2022-11-09 PROCEDURE — 99233 SBSQ HOSP IP/OBS HIGH 50: CPT | Performed by: INTERNAL MEDICINE

## 2022-11-09 PROCEDURE — 82962 GLUCOSE BLOOD TEST: CPT

## 2022-11-09 PROCEDURE — 97530 THERAPEUTIC ACTIVITIES: CPT

## 2022-11-09 PROCEDURE — 63710000001 INSULIN LISPRO (HUMAN) PER 5 UNITS: Performed by: FAMILY MEDICINE

## 2022-11-09 RX ORDER — DEXTROSE MONOHYDRATE 25 G/50ML
25 INJECTION, SOLUTION INTRAVENOUS
Status: DISCONTINUED | OUTPATIENT
Start: 2022-11-09 | End: 2022-11-09 | Stop reason: HOSPADM

## 2022-11-09 RX ORDER — INSULIN LISPRO 100 [IU]/ML
4-24 INJECTION, SOLUTION INTRAVENOUS; SUBCUTANEOUS
Status: DISCONTINUED | OUTPATIENT
Start: 2022-11-09 | End: 2022-11-09 | Stop reason: HOSPADM

## 2022-11-09 RX ORDER — NICOTINE POLACRILEX 4 MG
15 LOZENGE BUCCAL
Status: DISCONTINUED | OUTPATIENT
Start: 2022-11-09 | End: 2022-11-09 | Stop reason: HOSPADM

## 2022-11-09 RX ORDER — CEFDINIR 300 MG/1
300 CAPSULE ORAL EVERY 12 HOURS SCHEDULED
Qty: 4 CAPSULE | Refills: 0 | Status: SHIPPED | OUTPATIENT
Start: 2022-11-09 | End: 2022-11-11

## 2022-11-09 RX ORDER — FLUTICASONE FUROATE, UMECLIDINIUM BROMIDE AND VILANTEROL TRIFENATATE 200; 62.5; 25 UG/1; UG/1; UG/1
1 POWDER RESPIRATORY (INHALATION) DAILY
Qty: 60 EACH | Refills: 0 | Status: ON HOLD | OUTPATIENT
Start: 2022-11-09 | End: 2023-01-13 | Stop reason: SDUPTHER

## 2022-11-09 RX ADMIN — ASPIRIN 81 MG 81 MG: 81 TABLET ORAL at 09:25

## 2022-11-09 RX ADMIN — PANTOPRAZOLE SODIUM 40 MG: 40 TABLET, DELAYED RELEASE ORAL at 06:06

## 2022-11-09 RX ADMIN — IPRATROPIUM BROMIDE AND ALBUTEROL SULFATE 3 ML: 2.5; .5 SOLUTION RESPIRATORY (INHALATION) at 00:48

## 2022-11-09 RX ADMIN — IPRATROPIUM BROMIDE AND ALBUTEROL SULFATE 3 ML: 2.5; .5 SOLUTION RESPIRATORY (INHALATION) at 12:45

## 2022-11-09 RX ADMIN — INSULIN LISPRO 8 UNITS: 100 INJECTION, SOLUTION INTRAVENOUS; SUBCUTANEOUS at 12:20

## 2022-11-09 RX ADMIN — INSULIN LISPRO 16 UNITS: 100 INJECTION, SOLUTION INTRAVENOUS; SUBCUTANEOUS at 09:24

## 2022-11-09 RX ADMIN — ARFORMOTEROL TARTRATE 15 MCG: 15 SOLUTION RESPIRATORY (INHALATION) at 06:51

## 2022-11-09 RX ADMIN — CEFDINIR 300 MG: 300 CAPSULE ORAL at 09:25

## 2022-11-09 RX ADMIN — Medication 10 ML: at 09:26

## 2022-11-09 RX ADMIN — ENOXAPARIN SODIUM 40 MG: 100 INJECTION SUBCUTANEOUS at 09:25

## 2022-11-09 RX ADMIN — INSULIN DETEMIR 25 UNITS: 100 INJECTION, SOLUTION SUBCUTANEOUS at 09:33

## 2022-11-09 RX ADMIN — Medication 10 ML: at 09:27

## 2022-11-09 RX ADMIN — IPRATROPIUM BROMIDE AND ALBUTEROL SULFATE 3 ML: 2.5; .5 SOLUTION RESPIRATORY (INHALATION) at 06:52

## 2022-11-09 RX ADMIN — METOPROLOL TARTRATE 25 MG: 25 TABLET, FILM COATED ORAL at 09:25

## 2022-11-09 RX ADMIN — BUDESONIDE 0.5 MG: 0.5 INHALANT ORAL at 06:52

## 2022-11-09 NOTE — CASE MANAGEMENT/SOCIAL WORK
RT CM followed up with patient at the bedside to review Trelegy administration once more.  Bilevel with nasal pillows was delivered to bedside today prior to discharge.  Trelegy is being delivered via meds to bed, $4 copay.  COPD action plan was reviewed with patient and Mr Faulkner was provided a written copy in the Understanding COPD booklet.  Handout detailing Trelegy administration was also provided and instructions attached to AVS as well.  Follow up has been scheduled in COPD clinic for 11/17.

## 2022-11-09 NOTE — DISCHARGE SUMMARY
[facility]         HOSPITALIST  DISCHARGE SUMMARY    Patient Name: Dilip Faulkner  : 1950  MRN: [id number]    Date of Admission: 2022  Date of Discharge: 2022  Primary Care Physician: Rosalba Edwards APRN    Consults     Date and Time Order Name Status Description    2022  2:56 PM Inpatient Pulmonology Consult Completed     2022 12:53 PM Inpatient Hospitalist Consult      10/5/2022  7:23 AM Inpatient Pulmonology Consult Completed           Active and Resolved Hospital Problems:  Acute on chronic hypoxemic and hypercapnic respiratory failure  Acute COPD exacerbation  Lactic acidosis  Hyperkalemia, new  Type 2 diabetes mellitus with hyperglycemia  Dyslipidemia  Essential hypertension  Thrombocytopenia  GERD  Active Hospital Problems    Diagnosis POA   • Acute respiratory failure with hypoxia (HCC) [J96.01] Yes   • Noncompliance with CPAP treatment [Z91.14] Not Applicable      Resolved Hospital Problems   No resolved problems to display.       Hospital Course     Hospital Course:  Dilip Faulkner is a 72 y.o. male with COPD, type 2 diabetes mellitus, dyslipidemia and hypertension who presented with shortness of breath. Eval in ED significant for patient being in respiratory distress. Patient placed on BiPAP. Hospitalist service contacted for further evaluation management. Pulmonology consulted to assist in care.  Nebulizer breathing treatments empiric antibiotics and steroids initiated.  Issues with glucose control during admission, insulin drip initiated.  Systemic steroids tapered.  Respiratory function returned to baseline.  Blood sugar control improved and transition back to basal bolus insulin. RT  helped to arrange NIPPV with nasal pillows for home.  Patient seen and evaluated by myself and pulmonology on day of discharge and thought stable for discharge home to follow-up with his primary care provider and COPD clinic.        DISCHARGE Follow Up  Recommendations for labs and diagnostics: As above      Day of Discharge     Vital Signs:  Temp:  [97.2 °F (36.2 °C)-97.7 °F (36.5 °C)] 97.5 °F (36.4 °C)  Heart Rate:  [64-93] 69  Resp:  [17-20] 17  BP: (105-149)/(58-84) 105/58  Flow (L/min):  [2] 2  Physical Exam:   General:  WDWN male, Awake and Alert, NAD on 2 L NC    HEENT:  PERRL, EOMI.  OP, nares clear, no sinus tenderness  Neck:  Supple, no JVD, no thyromegaly  Chest:  good aeration, no wheezes rales or rhonchi symmetric chest expansion no increased work of breathing no conversational dyspnea appreciated  CV: RRR, no MGR, pulses 2+, equal  Abd:  Soft, NT, ND, + BS, no HSM  EXT:  no clubbing, no cyanosis, no edema  Neuro:  A&Ox3, CN grossly intact, no focal deficits  Skin: No rashes or lesions noted      Discharge Details        Discharge Medications      New Medications      Instructions Start Date   cefdinir 300 MG capsule  Commonly known as: OMNICEF   300 mg, Oral, Every 12 Hours Scheduled      empagliflozin 10 MG tablet tablet  Commonly known as: Jardiance   10 mg, Oral, Daily      Trelegy Ellipta 200-62.5-25 MCG/ACT aerosol powder   Generic drug: Fluticasone-Umeclidin-Vilant   1 dose, Inhalation, Daily         Continue These Medications      Instructions Start Date   albuterol sulfate  (90 Base) MCG/ACT inhaler  Commonly known as: PROVENTIL HFA;VENTOLIN HFA;PROAIR HFA   2 puffs, Inhalation, Every 6 Hours PRN      aspirin 81 MG chewable tablet   81 mg, Oral, Daily      atorvastatin 10 MG tablet  Commonly known as: LIPITOR   10 mg, Oral, Daily      cetirizine 10 MG tablet  Commonly known as: zyrTEC   10 mg, Oral, Daily      ipratropium-albuterol 0.5-2.5 mg/3 ml nebulizer  Commonly known as: DUO-NEB   3 mL, Nebulization, Every 4 Hours PRN      lisinopril 2.5 MG tablet  Commonly known as: PRINIVIL,ZESTRIL   2.5 mg, Oral, Daily      metFORMIN 500 MG tablet  Commonly known as: GLUCOPHAGE   500 mg, Oral, 2 times daily      metoprolol tartrate 25 MG  tablet  Commonly known as: LOPRESSOR   25 mg, Oral, 2 Times Daily      omeprazole 20 MG capsule  Commonly known as: priLOSEC   20 mg, Oral, Daily      QUEtiapine 25 MG tablet  Commonly known as: SEROquel   25 mg, Oral, Nightly      Tresiba FlexTouch 100 UNIT/ML solution pen-injector injection  Generic drug: insulin degludec   17 Units, Subcutaneous, Daily         Stop These Medications    Fluticasone-Salmeterol 250-50 MCG/ACT DISKUS  Commonly known as: ADVAIR/WIXELA            No Known Allergies    Discharge Disposition:  Home or Self Care    Diet:  Hospital:  Diet Order   Procedures   • Diet Regular; Consistent Carbohydrate       Discharge Activity:   Activity Instructions     Gradually Increase Activity Until at Pre-Hospitalization Level            CODE STATUS:  Code Status and Medical Interventions:   Ordered at: 11/05/22 1331     Level Of Support Discussed With:    Patient     Code Status (Patient has no pulse and is not breathing):    CPR (Attempt to Resuscitate)     Medical Interventions (Patient has pulse or is breathing):    Full Support         Future Appointments   Date Time Provider Department Center   11/17/2022  8:00 AM Gaby Gusman APRN [facility] [place] [facility]       Additional Instructions for the Follow-ups that You Need to Schedule     Discharge Follow-up with PCP   As directed       Currently Documented PCP:    Rosalba Edwards APRN    PCP Phone Number:    [phone]     Follow Up Details: Hospital discharge follow-up COPD with acute exacerbation         Discharge Follow-up with Specialty: COPD clinic; 1 Week   As directed      Specialty: COPD clinic    Follow Up: 1 Week    Follow Up Details: Hospital discharge follow-up COPD with acute exacerbation               Pertinent  and/or Most Recent Results     PROCEDURES:   None    LAB RESULTS:      Lab 11/09/22  0628 11/08/22  0724 11/07/22  0553 11/06/22  0434 11/06/22  0433 11/05/22  2114 11/05/22  1729 11/05/22  1436 11/05/22  1103   WBC 10.72 11.29*  8.36 10.43  --   --   --   --  9.64   HEMOGLOBIN 12.1* 12.8* 12.3* 11.9*  --   --   --   --  13.1   HEMATOCRIT 40.3 42.8 40.6 39.8  --   --   --   --  44.9   PLATELETS 190 204 162 126*  --   --   --   --  120*   NEUTROS ABS  --   --   --  9.16*  --   --   --   --  5.67   IMMATURE GRANS (ABS)  --   --   --  0.11*  --   --   --   --  0.10*   LYMPHS ABS  --   --   --  0.80  --   --   --   --  2.26   MONOS ABS  --   --   --  0.33  --   --   --   --  0.74   EOS ABS  --   --   --  0.01  --   --   --   --  0.82*   MCV 83.6 83.6 84.1 85.0  --   --   --   --  86.8   PROCALCITONIN  --  0.06  --   --  0.06  --   --   --  0.06   LACTATE  --   --   --   --   --  2.0 3.7* 2.2* 2.1*         Lab 11/09/22 0628 11/08/22 0724 11/07/22  0553 11/06/22 0433 11/05/22  1103   SODIUM 135* 139 135* 135* 135*   POTASSIUM 4.5 4.4 5.9* 4.9 4.0   CHLORIDE 99 98 99 99 99   CO2 27.4 25.9 29.5* 25.2 27.3   ANION GAP 8.6 15.1* 6.5 10.8 8.7   BUN 20 20 15 17 17   CREATININE 1.22 1.18 1.10 1.08 1.08   EGFR 62.6 65.2 70.9 72.5 72.5   GLUCOSE 363* 209* 375* 460* 277*   CALCIUM 9.2 9.4 9.3 8.4* 8.6   MAGNESIUM  --  2.3 2.1 2.0  --    PHOSPHORUS 3.0  --   --   --   --          Lab 11/09/22 0628 11/06/22 0433 11/05/22  1103   TOTAL PROTEIN  --  6.3 7.3   ALBUMIN 3.60 3.40* 4.20   GLOBULIN  --  2.9 3.1   ALT (SGPT)  --  11 12   AST (SGOT)  --  12 13   BILIRUBIN  --  0.2 0.3   ALK PHOS  --  77 89                     Lab 11/05/22  1107   PH, ARTERIAL 7.288*   PCO2, ARTERIAL 52.4*   PO2 ART 84.8   O2 SATURATION ART 94.7*   FIO2 30   HCO3 ART 24.5   BASE EXCESS ART -2.7*   CARBOXYHEMOGLOBIN 1.2     Brief Urine Lab Results  (Last result in the past 365 days)      Color   Clarity   Blood   Leuk Est   Nitrite   Protein   CREAT   Urine HCG        08/01/22 1250 Yellow   Clear   Negative   Negative   Negative   Negative               Microbiology Results (last 10 days)     Procedure Component Value - Date/Time    Respiratory Culture - Sputum, Cough [[id number]]  Collected: 11/05/22 1708    Lab Status: Final result Specimen: Sputum from Cough Updated: 11/08/22 0830     Respiratory Culture Scant growth (1+) Normal respiratory chris. No S. aureus or Pseudomonas aeruginosa detected. Final report.     Gram Stain Moderate (3+) WBCs seen      Rare (1+) Epithelial cells seen    S. Pneumo Ag Urine or CSF - Urine, Urine, Clean Catch [[id number]]  (Normal) Collected: 11/05/22 1514    Lab Status: Final result Specimen: Urine, Clean Catch Updated: 11/05/22 1640     Strep Pneumo Ag Negative    Legionella Antigen, Urine - Urine, Urine, Clean Catch [[id number]]  (Normal) Collected: 11/05/22 1514    Lab Status: Final result Specimen: Urine, Clean Catch Updated: 11/05/22 1626     LEGIONELLA ANTIGEN, URINE Negative    COVID-19,APTIMA PANTHER(SRAVANTHI),[facility], NP/OP SWAB IN UTM/VTM/SALINE TRANSPORT MEDIA,24 HR TAT - Swab, Nasopharynx [[id number]]  (Normal) Collected: 11/05/22 1447    Lab Status: Final result Specimen: Swab from Nasopharynx Updated: 11/05/22 2212     COVID19 Not Detected    Narrative:      Fact sheet for providers: https://www.fda.gov/media/937148/download     Fact sheet for patients: https://www.fda.gov/media/992343/download    Test performed by RT PCR.    Blood Culture - Blood, Arm, Left [[id number]]  (Normal) Collected: 11/05/22 1103    Lab Status: Preliminary result Specimen: Blood from Arm, Left Updated: 11/09/22 1015     Blood Culture No growth at 4 days    Blood Culture - Blood, Arm, Left [[id number]]  (Normal) Collected: 11/05/22 1103    Lab Status: Preliminary result Specimen: Blood from Arm, Left Updated: 11/09/22 1015     Blood Culture No growth at 4 days          XR Chest 1 View    Result Date: 11/5/2022  Impression:   1. Chronic scarring or atelectasis in the mid to lower left lung 2. Probable chronic pleural thickening in the left lung base.  A small effusion is considered less likely.       Chirag Lopez M.D.       Electronically Signed and Approved By: Chirag  MAZIN Lopez on 11/05/2022 at 11:35                           Labs Pending at Discharge:  Pending Labs     Order Current Status    Blood Culture - Blood, Arm, Left Preliminary result    Blood Culture - Blood, Arm, Left Preliminary result            Time spent on Discharge including face to face service: Greater than 35 minutes    Electronically signed by Danny Dye MD, 11/09/22, 2:28 PM EST.

## 2022-11-09 NOTE — DISCHARGE INSTR - APPOINTMENTS
Call Rosalba Edwards's office at [phone] to schedule a follow-up appointment in 7-10 days after discharge

## 2022-11-09 NOTE — THERAPY TREATMENT NOTE
Acute Care - Physical Therapy Progress Note  [facility]     Patient Name: Dilip Faulkner  : 1950  MRN: [id number]  Today's Date: 2022      Visit Dx:     ICD-10-CM ICD-9-CM   1. Acute respiratory failure with hypoxia (HCC)  J96.01 518.81   2. Acute exacerbation of chronic obstructive pulmonary disease (COPD) (HCC)  J44.1 491.21   3. Difficulty walking  R26.2 719.7   4. Chronic obstructive pulmonary disease with acute exacerbation (HCC)  J44.1 491.21     Patient Active Problem List   Diagnosis   • Chronic obstructive pulmonary disease with acute exacerbation (HCC)   • Acute on chronic respiratory failure with hypoxia and hypercapnia (HCC)   • Type 2 diabetes mellitus (HCC)   • Acute on chronic respiratory failure (HCC)   • Essential hypertension   • Hyperlipidemia   • Cough   • Pneumonia due to infectious organism   • COPD with acute exacerbation (HCC)   • Obesity (BMI 30-39.9)   • Right bundle branch block   • Acquired ptosis of eyelid, bilateral   • Type 2 diabetes mellitus with hyperglycemia, with long-term current use of insulin (HCC)   • Acute exacerbation of chronic obstructive pulmonary disease (COPD) (HCC)   • Respiratory failure with hypoxia (HCC)   • Gastroesophageal reflux disease without esophagitis   • COPD (chronic obstructive pulmonary disease) (HCC)   • Acute respiratory failure with hypoxia (HCC)   • Noncompliance with CPAP treatment     Past Medical History:   Diagnosis Date   • Asthma    • COPD (chronic obstructive pulmonary disease) (HCC)    • Diabetes mellitus (HCC)    • Hernia, umbilical    • Hypertension    • Requires continuous at home supplemental oxygen      Past Surgical History:   Procedure Laterality Date   • ABDOMINAL SURGERY       PT Assessment (last 12 hours)     PT Evaluation and Treatment     Row Name 22 1700          Physical Therapy Time and Intention    Subjective Information no complaints  -CS     Document Type therapy note (daily note)  -CS     Mode of  Treatment individual therapy;physical therapy  -CS     Patient Effort good  -CS     Symptoms Noted During/After Treatment none  -CS     Row Name 11/09/22 1700          Bed Mobility    Bed Mobility supine-sit-supine  -CS     Supine-Sit-Supine [place] (Bed Mobility) supervision  -CS     Row Name 11/09/22 1700          Sit-Stand Transfer    Sit-Stand [place] (Transfers) standby assist  -CS     Assistive Device (Sit-Stand Transfers) other (see comments)  no AD required  -CS     Row Name 11/09/22 1700          Stand-Sit Transfer    Stand-Sit [place] (Transfers) standby assist  -CS     Assistive Device (Stand-Sit Transfers) --  no AD required  -CS     Row Name 11/09/22 1700          Gait/Stairs (Locomotion)    [place] Level (Gait) contact guard  -CS     Assistive Device (Gait) --  railings utilized ~ 15% of time for added stability with ambulation  -CS     Distance in Feet (Gait) 200  -CS     Pattern (Gait) 2-point;step-through  -CS     Deviations/Abnormal Patterns (Gait) gait speed decreased;stride length decreased  -CS     Bilateral Gait Deviations forward flexed posture  -CS     Gait Assessment/Intervention Minor instability noted with gait training this date.  Pt. utilized hand railings for added stability with ambulation and pt. reports using furniture at home for added support.  -CS     Row Name 11/09/22 1700          Vital Signs    Pretreatment Heart Rate (beats/min) 72  -CS     Intratreatment Heart Rate (beats/min) 85  -CS     Posttreatment Heart Rate (beats/min) 80  -CS     Pre SpO2 (%) 93  -CS     O2 Delivery Pre Treatment --  2 LPM NC  -CS     Intra SpO2 (%) 89  -CS     O2 Delivery Intra Treatment room air  -CS     Post SpO2 (%) 93  -CS     O2 Delivery Post Treatment room air  -CS     Pre Patient Position Supine  -CS     Intra Patient Position Standing  -CS     Post Patient Position Sitting  -CS     Row Name 11/09/22 1700          Positioning and Restraints    Pre-Treatment Position in bed   -CS     Post Treatment Position bed  -CS     In Bed fowlers;call light within reach;encouraged to call for assist  -CS     Row Name 11/09/22 1700          Progress Summary (PT)    Progress Toward Functional Goals (PT) progress toward functional goals is good  -CS           User Key  (r) = Recorded By, (t) = Taken By, (c) = Cosigned By    Initials Name Provider Type    CS Jono Monroy PTA Physical Therapist Assistant                Physical Therapy Education     Title: PT OT SLP Therapies (Resolved)     Topic: Physical Therapy (Resolved)     Point: Mobility training (Resolved)     Learning Progress Summary           Patient Acceptance, E,TB, VU by AD at 11/7/2022 1343                   Point: Home exercise program (Resolved)     Learner Progress:  Not documented in this visit.          Point: Body mechanics (Resolved)     Learning Progress Summary           Patient Acceptance, E,TB, VU by AD at 11/7/2022 1343                   Point: Precautions (Resolved)     Learning Progress Summary           Patient Acceptance, E,TB, VU by AD at 11/7/2022 1343                               User Key     Initials Effective Dates Name Provider Type Discipline    AD 10/18/22 -  Uli Perez, PT Student PT Student PT              PT Recommendation and Plan     Progress Summary (PT)  Progress Toward Functional Goals (PT): progress toward functional goals is good   Outcome Measures     Row Name 11/09/22 1700 11/07/22 1300          How much help from another person do you currently need...    Turning from your back to your side while in flat bed without using bedrails? 4  -CS 4  -AV (r) AD (t) AV (c)     Moving from lying on back to sitting on the side of a flat bed without bedrails? 4  -CS 4  -AV (r) AD (t) AV (c)     Moving to and from a bed to a chair (including a wheelchair)? 4  -CS 4  -AV (r) AD (t) AV (c)     Standing up from a chair using your arms (e.g., wheelchair, bedside chair)? 4  -CS 4  -AV (r) AD (t) AV (c)      Climbing 3-5 steps with a railing? 4  -CS 3  -AV (r) AD (t) AV (c)     To walk in hospital room? 4  -CS 3  -AV (r) AD (t) AV (c)     AM-PAC 6 Clicks Score (PT) 24  -CS 22  -AV (r) AD (t)        Functional Assessment    Outcome Measure Options AM-PAC 6 Clicks Basic Mobility (PT)  -CS AM-PAC 6 Clicks Basic Mobility (PT)  -AV (r) AD (t) AV (c)           User Key  (r) = Recorded By, (t) = Taken By, (c) = Cosigned By    Initials Name Provider Type    AV Dilip Hill, PT Physical Therapist    CS Jono Monroy PTA Physical Therapist Assistant    AD Uli Perez, PT Student PT Student                 Time Calculation:    PT Charges     Row Name 11/09/22 1716             Time Calculation    Start Time 1520  -CS      PT Received On 11/09/22  -CS         Timed Charges    86952 - Gait Training Minutes  13  -CS      14411 - PT Therapeutic Activity Minutes 10  -CS         Total Minutes    Timed Charges Total Minutes 23  -CS       Total Minutes 23  -CS            User Key  (r) = Recorded By, (t) = Taken By, (c) = Cosigned By    Initials Name Provider Type    CS Jono Monroy PTA Physical Therapist Assistant              Therapy Charges for Today     Code Description Service Date Service Provider Modifiers Qty    68253088045 HC GAIT TRAINING EA 15 MIN 11/9/2022 Jono Monroy PTA GP 1    72337878304 HC PT THERAPEUTIC ACT EA 15 MIN 11/9/2022 Jono Monroy PTA GP 1          PT G-Codes  Outcome Measure Options: AM-PAC 6 Clicks Basic Mobility (PT)  AM-PAC 6 Clicks Score (PT): 24    Jono Monroy PTA  11/9/2022

## 2022-11-09 NOTE — PROGRESS NOTES
Pulmonary / Critical Care Progress Note      Patient Name: Dilip Faulkner  : 1950  MRN: [id number]  Primary Care Physician:  Rosalba Edwards, MONE  Date of admission: 2022    Subjective   Subjective   Follow-up for acute exacerbation of COPD.    Over last 24 hrs, glucose improved and insulin drip discontinued and transitioned to basal bolus insulin.    No acute events overnight. Refused NIPPV.    This morning,   Sitting up in bed on 2 L NC  Feels breathing is near baseline  Nonproductive cough  Blood sugars elevated improving but still remains elevated  Steroids were discontinued  No chest pain  No fever or chills  Weak and fatigued  Wants to go home    Review of Systems  General:  + Fatigue, No Fever. No weight loss  HEENT: No dysphagia, No Visual Changes, no rhinorrhea  Respiratory:  No Productive cough, improving Dyspnea, no phlegm, No Pleuritic Pain, + wheezing improving, no hemoptysis  Cardiovascular: Denies chest pain, denies palpitations, + [place], No Chest Pressure  Gastrointestinal:  No Abdominal Pain, No Nausea, No Vomiting, No Diarrhea  Genitourinary:  No Dysuria, No Frequency, No Hesitancy  Musculoskeletal: No muscle pain or swelling    Objective   Objective     Vitals:   Temp:  [97.2 °F (36.2 °C)-97.7 °F (36.5 °C)] 97.3 °F (36.3 °C)  Heart Rate:  [64-84] 65  Resp:  [16-20] 18  BP: (105-149)/(54-84) 118/75  Flow (L/min):  [2] 2    Physical Exam   Vital Signs Reviewed   General:  WDWN male, Awake and Alert, NAD on 2 L NC    HEENT:  PERRL, EOMI.  OP, nares clear, no sinus tenderness  Neck:  Supple, no JVD, no thyromegaly  Chest:  good aeration, diminished bilaterally, tympanic to percussion bilaterally, no work of breathing noted  CV: RRR, no MGR, pulses 2+, equal  Abd:  Soft, NT, ND, + BS, no HSM  EXT:  no clubbing, no cyanosis, no edema  Neuro:  A&Ox3, CN grossly intact, no focal deficits  Skin: No rashes or lesions noted    Result Review    Result Review:  I have personally reviewed the  results from the time of this admission to 11/9/2022 07:49 EST and agree with these findings:  [x]  Laboratory  [x]  Microbiology  [x]  Radiology  []  EKG/Telemetry   []  Cardiology/Vascular   []  Pathology  []  Old records  []  Other:  Most notable findings include: WBC 10.72, potassium 4.5, creatinine 1.22    Assessment & Plan   Assessment / Plan     Active Hospital Problems:  Active Hospital Problems    Diagnosis    • Acute respiratory failure with hypoxia (HCC)    • Noncompliance with CPAP treatment      Impression:   Acute exacerbation of end-stage COPD  Acute on chronic hypercapnic respiratory failure requiring NIPPV  Noncompliance with home NIPPV  CO2 narcosis  Concern for UTI of unspecified organism  Obesity: BMI 32.31  Diabetes with hyperglycemia     Plan:   -On baseline home O2 2 L nasal cannula.  -Continue to encourage NIPPV at night and as needed days.    -Appreciate RT  assistance in helping to arrange NIPPV with nasal pillows for home.   -Continue Brovana, Pulmicort and DuoNebs.  -Will need to discharge home on Trelegy 200 daily and as needed DuoNebs.  RT  to check on cost.  -Continue bronchopulmonary hygiene.  Encourage I-S and flutter valve.  -Cefepime discontinued and incision to cefdinir to complete 7 days for UTI.  -Glucose control per primary.  -Encourage activity.  Out of bed to chair.  - Will need follow up in COPD clinic on November 17 at 0800.  - Will need outpatient PFTs.  - Would benefit from pulmonary rehab but at this time patient refuses.  - Has issues with noncompliance.     DVT prophylaxis:  Medical DVT prophylaxis orders are present.    CODE STATUS:   Level Of Support Discussed With: Patient  Code Status (Patient has no pulse and is not breathing): CPR (Attempt to Resuscitate)  Medical Interventions (Patient has pulse or is breathing): Full Support    I personally reviewed pertinent labs, imaging and provider notes.   Discussed with bedside nurse and will  discuss with primary service.     Electronically signed by MONE Mcbride, 11/09/22, 10:47 AM EST.    This visit was performed by BOTH a physician and an APC. I personally evaluated and examined the patient. I performed all aspects of MDM as documented. , I have reviewed and confirmed the accuracy of the patient's history as documented in this note. and I have reexamined the patient and the results are consistent with the previously documented exam. I have updated the documentation as necessary.     Electronically signed by Harrison Bauer MD, 11/09/22, 12:57 PM EST.   Electronically signed by Harrison Bauer MD, 11/9/2022, 07:49 EST.

## 2022-11-10 LAB
BACTERIA SPEC AEROBE CULT: NORMAL
BACTERIA SPEC AEROBE CULT: NORMAL

## 2022-11-10 NOTE — OUTREACH NOTE
Prep Survey    Flowsheet Row Responses   [facility] patient discharged from? [facility]   Is LACE score < 7 ? No   Emergency Room discharge w/ pulse ox? No   Eligibility Readm Mgmt   Discharge diagnosis Acute on chronic hypoxemic and hypercapnic respiratory failure   Does the patient have one of the following disease processes/diagnoses(primary or secondary)? COPD   Does the patient have Home health ordered? No   Is there a DME ordered? Yes   What DME was ordered? PAP   Prep survey completed? Yes          PARK ABDULLAHI - Registered Nurse

## 2022-11-16 ENCOUNTER — READMISSION MANAGEMENT (OUTPATIENT)
Dept: CALL CENTER | Facility: HOSPITAL | Age: 72
End: 2022-11-16

## 2022-11-22 NOTE — PROGRESS NOTES
Enter Query Response Below      Query Response:     Hyperglycemia expected  Electronically signed by Danny Dye MD, 22, 12:40 PM EST.           If applicable, please update the problem list.       Patient: Dilip Faulkner        : 1950  Account: [id number]           Admit Date: 2022        How to Respond to this query:       a. Click New Note     b. Answer query within the yellow box.                c. Update the Problem List, if applicable.      If you have any questions about this query contact me at:  [email or web address]    Dr. Dye,     The 22 Discharge Summary includes Type 2 diabetes mellitus with hyperglycemia.  The 22 H&P stated blood sugars have been extremely elevated most likely due to steroids.  22 Pulmonology note states steroids stopped due to hyperglycemia.   Glucose was noted up to 460.  Treatment has included steroids, and insulin drip, and Levemir/Humolog injections.      Please clarify the following:    Hyperglycemia expected  Hyperglycemia not expected  Other- specify_______  Unable to determine    By submitting this query, we are merely seeking further clarification of documentation to accurately reflect all conditions that you are monitoring, evaluating, treating or that extend the hospitalization or utilize additional resources of care. Please utilize your independent clinical judgment when addressing the question(s) above.     This query and your response, once completed, will be entered into the legal medical record.    Sincerely,  Yady KRUSE, RN, CDIS  Clinical Documentation Integrity Program   [email or web address]

## 2022-11-22 NOTE — PROGRESS NOTES
"Enter Query Response Below      Query Response:   Lactic acidosis-clinically significant  Electronically signed by Danny Dye MD, 22, 12:40 PM EST.             If applicable, please update the problem list.       Patient: Dilip Faulkner        : 1950  Account: [id number]           Admit Date: 2022        How to Respond to this query:       a. Click New Note     b. Answer query within the yellow box.                c. Update the Problem List, if applicable.      If you have any questions about this query contact me at:  [email or web address]     Dr. Dye,    The 22 Discharge Summary includes Lactic Acidosis.  The H&P stated \"patient's repeat lactic came back at 3.7.  I felt this is most likely due to the breathing treatments and not sepsis or any other type of infectious etiology.\"  Treatment included a Normal Saline 500 cc bolus .     22 Lactic acid, plasma:  2.1  22 Lactic acid, reflex:  2.2, 3.7, and 2.0    After study, please clarify:     Lactic acidosis-clinically significant  Lactic acidosis-clinically insignificant  Other (please specify)_________  Unable to determine      By submitting this query, we are merely seeking further clarification of documentation to accurately reflect all conditions that you are monitoring, evaluating, treating or that extend the hospitalization or utilize additional resources of care. Please utilize your independent clinical judgment when addressing the question(s) above.     This query and your response, once completed, will be entered into the legal medical record.    Sincerely,  Yady KRUSE, RN, CDIS  Clinical Documentation Integrity Program   [email or web address]  "

## 2022-11-30 ENCOUNTER — READMISSION MANAGEMENT (OUTPATIENT)
Dept: CALL CENTER | Facility: HOSPITAL | Age: 72
End: 2022-11-30

## 2022-11-30 NOTE — OUTREACH NOTE
COPD/PN Week 3 Survey    Flowsheet Row Responses   [facility] facility patient discharged from? [facility]   Does the patient have one of the following disease processes/diagnoses(primary or secondary)? COPD   Week 3 attempt successful? No   Unsuccessful attempts Attempt 1          EDMAR RAWLS - Registered Nurse

## 2023-01-12 ENCOUNTER — APPOINTMENT (OUTPATIENT)
Dept: GENERAL RADIOLOGY | Facility: HOSPITAL | Age: 73
End: 2023-01-12
Payer: MEDICARE

## 2023-01-12 ENCOUNTER — HOSPITAL ENCOUNTER (OUTPATIENT)
Facility: HOSPITAL | Age: 73
Setting detail: OBSERVATION
Discharge: HOME | End: 2023-01-13
Attending: EMERGENCY MEDICINE | Admitting: INTERNAL MEDICINE
Payer: MEDICARE

## 2023-01-12 DIAGNOSIS — J44.1 ACUTE EXACERBATION OF CHRONIC OBSTRUCTIVE PULMONARY DISEASE (COPD): Primary | ICD-10-CM

## 2023-01-12 LAB
ALBUMIN SERPL-MCNC: 3.9 G/DL (ref 3.5–5.2)
ALBUMIN/GLOB SERPL: 1.1 G/DL
ALP SERPL-CCNC: 111 U/L (ref 39–117)
ALT SERPL W P-5'-P-CCNC: 9 U/L (ref 1–41)
ANION GAP SERPL CALCULATED.3IONS-SCNC: 8.4 MMOL/L (ref 5–15)
ARTERIAL PATENCY WRIST A: POSITIVE
AST SERPL-CCNC: 14 U/L (ref 1–40)
BASE EXCESS BLDA CALC-SCNC: -1.2 MMOL/L (ref -2–2)
BASOPHILS # BLD AUTO: 0.06 10*3/MM3 (ref 0–0.2)
BASOPHILS NFR BLD AUTO: 0.7 % (ref 0–1.5)
BDY SITE: ABNORMAL
BILIRUB SERPL-MCNC: 0.3 MG/DL (ref 0–1.2)
BUN SERPL-MCNC: 18 MG/DL (ref 8–23)
BUN/CREAT SERPL: 16.8 (ref 7–25)
CALCIUM SPEC-SCNC: 9.1 MG/DL (ref 8.6–10.5)
CHLORIDE SERPL-SCNC: 103 MMOL/L (ref 98–107)
CO2 SERPL-SCNC: 26.6 MMOL/L (ref 22–29)
COHGB MFR BLD: 1.4 % (ref 0–1.5)
CREAT SERPL-MCNC: 1.07 MG/DL (ref 0.76–1.27)
DEPRECATED RDW RBC AUTO: 44.4 FL (ref 37–54)
EGFRCR SERPLBLD CKD-EPI 2021: 73.3 ML/MIN/1.73
EOSINOPHIL # BLD AUTO: 1.1 10*3/MM3 (ref 0–0.4)
EOSINOPHIL NFR BLD AUTO: 13 % (ref 0.3–6.2)
ERYTHROCYTE [DISTWIDTH] IN BLOOD BY AUTOMATED COUNT: 15.3 % (ref 12.3–15.4)
FHHB: 4.3 % (ref 0–5)
FLUAV AG NPH QL: NEGATIVE
FLUBV AG NPH QL IA: NEGATIVE
GAS FLOW AIRWAY: 4.5 LPM
GLOBULIN UR ELPH-MCNC: 3.7 GM/DL
GLUCOSE SERPL-MCNC: 102 MG/DL (ref 65–99)
HCO3 BLDA-SCNC: 26 MMOL/L (ref 22–26)
HCT VFR BLD AUTO: 46.6 % (ref 37.5–51)
HGB BLD-MCNC: 13.6 G/DL (ref 13–17.7)
HGB BLDA-MCNC: 13.7 G/DL (ref 13.8–16.4)
HOLD SPECIMEN: NORMAL
HOLD SPECIMEN: NORMAL
IMM GRANULOCYTES # BLD AUTO: 0.02 10*3/MM3 (ref 0–0.05)
IMM GRANULOCYTES NFR BLD AUTO: 0.2 % (ref 0–0.5)
LACTATE BLDA-SCNC: ABNORMAL MMOL/L
LYMPHOCYTES # BLD AUTO: 1.47 10*3/MM3 (ref 0.7–3.1)
LYMPHOCYTES NFR BLD AUTO: 17.4 % (ref 19.6–45.3)
MCH RBC QN AUTO: 23.5 PG (ref 26.6–33)
MCHC RBC AUTO-ENTMCNC: 29.2 G/DL (ref 31.5–35.7)
MCV RBC AUTO: 80.6 FL (ref 79–97)
METHGB BLD QL: 0.3 % (ref 0–1.5)
MODALITY: ABNORMAL
MONOCYTES # BLD AUTO: 0.65 10*3/MM3 (ref 0.1–0.9)
MONOCYTES NFR BLD AUTO: 7.7 % (ref 5–12)
NEUTROPHILS NFR BLD AUTO: 5.17 10*3/MM3 (ref 1.7–7)
NEUTROPHILS NFR BLD AUTO: 61 % (ref 42.7–76)
NRBC BLD AUTO-RTO: 0 /100 WBC (ref 0–0.2)
NT-PROBNP SERPL-MCNC: 227 PG/ML (ref 0–900)
OXYHGB MFR BLDV: 94 % (ref 94–99)
PCO2 BLDA: 53.8 MM HG (ref 35–45)
PH BLDA: 7.3 PH UNITS (ref 7.35–7.45)
PLATELET # BLD AUTO: 246 10*3/MM3 (ref 140–450)
PMV BLD AUTO: 10.1 FL (ref 6–12)
PO2 BLDA: 91.2 MM HG (ref 80–100)
POTASSIUM SERPL-SCNC: 4.9 MMOL/L (ref 3.5–5.2)
PROCALCITONIN SERPL-MCNC: 0.06 NG/ML (ref 0–0.25)
PROT SERPL-MCNC: 7.6 G/DL (ref 6–8.5)
RBC # BLD AUTO: 5.78 10*6/MM3 (ref 4.14–5.8)
SAO2 % BLDCOA: 95.6 % (ref 95–99)
SODIUM SERPL-SCNC: 138 MMOL/L (ref 136–145)
TROPONIN T SERPL-MCNC: 0.03 NG/ML (ref 0–0.03)
WBC NRBC COR # BLD: 8.47 10*3/MM3 (ref 3.4–10.8)
WHOLE BLOOD HOLD COAG: NORMAL
WHOLE BLOOD HOLD SPECIMEN: NORMAL

## 2023-01-12 PROCEDURE — 93005 ELECTROCARDIOGRAM TRACING: CPT

## 2023-01-12 PROCEDURE — 99233 SBSQ HOSP IP/OBS HIGH 50: CPT | Performed by: INTERNAL MEDICINE

## 2023-01-12 PROCEDURE — 25010000002 METHYLPREDNISOLONE PER 125 MG: Performed by: EMERGENCY MEDICINE

## 2023-01-12 PROCEDURE — 93005 ELECTROCARDIOGRAM TRACING: CPT | Performed by: EMERGENCY MEDICINE

## 2023-01-12 PROCEDURE — 63710000001 INSULIN DETEMIR PER 5 UNITS: Performed by: INTERNAL MEDICINE

## 2023-01-12 PROCEDURE — 93010 ELECTROCARDIOGRAM REPORT: CPT | Performed by: INTERNAL MEDICINE

## 2023-01-12 PROCEDURE — G0378 HOSPITAL OBSERVATION PER HR: HCPCS

## 2023-01-12 PROCEDURE — 83050 HGB METHEMOGLOBIN QUAN: CPT | Performed by: EMERGENCY MEDICINE

## 2023-01-12 PROCEDURE — 80053 COMPREHEN METABOLIC PANEL: CPT

## 2023-01-12 PROCEDURE — 87205 SMEAR GRAM STAIN: CPT | Performed by: INTERNAL MEDICINE

## 2023-01-12 PROCEDURE — 94799 UNLISTED PULMONARY SVC/PX: CPT

## 2023-01-12 PROCEDURE — 82805 BLOOD GASES W/O2 SATURATION: CPT | Performed by: EMERGENCY MEDICINE

## 2023-01-12 PROCEDURE — 25010000002 ENOXAPARIN PER 10 MG: Performed by: INTERNAL MEDICINE

## 2023-01-12 PROCEDURE — 94640 AIRWAY INHALATION TREATMENT: CPT

## 2023-01-12 PROCEDURE — 82375 ASSAY CARBOXYHB QUANT: CPT | Performed by: EMERGENCY MEDICINE

## 2023-01-12 PROCEDURE — 96374 THER/PROPH/DIAG INJ IV PUSH: CPT

## 2023-01-12 PROCEDURE — 36415 COLL VENOUS BLD VENIPUNCTURE: CPT

## 2023-01-12 PROCEDURE — 36600 WITHDRAWAL OF ARTERIAL BLOOD: CPT | Performed by: EMERGENCY MEDICINE

## 2023-01-12 PROCEDURE — 83880 ASSAY OF NATRIURETIC PEPTIDE: CPT

## 2023-01-12 PROCEDURE — 96372 THER/PROPH/DIAG INJ SC/IM: CPT

## 2023-01-12 PROCEDURE — 71045 X-RAY EXAM CHEST 1 VIEW: CPT

## 2023-01-12 PROCEDURE — 99285 EMERGENCY DEPT VISIT HI MDM: CPT

## 2023-01-12 PROCEDURE — C9803 HOPD COVID-19 SPEC COLLECT: HCPCS

## 2023-01-12 PROCEDURE — 84145 PROCALCITONIN (PCT): CPT | Performed by: INTERNAL MEDICINE

## 2023-01-12 PROCEDURE — 25010000002 CEFEPIME PER 500 MG: Performed by: INTERNAL MEDICINE

## 2023-01-12 PROCEDURE — 87804 INFLUENZA ASSAY W/OPTIC: CPT | Performed by: INTERNAL MEDICINE

## 2023-01-12 PROCEDURE — U0004 COV-19 TEST NON-CDC HGH THRU: HCPCS | Performed by: INTERNAL MEDICINE

## 2023-01-12 PROCEDURE — 84484 ASSAY OF TROPONIN QUANT: CPT

## 2023-01-12 PROCEDURE — 87070 CULTURE OTHR SPECIMN AEROBIC: CPT | Performed by: INTERNAL MEDICINE

## 2023-01-12 PROCEDURE — 94760 N-INVAS EAR/PLS OXIMETRY 1: CPT

## 2023-01-12 PROCEDURE — 85025 COMPLETE CBC W/AUTO DIFF WBC: CPT

## 2023-01-12 RX ORDER — ONDANSETRON 4 MG/1
4 TABLET, FILM COATED ORAL EVERY 6 HOURS PRN
Status: DISCONTINUED | OUTPATIENT
Start: 2023-01-12 | End: 2023-01-13 | Stop reason: HOSPADM

## 2023-01-12 RX ORDER — METHYLPREDNISOLONE SODIUM SUCCINATE 125 MG/2ML
125 INJECTION, POWDER, LYOPHILIZED, FOR SOLUTION INTRAMUSCULAR; INTRAVENOUS ONCE
Status: COMPLETED | OUTPATIENT
Start: 2023-01-12 | End: 2023-01-12

## 2023-01-12 RX ORDER — ARFORMOTEROL TARTRATE 15 UG/2ML
15 SOLUTION RESPIRATORY (INHALATION)
Status: DISCONTINUED | OUTPATIENT
Start: 2023-01-12 | End: 2023-01-13 | Stop reason: HOSPADM

## 2023-01-12 RX ORDER — SODIUM CHLORIDE 0.9 % (FLUSH) 0.9 %
10 SYRINGE (ML) INJECTION AS NEEDED
Status: DISCONTINUED | OUTPATIENT
Start: 2023-01-12 | End: 2023-01-13 | Stop reason: HOSPADM

## 2023-01-12 RX ORDER — ALBUTEROL SULFATE 2.5 MG/3ML
2.5 SOLUTION RESPIRATORY (INHALATION)
Status: DISCONTINUED | OUTPATIENT
Start: 2023-01-12 | End: 2023-01-13 | Stop reason: HOSPADM

## 2023-01-12 RX ORDER — LISINOPRIL 2.5 MG/1
1 TABLET ORAL DAILY
COMMUNITY
Start: 2022-11-23 | End: 2023-03-30 | Stop reason: HOSPADM

## 2023-01-12 RX ORDER — ASPIRIN 81 MG/1
81 TABLET, CHEWABLE ORAL DAILY
Status: DISCONTINUED | OUTPATIENT
Start: 2023-01-13 | End: 2023-01-13 | Stop reason: HOSPADM

## 2023-01-12 RX ORDER — CEFEPIME 1 G/50ML
2 INJECTION, SOLUTION INTRAVENOUS ONCE
Status: COMPLETED | OUTPATIENT
Start: 2023-01-12 | End: 2023-01-12

## 2023-01-12 RX ORDER — SODIUM CHLORIDE 0.9 % (FLUSH) 0.9 %
10 SYRINGE (ML) INJECTION EVERY 12 HOURS SCHEDULED
Status: DISCONTINUED | OUTPATIENT
Start: 2023-01-12 | End: 2023-01-13 | Stop reason: HOSPADM

## 2023-01-12 RX ORDER — SODIUM CHLORIDE 9 MG/ML
40 INJECTION, SOLUTION INTRAVENOUS AS NEEDED
Status: DISCONTINUED | OUTPATIENT
Start: 2023-01-12 | End: 2023-01-13 | Stop reason: HOSPADM

## 2023-01-12 RX ORDER — PREDNISONE 20 MG/1
40 TABLET ORAL
Status: DISCONTINUED | OUTPATIENT
Start: 2023-01-13 | End: 2023-01-13 | Stop reason: HOSPADM

## 2023-01-12 RX ORDER — DEXTROSE MONOHYDRATE 25 G/50ML
25 INJECTION, SOLUTION INTRAVENOUS
Status: DISCONTINUED | OUTPATIENT
Start: 2023-01-12 | End: 2023-01-13 | Stop reason: HOSPADM

## 2023-01-12 RX ORDER — LISINOPRIL 2.5 MG/1
2.5 TABLET ORAL DAILY
Status: DISCONTINUED | OUTPATIENT
Start: 2023-01-13 | End: 2023-01-13 | Stop reason: HOSPADM

## 2023-01-12 RX ORDER — ENOXAPARIN SODIUM 100 MG/ML
40 INJECTION SUBCUTANEOUS DAILY
Status: DISCONTINUED | OUTPATIENT
Start: 2023-01-12 | End: 2023-01-13 | Stop reason: HOSPADM

## 2023-01-12 RX ORDER — ATORVASTATIN CALCIUM 10 MG/1
10 TABLET, FILM COATED ORAL DAILY
Status: DISCONTINUED | OUTPATIENT
Start: 2023-01-13 | End: 2023-01-13 | Stop reason: HOSPADM

## 2023-01-12 RX ORDER — EMPAGLIFLOZIN 10 MG/1
10 TABLET, FILM COATED ORAL DAILY
Status: ON HOLD | COMMUNITY
Start: 2022-11-09 | End: 2023-03-29

## 2023-01-12 RX ORDER — BUDESONIDE 0.5 MG/2ML
0.5 INHALANT ORAL
Status: DISCONTINUED | OUTPATIENT
Start: 2023-01-12 | End: 2023-01-13 | Stop reason: HOSPADM

## 2023-01-12 RX ORDER — INSULIN LISPRO 100 [IU]/ML
2-7 INJECTION, SOLUTION INTRAVENOUS; SUBCUTANEOUS
Status: DISCONTINUED | OUTPATIENT
Start: 2023-01-12 | End: 2023-01-13 | Stop reason: HOSPADM

## 2023-01-12 RX ORDER — ACETAMINOPHEN 325 MG/1
650 TABLET ORAL EVERY 4 HOURS PRN
Status: DISCONTINUED | OUTPATIENT
Start: 2023-01-12 | End: 2023-01-13 | Stop reason: HOSPADM

## 2023-01-12 RX ORDER — IPRATROPIUM BROMIDE AND ALBUTEROL SULFATE 2.5; .5 MG/3ML; MG/3ML
3 SOLUTION RESPIRATORY (INHALATION) ONCE
Status: COMPLETED | OUTPATIENT
Start: 2023-01-12 | End: 2023-01-12

## 2023-01-12 RX ORDER — NICOTINE POLACRILEX 4 MG
15 LOZENGE BUCCAL
Status: DISCONTINUED | OUTPATIENT
Start: 2023-01-12 | End: 2023-01-13 | Stop reason: HOSPADM

## 2023-01-12 RX ADMIN — IPRATROPIUM BROMIDE AND ALBUTEROL SULFATE 3 ML: 2.5; .5 SOLUTION RESPIRATORY (INHALATION) at 19:25

## 2023-01-12 RX ADMIN — CEFEPIME 2 G: 1 INJECTION, SOLUTION INTRAVENOUS at 23:34

## 2023-01-12 RX ADMIN — METHYLPREDNISOLONE SODIUM SUCCINATE 125 MG: 125 INJECTION, POWDER, FOR SOLUTION INTRAMUSCULAR; INTRAVENOUS at 19:39

## 2023-01-12 RX ADMIN — ALBUTEROL SULFATE 2.5 MG: 2.5 SOLUTION RESPIRATORY (INHALATION) at 23:40

## 2023-01-12 RX ADMIN — ENOXAPARIN SODIUM 40 MG: 100 INJECTION SUBCUTANEOUS at 23:34

## 2023-01-12 RX ADMIN — METOPROLOL TARTRATE 25 MG: 25 TABLET, FILM COATED ORAL at 23:34

## 2023-01-12 RX ADMIN — INSULIN DETEMIR 10 UNITS: 100 INJECTION, SOLUTION SUBCUTANEOUS at 23:34

## 2023-01-13 ENCOUNTER — READMISSION MANAGEMENT (OUTPATIENT)
Dept: CALL CENTER | Facility: HOSPITAL | Age: 73
End: 2023-01-13
Payer: MEDICARE

## 2023-01-13 VITALS
HEIGHT: 73 IN | TEMPERATURE: 97.2 F | SYSTOLIC BLOOD PRESSURE: 154 MMHG | BODY MASS INDEX: 33.43 KG/M2 | WEIGHT: 252.21 LBS | HEART RATE: 72 BPM | DIASTOLIC BLOOD PRESSURE: 84 MMHG | OXYGEN SATURATION: 92 % | RESPIRATION RATE: 20 BRPM

## 2023-01-13 LAB
ALBUMIN SERPL-MCNC: 4 G/DL (ref 3.5–5.2)
ALBUMIN/GLOB SERPL: 1.1 G/DL
ALP SERPL-CCNC: 105 U/L (ref 39–117)
ALT SERPL W P-5'-P-CCNC: 10 U/L (ref 1–41)
ANION GAP SERPL CALCULATED.3IONS-SCNC: 13.1 MMOL/L (ref 5–15)
AST SERPL-CCNC: 16 U/L (ref 1–40)
BASOPHILS # BLD AUTO: 0.03 10*3/MM3 (ref 0–0.2)
BASOPHILS NFR BLD AUTO: 0.4 % (ref 0–1.5)
BILIRUB SERPL-MCNC: 0.3 MG/DL (ref 0–1.2)
BUN SERPL-MCNC: 22 MG/DL (ref 8–23)
BUN/CREAT SERPL: 18.8 (ref 7–25)
CALCIUM SPEC-SCNC: 8.9 MG/DL (ref 8.6–10.5)
CHLORIDE SERPL-SCNC: 101 MMOL/L (ref 98–107)
CO2 SERPL-SCNC: 20.9 MMOL/L (ref 22–29)
CREAT SERPL-MCNC: 1.17 MG/DL (ref 0.76–1.27)
DEPRECATED RDW RBC AUTO: 43.1 FL (ref 37–54)
EGFRCR SERPLBLD CKD-EPI 2021: 65.8 ML/MIN/1.73
EOSINOPHIL # BLD AUTO: 0.01 10*3/MM3 (ref 0–0.4)
EOSINOPHIL NFR BLD AUTO: 0.1 % (ref 0.3–6.2)
ERYTHROCYTE [DISTWIDTH] IN BLOOD BY AUTOMATED COUNT: 15 % (ref 12.3–15.4)
GLOBULIN UR ELPH-MCNC: 3.7 GM/DL
GLUCOSE BLDC GLUCOMTR-MCNC: 188 MG/DL (ref 70–99)
GLUCOSE SERPL-MCNC: 276 MG/DL (ref 65–99)
HCT VFR BLD AUTO: 44 % (ref 37.5–51)
HGB BLD-MCNC: 13.1 G/DL (ref 13–17.7)
IMM GRANULOCYTES # BLD AUTO: 0.04 10*3/MM3 (ref 0–0.05)
IMM GRANULOCYTES NFR BLD AUTO: 0.6 % (ref 0–0.5)
LYMPHOCYTES # BLD AUTO: 0.63 10*3/MM3 (ref 0.7–3.1)
LYMPHOCYTES NFR BLD AUTO: 9.3 % (ref 19.6–45.3)
MAGNESIUM SERPL-MCNC: 1.9 MG/DL (ref 1.6–2.4)
MCH RBC QN AUTO: 23.6 PG (ref 26.6–33)
MCHC RBC AUTO-ENTMCNC: 29.8 G/DL (ref 31.5–35.7)
MCV RBC AUTO: 79.3 FL (ref 79–97)
MONOCYTES # BLD AUTO: 0.04 10*3/MM3 (ref 0.1–0.9)
MONOCYTES NFR BLD AUTO: 0.6 % (ref 5–12)
NEUTROPHILS NFR BLD AUTO: 6.06 10*3/MM3 (ref 1.7–7)
NEUTROPHILS NFR BLD AUTO: 89 % (ref 42.7–76)
NRBC BLD AUTO-RTO: 0 /100 WBC (ref 0–0.2)
PLATELET # BLD AUTO: 235 10*3/MM3 (ref 140–450)
PMV BLD AUTO: 10.5 FL (ref 6–12)
POTASSIUM SERPL-SCNC: 4.8 MMOL/L (ref 3.5–5.2)
PROCALCITONIN SERPL-MCNC: 0.06 NG/ML (ref 0–0.25)
PROT SERPL-MCNC: 7.7 G/DL (ref 6–8.5)
RBC # BLD AUTO: 5.55 10*6/MM3 (ref 4.14–5.8)
SARS-COV-2 RNA PNL SPEC NAA+PROBE: NOT DETECTED
SODIUM SERPL-SCNC: 135 MMOL/L (ref 136–145)
WBC NRBC COR # BLD: 6.81 10*3/MM3 (ref 3.4–10.8)

## 2023-01-13 PROCEDURE — 84145 PROCALCITONIN (PCT): CPT | Performed by: INTERNAL MEDICINE

## 2023-01-13 PROCEDURE — 99239 HOSP IP/OBS DSCHRG MGMT >30: CPT | Performed by: INTERNAL MEDICINE

## 2023-01-13 PROCEDURE — 82962 GLUCOSE BLOOD TEST: CPT

## 2023-01-13 PROCEDURE — 94799 UNLISTED PULMONARY SVC/PX: CPT

## 2023-01-13 PROCEDURE — 63710000001 PREDNISONE PER 1 MG: Performed by: INTERNAL MEDICINE

## 2023-01-13 PROCEDURE — G0378 HOSPITAL OBSERVATION PER HR: HCPCS

## 2023-01-13 PROCEDURE — 63710000001 INSULIN LISPRO (HUMAN) PER 5 UNITS: Performed by: INTERNAL MEDICINE

## 2023-01-13 PROCEDURE — 83735 ASSAY OF MAGNESIUM: CPT | Performed by: INTERNAL MEDICINE

## 2023-01-13 PROCEDURE — 80053 COMPREHEN METABOLIC PANEL: CPT | Performed by: INTERNAL MEDICINE

## 2023-01-13 PROCEDURE — 85025 COMPLETE CBC W/AUTO DIFF WBC: CPT | Performed by: INTERNAL MEDICINE

## 2023-01-13 RX ORDER — FLUTICASONE FUROATE, UMECLIDINIUM BROMIDE AND VILANTEROL TRIFENATATE 200; 62.5; 25 UG/1; UG/1; UG/1
1 POWDER RESPIRATORY (INHALATION) DAILY
Qty: 60 EACH | Refills: 0 | Status: SHIPPED | OUTPATIENT
Start: 2023-01-13 | End: 2023-02-12

## 2023-01-13 RX ORDER — PREDNISONE 20 MG/1
40 TABLET ORAL
Qty: 10 TABLET | Refills: 0 | Status: SHIPPED | OUTPATIENT
Start: 2023-01-14 | End: 2023-01-19

## 2023-01-13 RX ORDER — CEFEPIME 1 G/50ML
2 INJECTION, SOLUTION INTRAVENOUS EVERY 8 HOURS
Status: DISCONTINUED | OUTPATIENT
Start: 2023-01-13 | End: 2023-01-13 | Stop reason: HOSPADM

## 2023-01-13 RX ORDER — CEFDINIR 300 MG/1
300 CAPSULE ORAL 2 TIMES DAILY
Qty: 10 CAPSULE | Refills: 0 | Status: SHIPPED | OUTPATIENT
Start: 2023-01-13 | End: 2023-01-18

## 2023-01-13 RX ADMIN — ARFORMOTEROL TARTRATE 15 MCG: 15 SOLUTION RESPIRATORY (INHALATION) at 07:33

## 2023-01-13 RX ADMIN — LISINOPRIL 2.5 MG: 2.5 TABLET ORAL at 08:58

## 2023-01-13 RX ADMIN — ALBUTEROL SULFATE 2.5 MG: 2.5 SOLUTION RESPIRATORY (INHALATION) at 03:08

## 2023-01-13 RX ADMIN — ASPIRIN 81 MG: 81 TABLET, CHEWABLE ORAL at 08:58

## 2023-01-13 RX ADMIN — ALBUTEROL SULFATE 2.5 MG: 2.5 SOLUTION RESPIRATORY (INHALATION) at 07:33

## 2023-01-13 RX ADMIN — METOPROLOL TARTRATE 25 MG: 25 TABLET, FILM COATED ORAL at 08:58

## 2023-01-13 RX ADMIN — INSULIN LISPRO 2 UNITS: 100 INJECTION, SOLUTION INTRAVENOUS; SUBCUTANEOUS at 08:59

## 2023-01-13 RX ADMIN — BUDESONIDE 0.5 MG: 0.5 INHALANT ORAL at 07:33

## 2023-01-13 RX ADMIN — PREDNISONE 40 MG: 20 TABLET ORAL at 08:58

## 2023-01-13 RX ADMIN — ATORVASTATIN CALCIUM 10 MG: 10 TABLET, FILM COATED ORAL at 08:58

## 2023-01-13 RX ADMIN — ALBUTEROL SULFATE 2.5 MG: 2.5 SOLUTION RESPIRATORY (INHALATION) at 11:18

## 2023-01-13 NOTE — ED PROVIDER NOTES
Time: 7:04 PM EST  Date of encounter:  1/12/2023  Independent Historian/Clinical History and Information was obtained by:   Patient and EMS  Chief Complaint: SOB, cough    History is limited by: N/A    History of Present Illness:  Patient is a 72 y.o. year old male who presents to the emergency department for evaluation of SOB and cough with onset today. Pt is a previous smoker who quit several years ago. Pt is on 2 L of oxygen at home at times.     HPI    Patient Care Team  Primary Care Provider: Rosalba Edwards APRN    Past Medical History:     No Known Allergies  Past Medical History:   Diagnosis Date   • Asthma    • COPD (chronic obstructive pulmonary disease) (HCC)    • Diabetes mellitus (HCC)    • Hernia, umbilical    • Hypertension    • Requires continuous at home supplemental oxygen      Past Surgical History:   Procedure Laterality Date   • ABDOMINAL SURGERY       No family history on file.    Home Medications:  Prior to Admission medications    Medication Sig Start Date End Date Taking? Authorizing Provider   albuterol sulfate  (90 Base) MCG/ACT inhaler Inhale 2 puffs Every 6 (Six) Hours As Needed for Wheezing.    Provider, MD Roosevelt   aspirin 81 MG chewable tablet Chew 81 mg Daily.    Provider, MD Roosevelt   atorvastatin (LIPITOR) 10 MG tablet Take 10 mg by mouth Daily.    Provider, MD Roosevelt   cetirizine (zyrTEC) 10 MG tablet Take 10 mg by mouth Daily.    Provider, MD Roosevelt   Fluticasone-Umeclidin-Vilant (Trelegy Ellipta) 200-62.5-25 MCG/ACT aerosol powder  Inhale 1 dose by mouth Daily. 11/9/22   Danny Dye MD   insulin degludec (Tresiba FlexTouch) 100 UNIT/ML solution pen-injector injection Inject 17 Units under the skin into the appropriate area as directed Daily.    Provider, MD Roosevelt   ipratropium-albuterol (DUO-NEB) 0.5-2.5 mg/3 ml nebulizer Take 3 mL by nebulization Every 4 (Four) Hours As Needed for Wheezing or Shortness of Air. 12/13/21   Ricci Trinidad,  "DO   lisinopril (PRINIVIL,ZESTRIL) 2.5 MG tablet Take 2.5 mg by mouth Daily.    ProviderRoosevelt MD   metFORMIN (GLUCOPHAGE) 500 MG tablet Take 500 mg by mouth 2 (two) times a day. 21   Roosevelt Gaston MD   metoprolol tartrate (LOPRESSOR) 25 MG tablet Take 25 mg by mouth 2 (Two) Times a Day.    Roosevelt Gaston MD   omeprazole (priLOSEC) 20 MG capsule Take 20 mg by mouth Daily.    ProviderRoosevelt MD   QUEtiapine (SEROquel) 25 MG tablet Take 1 tablet by mouth Every Night for 30 days. 10/8/22 11/7/22  Oralia Hardy MD        Social History:   Social History     Tobacco Use   • Smoking status: Former     Years: 2.00     Types: Cigarettes     Quit date: 3/19/2021     Years since quittin.8   • Smokeless tobacco: Current     Types: Chew   Vaping Use   • Vaping Use: Never used   Substance Use Topics   • Alcohol use: Never   • Drug use: Never         Review of Systems:  Review of Systems   Constitutional: Negative for chills and fever.   HENT: Negative for sore throat.    Eyes: Negative for photophobia.   Respiratory: Positive for cough and shortness of breath.    Cardiovascular: Negative for chest pain.   Gastrointestinal: Negative for abdominal pain, diarrhea, nausea and vomiting.   Genitourinary: Negative for dysuria.   Musculoskeletal: Negative for neck pain.   Skin: Negative for wound.   Neurological: Negative for headaches.   All other systems reviewed and are negative.       Physical Exam:  /68   Pulse 68   Temp 97.9 °F (36.6 °C) (Oral)   Resp 21   Ht 185.4 cm (73\")   SpO2 94%   BMI 32.31 kg/m²     Physical Exam  Vitals and nursing note reviewed.   Constitutional:       General: He is not in acute distress.  HENT:      Head: Normocephalic and atraumatic.   Eyes:      Extraocular Movements: Extraocular movements intact.   Cardiovascular:      Rate and Rhythm: Normal rate and regular rhythm.   Pulmonary:      Effort: Accessory muscle usage and respiratory distress " present.      Breath sounds: Decreased breath sounds and wheezing present.   Abdominal:      General: Abdomen is flat.      Palpations: Abdomen is soft.      Tenderness: There is no abdominal tenderness.   Musculoskeletal:         General: Normal range of motion.      Cervical back: Normal range of motion and neck supple.      Right lower leg: No edema.      Left lower leg: No edema.   Skin:     General: Skin is warm and dry.      Capillary Refill: Capillary refill takes less than 2 seconds.   Neurological:      Mental Status: He is alert and oriented to person, place, and time. Mental status is at baseline.                  Procedures:  Procedures      Medical Decision Making:      Comorbidities that affect care:    COPD, Diabetes, Hypertension    External Notes reviewed:    None      The following orders were placed and all results were independently analyzed by me:  Orders Placed This Encounter   Procedures   • COVID-19,APTIMA PANTHER(SRAVANTHI), GUERA/ CARMEN, NP/OP SWAB IN UTM/VTM/SALINE TRANSPORT MEDIA,24 HR TAT - Swab, Nasopharynx   • Influenza Antigen, Rapid - Swab, Nasopharynx   • XR Chest 1 View   • [place] Draw   • Comprehensive Metabolic Panel   • BNP   • Troponin   • CBC Auto Differential   • Blood Gas, Arterial -With Co-Ox Panel: Yes   • Procalcitonin   • NPO Diet NPO Type: Strict NPO   • Undress & Gown   • Cardiac Monitoring   • Continuous Pulse Oximetry   • Vital Signs   • Inpatient Hospitalist Consult   • Oxygen Therapy- Nasal Cannula; 2 LPM; Titrate for SPO2: equal to or greater than, 92%   • ECG 12 Lead ED Triage Standing Order; SOA   • Insert Peripheral IV   • CBC & Differential   • Green Top (Gel)   • Lavender Top   • Gold Top - SST   • Light Blue Top       Medications Given in the Emergency Department:  Medications   sodium chloride 0.9 % flush 10 mL (has no administration in time range)   ipratropium-albuterol (DUO-NEB) nebulizer solution 3 mL (3 mL Nebulization Given 1/12/23 1925)    methylPREDNISolone sodium succinate (SOLU-Medrol) injection 125 mg (125 mg Intravenous Given 1/12/23 1939)        ED Course:         Labs:    Lab Results (last 24 hours)     Procedure Component Value Units Date/Time    CBC & Differential [[id number]]  (Abnormal) Collected: 01/12/23 1758    Specimen: Blood Updated: 01/12/23 1816    Narrative:      The following orders were created for panel order CBC & Differential.  Procedure                               Abnormality         Status                     ---------                               -----------         ------                     CBC Auto Differential[[id number]]        Abnormal            Final result                 Please view results for these tests on the individual orders.    Comprehensive Metabolic Panel [[id number]]  (Abnormal) Collected: 01/12/23 1758    Specimen: Blood Updated: 01/12/23 1845     Glucose 102 mg/dL      BUN 18 mg/dL      Creatinine 1.07 mg/dL      Sodium 138 mmol/L      Potassium 4.9 mmol/L      Chloride 103 mmol/L      CO2 26.6 mmol/L      Calcium 9.1 mg/dL      Total Protein 7.6 g/dL      Albumin 3.9 g/dL      ALT (SGPT) 9 U/L      AST (SGOT) 14 U/L      Alkaline Phosphatase 111 U/L      Total Bilirubin 0.3 mg/dL      Globulin 3.7 gm/dL      A/G Ratio 1.1 g/dL      BUN/Creatinine Ratio 16.8     Anion Gap 8.4 mmol/L      eGFR 73.3 mL/min/1.73      Comment: National Kidney Foundation and American Society of Nephrology (ASN) Task Force recommended calculation based on the Chronic Kidney Disease Epidemiology Collaboration (CKD-EPI) equation refit without adjustment for race.       Narrative:      GFR Normal >60  Chronic Kidney Disease <60  Kidney Failure <15    The GFR formula is only valid for adults with stable renal function between ages 18 and 70.    BNP [[id number]]  (Normal) Collected: 01/12/23 1758    Specimen: Blood Updated: 01/12/23 1840     proBNP 227.0 pg/mL     Narrative:      Among patients with dyspnea, NT-proBNP is highly  sensitive for the detection of acute congestive heart failure. In addition NT-proBNP of <300 pg/ml effectively rules out acute congestive heart failure with 99% negative predictive value.      Troponin [[id number]]  (Normal) Collected: 01/12/23 1758    Specimen: Blood Updated: 01/12/23 1845     Troponin T 0.025 ng/mL     Narrative:      Troponin T Reference Range:  <= 0.03 ng/mL-   Negative for AMI  >0.03 ng/mL-     Abnormal for myocardial necrosis.  Clinicians would have to utilize clinical acumen, EKG, Troponin and serial changes to determine if it is an Acute Myocardial Infarction or myocardial injury due to an underlying chronic condition.       Results may be falsely decreased if patient taking Biotin.      CBC Auto Differential [[id number]]  (Abnormal) Collected: 01/12/23 1758    Specimen: Blood Updated: 01/12/23 1816     WBC 8.47 10*3/mm3      RBC 5.78 10*6/mm3      Hemoglobin 13.6 g/dL      Hematocrit 46.6 %      MCV 80.6 fL      MCH 23.5 pg      MCHC 29.2 g/dL      RDW 15.3 %      RDW-SD 44.4 fl      MPV 10.1 fL      Platelets 246 10*3/mm3      Neutrophil % 61.0 %      Lymphocyte % 17.4 %      Monocyte % 7.7 %      Eosinophil % 13.0 %      Basophil % 0.7 %      Immature Grans % 0.2 %      Neutrophils, Absolute 5.17 10*3/mm3      Lymphocytes, Absolute 1.47 10*3/mm3      Monocytes, Absolute 0.65 10*3/mm3      Eosinophils, Absolute 1.10 10*3/mm3      Basophils, Absolute 0.06 10*3/mm3      Immature Grans, Absolute 0.02 10*3/mm3      nRBC 0.0 /100 WBC     Procalcitonin [[id number]] Collected: 01/12/23 1758    Specimen: Blood Updated: 01/12/23 2042    Blood Gas, Arterial -With Co-Ox Panel: Yes [[id number]]  (Abnormal) Collected: 01/12/23 1927    Specimen: Arterial Blood from Arm, Right Updated: 01/12/23 1931     pH, Arterial 7.302 pH units      pCO2, Arterial 53.8 mm Hg      pO2, Arterial 91.2 mm Hg      HCO3, Arterial 26.0 mmol/L      Base Excess, Arterial -1.2 mmol/L      O2 Saturation, Arterial 95.6 %       Hemoglobin, Blood Gas 13.7 g/dL      Carboxyhemoglobin 1.4 %      Methemoglobin 0.30 %      Oxyhemoglobin 94.0 %      FHHB 4.3 %      Site Arterial: right radial     Modality Cannula - Nasal     Flow Rate 4.5 lpm      Nael's Test Positive     Lactate, Arterial --           Imaging:    XR Chest 1 View    Result Date: 1/12/2023  PROCEDURE: XR CHEST 1 VW  COMPARISON: [facility], CR, XR CHEST 1 VW, 9/16/2022, 20:45.  [facility], CR, XR CHEST 1 VW, 11/05/2022, 11:17.  INDICATIONS: SOA Triage Protocol  FINDINGS:  Irregular density in the left lung base likely represents scarring or atelectasis, similar to the previous exam.  No acute infiltrate is identified.  There is probable pleural thickening along the left lung base.  The cardiac and mediastinal silhouettes appear normal.        1. Chronic scarring or atelectasis in the mid to lower left lung field 2. Suspected chronic pleural thickening along the lateral left lung base.       Chirag Lopez M.D.       Electronically Signed and Approved By: Chirag Lopez M.D. on 1/12/2023 at 18:57                 Differential Diagnosis and Discussion:    Dyspnea: Differential diagnosis includes but is not limited to metabolic acidosis, neurological disorders, psychogenic, asthma, pneumothorax, upper airway obstruction, COPD, pneumonia, noncardiogenic pulmonary edema, interstitial lung disease, anemia, congestive heart failure, and pulmonary embolism    All labs were reviewed and analyzed by me.    MDM   72-year-old male patient presents short of breath.  Patient has home O2 history of COPD.  Patient improved mildly with a breathing treatment.  Patient initially was using accessory muscles and was in respiratory distress.  After nebulizer treatment and steroids patient's breathing is slightly improved but he does not feel comfortable being discharged home.  His lungs continue to have diminished breath sounds with expiratory wheezing.  Patient was discussed  with the hospitalist service who will admit the patient.    Patient Care Considerations:          Consultants/Shared Management Plan:    None    Social Determinants of Health:    Patient is independent, reliable, and has access to care.       Disposition and Care Coordination:    Admit:   Through independent evaluation of the patient's history, physical, and imperical data, the patient meets criteria for observation/admission to the hospital.        Final diagnoses:   Acute exacerbation of chronic obstructive pulmonary disease (COPD) (HCC)        ED Disposition     ED Disposition   Decision to Admit    Condition   --    Comment   --             This medical record created using voice recognition software.             Rah Cardenas  01/12/23 1912       Rah Cardenas  01/12/23 1913       Wilton Zimmerman DO  01/12/23 2044       Wilton Zimmerman DO  01/12/23 2046

## 2023-01-13 NOTE — PLAN OF CARE
Goal Outcome Evaluation:  Plan of Care Reviewed With: patient        Progress: no change  Outcome Evaluation: New admit, admin orders carried out

## 2023-01-13 NOTE — DISCHARGE INSTR - APPOINTMENTS
Follow up with Rosalba Edwards on Jan 24 2023 at 9:30    [address] [facility] [address]

## 2023-01-13 NOTE — H&P
[facility]  [facility]   Patient Name: Dilip Faulkner  : 1950  MRN: [id number]  Primary Care Physician:  Rosalba Edwards, MONE  Date of admission: 2023    Subjective   Subjective     Chief Complaint: Shortness of breath    HPI:    Dilip Faulkner is a 72 y.o. male past medical history of COPD on home oxygen, type 2 diabetes on insulin, dyslipidemia and hypertension who presents in respiratory distress    The patient was recently in the hospital in November for COPD exacerbation.  At that time the pulmonary note says that he should have BiPAP at home but the patient says he does not have at home.  He states he has been coughing and having increased sputum production.  Patient is also had significant shortness of breath and difficulty breathing.  As result of the symptoms he came the ER for further evaluation.    In the emergency department the patient's vital signs are as follows: CBC shows no abnormalities.  CMP shows no abnormalities.  BNP is normal.  Troponin is normal.  Chest x-ray shows chronic scarring versus atelectasis and thickening along the left lateral lung base.  ABG shows 7.30/53.  Patient was given 125 of methylprednisolone and DuoNeb's.  He still requiring 4 L of oxygen what his home was 2 L of oxygen.  Admitted to hospital for acute COPD exacerbation leading to acute on chronic hypoxic respiratory failure    All systems reviewed abnormal as noted above       Personal History     Past Medical History:  Severe COPD  Type 2 diabetes  Hypertension  Chronic hypoxic respiratory failure     Past Surgical History:     Abdominal surgery     Family History:   Family history of lung disease     Social History:   Quit smoking .  No alcohol.      Home Medications:  Fluticasone-Umeclidin-Vilant, QUEtiapine, albuterol sulfate HFA, aspirin, atorvastatin, cetirizine, insulin degludec, ipratropium-albuterol, lisinopril, metFORMIN, metoprolol tartrate,  and omeprazole    Allergies:  No Known Allergies      Objective   Objective     Vitals:   Temp:  [97.9 °F (36.6 °C)] 97.9 °F (36.6 °C)  Heart Rate:  [65-71] 68  Resp:  [21-24] 21  BP: ()/(68-79) 115/68  Flow (L/min):  [2-4.5] 4.5    Physical Exam    Constitutional: Chronically ill-appearing.  Disheveled   Eyes: Pupils equal, sclerae anicteric, no conjunctival injection   HENT: NCAT, mucous membranes moist   Neck: Supple, no thyromegaly, no lymphadenopathy, trachea midline   Respiratory: Mild respite distress.  Crackles and wheezing and rhonchi throughout   Cardiovascular: RRR, no murmurs, rubs, or gallops, palpable pedal pulses bilaterally   Gastrointestinal: Positive bowel sounds, soft, nontender, nondistended   Musculoskeletal: No bilateral ankle edema, no clubbing or cyanosis to extremities   Psychiatric: Appropriate affect, cooperative   Neurologic: Oriented x 3, strength symmetric in all extremities, Cranial Nerves grossly intact to confrontation, speech clear   Skin: No rashes     Result Review    Result Review:  I have personally reviewed the results from the time of this admission to 1/12/2023 20:36 EST and agree with these findings:  ABG shows 7.3/53  Chest x-ray shows atelectasis versus scarring in left lung base thickening      Assessment & Plan   Assessment / Plan     Acute hypoxemic respiratory failure  Acute COPD exacerbation  Mild hypercapnia requiring BiPAP  Type 2 diabetes requiring insulin  Dyslipidemia  Hypertension   GERD  Probable pleural thickening in the left lung base and small effusion     Plan:  -- Admit to hospitalist service  -- Continue ox for saturations less than 90%  -- Currently on 4 L so have to watch for worsening hypercapnia overnight may need BiPAP  --We will try to wean oxygen down overnight  -- Continue cefepime for concern for Pseudomonas  -- Procalcitonin was sent now will be sent in the morning  -- Strep and Legionella urine  -- Respiratory culture  --  Brovana/Pulmicort/albuterol  -- Levemir 10 units which is decreased from his normal dose of 17 units of Tresiba  -- Sliding scale  -- Hold metformin and Jardiance  -- COVID-19 and influenza sent    DVT prophylaxis:  Lovenox    CODE STATUS:     Full code    Admission Status:  I believe this patient meets observation status.    Electronically signed by Rob Vincent MD, 01/12/23, 8:36 PM EST.

## 2023-01-13 NOTE — PLAN OF CARE
Goal Outcome Evaluation:  Plan of Care Reviewed With: patient        Progress: no change  Outcome Evaluation: Patient remains alert and oriented x4. Discharge paperwork addressed, no questions at this time. Meds to bed brought up medications to patient. Patient on 2L at home, O2 tank was brought to patient, patient refused to take the O2 tank stated he did not need it and that the ride would not be long. Patient educated on importance of O2, patient continued to refuse. Patient discharged home via cab.

## 2023-01-13 NOTE — DISCHARGE SUMMARY
[facility]         HOSPITALIST  DISCHARGE SUMMARY    Patient Name: Dilip Faulkner  : 1950  MRN: [id number]    Date of Admission: 2023  Date of Discharge:  2023  Primary Care Physician: Rosalba Edwards APRN    Consults     Date and Time Order Name Status Description    2023  8:25 PM Inpatient Hospitalist Consult            Active and Resolved Hospital Problems:  Acute hypoxemic respiratory failure  Acute COPD exacerbation  Mild hypercapnia requiring BiPAP  Type 2 diabetes requiring insulin  Dyslipidemia  Hypertension   GERD  Probable pleural thickening in the left lung base and small effusion     Hospital Course     Hospital Course:  Dilip Faulkner is a 72 y.o. male past medical history of COPD on home oxygen, type 2 diabetes on insulin, dyslipidemia and hypertension who presents in respiratory distress     The patient was recently in the hospital in November for COPD exacerbation.  At that time the pulmonary note says that he should have BiPAP at home but the patient says he does not have at home.  He states he has been coughing and having increased sputum production.  Patient is also had significant shortness of breath and difficulty breathing.  As result of the symptoms he came the ER for further evaluation.     In the emergency department the patient's vital signs are as follows: CBC shows no abnormalities.  CMP shows no abnormalities.  BNP is normal.  Troponin is normal.  Chest x-ray shows chronic scarring versus atelectasis and thickening along the left lateral lung base.  ABG shows 7.30/53.  Patient was given 125 of methylprednisolone and DuoNeb's.  He still requiring 4 L of oxygen what his home was 2 L of oxygen.  Admitted to hospital for acute COPD exacerbation leading to acute on chronic hypoxic respiratory failure    Overnight, patient received steroids, bronchodilators, antibiotics with good response.  Patient's breathing is back at baseline this morning.   Patient is requesting discharge home.  Patient is discharged home today in stable condition.  Patient should follow-up with the COPD clinic within 1 week.  Patient states he was out of his inhaler, refills have been sent to his pharmacy.  He should follow-up with his PCP in 3 to 7 days.    Day of Discharge     Vital Signs:  Temp:  [97.4 °F (36.3 °C)-97.9 °F (36.6 °C)] 97.4 °F (36.3 °C)  Heart Rate:  [65-78] 72  Resp:  [16-24] 18  BP: ()/(60-79) 118/60  Flow (L/min):  [2-4.5] 2    Physical Exam:   General appearance: NAD, conversant   Eyes: anicteric sclerae, moist conjunctivae; no lid-lag; PERRLA  HENT: Atraumatic; oropharynx clear with moist mucous membranes and no mucosal ulcerations; normal hard and soft palate  Neck: Trachea midline; FROM, supple, no thyromegaly or lymphadenopathy  Lungs: CTA, with normal respiratory effort and no intercostal retractions  CV: Regular Rate and Rhythm, no Murmurs, Rubs, or Gallops   Abdomen: Soft, non-tender; no masses or Heptosplenomegally  Extremities: No peripheral edema or extremity lymphadenopathy  Skin: Normal temperature, turgor and texture; no rash, ulcers or subcutaneous nodules  Psych: Appropriate affect, alert and oriented to person, place and time  Neuro: CN II - XII intact no motor deficits, no sensory defecits      Discharge Details        Discharge Medications      New Medications      Instructions Start Date   cefdinir 300 MG capsule  Commonly known as: OMNICEF   300 mg, Oral, 2 Times Daily      predniSONE 20 MG tablet  Commonly known as: DELTASONE   40 mg, Oral, Daily With Breakfast   Start Date: January 14, 2023        Continue These Medications      Instructions Start Date   albuterol sulfate  (90 Base) MCG/ACT inhaler  Commonly known as: PROVENTIL HFA;VENTOLIN HFA;PROAIR HFA   2 puffs, Inhalation, Every 6 Hours PRN      aspirin 81 MG chewable tablet   81 mg, Oral, Daily      atorvastatin 10 MG tablet  Commonly known as: LIPITOR   10 mg, Oral,  Daily      cetirizine 10 MG tablet  Commonly known as: zyrTEC   10 mg, Oral, Daily      ipratropium-albuterol 0.5-2.5 mg/3 ml nebulizer  Commonly known as: DUO-NEB   3 mL, Nebulization, Every 4 Hours PRN      Jardiance 10 MG tablet tablet  Generic drug: empagliflozin   10 mg, Oral, Daily,        lisinopril 2.5 MG tablet  Commonly known as: PRINIVIL,ZESTRIL   1 tablet, Oral, Daily,        metFORMIN 500 MG tablet  Commonly known as: GLUCOPHAGE   500 mg, Oral, 2 Times Daily      metoprolol tartrate 25 MG tablet  Commonly known as: LOPRESSOR   25 mg, Oral, 2 Times Daily      omeprazole 20 MG capsule  Commonly known as: priLOSEC   20 mg, Oral, Daily      QUEtiapine 25 MG tablet  Commonly known as: SEROquel   25 mg, Oral, Nightly      Trelegy Ellipta 200-62.5-25 MCG/ACT aerosol powder   Generic drug: Fluticasone-Umeclidin-Vilant   1 dose, Inhalation, Daily      Tresiba FlexTouch 100 UNIT/ML solution pen-injector injection  Generic drug: insulin degludec   17 Units, Subcutaneous, Daily             No Known Allergies    Discharge Disposition:  Home or Self Care    Diet:  Hospital:  Diet Order   Procedures   • Diet: Cardiac Diets; Healthy Heart (2-3 Na+); Texture: Regular Texture (IDDSI 7); Fluid Consistency: Thin (IDDSI 0)       Discharge Activity:       CODE STATUS:  Code Status and Medical Interventions:   Ordered at: 01/12/23 2100     Level Of Support Discussed With:    Patient     Code Status (Patient has no pulse and is not breathing):    CPR (Attempt to Resuscitate)     Medical Interventions (Patient has pulse or is breathing):    Full Support       No future appointments.        Pertinent  and/or Most Recent Results     IMAGING:  XR Chest 1 View    Result Date: 1/12/2023  PROCEDURE: XR CHEST 1 VW  COMPARISON: [facility], OPAL, XR CHEST 1 VW, 9/16/2022, 20:45.  [facility], OPAL, XR CHEST 1 VW, 11/05/2022, 11:17.  INDICATIONS: SOA Triage Protocol  FINDINGS:  Irregular density in the left lung  base likely represents scarring or atelectasis, similar to the previous exam.  No acute infiltrate is identified.  There is probable pleural thickening along the left lung base.  The cardiac and mediastinal silhouettes appear normal.        1. Chronic scarring or atelectasis in the mid to lower left lung field 2. Suspected chronic pleural thickening along the lateral left lung base.       Chirag Lopez M.D.       Electronically Signed and Approved By: Chirag Lopez M.D. on 1/12/2023 at 18:57               LAB RESULTS:      Lab 01/13/23  0535 01/12/23  1758   WBC 6.81 8.47   HEMOGLOBIN 13.1 13.6   HEMATOCRIT 44.0 46.6   PLATELETS 235 246   NEUTROS ABS 6.06 5.17   IMMATURE GRANS (ABS) 0.04 0.02   LYMPHS ABS 0.63* 1.47   MONOS ABS 0.04* 0.65   EOS ABS 0.01 1.10*   MCV 79.3 80.6   PROCALCITONIN 0.06 0.06         Lab 01/13/23  0535 01/12/23  1758   SODIUM 135* 138   POTASSIUM 4.8 4.9   CHLORIDE 101 103   CO2 20.9* 26.6   ANION GAP 13.1 8.4   BUN 22 18   CREATININE 1.17 1.07   EGFR 65.8 73.3   GLUCOSE 276* 102*   CALCIUM 8.9 9.1   MAGNESIUM 1.9  --          Lab 01/13/23  0535 01/12/23  1758   TOTAL PROTEIN 7.7 7.6   ALBUMIN 4.0 3.9   GLOBULIN 3.7 3.7   ALT (SGPT) 10 9   AST (SGOT) 16 14   BILIRUBIN 0.3 0.3   ALK PHOS 105 111         Lab 01/12/23  1758   PROBNP 227.0   TROPONIN T 0.025                 Lab 01/12/23  1927   PH, ARTERIAL 7.302*   PCO2, ARTERIAL 53.8*   PO2 ART 91.2   O2 SATURATION ART 95.6   HCO3 ART 26.0   BASE EXCESS ART -1.2   CARBOXYHEMOGLOBIN 1.4     Brief Urine Lab Results  (Last result in the past 365 days)      Color   Clarity   Blood   Leuk Est   Nitrite   Protein   CREAT   Urine HCG        08/01/22 1250 Yellow   Clear   Negative   Negative   Negative   Negative               Microbiology Results (last 10 days)     Procedure Component Value - Date/Time    Respiratory Culture - Sputum, Cough [[id number]] Collected: 01/12/23 1304    Lab Status: Preliminary result Specimen: Sputum from Cough Updated:  01/13/23 0218     Gram Stain Moderate (3+) WBCs seen      Rare (1+) Epithelial cells seen      Rare (1+) Gram positive cocci      Occasional Gram negative bacilli      Occasional Budding yeast    COVID-19,APTIMA PANTHER(SRAVANTHI),[facility], NP/OP SWAB IN UTM/VTM/[facility]INE TRANSPORT MEDIA,24 HR TAT - Swab, Nasopharynx [[id number]]  (Normal) Collected: 01/12/23 2056    Lab Status: Final result Specimen: Swab from Nasopharynx Updated: 01/13/23 0059     COVID19 Not Detected    Narrative:      Fact sheet for providers: https://www.fda.gov/media/958183/download     Fact sheet for patients: https://www.fda.gov/media/701912/download    Test performed by RT PCR.    Influenza Antigen, Rapid - Swab, Nasopharynx [[id number]]  (Normal) Collected: 01/12/23 2056    Lab Status: Final result Specimen: Swab from Nasopharynx Updated: 01/12/23 2123     Influenza A Ag, EIA Negative     Influenza B Ag, EIA Negative                Time spent on Discharge including face to face service: Greater than 30 minutes      Electronically signed by Yung Singleton MD, 01/13/23, 10:10 AM EST.

## 2023-01-14 NOTE — OUTREACH NOTE
Prep Survey    Flowsheet Row Responses   [facility] facility patient discharged from? [facility]   Is LACE score < 7 ? No   Eligibility Readm Mgmt   Discharge diagnosis acute hypoxic resp failure, Acute COPD exacerb, T2DM   Does the patient have one of the following disease processes/diagnoses(primary or secondary)? COPD   Does the patient have Home health ordered? No   Is there a DME ordered? No   Prep survey completed? Yes          JHONATHAN BECKHAM - Registered Nurse

## 2023-01-15 LAB
BACTERIA SPEC RESP CULT: NORMAL
GRAM STN SPEC: NORMAL
QT INTERVAL: 422 MS

## 2023-01-18 ENCOUNTER — READMISSION MANAGEMENT (OUTPATIENT)
Dept: CALL CENTER | Facility: HOSPITAL | Age: 73
End: 2023-01-18
Payer: MEDICARE

## 2023-01-18 NOTE — OUTREACH NOTE
COPD/PN Week 1 Survey    Flowsheet Row Responses   [facility] patient discharged from? [facility]   Does the patient have one of the following disease processes/diagnoses(primary or secondary)? COPD   Week 1 attempt successful? Yes   Call start time 0909   Call end time 0911   Discharge diagnosis acute hypoxic resp failure, Acute COPD exacerb, T2DM   Meds reviewed with patient/caregiver? Yes   Is the patient having any side effects they believe may be caused by any medication additions or changes? No   Does the patient have all medications ordered at discharge? Yes   Is the patient taking all medications as directed (includes completed medication regime)? Yes   Does the patient have a primary care provider?  Yes   Does the patient have an appointment with their PCP or specialist within 7 days of discharge? Yes   Comments regarding PCP PATIENT STATES HE HAS AN APPOINTMENT WITH HIS PCP TOMORROW   Has the patient kept scheduled appointments due by today? N/A   Has home health visited the patient within 72 hours of discharge? N/A   Pulse Ox monitoring None   Psychosocial issues? No   Did the patient receive a copy of their discharge instructions? Yes   Nursing interventions Reviewed instructions with patient   What is the patient's perception of their health status since discharge? Improving   Nursing Interventions Nurse provided patient education   Is the patient/caregiver able to teach back the hierarchy of who to call/visit for symptoms/problems? PCP, Specialist, Home health nurse, Urgent Care, ED, 911 Yes   Is the patient able to teach back COPD zones? Yes   Nursing interventions Education provided on various zones   Patient reports what zone on this call? Green Zone   Green Zone Reports doing well, Usual activity and exercise level, Breathing without shortness of breath, Usual amounts of cough and phlegm/mucous, Appetite is good, Slept well last night   Green Zone interventions: Take daily medications, Use oxygen  as prescribed   Week 1 call completed? Yes          PATTI BECKHAM - Licensed Nurse

## 2023-01-25 ENCOUNTER — READMISSION MANAGEMENT (OUTPATIENT)
Dept: CALL CENTER | Facility: HOSPITAL | Age: 73
End: 2023-01-25
Payer: MEDICARE

## 2023-01-25 NOTE — OUTREACH NOTE
COPD/PN Week 2 Survey    Flowsheet Row Responses   [facility] facility patient discharged from? [facility]   Does the patient have one of the following disease processes/diagnoses(primary or secondary)? COPD   Week 2 attempt successful? No   Unsuccessful attempts Attempt 1          RAMIRO ABDULLAHI - Registered Nurse

## 2023-01-30 ENCOUNTER — READMISSION MANAGEMENT (OUTPATIENT)
Dept: CALL CENTER | Facility: HOSPITAL | Age: 73
End: 2023-01-30
Payer: MEDICARE

## 2023-01-30 NOTE — OUTREACH NOTE
COPD/PN Week 2 Survey    Flowsheet Row Responses   [facility] facility patient discharged from? [facility]   Does the patient have one of the following disease processes/diagnoses(primary or secondary)? COPD   Week 2 attempt successful? No   Unsuccessful attempts Attempt 2          RACHEL HERNANDEZ - Registered Nurse

## 2023-02-08 ENCOUNTER — READMISSION MANAGEMENT (OUTPATIENT)
Dept: CALL CENTER | Facility: HOSPITAL | Age: 73
End: 2023-02-08
Payer: MEDICARE

## 2023-02-09 NOTE — OUTREACH NOTE
"COPD/PN Week 3 Survey    Flowsheet Row Responses   [facility] patient discharged from? [facility]   Does the patient have one of the following disease processes/diagnoses(primary or secondary)? COPD   Week 3 attempt successful? Yes   Call start time 1936   Call end time 1938   Discharge diagnosis acute hypoxic resp failure, Acute COPD exacerb, T2DM   Is the patient taking all medications as directed (includes completed medication regime)? Yes   Does the patient have a primary care provider?  Yes   Has the patient kept scheduled appointments due by today? Yes   Pulse Ox monitoring None   Psychosocial issues? No   What is the patient's perception of their health status since discharge? Improving   Is the patient/caregiver able to teach back the hierarchy of who to call/visit for symptoms/problems? PCP, Specialist, Home health nurse, Urgent Care, ED, 911 Yes   Patient reports what zone on this call? Green Zone   Green Zone Reports doing well, Breathing without shortness of breath   Green Zone interventions: Take daily medications   Week 3 call completed? Yes   Graduated/Revoked comments Call was brief and states he is doing \"ok\"          Vivien GEORGE - Licensed Nurse  "

## 2023-03-02 NOTE — PROGRESS NOTES
[facility]   Hospitalist Progress Note  Date: 10/6/2022  Patient Name: Dilip Faulkner  : 1950  MRN: [id number]  Date of admission: 10/5/2022      Subjective   Subjective     Chief complaint: Shortness of breath    Summary:  72-year-old male with history of COPD with recurrent hospitalizations for COPD exacerbations, chronic hypoxemic respiratory failure on 2 L nasal cannula, diabetes mellitus, essential pretension, hospitalized on 10/5/2020 with shortness of breath symptoms, found to be in acute COPD exacerbated state, transition to BiPAP which she has previously refused on previous admissions, breathing stabilized, pulmonary consulted, etiology remains uncertain for his frequent exacerbations, suspect medical noncompliance    Interval follow-up: Patient seen and examined this morning, no acute distress, no acute major overnight events, patient is down to 3-1/2 L nasal cannula oxygen with sats in the low to mid 90s, with stable vital signs, denies fevers, chills, sweats.  Still has cough and shortness of symptoms with exertion, denies chest pain or palpitations.  He is eager to go home, he does not wish to have BiPAP arranged as outpatient, he wishes to change his CODE STATUS to DNR/DNI.  Discussing his frequent hospitalizations and recurrent COPD exacerbations, etiology remains unclear.  He lacks social support, several times his COPD exacerbations are interpreted as such when in fact they are panic attacks and anxiety, this prompts him to call 911.  Blood sugars remain uncontrolled.  He does not use home insulin though prescribed.  Telemetry reviewed, intermittently tachycardic with PVCs, baseline heart rate in the 80s.    Review of systems:  All systems reviewed and negative except for cough, shortness of breath, generalized fatigue, generalized weakness    Objective   Objective     Vitals:   Temp:  [97.5 °F (36.4 °C)-98.6 °F (37 °C)] 97.6 °F (36.4 °C)  Heart Rate:  [65-94] 74  Resp:  [20] 20  BP:  (112-145)/(59-83) 145/82  Flow (L/min):  [3.5-5] 3.5  Physical Exam    Constitutional: Awake, alert, no acute distress heavyset male, no acute distress, NC O2 sitting upright in the edge of bed              Eyes: Pupils equal, sclerae anicteric, no conjunctival injection              HENT: NCAT, mucous membranes moist              Neck: Supple, FROM              Respiratory: Air entry bilaterally, diminished breath sounds throughout, scattered wheezing              Cardiovascular: RRR, no murmurs, rubs, or gallops, palpable pedal pulses bilaterally              Gastrointestinal: Positive bowel sounds, soft, nontender, nondistended              Musculoskeletal: Trace bilateral ankle edema, no clubbing or cyanosis to extremities              Psychiatric: Appropriate affect, cooperative              Neurologic: Oriented x 3, strength symmetric in all extremities, Cranial Nerves grossly intact to confrontation, speech clear              Skin: No rashes     Result Review    Result Review:  I have personally reviewed the pertinent results from the past 24 hours to 10/6/2022 18:29 EDT and agree with these findings:  [x]  Laboratory   CBC    CBC 9/16/22 10/5/22 10/6/22   WBC 7.87 15.45 (A) 14.55 (A)   RBC 4.89 5.09 4.52   Hemoglobin 12.3 (A) 12.7 (A) 11.4 (A)   Hematocrit 41.3 43.3 39.1   MCV 84.5 85.1 86.5   MCH 25.2 (A) 25.0 (A) 25.2 (A)   MCHC 29.8 (A) 29.3 (A) 29.2 (A)   RDW 15.2 15.2 15.4   Platelets 190 223 179   (A) Abnormal value            BMP    BMP 9/16/22 9/16/22 10/5/22 10/5/22 10/5/22 10/6/22    2025 2208 0108 0113 0229    BUN 16  12   18   Creatinine 0.99  0.99   0.99   Sodium 139 136.3 139 137.9 136.7 137   Potassium 4.3  4.7   4.8   Chloride 103  103   103   CO2 28.9  28.4   23.5   Calcium 8.8  9.1   8.9      Comments are available for some flowsheets but are not being displayed.           LIVER FUNCTION TESTS:      Lab 10/05/22  0108   TOTAL PROTEIN 7.4   ALBUMIN 4.00   GLOBULIN 3.4   ALT (SGPT) 12   AST  (SGOT) 13   BILIRUBIN 0.3   ALK PHOS 90       [x]  Microbiology   Blood Culture   Date Value Ref Range Status   10/05/2022 No growth at 24 hours  Preliminary   10/05/2022 No growth at 24 hours  Preliminary     No results found for: BCIDPCR, CXREFLEX, CSFCX, CULTURETIS  No results found for: CULTURES, HSVCX, URCX  No results found for: EYECULTURE, GCCX, HSVCULTURE, LABHSV  No results found for: LEGIONELLA, MRSACX, MUMPSCX, MYCOPLASCX  No results found for: NOCARDIACX, STOOLCX  No results found for: THROATCX, UNSTIMCULT, URINECX, CULTURE, VZVCULTUR  No results found for: VIRALCULTU, WOUNDCX    [x]  Radiology XR Chest 1 View    Result Date: 10/5/2022  PROCEDURE: XR CHEST 1 VW  COMPARISON: [facility], CR, XR CHEST 1 VW, 7/10/2022, 2:45.  [facility], CR, XR CHEST 1 VW, 9/16/2022, 20:45.  INDICATIONS: shortness of breath  FINDINGS:  Chronic changes are noted.  No new consolidations or pleural effusions are observed. The cardiac silhouette and mediastinum are unchanged. No definitive acute osseous abnormalities are seen on this single view.        1. Chronic changes are noted.  There is no definitive evidence for acute cardiopulmonary process.         SHRUTI CUTLER MD       Electronically Signed and Approved By: SHRUTI CUTLER MD on 10/05/2022 at 1:31             XR Chest 1 View    Result Date: 9/16/2022  PROCEDURE: XR CHEST 1 VW  COMPARISON: 8/22/2022.  INDICATIONS: Shortness of breath.  FINDINGS: Two AP upright portable views of the chest are provided for review.  Pleural-parenchymal scarring is suggested in the left lung base with volume loss, seen previously.  External artifacts obscure detail.  No pneumothorax is seen.  There may be mild cardiomegaly.  The thoracic aorta is atherosclerotic.  There is emphysematous contour of the lungs.  Degenerative changes involve the imaged spine and the bilateral shoulders.  No pneumothorax.  Probably no pleural effusion.  Overall, little interval  change is suggested radiographically since the 8/22/2022 study.       No definite acute infiltrate.  No significant interval change is suggested radiographically since the 8/22/2022 study.      Please note that portions of this note were completed with a voice recognition program.  SEGUNDO MORA JR, MD       Electronically Signed and Approved By: SEGUNDO MORA JR, MD on 9/16/2022 at 22:02                [x]  EKG/Telemetry   []  Cardiology/Vascular   []  Pathology  [x]  Old records  []  Other:    Assessment & Plan   Assessment / Plan     Assessment/Plan:    Assessment:  Acute on chronic hypoxemic respiratory failure with hypercapnia  Acute exacerbation of COPD  Asthma overlap  Insulin-dependent diabetes mellitus with poor glycemic control  Essential hypertension  Dyslipidemia  Lacks social support  Anxiety  Panic attacks     Plan:  Labs and imaging reviewed  Discontinue cefepime and vancomycin with low procalcitonin and low suspicion for pneumonia  Start Atarax 25 mg p.o. 3 times daily For anxiety  CODE STATUS changed to DNR/DNI  Continued on Solu-Medrol 40 mg IV every 12 hours  Will switch to prednisone daily tomorrow  Continue Brovana and Pulmicort nebs twice daily  Levemir dose increased to 25 units at night  Insulin sliding scale coverage  Recommendations appreciated from Dr. Castelan pulmonary service  Continue supplemental oxygen to maintain sats greater 90%  Continue metoprolol 25 mg p.o. twice daily  Continue lisinopril 2.5 mg daily  Continue Protonix 40 mg daily  Palliative care consultation appreciated  A.m. labs  Full code  DVT prophylaxis with Lovenox  Clinical course dictate further management  Discussed with nurse at the bedside  Overall prognosis poor, recurrent readmissions troublesome       DVT prophylaxis:  Medical DVT prophylaxis orders are present.    CODE STATUS:   Medical Intervention Limits: NO intubation (DNI)  Code Status (Patient has no pulse and is not breathing): No CPR (Do Not Attempt to  Resuscitate)  Medical Interventions (Patient has pulse or is breathing): Limited Support  Release to patient: Routine Release        Electronically signed by Oralia Hardy MD, 10/06/22, 6:29 PM EDT.  Portions of this documentation were transcribed electronically from a voice recognition software.  I confirm all data accurately represents the service(s) I performed at today's visit.              Quality 265: Biopsy Follow-Up: Biopsy results reviewed, communicated, tracked, and documented Quality 226: Preventive Care And Screening: Tobacco Use: Screening And Cessation Intervention: Patient screened for tobacco use and is an ex/non-smoker Detail Level: Detailed

## 2023-03-28 ENCOUNTER — HOSPITAL ENCOUNTER (INPATIENT)
Facility: HOSPITAL | Age: 73
LOS: 1 days | Discharge: HOME | DRG: 311 | End: 2023-03-30
Attending: EMERGENCY MEDICINE | Admitting: FAMILY MEDICINE
Payer: MEDICARE

## 2023-03-28 ENCOUNTER — APPOINTMENT (OUTPATIENT)
Dept: GENERAL RADIOLOGY | Facility: HOSPITAL | Age: 73
DRG: 311 | End: 2023-03-28
Payer: MEDICARE

## 2023-03-28 DIAGNOSIS — R07.9 CHEST PAIN, UNSPECIFIED TYPE: Primary | ICD-10-CM

## 2023-03-28 LAB
ALBUMIN SERPL-MCNC: 3.7 G/DL (ref 3.5–5.2)
ALBUMIN/GLOB SERPL: 1 G/DL
ALP SERPL-CCNC: 103 U/L (ref 39–117)
ALT SERPL W P-5'-P-CCNC: 9 U/L (ref 1–41)
ANION GAP SERPL CALCULATED.3IONS-SCNC: 9.4 MMOL/L (ref 5–15)
AST SERPL-CCNC: 13 U/L (ref 1–40)
BASOPHILS # BLD AUTO: 0.06 10*3/MM3 (ref 0–0.2)
BASOPHILS NFR BLD AUTO: 0.6 % (ref 0–1.5)
BILIRUB SERPL-MCNC: 0.3 MG/DL (ref 0–1.2)
BUN SERPL-MCNC: 18 MG/DL (ref 8–23)
BUN/CREAT SERPL: 17.6 (ref 7–25)
CALCIUM SPEC-SCNC: 8.8 MG/DL (ref 8.6–10.5)
CHLORIDE SERPL-SCNC: 102 MMOL/L (ref 98–107)
CO2 SERPL-SCNC: 26.6 MMOL/L (ref 22–29)
CREAT SERPL-MCNC: 1.02 MG/DL (ref 0.76–1.27)
DEPRECATED RDW RBC AUTO: 47 FL (ref 37–54)
EGFRCR SERPLBLD CKD-EPI 2021: 77.6 ML/MIN/1.73
EOSINOPHIL # BLD AUTO: 1.13 10*3/MM3 (ref 0–0.4)
EOSINOPHIL NFR BLD AUTO: 11 % (ref 0.3–6.2)
ERYTHROCYTE [DISTWIDTH] IN BLOOD BY AUTOMATED COUNT: 16.2 % (ref 12.3–15.4)
GLOBULIN UR ELPH-MCNC: 3.7 GM/DL
GLUCOSE SERPL-MCNC: 92 MG/DL (ref 65–99)
HCT VFR BLD AUTO: 43.4 % (ref 37.5–51)
HGB BLD-MCNC: 12.9 G/DL (ref 13–17.7)
HOLD SPECIMEN: NORMAL
HOLD SPECIMEN: NORMAL
IMM GRANULOCYTES # BLD AUTO: 0.04 10*3/MM3 (ref 0–0.05)
IMM GRANULOCYTES NFR BLD AUTO: 0.4 % (ref 0–0.5)
LIPASE SERPL-CCNC: 19 U/L (ref 13–60)
LYMPHOCYTES # BLD AUTO: 1.28 10*3/MM3 (ref 0.7–3.1)
LYMPHOCYTES NFR BLD AUTO: 12.4 % (ref 19.6–45.3)
MAGNESIUM SERPL-MCNC: 2 MG/DL (ref 1.6–2.4)
MCH RBC QN AUTO: 23.8 PG (ref 26.6–33)
MCHC RBC AUTO-ENTMCNC: 29.7 G/DL (ref 31.5–35.7)
MCV RBC AUTO: 80.2 FL (ref 79–97)
MONOCYTES # BLD AUTO: 0.67 10*3/MM3 (ref 0.1–0.9)
MONOCYTES NFR BLD AUTO: 6.5 % (ref 5–12)
NEUTROPHILS NFR BLD AUTO: 69.1 % (ref 42.7–76)
NEUTROPHILS NFR BLD AUTO: 7.12 10*3/MM3 (ref 1.7–7)
NRBC BLD AUTO-RTO: 0 /100 WBC (ref 0–0.2)
NT-PROBNP SERPL-MCNC: 111.2 PG/ML (ref 0–900)
PLATELET # BLD AUTO: 210 10*3/MM3 (ref 140–450)
PMV BLD AUTO: 9.7 FL (ref 6–12)
POTASSIUM SERPL-SCNC: 4.4 MMOL/L (ref 3.5–5.2)
PROT SERPL-MCNC: 7.4 G/DL (ref 6–8.5)
RBC # BLD AUTO: 5.41 10*6/MM3 (ref 4.14–5.8)
SODIUM SERPL-SCNC: 138 MMOL/L (ref 136–145)
TROPONIN T SERPL HS-MCNC: 64 NG/L
WBC NRBC COR # BLD: 10.3 10*3/MM3 (ref 3.4–10.8)
WHOLE BLOOD HOLD COAG: NORMAL
WHOLE BLOOD HOLD SPECIMEN: NORMAL

## 2023-03-28 PROCEDURE — 84484 ASSAY OF TROPONIN QUANT: CPT

## 2023-03-28 PROCEDURE — 85025 COMPLETE CBC W/AUTO DIFF WBC: CPT

## 2023-03-28 PROCEDURE — 36415 COLL VENOUS BLD VENIPUNCTURE: CPT

## 2023-03-28 PROCEDURE — 71045 X-RAY EXAM CHEST 1 VIEW: CPT

## 2023-03-28 PROCEDURE — 83880 ASSAY OF NATRIURETIC PEPTIDE: CPT

## 2023-03-28 PROCEDURE — 84484 ASSAY OF TROPONIN QUANT: CPT | Performed by: EMERGENCY MEDICINE

## 2023-03-28 PROCEDURE — 93005 ELECTROCARDIOGRAM TRACING: CPT | Performed by: EMERGENCY MEDICINE

## 2023-03-28 PROCEDURE — 93010 ELECTROCARDIOGRAM REPORT: CPT | Performed by: INTERNAL MEDICINE

## 2023-03-28 PROCEDURE — 80053 COMPREHEN METABOLIC PANEL: CPT

## 2023-03-28 PROCEDURE — 80061 LIPID PANEL: CPT | Performed by: FAMILY MEDICINE

## 2023-03-28 PROCEDURE — 93005 ELECTROCARDIOGRAM TRACING: CPT

## 2023-03-28 PROCEDURE — 83690 ASSAY OF LIPASE: CPT

## 2023-03-28 PROCEDURE — 83735 ASSAY OF MAGNESIUM: CPT

## 2023-03-28 PROCEDURE — 99285 EMERGENCY DEPT VISIT HI MDM: CPT

## 2023-03-28 RX ORDER — ASPIRIN 81 MG/1
324 TABLET, CHEWABLE ORAL ONCE
Status: COMPLETED | OUTPATIENT
Start: 2023-03-28 | End: 2023-03-28

## 2023-03-28 RX ORDER — SODIUM CHLORIDE 0.9 % (FLUSH) 0.9 %
10 SYRINGE (ML) INJECTION AS NEEDED
Status: DISCONTINUED | OUTPATIENT
Start: 2023-03-28 | End: 2023-03-29

## 2023-03-28 RX ADMIN — ASPIRIN 81 MG 324 MG: 81 TABLET ORAL at 21:40

## 2023-03-29 ENCOUNTER — APPOINTMENT (OUTPATIENT)
Dept: NUCLEAR MEDICINE | Facility: HOSPITAL | Age: 73
DRG: 311 | End: 2023-03-29
Payer: MEDICARE

## 2023-03-29 ENCOUNTER — APPOINTMENT (OUTPATIENT)
Dept: CARDIOLOGY | Facility: HOSPITAL | Age: 73
DRG: 311 | End: 2023-03-29
Payer: MEDICARE

## 2023-03-29 PROBLEM — I20.0 UNSTABLE ANGINA: Status: ACTIVE | Noted: 2023-03-29

## 2023-03-29 LAB
APTT PPP: 38.7 SECONDS (ref 78–95.9)
APTT PPP: 45 SECONDS (ref 78–95.9)
ASCENDING AORTA: 3.6 CM
BH CV ECHO MEAS - AO ROOT DIAM: 3.5 CM
BH CV ECHO MEAS - EDV(MOD-SP2): 100 ML
BH CV ECHO MEAS - EDV(MOD-SP4): 90 ML
BH CV ECHO MEAS - EF(MOD-BP): 57 %
BH CV ECHO MEAS - ESV(MOD-SP2): 50 ML
BH CV ECHO MEAS - ESV(MOD-SP4): 34 ML
BH CV ECHO MEAS - IVSD: 1 CM
BH CV ECHO MEAS - LA DIMENSION: 4.3 CM
BH CV ECHO MEAS - LAT PEAK E' VEL: 7.5 CM/SEC
BH CV ECHO MEAS - LVIDD: 5 CM
BH CV ECHO MEAS - LVIDS: 3.1 CM
BH CV ECHO MEAS - LVOT DIAM: 2 CM
BH CV ECHO MEAS - LVPWD: 1 CM
BH CV ECHO MEAS - MED PEAK E' VEL: 6.74 CM/SEC
BH CV ECHO MEAS - MV A MAX VEL: 101 CM/SEC
BH CV ECHO MEAS - MV DEC TIME: 249 MSEC
BH CV ECHO MEAS - MV E MAX VEL: 80 CM/SEC
BH CV ECHO MEAS - MV E/A: 0.8
BH CV ECHO MEAS - RVDD: 2.7 CM
BH CV ECHO MEASUREMENTS AVERAGE E/E' RATIO: 11.24
BH CV IMMEDIATE POST TECH DATA BLOOD PRESSURE: NORMAL MMHG
BH CV IMMEDIATE POST TECH DATA HEART RATE: 75 BPM
BH CV IMMEDIATE POST TECH DATA OXYGEN SATS: 93 %
BH CV REST NUCLEAR ISOTOPE DOSE: 8.9 MCI
BH CV SIX MINUTE RECOVERY TECH DATA BLOOD PRESSURE: NORMAL
BH CV SIX MINUTE RECOVERY TECH DATA HEART RATE: 75 BPM
BH CV SIX MINUTE RECOVERY TECH DATA OXYGEN SATURATION: 91 %
BH CV STRESS BP STAGE 1: NORMAL
BH CV STRESS COMMENTS STAGE 1: NORMAL
BH CV STRESS DOSE REGADENOSON STAGE 1: 0.4
BH CV STRESS DURATION MIN STAGE 1: 0
BH CV STRESS DURATION SEC STAGE 1: 10
BH CV STRESS HR STAGE 1: 69
BH CV STRESS NUCLEAR ISOTOPE DOSE: 33.4 MCI
BH CV STRESS O2 STAGE 1: 93
BH CV STRESS PROTOCOL 1: NORMAL
BH CV STRESS RECOVERY BP: NORMAL MMHG
BH CV STRESS RECOVERY HR: 75 BPM
BH CV STRESS RECOVERY O2: 91 %
BH CV STRESS STAGE 1: 1
BH CV THREE MINUTE POST TECH DATA BLOOD PRESSURE: NORMAL MMHG
BH CV THREE MINUTE POST TECH DATA HEART RATE: 75 BPM
BH CV THREE MINUTE POST TECH DATA OXYGEN SATURATION: 93 %
CHOLEST SERPL-MCNC: 69 MG/DL (ref 0–200)
GEN 5 2HR TROPONIN T REFLEX: 59 NG/L
HDLC SERPL-MCNC: 31 MG/DL (ref 40–60)
LDLC SERPL CALC-MCNC: 21 MG/DL (ref 0–100)
LDLC/HDLC SERPL: 0.7 {RATIO}
LEFT ATRIUM VOLUME INDEX: 25.5 ML/M2
LV EF NUC BP: 60 %
MAXIMAL PREDICTED HEART RATE: 147 BPM
MAXIMAL PREDICTED HEART RATE: 147 BPM
PERCENT MAX PREDICTED HR: 51.02 %
QT INTERVAL: 431 MS
QT INTERVAL: 473 MS
QT INTERVAL: 489 MS
QT INTERVAL: 489 MS
STRESS BASELINE BP: NORMAL MMHG
STRESS BASELINE HR: 65 BPM
STRESS O2 SAT REST: 92 %
STRESS PERCENT HR: 60 %
STRESS POST O2 SAT PEAK: 93 %
STRESS POST PEAK BP: NORMAL MMHG
STRESS POST PEAK HR: 75 BPM
STRESS TARGET HR: 125 BPM
STRESS TARGET HR: 125 BPM
TRIGL SERPL-MCNC: 81 MG/DL (ref 0–150)
TROPONIN T DELTA: -5 NG/L
TROPONIN T SERPL HS-MCNC: 58 NG/L
VLDLC SERPL-MCNC: 17 MG/DL (ref 5–40)

## 2023-03-29 PROCEDURE — 93005 ELECTROCARDIOGRAM TRACING: CPT | Performed by: FAMILY MEDICINE

## 2023-03-29 PROCEDURE — 84484 ASSAY OF TROPONIN QUANT: CPT | Performed by: INTERNAL MEDICINE

## 2023-03-29 PROCEDURE — 78452 HT MUSCLE IMAGE SPECT MULT: CPT | Performed by: INTERNAL MEDICINE

## 2023-03-29 PROCEDURE — 93016 CV STRESS TEST SUPVJ ONLY: CPT | Performed by: NURSE PRACTITIONER

## 2023-03-29 PROCEDURE — 94799 UNLISTED PULMONARY SVC/PX: CPT

## 2023-03-29 PROCEDURE — 93306 TTE W/DOPPLER COMPLETE: CPT | Performed by: INTERNAL MEDICINE

## 2023-03-29 PROCEDURE — 85730 THROMBOPLASTIN TIME PARTIAL: CPT | Performed by: STUDENT IN AN ORGANIZED HEALTH CARE EDUCATION/TRAINING PROGRAM

## 2023-03-29 PROCEDURE — 25010000002 HEPARIN (PORCINE) PER 1000 UNITS: Performed by: FAMILY MEDICINE

## 2023-03-29 PROCEDURE — 93018 CV STRESS TEST I&R ONLY: CPT | Performed by: INTERNAL MEDICINE

## 2023-03-29 PROCEDURE — 25010000002 REGADENOSON 0.4 MG/5ML SOLUTION: Performed by: STUDENT IN AN ORGANIZED HEALTH CARE EDUCATION/TRAINING PROGRAM

## 2023-03-29 PROCEDURE — 94760 N-INVAS EAR/PLS OXIMETRY 1: CPT

## 2023-03-29 PROCEDURE — 94761 N-INVAS EAR/PLS OXIMETRY MLT: CPT

## 2023-03-29 PROCEDURE — A9502 TC99M TETROFOSMIN: HCPCS | Performed by: STUDENT IN AN ORGANIZED HEALTH CARE EDUCATION/TRAINING PROGRAM

## 2023-03-29 PROCEDURE — 93306 TTE W/DOPPLER COMPLETE: CPT

## 2023-03-29 PROCEDURE — 78452 HT MUSCLE IMAGE SPECT MULT: CPT

## 2023-03-29 PROCEDURE — 99222 1ST HOSP IP/OBS MODERATE 55: CPT | Performed by: INTERNAL MEDICINE

## 2023-03-29 PROCEDURE — 93017 CV STRESS TEST TRACING ONLY: CPT

## 2023-03-29 PROCEDURE — 85730 THROMBOPLASTIN TIME PARTIAL: CPT | Performed by: FAMILY MEDICINE

## 2023-03-29 PROCEDURE — 93010 ELECTROCARDIOGRAM REPORT: CPT | Performed by: INTERNAL MEDICINE

## 2023-03-29 PROCEDURE — 0 TECHNETIUM TETROFOSMIN KIT: Performed by: STUDENT IN AN ORGANIZED HEALTH CARE EDUCATION/TRAINING PROGRAM

## 2023-03-29 PROCEDURE — 99222 1ST HOSP IP/OBS MODERATE 55: CPT | Performed by: FAMILY MEDICINE

## 2023-03-29 RX ORDER — LISINOPRIL 5 MG/1
5 TABLET ORAL DAILY
Status: DISCONTINUED | OUTPATIENT
Start: 2023-03-29 | End: 2023-03-30 | Stop reason: HOSPADM

## 2023-03-29 RX ORDER — IPRATROPIUM BROMIDE AND ALBUTEROL SULFATE 2.5; .5 MG/3ML; MG/3ML
3 SOLUTION RESPIRATORY (INHALATION) EVERY 4 HOURS PRN
Status: DISCONTINUED | OUTPATIENT
Start: 2023-03-29 | End: 2023-03-30 | Stop reason: HOSPADM

## 2023-03-29 RX ORDER — NITROGLYCERIN 0.4 MG/1
0.4 TABLET SUBLINGUAL
Status: DISCONTINUED | OUTPATIENT
Start: 2023-03-29 | End: 2023-03-30 | Stop reason: HOSPADM

## 2023-03-29 RX ORDER — SODIUM CHLORIDE 9 MG/ML
40 INJECTION, SOLUTION INTRAVENOUS AS NEEDED
Status: DISCONTINUED | OUTPATIENT
Start: 2023-03-29 | End: 2023-03-30 | Stop reason: HOSPADM

## 2023-03-29 RX ORDER — ASPIRIN 81 MG/1
81 TABLET ORAL DAILY
Status: DISCONTINUED | OUTPATIENT
Start: 2023-03-29 | End: 2023-03-30 | Stop reason: HOSPADM

## 2023-03-29 RX ORDER — CLOPIDOGREL BISULFATE 75 MG/1
75 TABLET ORAL DAILY
Status: DISCONTINUED | OUTPATIENT
Start: 2023-03-29 | End: 2023-03-30 | Stop reason: HOSPADM

## 2023-03-29 RX ORDER — METOPROLOL SUCCINATE 25 MG/1
25 TABLET, EXTENDED RELEASE ORAL
Status: DISCONTINUED | OUTPATIENT
Start: 2023-03-29 | End: 2023-03-30 | Stop reason: HOSPADM

## 2023-03-29 RX ORDER — HEPARIN SOD,PORCINE/0.9 % NACL 25000/250
10.9 INTRAVENOUS SOLUTION INTRAVENOUS
Status: DISCONTINUED | OUTPATIENT
Start: 2023-03-29 | End: 2023-03-30 | Stop reason: HOSPADM

## 2023-03-29 RX ORDER — SODIUM CHLORIDE 0.9 % (FLUSH) 0.9 %
10 SYRINGE (ML) INJECTION EVERY 12 HOURS SCHEDULED
Status: DISCONTINUED | OUTPATIENT
Start: 2023-03-29 | End: 2023-03-30 | Stop reason: HOSPADM

## 2023-03-29 RX ORDER — FLUTICASONE FUROATE, UMECLIDINIUM BROMIDE AND VILANTEROL TRIFENATATE 200; 62.5; 25 UG/1; UG/1; UG/1
1 POWDER RESPIRATORY (INHALATION) DAILY
COMMUNITY
End: 2023-03-30 | Stop reason: HOSPADM

## 2023-03-29 RX ORDER — SODIUM CHLORIDE 0.9 % (FLUSH) 0.9 %
10 SYRINGE (ML) INJECTION AS NEEDED
Status: DISCONTINUED | OUTPATIENT
Start: 2023-03-29 | End: 2023-03-30 | Stop reason: HOSPADM

## 2023-03-29 RX ORDER — ATORVASTATIN CALCIUM 40 MG/1
40 TABLET, FILM COATED ORAL NIGHTLY
Status: DISCONTINUED | OUTPATIENT
Start: 2023-03-29 | End: 2023-03-30 | Stop reason: HOSPADM

## 2023-03-29 RX ORDER — FLUTICASONE PROPIONATE AND SALMETEROL 250; 50 UG/1; UG/1
1 POWDER RESPIRATORY (INHALATION)
COMMUNITY

## 2023-03-29 RX ADMIN — METOPROLOL SUCCINATE 25 MG: 25 TABLET, EXTENDED RELEASE ORAL at 09:02

## 2023-03-29 RX ADMIN — ATORVASTATIN CALCIUM 40 MG: 40 TABLET, FILM COATED ORAL at 20:11

## 2023-03-29 RX ADMIN — Medication 10 ML: at 20:11

## 2023-03-29 RX ADMIN — REGADENOSON 0.4 MG: 0.08 INJECTION, SOLUTION INTRAVENOUS at 13:22

## 2023-03-29 RX ADMIN — CLOPIDOGREL BISULFATE 75 MG: 75 TABLET ORAL at 09:02

## 2023-03-29 RX ADMIN — TETROFOSMIN 1 DOSE: 1.38 INJECTION, POWDER, LYOPHILIZED, FOR SOLUTION INTRAVENOUS at 11:45

## 2023-03-29 RX ADMIN — ASPIRIN 81 MG: 81 TABLET, COATED ORAL at 09:02

## 2023-03-29 RX ADMIN — LISINOPRIL 5 MG: 5 TABLET ORAL at 09:02

## 2023-03-29 RX ADMIN — HEPARIN SODIUM 15 UNITS/KG/HR: 5000 INJECTION INTRAVENOUS; SUBCUTANEOUS at 17:03

## 2023-03-29 RX ADMIN — HEPARIN SODIUM 10.9 UNITS/KG/HR: 5000 INJECTION INTRAVENOUS; SUBCUTANEOUS at 03:30

## 2023-03-29 RX ADMIN — TETROFOSMIN 1 DOSE: 1.38 INJECTION, POWDER, LYOPHILIZED, FOR SOLUTION INTRAVENOUS at 13:22

## 2023-03-29 NOTE — CONSULTS
[facility]   Cardiology Consult Note    Patient Name: Dilip Faulkner  : 1950  MRN: [id number]  Primary Care Physician:  Rosalba Edwards APRN  Referring Physician: No ref. provider found  Date of admission: 3/28/2023    Subjective   Subjective     Reason for Consult/ Chief Complaint: Atypical chest pain    HPI:  Dilip Faulkner is a 72 y.o. male with past medical history significant for previous SVT that was adenosine sensitive, severe COPD, hypertension and hyperlipidemia who presents with chest pain that has been going on for a week.  It is retrosternal dull sensation.  It is intermittent lasting 5 to 10 minutes.  It occurs just sitting still.  There is no nausea, vomiting, burping, alleviating or exacerbating factors.  Patient states he quit smoking 2 years ago.  No significant alcohol use.  Mother and father have no heart disease.    Review of Systems   All systems were reviewed and negative except for: Chest pain    Personal History     Past Medical History:   Diagnosis Date   • Asthma    • COPD (chronic obstructive pulmonary disease) (HCC)    • Diabetes mellitus (HCC)    • Hernia, umbilical    • Hypertension    • Requires continuous at home supplemental oxygen         Past medical history reviewed      Family History: family history is not on file. Otherwise pertinent FHx was reviewed and not pertinent to current issue.    Social History:  reports that he quit smoking about 2 years ago. His smoking use included cigarettes. His smokeless tobacco use includes chew. He reports that he does not drink alcohol and does not use drugs.    Home Medications:  Fluticasone-Salmeterol, Fluticasone-Umeclidin-Vilant, albuterol sulfate HFA, aspirin, atorvastatin, cetirizine, insulin degludec, ipratropium-albuterol, lisinopril, metFORMIN, metoprolol tartrate, and omeprazole    Allergies:  No Known Allergies    Objective    Objective     Vitals:   Temp:  [97.2 °F (36.2 °C)-97.6 °F (36.4 °C)] 97.2 °F (36.2  °C)  Heart Rate:  [58-81] 63  Resp:  [16-20] 16  BP: ()/(59-75) 97/59  Flow (L/min):  [2] 2      Physical Exam:   Constitutional: Awake, alert, No acute distress    Eyes: PERRLA, sclerae anicteric, no conjunctival injection   HENT: NCAT, mucous membranes moist   Neck: Supple, no thyromegaly, no lymphadenopathy, trachea midline   Respiratory: Clear to auscultation bilaterally, nonlabored respirations    Cardiovascular: RRR, no murmurs, rubs, or gallops, palpable pedal pulses bilaterally   Gastrointestinal: Positive bowel sounds, soft, nontender, nondistended   Musculoskeletal: No bilateral ankle edema, no clubbing or cyanosis to extremities   Psychiatric: Appropriate affect, cooperative   Neurologic: Oriented x 3, strength symmetric in all extremities, Cranial Nerves grossly intact to confrontation, speech clear   Skin: No rashes     Result Review    Result Review:  I have personally reviewed the results from the time of this admission to 3/29/2023 10:25 EDT and agree with these findings:  [x]  Laboratory  []  Microbiology  [x]  Radiology  [x]  EKG/Telemetry   [x]  Cardiology/Vascular   []  Pathology  [x]  Old records  []  Other:  Most notable findings include:     CMP    CMP 1/12/23 1/13/23 3/28/23   Glucose 102 (A) 276 (A) 92   BUN 18 22 18   Creatinine 1.07 1.17 1.02   eGFR 73.3 65.8 77.6   Sodium 138 135 (A) 138   Potassium 4.9 4.8 4.4   Chloride 103 101 102   Calcium 9.1 8.9 8.8   Total Protein 7.6 7.7 7.4   Albumin 3.9 4.0 3.7   Globulin 3.7 3.7 3.7   Total Bilirubin 0.3 0.3 0.3   Alkaline Phosphatase 111 105 103   AST (SGOT) 14 16 13   ALT (SGPT) 9 10 9   Albumin/Globulin Ratio 1.1 1.1 1.0   BUN/Creatinine Ratio 16.8 18.8 17.6   Anion Gap 8.4 13.1 9.4   (A) Abnormal value       Comments are available for some flowsheets but are not being displayed.            CBC    CBC 1/12/23 1/13/23 3/28/23   WBC 8.47 6.81 10.30   RBC 5.78 5.55 5.41   Hemoglobin 13.6 13.1 12.9 (A)   Hematocrit 46.6 44.0 43.4   MCV  80.6 79.3 80.2   MCH 23.5 (A) 23.6 (A) 23.8 (A)   MCHC 29.2 (A) 29.8 (A) 29.7 (A)   RDW 15.3 15.0 16.2 (A)   Platelets 246 235 210   (A) Abnormal value             Lab Results   Component Value Date    TROPONINT 58 (C) 03/29/2023         Assessment & Plan   Assessment / Plan     Brief Patient Summary:  Dilip Faulkner is a 72 y.o. male who history of severe oxygen dependent COPD, hypertension, hyperlipidemia and diabetes who presents with fairly atypical chest pain.    Active Hospital Problems:  Active Hospital Problems    Diagnosis    • **Unstable angina (HCC)        Assessment:  1.  Atypical chest pain  2.  Hypertension  3.  Hyperlipidemia    Plan:   1.  Continue the metoprolol and lisinopril along with the Lipitor.  2.  Patient has 3 sets of high-sensitivity troponins with no significant rise or fall.  3.  Have discussed with the patient and we are going to set him up for a Lexiscan Cardiolite.  If the Cardiolite shows no ischemia then I do think he can be discharged from a cardiac standpoint.  4.  EKG shows right bundle branch block and left anterior fascicular block.  There is no ischemic changes.    Electronically signed by Kane Purcell MD, 03/29/23, 10:25 AM EDT.

## 2023-03-29 NOTE — PAYOR COMM NOTE
"    AUTHORIZATION REQUEST for EMERGENCY DEPARTMENT ADMISSION IN ED 3/28   INPT 3/29         PATIENT INFORMATION  Name:             Dilip Faulkner  MRN#:            [id number]        ADMISSION INFORMATION  CLASS:          Inpatient   DOS:               03/29/23         ADMISSION DIAGNOSIS AND HOSPITAL PROBLEMS    Problems Addressed this Visit    None  Visit Diagnoses     Chest pain, unspecified type    -  Primary      Diagnoses       Codes Comments    Chest pain, unspecified type    -  Primary ICD-10-CM: R07.9  ICD-9-CM: 786.50                ADMITTING PROVIDER INFORMATION  Name/NPI       Ricci Trinidad DO [[id number]]  Phone:            Phone: [phone]        RENDERING FACILITY  Name:             [facility]   NPI:                 [id number]  TID:                 [id number]  Address:        [address] [place] [address]        CASE MANAGEMENT CONTACT INFORMATION  Name:             DAMIAN Miller  Phone:            [phone]  Fax:                [phone]      Dilip Faulkner (72 y.o. Male)     Date of Birth   1950    Social Security Number       Address   [address] [facility] [address] [id number]    Home Phone   [phone]    MRN   [id number]       Christian   None    Marital Status   Single                            Admission Date   3/28/23    Admission Type   Elective    Admitting Provider   Frank Felix DO    Attending Provider   Ricci Trinidad DO    Department, Room/Bed   [facility] PROGRESSIVE CARE UNIT 2, 257/1       Discharge Date       Discharge Disposition       Discharge Destination                               Attending Provider: Ricci Trinidad DO    Allergies: No Known Allergies    Isolation: None   Infection: None   Code Status: Prior    Ht: 185.4 cm (72.99\")   Wt: 110 kg (241 lb 13.5 oz)    Admission Cmt: None   Principal Problem: Unstable angina (HCC) [I20.0]                 Active Insurance as of 3/28/2023     Primary Coverage  "    Payor Plan Insurance Group Employer/Plan Group    HUMANA MEDICARE REPLACEMENT HUMANA MEDICARE REPLACEMENT [id number]     Payor Plan Address Payor Plan Phone Number Payor Plan Fax Number Effective Dates    [address] [address] [phone]  2022 - None Entered    [facility] [id number]       Subscriber Name Subscriber Birth Date Member ID       DILIP LAWS 1950 [id number]           Secondary Coverage     Payor Plan Insurance Group Employer/Plan Group    [facility] MEDICAID [id number]     Payor Plan Address Payor Plan Phone Number Payor Plan Fax Number Effective Dates    [phone]  2022 - None Entered    [facility] [id number]       Subscriber Name Subscriber Birth Date Member ID       DILIP LAWS 1950 [id number]                 Emergency Contacts      (Rel.) Home Phone Work Phone Mobile Phone    Juana Sandoval (Sister) [phone] -- [phone]               History & Physical      Frank Felix DO at 23 0037           [facility]  Date: 3/29/2023   Patient Name: Dilip Laws  : 1950  MRN: [id number]  Primary Care Physician:  Rosalba Edwards, MONE  Date of admission: 3/28/2023    Subjective    Subjective     Chief Complaint: Chest pain    HPI:    Dilip Laws is a 72 y.o. male presented to the emergency department this evening for evaluation of chest pain this been ongoing for approximately 4 days duration.  Patient reports he woke up this morning and had another episode of chest pain.  Patient report it has been intermittent, occurring at rest.  Patient describes the chest pain as a heaviness that radiates into his abdomen.  Patient reports 2 L of home oxygen therapy, that has been unchanged since the onset of symptoms.  He does report that he has been more short of breath with exertion.  Patient denies ever having cardiac work-up in the past.  Patient has a history of hypertension, hyperlipidemia,  type 2 diabetes mellitus, patient is obese.  Patient reports following regularly with his PCP, last visit was approximate 1 month ago.  Patient denies any nausea, vomiting, diarrhea, numbness, tingling, weakness.      Personal History     Past Medical History:  Hypertension  Hyperlipidemia  Type 2 diabetes mellitus  COPD  Asthma    Past Surgical History:  Abdominal surgery    Family History:   CAD    Social History:   Patient is a 2-pack-year smoker, currently chews tobacco.  Denies any alcohol or illicit drug use.    Home Medications:  QUEtiapine, albuterol sulfate HFA, aspirin, atorvastatin, cetirizine, empagliflozin, insulin degludec, ipratropium-albuterol, lisinopril, metFORMIN, metoprolol tartrate, and omeprazole    Allergies:  No Known Allergies    Review of Systems   All systems were reviewed and negative except for: Chest pain, dyspnea    Objective    Objective     Vitals:   Temp:  [97.6 °F (36.4 °C)] 97.6 °F (36.4 °C)  Heart Rate:  [63-81] 63  Resp:  [20] 20  BP: (101-107)/(63-67) 101/63  Flow (L/min):  [2] 2    Physical Exam    Constitutional: Awake, alert, no acute distress, chronically ill-appearing.   Eyes: Pupils equal, sclerae anicteric, no conjunctival injection   HENT: NCAT, mucous membranes moist   Neck: Supple, no thyromegaly, no lymphadenopathy, trachea midline   Respiratory: Scant wheezing in the left lower lung field.  Otherwise clear to auscultation, nonlabored respirations    Cardiovascular: RRR, no murmurs, rubs, or gallops, palpable pedal pulses bilaterally   Gastrointestinal: Positive bowel sounds, soft, nontender, distended secondary to obesity.   Musculoskeletal: No bilateral ankle edema, no clubbing or cyanosis to extremities   Psychiatric: Appropriate affect, cooperative   Neurologic: Oriented x 3, strength symmetric in all extremities, Cranial Nerves grossly intact to confrontation, speech clear   Skin: No rashes     Result Review    Result Review:  I have personally reviewed the  results from the time of this admission to 3/29/2023 00:37 EDT and agree with these findings:  [x]  Laboratory  []  Microbiology  [x]  Radiology  [x]  EKG/Telemetry   [x]  Cardiology/Vascular   []  Pathology  []  Old records  []  Other:      Assessment & Plan   Assessment / Plan     Assessment/Plan:   • Unstable angina-we will place the patient on telemetry, continue to monitor heart rhythm.  EKG was reviewed in the emergency department, shows a bifascicular block, relatively unchanged from EKG performed 2 months ago.  However the patient's troponin is grossly positive at 64.  Patient denies any recent bleeding.  Given the patient's risk factors mentioned above, we will place the patient on heparin drip, trend troponins, check an updated 2D echo, as his last one was performed in 2019.  We will consult cardiology and get their recommendations to decide on heart catheterization versus stress testing.  We appreciate the recommendations and care of this patient.  • Atelectasis-x-ray showed atelectasis versus possible pneumonia.  Patient has a normal white count.  Patient has not had any significant change in sputum production, or worsening respiratory status.  No indication for antibiotics at this time.  • Tobacco use disorder-patient educated to quit tobacco, patient verbalized understanding.  • Hypertension-Home meds  • Hyperlipidemia-Home meds  • Type 2 diabetes mellitus-Home meds  • Obesity      DVT prophylaxis:  No DVT prophylaxis order currently exists.    CODE STATUS: Full code       Admission Status:  I believe this patient meets inpatient status.    Electronically signed by Frank Felix DO, 03/29/23, 12:37 AM EDT.               Electronically signed by Frank Felix DO at 03/29/23 0045          Emergency Department Notes      Milo Pereira MD at 03/28/23 2323          Time: 11:23 PM EDT  Date of encounter:  3/28/2023  Independent Historian/Clinical History and Information was obtained by:    Patient  Chief Complaint: Chest Pain    History is limited by: N/A    History of Present Illness:      Patient is a 72 y.o. year old male who presents to the emergency department for evaluation of chest pain with sudden acute onset this morning. Pt denies anything improving or worsening his chest pain. Pt reports being on 2L of oxygen when needed. Pt denies cough, fever, vomiting, and diarrhea. Pt reports a history of hypertension but denies any other significant heart issues. Pt reports shortness of breath with exertion.      History provided by:  Patient   used: No        Patient Care Team  Primary Care Provider: Rosalba Edwards APRN    Past Medical History:     No Known Allergies  Past Medical History:   Diagnosis Date   • Asthma    • COPD (chronic obstructive pulmonary disease) (HCC)    • Diabetes mellitus (HCC)    • Hernia, umbilical    • Hypertension    • Requires continuous at home supplemental oxygen      Past Surgical History:   Procedure Laterality Date   • ABDOMINAL SURGERY       History reviewed. No pertinent family history.    Home Medications:  Prior to Admission medications    Medication Sig Start Date End Date Taking? Authorizing Provider   albuterol sulfate  (90 Base) MCG/ACT inhaler Inhale 2 puffs Every 6 (Six) Hours As Needed for Wheezing.    ProviderRoosevelt MD   aspirin 81 MG chewable tablet Chew 81 mg Daily.    ProviderRoosevelt MD   atorvastatin (LIPITOR) 10 MG tablet Take 10 mg by mouth Daily.    Roosevelt Gaston MD   cetirizine (zyrTEC) 10 MG tablet Take 10 mg by mouth Daily.    ProviderRoosevelt MD   insulin degludec (Tresiba FlexTouch) 100 UNIT/ML solution pen-injector injection Inject 17 Units under the skin into the appropriate area as directed Daily.    ProviderRoosevelt MD   ipratropium-albuterol (DUO-NEB) 0.5-2.5 mg/3 ml nebulizer Take 3 mL by nebulization Every 4 (Four) Hours As Needed for Wheezing or Shortness of Air. 12/13/21    "Joni TrinidadenDO   Jardiance 10 MG tablet tablet Take 10 mg by mouth Daily.   22   Roosevelt Gaston MD   lisinopril (PRINIVIL,ZESTRIL) 2.5 MG tablet Take 1 tablet by mouth Daily.   22   Roosevelt Gaston MD   metFORMIN (GLUCOPHAGE) 500 MG tablet Take 500 mg by mouth 2 (Two) Times a Day. 22   Roosevelt Gaston MD   metoprolol tartrate (LOPRESSOR) 25 MG tablet Take 25 mg by mouth 2 (Two) Times a Day.    ProviderRoosevelt MD   omeprazole (priLOSEC) 20 MG capsule Take 20 mg by mouth Daily.    ProviderRoosevelt MD   QUEtiapine (SEROquel) 25 MG tablet Take 1 tablet by mouth Every Night for 30 days. 10/8/22 11/7/22  Oralia Hardy MD        Social History:   Social History     Tobacco Use   • Smoking status: Former     Years: 2.00     Types: Cigarettes     Quit date: 3/19/2021     Years since quittin.0   • Smokeless tobacco: Current     Types: Chew   Vaping Use   • Vaping Use: Never used   Substance Use Topics   • Alcohol use: Never   • Drug use: Never         Review of Systems:  Review of Systems   Constitutional: Negative for chills and fever.   HENT: Negative for congestion, ear pain and sore throat.    Eyes: Negative for pain.   Respiratory: Positive for shortness of breath. Negative for cough and chest tightness.    Cardiovascular: Positive for chest pain.   Gastrointestinal: Negative for abdominal pain, diarrhea, nausea and vomiting.   Genitourinary: Negative for flank pain and hematuria.   Musculoskeletal: Negative for joint swelling.   Skin: Negative for pallor.   Neurological: Negative for seizures and headaches.        Physical Exam:  BP 98/62 (BP Location: Right arm, Patient Position: Sitting)   Pulse 58   Temp 97.4 °F (36.3 °C) (Oral)   Resp 16   Ht 185.4 cm (72.99\")   Wt 110 kg (241 lb 13.5 oz)   SpO2 93%   BMI 31.91 kg/m²     Physical Exam  Vitals and nursing note reviewed.   Constitutional:       General: He is not in acute distress.     Appearance: " Normal appearance. He is not toxic-appearing.   HENT:      Head: Normocephalic and atraumatic.      Nose: Nose normal.      Mouth/Throat:      Mouth: Mucous membranes are moist.   Eyes:      General: No scleral icterus.     Extraocular Movements: Extraocular movements intact.      Conjunctiva/sclera: Conjunctivae normal.      Pupils: Pupils are equal, round, and reactive to light.   Cardiovascular:      Rate and Rhythm: Normal rate and regular rhythm.      Pulses: Normal pulses.      Heart sounds: Normal heart sounds.   Pulmonary:      Effort: Pulmonary effort is normal. No respiratory distress.      Breath sounds: Wheezing present.   Abdominal:      General: Abdomen is flat. There is no distension.      Palpations: Abdomen is soft.      Tenderness: There is no abdominal tenderness.   Musculoskeletal:         General: Normal range of motion.      Cervical back: Normal range of motion and neck supple.   Skin:     General: Skin is warm and dry.      Capillary Refill: Capillary refill takes less than 2 seconds.   Neurological:      General: No focal deficit present.      Mental Status: He is alert and oriented to person, place, and time. Mental status is at baseline.   Psychiatric:         Mood and Affect: Mood normal.         Behavior: Behavior normal.                 Procedures:  Procedures      Medical Decision Making:      Comorbidities that affect care:    Asthma, COPD, Diabetes, Hypertension    External Notes reviewed:    Hospital Discharge Summary: Patient was recently admitted for Acute respiratory failure and COPD exacerbation and discharged in 1/13/23      The following orders were placed and all results were independently analyzed by me:  Orders Placed This Encounter   Procedures   • XR Chest 1 View   • [place] Draw   • High Sensitivity Troponin T   • Comprehensive Metabolic Panel   • Lipase   • BNP   • Magnesium   • CBC Auto Differential   • High Sensitivity Troponin T 2Hr   • Potassium   • Magnesium   •  High Sensitivity Troponin T   • Blood Gas, Arterial -With Co-Ox Panel: Yes   • Lipid Panel   • aPTT   • aPTT   • NPO Diet NPO Type: Strict NPO   • Undress & Gown   • Vital Signs Every 15 Minutes Until Stable, Then Every 4 Hours   • Notify Physician For Unrelieved Chest Pain   • Intake & Output   • Weigh Patient   • Vital Signs   • Oral Care   • Telemetry - Maintain IV Access   • Telemetry - Pulse Oximetry   • Pulse Oximetry, Continuous   • Cardiac Monitoring   • Activity - Bed Rest With Exceptions (Specify)   • Daily Weights   • RN To Release aPTT Order 6 Hours After Heparin Bolus & 6 Hours After Any Heparin Rate Change   • After 2 Consecutive Therapeutic aPTTs, Obtain aPTT Daily.  If a Rate Adjustment is Necessary, Resume Every 6 Hour aPTT Draws   • Notify Provider Platelet Count Less Than 82435   • Stop Infusion & Notify Provider if Bleeding Occurs   • Notify Provider if PTT Not in Therapeutic Range After 24 Hours   • Inpatient Hospitalist Consult   • Cardiac Rehab Evaluation and Enrollment   • Inpatient Cardiology Consult   • Oxygen Therapy- Nasal Cannula; Titrate for SPO2: 90% - 95%   • Oxygen Therapy- Nasal Cannula; Titrate for SPO2: 90% - 95%   • ECG 12 Lead ED Triage Standing Order; Chest Pain   • ECG 12 Lead ED Triage Standing Order; Chest Pain   • ECG 12 Lead Chest Pain   • ECG 12 Lead Chest Pain   • ECG 12 Lead Chest Pain   • Adult Transthoracic Echo Complete W/ Cont if Necessary Per Protocol   • Insert Peripheral IV   • Insert Peripheral IV   • Inpatient Admission   • CBC & Differential   • Green Top (Gel)   • Lavender Top   • Gold Top - SST   • Light Blue Top   • CBC & Differential       Medications Given in the Emergency Department:  Medications   sodium chloride 0.9 % flush 10 mL (has no administration in time range)   sodium chloride 0.9 % flush 10 mL (has no administration in time range)   sodium chloride 0.9 % infusion 40 mL (has no administration in time range)   nitroglycerin (NITROSTAT) SL tablet  0.4 mg (has no administration in time range)   aspirin EC tablet 81 mg (has no administration in time range)   clopidogrel (PLAVIX) tablet 75 mg (has no administration in time range)   metoprolol succinate XL (TOPROL-XL) 24 hr tablet 25 mg (has no administration in time range)   lisinopril (PRINIVIL,ZESTRIL) tablet 5 mg (has no administration in time range)   atorvastatin (LIPITOR) tablet 40 mg (has no administration in time range)   heparin 28731 units/250 mL (100 units/mL) in 0.9% NaCl infusion ( Intravenous Canceled Entry 3/29/23 0452)   heparin bolus from bag 4,000 Units (has no administration in time range)   heparin bolus from bag 2,000 Units (has no administration in time range)   aspirin chewable tablet 324 mg (324 mg Oral Given 3/28/23 2140)   heparin bolus from bag 5,000 Units (5,000 Units Intravenous Bolus from Bag 3/29/23 0330)        ED Course:    ED Course as of 03/29/23 0635   Wed Mar 29, 2023   0634 ECG 12 Lead ED Triage Standing Order; Chest Pain  Sinus rhythm rate of 62.  Prolonged DC interval.  Right bundle branch block.  Normal QTc.  No acute ST elevation.  EKG interpreted by me.  No significant change when compared to previous. [LD]   0634 ECG 12 Lead ED Triage Standing Order; Chest Pain  Sinus rhythm with a rate of 80.  Prolonged DC interval.  Normal QTc.  Right bundle branch block.  Left anterior fascicular block.  EKG interpreted by me.  No significant change when compared to previous.       [LD]      ED Course User Index  [LD] Milo Pereira MD       Labs:    Lab Results (last 24 hours)     Procedure Component Value Units Date/Time    High Sensitivity Troponin T [[id number]]  (Abnormal) Collected: 03/28/23 2116    Specimen: Blood Updated: 03/28/23 2208     HS Troponin T 64 ng/L     Narrative:      High Sensitive Troponin T Reference Range:  <10.0 ng/L- Negative Female for AMI  <15.0 ng/L- Negative Male for AMI  >=10 - Abnormal Female indicating possible myocardial injury.  >=15 -  Abnormal Male indicating possible myocardial injury.   Clinicians would have to utilize clinical acumen, EKG, Troponin, and serial changes to determine if it is an Acute Myocardial Infarction or myocardial injury due to an underlying chronic condition.         CBC & Differential [[id number]]  (Abnormal) Collected: 03/28/23 2116    Specimen: Blood Updated: 03/28/23 2126    Narrative:      The following orders were created for panel order CBC & Differential.  Procedure                               Abnormality         Status                     ---------                               -----------         ------                     CBC Auto Differential[[id number]]        Abnormal            Final result                 Please view results for these tests on the individual orders.    Comprehensive Metabolic Panel [[id number]] Collected: 03/28/23 2116    Specimen: Blood Updated: 03/28/23 2156     Glucose 92 mg/dL      BUN 18 mg/dL      Creatinine 1.02 mg/dL      Sodium 138 mmol/L      Potassium 4.4 mmol/L      Chloride 102 mmol/L      CO2 26.6 mmol/L      Calcium 8.8 mg/dL      Total Protein 7.4 g/dL      Albumin 3.7 g/dL      ALT (SGPT) 9 U/L      AST (SGOT) 13 U/L      Alkaline Phosphatase 103 U/L      Total Bilirubin 0.3 mg/dL      Globulin 3.7 gm/dL      A/G Ratio 1.0 g/dL      BUN/Creatinine Ratio 17.6     Anion Gap 9.4 mmol/L      eGFR 77.6 mL/min/1.73     Narrative:      GFR Normal >60  Chronic Kidney Disease <60  Kidney Failure <15    The GFR formula is only valid for adults with stable renal function between ages 18 and 70.    Lipase [[id number]]  (Normal) Collected: 03/28/23 2116    Specimen: Blood Updated: 03/28/23 2156     Lipase 19 U/L     BNP [[id number]]  (Normal) Collected: 03/28/23 2116    Specimen: Blood Updated: 03/28/23 2154     proBNP 111.2 pg/mL     Narrative:      Among patients with dyspnea, NT-proBNP is highly sensitive for the detection of acute congestive heart failure. In addition NT-proBNP of <300  pg/ml effectively rules out acute congestive heart failure with 99% negative predictive value.      Magnesium [[id number]]  (Normal) Collected: 03/28/23 2116    Specimen: Blood Updated: 03/28/23 2156     Magnesium 2.0 mg/dL     CBC Auto Differential [[id number]]  (Abnormal) Collected: 03/28/23 2116    Specimen: Blood Updated: 03/28/23 2126     WBC 10.30 10*3/mm3      RBC 5.41 10*6/mm3      Hemoglobin 12.9 g/dL      Hematocrit 43.4 %      MCV 80.2 fL      MCH 23.8 pg      MCHC 29.7 g/dL      RDW 16.2 %      RDW-SD 47.0 fl      MPV 9.7 fL      Platelets 210 10*3/mm3      Neutrophil % 69.1 %      Lymphocyte % 12.4 %      Monocyte % 6.5 %      Eosinophil % 11.0 %      Basophil % 0.6 %      Immature Grans % 0.4 %      Neutrophils, Absolute 7.12 10*3/mm3      Lymphocytes, Absolute 1.28 10*3/mm3      Monocytes, Absolute 0.67 10*3/mm3      Eosinophils, Absolute 1.13 10*3/mm3      Basophils, Absolute 0.06 10*3/mm3      Immature Grans, Absolute 0.04 10*3/mm3      nRBC 0.0 /100 WBC     High Sensitivity Troponin T 2Hr [[id number]]  (Abnormal) Collected: 03/28/23 2336    Specimen: Blood Updated: 03/29/23 0047     HS Troponin T 59 ng/L      Troponin T Delta -5 ng/L     Narrative:      High Sensitive Troponin T Reference Range:  <10.0 ng/L- Negative Female for AMI  <15.0 ng/L- Negative Male for AMI  >=10 - Abnormal Female indicating possible myocardial injury.  >=15 - Abnormal Male indicating possible myocardial injury.   Clinicians would have to utilize clinical acumen, EKG, Troponin, and serial changes to determine if it is an Acute Myocardial Infarction or myocardial injury due to an underlying chronic condition.         Lipid Panel [[id number]]  (Abnormal) Collected: 03/28/23 2336    Specimen: Blood Updated: 03/29/23 0523     Total Cholesterol 69 mg/dL      Triglycerides 81 mg/dL      HDL Cholesterol 31 mg/dL      LDL Cholesterol  21 mg/dL      VLDL Cholesterol 17 mg/dL      LDL/HDL Ratio 0.70    Narrative:      Cholesterol  Reference Ranges  (U.S. Department of Health and Human Services ATP III Classifications)    Desirable          <200 mg/dL  Borderline High    200-239 mg/dL  High Risk          >240 mg/dL      Triglyceride Reference Ranges  (U.S. Department of Health and Human Services ATP III Classifications)    Normal           <150 mg/dL  Borderline High  150-199 mg/dL  High             200-499 mg/dL  Very High        >500 mg/dL    HDL Reference Ranges  (U.S. Department of Health and Human Services ATP III Classifications)    Low     <40 mg/dl (major risk factor for CHD)  High    >60 mg/dl ('negative' risk factor for CHD)        LDL Reference Ranges  (U.S. Department of Health and Human Services ATP III Classifications)    Optimal          <100 mg/dL  Near Optimal     100-129 mg/dL  Borderline High  130-159 mg/dL  High             160-189 mg/dL  Very High        >189 mg/dL           Imaging:    XR Chest 1 View    Result Date: 3/28/2023  PROCEDURE: XR CHEST 1 VW  COMPARISON: [facility], , XR CHEST 1 VW, 1/12/2023, 18:34.  INDICATIONS: Chest Pain Triage Protocol  FINDINGS:  There is a left basilar opacity.  The heart and mediastinal contours appear normal.  The pulmonary vasculature appears normal.  The osseous structures appear intact.       Left basilar opacity, which could reflect atelectasis or pneumonia.       SHRUTI MENA MD       Electronically Signed and Approved By: SHRUTI MENA MD on 3/28/2023 at 21:17                 Differential Diagnosis and Discussion:    Chest Pain:  Based on the patient's signs and symptoms, I considered aortic dissection, myocardial infaction, pulmonary embolism, cardiac tamponade, pericarditis, pneumothorax, musculoskeletal chest pain and other differential diagnosis as an etiology of the patient's chest pain.   Dyspnea: Differential diagnosis includes but is not limited to metabolic acidosis, neurological disorders, psychogenic, asthma, pneumothorax, upper airway  obstruction, COPD, pneumonia, noncardiogenic pulmonary edema, interstitial lung disease, anemia, congestive heart failure, and pulmonary embolism    All labs were reviewed and interpreted by me.  All X-rays were independently reviewed by me.  EKG was interpreted by me.    MDM  Number of Diagnoses or Management Options  Diagnosis management comments:     Patient is afebrile and nontoxic appearing. The patient is resting comfortably and feels better, is alert and in no distress. Electrocardiogram shows no signs of acute ischemia. Initial troponin is elevated. Patient has multiple risk factors for heart disease and concerning story. Discussed patient with admitting physician and patient will be admitted for further care and evaluation. Discussed treatment plan with patient and answered all their questions.         Amount and/or Complexity of Data Reviewed  Clinical lab tests: reviewed  Tests in the radiology section of CPT®: reviewed  Review and summarize past medical records: yes  Independent visualization of images, tracings, or specimens: yes    Risk of Complications, Morbidity, and/or Mortality  Presenting problems: moderate  Management options: moderate             Patient Care Considerations:    CT CHEST: I considered ordering a CT scan of the chest, however not emergently indicated at this time      Consultants/Shared Management Plan:    Hospitalist: I have discussed the case with Dr Felix who agrees to accept the patient for admission.    Social Determinants of Health:    Patient is independent, reliable, and has access to care.       Disposition and Care Coordination:    Admit:   Through independent evaluation of the patient's history, physical, and imperical data, the patient meets criteria for observation/admission to the hospital.    I have explained the patient´s condition, diagnoses and treatment plan based on the information available to me at this time. I have answered questions and addressed any  concerns. The patient has a good  understanding of the patient´s diagnosis, condition, and treatment plan as can be expected at this point. The vital signs have been stable. The patient´s condition is stable and appropriate for discharge from the emergency department.      The patient will pursue further outpatient evaluation with the primary care physician or other designated or consulting physician as outlined in the discharge instructions. They are agreeable to this plan of care and follow-up instructions have been explained in detail. The patient has received these instructions in written format and have expressed an understanding of the discharge instructions. The patient is aware that any significant change in condition or worsening of symptoms should prompt an immediate return to this or the closest emergency department or call to 911.  I have explained discharge medications and the need for follow up with the patient/caretakers. This was also printed in the discharge instructions. Patient was discharged with the following medications and follow up:      Medication List      No changes were made to your prescriptions during this visit.      No follow-up provider specified.     Final diagnoses:   Chest pain, unspecified type        ED Disposition     ED Disposition   Decision to Admit    Condition   --    Comment   Level of Care: Telemetry [5]   Diagnosis: Unstable angina (HCC) [788320]   Admitting Physician: GENET RUTLEDGE [[id number]]   Attending Physician: GENET RUTLEDGE [[id number]]   Isolate for COVID?: No [0]   Certification: I Certify That Inpatient Hospital Services Are Medically Necessary For Greater Than 2 Midnights               This medical record created using voice recognition software.        Documentation assistance provided by Nick Aj acting as scribe for Milo Pereira MD. Information recorded by the scribe was done at my direction and has been verified and validated by me.        Madai  Nick  23 2358       Milo Pereira MD  23 0633       Milo Pereira MD  23 0635       Milo Pereira MD  23 0635      Electronically signed by Milo Pereira MD at 23 0635       Physician Progress Notes (last 24 hours)  Notes from 23 1156 through 23 1156   No notes of this type exist for this encounter.            Consult Notes (last 24 hours)      Kane Purcell MD at 23 1025            [facility]   Cardiology Consult Note    Patient Name: Dilip Faulkner  : 1950  MRN: [id number]  Primary Care Physician:  Rosalba Edwards APRN  Referring Physician: No ref. provider found  Date of admission: 3/28/2023    Subjective   Subjective     Reason for Consult/ Chief Complaint: Atypical chest pain    HPI:  Dilip Faulkner is a 72 y.o. male with past medical history significant for previous SVT that was adenosine sensitive, severe COPD, hypertension and hyperlipidemia who presents with chest pain that has been going on for a week.  It is retrosternal dull sensation.  It is intermittent lasting 5 to 10 minutes.  It occurs just sitting still.  There is no nausea, vomiting, burping, alleviating or exacerbating factors.  Patient states he quit smoking 2 years ago.  No significant alcohol use.  Mother and father have no heart disease.    Review of Systems   All systems were reviewed and negative except for: Chest pain    Personal History     Past Medical History:   Diagnosis Date   • Asthma    • COPD (chronic obstructive pulmonary disease) (HCC)    • Diabetes mellitus (HCC)    • Hernia, umbilical    • Hypertension    • Requires continuous at home supplemental oxygen         Past medical history reviewed      Family History: family history is not on file. Otherwise pertinent FHx was reviewed and not pertinent to current issue.    Social History:  reports that he quit smoking about 2 years ago. His smoking use included cigarettes. His  smokeless tobacco use includes chew. He reports that he does not drink alcohol and does not use drugs.    Home Medications:  Fluticasone-Salmeterol, Fluticasone-Umeclidin-Vilant, albuterol sulfate HFA, aspirin, atorvastatin, cetirizine, insulin degludec, ipratropium-albuterol, lisinopril, metFORMIN, metoprolol tartrate, and omeprazole    Allergies:  No Known Allergies    Objective    Objective     Vitals:   Temp:  [97.2 °F (36.2 °C)-97.6 °F (36.4 °C)] 97.2 °F (36.2 °C)  Heart Rate:  [58-81] 63  Resp:  [16-20] 16  BP: ()/(59-75) 97/59  Flow (L/min):  [2] 2      Physical Exam:   Constitutional: Awake, alert, No acute distress    Eyes: PERRLA, sclerae anicteric, no conjunctival injection   HENT: NCAT, mucous membranes moist   Neck: Supple, no thyromegaly, no lymphadenopathy, trachea midline   Respiratory: Clear to auscultation bilaterally, nonlabored respirations    Cardiovascular: RRR, no murmurs, rubs, or gallops, palpable pedal pulses bilaterally   Gastrointestinal: Positive bowel sounds, soft, nontender, nondistended   Musculoskeletal: No bilateral ankle edema, no clubbing or cyanosis to extremities   Psychiatric: Appropriate affect, cooperative   Neurologic: Oriented x 3, strength symmetric in all extremities, Cranial Nerves grossly intact to confrontation, speech clear   Skin: No rashes     Result Review    Result Review:  I have personally reviewed the results from the time of this admission to 3/29/2023 10:25 EDT and agree with these findings:  [x]  Laboratory  []  Microbiology  [x]  Radiology  [x]  EKG/Telemetry   [x]  Cardiology/Vascular   []  Pathology  [x]  Old records  []  Other:  Most notable findings include:     CMP    CMP 1/12/23 1/13/23 3/28/23   Glucose 102 (A) 276 (A) 92   BUN 18 22 18   Creatinine 1.07 1.17 1.02   eGFR 73.3 65.8 77.6   Sodium 138 135 (A) 138   Potassium 4.9 4.8 4.4   Chloride 103 101 102   Calcium 9.1 8.9 8.8   Total Protein 7.6 7.7 7.4   Albumin 3.9 4.0 3.7   Globulin 3.7  3.7 3.7   Total Bilirubin 0.3 0.3 0.3   Alkaline Phosphatase 111 105 103   AST (SGOT) 14 16 13   ALT (SGPT) 9 10 9   Albumin/Globulin Ratio 1.1 1.1 1.0   BUN/Creatinine Ratio 16.8 18.8 17.6   Anion Gap 8.4 13.1 9.4   (A) Abnormal value       Comments are available for some flowsheets but are not being displayed.            CBC    CBC 1/12/23 1/13/23 3/28/23   WBC 8.47 6.81 10.30   RBC 5.78 5.55 5.41   Hemoglobin 13.6 13.1 12.9 (A)   Hematocrit 46.6 44.0 43.4   MCV 80.6 79.3 80.2   MCH 23.5 (A) 23.6 (A) 23.8 (A)   MCHC 29.2 (A) 29.8 (A) 29.7 (A)   RDW 15.3 15.0 16.2 (A)   Platelets 246 235 210   (A) Abnormal value             Lab Results   Component Value Date    TROPONINT 58 (C) 03/29/2023         Assessment & Plan   Assessment / Plan     Brief Patient Summary:  Dilip Faulkner is a 72 y.o. male who history of severe oxygen dependent COPD, hypertension, hyperlipidemia and diabetes who presents with fairly atypical chest pain.    Active Hospital Problems:  Active Hospital Problems    Diagnosis    • **Unstable angina (HCC)        Assessment:  1.  Atypical chest pain  2.  Hypertension  3.  Hyperlipidemia    Plan:   1.  Continue the metoprolol and lisinopril along with the Lipitor.  2.  Patient has 3 sets of high-sensitivity troponins with no significant rise or fall.  3.  Have discussed with the patient and we are going to set him up for a Lexiscan Cardiolite.  If the Cardiolite shows no ischemia then I do think he can be discharged from a cardiac standpoint.  4.  EKG shows right bundle branch block and left anterior fascicular block.  There is no ischemic changes.    Electronically signed by Kane Purcell MD, 03/29/23, 10:25 AM EDT.    Electronically signed by Kane Purcell MD at 03/29/23 [id number]

## 2023-03-29 NOTE — ED PROVIDER NOTES
Time: 11:23 PM EDT  Date of encounter:  3/28/2023  Independent Historian/Clinical History and Information was obtained by:   Patient  Chief Complaint: Chest Pain    History is limited by: N/A    History of Present Illness:      Patient is a 72 y.o. year old male who presents to the emergency department for evaluation of chest pain with sudden acute onset this morning. Pt denies anything improving or worsening his chest pain. Pt reports being on 2L of oxygen when needed. Pt denies cough, fever, vomiting, and diarrhea. Pt reports a history of hypertension but denies any other significant heart issues. Pt reports shortness of breath with exertion.      History provided by:  Patient   used: No        Patient Care Team  Primary Care Provider: Rosalba Edwards APRN    Past Medical History:     No Known Allergies  Past Medical History:   Diagnosis Date   • Asthma    • COPD (chronic obstructive pulmonary disease) (HCC)    • Diabetes mellitus (HCC)    • Hernia, umbilical    • Hypertension    • Requires continuous at home supplemental oxygen      Past Surgical History:   Procedure Laterality Date   • ABDOMINAL SURGERY       History reviewed. No pertinent family history.    Home Medications:  Prior to Admission medications    Medication Sig Start Date End Date Taking? Authorizing Provider   albuterol sulfate  (90 Base) MCG/ACT inhaler Inhale 2 puffs Every 6 (Six) Hours As Needed for Wheezing.    ProviderRoosevelt MD   aspirin 81 MG chewable tablet Chew 81 mg Daily.    ProviderRoosevelt MD   atorvastatin (LIPITOR) 10 MG tablet Take 10 mg by mouth Daily.    Roosevelt Gaston MD   cetirizine (zyrTEC) 10 MG tablet Take 10 mg by mouth Daily.    Roosevelt Gaston MD   insulin degludec (Tresiba FlexTouch) 100 UNIT/ML solution pen-injector injection Inject 17 Units under the skin into the appropriate area as directed Daily.    Roosevelt Gaston MD   ipratropium-albuterol (DUO-NEB)  "0.5-2.5 mg/3 ml nebulizer Take 3 mL by nebulization Every 4 (Four) Hours As Needed for Wheezing or Shortness of Air. 21   Ricci Trinidad DO   Jardiance 10 MG tablet tablet Take 10 mg by mouth Daily.   22   Roosevelt Gaston MD   lisinopril (PRINIVIL,ZESTRIL) 2.5 MG tablet Take 1 tablet by mouth Daily.   22   Roosevelt Gaston MD   metFORMIN (GLUCOPHAGE) 500 MG tablet Take 500 mg by mouth 2 (Two) Times a Day. 22   Roosevelt Gaston MD   metoprolol tartrate (LOPRESSOR) 25 MG tablet Take 25 mg by mouth 2 (Two) Times a Day.    Roosevelt Gaston MD   omeprazole (priLOSEC) 20 MG capsule Take 20 mg by mouth Daily.    Roosevelt Gaston MD   QUEtiapine (SEROquel) 25 MG tablet Take 1 tablet by mouth Every Night for 30 days. 10/8/22 11/7/22  Oralia Hardy MD        Social History:   Social History     Tobacco Use   • Smoking status: Former     Years: 2.00     Types: Cigarettes     Quit date: 3/19/2021     Years since quittin.0   • Smokeless tobacco: Current     Types: Chew   Vaping Use   • Vaping Use: Never used   Substance Use Topics   • Alcohol use: Never   • Drug use: Never         Review of Systems:  Review of Systems   Constitutional: Negative for chills and fever.   HENT: Negative for congestion, ear pain and sore throat.    Eyes: Negative for pain.   Respiratory: Positive for shortness of breath. Negative for cough and chest tightness.    Cardiovascular: Positive for chest pain.   Gastrointestinal: Negative for abdominal pain, diarrhea, nausea and vomiting.   Genitourinary: Negative for flank pain and hematuria.   Musculoskeletal: Negative for joint swelling.   Skin: Negative for pallor.   Neurological: Negative for seizures and headaches.        Physical Exam:  BP 98/62 (BP Location: Right arm, Patient Position: Sitting)   Pulse 58   Temp 97.4 °F (36.3 °C) (Oral)   Resp 16   Ht 185.4 cm (72.99\")   Wt 110 kg (241 lb 13.5 oz)   SpO2 93%   BMI 31.91 kg/m² "     Physical Exam  Vitals and nursing note reviewed.   Constitutional:       General: He is not in acute distress.     Appearance: Normal appearance. He is not toxic-appearing.   HENT:      Head: Normocephalic and atraumatic.      Nose: Nose normal.      Mouth/Throat:      Mouth: Mucous membranes are moist.   Eyes:      General: No scleral icterus.     Extraocular Movements: Extraocular movements intact.      Conjunctiva/sclera: Conjunctivae normal.      Pupils: Pupils are equal, round, and reactive to light.   Cardiovascular:      Rate and Rhythm: Normal rate and regular rhythm.      Pulses: Normal pulses.      Heart sounds: Normal heart sounds.   Pulmonary:      Effort: Pulmonary effort is normal. No respiratory distress.      Breath sounds: Wheezing present.   Abdominal:      General: Abdomen is flat. There is no distension.      Palpations: Abdomen is soft.      Tenderness: There is no abdominal tenderness.   Musculoskeletal:         General: Normal range of motion.      Cervical back: Normal range of motion and neck supple.   Skin:     General: Skin is warm and dry.      Capillary Refill: Capillary refill takes less than 2 seconds.   Neurological:      General: No focal deficit present.      Mental Status: He is alert and oriented to person, place, and time. Mental status is at baseline.   Psychiatric:         Mood and Affect: Mood normal.         Behavior: Behavior normal.                  Procedures:  Procedures      Medical Decision Making:      Comorbidities that affect care:    Asthma, COPD, Diabetes, Hypertension    External Notes reviewed:    Hospital Discharge Summary: Patient was recently admitted for Acute respiratory failure and COPD exacerbation and discharged in 1/13/23      The following orders were placed and all results were independently analyzed by me:  Orders Placed This Encounter   Procedures   • XR Chest 1 View   • [place] Draw   • High Sensitivity Troponin T   • Comprehensive Metabolic  Panel   • Lipase   • BNP   • Magnesium   • CBC Auto Differential   • High Sensitivity Troponin T 2Hr   • Potassium   • Magnesium   • High Sensitivity Troponin T   • Blood Gas, Arterial -With Co-Ox Panel: Yes   • Lipid Panel   • aPTT   • aPTT   • NPO Diet NPO Type: Strict NPO   • Undress & Gown   • Vital Signs Every 15 Minutes Until Stable, Then Every 4 Hours   • Notify Physician For Unrelieved Chest Pain   • Intake & Output   • Weigh Patient   • Vital Signs   • Oral Care   • Telemetry - Maintain IV Access   • Telemetry - Pulse Oximetry   • Pulse Oximetry, Continuous   • Cardiac Monitoring   • Activity - Bed Rest With Exceptions (Specify)   • Daily Weights   • RN To Release aPTT Order 6 Hours After Heparin Bolus & 6 Hours After Any Heparin Rate Change   • After 2 Consecutive Therapeutic aPTTs, Obtain aPTT Daily.  If a Rate Adjustment is Necessary, Resume Every 6 Hour aPTT Draws   • Notify Provider Platelet Count Less Than 80347   • Stop Infusion & Notify Provider if Bleeding Occurs   • Notify Provider if PTT Not in Therapeutic Range After 24 Hours   • Inpatient Hospitalist Consult   • Cardiac Rehab Evaluation and Enrollment   • Inpatient Cardiology Consult   • Oxygen Therapy- Nasal Cannula; Titrate for SPO2: 90% - 95%   • Oxygen Therapy- Nasal Cannula; Titrate for SPO2: 90% - 95%   • ECG 12 Lead ED Triage Standing Order; Chest Pain   • ECG 12 Lead ED Triage Standing Order; Chest Pain   • ECG 12 Lead Chest Pain   • ECG 12 Lead Chest Pain   • ECG 12 Lead Chest Pain   • Adult Transthoracic Echo Complete W/ Cont if Necessary Per Protocol   • Insert Peripheral IV   • Insert Peripheral IV   • Inpatient Admission   • CBC & Differential   • Green Top (Gel)   • Lavender Top   • Gold Top - SST   • Light Blue Top   • CBC & Differential       Medications Given in the Emergency Department:  Medications   sodium chloride 0.9 % flush 10 mL (has no administration in time range)   sodium chloride 0.9 % flush 10 mL (has no  administration in time range)   sodium chloride 0.9 % infusion 40 mL (has no administration in time range)   nitroglycerin (NITROSTAT) SL tablet 0.4 mg (has no administration in time range)   aspirin EC tablet 81 mg (has no administration in time range)   clopidogrel (PLAVIX) tablet 75 mg (has no administration in time range)   metoprolol succinate XL (TOPROL-XL) 24 hr tablet 25 mg (has no administration in time range)   lisinopril (PRINIVIL,ZESTRIL) tablet 5 mg (has no administration in time range)   atorvastatin (LIPITOR) tablet 40 mg (has no administration in time range)   heparin 96785 units/250 mL (100 units/mL) in 0.9% NaCl infusion ( Intravenous Canceled Entry 3/29/23 0452)   heparin bolus from bag 4,000 Units (has no administration in time range)   heparin bolus from bag 2,000 Units (has no administration in time range)   aspirin chewable tablet 324 mg (324 mg Oral Given 3/28/23 2140)   heparin bolus from bag 5,000 Units (5,000 Units Intravenous Bolus from Bag 3/29/23 0330)        ED Course:    ED Course as of 03/29/23 0635   Wed Mar 29, 2023   0634 ECG 12 Lead ED Triage Standing Order; Chest Pain  Sinus rhythm rate of 62.  Prolonged NC interval.  Right bundle branch block.  Normal QTc.  No acute ST elevation.  EKG interpreted by me.  No significant change when compared to previous. [LD]   0634 ECG 12 Lead ED Triage Standing Order; Chest Pain  Sinus rhythm with a rate of 80.  Prolonged NC interval.  Normal QTc.  Right bundle branch block.  Left anterior fascicular block.  EKG interpreted by me.  No significant change when compared to previous.       [LD]      ED Course User Index  [LD] Milo Pereira MD       Labs:    Lab Results (last 24 hours)     Procedure Component Value Units Date/Time    High Sensitivity Troponin T [[id number]]  (Abnormal) Collected: 03/28/23 2116    Specimen: Blood Updated: 03/28/23 2208     HS Troponin T 64 ng/L     Narrative:      High Sensitive Troponin T Reference  Range:  <10.0 ng/L- Negative Female for AMI  <15.0 ng/L- Negative Male for AMI  >=10 - Abnormal Female indicating possible myocardial injury.  >=15 - Abnormal Male indicating possible myocardial injury.   Clinicians would have to utilize clinical acumen, EKG, Troponin, and serial changes to determine if it is an Acute Myocardial Infarction or myocardial injury due to an underlying chronic condition.         CBC & Differential [[id number]]  (Abnormal) Collected: 03/28/23 2116    Specimen: Blood Updated: 03/28/23 2126    Narrative:      The following orders were created for panel order CBC & Differential.  Procedure                               Abnormality         Status                     ---------                               -----------         ------                     CBC Auto Differential[[id number]]        Abnormal            Final result                 Please view results for these tests on the individual orders.    Comprehensive Metabolic Panel [[id number]] Collected: 03/28/23 2116    Specimen: Blood Updated: 03/28/23 2156     Glucose 92 mg/dL      BUN 18 mg/dL      Creatinine 1.02 mg/dL      Sodium 138 mmol/L      Potassium 4.4 mmol/L      Chloride 102 mmol/L      CO2 26.6 mmol/L      Calcium 8.8 mg/dL      Total Protein 7.4 g/dL      Albumin 3.7 g/dL      ALT (SGPT) 9 U/L      AST (SGOT) 13 U/L      Alkaline Phosphatase 103 U/L      Total Bilirubin 0.3 mg/dL      Globulin 3.7 gm/dL      A/G Ratio 1.0 g/dL      BUN/Creatinine Ratio 17.6     Anion Gap 9.4 mmol/L      eGFR 77.6 mL/min/1.73     Narrative:      GFR Normal >60  Chronic Kidney Disease <60  Kidney Failure <15    The GFR formula is only valid for adults with stable renal function between ages 18 and 70.    Lipase [[id number]]  (Normal) Collected: 03/28/23 2116    Specimen: Blood Updated: 03/28/23 2156     Lipase 19 U/L     BNP [[id number]]  (Normal) Collected: 03/28/23 2116    Specimen: Blood Updated: 03/28/23 2154     proBNP 111.2 pg/mL      Narrative:      Among patients with dyspnea, NT-proBNP is highly sensitive for the detection of acute congestive heart failure. In addition NT-proBNP of <300 pg/ml effectively rules out acute congestive heart failure with 99% negative predictive value.      Magnesium [[id number]]  (Normal) Collected: 03/28/23 2116    Specimen: Blood Updated: 03/28/23 2156     Magnesium 2.0 mg/dL     CBC Auto Differential [[id number]]  (Abnormal) Collected: 03/28/23 2116    Specimen: Blood Updated: 03/28/23 2126     WBC 10.30 10*3/mm3      RBC 5.41 10*6/mm3      Hemoglobin 12.9 g/dL      Hematocrit 43.4 %      MCV 80.2 fL      MCH 23.8 pg      MCHC 29.7 g/dL      RDW 16.2 %      RDW-SD 47.0 fl      MPV 9.7 fL      Platelets 210 10*3/mm3      Neutrophil % 69.1 %      Lymphocyte % 12.4 %      Monocyte % 6.5 %      Eosinophil % 11.0 %      Basophil % 0.6 %      Immature Grans % 0.4 %      Neutrophils, Absolute 7.12 10*3/mm3      Lymphocytes, Absolute 1.28 10*3/mm3      Monocytes, Absolute 0.67 10*3/mm3      Eosinophils, Absolute 1.13 10*3/mm3      Basophils, Absolute 0.06 10*3/mm3      Immature Grans, Absolute 0.04 10*3/mm3      nRBC 0.0 /100 WBC     High Sensitivity Troponin T 2Hr [[id number]]  (Abnormal) Collected: 03/28/23 2336    Specimen: Blood Updated: 03/29/23 0047     HS Troponin T 59 ng/L      Troponin T Delta -5 ng/L     Narrative:      High Sensitive Troponin T Reference Range:  <10.0 ng/L- Negative Female for AMI  <15.0 ng/L- Negative Male for AMI  >=10 - Abnormal Female indicating possible myocardial injury.  >=15 - Abnormal Male indicating possible myocardial injury.   Clinicians would have to utilize clinical acumen, EKG, Troponin, and serial changes to determine if it is an Acute Myocardial Infarction or myocardial injury due to an underlying chronic condition.         Lipid Panel [[id number]]  (Abnormal) Collected: 03/28/23 2336    Specimen: Blood Updated: 03/29/23 0523     Total Cholesterol 69 mg/dL      Triglycerides 81  mg/dL      HDL Cholesterol 31 mg/dL      LDL Cholesterol  21 mg/dL      VLDL Cholesterol 17 mg/dL      LDL/HDL Ratio 0.70    Narrative:      Cholesterol Reference Ranges  (U.S. Department of Health and Human Services ATP III Classifications)    Desirable          <200 mg/dL  Borderline High    200-239 mg/dL  High Risk          >240 mg/dL      Triglyceride Reference Ranges  (U.S. Department of Health and Human Services ATP III Classifications)    Normal           <150 mg/dL  Borderline High  150-199 mg/dL  High             200-499 mg/dL  Very High        >500 mg/dL    HDL Reference Ranges  (U.S. Department of Health and Human Services ATP III Classifications)    Low     <40 mg/dl (major risk factor for CHD)  High    >60 mg/dl ('negative' risk factor for CHD)        LDL Reference Ranges  (U.S. Department of Health and Human Services ATP III Classifications)    Optimal          <100 mg/dL  Near Optimal     100-129 mg/dL  Borderline High  130-159 mg/dL  High             160-189 mg/dL  Very High        >189 mg/dL           Imaging:    XR Chest 1 View    Result Date: 3/28/2023  PROCEDURE: XR CHEST 1 VW  COMPARISON: [facility], , XR CHEST 1 VW, 1/12/2023, 18:34.  INDICATIONS: Chest Pain Triage Protocol  FINDINGS:  There is a left basilar opacity.  The heart and mediastinal contours appear normal.  The pulmonary vasculature appears normal.  The osseous structures appear intact.       Left basilar opacity, which could reflect atelectasis or pneumonia.       SHRUTI MENA MD       Electronically Signed and Approved By: SHRUTI MENA MD on 3/28/2023 at 21:17                 Differential Diagnosis and Discussion:    Chest Pain:  Based on the patient's signs and symptoms, I considered aortic dissection, myocardial infaction, pulmonary embolism, cardiac tamponade, pericarditis, pneumothorax, musculoskeletal chest pain and other differential diagnosis as an etiology of the patient's chest pain.   Dyspnea:  Differential diagnosis includes but is not limited to metabolic acidosis, neurological disorders, psychogenic, asthma, pneumothorax, upper airway obstruction, COPD, pneumonia, noncardiogenic pulmonary edema, interstitial lung disease, anemia, congestive heart failure, and pulmonary embolism    All labs were reviewed and interpreted by me.  All X-rays were independently reviewed by me.  EKG was interpreted by me.    MDM  Number of Diagnoses or Management Options  Diagnosis management comments:     Patient is afebrile and nontoxic appearing. The patient is resting comfortably and feels better, is alert and in no distress. Electrocardiogram shows no signs of acute ischemia. Initial troponin is elevated. Patient has multiple risk factors for heart disease and concerning story. Discussed patient with admitting physician and patient will be admitted for further care and evaluation. Discussed treatment plan with patient and answered all their questions.         Amount and/or Complexity of Data Reviewed  Clinical lab tests: reviewed  Tests in the radiology section of CPT®: reviewed  Review and summarize past medical records: yes  Independent visualization of images, tracings, or specimens: yes    Risk of Complications, Morbidity, and/or Mortality  Presenting problems: moderate  Management options: moderate             Patient Care Considerations:    CT CHEST: I considered ordering a CT scan of the chest, however not emergently indicated at this time      Consultants/Shared Management Plan:    Hospitalist: I have discussed the case with Dr Felix who agrees to accept the patient for admission.    Social Determinants of Health:    Patient is independent, reliable, and has access to care.       Disposition and Care Coordination:    Admit:   Through independent evaluation of the patient's history, physical, and imperical data, the patient meets criteria for observation/admission to the hospital.    I have explained the patient´s  condition, diagnoses and treatment plan based on the information available to me at this time. I have answered questions and addressed any concerns. The patient has a good  understanding of the patient´s diagnosis, condition, and treatment plan as can be expected at this point. The vital signs have been stable. The patient´s condition is stable and appropriate for discharge from the emergency department.      The patient will pursue further outpatient evaluation with the primary care physician or other designated or consulting physician as outlined in the discharge instructions. They are agreeable to this plan of care and follow-up instructions have been explained in detail. The patient has received these instructions in written format and have expressed an understanding of the discharge instructions. The patient is aware that any significant change in condition or worsening of symptoms should prompt an immediate return to this or the closest emergency department or call to 911.  I have explained discharge medications and the need for follow up with the patient/caretakers. This was also printed in the discharge instructions. Patient was discharged with the following medications and follow up:      Medication List      No changes were made to your prescriptions during this visit.      No follow-up provider specified.     Final diagnoses:   Chest pain, unspecified type        ED Disposition     ED Disposition   Decision to Admit    Condition   --    Comment   Level of Care: Telemetry [5]   Diagnosis: Unstable angina (HCC) [387435]   Admitting Physician: GENET RUTLEDGE [[id number]]   Attending Physician: GENET RUTLEDGE [[id number]]   Isolate for COVID?: No [0]   Certification: I Certify That Inpatient Hospital Services Are Medically Necessary For Greater Than 2 Midnights               This medical record created using voice recognition software.        Documentation assistance provided by Nick Aj acting as scribe for  Milo Pereira MD. Information recorded by the scribe was done at my direction and has been verified and validated by me.        Nick Aj  03/28/23 1333       Milo Pereira MD  03/29/23 0633       Milo Pereira MD  03/29/23 0635       Milo Pereira MD  03/29/23 0668

## 2023-03-29 NOTE — PLAN OF CARE
Goal Outcome Evaluation:  Plan of Care Reviewed With: patient        Progress: no change  Outcome Evaluation: no complaints of chest pain

## 2023-03-29 NOTE — H&P
[facility]  Date: 3/29/2023   Patient Name: Dilip Faulkner  : 1950  MRN: [id number]  Primary Care Physician:  Rosalba Edwards, MONE  Date of admission: 3/28/2023    Subjective   Subjective     Chief Complaint: Chest pain    HPI:    Dilip Faulkner is a 72 y.o. male presented to the emergency department this evening for evaluation of chest pain this been ongoing for approximately 4 days duration.  Patient reports he woke up this morning and had another episode of chest pain.  Patient report it has been intermittent, occurring at rest.  Patient describes the chest pain as a heaviness that radiates into his abdomen.  Patient reports 2 L of home oxygen therapy, that has been unchanged since the onset of symptoms.  He does report that he has been more short of breath with exertion.  Patient denies ever having cardiac work-up in the past.  Patient has a history of hypertension, hyperlipidemia, type 2 diabetes mellitus, patient is obese.  Patient reports following regularly with his PCP, last visit was approximate 1 month ago.  Patient denies any nausea, vomiting, diarrhea, numbness, tingling, weakness.      Personal History     Past Medical History:  Hypertension  Hyperlipidemia  Type 2 diabetes mellitus  COPD  Asthma    Past Surgical History:  Abdominal surgery    Family History:   CAD    Social History:   Patient is a 2-pack-year smoker, currently chews tobacco.  Denies any alcohol or illicit drug use.    Home Medications:  QUEtiapine, albuterol sulfate HFA, aspirin, atorvastatin, cetirizine, empagliflozin, insulin degludec, ipratropium-albuterol, lisinopril, metFORMIN, metoprolol tartrate, and omeprazole    Allergies:  No Known Allergies    Review of Systems   All systems were reviewed and negative except for: Chest pain, dyspnea    Objective   Objective     Vitals:   Temp:  [97.6 °F (36.4 °C)] 97.6 °F (36.4 °C)  Heart Rate:  [63-81] 63  Resp:  [20] 20  BP:  (101-107)/(63-67) 101/63  Flow (L/min):  [2] 2    Physical Exam    Constitutional: Awake, alert, no acute distress, chronically ill-appearing.   Eyes: Pupils equal, sclerae anicteric, no conjunctival injection   HENT: NCAT, mucous membranes moist   Neck: Supple, no thyromegaly, no lymphadenopathy, trachea midline   Respiratory: Scant wheezing in the left lower lung field.  Otherwise clear to auscultation, nonlabored respirations    Cardiovascular: RRR, no murmurs, rubs, or gallops, palpable pedal pulses bilaterally   Gastrointestinal: Positive bowel sounds, soft, nontender, distended secondary to obesity.   Musculoskeletal: No bilateral ankle edema, no clubbing or cyanosis to extremities   Psychiatric: Appropriate affect, cooperative   Neurologic: Oriented x 3, strength symmetric in all extremities, Cranial Nerves grossly intact to confrontation, speech clear   Skin: No rashes     Result Review    Result Review:  I have personally reviewed the results from the time of this admission to 3/29/2023 00:37 EDT and agree with these findings:  [x]  Laboratory  []  Microbiology  [x]  Radiology  [x]  EKG/Telemetry   [x]  Cardiology/Vascular   []  Pathology  []  Old records  []  Other:      Assessment & Plan   Assessment / Plan     Assessment/Plan:   • Unstable angina-we will place the patient on telemetry, continue to monitor heart rhythm.  EKG was reviewed in the emergency department, shows a bifascicular block, relatively unchanged from EKG performed 2 months ago.  However the patient's troponin is grossly positive at 64.  Patient denies any recent bleeding.  Given the patient's risk factors mentioned above, we will place the patient on heparin drip, trend troponins, check an updated 2D echo, as his last one was performed in 2019.  We will consult cardiology and get their recommendations to decide on heart catheterization versus stress testing.  We appreciate the recommendations and care of this  patient.  • Atelectasis-x-ray showed atelectasis versus possible pneumonia.  Patient has a normal white count.  Patient has not had any significant change in sputum production, or worsening respiratory status.  No indication for antibiotics at this time.  • Tobacco use disorder-patient educated to quit tobacco, patient verbalized understanding.  • Hypertension-Home meds  • Hyperlipidemia-Home meds  • Type 2 diabetes mellitus-Home meds  • Obesity      DVT prophylaxis:  No DVT prophylaxis order currently exists.    CODE STATUS: Full code       Admission Status:  I believe this patient meets inpatient status.    Electronically signed by Frank Felix DO, 03/29/23, 12:37 AM EDT.

## 2023-03-29 NOTE — PLAN OF CARE
Goal Outcome Evaluation:  Plan of Care Reviewed With: patient        Progress: no change  Outcome Evaluation: Patient stable this shift, echo and nuclear stress test done, con't heparin gtt, no chest pain reported, no other concerns or complaints at this time, will continue to monitor.   Patient  reports Patient \"has been having seizures or not, not sure but she has been very out of it\"   reports Patient can not remember if she took her medication or not.  states this all started yesterday.    states patient \"is very out of it and does not know what is going on\"  states she is not having grand mal seizures but \"she is not with it\"     advised to take patient to ER now and I will update Dr. Jose Juan Whitaker and call him back.   agreed to take patient to ER and asked that we call him back on his cell phone [phone].  Advise

## 2023-03-29 NOTE — SIGNIFICANT NOTE
03/29/23 1030   Plan   Plan Patient reports lives with his sister that can assist with needs.  Reports home O2 with [employer].  Drives and provides own IADL's. Facesheet verified.  Reports no problems with medication or any other financial need.  Plans to return home with family and no needs.  Agreealbe to use Meds to Beds.

## 2023-03-30 ENCOUNTER — READMISSION MANAGEMENT (OUTPATIENT)
Dept: CALL CENTER | Facility: HOSPITAL | Age: 73
End: 2023-03-30
Payer: MEDICARE

## 2023-03-30 ENCOUNTER — TELEPHONE (OUTPATIENT)
Dept: CARDIOLOGY | Facility: CLINIC | Age: 73
End: 2023-03-30

## 2023-03-30 VITALS
RESPIRATION RATE: 18 BRPM | OXYGEN SATURATION: 91 % | TEMPERATURE: 97.3 F | BODY MASS INDEX: 32.05 KG/M2 | HEART RATE: 64 BPM | SYSTOLIC BLOOD PRESSURE: 114 MMHG | WEIGHT: 241.84 LBS | DIASTOLIC BLOOD PRESSURE: 72 MMHG | HEIGHT: 73 IN

## 2023-03-30 LAB
ALBUMIN SERPL-MCNC: 3.4 G/DL (ref 3.5–5.2)
ALBUMIN/GLOB SERPL: 1.2 G/DL
ALP SERPL-CCNC: 91 U/L (ref 39–117)
ALT SERPL W P-5'-P-CCNC: 7 U/L (ref 1–41)
ANION GAP SERPL CALCULATED.3IONS-SCNC: 7 MMOL/L (ref 5–15)
APTT PPP: 88.4 SECONDS (ref 78–95.9)
APTT PPP: 90.3 SECONDS (ref 78–95.9)
AST SERPL-CCNC: 13 U/L (ref 1–40)
BASOPHILS # BLD AUTO: 0.05 10*3/MM3 (ref 0–0.2)
BASOPHILS NFR BLD AUTO: 0.7 % (ref 0–1.5)
BILIRUB SERPL-MCNC: 0.3 MG/DL (ref 0–1.2)
BUN SERPL-MCNC: 20 MG/DL (ref 8–23)
BUN/CREAT SERPL: 18.5 (ref 7–25)
CALCIUM SPEC-SCNC: 8.3 MG/DL (ref 8.6–10.5)
CHLORIDE SERPL-SCNC: 102 MMOL/L (ref 98–107)
CO2 SERPL-SCNC: 28 MMOL/L (ref 22–29)
CREAT SERPL-MCNC: 1.08 MG/DL (ref 0.76–1.27)
DEPRECATED RDW RBC AUTO: 46.8 FL (ref 37–54)
EGFRCR SERPLBLD CKD-EPI 2021: 72.5 ML/MIN/1.73
EOSINOPHIL # BLD AUTO: 0.73 10*3/MM3 (ref 0–0.4)
EOSINOPHIL NFR BLD AUTO: 10.9 % (ref 0.3–6.2)
ERYTHROCYTE [DISTWIDTH] IN BLOOD BY AUTOMATED COUNT: 16.2 % (ref 12.3–15.4)
GLOBULIN UR ELPH-MCNC: 2.8 GM/DL
GLUCOSE SERPL-MCNC: 149 MG/DL (ref 65–99)
HCT VFR BLD AUTO: 39.9 % (ref 37.5–51)
HGB BLD-MCNC: 11.9 G/DL (ref 13–17.7)
IMM GRANULOCYTES # BLD AUTO: 0.01 10*3/MM3 (ref 0–0.05)
IMM GRANULOCYTES NFR BLD AUTO: 0.1 % (ref 0–0.5)
LYMPHOCYTES # BLD AUTO: 1.54 10*3/MM3 (ref 0.7–3.1)
LYMPHOCYTES NFR BLD AUTO: 23 % (ref 19.6–45.3)
MCH RBC QN AUTO: 23.9 PG (ref 26.6–33)
MCHC RBC AUTO-ENTMCNC: 29.8 G/DL (ref 31.5–35.7)
MCV RBC AUTO: 80.3 FL (ref 79–97)
MONOCYTES # BLD AUTO: 0.53 10*3/MM3 (ref 0.1–0.9)
MONOCYTES NFR BLD AUTO: 7.9 % (ref 5–12)
NEUTROPHILS NFR BLD AUTO: 3.84 10*3/MM3 (ref 1.7–7)
NEUTROPHILS NFR BLD AUTO: 57.4 % (ref 42.7–76)
NRBC BLD AUTO-RTO: 0 /100 WBC (ref 0–0.2)
PLATELET # BLD AUTO: 197 10*3/MM3 (ref 140–450)
PMV BLD AUTO: 9.8 FL (ref 6–12)
POTASSIUM SERPL-SCNC: 4.2 MMOL/L (ref 3.5–5.2)
PROT SERPL-MCNC: 6.2 G/DL (ref 6–8.5)
RBC # BLD AUTO: 4.97 10*6/MM3 (ref 4.14–5.8)
SODIUM SERPL-SCNC: 137 MMOL/L (ref 136–145)
WBC NRBC COR # BLD: 6.7 10*3/MM3 (ref 3.4–10.8)

## 2023-03-30 PROCEDURE — 25010000002 HEPARIN (PORCINE) PER 1000 UNITS: Performed by: FAMILY MEDICINE

## 2023-03-30 PROCEDURE — 85730 THROMBOPLASTIN TIME PARTIAL: CPT | Performed by: STUDENT IN AN ORGANIZED HEALTH CARE EDUCATION/TRAINING PROGRAM

## 2023-03-30 PROCEDURE — 99239 HOSP IP/OBS DSCHRG MGMT >30: CPT | Performed by: STUDENT IN AN ORGANIZED HEALTH CARE EDUCATION/TRAINING PROGRAM

## 2023-03-30 PROCEDURE — 94640 AIRWAY INHALATION TREATMENT: CPT

## 2023-03-30 PROCEDURE — 94799 UNLISTED PULMONARY SVC/PX: CPT

## 2023-03-30 PROCEDURE — 80053 COMPREHEN METABOLIC PANEL: CPT | Performed by: STUDENT IN AN ORGANIZED HEALTH CARE EDUCATION/TRAINING PROGRAM

## 2023-03-30 PROCEDURE — 85025 COMPLETE CBC W/AUTO DIFF WBC: CPT | Performed by: FAMILY MEDICINE

## 2023-03-30 RX ORDER — METOPROLOL SUCCINATE 25 MG/1
25 TABLET, EXTENDED RELEASE ORAL
Qty: 30 TABLET | Refills: 0 | Status: SHIPPED | OUTPATIENT
Start: 2023-03-30 | End: 2023-04-29

## 2023-03-30 RX ORDER — ATORVASTATIN CALCIUM 40 MG/1
40 TABLET, FILM COATED ORAL NIGHTLY
Qty: 30 TABLET | Refills: 0 | Status: SHIPPED | OUTPATIENT
Start: 2023-03-30 | End: 2023-04-29

## 2023-03-30 RX ORDER — LISINOPRIL 5 MG/1
5 TABLET ORAL DAILY
Qty: 30 TABLET | Refills: 0 | Status: SHIPPED | OUTPATIENT
Start: 2023-03-30 | End: 2023-04-29

## 2023-03-30 RX ADMIN — LISINOPRIL 5 MG: 5 TABLET ORAL at 09:44

## 2023-03-30 RX ADMIN — ASPIRIN 81 MG: 81 TABLET, COATED ORAL at 09:44

## 2023-03-30 RX ADMIN — METOPROLOL SUCCINATE 25 MG: 25 TABLET, EXTENDED RELEASE ORAL at 09:44

## 2023-03-30 RX ADMIN — CLOPIDOGREL BISULFATE 75 MG: 75 TABLET ORAL at 09:44

## 2023-03-30 RX ADMIN — IPRATROPIUM BROMIDE AND ALBUTEROL SULFATE 3 ML: 2.5; .5 SOLUTION RESPIRATORY (INHALATION) at 03:44

## 2023-03-30 RX ADMIN — HEPARIN SODIUM 18 UNITS/KG/HR: 5000 INJECTION INTRAVENOUS; SUBCUTANEOUS at 02:44

## 2023-03-30 NOTE — PLAN OF CARE
Goal Outcome Evaluation:   Pt has been restless throughout the night. PRN duo-neb ordered. Heparin gtt still at 18 units/hour. aPTT ordered. Will continue to monitor.

## 2023-03-30 NOTE — TELEPHONE ENCOUNTER
" Caller: Dilip Faulkner    Relationship to patient: Self    Best call back number: [phone]    Chief complaint: HOSPITAL CALLED IN FOR HOSPITAL FU    Type of visit: HOSPITAL FU    Requested date: NO TIMEFRAME GIVEN       Additional notes: PT SEEN DR. GRIFFITH IN HOSPITAL BUT NO FU INSTRUCTIONS IN HIS NOTE. DISCHARGE NOTE JUST SAYS \"Cardiology as recommended.\"        "

## 2023-03-30 NOTE — DISCHARGE INSTR - APPOINTMENTS
Patient needs to follow up with Mallorie Roa (PCP) on 4/3/23 @9:00am [phone]  Patient needs to follow up with (Cardiology) office will call with appointment. [phone]

## 2023-03-30 NOTE — DISCHARGE SUMMARY
[facility]         HOSPITALIST  DISCHARGE SUMMARY    Patient Name: Dilip Faulkner  : 1950  MRN: [id number]    Date of Admission: 3/28/2023  Date of Discharge: 3/30/2023  Primary Care Physician: Rosalba Edwards APRN    Consults     Date and Time Order Name Status Description    3/29/2023  4:46 AM Inpatient Cardiology Consult      3/29/2023 12:06 AM Inpatient Hospitalist Consult            Active and Resolved Hospital Problems:  Active Hospital Problems    Diagnosis POA   • **Unstable angina (HCC) [I20.0] Yes      Resolved Hospital Problems   No resolved problems to display.       Hospital Course     Hospital Course:  Dilip Faulkner is a 72 y.o. male who presented with chief complaint of chest pain.  Cardiology evaluated the patient he underwent myocardial perfusion study.  Myocardial perfusion study did not show any evidence of ischemia.  Cardiology stated that if patient did not have any evidence of ischemia on stress test they can be discharged from their standpoint.  Patient had no further evidence of chest pain and will be continued on his home medication regimen going forward.  Patient will be discharged back home under the care of his sister.  He will follow-up with PCP in 1 week.  He was discharged in stable condition.    Discharge problem list:  Atypical chest pain  Essential hypertension  Hyperlipidemia      DISCHARGE Follow Up Recommendations for labs and diagnostics: PCP 1 week.  Cardiology as recommended.      Day of Discharge     Vital Signs:  Temp:  [97.2 °F (36.2 °C)-97.3 °F (36.3 °C)] 97.3 °F (36.3 °C)  Heart Rate:  [60-67] 60  Resp:  [16-20] 18  BP: (104-118)/(52-78) 117/63  Flow (L/min):  [2] 2  Physical Exam:   Constitutional: Alert, awake, no acute distress  HEENT:  PERRLA, EOMI; No Scleral icterus  Neck:  Supple; No Mass, No Lymphadenopathy  Cardiovascular:  No Rubs, No Edema, No JVD  Respiratory: No respiratory distress, unlabored breathing, saturating well on  nasal cannula  Abdomen:  Normal BS all 4 Quadrants; No Guarding, No Tenderness  Musculoskeletal:  Pulses Positive all 4 Ext; No Cyanosis, No Edema  Neurological:  Alert+Ox3, Mental status WNL; No Dysarthria  Skin:  No Rash, No Cellulitis, No Erythema      Discharge Details        Discharge Medications      New Medications      Instructions Start Date   metoprolol succinate XL 25 MG 24 hr tablet  Commonly known as: TOPROL-XL   25 mg, Oral, Every 24 Hours Scheduled         Changes to Medications      Instructions Start Date   atorvastatin 40 MG tablet  Commonly known as: LIPITOR  What changed:   · medication strength  · how much to take  · when to take this   40 mg, Oral, Nightly      lisinopril 5 MG tablet  Commonly known as: PRINIVILZESTRIL  What changed:   · medication strength  · how much to take   5 mg, Oral, Daily         Continue These Medications      Instructions Start Date   albuterol sulfate  (90 Base) MCG/ACT inhaler  Commonly known as: PROVENTIL HFA;VENTOLIN HFA;PROAIR HFA   2 puffs, Inhalation, Every 6 Hours PRN      aspirin 81 MG chewable tablet   81 mg, Oral, Daily      cetirizine 10 MG tablet  Commonly known as: zyrTEC   10 mg, Oral, Daily      Fluticasone-Salmeterol 250-50 MCG/ACT DISKUS  Commonly known as: ADVAIR/WIXELA   1 puff, Inhalation, 2 Times Daily - RT      ipratropium-albuterol 0.5-2.5 mg/3 ml nebulizer  Commonly known as: DUO-NEB   3 mL, Nebulization, Every 4 Hours PRN      metFORMIN 500 MG tablet  Commonly known as: GLUCOPHAGE   500 mg, Oral, 2 Times Daily With Meals      omeprazole 20 MG capsule  Commonly known as: priLOSEC   20 mg, Oral, Daily      Tresiba FlexTouch 100 UNIT/ML solution pen-injector injection  Generic drug: insulin degludec   20 Units, Subcutaneous, Daily         Stop These Medications    metoprolol tartrate 25 MG tablet  Commonly known as: LOPRESSOR     Trelegy Ellipta 200-62.5-25 MCG/ACT aerosol powder   Generic drug: Fluticasone-Umeclidin-Vilant            No  Known Allergies    Discharge Disposition:  Home or Self Care    Diet:  Hospital:  Diet Order   Procedures   • Diet: Cardiac Diets; Healthy Heart (2-3 Na+); Texture: Regular Texture (IDDSI 7); Fluid Consistency: Thin (IDDSI 0)       Discharge Activity:       CODE STATUS:  Code Status and Medical Interventions:   Ordered at: 03/29/23 1921     Code Status (Patient has no pulse and is not breathing):    CPR (Attempt to Resuscitate)     Medical Interventions (Patient has pulse or is breathing):    Full Support     Release to patient:    Routine Release         Future Appointments   Date Time Provider Department Center   5/2/2023  3:15 PM Mallorie Roa APRN [facility]       Additional Instructions for the Follow-ups that You Need to Schedule     Discharge Follow-up with PCP   As directed       Currently Documented PCP:    Rosalba Edwards APRN    PCP Phone Number:    [phone]     Follow Up Details: 1 wk         Discharge Follow-up with Specified Provider: Cardiology as recommended.   As directed      To: Cardiology as recommended.               Pertinent  and/or Most Recent Results     PROCEDURES:   Adult Transthoracic Echo Complete W/ Cont if Necessary Per Protocol    Result Date: 3/29/2023  Narrative: •  Left ventricular ejection fraction appears to be 56 - 60%. •  Left ventricular diastolic function is consistent with (grade I) impaired relaxation and age. •  No significant valvular disease. •  Mild left atrial enlargement.     XR Chest 1 View    Result Date: 3/28/2023  Narrative: PROCEDURE: XR CHEST 1 VW  COMPARISON: [facility], CR, XR CHEST 1 VW, 1/12/2023, 18:34.  INDICATIONS: Chest Pain Triage Protocol  FINDINGS:  There is a left basilar opacity.  The heart and mediastinal contours appear normal.  The pulmonary vasculature appears normal.  The osseous structures appear intact.      Impression:  Left basilar opacity, which could reflect atelectasis or pneumonia.       SHRUTI MENA MD        Electronically Signed and Approved By: SHRUTI MENA MD on 3/28/2023 at 21:17             Stress Test With Myocardial Perfusion One Day    Result Date: 3/29/2023  Narrative: •  Left ventricular ejection fraction is normal (Calculated EF = 60%). •  Patient was given Lexiscan per protocol. •  Resting EKG showed normal sinus rhythm with right bundle branch block.  Stress electrocardiogram showed no ischemia. •  Myocardial perfusion imaging shows a fixed inferior defect that is better with stress consistent with artifact.  No obvious reversible perfusion defects noted. •  Feel this represents a normal Lexiscan Cardiolite with no obvious ischemia and normal ejection fraction.         LAB RESULTS:      Lab 03/30/23  0825 03/30/23  0048 03/29/23  1729 03/29/23  0933 03/28/23 2116   WBC 6.70  --   --   --  10.30   HEMOGLOBIN 11.9*  --   --   --  12.9*   HEMATOCRIT 39.9  --   --   --  43.4   PLATELETS 197  --   --   --  210   NEUTROS ABS 3.84  --   --   --  7.12*   IMMATURE GRANS (ABS) 0.01  --   --   --  0.04   LYMPHS ABS 1.54  --   --   --  1.28   MONOS ABS 0.53  --   --   --  0.67   EOS ABS 0.73*  --   --   --  1.13*   MCV 80.3  --   --   --  80.2   APTT 90.3 88.4 45.0* 38.7*  --          Lab 03/28/23 2116   SODIUM 138   POTASSIUM 4.4   CHLORIDE 102   CO2 26.6   ANION GAP 9.4   BUN 18   CREATININE 1.02   EGFR 77.6   GLUCOSE 92   CALCIUM 8.8   MAGNESIUM 2.0         Lab 03/28/23 2116   TOTAL PROTEIN 7.4   ALBUMIN 3.7   GLOBULIN 3.7   ALT (SGPT) 9   AST (SGOT) 13   BILIRUBIN 0.3   ALK PHOS 103   LIPASE 19         Lab 03/29/23  0847 03/28/23  2336 03/28/23 2116   PROBNP  --   --  111.2   HSTROP T 58* 59* 64*         Lab 03/28/23 2336   CHOLESTEROL 69   LDL CHOL 21   HDL CHOL 31*   TRIGLYCERIDES 81             Brief Urine Lab Results  (Last result in the past 365 days)      Color   Clarity   Blood   Leuk Est   Nitrite   Protein   CREAT   Urine HCG        08/01/22 1250 Yellow   Clear   Negative   Negative    Negative   Negative               Microbiology Results (last 10 days)     ** No results found for the last 240 hours. **          XR Chest 1 View    Result Date: 3/28/2023  Impression:  Left basilar opacity, which could reflect atelectasis or pneumonia.       SHRUTI MENA MD       Electronically Signed and Approved By: SHRUTI MENA MD on 3/28/2023 at 21:17                       Results for orders placed during the hospital encounter of 03/28/23    Adult Transthoracic Echo Complete W/ Cont if Necessary Per Protocol    Interpretation Summary  •  Left ventricular ejection fraction appears to be 56 - 60%.  •  Left ventricular diastolic function is consistent with (grade I) impaired relaxation and age.  •  No significant valvular disease.  •  Mild left atrial enlargement.      Labs Pending at Discharge:        Time spent on Discharge including face to face service:  32 minutes    Electronically signed by Ricci Trinidad DO, 03/30/23, 9:21 AM EDT.

## 2023-03-31 NOTE — OUTREACH NOTE
Prep Survey    Flowsheet Row Responses   [facility] facility patient discharged from? [facility]   Is LACE score < 7 ? No   Eligibility Readm Mgmt   Discharge diagnosis angina   Does the patient have one of the following disease processes/diagnoses(primary or secondary)? Other   Does the patient have Home health ordered? No   Is there a DME ordered? No  [already has home O2]   Prep survey completed? Yes          Jenni BECKHAM - Registered Nurse

## 2023-04-03 ENCOUNTER — READMISSION MANAGEMENT (OUTPATIENT)
Dept: CALL CENTER | Facility: HOSPITAL | Age: 73
End: 2023-04-03
Payer: MEDICARE

## 2023-04-03 NOTE — OUTREACH NOTE
"Medical Week 1 Survey    Flowsheet Row Responses   [facility] patient discharged from? [facility]   Does the patient have one of the following disease processes/diagnoses(primary or secondary)? Other   Week 1 attempt successful? Yes   Call start time 1841   Call end time 1843   Discharge diagnosis angina   Meds reviewed with patient/caregiver? Yes   Is the patient having any side effects they believe may be caused by any medication additions or changes? No   Does the patient have all medications ordered at discharge? Yes   Is the patient taking all medications as directed (includes completed medication regime)? Yes   Does the patient have a primary care provider?  Yes   Does the patient have an appointment with their PCP within 7 days of discharge? Yes   Has the patient kept scheduled appointments due by today? N/A   Psychosocial issues? No   Did the patient receive a copy of their discharge instructions? Yes   Nursing interventions Reviewed instructions with patient   What is the patient's perception of their health status since discharge? Improving   Is the patient/caregiver able to teach back signs and symptoms related to disease process for when to call PCP? Yes   Is the patient/caregiver able to teach back signs and symptoms related to disease process for when to call 911? Yes   Is the patient/caregiver able to teach back the hierarchy of who to call/visit for symptoms/problems? PCP, Specialist, Home health nurse, Urgent Care, ED, 911 Yes   Additional teach back comments States he is doing \"alright\".  Has appt with PCP tomorrow.    Week 1 call completed? Yes   Graduated Yes   Graduated/Revoked comments Brief call.  Denies questions or needs at this time.           Vivien GEORGE - Licensed Nurse  "

## 2023-04-04 ENCOUNTER — OFFICE VISIT (OUTPATIENT)
Dept: FAMILY MEDICINE CLINIC | Facility: CLINIC | Age: 73
End: 2023-04-04
Payer: MEDICARE

## 2023-04-04 VITALS
DIASTOLIC BLOOD PRESSURE: 61 MMHG | WEIGHT: 239.4 LBS | HEIGHT: 73 IN | BODY MASS INDEX: 31.73 KG/M2 | HEART RATE: 54 BPM | OXYGEN SATURATION: 91 % | SYSTOLIC BLOOD PRESSURE: 116 MMHG

## 2023-04-04 DIAGNOSIS — R07.9 CHEST PAIN, UNSPECIFIED TYPE: Primary | ICD-10-CM

## 2023-04-04 DIAGNOSIS — I10 ESSENTIAL HYPERTENSION: ICD-10-CM

## 2023-04-04 DIAGNOSIS — E11.9 TYPE 2 DIABETES MELLITUS WITHOUT COMPLICATION, WITHOUT LONG-TERM CURRENT USE OF INSULIN: ICD-10-CM

## 2023-04-04 DIAGNOSIS — R06.02 SHORTNESS OF BREATH: ICD-10-CM

## 2023-04-04 LAB — GLUCOSE BLDC GLUCOMTR-MCNC: 6 MG/DL (ref 70–130)

## 2023-04-04 PROCEDURE — 1160F RVW MEDS BY RX/DR IN RCRD: CPT

## 2023-04-04 PROCEDURE — 1159F MED LIST DOCD IN RCRD: CPT

## 2023-04-04 PROCEDURE — 99214 OFFICE O/P EST MOD 30 MIN: CPT

## 2023-04-04 PROCEDURE — 3078F DIAST BP <80 MM HG: CPT

## 2023-04-04 PROCEDURE — 3074F SYST BP LT 130 MM HG: CPT

## 2023-04-04 RX ORDER — PREDNISONE 10 MG/1
10 TABLET ORAL DAILY
Qty: 5 TABLET | Refills: 0 | Status: SHIPPED | OUTPATIENT
Start: 2023-04-04 | End: 2023-04-09

## 2023-04-04 NOTE — PROGRESS NOTES
Chief Complaint  Hospital Follow Up Visit (Chest pain 3/28/2023)    SUBJECTIVE  Dilip Faulkner presents to [facility]    History of Present Illness  72-year-old Dilip Faulkner presents today to establish care and for hospital follow-up.  Patient was in the ER 3/28/2023 with complaints of chest pain.     Patient has history of diabetes mellitus type 2 his most recent A1c was January 2023 and was 7.7%.  Patient is currently on metformin, Jardiance, Tresiba and wife states that his blood sugar levels have been doing well on these medicines.    Patient's wife states that he has still been having some shortness of breath and wheezing.  He is currently on albuterol sulfate inhaler, DuoNeb nebulizer, Advair.    Past Medical History:   Diagnosis Date   • Asthma    • COPD (chronic obstructive pulmonary disease)    • Diabetes mellitus    • Hernia, umbilical    • Hypertension    • Requires continuous at home supplemental oxygen       History reviewed. No pertinent family history.   Past Surgical History:   Procedure Laterality Date   • ABDOMINAL SURGERY          Current Outpatient Medications:   •  albuterol sulfate  (90 Base) MCG/ACT inhaler, Inhale 2 puffs Every 6 (Six) Hours As Needed for Wheezing., Disp: , Rfl:   •  aspirin 81 MG chewable tablet, Chew 1 tablet Daily., Disp: , Rfl:   •  atorvastatin (LIPITOR) 40 MG tablet, Take 1 tablet by mouth Every Night for 30 days., Disp: 30 tablet, Rfl: 0  •  cetirizine (zyrTEC) 10 MG tablet, Take 1 tablet by mouth Daily., Disp: , Rfl:   •  Fluticasone-Salmeterol (ADVAIR/WIXELA) 250-50 MCG/ACT DISKUS, Inhale 1 puff 2 (Two) Times a Day., Disp: , Rfl:   •  insulin degludec (Tresiba FlexTouch) 100 UNIT/ML solution pen-injector injection, Inject 20 Units under the skin into the appropriate area as directed Daily., Disp: , Rfl:   •  ipratropium-albuterol (DUO-NEB) 0.5-2.5 mg/3 ml nebulizer, Take 3 mL by nebulization Every 4 (Four) Hours As  "Needed for Wheezing or Shortness of Air., Disp: 360 mL, Rfl: 0  •  lisinopril (PRINIVIL,ZESTRIL) 5 MG tablet, Take 1 tablet by mouth Daily for 30 days., Disp: 30 tablet, Rfl: 0  •  metFORMIN (GLUCOPHAGE) 500 MG tablet, Take 1 tablet by mouth 2 (Two) Times a Day With Meals., Disp: , Rfl:   •  metoprolol succinate XL (TOPROL-XL) 25 MG 24 hr tablet, Take 1 tablet by mouth Daily for 30 days., Disp: 30 tablet, Rfl: 0  •  omeprazole (priLOSEC) 20 MG capsule, Take 1 capsule by mouth Daily., Disp: , Rfl:   •  empagliflozin (Jardiance) 10 MG tablet tablet, Take 1 tablet by mouth Daily for 30 days., Disp: 30 tablet, Rfl: 5  •  predniSONE (DELTASONE) 10 MG tablet, Take 1 tablet by mouth Daily for 5 days., Disp: 5 tablet, Rfl: 0    OBJECTIVE  Vital Signs:   /61 (BP Location: Right arm)   Pulse 54   Ht 185.4 cm (73\")   Wt 109 kg (239 lb 6.4 oz)   SpO2 91%   BMI 31.59 kg/m²    Estimated body mass index is 31.59 kg/m² as calculated from the following:    Height as of this encounter: 185.4 cm (73\").    Weight as of this encounter: 109 kg (239 lb 6.4 oz).     Wt Readings from Last 3 Encounters:   04/04/23 109 kg (239 lb 6.4 oz)   03/30/23 110 kg (241 lb 13.5 oz)   01/12/23 114 kg (252 lb 3.3 oz)     BP Readings from Last 3 Encounters:   04/04/23 116/61   03/30/23 114/72   01/13/23 154/84       Physical Exam  Vitals and nursing note reviewed.   Constitutional:       Appearance: Normal appearance. He is obese.   HENT:      Head: Normocephalic and atraumatic.      Nose: Nose normal.      Mouth/Throat:      Mouth: Mucous membranes are moist.   Eyes:      Conjunctiva/sclera: Conjunctivae normal.      Pupils: Pupils are equal, round, and reactive to light.   Cardiovascular:      Rate and Rhythm: Normal rate and regular rhythm.      Pulses: Normal pulses.      Heart sounds: Normal heart sounds.   Pulmonary:      Effort: Pulmonary effort is normal.      Breath sounds: Examination of the right-upper field reveals wheezing. " Examination of the right-middle field reveals wheezing. Examination of the right-lower field reveals wheezing. Wheezing present.   Abdominal:      General: Abdomen is flat. There is distension.      Palpations: Abdomen is soft.   Musculoskeletal:         General: Normal range of motion.      Cervical back: Normal range of motion and neck supple.   Skin:     General: Skin is warm and dry.   Neurological:      General: No focal deficit present.      Mental Status: He is alert and oriented to person, place, and time. Mental status is at baseline.   Psychiatric:         Mood and Affect: Mood normal.         Behavior: Behavior normal.         Thought Content: Thought content normal.         Judgment: Judgment normal.          Result Review    Common labs    Common Labs 1/13/23 1/13/23 3/28/23 3/28/23 3/28/23 3/30/23 3/30/23    0535 0535 2116 2116 2336 0825 0938   Glucose  276 (A)  92   149 (A)   BUN  22  18   20   Creatinine  1.17  1.02   1.08   Sodium  135 (A)  138   137   Potassium  4.8  4.4   4.2   Chloride  101  102   102   Calcium  8.9  8.8   8.3 (A)   Albumin  4.0  3.7   3.4 (A)   Total Bilirubin  0.3  0.3   0.3   Alkaline Phosphatase  105  103   91   AST (SGOT)  16  13   13   ALT (SGPT)  10  9   7   WBC 6.81  10.30   6.70    Hemoglobin 13.1  12.9 (A)   11.9 (A)    Hematocrit 44.0  43.4   39.9    Platelets 235  210   197    Total Cholesterol     69     Triglycerides     81     HDL Cholesterol     31 (A)     LDL Cholesterol      21     (A) Abnormal value            CMP    CMP 1/13/23 3/28/23 3/30/23   Glucose 276 (A) 92 149 (A)   BUN 22 18 20   Creatinine 1.17 1.02 1.08   eGFR 65.8 77.6 72.5   Sodium 135 (A) 138 137   Potassium 4.8 4.4 4.2   Chloride 101 102 102   Calcium 8.9 8.8 8.3 (A)   Total Protein 7.7 7.4 6.2   Albumin 4.0 3.7 3.4 (A)   Globulin 3.7 3.7 2.8   Total Bilirubin 0.3 0.3 0.3   Alkaline Phosphatase 105 103 91   AST (SGOT) 16 13 13   ALT (SGPT) 10 9 7   Albumin/Globulin Ratio 1.1 1.0 1.2    BUN/Creatinine Ratio 18.8 17.6 18.5   Anion Gap 13.1 9.4 7.0   (A) Abnormal value       Comments are available for some flowsheets but are not being displayed.           CBC    CBC 1/13/23 3/28/23 3/30/23   WBC 6.81 10.30 6.70   RBC 5.55 5.41 4.97   Hemoglobin 13.1 12.9 (A) 11.9 (A)   Hematocrit 44.0 43.4 39.9   MCV 79.3 80.2 80.3   MCH 23.6 (A) 23.8 (A) 23.9 (A)   MCHC 29.8 (A) 29.7 (A) 29.8 (A)   RDW 15.0 16.2 (A) 16.2 (A)   Platelets 235 210 197   (A) Abnormal value            CBC w/diff    CBC w/Diff 1/13/23 3/28/23 3/30/23   WBC 6.81 10.30 6.70   RBC 5.55 5.41 4.97   Hemoglobin 13.1 12.9 (A) 11.9 (A)   Hematocrit 44.0 43.4 39.9   MCV 79.3 80.2 80.3   MCH 23.6 (A) 23.8 (A) 23.9 (A)   MCHC 29.8 (A) 29.7 (A) 29.8 (A)   RDW 15.0 16.2 (A) 16.2 (A)   Platelets 235 210 197   Neutrophil Rel % 89.0 (A) 69.1 57.4   Immature Granulocyte Rel % 0.6 (A) 0.4 0.1   Lymphocyte Rel % 9.3 (A) 12.4 (A) 23.0   Monocyte Rel % 0.6 (A) 6.5 7.9   Eosinophil Rel % 0.1 (A) 11.0 (A) 10.9 (A)   Basophil Rel % 0.4 0.6 0.7   (A) Abnormal value            Lipid Panel    Lipid Panel 3/28/23   Total Cholesterol 69   Triglycerides 81   HDL Cholesterol 31 (A)   VLDL Cholesterol 17   LDL Cholesterol  21   LDL/HDL Ratio 0.70   (A) Abnormal value                Electrolytes    Electrolytes 1/13/23 3/28/23 3/30/23   Sodium 135 (A) 138 137   Potassium 4.8 4.4 4.2   Chloride 101 102 102   Calcium 8.9 8.8 8.3 (A)   (A) Abnormal value            Renal Profile    Renal Profile 1/13/23 3/28/23 3/30/23   BUN 22 18 20   Creatinine 1.17 1.02 1.08           BMP    BMP 1/13/23 3/28/23 3/30/23   BUN 22 18 20   Creatinine 1.17 1.02 1.08   Sodium 135 (A) 138 137   Potassium 4.8 4.4 4.2   Chloride 101 102 102   CO2 20.9 (A) 26.6 28.0   Calcium 8.9 8.8 8.3 (A)   (A) Abnormal value            Most Recent A1C    HGBA1C Most Recent 9/27/22   Hemoglobin A1C 11.7 (A)   (A) Abnormal value       Comments are available for some flowsheets but are not being displayed.                  Lab Results   Component Value Date    FREET4 0.9 03/22/2019          XR Chest 1 View    Result Date: 3/28/2023   Left basilar opacity, which could reflect atelectasis or pneumonia.       SHRUTI MENA MD       Electronically Signed and Approved By: SHRUTI MENA MD on 3/28/2023 at 21:17             XR Chest 1 View    Result Date: 1/12/2023    1. Chronic scarring or atelectasis in the mid to lower left lung field 2. Suspected chronic pleural thickening along the lateral left lung base.       Chirag Lopez M.D.       Electronically Signed and Approved By: Chirag Lopez M.D. on 1/12/2023 at 18:57                 The above data has been reviewed by MONE Buchanan 04/04/2023 13:11 EDT.          Patient Care Team:  Mallorie Roa APRN as PCP - General (Emergency Medicine)  Rosalba Edwards APRN (Nurse Practitioner)           ASSESSMENT & PLAN    Diagnoses and all orders for this visit:    1. Chest pain, unspecified type (Primary)  Comments:  Patient states that chest pain has been better he does still have shortness of breath and some wheezing.  Orders:  -     Cancel: Ambulatory Referral to Cardiology    2. Type 2 diabetes mellitus without complication, without long-term current use of insulin  Comments:  Patient's A1c on today's visit is 6.4%.  Patient is doing well on metformin, Jardiance and Tresiba.     3. Shortness of breath  Comments:  Have started patient on low-dose prednisone 10 mg daily for 5 days.  Patient is currently on albuterol sulfate inhaler, Advair/Wixela and DuoNeb    4. Essential hypertension  Comments:  Patient is stable on lisinopril and metoprolol daily.  In office blood pressure today was 116/61.  No changes at this time.    Other orders  -     empagliflozin (Jardiance) 10 MG tablet tablet; Take 1 tablet by mouth Daily for 30 days.  Dispense: 30 tablet; Refill: 5  -     predniSONE (DELTASONE) 10 MG tablet; Take 1 tablet by mouth Daily for 5 days.  Dispense: 5 tablet;  Refill: 0         Tobacco Use: High Risk   • Smoking Tobacco Use: Former   • Smokeless Tobacco Use: Current   • Passive Exposure: Not on file       Follow Up     No follow-ups on file.      Patient was given instructions and counseling regarding his condition or for health maintenance advice. Please see specific information pulled into the AVS if appropriate.   I have reviewed information obtained and documented by others and I have confirmed the accuracy of this documented note.    MONE Buchanan

## 2023-04-17 ENCOUNTER — TELEPHONE (OUTPATIENT)
Dept: CARDIOLOGY | Facility: CLINIC | Age: 73
End: 2023-04-17
Payer: MEDICARE

## 2023-04-17 NOTE — TELEPHONE ENCOUNTER
Received message to call pt and schedule an appt. Called pt told him I was calling to make an appt. Told him he saw Dr Purcell in the hosp and that the appt would be with Kaila SHORE for cardiology. Pt mentioned an office on [address]. Told him our office is on [place]. Call was then disconnected. Attempted to call pt back. No answer no option for vmail.     HUB can share with pt.    If patient calls back. Please schedule a follow up with Kaila SHORE at the [place] location next available.

## 2023-04-21 ENCOUNTER — HOSPITAL ENCOUNTER (EMERGENCY)
Facility: HOSPITAL | Age: 73
Discharge: HOME | End: 2023-04-21
Attending: EMERGENCY MEDICINE
Payer: MEDICARE

## 2023-04-21 ENCOUNTER — APPOINTMENT (OUTPATIENT)
Dept: GENERAL RADIOLOGY | Facility: HOSPITAL | Age: 73
End: 2023-04-21
Payer: MEDICARE

## 2023-04-21 VITALS
SYSTOLIC BLOOD PRESSURE: 102 MMHG | DIASTOLIC BLOOD PRESSURE: 44 MMHG | WEIGHT: 238.54 LBS | RESPIRATION RATE: 24 BRPM | HEIGHT: 73 IN | TEMPERATURE: 97.7 F | BODY MASS INDEX: 31.61 KG/M2 | HEART RATE: 82 BPM | OXYGEN SATURATION: 92 %

## 2023-04-21 DIAGNOSIS — J44.9 CHRONIC OBSTRUCTIVE PULMONARY DISEASE, UNSPECIFIED COPD TYPE: ICD-10-CM

## 2023-04-21 DIAGNOSIS — E86.0 DEHYDRATION: ICD-10-CM

## 2023-04-21 DIAGNOSIS — R06.02 SHORTNESS OF BREATH: ICD-10-CM

## 2023-04-21 DIAGNOSIS — R42 LIGHTHEADEDNESS: Primary | ICD-10-CM

## 2023-04-21 LAB
ALBUMIN SERPL-MCNC: 3.4 G/DL (ref 3.5–5.2)
ALBUMIN/GLOB SERPL: 0.9 G/DL
ALP SERPL-CCNC: 89 U/L (ref 39–117)
ALT SERPL W P-5'-P-CCNC: 9 U/L (ref 1–41)
ANION GAP SERPL CALCULATED.3IONS-SCNC: 9.8 MMOL/L (ref 5–15)
ARTERIAL PATENCY WRIST A: POSITIVE
AST SERPL-CCNC: 13 U/L (ref 1–40)
BASE EXCESS BLDA CALC-SCNC: -1.6 MMOL/L (ref -2–2)
BASOPHILS # BLD AUTO: 0.04 10*3/MM3 (ref 0–0.2)
BASOPHILS NFR BLD AUTO: 0.3 % (ref 0–1.5)
BDY SITE: ABNORMAL
BILIRUB SERPL-MCNC: 0.4 MG/DL (ref 0–1.2)
BUN SERPL-MCNC: 24 MG/DL (ref 8–23)
BUN/CREAT SERPL: 20.3 (ref 7–25)
CA-I BLDA-SCNC: 1.11 MMOL/L (ref 1.13–1.32)
CALCIUM SPEC-SCNC: 8.3 MG/DL (ref 8.6–10.5)
CHLORIDE BLDA-SCNC: 104 MMOL/L (ref 98–106)
CHLORIDE SERPL-SCNC: 100 MMOL/L (ref 98–107)
CO2 SERPL-SCNC: 23.2 MMOL/L (ref 22–29)
COHGB MFR BLD: 0.9 % (ref 0–1.5)
CREAT SERPL-MCNC: 1.18 MG/DL (ref 0.76–1.27)
DEPRECATED RDW RBC AUTO: 49.4 FL (ref 37–54)
EGFRCR SERPLBLD CKD-EPI 2021: 65.2 ML/MIN/1.73
EOSINOPHIL # BLD AUTO: 0.77 10*3/MM3 (ref 0–0.4)
EOSINOPHIL NFR BLD AUTO: 6.2 % (ref 0.3–6.2)
ERYTHROCYTE [DISTWIDTH] IN BLOOD BY AUTOMATED COUNT: 17.1 % (ref 12.3–15.4)
FHHB: 7 % (ref 0–5)
GAS FLOW AIRWAY: 2 LPM
GLOBULIN UR ELPH-MCNC: 3.6 GM/DL
GLUCOSE BLDA-MCNC: 156 MG/DL (ref 70–99)
GLUCOSE SERPL-MCNC: 153 MG/DL (ref 65–99)
HCO3 BLDA-SCNC: 23.6 MMOL/L (ref 22–26)
HCT VFR BLD AUTO: 38.9 % (ref 37.5–51)
HGB BLD-MCNC: 11.7 G/DL (ref 13–17.7)
HGB BLDA-MCNC: 12.3 G/DL (ref 13.8–16.4)
HOLD SPECIMEN: NORMAL
HOLD SPECIMEN: NORMAL
IMM GRANULOCYTES # BLD AUTO: 0.08 10*3/MM3 (ref 0–0.05)
IMM GRANULOCYTES NFR BLD AUTO: 0.6 % (ref 0–0.5)
INHALED O2 CONCENTRATION: 28 %
LACTATE BLDA-SCNC: 0.75 MMOL/L (ref 0.5–2)
LYMPHOCYTES # BLD AUTO: 0.96 10*3/MM3 (ref 0.7–3.1)
LYMPHOCYTES NFR BLD AUTO: 7.7 % (ref 19.6–45.3)
MCH RBC QN AUTO: 24.1 PG (ref 26.6–33)
MCHC RBC AUTO-ENTMCNC: 30.1 G/DL (ref 31.5–35.7)
MCV RBC AUTO: 80 FL (ref 79–97)
METHGB BLD QL: 0 % (ref 0–1.5)
MODALITY: ABNORMAL
MONOCYTES # BLD AUTO: 0.6 10*3/MM3 (ref 0.1–0.9)
MONOCYTES NFR BLD AUTO: 4.8 % (ref 5–12)
NEUTROPHILS NFR BLD AUTO: 10.02 10*3/MM3 (ref 1.7–7)
NEUTROPHILS NFR BLD AUTO: 80.4 % (ref 42.7–76)
NRBC BLD AUTO-RTO: 0 /100 WBC (ref 0–0.2)
NT-PROBNP SERPL-MCNC: 272.9 PG/ML (ref 0–900)
OXYHGB MFR BLDV: 92.1 % (ref 94–99)
PCO2 BLDA: 41.4 MM HG (ref 35–45)
PH BLDA: 7.37 PH UNITS (ref 7.35–7.45)
PLATELET # BLD AUTO: 195 10*3/MM3 (ref 140–450)
PMV BLD AUTO: 9.4 FL (ref 6–12)
PO2 BLD: 246 MM[HG] (ref 0–500)
PO2 BLDA: 69 MM HG (ref 80–100)
POTASSIUM BLDA-SCNC: 4.23 MMOL/L (ref 3.5–5)
POTASSIUM SERPL-SCNC: 4.2 MMOL/L (ref 3.5–5.2)
PROT SERPL-MCNC: 7 G/DL (ref 6–8.5)
QT INTERVAL: 402 MS
RBC # BLD AUTO: 4.86 10*6/MM3 (ref 4.14–5.8)
SAO2 % BLDCOA: 92.9 % (ref 95–99)
SODIUM BLDA-SCNC: 136.7 MMOL/L (ref 136–146)
SODIUM SERPL-SCNC: 133 MMOL/L (ref 136–145)
TROPONIN T SERPL HS-MCNC: 52 NG/L
WBC NRBC COR # BLD: 12.47 10*3/MM3 (ref 3.4–10.8)
WHOLE BLOOD HOLD COAG: NORMAL
WHOLE BLOOD HOLD SPECIMEN: NORMAL

## 2023-04-21 PROCEDURE — 82375 ASSAY CARBOXYHB QUANT: CPT | Performed by: EMERGENCY MEDICINE

## 2023-04-21 PROCEDURE — 71045 X-RAY EXAM CHEST 1 VIEW: CPT

## 2023-04-21 PROCEDURE — 36415 COLL VENOUS BLD VENIPUNCTURE: CPT

## 2023-04-21 PROCEDURE — 84484 ASSAY OF TROPONIN QUANT: CPT | Performed by: EMERGENCY MEDICINE

## 2023-04-21 PROCEDURE — 83880 ASSAY OF NATRIURETIC PEPTIDE: CPT | Performed by: EMERGENCY MEDICINE

## 2023-04-21 PROCEDURE — 36600 WITHDRAWAL OF ARTERIAL BLOOD: CPT | Performed by: EMERGENCY MEDICINE

## 2023-04-21 PROCEDURE — 99284 EMERGENCY DEPT VISIT MOD MDM: CPT

## 2023-04-21 PROCEDURE — 94640 AIRWAY INHALATION TREATMENT: CPT

## 2023-04-21 PROCEDURE — 93005 ELECTROCARDIOGRAM TRACING: CPT | Performed by: EMERGENCY MEDICINE

## 2023-04-21 PROCEDURE — 94799 UNLISTED PULMONARY SVC/PX: CPT

## 2023-04-21 PROCEDURE — 83050 HGB METHEMOGLOBIN QUAN: CPT | Performed by: EMERGENCY MEDICINE

## 2023-04-21 PROCEDURE — 85025 COMPLETE CBC W/AUTO DIFF WBC: CPT | Performed by: EMERGENCY MEDICINE

## 2023-04-21 PROCEDURE — 82805 BLOOD GASES W/O2 SATURATION: CPT | Performed by: EMERGENCY MEDICINE

## 2023-04-21 PROCEDURE — 80053 COMPREHEN METABOLIC PANEL: CPT | Performed by: EMERGENCY MEDICINE

## 2023-04-21 RX ORDER — IPRATROPIUM BROMIDE AND ALBUTEROL SULFATE 2.5; .5 MG/3ML; MG/3ML
3 SOLUTION RESPIRATORY (INHALATION) ONCE
Status: COMPLETED | OUTPATIENT
Start: 2023-04-21 | End: 2023-04-21

## 2023-04-21 RX ORDER — IPRATROPIUM BROMIDE AND ALBUTEROL SULFATE 2.5; .5 MG/3ML; MG/3ML
SOLUTION RESPIRATORY (INHALATION)
Status: COMPLETED
Start: 2023-04-21 | End: 2023-04-21

## 2023-04-21 RX ORDER — SODIUM CHLORIDE 0.9 % (FLUSH) 0.9 %
10 SYRINGE (ML) INJECTION AS NEEDED
Status: DISCONTINUED | OUTPATIENT
Start: 2023-04-21 | End: 2023-04-21 | Stop reason: HOSPADM

## 2023-04-21 RX ADMIN — SODIUM CHLORIDE 500 ML: 9 INJECTION, SOLUTION INTRAVENOUS at 10:08

## 2023-04-21 RX ADMIN — IPRATROPIUM BROMIDE AND ALBUTEROL SULFATE 3 ML: 2.5; .5 SOLUTION RESPIRATORY (INHALATION) at 09:44

## 2023-04-21 RX ADMIN — SODIUM CHLORIDE 500 ML: 9 INJECTION, SOLUTION INTRAVENOUS at 11:22

## 2023-04-21 NOTE — DISCHARGE INSTRUCTIONS
Faith or fluid in the lungs was found and your heart enzymes looked unchanged and no heart attack seen.    It looks like you are dehydrated and your blood pressure was lower than normal, causing you to feel lightheaded or dizzy and weak.    You were given some IV fluids here to hydrate you.    We put you on 2 L of nasal cannula oxygen which kept your oxygen sats at 94%, and you should plan on wearing your home oxygen this week at 2 L until you can follow-up with your doctor.

## 2023-04-21 NOTE — ED PROVIDER NOTES
Time: 9:14 AM EDT  Date of encounter:  4/21/2023  Independent Historian/Clinical History and Information was obtained by: Patient, Chart and EMS  Chief Complaint: shortness of breath  History is limited by: N/A  Room number: 10/10     History of Present Illness:  HPI  Patient is a 72 y.o. year old male who presents to the Emergency Department via EMS for evaluation of shortness of breath. Patient has no one at bedside on initial evaluation. Patient has a medical history of respiratory failure with hypoxia, chronic obstructive pulmonary disease (COPD) with asthma with oxygen dependency PRN, gastroesophageal reflux disease (GERD), hernia (umbilical), hyperlipidemia (HLD), hypertension (HTN), obesity and type 2 diabetes mellitus (T2DM). Patient has a surgical history of abdominal surgery. Patient has a social history of current smokeless tobacco user and former smoker.    Patient is experiencing symptoms of shortness of breath with light-headedness that started approximately less than 24 hours ago while standing up. Patient notes during initial evaluation symptoms have completely resolved. Patient states they are in no pain and rates their pain level 0 out of 10 at rest and with movement or activity. Patient states no modifying factors. Patient denies symptoms of congestion, cough, leg swelling, loss of appetite. All other systems reviews are negative.    Patient has not tried anything for symptom relief. Patient denies medical history of chronic heart failure (CHF), deep vein thrombosis (DVT). Patient denies anxiety. Patient denies recent medication changes.    Patient Care Team  Primary Care Provider: Mallorie Rao APRN    Past Medical History:  No Known Allergies  Past Medical History:   Diagnosis Date   • Asthma    • COPD (chronic obstructive pulmonary disease)    • Diabetes mellitus    • Hernia, umbilical    • Hypertension    • Requires continuous at home supplemental oxygen      Past Surgical History:    Procedure Laterality Date   • ABDOMINAL SURGERY       History reviewed. No pertinent family history.    Home Medications:  Prior to Admission medications    Medication Sig Start Date End Date Taking? Authorizing Provider   albuterol sulfate  (90 Base) MCG/ACT inhaler Inhale 2 puffs Every 6 (Six) Hours As Needed for Wheezing.    Roosevelt Gaston MD   aspirin 81 MG chewable tablet Chew 1 tablet Daily.    Roosevelt Gaston MD   atorvastatin (LIPITOR) 40 MG tablet Take 1 tablet by mouth Every Night for 30 days. 3/30/23 4/29/23  Ricci Trinidad DO   cetirizine (zyrTEC) 10 MG tablet Take 1 tablet by mouth Daily.    Roosevelt Gaston MD   empagliflozin (Jardiance) 10 MG tablet tablet Take 1 tablet by mouth Daily for 30 days. 4/4/23 5/4/23  Mallorie Roa APRN   Fluticasone-Salmeterol (ADVAIR/WIXELA) 250-50 MCG/ACT DISKUS Inhale 1 puff 2 (Two) Times a Day.    Roosevelt Gaston MD   insulin degludec (Tresiba FlexTouch) 100 UNIT/ML solution pen-injector injection Inject 20 Units under the skin into the appropriate area as directed Daily.    Roosevelt Gaston MD   ipratropium-albuterol (DUO-NEB) 0.5-2.5 mg/3 ml nebulizer Take 3 mL by nebulization Every 4 (Four) Hours As Needed for Wheezing or Shortness of Air. 12/13/21   Ricci Trinidad DO   lisinopril (PRINIVIL,ZESTRIL) 5 MG tablet Take 1 tablet by mouth Daily for 30 days. 3/30/23 4/29/23  Ricci Trinidad DO   metFORMIN (GLUCOPHAGE) 500 MG tablet Take 1 tablet by mouth 2 (Two) Times a Day With Meals.    Roosevelt Gaston MD   metoprolol succinate XL (TOPROL-XL) 25 MG 24 hr tablet Take 1 tablet by mouth Daily for 30 days. 3/30/23 4/29/23  Ricci Trinidad DO   omeprazole (priLOSEC) 20 MG capsule Take 1 capsule by mouth Daily.    Roosevelt Gaston MD        Social History:  Social History     Tobacco Use   • Smoking status: Former     Packs/day: 1.50     Years: 2.00     Pack years: 3.00     Types: Cigarettes     Quit date: 3/19/2021     " Years since quittin.0   • Smokeless tobacco: Current     Types: Chew   Vaping Use   • Vaping Use: Never used   Substance Use Topics   • Alcohol use: Never   • Drug use: Never       Review of Systems:   Review of Systems   Constitutional: Negative for appetite change.   HENT: Negative.    Eyes: Negative.    Respiratory: Positive for shortness of breath (resolved). Negative for cough.    Cardiovascular: Negative.  Negative for leg swelling.   Gastrointestinal: Negative.    Endocrine: Negative.    Genitourinary: Negative.    Musculoskeletal: Negative.    Skin: Negative.    Allergic/Immunologic: Negative.    Neurological: Positive for light-headedness.   Hematological: Negative.    Psychiatric/Behavioral: Negative.    All other systems reviewed and are negative.         Physical Exam:   /44   Pulse 82   Temp 97.7 °F (36.5 °C)   Resp 24   Ht 185.4 cm (73\")   Wt 108 kg (238 lb 8.6 oz)   SpO2 92%   BMI 31.47 kg/m²     Physical Exam  Vitals and nursing note reviewed.   Constitutional:       General: He is not in acute distress.     Appearance: Normal appearance. He is not toxic-appearing.   HENT:      Head: Normocephalic and atraumatic.      Mouth/Throat:      Mouth: Mucous membranes are moist.   Eyes:      General: No scleral icterus.  Cardiovascular:      Rate and Rhythm: Normal rate and regular rhythm.      Pulses: Normal pulses.           Radial pulses are 2+ on the right side and 2+ on the left side.      Heart sounds: Normal heart sounds. No murmur heard.    No friction rub.   Pulmonary:      Effort: Pulmonary effort is normal. No respiratory distress.      Breath sounds: Wheezing (mild expiratory) present. No decreased breath sounds, rhonchi or rales.   Abdominal:      General: Abdomen is flat. Bowel sounds are normal. There is no distension.      Palpations: Abdomen is soft.      Tenderness: There is no abdominal tenderness. There is no guarding or rebound.   Musculoskeletal:         General: " Normal range of motion.      Cervical back: Normal range of motion and neck supple.      Right lower leg: No edema.      Left lower leg: No edema.   Skin:     General: Skin is warm and dry.   Neurological:      Mental Status: He is alert and oriented to person, place, and time. Mental status is at baseline.                Procedures:  Procedures    Medical Decision Making:    Comorbidities that affect care:    respiratory failure with hypoxia, chronic obstructive pulmonary disease (COPD) with asthma with oxygen dependency PRN, gastroesophageal reflux disease (GERD), hernia (umbilical), hyperlipidemia (HLD), hypertension (HTN), obesity and type 2 diabetes mellitus (T2DM), current smokeless tobacco user but former smoker    External Notes reviewed:    Previous Operation Note: Internal Medicine note (04/04/2023) for follow-up of diabetes care and management. Patient was treated and discharged.     The following orders were placed and all results were independently analyzed by me:  Orders Placed This Encounter   Procedures   • XR Chest 1 View   • [place] Draw   • Comprehensive Metabolic Panel   • BNP   • Single High Sensitivity Troponin T   • CBC Auto Differential   • ABG with Co-Ox and Electrolytes   • Undress & Gown   • Continuous Pulse Oximetry   • Vital Signs   • ECG 12 Lead ED Triage Standing Order; SOA   • CBC & Differential   • Green Top (Gel)   • Lavender Top   • Gold Top - SST   • Light Blue Top       Medications Given in the Emergency Department:  Medications   sodium chloride 0.9 % bolus 500 mL (0 mL Intravenous Stopped 4/21/23 1123)   ipratropium-albuterol (DUO-NEB) nebulizer solution 3 mL (3 mL Nebulization Given 4/21/23 0983)   sodium chloride 0.9 % bolus 500 mL (0 mL Intravenous Stopped 4/21/23 1354)       ED Course:  ED Course as of 04/21/23 1820   Fri Apr 21, 2023   0930 EKG: I reviewed and interpreted his twelve-lead EKG as normal sinus rhythm 87 bpm, normal P waves with first-degree AV block, QRS  showing right bundle branch block, normal ST segments and T waves; no acute ischemia or ectopy.    This is unchanged from old EKG last month. [VS]      ED Course User Index  [VS] Jason Elam MD       Labs:  Lab Results (last 24 hours)     Procedure Component Value Units Date/Time    CBC & Differential [[id number]]  (Abnormal) Collected: 04/21/23 0925    Specimen: Blood Updated: 04/21/23 0935    Narrative:      The following orders were created for panel order CBC & Differential.  Procedure                               Abnormality         Status                     ---------                               -----------         ------                     CBC Auto Differential[[id number]]        Abnormal            Final result                 Please view results for these tests on the individual orders.    Comprehensive Metabolic Panel [[id number]]  (Abnormal) Collected: 04/21/23 0925    Specimen: Blood Updated: 04/21/23 0952     Glucose 153 mg/dL      BUN 24 mg/dL      Creatinine 1.18 mg/dL      Sodium 133 mmol/L      Potassium 4.2 mmol/L      Chloride 100 mmol/L      CO2 23.2 mmol/L      Calcium 8.3 mg/dL      Total Protein 7.0 g/dL      Albumin 3.4 g/dL      ALT (SGPT) 9 U/L      AST (SGOT) 13 U/L      Alkaline Phosphatase 89 U/L      Total Bilirubin 0.4 mg/dL      Globulin 3.6 gm/dL      A/G Ratio 0.9 g/dL      BUN/Creatinine Ratio 20.3     Anion Gap 9.8 mmol/L      eGFR 65.2 mL/min/1.73     Narrative:      GFR Normal >60  Chronic Kidney Disease <60  Kidney Failure <15    The GFR formula is only valid for adults with stable renal function between ages 18 and 70.    BNP [[id number]]  (Normal) Collected: 04/21/23 0925    Specimen: Blood Updated: 04/21/23 0950     proBNP 272.9 pg/mL     Narrative:      Among patients with dyspnea, NT-proBNP is highly sensitive for the detection of acute congestive heart failure. In addition NT-proBNP of <300 pg/ml effectively rules out acute congestive heart failure with 99% negative  predictive value.      Single High Sensitivity Troponin T [[id number]]  (Abnormal) Collected: 04/21/23 0925    Specimen: Blood Updated: 04/21/23 1002     HS Troponin T 52 ng/L     Narrative:      High Sensitive Troponin T Reference Range:  <10.0 ng/L- Negative Female for AMI  <15.0 ng/L- Negative Male for AMI  >=10 - Abnormal Female indicating possible myocardial injury.  >=15 - Abnormal Male indicating possible myocardial injury.   Clinicians would have to utilize clinical acumen, EKG, Troponin, and serial changes to determine if it is an Acute Myocardial Infarction or myocardial injury due to an underlying chronic condition.         CBC Auto Differential [[id number]]  (Abnormal) Collected: 04/21/23 0925    Specimen: Blood Updated: 04/21/23 0935     WBC 12.47 10*3/mm3      RBC 4.86 10*6/mm3      Hemoglobin 11.7 g/dL      Hematocrit 38.9 %      MCV 80.0 fL      MCH 24.1 pg      MCHC 30.1 g/dL      RDW 17.1 %      RDW-SD 49.4 fl      MPV 9.4 fL      Platelets 195 10*3/mm3      Neutrophil % 80.4 %      Lymphocyte % 7.7 %      Monocyte % 4.8 %      Eosinophil % 6.2 %      Basophil % 0.3 %      Immature Grans % 0.6 %      Neutrophils, Absolute 10.02 10*3/mm3      Lymphocytes, Absolute 0.96 10*3/mm3      Monocytes, Absolute 0.60 10*3/mm3      Eosinophils, Absolute 0.77 10*3/mm3      Basophils, Absolute 0.04 10*3/mm3      Immature Grans, Absolute 0.08 10*3/mm3      nRBC 0.0 /100 WBC     ABG with Co-Ox and Electrolytes [[id number]]  (Abnormal) Collected: 04/21/23 0945    Specimen: Arterial Blood Updated: 04/21/23 0955     pH, Arterial 7.373 pH units      pCO2, Arterial 41.4 mm Hg      pO2, Arterial 69.0 mm Hg      HCO3, Arterial 23.6 mmol/L      Base Excess, Arterial -1.6 mmol/L      O2 Saturation, Arterial 92.9 %      Hemoglobin, Blood Gas 12.3 g/dL      Carboxyhemoglobin 0.9 %      Methemoglobin 0.00 %      Oxyhemoglobin 92.1 %      FHHB 7.0 %      Nael's Test Positive     Site Arterial: left radial     Modality Cannula -  Nasal     FIO2 28 %      Flow Rate 2.0 lpm      Sodium, Arterial 136.7 mmol/L      Potassium, Arterial 4.23 mmol/L      Ionized Calcium, Arterial 1.11 mmol/L      Chloride, Arterial 104 mmol/L      Glucose, Arterial 156 mg/dL      Lactate, Arterial 0.75 mmol/L      PO2/FIO2 246           Imaging:  XR Chest 1 View    Result Date: 4/21/2023  PROCEDURE: XR CHEST 1 VW  COMPARISON: [facility], CR, XR CHEST 1 VW, 11/05/2022, 11:17.  [facility], CR, XR CHEST 1 VW, 3/28/2023, 21:08.  INDICATIONS: SOA Triage Protocol  FINDINGS:  Cardiac size is normal.  There is left basilar density which most likely represents chronic scarring.  The lungs are otherwise clear and the vascular pattern is normal.        1. Left basilar density likely chronic scarring.  The chest is otherwise clear       Robby Garay MD       Electronically Signed and Approved By: Robby Garay MD on 4/21/2023 at 9:41               Differential Diagnosis and Discussion:    Dyspnea: Differential diagnosis includes but is not limited to metabolic acidosis, neurological disorders, psychogenic, asthma, pneumothorax, upper airway obstruction, COPD, pneumonia, noncardiogenic pulmonary edema, interstitial lung disease, anemia, congestive heart failure, and pulmonary embolism    All labs were reviewed and interpreted by me.  All X-rays impressions were independently interpreted by me.  EKG was interpreted by me.    MDM               This patient is a 72-year-old male with some history of COPD who has home oxygen available but does not routinely use it, now presenting with worsening shortness of breath and also lightheadedness and dizziness.    He did have some mildly low blood pressure on arrival but no fever or signs of sepsis.    He looked like he was dehydrated and his lab work reflect this so I gave him some IV fluids here and his blood pressure came up appropriately.    He is oxygenating well at 94% on 2 L of oxygen so I will have  him continue this at home.    I did give him a DuoNeb breathing treatment here for some mild wheezing but he is really not in any respiratory distress here and his ABG actually looks reassuring today.    And also any signs of pneumonia or volume overload and no sign of heart attack here.    He is resting comfortably here and looks stable for outpatient management with his PCP.    He was instructed to keep wearing 2 L of oxygen until he can follow-up with his doctor.                    Patient Care Considerations:        Consultants/Shared Management Plan:        Social Determinants of Health:    Patient is independent, reliable, and has access to care.     Disposition and Care Coordination:    Discharged: I considered escalation of care by admitting this patient for observation, however the patient has improved and is suitable and  stable for discharge.    I have explained the patient´s condition, diagnoses and treatment plan based on the information available to me at this time. I have answered questions and addressed any concerns. The patient has a good  understanding of the patient´s diagnosis, condition, and treatment plan as can be expected at this point. The vital signs have been stable. The patient´s condition is stable and appropriate for discharge from the emergency department.      The patient will pursue further outpatient evaluation with the primary care physician or other designated or consulting physician as outlined in the discharge instructions. They are agreeable to this plan of care and follow-up instructions have been explained in detail. The patient has received these instructions in written format and have expressed an understanding of the discharge instructions. The patient is aware that any significant change in condition or worsening of symptoms should prompt an immediate return to this or the closest emergency department or call to 911.    Final diagnoses:   Lightheadedness   Dehydration    Shortness of breath   Chronic obstructive pulmonary disease, unspecified COPD type        ED Disposition     ED Disposition   Discharge    Condition   Stable    Comment   --             This medical record created using voice recognition software.    Documentation assistance provided by Carolynn Sepulveda acting a scribe for Jason Elam MD. Information recorded by the scribe was verified and validated at my direction.     Carolynn Sepulveda  04/21/23 0910       Jason Elam MD  04/21/23 5500       Jason Elam MD  04/21/23 3960

## 2023-04-21 NOTE — ED NOTES
Rn assumed care of pt at this time.  Pt arrived to ED with c/o SOA.  Denies dyspnea and SOA at this time. IV fluids continued r/t hypotension.

## 2023-04-26 LAB — QT INTERVAL: 402 MS

## 2023-05-02 ENCOUNTER — OFFICE VISIT (OUTPATIENT)
Dept: FAMILY MEDICINE CLINIC | Facility: CLINIC | Age: 73
End: 2023-05-02
Payer: MEDICARE

## 2023-05-02 VITALS
HEART RATE: 79 BPM | BODY MASS INDEX: 31.75 KG/M2 | HEIGHT: 73 IN | DIASTOLIC BLOOD PRESSURE: 66 MMHG | SYSTOLIC BLOOD PRESSURE: 106 MMHG | OXYGEN SATURATION: 91 % | WEIGHT: 239.6 LBS

## 2023-05-02 DIAGNOSIS — J44.9 CHRONIC OBSTRUCTIVE PULMONARY DISEASE, UNSPECIFIED COPD TYPE: ICD-10-CM

## 2023-05-02 DIAGNOSIS — E11.65 TYPE 2 DIABETES MELLITUS WITH HYPERGLYCEMIA, WITH LONG-TERM CURRENT USE OF INSULIN: Primary | ICD-10-CM

## 2023-05-02 DIAGNOSIS — Z79.4 TYPE 2 DIABETES MELLITUS WITH HYPERGLYCEMIA, WITH LONG-TERM CURRENT USE OF INSULIN: Primary | ICD-10-CM

## 2023-05-02 DIAGNOSIS — E66.9 OBESITY (BMI 30-39.9): ICD-10-CM

## 2023-05-02 DIAGNOSIS — I10 ESSENTIAL HYPERTENSION: ICD-10-CM

## 2023-05-02 LAB
EXPIRATION DATE: NORMAL
HBA1C MFR BLD: 6.6 %
Lab: NORMAL

## 2023-05-02 RX ORDER — OMEPRAZOLE 20 MG/1
20 CAPSULE, DELAYED RELEASE ORAL DAILY
Qty: 90 CAPSULE | Refills: 1 | Status: ON HOLD | OUTPATIENT
Start: 2023-05-02

## 2023-05-02 RX ORDER — FLUTICASONE PROPIONATE AND SALMETEROL 250; 50 UG/1; UG/1
1 POWDER RESPIRATORY (INHALATION)
Qty: 60 EACH | Refills: 1 | Status: ON HOLD | OUTPATIENT
Start: 2023-05-02 | End: 2023-06-01

## 2023-05-02 RX ORDER — CETIRIZINE HYDROCHLORIDE 10 MG/1
10 TABLET ORAL DAILY
Qty: 90 TABLET | Refills: 1 | Status: ON HOLD | OUTPATIENT
Start: 2023-05-02

## 2023-05-02 RX ORDER — ASPIRIN 81 MG/1
81 TABLET, CHEWABLE ORAL DAILY
Qty: 90 TABLET | Refills: 1 | Status: ON HOLD | OUTPATIENT
Start: 2023-05-02

## 2023-05-02 RX ORDER — ALBUTEROL SULFATE 90 UG/1
2 AEROSOL, METERED RESPIRATORY (INHALATION) EVERY 6 HOURS PRN
Qty: 18 G | Refills: 2 | Status: ON HOLD | OUTPATIENT
Start: 2023-05-02

## 2023-05-02 RX ORDER — LISINOPRIL 5 MG/1
5 TABLET ORAL DAILY
Qty: 90 TABLET | Refills: 1 | Status: ON HOLD | OUTPATIENT
Start: 2023-05-02 | End: 2023-07-31

## 2023-05-02 RX ORDER — INSULIN DEGLUDEC INJECTION 100 U/ML
20 INJECTION, SOLUTION SUBCUTANEOUS DAILY
Qty: 6 ML | Refills: 2 | Status: ON HOLD | OUTPATIENT
Start: 2023-05-02

## 2023-05-02 RX ORDER — METOPROLOL SUCCINATE 25 MG/1
25 TABLET, EXTENDED RELEASE ORAL
Qty: 90 TABLET | Refills: 1 | Status: ON HOLD | OUTPATIENT
Start: 2023-05-02 | End: 2023-07-31

## 2023-05-02 NOTE — PROGRESS NOTES
Chief Complaint  Diabetes, Knee Pain (Left knee feel like its going to give out and is weak.  Patient fell about 3 months ago ), and Anxiety (Needs referral to see apolonia )    SUBJECTIVE  Dilip Faulkner presents to [facility]    History of Present Illness  73-year-old Dilip Faulkner presents today to follow-up on diabetes management.  Patient presents today with his sister Alicia Sandoval.     Patient has a history of type 2 diabetes he is currently on Jardiance 10 mg, Tresiba, metformin 500 mg twice daily and states that he is doing well last A1c was checked in January at 7.7%.  He has had a recheck done in office today and it was      Patient has a history of hypertension and is currently on lisinopril 5 mg daily his in office blood pressure was 106/66.  Patient states that he is doing well he is also been taking metoprolol succinate XL 25 mg daily.      Patient has a history of COPD and does have some wheezing on assessment today he does have an albuterol inhaler at home to use for wheezing or shortness of air he also takes Advair/Wixela, DuoNebs as needed.          Past Medical History:   Diagnosis Date   • Asthma    • COPD (chronic obstructive pulmonary disease)    • Diabetes mellitus    • Hernia, umbilical    • Hypertension    • Requires continuous at home supplemental oxygen       History reviewed. No pertinent family history.   Past Surgical History:   Procedure Laterality Date   • ABDOMINAL SURGERY          Current Outpatient Medications:   •  albuterol sulfate  (90 Base) MCG/ACT inhaler, Inhale 2 puffs Every 6 (Six) Hours As Needed for Wheezing., Disp: 8 g, Rfl: 2  •  aspirin 81 MG chewable tablet, Chew 1 tablet Daily., Disp: 90 tablet, Rfl: 1  •  cetirizine (zyrTEC) 10 MG tablet, Take 1 tablet by mouth Daily., Disp: 90 tablet, Rfl: 1  •  empagliflozin (Jardiance) 10 MG tablet tablet, Take 1 tablet by mouth Daily for 30 days., Disp: 30 tablet, Rfl: 5  •   "Fluticasone-Salmeterol (ADVAIR/WIXELA) 250-50 MCG/ACT DISKUS, Inhale 1 puff 2 (Two) Times a Day for 30 days., Disp: 60 each, Rfl: 1  •  insulin degludec (Tresiba FlexTouch) 100 UNIT/ML solution pen-injector injection, Inject 20 Units under the skin into the appropriate area as directed Daily., Disp: 6 mL, Rfl: 2  •  ipratropium-albuterol (DUO-NEB) 0.5-2.5 mg/3 ml nebulizer, Take 3 mL by nebulization Every 4 (Four) Hours As Needed for Wheezing or Shortness of Air., Disp: 360 mL, Rfl: 0  •  lisinopril (PRINIVIL,ZESTRIL) 5 MG tablet, Take 1 tablet by mouth Daily for 30 days., Disp: 90 tablet, Rfl: 1  •  metFORMIN (GLUCOPHAGE) 500 MG tablet, Take 1 tablet by mouth 2 (Two) Times a Day With Meals for 30 days., Disp: 60 tablet, Rfl: 5  •  metoprolol succinate XL (TOPROL-XL) 25 MG 24 hr tablet, Take 1 tablet by mouth Daily for 30 days., Disp: 90 tablet, Rfl: 1  •  omeprazole (priLOSEC) 20 MG capsule, Take 1 capsule by mouth Daily., Disp: 90 capsule, Rfl: 1    OBJECTIVE  Vital Signs:   /66 (BP Location: Right arm)   Pulse 79   Ht 185.4 cm (73\")   Wt 109 kg (239 lb 9.6 oz)   SpO2 91%   BMI 31.61 kg/m²    Estimated body mass index is 31.61 kg/m² as calculated from the following:    Height as of this encounter: 185.4 cm (73\").    Weight as of this encounter: 109 kg (239 lb 9.6 oz).     Wt Readings from Last 3 Encounters:   05/02/23 109 kg (239 lb 9.6 oz)   04/21/23 108 kg (238 lb 8.6 oz)   04/04/23 109 kg (239 lb 6.4 oz)     BP Readings from Last 3 Encounters:   05/02/23 106/66   04/21/23 102/44   04/04/23 116/61       Physical Exam     Result Review    Common labs        3/28/2023    21:16 3/28/2023    23:36 3/30/2023    08:25 3/30/2023    09:38 4/21/2023    09:25   Common Labs   Glucose 92     149   153     BUN 18     20   24     Creatinine 1.02     1.08   1.18     Sodium 138     137   133     Potassium 4.4     4.2   4.2     Chloride 102     102   100     Calcium 8.8     8.3   8.3     Albumin 3.7     3.4   3.4   "   Total Bilirubin 0.3     0.3   0.4     Alkaline Phosphatase 103     91   89     AST (SGOT) 13     13   13     ALT (SGPT) 9     7   9     WBC 10.30    6.70    12.47     Hemoglobin 12.9    11.9    11.7     Hematocrit 43.4    39.9    38.9     Platelets 210    197    195     Total Cholesterol  69        Triglycerides  81        HDL Cholesterol  31        LDL Cholesterol   21                4/21/2023    09:45   Common Labs   Glucose 156     BUN    Creatinine    Sodium 136.7     Potassium    Chloride    Calcium    Albumin    Total Bilirubin    Alkaline Phosphatase    AST (SGOT)    ALT (SGPT)    WBC    Hemoglobin    Hematocrit    Platelets    Total Cholesterol    Triglycerides    HDL Cholesterol    LDL Cholesterol       CMP        3/28/2023    21:16 3/30/2023    09:38 4/21/2023    09:25 4/21/2023    09:45   CMP   Glucose 92   149   153   156     BUN 18   20   24      Creatinine 1.02   1.08   1.18      EGFR 77.6   72.5   65.2      Sodium 138   137   133   136.7     Potassium 4.4   4.2   4.2      Chloride 102   102   100      Calcium 8.8   8.3   8.3      Total Protein 7.4   6.2   7.0      Albumin 3.7   3.4   3.4      Globulin 3.7   2.8   3.6      Total Bilirubin 0.3   0.3   0.4      Alkaline Phosphatase 103   91   89      AST (SGOT) 13   13   13      ALT (SGPT) 9   7   9      Albumin/Globulin Ratio 1.0   1.2   0.9      BUN/Creatinine Ratio 17.6   18.5   20.3      Anion Gap 9.4   7.0   9.8        CBC        3/28/2023    21:16 3/30/2023    08:25 4/21/2023    09:25   CBC   WBC 10.30   6.70   12.47     RBC 5.41   4.97   4.86     Hemoglobin 12.9   11.9   11.7     Hematocrit 43.4   39.9   38.9     MCV 80.2   80.3   80.0     MCH 23.8   23.9   24.1     MCHC 29.7   29.8   30.1     RDW 16.2   16.2   17.1     Platelets 210   197   195       CBC w/diff        3/28/2023    21:16 3/30/2023    08:25 4/21/2023    09:25   CBC w/Diff   WBC 10.30   6.70   12.47     RBC 5.41   4.97   4.86     Hemoglobin 12.9   11.9   11.7     Hematocrit 43.4    39.9   38.9     MCV 80.2   80.3   80.0     MCH 23.8   23.9   24.1     MCHC 29.7   29.8   30.1     RDW 16.2   16.2   17.1     Platelets 210   197   195     Neutrophil Rel % 69.1   57.4   80.4     Immature Granulocyte Rel % 0.4   0.1   0.6     Lymphocyte Rel % 12.4   23.0   7.7     Monocyte Rel % 6.5   7.9   4.8     Eosinophil Rel % 11.0   10.9   6.2     Basophil Rel % 0.6   0.7   0.3       Lipid Panel        3/28/2023    23:36   Lipid Panel   Total Cholesterol 69     Triglycerides 81     HDL Cholesterol 31     VLDL Cholesterol 17     LDL Cholesterol  21     LDL/HDL Ratio 0.70           Electrolytes        3/28/2023    21:16 3/30/2023    09:38 4/21/2023    09:25 4/21/2023    09:45   Electrolytes   Sodium 138   137   133   136.7     Potassium 4.4   4.2   4.2      Chloride 102   102   100      Calcium 8.8   8.3   8.3        Most Recent A1C        9/27/2022    13:25   HGBA1C Most Recent   Hemoglobin A1C 11.7            This result is from an external source.           Lab Results   Component Value Date    FREET4 0.9 03/22/2019          XR Chest 1 View    Result Date: 4/21/2023    1. Left basilar density likely chronic scarring.  The chest is otherwise clear       Robby Garay MD       Electronically Signed and Approved By: Robby Garay MD on 4/21/2023 at 9:41             XR Chest 1 View    Result Date: 3/28/2023   Left basilar opacity, which could reflect atelectasis or pneumonia.       SHRUTI MENA MD       Electronically Signed and Approved By: SHRUTI MENA MD on 3/28/2023 at 21:17             XR Chest 1 View    Result Date: 1/12/2023    1. Chronic scarring or atelectasis in the mid to lower left lung field 2. Suspected chronic pleural thickening along the lateral left lung base.       Chirag Lopez M.D.       Electronically Signed and Approved By: Chirag Lopez M.D. on 1/12/2023 at 18:57                 The above data has been reviewed by MONE Buchanan 05/02/2023 15:14 EDT.          Patient Care  Team:  Mallorie Roa APRN as PCP - General (Emergency Medicine)  Rosalba Edwards APRN (Nurse Practitioner)    BMI is >= 30 and <35. (Class 1 Obesity). The following options were offered after discussion;: exercise counseling/recommendations and nutrition counseling/recommendations       ASSESSMENT & PLAN    Diagnoses and all orders for this visit:    1. Type 2 diabetes mellitus with hyperglycemia, with long-term current use of insulin (Primary)  Comments:  Patient in office A1c is 6.6%.  We discussed that we will follow-up in 3 months and continue medicines as listed.    2. Essential hypertension  Comments:  Patient is stable on lisinopril, metoprolol succinate XL at this time no changes.    3. Chronic obstructive pulmonary disease, unspecified COPD type  Comments:  Have discussed with patient that he does have some wheezing and he should use his albuterol inhaler more frequently.  He does use as needed for shortness of air    4. Obesity (BMI 30-39.9)  Comments:  Discussed with patient that exercise and diet could help reduce weight and also help with breathing difficulty.     Other orders  -     lisinopril (PRINIVIL,ZESTRIL) 5 MG tablet; Take 1 tablet by mouth Daily for 30 days.  Dispense: 90 tablet; Refill: 1  -     metoprolol succinate XL (TOPROL-XL) 25 MG 24 hr tablet; Take 1 tablet by mouth Daily for 30 days.  Dispense: 90 tablet; Refill: 1  -     albuterol sulfate  (90 Base) MCG/ACT inhaler; Inhale 2 puffs Every 6 (Six) Hours As Needed for Wheezing.  Dispense: 8 g; Refill: 2  -     aspirin 81 MG chewable tablet; Chew 1 tablet Daily.  Dispense: 90 tablet; Refill: 1  -     cetirizine (zyrTEC) 10 MG tablet; Take 1 tablet by mouth Daily.  Dispense: 90 tablet; Refill: 1  -     empagliflozin (Jardiance) 10 MG tablet tablet; Take 1 tablet by mouth Daily for 30 days.  Dispense: 30 tablet; Refill: 5  -     Fluticasone-Salmeterol (ADVAIR/WIXELA) 250-50 MCG/ACT DISKUS; Inhale 1 puff 2 (Two) Times a Day for  30 days.  Dispense: 60 each; Refill: 1  -     insulin degludec (Tresiba FlexTouch) 100 UNIT/ML solution pen-injector injection; Inject 20 Units under the skin into the appropriate area as directed Daily.  Dispense: 6 mL; Refill: 2  -     omeprazole (priLOSEC) 20 MG capsule; Take 1 capsule by mouth Daily.  Dispense: 90 capsule; Refill: 1  -     metFORMIN (GLUCOPHAGE) 500 MG tablet; Take 1 tablet by mouth 2 (Two) Times a Day With Meals for 30 days.  Dispense: 60 tablet; Refill: 5         Tobacco Use: High Risk   • Smoking Tobacco Use: Former   • Smokeless Tobacco Use: Current   • Passive Exposure: Not on file       Follow Up     Return in about 3 months (around 8/2/2023) for 3 month F/U DM II.      Patient was given instructions and counseling regarding his condition or for health maintenance advice. Please see specific information pulled into the AVS if appropriate.   I have reviewed information obtained and documented by others and I have confirmed the accuracy of this documented note.    MONE Buchanan

## 2023-05-08 ENCOUNTER — APPOINTMENT (OUTPATIENT)
Dept: GENERAL RADIOLOGY | Facility: HOSPITAL | Age: 73
End: 2023-05-08
Payer: MEDICARE

## 2023-05-08 ENCOUNTER — HOSPITAL ENCOUNTER (INPATIENT)
Facility: HOSPITAL | Age: 73
LOS: 2 days | Discharge: HOME | End: 2023-05-10
Attending: EMERGENCY MEDICINE | Admitting: INTERNAL MEDICINE
Payer: MEDICARE

## 2023-05-08 DIAGNOSIS — R26.2 DIFFICULTY IN WALKING: ICD-10-CM

## 2023-05-08 DIAGNOSIS — J96.21 ACUTE ON CHRONIC RESPIRATORY FAILURE WITH HYPOXIA: ICD-10-CM

## 2023-05-08 DIAGNOSIS — Z78.9 DECREASED ACTIVITIES OF DAILY LIVING (ADL): ICD-10-CM

## 2023-05-08 DIAGNOSIS — J44.1 COPD EXACERBATION: Primary | ICD-10-CM

## 2023-05-08 DIAGNOSIS — R09.02 HYPOXIA: ICD-10-CM

## 2023-05-08 LAB
ALBUMIN SERPL-MCNC: 3.7 G/DL (ref 3.5–5.2)
ALBUMIN/GLOB SERPL: 1 G/DL
ALP SERPL-CCNC: 99 U/L (ref 39–117)
ALT SERPL W P-5'-P-CCNC: 6 U/L (ref 1–41)
ANION GAP SERPL CALCULATED.3IONS-SCNC: 10.3 MMOL/L (ref 5–15)
ARTERIAL PATENCY WRIST A: POSITIVE
ARTERIAL PATENCY WRIST A: POSITIVE
AST SERPL-CCNC: 11 U/L (ref 1–40)
BASE EXCESS BLDA CALC-SCNC: -2.2 MMOL/L (ref -2–2)
BASE EXCESS BLDA CALC-SCNC: 2 MMOL/L (ref -2–2)
BASOPHILS # BLD AUTO: 0.06 10*3/MM3 (ref 0–0.2)
BASOPHILS NFR BLD AUTO: 0.5 % (ref 0–1.5)
BDY SITE: ABNORMAL
BDY SITE: ABNORMAL
BILIRUB SERPL-MCNC: 0.4 MG/DL (ref 0–1.2)
BUN SERPL-MCNC: 15 MG/DL (ref 8–23)
BUN/CREAT SERPL: 14 (ref 7–25)
CA-I BLDA-SCNC: 1.1 MMOL/L (ref 1.13–1.32)
CALCIUM SPEC-SCNC: 8.8 MG/DL (ref 8.6–10.5)
CHLORIDE BLDA-SCNC: 100 MMOL/L (ref 98–106)
CHLORIDE SERPL-SCNC: 98 MMOL/L (ref 98–107)
CO2 SERPL-SCNC: 25.7 MMOL/L (ref 22–29)
COHGB MFR BLD: 1.2 % (ref 0–1.5)
COHGB MFR BLD: 1.2 % (ref 0–1.5)
CREAT SERPL-MCNC: 1.07 MG/DL (ref 0.76–1.27)
D-LACTATE SERPL-SCNC: 1.3 MMOL/L (ref 0.5–2)
DEPRECATED RDW RBC AUTO: 48.3 FL (ref 37–54)
EGFRCR SERPLBLD CKD-EPI 2021: 72.8 ML/MIN/1.73
EOSINOPHIL # BLD AUTO: 1.04 10*3/MM3 (ref 0–0.4)
EOSINOPHIL NFR BLD AUTO: 9.1 % (ref 0.3–6.2)
ERYTHROCYTE [DISTWIDTH] IN BLOOD BY AUTOMATED COUNT: 16.7 % (ref 12.3–15.4)
FHHB: 4.2 % (ref 0–5)
FHHB: 7.4 % (ref 0–5)
GAS FLOW AIRWAY: ABNORMAL L/MIN
GLOBULIN UR ELPH-MCNC: 3.7 GM/DL
GLUCOSE BLDA-MCNC: 114 MG/DL (ref 70–99)
GLUCOSE BLDC GLUCOMTR-MCNC: 131 MG/DL (ref 70–99)
GLUCOSE SERPL-MCNC: 113 MG/DL (ref 65–99)
HCO3 BLDA-SCNC: 26.8 MMOL/L (ref 22–26)
HCO3 BLDA-SCNC: 28.8 MMOL/L (ref 22–26)
HCT VFR BLD AUTO: 42.8 % (ref 37.5–51)
HGB BLD-MCNC: 12.7 G/DL (ref 13–17.7)
HGB BLDA-MCNC: 13 G/DL (ref 13.8–16.4)
HGB BLDA-MCNC: 13.2 G/DL (ref 13.8–16.4)
HOLD SPECIMEN: NORMAL
HOLD SPECIMEN: NORMAL
IMM GRANULOCYTES # BLD AUTO: 0.05 10*3/MM3 (ref 0–0.05)
IMM GRANULOCYTES NFR BLD AUTO: 0.4 % (ref 0–0.5)
INHALED O2 CONCENTRATION: 30 %
INHALED O2 CONCENTRATION: 30 %
LACTATE BLDA-SCNC: 1.06 MMOL/L (ref 0.5–2)
LYMPHOCYTES # BLD AUTO: 1.5 10*3/MM3 (ref 0.7–3.1)
LYMPHOCYTES NFR BLD AUTO: 13.2 % (ref 19.6–45.3)
M PNEUMO IGM SER QL: POSITIVE
MCH RBC QN AUTO: 24 PG (ref 26.6–33)
MCHC RBC AUTO-ENTMCNC: 29.7 G/DL (ref 31.5–35.7)
MCV RBC AUTO: 80.8 FL (ref 79–97)
METHGB BLD QL: 0 % (ref 0–1.5)
METHGB BLD QL: 0.1 % (ref 0–1.5)
MODALITY: ABNORMAL
MODALITY: ABNORMAL
MONOCYTES # BLD AUTO: 0.73 10*3/MM3 (ref 0.1–0.9)
MONOCYTES NFR BLD AUTO: 6.4 % (ref 5–12)
NEUTROPHILS NFR BLD AUTO: 70.4 % (ref 42.7–76)
NEUTROPHILS NFR BLD AUTO: 8.02 10*3/MM3 (ref 1.7–7)
NOTE: ABNORMAL
NOTE: ABNORMAL
NRBC BLD AUTO-RTO: 0 /100 WBC (ref 0–0.2)
NT-PROBNP SERPL-MCNC: 376.1 PG/ML (ref 0–900)
OXYHGB MFR BLDV: 91.3 % (ref 94–99)
OXYHGB MFR BLDV: 94.6 % (ref 94–99)
PCO2 BLDA: 54.5 MM HG (ref 35–45)
PCO2 BLDA: 66.1 MM HG (ref 35–45)
PH BLDA: 7.22 PH UNITS (ref 7.35–7.45)
PH BLDA: 7.34 PH UNITS (ref 7.35–7.45)
PLATELET # BLD AUTO: 208 10*3/MM3 (ref 140–450)
PMV BLD AUTO: 9.3 FL (ref 6–12)
PO2 BLD: 265 MM[HG] (ref 0–500)
PO2 BLD: 288 MM[HG] (ref 0–500)
PO2 BLDA: 79.4 MM HG (ref 80–100)
PO2 BLDA: 86.4 MM HG (ref 80–100)
POTASSIUM BLDA-SCNC: 4.13 MMOL/L (ref 3.5–5)
POTASSIUM SERPL-SCNC: 4.4 MMOL/L (ref 3.5–5.2)
PROT SERPL-MCNC: 7.4 G/DL (ref 6–8.5)
QT INTERVAL: 375 MS
RBC # BLD AUTO: 5.3 10*6/MM3 (ref 4.14–5.8)
SAO2 % BLDCOA: 92.5 % (ref 95–99)
SAO2 % BLDCOA: 95.7 % (ref 95–99)
SODIUM BLDA-SCNC: 136.9 MMOL/L (ref 136–146)
SODIUM SERPL-SCNC: 134 MMOL/L (ref 136–145)
TROPONIN T SERPL HS-MCNC: 51 NG/L
WBC NRBC COR # BLD: 11.4 10*3/MM3 (ref 3.4–10.8)
WHOLE BLOOD HOLD COAG: NORMAL
WHOLE BLOOD HOLD SPECIMEN: NORMAL

## 2023-05-08 PROCEDURE — 0 CEFTRIAXONE PER 250 MG: Performed by: EMERGENCY MEDICINE

## 2023-05-08 PROCEDURE — 84484 ASSAY OF TROPONIN QUANT: CPT | Performed by: EMERGENCY MEDICINE

## 2023-05-08 PROCEDURE — 94660 CPAP INITIATION&MGMT: CPT

## 2023-05-08 PROCEDURE — 82375 ASSAY CARBOXYHB QUANT: CPT | Performed by: EMERGENCY MEDICINE

## 2023-05-08 PROCEDURE — 87040 BLOOD CULTURE FOR BACTERIA: CPT | Performed by: EMERGENCY MEDICINE

## 2023-05-08 PROCEDURE — 94761 N-INVAS EAR/PLS OXIMETRY MLT: CPT

## 2023-05-08 PROCEDURE — 94640 AIRWAY INHALATION TREATMENT: CPT

## 2023-05-08 PROCEDURE — 94799 UNLISTED PULMONARY SVC/PX: CPT

## 2023-05-08 PROCEDURE — 99291 CRITICAL CARE FIRST HOUR: CPT | Performed by: INTERNAL MEDICINE

## 2023-05-08 PROCEDURE — 93005 ELECTROCARDIOGRAM TRACING: CPT | Performed by: EMERGENCY MEDICINE

## 2023-05-08 PROCEDURE — 36415 COLL VENOUS BLD VENIPUNCTURE: CPT

## 2023-05-08 PROCEDURE — 82805 BLOOD GASES W/O2 SATURATION: CPT | Performed by: EMERGENCY MEDICINE

## 2023-05-08 PROCEDURE — 83880 ASSAY OF NATRIURETIC PEPTIDE: CPT | Performed by: EMERGENCY MEDICINE

## 2023-05-08 PROCEDURE — 83605 ASSAY OF LACTIC ACID: CPT | Performed by: EMERGENCY MEDICINE

## 2023-05-08 PROCEDURE — 25010000002 ENOXAPARIN PER 10 MG: Performed by: INTERNAL MEDICINE

## 2023-05-08 PROCEDURE — 99285 EMERGENCY DEPT VISIT HI MDM: CPT

## 2023-05-08 PROCEDURE — 85025 COMPLETE CBC W/AUTO DIFF WBC: CPT | Performed by: EMERGENCY MEDICINE

## 2023-05-08 PROCEDURE — 25010000002 AZITHROMYCIN PER 500 MG: Performed by: EMERGENCY MEDICINE

## 2023-05-08 PROCEDURE — 83050 HGB METHEMOGLOBIN QUAN: CPT | Performed by: EMERGENCY MEDICINE

## 2023-05-08 PROCEDURE — 25010000002 LORAZEPAM PER 2 MG: Performed by: EMERGENCY MEDICINE

## 2023-05-08 PROCEDURE — 25010000002 METHYLPREDNISOLONE PER 125 MG: Performed by: EMERGENCY MEDICINE

## 2023-05-08 PROCEDURE — 71045 X-RAY EXAM CHEST 1 VIEW: CPT

## 2023-05-08 PROCEDURE — 25010000002 METHYLPREDNISOLONE PER 125 MG: Performed by: INTERNAL MEDICINE

## 2023-05-08 PROCEDURE — 82948 REAGENT STRIP/BLOOD GLUCOSE: CPT

## 2023-05-08 PROCEDURE — 86738 MYCOPLASMA ANTIBODY: CPT | Performed by: INTERNAL MEDICINE

## 2023-05-08 PROCEDURE — 80053 COMPREHEN METABOLIC PANEL: CPT | Performed by: EMERGENCY MEDICINE

## 2023-05-08 PROCEDURE — 36600 WITHDRAWAL OF ARTERIAL BLOOD: CPT | Performed by: EMERGENCY MEDICINE

## 2023-05-08 RX ORDER — METHYLPREDNISOLONE SODIUM SUCCINATE 125 MG/2ML
125 INJECTION, POWDER, LYOPHILIZED, FOR SOLUTION INTRAMUSCULAR; INTRAVENOUS ONCE
Status: COMPLETED | OUTPATIENT
Start: 2023-05-08 | End: 2023-05-08

## 2023-05-08 RX ORDER — SODIUM CHLORIDE 0.9 % (FLUSH) 0.9 %
10 SYRINGE (ML) INJECTION EVERY 12 HOURS SCHEDULED
Status: DISCONTINUED | OUTPATIENT
Start: 2023-05-08 | End: 2023-05-10 | Stop reason: HOSPADM

## 2023-05-08 RX ORDER — CHOLECALCIFEROL (VITAMIN D3) 125 MCG
5 CAPSULE ORAL NIGHTLY PRN
Status: DISCONTINUED | OUTPATIENT
Start: 2023-05-08 | End: 2023-05-10 | Stop reason: HOSPADM

## 2023-05-08 RX ORDER — SODIUM CHLORIDE 0.9 % (FLUSH) 0.9 %
10 SYRINGE (ML) INJECTION AS NEEDED
Status: DISCONTINUED | OUTPATIENT
Start: 2023-05-08 | End: 2023-05-10 | Stop reason: HOSPADM

## 2023-05-08 RX ORDER — ASPIRIN 81 MG/1
81 TABLET, CHEWABLE ORAL DAILY
Status: DISCONTINUED | OUTPATIENT
Start: 2023-05-08 | End: 2023-05-10 | Stop reason: HOSPADM

## 2023-05-08 RX ORDER — BISACODYL 5 MG/1
5 TABLET, DELAYED RELEASE ORAL DAILY PRN
Status: DISCONTINUED | OUTPATIENT
Start: 2023-05-08 | End: 2023-05-10 | Stop reason: HOSPADM

## 2023-05-08 RX ORDER — IPRATROPIUM BROMIDE AND ALBUTEROL SULFATE 2.5; .5 MG/3ML; MG/3ML
3 SOLUTION RESPIRATORY (INHALATION)
Status: ACTIVE | OUTPATIENT
Start: 2023-05-08 | End: 2023-05-08

## 2023-05-08 RX ORDER — CEFTRIAXONE SODIUM 1 G/50ML
1 INJECTION, SOLUTION INTRAVENOUS EVERY 24 HOURS
Status: DISCONTINUED | OUTPATIENT
Start: 2023-05-09 | End: 2023-05-10

## 2023-05-08 RX ORDER — MULTIPLE VITAMINS W/ MINERALS TAB 9MG-400MCG
1 TAB ORAL DAILY
Status: DISCONTINUED | OUTPATIENT
Start: 2023-05-08 | End: 2023-05-10 | Stop reason: HOSPADM

## 2023-05-08 RX ORDER — CEFTRIAXONE SODIUM 2 G/50ML
2 INJECTION, SOLUTION INTRAVENOUS ONCE
Status: COMPLETED | OUTPATIENT
Start: 2023-05-08 | End: 2023-05-08

## 2023-05-08 RX ORDER — DEXTROSE MONOHYDRATE 25 G/50ML
25 INJECTION, SOLUTION INTRAVENOUS
Status: DISCONTINUED | OUTPATIENT
Start: 2023-05-08 | End: 2023-05-10 | Stop reason: HOSPADM

## 2023-05-08 RX ORDER — IPRATROPIUM BROMIDE AND ALBUTEROL SULFATE 2.5; .5 MG/3ML; MG/3ML
3 SOLUTION RESPIRATORY (INHALATION)
Status: DISCONTINUED | OUTPATIENT
Start: 2023-05-08 | End: 2023-05-10 | Stop reason: HOSPADM

## 2023-05-08 RX ORDER — BUDESONIDE 0.5 MG/2ML
0.5 INHALANT ORAL
Status: DISCONTINUED | OUTPATIENT
Start: 2023-05-08 | End: 2023-05-09

## 2023-05-08 RX ORDER — ALBUTEROL SULFATE 2.5 MG/3ML
2.5 SOLUTION RESPIRATORY (INHALATION) EVERY 6 HOURS PRN
Status: DISCONTINUED | OUTPATIENT
Start: 2023-05-08 | End: 2023-05-10 | Stop reason: HOSPADM

## 2023-05-08 RX ORDER — LORAZEPAM 2 MG/ML
1 INJECTION INTRAMUSCULAR ONCE
Status: COMPLETED | OUTPATIENT
Start: 2023-05-08 | End: 2023-05-08

## 2023-05-08 RX ORDER — NICOTINE POLACRILEX 4 MG
15 LOZENGE BUCCAL
Status: DISCONTINUED | OUTPATIENT
Start: 2023-05-08 | End: 2023-05-10 | Stop reason: HOSPADM

## 2023-05-08 RX ORDER — ENOXAPARIN SODIUM 100 MG/ML
40 INJECTION SUBCUTANEOUS DAILY
Status: DISCONTINUED | OUTPATIENT
Start: 2023-05-08 | End: 2023-05-10 | Stop reason: HOSPADM

## 2023-05-08 RX ORDER — ATORVASTATIN CALCIUM 10 MG/1
10 TABLET, FILM COATED ORAL DAILY
COMMUNITY

## 2023-05-08 RX ORDER — METHYLPREDNISOLONE SODIUM SUCCINATE 125 MG/2ML
60 INJECTION, POWDER, LYOPHILIZED, FOR SOLUTION INTRAMUSCULAR; INTRAVENOUS EVERY 6 HOURS
Status: DISCONTINUED | OUTPATIENT
Start: 2023-05-08 | End: 2023-05-10 | Stop reason: HOSPADM

## 2023-05-08 RX ORDER — SODIUM CHLORIDE 9 MG/ML
40 INJECTION, SOLUTION INTRAVENOUS AS NEEDED
Status: DISCONTINUED | OUTPATIENT
Start: 2023-05-08 | End: 2023-05-10 | Stop reason: HOSPADM

## 2023-05-08 RX ORDER — METOPROLOL SUCCINATE 25 MG/1
25 TABLET, EXTENDED RELEASE ORAL
Status: DISCONTINUED | OUTPATIENT
Start: 2023-05-08 | End: 2023-05-10 | Stop reason: HOSPADM

## 2023-05-08 RX ORDER — AMOXICILLIN 250 MG
2 CAPSULE ORAL 2 TIMES DAILY
Status: DISCONTINUED | OUTPATIENT
Start: 2023-05-08 | End: 2023-05-10 | Stop reason: HOSPADM

## 2023-05-08 RX ORDER — BISACODYL 10 MG
10 SUPPOSITORY, RECTAL RECTAL DAILY PRN
Status: DISCONTINUED | OUTPATIENT
Start: 2023-05-08 | End: 2023-05-10 | Stop reason: HOSPADM

## 2023-05-08 RX ORDER — POLYETHYLENE GLYCOL 3350 17 G/17G
17 POWDER, FOR SOLUTION ORAL DAILY PRN
Status: DISCONTINUED | OUTPATIENT
Start: 2023-05-08 | End: 2023-05-10 | Stop reason: HOSPADM

## 2023-05-08 RX ORDER — INSULIN LISPRO 100 [IU]/ML
2-9 INJECTION, SOLUTION INTRAVENOUS; SUBCUTANEOUS
Status: DISCONTINUED | OUTPATIENT
Start: 2023-05-08 | End: 2023-05-10 | Stop reason: HOSPADM

## 2023-05-08 RX ORDER — ARFORMOTEROL TARTRATE 15 UG/2ML
15 SOLUTION RESPIRATORY (INHALATION)
Status: DISCONTINUED | OUTPATIENT
Start: 2023-05-08 | End: 2023-05-09

## 2023-05-08 RX ORDER — ONDANSETRON 2 MG/ML
4 INJECTION INTRAMUSCULAR; INTRAVENOUS EVERY 6 HOURS PRN
Status: DISCONTINUED | OUTPATIENT
Start: 2023-05-08 | End: 2023-05-10 | Stop reason: HOSPADM

## 2023-05-08 RX ADMIN — ARFORMOTEROL TARTRATE 15 MCG: 15 SOLUTION RESPIRATORY (INHALATION) at 21:44

## 2023-05-08 RX ADMIN — ENOXAPARIN SODIUM 40 MG: 100 INJECTION SUBCUTANEOUS at 20:44

## 2023-05-08 RX ADMIN — BUDESONIDE 0.5 MG: 0.5 SUSPENSION RESPIRATORY (INHALATION) at 21:44

## 2023-05-08 RX ADMIN — IPRATROPIUM BROMIDE AND ALBUTEROL SULFATE 3 ML: 2.5; .5 SOLUTION RESPIRATORY (INHALATION) at 21:44

## 2023-05-08 RX ADMIN — METHYLPREDNISOLONE SODIUM SUCCINATE 60 MG: 125 INJECTION INTRAMUSCULAR; INTRAVENOUS at 22:48

## 2023-05-08 RX ADMIN — Medication 10 ML: at 20:44

## 2023-05-08 RX ADMIN — SENNOSIDES AND DOCUSATE SODIUM 2 TABLET: 8.6; 5 TABLET ORAL at 20:44

## 2023-05-08 RX ADMIN — ASPIRIN 81 MG 81 MG: 81 TABLET ORAL at 20:45

## 2023-05-08 RX ADMIN — METOPROLOL SUCCINATE 25 MG: 25 TABLET, EXTENDED RELEASE ORAL at 20:45

## 2023-05-08 RX ADMIN — CEFTRIAXONE SODIUM 2 G: 2 INJECTION, SOLUTION INTRAVENOUS at 17:24

## 2023-05-08 RX ADMIN — LORAZEPAM 1 MG: 2 INJECTION INTRAMUSCULAR; INTRAVENOUS at 16:01

## 2023-05-08 RX ADMIN — MULTIPLE VITAMINS W/ MINERALS TAB 1 TABLET: TAB at 20:45

## 2023-05-08 RX ADMIN — AZITHROMYCIN 500 MG: 500 INJECTION, POWDER, LYOPHILIZED, FOR SOLUTION INTRAVENOUS at 17:28

## 2023-05-08 RX ADMIN — METHYLPREDNISOLONE SODIUM SUCCINATE 125 MG: 125 INJECTION INTRAMUSCULAR; INTRAVENOUS at 17:24

## 2023-05-08 RX ADMIN — EMPAGLIFLOZIN 10 MG: 10 TABLET, FILM COATED ORAL at 20:45

## 2023-05-08 NOTE — H&P
[facility]   HOSPITALIST HISTORY AND PHYSICAL  Date: 2023   Patient Name: Dilip Faulkner  : 1950  MRN: [id number]  Primary Care Physician:  Mallorie Roa APRN  Date of admission: 2023    Subjective   Subjective     Chief Complaint: Respiratory failure    HPI:  Dilip Faulkner is a 73 y.o. male who presents to the hospital with acute hypoxemia.  There is no family at bedside, patient is altered secondary to his hypoxia, history is obtained from chart review and discussion with emergency room physician.  Patient presented to the emergency department via EMS as patient was satting only 50% on 6 L of oxygen at home.  Patient typically is on 2 L for his chronic hypoxemia and COPD.  Reportedly the patient had no fever or chills or cough, he denied chest pain upon initial evaluation.  The patient was very dyspneic and had trouble speaking secondary to this.  In the emergency department the patient was found to be hypoxemic, he was on a nonrebreather.  Patient was transitioned to BiPAP, patient was mildly hypercapnic, patient was noted to be hypoxemic.  Patient was given antibiotics, Solu-Medrol and the hospitalist service was asked to admit for further work-up and management      Personal History     Past Medical History:  Past Medical History:   Diagnosis Date   • Asthma    • COPD (chronic obstructive pulmonary disease)    • Diabetes mellitus    • Hernia, umbilical    • Hypertension    • Requires continuous at home supplemental oxygen        Past Surgical History:  Past Surgical History:   Procedure Laterality Date   • ABDOMINAL SURGERY         Family History:   Unable to obtain secondary to mental status    Social History:    reports that he quit smoking about 2 years ago. His smoking use included cigarettes. He has a 3.00 pack-year smoking history. His smokeless tobacco use includes chew. He reports that he does not drink alcohol and does not use drugs.    Home  Medications:  Fluticasone-Salmeterol, albuterol sulfate HFA, aspirin, cetirizine, empagliflozin, insulin degludec, ipratropium-albuterol, lisinopril, metFORMIN, metoprolol succinate XL, and omeprazole    Allergies:  No Known Allergies    Review of Systems:  Unable to obtain secondary to mental status    Objective   Objective     Vitals:   Temp:  [98.2 °F (36.8 °C)] 98.2 °F (36.8 °C)  Heart Rate:  [114] 114  Resp:  [28] 28  BP: (155)/(95) 155/95    Physical Exam   GEN: NIPPV in place  HEENT: Mucous membranes moist  LUNGS: Ventilated lungs, diminished breath sounds bilaterally  CARDIAC: Tachycardic  NEURO: Drowsy, moving all 4 extremities spontaneously  SKIN: No obvious breakdown    Result Review:  I have personally reviewed the results from the time of this admission to 5/8/2023 17:02 EDT and agree with these findings:  [x]  Laboratory  CMP        3/30/2023    09:38 4/21/2023    09:25 4/21/2023    09:45 5/8/2023    15:50 5/8/2023    16:07   CMP   Glucose 149   153   156   113   114     BUN 20   24    15      Creatinine 1.08   1.18    1.07      EGFR 72.5   65.2    72.8      Sodium 137   133   136.7   134   136.9     Potassium 4.2   4.2    4.4      Chloride 102   100    98      Calcium 8.3   8.3    8.8      Total Protein 6.2   7.0    7.4      Albumin 3.4   3.4    3.7      Globulin 2.8   3.6    3.7      Total Bilirubin 0.3   0.4    0.4      Alkaline Phosphatase 91   89    99      AST (SGOT) 13   13    11      ALT (SGPT) 7   9    6      Albumin/Globulin Ratio 1.2   0.9    1.0      BUN/Creatinine Ratio 18.5   20.3    14.0      Anion Gap 7.0   9.8    10.3        CBC        3/30/2023    08:25 4/21/2023    09:25 5/8/2023    15:50   CBC   WBC 6.70   12.47   11.40     RBC 4.97   4.86   5.30     Hemoglobin 11.9   11.7   12.7     Hematocrit 39.9   38.9   42.8     MCV 80.3   80.0   80.8     MCH 23.9   24.1   24.0     MCHC 29.8   30.1   29.7     RDW 16.2   17.1   16.7     Platelets 197   195   208       []  Microbiology  []   Radiology  []  EKG/Telemetry   []  Cardiology/Vascular   []  Pathology  []  Old records  []  Other:      Assessment & Plan   Assessment / Plan     Assessment:   Acute hypoxemic respiratory failure requiring NIPPV  Acute exacerbation of COPD  Metabolic encephalopathy secondary to hypoxemia  Diabetes mellitus  Hypertension  Chronic hypoxemic respiratory failure    Plan:  Patient admitted to the hospital for further work-up and management of above  Patient was placed on continuous BiPAP for respiratory failure, will continue this for now  Start sliding scale insulin  Start Solu-Medrol IV  Start Brovana, Pulmicort, DuoNebs  Start Rocephin, azithromycin  Check sputum culture, urine antigens  Titrate O2 for SPO2 greater than 88%  Resume appropriate home medications  Chest x-ray personally reviewed by me  Discussed management plan with ER physician  Patient is critically ill with respiratory failure    Reviewed patients labs and imaging, and discussed with patient and nurse at bedside    Pt is critically ill with respiratory failure, acute hypoxemia requiring NIPPV, patient is at high risk for deterioration requiring intubation.  I have spent 35 minutes of critical care time reviewing previous documentation, reviewing all pertinent telemetry, labs, and imaging studies, examining the patient, modifying the care plan and discussing the patient´s condition and care plan with the consultants, available family and during rounds. This does not include any procedures performed.    CODE STATUS:         Admission Status:  I believe this patient meets inpatient status.      Electronically signed by Yung Singleton MD, 05/08/23, 5:02 PM EDT.

## 2023-05-08 NOTE — ED PROVIDER NOTES
Time: 3:57 PM EDT  Date of encounter:  5/8/2023  Independent Historian/Clinical History and Information was obtained by:   EMS  Chief Complaint: Shortness of breath    History is limited by: Acuity of Condition    History of Present Illness:  Patient is a 73 y.o. year old male who presents to the emergency department for evaluation of severe shortness of breath.  The patient has a history of COPD and he is on oxygen at home on 2 L.  EMS was called to the patient's house because he was in severe shortness of breath when they arrived he was on oxygen and on 6 L of oxygen was only 50% oxygen saturation on pulse oximeter.  The patient has reportedly had no fever chills or cough and no chest pain.  The patient has difficulty speaking at this point due to his respiratory condition.    HPI    Patient Care Team  Primary Care Provider: Mallorie Roa APRN    Past Medical History:     No Known Allergies  Past Medical History:   Diagnosis Date   • Asthma    • COPD (chronic obstructive pulmonary disease)    • Diabetes mellitus    • Hernia, umbilical    • Hypertension    • Requires continuous at home supplemental oxygen      Past Surgical History:   Procedure Laterality Date   • ABDOMINAL SURGERY       History reviewed. No pertinent family history.    Home Medications:  Prior to Admission medications    Medication Sig Start Date End Date Taking? Authorizing Provider   albuterol sulfate  (90 Base) MCG/ACT inhaler Inhale 2 puffs Every 6 (Six) Hours As Needed for Wheezing. 5/2/23   Mallorie Roa APRN   aspirin 81 MG chewable tablet Chew 1 tablet Daily. 5/2/23   Mallorie Roa APRN   cetirizine (zyrTEC) 10 MG tablet Take 1 tablet by mouth Daily. 5/2/23   Mallorie Roa APRN   empagliflozin (Jardiance) 10 MG tablet tablet Take 1 tablet by mouth Daily for 30 days. 5/2/23 6/1/23  Mallorie Roa APRN   Fluticasone-Salmeterol (ADVAIR/WIXELA) 250-50 MCG/ACT DISKUS Inhale 1 puff 2 (Two) Times a Day for 30 days. 5/2/23  23  Mallorie Roa APRN   insulin degludec (Tresiba FlexTouch) 100 UNIT/ML solution pen-injector injection Inject 20 Units under the skin into the appropriate area as directed Daily. 23   Mallorie Roa APRN   ipratropium-albuterol (DUO-NEB) 0.5-2.5 mg/3 ml nebulizer Take 3 mL by nebulization Every 4 (Four) Hours As Needed for Wheezing or Shortness of Air. 21   Ricci Trinidad DO   lisinopril (PRINIVIL,ZESTRIL) 5 MG tablet Take 1 tablet by mouth Daily for 30 days. 23  Mallorie Roa APRN   metFORMIN (GLUCOPHAGE) 500 MG tablet Take 1 tablet by mouth 2 (Two) Times a Day With Meals for 30 days. 23  Mallorie Roa APRN   metoprolol succinate XL (TOPROL-XL) 25 MG 24 hr tablet Take 1 tablet by mouth Daily for 30 days. 23  Mallorie Roa APRN   omeprazole (priLOSEC) 20 MG capsule Take 1 capsule by mouth Daily. 23   Mallorie Roa APRN        Social History:   Social History     Tobacco Use   • Smoking status: Former     Packs/day: 1.50     Years: 2.00     Pack years: 3.00     Types: Cigarettes     Quit date: 3/19/2021     Years since quittin.1   • Smokeless tobacco: Current     Types: Chew   Vaping Use   • Vaping Use: Never used   Substance Use Topics   • Alcohol use: Never   • Drug use: Never         Review of Systems:  Review of Systems   Constitutional: Negative for chills and fever.   HENT: Negative for congestion, ear pain and sore throat.    Eyes: Negative for pain.   Respiratory: Positive for cough, shortness of breath and wheezing. Negative for chest tightness.    Cardiovascular: Negative for chest pain.   Gastrointestinal: Negative for abdominal pain, diarrhea, nausea and vomiting.   Genitourinary: Negative for flank pain and hematuria.   Musculoskeletal: Negative for joint swelling.   Skin: Negative for pallor.   Neurological: Negative for seizures and headaches.   All other systems reviewed and are negative.       Physical Exam:  /65    "Pulse 77   Temp 97.6 °F (36.4 °C)   Resp 22   Ht 185.4 cm (73\")   Wt 109 kg (240 lb 4.8 oz)   SpO2 95%   BMI 31.70 kg/m²     Physical Exam  Vitals and nursing note reviewed.   Constitutional:       General: He is in acute distress.      Appearance: Normal appearance. He is ill-appearing. He is not toxic-appearing.   HENT:      Head: Normocephalic and atraumatic.      Mouth/Throat:      Mouth: Mucous membranes are moist.   Eyes:      General: No scleral icterus.  Cardiovascular:      Rate and Rhythm: Normal rate and regular rhythm.      Pulses: Normal pulses.      Heart sounds: Normal heart sounds.   Pulmonary:      Effort: Tachypnea, accessory muscle usage and respiratory distress present.      Breath sounds: Wheezing and rhonchi present.   Abdominal:      General: Abdomen is flat.      Palpations: Abdomen is soft.      Tenderness: There is no abdominal tenderness.   Musculoskeletal:         General: Normal range of motion.      Cervical back: Normal range of motion and neck supple.   Skin:     General: Skin is warm and dry.   Neurological:      Mental Status: He is alert and oriented to person, place, and time. Mental status is at baseline.                  Procedures:  Procedures      Medical Decision Making:      Comorbidities that affect care:    COPD    External Notes reviewed:    EMS Run sheet: Which indicates COPD exacerbation and hypoxia      The following orders were placed and all results were independently analyzed by me:  Orders Placed This Encounter   Procedures   • Blood Culture - Blood,   • Blood Culture - Blood,   • S. Pneumo Ag Urine or CSF - Urine, Urine, Clean Catch   • Mycoplasma Pneumoniae Antibody, IgM - Blood, Arm, Left   • Legionella Antigen, Urine - Urine, Urine, Clean Catch   • Respiratory Culture - Sputum, Cough   • XR Chest 1 View   • [place] Draw   • Comprehensive Metabolic Panel   • BNP   • Single High Sensitivity Troponin T   • Lactic Acid, Plasma   • CBC Auto Differential   • " ABG with Co-Ox and Electrolytes   • CBC Auto Differential   • Comprehensive Metabolic Panel   • Magnesium   • Phosphorus   • Blood Gas, Arterial -With Co-Ox Panel: Yes   • Phosphorus   • Comprehensive Metabolic Panel   • Magnesium   • Diet: Regular/House Diet; Texture: Regular Texture (IDDSI 7); Fluid Consistency: Thin (IDDSI 0)   • Undress & Gown   • Cardiac Monitoring   • Continuous Pulse Oximetry   • Vital Signs   • Vital Signs   • Notify Provider (With Default Parameters)   • Intake & Output   • Weigh Patient   • Daily Weights   • Oral Care   • Saline Lock & Maintain IV Access   • Place Sequential Compression Device   • Maintain Sequential Compression Device   • Hospitalist (on-call MD unless specified)   • OT Consult: Eval & Treat   • PT Consult: Eval & Treat Functional Mobility Below Baseline   • NIPPV (CPAP or BIPAP)   • Oxygen Therapy- Nasal Cannula; Titrate for SPO2: 90% - 95%   • RT to Initiate Bronchopulmonary Hygiene Protocol   • Oscillating Positive Expiratory Pressure (OPEP)   • POC Glucose TID AC   • POC Glucose Once   • POC Glucose Once   • ECG 12 Lead ED Triage Standing Order; SOA   • Insert Peripheral IV   • Insert Peripheral IV   • Inpatient Admission   • CBC & Differential   • Green Top (Gel)   • Lavender Top   • Gold Top - SST   • Light Blue Top   • CBC & Differential       Medications Given in the Emergency Department:  Medications   sodium chloride 0.9 % flush 10 mL (has no administration in time range)   ipratropium-albuterol (DUO-NEB) nebulizer solution 3 mL (has no administration in time range)   cefTRIAXone (ROCEPHIN) IVPB 1 g (has no administration in time range)   AZITHROMYCIN 500 MG/250 ML 0.9% NS IVPB (vial-mate) (has no administration in time range)   methylPREDNISolone sodium succinate (SOLU-Medrol) injection 60 mg (60 mg Intravenous Given 5/9/23 0501)   aspirin chewable tablet 81 mg (81 mg Oral Given 5/9/23 0854)   empagliflozin (JARDIANCE) tablet 10 mg (10 mg Oral Given 5/9/23  0855)   metoprolol succinate XL (TOPROL-XL) 24 hr tablet 25 mg (25 mg Oral Given 5/9/23 0854)   dextrose (GLUTOSE) oral gel 15 g (has no administration in time range)   dextrose (D50W) (25 g/50 mL) IV injection 25 g (has no administration in time range)   glucagon (GLUCAGEN) injection 1 mg (has no administration in time range)   Insulin Lispro (humaLOG) injection 2-9 Units (2 Units Subcutaneous Not Given 5/9/23 0714)   sodium chloride 0.9 % flush 10 mL (has no administration in time range)   sodium chloride 0.9 % flush 10 mL (10 mL Intravenous Given 5/9/23 0855)   sodium chloride 0.9 % infusion 40 mL (has no administration in time range)   sennosides-docusate (PERICOLACE) 8.6-50 MG per tablet 2 tablet (2 tablets Oral Given 5/9/23 0854)     And   polyethylene glycol (MIRALAX) packet 17 g (has no administration in time range)     And   bisacodyl (DULCOLAX) EC tablet 5 mg (has no administration in time range)     And   bisacodyl (DULCOLAX) suppository 10 mg (has no administration in time range)   ondansetron (ZOFRAN) injection 4 mg (has no administration in time range)   multivitamin with minerals 1 tablet (1 tablet Oral Given 5/9/23 0854)   melatonin tablet 5 mg (has no administration in time range)   Enoxaparin Sodium (LOVENOX) syringe 40 mg (40 mg Subcutaneous Given 5/9/23 0854)   ipratropium-albuterol (DUO-NEB) nebulizer solution 3 mL (3 mL Nebulization Given 5/9/23 0742)   arformoterol (BROVANA) nebulizer solution 15 mcg (15 mcg Nebulization Given 5/9/23 0741)   budesonide (PULMICORT) nebulizer solution 0.5 mg (0.5 mg Nebulization Given 5/9/23 0742)   albuterol (PROVENTIL) nebulizer solution 0.083% 2.5 mg/3mL (has no administration in time range)   LORazepam (ATIVAN) injection 1 mg (1 mg Intravenous Given 5/8/23 1601)   methylPREDNISolone sodium succinate (SOLU-Medrol) injection 125 mg (125 mg Intravenous Given 5/8/23 1576)   cefTRIAXone (ROCEPHIN) IVPB 2 g (0 g Intravenous Stopped 5/8/23 3383)   AZITHROMYCIN 500  MG/250 ML 0.9% NS IVPB (vial-mate) (500 mg Intravenous New Bag 5/8/23 1728)        ED Course:         Labs:    Lab Results (last 24 hours)     Procedure Component Value Units Date/Time    CBC & Differential [[id number]]  (Abnormal) Collected: 05/08/23 1550    Specimen: Blood Updated: 05/08/23 1602    Narrative:      The following orders were created for panel order CBC & Differential.  Procedure                               Abnormality         Status                     ---------                               -----------         ------                     CBC Auto Differential[[id number]]        Abnormal            Final result                 Please view results for these tests on the individual orders.    Comprehensive Metabolic Panel [[id number]]  (Abnormal) Collected: 05/08/23 1550    Specimen: Blood Updated: 05/08/23 1621     Glucose 113 mg/dL      BUN 15 mg/dL      Creatinine 1.07 mg/dL      Sodium 134 mmol/L      Potassium 4.4 mmol/L      Chloride 98 mmol/L      CO2 25.7 mmol/L      Calcium 8.8 mg/dL      Total Protein 7.4 g/dL      Albumin 3.7 g/dL      ALT (SGPT) 6 U/L      AST (SGOT) 11 U/L      Alkaline Phosphatase 99 U/L      Total Bilirubin 0.4 mg/dL      Globulin 3.7 gm/dL      A/G Ratio 1.0 g/dL      BUN/Creatinine Ratio 14.0     Anion Gap 10.3 mmol/L      eGFR 72.8 mL/min/1.73     Narrative:      GFR Normal >60  Chronic Kidney Disease <60  Kidney Failure <15    The GFR formula is only valid for adults with stable renal function between ages 18 and 70.    BNP [[id number]]  (Normal) Collected: 05/08/23 1550    Specimen: Blood Updated: 05/08/23 1619     proBNP 376.1 pg/mL     Narrative:      Among patients with dyspnea, NT-proBNP is highly sensitive for the detection of acute congestive heart failure. In addition NT-proBNP of <300 pg/ml effectively rules out acute congestive heart failure with 99% negative predictive value.      Single High Sensitivity Troponin T [[id number]]  (Abnormal) Collected: 05/08/23  1550    Specimen: Blood Updated: 05/08/23 1621     HS Troponin T 51 ng/L     Narrative:      High Sensitive Troponin T Reference Range:  <10.0 ng/L- Negative Female for AMI  <15.0 ng/L- Negative Male for AMI  >=10 - Abnormal Female indicating possible myocardial injury.  >=15 - Abnormal Male indicating possible myocardial injury.   Clinicians would have to utilize clinical acumen, EKG, Troponin, and serial changes to determine if it is an Acute Myocardial Infarction or myocardial injury due to an underlying chronic condition.         Blood Culture - Blood, Arm, Left [[id number]] Collected: 05/08/23 1550    Specimen: Blood from Arm, Left Updated: 05/08/23 1559    Blood Culture - Blood, Arm, Right [[id number]] Collected: 05/08/23 1550    Specimen: Blood from Arm, Right Updated: 05/08/23 1559    Lactic Acid, Plasma [[id number]]  (Normal) Collected: 05/08/23 1550    Specimen: Blood Updated: 05/08/23 1619     Lactate 1.3 mmol/L     CBC Auto Differential [[id number]]  (Abnormal) Collected: 05/08/23 1550    Specimen: Blood Updated: 05/08/23 1602     WBC 11.40 10*3/mm3      RBC 5.30 10*6/mm3      Hemoglobin 12.7 g/dL      Hematocrit 42.8 %      MCV 80.8 fL      MCH 24.0 pg      MCHC 29.7 g/dL      RDW 16.7 %      RDW-SD 48.3 fl      MPV 9.3 fL      Platelets 208 10*3/mm3      Neutrophil % 70.4 %      Lymphocyte % 13.2 %      Monocyte % 6.4 %      Eosinophil % 9.1 %      Basophil % 0.5 %      Immature Grans % 0.4 %      Neutrophils, Absolute 8.02 10*3/mm3      Lymphocytes, Absolute 1.50 10*3/mm3      Monocytes, Absolute 0.73 10*3/mm3      Eosinophils, Absolute 1.04 10*3/mm3      Basophils, Absolute 0.06 10*3/mm3      Immature Grans, Absolute 0.05 10*3/mm3      nRBC 0.0 /100 WBC     Mycoplasma Pneumoniae Antibody, IgM - Blood, [[id number]]  (Abnormal) Collected: 05/08/23 1550    Specimen: Blood Updated: 05/08/23 1854     Mycoplasma pneumo IgM Positive    ABG with Co-Ox and Electrolytes [[id number]]  (Abnormal) Collected: 05/08/23  1607    Specimen: Arterial Blood Updated: 05/08/23 1614     pH, Arterial 7.341 pH units      pCO2, Arterial 54.5 mm Hg      pO2, Arterial 86.4 mm Hg      HCO3, Arterial 28.8 mmol/L      Base Excess, Arterial 2.0 mmol/L      O2 Saturation, Arterial 95.7 %      Hemoglobin, Blood Gas 13.0 g/dL      Carboxyhemoglobin 1.2 %      Methemoglobin 0.00 %      Oxyhemoglobin 94.6 %      FHHB 4.2 %      Nael's Test Positive     Note V60 16/8 30%     Site Arterial: right radial     Modality BiPap     FIO2 30 %      Flow Rate --     Sodium, Arterial 136.9 mmol/L      Potassium, Arterial 4.13 mmol/L      Ionized Calcium, Arterial 1.10 mmol/L      Chloride, Arterial 100 mmol/L      Glucose, Arterial 114 mg/dL      Lactate, Arterial 1.06 mmol/L      PO2/FIO2 288    Blood Gas, Arterial -With Co-Ox Panel: Yes [[id number]]  (Abnormal) Collected: 05/08/23 1739    Specimen: Arterial Blood Updated: 05/08/23 1743     pH, Arterial 7.225 pH units      pCO2, Arterial 66.1 mm Hg      pO2, Arterial 79.4 mm Hg      HCO3, Arterial 26.8 mmol/L      Base Excess, Arterial -2.2 mmol/L      O2 Saturation, Arterial 92.5 %      Hemoglobin, Blood Gas 13.2 g/dL      Carboxyhemoglobin 1.2 %      Methemoglobin 0.10 %      Oxyhemoglobin 91.3 %      FHHB 7.4 %      Site Arterial: right radial     Modality BiPap     FIO2 30 %      PO2/FIO2 265     Nael's Test Positive     Note V60 16/8 30%    POC Glucose Once [[id number]]  (Abnormal) Collected: 05/08/23 2039    Specimen: Blood Updated: 05/08/23 2042     Glucose 131 mg/dL      Comment: Serial Number: [id number]:  [id number]       CBC Auto Differential [[id number]]  (Abnormal) Collected: 05/09/23 0554    Specimen: Blood Updated: 05/09/23 0631     WBC 6.83 10*3/mm3      RBC 5.15 10*6/mm3      Hemoglobin 12.4 g/dL      Hematocrit 42.5 %      MCV 82.5 fL      MCH 24.1 pg      MCHC 29.2 g/dL      RDW 16.4 %      RDW-SD 49.3 fl      MPV 9.9 fL      Platelets 186 10*3/mm3      Neutrophil % 87.2 %      Lymphocyte %  11.0 %      Monocyte % 0.6 %      Eosinophil % 0.0 %      Basophil % 0.3 %      Immature Grans % 0.9 %      Neutrophils, Absolute 5.96 10*3/mm3      Lymphocytes, Absolute 0.75 10*3/mm3      Monocytes, Absolute 0.04 10*3/mm3      Eosinophils, Absolute 0.00 10*3/mm3      Basophils, Absolute 0.02 10*3/mm3      Immature Grans, Absolute 0.06 10*3/mm3      nRBC 0.0 /100 WBC     Comprehensive Metabolic Panel [[id number]]  (Abnormal) Collected: 05/09/23 0554    Specimen: Blood Updated: 05/09/23 0638     Glucose 160 mg/dL      BUN 18 mg/dL      Creatinine 1.10 mg/dL      Sodium 140 mmol/L      Potassium 4.7 mmol/L      Chloride 102 mmol/L      CO2 26.9 mmol/L      Calcium 9.0 mg/dL      Total Protein 7.3 g/dL      Albumin 3.4 g/dL      ALT (SGPT) 7 U/L      AST (SGOT) 9 U/L      Alkaline Phosphatase 94 U/L      Total Bilirubin 0.3 mg/dL      Globulin 3.9 gm/dL      A/G Ratio 0.9 g/dL      BUN/Creatinine Ratio 16.4     Anion Gap 11.1 mmol/L      eGFR 70.4 mL/min/1.73     Narrative:      GFR Normal >60  Chronic Kidney Disease <60  Kidney Failure <15    The GFR formula is only valid for adults with stable renal function between ages 18 and 70.    Magnesium [[id number]]  (Normal) Collected: 05/09/23 0554    Specimen: Blood Updated: 05/09/23 0638     Magnesium 2.2 mg/dL     Phosphorus [[id number]]  (Abnormal) Collected: 05/09/23 0554    Specimen: Blood Updated: 05/09/23 0638     Phosphorus 4.8 mg/dL     POC Glucose Once [[id number]]  (Abnormal) Collected: 05/09/23 0708    Specimen: Blood Updated: 05/09/23 0713     Glucose 139 mg/dL      Comment: Serial Number: [id number]:  [id number]                  EKG: Sinus rhythm with a rate of 90 bpm first-degree AV block with normal P wave  Normal QRS and normal axis  Normal ST segments and normal QT/QTc interval.          Imaging:    XR Chest 1 View    Result Date: 5/8/2023  PROCEDURE: XR CHEST 1 VW  COMPARISON: [facility], CR, XR CHEST 1 VW, 4/21/2023, 9:04.   INDICATIONS: SOA Triage Protocol  FINDINGS:  Small, left greater than right pleural effusions with bibasilar opacities suggestive either atelectasis or pneumonia.  Cardiac and mediastinal contours within normal limits.  Regional skeleton is unremarkable.        1. Small, left greater than right pleural effusions and bibasilar opacities either atelectasis or pneumonia.       JEF BAEZ MD       Electronically Signed and Approved By: JEF BAEZ MD on 5/08/2023 at 16:11                 Differential Diagnosis and Discussion:    Dyspnea: Differential diagnosis includes but is not limited to metabolic acidosis, neurological disorders, psychogenic, asthma, pneumothorax, upper airway obstruction, COPD, pneumonia, noncardiogenic pulmonary edema, interstitial lung disease, anemia, congestive heart failure, and pulmonary embolism    All labs were reviewed and interpreted by me.  EKG was interpreted by me.    [facility]     Critical Care Note: Total Critical Care time of 45 minutes. Total critical care time documented does not include time spent on separately billed procedures for services of nurses or physician assistants. I personally saw and examined the patient. I have reviewed all diagnostic interpretations and treatment plans as written. I was present for the key portions of any procedures performed and the inclusive time noted in any critical care statement. Critical care time includes patient management by me, time spent at the patients bedside,  time to review lab and imaging results, discussing patient care, documentation in the medical record, and time spent with family or caregiver.    Patient Care Considerations:    CT CHEST: I considered ordering a CT scan of the chest, however The patient has signs of COPD exacerbation and pneumonia without signs of pulmonary embolism      Consultants/Shared Management Plan:    Hospitalist: I have discussed the case with Hospitalist who agrees to accept the patient for  admission.    Social Determinants of Health:    Patient is independent, reliable, and has access to care.       Disposition and Care Coordination:    Admit:   Through independent evaluation of the patient's history, physical, and imperical data, the patient meets criteria for observation/admission to the hospital.        Final diagnoses:   COPD exacerbation   Hypoxia   Acute on chronic respiratory failure with hypoxia        ED Disposition     ED Disposition   Decision to Admit    Condition   --    Comment   Level of Care: Telemetry [5]   Diagnosis: COPD exacerbation [246544]   Admitting Physician: JING GIBBS [[id number]]   Attending Physician: JING GIBBS [[id number]]   Certification: I Certify That Inpatient Hospital Services Are Medically Necessary For Greater Than 2 Midnights               This medical record created using voice recognition software.           Khoa Rodgers, DO  05/09/23 1056

## 2023-05-09 LAB
ALBUMIN SERPL-MCNC: 3.4 G/DL (ref 3.5–5.2)
ALBUMIN/GLOB SERPL: 0.9 G/DL
ALP SERPL-CCNC: 94 U/L (ref 39–117)
ALT SERPL W P-5'-P-CCNC: 7 U/L (ref 1–41)
ANION GAP SERPL CALCULATED.3IONS-SCNC: 11.1 MMOL/L (ref 5–15)
AST SERPL-CCNC: 9 U/L (ref 1–40)
BASOPHILS # BLD AUTO: 0.02 10*3/MM3 (ref 0–0.2)
BASOPHILS NFR BLD AUTO: 0.3 % (ref 0–1.5)
BILIRUB SERPL-MCNC: 0.3 MG/DL (ref 0–1.2)
BUN SERPL-MCNC: 18 MG/DL (ref 8–23)
BUN/CREAT SERPL: 16.4 (ref 7–25)
CALCIUM SPEC-SCNC: 9 MG/DL (ref 8.6–10.5)
CHLORIDE SERPL-SCNC: 102 MMOL/L (ref 98–107)
CO2 SERPL-SCNC: 26.9 MMOL/L (ref 22–29)
CREAT SERPL-MCNC: 1.1 MG/DL (ref 0.76–1.27)
DEPRECATED RDW RBC AUTO: 49.3 FL (ref 37–54)
EGFRCR SERPLBLD CKD-EPI 2021: 70.4 ML/MIN/1.73
EOSINOPHIL # BLD AUTO: 0 10*3/MM3 (ref 0–0.4)
EOSINOPHIL NFR BLD AUTO: 0 % (ref 0.3–6.2)
ERYTHROCYTE [DISTWIDTH] IN BLOOD BY AUTOMATED COUNT: 16.4 % (ref 12.3–15.4)
GLOBULIN UR ELPH-MCNC: 3.9 GM/DL
GLUCOSE BLDC GLUCOMTR-MCNC: 121 MG/DL (ref 70–99)
GLUCOSE BLDC GLUCOMTR-MCNC: 139 MG/DL (ref 70–99)
GLUCOSE BLDC GLUCOMTR-MCNC: 177 MG/DL (ref 70–99)
GLUCOSE SERPL-MCNC: 160 MG/DL (ref 65–99)
HCT VFR BLD AUTO: 42.5 % (ref 37.5–51)
HGB BLD-MCNC: 12.4 G/DL (ref 13–17.7)
IMM GRANULOCYTES # BLD AUTO: 0.06 10*3/MM3 (ref 0–0.05)
IMM GRANULOCYTES NFR BLD AUTO: 0.9 % (ref 0–0.5)
L PNEUMO1 AG UR QL IA: NEGATIVE
LYMPHOCYTES # BLD AUTO: 0.75 10*3/MM3 (ref 0.7–3.1)
LYMPHOCYTES NFR BLD AUTO: 11 % (ref 19.6–45.3)
MAGNESIUM SERPL-MCNC: 2.2 MG/DL (ref 1.6–2.4)
MCH RBC QN AUTO: 24.1 PG (ref 26.6–33)
MCHC RBC AUTO-ENTMCNC: 29.2 G/DL (ref 31.5–35.7)
MCV RBC AUTO: 82.5 FL (ref 79–97)
MONOCYTES # BLD AUTO: 0.04 10*3/MM3 (ref 0.1–0.9)
MONOCYTES NFR BLD AUTO: 0.6 % (ref 5–12)
NEUTROPHILS NFR BLD AUTO: 5.96 10*3/MM3 (ref 1.7–7)
NEUTROPHILS NFR BLD AUTO: 87.2 % (ref 42.7–76)
NRBC BLD AUTO-RTO: 0 /100 WBC (ref 0–0.2)
PHOSPHATE SERPL-MCNC: 4.8 MG/DL (ref 2.5–4.5)
PLATELET # BLD AUTO: 186 10*3/MM3 (ref 140–450)
PMV BLD AUTO: 9.9 FL (ref 6–12)
POTASSIUM SERPL-SCNC: 4.7 MMOL/L (ref 3.5–5.2)
PROT SERPL-MCNC: 7.3 G/DL (ref 6–8.5)
RBC # BLD AUTO: 5.15 10*6/MM3 (ref 4.14–5.8)
S PNEUM AG SPEC QL LA: NEGATIVE
SODIUM SERPL-SCNC: 140 MMOL/L (ref 136–145)
WBC NRBC COR # BLD: 6.83 10*3/MM3 (ref 3.4–10.8)

## 2023-05-09 PROCEDURE — 94799 UNLISTED PULMONARY SVC/PX: CPT

## 2023-05-09 PROCEDURE — 25010000002 ENOXAPARIN PER 10 MG: Performed by: INTERNAL MEDICINE

## 2023-05-09 PROCEDURE — 94664 DEMO&/EVAL PT USE INHALER: CPT

## 2023-05-09 PROCEDURE — 83735 ASSAY OF MAGNESIUM: CPT | Performed by: INTERNAL MEDICINE

## 2023-05-09 PROCEDURE — 25010000002 METHYLPREDNISOLONE PER 125 MG: Performed by: INTERNAL MEDICINE

## 2023-05-09 PROCEDURE — 82948 REAGENT STRIP/BLOOD GLUCOSE: CPT

## 2023-05-09 PROCEDURE — 99232 SBSQ HOSP IP/OBS MODERATE 35: CPT | Performed by: INTERNAL MEDICINE

## 2023-05-09 PROCEDURE — 80053 COMPREHEN METABOLIC PANEL: CPT | Performed by: INTERNAL MEDICINE

## 2023-05-09 PROCEDURE — 94761 N-INVAS EAR/PLS OXIMETRY MLT: CPT

## 2023-05-09 PROCEDURE — 84100 ASSAY OF PHOSPHORUS: CPT | Performed by: INTERNAL MEDICINE

## 2023-05-09 PROCEDURE — 85025 COMPLETE CBC W/AUTO DIFF WBC: CPT | Performed by: INTERNAL MEDICINE

## 2023-05-09 PROCEDURE — 25010000002 CEFTRIAXONE PER 250 MG: Performed by: INTERNAL MEDICINE

## 2023-05-09 PROCEDURE — 63710000001 INSULIN LISPRO (HUMAN) PER 5 UNITS: Performed by: INTERNAL MEDICINE

## 2023-05-09 PROCEDURE — 87899 AGENT NOS ASSAY W/OPTIC: CPT | Performed by: INTERNAL MEDICINE

## 2023-05-09 PROCEDURE — 25010000002 AZITHROMYCIN PER 500 MG: Performed by: INTERNAL MEDICINE

## 2023-05-09 PROCEDURE — 87449 NOS EACH ORGANISM AG IA: CPT | Performed by: INTERNAL MEDICINE

## 2023-05-09 RX ORDER — BUDESONIDE AND FORMOTEROL FUMARATE DIHYDRATE 160; 4.5 UG/1; UG/1
2 AEROSOL RESPIRATORY (INHALATION)
Status: DISCONTINUED | OUTPATIENT
Start: 2023-05-09 | End: 2023-05-10 | Stop reason: HOSPADM

## 2023-05-09 RX ADMIN — METHYLPREDNISOLONE SODIUM SUCCINATE 60 MG: 125 INJECTION INTRAMUSCULAR; INTRAVENOUS at 23:10

## 2023-05-09 RX ADMIN — ASPIRIN 81 MG 81 MG: 81 TABLET ORAL at 08:54

## 2023-05-09 RX ADMIN — MULTIPLE VITAMINS W/ MINERALS TAB 1 TABLET: TAB at 08:54

## 2023-05-09 RX ADMIN — ENOXAPARIN SODIUM 40 MG: 100 INJECTION SUBCUTANEOUS at 08:54

## 2023-05-09 RX ADMIN — AZITHROMYCIN 500 MG: 500 INJECTION, POWDER, LYOPHILIZED, FOR SOLUTION INTRAVENOUS at 18:08

## 2023-05-09 RX ADMIN — Medication 10 ML: at 08:55

## 2023-05-09 RX ADMIN — METHYLPREDNISOLONE SODIUM SUCCINATE 60 MG: 125 INJECTION INTRAMUSCULAR; INTRAVENOUS at 11:28

## 2023-05-09 RX ADMIN — SENNOSIDES AND DOCUSATE SODIUM 2 TABLET: 8.6; 5 TABLET ORAL at 08:54

## 2023-05-09 RX ADMIN — ARFORMOTEROL TARTRATE 15 MCG: 15 SOLUTION RESPIRATORY (INHALATION) at 07:41

## 2023-05-09 RX ADMIN — BUDESONIDE 0.5 MG: 0.5 SUSPENSION RESPIRATORY (INHALATION) at 07:42

## 2023-05-09 RX ADMIN — METHYLPREDNISOLONE SODIUM SUCCINATE 60 MG: 125 INJECTION INTRAMUSCULAR; INTRAVENOUS at 05:01

## 2023-05-09 RX ADMIN — IPRATROPIUM BROMIDE AND ALBUTEROL SULFATE 3 ML: 2.5; .5 SOLUTION RESPIRATORY (INHALATION) at 19:10

## 2023-05-09 RX ADMIN — METHYLPREDNISOLONE SODIUM SUCCINATE 60 MG: 125 INJECTION INTRAMUSCULAR; INTRAVENOUS at 16:56

## 2023-05-09 RX ADMIN — IPRATROPIUM BROMIDE AND ALBUTEROL SULFATE 3 ML: 2.5; .5 SOLUTION RESPIRATORY (INHALATION) at 12:25

## 2023-05-09 RX ADMIN — EMPAGLIFLOZIN 10 MG: 10 TABLET, FILM COATED ORAL at 08:55

## 2023-05-09 RX ADMIN — Medication 10 ML: at 18:09

## 2023-05-09 RX ADMIN — Medication 10 ML: at 17:03

## 2023-05-09 RX ADMIN — INSULIN LISPRO 2 UNITS: 100 INJECTION, SOLUTION INTRAVENOUS; SUBCUTANEOUS at 12:09

## 2023-05-09 RX ADMIN — BUDESONIDE AND FORMOTEROL FUMARATE DIHYDRATE 2 PUFF: 160; 4.5 AEROSOL RESPIRATORY (INHALATION) at 19:10

## 2023-05-09 RX ADMIN — CEFTRIAXONE SODIUM 1 G: 1 INJECTION, SOLUTION INTRAVENOUS at 20:22

## 2023-05-09 RX ADMIN — METOPROLOL SUCCINATE 25 MG: 25 TABLET, EXTENDED RELEASE ORAL at 08:54

## 2023-05-09 RX ADMIN — IPRATROPIUM BROMIDE AND ALBUTEROL SULFATE 3 ML: 2.5; .5 SOLUTION RESPIRATORY (INHALATION) at 07:42

## 2023-05-09 NOTE — SIGNIFICANT NOTE
05/09/23 1313   Plan   Plan Patient reports lives with his sisters that can assist if needed.  Reports using [employer] for home O2.  Provides own IADL's.  Possible taxi to provide transportation home.  Reports driving self to appointments.  Patient refuses home health and plans to return home with sisters.  Reports no financial needs at this time. Meds to Beds services

## 2023-05-09 NOTE — PLAN OF CARE
Goal Outcome Evaluation:    AAO X4. TOLERATING DIET, ROOM AIR (WEARS 2L/NC PRN AT HOME), & ACTIVITY WITH STAND-BY ASSIST. TOLERATED GIVING HIMSELF A SHOWER TODAY.

## 2023-05-09 NOTE — PROGRESS NOTES
[facility]   Hospitalist Progress Note  Date: 2023  Patient Name: Dilip Faulkner  : 1950  MRN: [id number]  Date of admission: 2023    Subjective   Subjective     Chief Complaint:   Shortness of breath    Summary:   Dilip Faulkner is a 73 y.o. male who presents to the hospital with acute hypoxemia.  There is no family at bedside, patient is altered secondary to his hypoxia, history is obtained from chart review and discussion with emergency room physician.  Patient presented to the emergency department via EMS as patient was satting only 50% on 6 L of oxygen at home.  Patient typically is on 2 L for his chronic hypoxemia and COPD.  Reportedly the patient had no fever or chills or cough, he denied chest pain upon initial evaluation.  The patient was very dyspneic and had trouble speaking secondary to this.  In the emergency department the patient was found to be hypoxemic, he was on a nonrebreather.  Patient was transitioned to BiPAP, patient was mildly hypercapnic, patient was noted to be hypoxemic.  Patient was given antibiotics, Solu-Medrol and the hospitalist service was asked to admit for further work-up and management    Interval Followup:   No acute events overnight, patient weaned off of BiPAP, patient currently on 4 L nasal cannula, his breathing is greatly improved, his work of breathing has decreased.  Patient states that he has felt bad for 2 to 3 days he states he did not run out of any medicines.    Objective   Objective     Vitals:   Temp:  [97 °F (36.1 °C)-98.2 °F (36.8 °C)] 97.6 °F (36.4 °C)  Heart Rate:  [] 77  Resp:  [18-28] 22  BP: (112-155)/(64-95) 134/65  Flow (L/min):  [4] 4    Physical Exam   GEN: No acute distress, sitting up on side of bed  HEENT: Moist mucous membranes  LUNGS: Diminished breath sounds at the bases bilaterally, good air movement throughout  CARDIAC: Regular rate and rhythm  NEURO: Moving all 4 extremities spontaneously  SKIN: No obvious skin  breakdown    Result Review    Result Review:  I have personally reviewed the results as below  [x]  Laboratory  CBC        4/21/2023    09:25 5/8/2023    15:50 5/9/2023    05:54   CBC   WBC 12.47   11.40   6.83     RBC 4.86   5.30   5.15     Hemoglobin 11.7   12.7   12.4     Hematocrit 38.9   42.8   42.5     MCV 80.0   80.8   82.5     MCH 24.1   24.0   24.1     MCHC 30.1   29.7   29.2     RDW 17.1   16.7   16.4     Platelets 195   208   186       CMP        4/21/2023    09:25 4/21/2023    09:45 5/8/2023    15:50 5/8/2023    16:07 5/9/2023    05:54   CMP   Glucose 153   156   113   114   160     BUN 24    15    18     Creatinine 1.18    1.07    1.10     EGFR 65.2    72.8    70.4     Sodium 133   136.7   134   136.9   140     Potassium 4.2    4.4    4.7     Chloride 100    98    102     Calcium 8.3    8.8    9.0     Total Protein 7.0    7.4    7.3     Albumin 3.4    3.7    3.4     Globulin 3.6    3.7    3.9     Total Bilirubin 0.4    0.4    0.3     Alkaline Phosphatase 89    99    94     AST (SGOT) 13    11    9     ALT (SGPT) 9    6    7     Albumin/Globulin Ratio 0.9    1.0    0.9     BUN/Creatinine Ratio 20.3    14.0    16.4     Anion Gap 9.8    10.3    11.1       []  Microbiology  []  Radiology  []  EKG/Telemetry   []  Cardiology/Vascular   []  Pathology  []  Old records  []  Other:    Assessment & Plan   Assessment / Plan     Assessment:  Acute hypoxemic respiratory failure requiring NIPPV  Acute exacerbation of COPD  Metabolic encephalopathy secondary to hypoxemia  Diabetes mellitus  Hypertension  Chronic hypoxemic respiratory failure     Plan:  Patient admitted to the hospital for further work-up and management of above  Continue supplemental oxygen to maintain saturations above 88%  Continue sliding scale insulin  Continue Solu-Medrol IV  Continue Brovana, Pulmicort, DuoNebs  Continue with Rocephin, azithromycin  Check sputum culture, urine antigens, mycoplasma IgM positive  Titrate O2 for SPO2 greater than  88%  Resume appropriate home medications  Chest x-ray personally reviewed by me  Patient is critically ill with respiratory failure     Reviewed patients labs and imaging, and discussed with patient and nurse at bedside     DVT prophylaxis:  Medical and mechanical DVT prophylaxis orders are present.    CODE STATUS:          Electronically signed by Yung Singleton MD, 05/09/23, 10:19 AM EDT.

## 2023-05-09 NOTE — PLAN OF CARE
Goal Outcome Evaluation:  Patient wore BiPAP last night with a continuous pulse oximeter attached. Patient wore the BiPAP with the settings of 16/8, a rate of 20 and 30% FiO2. Patient wears 4L NC when off of BiPAP.

## 2023-05-09 NOTE — PROGRESS NOTES
Respiratory Therapist Broncho-Pulmonary Hygiene Progress Note      Patient Name:  Dilip Faulkner  YOB: 1950    Dilip Faulkner meets the qualification for Level 1 of the Bronco-Pulmonary Hygiene Protocol. This was based on my daily patient assessment and includes review of chest x-ray results, cough ability and quality, oxygenation, secretions or risk for secretion development and patient mobility.     Broncho-Pulmonary Hygiene Assessment:    Level of Movement: Actively changing positions without assistance  Alert/ oriented/ cooperative    Breath Sounds: Diminished and/or coarse rhonchi    Cough: Strong, effective    Chest X-Ray: Presence of atelectasis and/or consolidation    Sputum Productions: None or small amount of thin or watery secretions with effective cough    History and Physical: Chronic condition    SpO2 to Oxygen Need: greater than 92% on 4-6L nasal canula    Current SpO2 is: 97% on 4LNC      Based on this information, I have completed the following interventions: Aerobika with bronchodialtor medication or TID      Electronically signed by Renate Chacko, RRT, 05/09/23, 2:16 AM EDT.

## 2023-05-10 VITALS
OXYGEN SATURATION: 95 % | HEART RATE: 88 BPM | BODY MASS INDEX: 30.97 KG/M2 | TEMPERATURE: 97.9 F | RESPIRATION RATE: 18 BRPM | HEIGHT: 73 IN | SYSTOLIC BLOOD PRESSURE: 120 MMHG | WEIGHT: 233.69 LBS | DIASTOLIC BLOOD PRESSURE: 59 MMHG

## 2023-05-10 LAB
ALBUMIN SERPL-MCNC: 3.4 G/DL (ref 3.5–5.2)
ALBUMIN/GLOB SERPL: 1.1 G/DL
ALP SERPL-CCNC: 84 U/L (ref 39–117)
ALT SERPL W P-5'-P-CCNC: 8 U/L (ref 1–41)
ANION GAP SERPL CALCULATED.3IONS-SCNC: 11.9 MMOL/L (ref 5–15)
AST SERPL-CCNC: 13 U/L (ref 1–40)
BASOPHILS # BLD AUTO: 0.02 10*3/MM3 (ref 0–0.2)
BASOPHILS NFR BLD AUTO: 0.1 % (ref 0–1.5)
BILIRUB SERPL-MCNC: 0.2 MG/DL (ref 0–1.2)
BUN SERPL-MCNC: 26 MG/DL (ref 8–23)
BUN/CREAT SERPL: 26.5 (ref 7–25)
CALCIUM SPEC-SCNC: 8.8 MG/DL (ref 8.6–10.5)
CHLORIDE SERPL-SCNC: 103 MMOL/L (ref 98–107)
CO2 SERPL-SCNC: 25.1 MMOL/L (ref 22–29)
CREAT SERPL-MCNC: 0.98 MG/DL (ref 0.76–1.27)
DEPRECATED RDW RBC AUTO: 47.7 FL (ref 37–54)
EGFRCR SERPLBLD CKD-EPI 2021: 80.9 ML/MIN/1.73
EOSINOPHIL # BLD AUTO: 0 10*3/MM3 (ref 0–0.4)
EOSINOPHIL NFR BLD AUTO: 0 % (ref 0.3–6.2)
ERYTHROCYTE [DISTWIDTH] IN BLOOD BY AUTOMATED COUNT: 16.5 % (ref 12.3–15.4)
GLOBULIN UR ELPH-MCNC: 3.1 GM/DL
GLUCOSE BLDC GLUCOMTR-MCNC: 178 MG/DL (ref 70–99)
GLUCOSE BLDC GLUCOMTR-MCNC: 192 MG/DL (ref 70–99)
GLUCOSE SERPL-MCNC: 175 MG/DL (ref 65–99)
HCT VFR BLD AUTO: 39.9 % (ref 37.5–51)
HGB BLD-MCNC: 12 G/DL (ref 13–17.7)
IMM GRANULOCYTES # BLD AUTO: 0.21 10*3/MM3 (ref 0–0.05)
IMM GRANULOCYTES NFR BLD AUTO: 1.2 % (ref 0–0.5)
LYMPHOCYTES # BLD AUTO: 0.55 10*3/MM3 (ref 0.7–3.1)
LYMPHOCYTES NFR BLD AUTO: 3.2 % (ref 19.6–45.3)
MAGNESIUM SERPL-MCNC: 2.4 MG/DL (ref 1.6–2.4)
MCH RBC QN AUTO: 24.1 PG (ref 26.6–33)
MCHC RBC AUTO-ENTMCNC: 30.1 G/DL (ref 31.5–35.7)
MCV RBC AUTO: 80.3 FL (ref 79–97)
MONOCYTES # BLD AUTO: 0.33 10*3/MM3 (ref 0.1–0.9)
MONOCYTES NFR BLD AUTO: 1.9 % (ref 5–12)
NEUTROPHILS NFR BLD AUTO: 16.34 10*3/MM3 (ref 1.7–7)
NEUTROPHILS NFR BLD AUTO: 93.6 % (ref 42.7–76)
NRBC BLD AUTO-RTO: 0 /100 WBC (ref 0–0.2)
PHOSPHATE SERPL-MCNC: 2.8 MG/DL (ref 2.5–4.5)
PLATELET # BLD AUTO: 196 10*3/MM3 (ref 140–450)
PMV BLD AUTO: 9.9 FL (ref 6–12)
POTASSIUM SERPL-SCNC: 4.3 MMOL/L (ref 3.5–5.2)
PROT SERPL-MCNC: 6.5 G/DL (ref 6–8.5)
RBC # BLD AUTO: 4.97 10*6/MM3 (ref 4.14–5.8)
SODIUM SERPL-SCNC: 140 MMOL/L (ref 136–145)
WBC NRBC COR # BLD: 17.45 10*3/MM3 (ref 3.4–10.8)

## 2023-05-10 PROCEDURE — 94799 UNLISTED PULMONARY SVC/PX: CPT

## 2023-05-10 PROCEDURE — 83735 ASSAY OF MAGNESIUM: CPT | Performed by: INTERNAL MEDICINE

## 2023-05-10 PROCEDURE — 63710000001 INSULIN LISPRO (HUMAN) PER 5 UNITS: Performed by: INTERNAL MEDICINE

## 2023-05-10 PROCEDURE — 84100 ASSAY OF PHOSPHORUS: CPT | Performed by: INTERNAL MEDICINE

## 2023-05-10 PROCEDURE — 85025 COMPLETE CBC W/AUTO DIFF WBC: CPT | Performed by: INTERNAL MEDICINE

## 2023-05-10 PROCEDURE — 25010000002 METHYLPREDNISOLONE PER 125 MG: Performed by: INTERNAL MEDICINE

## 2023-05-10 PROCEDURE — 99239 HOSP IP/OBS DSCHRG MGMT >30: CPT | Performed by: INTERNAL MEDICINE

## 2023-05-10 PROCEDURE — 94664 DEMO&/EVAL PT USE INHALER: CPT

## 2023-05-10 PROCEDURE — 80053 COMPREHEN METABOLIC PANEL: CPT | Performed by: INTERNAL MEDICINE

## 2023-05-10 PROCEDURE — 97165 OT EVAL LOW COMPLEX 30 MIN: CPT

## 2023-05-10 PROCEDURE — 97161 PT EVAL LOW COMPLEX 20 MIN: CPT

## 2023-05-10 PROCEDURE — 25010000002 ENOXAPARIN PER 10 MG: Performed by: INTERNAL MEDICINE

## 2023-05-10 PROCEDURE — 82948 REAGENT STRIP/BLOOD GLUCOSE: CPT

## 2023-05-10 RX ORDER — PREDNISONE 10 MG/1
40 TABLET ORAL DAILY
Qty: 16 TABLET | Refills: 0 | Status: SHIPPED | OUTPATIENT
Start: 2023-05-10 | End: 2023-05-14

## 2023-05-10 RX ORDER — CEFTRIAXONE SODIUM 2 G/50ML
2 INJECTION, SOLUTION INTRAVENOUS EVERY 24 HOURS
Status: DISCONTINUED | OUTPATIENT
Start: 2023-05-10 | End: 2023-05-10 | Stop reason: HOSPADM

## 2023-05-10 RX ORDER — AZITHROMYCIN 500 MG/1
500 TABLET, FILM COATED ORAL DAILY
Qty: 5 TABLET | Refills: 0 | Status: SHIPPED | OUTPATIENT
Start: 2023-05-10 | End: 2023-05-15

## 2023-05-10 RX ADMIN — ASPIRIN 81 MG 81 MG: 81 TABLET ORAL at 08:52

## 2023-05-10 RX ADMIN — METOPROLOL SUCCINATE 25 MG: 25 TABLET, EXTENDED RELEASE ORAL at 08:51

## 2023-05-10 RX ADMIN — ENOXAPARIN SODIUM 40 MG: 100 INJECTION SUBCUTANEOUS at 08:51

## 2023-05-10 RX ADMIN — BUDESONIDE AND FORMOTEROL FUMARATE DIHYDRATE 2 PUFF: 160; 4.5 AEROSOL RESPIRATORY (INHALATION) at 06:54

## 2023-05-10 RX ADMIN — IPRATROPIUM BROMIDE AND ALBUTEROL SULFATE 3 ML: 2.5; .5 SOLUTION RESPIRATORY (INHALATION) at 06:54

## 2023-05-10 RX ADMIN — METHYLPREDNISOLONE SODIUM SUCCINATE 60 MG: 125 INJECTION INTRAMUSCULAR; INTRAVENOUS at 04:06

## 2023-05-10 RX ADMIN — Medication 10 ML: at 08:53

## 2023-05-10 RX ADMIN — SENNOSIDES AND DOCUSATE SODIUM 2 TABLET: 8.6; 5 TABLET ORAL at 08:51

## 2023-05-10 RX ADMIN — MULTIPLE VITAMINS W/ MINERALS TAB 1 TABLET: TAB at 08:51

## 2023-05-10 RX ADMIN — INSULIN LISPRO 2 UNITS: 100 INJECTION, SOLUTION INTRAVENOUS; SUBCUTANEOUS at 08:50

## 2023-05-10 RX ADMIN — IPRATROPIUM BROMIDE AND ALBUTEROL SULFATE 3 ML: 2.5; .5 SOLUTION RESPIRATORY (INHALATION) at 01:01

## 2023-05-10 NOTE — PLAN OF CARE
Goal Outcome Evaluation:   Pt was transferred from 5th floor around 2100. No complaints of pain. Continuing to monitor.    Dilip Casas RN

## 2023-05-10 NOTE — PLAN OF CARE
Goal Outcome Evaluation:                 Patient has been refusing to wear bipap at night.  Pt is in no distress, resting comfortably on 2.5 liters per minute, with a saturation of 95%.

## 2023-05-10 NOTE — THERAPY EVALUATION
Acute Care - Physical Therapy Initial Evaluation   [facility]     Patient Name: Dilip Faulkner  : 1950  MRN: [id number]  Today's Date: 5/10/2023      Visit Dx:     ICD-10-CM ICD-9-CM   1. COPD exacerbation  J44.1 491.21   2. Hypoxia  R09.02 799.02   3. Acute on chronic respiratory failure with hypoxia  J96.21 518.84     799.02   4. Difficulty in walking  R26.2 719.7     Patient Active Problem List   Diagnosis   • Chronic obstructive pulmonary disease with acute exacerbation (HCC)   • Acute on chronic respiratory failure with hypoxia and hypercapnia (HCC)   • Type 2 diabetes mellitus   • Acute on chronic respiratory failure   • Essential hypertension   • Hyperlipidemia   • Cough   • Pneumonia due to infectious organism   • COPD with acute exacerbation   • Obesity (BMI 30-39.9)   • Right bundle branch block   • Acquired ptosis of eyelid, bilateral   • Type 2 diabetes mellitus with hyperglycemia, with long-term current use of insulin   • Acute exacerbation of chronic obstructive pulmonary disease (COPD)   • Respiratory failure with hypoxia   • Gastroesophageal reflux disease without esophagitis   • COPD (chronic obstructive pulmonary disease)   • Acute respiratory failure with hypoxia   • Noncompliance with CPAP treatment   • Unstable angina   • COPD exacerbation     Past Medical History:   Diagnosis Date   • Asthma    • COPD (chronic obstructive pulmonary disease)    • Diabetes mellitus    • Ex-smoker     QUIT    • Hernia, umbilical    • Hypertension    • On home O2     REPORTS WEARING 2L/NC PRN SOA     Past Surgical History:   Procedure Laterality Date   • ABDOMINAL SURGERY       PT Assessment (last 12 hours)     PT Evaluation and Treatment     Row Name 05/10/23 1000          Physical Therapy Time and Intention    Subjective Information no complaints  -CHARLY     Document Type evaluation  -CHARLY     Mode of Treatment individual therapy;physical therapy  -CHARLY     Patient Effort good  -CHARLY     Row Name 05/10/23 1000           General Information    Patient Observations alert;cooperative;agree to therapy  -CHARLY     Prior Level of Function independent:;all household mobility;community mobility  -CHARLY     Existing Precautions/Restrictions fall  -CHARLY     Barriers to Rehab none identified  -CHARLY     Row Name 05/10/23 1000          Living Environment    Current Living Arrangements home  -CHARLY     People in Home sibling(s)  -CHARLY     Row Name 05/10/23 1000          Range of Motion (ROM)    Range of Motion ROM is L  -[facility] Name 05/10/23 1000          Strength (Manual Muscle Testing)    Strength (Manual Muscle Testing) strength is L  -[facility] Name 05/10/23 1000          Bed Mobility    Bed Mobility bed mobility (all) activities;supine-sit  -CHARLY     All Activities, Cuming (Bed Mobility) standby assist  -CHARLY     Supine-Sit Cuming (Bed Mobility) standby assist  -CHARLY     [facility] Name 05/10/23 1000          Transfers    Transfers bed-chair transfer;sit-stand transfer  -CHARLY     [facility] Name 05/10/23 1000          Bed-Chair Transfer    Bed-Chair [place]uming (Transfers) contact guard  -CHARLY     Row Name 05/10/23 1000          Sit-Stand Transfer    Sit-Stand [place]uming (Transfers) contact guard  -CHARLY     [facility] Name 05/10/23 1000          Gait/Stairs (Locomotion)    Gait/Stairs Locomotion gait/ambulation assistive device  -CHARLY     Cuming Level (Gait) contact guard  -CHARLY     Distance in Feet (Gait) 40  unsteady with all standing activity  -CHARLY     Row Name 05/10/23 1000          Safety Issues, Functional Mobility    Impairments Affecting Function (Mobility) balance  -CHARLY     Row Name 05/10/23 1000          Balance    Balance Assessment standing dynamic balance  -CHARLY     Dynamic Standing Balance contact guard  -CHARLY     Row Name 05/10/23 1000          Plan of Care Review    Plan of Care Reviewed With patient  -CHARLY     Outcome Evaluation Patient presents with decreased strength, transfers and ambulation.  Skilled physical therapy services will be  required to address these mobility deficits.  Recommend rolling walker at home with home health services with the assistance of his sister  -CHARLY     Row Name 05/10/23 1000          Therapy Assessment/Plan (PT)    Rehab Potential (PT) good, to achieve stated therapy goals  -CHARLY     Criteria for Skilled Interventions Met (PT) skilled treatment is necessary  -CHARLY     Therapy Frequency (PT) daily  -CHARLY     Predicted Duration of Therapy Intervention (PT) 10 days  -CHARLY     Problem List (PT) problems related to;balance  -CHARLY     Activity Limitations Related to Problem List (PT) unable to ambulate safely  -CHARLY     Row Name 05/10/23 1000          PT Evaluation Complexity    History, PT Evaluation Complexity no personal factors and/or comorbidities  -CHARLY     Examination of Body Systems (PT Eval Complexity) total of 4 or more elements  -CHARLY     Clinical Presentation (PT Evaluation Complexity) stable  -CHARLY     Clinical Decision Making (PT Evaluation Complexity) low complexity  -CHARLY     Overall Complexity (PT Evaluation Complexity) low complexity  -CHARLY     Row Name 05/10/23 1000          Therapy Plan Review/Discharge Plan (PT)    Therapy Plan Review (PT) evaluation/treatment results reviewed;participants voiced agreement with care plan;participants included;patient  -CHARLY     Row Name 05/10/23 1000          Physical Therapy Goals    Gait Training Goal Selection (PT) gait training, PT goal 1  -CHARLY     Row Name 05/10/23 1000          Gait Training Goal 1 (PT)    Activity/Assistive Device (Gait Training Goal 1, PT) gait (walking locomotion);assistive device use;walker, rolling  -CHARLY     Teller Level (Gait Training Goal 1, PT) independent  -CHARLY     Distance (Gait Training Goal 1, PT) 300  -CHARLY     Time Frame (Gait Training Goal 1, PT) long term goal (LTG);10 days  -CHARLY           User Key  (r) = Recorded By, (t) = Taken By, (c) = Cosigned By    Initials Name Provider Type    CHARLYJose Bourne, PT Physical Therapist                  PT  Recommendation and Plan  Anticipated Discharge Disposition (PT): home with assist, home with home health  Planned Therapy Interventions (PT): balance training, gait training, transfer training  Therapy Frequency (PT): daily  Plan of Care Reviewed With: patient  Outcome Evaluation: Patient presents with decreased strength, transfers and ambulation.  Skilled physical therapy services will be required to address these mobility deficits.  Recommend rolling walker at home with home health services with the assistance of his sister   Outcome Measures     Row Name 05/10/23 1000             How much help from another person do you currently need...    Turning from your back to your side while in flat bed without using bedrails? 4  -CHARLY      Moving from lying on back to sitting on the side of a flat bed without bedrails? 4  -CHARLY      Moving to and from a bed to a chair (including a wheelchair)? 3  -CHARLY      Standing up from a chair using your arms (e.g., wheelchair, bedside chair)? 3  -CHARLY      Climbing 3-5 steps with a railing? 3  -CHARLY      To walk in hospital room? 3  -CHARLY      AM-PAC 6 Clicks Score (PT) 20  -CHARLY         Functional Assessment    Outcome Measure Options AM-PAC 6 Clicks Basic Mobility (PT)  -CHARLY            User Key  (r) = Recorded By, (t) = Taken By, (c) = Cosigned By    Initials Name Provider Type    Jose Kellogg PT Physical Therapist                 Time Calculation:    PT Charges     Row Name 05/10/23 1056             Time Calculation    PT Received On 05/10/23  -CHARLY      PT Goal Re-Cert Due Date 05/19/23  -CHARLY         Untimed Charges    PT Eval/Re-eval Minutes 20  -CHARLY         Total Minutes    Untimed Charges Total Minutes 20  -CHARLY       Total Minutes 20  -CHARLY            User Key  (r) = Recorded By, (t) = Taken By, (c) = Cosigned By    Initials Name Provider Type    Jose Kellogg PT Physical Therapist              Therapy Charges for Today     Code Description Service Date Service Provider Modifiers  Qty    [id number] HC PT EVAL LOW COMPLEXITY 2 5/10/2023 Jose Oliva, PT GP 1          PT G-Codes  Outcome Measure Options: AM-PAC 6 Clicks Basic Mobility (PT)  AM-PAC 6 Clicks Score (PT): 20    Jose Oliva, PT  5/10/2023

## 2023-05-10 NOTE — DISCHARGE SUMMARY
[facility]         HOSPITALIST  DISCHARGE SUMMARY    Patient Name: Dilip Faulkner  : 1950  MRN: [id number]    Date of Admission: 2023  Date of Discharge:  5/10/2023  Primary Care Physician: Mallorie Roa APRN    Consults     Date and Time Order Name Status Description    2023  4:37 PM Hospitalist (on-call MD unless specified)            Active and Resolved Hospital Problems:  Acute hypoxemic respiratory failure requiring NIPPV  Acute exacerbation of COPD  Metabolic encephalopathy secondary to hypoxemia  Diabetes mellitus  Hypertension  Chronic hypoxemic respiratory failure    Hospital Course     Hospital Course:  Dilip Faulkner is a 73 y.o. male who presents to the hospital with acute hypoxemia.  There is no family at bedside, patient is altered secondary to his hypoxia, history is obtained from chart review and discussion with emergency room physician.  Patient presented to the emergency department via EMS as patient was satting only 50% on 6 L of oxygen at home.  Patient typically is on 2 L for his chronic hypoxemia and COPD.  Reportedly the patient had no fever or chills or cough, he denied chest pain upon initial evaluation.  The patient was very dyspneic and had trouble speaking secondary to this.  In the emergency department the patient was found to be hypoxemic, he was on a nonrebreather.  Patient was transitioned to BiPAP, patient was mildly hypercapnic, patient was noted to be hypoxemic.  Patient was given antibiotics, Solu-Medrol and the hospitalist service was asked to admit for further work-up and management.  Patient was treated with antibiotics, steroids, breathing treatments good response.  Patient is moving good air on day of discharge.  Patient was satting well on his home O2.  Patient was requesting discharge home.  Patient will be discharged with a steroid burst.  Patient will be discharged with azithromycin, patient's infectious work-up was only  significant for mycoplasma IgM.  Patient should follow-up with the COPD clinic in 1 week of discharge.  Patient did not want home health services and is discharged to the care of his sisters at this time.      Day of Discharge     Vital Signs:  Temp:  [97.3 °F (36.3 °C)-98.3 °F (36.8 °C)] 97.8 °F (36.6 °C)  Heart Rate:  [52-87] 81  Resp:  [15-20] 18  BP: (110-136)/(53-74) 130/64  Flow (L/min):  [2-4] 2.5    Physical Exam:   GEN: No acute distress, sitting up on side of bed  HEENT: Moist mucous membranes  LUNGS: Diminished breath sounds at the bases bilaterally, good air movement throughout  CARDIAC: Regular rate and rhythm  NEURO: Moving all 4 extremities spontaneously  SKIN: No obvious skin breakdown    Discharge Details        Discharge Medications      New Medications      Instructions Start Date   azithromycin 500 MG tablet  Commonly known as: Zithromax   500 mg, Oral, Daily      predniSONE 10 MG tablet  Commonly known as: DELTASONE   40 mg, Oral, Daily         Continue These Medications      Instructions Start Date   albuterol sulfate  (90 Base) MCG/ACT inhaler  Commonly known as: PROVENTIL HFA;VENTOLIN HFA;PROAIR HFA   2 puffs, Inhalation, Every 6 Hours PRN      aspirin 81 MG chewable tablet   81 mg, Oral, Daily      atorvastatin 10 MG tablet  Commonly known as: LIPITOR   10 mg, Oral, Daily      cetirizine 10 MG tablet  Commonly known as: zyrTEC   10 mg, Oral, Daily      empagliflozin 10 MG tablet tablet  Commonly known as: Jardiance   10 mg, Oral, Daily      Fluticasone-Salmeterol 250-50 MCG/ACT DISKUS  Commonly known as: ADVAIR/WIXELA   1 puff, Inhalation, 2 Times Daily - RT      ipratropium-albuterol 0.5-2.5 mg/3 ml nebulizer  Commonly known as: DUO-NEB   3 mL, Nebulization, Every 4 Hours PRN      lisinopril 5 MG tablet  Commonly known as: PRINIVIL,ZESTRIL   5 mg, Oral, Daily      metFORMIN 500 MG tablet  Commonly known as: GLUCOPHAGE   500 mg, Oral, 2 Times Daily With Meals      metoprolol succinate  XL 25 MG 24 hr tablet  Commonly known as: TOPROL-XL   25 mg, Oral, Every 24 Hours Scheduled      omeprazole 20 MG capsule  Commonly known as: priLOSEC   20 mg, Oral, Daily      Tresiba FlexTouch 100 UNIT/ML solution pen-injector injection  Generic drug: insulin degludec   20 Units, Subcutaneous, Daily             No Known Allergies    Discharge Disposition:  Home or Self Care    Diet:  Hospital:  Diet Order   Procedures   • Diet: Regular/House Diet; Texture: Regular Texture (IDDSI 7); Fluid Consistency: Thin (IDDSI 0)       Discharge Activity:       CODE STATUS:  There are no questions and answers to display.       Future Appointments   Date Time Provider Department Center   8/3/2023  4:00 PM Mallorie Roa APRN [facility]       Additional Instructions for the Follow-ups that You Need to Schedule     Discharge Follow-up with PCP   As directed       Currently Documented PCP:    Mallorie Roa APRN    PCP Phone Number:    [phone]     Follow Up Details: 3 to 7 days               Pertinent  and/or Most Recent Results     IMAGING:  XR Chest 1 View    Result Date: 5/8/2023  PROCEDURE: XR CHEST 1 VW  COMPARISON: [facility], CR, XR CHEST 1 VW, 4/21/2023, 9:04.  INDICATIONS: SOA Triage Protocol  FINDINGS:  Small, left greater than right pleural effusions with bibasilar opacities suggestive either atelectasis or pneumonia.  Cardiac and mediastinal contours within normal limits.  Regional skeleton is unremarkable.        1. Small, left greater than right pleural effusions and bibasilar opacities either atelectasis or pneumonia.       JEF BAEZ MD       Electronically Signed and Approved By: JEF BAEZ MD on 5/08/2023 at 16:11             XR Chest 1 View    Result Date: 4/21/2023  PROCEDURE: XR CHEST 1 VW  COMPARISON: [facility], CR, XR CHEST 1 VW, 11/05/2022, 11:17.  [facility], CR, XR CHEST 1 VW, 3/28/2023, 21:08.  INDICATIONS: SOA Triage Protocol  FINDINGS:   Cardiac size is normal.  There is left basilar density which most likely represents chronic scarring.  The lungs are otherwise clear and the vascular pattern is normal.        1. Left basilar density likely chronic scarring.  The chest is otherwise clear       Robby Garay MD       Electronically Signed and Approved By: Robby Garay MD on 4/21/2023 at 9:41               LAB RESULTS:      Lab 05/10/23  0559 05/09/23  0554 05/08/23  1607 05/08/23  1550   WBC 17.45* 6.83  --  11.40*   HEMOGLOBIN 12.0* 12.4*  --  12.7*   HEMATOCRIT 39.9 42.5  --  42.8   PLATELETS 196 186  --  208   NEUTROS ABS 16.34* 5.96  --  8.02*   IMMATURE GRANS (ABS) 0.21* 0.06*  --  0.05   LYMPHS ABS 0.55* 0.75  --  1.50   MONOS ABS 0.33 0.04*  --  0.73   EOS ABS 0.00 0.00  --  1.04*   MCV 80.3 82.5  --  80.8   LACTATE  --   --   --  1.3   LACTATE, ARTERIAL  --   --  1.06  --          Lab 05/10/23  0559 05/09/23  0554 05/08/23  1607 05/08/23  1550   SODIUM 140 140  --  134*   SODIUM, ARTERIAL  --   --  136.9  --    POTASSIUM 4.3 4.7  --  4.4   CHLORIDE 103 102  --  98   CO2 25.1 26.9  --  25.7   ANION GAP 11.9 11.1  --  10.3   BUN 26* 18  --  15   CREATININE 0.98 1.10  --  1.07   EGFR 80.9 70.4  --  72.8   GLUCOSE 175* 160*  --  113*   GLUCOSE, ARTERIAL  --   --  114*  --    CALCIUM 8.8 9.0  --  8.8   IONIZED CALCIUM  --   --  1.10*  --    MAGNESIUM 2.4 2.2  --   --    PHOSPHORUS 2.8 4.8*  --   --          Lab 05/10/23  0559 05/09/23  0554 05/08/23  1550   TOTAL PROTEIN 6.5 7.3 7.4   ALBUMIN 3.4* 3.4* 3.7   GLOBULIN 3.1 3.9 3.7   ALT (SGPT) 8 7 6   AST (SGOT) 13 9 11   BILIRUBIN 0.2 0.3 0.4   ALK PHOS 84 94 99         Lab 05/08/23  1550   PROBNP 376.1   HSTROP T 51*                 Lab 05/08/23  1739 05/08/23  1607   PH, ARTERIAL 7.225* 7.341*   PCO2, ARTERIAL 66.1* 54.5*   PO2 ART 79.4* 86.4   O2 SATURATION ART 92.5* 95.7   FIO2 30 30   HCO3 ART 26.8* 28.8*   BASE EXCESS ART -2.2* 2.0   CARBOXYHEMOGLOBIN 1.2 1.2     Brief Urine Lab Results   (Last result in the past 365 days)      Color   Clarity   Blood   Leuk Est   Nitrite   Protein   CREAT   Urine HCG        08/01/22 1250 Yellow   Clear   Negative   Negative   Negative   Negative               Microbiology Results (last 10 days)     Procedure Component Value - Date/Time    S. Pneumo Ag Urine or CSF - Urine, Urine, Clean Catch [[id number]]  (Normal) Collected: 05/09/23 1617    Lab Status: Final result Specimen: Urine, Clean Catch Updated: 05/09/23 1639     Strep Pneumo Ag Negative    Legionella Antigen, Urine - Urine, Urine, Clean Catch [[id number]]  (Normal) Collected: 05/09/23 1617    Lab Status: Final result Specimen: Urine, Clean Catch Updated: 05/09/23 1639     LEGIONELLA ANTIGEN, URINE Negative    Blood Culture - Blood, Arm, Left [[id number]]  (Normal) Collected: 05/08/23 1550    Lab Status: Preliminary result Specimen: Blood from Arm, Left Updated: 05/09/23 1600     Blood Culture No growth at 24 hours    Blood Culture - Blood, Arm, Right [[id number]]  (Normal) Collected: 05/08/23 1550    Lab Status: Preliminary result Specimen: Blood from Arm, Right Updated: 05/09/23 1600     Blood Culture No growth at 24 hours    Mycoplasma Pneumoniae Antibody, IgM - Blood, [[id number]]  (Abnormal) Collected: 05/08/23 1550    Lab Status: Final result Specimen: Blood Updated: 05/08/23 1854     Mycoplasma pneumo IgM Positive            Results for orders placed during the hospital encounter of 03/28/23    Adult Transthoracic Echo Complete W/ Cont if Necessary Per Protocol    Interpretation Summary  •  Left ventricular ejection fraction appears to be 56 - 60%.  •  Left ventricular diastolic function is consistent with (grade I) impaired relaxation and age.  •  No significant valvular disease.  •  Mild left atrial enlargement.      Time spent on Discharge including face to face service: Greater than 30 minutes      Electronically signed by Yung Singleton MD, 05/10/23, 10:29 AM EDT.

## 2023-05-10 NOTE — PLAN OF CARE
Goal Outcome Evaluation:  Plan of Care Reviewed With: patient           Outcome Evaluation: Patient presents with decreased strength, transfers and ambulation.  Skilled physical therapy services will be required to address these mobility deficits.  Recommend rolling walker at home with home health services with the assistance of his sister

## 2023-05-10 NOTE — PLAN OF CARE
Goal Outcome Evaluation:  Plan of Care Reviewed With: patient        Progress: no change  Outcome Evaluation: Patient presents at or near baseline functional status with no functional deficits related to strength, balance, transfers or mobility that impede patient independence with activities of daily living.  No indicated need for skilled occupational therapy intervention in the acute care setting.  Occupational therapy will sign off at this time.

## 2023-05-10 NOTE — THERAPY EVALUATION
Patient Name: Dilip Faulkner  : 1950    MRN: [id number]                              Today's Date: 5/10/2023       Admit Date: 2023    Visit Dx:     ICD-10-CM ICD-9-CM   1. COPD exacerbation  J44.1 491.21   2. Hypoxia  R09.02 799.02   3. Acute on chronic respiratory failure with hypoxia  J96.21 518.84     799.02   4. Difficulty in walking  R26.2 719.7   5. Decreased activities of daily living (ADL)  Z78.9 V49.89     Patient Active Problem List   Diagnosis   • Chronic obstructive pulmonary disease with acute exacerbation (HCC)   • Acute on chronic respiratory failure with hypoxia and hypercapnia (HCC)   • Type 2 diabetes mellitus   • Acute on chronic respiratory failure   • Essential hypertension   • Hyperlipidemia   • Cough   • Pneumonia due to infectious organism   • COPD with acute exacerbation   • Obesity (BMI 30-39.9)   • Right bundle branch block   • Acquired ptosis of eyelid, bilateral   • Type 2 diabetes mellitus with hyperglycemia, with long-term current use of insulin   • Acute exacerbation of chronic obstructive pulmonary disease (COPD)   • Respiratory failure with hypoxia   • Gastroesophageal reflux disease without esophagitis   • COPD (chronic obstructive pulmonary disease)   • Acute respiratory failure with hypoxia   • Noncompliance with CPAP treatment   • Unstable angina   • COPD exacerbation     Past Medical History:   Diagnosis Date   • Asthma    • COPD (chronic obstructive pulmonary disease)    • Diabetes mellitus    • Ex-smoker     QUIT    • Hernia, umbilical    • Hypertension    • On home O2     REPORTS WEARING 2L/NC PRN SOA     Past Surgical History:   Procedure Laterality Date   • ABDOMINAL SURGERY        General Information     Row Name 05/10/23 1137          OT Time and Intention    Document Type evaluation  -ES     Mode of Treatment individual therapy;occupational therapy  -ES     Row Name 05/10/23 1137          General Information    Patient Profile Reviewed yes  -ES      Prior Level of Function independent:;ADL's;all household mobility;community mobility  Patient reports independent with ADLs at baseline. Cane for functional mobility. Tub/shower combo with shower chair. Groom in stance. 2L NC at baseline. Denies recent falls.  -ES     Existing Precautions/Restrictions fall  -ES     Barriers to Rehab none identified  -ES     Row Name 05/10/23 1137          Occupational Profile    Reason for Services/Referral (Occupational Profile) Patient is 73 yr old male admitted to [facility] on 5/8/2023 with reports of hypoxia. OT evaluation and treatment ordered d/t recent decline in ADLs/transfer ability and discharge planning recommendations. No previous OT services for current condition.  -ES     Row Name 05/10/23 1137          Living Environment    People in Home sibling(s)  sisters  -ES     Row Name 05/10/23 1137          Home Main Entrance    Number of Stairs, Main Entrance none  -ES     Row Name 05/10/23 1137          Stairs Within Home, Primary    Number of Stairs, Within Home, Primary none  -ES           User Key  (r) = Recorded By, (t) = Taken By, (c) = Cosigned By    Initials Name Provider Type    ES Alyx Beckham, OTR/L, CSRS Occupational Therapist                 Mobility/ADL's     Row Name 05/10/23 1143          Bed Mobility    Bed Mobility supine-sit;sit-supine  -ES     Supine-Sit Hyde (Bed Mobility) independent  -ES     Sit-Supine [place] (Bed Mobility) independent  -ES     Row Name 05/10/23 1143          Transfers    Transfers sit-stand transfer;stand-sit transfer  -ES     Row Name 05/10/23 1143          Sit-Stand Transfer    Sit-Stand Hyde (Transfers) standby assist  -ES     Assistive Device (Sit-Stand Transfers) walker, front-wheeled  -ES     Row Name 05/10/23 1143          Stand-Sit Transfer    Stand-Sit Hyde (Transfers) standby assist  -ES     Assistive Device (Stand-Sit Transfers) walker, front-wheeled  -ES     Row Name 05/10/23 1143           Functional Mobility    Functional Mobility- Ind. Level standby assist  -ES     Functional Mobility- Device walker, front-wheeled  -ES     Functional Mobility- Comment patient performs functional mobility to/from bathroom, SBA with use of rolling walker  -ES     Row Name 05/10/23 1143          Activities of Daily Living    BADL Assessment/Intervention bathing;upper body dressing;lower body dressing;grooming;feeding;toileting  -ES     Row Name 05/10/23 1143          Bathing Assessment/Intervention    Bond Level (Bathing) bathing skills;independent  -ES     Row Name 05/10/23 1143          Upper Body Dressing Assessment/Training    Bond Level (Upper Body Dressing) upper body dressing skills;independent  -ES     Row Name 05/10/23 1143          Lower Body Dressing Assessment/Training    Bond Level (Lower Body Dressing) lower body dressing skills;don;shoes/slippers;set up  -ES     Position (Lower Body Dressing) edge of bed sitting;unsupported sitting  -ES     Row Name 05/10/23 1143          Grooming Assessment/Training    Bond Level (Grooming) grooming skills;independent  -ES     Row Name 05/10/23 1143          Self-Feeding Assessment/Training    Bond Level (Feeding) feeding skills;independent  -ES     Row Name 05/10/23 1143          Toileting Assessment/Training    Bond Level (Toileting) toileting skills;independent  -ES           User Key  (r) = Recorded By, (t) = Taken By, (c) = Cosigned By    Initials Name Provider Type    ES Alyx Beckham, OTR/L, CSRS Occupational Therapist               Obj/Interventions     Row Name 05/10/23 1147          Sensory Assessment (Somatosensory)    Sensory Assessment (Somatosensory) sensation intact;UE sensation intact;LE sensation intact  -ES     Row Name 05/10/23 1147          Vision Assessment/Intervention    Visual Impairment/Limitations WFL  -ES     Row Name 05/10/23 1147          Range of Motion Comprehensive    General Range  of Motion no range of motion deficits identified  -ES     Row Name 05/10/23 1147          Strength Comprehensive (MMT)    General Manual Muscle Testing (MMT) Assessment no strength deficits identified  -ES     Comment, General Manual Muscle Testing (MMT) Assessment bilateral upper extremities WFL  -ES     Row Name 05/10/23 1147          Motor Skills    Motor Skills coordination;functional endurance  -ES     Coordination WFL;upper extremity  -ES     Functional Endurance good. Patient performs short distance mobility without O2 donned. Saturates >90% with mobility on RA.  -ES     Row Name 05/10/23 1147          Balance    Balance Assessment sitting dynamic balance;standing dynamic balance  -ES     Dynamic Sitting Balance independent  -ES     Position, Sitting Balance unsupported;sitting edge of bed  -ES     Dynamic Standing Balance standby assist  -ES     Position/Device Used, Standing Balance supported;walker, front-wheeled  -ES           User Key  (r) = Recorded By, (t) = Taken By, (c) = Cosigned By    Initials Name Provider Type    ES Alyx Beckham, OTR/L, CSRS Occupational Therapist               Goals/Plan    No documentation.                Clinical Impression     Row Name 05/10/23 1148          Plan of Care Review    Plan of Care Reviewed With patient  -ES     Progress no change  -ES     Outcome Evaluation Patient presents at or near baseline functional status with no functional deficits related to strength, balance, transfers or mobility that impede patient independence with activities of daily living.  No indicated need for skilled occupational therapy intervention in the acute care setting.  Occupational therapy will sign off at this time.  -     Row Name 05/10/23 1148          Therapy Assessment/Plan (OT)    Criteria for Skilled Therapeutic Interventions Met (OT) no problems identified which require skilled intervention  -ES     Therapy Frequency (OT) evaluation only  -     Row Name 05/10/23 1142           Therapy Plan Review/Discharge Plan (OT)    Anticipated Discharge Disposition (OT) home  -ES     Row Name 05/10/23 1148          Vital Signs    O2 Delivery Pre Treatment nasal cannula  -ES     O2 Delivery Intra Treatment nasal cannula  -ES     O2 Delivery Post Treatment nasal cannula  -ES     Row Name 05/10/23 1148          Positioning and Restraints    Pre-Treatment Position in bed  -ES     Post Treatment Position bed  -ES           User Key  (r) = Recorded By, (t) = Taken By, (c) = Cosigned By    Initials Name Provider Type    Alyx Rivas, OTR/L, CSRS Occupational Therapist               Outcome Measures     Row Name 05/10/23 1149          How much help from another is currently needed...    Putting on and taking off regular lower body clothing? 4  -ES     Bathing (including washing, rinsing, and drying) 4  -ES     Toileting (which includes using toilet bed pan or urinal) 4  -ES     Putting on and taking off regular upper body clothing 4  -ES     Taking care of personal grooming (such as brushing teeth) 4  -ES     Eating meals 4  -ES     AM-PAC 6 Clicks Score (OT) 24  -ES     Row Name 05/10/23 1000 05/10/23 0857       How much help from another person do you currently need...    Turning from your back to your side while in flat bed without using bedrails? 4  -CHARLY 4  -TEOFILO    Moving from lying on back to sitting on the side of a flat bed without bedrails? 4  -CHARLY 4  -TEOFILO    Moving to and from a bed to a chair (including a wheelchair)? 3  -CHARLY 3  -TEOFILO    Standing up from a chair using your arms (e.g., wheelchair, bedside chair)? 3  -CHARLY 3  -TEOFILO    Climbing 3-5 steps with a railing? 3  -CHARLY 3  -TEOFILO    To walk in hospital room? 3  -CHARLY 3  -TEOFILO    AM-PAC 6 Clicks Score (PT) 20  -CHARLY 20  -TEOFILO    Highest level of mobility 6 --> Walked 10 steps or more  -CHARLY 6 --> Walked 10 steps or more  -TEOFILO    Row Name 05/10/23 1149 05/10/23 1000       Functional Assessment    Outcome Measure Options AM-PAC 6 Clicks Daily Activity  (OT);Optimal Instrument  -ES AM-PAC 6 Clicks Basic Mobility (PT)  -CHARLY    Row Name 05/10/23 1149          Optimal Instrument    Optimal Instrument Optimal - 3  -ES     Bending/Stooping 1  -ES     Standing 1  -ES     Reaching 1  -ES     From the list, choose the 3 activities you would most like to be able to do without any difficulty Bending/stooping;Standing;Reaching  -ES     Total Score Optimal - 3 3  -ES           User Key  (r) = Recorded By, (t) = Taken By, (c) = Cosigned By    Initials Name Provider Type    Jose Kellogg, PT Physical Therapist    ES Alyx Beckham, OTR/L, CSRS Occupational Therapist    Joie Penny RN Registered Nurse                  OT Recommendation and Plan  Therapy Frequency (OT): evaluation only  Plan of Care Review  Plan of Care Reviewed With: patient  Progress: no change  Outcome Evaluation: Patient presents at or near baseline functional status with no functional deficits related to strength, balance, transfers or mobility that impede patient independence with activities of daily living.  No indicated need for skilled occupational therapy intervention in the acute care setting.  Occupational therapy will sign off at this time.     Time Calculation:    Time Calculation- OT     Row Name 05/10/23 1150             Time Calculation- OT    OT Received On 05/10/23  -ES         Untimed Charges    OT Eval/Re-eval Minutes 31  -ES         Total Minutes    Untimed Charges Total Minutes 31  -ES       Total Minutes 31  -ES            User Key  (r) = Recorded By, (t) = Taken By, (c) = Cosigned By    Initials Name Provider Type    Alyx Rivas, OTR/L, CSRS Occupational Therapist              Therapy Charges for Today     Code Description Service Date Service Provider Modifiers Qty    [id number]  OT EVAL LOW COMPLEXITY 3 5/10/2023 Alyx Beckham, OTR/L, CSRS GO 1               Alyx Beckham OTR/L, CSRS  5/10/2023

## 2023-05-11 ENCOUNTER — READMISSION MANAGEMENT (OUTPATIENT)
Dept: CALL CENTER | Facility: HOSPITAL | Age: 73
End: 2023-05-11
Payer: MEDICARE

## 2023-05-11 NOTE — OUTREACH NOTE
Prep Survey    Flowsheet Row Responses   [facility] facility patient discharged from? [facility]   Is LACE score < 7 ? No   Eligibility Readm Mgmt   Discharge diagnosis COPD   Does the patient have one of the following disease processes/diagnoses(primary or secondary)? COPD   Does the patient have Home health ordered? No   Is there a DME ordered? Yes   What DME was ordered? [employer] - O2   Prep survey completed? Yes          Jenni BECKHAM - Registered Nurse

## 2023-05-13 LAB
BACTERIA SPEC AEROBE CULT: NORMAL
BACTERIA SPEC AEROBE CULT: NORMAL

## 2023-05-15 ENCOUNTER — OFFICE VISIT (OUTPATIENT)
Dept: FAMILY MEDICINE CLINIC | Facility: CLINIC | Age: 73
End: 2023-05-15
Payer: MEDICARE

## 2023-05-15 VITALS
OXYGEN SATURATION: 94 % | DIASTOLIC BLOOD PRESSURE: 76 MMHG | BODY MASS INDEX: 31.53 KG/M2 | SYSTOLIC BLOOD PRESSURE: 120 MMHG | HEART RATE: 66 BPM | WEIGHT: 237.9 LBS | HEIGHT: 73 IN

## 2023-05-15 DIAGNOSIS — J44.1 COPD EXACERBATION: Primary | ICD-10-CM

## 2023-05-15 NOTE — PROGRESS NOTES
Transitional Care Follow Up Visit  Subjective     Dilip Faulkner is a 73 y.o. male who presents for a transitional care management visit.    Within 48 business hours after discharge our office contacted him via telephone to coordinate his care and needs.      I reviewed and discussed the details of that call along with the discharge summary, hospital problems, inpatient lab results, inpatient diagnostic studies, and consultation reports with Dilip.     Current outpatient and discharge medications have been reconciled for the patient.  Reviewed by: MONE Buchanan           View : No data to display.              Risk for Readmission (LACE) Score: 11 (5/10/2023  6:00 AM)      History of Present Illness  73-year-old Dilip Faulkner presents today with his sister for a hospital follow-up.  Patient was hospitalized for COPD exacerbation from 5/8/2023 to 5/10/2023.  Patient states he is doing much better at this time.  Patient is still a chronic everyday smoker.      Course During Hospital Stay:  2 days     The following portions of the patient's history were reviewed and updated as appropriate: allergies, current medications, past family history, past medical history, past social history, past surgical history and problem list.    Review of Systems   Constitutional: Negative.    HENT: Negative.    Eyes: Negative.    Respiratory: Negative.    Cardiovascular: Negative.    Gastrointestinal: Negative.    Endocrine: Negative.    Genitourinary: Negative.    Musculoskeletal: Negative.    Skin: Negative.    Allergic/Immunologic: Negative.    Neurological: Negative.    Hematological: Negative.    Psychiatric/Behavioral: Negative.        Objective   Physical Exam  Vitals and nursing note reviewed.   Constitutional:       Appearance: Normal appearance.   HENT:      Head: Normocephalic and atraumatic.      Nose: Nose normal.      Mouth/Throat:      Mouth: Mucous membranes are moist.   Eyes:      Conjunctiva/sclera:  Conjunctivae normal.      Pupils: Pupils are equal, round, and reactive to light.   Cardiovascular:      Rate and Rhythm: Normal rate and regular rhythm.      Pulses: Normal pulses.      Heart sounds: Normal heart sounds.   Pulmonary:      Effort: Pulmonary effort is normal.      Breath sounds: Normal breath sounds.   Abdominal:      General: Abdomen is flat.      Palpations: Abdomen is soft.   Musculoskeletal:         General: Normal range of motion.      Cervical back: Normal range of motion and neck supple.   Skin:     General: Skin is warm and dry.   Neurological:      General: No focal deficit present.      Mental Status: He is alert and oriented to person, place, and time. Mental status is at baseline.   Psychiatric:         Mood and Affect: Mood normal.         Behavior: Behavior normal.         Thought Content: Thought content normal.         Judgment: Judgment normal.         Assessment & Plan   Problems Addressed this Visit        Pulmonary and Pneumonias    COPD exacerbation - Primary   Diagnoses       Codes Comments    COPD exacerbation    -  Primary ICD-10-CM: J44.1  ICD-9-CM: 491.21 Patient is stable at this time.

## 2023-05-16 ENCOUNTER — READMISSION MANAGEMENT (OUTPATIENT)
Dept: CALL CENTER | Facility: HOSPITAL | Age: 73
End: 2023-05-16
Payer: MEDICARE

## 2023-05-16 NOTE — OUTREACH NOTE
COPD/PN Week 1 Survey    Flowsheet Row Responses   [facility] patient discharged from? [facility]   Does the patient have one of the following disease processes/diagnoses(primary or secondary)? COPD   Week 1 attempt successful? Yes   Call start time 1447   Call end time 1450   Discharge diagnosis COPD   Meds reviewed with patient/caregiver? Yes   Is the patient having any side effects they believe may be caused by any medication additions or changes? No   Does the patient have all medications ordered at discharge? Yes   Is the patient taking all medications as directed (includes completed medication regime)? Yes   Does the patient have a primary care provider?  Yes   Does the patient have an appointment with their PCP or specialist within 7 days of discharge? Yes   Has the patient kept scheduled appointments due by today? Yes   DME comments 2L O2 at home, pulse 80-90% on RA,    Pulse Ox monitoring Intermittent   Pulse Ox device source Patient   O2 Sat: education provided Sat levels, When to seek care   Comments Pt reports doing better, denies SOA /CP or cough   What is the patient's perception of their health status since discharge? Improving   If the patient is a current smoker, are they able to teach back resources for cessation? Not a smoker   Is the patient/caregiver able to teach back the hierarchy of who to call/visit for symptoms/problems? PCP, Specialist, Home health nurse, Urgent Care, ED, 911 Yes   Patient reports what zone on this call? Green Zone   Green Zone Reports doing well, Breathing without shortness of breath, Usual amounts of cough and phlegm/mucous   Week 1 call completed? Yes   Revoked No further contact(revokes)-requires comment   Is the patient interested in additional calls from an ambulatory ?  NOTE:  applies to high risk patients requiring additional follow-up. No   Graduated/Revoked comments Brief call.  Denies questions or needs at this time.           Naomy ABDULLAHI - Registered  Nurse

## 2023-06-06 ENCOUNTER — APPOINTMENT (OUTPATIENT)
Dept: GENERAL RADIOLOGY | Facility: HOSPITAL | Age: 73
DRG: 177 | End: 2023-06-06
Payer: MEDICARE

## 2023-06-06 ENCOUNTER — HOSPITAL ENCOUNTER (INPATIENT)
Facility: HOSPITAL | Age: 73
LOS: 2 days | Discharge: HOME | DRG: 177 | End: 2023-06-08
Attending: EMERGENCY MEDICINE | Admitting: INTERNAL MEDICINE
Payer: MEDICARE

## 2023-06-06 DIAGNOSIS — J96.02 ACUTE RESPIRATORY FAILURE WITH HYPOXIA AND HYPERCAPNIA: Primary | ICD-10-CM

## 2023-06-06 DIAGNOSIS — J96.01 ACUTE RESPIRATORY FAILURE WITH HYPOXIA AND HYPERCAPNIA: Primary | ICD-10-CM

## 2023-06-06 DIAGNOSIS — J44.1 COPD EXACERBATION: ICD-10-CM

## 2023-06-06 DIAGNOSIS — J44.1 COPD WITH ACUTE EXACERBATION: ICD-10-CM

## 2023-06-06 LAB
ALBUMIN SERPL-MCNC: 4 G/DL (ref 3.5–5.2)
ALBUMIN/GLOB SERPL: 1.2 G/DL
ALP SERPL-CCNC: 107 U/L (ref 39–117)
ALT SERPL W P-5'-P-CCNC: 9 U/L (ref 1–41)
ANION GAP SERPL CALCULATED.3IONS-SCNC: 9.3 MMOL/L (ref 5–15)
ARTERIAL PATENCY WRIST A: ABNORMAL
ARTERIAL PATENCY WRIST A: POSITIVE
AST SERPL-CCNC: 12 U/L (ref 1–40)
BASE EXCESS BLDA CALC-SCNC: -1.9 MMOL/L (ref -2–2)
BASE EXCESS BLDA CALC-SCNC: -3 MMOL/L (ref -2–2)
BASOPHILS # BLD AUTO: 0.06 10*3/MM3 (ref 0–0.2)
BASOPHILS NFR BLD AUTO: 0.6 % (ref 0–1.5)
BDY SITE: ABNORMAL
BDY SITE: ABNORMAL
BILIRUB SERPL-MCNC: 0.5 MG/DL (ref 0–1.2)
BUN SERPL-MCNC: 21 MG/DL (ref 8–23)
BUN/CREAT SERPL: 19.1 (ref 7–25)
CA-I BLDA-SCNC: 1.19 MMOL/L (ref 1.13–1.32)
CALCIUM SPEC-SCNC: 8.9 MG/DL (ref 8.6–10.5)
CHLORIDE BLDA-SCNC: 103 MMOL/L (ref 98–106)
CHLORIDE SERPL-SCNC: 101 MMOL/L (ref 98–107)
CO2 SERPL-SCNC: 28.7 MMOL/L (ref 22–29)
COHGB MFR BLD: 1.4 % (ref 0–1.5)
COHGB MFR BLD: 1.5 % (ref 0–1.5)
CREAT SERPL-MCNC: 1.1 MG/DL (ref 0.76–1.27)
D-LACTATE SERPL-SCNC: 1 MMOL/L (ref 0.5–2)
DEPRECATED RDW RBC AUTO: 49.5 FL (ref 37–54)
EGFRCR SERPLBLD CKD-EPI 2021: 70.4 ML/MIN/1.73
EOSINOPHIL # BLD AUTO: 1.27 10*3/MM3 (ref 0–0.4)
EOSINOPHIL NFR BLD AUTO: 13.1 % (ref 0.3–6.2)
ERYTHROCYTE [DISTWIDTH] IN BLOOD BY AUTOMATED COUNT: 16.5 % (ref 12.3–15.4)
FHHB: 1.7 % (ref 0–5)
FHHB: 3.4 % (ref 0–5)
GAS FLOW AIRWAY: 60 LPM
GEN 5 2HR TROPONIN T REFLEX: 50 NG/L
GLOBULIN UR ELPH-MCNC: 3.4 GM/DL
GLUCOSE BLDA-MCNC: 140 MG/DL (ref 70–99)
GLUCOSE BLDC GLUCOMTR-MCNC: 199 MG/DL (ref 70–99)
GLUCOSE BLDC GLUCOMTR-MCNC: 205 MG/DL (ref 70–99)
GLUCOSE SERPL-MCNC: 125 MG/DL (ref 65–99)
HCO3 BLDA-SCNC: 25.7 MMOL/L (ref 22–26)
HCO3 BLDA-SCNC: 26.9 MMOL/L (ref 22–26)
HCT VFR BLD AUTO: 45 % (ref 37.5–51)
HGB BLD-MCNC: 13.1 G/DL (ref 13–17.7)
HGB BLDA-MCNC: 13.1 G/DL (ref 13.8–16.4)
HGB BLDA-MCNC: 13.4 G/DL (ref 13.8–16.4)
HOLD SPECIMEN: NORMAL
HOLD SPECIMEN: NORMAL
IMM GRANULOCYTES # BLD AUTO: 0.05 10*3/MM3 (ref 0–0.05)
IMM GRANULOCYTES NFR BLD AUTO: 0.5 % (ref 0–0.5)
INHALED O2 CONCENTRATION: 100 %
INHALED O2 CONCENTRATION: 45 %
LACTATE BLDA-SCNC: 0.93 MMOL/L (ref 0.5–2)
LACTATE BLDA-SCNC: ABNORMAL MMOL/L
LYMPHOCYTES # BLD AUTO: 2.01 10*3/MM3 (ref 0.7–3.1)
LYMPHOCYTES NFR BLD AUTO: 20.7 % (ref 19.6–45.3)
MCH RBC QN AUTO: 24.3 PG (ref 26.6–33)
MCHC RBC AUTO-ENTMCNC: 29.1 G/DL (ref 31.5–35.7)
MCV RBC AUTO: 83.5 FL (ref 79–97)
METHGB BLD QL: 0 % (ref 0–1.5)
METHGB BLD QL: 0.3 % (ref 0–1.5)
MODALITY: ABNORMAL
MODALITY: ABNORMAL
MONOCYTES # BLD AUTO: 0.97 10*3/MM3 (ref 0.1–0.9)
MONOCYTES NFR BLD AUTO: 10 % (ref 5–12)
MRSA DNA SPEC QL NAA+PROBE: NORMAL
NEUTROPHILS NFR BLD AUTO: 5.36 10*3/MM3 (ref 1.7–7)
NEUTROPHILS NFR BLD AUTO: 55.1 % (ref 42.7–76)
NRBC BLD AUTO-RTO: 0 /100 WBC (ref 0–0.2)
NT-PROBNP SERPL-MCNC: 125.3 PG/ML (ref 0–900)
OXYHGB MFR BLDV: 95.2 % (ref 94–99)
OXYHGB MFR BLDV: 96.5 % (ref 94–99)
PCO2 BLDA: 56.4 MM HG (ref 35–45)
PCO2 BLDA: 73 MM HG (ref 35–45)
PH BLDA: 7.18 PH UNITS (ref 7.35–7.45)
PH BLDA: 7.28 PH UNITS (ref 7.35–7.45)
PLATELET # BLD AUTO: 218 10*3/MM3 (ref 140–450)
PMV BLD AUTO: 10.2 FL (ref 6–12)
PO2 BLD: 153 MM[HG] (ref 0–500)
PO2 BLD: 218 MM[HG] (ref 0–500)
PO2 BLDA: 152.8 MM HG (ref 80–100)
PO2 BLDA: 98 MM HG (ref 80–100)
POTASSIUM BLDA-SCNC: 4.84 MMOL/L (ref 3.5–5)
POTASSIUM SERPL-SCNC: 4.8 MMOL/L (ref 3.5–5.2)
PROCALCITONIN SERPL-MCNC: 0.07 NG/ML (ref 0–0.25)
PROT SERPL-MCNC: 7.4 G/DL (ref 6–8.5)
QT INTERVAL: 382 MS
RBC # BLD AUTO: 5.39 10*6/MM3 (ref 4.14–5.8)
SAO2 % BLDCOA: 96.6 % (ref 95–99)
SAO2 % BLDCOA: 98.3 % (ref 95–99)
SODIUM BLDA-SCNC: 138.9 MMOL/L (ref 136–146)
SODIUM SERPL-SCNC: 139 MMOL/L (ref 136–145)
TROPONIN T DELTA: -3 NG/L
TROPONIN T SERPL HS-MCNC: 53 NG/L
WBC NRBC COR # BLD: 9.72 10*3/MM3 (ref 3.4–10.8)
WHOLE BLOOD HOLD COAG: NORMAL
WHOLE BLOOD HOLD SPECIMEN: NORMAL

## 2023-06-06 PROCEDURE — 83605 ASSAY OF LACTIC ACID: CPT | Performed by: EMERGENCY MEDICINE

## 2023-06-06 PROCEDURE — 82805 BLOOD GASES W/O2 SATURATION: CPT | Performed by: EMERGENCY MEDICINE

## 2023-06-06 PROCEDURE — 0 CEFEPIME PER 500 MG: Performed by: INTERNAL MEDICINE

## 2023-06-06 PROCEDURE — 25010000002 AZITHROMYCIN PER 500 MG: Performed by: EMERGENCY MEDICINE

## 2023-06-06 PROCEDURE — 71045 X-RAY EXAM CHEST 1 VIEW: CPT

## 2023-06-06 PROCEDURE — 82375 ASSAY CARBOXYHB QUANT: CPT | Performed by: EMERGENCY MEDICINE

## 2023-06-06 PROCEDURE — 94799 UNLISTED PULMONARY SVC/PX: CPT

## 2023-06-06 PROCEDURE — 83880 ASSAY OF NATRIURETIC PEPTIDE: CPT | Performed by: EMERGENCY MEDICINE

## 2023-06-06 PROCEDURE — 85025 COMPLETE CBC W/AUTO DIFF WBC: CPT | Performed by: EMERGENCY MEDICINE

## 2023-06-06 PROCEDURE — 99223 1ST HOSP IP/OBS HIGH 75: CPT | Performed by: INTERNAL MEDICINE

## 2023-06-06 PROCEDURE — 83050 HGB METHEMOGLOBIN QUAN: CPT | Performed by: EMERGENCY MEDICINE

## 2023-06-06 PROCEDURE — 87040 BLOOD CULTURE FOR BACTERIA: CPT | Performed by: EMERGENCY MEDICINE

## 2023-06-06 PROCEDURE — 82948 REAGENT STRIP/BLOOD GLUCOSE: CPT

## 2023-06-06 PROCEDURE — 25010000002 METHYLPREDNISOLONE PER 40 MG: Performed by: INTERNAL MEDICINE

## 2023-06-06 PROCEDURE — 84484 ASSAY OF TROPONIN QUANT: CPT | Performed by: EMERGENCY MEDICINE

## 2023-06-06 PROCEDURE — 93010 ELECTROCARDIOGRAM REPORT: CPT | Performed by: INTERNAL MEDICINE

## 2023-06-06 PROCEDURE — 63710000001 INSULIN LISPRO (HUMAN) PER 5 UNITS: Performed by: INTERNAL MEDICINE

## 2023-06-06 PROCEDURE — 36600 WITHDRAWAL OF ARTERIAL BLOOD: CPT | Performed by: EMERGENCY MEDICINE

## 2023-06-06 PROCEDURE — 99285 EMERGENCY DEPT VISIT HI MDM: CPT

## 2023-06-06 PROCEDURE — 25010000002 CEFTRIAXONE PER 250 MG: Performed by: EMERGENCY MEDICINE

## 2023-06-06 PROCEDURE — 87641 MR-STAPH DNA AMP PROBE: CPT | Performed by: INTERNAL MEDICINE

## 2023-06-06 PROCEDURE — 25010000002 VANCOMYCIN 5 G RECONSTITUTED SOLUTION: Performed by: INTERNAL MEDICINE

## 2023-06-06 PROCEDURE — 84145 PROCALCITONIN (PCT): CPT | Performed by: INTERNAL MEDICINE

## 2023-06-06 PROCEDURE — 94640 AIRWAY INHALATION TREATMENT: CPT

## 2023-06-06 PROCEDURE — 80053 COMPREHEN METABOLIC PANEL: CPT | Performed by: EMERGENCY MEDICINE

## 2023-06-06 PROCEDURE — 93005 ELECTROCARDIOGRAM TRACING: CPT | Performed by: EMERGENCY MEDICINE

## 2023-06-06 RX ORDER — CEFTRIAXONE SODIUM 1 G/50ML
1 INJECTION, SOLUTION INTRAVENOUS ONCE
Status: COMPLETED | OUTPATIENT
Start: 2023-06-06 | End: 2023-06-06

## 2023-06-06 RX ORDER — METHYLPREDNISOLONE SODIUM SUCCINATE 40 MG/ML
40 INJECTION, POWDER, LYOPHILIZED, FOR SOLUTION INTRAMUSCULAR; INTRAVENOUS EVERY 8 HOURS
Status: DISCONTINUED | OUTPATIENT
Start: 2023-06-06 | End: 2023-06-07

## 2023-06-06 RX ORDER — SODIUM CHLORIDE 0.9 % (FLUSH) 0.9 %
10 SYRINGE (ML) INJECTION AS NEEDED
Status: DISCONTINUED | OUTPATIENT
Start: 2023-06-06 | End: 2023-06-08 | Stop reason: HOSPADM

## 2023-06-06 RX ORDER — SODIUM CHLORIDE 0.9 % (FLUSH) 0.9 %
10 SYRINGE (ML) INJECTION EVERY 12 HOURS SCHEDULED
Status: DISCONTINUED | OUTPATIENT
Start: 2023-06-06 | End: 2023-06-08 | Stop reason: HOSPADM

## 2023-06-06 RX ORDER — ARFORMOTEROL TARTRATE 15 UG/2ML
15 SOLUTION RESPIRATORY (INHALATION)
Status: DISCONTINUED | OUTPATIENT
Start: 2023-06-06 | End: 2023-06-08 | Stop reason: HOSPADM

## 2023-06-06 RX ORDER — IPRATROPIUM BROMIDE AND ALBUTEROL SULFATE 2.5; .5 MG/3ML; MG/3ML
3 SOLUTION RESPIRATORY (INHALATION)
Status: DISCONTINUED | OUTPATIENT
Start: 2023-06-06 | End: 2023-06-07

## 2023-06-06 RX ORDER — BUDESONIDE 0.5 MG/2ML
0.5 INHALANT ORAL
Status: DISCONTINUED | OUTPATIENT
Start: 2023-06-06 | End: 2023-06-08 | Stop reason: HOSPADM

## 2023-06-06 RX ORDER — INSULIN LISPRO 100 [IU]/ML
2-7 INJECTION, SOLUTION INTRAVENOUS; SUBCUTANEOUS
Status: DISCONTINUED | OUTPATIENT
Start: 2023-06-06 | End: 2023-06-08 | Stop reason: HOSPADM

## 2023-06-06 RX ORDER — IPRATROPIUM BROMIDE AND ALBUTEROL SULFATE 2.5; .5 MG/3ML; MG/3ML
3 SOLUTION RESPIRATORY (INHALATION) ONCE
Status: COMPLETED | OUTPATIENT
Start: 2023-06-06 | End: 2023-06-06

## 2023-06-06 RX ORDER — NICOTINE POLACRILEX 4 MG
15 LOZENGE BUCCAL
Status: DISCONTINUED | OUTPATIENT
Start: 2023-06-06 | End: 2023-06-08 | Stop reason: HOSPADM

## 2023-06-06 RX ORDER — DEXTROSE MONOHYDRATE 25 G/50ML
25 INJECTION, SOLUTION INTRAVENOUS
Status: DISCONTINUED | OUTPATIENT
Start: 2023-06-06 | End: 2023-06-08 | Stop reason: HOSPADM

## 2023-06-06 RX ORDER — ENOXAPARIN SODIUM 100 MG/ML
40 INJECTION SUBCUTANEOUS DAILY
Status: DISCONTINUED | OUTPATIENT
Start: 2023-06-07 | End: 2023-06-08 | Stop reason: HOSPADM

## 2023-06-06 RX ORDER — SODIUM CHLORIDE 9 MG/ML
40 INJECTION, SOLUTION INTRAVENOUS AS NEEDED
Status: DISCONTINUED | OUTPATIENT
Start: 2023-06-06 | End: 2023-06-08 | Stop reason: HOSPADM

## 2023-06-06 RX ADMIN — VANCOMYCIN HYDROCHLORIDE 2250 MG: 5 INJECTION, POWDER, LYOPHILIZED, FOR SOLUTION INTRAVENOUS at 18:44

## 2023-06-06 RX ADMIN — INSULIN LISPRO 2 UNITS: 100 INJECTION, SOLUTION INTRAVENOUS; SUBCUTANEOUS at 18:44

## 2023-06-06 RX ADMIN — METHYLPREDNISOLONE SODIUM SUCCINATE 40 MG: 40 INJECTION INTRAMUSCULAR; INTRAVENOUS at 17:08

## 2023-06-06 RX ADMIN — INSULIN LISPRO 3 UNITS: 100 INJECTION, SOLUTION INTRAVENOUS; SUBCUTANEOUS at 20:49

## 2023-06-06 RX ADMIN — SODIUM CHLORIDE 500 MG: 9 INJECTION, SOLUTION INTRAVENOUS at 14:48

## 2023-06-06 RX ADMIN — IPRATROPIUM BROMIDE 0.5 MG: 0.5 SOLUTION RESPIRATORY (INHALATION) at 11:54

## 2023-06-06 RX ADMIN — IPRATROPIUM BROMIDE AND ALBUTEROL SULFATE 3 ML: .5; 2.5 SOLUTION RESPIRATORY (INHALATION) at 18:40

## 2023-06-06 RX ADMIN — BUDESONIDE 0.5 MG: 0.5 INHALANT RESPIRATORY (INHALATION) at 18:40

## 2023-06-06 RX ADMIN — IPRATROPIUM BROMIDE AND ALBUTEROL SULFATE 3 ML: .5; 2.5 SOLUTION RESPIRATORY (INHALATION) at 23:30

## 2023-06-06 RX ADMIN — IPRATROPIUM BROMIDE AND ALBUTEROL SULFATE 3 ML: .5; 2.5 SOLUTION RESPIRATORY (INHALATION) at 11:54

## 2023-06-06 RX ADMIN — CEFEPIME 2 G: 2 INJECTION, POWDER, FOR SOLUTION INTRAVENOUS at 17:13

## 2023-06-06 RX ADMIN — ARFORMOTEROL TARTRATE 15 MCG: 15 SOLUTION RESPIRATORY (INHALATION) at 18:40

## 2023-06-06 RX ADMIN — CEFTRIAXONE SODIUM 1 G: 1 INJECTION, SOLUTION INTRAVENOUS at 14:12

## 2023-06-06 NOTE — H&P
[facility]  [facility]: [facility]   Patient Name: Dilip Faulkner  : 1950  MRN: [id number]  Primary Care Physician:  Mallorie Roa APRN  Date of admission: 2023    Subjective   Subjective     Chief Complaint: Shortness of breath    HPI:    Dilip Faulkner is a 73 y.o. male past medical history of severe COPD with 2 L home ox, dyslipidemia, type 2 diabetes, hypertension, GERD, and obesity who presents to the ER with shortness of breath.    Patient was admitted to the hospital earlier in May for COPD exacerbation.  Of note there has been attempts in the past to get the patient on home NIPPV but he has refused.  He also had a thoracentesis on his last admission.  States that he had shortness of breath for 2 to 3 days.  Increased cough.  Increased sputum production.  No fevers.  He has had extreme shortness of breath.  As result he came the ER for further evaluation.      In the emergency department the patient's vital signs are as follows: Temperature was 98.1, pulse was in the 90s, respiratory is 26, blood pressure 160/92.  Patient was hypoxemic when he was picked up by EMS.  Was initially trialed on BiPAP but the patient refused and is currently on Airvo.  CBC shows no abnormalities.  CMP shows no abnormalities.  X-ray shows left basilar infiltrates and left-sided effusion.  Patient received ceftriaxone/azithromycin in the emergency department.  Admitted to the hospital for acute COPD exacerbation and left-sided pleural effusion.  As well as hypercapnic respiratory failure.    All systems reviewed abnormal as noted above    Personal History     Past Medical History:  History of severe COPD  Chronic hypoxic respiratory failure on 2 L  Dyslipidemia  Type 2 diabetes  Hypertension  GERD    Past Surgical History:  Abdominal surgery    Family History:   No family history of COPD    Social History:     Former cigarette smoker.  Never alcohol.    Home  Medications:  Fluticasone-Salmeterol, albuterol sulfate HFA, aspirin, atorvastatin, cetirizine, insulin degludec, ipratropium-albuterol, lisinopril, metFORMIN, metoprolol succinate XL, and omeprazole    Allergies:  No Known Allergies        Objective   Objective     Vitals:   Temp:  [98.1 °F (36.7 °C)] 98.1 °F (36.7 °C)  Heart Rate:  [] 93  Resp:  [24-26] 26  BP: (145-158)/(48-70) 145/48  Flow (L/min):  [8-60] 60    Physical Exam    Constitutional: Awake, alert, no acute distress   Eyes: Pupils equal, sclerae anicteric, no conjunctival injection   HENT: NCAT, mucous membranes moist   Neck: Supple, no thyromegaly, no lymphadenopathy, trachea midline   Respiratory: Wheezing.  Rhonchi.  Mild respite distress.   Cardiovascular: RRR, no murmurs, rubs, or gallops, palpable pedal pulses bilaterally   Gastrointestinal: Positive bowel sounds, soft, nontender, nondistended   Musculoskeletal: No bilateral ankle edema, no clubbing or cyanosis to extremities   Psychiatric: Appropriate affect, cooperative   Neurologic: Oriented x 3, strength symmetric in all extremities, Cranial Nerves grossly intact to confrontation, speech clear   Skin: No rashes     Result Review    Result Review:  I have personally reviewed the results from the time of this admission to 6/6/2023 14:10 EDT and agree with these findings:  7.18/73-> 7.27/56  Troponin 53 down to 50  BNP is 125  Lactate normal  CBC and CMP show no concerning abnormalities    Chest x-ray shows small left pleural effusion slight larger than previous    Assessment & Plan   Assessment / Plan     Assessment/Plan:   Hypoxemic and hypercapnic respiratory failure  Acute COPD exacerbation  Left-sided pleural effusion  Pneumonia concerning for MRSA and Pseudomonas  Type 2 diabetes  GERD  Chronic hypoxic respiratory failure    Plan:  --Admit to hospitalist service  -- Consult pulmonary for thoracentesis  -- Patient refused NIPPV but is currently on Airvo and has had improvement in his  hypercapnia  -- Continue Airvo  -- Cefepime and vancomycin due due to recent hospitalization  -- MRSA swab   -- Strep and Legionella urine antigen  -- Respiratory culture  -- Blood culture  -- Brovana/Pulmicort/DuoNebs  -- Methylprednisolone 40 every 8 hours as long as is on Airvo  -- Sliding scale insulin      DVT prophylaxis:  Lovenox    CODE STATUS:     Full code    Admission Status:  I believe this patient meets admission status.    Electronically signed by Rob Vincent MD, 06/06/23, 2:10 PM EDT.

## 2023-06-06 NOTE — ED PROVIDER NOTES
Time: 1:08 PM EDT  Date of encounter:  6/6/2023  Independent Historian/Clinical History and Information was obtained by:   Patient and EMS  Chief Complaint: Difficulty breathing/shortness of breath    History is limited by: Acuity of Condition    History of Present Illness:  Patient is a 73 y.o. year old male who presents to the emergency department for evaluation of difficulty breathing and shortness of breath.  Patient has been short of breath since Saturday and is progressively gotten worse.  Patient has a history of COPD.  Patient has oxygen at home that he uses.  Patient denies any chest pain.  He denies any fevers.  Patient is only able to speak in a couple words at a time due to his respiratory distress.    HPI    Patient Care Team  Primary Care Provider: Mallorie Roa APRN    Past Medical History:     No Known Allergies  Past Medical History:   Diagnosis Date    Asthma     COPD (chronic obstructive pulmonary disease)     Diabetes mellitus     Ex-smoker     QUIT 2021    Hernia, umbilical     Hypertension     On home O2     REPORTS WEARING 2L/NC PRN SOA     Past Surgical History:   Procedure Laterality Date    ABDOMINAL SURGERY       History reviewed. No pertinent family history.    Home Medications:  Prior to Admission medications    Medication Sig Start Date End Date Taking? Authorizing Provider   albuterol sulfate  (90 Base) MCG/ACT inhaler Inhale 2 puffs Every 6 (Six) Hours As Needed for Wheezing. 5/2/23   Mallorie Roa APRN   aspirin 81 MG chewable tablet Chew 1 tablet Daily. 5/2/23   Mallorie Roa APRN   atorvastatin (LIPITOR) 10 MG tablet Take 1 tablet by mouth Daily.    Provider, MD oRosevelt   cetirizine (zyrTEC) 10 MG tablet Take 1 tablet by mouth Daily. 5/2/23   Mallorie Roa APRN   Fluticasone-Salmeterol (ADVAIR/WIXELA) 250-50 MCG/ACT DISKUS Inhale 1 puff 2 (Two) Times a Day for 30 days. 5/2/23 6/1/23  Mallorie Roa APRN   insulin degludec (Tresiba FlexTouch) 100 UNIT/ML  solution pen-injector injection Inject 20 Units under the skin into the appropriate area as directed Daily. 23   Mallorie Roa APRN   ipratropium-albuterol (DUO-NEB) 0.5-2.5 mg/3 ml nebulizer Take 3 mL by nebulization Every 4 (Four) Hours As Needed for Wheezing or Shortness of Air. 21   Ricci Trinidad DO   lisinopril (PRINIVIL,ZESTRIL) 5 MG tablet Take 1 tablet by mouth Daily for 30 days. 23  Mallorie Roa APRN   metFORMIN (GLUCOPHAGE) 500 MG tablet Take 1 tablet by mouth 2 (Two) Times a Day With Meals for 30 days. 23  Mallorie Roa APRN   metoprolol succinate XL (TOPROL-XL) 25 MG 24 hr tablet Take 1 tablet by mouth Daily for 30 days. 23  Mallorie Roa APRN   omeprazole (priLOSEC) 20 MG capsule Take 1 capsule by mouth Daily. 23   Mallorie Roa APRN        Social History:   Social History     Tobacco Use    Smoking status: Former     Packs/day: 1.50     Years: 2.00     Pack years: 3.00     Types: Cigarettes     Quit date: 3/19/2021     Years since quittin.2    Smokeless tobacco: Current     Types: Chew   Vaping Use    Vaping Use: Never used   Substance Use Topics    Alcohol use: Never    Drug use: Never         Review of Systems:  Review of Systems   Constitutional:  Negative for chills and fever.   HENT:  Negative for congestion, ear pain and sore throat.    Eyes:  Negative for pain.   Respiratory:  Positive for cough, shortness of breath and wheezing. Negative for chest tightness.    Cardiovascular:  Negative for chest pain.   Gastrointestinal:  Negative for abdominal pain, diarrhea, nausea and vomiting.   Genitourinary:  Negative for flank pain and hematuria.   Musculoskeletal:  Negative for joint swelling.   Skin:  Negative for pallor.   Neurological:  Negative for seizures and headaches.   All other systems reviewed and are negative.     Physical Exam:  /65 (BP Location: Left arm, Patient Position: Sitting)   Pulse 92   Temp 97.9 °F  "(36.6 °C) (Oral)   Resp 16   Ht 185.4 cm (73\")   Wt 107 kg (236 lb 12.4 oz)   SpO2 94%   BMI 31.24 kg/m²     Physical Exam       Vital signs were reviewed under triage note.  General appearance - Patient appears well-developed and well-nourished.  Patient is in severe acute respiratory distress.  Patient is sitting in a tripod position.  Head - Normocephalic, atraumatic.  Pupils - Equal, round, reactive to light.  Extraocular muscles are intact.  Conjunctiva is clear.  Nasal - Normal inspection.  No evidence of trauma or epistaxis.  Tympanic membranes - Gray, intact without erythema or retractions.  Oral mucosa - Pink and moist without lesions or erythema.  Uvula is midline.  Chest wall - Atraumatic.  Chest wall is nontender.  There are no vesicular rashes noted.  Neck - Supple.  Trachea was midline.  There is no palpable lymphadenopathy or thyromegaly.  There are no meningeal signs  Lungs - Auscultation reveals moderately decreased breath sounds with moderately diffuse scattered expiratory wheezes.  Patient is tachypneic.  Patient is sitting in a tripod position.    Heart - Regular rate and rhythm without any murmurs, clicks, or gallops.  Abdomen - Soft.  Bowel sounds are present.  There is no palpable tenderness.  There is no rebound, guarding, or rigidity.  There are no palpable masses.  There are no pulsatile masses.  Back - Spine is straight and midline.  There is no CVA tenderness.  Extremities - Intact x4 with full range of motion.  There is no palpable edema.  Pulses are intact x4 and equal.  Neurologic - Patient is awake, alert, and oriented x3.  Cranial nerves II through XII are grossly intact.  Motor and sensory functions grossly intact.  Cerebellar function was normal.  Integument - There are no rashes.  There are no petechia or purpura lesions noted.  There are no vesicular lesions noted.      Procedures:  Procedures      Medical Decision Making:      Comorbidities that affect care:    COPD, " asthma, hypertension, diabetes    External Notes reviewed:    EMS Run sheet: EMS run sheet was reviewed by me.  Patient was placed on nonrebreather mask in order to maintain saturations at 90%.  Patient was given Solu-Medrol 120 mg IV.  Patient was also given a DuoNeb breathing treatment.      The following orders were placed and all results were independently analyzed by me:  Orders Placed This Encounter   Procedures    Blood Culture - Blood,    Blood Culture - Blood,    MRSA Screen, PCR (Inpatient) - Swab, Nares    S. Pneumo Ag Urine or CSF - Urine, Urine, Clean Catch    Legionella Antigen, Urine - Urine, Urine, Clean Catch    Respiratory Culture - Sputum, Cough    Respiratory Panel PCR w/COVID-19(SARS-CoV-2) [facility]/KYLEE/[facility]/ANA In-House, NP Swab in UTM/VTM, 3-4 HR TAT - Swab, Nasopharynx    XR Chest 1 View    CT Chest Without Contrast Diagnostic    [place] Draw    Comprehensive Metabolic Panel    BNP    Single High Sensitivity Troponin T    CBC Auto Differential    ABG with Co-Ox and Electrolytes    Lactic Acid, Plasma    High Sensitivity Troponin T 2Hr    Blood Gas, Arterial -With Co-Ox Panel: Yes    CBC Auto Differential    Comprehensive Metabolic Panel    Magnesium    Procalcitonin    Procalcitonin    Vancomycin, Random    Diet: Diabetic Diets; Consistent Carbohydrate; Texture: Regular Texture (IDDSI 7); Fluid Consistency: Thin (IDDSI 0)    Undress & Gown    Vital Signs    Undress & Gown    Continuous Pulse Oximetry    Vital Signs    Vital Signs    Intake & Output    Weigh Patient    Oral Care    Telemetry - Maintain IV Access    Telemetry - Place Orders & Notify Provider of Results When Patient Experiences Acute Chest Pain, Dysrhythmia or Respiratory Distress    Daily Weights    Strict Intake & Output    Discontinue Insulin Infusion at Specified Time After Basal Dose Administered    Discontinue Glucommander IV Insulin Order Set After Transition Complete    Discontinue Glucommander After Transition  Complete    Code Status and Medical Interventions:    Hospitalist (on-call MD unless specified)    Inpatient Pulmonology Consult    Oxygen Therapy- Nasal Cannula; Titrate 1-6 LPM Per SpO2; 90 - 95%    Oxygen Therapy- Nasal Cannula; Titrate 1-6 LPM Per SpO2; 90 - 95%    Oxygen Therapy- Heated High Flow Nasal Cannula; Titrate 30-60 LPM Per SpO2; 90 - 95%    Incentive Spirometry    Oscillating Positive Expiratory Pressure (OPEP)    RT to Initiate Bronchopulmonary Hygiene Protocol    POC Glucose 4x Daily AC & at Bedtime    POC Glucose Once    POC Glucose Once    POC Glucose Once    POC Glucose Once    ECG 12 Lead Dyspnea    SCANNED - TELEMETRY      Insert Peripheral IV    Insert Peripheral IV    Insert Peripheral IV    Inpatient Admission    CBC & Differential    Green Top (Gel)    Lavender Top    Gold Top - SST    Light Blue Top       Medications Given in the Emergency Department:  Medications   sodium chloride 0.9 % flush 10 mL (has no administration in time range)   sodium chloride 0.9 % flush 10 mL (has no administration in time range)   sodium chloride 0.9 % flush 10 mL (10 mL Intravenous Given 6/7/23 0938)   sodium chloride 0.9 % flush 10 mL (has no administration in time range)   sodium chloride 0.9 % infusion 40 mL (has no administration in time range)   Enoxaparin Sodium (LOVENOX) syringe 40 mg (40 mg Subcutaneous Given 6/7/23 0938)   arformoterol (BROVANA) nebulizer solution 15 mcg (15 mcg Nebulization Given 6/7/23 0648)   budesonide (PULMICORT) nebulizer solution 0.5 mg (0.5 mg Nebulization Given 6/7/23 0648)   dextrose (GLUTOSE) oral gel 15 g (has no administration in time range)   dextrose (D50W) (25 g/50 mL) IV injection 25 g (has no administration in time range)   glucagon (GLUCAGEN) injection 1 mg (has no administration in time range)   Insulin Lispro (humaLOG) injection 2-7 Units (2 Units Subcutaneous Given 6/7/23 1258)   levalbuterol (XOPENEX) nebulizer solution 1.25 mg (1.25 mg Nebulization Given  6/7/23 1509)   predniSONE (DELTASONE) tablet 40 mg (40 mg Oral Given 6/7/23 1444)   ampicillin-sulbactam 3 g/100 mL 0.9% NS IVPB (has no administration in time range)   azithromycin (ZITHROMAX) tablet 500 mg (has no administration in time range)   ipratropium-albuterol (DUO-NEB) nebulizer solution 3 mL (3 mL Nebulization Given 6/6/23 1154)   ipratropium (ATROVENT) nebulizer solution 0.5 mg (0.5 mg Nebulization Given 6/6/23 1154)   cefTRIAXone (ROCEPHIN) IVPB 1 g (0 g Intravenous Stopped 6/6/23 1444)   AZITHROMYCIN 500 MG/250 ML 0.9% NS IVPB (vial-mate) (0 mg Intravenous Stopped 6/6/23 1637)   vancomycin 2250 mg/500 mL 0.9% NS IVPB ([facility]) (2,250 mg Intravenous New Bag 6/6/23 1844)   cefepime (MAXIPIME) 2 g/100 mL 0.9% NS (mbp) (0 g Intravenous Stopped 6/6/23 1755)        ED Course:    ED Course as of 06/07/23 1628   Wed Jun 07, 2023   1617 EKG performed at 1128 was interpreted by me to show sinus tachycardia with a ventricular of 102 bpm.  Parable 187 ms.  P waves are normal.  QRS interval is 143 ms.  Patient is a right bundle branch block morphology.  Axis is leftward at -6 degrees.  There is a nonspecific ST-T wave change identified.  QT corrected is 420 ms. [TB]      ED Course User Index  [TB] Alexi Mcleod DO     The patient presents to the ER via EMS in respiratory distress.  Patient was seen shortly upon arrival.  Patient was in obvious respiratory distress sitting in a tripod position on nonrebreather mask.  Patient's blood gas shows a respiratory acidosis with hypercapnia.  BiPAP was ordered however the patient refused to wear it.  Patient is he has been he is tried were the mass performed and cannot tolerate it.  Patient is unwilling to try it even with a benzodiazepine.  Patient is a full code.  Patient is agreeable to intubation if necessary.  Patient was also placed on high flow O2 given some additional breathing treatments.  Patient will be covered for pneumonia as well.  Patient responded well to this.   Patient will require acute hospitalization.    Labs:    Lab Results (last 24 hours)       Procedure Component Value Units Date/Time    POC Glucose Once [[id number]]  (Abnormal) Collected: 06/06/23 1839    Specimen: Blood Updated: 06/06/23 1841     Glucose 199 mg/dL      Comment: Serial Number: [id number]:  [id number]       POC Glucose Once [[id number]]  (Abnormal) Collected: 06/06/23 1958    Specimen: Blood Updated: 06/06/23 2001     Glucose 205 mg/dL      Comment: Serial Number: [id number]:  [id number]       MRSA Screen, PCR (Inpatient) - Swab, Nares [[id number]]  (Normal) Collected: 06/06/23 1959    Specimen: Swab from Nares Updated: 06/06/23 2120     MRSA PCR No MRSA Detected    Narrative:      The negative predictive value of this diagnostic test is high and should only be used to consider de-escalating anti-MRSA therapy. A positive result may indicate colonization with MRSA and must be correlated clinically.    S. Pneumo Ag Urine or CSF - Urine, Urine, Clean Catch [[id number]]  (Normal) Collected: 06/07/23 0417    Specimen: Urine, Clean Catch Updated: 06/07/23 0706     Strep Pneumo Ag Negative    Legionella Antigen, Urine - Urine, Urine, Clean Catch [[id number]]  (Normal) Collected: 06/07/23 0417    Specimen: Urine, Clean Catch Updated: 06/07/23 0710     LEGIONELLA ANTIGEN, URINE Negative    CBC Auto Differential [[id number]]  (Abnormal) Collected: 06/07/23 0544    Specimen: Blood Updated: 06/07/23 0634     WBC 9.81 10*3/mm3      RBC 4.84 10*6/mm3      Hemoglobin 11.6 g/dL      Hematocrit 40.3 %      MCV 83.3 fL      MCH 24.0 pg      MCHC 28.8 g/dL      RDW 15.9 %      RDW-SD 47.9 fl      MPV 9.5 fL      Platelets 181 10*3/mm3      Neutrophil % 89.8 %      Lymphocyte % 7.4 %      Monocyte % 1.5 %      Eosinophil % 0.1 %      Basophil % 0.2 %      Immature Grans % 1.0 %      Neutrophils, Absolute 8.80 10*3/mm3      Lymphocytes, Absolute 0.73 10*3/mm3      Monocytes, Absolute 0.15 10*3/mm3      Eosinophils,  "Absolute 0.01 10*3/mm3      Basophils, Absolute 0.02 10*3/mm3      Immature Grans, Absolute 0.10 10*3/mm3      nRBC 0.0 /100 WBC     Comprehensive Metabolic Panel [[id number]]  (Abnormal) Collected: 06/07/23 0544    Specimen: Blood Updated: 06/07/23 0657     Glucose 202 mg/dL      BUN 25 mg/dL      Creatinine 0.95 mg/dL      Sodium 136 mmol/L      Potassium 4.8 mmol/L      Chloride 102 mmol/L      CO2 23.6 mmol/L      Calcium 8.9 mg/dL      Total Protein 6.8 g/dL      Albumin 3.4 g/dL      ALT (SGPT) 8 U/L      AST (SGOT) 11 U/L      Alkaline Phosphatase 85 U/L      Total Bilirubin 0.3 mg/dL      Globulin 3.4 gm/dL      A/G Ratio 1.0 g/dL      BUN/Creatinine Ratio 26.3     Anion Gap 10.4 mmol/L      eGFR 84.0 mL/min/1.73     Narrative:      GFR Normal >60  Chronic Kidney Disease <60  Kidney Failure <15    The GFR formula is only valid for adults with stable renal function between ages 18 and 70.    Magnesium [[id number]]  (Normal) Collected: 06/07/23 0544    Specimen: Blood Updated: 06/07/23 0657     Magnesium 2.4 mg/dL     Procalcitonin [[id number]]  (Normal) Collected: 06/07/23 0544    Specimen: Blood Updated: 06/07/23 0659     Procalcitonin 0.05 ng/mL     Narrative:      As a Marker for Sepsis (Non-Neonates):    1. <0.5 ng/mL represents a low risk of severe sepsis and/or septic shock.  2. >2 ng/mL represents a high risk of severe sepsis and/or septic shock.    As a Marker for Lower Respiratory Tract Infections that require antibiotic therapy:    PCT on Admission    Antibiotic Therapy       6-12 Hrs later    >0.5                Strongly Recommended  >0.25 - <0.5        Recommended  0.1 - 0.25          Discouraged              Remeasure/reassess PCT  <0.1                Strongly Discouraged     Remeasure/reassess PCT    As 28 day mortality risk marker: \"Change in Procalcitonin Result\" (>80% or <=80%) if Day 0 (or Day 1) and Day 4 values are available. Refer to http://www.brahms-pct-calculator.com    Change in PCT " <=80%  A decrease of PCT levels below or equal to 80% defines a positive change in PCT test result representing a higher risk for 28-day all-cause mortality of patients diagnosed with severe sepsis for septic shock.    Change in PCT >80%  A decrease of PCT levels of more than 80% defines a negative change in PCT result representing a lower risk for 28-day all-cause mortality of patients diagnosed with severe sepsis or septic shock.    This test is Prognostic not Diagnostic, if elevated correlate with clinical findings before administering antibiotic treatment.        Vancomycin, Random [[id number]]  (Normal) Collected: 06/07/23 0544    Specimen: Blood Updated: 06/07/23 0703     Vancomycin Random 11.80 mcg/mL     Narrative:      Therapeutic Ranges for Vancomycin    Vancomycin Random   5.0-40.0 mcg/mL  Vancomycin Trough   5.0-20.0 mcg/mL  Vancomycin Peak     20.0-40.0 mcg/mL    POC Glucose Once [[id number]]  (Abnormal) Collected: 06/07/23 0733    Specimen: Blood Updated: 06/07/23 0736     Glucose 146 mg/dL      Comment: Serial Number: [id number]:  [id number]       POC Glucose Once [[id number]]  (Abnormal) Collected: 06/07/23 1138    Specimen: Blood Updated: 06/07/23 1141     Glucose 172 mg/dL      Comment: Serial Number: [id number]:  [id number]       Respiratory Panel PCR w/COVID-19(SARS-CoV-2) [facility]/ANIL/KYLEE/PAD/[facility]/MAD/ANA In-House, NP Swab in UTM/[facility], 3-4 HR TAT - Swab, Nasopharynx [[id number]]  (Normal) Collected: 06/07/23 1144    Specimen: Swab from Nasopharynx Updated: 06/07/23 1359     ADENOVIRUS, PCR Not Detected     Coronavirus 229E Not Detected     Coronavirus HKU1 Not Detected     Coronavirus NL63 Not Detected     Coronavirus OC43 Not Detected     COVID19 Not Detected     Human Metapneumovirus Not Detected     Human Rhinovirus/Enterovirus Not Detected     Influenza A PCR Not Detected     Influenza B PCR Not Detected     Parainfluenza Virus 1 Not Detected     Parainfluenza Virus 2 Not Detected      Parainfluenza Virus 3 Not Detected     Parainfluenza Virus 4 Not Detected     RSV, PCR Not Detected     Bordetella pertussis pcr Not Detected     Bordetella parapertussis PCR Not Detected     Chlamydophila pneumoniae PCR Not Detected     Mycoplasma pneumo by PCR Not Detected    Narrative:      In the setting of a positive respiratory panel with a viral infection PLUS a negative procalcitonin without other underlying concern for bacterial infection, consider observing off antibiotics or discontinuation of antibiotics and continue supportive care. If the respiratory panel is positive for atypical bacterial infection (Bordetella pertussis, Chlamydophila pneumoniae, or Mycoplasma pneumoniae), consider antibiotic de-escalation to target atypical bacterial infection.             Imaging:    No Radiology Exams Resulted Within Past 24 Hours      Differential Diagnosis and Discussion:    Dyspnea: Differential diagnosis includes but is not limited to metabolic acidosis, neurological disorders, psychogenic, asthma, pneumothorax, upper airway obstruction, COPD, pneumonia, noncardiogenic pulmonary edema, interstitial lung disease, anemia, congestive heart failure, and pulmonary embolism    All labs were reviewed and interpreted by me.  All X-rays impressions were independently interpreted by me.  EKG was interpreted by me.    [facility]       Critical Care Note: Total Critical Care time of 35 minutes. Total critical care time documented does not include time spent on separately billed procedures for services of nurses or physician assistants. I personally saw and examined the patient. I have reviewed all diagnostic interpretations and treatment plans as written. I was present for the key portions of any procedures performed and the inclusive time noted in any critical care statement. Critical care time includes patient management by me, time spent at the patients bedside,  time to review lab and imaging results, discussing patient  care, documentation in the medical record, and time spent with family or caregiver.    Patient Care Considerations:          Consultants/Shared Management Plan:    Hospitalist: I have discussed the case with Dr. Vincent who agrees to accept the patient for admission.    Social Determinants of Health:    Patient is independent, reliable, and has access to care.       Disposition and Care Coordination:    Admit:   Through independent evaluation of the patient's history, physical, and imperical data, the patient meets criteria for observation/admission to the hospital.        Final diagnoses:   Acute respiratory failure with hypoxia and hypercapnia   COPD with acute exacerbation        ED Disposition       ED Disposition   Decision to Admit    Condition   --    Comment   Level of Care: Telemetry [5]   Diagnosis: Acute respiratory failure with hypoxia and hypercapnia [2602192]   Isolate for COVID?: Yes [1]   Certification: I Certify That Inpatient Hospital Services Are Medically Necessary For Greater Than 2 Midnights                 This medical record created using voice recognition software.             Alexi Mcleod DO  06/07/23 7187

## 2023-06-06 NOTE — SIGNIFICANT NOTE
06/06/23 1829   Living Environment   People in Home alone   Current Living Arrangements home   Primary Care Provided by self   Provides Primary Care For no one   Caregiving Concerns lives independent, yet may require a little assistance at home   Family Caregiver if Needed none   Quality of Family Relationships other (see comments)  (has a sister named Alicia Rogers, unknown relationship integrity)   Able to Return to Prior Arrangements yes   Living Arrangement Comments return home to live on his own   Resource/Environmental Concerns   Transportation Concerns none   Transition Planning   Patient/Family Anticipates Transition to home   Patient/Family Anticipated Services at Transition none   Transportation Anticipated car, drives self   Discharge Needs Assessment   Readmission Within the Last 30 Days lack of support   Equipment Currently Used at Home cane, straight   Concerns to be Addressed no discharge needs identified   Anticipated Changes Related to Illness none   Equipment Needed After Discharge none     SW student appeared bedside to pt who was warm, welcoming, sitting up in bed, yet his eyes appeared closed as if he may have been slightly blind or hard of seeing. Pt stated that he was coming from home and planned to return. Pt stated that he lives alone and has his sister as his support system, her name is Alicia Sandoval. Pt stated that he does have a living will and a healthcare POA on file. Pt stated that lives independently at home and he drives himself around without trouble. Pt stated that he has no need for home healthcare, he is not on dialysis, and his PCP was unknown. Pt file states that his PCP may be Angie Duran and or [facility] [place].  Pt stated that his pharmacy is here at [facility]. Pt stated that he uses a straight cane. Pt stated that he has no trouble affording medication. Pt stated that he does not anticipate any services needed at discharge and yes, he has been  admitted to this [facility] on 5/8/2023 for the same medical concern.

## 2023-06-07 ENCOUNTER — APPOINTMENT (OUTPATIENT)
Dept: CT IMAGING | Facility: HOSPITAL | Age: 73
DRG: 177 | End: 2023-06-07
Payer: MEDICARE

## 2023-06-07 LAB
ALBUMIN SERPL-MCNC: 3.4 G/DL (ref 3.5–5.2)
ALBUMIN/GLOB SERPL: 1 G/DL
ALP SERPL-CCNC: 85 U/L (ref 39–117)
ALT SERPL W P-5'-P-CCNC: 8 U/L (ref 1–41)
ANION GAP SERPL CALCULATED.3IONS-SCNC: 10.4 MMOL/L (ref 5–15)
AST SERPL-CCNC: 11 U/L (ref 1–40)
B PARAPERT DNA SPEC QL NAA+PROBE: NOT DETECTED
B PERT DNA SPEC QL NAA+PROBE: NOT DETECTED
BASOPHILS # BLD AUTO: 0.02 10*3/MM3 (ref 0–0.2)
BASOPHILS NFR BLD AUTO: 0.2 % (ref 0–1.5)
BILIRUB SERPL-MCNC: 0.3 MG/DL (ref 0–1.2)
BUN SERPL-MCNC: 25 MG/DL (ref 8–23)
BUN/CREAT SERPL: 26.3 (ref 7–25)
C PNEUM DNA NPH QL NAA+NON-PROBE: NOT DETECTED
CALCIUM SPEC-SCNC: 8.9 MG/DL (ref 8.6–10.5)
CHLORIDE SERPL-SCNC: 102 MMOL/L (ref 98–107)
CO2 SERPL-SCNC: 23.6 MMOL/L (ref 22–29)
CREAT SERPL-MCNC: 0.95 MG/DL (ref 0.76–1.27)
DEPRECATED RDW RBC AUTO: 47.9 FL (ref 37–54)
EGFRCR SERPLBLD CKD-EPI 2021: 84 ML/MIN/1.73
EOSINOPHIL # BLD AUTO: 0.01 10*3/MM3 (ref 0–0.4)
EOSINOPHIL NFR BLD AUTO: 0.1 % (ref 0.3–6.2)
ERYTHROCYTE [DISTWIDTH] IN BLOOD BY AUTOMATED COUNT: 15.9 % (ref 12.3–15.4)
FLUAV SUBTYP SPEC NAA+PROBE: NOT DETECTED
FLUBV RNA ISLT QL NAA+PROBE: NOT DETECTED
GLOBULIN UR ELPH-MCNC: 3.4 GM/DL
GLUCOSE BLDC GLUCOMTR-MCNC: 146 MG/DL (ref 70–99)
GLUCOSE BLDC GLUCOMTR-MCNC: 172 MG/DL (ref 70–99)
GLUCOSE BLDC GLUCOMTR-MCNC: 183 MG/DL (ref 70–99)
GLUCOSE BLDC GLUCOMTR-MCNC: 209 MG/DL (ref 70–99)
GLUCOSE SERPL-MCNC: 202 MG/DL (ref 65–99)
HADV DNA SPEC NAA+PROBE: NOT DETECTED
HCOV 229E RNA SPEC QL NAA+PROBE: NOT DETECTED
HCOV HKU1 RNA SPEC QL NAA+PROBE: NOT DETECTED
HCOV NL63 RNA SPEC QL NAA+PROBE: NOT DETECTED
HCOV OC43 RNA SPEC QL NAA+PROBE: NOT DETECTED
HCT VFR BLD AUTO: 40.3 % (ref 37.5–51)
HGB BLD-MCNC: 11.6 G/DL (ref 13–17.7)
HMPV RNA NPH QL NAA+NON-PROBE: NOT DETECTED
HPIV1 RNA ISLT QL NAA+PROBE: NOT DETECTED
HPIV2 RNA SPEC QL NAA+PROBE: NOT DETECTED
HPIV3 RNA NPH QL NAA+PROBE: NOT DETECTED
HPIV4 P GENE NPH QL NAA+PROBE: NOT DETECTED
IMM GRANULOCYTES # BLD AUTO: 0.1 10*3/MM3 (ref 0–0.05)
IMM GRANULOCYTES NFR BLD AUTO: 1 % (ref 0–0.5)
L PNEUMO1 AG UR QL IA: NEGATIVE
LYMPHOCYTES # BLD AUTO: 0.73 10*3/MM3 (ref 0.7–3.1)
LYMPHOCYTES NFR BLD AUTO: 7.4 % (ref 19.6–45.3)
M PNEUMO IGG SER IA-ACNC: NOT DETECTED
MAGNESIUM SERPL-MCNC: 2.4 MG/DL (ref 1.6–2.4)
MCH RBC QN AUTO: 24 PG (ref 26.6–33)
MCHC RBC AUTO-ENTMCNC: 28.8 G/DL (ref 31.5–35.7)
MCV RBC AUTO: 83.3 FL (ref 79–97)
MONOCYTES # BLD AUTO: 0.15 10*3/MM3 (ref 0.1–0.9)
MONOCYTES NFR BLD AUTO: 1.5 % (ref 5–12)
NEUTROPHILS NFR BLD AUTO: 8.8 10*3/MM3 (ref 1.7–7)
NEUTROPHILS NFR BLD AUTO: 89.8 % (ref 42.7–76)
NRBC BLD AUTO-RTO: 0 /100 WBC (ref 0–0.2)
PLATELET # BLD AUTO: 181 10*3/MM3 (ref 140–450)
PMV BLD AUTO: 9.5 FL (ref 6–12)
POTASSIUM SERPL-SCNC: 4.8 MMOL/L (ref 3.5–5.2)
PROCALCITONIN SERPL-MCNC: 0.05 NG/ML (ref 0–0.25)
PROT SERPL-MCNC: 6.8 G/DL (ref 6–8.5)
RBC # BLD AUTO: 4.84 10*6/MM3 (ref 4.14–5.8)
RHINOVIRUS RNA SPEC NAA+PROBE: NOT DETECTED
RSV RNA NPH QL NAA+NON-PROBE: NOT DETECTED
S PNEUM AG SPEC QL LA: NEGATIVE
SARS-COV-2 RNA RESP QL NAA+PROBE: NOT DETECTED
SODIUM SERPL-SCNC: 136 MMOL/L (ref 136–145)
VANCOMYCIN SERPL-MCNC: 11.8 MCG/ML (ref 5–40)
WBC NRBC COR # BLD: 9.81 10*3/MM3 (ref 3.4–10.8)

## 2023-06-07 PROCEDURE — 63710000001 INSULIN DETEMIR PER 5 UNITS: Performed by: FAMILY MEDICINE

## 2023-06-07 PROCEDURE — 71250 CT THORAX DX C-: CPT

## 2023-06-07 PROCEDURE — 25010000002 METHYLPREDNISOLONE PER 40 MG: Performed by: INTERNAL MEDICINE

## 2023-06-07 PROCEDURE — 87449 NOS EACH ORGANISM AG IA: CPT | Performed by: INTERNAL MEDICINE

## 2023-06-07 PROCEDURE — 94799 UNLISTED PULMONARY SVC/PX: CPT

## 2023-06-07 PROCEDURE — 0 CEFEPIME PER 500 MG: Performed by: INTERNAL MEDICINE

## 2023-06-07 PROCEDURE — 80053 COMPREHEN METABOLIC PANEL: CPT | Performed by: INTERNAL MEDICINE

## 2023-06-07 PROCEDURE — 82948 REAGENT STRIP/BLOOD GLUCOSE: CPT

## 2023-06-07 PROCEDURE — 0202U NFCT DS 22 TRGT SARS-COV-2: CPT | Performed by: INTERNAL MEDICINE

## 2023-06-07 PROCEDURE — 80202 ASSAY OF VANCOMYCIN: CPT | Performed by: INTERNAL MEDICINE

## 2023-06-07 PROCEDURE — 99223 1ST HOSP IP/OBS HIGH 75: CPT | Performed by: INTERNAL MEDICINE

## 2023-06-07 PROCEDURE — 25010000002 VANCOMYCIN 5 G RECONSTITUTED SOLUTION: Performed by: INTERNAL MEDICINE

## 2023-06-07 PROCEDURE — 99233 SBSQ HOSP IP/OBS HIGH 50: CPT | Performed by: FAMILY MEDICINE

## 2023-06-07 PROCEDURE — 25010000002 ENOXAPARIN PER 10 MG: Performed by: INTERNAL MEDICINE

## 2023-06-07 PROCEDURE — 63710000001 INSULIN LISPRO (HUMAN) PER 5 UNITS: Performed by: INTERNAL MEDICINE

## 2023-06-07 PROCEDURE — 85025 COMPLETE CBC W/AUTO DIFF WBC: CPT | Performed by: INTERNAL MEDICINE

## 2023-06-07 PROCEDURE — 63710000001 PREDNISONE PER 1 MG: Performed by: NURSE PRACTITIONER

## 2023-06-07 PROCEDURE — 25010000002 AMPICILLIN-SULBACTAM PER 1.5 G: Performed by: FAMILY MEDICINE

## 2023-06-07 PROCEDURE — 36415 COLL VENOUS BLD VENIPUNCTURE: CPT | Performed by: INTERNAL MEDICINE

## 2023-06-07 PROCEDURE — 83735 ASSAY OF MAGNESIUM: CPT | Performed by: INTERNAL MEDICINE

## 2023-06-07 PROCEDURE — 84145 PROCALCITONIN (PCT): CPT | Performed by: INTERNAL MEDICINE

## 2023-06-07 PROCEDURE — 94664 DEMO&/EVAL PT USE INHALER: CPT

## 2023-06-07 RX ORDER — AZITHROMYCIN 250 MG/1
500 TABLET, FILM COATED ORAL EVERY 24 HOURS
Status: DISCONTINUED | OUTPATIENT
Start: 2023-06-07 | End: 2023-06-08

## 2023-06-07 RX ORDER — PREDNISONE 20 MG/1
40 TABLET ORAL
Status: DISCONTINUED | OUTPATIENT
Start: 2023-06-07 | End: 2023-06-08 | Stop reason: HOSPADM

## 2023-06-07 RX ORDER — ASPIRIN 81 MG/1
81 TABLET, CHEWABLE ORAL DAILY
Status: DISCONTINUED | OUTPATIENT
Start: 2023-06-08 | End: 2023-06-08 | Stop reason: HOSPADM

## 2023-06-07 RX ORDER — PANTOPRAZOLE SODIUM 40 MG/1
40 TABLET, DELAYED RELEASE ORAL
Status: DISCONTINUED | OUTPATIENT
Start: 2023-06-08 | End: 2023-06-08 | Stop reason: HOSPADM

## 2023-06-07 RX ORDER — CETIRIZINE HYDROCHLORIDE 10 MG/1
10 TABLET ORAL DAILY
Status: DISCONTINUED | OUTPATIENT
Start: 2023-06-08 | End: 2023-06-08 | Stop reason: HOSPADM

## 2023-06-07 RX ORDER — LEVALBUTEROL INHALATION SOLUTION 1.25 MG/3ML
1.25 SOLUTION RESPIRATORY (INHALATION)
Status: DISCONTINUED | OUTPATIENT
Start: 2023-06-07 | End: 2023-06-08 | Stop reason: HOSPADM

## 2023-06-07 RX ADMIN — CEFEPIME 2 G: 2 INJECTION, POWDER, FOR SOLUTION INTRAVENOUS at 09:38

## 2023-06-07 RX ADMIN — IPRATROPIUM BROMIDE AND ALBUTEROL SULFATE 3 ML: .5; 2.5 SOLUTION RESPIRATORY (INHALATION) at 06:48

## 2023-06-07 RX ADMIN — ARFORMOTEROL TARTRATE 15 MCG: 15 SOLUTION RESPIRATORY (INHALATION) at 06:48

## 2023-06-07 RX ADMIN — BUDESONIDE 0.5 MG: 0.5 INHALANT RESPIRATORY (INHALATION) at 18:50

## 2023-06-07 RX ADMIN — AMPICILLIN SODIUM AND SULBACTAM SODIUM 3 G: 2; 1 INJECTION, POWDER, FOR SOLUTION INTRAMUSCULAR; INTRAVENOUS at 17:31

## 2023-06-07 RX ADMIN — LEVALBUTEROL HYDROCHLORIDE 1.25 MG: 1.25 SOLUTION RESPIRATORY (INHALATION) at 23:15

## 2023-06-07 RX ADMIN — INSULIN LISPRO 2 UNITS: 100 INJECTION, SOLUTION INTRAVENOUS; SUBCUTANEOUS at 20:54

## 2023-06-07 RX ADMIN — ARFORMOTEROL TARTRATE 15 MCG: 15 SOLUTION RESPIRATORY (INHALATION) at 18:49

## 2023-06-07 RX ADMIN — Medication 10 ML: at 09:38

## 2023-06-07 RX ADMIN — PREDNISONE 40 MG: 20 TABLET ORAL at 14:44

## 2023-06-07 RX ADMIN — INSULIN LISPRO 2 UNITS: 100 INJECTION, SOLUTION INTRAVENOUS; SUBCUTANEOUS at 12:58

## 2023-06-07 RX ADMIN — AZITHROMYCIN MONOHYDRATE 500 MG: 250 TABLET ORAL at 17:31

## 2023-06-07 RX ADMIN — VANCOMYCIN HYDROCHLORIDE 750 MG: 5 INJECTION, POWDER, LYOPHILIZED, FOR SOLUTION INTRAVENOUS at 06:23

## 2023-06-07 RX ADMIN — METHYLPREDNISOLONE SODIUM SUCCINATE 40 MG: 40 INJECTION INTRAMUSCULAR; INTRAVENOUS at 00:51

## 2023-06-07 RX ADMIN — INSULIN LISPRO 3 UNITS: 100 INJECTION, SOLUTION INTRAVENOUS; SUBCUTANEOUS at 17:34

## 2023-06-07 RX ADMIN — BUDESONIDE 0.5 MG: 0.5 INHALANT RESPIRATORY (INHALATION) at 06:48

## 2023-06-07 RX ADMIN — LEVALBUTEROL HYDROCHLORIDE 1.25 MG: 1.25 SOLUTION RESPIRATORY (INHALATION) at 18:49

## 2023-06-07 RX ADMIN — CEFEPIME 2 G: 2 INJECTION, POWDER, FOR SOLUTION INTRAVENOUS at 01:35

## 2023-06-07 RX ADMIN — LEVALBUTEROL HYDROCHLORIDE 1.25 MG: 1.25 SOLUTION RESPIRATORY (INHALATION) at 15:09

## 2023-06-07 RX ADMIN — LEVALBUTEROL HYDROCHLORIDE 1.25 MG: 1.25 SOLUTION RESPIRATORY (INHALATION) at 11:52

## 2023-06-07 RX ADMIN — INSULIN DETEMIR 10 UNITS: 100 INJECTION, SOLUTION SUBCUTANEOUS at 20:54

## 2023-06-07 RX ADMIN — METHYLPREDNISOLONE SODIUM SUCCINATE 40 MG: 40 INJECTION INTRAMUSCULAR; INTRAVENOUS at 09:38

## 2023-06-07 RX ADMIN — Medication 10 ML: at 20:54

## 2023-06-07 RX ADMIN — ENOXAPARIN SODIUM 40 MG: 100 INJECTION SUBCUTANEOUS at 09:38

## 2023-06-07 NOTE — PROGRESS NOTES
[facility]   Hospitalist Progress Note  Date: 2023  Patient Name: Dilip Faulkner  : 1950  MRN: [id number]  Date of admission: 2023      Subjective   Subjective     Chief Complaint: Follow-up shortness of breath    Summary:Dilip Faulkner is a 73 y.o. male  past medical history of severe COPD with 2 L home ox, dyslipidemia, type 2 diabetes, hypertension, GERD, and obesity who presents to the ER with shortness of breath.  Patient reports shortness of breath for 2 to 3 days prior to presentation associated with increased cough increased sputum production.  No fevers.  He has had extreme shortness of breath.  As result he came the ER for further evaluation.        In the emergency department the patient's vital signs are as follows: Temperature was 98.1, pulse was in the 90s, respiratory is 26, blood pressure 160/92.  Patient was hypoxemic when he was picked up by EMS.  Was initially trialed on BiPAP but the patient refused and is currently on Airvo.  CBC shows no abnormalities.  CMP shows no abnormalities.  X-ray shows left basilar infiltrates and left-sided effusion.  Patient received ceftriaxone/azithromycin in the emergency department.  Admitted to the hospital for acute COPD exacerbation and left-sided pleural effusion as well as hypercapnic respiratory failure.  Patient refusing BiPAP was placed on AirVo with gradual improvement and hypercapnia.  Patient started on empiric antibiotics.  Pulmonology consulted.  CT of the chest performed demonstrated possible developing infiltrate in the left lower lobe superimposed on chronic changes.  Concern for possible aspiration as fluid and debris seen throughout the esophagus.    Of note there has been attempts in the past to get the patient on home NIPPV but he has refused.     Interval Followup: Patient sitting up in bed resting comfortably.  Patient states he is feeling much better much less short of breath cough improved decreased sputum  production.  Patient very eager to return home.  Afebrile overnight.  Sinus rhythm 60s to 90s on telemetry review.  Blood pressure within normal limits.  Able to be weaned from AirVo to 2 L nasal cannula.  Patient again refusing BiPAP.  Pro-Rigoberto remains within normal limits.    Review of Systems  Constitutional: Negative for fatigue and fever.   HENT: Negative for sore throat and trouble swallowing.    Eyes: Negative for pain and discharge.   Respiratory: Positive for cough and shortness of breath.    Cardiovascular: Negative for chest pain and palpitations.   Gastrointestinal: Negative for abdominal pain, nausea and vomiting.   Endocrine: Negative for cold intolerance and heat intolerance.   Genitourinary: Negative for difficulty urinating and dysuria.   Musculoskeletal: Negative for back pain and neck stiffness.   Skin: Negative for color change and rash.   Neurological: Negative for syncope and headaches.   Hematological: Negative for adenopathy.   Psychiatric/Behavioral: Negative for confusion and hallucinations.    Objective   Objective     Vitals:   Temp:  [97.3 °F (36.3 °C)-99.2 °F (37.3 °C)] 97.9 °F (36.6 °C)  Heart Rate:  [66-92] 89  Resp:  [16-22] 22  BP: (111-131)/(60-65) 125/65  Flow (L/min):  [2] 2  Physical Exam   Gen. well-developed appearing stated age in no acute distress  HEENT: Normocephalic atraumatic moist membranes pupils equal round reactive light, no scleral icterus no conjunctival injection  Cardiovascular: regular rate and rhythm no murmurs rubs or gallops S1-S2, no lower extremity edema appreciated  Pulmonary: Scant expiratory wheezes no rhonchi or rails, symmetric chest expansion, unlabored, no conversational dyspnea appreciated  Gastrointestinal: Soft nontender nondistended positive bowel sounds all 4 quadrants no rebound or guarding  Musculoskeletal: No clubbing cyanosis, warm and well-perfused, calves soft symmetric nontender bilaterally  Skin: Clean dry without rashs  Neuro: Cranial  nerves II through XII intact grossly no sensorimotor deficits appreciated bilateral upper and lower extremities  Psych: Patient is calm cooperative and appropriate with exam not responding to internal stimuli  : No Dias catheter no bladder distention no suprapubic tenderness    Result Review    Result Review:  I have personally reviewed these results and agree with these findings:  [x]  Laboratory  LAB RESULTS:      Lab 06/07/23  0544 06/06/23  1408 06/06/23  1129 06/06/23  1126 06/06/23  1125   WBC 9.81  --   --   --  9.72   HEMOGLOBIN 11.6*  --   --   --  13.1   HEMATOCRIT 40.3  --   --   --  45.0   PLATELETS 181  --   --   --  218   NEUTROS ABS 8.80*  --   --   --  5.36   IMMATURE GRANS (ABS) 0.10*  --   --   --  0.05   LYMPHS ABS 0.73  --   --   --  2.01   MONOS ABS 0.15  --   --   --  0.97*   EOS ABS 0.01  --   --   --  1.27*   MCV 83.3  --   --   --  83.5   PROCALCITONIN 0.05 0.07  --   --   --    LACTATE  --   --  1.0  --   --    LACTATE, ARTERIAL  --   --   --  0.93  --          Lab 06/07/23  0544 06/06/23  1126 06/06/23  1125   SODIUM 136  --  139   SODIUM, ARTERIAL  --  138.9  --    POTASSIUM 4.8  --  4.8   CHLORIDE 102  --  101   CO2 23.6  --  28.7   ANION GAP 10.4  --  9.3   BUN 25*  --  21   CREATININE 0.95  --  1.10   EGFR 84.0  --  70.4   GLUCOSE 202*  --  125*   GLUCOSE, ARTERIAL  --  140*  --    CALCIUM 8.9  --  8.9   IONIZED CALCIUM  --  1.19  --    MAGNESIUM 2.4  --   --          Lab 06/07/23  0544 06/06/23  1125   TOTAL PROTEIN 6.8 7.4   ALBUMIN 3.4* 4.0   GLOBULIN 3.4 3.4   ALT (SGPT) 8 9   AST (SGOT) 11 12   BILIRUBIN 0.3 0.5   ALK PHOS 85 107         Lab 06/06/23  1408 06/06/23  1125   PROBNP  --  125.3   HSTROP T 50* 53*                 Lab 06/06/23  1314 06/06/23  1126   PH, ARTERIAL 7.277* 7.185*   PCO2, ARTERIAL 56.4* 73.0*   PO2 ART 98.0 152.8*   O2 SATURATION ART 96.6 98.3   FIO2 45 100   HCO3 ART 25.7 26.9*   BASE EXCESS ART -1.9 -3.0*   CARBOXYHEMOGLOBIN 1.4 1.5     Brief Urine Lab  Results  (Last result in the past 365 days)        Color   Clarity   Blood   Leuk Est   Nitrite   Protein   CREAT   Urine HCG        08/01/22 1250 Yellow   Clear   Negative   Negative   Negative   Negative                 Microbiology Results (last 10 days)       Procedure Component Value - Date/Time    Respiratory Panel PCR w/COVID-19(SARS-CoV-2) GUERA/ANIL/KYLEE/PAD/COR/MAD/ANA In-House, NP Swab in UTM/VTM, 3-4 HR TAT - Swab, Nasopharynx [[id number]]  (Normal) Collected: 06/07/23 1144    Lab Status: Final result Specimen: Swab from Nasopharynx Updated: 06/07/23 1359     ADENOVIRUS, PCR Not Detected     Coronavirus 229E Not Detected     Coronavirus HKU1 Not Detected     Coronavirus NL63 Not Detected     Coronavirus OC43 Not Detected     COVID19 Not Detected     Human Metapneumovirus Not Detected     Human Rhinovirus/Enterovirus Not Detected     Influenza A PCR Not Detected     Influenza B PCR Not Detected     Parainfluenza Virus 1 Not Detected     Parainfluenza Virus 2 Not Detected     Parainfluenza Virus 3 Not Detected     Parainfluenza Virus 4 Not Detected     RSV, PCR Not Detected     Bordetella pertussis pcr Not Detected     Bordetella parapertussis PCR Not Detected     Chlamydophila pneumoniae PCR Not Detected     Mycoplasma pneumo by PCR Not Detected    Narrative:      In the setting of a positive respiratory panel with a viral infection PLUS a negative procalcitonin without other underlying concern for bacterial infection, consider observing off antibiotics or discontinuation of antibiotics and continue supportive care. If the respiratory panel is positive for atypical bacterial infection (Bordetella pertussis, Chlamydophila pneumoniae, or Mycoplasma pneumoniae), consider antibiotic de-escalation to target atypical bacterial infection.    S. Pneumo Ag Urine or CSF - Urine, Urine, Clean Catch [[id number]]  (Normal) Collected: 06/07/23 0417    Lab Status: Final result Specimen: Urine, Clean Catch Updated: 06/07/23  0706     Strep Pneumo Ag Negative    Legionella Antigen, Urine - Urine, Urine, Clean Catch [[id number]]  (Normal) Collected: 06/07/23 0417    Lab Status: Final result Specimen: Urine, Clean Catch Updated: 06/07/23 0710     LEGIONELLA ANTIGEN, URINE Negative    MRSA Screen, PCR (Inpatient) - Swab, Nares [[id number]]  (Normal) Collected: 06/06/23 1959    Lab Status: Final result Specimen: Swab from Nares Updated: 06/06/23 2120     MRSA PCR No MRSA Detected    Narrative:      The negative predictive value of this diagnostic test is high and should only be used to consider de-escalating anti-MRSA therapy. A positive result may indicate colonization with MRSA and must be correlated clinically.    Blood Culture - Blood, Blood, Arterial [[id number]]  (Normal) Collected: 06/06/23 1408    Lab Status: Preliminary result Specimen: Blood, Arterial Updated: 06/07/23 1431     Blood Culture No growth at 24 hours    Blood Culture - Blood, Arm, Left [[id number]]  (Normal) Collected: 06/06/23 1129    Lab Status: Preliminary result Specimen: Blood from Arm, Left Updated: 06/07/23 1145     Blood Culture No growth at 24 hours            [x]  Microbiology  [x]  Radiology  CT Chest Without Contrast Diagnostic    Result Date: 6/7/2023    1. Evidence for potential developing infiltrate within left lower lobe superimposed on chronic changes.  The findings may indicate developing left lower lobe pneumonia and possible aspiration pneumonia.  Recommend correlation for signs or symptoms of acute infection and follow-up to ensure improvement. 2. Evidence for marked gastroesophageal reflux with fluid and debris throughout the esophagus. 3. Moderate coronary artery calcifications are noted.      SHRUTI CUTLER MD       Electronically Signed and Approved By: SHRUTI CUTLER MD on 6/07/2023 at 18:06             XR Chest 1 View    Result Date: 6/6/2023   Small left pleural effusion is slightly larger than previous exam.  Bilateral basilar infiltrates could  be atelectasis or pneumonia.  JOANIE ANGELES MD       Electronically Signed and Approved By: JOANIE ANGELES MD on 6/06/2023 at 12:12              [x]  EKG/Telemetry   []  Cardiology/Vascular   []  Pathology  []  Old records  [x]  Other:  Scheduled Meds:ampicillin-sulbactam, 3 g, Intravenous, Q6H  arformoterol, 15 mcg, Nebulization, BID - RT  azithromycin, 500 mg, Oral, Q24H  budesonide, 0.5 mg, Nebulization, BID - RT  enoxaparin, 40 mg, Subcutaneous, Daily  insulin lispro, 2-7 Units, Subcutaneous, 4x Daily AC & at Bedtime  levalbuterol, 1.25 mg, Nebulization, Q4H While Awake - RT  predniSONE, 40 mg, Oral, Daily With Breakfast  sodium chloride, 10 mL, Intravenous, Q12H      Continuous Infusions:   PRN Meds:.  dextrose    dextrose    glucagon (human recombinant)    sodium chloride    sodium chloride    sodium chloride    sodium chloride      Assessment & Plan   Assessment / Plan     Assessment/Plan:  Acute on chronic hypoxic and hypercapnic respiratory failure  COPD with acute exacerbation  Left lower lobe pneumonia concern for aspiration pneumonia  Type 2 diabetes mellitus  GERD        Patient admitted for further evaluation and treatment  Pulmonology critical care consulted thank you for your assistance  Continue supplemental nasal cannula oxygen titrated keep sats greater than 90%  Patient refusing BiPAP in hospital and at home.  Patient acknowledges risk of sudden death from carbon dioxide accumulation  Change antibiotics to ampicillin sulbactam and azithromycin can transition to Augmentin on discharge  Stop vancomycin as MRSA negative by PCR  Continue prednisone and taper per pulmonology  Continue scheduled Xopenex Pulmicort and Brovana  Continue bronchopulmonary hygiene protocol  Start Levemir  Continue sliding scale insulin  Continue home Jardiance  Continue PPI  Further inpatient orders recommendations pending clinical course                   Discussed plan with bedside RN as well as pulmonology critical care  attending.    Disposition: Likely be able to return home if respiratory status continues to improve.  Patient already has home O2 and is refusing BiPAP.    DVT prophylaxis:  Medical DVT prophylaxis orders are present.    CODE STATUS:   Level Of Support Discussed With: Patient  Code Status (Patient has no pulse and is not breathing): CPR (Attempt to Resuscitate)  Medical Interventions (Patient has pulse or is breathing): Full Support

## 2023-06-07 NOTE — PLAN OF CARE
Problem: Adjustment to Illness (Sepsis/Septic Shock)  Goal: Optimal Coping  Outcome: Ongoing, Progressing   Goal Outcome Evaluation:

## 2023-06-07 NOTE — CONSULTS
Pulmonary / Critical Care Consult Note      Patient Name: Dilip Faulkner  : 1950  MRN: [id number]  Primary Care Physician:  Mallorie Roa APRN  Referring Physician: No ref. provider found  Date of admission: 2023    Subjective   Subjective   Reason for Consult/ Chief Complaint: Pleural effusion    HPI:  Dilip Faulkner is a 73 y.o. male with past medical history of severe COPD on 2 L home O2, type 2 diabetes, hypertension and obesity who presented to the ER with reports of shortness of breath.  Patient was reportedly found hypoxic when transported by EMS.  Initially he was placed on BiPAP but refused and placed on Airvo.  ABGs 7.27/56.4/98 revealing hypercapnia.  Of note, patient was recently hospitalized in May 2023 for COPD exacerbation and there have been multiple attempts in the past the patient to be started on home NIPPV however he has continually refused.  CXR shows bibasilar infiltrate and left-sided effusion.  Patient was started on ceftriaxone/azithromycin and admitted to hospitalist service for further evaluation and treatment of COPD exacerbation, hypercapnic respiratory failure and left-sided pleural effusion.  For the above reasons pulmonology was then consulted.    Upon exam, patient was resting comfortably in bed on 2 L nasal cannula which is patient's baseline.  Patient reports he has been sick with increasing shortness of air and worsening sputum production for the last 2 to 3 days.  He denies any fevers, chills, chest pain or hemoptysis.  Patient denies current tobacco abuse.  Denies any known sick contact exposure.  Continues to refuse NIPPV.    Review of Systems:  General: Denied complaints  HEENT: Denied complaints  Respiratory: Dyspnea, cough, otherwise denied complaints  Cardiovascular: Denied complaints  GI: Denied complaints  : Denied complaints  MSK: Denied complaints  Endocrine: Denied complaints  Hematologic: Denied complaints  Psychiatric: Denied  complaints  Neurologic: Denied complaints  Skin: Denied complaints    Personal History     Past Medical History:   Diagnosis Date    Asthma     COPD (chronic obstructive pulmonary disease)     Diabetes mellitus     Ex-smoker     QUIT 2021    Hernia, umbilical     Hypertension     On home O2     REPORTS WEARING 2L/NC PRN SOA       Past Surgical History:   Procedure Laterality Date    ABDOMINAL SURGERY         Family History: family history is not on file. Otherwise pertinent FHx was reviewed and not pertinent to current issue.    Social History:  reports that he quit smoking about 2 years ago. His smoking use included cigarettes. He has a 3.00 pack-year smoking history. His smokeless tobacco use includes chew. He reports that he does not drink alcohol and does not use drugs.    Home Medications:  albuterol sulfate HFA, aspirin, atorvastatin, cetirizine, empagliflozin, insulin degludec, lisinopril, metFORMIN, metoprolol succinate XL, and omeprazole    Allergies:  No Known Allergies    Objective    Objective     Vitals:   Temp:  [97.3 °F (36.3 °C)-99.2 °F (37.3 °C)] 97.3 °F (36.3 °C)  Heart Rate:  [] 86  Resp:  [16-26] 20  BP: (107-168)/(48-92) 111/60  Flow (L/min):  [2-60] 2    Physical Exam:  Vital Signs Reviewed   General : WDWN, elderly male, alert, NAD.    HEENT:  PERRL, EOMI.   Neck:  Supple, no JVD, no thyromegaly  Lymph: no axillary, cervical, supraclavicular lymphadenopathy noted bilaterally  Chest:  good aeration, scattered wheezes and rhonchi to auscultation bilaterally, tympanic to percussion bilaterally, no work of breathing noted on 2 L nasal cannula  CV: RRR, no MGR, pulses 2+, equal.  Abd:  Soft, NT, ND, + BS, no HSM  EXT:  no clubbing, no cyanosis, no edema, no joint tenderness  Neuro:  A&Ox3, CN grossly intact, no focal deficits.  Skin: No rashes or lesions noted      Result Review    Result Review:  I have personally reviewed the results from the time of this admission to 6/7/2023 10:06 EDT and  agree with these findings:  [x]  Laboratory  [x]  Microbiology  [x]  Radiology  [x]  EKG/Telemetry   []  Cardiology/Vascular   []  Pathology  [x]  Old records  []  Other:  Most notable findings include:     6/6 CXR-small left pleural effusion and bibasilar infiltrates concerning for atelectasis/pneumonia        Lab 06/07/23  0544 06/06/23  1126 06/06/23  1125   WBC 9.81  --  9.72   HEMOGLOBIN 11.6*  --  13.1   HEMATOCRIT 40.3  --  45.0   PLATELETS 181  --  218   SODIUM 136  --  139   SODIUM, ARTERIAL  --  138.9  --    POTASSIUM 4.8  --  4.8   CHLORIDE 102  --  101   CO2 23.6  --  28.7   BUN 25*  --  21   CREATININE 0.95  --  1.10   GLUCOSE 202*  --  125*   GLUCOSE, ARTERIAL  --  140*  --    CALCIUM 8.9  --  8.9   TOTAL PROTEIN 6.8  --  7.4   ALBUMIN 3.4*  --  4.0   GLOBULIN 3.4  --  3.4         Assessment & Plan   Assessment / Plan     Active Hospital Problems:  Active Hospital Problems    Diagnosis     **Acute respiratory failure with hypoxia and hypercapnia      Impression:   Acute on chronic hypoxic/hypercapnic respiratory failure  Pneumonia, unknown etiology  Left pleural effusion  Acute COPD exacerbation  Medical noncompliance  Former abuse of cigarettes    Plan:   -Currently on 2 L nasal cannula (2 L baseline), continue to use to maintain SPO2 greater than 90%  -CXR reviewed revealing bibasilar infiltrates and small left effusion, will obtain updated CT of chest  -Continue cefepime for pneumonia, may de-escalate pending cultures  -MRSA PCR negative, vancomycin discontinued  -Sputum culture pending  -S pneumo/Legionella negative, respiratory viral panel pending  -Continue Brovana, Pulmicort and Xopenex  -Continue bronchopulmonary hygiene  -Encourage use of I-S/flutter valve  -Continue Solu-Medrol 40 mg IV every 8 hours, may consider switching to prednisone tomorrow  -Encourage mobilization, out of bed to chair    Labs, microbiology, radiology, medications, and provider notes personally reviewed.  Discussed with  primary services and bedside RN.    This visit was performed by BOTH a physician and an APC spent greater than 51% of time caring for this patient no. I personally evaluated and examined the patient. I performed all aspects of MDM as documented. , I have reviewed and confirmed the accuracy of the patient's history as documented in this note. and I have reexamined the patient and the results are consistent with the previously documented exam. I have updated the documentation as necessary     Electronically signed by Leandro Galeano DO, 06/07/23, 8:12 PM EDT.       Thank you for this consult and allowing me to participate in the care of Mr. Faulkner      Electronically signed by MONE Voss, 06/07/23, 10:06 AM EDT.

## 2023-06-07 NOTE — PLAN OF CARE
Goal Outcome Evaluation:      Respiratory Therapist Broncho-Pulmonary Hygiene Progress Note      Patient Name:  Dilip Faulkner  YOB: 1950    Dilip Faulkner meets the qualification for Level 1 of the Bronco-Pulmonary Hygiene Protocol. This was based on my daily patient assessment and includes review of chest x-ray results, cough ability and quality, oxygenation, secretions or risk for secretion development and patient mobility.     Broncho-Pulmonary Hygiene Assessment:    Level of Movement: Actively changing positions without assistance  Alert/ oriented/ cooperative    Breath Sounds: Diminished and/or coarse rhonchi    Cough: Strong, effective    Chest X-Ray: Possible signs of consolidation and/or atelectasis or clear.     Sputum Productions: None or small amount of thin or watery secretions with effective cough    History and Physical: Chronic condition    SpO2 to Oxygen Need: greater than 92% on room air or  less than 3L nasal canula    Current SpO2 is: 92 on 2    Based on this information, I have completed the following interventions: Aerobika with bronchodialtor medication or TID      Electronically signed by Courtney Bee, RRT, 06/07/23, 12:42 PM EDT.

## 2023-06-08 ENCOUNTER — READMISSION MANAGEMENT (OUTPATIENT)
Dept: CALL CENTER | Facility: HOSPITAL | Age: 73
End: 2023-06-08
Payer: MEDICARE

## 2023-06-08 VITALS
BODY MASS INDEX: 31.56 KG/M2 | HEART RATE: 81 BPM | RESPIRATION RATE: 16 BRPM | HEIGHT: 73 IN | WEIGHT: 238.1 LBS | TEMPERATURE: 97.5 F | DIASTOLIC BLOOD PRESSURE: 75 MMHG | SYSTOLIC BLOOD PRESSURE: 144 MMHG | OXYGEN SATURATION: 95 %

## 2023-06-08 PROBLEM — J69.0 ASPIRATION PNEUMONIA OF LEFT LOWER LOBE DUE TO GASTRIC SECRETIONS: Status: ACTIVE | Noted: 2023-06-08

## 2023-06-08 LAB
GLUCOSE BLDC GLUCOMTR-MCNC: 138 MG/DL (ref 70–99)
GLUCOSE BLDC GLUCOMTR-MCNC: 153 MG/DL (ref 70–99)

## 2023-06-08 PROCEDURE — 82948 REAGENT STRIP/BLOOD GLUCOSE: CPT

## 2023-06-08 PROCEDURE — 94799 UNLISTED PULMONARY SVC/PX: CPT

## 2023-06-08 PROCEDURE — 94664 DEMO&/EVAL PT USE INHALER: CPT

## 2023-06-08 PROCEDURE — 25010000002 ENOXAPARIN PER 10 MG: Performed by: INTERNAL MEDICINE

## 2023-06-08 PROCEDURE — 63710000001 PREDNISONE PER 1 MG: Performed by: NURSE PRACTITIONER

## 2023-06-08 PROCEDURE — 63710000001 INSULIN LISPRO (HUMAN) PER 5 UNITS: Performed by: INTERNAL MEDICINE

## 2023-06-08 PROCEDURE — 99239 HOSP IP/OBS DSCHRG MGMT >30: CPT | Performed by: FAMILY MEDICINE

## 2023-06-08 PROCEDURE — 25010000002 AMPICILLIN-SULBACTAM PER 1.5 G: Performed by: FAMILY MEDICINE

## 2023-06-08 PROCEDURE — 63710000001 INSULIN DETEMIR PER 5 UNITS: Performed by: FAMILY MEDICINE

## 2023-06-08 RX ORDER — BUDESONIDE AND FORMOTEROL FUMARATE DIHYDRATE 160; 4.5 UG/1; UG/1
2 AEROSOL RESPIRATORY (INHALATION)
Qty: 10.2 G | Refills: 0 | Status: SHIPPED | OUTPATIENT
Start: 2023-06-08

## 2023-06-08 RX ORDER — PREDNISONE 20 MG/1
40 TABLET ORAL
Qty: 6 TABLET | Refills: 0 | Status: SHIPPED | OUTPATIENT
Start: 2023-06-09 | End: 2023-06-12

## 2023-06-08 RX ORDER — AMOXICILLIN AND CLAVULANATE POTASSIUM 875; 125 MG/1; MG/1
1 TABLET, FILM COATED ORAL 2 TIMES DAILY
Qty: 8 TABLET | Refills: 0 | Status: SHIPPED | OUTPATIENT
Start: 2023-06-08 | End: 2023-06-12

## 2023-06-08 RX ORDER — AZITHROMYCIN 250 MG/1
500 TABLET, FILM COATED ORAL ONCE
Status: COMPLETED | OUTPATIENT
Start: 2023-06-08 | End: 2023-06-08

## 2023-06-08 RX ADMIN — AMPICILLIN SODIUM AND SULBACTAM SODIUM 3 G: 2; 1 INJECTION, POWDER, FOR SOLUTION INTRAMUSCULAR; INTRAVENOUS at 00:24

## 2023-06-08 RX ADMIN — PANTOPRAZOLE SODIUM 40 MG: 40 TABLET, DELAYED RELEASE ORAL at 05:24

## 2023-06-08 RX ADMIN — Medication 10 ML: at 08:20

## 2023-06-08 RX ADMIN — AMPICILLIN SODIUM AND SULBACTAM SODIUM 3 G: 2; 1 INJECTION, POWDER, FOR SOLUTION INTRAMUSCULAR; INTRAVENOUS at 05:24

## 2023-06-08 RX ADMIN — EMPAGLIFLOZIN 10 MG: 10 TABLET, FILM COATED ORAL at 08:20

## 2023-06-08 RX ADMIN — ASPIRIN 81 MG 81 MG: 81 TABLET ORAL at 08:20

## 2023-06-08 RX ADMIN — PREDNISONE 40 MG: 20 TABLET ORAL at 08:20

## 2023-06-08 RX ADMIN — ENOXAPARIN SODIUM 40 MG: 100 INJECTION SUBCUTANEOUS at 08:20

## 2023-06-08 RX ADMIN — AMPICILLIN SODIUM AND SULBACTAM SODIUM 3 G: 2; 1 INJECTION, POWDER, FOR SOLUTION INTRAMUSCULAR; INTRAVENOUS at 11:41

## 2023-06-08 RX ADMIN — INSULIN LISPRO 2 UNITS: 100 INJECTION, SOLUTION INTRAVENOUS; SUBCUTANEOUS at 08:20

## 2023-06-08 RX ADMIN — INSULIN DETEMIR 10 UNITS: 100 INJECTION, SOLUTION SUBCUTANEOUS at 08:20

## 2023-06-08 RX ADMIN — LEVALBUTEROL HYDROCHLORIDE 1.25 MG: 1.25 SOLUTION RESPIRATORY (INHALATION) at 10:57

## 2023-06-08 RX ADMIN — ARFORMOTEROL TARTRATE 15 MCG: 15 SOLUTION RESPIRATORY (INHALATION) at 06:23

## 2023-06-08 RX ADMIN — BUDESONIDE 0.5 MG: 0.5 INHALANT RESPIRATORY (INHALATION) at 06:23

## 2023-06-08 RX ADMIN — CETIRIZINE HYDROCHLORIDE 10 MG: 10 TABLET, FILM COATED ORAL at 08:20

## 2023-06-08 RX ADMIN — LEVALBUTEROL HYDROCHLORIDE 1.25 MG: 1.25 SOLUTION RESPIRATORY (INHALATION) at 06:23

## 2023-06-08 RX ADMIN — AZITHROMYCIN MONOHYDRATE 500 MG: 250 TABLET ORAL at 11:41

## 2023-06-08 NOTE — DISCHARGE SUMMARY
[facility]         HOSPITALIST  DISCHARGE SUMMARY    Patient Name: Dilip Faulkner  : 1950  MRN: [id number]    Date of Admission: 2023  Date of Discharge: 2023  Primary Care Physician: Mallorie Roa APRN    Consults       Date and Time Order Name Status Description    2023  4:16 PM Inpatient Pulmonology Consult      2023  1:57 PM Hospitalist (on-call MD unless specified)              Active and Resolved Hospital Problems:  Acute on chronic hypoxic and hypercapnic respiratory failure  COPD with acute exacerbation  Left lower lobe pneumonia concern for aspiration pneumonia  Type 2 diabetes mellitus  GERD  Active Hospital Problems    Diagnosis POA    **Acute respiratory failure with hypoxia and hypercapnia [J96.01, J96.02] Yes    Aspiration pneumonia of left lower lobe due to gastric secretions [J69.0] Unknown      Resolved Hospital Problems   No resolved problems to display.       Hospital Course     Hospital Course:  Dilip Faulkner is a 73 y.o. male past medical history of severe COPD with 2 L home ox, dyslipidemia, type 2 diabetes, hypertension, GERD, and obesity who presents to the ER with shortness of breath.  Patient reports shortness of breath for 2 to 3 days prior to presentation associated with increased cough increased sputum production.  No fevers.  He has had extreme shortness of breath.  As result he came the ER for further evaluation.        In the emergency department the patient's vital signs are as follows: Temperature was 98.1, pulse was in the 90s, respiratory is 26, blood pressure 160/92.  Patient was hypoxemic when he was picked up by EMS.  Was initially trialed on BiPAP but the patient refused and is currently on Airvo.  CBC shows no abnormalities.  CMP shows no abnormalities.  X-ray shows left basilar infiltrates and left-sided effusion.  Patient received ceftriaxone/azithromycin in the emergency department.  Admitted to the hospital for acute COPD  exacerbation and left-sided pleural effusion as well as hypercapnic respiratory failure.  Patient refusing BiPAP was placed on AirVo with gradual improvement and hypercapnia.  Patient started on empiric antibiotics.  Pulmonology consulted.  CT of the chest performed demonstrated possible developing infiltrate in the left lower lobe superimposed on chronic changes.  Concern for possible aspiration as fluid and debris seen throughout the esophagus. Patient refusing BiPAP in hospital and at home. Patient acknowledges risk of sudden death from carbon dioxide accumulation  Respiratory function improved quickly patient was able to be weaned to supplemental oxygen at his home dose.  Patient very eager to return home.  Patient seen and evaluated on day of discharge and thus stable for discharge home with the following instructions:       Acute on chronic hypoxic and hypercapnic respiratory failure secondary to aspiration pneumonia with COPD exacerbation   -Continue home oxygen   -Follow-up with COPD clinic 1 to 2 weeks and notify them sooner if you change your mind about BiPAP at home   -Complete course of amoxicillin/clavulanate per discharge medication list   -Complete course of prednisone per discharge medication list   -Start Symbicort 2 puffs twice daily   -Continue albuterol inhaler   -Continue bronchopulmonary hygiene protocol   -Do not eat for 1 hour prior to laying down   -Keep head elevated 30 degrees during sleep       Follow-up your primary care provider in 1 to 2 weeks.  Follow-up with COPD clinic in 1 to 2 weeks.  Please take all medications per discharge medication list.  Please seek immediate medical attention for increased shortness of breath, fever, chills, chest pain, palpitations, abdominal pain nausea vomiting or diarrhea.           DISCHARGE Follow Up Recommendations for labs and diagnostics: As above      Day of Discharge     Vital Signs:  Temp:  [97.3 °F (36.3 °C)-98.4 °F (36.9 °C)] 97.3 °F (36.3  °C)  Heart Rate:  [66-93] 67  Resp:  [16-22] 20  BP: (125-150)/(61-80) 150/74  Flow (L/min):  [2] 2  Physical Exam:   Gen. well-developed appearing stated age in no acute distress  HEENT: Normocephalic atraumatic moist membranes pupils equal round reactive light, no scleral icterus no conjunctival injection  Cardiovascular: regular rate and rhythm no murmurs rubs or gallops S1-S2, no lower extremity edema appreciated  Pulmonary: Scant expiratory wheezes no rhonchi or rails, symmetric chest expansion, unlabored, no conversational dyspnea appreciated  Gastrointestinal: Soft nontender nondistended positive bowel sounds all 4 quadrants no rebound or guarding  Musculoskeletal: No clubbing cyanosis, warm and well-perfused, calves soft symmetric nontender bilaterally  Skin: Clean dry without rashs  Neuro: Cranial nerves II through XII intact grossly no sensorimotor deficits appreciated bilateral upper and lower extremities  Psych: Patient is calm cooperative and appropriate with exam not responding to internal stimuli  : No Dias catheter no bladder distention no suprapubic tenderness       Discharge Details        Discharge Medications        New Medications        Instructions Start Date   amoxicillin-clavulanate 875-125 MG per tablet  Commonly known as: AUGMENTIN   1 tablet, Oral, 2 Times Daily      budesonide-formoterol 160-4.5 MCG/ACT inhaler  Commonly known as: Symbicort   2 puffs, Inhalation, 2 Times Daily - RT      predniSONE 20 MG tablet  Commonly known as: DELTASONE   40 mg, Oral, Daily With Breakfast   Start Date: June 9, 2023            Continue These Medications        Instructions Start Date   aspirin 81 MG chewable tablet   81 mg, Oral, Daily      atorvastatin 10 MG tablet  Commonly known as: LIPITOR   10 mg, Oral, Daily      cetirizine 10 MG tablet  Commonly known as: zyrTEC   10 mg, Oral, Daily      empagliflozin 10 MG tablet tablet  Commonly known as: JARDIANCE   10 mg, Oral, Daily      lisinopril 5 MG  tablet  Commonly known as: PRINIVIL,ZESTRIL   5 mg, Oral, Daily      metFORMIN 500 MG tablet  Commonly known as: GLUCOPHAGE   500 mg, Oral, 2 Times Daily With Meals      metoprolol succinate XL 25 MG 24 hr tablet  Commonly known as: TOPROL-XL   25 mg, Oral, Every 24 Hours Scheduled      omeprazole 20 MG capsule  Commonly known as: priLOSEC   20 mg, Oral, Daily      Tresiba FlexTouch 100 UNIT/ML solution pen-injector injection  Generic drug: insulin degludec   20 Units, Subcutaneous, Daily      Ventolin  (90 Base) MCG/ACT inhaler  Generic drug: albuterol sulfate HFA   2 puffs, Inhalation, Every 6 Hours PRN               No Known Allergies    Discharge Disposition:  Home or Self Care    Diet:  Hospital:  Diet Order   Procedures    Diet: Diabetic Diets; Consistent Carbohydrate; Texture: Regular Texture (IDDSI 7); Fluid Consistency: Thin (IDDSI 0)       Discharge Activity:   Activity Instructions       Gradually Increase Activity Until at Pre-Hospitalization Level              CODE STATUS:  Code Status and Medical Interventions:   Ordered at: 06/06/23 8881     Level Of Support Discussed With:    Patient     Code Status (Patient has no pulse and is not breathing):    CPR (Attempt to Resuscitate)     Medical Interventions (Patient has pulse or is breathing):    Full Support         Future Appointments   Date Time Provider Department Center   8/3/2023  4:00 PM Mallorie Roa APRN [facility]       Additional Instructions for the Follow-ups that You Need to Schedule       Discharge Follow-up with PCP   As directed       Currently Documented PCP:    Mallorie Roa APRN    PCP Phone Number:    [phone]     Follow Up Details: Hospital discharge follow-up 1 to 2 weeks acute on chronic hypoxic and hypercapnic respiratory failure secondary to aspiration pneumonia with COPD exacerbation                 Pertinent  and/or Most Recent Results     PROCEDURES:   None    LAB RESULTS:      Lab 06/07/23  9614  06/06/23  1408 06/06/23  1129 06/06/23  1126 06/06/23  1125   WBC 9.81  --   --   --  9.72   HEMOGLOBIN 11.6*  --   --   --  13.1   HEMATOCRIT 40.3  --   --   --  45.0   PLATELETS 181  --   --   --  218   NEUTROS ABS 8.80*  --   --   --  5.36   IMMATURE GRANS (ABS) 0.10*  --   --   --  0.05   LYMPHS ABS 0.73  --   --   --  2.01   MONOS ABS 0.15  --   --   --  0.97*   EOS ABS 0.01  --   --   --  1.27*   MCV 83.3  --   --   --  83.5   PROCALCITONIN 0.05 0.07  --   --   --    LACTATE  --   --  1.0  --   --    LACTATE, ARTERIAL  --   --   --  0.93  --          Lab 06/07/23  0544 06/06/23  1126 06/06/23  1125   SODIUM 136  --  139   SODIUM, ARTERIAL  --  138.9  --    POTASSIUM 4.8  --  4.8   CHLORIDE 102  --  101   CO2 23.6  --  28.7   ANION GAP 10.4  --  9.3   BUN 25*  --  21   CREATININE 0.95  --  1.10   EGFR 84.0  --  70.4   GLUCOSE 202*  --  125*   GLUCOSE, ARTERIAL  --  140*  --    CALCIUM 8.9  --  8.9   IONIZED CALCIUM  --  1.19  --    MAGNESIUM 2.4  --   --          Lab 06/07/23  0544 06/06/23  1125   TOTAL PROTEIN 6.8 7.4   ALBUMIN 3.4* 4.0   GLOBULIN 3.4 3.4   ALT (SGPT) 8 9   AST (SGOT) 11 12   BILIRUBIN 0.3 0.5   ALK PHOS 85 107         Lab 06/06/23  1408 06/06/23  1125   PROBNP  --  125.3   HSTROP T 50* 53*                 Lab 06/06/23  1314 06/06/23  1126   PH, ARTERIAL 7.277* 7.185*   PCO2, ARTERIAL 56.4* 73.0*   PO2 ART 98.0 152.8*   O2 SATURATION ART 96.6 98.3   FIO2 45 100   HCO3 ART 25.7 26.9*   BASE EXCESS ART -1.9 -3.0*   CARBOXYHEMOGLOBIN 1.4 1.5     Brief Urine Lab Results  (Last result in the past 365 days)        Color   Clarity   Blood   Leuk Est   Nitrite   Protein   CREAT   Urine HCG        08/01/22 1250 Yellow   Clear   Negative   Negative   Negative   Negative                 Microbiology Results (last 10 days)       Procedure Component Value - Date/Time    Respiratory Panel PCR w/COVID-19(SARS-CoV-2) GUERA/ANIL/KYLEE/PAD/COR/MAD/ANA In-House, NP Swab in UTM/VTM, 3-4 HR TAT - Swab, Nasopharynx  [[id number]]  (Normal) Collected: 06/07/23 1144    Lab Status: Final result Specimen: Swab from Nasopharynx Updated: 06/07/23 1359     ADENOVIRUS, PCR Not Detected     Coronavirus 229E Not Detected     Coronavirus HKU1 Not Detected     Coronavirus NL63 Not Detected     Coronavirus OC43 Not Detected     COVID19 Not Detected     Human Metapneumovirus Not Detected     Human Rhinovirus/Enterovirus Not Detected     Influenza A PCR Not Detected     Influenza B PCR Not Detected     Parainfluenza Virus 1 Not Detected     Parainfluenza Virus 2 Not Detected     Parainfluenza Virus 3 Not Detected     Parainfluenza Virus 4 Not Detected     RSV, PCR Not Detected     Bordetella pertussis pcr Not Detected     Bordetella parapertussis PCR Not Detected     Chlamydophila pneumoniae PCR Not Detected     Mycoplasma pneumo by PCR Not Detected    Narrative:      In the setting of a positive respiratory panel with a viral infection PLUS a negative procalcitonin without other underlying concern for bacterial infection, consider observing off antibiotics or discontinuation of antibiotics and continue supportive care. If the respiratory panel is positive for atypical bacterial infection (Bordetella pertussis, Chlamydophila pneumoniae, or Mycoplasma pneumoniae), consider antibiotic de-escalation to target atypical bacterial infection.    S. Pneumo Ag Urine or CSF - Urine, Urine, Clean Catch [[id number]]  (Normal) Collected: 06/07/23 0417    Lab Status: Final result Specimen: Urine, Clean Catch Updated: 06/07/23 0706     Strep Pneumo Ag Negative    Legionella Antigen, Urine - Urine, Urine, Clean Catch [[id number]]  (Normal) Collected: 06/07/23 0417    Lab Status: Final result Specimen: Urine, Clean Catch Updated: 06/07/23 0710     LEGIONELLA ANTIGEN, URINE Negative    MRSA Screen, PCR (Inpatient) - Swab, Nares [[id number]]  (Normal) Collected: 06/06/23 1959    Lab Status: Final result Specimen: Swab from Nares Updated: 06/06/23 2120     MRSA PCR  No MRSA Detected    Narrative:      The negative predictive value of this diagnostic test is high and should only be used to consider de-escalating anti-MRSA therapy. A positive result may indicate colonization with MRSA and must be correlated clinically.    Blood Culture - Blood, Blood, Arterial [[id number]]  (Normal) Collected: 06/06/23 1408    Lab Status: Preliminary result Specimen: Blood, Arterial Updated: 06/07/23 1431     Blood Culture No growth at 24 hours    Blood Culture - Blood, Arm, Left [[id number]]  (Normal) Collected: 06/06/23 1129    Lab Status: Preliminary result Specimen: Blood from Arm, Left Updated: 06/07/23 1145     Blood Culture No growth at 24 hours            CT Chest Without Contrast Diagnostic    Result Date: 6/7/2023  Impression:   1. Evidence for potential developing infiltrate within left lower lobe superimposed on chronic changes.  The findings may indicate developing left lower lobe pneumonia and possible aspiration pneumonia.  Recommend correlation for signs or symptoms of acute infection and follow-up to ensure improvement. 2. Evidence for marked gastroesophageal reflux with fluid and debris throughout the esophagus. 3. Moderate coronary artery calcifications are noted.      SHRUTI CUTLER MD       Electronically Signed and Approved By: SHRUTI CUTLER MD on 6/07/2023 at 18:06             XR Chest 1 View    Result Date: 6/6/2023  Impression:  Small left pleural effusion is slightly larger than previous exam.  Bilateral basilar infiltrates could be atelectasis or pneumonia.  JOANIE ANGELES MD       Electronically Signed and Approved By: JOANIE ANGELES MD on 6/06/2023 at 12:12                      Results for orders placed during the hospital encounter of 03/28/23    Adult Transthoracic Echo Complete W/ Cont if Necessary Per Protocol    Interpretation Summary    Left ventricular ejection fraction appears to be 56 - 60%.    Left ventricular diastolic function is consistent with (grade I)  impaired relaxation and age.    No significant valvular disease.    Mild left atrial enlargement.      Labs Pending at Discharge:  Pending Labs       Order Current Status    Blood Culture - Blood, Arm, Left Preliminary result    Blood Culture - Blood, Blood, Arterial Preliminary result              Time spent on Discharge including face to face service: Greater than 35 minutes    Electronically signed by Danny Dye MD, 06/08/23, 11:33 AM EDT.

## 2023-06-08 NOTE — OUTREACH NOTE
Prep Survey      Flowsheet Row Responses   [facility] facility patient discharged from? [facility]   Is LACE score < 7 ? No   Eligibility [facility]   Date of Admission 06/06/23   Date of Discharge 06/08/23   Discharge Disposition Home or Self Care   Discharge diagnosis COPD with acute exacerbationAspiration pneumonia   Does the patient have one of the following disease processes/diagnoses(primary or secondary)? COPD   Does the patient have Home health ordered? No   Is there a DME ordered? No   Prep survey completed? Yes            JUAN HERNANDEZ - Registered Nurse

## 2023-06-08 NOTE — PLAN OF CARE
Problem: Hypertension Comorbidity  Goal: Blood Pressure in Desired Range  Outcome: Ongoing, Progressing  Intervention: Maintain Blood Pressure Management  Recent Flowsheet Documentation  Taken 6/7/2023 2054 by Mariaa Singh, RN  Medication Review/Management: medications reviewed   Goal Outcome Evaluation:                      Pt a&o x4. IV abx per MAR.

## 2023-06-08 NOTE — PROGRESS NOTES
Pulmonary / Critical Care Progress Note      Patient Name: Dilip Faulkner  : 1950  MRN: [id number]  Primary Care Physician:  Mallorie Roa APRN  Date of admission: 2023    Subjective   Subjective   Follow-up for pleural effusion    No acute events overnight.    This morning,  Lying in bed on 2 L nasal cannula  Reports breathing improved  Continues with congested cough  Scant productive sputum  CT chest concerning for aspiration  Denies dysphagia  Reports concerns with reflux  Does not take PPI  No fever/chills  No chest pain      Review of Systems  General: Denied complaints  HEENT: Denied complaints  Respiratory: Dyspnea, cough denied complaints  Cardiovascular: Denied complaints  GI: Reflux, otherwise denied complaints  : Denied complaints  MSK: Denied complaints    Objective   Objective     Vitals:   Temp:  [97.3 °F (36.3 °C)-98.4 °F (36.9 °C)] 97.3 °F (36.3 °C)  Heart Rate:  [66-93] 67  Resp:  [16-22] 20  BP: (125-150)/(61-80) 150/74  Flow (L/min):  [2] 2    Physical Exam   Vital Signs Reviewed   General: WDWN, Alert, elderly male, NAD lying in bed  HEENT:  PERRL, EOMI.   Neck:  Supple, no JVD, no thyromegaly  Chest:  good aeration, wheezes to auscultation in left lower lobe, tympanic to percussion bilaterally, no work of breathing noted on 2 L nasal cannula  CV: RRR, no MGR, pulses 2+, equal.  Abd:  Soft, NT, ND, + BS, no HSM  EXT:  no clubbing, no cyanosis, no edema  Neuro:  A&Ox3, CN grossly intact, no focal deficits.  Skin: No rashes or lesions noted      Result Review    Result Review:  I have personally reviewed the results from the time of this admission to 2023 11:40 EDT and agree with these findings:  [x]  Laboratory  [x]  Microbiology  [x]  Radiology  [x]  EKG/Telemetry   []  Cardiology/Vascular   []  Pathology  []  Old records  []  Other:  Most notable findings include:     CT chest - left lower lobe infiltrate superimposed on chronic changes consistent with pneumonia, concern  for aspiration pneumonia, marked gastroesophageal reflux with fluid and debris throughout esophagus        Lab 06/07/23  0544 06/06/23  1126 06/06/23  1125   WBC 9.81  --  9.72   HEMOGLOBIN 11.6*  --  13.1   HEMATOCRIT 40.3  --  45.0   PLATELETS 181  --  218   SODIUM 136  --  139   SODIUM, ARTERIAL  --  138.9  --    POTASSIUM 4.8  --  4.8   CHLORIDE 102  --  101   CO2 23.6  --  28.7   BUN 25*  --  21   CREATININE 0.95  --  1.10   GLUCOSE 202*  --  125*   GLUCOSE, ARTERIAL  --  140*  --    CALCIUM 8.9  --  8.9   TOTAL PROTEIN 6.8  --  7.4   ALBUMIN 3.4*  --  4.0   GLOBULIN 3.4  --  3.4         Assessment & Plan   Assessment / Plan     Active Hospital Problems:  Active Hospital Problems    Diagnosis     **Acute respiratory failure with hypoxia and hypercapnia     Aspiration pneumonia of left lower lobe due to gastric secretions      Impression:  Acute on chronic hypoxic/hypercapnic respiratory failure  Left lower lobe pneumonia, unknown etiology  Possible aspiration pneumonia  Left pleural effusion  Acute COPD exacerbation  Medical noncompliance  Former abuse of cigarettes    Plan:  -Currently on 2 L nasal cannula (2 L baseline), continue to use to maintain SPO2 greater than 90%  -CT chest reviewed revealing left lower lobe infiltrate superimposed on chronic changes consistent with pneumonia, concern for aspiration pneumonia, marked gastroesophageal reflux with fluid and debris throughout esophagus  -SLP consulted for aspiration concerns, appreciate assistance  -Continue aspiration precautions, elevate head of bed  -Continue Unasyn x5 days for aspiration pneumonia  -Continue Protonix 40 mg p.o. daily  -Continue Brovana, Pulmicort and Xopenex  -Continue bronchopulmonary hygiene  -Encourage use of I-S/flutter valve  -Continue prednisone 40 mg x 5 days  -Encourage mobilization, out of bed to chair  -Scheduled for follow-up with COPD clinic in 1 week      DVT prophylaxis:  Medical DVT prophylaxis orders are  present.    CODE STATUS:   Level Of Support Discussed With: Patient  Code Status (Patient has no pulse and is not breathing): CPR (Attempt to Resuscitate)  Medical Interventions (Patient has pulse or is breathing): Full Support      Electronically signed by OMNE Voss, 06/08/23, 11:40 AM EDT.

## 2023-06-09 ENCOUNTER — TRANSITIONAL CARE MANAGEMENT TELEPHONE ENCOUNTER (OUTPATIENT)
Dept: CALL CENTER | Facility: HOSPITAL | Age: 73
End: 2023-06-09
Payer: MEDICARE

## 2023-06-09 NOTE — OUTREACH NOTE
Call Center TCM Note    Flowsheet Row Responses   [facility] patient discharged from? [facility]   Does the patient have one of the following disease processes/diagnoses(primary or secondary)? COPD   TCM attempt successful? Yes   Call start time 1410   Call end time 1413   Discharge diagnosis COPD with acute exacerbationAspiration pneumonia   Person spoke with today (if not patient) and relationship Patient   Meds reviewed with patient/caregiver? Yes   Does the patient have all medications ordered at discharge? Yes   Prescription comments No concerns or questions with medications. Does have Augmentin, prednisone, and symbicort   Is the patient taking all medications as directed (includes completed medication regime)? Yes   Comments Patient aware of hospital fu on 06/12 @ 115   Does the patient have an appointment with their PCP within 7 days of discharge? Yes   Has home health visited the patient within 72 hours of discharge? N/A   Pulse Ox monitoring None  [Patient declines having a pulse ox monitor at home]   Did the patient receive a copy of their discharge instructions? Yes   Nursing interventions Reviewed instructions with patient   What is the patient's perception of their health status since discharge? Improving   Nursing Interventions Nurse provided patient education   Is the patient/caregiver able to teach back the hierarchy of who to call/visit for symptoms/problems? PCP, Specialist, Home health nurse, Urgent Care, ED, 911 Yes   Is the patient able to teach back COPD zones? Yes   Patient reports what zone on this call? Green Zone   Green Zone Reports doing well, Breathing without shortness of breath, Usual activity and exercise level   TCM call completed? Yes   Wrap up additional comments Kiannatnet states he is doing better, breathing well with no concerns. He does not have a pulse ox to monitor himself at home- he is aware to get one and the importance of having it with declining 02 at [place]. Patient states  he is breathing better now that he is home. He is aware of pulm and pcp appt. No concerns or questions noted.   Call end time 1413   Would this patient benefit from a Referral to [facility] Social Work? No   Is the patient interested in additional calls from an ambulatory ?  NOTE:  applies to high risk patients requiring additional follow-up. No          Krissy Nunes RN    6/9/2023, 14:13 EDT

## 2023-06-11 LAB
BACTERIA SPEC AEROBE CULT: NORMAL
BACTERIA SPEC AEROBE CULT: NORMAL

## 2023-06-15 ENCOUNTER — TELEPHONE (OUTPATIENT)
Dept: FAMILY MEDICINE CLINIC | Facility: CLINIC | Age: 73
End: 2023-06-15

## 2023-06-15 NOTE — TELEPHONE ENCOUNTER
Looks like 8 months ago A1C w3as 11.7. This made him a diabetic. The medicine he is on should be helping to control this. We will discuss further for his appointment this afternoon.

## 2023-06-15 NOTE — TELEPHONE ENCOUNTER
Caller: GOPAL WITH [employer]    Relationship to patient: Home Health    Best call back number: [phone]     Patient is needing: PATIENT JUST GOT OUT OF HOSPITAL ON 06.08.2023 FOR COPD AND EXACERBATION. CALLER STATES THAT PATIENT SEEMED CONFUSED ABOUT HIS INHALERS AND HOW AND WHEN TO USE THEM BECAUSE HE STATES THAT HE USES THEM WHENEVER. CALLER STATES THAT PATIENT STATES HE DOES NOT HAVE DIABETES AND DOES NOT KNOW WHY HE IS TAKING DIABETIC MEDICATIONS.     PATIENT HAS APPOINTMENT 06.15.2023 BUT CALLER WANTED TO HAVE MESSAGE PUT IN BEFORE HIS APPOINTMENT. PHONE NUMBER LISTED IS PATIENT'S PHONE NUMBER. CALLER DOES NOT HAVE PHONE NUMBER LINE TO CONTACT HER DIRECTLY.

## 2023-06-19 ENCOUNTER — OFFICE VISIT (OUTPATIENT)
Dept: FAMILY MEDICINE CLINIC | Facility: CLINIC | Age: 73
End: 2023-06-19
Payer: MEDICARE

## 2023-06-19 ENCOUNTER — READMISSION MANAGEMENT (OUTPATIENT)
Dept: CALL CENTER | Facility: HOSPITAL | Age: 73
End: 2023-06-19
Payer: MEDICARE

## 2023-06-19 VITALS
SYSTOLIC BLOOD PRESSURE: 106 MMHG | HEIGHT: 73 IN | OXYGEN SATURATION: 94 % | DIASTOLIC BLOOD PRESSURE: 62 MMHG | WEIGHT: 248 LBS | BODY MASS INDEX: 32.87 KG/M2 | HEART RATE: 58 BPM

## 2023-06-19 DIAGNOSIS — J44.1 COPD WITH EXACERBATION: Primary | ICD-10-CM

## 2023-06-19 NOTE — PROGRESS NOTES
Transitional Care Follow Up Visit  Subjective     Dilip Faulkner is a 73 y.o. male who presents for a transitional care management visit.    Within 48 business hours after discharge our office contacted him via telephone to coordinate his care and needs.      I reviewed and discussed the details of that call along with the discharge summary, hospital problems, inpatient lab results, inpatient diagnostic studies, and consultation reports with Dilip.     Current outpatient and discharge medications have been reconciled for the patient.  Reviewed by: MONE Buchanan          6/8/2023     5:44 PM   Date of TCM Phone Call   [facility]   Date of Admission 6/6/2023   Date of Discharge 6/8/2023   Discharge Disposition Home or Self Care     Risk for Readmission (LACE) Score: 13 (6/8/2023  6:00 AM)      History of Present Illness  73-year-old Dilip Faulkner presents today to for a hospital follow-up patient was in the hospital from 6/6/2023 to 6/8/2023 for COPD exacerbation and aspiration pneumonia.  Patient is accompanied by his sister Alicia Garcia that states that he has been doing much better since his hospitalization.  He also has an appointment this afternoon with pulmonology at 3:30 PM.  Sister is concerned that patient does use his inhaler  too frequently and that this is causing him to have increased anxiety.  Discussed with patient that he should be using the albuterol inhaler and nebulizer as directed.  They are to discuss this in further with pulmonology at their appointment.    Patient does have a history of hypertension but is well managed on the medications that he is currently on lisinopril 5 mg, Metroprolol succinate XL 25 mg.         Course During Hospital Stay:6/6/2023-6/8/2023    Current Outpatient Medications:   •  albuterol sulfate  (90 Base) MCG/ACT inhaler, Inhale 2 puffs Every 6 (Six) Hours As Needed for Wheezing., Disp: 18 g, Rfl: 2  •  aspirin 81 MG chewable tablet, Chew 1  tablet Daily., Disp: 90 tablet, Rfl: 1  •  atorvastatin (LIPITOR) 10 MG tablet, Take 1 tablet by mouth Daily., Disp: , Rfl:   •  budesonide-formoterol (Symbicort) 160-4.5 MCG/ACT inhaler, Inhale 2 puffs 2 (Two) Times a Day., Disp: 10.2 g, Rfl: 0  •  cetirizine (zyrTEC) 10 MG tablet, Take 1 tablet by mouth Daily., Disp: 90 tablet, Rfl: 1  •  empagliflozin (JARDIANCE) 10 MG tablet tablet, Take 1 tablet by mouth Daily., Disp: , Rfl:   •  insulin degludec (Tresiba FlexTouch) 100 UNIT/ML solution pen-injector injection, Inject 20 Units under the skin into the appropriate area as directed Daily., Disp: 6 mL, Rfl: 2  •  lisinopril (PRINIVIL,ZESTRIL) 5 MG tablet, Take 1 tablet by mouth Daily for 30 days., Disp: 90 tablet, Rfl: 1  •  metFORMIN (GLUCOPHAGE) 500 MG tablet, Take 1 tablet by mouth 2 (Two) Times a Day With Meals for 30 days., Disp: 60 tablet, Rfl: 5  •  metoprolol succinate XL (TOPROL-XL) 25 MG 24 hr tablet, Take 1 tablet by mouth Daily for 30 days., Disp: 90 tablet, Rfl: 1  •  omeprazole (priLOSEC) 20 MG capsule, Take 1 capsule by mouth Daily., Disp: 90 capsule, Rfl: 1  •  empagliflozin (Jardiance) 10 MG tablet tablet, Take 1 tablet by mouth Daily for 30 days., Disp: 30 tablet, Rfl: 5      The following portions of the patient's history were reviewed and updated as appropriate: allergies, current medications, past family history, past medical history, past social history, past surgical history and problem list.    Review of Systems   Constitutional: Negative.    HENT: Negative.    Eyes: Negative.    Respiratory: Positive for cough and shortness of breath.    Cardiovascular: Negative.    Gastrointestinal: Negative.    Endocrine: Negative.    Genitourinary: Negative.    Musculoskeletal: Negative.    Allergic/Immunologic: Negative.    Neurological: Negative.    Hematological: Negative.    Psychiatric/Behavioral: Negative.        Objective   Physical Exam  Vitals and nursing note reviewed.   Constitutional:        Appearance: Normal appearance.   HENT:      Head: Normocephalic and atraumatic.      Nose: Nose normal.      Mouth/Throat:      Mouth: Mucous membranes are moist.   Eyes:      Conjunctiva/sclera: Conjunctivae normal.      Pupils: Pupils are equal, round, and reactive to light.   Cardiovascular:      Rate and Rhythm: Normal rate and regular rhythm.      Pulses: Normal pulses.      Heart sounds: Normal heart sounds.   Pulmonary:      Effort: Pulmonary effort is normal.      Breath sounds: Normal breath sounds.   Abdominal:      General: Abdomen is flat.      Palpations: Abdomen is soft.   Musculoskeletal:         General: Normal range of motion.      Cervical back: Normal range of motion and neck supple.   Skin:     General: Skin is warm and dry.   Neurological:      General: No focal deficit present.      Mental Status: He is alert and oriented to person, place, and time. Mental status is at baseline.   Psychiatric:         Mood and Affect: Mood normal.         Behavior: Behavior normal.         Thought Content: Thought content normal.         Judgment: Judgment normal.         Assessment & Plan   Diagnoses and all orders for this visit:    1. COPD with exacerbation (Primary)  Comments:  Patient is stable at this time.  He will follow-up with pulmonology in regards to recent hospitalization due to COPD.  No changes at this time.

## 2023-06-19 NOTE — OUTREACH NOTE
COPD/PN Week 2 Survey      Flowsheet Row Responses   [facility] patient discharged from? [facility]   Does the patient have one of the following disease processes/diagnoses(primary or secondary)? COPD   Week 2 attempt successful? Yes   Call start time 0844   Call end time 0846   Discharge diagnosis COPD with acute exacerbationAspiration pneumonia   Meds reviewed with patient/caregiver? Yes   Is the patient having any side effects they believe may be caused by any medication additions or changes? No   Does the patient have all medications ordered at discharge? Yes   Is the patient taking all medications as directed (includes completed medication regime)? Yes   Does the patient have a primary care provider?  Yes   Comments regarding PCP PCP appointment is today. Patient has missed some appointments and he was encouraged to keep this appointment.   Has the patient kept scheduled appointments due by today? No   Nursing Interventions Educated on importance of keeping appointment   Has home health visited the patient within 72 hours of discharge? N/A   Pulse Ox monitoring None   Psychosocial issues? No   Did the patient receive a copy of their discharge instructions? Yes   Nursing interventions Reviewed instructions with patient   What is the patient's perception of their health status since discharge? Improving   Nursing Interventions Nurse provided patient education   If the patient is a current smoker, are they able to teach back resources for cessation? Not a smoker   Is the patient/caregiver able to teach back the hierarchy of who to call/visit for symptoms/problems? PCP, Specialist, Home health nurse, Urgent Care, ED, 911 Yes   Is the patient able to teach back COPD zones? Yes   Patient reports what zone on this call? Green Zone   Is the patient/caregiver able to teach back signs and symptoms of worsening condition: Shortness of breath, Chest pain, Fever/chills   Is the patient/caregiver able to teach back  importance of completing antibiotic course of treatment? Yes   Week 2 call completed? Yes            Mariana BECKHAM - Licensed Nurse

## 2023-07-12 ENCOUNTER — TELEPHONE (OUTPATIENT)
Dept: FAMILY MEDICINE CLINIC | Facility: CLINIC | Age: 73
End: 2023-07-12

## 2023-07-12 NOTE — TELEPHONE ENCOUNTER
Caller: Juana Sandoval    Relationship: Emergency Contact    Best call back number: [phone]     Requested Prescriptions:   NEEDLES FOR HIS TRESIBA INJECTION     Pharmacy where request should be sent: [id number] - [place], [place] - [address] AT [facility] [phone] [facility] [phone] FX     Last office visit with prescribing clinician: 6/19/2023   Last telemedicine visit with prescribing clinician: Visit date not found   Next office visit with prescribing clinician: 8/3/2023     Additional details provided by patient: PATIENT  IS NEEDING A REFILL.     Does the patient have less than a 3 day supply:  [x] Yes  [x] No    Would you like a call back once the refill request has been completed: [x] Yes [] No    If the office needs to give you a call back, can they leave a voicemail: [x] Yes [] No    Mary Neri Rep   07/12/23 14:34 EDT

## 2023-07-27 RX ORDER — ALBUTEROL SULFATE 90 UG/1
2 AEROSOL, METERED RESPIRATORY (INHALATION) EVERY 6 HOURS PRN
Qty: 18 G | Refills: 2 | Status: SHIPPED | OUTPATIENT
Start: 2023-07-27

## 2023-07-27 RX ORDER — INSULIN DEGLUDEC INJECTION 100 U/ML
20 INJECTION, SOLUTION SUBCUTANEOUS DAILY
Qty: 6 ML | Refills: 2 | Status: SHIPPED | OUTPATIENT
Start: 2023-07-27

## 2023-07-27 RX ORDER — INSULIN DEGLUDEC INJECTION 100 U/ML
20 INJECTION, SOLUTION SUBCUTANEOUS DAILY
Qty: 6 ML | Refills: 2 | Status: CANCELLED | OUTPATIENT
Start: 2023-07-27

## 2023-07-27 NOTE — TELEPHONE ENCOUNTER
Caller: JavierporfirioJuana    Relationship: Emergency Contact    Best call back number: [phone]     Requested Prescriptions:   Requested Prescriptions     Pending Prescriptions Disp Refills    insulin degludec (Tresiba FlexTouch) 100 UNIT/ML solution pen-injector injection 6 mL 2     Sig: Inject 20 Units under the skin into the appropriate area as directed Daily.        Pharmacy where request should be sent: [facility] #[id number] - [place], [place] - [address] AT [facility] [phone] [facility] [phone] FX     Last office visit with prescribing clinician: 6/19/2023   Last telemedicine visit with prescribing clinician: Visit date not found   Next office visit with prescribing clinician: 8/3/2023     Does the patient have less than a 3 day supply:  [x] Yes  [] No    Would you like a call back once the refill request has been completed: [] Yes [] No    If the office needs to give you a call back, can they leave a voicemail: [] Yes [] No    Mary Bernardo Rep   07/27/23 12:56 EDT

## 2023-08-14 ENCOUNTER — OFFICE VISIT (OUTPATIENT)
Dept: FAMILY MEDICINE CLINIC | Facility: CLINIC | Age: 73
End: 2023-08-14
Payer: MEDICARE

## 2023-08-14 VITALS
DIASTOLIC BLOOD PRESSURE: 81 MMHG | HEART RATE: 73 BPM | OXYGEN SATURATION: 86 % | BODY MASS INDEX: 33.53 KG/M2 | SYSTOLIC BLOOD PRESSURE: 123 MMHG | WEIGHT: 253 LBS | HEIGHT: 73 IN

## 2023-08-14 DIAGNOSIS — E11.9 TYPE 2 DIABETES MELLITUS WITHOUT COMPLICATION, WITHOUT LONG-TERM CURRENT USE OF INSULIN: ICD-10-CM

## 2023-08-14 DIAGNOSIS — J44.1 COPD WITH EXACERBATION: Primary | ICD-10-CM

## 2023-08-14 DIAGNOSIS — R06.02 SOB (SHORTNESS OF BREATH) ON EXERTION: ICD-10-CM

## 2023-08-14 LAB
EXPIRATION DATE: NORMAL
HBA1C MFR BLD: 6.8 %
Lab: NORMAL

## 2023-08-14 RX ORDER — ALBUTEROL SULFATE 90 UG/1
2 AEROSOL, METERED RESPIRATORY (INHALATION) EVERY 4 HOURS PRN
Qty: 8 G | Refills: 2 | Status: SHIPPED | OUTPATIENT
Start: 2023-08-14

## 2023-08-14 NOTE — PROGRESS NOTES
Chief Complaint  Hypertension, Hyperlipidemia, Diabetes, and COPD PT HERE FOR FOLLOW UP DM, HTN ,A\ND COPD. PT WOULD ALSO LIKE AN ORDER FOR A PORTABLE OXYGEN TANK.     SUBJECTIVE  Dilip Faulkner presents to [facility]    History of Present Illness    73-year-old Dilip Faulkner presents today accompanied by his sister for a 6-month follow-up on diabetic management, hypertension and med refills. He is also wanting to talk about a portable oxygen tank. We did a 6-minute walk test, which he was unable to complete. Due to arthritis in knees.    He mentions he is interested in getting a portable oxygen tank. He notes he has a oxygen tank at home but it is not portable. She mentions she still tries to get him to walk a little bit to get some exercise. He is now taking ProAir inhaler. He use to take the Ventolin inhaler. He denies any refills needed today.     Past Medical History:   Diagnosis Date    Asthma     COPD (chronic obstructive pulmonary disease)     Diabetes mellitus     Ex-smoker     QUIT 2021    Hernia, umbilical     Hypertension     On home O2     REPORTS WEARING 2L/NC PRN SOA      History reviewed. No pertinent family history.   Past Surgical History:   Procedure Laterality Date    ABDOMINAL SURGERY          Current Outpatient Medications:     aspirin 81 MG chewable tablet, Chew 1 tablet Daily., Disp: 90 tablet, Rfl: 1    atorvastatin (LIPITOR) 10 MG tablet, Take 1 tablet by mouth Daily., Disp: , Rfl:     B-D ULTRAFINE III SHORT PEN 31G X 8 MM misc, Daily., Disp: , Rfl:     budesonide-formoterol (Symbicort) 160-4.5 MCG/ACT inhaler, Inhale 2 puffs 2 (Two) Times a Day., Disp: 10.2 g, Rfl: 0    cetirizine (zyrTEC) 10 MG tablet, Take 1 tablet by mouth Daily., Disp: 90 tablet, Rfl: 1    empagliflozin (JARDIANCE) 10 MG tablet tablet, Take 1 tablet by mouth Daily., Disp: , Rfl:     insulin degludec (Tresiba FlexTouch) 100 UNIT/ML solution pen-injector injection, Inject 20 Units  "under the skin into the appropriate area as directed Daily., Disp: 6 mL, Rfl: 2    Needles & Syringes misc, 90 each Daily for 180 days., Disp: 90 each, Rfl: 1    omeprazole (priLOSEC) 20 MG capsule, Take 1 capsule by mouth Daily., Disp: 90 capsule, Rfl: 1    albuterol sulfate  (90 Base) MCG/ACT inhaler, Inhale 2 puffs Every 4 (Four) Hours As Needed for Wheezing., Disp: 8 g, Rfl: 2    lisinopril (PRINIVIL,ZESTRIL) 5 MG tablet, Take 1 tablet by mouth Daily for 30 days., Disp: 90 tablet, Rfl: 1    metFORMIN (GLUCOPHAGE) 500 MG tablet, Take 1 tablet by mouth 2 (Two) Times a Day With Meals for 30 days., Disp: 60 tablet, Rfl: 5    metoprolol succinate XL (TOPROL-XL) 25 MG 24 hr tablet, Take 1 tablet by mouth Daily for 30 days., Disp: 90 tablet, Rfl: 1    OBJECTIVE  Vital Signs:   /81   Pulse 73   Ht 185.4 cm (73\")   Wt 115 kg (253 lb)   SpO2 (!) 86%   BMI 33.38 kg/mý    Estimated body mass index is 33.38 kg/mý as calculated from the following:    Height as of this encounter: 185.4 cm (73\").    Weight as of this encounter: 115 kg (253 lb).     Wt Readings from Last 3 Encounters:   08/14/23 115 kg (253 lb)   06/19/23 112 kg (248 lb)   06/08/23 108 kg (238 lb 1.6 oz)     BP Readings from Last 3 Encounters:   08/14/23 123/81   06/19/23 106/62   06/08/23 144/75       Physical Exam  Vitals and nursing note reviewed.   Constitutional:       Appearance: Normal appearance.   HENT:      Head: Normocephalic and atraumatic.      Right Ear: Tympanic membrane and ear canal normal.      Left Ear: Tympanic membrane and ear canal normal.      Nose: No congestion or rhinorrhea.      Mouth/Throat:      Mouth: Mucous membranes are moist.      Pharynx: Oropharynx is clear. No posterior oropharyngeal erythema.   Cardiovascular:      Rate and Rhythm: Normal rate and regular rhythm.   Pulmonary:      Effort: Pulmonary effort is normal.      Breath sounds: Normal breath sounds. No decreased breath sounds, wheezing, rhonchi or " rales.   Musculoskeletal:      Cervical back: Neck supple.      Right lower leg: No edema.      Left lower leg: No edema.   Lymphadenopathy:      Cervical: No cervical adenopathy.   Neurological:      Mental Status: He is alert.        Result Review    Common labs          6/6/2023    11:25 6/6/2023    11:26 6/7/2023    05:44 8/14/2023    16:17   Common Labs   Glucose 125  140  202     BUN 21   25     Creatinine 1.10   0.95     Sodium 139  138.9  136     Potassium 4.8   4.8     Chloride 101   102     Calcium 8.9   8.9     Albumin 4.0   3.4     Total Bilirubin 0.5   0.3     Alkaline Phosphatase 107   85     AST (SGOT) 12   11     ALT (SGPT) 9   8     WBC 9.72   9.81     Hemoglobin 13.1   11.6     Hematocrit 45.0   40.3     Platelets 218   181     Hemoglobin A1C    6.8      CMP          5/10/2023    05:59 6/6/2023    11:25 6/6/2023    11:26 6/7/2023    05:44   CMP   Glucose 175  125  140  202    BUN 26  21   25    Creatinine 0.98  1.10   0.95    EGFR 80.9  70.4   84.0    Sodium 140  139  138.9  136    Potassium 4.3  4.8   4.8    Chloride 103  101   102    Calcium 8.8  8.9   8.9    Total Protein 6.5  7.4   6.8    Albumin 3.4  4.0   3.4    Globulin 3.1  3.4   3.4    Total Bilirubin 0.2  0.5   0.3    Alkaline Phosphatase 84  107   85    AST (SGOT) 13  12   11    ALT (SGPT) 8  9   8    Albumin/Globulin Ratio 1.1  1.2   1.0    BUN/Creatinine Ratio 26.5  19.1   26.3    Anion Gap 11.9  9.3   10.4      CBC          5/10/2023    05:59 6/6/2023    11:25 6/7/2023    05:44   CBC   WBC 17.45  9.72  9.81    RBC 4.97  5.39  4.84    Hemoglobin 12.0  13.1  11.6    Hematocrit 39.9  45.0  40.3    MCV 80.3  83.5  83.3    MCH 24.1  24.3  24.0    MCHC 30.1  29.1  28.8    RDW 16.5  16.5  15.9    Platelets 196  218  181      CBC w/diff          5/10/2023    05:59 6/6/2023    11:25 6/7/2023    05:44   CBC w/Diff   WBC 17.45  9.72  9.81    RBC 4.97  5.39  4.84    Hemoglobin 12.0  13.1  11.6    Hematocrit 39.9  45.0  40.3    MCV 80.3  83.5   83.3    MCH 24.1  24.3  24.0    MCHC 30.1  29.1  28.8    RDW 16.5  16.5  15.9    Platelets 196  218  181    Neutrophil Rel % 93.6  55.1  89.8    Immature Granulocyte Rel % 1.2  0.5  1.0    Lymphocyte Rel % 3.2  20.7  7.4    Monocyte Rel % 1.9  10.0  1.5    Eosinophil Rel % 0.0  13.1  0.1    Basophil Rel % 0.1  0.6  0.2      Lipid Panel          3/28/2023    23:36   Lipid Panel   Total Cholesterol 69    Triglycerides 81    HDL Cholesterol 31    VLDL Cholesterol 17    LDL Cholesterol  21    LDL/HDL Ratio 0.70        Electrolytes          5/10/2023    05:59 6/6/2023    11:25 6/6/2023    11:26 6/7/2023    05:44   Electrolytes   Sodium 140  139  138.9  136    Potassium 4.3  4.8   4.8    Chloride 103  101   102    Calcium 8.8  8.9   8.9            Lab Results   Component Value Date    FREET4 0.9 03/22/2019          CT Chest Without Contrast Diagnostic    Result Date: 6/7/2023    1. Evidence for potential developing infiltrate within left lower lobe superimposed on chronic changes.  The findings may indicate developing left lower lobe pneumonia and possible aspiration pneumonia.  Recommend correlation for signs or symptoms of acute infection and follow-up to ensure improvement. 2. Evidence for marked gastroesophageal reflux with fluid and debris throughout the esophagus. 3. Moderate coronary artery calcifications are noted.      SHRUTI CUTLER MD       Electronically Signed and Approved By: SHRUTI CUTLER MD on 6/07/2023 at 18:06             XR Chest 1 View    Result Date: 6/6/2023   Small left pleural effusion is slightly larger than previous exam.  Bilateral basilar infiltrates could be atelectasis or pneumonia.  JOANIE ANGELES MD       Electronically Signed and Approved By: JOANIE ANGELES MD on 6/06/2023 at 12:12             XR Chest 1 View    Result Date: 5/8/2023    1. Small, left greater than right pleural effusions and bibasilar opacities either atelectasis or pneumonia.       JEF BAEZ MD       Electronically  Signed and Approved By: JEF BAEZ MD on 5/08/2023 at 16:11             XR Chest 1 View    Result Date: 4/21/2023    1. Left basilar density likely chronic scarring.  The chest is otherwise clear       Robby Garay MD       Electronically Signed and Approved By: Robby Garay MD on 4/21/2023 at 9:41                 The above data has been reviewed by MONE Buchanan 08/14/2023 15:17 EDT.          Patient Care Team:  Mallorie Roa APRN as PCP - General (Emergency Medicine)  Rosalba Edwards APRN (Nurse Practitioner)            ASSESSMENT & PLAN    1. COPD with exacerbation.  - Albuterol sulfate  (90 Base) MCG/ACT inhaler.  - Oxygen Therapy.    Diagnoses and all orders for this visit:    1. COPD with exacerbation (Primary)  -     Cancel: Oxygen Therapy  -     Cancel: Oxygen Therapy  -     Cancel: Oxygen Therapy  -     Oxygen Therapy    2. SOB (shortness of breath) on exertion  -     Cancel: Oxygen Therapy  -     Cancel: Oxygen Therapy  -     Cancel: Oxygen Therapy  -     Oxygen Therapy    3. Type 2 diabetes mellitus without complication, without long-term current use of insulin  -     POC Glycosylated Hemoglobin (Hb A1C)    Other orders  -     albuterol sulfate  (90 Base) MCG/ACT inhaler; Inhale 2 puffs Every 4 (Four) Hours As Needed for Wheezing.  Dispense: 8 g; Refill: 2         Tobacco Use: High Risk    Smoking Tobacco Use: Former    Smokeless Tobacco Use: Current    Passive Exposure: Not on file       Follow Up     Return in about 3 months (around 11/14/2023).      Patient was given instructions and counseling regarding his condition or for health maintenance advice. Please see specific information pulled into the AVS if appropriate.   I have reviewed information obtained and documented by others and I have confirmed the accuracy of this documented note.    MONE Buchanan      Transcribed from ambient dictation for MONE Buchanan by Zaira Duran.  08/14/23   16:45  EDT    Patient or patient representative verbalized consent to the visit recording.  I have personally performed the services described in this document as transcribed by the above individual, and it is both accurate and complete.

## 2023-08-27 ENCOUNTER — HOSPITAL ENCOUNTER (INPATIENT)
Facility: HOSPITAL | Age: 73
LOS: 2 days | Discharge: HOME | DRG: 871 | End: 2023-08-29
Attending: EMERGENCY MEDICINE | Admitting: INTERNAL MEDICINE
Payer: MEDICARE

## 2023-08-27 ENCOUNTER — APPOINTMENT (OUTPATIENT)
Dept: GENERAL RADIOLOGY | Facility: HOSPITAL | Age: 73
DRG: 871 | End: 2023-08-27
Payer: MEDICARE

## 2023-08-27 DIAGNOSIS — J18.9 PNEUMONIA DUE TO INFECTIOUS ORGANISM, UNSPECIFIED LATERALITY, UNSPECIFIED PART OF LUNG: ICD-10-CM

## 2023-08-27 DIAGNOSIS — J44.1 COPD WITH ACUTE EXACERBATION: ICD-10-CM

## 2023-08-27 DIAGNOSIS — A41.9 SEPSIS, DUE TO UNSPECIFIED ORGANISM, UNSPECIFIED WHETHER ACUTE ORGAN DYSFUNCTION PRESENT: ICD-10-CM

## 2023-08-27 DIAGNOSIS — J96.01 ACUTE RESPIRATORY FAILURE WITH HYPOXIA: Primary | ICD-10-CM

## 2023-08-27 DIAGNOSIS — Z78.9 DECREASED ACTIVITIES OF DAILY LIVING (ADL): ICD-10-CM

## 2023-08-27 DIAGNOSIS — R26.2 DIFFICULTY IN WALKING: ICD-10-CM

## 2023-08-27 LAB
ALBUMIN SERPL-MCNC: 4 G/DL (ref 3.5–5.2)
ALBUMIN/GLOB SERPL: 1 G/DL
ALP SERPL-CCNC: 94 U/L (ref 39–117)
ALT SERPL W P-5'-P-CCNC: 10 U/L (ref 1–41)
ANION GAP SERPL CALCULATED.3IONS-SCNC: 9.5 MMOL/L (ref 5–15)
AST SERPL-CCNC: 12 U/L (ref 1–40)
BASOPHILS # BLD AUTO: 0.08 10*3/MM3 (ref 0–0.2)
BASOPHILS NFR BLD AUTO: 0.5 % (ref 0–1.5)
BILIRUB SERPL-MCNC: 0.3 MG/DL (ref 0–1.2)
BUN SERPL-MCNC: 15 MG/DL (ref 8–23)
BUN/CREAT SERPL: 13.8 (ref 7–25)
CALCIUM SPEC-SCNC: 8.9 MG/DL (ref 8.6–10.5)
CHLORIDE SERPL-SCNC: 100 MMOL/L (ref 98–107)
CO2 SERPL-SCNC: 27.5 MMOL/L (ref 22–29)
CREAT SERPL-MCNC: 1.09 MG/DL (ref 0.76–1.27)
D-LACTATE SERPL-SCNC: 1.4 MMOL/L (ref 0.5–2)
D-LACTATE SERPL-SCNC: 1.6 MMOL/L (ref 0.5–2)
DEPRECATED RDW RBC AUTO: 46.9 FL (ref 37–54)
EGFRCR SERPLBLD CKD-EPI 2021: 71.2 ML/MIN/1.73
EOSINOPHIL # BLD AUTO: 1.66 10*3/MM3 (ref 0–0.4)
EOSINOPHIL NFR BLD AUTO: 11.2 % (ref 0.3–6.2)
ERYTHROCYTE [DISTWIDTH] IN BLOOD BY AUTOMATED COUNT: 16.4 % (ref 12.3–15.4)
FLUAV AG NPH QL: NEGATIVE
FLUBV AG NPH QL IA: NEGATIVE
GEN 5 2HR TROPONIN T REFLEX: 62 NG/L
GLOBULIN UR ELPH-MCNC: 4 GM/DL
GLUCOSE BLDC GLUCOMTR-MCNC: 125 MG/DL (ref 70–99)
GLUCOSE BLDC GLUCOMTR-MCNC: 138 MG/DL (ref 70–99)
GLUCOSE BLDC GLUCOMTR-MCNC: 186 MG/DL (ref 70–99)
GLUCOSE SERPL-MCNC: 148 MG/DL (ref 65–99)
HCT VFR BLD AUTO: 42.9 % (ref 37.5–51)
HGB BLD-MCNC: 12 G/DL (ref 13–17.7)
HOLD SPECIMEN: NORMAL
HOLD SPECIMEN: NORMAL
IMM GRANULOCYTES # BLD AUTO: 0.08 10*3/MM3 (ref 0–0.05)
IMM GRANULOCYTES NFR BLD AUTO: 0.5 % (ref 0–0.5)
LIPASE SERPL-CCNC: 14 U/L (ref 13–60)
LYMPHOCYTES # BLD AUTO: 1.49 10*3/MM3 (ref 0.7–3.1)
LYMPHOCYTES NFR BLD AUTO: 10.1 % (ref 19.6–45.3)
MAGNESIUM SERPL-MCNC: 2.2 MG/DL (ref 1.6–2.4)
MCH RBC QN AUTO: 22.3 PG (ref 26.6–33)
MCHC RBC AUTO-ENTMCNC: 28 G/DL (ref 31.5–35.7)
MCV RBC AUTO: 79.6 FL (ref 79–97)
MONOCYTES # BLD AUTO: 0.95 10*3/MM3 (ref 0.1–0.9)
MONOCYTES NFR BLD AUTO: 6.4 % (ref 5–12)
MRSA DNA SPEC QL NAA+PROBE: NORMAL
NEUTROPHILS NFR BLD AUTO: 10.52 10*3/MM3 (ref 1.7–7)
NEUTROPHILS NFR BLD AUTO: 71.3 % (ref 42.7–76)
NRBC BLD AUTO-RTO: 0 /100 WBC (ref 0–0.2)
NT-PROBNP SERPL-MCNC: 218 PG/ML (ref 0–900)
PLATELET # BLD AUTO: 229 10*3/MM3 (ref 140–450)
PMV BLD AUTO: 10.1 FL (ref 6–12)
POTASSIUM SERPL-SCNC: 4.6 MMOL/L (ref 3.5–5.2)
PROCALCITONIN SERPL-MCNC: 0.07 NG/ML (ref 0–0.25)
PROCALCITONIN SERPL-MCNC: 0.1 NG/ML (ref 0–0.25)
PROT SERPL-MCNC: 8 G/DL (ref 6–8.5)
RBC # BLD AUTO: 5.39 10*6/MM3 (ref 4.14–5.8)
SARS-COV-2 RNA RESP QL NAA+PROBE: NOT DETECTED
SODIUM SERPL-SCNC: 137 MMOL/L (ref 136–145)
TROPONIN T DELTA: 12 NG/L
TROPONIN T SERPL HS-MCNC: 50 NG/L
TROPONIN T SERPL HS-MCNC: 50 NG/L
WBC NRBC COR # BLD: 14.78 10*3/MM3 (ref 3.4–10.8)
WHOLE BLOOD HOLD COAG: NORMAL
WHOLE BLOOD HOLD SPECIMEN: NORMAL

## 2023-08-27 PROCEDURE — 25010000002 METHYLPREDNISOLONE PER 125 MG: Performed by: EMERGENCY MEDICINE

## 2023-08-27 PROCEDURE — 36415 COLL VENOUS BLD VENIPUNCTURE: CPT

## 2023-08-27 PROCEDURE — 83690 ASSAY OF LIPASE: CPT | Performed by: EMERGENCY MEDICINE

## 2023-08-27 PROCEDURE — 83605 ASSAY OF LACTIC ACID: CPT | Performed by: FAMILY MEDICINE

## 2023-08-27 PROCEDURE — 93005 ELECTROCARDIOGRAM TRACING: CPT

## 2023-08-27 PROCEDURE — 0 CEFEPIME PER 500 MG: Performed by: FAMILY MEDICINE

## 2023-08-27 PROCEDURE — 94799 UNLISTED PULMONARY SVC/PX: CPT

## 2023-08-27 PROCEDURE — 25010000002 ENOXAPARIN PER 10 MG: Performed by: FAMILY MEDICINE

## 2023-08-27 PROCEDURE — 87804 INFLUENZA ASSAY W/OPTIC: CPT | Performed by: EMERGENCY MEDICINE

## 2023-08-27 PROCEDURE — 93005 ELECTROCARDIOGRAM TRACING: CPT | Performed by: EMERGENCY MEDICINE

## 2023-08-27 PROCEDURE — 87040 BLOOD CULTURE FOR BACTERIA: CPT | Performed by: FAMILY MEDICINE

## 2023-08-27 PROCEDURE — 94664 DEMO&/EVAL PT USE INHALER: CPT

## 2023-08-27 PROCEDURE — 80053 COMPREHEN METABOLIC PANEL: CPT | Performed by: EMERGENCY MEDICINE

## 2023-08-27 PROCEDURE — 87040 BLOOD CULTURE FOR BACTERIA: CPT | Performed by: EMERGENCY MEDICINE

## 2023-08-27 PROCEDURE — 25010000002 METHYLPREDNISOLONE PER 125 MG: Performed by: FAMILY MEDICINE

## 2023-08-27 PROCEDURE — 93010 ELECTROCARDIOGRAM REPORT: CPT | Performed by: INTERNAL MEDICINE

## 2023-08-27 PROCEDURE — 94640 AIRWAY INHALATION TREATMENT: CPT

## 2023-08-27 PROCEDURE — 83735 ASSAY OF MAGNESIUM: CPT | Performed by: EMERGENCY MEDICINE

## 2023-08-27 PROCEDURE — 0 CEFEPIME PER 500 MG: Performed by: EMERGENCY MEDICINE

## 2023-08-27 PROCEDURE — 94761 N-INVAS EAR/PLS OXIMETRY MLT: CPT

## 2023-08-27 PROCEDURE — 82948 REAGENT STRIP/BLOOD GLUCOSE: CPT

## 2023-08-27 PROCEDURE — 25010000002 VANCOMYCIN 5 G RECONSTITUTED SOLUTION: Performed by: FAMILY MEDICINE

## 2023-08-27 PROCEDURE — 84484 ASSAY OF TROPONIN QUANT: CPT | Performed by: EMERGENCY MEDICINE

## 2023-08-27 PROCEDURE — 83880 ASSAY OF NATRIURETIC PEPTIDE: CPT | Performed by: EMERGENCY MEDICINE

## 2023-08-27 PROCEDURE — 25010000002 VANCOMYCIN 5 G RECONSTITUTED SOLUTION: Performed by: EMERGENCY MEDICINE

## 2023-08-27 PROCEDURE — 71045 X-RAY EXAM CHEST 1 VIEW: CPT

## 2023-08-27 PROCEDURE — 87635 SARS-COV-2 COVID-19 AMP PRB: CPT | Performed by: EMERGENCY MEDICINE

## 2023-08-27 PROCEDURE — 85025 COMPLETE CBC W/AUTO DIFF WBC: CPT | Performed by: EMERGENCY MEDICINE

## 2023-08-27 PROCEDURE — 99285 EMERGENCY DEPT VISIT HI MDM: CPT

## 2023-08-27 PROCEDURE — 84145 PROCALCITONIN (PCT): CPT | Performed by: FAMILY MEDICINE

## 2023-08-27 PROCEDURE — 63710000001 INSULIN LISPRO (HUMAN) PER 5 UNITS: Performed by: PHYSICIAN ASSISTANT

## 2023-08-27 PROCEDURE — 87641 MR-STAPH DNA AMP PROBE: CPT | Performed by: FAMILY MEDICINE

## 2023-08-27 PROCEDURE — 83605 ASSAY OF LACTIC ACID: CPT | Performed by: EMERGENCY MEDICINE

## 2023-08-27 RX ORDER — POLYETHYLENE GLYCOL 3350 17 G/17G
17 POWDER, FOR SOLUTION ORAL DAILY PRN
Status: DISCONTINUED | OUTPATIENT
Start: 2023-08-27 | End: 2023-08-29 | Stop reason: HOSPADM

## 2023-08-27 RX ORDER — METHYLPREDNISOLONE SODIUM SUCCINATE 125 MG/2ML
125 INJECTION, POWDER, LYOPHILIZED, FOR SOLUTION INTRAMUSCULAR; INTRAVENOUS ONCE
Status: COMPLETED | OUTPATIENT
Start: 2023-08-27 | End: 2023-08-27

## 2023-08-27 RX ORDER — ONDANSETRON 2 MG/ML
4 INJECTION INTRAMUSCULAR; INTRAVENOUS EVERY 6 HOURS PRN
Status: DISCONTINUED | OUTPATIENT
Start: 2023-08-27 | End: 2023-08-29 | Stop reason: HOSPADM

## 2023-08-27 RX ORDER — SODIUM CHLORIDE 9 MG/ML
40 INJECTION, SOLUTION INTRAVENOUS AS NEEDED
Status: DISCONTINUED | OUTPATIENT
Start: 2023-08-27 | End: 2023-08-29 | Stop reason: HOSPADM

## 2023-08-27 RX ORDER — IPRATROPIUM BROMIDE AND ALBUTEROL SULFATE 2.5; .5 MG/3ML; MG/3ML
3 SOLUTION RESPIRATORY (INHALATION)
Status: DISCONTINUED | OUTPATIENT
Start: 2023-08-27 | End: 2023-08-29 | Stop reason: HOSPADM

## 2023-08-27 RX ORDER — NITROGLYCERIN 0.4 MG/1
0.4 TABLET SUBLINGUAL
Status: DISCONTINUED | OUTPATIENT
Start: 2023-08-27 | End: 2023-08-29 | Stop reason: HOSPADM

## 2023-08-27 RX ORDER — PANTOPRAZOLE SODIUM 40 MG/1
40 TABLET, DELAYED RELEASE ORAL DAILY
Status: DISCONTINUED | OUTPATIENT
Start: 2023-08-27 | End: 2023-08-29 | Stop reason: HOSPADM

## 2023-08-27 RX ORDER — ASPIRIN 81 MG/1
324 TABLET, CHEWABLE ORAL ONCE
Status: COMPLETED | OUTPATIENT
Start: 2023-08-27 | End: 2023-08-27

## 2023-08-27 RX ORDER — INSULIN LISPRO 100 [IU]/ML
2-9 INJECTION, SOLUTION INTRAVENOUS; SUBCUTANEOUS
Status: DISCONTINUED | OUTPATIENT
Start: 2023-08-27 | End: 2023-08-29 | Stop reason: HOSPADM

## 2023-08-27 RX ORDER — SODIUM CHLORIDE 0.9 % (FLUSH) 0.9 %
10 SYRINGE (ML) INJECTION AS NEEDED
Status: DISCONTINUED | OUTPATIENT
Start: 2023-08-27 | End: 2023-08-27

## 2023-08-27 RX ORDER — AMOXICILLIN 250 MG
2 CAPSULE ORAL 2 TIMES DAILY
Status: DISCONTINUED | OUTPATIENT
Start: 2023-08-27 | End: 2023-08-29 | Stop reason: HOSPADM

## 2023-08-27 RX ORDER — ASPIRIN 81 MG/1
81 TABLET ORAL DAILY
Status: DISCONTINUED | OUTPATIENT
Start: 2023-08-27 | End: 2023-08-29 | Stop reason: HOSPADM

## 2023-08-27 RX ORDER — ATORVASTATIN CALCIUM 10 MG/1
10 TABLET, FILM COATED ORAL NIGHTLY
Status: DISCONTINUED | OUTPATIENT
Start: 2023-08-27 | End: 2023-08-29 | Stop reason: HOSPADM

## 2023-08-27 RX ORDER — BISACODYL 10 MG
10 SUPPOSITORY, RECTAL RECTAL DAILY PRN
Status: DISCONTINUED | OUTPATIENT
Start: 2023-08-27 | End: 2023-08-29 | Stop reason: HOSPADM

## 2023-08-27 RX ORDER — BUDESONIDE 0.5 MG/2ML
0.5 INHALANT ORAL
Status: DISCONTINUED | OUTPATIENT
Start: 2023-08-27 | End: 2023-08-29 | Stop reason: HOSPADM

## 2023-08-27 RX ORDER — NICOTINE POLACRILEX 4 MG
15 LOZENGE BUCCAL
Status: DISCONTINUED | OUTPATIENT
Start: 2023-08-27 | End: 2023-08-29 | Stop reason: HOSPADM

## 2023-08-27 RX ORDER — METOPROLOL SUCCINATE 25 MG/1
25 TABLET, EXTENDED RELEASE ORAL DAILY
Status: DISCONTINUED | OUTPATIENT
Start: 2023-08-27 | End: 2023-08-29 | Stop reason: HOSPADM

## 2023-08-27 RX ORDER — SODIUM CHLORIDE 0.9 % (FLUSH) 0.9 %
10 SYRINGE (ML) INJECTION EVERY 12 HOURS SCHEDULED
Status: DISCONTINUED | OUTPATIENT
Start: 2023-08-27 | End: 2023-08-29 | Stop reason: HOSPADM

## 2023-08-27 RX ORDER — BISACODYL 5 MG/1
5 TABLET, DELAYED RELEASE ORAL DAILY PRN
Status: DISCONTINUED | OUTPATIENT
Start: 2023-08-27 | End: 2023-08-29 | Stop reason: HOSPADM

## 2023-08-27 RX ORDER — SODIUM CHLORIDE 0.9 % (FLUSH) 0.9 %
10 SYRINGE (ML) INJECTION AS NEEDED
Status: DISCONTINUED | OUTPATIENT
Start: 2023-08-27 | End: 2023-08-29 | Stop reason: HOSPADM

## 2023-08-27 RX ORDER — METHYLPREDNISOLONE SODIUM SUCCINATE 125 MG/2ML
60 INJECTION, POWDER, LYOPHILIZED, FOR SOLUTION INTRAMUSCULAR; INTRAVENOUS DAILY
Status: COMPLETED | OUTPATIENT
Start: 2023-08-27 | End: 2023-08-29

## 2023-08-27 RX ORDER — ARFORMOTEROL TARTRATE 15 UG/2ML
15 SOLUTION RESPIRATORY (INHALATION)
Status: DISCONTINUED | OUTPATIENT
Start: 2023-08-27 | End: 2023-08-29 | Stop reason: HOSPADM

## 2023-08-27 RX ORDER — ENOXAPARIN SODIUM 100 MG/ML
40 INJECTION SUBCUTANEOUS DAILY
Status: DISCONTINUED | OUTPATIENT
Start: 2023-08-27 | End: 2023-08-29 | Stop reason: HOSPADM

## 2023-08-27 RX ORDER — DEXTROSE MONOHYDRATE 25 G/50ML
25 INJECTION, SOLUTION INTRAVENOUS
Status: DISCONTINUED | OUTPATIENT
Start: 2023-08-27 | End: 2023-08-29 | Stop reason: HOSPADM

## 2023-08-27 RX ORDER — IPRATROPIUM BROMIDE AND ALBUTEROL SULFATE 2.5; .5 MG/3ML; MG/3ML
3 SOLUTION RESPIRATORY (INHALATION)
Status: COMPLETED | OUTPATIENT
Start: 2023-08-27 | End: 2023-08-27

## 2023-08-27 RX ORDER — ACETAMINOPHEN 325 MG/1
650 TABLET ORAL EVERY 6 HOURS PRN
Status: DISCONTINUED | OUTPATIENT
Start: 2023-08-27 | End: 2023-08-29 | Stop reason: HOSPADM

## 2023-08-27 RX ADMIN — METHYLPREDNISOLONE SODIUM SUCCINATE 60 MG: 125 INJECTION INTRAMUSCULAR; INTRAVENOUS at 08:44

## 2023-08-27 RX ADMIN — CEFEPIME 2000 MG: 2 INJECTION, POWDER, FOR SOLUTION INTRAVENOUS at 02:29

## 2023-08-27 RX ADMIN — ASPIRIN 81 MG: 81 TABLET, COATED ORAL at 12:41

## 2023-08-27 RX ADMIN — CEFEPIME 2000 MG: 2 INJECTION, POWDER, FOR SOLUTION INTRAVENOUS at 09:59

## 2023-08-27 RX ADMIN — IPRATROPIUM BROMIDE AND ALBUTEROL SULFATE 3 ML: .5; 3 SOLUTION RESPIRATORY (INHALATION) at 00:56

## 2023-08-27 RX ADMIN — ASPIRIN 324 MG: 81 TABLET, CHEWABLE ORAL at 00:35

## 2023-08-27 RX ADMIN — PANTOPRAZOLE SODIUM 40 MG: 40 TABLET, DELAYED RELEASE ORAL at 12:41

## 2023-08-27 RX ADMIN — METHYLPREDNISOLONE SODIUM SUCCINATE 125 MG: 125 INJECTION INTRAMUSCULAR; INTRAVENOUS at 00:52

## 2023-08-27 RX ADMIN — IPRATROPIUM BROMIDE AND ALBUTEROL SULFATE 3 ML: .5; 3 SOLUTION RESPIRATORY (INHALATION) at 11:21

## 2023-08-27 RX ADMIN — Medication 10 ML: at 08:44

## 2023-08-27 RX ADMIN — Medication 10 ML: at 21:17

## 2023-08-27 RX ADMIN — IPRATROPIUM BROMIDE AND ALBUTEROL SULFATE 3 ML: .5; 3 SOLUTION RESPIRATORY (INHALATION) at 01:06

## 2023-08-27 RX ADMIN — METOPROLOL SUCCINATE 25 MG: 25 TABLET, EXTENDED RELEASE ORAL at 12:41

## 2023-08-27 RX ADMIN — ENOXAPARIN SODIUM 40 MG: 100 INJECTION SUBCUTANEOUS at 05:44

## 2023-08-27 RX ADMIN — VANCOMYCIN HYDROCHLORIDE 1000 MG: 5 INJECTION, POWDER, LYOPHILIZED, FOR SOLUTION INTRAVENOUS at 15:11

## 2023-08-27 RX ADMIN — IPRATROPIUM BROMIDE AND ALBUTEROL SULFATE 3 ML: .5; 3 SOLUTION RESPIRATORY (INHALATION) at 06:13

## 2023-08-27 RX ADMIN — INSULIN LISPRO 2 UNITS: 100 INJECTION, SOLUTION INTRAVENOUS; SUBCUTANEOUS at 12:41

## 2023-08-27 RX ADMIN — SODIUM CHLORIDE 1000 ML: 9 INJECTION, SOLUTION INTRAVENOUS at 02:29

## 2023-08-27 RX ADMIN — BUDESONIDE 0.5 MG: 0.5 INHALANT ORAL at 18:25

## 2023-08-27 RX ADMIN — SENNOSIDES AND DOCUSATE SODIUM 2 TABLET: 50; 8.6 TABLET ORAL at 21:16

## 2023-08-27 RX ADMIN — IPRATROPIUM BROMIDE AND ALBUTEROL SULFATE 3 ML: .5; 3 SOLUTION RESPIRATORY (INHALATION) at 18:25

## 2023-08-27 RX ADMIN — ATORVASTATIN CALCIUM 10 MG: 10 TABLET, FILM COATED ORAL at 21:15

## 2023-08-27 RX ADMIN — IPRATROPIUM BROMIDE AND ALBUTEROL SULFATE 3 ML: .5; 3 SOLUTION RESPIRATORY (INHALATION) at 01:01

## 2023-08-27 RX ADMIN — VANCOMYCIN HYDROCHLORIDE 2250 MG: 500 INJECTION, POWDER, LYOPHILIZED, FOR SOLUTION INTRAVENOUS at 03:11

## 2023-08-27 RX ADMIN — ARFORMOTEROL TARTRATE 15 MCG: 15 SOLUTION RESPIRATORY (INHALATION) at 18:25

## 2023-08-27 RX ADMIN — CEFEPIME 2000 MG: 2 INJECTION, POWDER, FOR SOLUTION INTRAVENOUS at 17:55

## 2023-08-27 NOTE — PROGRESS NOTES
[facility]   Hospitalist Progress Note       Patient Name: Dilip Faulkner  : 1950  MRN: [id number]  Primary Care Physician: Mallorie Roa APRN  Date of admission: 2023  Today's Date: 2023  Room / Bed:   216/1  Subjective   Chief Complaint: Dyspnea     HPI:     Dilip Faulkner is a 73 y.o. male brought to emergency department for evaluation of dyspnea.  Patient states he is chronically dyspneic, but has been worse over the last 2 nights.  He is getting progressively short of air, especially with exertion.  Patient wears 2 L of home supplemental oxygen due to his COPD, but states that it has not been helping with his shortness of breath.  He has also been given himself breathing treatments at home without improvement.  Does have a cough, but does not produce mucus at this time.  Denies any recent sick contacts, fevers, chills, abdominal pain, nausea, vomiting.  Patient reports chest pain with breathing.       Interval Followup: 2023    Admitted earlier today by nocturnist   Resting in bed.  Alert and conversational.    Reports shortness of air is a little bit better now  On 4L (2L at home)  Reports cough  Denies chest discomfort  White count 14 K noted  Tolerating Vanco/cefepime; Solu-Medrol; DuoNebs; [id number]        REVIEW OF SYSTEMS: All other systems reviewed and are negative.   SOA  Objective   Temp:  [97.3 °F (36.3 °C)-98.5 °F (36.9 °C)] 97.7 °F (36.5 °C)  Heart Rate:  [] 86  Resp:  [22-34] 22  BP: ()/(53-90) 121/64  Flow (L/min):  [2.5-4] 4  PHYSICAL EXAM   CON: WN. WD. NAD.  Calm.  Coherent.  Obese  EYES:  Sclera anicteric. EOMI.  ENT:  Oropharyngeal mucosa without ulcers or thrush.    NECK:  No thyromegaly. No stridor. Trachea midline.  RESP:  Faint wheeze.  No work of breathing or tachypnea.   CV:  Rhythm regular. Rate WNL. No murmur noted.  No edema.  GI:  Soft and nontender. Nondistended.  Bowel sounds present.   EXT: Peripheral pulses intact.  No joint  deformities or cyanosis.  PSYCH:  Alert. Oriented. Normal affect and mood.  NEURO:  No dysarthria or aphasia. No unilateral weakness or paresthesia.  SKIN: No chronic venous stasis changes or varicosities.  No cellulitis    Results from last 7 days   Lab Units 08/27/23  0032   WBC 10*3/mm3 14.78*   HEMOGLOBIN g/dL 12.0*   HEMATOCRIT % 42.9   PLATELETS 10*3/mm3 229     Results from last 7 days   Lab Units 08/27/23  0032   SODIUM mmol/L 137   POTASSIUM mmol/L 4.6   CO2 mmol/L 27.5   CHLORIDE mmol/L 100   ANION GAP mmol/L 9.5   BUN mg/dL 15   CREATININE mg/dL 1.09   GLUCOSE mg/dL 148*           RESULTS REVIEWED:  I have personally reviewed the results from the time of this admission to 8/27/2023 10:40 EDT and agree with these findings:  []  Laboratory  []  Microbiology  []  Radiology  []  EKG/Telemetry   []  Cardiology/Vascular   []  Pathology  []  Old records  []  Other:  Assessment / Plan   Assessment:  Sepsis secondary to pneumonia  COPD exacerbation  Acute on chronic hypoxic respiratory failure (home 2 L)  HTN  DM  Obesity  GERD  Hx aspiration       Plan:  Monitored bed  Empiric broad-spectrum antibiotics initially ... De-escalate soon when feasible  Bronchopulmonary hygiene protocol: Brovana, Pulmicort, DuoNebs  Sliding scale insulin protocol.  Accu-Cheks ordered.  Reconcile and resume home medications accordingly  Continue home beta-blocker  Continue home statin  Continue home PPI  Continue home aspirin  Lovenox 40 for DVT prophylaxis  Discussed plan with RN.  DVT prophylaxis:  Medical DVT prophylaxis orders are present.  CODE STATUS:      Level Of Support Discussed With: Patient  Code Status (Patient has no pulse and is not breathing): CPR (Attempt to Resuscitate)  Medical Interventions (Patient has pulse or is breathing): Full Support       Electronically signed by RIMA Najera, 08/27/23, 10:40 AM EDT.

## 2023-08-27 NOTE — ED PROVIDER NOTES
Time: 12:44 AM EDT  Date of encounter:  8/27/2023  Independent Historian/Clinical History and Information was obtained by:   Patient    History is limited by: N/A    Chief Complaint: Shortness of breath      History of Present Illness:  Patient is a 73 y.o. year old male who presents to the emergency department for evaluation of shortness of breath.  Patient describes worsening difficulty breathing and shortness of breath with cough over the past 2 nights.  He is attempted to use his supplemental oxygen at home along with his breathing treatments without relief.  He denies any mucus production with his cough.  He denies any fevers or chills.  No pedal edema.  No sick contacts.    HPI    Patient Care Team  Primary Care Provider: Mallorie Roa APRN    Past Medical History:     No Known Allergies  Past Medical History:   Diagnosis Date    Asthma     COPD (chronic obstructive pulmonary disease)     Diabetes mellitus     Ex-smoker     QUIT 2021    Hernia, umbilical     Hypertension     On home O2     REPORTS WEARING 2L/NC PRN SOA     Past Surgical History:   Procedure Laterality Date    ABDOMINAL SURGERY       History reviewed. No pertinent family history.    Home Medications:  Prior to Admission medications    Medication Sig Start Date End Date Taking? Authorizing Provider   albuterol sulfate  (90 Base) MCG/ACT inhaler Inhale 2 puffs Every 4 (Four) Hours As Needed for Wheezing. 8/14/23   Mallorie Roa APRN   aspirin 81 MG chewable tablet Chew 1 tablet Daily. 5/2/23   Mallorie Roa APRN   atorvastatin (LIPITOR) 10 MG tablet Take 1 tablet by mouth Daily.    Provider, MD JUAN ANTONIO Albert ULTRAFINE III SHORT PEN 31G X 8 MM misc Daily. 7/13/23   Provider, MD Roosevelt   budesonide-formoterol (Symbicort) 160-4.5 MCG/ACT inhaler Inhale 2 puffs 2 (Two) Times a Day. 6/8/23   Danny Dye MD   cetirizine (zyrTEC) 10 MG tablet Take 1 tablet by mouth Daily. 5/2/23   Mallorie Roa APRN   empagliflozin  (JARDIANCE) 10 MG tablet tablet Take 1 tablet by mouth Daily.    Provider, MD Roosevelt   insulin degludec (Tresiba FlexTouch) 100 UNIT/ML solution pen-injector injection Inject 20 Units under the skin into the appropriate area as directed Daily. 23   Mallorie Roa APRN   lisinopril (PRINIVIL,ZESTRIL) 5 MG tablet Take 1 tablet by mouth Daily for 30 days. 23  Mallorie Roa APRN   metFORMIN (GLUCOPHAGE) 500 MG tablet Take 1 tablet by mouth 2 (Two) Times a Day With Meals for 30 days. 23  Mallorie Roa APRN   metoprolol succinate XL (TOPROL-XL) 25 MG 24 hr tablet Take 1 tablet by mouth Daily for 30 days. 23  Mallorie Roa APRN   Needles & Syringes misc 90 each Daily for 180 days. 23  Mallorie Roa APRN   omeprazole (priLOSEC) 20 MG capsule Take 1 capsule by mouth Daily. 23   Mallorie Roa APRN        Social History:   Social History     Tobacco Use    Smoking status: Former     Packs/day: 1.50     Years: 56.00     Pack years: 84.00     Types: Cigarettes     Start date:      Quit date: 3/19/2021     Years since quittin.4    Smokeless tobacco: Current     Types: Chew   Vaping Use    Vaping Use: Never used   Substance Use Topics    Alcohol use: Never    Drug use: Never         Review of Systems:  Review of Systems   Constitutional:  Negative for chills and fever.   HENT:  Negative for congestion, ear pain and sore throat.    Eyes:  Negative for pain.   Respiratory:  Positive for cough and shortness of breath. Negative for chest tightness.    Cardiovascular:  Negative for chest pain and leg swelling.   Gastrointestinal:  Negative for abdominal pain, diarrhea, nausea and vomiting.   Genitourinary:  Negative for flank pain and hematuria.   Musculoskeletal:  Negative for joint swelling.   Skin:  Negative for pallor.   Neurological:  Negative for seizures and headaches.   All other systems reviewed and are negative.     Physical Exam:  /64  "(BP Location: Right leg, Patient Position: Lying)   Pulse 86   Temp 97.7 °F (36.5 °C) (Oral)   Resp 22   Ht 185.4 cm (72.99\")   Wt 116 kg (255 lb 4.7 oz)   SpO2 90%   BMI 33.69 kg/m²     Physical Exam  Vitals and nursing note reviewed.   Constitutional:       General: He is not in acute distress.     Appearance: Normal appearance. He is ill-appearing. He is not toxic-appearing.   HENT:      Head: Normocephalic and atraumatic.      Jaw: There is normal jaw occlusion.   Eyes:      General: Lids are normal.      Extraocular Movements: Extraocular movements intact.      Conjunctiva/sclera: Conjunctivae normal.      Pupils: Pupils are equal, round, and reactive to light.   Cardiovascular:      Rate and Rhythm: Regular rhythm. Tachycardia present.      Pulses: Normal pulses.      Heart sounds: Normal heart sounds.   Pulmonary:      Effort: Tachypnea and accessory muscle usage present. No respiratory distress.      Breath sounds: Decreased breath sounds and wheezing present. No rhonchi.   Abdominal:      General: Abdomen is flat.      Palpations: Abdomen is soft.      Tenderness: There is no abdominal tenderness. There is no guarding or rebound.   Musculoskeletal:         General: Normal range of motion.      Cervical back: Normal range of motion and neck supple.      Right lower leg: No edema.      Left lower leg: No edema.   Skin:     General: Skin is warm and dry.   Neurological:      Mental Status: He is alert and oriented to person, place, and time. Mental status is at baseline.   Psychiatric:         Mood and Affect: Mood normal.             Procedures:  Procedures      Medical Decision Making:      Comorbidities that affect care:    Asthma, diabetes, home supplemental oxygen, COPD, hypertension, ex-smoker    External Notes reviewed:    Previous Clinic Note: Outpatient PCP visit August 14, 2023      The following orders were placed and all results were independently analyzed by me:  Orders Placed This " Encounter   Procedures    Blood Culture - Blood,    Blood Culture - Blood,    COVID-19,CEPHEID/PERI,COR/KYLEE/PAD/CARMEN/MAD IN-HOUSE(OR EMERGENT/ADD-ON),NP SWAB IN TRANSPORT MEDIA 3-4 HR TAT, RT-PCR - Swab, Nasopharynx    Influenza Antigen, Rapid - Swab, Nasopharynx    Blood Culture - Blood,    Blood Culture - Blood,    MRSA Screen, PCR (Inpatient) - Swab, Nares    XR Chest 1 View    [place] Draw    Comprehensive Metabolic Panel    BNP    Single High Sensitivity Troponin T    CBC Auto Differential    High Sensitivity Troponin T    Lipase    Magnesium    Lactic Acid, Plasma    High Sensitivity Troponin T 2Hr    Blood Gas, Arterial -With Co-Ox Panel: Yes    Lactic Acid, Plasma    Procalcitonin    Procalcitonin    Vancomycin, Random    Basic Metabolic Panel    Diet: Diabetic Diets; Consistent Carbohydrate; Texture: Regular Texture (IDDSI 7); Fluid Consistency: Thin (IDDSI 0)    Undress & Gown    Vital Signs    Vital Signs    Intake & Output    Weigh Patient    Nursing Dysphagia Screening (Complete Prior to Giving Anything By Mouth)    RN to Place Order SLP Consult - Eval & Treat Choosing Reason of RN Dysphagia Screen Failed    Nurse to Call MD or Nutrition Services for Diet if Patient Passes Dysphagia Screen    Telemetry - Maintain IV Access    Telemetry - Place Orders & Notify Provider of Results When Patient Experiences Acute Chest Pain, Dysrhythmia or Respiratory Distress    Pulse Oximetry, Continuous    Up in Chair    Activity - Ad Narda    Strict Intake & Output    Daily Weights    Oral Care    Pneumonia Education    Notify Provider if Patient Requires Greater Than 4LPM of Oxygen    Code Status and Medical Interventions:    Hospitalist (on-call MD unless specified)    Oxygen Therapy- Nasal Cannula; Titrate 1-6 LPM Per SpO2; 90 - 95%    Incentive Spirometry    Pulse Oximetry on Admit    ECG 12 Lead ED Triage Standing Order; Chest Pain    ECG 12 Lead ED Triage Standing Order; Chest Pain    Insert Peripheral IV    Insert  Peripheral IV    Inpatient Admission    CBC & Differential    Green Top (Gel)    Lavender Top    Gold Top - SST    Light Blue Top       Medications Given in the Emergency Department:  Medications   sodium chloride 0.9 % flush 10 mL (10 mL Intravenous Given 8/27/23 0844)   sodium chloride 0.9 % flush 10 mL (has no administration in time range)   sodium chloride 0.9 % infusion 40 mL (has no administration in time range)   sennosides-docusate (PERICOLACE) 8.6-50 MG per tablet 2 tablet (has no administration in time range)     And   polyethylene glycol (MIRALAX) packet 17 g (has no administration in time range)     And   bisacodyl (DULCOLAX) EC tablet 5 mg (has no administration in time range)     And   bisacodyl (DULCOLAX) suppository 10 mg (has no administration in time range)   Enoxaparin Sodium (LOVENOX) syringe 40 mg (40 mg Subcutaneous Given 8/27/23 0544)   nitroglycerin (NITROSTAT) SL tablet 0.4 mg (has no administration in time range)   cefepime (MAXIPIME) 2000 mg/100 mL 0.9% NS (mbp) (has no administration in time range)   Pharmacy to dose vancomycin (has no administration in time range)   ondansetron (ZOFRAN) injection 4 mg (has no administration in time range)   ipratropium-albuterol (DUO-NEB) nebulizer solution 3 mL (3 mL Nebulization Given 8/27/23 0613)   methylPREDNISolone sodium succinate (SOLU-Medrol) injection 60 mg (60 mg Intravenous Given 8/27/23 0844)   vancomycin 1000 mg/250 mL 0.9% NS IVPB ([facility]) (has no administration in time range)   aspirin chewable tablet 324 mg (324 mg Oral Given 8/27/23 0035)   ipratropium-albuterol (DUO-NEB) nebulizer solution 3 mL (3 mL Nebulization Given 8/27/23 0106)   methylPREDNISolone sodium succinate (SOLU-Medrol) injection 125 mg (125 mg Intravenous Given 8/27/23 0052)   cefepime (MAXIPIME) 2000 mg/100 mL 0.9% NS (mbp) (0 mg Intravenous Stopped 8/27/23 0309)   vancomycin 2250 mg/500 mL 0.9% NS IVPB ([facility]) (2,250 mg Intravenous New Bag 8/27/23 5420)   sodium chloride  0.9 % bolus 1,000 mL (1,000 mL Intravenous New Bag 8/27/23 0229)        ED Course:    ED Course as of 08/27/23 0935   Sun Aug 27, 2023   0108 My interpretation of EKG: Sinus rhythm 98, right bundle branch pattern, no acute ischemia [JS]      ED Course User Index  [JS] Roel Saldivar MD       Labs:    Lab Results (last 24 hours)       Procedure Component Value Units Date/Time    CBC & Differential [[id number]]  (Abnormal) Collected: 08/27/23 0032    Specimen: Blood Updated: 08/27/23 0047    Narrative:      The following orders were created for panel order CBC & Differential.  Procedure                               Abnormality         Status                     ---------                               -----------         ------                     CBC Auto Differential[[id number]]        Abnormal            Final result                 Please view results for these tests on the individual orders.    Comprehensive Metabolic Panel [[id number]]  (Abnormal) Collected: 08/27/23 0032    Specimen: Blood Updated: 08/27/23 0113     Glucose 148 mg/dL      BUN 15 mg/dL      Creatinine 1.09 mg/dL      Sodium 137 mmol/L      Potassium 4.6 mmol/L      Chloride 100 mmol/L      CO2 27.5 mmol/L      Calcium 8.9 mg/dL      Total Protein 8.0 g/dL      Albumin 4.0 g/dL      ALT (SGPT) 10 U/L      AST (SGOT) 12 U/L      Alkaline Phosphatase 94 U/L      Total Bilirubin 0.3 mg/dL      Globulin 4.0 gm/dL      A/G Ratio 1.0 g/dL      BUN/Creatinine Ratio 13.8     Anion Gap 9.5 mmol/L      eGFR 71.2 mL/min/1.73     Narrative:      GFR Normal >60  Chronic Kidney Disease <60  Kidney Failure <15    The GFR formula is only valid for adults with stable renal function between ages 18 and 70.    BNP [[id number]]  (Normal) Collected: 08/27/23 0032    Specimen: Blood Updated: 08/27/23 0108     proBNP 218.0 pg/mL     Narrative:      Among patients with dyspnea, NT-proBNP is highly sensitive for the detection of acute congestive heart failure. In addition  NT-proBNP of <300 pg/ml effectively rules out acute congestive heart failure with 99% negative predictive value.      Single High Sensitivity Troponin T [[id number]]  (Abnormal) Collected: 08/27/23 0032    Specimen: Blood Updated: 08/27/23 0113     HS Troponin T 50 ng/L     Narrative:      High Sensitive Troponin T Reference Range:  <10.0 ng/L- Negative Female for AMI  <15.0 ng/L- Negative Male for AMI  >=10 - Abnormal Female indicating possible myocardial injury.  >=15 - Abnormal Male indicating possible myocardial injury.   Clinicians would have to utilize clinical acumen, EKG, Troponin, and serial changes to determine if it is an Acute Myocardial Infarction or myocardial injury due to an underlying chronic condition.         CBC Auto Differential [[id number]]  (Abnormal) Collected: 08/27/23 0032    Specimen: Blood Updated: 08/27/23 0047     WBC 14.78 10*3/mm3      RBC 5.39 10*6/mm3      Hemoglobin 12.0 g/dL      Hematocrit 42.9 %      MCV 79.6 fL      MCH 22.3 pg      MCHC 28.0 g/dL      RDW 16.4 %      RDW-SD 46.9 fl      MPV 10.1 fL      Platelets 229 10*3/mm3      Neutrophil % 71.3 %      Lymphocyte % 10.1 %      Monocyte % 6.4 %      Eosinophil % 11.2 %      Basophil % 0.5 %      Immature Grans % 0.5 %      Neutrophils, Absolute 10.52 10*3/mm3      Lymphocytes, Absolute 1.49 10*3/mm3      Monocytes, Absolute 0.95 10*3/mm3      Eosinophils, Absolute 1.66 10*3/mm3      Basophils, Absolute 0.08 10*3/mm3      Immature Grans, Absolute 0.08 10*3/mm3      nRBC 0.0 /100 WBC     High Sensitivity Troponin T [[id number]]  (Abnormal) Collected: 08/27/23 0032    Specimen: Blood Updated: 08/27/23 0113     HS Troponin T 50 ng/L     Narrative:      High Sensitive Troponin T Reference Range:  <10.0 ng/L- Negative Female for AMI  <15.0 ng/L- Negative Male for AMI  >=10 - Abnormal Female indicating possible myocardial injury.  >=15 - Abnormal Male indicating possible myocardial injury.   Clinicians would have to utilize clinical  acumen, EKG, Troponin, and serial changes to determine if it is an Acute Myocardial Infarction or myocardial injury due to an underlying chronic condition.         Lipase [[id number]]  (Normal) Collected: 08/27/23 0032    Specimen: Blood Updated: 08/27/23 0113     Lipase 14 U/L     Magnesium [[id number]]  (Normal) Collected: 08/27/23 0032    Specimen: Blood Updated: 08/27/23 0113     Magnesium 2.2 mg/dL     Lactic Acid, Plasma [[id number]]  (Normal) Collected: 08/27/23 0044    Specimen: Blood from Arm, Left Updated: 08/27/23 0114     Lactate 1.4 mmol/L     Blood Culture - Blood, Arm, Left [[id number]] Collected: 08/27/23 0044    Specimen: Blood from Arm, Left Updated: 08/27/23 0050    Blood Culture - Blood, Arm, Left [[id number]] Collected: 08/27/23 0044    Specimen: Blood from Arm, Left Updated: 08/27/23 0050    COVID-19,CEPHEID/PERI,COR/KYLEE/PAD/CARMEN/MAD IN-HOUSE(OR EMERGENT/ADD-ON),NP SWAB IN TRANSPORT MEDIA 3-4 HR TAT, RT-PCR - Swab, Nasopharynx [[id number]]  (Normal) Collected: 08/27/23 0154    Specimen: Swab from Nasopharynx Updated: 08/27/23 0346     COVID19 Not Detected    Narrative:      Fact sheet for providers: https://www.fda.gov/media/349332/download     Fact sheet for patients: https://www.fda.gov/media/519204/download  Fact sheet for providers: https://www.fda.gov/media/065495/download     Fact sheet for patients: https://www.fda.gov/media/065519/download    Influenza Antigen, Rapid - Swab, Nasopharynx [[id number]]  (Normal) Collected: 08/27/23 0154    Specimen: Swab from Nasopharynx Updated: 08/27/23 0220     Influenza A Ag, EIA Negative     Influenza B Ag, EIA Negative    High Sensitivity Troponin T 2Hr [[id number]]  (Abnormal) Collected: 08/27/23 0238    Specimen: Blood from Arm, Right Updated: 08/27/23 0324     HS Troponin T 62 ng/L      Troponin T Delta 12 ng/L     Narrative:      High Sensitive Troponin T Reference Range:  <10.0 ng/L- Negative Female for AMI  <15.0 ng/L- Negative Male for AMI  >=10 -  "Abnormal Female indicating possible myocardial injury.  >=15 - Abnormal Male indicating possible myocardial injury.   Clinicians would have to utilize clinical acumen, EKG, Troponin, and serial changes to determine if it is an Acute Myocardial Infarction or myocardial injury due to an underlying chronic condition.         Procalcitonin [[id number]]  (Normal) Collected: 08/27/23 0238    Specimen: Blood from Arm, Right Updated: 08/27/23 0532     Procalcitonin 0.10 ng/mL     Narrative:      As a Marker for Sepsis (Non-Neonates):    1. <0.5 ng/mL represents a low risk of severe sepsis and/or septic shock.  2. >2 ng/mL represents a high risk of severe sepsis and/or septic shock.    As a Marker for Lower Respiratory Tract Infections that require antibiotic therapy:    PCT on Admission    Antibiotic Therapy       6-12 Hrs later    >0.5                Strongly Recommended  >0.25 - <0.5        Recommended  0.1 - 0.25          Discouraged              Remeasure/reassess PCT  <0.1                Strongly Discouraged     Remeasure/reassess PCT    As 28 day mortality risk marker: \"Change in Procalcitonin Result\" (>80% or <=80%) if Day 0 (or Day 1) and Day 4 values are available. Refer to http://www.[facility]-pct-calculator.com    Change in PCT <=80%  A decrease of PCT levels below or equal to 80% defines a positive change in PCT test result representing a higher risk for 28-day all-cause mortality of patients diagnosed with severe sepsis for septic shock.    Change in PCT >80%  A decrease of PCT levels of more than 80% defines a negative change in PCT result representing a lower risk for 28-day all-cause mortality of patients diagnosed with severe sepsis or septic shock.    This test is Prognostic not Diagnostic, if elevated correlate with clinical findings before administering antibiotic treatment.        MRSA Screen, PCR (Inpatient) - Swab, Nares [[id number]] Collected: 08/27/23 0631    Specimen: Swab from Nares Updated: 08/27/23 " "0643    Blood Culture - Blood, Hand, Left [[id number]] Collected: 08/27/23 0710    Specimen: Blood from Hand, Left Updated: 08/27/23 0730    Procalcitonin [[id number]]  (Normal) Collected: 08/27/23 0710    Specimen: Blood from Hand, Left Updated: 08/27/23 0819     Procalcitonin 0.07 ng/mL     Narrative:      As a Marker for Sepsis (Non-Neonates):    1. <0.5 ng/mL represents a low risk of severe sepsis and/or septic shock.  2. >2 ng/mL represents a high risk of severe sepsis and/or septic shock.    As a Marker for Lower Respiratory Tract Infections that require antibiotic therapy:    PCT on Admission    Antibiotic Therapy       6-12 Hrs later    >0.5                Strongly Recommended  >0.25 - <0.5        Recommended  0.1 - 0.25          Discouraged              Remeasure/reassess PCT  <0.1                Strongly Discouraged     Remeasure/reassess PCT    As 28 day mortality risk marker: \"Change in Procalcitonin Result\" (>80% or <=80%) if Day 0 (or Day 1) and Day 4 values are available. Refer to http://www.[employer]-pct-calculator.com    Change in PCT <=80%  A decrease of PCT levels below or equal to 80% defines a positive change in PCT test result representing a higher risk for 28-day all-cause mortality of patients diagnosed with severe sepsis for septic shock.    Change in PCT >80%  A decrease of PCT levels of more than 80% defines a negative change in PCT result representing a lower risk for 28-day all-cause mortality of patients diagnosed with severe sepsis or septic shock.    This test is Prognostic not Diagnostic, if elevated correlate with clinical findings before administering antibiotic treatment.        Lactic Acid, Plasma [[id number]]  (Normal) Collected: 08/27/23 0711    Specimen: Blood from Hand, Left Updated: 08/27/23 0750     Lactate 1.6 mmol/L     Blood Culture - Blood, Hand, Left [[id number]] Collected: 08/27/23 0827    Specimen: Blood from Hand, Left Updated: 08/27/23 0847             Imaging:    XR " Chest 1 View    Result Date: 8/27/2023  PROCEDURE: XR CHEST 1 VW  COMPARISONS: 6/7/2023; 6/6/2023; 5/8/2023; 4/21/2023.  INDICATIONS: SOA/SOB/shortness of air/shortness of breath.  FINDINGS:  Two AP upright portable views of the chest are provided for review.  These images reveal no definite cardiac enlargement is seen.  No acute infiltrate is appreciated.  Bilateral pleural-parenchymal scarring and/or atelectasis, greater on the left, especially in the lung bases is (are) noted.  The blunted appearance of the left lateral costophrenic sulcus is thought to be chronic in nature and not to be related to a small-to-moderate left pleural effusion.  No definite right pleural effusion.  No pneumothorax is identified.  The thoracic aorta is atherosclerotic.  External artifacts obscure detail.  No significant interval change is seen since the prior study (or studies).        No acute infiltrate is appreciated.  No significant interval change since 6/6/2023.  Probably no cardiac enlargement.    Please note that portions of this note were completed with a voice recognition program.  SEGUNDO MORA JR, MD       Electronically Signed and Approved By: SEGUNDO MORA JR, MD on 8/27/2023 at 2:17                 Differential Diagnosis and Discussion:    Dyspnea: Differential diagnosis includes but is not limited to metabolic acidosis, neurological disorders, psychogenic, asthma, pneumothorax, upper airway obstruction, COPD, pneumonia, noncardiogenic pulmonary edema, interstitial lung disease, anemia, congestive heart failure, and pulmonary embolism    All labs were reviewed and interpreted by me.  All X-rays impressions were independently interpreted by me.  EKG was interpreted by me.    MDM  Number of Diagnoses or Management Options  Acute respiratory failure with hypoxia  COPD with acute exacerbation  Pneumonia due to infectious organism, unspecified laterality, unspecified part of lung  Sepsis, due to unspecified organism,  unspecified whether acute organ dysfunction present  Diagnosis management comments: Sepsis criteria was met in the emergency department and the Sepsis protocol (including antibiotic administration) was initiated.      SIRS criteria considered:   1.  Temperature > 100.4 or <98.6    2.  Heart Rate > 90    3.  Respiratory Rate > 22    4.  WBC > 12K or <4K.             Severe Sepsis:     Respiratory: Mechanical Ventilation or BiPAP  Hypotension: SBP > 90 or MAP < 65  Renal: Creatinine > 2  Metabolic: Lactic Acid > 2  Hematologic: Platelets < 100K or INR > 1.5  Hepatic: BILI  >  2  CNS: Sudden AMS     Septic Shock:     Severe Sepsis + Persistent hypotension or Lactic Acid > 4     Normal saline bolus, Antibiotics, and final disposition was based on these definitions.        Sepsis was recognized at 0206    Antibiotics were ordered.       30 mL/kg bolus was NOT indicated.       Patient did not receive the recommended 30 mL/kg fluid bolus for sepsis because it would be harmful or detrimental to the patient.    The patient has concern for fluid overload.   The patient was ordered 500mL of fluids.    Total Critical Care time of 35 minutes. Total critical care time documented does not include time spent on separately billed procedures for services of nurses or physician assistants. I personally saw and examined the patient. I have reviewed all diagnostic interpretations and treatment plans as written. I was present for the key portions of any procedures performed and the inclusive time noted in any critical care statement. Critical care time includes patient management by me, time spent at the patient's bedside,  time to review lab and imaging results, discussing patient care, documentation in the medical record, and time spent with family or caregiver.           Critical Care Note: Total Critical Care time of 35 minutes. Total critical care time documented does not include time spent on separately billed procedures for  services of nurses or physician assistants. I personally saw and examined the patient. I have reviewed all diagnostic interpretations and treatment plans as written. I was present for the key portions of any procedures performed and the inclusive time noted in any critical care statement. Critical care time includes patient management by me, time spent at the patients bedside,  time to review lab and imaging results, discussing patient care, documentation in the medical record, and time spent with family or caregiver.    Patient Care Considerations:          Consultants/Shared Management Plan:    Hospitalist: I have discussed the case with the hospitalist who agrees to accept the patient for admission.    Social Determinants of Health:    Patient is independent, reliable, and has access to care.       Disposition and Care Coordination:    Admit:   Through independent evaluation of the patient's history, physical, and imperical data, the patient meets criteria for observation/admission to the hospital.        Final diagnoses:   Acute respiratory failure with hypoxia   Sepsis, due to unspecified organism, unspecified whether acute organ dysfunction present   COPD with acute exacerbation   Pneumonia due to infectious organism, unspecified laterality, unspecified part of lung        ED Disposition       ED Disposition   Decision to Admit    Condition   --    Comment   Level of Care: Telemetry [5]   Diagnosis: Sepsis [[id number]]   Admitting Physician: GENET RUTLEDGE [[id number]]   Attending Physician: GENET RUTLEDGE [[id number]]   Certification: I Certify That Inpatient Hospital Services Are Medically Necessary For Greater Than 2 Midnights                 This medical record created using voice recognition software.             Roel Saldivar MD  08/27/23 0999

## 2023-08-27 NOTE — H&P
[facility]  [facility]   Patient Name: Dilip Faulkner  : 1950  MRN: [id number]  Primary Care Physician:  Mallorie Roa APRN  Date of admission: 2023    Subjective   Subjective     Chief Complaint: Dyspnea    HPI:    Dilip Faulkner is a 73 y.o. male brought to emergency department for evaluation of dyspnea.  Patient states he is chronically dyspneic, but has been worse over the last 2 nights.  He is getting progressively short of air, especially with exertion.  Patient wears 2 L of home supplemental oxygen due to his COPD, but states that it has not been helping with his shortness of breath.  He has also been given himself breathing treatments at home without improvement.  Does have a cough, but does not produce mucus at this time.  Denies any recent sick contacts, fevers, chills, abdominal pain, nausea, vomiting.  Patient reports chest pain with breathing.      Personal History     Past Medical History:  Past Medical History:   Diagnosis Date    Asthma     COPD (chronic obstructive pulmonary disease)     Diabetes mellitus     Ex-smoker     QUIT     Hernia, umbilical     Hypertension     On home O2     REPORTS WEARING 2L/NC PRN SOA         Past Surgical History:  Past Surgical History:   Procedure Laterality Date    ABDOMINAL SURGERY           Family History:   History reviewed. No pertinent family history.      Social History:   Social History     Socioeconomic History    Marital status: Single   Tobacco Use    Smoking status: Former     Packs/day: 1.50     Years: 56.00     Pack years: 84.00     Types: Cigarettes     Start date:      Quit date: 3/19/2021     Years since quittin.4    Smokeless tobacco: Current     Types: Chew   Vaping Use    Vaping Use: Never used   Substance and Sexual Activity    Alcohol use: Never    Drug use: Never    Sexual activity: Defer         Home Medications:  Insulin Pen Needle, Needles & Syringes, albuterol  sulfate HFA, aspirin, atorvastatin, budesonide-formoterol, cetirizine, empagliflozin, insulin degludec, lisinopril, metFORMIN, metoprolol succinate XL, and omeprazole    Allergies:  No Known Allergies    Review of Systems   All systems were reviewed and negative except for: Dyspnea, pleurisy, cough    Objective   Objective     Vitals:   Temp:  [98.5 °F (36.9 °C)] 98.5 °F (36.9 °C)  Heart Rate:  [] 99  Resp:  [26-34] 26  BP: ()/(60-82) 95/82  Flow (L/min):  [2.5] 2.5    Physical Exam    Constitutional: Awake, alert, no acute distress, chronically ill-appearing, obese   Eyes: Pupils equal, sclerae anicteric, no conjunctival injection   HENT: NCAT, mucous membranes moist   Neck: Supple, no thyromegaly, no lymphadenopathy, trachea midline   Respiratory: Diffuse wheezing bilaterally, intercostal accessory muscle use noted   Cardiovascular: RRR, no murmurs, rubs, or gallops, palpable pedal pulses bilaterally   Gastrointestinal: Positive bowel sounds, soft, nontender, nondistended   Musculoskeletal: 2+ ankle edema, no clubbing or cyanosis to extremities   Psychiatric: Appropriate affect, cooperative   Neurologic: Oriented x 3, strength symmetric in all extremities, Cranial Nerves grossly intact to confrontation, speech clear   Skin: No rashes     Result Review    Result Review:  I have personally reviewed the results from the time of this admission to 8/27/2023 02:19 EDT and agree with these findings:  [x]  Laboratory  [x]  Microbiology  [x]  Radiology  [x]  EKG/Telemetry   []  Cardiology/Vascular   []  Pathology  []  Old records  []  Other:      Assessment & Plan   Assessment / Plan     Assessment/Plan:   Sepsis secondary to healthcare associated pneumonia-admit patient meets criteria with tachypnea, tachycardia, leukocytosis, source of infection pneumonia.  Patient was recently admitted proximally 2 months ago.  Broad-spectrum antibiotics including vancomycin and cefepime.  IV fluids will be started, however  due to the concern for fluid overload and the edematous presentation on physical exam, will be cautious with administration.  Acute exacerbation of COPD-plan as above.  Supportive care.  Steroids.  DuoNebs.  Acute on chronic hypoxic respiratory failure-baseline oxygen 2 L, required 4 L in the ED to keep oxygenation greater than 88%.  Supportive care.  Plan as above.        DVT prophylaxis:  No DVT prophylaxis order currently exists.    CODE STATUS: Full code    Admission Status:  I believe this patient meets patient status.    Electronically signed by Frank Felix DO, 08/27/23, 2:19 AM EDT.

## 2023-08-27 NOTE — PLAN OF CARE
Goal Outcome Evaluation:      Pt is alert and oriented.  Pt arrived to floor with no complaints of home. GIGI CELESTE RN

## 2023-08-27 NOTE — ED NOTES
Arrived via EMS.  Was on 2 L at home sats 78-79% having SOA and Chest pain x 2 hours and put on 4L up to 92% arriving.  Duoneb given en route    Room air in room sats 88-90%, placed on 2 L nc

## 2023-08-27 NOTE — PROGRESS NOTES
"[facility] Clinical Pharmacy Services: Vancomycin Monitoring Note    Dilip Faulkner is a 73 y.o. male who is on day  of pharmacy to dose vancomycin for Pneumonia and Sepsis.    Previous Vancomycin Dose:   1000 mg IV every  12  hours  Imaging Reviewed?: Yes  Updated Cultures and Sensitivities:   23: MRSA PCR: NOT DETECTED  23: COVID-19 PCR: NOT DETECTED  23: INFLUENZA A&B AG: NEGATIVE    Vitals/Labs  Ht: 185.4 cm (72.99\"); Wt: 116 kg (255 lb 4.7 oz)     Temp (24hrs), Av.9 °F (36.6 °C), Min:97.3 °F (36.3 °C), Max:98.5 °F (36.9 °C)    Estimated Creatinine Clearance: 79.3 mL/min (by C-G formula based on SCr of 1.09 mg/dL).       Results from last 7 days   Lab Units 23  0032   CREATININE mg/dL 1.09   WBC 10*3/mm3 14.78*     Assessment/Plan    Current Vancomycin Dose:  1000 mg IV every 12 hours; which provides the following predicted parameters:  AUC24,ss: 512 mg/L.hr  PAUC*: 76 %  Ctrough,ss: 17.5 mg/L  Pconc*: 37 %  Tox.: 13 %  Next Vanc Random ordered for tomorrow at 1300  We will continue to monitor patient changes and renal function     Thank you for involving pharmacy in this patient's care. Please contact pharmacy with any questions or concerns.    Mary Coughlin  Clinical Pharmacist    "

## 2023-08-28 LAB
ANION GAP SERPL CALCULATED.3IONS-SCNC: 7.6 MMOL/L (ref 5–15)
BASOPHILS # BLD AUTO: 0.03 10*3/MM3 (ref 0–0.2)
BASOPHILS NFR BLD AUTO: 0.2 % (ref 0–1.5)
BUN SERPL-MCNC: 22 MG/DL (ref 8–23)
BUN/CREAT SERPL: 23.9 (ref 7–25)
CALCIUM SPEC-SCNC: 8.5 MG/DL (ref 8.6–10.5)
CHLORIDE SERPL-SCNC: 104 MMOL/L (ref 98–107)
CO2 SERPL-SCNC: 26.4 MMOL/L (ref 22–29)
CREAT SERPL-MCNC: 0.92 MG/DL (ref 0.76–1.27)
DEPRECATED RDW RBC AUTO: 46.1 FL (ref 37–54)
EGFRCR SERPLBLD CKD-EPI 2021: 87.3 ML/MIN/1.73
EOSINOPHIL # BLD AUTO: 0.1 10*3/MM3 (ref 0–0.4)
EOSINOPHIL NFR BLD AUTO: 0.7 % (ref 0.3–6.2)
ERYTHROCYTE [DISTWIDTH] IN BLOOD BY AUTOMATED COUNT: 16.1 % (ref 12.3–15.4)
GLUCOSE BLDC GLUCOMTR-MCNC: 113 MG/DL (ref 70–99)
GLUCOSE BLDC GLUCOMTR-MCNC: 160 MG/DL (ref 70–99)
GLUCOSE BLDC GLUCOMTR-MCNC: 164 MG/DL (ref 70–99)
GLUCOSE BLDC GLUCOMTR-MCNC: 194 MG/DL (ref 70–99)
GLUCOSE SERPL-MCNC: 122 MG/DL (ref 65–99)
HCT VFR BLD AUTO: 37.2 % (ref 37.5–51)
HGB BLD-MCNC: 10.2 G/DL (ref 13–17.7)
IMM GRANULOCYTES # BLD AUTO: 0.1 10*3/MM3 (ref 0–0.05)
IMM GRANULOCYTES NFR BLD AUTO: 0.7 % (ref 0–0.5)
LYMPHOCYTES # BLD AUTO: 1.21 10*3/MM3 (ref 0.7–3.1)
LYMPHOCYTES NFR BLD AUTO: 8.7 % (ref 19.6–45.3)
MAGNESIUM SERPL-MCNC: 2.5 MG/DL (ref 1.6–2.4)
MCH RBC QN AUTO: 21.7 PG (ref 26.6–33)
MCHC RBC AUTO-ENTMCNC: 27.4 G/DL (ref 31.5–35.7)
MCV RBC AUTO: 79 FL (ref 79–97)
MONOCYTES # BLD AUTO: 0.93 10*3/MM3 (ref 0.1–0.9)
MONOCYTES NFR BLD AUTO: 6.7 % (ref 5–12)
NEUTROPHILS NFR BLD AUTO: 11.6 10*3/MM3 (ref 1.7–7)
NEUTROPHILS NFR BLD AUTO: 83 % (ref 42.7–76)
NRBC BLD AUTO-RTO: 0 /100 WBC (ref 0–0.2)
PLATELET # BLD AUTO: 198 10*3/MM3 (ref 140–450)
PMV BLD AUTO: 10 FL (ref 6–12)
POTASSIUM SERPL-SCNC: 4.1 MMOL/L (ref 3.5–5.2)
QT INTERVAL: 378 MS
QT INTERVAL: 379 MS
QTC INTERVAL: 471 MS
QTC INTERVAL: 486 MS
RBC # BLD AUTO: 4.71 10*6/MM3 (ref 4.14–5.8)
SODIUM SERPL-SCNC: 138 MMOL/L (ref 136–145)
VANCOMYCIN SERPL-MCNC: 14.11 MCG/ML (ref 5–40)
WBC NRBC COR # BLD: 13.97 10*3/MM3 (ref 3.4–10.8)

## 2023-08-28 PROCEDURE — 97161 PT EVAL LOW COMPLEX 20 MIN: CPT

## 2023-08-28 PROCEDURE — 82948 REAGENT STRIP/BLOOD GLUCOSE: CPT

## 2023-08-28 PROCEDURE — 25010000002 ENOXAPARIN PER 10 MG: Performed by: FAMILY MEDICINE

## 2023-08-28 PROCEDURE — 94664 DEMO&/EVAL PT USE INHALER: CPT

## 2023-08-28 PROCEDURE — 80048 BASIC METABOLIC PNL TOTAL CA: CPT | Performed by: PHYSICIAN ASSISTANT

## 2023-08-28 PROCEDURE — 63710000001 INSULIN LISPRO (HUMAN) PER 5 UNITS: Performed by: PHYSICIAN ASSISTANT

## 2023-08-28 PROCEDURE — 85025 COMPLETE CBC W/AUTO DIFF WBC: CPT | Performed by: PHYSICIAN ASSISTANT

## 2023-08-28 PROCEDURE — 0 CEFEPIME PER 500 MG: Performed by: FAMILY MEDICINE

## 2023-08-28 PROCEDURE — 94799 UNLISTED PULMONARY SVC/PX: CPT

## 2023-08-28 PROCEDURE — 83735 ASSAY OF MAGNESIUM: CPT | Performed by: PHYSICIAN ASSISTANT

## 2023-08-28 PROCEDURE — 94760 N-INVAS EAR/PLS OXIMETRY 1: CPT

## 2023-08-28 PROCEDURE — 80202 ASSAY OF VANCOMYCIN: CPT | Performed by: FAMILY MEDICINE

## 2023-08-28 PROCEDURE — 25010000002 VANCOMYCIN 5 G RECONSTITUTED SOLUTION: Performed by: FAMILY MEDICINE

## 2023-08-28 PROCEDURE — 25010000002 METHYLPREDNISOLONE PER 125 MG: Performed by: FAMILY MEDICINE

## 2023-08-28 RX ADMIN — CEFEPIME 2000 MG: 2 INJECTION, POWDER, FOR SOLUTION INTRAVENOUS at 17:30

## 2023-08-28 RX ADMIN — IPRATROPIUM BROMIDE AND ALBUTEROL SULFATE 3 ML: .5; 3 SOLUTION RESPIRATORY (INHALATION) at 19:16

## 2023-08-28 RX ADMIN — ASPIRIN 81 MG: 81 TABLET, COATED ORAL at 09:26

## 2023-08-28 RX ADMIN — METOPROLOL SUCCINATE 25 MG: 25 TABLET, EXTENDED RELEASE ORAL at 09:26

## 2023-08-28 RX ADMIN — BUDESONIDE 0.5 MG: 0.5 INHALANT ORAL at 19:16

## 2023-08-28 RX ADMIN — ARFORMOTEROL TARTRATE 15 MCG: 15 SOLUTION RESPIRATORY (INHALATION) at 19:16

## 2023-08-28 RX ADMIN — ATORVASTATIN CALCIUM 10 MG: 10 TABLET, FILM COATED ORAL at 20:29

## 2023-08-28 RX ADMIN — INSULIN LISPRO 2 UNITS: 100 INJECTION, SOLUTION INTRAVENOUS; SUBCUTANEOUS at 17:28

## 2023-08-28 RX ADMIN — Medication 10 ML: at 09:27

## 2023-08-28 RX ADMIN — ENOXAPARIN SODIUM 40 MG: 100 INJECTION SUBCUTANEOUS at 09:27

## 2023-08-28 RX ADMIN — ARFORMOTEROL TARTRATE 15 MCG: 15 SOLUTION RESPIRATORY (INHALATION) at 06:27

## 2023-08-28 RX ADMIN — METHYLPREDNISOLONE SODIUM SUCCINATE 60 MG: 125 INJECTION INTRAMUSCULAR; INTRAVENOUS at 09:26

## 2023-08-28 RX ADMIN — IPRATROPIUM BROMIDE AND ALBUTEROL SULFATE 3 ML: .5; 3 SOLUTION RESPIRATORY (INHALATION) at 06:27

## 2023-08-28 RX ADMIN — CEFEPIME 2000 MG: 2 INJECTION, POWDER, FOR SOLUTION INTRAVENOUS at 09:30

## 2023-08-28 RX ADMIN — BUDESONIDE 0.5 MG: 0.5 INHALANT ORAL at 06:27

## 2023-08-28 RX ADMIN — Medication 10 ML: at 20:29

## 2023-08-28 RX ADMIN — VANCOMYCIN HYDROCHLORIDE 1000 MG: 5 INJECTION, POWDER, LYOPHILIZED, FOR SOLUTION INTRAVENOUS at 02:03

## 2023-08-28 RX ADMIN — INSULIN LISPRO 2 UNITS: 100 INJECTION, SOLUTION INTRAVENOUS; SUBCUTANEOUS at 20:51

## 2023-08-28 RX ADMIN — SENNOSIDES AND DOCUSATE SODIUM 2 TABLET: 50; 8.6 TABLET ORAL at 09:27

## 2023-08-28 RX ADMIN — INSULIN LISPRO 2 UNITS: 100 INJECTION, SOLUTION INTRAVENOUS; SUBCUTANEOUS at 11:55

## 2023-08-28 RX ADMIN — CEFEPIME 2000 MG: 2 INJECTION, POWDER, FOR SOLUTION INTRAVENOUS at 04:39

## 2023-08-28 RX ADMIN — IPRATROPIUM BROMIDE AND ALBUTEROL SULFATE 3 ML: .5; 3 SOLUTION RESPIRATORY (INHALATION) at 13:11

## 2023-08-28 RX ADMIN — PANTOPRAZOLE SODIUM 40 MG: 40 TABLET, DELAYED RELEASE ORAL at 09:26

## 2023-08-28 RX ADMIN — IPRATROPIUM BROMIDE AND ALBUTEROL SULFATE 3 ML: .5; 3 SOLUTION RESPIRATORY (INHALATION) at 00:39

## 2023-08-28 RX ADMIN — VANCOMYCIN HYDROCHLORIDE 1000 MG: 5 INJECTION, POWDER, LYOPHILIZED, FOR SOLUTION INTRAVENOUS at 14:39

## 2023-08-28 NOTE — PLAN OF CARE
Goal Outcome Evaluation:  Plan of Care Reviewed With: patient        Progress: no change      Pt resting well without sign of discomfort, respiration even. VSS. Blood sugar 125. No significant change to be noted at this shift.

## 2023-08-28 NOTE — PLAN OF CARE
Goal Outcome Evaluation:  Plan of Care Reviewed With: (P) patient           Outcome Evaluation: (P) Pt presents with general deconditioning and dyspnea affecting his ability to tolerate functional activity and ADL's. He is currently unable to safely ambulate independently for long distances. He requires skilled physical therapy services to address these deficits.      Anticipated Discharge Disposition (PT): (P) home with home health

## 2023-08-28 NOTE — THERAPY EVALUATION
Acute Care - Physical Therapy Initial Evaluation  RISHABH Mclaughlin     Patient Name: Dilip Faulkner  : 1950  MRN: [id number]  Today's Date: 2023      Visit Dx:     ICD-10-CM ICD-9-CM   1. Acute respiratory failure with hypoxia  J96.01 518.81   2. Sepsis, due to unspecified organism, unspecified whether acute organ dysfunction present  A41.9 038.9     995.91   3. COPD with acute exacerbation  J44.1 491.21   4. Pneumonia due to infectious organism, unspecified laterality, unspecified part of lung  J18.9 486   5. Difficulty in walking  R26.2 719.7     Patient Active Problem List   Diagnosis    Chronic obstructive pulmonary disease with acute exacerbation    Acute on chronic respiratory failure with hypoxia and hypercapnia    Type 2 diabetes mellitus    Acute on chronic respiratory failure    Essential hypertension    Hyperlipidemia    Cough    Pneumonia due to infectious organism    COPD with acute exacerbation    Obesity (BMI 30-39.9)    Right bundle branch block    Acquired ptosis of eyelid, bilateral    Type 2 diabetes mellitus with hyperglycemia, with long-term current use of insulin    Acute exacerbation of chronic obstructive pulmonary disease (COPD)    Respiratory failure with hypoxia    Gastroesophageal reflux disease without esophagitis    COPD (chronic obstructive pulmonary disease)    Acute respiratory failure with hypoxia    Noncompliance with CPAP treatment    Unstable angina    COPD exacerbation    Acute respiratory failure with hypoxia and hypercapnia    Aspiration pneumonia of left lower lobe due to gastric secretions    Sepsis     Past Medical History:   Diagnosis Date    Asthma     COPD (chronic obstructive pulmonary disease)     Diabetes mellitus     Ex-smoker     QUIT     Hernia, umbilical     Hypertension     On home O2     REPORTS WEARING 2L/NC PRN SOA     Past Surgical History:   Procedure Laterality Date    ABDOMINAL SURGERY       PT Assessment (last 12 hours)       PT Evaluation and  Treatment       Row Name 08/28/23 1314          Physical Therapy Time and Intention    Subjective Information complains of;dyspnea;pain (P)   R knee pain with ambulation.  -     Document Type evaluation (P)   -     Mode of Treatment individual therapy;physical therapy (P)   -     Patient Effort good (P)   -     Symptoms Noted During/After Treatment increased pain;shortness of breath (P)   Long standing R knee pain.  -       Row Name 08/28/23 1314          General Information    Patient Profile Reviewed yes (P)   -     Patient Observations alert;cooperative;agree to therapy (P)   -     Prior Level of Function min assist:;all household mobility;community mobility;ADL's (P)   -     Equipment Currently Used at Home walker, rolling (P)   -     Existing Precautions/Restrictions fall (P)   -       Row Name 08/28/23 1314          Living Environment    Current Living Arrangements home (P)   -     Home Accessibility wheelchair accessible (P)   -     People in Home sibling(s) (P)   Pt reports he lives with his sisters.  -     Primary Care Provided by self;other (see comments) (P)   Pt receives support from sisters.  -       Row Name 08/28/23 1314          Home Use of Assistive/Adaptive Equipment    Equipment Currently Used at Home walker, rolling (P)   -       Row Name 08/28/23 1314          Range of Motion (ROM)    Range of Motion bilateral lower extremities;ROM is WFL (P)   -[facility] Name 08/28/23 1314          Strength (Manual Muscle Testing)    Strength (Manual Muscle Testing) bilateral lower extremities;strength is WFL (P)   5/5  -       Row Name 08/28/23 1314          Bed Mobility    Bed Mobility sidelying-sit (P)   -     Sidelying-Sit [place] (Bed Mobility) standby assist (P)   -     Assistive Device (Bed Mobility) bed rails (P)   -       Row Name 08/28/23 1314          Transfers    Transfers sit-stand transfer;stand-sit transfer (P)   -       Row Name 08/28/23 1314           Sit-Stand Transfer    Sit-Stand [place] (Transfers) standby assist (P)   -RH     Assistive Device (Sit-Stand Transfers) walker, front-wheeled (P)   -       Row Name 08/28/23 1314          Stand-Sit Transfer    Stand-Sit [place] (Transfers) standby assist (P)   -RH     Assistive Device (Stand-Sit Transfers) walker, front-wheeled (P)   -       Row Name 08/28/23 1314          Gait/Stairs (Locomotion)    Gait/Stairs Locomotion gait/ambulation assistive device (P)   -     [place] Level (Gait) contact guard (P)   -RH     Assistive Device (Gait) walker, front-wheeled (P)   -RH     Patient was Unable to Ambulate yes (P)   Pt was able to ambulate.  -RH     Distance in Feet (Gait) 40 (P)   due to pain in R knee  -RH     Pattern (Gait) step-through (P)   -RH     Deviations/Abnormal Patterns (Gait) antalgic (P)   Due to long standing R knee pain  -       Row Name 08/28/23 1314          Safety Issues, Functional Mobility    Impairments Affecting Function (Mobility) balance;endurance/activity tolerance;pain;shortness of breath (P)   -       Row Name 08/28/23 1314          Balance    Balance Assessment standing dynamic balance (P)   -RH     Dynamic Standing Balance contact guard (P)   -RH     Position/Device Used, Standing Balance walker, front-wheeled (P)   -       Row Name 08/28/23 1314          Plan of Care Review    Plan of Care Reviewed With patient (P)   -RH     Outcome Evaluation Pt presents with general deconditioning and dyspnea affecting his ability to tolerate functional activity and ADL's. He is currently unable to safely ambulate independently for long distances. He requires skilled physical therapy services to address these deficits. (P)   -       Row Name 08/28/23 1314          Vital Signs    Intra SpO2 (%) -- (P)   Pt's O2 saturation dropped to 90% with ambulation and returned to 98% upon resting.  -       Row Name 08/28/23 1314          Therapy Assessment/Plan (PT)    Rehab  Potential (PT) good, to achieve stated therapy goals (P)   -     Criteria for Skilled Interventions Met (PT) skilled treatment is necessary (P)   -RH     Therapy Frequency (PT) daily (P)   -     Predicted Duration of Therapy Intervention (PT) 10 days (P)   -RH     Problem List (PT) problems related to;balance;motor control;strength;pain;other (see comments) (P)   Activity tolerance/endurance  -RH     Activity Limitations Related to Problem List (PT) unable to ambulate safely;BADLs not performed adequately or safely;IADLs not performed adequately or safely;community activities not performed adequately or safely (P)   -       Row Name 08/28/23 1314          Therapy Plan Review/Discharge Plan (PT)    Therapy Plan Review (PT) evaluation/treatment results reviewed;care plan/treatment goals reviewed;participants included;patient (P)   -       Row Name 08/28/23 1314          Physical Therapy Goals    Gait Training Goal Selection (PT) gait training, PT goal 1 (P)   -       Row Name 08/28/23 1314          Gait Training Goal 1 (PT)    Activity/Assistive Device (Gait Training Goal 1, PT) gait (walking locomotion) (P)   -     [place] Level (Gait Training Goal 1, PT) independent (P)   -RH     Distance (Gait Training Goal 1, PT) 150' (P)   While maintaing O2 saturation above 93%.  -     Time Frame (Gait Training Goal 1, PT) long term goal (LTG);10 days (P)   -               User Key  (r) = Recorded By, (t) = Taken By, (c) = Cosigned By      Initials Name Provider Type     Miguel Mitchell, PT Student PT Student                    Physical Therapy Education       Title: PT OT SLP Therapies (Done)       Topic: Physical Therapy (Done)       Point: Mobility training (Done)       Learning Progress Summary             Patient Acceptance, E,TB, VU by  at 8/28/2023 1336                         Point: Precautions (Done)       Learning Progress Summary             Patient Acceptance, E,TB, VU by  at  8/28/2023 1336                                         User Key       Initials Effective Dates Name Provider Type Discipline     08/16/23 -  Miguel Mitchell, PT Student PT Student PT                  PT Recommendation and Plan  Anticipated Discharge Disposition (PT): (P) home with home health  Planned Therapy Interventions (PT): (P) balance training, gait training, neuromuscular re-education, strengthening, stair training, postural re-education, transfer training  Therapy Frequency (PT): (P) daily  Plan of Care Reviewed With: (P) patient  Outcome Evaluation: (P) Pt presents with general deconditioning and dyspnea affecting his ability to tolerate functional activity and ADL's. He is currently unable to safely ambulate independently for long distances. He requires skilled physical therapy services to address these deficits.   Outcome Measures       Row Name 08/28/23 1300             How much help from another person do you currently need...    Turning from your back to your side while in flat bed without using bedrails? 3 (P)   -RH      Moving from lying on back to sitting on the side of a flat bed without bedrails? 3 (P)   -RH      Moving to and from a bed to a chair (including a wheelchair)? 3 (P)   -RH      Standing up from a chair using your arms (e.g., wheelchair, bedside chair)? 3 (P)   -RH      Climbing 3-5 steps with a railing? 2 (P)   -RH      To walk in hospital room? 3 (P)   -RH      AM-PAC 6 Clicks Score (PT) 17 (P)   -RH                User Key  (r) = Recorded By, (t) = Taken By, (c) = Cosigned By      Initials Name Provider Type     Miguel Mitchell, PT Student PT Student                     Time Calculation:    PT Charges       Row Name 08/28/23 1314             Time Calculation    PT Received On 08/28/23 (P)   -      PT Goal Re-Cert Due Date 09/06/23 (P)   -         Untimed Charges    PT Eval/Re-eval Minutes 35 (P)   -         Total Minutes    Untimed Charges Total Minutes 35  (P)   -       Total Minutes 35 (P)   -RH                User Key  (r) = Recorded By, (t) = Taken By, (c) = Cosigned By      Initials Name Provider Type     Miguel Mitchell, PT Student PT Student                      PT G-Codes  AM-PAC 6 Clicks Score (PT): (P) 17    Miguel Mitchell, PT Student  8/28/2023

## 2023-08-28 NOTE — PROGRESS NOTES
"[facility] Clinical Pharmacy Services: Vancomycin Monitoring Note    Dilip Faulkner is a 73 y.o. male who is on day  of pharmacy to dose vancomycin for Pneumonia and Sepsis.    Previous Vancomycin Dose:   1000 mg IV every  12  hours  Imaging Reviewed?: Yes  Updated Cultures and Sensitivities:   23: BLOOD CX, LEFT HAND: NGD1  23: BLOOD CX, LEFT HAND: NGD1  23: MRSA PCR: NOT DETECTED  23: COVID-19 PCR: NOT DETECTED  23: INFLUENZA A&B AG: NEGATIVE  23: BLOOD CX, LEFT ARM: NGD1  23: BLOOD CX, LEFT ARM: NGD1    Vitals/Labs  Ht: 185.4 cm (72.99\"); Wt: 115 kg (252 lb 10.4 oz)     Temp (24hrs), Av.7 °F (36.5 °C), Min:97.5 °F (36.4 °C), Max:98.1 °F (36.7 °C)    Estimated Creatinine Clearance: 93.6 mL/min (by C-G formula based on SCr of 0.92 mg/dL).       Results from last 7 days   Lab Units 23  1312 23  0428 23  0032   VANCOMYCIN RM mcg/mL 14.11  --   --    CREATININE mg/dL  --  0.92 1.09   WBC 10*3/mm3  --  13.97* 14.78*     Assessment/Plan    Current Vancomycin Dose:  1000 mg IV every 12 hours; which provides the following predicted parameters:  AUC24,ss: 440 mg/L.hr  PAUC*: 68 %  Ctrough,ss: 14.6 mg/L  Pconc*: 13 %  Tox.: 10 %  No change in dose needed  We will continue to monitor patient changes and renal function     Thank you for involving pharmacy in this patient's care. Please contact pharmacy with any questions or concerns.    Mary Coughlin  Clinical Pharmacist    "

## 2023-08-28 NOTE — SIGNIFICANT NOTE
08/28/23 1250   Coping/Psychosocial   Observed Emotional State calm;cooperative   Verbalized Emotional State relief;hopefulness   Trust Relationship/Rapport empathic listening provided   Involvement in Care interacting with patient   Additional Documentation Spiritual Care (Group)   Spiritual Care   Use of Spiritual Resources non-Protestant use of spiritual care   Spiritual Care Source  initiative   Spiritual Care Follow-Up follow-up, none required as presently assessed   Response to Spiritual Care receptive of support;engaged in conversation   Spiritual Care Interventions supportive conversation provided   Spiritual Care Visit Type initial   Receptivity to Spiritual Care visit welcomed

## 2023-08-28 NOTE — PROGRESS NOTES
[facility]   Hospitalist Progress Note       Patient Name: Dilip Faulkner  : 1950  MRN: [id number]  Primary Care Physician: Mallorie Roa APRN  Date of admission: 2023  Today's Date: 2023  Room / Bed:   216/1  Subjective   Chief Complaint: Dyspnea     HPI:     Dilip Faulkner is a 73 y.o. male brought to emergency department for evaluation of dyspnea.  Patient states he is chronically dyspneic, but has been worse over the last 2 nights.  He is getting progressively short of air, especially with exertion.  Patient wears 2 L of home supplemental oxygen due to his COPD, but states that it has not been helping with his shortness of breath.  He has also been given himself breathing treatments at home without improvement.  Does have a cough, but does not produce mucus at this time.  Denies any recent sick contacts, fevers, chills, abdominal pain, nausea, vomiting.  Patient reports chest pain with breathing.       Interval Followup: 2023    Resting in bed.  Feeling better, making progress towards his baseline.  Appetite good   Reports shortness of air is a little bit better now  On 3.5L (2L at home)  Cough better.  Denies chest discomfort  White count 14 K noted  Tolerating Vanco/cefepime; Solu-Medrol; DuoNebs; [id number]        REVIEW OF SYSTEMS: All other systems reviewed and are negative.   SOA  Objective   Temp:  [97.5 °F (36.4 °C)-98.1 °F (36.7 °C)] 98 °F (36.7 °C)  Heart Rate:  [66-83] 73  Resp:  [16-20] 18  BP: (117-137)/(48-69) 137/65  Flow (L/min):  [4] 4  PHYSICAL EXAM   CON: WN. WD. NAD.  Calm.  Coherent.  Obese  EYES:  Sclera anicteric. EOMI.  ENT:  Oropharyngeal mucosa without ulcers or thrush.    NECK:  No thyromegaly. No stridor. Trachea midline.  RESP:  Faint wheeze.  No work of breathing or tachypnea.   CV:  Rhythm regular. Rate WNL. No murmur noted.  No edema.  GI:  Soft and nontender. Nondistended.  Bowel sounds present.   EXT: Peripheral pulses intact.  No joint  deformities or cyanosis.  PSYCH:  Alert. Oriented. Normal affect and mood.  NEURO:  No dysarthria or aphasia. No unilateral weakness or paresthesia.  SKIN: No chronic venous stasis changes or varicosities.  No cellulitis    Results from last 7 days   Lab Units 08/28/23  0428 08/27/23  0032   WBC 10*3/mm3 13.97* 14.78*   HEMOGLOBIN g/dL 10.2* 12.0*   HEMATOCRIT % 37.2* 42.9   PLATELETS 10*3/mm3 198 229     Results from last 7 days   Lab Units 08/28/23  0428 08/27/23  0032   SODIUM mmol/L 138 137   POTASSIUM mmol/L 4.1 4.6   CO2 mmol/L 26.4 27.5   CHLORIDE mmol/L 104 100   ANION GAP mmol/L 7.6 9.5   BUN mg/dL 22 15   CREATININE mg/dL 0.92 1.09   GLUCOSE mg/dL 122* 148*           RESULTS REVIEWED:  I have personally reviewed the results from the time of this admission to 8/28/2023 13:00 EDT and agree with these findings:  []  Laboratory  []  Microbiology  []  Radiology  []  EKG/Telemetry   []  Cardiology/Vascular   []  Pathology  []  Old records  []  Other:  Assessment / Plan   Assessment:  Sepsis secondary to pneumonia  COPD exacerbation  Acute on chronic hypoxic respiratory failure (home 2 L)  HTN  DM  Obesity  GERD  Hx aspiration       Plan:  Clinically improving.  Await PT and OT evaluation.  Mobilize.  Home health versus subacute rehab soon.  Monitored bed  Empiric broad-spectrum antibiotics initially ... De-escalate soon when feasible  Bronchopulmonary hygiene protocol: Brovana, Pulmicort, DuoNebs  Sliding scale insulin protocol.  Accu-Cheks ordered.  Reconcile and resume home medications accordingly  Continue home beta-blocker  Continue home statin  Continue home PPI  Continue home aspirin  Lovenox 40 for DVT prophylaxis  Discussed plan with RN.  DVT prophylaxis:  Medical DVT prophylaxis orders are present.  CODE STATUS:      Level Of Support Discussed With: Patient  Code Status (Patient has no pulse and is not breathing): CPR (Attempt to Resuscitate)  Medical Interventions (Patient has pulse or is breathing):  Full Support         Patient independently seen and evaluated, agree with assessment and plan, above documentation reflects plan put forth during bedside rounds.  More than 51% of the time of this patient encounter was performed by me.    Interval history: No acute events overnight, breathing is improving    GEN: No acute distress  HEENT: Moist mucous membranes  LUNGS: Equal chest rise bilaterally  CARDIAC: Regular rate and rhythm  NEURO: Moving all 4 extremities spontaneously  SKIN: No obvious breakdown    Plan:  Agree with assessment plan as above  Continue empiric antibiotics  Continue Brovana, Pulmicort, DuoNebs  Sliding-scale insulin  Continue home beta-blocker  Continue home statin, PPI, aspirin  CBC, CMP reviewed  Repeat CBC, CMP, mag and Phos in a.m.  Chest x-ray personally reviewed, no dense infiltrate identified      Electronically signed by Yung Singleton MD, 08/28/23, 1:20 PM EDT.

## 2023-08-29 ENCOUNTER — READMISSION MANAGEMENT (OUTPATIENT)
Dept: CALL CENTER | Facility: HOSPITAL | Age: 73
End: 2023-08-29
Payer: MEDICARE

## 2023-08-29 VITALS
SYSTOLIC BLOOD PRESSURE: 136 MMHG | TEMPERATURE: 97.4 F | BODY MASS INDEX: 33.48 KG/M2 | WEIGHT: 252.65 LBS | OXYGEN SATURATION: 95 % | HEART RATE: 69 BPM | RESPIRATION RATE: 16 BRPM | HEIGHT: 73 IN | DIASTOLIC BLOOD PRESSURE: 71 MMHG

## 2023-08-29 LAB
GLUCOSE BLDC GLUCOMTR-MCNC: 113 MG/DL (ref 70–99)
GLUCOSE BLDC GLUCOMTR-MCNC: 164 MG/DL (ref 70–99)

## 2023-08-29 PROCEDURE — 94664 DEMO&/EVAL PT USE INHALER: CPT

## 2023-08-29 PROCEDURE — 97110 THERAPEUTIC EXERCISES: CPT

## 2023-08-29 PROCEDURE — 25010000002 VANCOMYCIN 5 G RECONSTITUTED SOLUTION: Performed by: FAMILY MEDICINE

## 2023-08-29 PROCEDURE — 63710000001 INSULIN LISPRO (HUMAN) PER 5 UNITS: Performed by: PHYSICIAN ASSISTANT

## 2023-08-29 PROCEDURE — 25010000002 METHYLPREDNISOLONE PER 125 MG: Performed by: FAMILY MEDICINE

## 2023-08-29 PROCEDURE — 0 CEFEPIME PER 500 MG: Performed by: FAMILY MEDICINE

## 2023-08-29 PROCEDURE — 25010000002 ENOXAPARIN PER 10 MG: Performed by: FAMILY MEDICINE

## 2023-08-29 PROCEDURE — 94799 UNLISTED PULMONARY SVC/PX: CPT

## 2023-08-29 PROCEDURE — 97165 OT EVAL LOW COMPLEX 30 MIN: CPT

## 2023-08-29 PROCEDURE — 97530 THERAPEUTIC ACTIVITIES: CPT

## 2023-08-29 PROCEDURE — 82948 REAGENT STRIP/BLOOD GLUCOSE: CPT

## 2023-08-29 RX ORDER — LEVOFLOXACIN 500 MG/1
500 TABLET, FILM COATED ORAL DAILY
Qty: 5 TABLET | Refills: 0 | Status: SHIPPED | OUTPATIENT
Start: 2023-08-29 | End: 2023-09-03

## 2023-08-29 RX ORDER — GUAIFENESIN 200 MG/10ML
200 LIQUID ORAL EVERY 4 HOURS PRN
Status: DISCONTINUED | OUTPATIENT
Start: 2023-08-29 | End: 2023-08-29 | Stop reason: HOSPADM

## 2023-08-29 RX ORDER — PANTOPRAZOLE SODIUM 40 MG/1
40 TABLET, DELAYED RELEASE ORAL DAILY
Qty: 30 TABLET | Refills: 0 | Status: ON HOLD | OUTPATIENT
Start: 2023-08-30 | End: 2023-09-29

## 2023-08-29 RX ADMIN — METOPROLOL SUCCINATE 25 MG: 25 TABLET, EXTENDED RELEASE ORAL at 09:08

## 2023-08-29 RX ADMIN — CEFEPIME 2000 MG: 2 INJECTION, POWDER, FOR SOLUTION INTRAVENOUS at 10:51

## 2023-08-29 RX ADMIN — SODIUM CHLORIDE 40 ML: 9 INJECTION, SOLUTION INTRAVENOUS at 02:31

## 2023-08-29 RX ADMIN — VANCOMYCIN HYDROCHLORIDE 1000 MG: 5 INJECTION, POWDER, LYOPHILIZED, FOR SOLUTION INTRAVENOUS at 02:31

## 2023-08-29 RX ADMIN — ENOXAPARIN SODIUM 40 MG: 100 INJECTION SUBCUTANEOUS at 09:08

## 2023-08-29 RX ADMIN — Medication 10 ML: at 09:09

## 2023-08-29 RX ADMIN — GUAIFENESIN 200 MG: 200 SOLUTION ORAL at 03:51

## 2023-08-29 RX ADMIN — ARFORMOTEROL TARTRATE 15 MCG: 15 SOLUTION RESPIRATORY (INHALATION) at 06:25

## 2023-08-29 RX ADMIN — SENNOSIDES AND DOCUSATE SODIUM 2 TABLET: 50; 8.6 TABLET ORAL at 09:08

## 2023-08-29 RX ADMIN — ASPIRIN 81 MG: 81 TABLET, COATED ORAL at 09:08

## 2023-08-29 RX ADMIN — BUDESONIDE 0.5 MG: 0.5 INHALANT ORAL at 06:25

## 2023-08-29 RX ADMIN — CEFEPIME 2000 MG: 2 INJECTION, POWDER, FOR SOLUTION INTRAVENOUS at 02:32

## 2023-08-29 RX ADMIN — IPRATROPIUM BROMIDE AND ALBUTEROL SULFATE 3 ML: .5; 3 SOLUTION RESPIRATORY (INHALATION) at 06:24

## 2023-08-29 RX ADMIN — PANTOPRAZOLE SODIUM 40 MG: 40 TABLET, DELAYED RELEASE ORAL at 09:08

## 2023-08-29 RX ADMIN — INSULIN LISPRO 2 UNITS: 100 INJECTION, SOLUTION INTRAVENOUS; SUBCUTANEOUS at 12:09

## 2023-08-29 RX ADMIN — IPRATROPIUM BROMIDE AND ALBUTEROL SULFATE 3 ML: .5; 3 SOLUTION RESPIRATORY (INHALATION) at 00:31

## 2023-08-29 RX ADMIN — METHYLPREDNISOLONE SODIUM SUCCINATE 60 MG: 125 INJECTION INTRAMUSCULAR; INTRAVENOUS at 09:08

## 2023-08-29 NOTE — OUTREACH NOTE
Prep Survey      Flowsheet Row Responses   [facility] patient discharged from? [facility]   Is LACE score < 7 ? No   Eligibility [facility]   Date of Admission 08/27/23   Date of Discharge 08/29/23   Discharge Disposition Home or Self Care   Discharge diagnosis Sepsis secondary to pneumonia COPD   Does the patient have one of the following disease processes/diagnoses(primary or secondary)? Sepsis   Does the patient have Home health ordered? No   Is there a DME ordered? No   Prep survey completed? Yes            RONY RINALDI - Registered Nurse

## 2023-08-29 NOTE — THERAPY TREATMENT NOTE
Acute Care - Physical Therapy Treatment Note   [place]     Patient Name: Dilip Faulkner  : 1950  MRN: [id number]  Today's Date: 2023      Visit Dx:     ICD-10-CM ICD-9-CM   1. Acute respiratory failure with hypoxia  J96.01 518.81   2. Sepsis, due to unspecified organism, unspecified whether acute organ dysfunction present  A41.9 038.9     995.91   3. COPD with acute exacerbation  J44.1 491.21   4. Pneumonia due to infectious organism, unspecified laterality, unspecified part of lung  J18.9 486   5. Difficulty in walking  R26.2 719.7   6. Decreased activities of daily living (ADL)  Z78.9 V49.89     Patient Active Problem List   Diagnosis    Chronic obstructive pulmonary disease with acute exacerbation    Acute on chronic respiratory failure with hypoxia and hypercapnia    Type 2 diabetes mellitus    Acute on chronic respiratory failure    Essential hypertension    Hyperlipidemia    Cough    Pneumonia due to infectious organism    COPD with acute exacerbation    Obesity (BMI 30-39.9)    Right bundle branch block    Acquired ptosis of eyelid, bilateral    Type 2 diabetes mellitus with hyperglycemia, with long-term current use of insulin    Acute exacerbation of chronic obstructive pulmonary disease (COPD)    Respiratory failure with hypoxia    Gastroesophageal reflux disease without esophagitis    COPD (chronic obstructive pulmonary disease)    Acute respiratory failure with hypoxia    Noncompliance with CPAP treatment    Unstable angina    COPD exacerbation    Acute respiratory failure with hypoxia and hypercapnia    Aspiration pneumonia of left lower lobe due to gastric secretions    Sepsis     Past Medical History:   Diagnosis Date    Asthma     COPD (chronic obstructive pulmonary disease)     Diabetes mellitus     Ex-smoker     QUIT     Hernia, umbilical     Hypertension     On home O2     REPORTS WEARING 2L/NC PRN SOA     Past Surgical History:   Procedure Laterality Date    ABDOMINAL SURGERY        PT Assessment (last 12 hours)       PT Evaluation and Treatment       Row Name 08/29/23 1112          Physical Therapy Time and Intention    Subjective Information no complaints  -DK     Document Type therapy note (daily note)  -DK     Mode of Treatment individual therapy;physical therapy  -DK     Patient Effort good  -DK     Symptoms Noted During/After Treatment none  -DK     Comment Pt reports not using O2 continuously at home pre admission.  -DK       Row Name 08/29/23 1112          Pain    Pretreatment Pain Rating 0/10 - no pain  -DK     Posttreatment Pain Rating 0/10 - no pain  -DK       Row Name 08/29/23 1112          Cognition    Affect/Mental Status (Cognition) WFL  -DK     Orientation Status (Cognition) oriented x 4  -DK     Follows Commands (Cognition) WFL  -DK     Cognitive Function WFL  -DK     Personal Safety Interventions gait belt;nonskid shoes/slippers when out of bed;supervised activity  -DK       Row Name 08/29/23 1112          Bed Mobility    Bed Mobility supine-sit-supine  -DK     Supine-Sit [place] (Bed Mobility) standby assist  -DK     Sit-Supine [place] (Bed Mobility) standby assist  -DK     Supine-Sit-Supine [place] (Bed Mobility) standby assist  -DK     Sidelying-Sit [place] (Bed Mobility) standby assist  -DK     Assistive Device (Bed Mobility) bed rails  -DK       Row Name 08/29/23 1112          Transfers    Transfers sit-stand transfer;stand-sit transfer  -DK       Row Name 08/29/23 1112          Sit-Stand Transfer    Sit-Stand [place] (Transfers) standby assist;contact guard;1 person assist  -DK     Assistive Device (Sit-Stand Transfers) walker, front-wheeled  -DK       Row Name 08/29/23 1112          Stand-Sit Transfer    Stand-Sit [place] (Transfers) standby assist;contact guard;1 person assist  -DK     Assistive Device (Stand-Sit Transfers) walker, front-wheeled  -DK       Row Name 08/29/23 1112          Gait/Stairs (Locomotion)    Gait/Stairs  Locomotion gait/ambulation independence;gait/ambulation assistive device;distance ambulated;gait pattern  -DK     [place] Level (Gait) standby assist;contact guard;1 person assist  -DK     Assistive Device (Gait) walker, front-wheeled  -DK     Distance in Feet (Gait) 90  -DK     Pattern (Gait) step-through  -DK     Deviations/Abnormal Patterns (Gait) festinating/shuffling  -DK     Comment, (Gait/Stairs) Pt ambulated on room air with a rolling walker.  He returned to bed post treatment.  Pt did not appear SOA with treatment.  -       Row Name 08/29/23 1112          Safety Issues, Functional Mobility    Safety Issues Affecting Function (Mobility) awareness of need for assistance;impulsivity;judgment;safety precaution awareness  -     Impairments Affecting Function (Mobility) balance;endurance/activity tolerance;strength  -       Row Name 08/29/23 1112          Balance    Balance Assessment sitting static balance;sitting dynamic balance;standing static balance;standing dynamic balance  -DK     Static Sitting Balance standby assist  -     Dynamic Sitting Balance standby assist  -DK     Position, Sitting Balance unsupported;sitting edge of bed  -DK     Static Standing Balance standby assist  -DK     Dynamic Standing Balance standby assist;contact guard;1-person assist  -DK     Position/Device Used, Standing Balance walker, front-wheeled  -DK     Balance Interventions standing;dynamic;tandem gait  -       Row Name 08/29/23 1112          Motor Skills    Motor Skills --  therapeutic exercises  -     Therapeutic Exercise hip;knee;ankle  -       Row Name 08/29/23 1112          Hip (Therapeutic Exercise)    Hip (Therapeutic Exercise) AAROM (active assistive range of motion)  -     Hip AAROM (Therapeutic Exercise) bilateral;flexion;extension;aBduction;aDduction;supine;10 repetitions;2 sets  -       Row Name 08/29/23 1112          Knee (Therapeutic Exercise)    Knee (Therapeutic Exercise) AAROM (active  assistive range of motion)  -DK     Knee AAROM (Therapeutic Exercise) bilateral;flexion;extension;supine;10 repetitions;2 sets  -       Row Name 08/29/23 1112          Ankle (Therapeutic Exercise)    Ankle (Therapeutic Exercise) AAROM (active assistive range of motion)  -DK     Ankle AAROM (Therapeutic Exercise) bilateral;dorsiflexion;plantarflexion;supine;10 repetitions;2 sets  -       Row Name 08/29/23 1112          Plan of Care Review    Plan of Care Reviewed With patient  -     Progress improving  -       Row Name 08/29/23 1112          Positioning and Restraints    Pre-Treatment Position in bed  -DK     Post Treatment Position bed  -DK     In Bed supine;call light within reach;encouraged to call for assist;side rails up x2;legs elevated  -       Row Name 08/29/23 1112          Therapy Assessment/Plan (PT)    Rehab Potential (PT) good, to achieve stated therapy goals  -     Criteria for Skilled Interventions Met (PT) skilled treatment is necessary  -     Therapy Frequency (PT) daily  -     Problem List (PT) problems related to;balance;mobility;strength  -     Activity Limitations Related to Problem List (PT) unable to ambulate safely  -       Row Name 08/29/23 1112          Progress Summary (PT)    Progress Toward Functional Goals (PT) progress toward functional goals is good  -               User Key  (r) = Recorded By, (t) = Taken By, (c) = Cosigned By      Initials Name Provider Type    Cheryl Davis PTA Physical Therapist Assistant                    Physical Therapy Education       Title: PT OT SLP Therapies (Done)       Topic: Physical Therapy (Done)       Point: Mobility training (Done)       Learning Progress Summary             Patient Acceptance, E,TB, VU by  at 8/28/2023 1336                         Point: Precautions (Done)       Learning Progress Summary             Patient Acceptance, E,TB, VU by  at 8/28/2023 1336                                         User Key        Initials Effective Dates Name Provider Type Discipline     08/16/23 -  Miguel Mitchell, PT Student PT Student PT                  PT Recommendation and Plan  Planned Therapy Interventions (PT): balance training, bed mobility training, gait training, strengthening, transfer training  Therapy Frequency (PT): daily  Progress Summary (PT)  Progress Toward Functional Goals (PT): progress toward functional goals is good  Plan of Care Reviewed With: patient  Progress: improving   Outcome Measures       Row Name 08/29/23 1112 08/28/23 1300          How much help from another person do you currently need...    Turning from your back to your side while in flat bed without using bedrails? 4  -DK 3  -DP (r) RH (t) DP (c)     Moving from lying on back to sitting on the side of a flat bed without bedrails? 4  -DK 3  -DP (r) RH (t) DP (c)     Moving to and from a bed to a chair (including a wheelchair)? 3  -DK 3  -DP (r) RH (t) DP (c)     Standing up from a chair using your arms (e.g., wheelchair, bedside chair)? 3  -DK 3  -DP (r) RH (t) DP (c)     Climbing 3-5 steps with a railing? 3  -DK 2  -DP (r) RH (t) DP (c)     To walk in hospital room? 3  -DK 3  -DP (r) RH (t) DP (c)     AM-PAC 6 Clicks Score (PT) 20  -DK 17  -DP (r) RH (t)        Functional Assessment    Outcome Measure Options AM-PAC 6 Clicks Basic Mobility (PT)  -DK --               User Key  (r) = Recorded By, (t) = Taken By, (c) = Cosigned By      Initials Name Provider Type    DK Chreyl Moore, PTA Physical Therapist Assistant    DP Elza Miguel, PT Physical Therapist     Miguel Mitchell, PT Student PT Student                     Time Calculation:    PT Charges       Row Name 08/29/23 1116             Time Calculation    PT Received On 08/29/23  -      PT Goal Re-Cert Due Date 09/06/23  -DK         Timed Charges    99085 - PT Therapeutic Exercise Minutes 14  -DK      86577 - Gait Training Minutes  5  -DK      36085 - PT Therapeutic  Activity Minutes 6  -DK         Total Minutes    Timed Charges Total Minutes 25  -DK       Total Minutes 25  -DK                User Key  (r) = Recorded By, (t) = Taken By, (c) = Cosigned By      Initials Name Provider Type    Cheryl Davis PTA Physical Therapist Assistant                  Therapy Charges for Today       Code Description Service Date Service Provider Modifiers Qty    [id number]  PT THER PROC EA 15 MIN 8/29/2023 Cheryl Moore PTA GP 1    [id number]  PT THERAPEUTIC ACT EA 15 MIN 8/29/2023 Cheryl Moroe PTA GP 1            PT G-Codes  Outcome Measure Options: AM-PAC 6 Clicks Basic Mobility (PT)  AM-PAC 6 Clicks Score (PT): 20  AM-PAC 6 Clicks Score (OT): 21    Cheryl Moore PTA  8/29/2023

## 2023-08-29 NOTE — PROGRESS NOTES
Enter Query Response Below      Query Response:     Chronic hypoxemic respiratory failure    Electronically signed by Yung Singleton MD, 23, 11:54 AM EDT.           If applicable, please update the problem list.     Patient: Dilip Faulkner        : 1950  Account: [id number]           Admit Date:         How to Respond to this query:       a. Click New Note     b. Answer query within the yellow box.                c. Update the Problem List, if applicable.      If you have any questions about this query contact me at: [email or web address]     Dr. Singleton:     Patient presents to emergency room with shortness of breath.  On arrival afebrile heart rate 100, respirations 34, sat 88% o24lnc, wbc 14.78. History and physical states sepsis secondary to healthcare associated pneumonia, acute exacerbation of COPD, acute on chronic hypoxic respiratory failure.     Please clarify the following:    Acute hypoxic respiratory failure due to sepsis  Acute hypoxic respiratory failure due to __________________________.  Other- specify_____________________.  Unable to determine.    By submitting this query, we are merely seeking further clarification of documentation to accurately reflect all conditions that you are monitoring, evaluating, treating or that extend the hospitalization or utilize additional resources of care. Please utilize your independent clinical judgment when addressing the question(s) above.     This query and your response, once completed, will be entered into the legal medical record.    Sincerely,  Zakiya BECKHAM RN BSN   Clinical Documentation Integrity Program   [email or web address]

## 2023-08-29 NOTE — DISCHARGE SUMMARY
[facility]         HOSPITALIST  DISCHARGE SUMMARY    Patient Name: Dilip Faulkner  : 1950  MRN: [id number]    Date of Admission: 2023  Date of Discharge:  2023  Primary Care Physician: Mallorie Roa APRN    Consults       Date and Time Order Name Status Description    2023  2:05 AM Hospitalist (on-call MD unless specified)              Active and Resolved Hospital Problems:  Sepsis secondary to pneumonia  COPD exacerbation  Acute on chronic hypoxic respiratory failure (home 2 L)  HTN  DM  Obesity  GERD  Hx aspiration    Hospital Course     Hospital Course:  Dilip Faulkner is a 73 y.o. male brought to emergency department for evaluation of dyspnea.  Patient states he is chronically dyspneic, but has been worse over the last 2 nights.  He is getting progressively short of air, especially with exertion.  Patient wears 2 L of home supplemental oxygen due to his COPD, but states that it has not been helping with his shortness of breath.  He has also been given himself breathing treatments at home without improvement.  Does have a cough, but does not produce mucus at this time.  Denies any recent sick contacts, fevers, chills, abdominal pain, nausea, vomiting.  Patient reports chest pain with breathing.     Patient was treated with antibiotics, bronchodilators with good response.  Patient was at his baseline O2, patient states that his breathing was at baseline.  Patient was ambulating and his oxygen remained above 90%.  Patient was deemed safe to discharge home, he was discharged home today in stable condition, he will complete 5 days of Levaquin, patient will follow-up with pulmonology as an outpatient.  He is discharged home today in stable condition    Day of Discharge     Vital Signs:  Temp:  [97.2 °F (36.2 °C)-98 °F (36.7 °C)] 97.7 °F (36.5 °C)  Heart Rate:  [68-77] 72  Resp:  [16-18] 17  BP: (127-159)/(60-81) 134/78  Flow (L/min):  [2-4] 2    Physical Exam:   GEN: No  acute distress  HEENT: Moist mucous membranes  LUNGS: Equal chest rise bilaterally  CARDIAC: Regular rate and rhythm  NEURO: Moving all 4 extremities spontaneously  SKIN: No obvious breakdown    Discharge Details        Discharge Medications        New Medications        Instructions Start Date   levoFLOXacin 500 MG tablet  Commonly known as: Levaquin   500 mg, Oral, Daily      pantoprazole 40 MG EC tablet  Commonly known as: PROTONIX   40 mg, Oral, Daily   Start Date: August 30, 2023            Continue These Medications        Instructions Start Date   albuterol sulfate  (90 Base) MCG/ACT inhaler  Commonly known as: PROVENTIL HFA;VENTOLIN HFA;PROAIR HFA   2 puffs, Inhalation, Every 4 Hours PRN      aspirin 81 MG chewable tablet   81 mg, Oral, Daily      atorvastatin 10 MG tablet  Commonly known as: LIPITOR   10 mg, Oral, Daily      cetirizine 10 MG tablet  Commonly known as: zyrTEC   10 mg, Oral, Daily      empagliflozin 10 MG tablet tablet  Commonly known as: JARDIANCE   10 mg, Oral, Daily      lisinopril 5 MG tablet  Commonly known as: PRINIVIL,ZESTRIL   5 mg, Oral, Daily      metFORMIN 500 MG tablet  Commonly known as: GLUCOPHAGE   500 mg, Oral, 2 Times Daily With Meals      metoprolol succinate XL 25 MG 24 hr tablet  Commonly known as: TOPROL-XL   25 mg, Oral, Every 24 Hours Scheduled      Tresiba FlexTouch 100 UNIT/ML solution pen-injector injection  Generic drug: insulin degludec   20 Units, Subcutaneous, Daily             Stop These Medications      omeprazole 20 MG capsule  Commonly known as: priLOSEC              No Known Allergies    Discharge Disposition:  Home or Self Care    Diet:  Hospital:  Diet Order   Procedures   • Diet: Diabetic Diets; Consistent Carbohydrate; Texture: Regular Texture (IDDSI 7); Fluid Consistency: Thin (IDDSI 0)       Discharge Activity:       CODE STATUS:  Code Status and Medical Interventions:   Ordered at: 08/27/23 0253     Level Of Support Discussed With:    Patient      Code Status (Patient has no pulse and is not breathing):    CPR (Attempt to Resuscitate)     Medical Interventions (Patient has pulse or is breathing):    Full Support       Future Appointments   Date Time Provider Department Center   10/4/2023  3:45 PM Chandu Vail MD [facility]       Additional Instructions for the Follow-ups that You Need to Schedule       Discharge Follow-up with PCP   As directed       Currently Documented PCP:    Mallorie Roa APRN    PCP Phone Number:    [phone]     Follow Up Details: 3 to 7 days                Pertinent  and/or Most Recent Results     IMAGING:  XR Chest 1 View    Result Date: 8/27/2023  PROCEDURE: XR CHEST 1 VW  COMPARISONS: 6/7/2023; 6/6/2023; 5/8/2023; 4/21/2023.  INDICATIONS: SOA/SOB/shortness of air/shortness of breath.  FINDINGS:  Two AP upright portable views of the chest are provided for review.  These images reveal no definite cardiac enlargement is seen.  No acute infiltrate is appreciated.  Bilateral pleural-parenchymal scarring and/or atelectasis, greater on the left, especially in the lung bases is (are) noted.  The blunted appearance of the left lateral costophrenic sulcus is thought to be chronic in nature and not to be related to a small-to-moderate left pleural effusion.  No definite right pleural effusion.  No pneumothorax is identified.  The thoracic aorta is atherosclerotic.  External artifacts obscure detail.  No significant interval change is seen since the prior study (or studies).        No acute infiltrate is appreciated.  No significant interval change since 6/6/2023.  Probably no cardiac enlargement.    Please note that portions of this note were completed with a voice recognition program.  SEGUNDO MORA JR, MD       Electronically Signed and Approved By: SEGUNDO MORA JR, MD on 8/27/2023 at 2:17                LAB RESULTS:      Lab 08/28/23  0428 08/27/23  0711 08/27/23  0710 08/27/23  0238 08/27/23  0044 08/27/23  0032   WBC  13.97*  --   --   --   --  14.78*   HEMOGLOBIN 10.2*  --   --   --   --  12.0*   HEMATOCRIT 37.2*  --   --   --   --  42.9   PLATELETS 198  --   --   --   --  229   NEUTROS ABS 11.60*  --   --   --   --  10.52*   IMMATURE GRANS (ABS) 0.10*  --   --   --   --  0.08*   LYMPHS ABS 1.21  --   --   --   --  1.49   MONOS ABS 0.93*  --   --   --   --  0.95*   EOS ABS 0.10  --   --   --   --  1.66*   MCV 79.0  --   --   --   --  79.6   PROCALCITONIN  --   --  0.07 0.10  --   --    LACTATE  --  1.6  --   --  1.4  --          Lab 08/28/23  0428 08/27/23  0032   SODIUM 138 137   POTASSIUM 4.1 4.6   CHLORIDE 104 100   CO2 26.4 27.5   ANION GAP 7.6 9.5   BUN 22 15   CREATININE 0.92 1.09   EGFR 87.3 71.2   GLUCOSE 122* 148*   CALCIUM 8.5* 8.9   MAGNESIUM 2.5* 2.2         Lab 08/27/23  0032   TOTAL PROTEIN 8.0   ALBUMIN 4.0   GLOBULIN 4.0   ALT (SGPT) 10   AST (SGOT) 12   BILIRUBIN 0.3   ALK PHOS 94   LIPASE 14         Lab 08/27/23  0238 08/27/23  0032   PROBNP  --  218.0   HSTROP T 62* 50*  50*                 Brief Urine Lab Results       None          Microbiology Results (last 10 days)       Procedure Component Value - Date/Time    Blood Culture - Blood, Hand, Left [[id number]]  (Normal) Collected: 08/27/23 0827    Lab Status: Preliminary result Specimen: Blood from Hand, Left Updated: 08/29/23 0900     Blood Culture No growth at 2 days    Blood Culture - Blood, Hand, Left [[id number]]  (Normal) Collected: 08/27/23 0710    Lab Status: Preliminary result Specimen: Blood from Hand, Left Updated: 08/29/23 0746     Blood Culture No growth at 2 days    MRSA Screen, PCR (Inpatient) - Swab, Nares [[id number]]  (Normal) Collected: 08/27/23 0631    Lab Status: Final result Specimen: Swab from Nares Updated: 08/27/23 0938     MRSA PCR No MRSA Detected    Narrative:      The negative predictive value of this diagnostic test is high and should only be used to consider de-escalating anti-MRSA therapy. A positive result may indicate  colonization with MRSA and must be correlated clinically.    COVID-19,CEPHEID/PERI,COR/KYLEE/PAD/CARMEN/MAD IN-HOUSE(OR EMERGENT/ADD-ON),NP SWAB IN TRANSPORT MEDIA 3-4 HR TAT, RT-PCR - Swab, Nasopharynx [[id number]]  (Normal) Collected: 08/27/23 0154    Lab Status: Final result Specimen: Swab from Nasopharynx Updated: 08/27/23 0346     COVID19 Not Detected    Narrative:      Fact sheet for providers: https://www.fda.gov/media/307093/download     Fact sheet for patients: https://www.fda.gov/media/129705/download  Fact sheet for providers: https://www.fda.gov/media/821038/download     Fact sheet for patients: https://www.fda.gov/media/016778/download    Influenza Antigen, Rapid - Swab, Nasopharynx [[id number]]  (Normal) Collected: 08/27/23 0154    Lab Status: Final result Specimen: Swab from Nasopharynx Updated: 08/27/23 0220     Influenza A Ag, EIA Negative     Influenza B Ag, EIA Negative    Blood Culture - Blood, Arm, Left [[id number]]  (Normal) Collected: 08/27/23 0044    Lab Status: Preliminary result Specimen: Blood from Arm, Left Updated: 08/29/23 0100     Blood Culture No growth at 2 days    Blood Culture - Blood, Arm, Left [[id number]]  (Normal) Collected: 08/27/23 0044    Lab Status: Preliminary result Specimen: Blood from Arm, Left Updated: 08/29/23 0100     Blood Culture No growth at 2 days              Results for orders placed during the hospital encounter of 03/28/23    Adult Transthoracic Echo Complete W/ Cont if Necessary Per Protocol    Interpretation Summary  •  Left ventricular ejection fraction appears to be 56 - 60%.  •  Left ventricular diastolic function is consistent with (grade I) impaired relaxation and age.  •  No significant valvular disease.  •  Mild left atrial enlargement.      Time spent on Discharge including face to face service: Greater than 30 minutes      Electronically signed by Yung Singleton MD, 08/29/23, 10:17 AM EDT.

## 2023-08-29 NOTE — PLAN OF CARE
Goal Outcome Evaluation:  Plan of Care Reviewed With: patient        Progress: no change  Outcome Evaluation: Patient has experienced decline in function from baseline status, presenting w/ deficits related to balance, task tolerance, transfers and mobility that impede patient independence with activities of daily living.  Patient would benefit from skilled Occupational Therapy intervention to maxamize patient safety, and promote return to baseline independence.      Anticipated Discharge Disposition (OT): home with home health

## 2023-08-29 NOTE — PLAN OF CARE
Goal Outcome Evaluation:  Plan of Care Reviewed With: patient        Progress: no change  Outcome Evaluation: Pt denies pain/discomfort this shift. Pt has had occassional nonproductive cough this shift, administered prn cough meds as ordered. Pt is resting well this shift. No new issues or new needs noted at this time.

## 2023-08-29 NOTE — THERAPY EVALUATION
Patient Name: Dilip Faulkner  : 1950    MRN: [id number]                              Today's Date: 2023       Admit Date: 2023    Visit Dx:     ICD-10-CM ICD-9-CM   1. Acute respiratory failure with hypoxia  J96.01 518.81   2. Sepsis, due to unspecified organism, unspecified whether acute organ dysfunction present  A41.9 038.9     995.91   3. COPD with acute exacerbation  J44.1 491.21   4. Pneumonia due to infectious organism, unspecified laterality, unspecified part of lung  J18.9 486   5. Difficulty in walking  R26.2 719.7   6. Decreased activities of daily living (ADL)  Z78.9 V49.89     Patient Active Problem List   Diagnosis    Chronic obstructive pulmonary disease with acute exacerbation    Acute on chronic respiratory failure with hypoxia and hypercapnia    Type 2 diabetes mellitus    Acute on chronic respiratory failure    Essential hypertension    Hyperlipidemia    Cough    Pneumonia due to infectious organism    COPD with acute exacerbation    Obesity (BMI 30-39.9)    Right bundle branch block    Acquired ptosis of eyelid, bilateral    Type 2 diabetes mellitus with hyperglycemia, with long-term current use of insulin    Acute exacerbation of chronic obstructive pulmonary disease (COPD)    Respiratory failure with hypoxia    Gastroesophageal reflux disease without esophagitis    COPD (chronic obstructive pulmonary disease)    Acute respiratory failure with hypoxia    Noncompliance with CPAP treatment    Unstable angina    COPD exacerbation    Acute respiratory failure with hypoxia and hypercapnia    Aspiration pneumonia of left lower lobe due to gastric secretions    Sepsis     Past Medical History:   Diagnosis Date    Asthma     COPD (chronic obstructive pulmonary disease)     Diabetes mellitus     Ex-smoker     QUIT     Hernia, umbilical     Hypertension     On home O2     REPORTS WEARING 2L/NC PRN SOA     Past Surgical History:   Procedure Laterality Date    ABDOMINAL SURGERY         General Information       Row Name 08/29/23 1035          OT Time and Intention    Document Type evaluation  -ES     Mode of Treatment individual therapy;occupational therapy  -ES       Row Name 08/29/23 1035          General Information    Patient Profile Reviewed yes  -ES     Prior Level of Function independent:;ADL's;all household mobility;community mobility  Patient reports independent with ADLs at baseline. Cane as needed for functional mobility. Tub/shower combo, shower chair as needed. [place] in stance. 2L NC PRN. Denies recent falls.  -ES     Existing Precautions/Restrictions fall;oxygen therapy device and L/min  -ES     Barriers to Rehab none identified  -ES       Row Name 08/29/23 1035          Occupational Profile    Reason for Services/Referral (Occupational Profile) Patient is 73 yr old male admitted to [facility] on 8/27/2023 with reports of shortness of breath. OT evaluation and treatment ordered d/t recent decline in ADLs/transfer ability and discharge planning recommendations. No previous OT services for current condition.  -ES       Row Name 08/29/23 1035          Living Environment    People in Home sibling(s)  sisters  -ES       Row Name 08/29/23 1035          Home Main Entrance    Number of Stairs, Main Entrance none  -ES       Row Name 08/29/23 1035          Stairs Within Home, Primary    Number of Stairs, Within Home, Primary none  -ES       Row Name 08/29/23 1035          Cognition    Orientation Status (Cognition) oriented x 3  patient pleasant and cooperative, agreeable to therapy participation  -ES       Row Name 08/29/23 1035          Safety Issues, Functional Mobility    Impairments Affecting Function (Mobility) endurance/activity tolerance;shortness of breath;balance  -ES               User Key  (r) = Recorded By, (t) = Taken By, (c) = Cosigned By      Initials Name Provider Type    ES Alyx Beckham, OTR/L, CSRS Occupational Therapist                     Mobility/ADL's        Row Name 08/29/23 1041          Bed Mobility    Bed Mobility supine-sit;sit-supine  -ES     Supine-Sit [place] (Bed Mobility) standby assist  -ES     Sit-Supine [place] (Bed Mobility) standby assist  -ES       Row Name 08/29/23 1041          Transfers    Transfers sit-stand transfer;stand-sit transfer  -ES       Row Name 08/29/23 1041          Sit-Stand Transfer    Sit-Stand [place] (Transfers) standby assist  -ES     Assistive Device (Sit-Stand Transfers) walker, front-wheeled  -ES       Row Name 08/29/23 1041          Stand-Sit Transfer    Stand-Sit [place] (Transfers) standby assist  -ES     Assistive Device (Stand-Sit Transfers) walker, front-wheeled  -ES       Row Name 08/29/23 1041          Activities of Daily Living    BADL Assessment/Intervention bathing;upper body dressing;lower body dressing;grooming;feeding;toileting  -ES       Row Name 08/29/23 1041          Bathing Assessment/Intervention    [place] Level (Bathing) bathing skills;set up;contact guard assist  -ES       Row Name 08/29/23 1041          Upper Body Dressing Assessment/Training    [place] Level (Upper Body Dressing) upper body dressing skills;set up  -ES       Row Name 08/29/23 1041          Lower Body Dressing Assessment/Training    [place] Level (Lower Body Dressing) lower body dressing skills;minimum assist (75% patient effort)  -ES       Row Name 08/29/23 1041          Grooming Assessment/Training    [place] Level (Grooming) grooming skills;set up  -ES       Row Name 08/29/23 1041          Self-Feeding Assessment/Training    [place] Level (Feeding) feeding skills;set up  -ES       Row Name 08/29/23 1041          Toileting Assessment/Training    [place] Level (Toileting) toileting skills;contact guard assist  -ES     Position (Toileting) unsupported standing  -ES               User Key  (r) = Recorded By, (t) = Taken By, (c) = Cosigned By      Initials Name Provider Type    BENJI Beckham  Alyx, OTR/L, CSRS Occupational Therapist                   Obj/Interventions       Row Name 08/29/23 1042          Sensory Assessment (Somatosensory)    Sensory Assessment (Somatosensory) sensation intact  -ES       Row Name 08/29/23 1042          Vision Assessment/Intervention    Visual Impairment/Limitations WFL  -ES       Row Name 08/29/23 1042          Range of Motion Comprehensive    General Range of Motion no range of motion deficits identified  -ES       Row Name 08/29/23 1042          Strength Comprehensive (MMT)    General Manual Muscle Testing (MMT) Assessment no strength deficits identified  -ES     Comment, General Manual Muscle Testing (MMT) Assessment BUEs assessed 4+/5  -ES       Row Name 08/29/23 1042          Motor Skills    Motor Skills functional endurance  -ES     Functional Endurance fair minus. Patient demonstrates shortness of breath with functional mobility on NCL  -ES       Row Name 08/29/23 1042          Balance    Balance Assessment sitting dynamic balance;standing dynamic balance  -ES     Dynamic Sitting Balance independent  -ES     Position, Sitting Balance unsupported;sitting edge of bed  -ES     Dynamic Standing Balance contact guard;1-person assist  -ES     Position/Device Used, Standing Balance supported;walker, front-wheeled  -ES               User Key  (r) = Recorded By, (t) = Taken By, (c) = Cosigned By      Initials Name Provider Type    ES Alyx Beckham, OTR/L, CSRS Occupational Therapist                   Goals/Plan       Row Name 08/29/23 1044          Transfer Goal 1 (OT)    Activity/Assistive Device (Transfer Goal 1, OT) transfers, all  -ES     [place] Level/Cues Needed (Transfer Goal 1, OT) modified independence  -ES     Time Frame (Transfer Goal 1, OT) long term goal (LTG);10 days  -ES       Row Name 08/29/23 1044          Bathing Goal 1 (OT)    Activity/Device (Bathing Goal 1, OT) bathing skills, all  -ES     [place] Level/Cues Needed (Bathing Goal 1, OT)  modified independence  -ES     Time Frame (Bathing Goal 1, OT) long term goal (LTG);10 days  -ES       Row Name 08/29/23 1044          Dressing Goal 1 (OT)    Activity/Device (Dressing Goal 1, OT) dressing skills, all  -ES     [place]/Cues Needed (Dressing Goal 1, OT) modified independence  -ES     Time Frame (Dressing Goal 1, OT) long term goal (LTG);10 days  -ES       Row Name 08/29/23 1044          Toileting Goal 1 (OT)    Activity/Device (Toileting Goal 1, OT) toileting skills, all  -ES     [place] Level/Cues Needed (Toileting Goal 1, OT) modified independence  -ES     Time Frame (Toileting Goal 1, OT) long term goal (LTG);10 days  -ES       Row Name 08/29/23 1044          Grooming Goal 1 (OT)    Activity/Device (Grooming Goal 1, OT) grooming skills, all  -ES     [place] (Grooming Goal 1, OT) modified independence  -ES     Time Frame (Grooming Goal 1, OT) long term goal (LTG);10 days  -ES       Row Name 08/29/23 1044          Problem Specific Goal 1 (OT)    Problem Specific Goal 1 (OT) Patient will demonstrate good task tolerance in preperation for independent ADL routine completion at time of discharge  -ES     Time Frame (Problem Specific Goal 1, OT) long term goal (LTG);10 days  -ES       Row Name 08/29/23 1044          Therapy Assessment/Plan (OT)    Planned Therapy Interventions (OT) activity tolerance training;BADL retraining;functional balance retraining;occupation/activity based interventions;patient/caregiver education/training;transfer/mobility retraining  -ES               User Key  (r) = Recorded By, (t) = Taken By, (c) = Cosigned By      Initials Name Provider Type    ES Alyx Beckham, OTR/L, CSRS Occupational Therapist                   Clinical Impression       Row Name 08/29/23 1043          Plan of Care Review    Plan of Care Reviewed With patient  -ES     Progress no change  -ES     Outcome Evaluation Patient has experienced decline in function from baseline status, presenting w/  deficits related to balance, task tolerance, transfers and mobility that impede patient independence with activities of daily living.  Patient would benefit from skilled Occupational Therapy intervention to maxamize patient safety, and promote return to baseline independence.  -ES       Row Name 08/29/23 1043          Therapy Assessment/Plan (OT)    Rehab Potential (OT) good, to achieve stated therapy goals  -ES     Criteria for Skilled Therapeutic Interventions Met (OT) yes;skilled treatment is necessary;meets criteria  -ES       Row Name 08/29/23 1043          Therapy Plan Review/Discharge Plan (OT)    Anticipated Discharge Disposition (OT) home with home health  -ES       Row Name 08/29/23 1043          Vital Signs    O2 Delivery Pre Treatment nasal cannula  -ES     O2 Delivery Intra Treatment nasal cannula  -ES     O2 Delivery Post Treatment nasal cannula  -ES               User Key  (r) = Recorded By, (t) = Taken By, (c) = Cosigned By      Initials Name Provider Type    ES Alyx Beckham, OTR/L, CSRS Occupational Therapist                   Outcome Measures       Row Name 08/29/23 1045          How much help from another is currently needed...    Putting on and taking off regular lower body clothing? 3  -ES     Bathing (including washing, rinsing, and drying) 3  -ES     Toileting (which includes using toilet bed pan or urinal) 3  -ES     Putting on and taking off regular upper body clothing 4  -ES     Taking care of personal grooming (such as brushing teeth) 4  -ES     Eating meals 4  -ES     AM-PAC 6 Clicks Score (OT) 21  -ES       Row Name 08/29/23 1045          Functional Assessment    Outcome Measure Options AM-PAC 6 Clicks Daily Activity (OT);Optimal Instrument  -ES       Row Name 08/29/23 1045          Optimal Instrument    Optimal Instrument Optimal - 3  -ES     Bending/Stooping 2  -ES     Balancing 2  -ES     Standing 2  -ES     From the list, choose the 3 activities you would most like to be able to do  without any difficulty Bending/stooping;Standing;Balancing  -ES     Total Score Optimal - 3 6  -ES               User Key  (r) = Recorded By, (t) = Taken By, (c) = Cosigned By      Initials Name Provider Type    Alyx Rivas OTR/L, JAZ Occupational Therapist                      OT Recommendation and Plan  Planned Therapy Interventions (OT): activity tolerance training, BADL retraining, functional balance retraining, occupation/activity based interventions, patient/caregiver education/training, transfer/mobility retraining  Plan of Care Review  Plan of Care Reviewed With: patient  Progress: no change  Outcome Evaluation: Patient has experienced decline in function from baseline status, presenting w/ deficits related to balance, task tolerance, transfers and mobility that impede patient independence with activities of daily living.  Patient would benefit from skilled Occupational Therapy intervention to maxamize patient safety, and promote return to baseline independence.     Time Calculation:   Evaluation Complexity (OT)  Review Occupational Profile/Medical/Therapy History Complexity: brief/low complexity  Assessment, Occupational Performance/Identification of Deficit Complexity: 3-5 performance deficits  Clinical Decision Making Complexity (OT): problem focused assessment/low complexity  Overall Complexity of Evaluation (OT): low complexity     Time Calculation- OT       Row Name 08/29/23 1050             Time Calculation- OT    OT Received On 08/29/23  -ES      OT Goal Re-Cert Due Date 09/07/23  -ES         Untimed Charges    OT Eval/Re-eval Minutes 33  -ES         Total Minutes    Untimed Charges Total Minutes 33  -ES       Total Minutes 33  -ES                User Key  (r) = Recorded By, (t) = Taken By, (c) = Cosigned By      Initials Name Provider Type    Alyx Rivas OTR/L, JAZ Occupational Therapist                  Therapy Charges for Today       Code Description Service Date Service Provider  Modifiers Qty    [id number] HC OT EVAL LOW COMPLEXITY 3 8/29/2023 Alyx Beckham, OTR/L, CSRS GO 1                 KAREN Green/L, CSRS  8/29/2023

## 2023-08-30 ENCOUNTER — TRANSITIONAL CARE MANAGEMENT TELEPHONE ENCOUNTER (OUTPATIENT)
Dept: CALL CENTER | Facility: HOSPITAL | Age: 73
End: 2023-08-30
Payer: MEDICARE

## 2023-08-30 NOTE — OUTREACH NOTE
Call Center TCM Note      Flowsheet Row Responses   [facility] patient discharged from? [facility]   Does the patient have one of the following disease processes/diagnoses(primary or secondary)? Sepsis   TCM attempt successful? Yes   Call start time 1053   Call end time 1055   Discharge diagnosis Sepsis secondary to pneumonia,  COPD   Meds reviewed with patient/caregiver? Yes   Is the patient having any side effects they believe may be caused by any medication additions or changes? No   Does the patient have all medications related to this admission filled (includes all antibiotics, inhalers, nebulizers,steroids,etc.) Yes   Is the patient taking all medications as directed (includes completed medication regime)? Yes   Comments scheduled hospital f/u with PCP for 9/1.   Does the patient have an appointment with their PCP within 7-14 days of discharge? Yes   Has home health visited the patient within 72 hours of discharge? N/A   DME comments Using home O2 at 2L as needed   Psychosocial issues? No   Did the patient receive a copy of their discharge instructions? Yes   Nursing interventions Reviewed instructions with patient   What is the patient's perception of their health status since discharge? Improving   Nursing interventions Nurse provided patient education   Is the patient/caregiver able to teach back TIME? T emperature - higher or lower than normal, I nfection - may have signs and symptoms of an infection, M ental Decline - confused, sleepy, difficult to arouse, E xtremely Ill - severe pain, discomfort, shortness of breath   Nursing interventions Nurse provided patient education   Is the patient/caregiver able to teach back signs and symptoms of worsening condition: Fever   Is the patient/caregiver able to teach back the hierarchy of who to call/visit for symptoms/problems? PCP, Specialist, Home health nurse, Urgent Care, ED, 911 Yes   TCM call completed? Yes   Wrap up additional comments Doing well, no  questions, scheduled hospital f/u appt with PCP for 9/1.   Call end time 1055   Would this patient benefit from a Referral to [facility] Social Work? No   Is the patient interested in additional calls from an ambulatory ? No            Tianna Lepe RN    8/30/2023, 10:55 EDT

## 2023-08-31 ENCOUNTER — TELEPHONE (OUTPATIENT)
Dept: CASE MANAGEMENT | Facility: OTHER | Age: 73
End: 2023-08-31
Payer: MEDICARE

## 2023-08-31 NOTE — TELEPHONE ENCOUNTER
Patient was identified as potential CCM/HRCM Program candidate from ER list. He was admitted from 8/27-8/29 with Sepsis PNA, COPD. He has had 5 admits this year and one ER visit.   UTR x1.  Mallorie Haywood RN  Ambulatory Case Management  8/31/2023, 11:30 EDT

## 2023-09-01 ENCOUNTER — OFFICE VISIT (OUTPATIENT)
Dept: FAMILY MEDICINE CLINIC | Facility: CLINIC | Age: 73
End: 2023-09-01
Payer: MEDICARE

## 2023-09-01 VITALS
WEIGHT: 251.6 LBS | HEART RATE: 65 BPM | DIASTOLIC BLOOD PRESSURE: 72 MMHG | BODY MASS INDEX: 33.34 KG/M2 | SYSTOLIC BLOOD PRESSURE: 115 MMHG | OXYGEN SATURATION: 93 % | HEIGHT: 73 IN

## 2023-09-01 DIAGNOSIS — Z09 HOSPITAL DISCHARGE FOLLOW-UP: Primary | ICD-10-CM

## 2023-09-01 LAB
BACTERIA SPEC AEROBE CULT: NORMAL

## 2023-09-01 RX ORDER — FLUTICASONE PROPIONATE AND SALMETEROL 250; 50 UG/1; UG/1
1 POWDER RESPIRATORY (INHALATION) 2 TIMES DAILY
Qty: 1 EACH | Refills: 5 | Status: ON HOLD | OUTPATIENT
Start: 2023-09-01 | End: 2024-02-28

## 2023-09-01 RX ORDER — ATORVASTATIN CALCIUM 10 MG/1
10 TABLET, FILM COATED ORAL DAILY
Qty: 90 TABLET | Refills: 1 | Status: ON HOLD | OUTPATIENT
Start: 2023-09-01 | End: 2024-02-28

## 2023-09-01 NOTE — PROGRESS NOTES
Chief Complaint  Follow-up (Pt is here for a f/u from the hospital on 8/27. Pt is doing well no issues or concerns at this time. )    SUBJECTIVE  Dilip Faulkner presents to [facility]    History of Present Illness  Past Medical History:   Diagnosis Date    Asthma     COPD (chronic obstructive pulmonary disease)     Diabetes mellitus     Ex-smoker     QUIT 2021    Hernia, umbilical     Hypertension     On home O2     REPORTS WEARING 2L/NC PRN SOA      No family history on file.   Past Surgical History:   Procedure Laterality Date    ABDOMINAL SURGERY          Current Outpatient Medications:     albuterol sulfate  (90 Base) MCG/ACT inhaler, Inhale 2 puffs Every 4 (Four) Hours As Needed for Wheezing., Disp: 8 g, Rfl: 2    aspirin 81 MG chewable tablet, Chew 1 tablet Daily., Disp: 90 tablet, Rfl: 1    atorvastatin (LIPITOR) 10 MG tablet, Take 1 tablet by mouth Daily for 180 days., Disp: 90 tablet, Rfl: 1    cetirizine (zyrTEC) 10 MG tablet, Take 1 tablet by mouth Daily., Disp: 90 tablet, Rfl: 1    empagliflozin (JARDIANCE) 10 MG tablet tablet, Take 1 tablet by mouth Daily., Disp: , Rfl:     insulin degludec (Tresiba FlexTouch) 100 UNIT/ML solution pen-injector injection, Inject 20 Units under the skin into the appropriate area as directed Daily., Disp: 6 mL, Rfl: 2    pantoprazole (PROTONIX) 40 MG EC tablet, Take 1 tablet by mouth Daily for 30 days., Disp: 30 tablet, Rfl: 0    Fluticasone-Salmeterol (ADVAIR/WIXELA) 250-50 MCG/ACT DISKUS, Inhale 1 puff 2 (Two) Times a Day for 180 days., Disp: 1 each, Rfl: 5    lisinopril (PRINIVIL,ZESTRIL) 5 MG tablet, Take 1 tablet by mouth Daily for 30 days., Disp: 90 tablet, Rfl: 1    metFORMIN (GLUCOPHAGE) 500 MG tablet, Take 1 tablet by mouth 2 (Two) Times a Day With Meals for 30 days., Disp: 60 tablet, Rfl: 5    metoprolol succinate XL (TOPROL-XL) 25 MG 24 hr tablet, Take 1 tablet by mouth Daily for 30 days., Disp: 90 tablet, Rfl:  "1    OBJECTIVE  Vital Signs:   /72   Pulse 65   Ht 185.4 cm (72.99\")   Wt 114 kg (251 lb 9.6 oz)   SpO2 93%   BMI 33.20 kg/m²    Estimated body mass index is 33.2 kg/m² as calculated from the following:    Height as of this encounter: 185.4 cm (72.99\").    Weight as of this encounter: 114 kg (251 lb 9.6 oz).     Wt Readings from Last 3 Encounters:   09/01/23 114 kg (251 lb 9.6 oz)   08/29/23 115 kg (252 lb 10.4 oz)   08/14/23 115 kg (253 lb)     BP Readings from Last 3 Encounters:   09/01/23 115/72   08/29/23 136/71   08/14/23 123/81       Physical Exam  Vitals and nursing note reviewed.   Constitutional:       Appearance: Normal appearance.   HENT:      Head: Normocephalic and atraumatic.      Nose: Nose normal.      Mouth/Throat:      Mouth: Mucous membranes are moist.   Eyes:      Conjunctiva/sclera: Conjunctivae normal.      Pupils: Pupils are equal, round, and reactive to light.   Cardiovascular:      Rate and Rhythm: Normal rate and regular rhythm.      Pulses: Normal pulses.      Heart sounds: Normal heart sounds.   Pulmonary:      Effort: Pulmonary effort is normal.      Breath sounds: Wheezing present.   Abdominal:      General: Abdomen is flat.      Palpations: Abdomen is soft.   Musculoskeletal:         General: Normal range of motion.      Cervical back: Normal range of motion and neck supple.   Skin:     General: Skin is warm and dry.   Neurological:      General: No focal deficit present.      Mental Status: He is alert and oriented to person, place, and time. Mental status is at baseline.   Psychiatric:         Mood and Affect: Mood normal.         Behavior: Behavior normal.         Thought Content: Thought content normal.         Judgment: Judgment normal.        Result Review    Common labs          8/14/2023    16:17 8/27/2023    00:32 8/28/2023    04:28   Common Labs   Glucose  148  122    BUN  15  22    Creatinine  1.09  0.92    Sodium  137  138    Potassium  4.6  4.1    Chloride  100  " 104    Calcium  8.9  8.5    Albumin  4.0     Total Bilirubin  0.3     Alkaline Phosphatase  94     AST (SGOT)  12     ALT (SGPT)  10     WBC  14.78  13.97    Hemoglobin  12.0  10.2    Hematocrit  42.9  37.2    Platelets  229  198    Hemoglobin A1C 6.8          CT Chest Without Contrast Diagnostic    Result Date: 6/7/2023    1. Evidence for potential developing infiltrate within left lower lobe superimposed on chronic changes.  The findings may indicate developing left lower lobe pneumonia and possible aspiration pneumonia.  Recommend correlation for signs or symptoms of acute infection and follow-up to ensure improvement. 2. Evidence for marked gastroesophageal reflux with fluid and debris throughout the esophagus. 3. Moderate coronary artery calcifications are noted.      SHRUTI CUTLER MD       Electronically Signed and Approved By: SHRUTI CUTLER MD on 6/07/2023 at 18:06             XR Chest 1 View    Result Date: 8/27/2023    No acute infiltrate is appreciated.  No significant interval change since 6/6/2023.  Probably no cardiac enlargement.    Please note that portions of this note were completed with a voice recognition program.  SEGUNDO MORA JR, MD       Electronically Signed and Approved By: SEGUNDO MORA JR, MD on 8/27/2023 at 2:17              XR Chest 1 View    Result Date: 6/6/2023   Small left pleural effusion is slightly larger than previous exam.  Bilateral basilar infiltrates could be atelectasis or pneumonia.  JOANIE ANGELES MD       Electronically Signed and Approved By: JOANIE ANGELES MD on 6/06/2023 at 12:12             XR Chest 1 View    Result Date: 5/8/2023    1. Small, left greater than right pleural effusions and bibasilar opacities either atelectasis or pneumonia.       JEF BAEZ MD       Electronically Signed and Approved By: JEF BAEZ MD on 5/08/2023 at 16:11                 The above data has been reviewed by MONE Buchanan 09/01/2023 15:17 EDT.          Patient Care  Team:  Mallorie Roa APRN as PCP - General (Emergency Medicine)  Rosalba Edwards APRN (Nurse Practitioner)  Mallorie Haywood, RN as Ambulatory  (Population Health)            ASSESSMENT & PLAN    There are no diagnoses linked to this encounter.     Tobacco Use: High Risk    Smoking Tobacco Use: Former    Smokeless Tobacco Use: Current    Passive Exposure: Not on file       Follow Up     No follow-ups on file.      Patient was given instructions and counseling regarding his condition or for health maintenance advice. Please see specific information pulled into the AVS if appropriate.   I have reviewed information obtained and documented by others and I have confirmed the accuracy of this documented note.    MONE Buchanan

## 2023-09-05 ENCOUNTER — PATIENT OUTREACH (OUTPATIENT)
Dept: CASE MANAGEMENT | Facility: OTHER | Age: 73
End: 2023-09-05
Payer: MEDICARE

## 2023-09-05 NOTE — OUTREACH NOTE
AMBULATORY CASE MANAGEMENT NOTE    Name and Relationship of Patient/Support Person:  -     Patient Outreach    Patient identified as potential CCM/HRCM Program candidate from ER list, he's been hospitalized a few times this year and had a couple ER visits. Patient states he is doing well, and doesn't need any extra help.   Enrolled in [facility] and made pt an appointment with new PCP, his PCP is transferring to a [place] office and he said he does not want to travel, will go with someone else in the office.   Reminded him about his new pt appointment with Pulmonology, he said he didn't know he had one. Most of his ER/Hospital issues look to be lung related, educated about this and stressed importance of making this appointment.   Will call again next month to remind him then DC.  Education Documentation  No documentation found.    Mallorie MILLER  Ambulatory Case Management  9/5/2023, 12:29 EDT

## 2023-09-08 ENCOUNTER — READMISSION MANAGEMENT (OUTPATIENT)
Dept: CALL CENTER | Facility: HOSPITAL | Age: 73
End: 2023-09-08
Payer: MEDICARE

## 2023-09-08 NOTE — OUTREACH NOTE
Sepsis Week 2 Survey      Flowsheet Row Responses   [facility] patient discharged from? [facility]   Does the patient have one of the following disease processes/diagnoses(primary or secondary)? Sepsis   Week 2 attempt successful? No   Unsuccessful attempts Attempt 1            Mariana BECKHAM - Licensed Nurse

## 2023-09-11 ENCOUNTER — APPOINTMENT (OUTPATIENT)
Dept: GENERAL RADIOLOGY | Facility: HOSPITAL | Age: 73
DRG: 190 | End: 2023-09-11
Payer: MEDICARE

## 2023-09-11 ENCOUNTER — READMISSION MANAGEMENT (OUTPATIENT)
Dept: CALL CENTER | Facility: HOSPITAL | Age: 73
End: 2023-09-11
Payer: MEDICARE

## 2023-09-11 ENCOUNTER — HOSPITAL ENCOUNTER (INPATIENT)
Facility: HOSPITAL | Age: 73
LOS: 3 days | Discharge: HOME | DRG: 190 | End: 2023-09-14
Attending: EMERGENCY MEDICINE | Admitting: STUDENT IN AN ORGANIZED HEALTH CARE EDUCATION/TRAINING PROGRAM
Payer: MEDICARE

## 2023-09-11 DIAGNOSIS — Z74.09 IMPAIRED MOBILITY AND ADLS: ICD-10-CM

## 2023-09-11 DIAGNOSIS — R31.29 MICROSCOPIC HEMATURIA: ICD-10-CM

## 2023-09-11 DIAGNOSIS — Z78.9 IMPAIRED MOBILITY AND ADLS: ICD-10-CM

## 2023-09-11 DIAGNOSIS — J44.1 COPD WITH ACUTE EXACERBATION: ICD-10-CM

## 2023-09-11 DIAGNOSIS — R26.2 DIFFICULTY IN WALKING: ICD-10-CM

## 2023-09-11 DIAGNOSIS — J96.02 ACUTE RESPIRATORY FAILURE WITH HYPOXIA AND HYPERCAPNIA: Primary | ICD-10-CM

## 2023-09-11 DIAGNOSIS — J96.01 ACUTE RESPIRATORY FAILURE WITH HYPOXIA AND HYPERCAPNIA: Primary | ICD-10-CM

## 2023-09-11 LAB
ALBUMIN SERPL-MCNC: 4.1 G/DL (ref 3.5–5.2)
ALBUMIN/GLOB SERPL: 1.2 G/DL
ALP SERPL-CCNC: 106 U/L (ref 39–117)
ALT SERPL W P-5'-P-CCNC: 9 U/L (ref 1–41)
ANION GAP SERPL CALCULATED.3IONS-SCNC: 9.1 MMOL/L (ref 5–15)
ARTERIAL PATENCY WRIST A: POSITIVE
AST SERPL-CCNC: 13 U/L (ref 1–40)
B PARAPERT DNA SPEC QL NAA+PROBE: NOT DETECTED
B PERT DNA SPEC QL NAA+PROBE: NOT DETECTED
BACTERIA UR QL AUTO: ABNORMAL /HPF
BASE EXCESS BLDA CALC-SCNC: -1.1 MMOL/L (ref -2–2)
BASE EXCESS BLDA CALC-SCNC: -3.3 MMOL/L (ref -2–2)
BASE EXCESS BLDA CALC-SCNC: -5.4 MMOL/L (ref -2–2)
BASOPHILS # BLD AUTO: 0.07 10*3/MM3 (ref 0–0.2)
BASOPHILS NFR BLD AUTO: 0.6 % (ref 0–1.5)
BDY SITE: ABNORMAL
BILIRUB SERPL-MCNC: 0.4 MG/DL (ref 0–1.2)
BILIRUB UR QL STRIP: NEGATIVE
BUN SERPL-MCNC: 15 MG/DL (ref 8–23)
BUN/CREAT SERPL: 15 (ref 7–25)
C PNEUM DNA NPH QL NAA+NON-PROBE: NOT DETECTED
CA-I BLDA-SCNC: 1.14 MMOL/L (ref 1.13–1.32)
CALCIUM SPEC-SCNC: 8.8 MG/DL (ref 8.6–10.5)
CHLORIDE BLDA-SCNC: 103 MMOL/L (ref 98–106)
CHLORIDE SERPL-SCNC: 102 MMOL/L (ref 98–107)
CLARITY UR: CLEAR
CO2 SERPL-SCNC: 26.9 MMOL/L (ref 22–29)
COHGB MFR BLD: 0.9 % (ref 0–1.5)
COHGB MFR BLD: 0.9 % (ref 0–1.5)
COHGB MFR BLD: 1.2 % (ref 0–1.5)
COLOR UR: YELLOW
CREAT SERPL-MCNC: 1 MG/DL (ref 0.76–1.27)
D-LACTATE SERPL-SCNC: 1 MMOL/L (ref 0.5–2)
DEPRECATED RDW RBC AUTO: 50.7 FL (ref 37–54)
EGFRCR SERPLBLD CKD-EPI 2021: 79 ML/MIN/1.73
EOSINOPHIL # BLD AUTO: 1.43 10*3/MM3 (ref 0–0.4)
EOSINOPHIL NFR BLD AUTO: 13 % (ref 0.3–6.2)
ERYTHROCYTE [DISTWIDTH] IN BLOOD BY AUTOMATED COUNT: 17.6 % (ref 12.3–15.4)
FHHB: 1.2 % (ref 0–5)
FHHB: 4.8 % (ref 0–5)
FHHB: 5.5 % (ref 0–5)
FLUAV AG NPH QL: NEGATIVE
FLUAV SUBTYP SPEC NAA+PROBE: NOT DETECTED
FLUBV AG NPH QL IA: NEGATIVE
FLUBV RNA ISLT QL NAA+PROBE: NOT DETECTED
GAS FLOW AIRWAY: ABNORMAL L/MIN
GLOBULIN UR ELPH-MCNC: 3.5 GM/DL
GLUCOSE BLDA-MCNC: 157 MG/DL (ref 70–99)
GLUCOSE BLDC GLUCOMTR-MCNC: 138 MG/DL (ref 70–99)
GLUCOSE BLDC GLUCOMTR-MCNC: 159 MG/DL (ref 70–99)
GLUCOSE SERPL-MCNC: 160 MG/DL (ref 65–99)
GLUCOSE UR STRIP-MCNC: ABNORMAL MG/DL
HADV DNA SPEC NAA+PROBE: NOT DETECTED
HCO3 BLDA-SCNC: 24.1 MMOL/L (ref 22–26)
HCO3 BLDA-SCNC: 25.3 MMOL/L (ref 22–26)
HCO3 BLDA-SCNC: 26.3 MMOL/L (ref 22–26)
HCOV 229E RNA SPEC QL NAA+PROBE: NOT DETECTED
HCOV HKU1 RNA SPEC QL NAA+PROBE: NOT DETECTED
HCOV NL63 RNA SPEC QL NAA+PROBE: NOT DETECTED
HCOV OC43 RNA SPEC QL NAA+PROBE: NOT DETECTED
HCT VFR BLD AUTO: 43.4 % (ref 37.5–51)
HGB BLD-MCNC: 12.1 G/DL (ref 13–17.7)
HGB BLDA-MCNC: 12.4 G/DL (ref 13.8–16.4)
HGB BLDA-MCNC: 12.5 G/DL (ref 13.8–16.4)
HGB BLDA-MCNC: 12.9 G/DL (ref 13.8–16.4)
HGB UR QL STRIP.AUTO: ABNORMAL
HMPV RNA NPH QL NAA+NON-PROBE: NOT DETECTED
HOLD SPECIMEN: NORMAL
HOLD SPECIMEN: NORMAL
HPIV1 RNA ISLT QL NAA+PROBE: NOT DETECTED
HPIV2 RNA SPEC QL NAA+PROBE: NOT DETECTED
HPIV3 RNA NPH QL NAA+PROBE: NOT DETECTED
HPIV4 P GENE NPH QL NAA+PROBE: NOT DETECTED
HYALINE CASTS UR QL AUTO: ABNORMAL /LPF
IMM GRANULOCYTES # BLD AUTO: 0.06 10*3/MM3 (ref 0–0.05)
IMM GRANULOCYTES NFR BLD AUTO: 0.5 % (ref 0–0.5)
INHALED O2 CONCENTRATION: 100 %
INHALED O2 CONCENTRATION: 32 %
INHALED O2 CONCENTRATION: 32 %
KETONES UR QL STRIP: NEGATIVE
LACTATE BLDA-SCNC: 0.92 MMOL/L (ref 0.5–2)
LACTATE BLDA-SCNC: ABNORMAL MMOL/L
LEUKOCYTE ESTERASE UR QL STRIP.AUTO: NEGATIVE
LYMPHOCYTES # BLD AUTO: 1.54 10*3/MM3 (ref 0.7–3.1)
LYMPHOCYTES NFR BLD AUTO: 14 % (ref 19.6–45.3)
M PNEUMO IGG SER IA-ACNC: NOT DETECTED
MCH RBC QN AUTO: 22.6 PG (ref 26.6–33)
MCHC RBC AUTO-ENTMCNC: 27.9 G/DL (ref 31.5–35.7)
MCV RBC AUTO: 81.1 FL (ref 79–97)
METHGB BLD QL: 0 % (ref 0–1.5)
METHGB BLD QL: 0.1 % (ref 0–1.5)
METHGB BLD QL: 0.3 % (ref 0–1.5)
MODALITY: ABNORMAL
MONOCYTES # BLD AUTO: 0.9 10*3/MM3 (ref 0.1–0.9)
MONOCYTES NFR BLD AUTO: 8.2 % (ref 5–12)
NEUTROPHILS NFR BLD AUTO: 6.97 10*3/MM3 (ref 1.7–7)
NEUTROPHILS NFR BLD AUTO: 63.7 % (ref 42.7–76)
NITRITE UR QL STRIP: NEGATIVE
NOTE: ABNORMAL
NRBC BLD AUTO-RTO: 0 /100 WBC (ref 0–0.2)
NT-PROBNP SERPL-MCNC: 446.1 PG/ML (ref 0–900)
OXYHGB MFR BLDV: 93.2 % (ref 94–99)
OXYHGB MFR BLDV: 94 % (ref 94–99)
OXYHGB MFR BLDV: 97.9 % (ref 94–99)
PCO2 BLDA: 55.8 MM HG (ref 35–45)
PCO2 BLDA: 62.5 MM HG (ref 35–45)
PCO2 BLDA: 66.9 MM HG (ref 35–45)
PH BLDA: 7.17 PH UNITS (ref 7.35–7.45)
PH BLDA: 7.22 PH UNITS (ref 7.35–7.45)
PH BLDA: 7.29 PH UNITS (ref 7.35–7.45)
PH UR STRIP.AUTO: 5.5 [PH] (ref 5–8)
PLATELET # BLD AUTO: 263 10*3/MM3 (ref 140–450)
PMV BLD AUTO: 9.9 FL (ref 6–12)
PO2 BLD: 232 MM[HG] (ref 0–500)
PO2 BLD: 269 MM[HG] (ref 0–500)
PO2 BLD: 275 MM[HG] (ref 0–500)
PO2 BLDA: 231.9 MM HG (ref 80–100)
PO2 BLDA: 86.1 MM HG (ref 80–100)
PO2 BLDA: 87.9 MM HG (ref 80–100)
POTASSIUM BLDA-SCNC: 4.8 MMOL/L (ref 3.5–5)
POTASSIUM SERPL-SCNC: 5 MMOL/L (ref 3.5–5.2)
PROT SERPL-MCNC: 7.6 G/DL (ref 6–8.5)
PROT UR QL STRIP: ABNORMAL
RBC # BLD AUTO: 5.35 10*6/MM3 (ref 4.14–5.8)
RBC # UR STRIP: ABNORMAL /HPF
REF LAB TEST METHOD: ABNORMAL
RHINOVIRUS RNA SPEC NAA+PROBE: NOT DETECTED
RSV RNA NPH QL NAA+NON-PROBE: NOT DETECTED
SAO2 % BLDCOA: 94.4 % (ref 95–99)
SAO2 % BLDCOA: 95.1 % (ref 95–99)
SAO2 % BLDCOA: 98.8 % (ref 95–99)
SARS-COV-2 RNA RESP QL NAA+PROBE: NOT DETECTED
SODIUM BLDA-SCNC: 137.1 MMOL/L (ref 136–146)
SODIUM SERPL-SCNC: 138 MMOL/L (ref 136–145)
SP GR UR STRIP: 1.02 (ref 1–1.03)
SQUAMOUS #/AREA URNS HPF: ABNORMAL /HPF
TROPONIN T SERPL HS-MCNC: 40 NG/L
UROBILINOGEN UR QL STRIP: ABNORMAL
WBC # UR STRIP: ABNORMAL /HPF
WBC NRBC COR # BLD: 10.97 10*3/MM3 (ref 3.4–10.8)
WHOLE BLOOD HOLD COAG: NORMAL
WHOLE BLOOD HOLD SPECIMEN: NORMAL

## 2023-09-11 PROCEDURE — 83880 ASSAY OF NATRIURETIC PEPTIDE: CPT | Performed by: EMERGENCY MEDICINE

## 2023-09-11 PROCEDURE — 36600 WITHDRAWAL OF ARTERIAL BLOOD: CPT | Performed by: EMERGENCY MEDICINE

## 2023-09-11 PROCEDURE — 93005 ELECTROCARDIOGRAM TRACING: CPT | Performed by: EMERGENCY MEDICINE

## 2023-09-11 PROCEDURE — 94799 UNLISTED PULMONARY SVC/PX: CPT

## 2023-09-11 PROCEDURE — 82375 ASSAY CARBOXYHB QUANT: CPT | Performed by: EMERGENCY MEDICINE

## 2023-09-11 PROCEDURE — 85025 COMPLETE CBC W/AUTO DIFF WBC: CPT | Performed by: EMERGENCY MEDICINE

## 2023-09-11 PROCEDURE — 82948 REAGENT STRIP/BLOOD GLUCOSE: CPT

## 2023-09-11 PROCEDURE — 84484 ASSAY OF TROPONIN QUANT: CPT | Performed by: EMERGENCY MEDICINE

## 2023-09-11 PROCEDURE — 0202U NFCT DS 22 TRGT SARS-COV-2: CPT | Performed by: STUDENT IN AN ORGANIZED HEALTH CARE EDUCATION/TRAINING PROGRAM

## 2023-09-11 PROCEDURE — 94640 AIRWAY INHALATION TREATMENT: CPT

## 2023-09-11 PROCEDURE — 71045 X-RAY EXAM CHEST 1 VIEW: CPT

## 2023-09-11 PROCEDURE — 83605 ASSAY OF LACTIC ACID: CPT | Performed by: EMERGENCY MEDICINE

## 2023-09-11 PROCEDURE — 87040 BLOOD CULTURE FOR BACTERIA: CPT | Performed by: EMERGENCY MEDICINE

## 2023-09-11 PROCEDURE — 93010 ELECTROCARDIOGRAM REPORT: CPT | Performed by: INTERNAL MEDICINE

## 2023-09-11 PROCEDURE — 87804 INFLUENZA ASSAY W/OPTIC: CPT | Performed by: STUDENT IN AN ORGANIZED HEALTH CARE EDUCATION/TRAINING PROGRAM

## 2023-09-11 PROCEDURE — 94660 CPAP INITIATION&MGMT: CPT

## 2023-09-11 PROCEDURE — 36415 COLL VENOUS BLD VENIPUNCTURE: CPT

## 2023-09-11 PROCEDURE — 82805 BLOOD GASES W/O2 SATURATION: CPT | Performed by: EMERGENCY MEDICINE

## 2023-09-11 PROCEDURE — 83050 HGB METHEMOGLOBIN QUAN: CPT | Performed by: STUDENT IN AN ORGANIZED HEALTH CARE EDUCATION/TRAINING PROGRAM

## 2023-09-11 PROCEDURE — 82805 BLOOD GASES W/O2 SATURATION: CPT | Performed by: STUDENT IN AN ORGANIZED HEALTH CARE EDUCATION/TRAINING PROGRAM

## 2023-09-11 PROCEDURE — 81001 URINALYSIS AUTO W/SCOPE: CPT | Performed by: EMERGENCY MEDICINE

## 2023-09-11 PROCEDURE — 25010000002 HEPARIN (PORCINE) PER 1000 UNITS: Performed by: STUDENT IN AN ORGANIZED HEALTH CARE EDUCATION/TRAINING PROGRAM

## 2023-09-11 PROCEDURE — 99223 1ST HOSP IP/OBS HIGH 75: CPT | Performed by: STUDENT IN AN ORGANIZED HEALTH CARE EDUCATION/TRAINING PROGRAM

## 2023-09-11 PROCEDURE — 36600 WITHDRAWAL OF ARTERIAL BLOOD: CPT | Performed by: STUDENT IN AN ORGANIZED HEALTH CARE EDUCATION/TRAINING PROGRAM

## 2023-09-11 PROCEDURE — 93005 ELECTROCARDIOGRAM TRACING: CPT

## 2023-09-11 PROCEDURE — 83050 HGB METHEMOGLOBIN QUAN: CPT | Performed by: EMERGENCY MEDICINE

## 2023-09-11 PROCEDURE — 82375 ASSAY CARBOXYHB QUANT: CPT | Performed by: STUDENT IN AN ORGANIZED HEALTH CARE EDUCATION/TRAINING PROGRAM

## 2023-09-11 PROCEDURE — 80053 COMPREHEN METABOLIC PANEL: CPT | Performed by: EMERGENCY MEDICINE

## 2023-09-11 PROCEDURE — 25010000002 AZITHROMYCIN PER 500 MG: Performed by: NURSE PRACTITIONER

## 2023-09-11 PROCEDURE — 99285 EMERGENCY DEPT VISIT HI MDM: CPT

## 2023-09-11 PROCEDURE — P9612 CATHETERIZE FOR URINE SPEC: HCPCS

## 2023-09-11 PROCEDURE — 94761 N-INVAS EAR/PLS OXIMETRY MLT: CPT

## 2023-09-11 RX ORDER — ARFORMOTEROL TARTRATE 15 UG/2ML
15 SOLUTION RESPIRATORY (INHALATION)
Status: DISCONTINUED | OUTPATIENT
Start: 2023-09-11 | End: 2023-09-14 | Stop reason: HOSPADM

## 2023-09-11 RX ORDER — DEXTROSE MONOHYDRATE 25 G/50ML
25 INJECTION, SOLUTION INTRAVENOUS
Status: DISCONTINUED | OUTPATIENT
Start: 2023-09-11 | End: 2023-09-14 | Stop reason: HOSPADM

## 2023-09-11 RX ORDER — NITROGLYCERIN 0.4 MG/1
0.4 TABLET SUBLINGUAL
Status: DISCONTINUED | OUTPATIENT
Start: 2023-09-11 | End: 2023-09-14 | Stop reason: HOSPADM

## 2023-09-11 RX ORDER — SODIUM CHLORIDE 0.9 % (FLUSH) 0.9 %
10 SYRINGE (ML) INJECTION AS NEEDED
Status: DISCONTINUED | OUTPATIENT
Start: 2023-09-11 | End: 2023-09-14 | Stop reason: HOSPADM

## 2023-09-11 RX ORDER — SODIUM CHLORIDE 9 MG/ML
40 INJECTION, SOLUTION INTRAVENOUS AS NEEDED
Status: DISCONTINUED | OUTPATIENT
Start: 2023-09-11 | End: 2023-09-14 | Stop reason: HOSPADM

## 2023-09-11 RX ORDER — METHYLPREDNISOLONE SODIUM SUCCINATE 40 MG/ML
40 INJECTION, POWDER, LYOPHILIZED, FOR SOLUTION INTRAMUSCULAR; INTRAVENOUS EVERY 8 HOURS
Status: DISCONTINUED | OUTPATIENT
Start: 2023-09-12 | End: 2023-09-13

## 2023-09-11 RX ORDER — PREDNISONE 20 MG/1
40 TABLET ORAL DAILY
Status: DISCONTINUED | OUTPATIENT
Start: 2023-09-12 | End: 2023-09-11

## 2023-09-11 RX ORDER — HEPARIN SODIUM 5000 [USP'U]/ML
5000 INJECTION, SOLUTION INTRAVENOUS; SUBCUTANEOUS EVERY 8 HOURS SCHEDULED
Status: DISCONTINUED | OUTPATIENT
Start: 2023-09-11 | End: 2023-09-14 | Stop reason: HOSPADM

## 2023-09-11 RX ORDER — BUDESONIDE AND FORMOTEROL FUMARATE DIHYDRATE 160; 4.5 UG/1; UG/1
2 AEROSOL RESPIRATORY (INHALATION)
Status: DISCONTINUED | OUTPATIENT
Start: 2023-09-11 | End: 2023-09-11

## 2023-09-11 RX ORDER — SODIUM CHLORIDE 0.9 % (FLUSH) 0.9 %
10 SYRINGE (ML) INJECTION EVERY 12 HOURS SCHEDULED
Status: DISCONTINUED | OUTPATIENT
Start: 2023-09-11 | End: 2023-09-14 | Stop reason: HOSPADM

## 2023-09-11 RX ORDER — BISACODYL 10 MG
10 SUPPOSITORY, RECTAL RECTAL DAILY PRN
Status: DISCONTINUED | OUTPATIENT
Start: 2023-09-11 | End: 2023-09-14 | Stop reason: HOSPADM

## 2023-09-11 RX ORDER — AMOXICILLIN 250 MG
2 CAPSULE ORAL 2 TIMES DAILY
Status: DISCONTINUED | OUTPATIENT
Start: 2023-09-11 | End: 2023-09-14 | Stop reason: HOSPADM

## 2023-09-11 RX ORDER — IPRATROPIUM BROMIDE AND ALBUTEROL SULFATE 2.5; .5 MG/3ML; MG/3ML
3 SOLUTION RESPIRATORY (INHALATION)
Status: DISCONTINUED | OUTPATIENT
Start: 2023-09-11 | End: 2023-09-12

## 2023-09-11 RX ORDER — BUDESONIDE 0.5 MG/2ML
0.5 INHALANT ORAL
Status: DISCONTINUED | OUTPATIENT
Start: 2023-09-11 | End: 2023-09-14 | Stop reason: HOSPADM

## 2023-09-11 RX ORDER — POLYETHYLENE GLYCOL 3350 17 G/17G
17 POWDER, FOR SOLUTION ORAL DAILY PRN
Status: DISCONTINUED | OUTPATIENT
Start: 2023-09-11 | End: 2023-09-14 | Stop reason: HOSPADM

## 2023-09-11 RX ORDER — LEVALBUTEROL INHALATION SOLUTION 1.25 MG/3ML
1.25 SOLUTION RESPIRATORY (INHALATION) EVERY 6 HOURS PRN
Status: DISCONTINUED | OUTPATIENT
Start: 2023-09-11 | End: 2023-09-14 | Stop reason: HOSPADM

## 2023-09-11 RX ORDER — BISACODYL 5 MG/1
5 TABLET, DELAYED RELEASE ORAL DAILY PRN
Status: DISCONTINUED | OUTPATIENT
Start: 2023-09-11 | End: 2023-09-14 | Stop reason: HOSPADM

## 2023-09-11 RX ORDER — NICOTINE POLACRILEX 4 MG
15 LOZENGE BUCCAL
Status: DISCONTINUED | OUTPATIENT
Start: 2023-09-11 | End: 2023-09-14 | Stop reason: HOSPADM

## 2023-09-11 RX ADMIN — Medication 10 ML: at 21:17

## 2023-09-11 RX ADMIN — ARFORMOTEROL TARTRATE 15 MCG: 15 SOLUTION RESPIRATORY (INHALATION) at 18:43

## 2023-09-11 RX ADMIN — HEPARIN SODIUM 5000 UNITS: 5000 INJECTION INTRAVENOUS; SUBCUTANEOUS at 21:16

## 2023-09-11 RX ADMIN — AZITHROMYCIN 500 MG: 500 INJECTION, POWDER, LYOPHILIZED, FOR SOLUTION INTRAVENOUS at 21:17

## 2023-09-11 RX ADMIN — IPRATROPIUM BROMIDE AND ALBUTEROL SULFATE 3 ML: .5; 3 SOLUTION RESPIRATORY (INHALATION) at 18:43

## 2023-09-11 RX ADMIN — IPRATROPIUM BROMIDE AND ALBUTEROL SULFATE 3 ML: .5; 3 SOLUTION RESPIRATORY (INHALATION) at 23:01

## 2023-09-11 RX ADMIN — BUDESONIDE 0.5 MG: 0.5 INHALANT RESPIRATORY (INHALATION) at 18:43

## 2023-09-11 NOTE — PAYOR COMM NOTE
"Dilip Laws (73 y.o. Male)     PATIENT INFORMATION  Name:  Dilip Laws  MRN#:     [id number]  :  1950         ADMISSION INFORMATION  CLASS: Inpatient   DOS:  2023        CURRENT ATTENDING PROVIDER INFORMATION  Name/NPI: Cleopatra Alaniz MD [id number]  Phone:  Phone: [phone]        RENDERING FACILITY  Name:  [facility]   NPI:  [id number]  TID:  [id number]  Address:      [address] [place] [address]  Phone  [phone]        CASE MANAGEMENT CONTACT INFORMATION  Phone:      [phone]  Fax:           [phone]            Date of Birth   1950    Social Security Number       Address   [address] [address]    Home Phone   [phone]    MRN   [id number]       Cheondoism   None    Marital Status   Single                            Admission Date   23    Admission Type   Emergency    Admitting Provider   Cleopatra Alaniz MD    Attending Provider   Cleopatra Alaniz MD    Department, Room/Bed   [facility] EMERGENCY ROOM,        Discharge Date       Discharge Disposition       Discharge Destination                                 Attending Provider: Cleopatra Alaniz MD    Allergies: No Known Allergies    Isolation: None   Infection: COVID (rule out) (23)   Code Status: Prior    Ht: 182.9 cm (72\")   Wt: 113 kg (250 lb)    Admission Cmt: None   Principal Problem: Acute respiratory failure with hypoxia and hypercapnia [J96.01,J96.02]                   Active Insurance as of 2023       Primary Coverage       Payor Plan Insurance Group Employer/Plan Group    HUMANA MEDICARE REPLACEMENT HUMANA MEDICARE REPLACEMENT [id number]       Payor Plan Address Payor Plan Phone Number Payor Plan Fax Number Effective Dates    [id number] [phone]  2022 - None Entered    [facility] [id number]         Subscriber Name Subscriber Birth Date Member ID       DILIP LAWS 1950 [id number]  "              Secondary Coverage       Payor Plan Insurance Group Employer/Plan Group    [facility] MEDICAID N       Payor Plan Address Payor Plan Phone Number Payor Plan Fax Number Effective Dates    [address] [address] [phone]  5/20/2022 - None Entered    [facility] [address]         Subscriber Name Subscriber Birth Date Member ID       MERCEDES LAWS 1950 [id number]                      Respiratory Failure GRG Clinical Indications for Admission to Inpatient Care by Bianka Fang RN       Indications Met: Reviewed on 9/11/2023 by Bianka Fang RN       Created Using Review Status Review Entered   [facility] for Case Management Primary Completed 9/11/2023 1613       Created By   Bianka Fang RN       Criteria Set Name - Subset   Respiratory Failure GRG Clinical Indications for Admission to Inpatient Care      Criteria Review   Respiratory Failure GRG Clinical Indications for Admission to Inpatient Care     Overall Determination: Indications Met     Criteria:  [×] Hospital admission is needed for appropriate care of the patient because of  1 or more  of the following  (1) (2) (3) (4) (5) (6) (7) (8) (9) (10):      [×] New (acute) need for mechanical invasive or noninvasive (eg, bilevel positive airway pressure (BPAP), volume-assured pressure support (VAPS), or volume control) ventilation (11) (12) (13) (14) (15) (16) (17) (18) (19)          9/11/2023  4:08 PM              -- 9/11/2023  4:08 PM by Biakna Fang RN --                  BIPAP      [×] Severe respiratory distress, as indicated by  1 or more  of the following :          [×] Altered mental status from respiratory disease              9/11/2023  4:13 PM                  -- 9/11/2023  4:13 PM by Bianka Fang RN --                                            (X) Altered mental status (ie, different from baseline), as indicated by  1 or more  of the following  (1) (2) (3) (4):                      (X) Confusional state (eg,  disorientation, difficulty following commands, deficit in attention)              9/11/2023  4:08 PM                  -- 9/11/2023  4:08 PM by Bianka Fang RN --                      + Confusion     Notes:  -- 9/11/2023  4:13 PM by Bianka Fang, RN --      Per EMS, pt also received Solumedrol IV, MgSO4 2gm IV; & Duonebs.        -- 9/11/2023  4:08 PM by Bianka Fang, RN --      Note: Pt also with intermittent. Confusion noted in ED which is improving with BIPAP        -- 9/11/2023  4:08 PM by Bianka Fang, RN --      To ED C/o SOA. ON EMS arrival, O2 sats in the 60s- 70s. SOA x 24 hrs. Home O2 concentrator did not appear to be working. Remained SOA on O2 & BIPAP added.             PMHx: COPD; DM; HTN. O2 2L prn;             ED results: WBC 10.97; Troponin 40; pH 7.17; pCO2 66.             In ED: Intubation discussed. Pt does not want to be intubated. Agrees to Bipap. O2, Bipap.                        Admit: Telemetry. Pulmonary consult; O2; BIPAP; NPO; check flu & COVID panel. Additional orders pending.              History & Physical    No notes of this type exist for this encounter.          Emergency Department Notes        Dulce Ayala RN at 09/11/23 1319          SPOKE WITH PATIENT ABOUT POSSIBILITY OF BEING INTUBATED. PATIENT VERBALIZED THAT HE DOES NOT WANT TO BE INTUBATED. MD AWARE    Electronically signed by Dulce Ayala RN at 09/11/23 1319       Aaron Jacob MD at 09/11/23 1312          Time: 1:12 PM EDT  Date of encounter:  9/11/2023  Independent Historian/Clinical History and Information was obtained by:   Patient and EMS    History is limited by: Acuity of Condition    Chief Complaint: Shortness of breath      History of Present Illness:  Patient is a 73 y.o. year old male who presents to the emergency department for evaluation of shortness of breath.  Per EMS they were called out due to shortness of breath.  Patient was reported to have oxygen saturations in the 60s and has been short of  breath since yesterday.  They report his oxygen concentrator was not working.  He received DuoNeb, steroids, mag in route.  They also placed patient on BiPAP.  He had urinated himself.  Patient mainly only complaining of shortness of breath.  Denies any chest pain, nausea, vomiting, fever.    HPI    Patient Care Team  Primary Care Provider: Mallorie Roa APRN    Past Medical History:     No Known Allergies  Past Medical History:   Diagnosis Date    Asthma     COPD (chronic obstructive pulmonary disease)     Diabetes mellitus     Ex-smoker     QUIT 2021    Hernia, umbilical     Hypertension     On home O2     REPORTS WEARING 2L/NC PRN SOA     Past Surgical History:   Procedure Laterality Date    ABDOMINAL SURGERY       History reviewed. No pertinent family history.    Home Medications:  Prior to Admission medications    Medication Sig Start Date End Date Taking? Authorizing Provider   albuterol sulfate  (90 Base) MCG/ACT inhaler Inhale 2 puffs Every 4 (Four) Hours As Needed for Wheezing. 8/14/23   Mallorie Roa APRN   aspirin 81 MG chewable tablet Chew 1 tablet Daily. 5/2/23   Mallorie Roa APRN   atorvastatin (LIPITOR) 10 MG tablet Take 1 tablet by mouth Daily for 180 days. 9/1/23 2/28/24  Mallorie Roa APRN   cetirizine (zyrTEC) 10 MG tablet Take 1 tablet by mouth Daily. 5/2/23   Mallorie Roa APRN   empagliflozin (JARDIANCE) 10 MG tablet tablet Take 1 tablet by mouth Daily.    Provider, MD Roosevelt   Fluticasone-Salmeterol (ADVAIR/WIXELA) 250-50 MCG/ACT DISKUS Inhale 1 puff 2 (Two) Times a Day for 180 days. 9/1/23 2/28/24  Mallorie Roa APRN   insulin degludec (Tresiba FlexTouch) 100 UNIT/ML solution pen-injector injection Inject 20 Units under the skin into the appropriate area as directed Daily. 7/27/23   Mallorie Roa APRN   lisinopril (PRINIVIL,ZESTRIL) 5 MG tablet Take 1 tablet by mouth Daily for 30 days. 5/2/23 8/27/23  Mallorie Roa APRN   metFORMIN (GLUCOPHAGE) 500 MG  "tablet Take 1 tablet by mouth 2 (Two) Times a Day With Meals for 30 days. 23  Mallorie Roa APRN   metoprolol succinate XL (TOPROL-XL) 25 MG 24 hr tablet Take 1 tablet by mouth Daily for 30 days. 23  Mallorie Roa APRN   pantoprazole (PROTONIX) 40 MG EC tablet Take 1 tablet by mouth Daily for 30 days. 23  Yung Singleton MD        Social History:   Social History     Tobacco Use    Smoking status: Former     Packs/day: 1.50     Years: 56.00     Pack years: 84.00     Types: Cigarettes     Start date:      Quit date: 3/19/2021     Years since quittin.4    Smokeless tobacco: Current     Types: Chew   Vaping Use    Vaping Use: Never used   Substance Use Topics    Alcohol use: Never    Drug use: Never         Review of Systems:  Review of Systems   Constitutional:  Negative for fever.   Respiratory:  Positive for shortness of breath.       Physical Exam:  /71   Pulse 89   Temp 97.9 °F (36.6 °C) (Rectal)   Resp (!) 30   Ht 182.9 cm (72\")   Wt 113 kg (250 lb)   SpO2 93%   BMI 33.91 kg/m²     Physical Exam  Vitals and nursing note reviewed.   Constitutional:       Appearance: Normal appearance. He is ill-appearing.      Comments: Patient had urinated on himself.  Patient is alert but is lethargic   HENT:      Head: Normocephalic and atraumatic.   Eyes:      General: No scleral icterus.  Cardiovascular:      Rate and Rhythm: Normal rate and regular rhythm.   Pulmonary:      Effort: Respiratory distress present.      Breath sounds: Wheezing present.   Abdominal:      Palpations: Abdomen is soft.      Tenderness: There is no abdominal tenderness.   Musculoskeletal:         General: Normal range of motion.      Cervical back: Normal range of motion.   Skin:     Findings: No rash.   Neurological:      General: No focal deficit present.      Mental Status: He is alert.                Procedures:  Procedures      Medical Decision Making:      Comorbidities that " affect care:    COPD, Diabetes    External Notes reviewed:    Reviewed admission from 8/27/2023      The following orders were placed and all results were independently analyzed by me:  Orders Placed This Encounter   Procedures    Blood Culture - Blood,    Blood Culture - Blood,    XR Chest 1 View    [place] Draw    Comprehensive Metabolic Panel    BNP    Single High Sensitivity Troponin T    CBC Auto Differential    ABG with Co-Ox and Electrolytes    Lactic Acid, Plasma    Urinalysis With Culture If Indicated - Urine, Clean Catch    Blood Gas, Arterial -Obtain on: Current Settings; With Co-Ox Panel: Yes    Urinalysis, Microscopic Only - Urine, Clean Catch    NPO Diet NPO Type: Strict NPO    Undress & Gown    Continuous Pulse Oximetry    Vital Signs    Inpatient Hospitalist Consult    Oxygen Therapy- Nasal Cannula; Titrate 1-6 LPM Per SpO2; 90 - 95%    POC Glucose Once    ECG 12 Lead ED Triage Standing Order; SOA    Insert Peripheral IV    CBC & Differential    Green Top (Gel)    Lavender Top    Gold Top - SST    Light Blue Top       Medications Given in the Emergency Department:  Medications   sodium chloride 0.9 % flush 10 mL (has no administration in time range)        ED Course:    ED Course as of 09/11/23 1534   Mon Sep 11, 2023   1445 Updated patient on need for admission.  Has not been improving on BiPAP.  Continue BiPAP at this time.  Will admit to the hospital. [MA]   1533 Spoke with Dr. Alaniz who agrees to admit. [MA]      ED Course User Index  [MA] Aaron Jacob MD       Labs:    Lab Results (last 24 hours)       Procedure Component Value Units Date/Time    POC Glucose Once [[id number]]  (Abnormal) Collected: 09/11/23 1308    Specimen: Blood Updated: 09/11/23 1311     Glucose 159 mg/dL      Comment: Serial Number: [id number]:  [id number]       ABG with Co-Ox and Electrolytes [[id number]]  (Abnormal) Collected: 09/11/23 1309    Specimen: Arterial Blood from Arm, Right Updated: 09/11/23 1317      pH, Arterial 7.174 pH units      pCO2, Arterial 66.9 mm Hg      pO2, Arterial 231.9 mm Hg      HCO3, Arterial 24.1 mmol/L      Base Excess, Arterial -5.4 mmol/L      O2 Saturation, Arterial 98.8 %      Hemoglobin, Blood Gas 12.9 g/dL      Carboxyhemoglobin 0.9 %      Methemoglobin 0.00 %      Oxyhemoglobin 97.9 %      FHHB 1.2 %      Nael's Test Positive     Note 14/7     Site Arterial: right radial     Modality BiPap     FIO2 100 %      Flow Rate --     Sodium, Arterial 137.1 mmol/L      Potassium, Arterial 4.80 mmol/L      Ionized Calcium, Arterial 1.14 mmol/L      Chloride, Arterial 103 mmol/L      Glucose, Arterial 157 mg/dL      Lactate, Arterial 0.92 mmol/L      PO2/FIO2 232    CBC & Differential [[id number]]  (Abnormal) Collected: 09/11/23 1310    Specimen: Blood from Arm, Left Updated: 09/11/23 1322    Narrative:      The following orders were created for panel order CBC & Differential.  Procedure                               Abnormality         Status                     ---------                               -----------         ------                     CBC Auto Differential[[id number]]        Abnormal            Final result                 Please view results for these tests on the individual orders.    Comprehensive Metabolic Panel [[id number]]  (Abnormal) Collected: 09/11/23 1310    Specimen: Blood from Arm, Left Updated: 09/11/23 1343     Glucose 160 mg/dL      BUN 15 mg/dL      Creatinine 1.00 mg/dL      Sodium 138 mmol/L      Potassium 5.0 mmol/L      Chloride 102 mmol/L      CO2 26.9 mmol/L      Calcium 8.8 mg/dL      Total Protein 7.6 g/dL      Albumin 4.1 g/dL      ALT (SGPT) 9 U/L      AST (SGOT) 13 U/L      Alkaline Phosphatase 106 U/L      Total Bilirubin 0.4 mg/dL      Globulin 3.5 gm/dL      A/G Ratio 1.2 g/dL      BUN/Creatinine Ratio 15.0     Anion Gap 9.1 mmol/L      eGFR 79.0 mL/min/1.73     Narrative:      GFR Normal >60  Chronic Kidney Disease <60  Kidney Failure <15    The GFR  formula is only valid for adults with stable renal function between ages 18 and 70.    BNP [[id number]]  (Normal) Collected: 09/11/23 1310    Specimen: Blood from Arm, Left Updated: 09/11/23 1341     proBNP 446.1 pg/mL     Narrative:      Among patients with dyspnea, NT-proBNP is highly sensitive for the detection of acute congestive heart failure. In addition NT-proBNP of <300 pg/ml effectively rules out acute congestive heart failure with 99% negative predictive value.      Single High Sensitivity Troponin T [[id number]]  (Abnormal) Collected: 09/11/23 1310    Specimen: Blood from Arm, Left Updated: 09/11/23 1343     HS Troponin T 40 ng/L     Narrative:      High Sensitive Troponin T Reference Range:  <10.0 ng/L- Negative Female for AMI  <15.0 ng/L- Negative Male for AMI  >=10 - Abnormal Female indicating possible myocardial injury.  >=15 - Abnormal Male indicating possible myocardial injury.   Clinicians would have to utilize clinical acumen, EKG, Troponin, and serial changes to determine if it is an Acute Myocardial Infarction or myocardial injury due to an underlying chronic condition.         CBC Auto Differential [[id number]]  (Abnormal) Collected: 09/11/23 1310    Specimen: Blood from Arm, Left Updated: 09/11/23 1322     WBC 10.97 10*3/mm3      RBC 5.35 10*6/mm3      Hemoglobin 12.1 g/dL      Hematocrit 43.4 %      MCV 81.1 fL      MCH 22.6 pg      MCHC 27.9 g/dL      RDW 17.6 %      RDW-SD 50.7 fl      MPV 9.9 fL      Platelets 263 10*3/mm3      Neutrophil % 63.7 %      Lymphocyte % 14.0 %      Monocyte % 8.2 %      Eosinophil % 13.0 %      Basophil % 0.6 %      Immature Grans % 0.5 %      Neutrophils, Absolute 6.97 10*3/mm3      Lymphocytes, Absolute 1.54 10*3/mm3      Monocytes, Absolute 0.90 10*3/mm3      Eosinophils, Absolute 1.43 10*3/mm3      Basophils, Absolute 0.07 10*3/mm3      Immature Grans, Absolute 0.06 10*3/mm3      nRBC 0.0 /100 WBC     Blood Culture - Blood, Arm, Right [[id number]] Collected:  09/11/23 1315    Specimen: Blood from Arm, Right Updated: 09/11/23 1318    Blood Culture - Blood, Arm, Left [[id number]] Collected: 09/11/23 1315    Specimen: Blood from Arm, Left Updated: 09/11/23 1318    Lactic Acid, Plasma [[id number]]  (Normal) Collected: 09/11/23 1315    Specimen: Blood Updated: 09/11/23 1341     Lactate 1.0 mmol/L     Blood Gas, Arterial -Obtain on: Current Settings; With Co-Ox Panel: Yes [[id number]]  (Abnormal) Collected: 09/11/23 1423    Specimen: Arterial Blood from Arm, Left Updated: 09/11/23 1428     pH, Arterial 7.225 pH units      pCO2, Arterial 62.5 mm Hg      pO2, Arterial 86.1 mm Hg      HCO3, Arterial 25.3 mmol/L      Base Excess, Arterial -3.3 mmol/L      O2 Saturation, Arterial 94.4 %      Hemoglobin, Blood Gas 12.5 g/dL      Carboxyhemoglobin 1.2 %      Methemoglobin 0.10 %      Oxyhemoglobin 93.2 %      FHHB 5.5 %      Site Arterial: left radial     Modality BiPap     FIO2 32 %      PO2/FIO2 269     Nael's Test Positive     Note 14/7     Lactate, Arterial --    Urinalysis With Culture If Indicated - Straight Cath [[id number]]  (Abnormal) Collected: 09/11/23 1434    Specimen: Urine from Straight Cath Updated: 09/11/23 1502     Color, UA Yellow     Appearance, UA Clear     pH, UA 5.5     Specific Gravity, UA 1.023     Glucose, UA >=1000 mg/dL (3+)     Ketones, UA Negative     Bilirubin, UA Negative     Blood, UA Trace     Protein, UA Trace     Leuk Esterase, UA Negative     Nitrite, UA Negative     Urobilinogen, UA 1.0 E.U./dL    Narrative:      In absence of clinical symptoms, the presence of pyuria, bacteria, and/or nitrites on the urinalysis result does not correlate with infection.    Urinalysis, Microscopic Only - Straight Cath [[id number]]  (Abnormal) Collected: 09/11/23 1434    Specimen: Urine from Straight Cath Updated: 09/11/23 1502     RBC, UA 13-20 /HPF      WBC, UA 0-2 /HPF      Comment: Urine culture not indicated.        Bacteria, UA None Seen /HPF      Squamous  Epithelial Cells, UA 0-2 /HPF      Hyaline Casts, UA 3-6 /LPF      Methodology Automated Microscopy             Imaging:    XR Chest 1 View    Result Date: 9/11/2023  PROCEDURE: XR CHEST 1 VW  COMPARISON: [facility], CT, CT CHEST WO CONTRAST DIAGNOSTIC, 6/07/2023, 16:27.  [facility], CR, XR CHEST 1 VW, 8/27/2023, 0:33.  INDICATIONS: SOA Triage Protocol  FINDINGS:  Heart size within normal limits and similar to prior exam.  Chronic appearing left mid and lower lobe reticular markings unchanged from prior exam.  Pleural thickening versus trace effusion unchanged from prior exam noted on the left.  Right lung relatively clear.  No pneumothorax.  Underlying emphysematous changes noted most notable in the upper lobes.  Osseous structures are unremarkable.        Stable appearing chronic findings compared to previous radiograph, without convincing new abnormality.       MELANIA SHAIKH MD       Electronically Signed and Approved By: MELANIA SHAIKH MD on 9/11/2023 at 13:34                Differential Diagnosis and Discussion:    Dyspnea: Differential diagnosis includes but is not limited to metabolic acidosis, neurological disorders, psychogenic, asthma, pneumothorax, upper airway obstruction, COPD, pneumonia, noncardiogenic pulmonary edema, interstitial lung disease, anemia, congestive heart failure, and pulmonary embolism    All labs were reviewed and interpreted by me.  All X-rays impressions were independently interpreted by me.  EKG was interpreted by me.    MDM     Patient with acute on chronic respiratory failure with hypoxia and hypercapnia.  Had saturations in the 70s and had to be placed on BiPAP in route.  BiPAP.  Was continued.  Has had improvement of his acid-base status and improvement in his mental status.  Continue BiPAP at this time.  Patient did confirm he did not want to be intubated if need be.  Currently does not need to be intubated and needs to be continued on BiPAP.   Will admit to the hospital further work-up and management.      Critical Care Note: Total Critical Care time of 33 minutes. Total critical care time documented does not include time spent on separately billed procedures for services of nurses or physician assistants. I personally saw and examined the patient. I have reviewed all diagnostic interpretations and treatment plans as written. I was present for the key portions of any procedures performed and the inclusive time noted in any critical care statement. Critical care time includes patient management by me, time spent at the patients bedside,  time to review lab and imaging results, discussing patient care, documentation in the medical record, and time spent with family or caregiver.    Patient Care Considerations:          Consultants/Shared Management Plan:    Hospitalist: I have discussed the case with Dr. Alaniz who agrees to accept the patient for admission.    Social Determinants of Health:          Disposition and Care Coordination:    Admit:   Through independent evaluation of the patient's history, physical, and imperical data, the patient meets criteria for observation/admission to the hospital.        Final diagnoses:   Acute respiratory failure with hypoxia and hypercapnia        ED Disposition       ED Disposition   Decision to Admit    Condition   --    Comment   --               This medical record created using voice recognition software.             Aaron Jacob MD  09/11/23 1534      Electronically signed by Aaron Jacob MD at 09/11/23 1534       Dulce Ayala, RN at 09/11/23 1312          EMS GAVE DUO NEB IN ROUTE    Electronically signed by Dulce Ayala, RN at 09/11/23 1312       Dulce Ayala, RN at 09/11/23 1302          EMS GAVE PATIENT SOLUMEDROL 125MG IV AND MAG 2GM IV    Electronically signed by Dulce Ayala, RN at 09/11/23 1305       Lab Results (last 24 hours)       Procedure Component Value Units Date/Time    Urinalysis,  Microscopic Only - Straight Cath [[id number]]  (Abnormal) Collected: 09/11/23 1434    Specimen: Urine from Straight Cath Updated: 09/11/23 1502     RBC, UA 13-20 /HPF      WBC, UA 0-2 /HPF      Comment: Urine culture not indicated.        Bacteria, UA None Seen /HPF      Squamous Epithelial Cells, UA 0-2 /HPF      Hyaline Casts, UA 3-6 /LPF      Methodology Automated Microscopy    Urinalysis With Culture If Indicated - Straight Cath [[id number]]  (Abnormal) Collected: 09/11/23 1434    Specimen: Urine from Straight Cath Updated: 09/11/23 1502     Color, UA Yellow     Appearance, UA Clear     pH, UA 5.5     Specific Gravity, UA 1.023     Glucose, UA >=1000 mg/dL (3+)     Ketones, UA Negative     Bilirubin, UA Negative     Blood, UA Trace     Protein, UA Trace     Leuk Esterase, UA Negative     Nitrite, UA Negative     Urobilinogen, UA 1.0 E.U./dL    Narrative:      In absence of clinical symptoms, the presence of pyuria, bacteria, and/or nitrites on the urinalysis result does not correlate with infection.    Blood Gas, Arterial -Obtain on: Current Settings; With Co-Ox Panel: Yes [[id number]]  (Abnormal) Collected: 09/11/23 1423    Specimen: Arterial Blood from Arm, Left Updated: 09/11/23 1428     pH, Arterial 7.225 pH units      pCO2, Arterial 62.5 mm Hg      pO2, Arterial 86.1 mm Hg      HCO3, Arterial 25.3 mmol/L      Base Excess, Arterial -3.3 mmol/L      O2 Saturation, Arterial 94.4 %      Hemoglobin, Blood Gas 12.5 g/dL      Carboxyhemoglobin 1.2 %      Methemoglobin 0.10 %      Oxyhemoglobin 93.2 %      FHHB 5.5 %      Site Arterial: left radial     Modality BiPap     FIO2 32 %      PO2/FIO2 269     Nael's Test Positive     Note 14/7     Lactate, Arterial --    [place] Draw [[id number]] Collected: 09/11/23 1310    Specimen: Blood from Arm, Left Updated: 09/11/23 1345    Narrative:      The following orders were created for panel order [place] Draw.  Procedure                               Abnormality         Status                      ---------                               -----------         ------                     Green Top (Gel)[[id number]]                                  Final result               Lavender Top[[id number]]                                     Final result               Gold Top - SST[[id number]]                                   Final result               Light Blue Top[[id number]]                                   Final result                 Please view results for these tests on the individual orders.    Light Blue Top [[id number]] Collected: 09/11/23 1310    Specimen: Blood from Arm, Left Updated: 09/11/23 1345     Extra Tube Hold for add-ons.     Comment: Auto resulted       Green Top (Gel) [[id number]] Collected: 09/11/23 1310    Specimen: Blood from Arm, Left Updated: 09/11/23 1345     Extra Tube Hold for add-ons.     Comment: Auto resulted.       Lavender Top [[id number]] Collected: 09/11/23 1310    Specimen: Blood from Arm, Left Updated: 09/11/23 1345     Extra Tube hold for add-on     Comment: Auto resulted       Gold Top - SST [[id number]] Collected: 09/11/23 1310    Specimen: Blood from Arm, Left Updated: 09/11/23 1345     Extra Tube Hold for add-ons.     Comment: Auto resulted.       Comprehensive Metabolic Panel [[id number]]  (Abnormal) Collected: 09/11/23 1310    Specimen: Blood from Arm, Left Updated: 09/11/23 1343     Glucose 160 mg/dL      BUN 15 mg/dL      Creatinine 1.00 mg/dL      Sodium 138 mmol/L      Potassium 5.0 mmol/L      Chloride 102 mmol/L      CO2 26.9 mmol/L      Calcium 8.8 mg/dL      Total Protein 7.6 g/dL      Albumin 4.1 g/dL      ALT (SGPT) 9 U/L      AST (SGOT) 13 U/L      Alkaline Phosphatase 106 U/L      Total Bilirubin 0.4 mg/dL      Globulin 3.5 gm/dL      A/G Ratio 1.2 g/dL      BUN/Creatinine Ratio 15.0     Anion Gap 9.1 mmol/L      eGFR 79.0 mL/min/1.73     Narrative:      GFR Normal >60  Chronic Kidney Disease <60  Kidney Failure <15    The GFR formula is only valid  for adults with stable renal function between ages 18 and 70.    Single High Sensitivity Troponin T [[id number]]  (Abnormal) Collected: 09/11/23 1310    Specimen: Blood from Arm, Left Updated: 09/11/23 1343     HS Troponin T 40 ng/L     Narrative:      High Sensitive Troponin T Reference Range:  <10.0 ng/L- Negative Female for AMI  <15.0 ng/L- Negative Male for AMI  >=10 - Abnormal Female indicating possible myocardial injury.  >=15 - Abnormal Male indicating possible myocardial injury.   Clinicians would have to utilize clinical acumen, EKG, Troponin, and serial changes to determine if it is an Acute Myocardial Infarction or myocardial injury due to an underlying chronic condition.         Lactic Acid, Plasma [[id number]]  (Normal) Collected: 09/11/23 1315    Specimen: Blood Updated: 09/11/23 1341     Lactate 1.0 mmol/L     BNP [[id number]]  (Normal) Collected: 09/11/23 1310    Specimen: Blood from Arm, Left Updated: 09/11/23 1341     proBNP 446.1 pg/mL     Narrative:      Among patients with dyspnea, NT-proBNP is highly sensitive for the detection of acute congestive heart failure. In addition NT-proBNP of <300 pg/ml effectively rules out acute congestive heart failure with 99% negative predictive value.      CBC & Differential [[id number]]  (Abnormal) Collected: 09/11/23 1310    Specimen: Blood from Arm, Left Updated: 09/11/23 1322    Narrative:      The following orders were created for panel order CBC & Differential.  Procedure                               Abnormality         Status                     ---------                               -----------         ------                     CBC Auto Differential[[id number]]        Abnormal            Final result                 Please view results for these tests on the individual orders.    CBC Auto Differential [[id number]]  (Abnormal) Collected: 09/11/23 1310    Specimen: Blood from Arm, Left Updated: 09/11/23 1322     WBC 10.97 10*3/mm3      RBC 5.35 10*6/mm3       Hemoglobin 12.1 g/dL      Hematocrit 43.4 %      MCV 81.1 fL      MCH 22.6 pg      MCHC 27.9 g/dL      RDW 17.6 %      RDW-SD 50.7 fl      MPV 9.9 fL      Platelets 263 10*3/mm3      Neutrophil % 63.7 %      Lymphocyte % 14.0 %      Monocyte % 8.2 %      Eosinophil % 13.0 %      Basophil % 0.6 %      Immature Grans % 0.5 %      Neutrophils, Absolute 6.97 10*3/mm3      Lymphocytes, Absolute 1.54 10*3/mm3      Monocytes, Absolute 0.90 10*3/mm3      Eosinophils, Absolute 1.43 10*3/mm3      Basophils, Absolute 0.07 10*3/mm3      Immature Grans, Absolute 0.06 10*3/mm3      nRBC 0.0 /100 WBC     Blood Culture - Blood, Arm, Left [[id number]] Collected: 09/11/23 1315    Specimen: Blood from Arm, Left Updated: 09/11/23 1318    Blood Culture - Blood, Arm, Right [[id number]] Collected: 09/11/23 1315    Specimen: Blood from Arm, Right Updated: 09/11/23 1318    ABG with Co-Ox and Electrolytes [[id number]]  (Abnormal) Collected: 09/11/23 1309    Specimen: Arterial Blood from Arm, Right Updated: 09/11/23 1317     pH, Arterial 7.174 pH units      pCO2, Arterial 66.9 mm Hg      pO2, Arterial 231.9 mm Hg      HCO3, Arterial 24.1 mmol/L      Base Excess, Arterial -5.4 mmol/L      O2 Saturation, Arterial 98.8 %      Hemoglobin, Blood Gas 12.9 g/dL      Carboxyhemoglobin 0.9 %      Methemoglobin 0.00 %      Oxyhemoglobin 97.9 %      FHHB 1.2 %      Nael's Test Positive     Note 14/7     Site Arterial: right radial     Modality BiPap     FIO2 100 %      Flow Rate --     Sodium, Arterial 137.1 mmol/L      Potassium, Arterial 4.80 mmol/L      Ionized Calcium, Arterial 1.14 mmol/L      Chloride, Arterial 103 mmol/L      Glucose, Arterial 157 mg/dL      Lactate, Arterial 0.92 mmol/L      PO2/FIO2 232    POC Glucose Once [[id number]]  (Abnormal) Collected: 09/11/23 1308    Specimen: Blood Updated: 09/11/23 1311     Glucose 159 mg/dL      Comment: Serial Number: [id number]:  [id number]

## 2023-09-11 NOTE — CONSULTS
Pt brought to Trauma 2 via EMS.  has notified [facility] listed.  Pt has an EMS DNR and also a living Will stating that he does NOT want aggressive treatment.   double checked with [facility] and health care surrogate about the pt's ishes. Pt does not want to be placed on vent support and wants to be a DNR.     - Chaplain rev. Meneses

## 2023-09-11 NOTE — H&P
[facility]   HOSPITALIST HISTORY AND PHYSICAL  Date: 2023   Patient Name: Dilip Faulkner  : 1950  MRN: [id number]  Primary Care Physician:  Mallorie Roa APRN  Date of admission: 2023    Subjective   Subjective     Chief Complaint: Shortness of breath    HPI:    Dilip Faulkner is a 73 y.o. male with a past medical history of COPD, type 2 diabetes mellitus, hypertension, hyperlipidemia presented to the ED for evaluation of shortness of breath by EMS.  Per EMS, patient noted to be saturating around 60% and has been complaining of shortness of breath since yesterday.  Patient's oxygen concentrator at home was not working.  Received nebulizer treatment, steroids and magnesium in route to hospital and was placed on BiPAP.  Denies any worsening cough, fevers, chills, runny nose, sore throat, dysuria.    In the ED, vital signs temperature 97.9, pulse 90, respiratory 30, blood pressure 140/101 on BiPAP saturating around 97% on 70% FiO2.  Initial ABG 7.1 7/66/231 on 100% BiPAP, repeat ABG 7.22/62/86 on 32% FiO2.  Normal lactic acid, normal CMP, WBC 10.97, hemoglobin 12.1, platelets 263, urinalysis does not appear to be infected.  Chest x-ray showed stable chronic appearing left mid and lower lobe reticular markings unchanged from previous chest x-ray in .  No acute abnormalities noted.  Blood cultures in process.  Patient has been admitted for further evaluation and management of acute hypoxic and hypercapnic respiratory failure likely due to COPD exacerbation.      Personal History     Past Medical History:   Diagnosis Date    Asthma     COPD (chronic obstructive pulmonary disease)     Diabetes mellitus     Ex-smoker     QUIT     Hernia, umbilical     Hypertension     On home O2     REPORTS WEARING 2L/NC PRN SOA         Past Surgical History:   Procedure Laterality Date    ABDOMINAL SURGERY           History reviewed. No pertinent family history.      Social History     Socioeconomic  History    Marital status: Single   Tobacco Use    Smoking status: Former     Packs/day: 1.50     Years: 56.00     Pack years: 84.00     Types: Cigarettes     Start date:      Quit date: 3/19/2021     Years since quittin.4    Smokeless tobacco: Current     Types: Chew   Vaping Use    Vaping Use: Never used   Substance and Sexual Activity    Alcohol use: Never    Drug use: Never    Sexual activity: Defer         Home Medications:  Fluticasone-Salmeterol, albuterol sulfate HFA, aspirin, atorvastatin, empagliflozin, insulin degludec, metFORMIN, metoprolol succinate XL, and pantoprazole    Allergies:  No Known Allergies    Review of Systems   All other systems reviewed and negative except as mentioned above in the HPI    Objective   Objective     Vitals:   Temp:  [97.9 °F (36.6 °C)] 97.9 °F (36.6 °C)  Heart Rate:  [89-99] 89  Resp:  [30] 30  BP: (121-140)/() 123/71    Physical Exam    Constitutional: Awake, mild respiratory distress, on BiPAP lethargic   Eyes: Pupils equal, sclerae anicteric, no conjunctival injection   HENT: NCAT, mucous membranes moist   Respiratory: Bilateral air entry present, diffuse inspiratory expiratory wheezing, no respiratory distress   Cardiovascular: RRR, no murmurs, rubs, or gallops, palpable pedal pulses bilaterally   Gastrointestinal: Positive bowel sounds, soft, nontender, nondistended   Musculoskeletal: No bilateral ankle edema, no clubbing or cyanosis to extremities   Psychiatric: Appropriate affect, cooperative   Neurologic: Oriented x 3,speech clear   Skin: No rashes     Result Review    Result Review:  I have personally reviewed the results from the time of this admission to 2023 16:17 EDT and agree with these findings:  [x]  Laboratory  []  Microbiology  [x]  Radiology  [x]  EKG/Telemetry   []  Cardiology/Vascular   []  Pathology  []  Old records  []  Other:        Imaging Results (Last 24 Hours)       Procedure Component Value Units Date/Time    XR Chest 1 View  [[id number]] Collected: 09/11/23 1335     Updated: 09/11/23 1338    Narrative:      PROCEDURE: XR CHEST 1 VW     COMPARISON: [facility], CT, CT CHEST WO CONTRAST DIAGNOSTIC, 6/07/2023, 16:27.    [facility], CR, XR CHEST 1 VW, 8/27/2023, 0:33.     INDICATIONS: SOA Triage Protocol     FINDINGS:   Heart size within normal limits and similar to prior exam.  Chronic appearing left mid and lower   lobe reticular markings unchanged from prior exam.  Pleural thickening versus trace effusion   unchanged from prior exam noted on the left.  Right lung relatively clear.  No pneumothorax.    Underlying emphysematous changes noted most notable in the upper lobes.  Osseous structures are   unremarkable.       Impression:         Stable appearing chronic findings compared to previous radiograph, without convincing new   abnormality.                  MELANIA SHAIKH MD         Electronically Signed and Approved By: MELANIA SHAIKH MD on 9/11/2023 at 13:34                                    Assessment & Plan   Assessment / Plan     Assessment/Plan:   Acute hypoxic and hypercapnic respiratory failure  Respiratory acidosis  COPD exacerbation  History of COPD on home oxygen  Type 2 diabetes mellitus  Hypertension  Hyperlipidemia    Plan  Admit to inpatient, telemetry  Continue on BiPAP tonight, recheck ABG around 8 PM  Received methylprednisolone by the EMS  Prednisone 40 mg p.o. daily for next 4 days starting tomorrow  Symbicort 2 puffs twice daily  DuoNebs every 4 hours scheduled  Bronchodilator protocol  Bronchopulmonary hygiene  Xopenex every 6 hours as needed  Pulmonology consult  N.p.o. while on BiPAP except sips with meds  Low-dose sliding scale insulin  POCT every 6 hours  Hypoglycemic protocol      DVT prophylaxis:  No DVT prophylaxis order currently exists.    CODE STATUS:    Medical Intervention Limits: NO intubation (DNI)  Level Of Support Discussed With: Patient  Code Status (Patient has no pulse  and is not breathing): CPR (Attempt to Resuscitate)  Medical Interventions (Patient has pulse or is breathing): Limited Support      Admission Status:  I believe this patient meets inpatient status.    Part of this note may be an electronic transcription/translation of spoken language to printed text using the Dragon Dictation System    Cleopatra Alaniz MD

## 2023-09-11 NOTE — ED PROVIDER NOTES
Time: 1:12 PM EDT  Date of encounter:  9/11/2023  Independent Historian/Clinical History and Information was obtained by:   Patient and EMS    History is limited by: Acuity of Condition    Chief Complaint: Shortness of breath      History of Present Illness:  Patient is a 73 y.o. year old male who presents to the emergency department for evaluation of shortness of breath.  Per EMS they were called out due to shortness of breath.  Patient was reported to have oxygen saturations in the 60s and has been short of breath since yesterday.  They report his oxygen concentrator was not working.  He received DuoNeb, steroids, mag in route.  They also placed patient on BiPAP.  He had urinated himself.  Patient mainly only complaining of shortness of breath.  Denies any chest pain, nausea, vomiting, fever.    HPI    Patient Care Team  Primary Care Provider: Mallorie Roa APRN    Past Medical History:     No Known Allergies  Past Medical History:   Diagnosis Date    Asthma     COPD (chronic obstructive pulmonary disease)     Diabetes mellitus     Ex-smoker     QUIT 2021    Hernia, umbilical     Hypertension     On home O2     REPORTS WEARING 2L/NC PRN SOA     Past Surgical History:   Procedure Laterality Date    ABDOMINAL SURGERY       History reviewed. No pertinent family history.    Home Medications:  Prior to Admission medications    Medication Sig Start Date End Date Taking? Authorizing Provider   albuterol sulfate  (90 Base) MCG/ACT inhaler Inhale 2 puffs Every 4 (Four) Hours As Needed for Wheezing. 8/14/23   Mallorie Roa APRN   aspirin 81 MG chewable tablet Chew 1 tablet Daily. 5/2/23   Mallorie Roa APRN   atorvastatin (LIPITOR) 10 MG tablet Take 1 tablet by mouth Daily for 180 days. 9/1/23 2/28/24  Mallorie Roa APRN   cetirizine (zyrTEC) 10 MG tablet Take 1 tablet by mouth Daily. 5/2/23   Mallorie Roa APRN   empagliflozin (JARDIANCE) 10 MG tablet tablet Take 1 tablet by mouth Daily.    Provider,  "MD Roosevelt   Fluticasone-Salmeterol (ADVAIR/WIXELA) 250-50 MCG/ACT DISKUS Inhale 1 puff 2 (Two) Times a Day for 180 days. 23  Mallorie Roa APRN   insulin degludec (Tresiba FlexTouch) 100 UNIT/ML solution pen-injector injection Inject 20 Units under the skin into the appropriate area as directed Daily. 23   Mallorie Roa APRN   lisinopril (PRINIVIL,ZESTRIL) 5 MG tablet Take 1 tablet by mouth Daily for 30 days. 23  Mallorie Roa APRN   metFORMIN (GLUCOPHAGE) 500 MG tablet Take 1 tablet by mouth 2 (Two) Times a Day With Meals for 30 days. 23  Mallorie Roa APRN   metoprolol succinate XL (TOPROL-XL) 25 MG 24 hr tablet Take 1 tablet by mouth Daily for 30 days. 23  Mallorie Roa APRN   pantoprazole (PROTONIX) 40 MG EC tablet Take 1 tablet by mouth Daily for 30 days. 23  Yung Singleton MD        Social History:   Social History     Tobacco Use    Smoking status: Former     Packs/day: 1.50     Years: 56.00     Pack years: 84.00     Types: Cigarettes     Start date:      Quit date: 3/19/2021     Years since quittin.4    Smokeless tobacco: Current     Types: Chew   Vaping Use    Vaping Use: Never used   Substance Use Topics    Alcohol use: Never    Drug use: Never         Review of Systems:  Review of Systems   Constitutional:  Negative for fever.   Respiratory:  Positive for shortness of breath.       Physical Exam:  /71   Pulse 89   Temp 97.9 °F (36.6 °C) (Rectal)   Resp (!) 30   Ht 182.9 cm (72\")   Wt 113 kg (250 lb)   SpO2 93%   BMI 33.91 kg/m²     Physical Exam  Vitals and nursing note reviewed.   Constitutional:       Appearance: Normal appearance. He is ill-appearing.      Comments: Patient had urinated on himself.  Patient is alert but is lethargic   HENT:      Head: Normocephalic and atraumatic.   Eyes:      General: No scleral icterus.  Cardiovascular:      Rate and Rhythm: Normal rate and regular " rhythm.   Pulmonary:      Effort: Respiratory distress present.      Breath sounds: Wheezing present.   Abdominal:      Palpations: Abdomen is soft.      Tenderness: There is no abdominal tenderness.   Musculoskeletal:         General: Normal range of motion.      Cervical back: Normal range of motion.   Skin:     Findings: No rash.   Neurological:      General: No focal deficit present.      Mental Status: He is alert.                Procedures:  Procedures      Medical Decision Making:      Comorbidities that affect care:    COPD, Diabetes    External Notes reviewed:    Reviewed admission from 8/27/2023      The following orders were placed and all results were independently analyzed by me:  Orders Placed This Encounter   Procedures    Blood Culture - Blood,    Blood Culture - Blood,    XR Chest 1 View    [place] Draw    Comprehensive Metabolic Panel    BNP    Single High Sensitivity Troponin T    CBC Auto Differential    ABG with Co-Ox and Electrolytes    Lactic Acid, Plasma    Urinalysis With Culture If Indicated - Urine, Clean Catch    Blood Gas, Arterial -Obtain on: Current Settings; With Co-Ox Panel: Yes    Urinalysis, Microscopic Only - Urine, Clean Catch    NPO Diet NPO Type: Strict NPO    Undress & Gown    Continuous Pulse Oximetry    Vital Signs    Inpatient Hospitalist Consult    Oxygen Therapy- Nasal Cannula; Titrate 1-6 LPM Per SpO2; 90 - 95%    POC Glucose Once    ECG 12 Lead ED Triage Standing Order; SOA    Insert Peripheral IV    CBC & Differential    Green Top (Gel)    Lavender Top    Gold Top - SST    Light Blue Top       Medications Given in the Emergency Department:  Medications   sodium chloride 0.9 % flush 10 mL (has no administration in time range)        ED Course:    ED Course as of 09/11/23 1534   Mon Sep 11, 2023   1445 Updated patient on need for admission.  Has not been improving on BiPAP.  Continue BiPAP at this time.  Will admit to the hospital. [MA]   6618 Spoke with Dr. Alaniz who  agrees to admit. [MA]      ED Course User Index  [MA] Aaron Jacob MD       Labs:    Lab Results (last 24 hours)       Procedure Component Value Units Date/Time    POC Glucose Once [[id number]]  (Abnormal) Collected: 09/11/23 1308    Specimen: Blood Updated: 09/11/23 1311     Glucose 159 mg/dL      Comment: Serial Number: [id number]:  [id number]       ABG with Co-Ox and Electrolytes [[id number]]  (Abnormal) Collected: 09/11/23 1309    Specimen: Arterial Blood from Arm, Right Updated: 09/11/23 1317     pH, Arterial 7.174 pH units      pCO2, Arterial 66.9 mm Hg      pO2, Arterial 231.9 mm Hg      HCO3, Arterial 24.1 mmol/L      Base Excess, Arterial -5.4 mmol/L      O2 Saturation, Arterial 98.8 %      Hemoglobin, Blood Gas 12.9 g/dL      Carboxyhemoglobin 0.9 %      Methemoglobin 0.00 %      Oxyhemoglobin 97.9 %      FHHB 1.2 %      Nael's Test Positive     Note 14/7     Site Arterial: right radial     Modality BiPap     FIO2 100 %      Flow Rate --     Sodium, Arterial 137.1 mmol/L      Potassium, Arterial 4.80 mmol/L      Ionized Calcium, Arterial 1.14 mmol/L      Chloride, Arterial 103 mmol/L      Glucose, Arterial 157 mg/dL      Lactate, Arterial 0.92 mmol/L      PO2/FIO2 232    CBC & Differential [[id number]]  (Abnormal) Collected: 09/11/23 1310    Specimen: Blood from Arm, Left Updated: 09/11/23 1322    Narrative:      The following orders were created for panel order CBC & Differential.  Procedure                               Abnormality         Status                     ---------                               -----------         ------                     CBC Auto Differential[[id number]]        Abnormal            Final result                 Please view results for these tests on the individual orders.    Comprehensive Metabolic Panel [[id number]]  (Abnormal) Collected: 09/11/23 1310    Specimen: Blood from Arm, Left Updated: 09/11/23 1343     Glucose 160 mg/dL      BUN 15 mg/dL      Creatinine  1.00 mg/dL      Sodium 138 mmol/L      Potassium 5.0 mmol/L      Chloride 102 mmol/L      CO2 26.9 mmol/L      Calcium 8.8 mg/dL      Total Protein 7.6 g/dL      Albumin 4.1 g/dL      ALT (SGPT) 9 U/L      AST (SGOT) 13 U/L      Alkaline Phosphatase 106 U/L      Total Bilirubin 0.4 mg/dL      Globulin 3.5 gm/dL      A/G Ratio 1.2 g/dL      BUN/Creatinine Ratio 15.0     Anion Gap 9.1 mmol/L      eGFR 79.0 mL/min/1.73     Narrative:      GFR Normal >60  Chronic Kidney Disease <60  Kidney Failure <15    The GFR formula is only valid for adults with stable renal function between ages 18 and 70.    BNP [[id number]]  (Normal) Collected: 09/11/23 1310    Specimen: Blood from Arm, Left Updated: 09/11/23 1341     proBNP 446.1 pg/mL     Narrative:      Among patients with dyspnea, NT-proBNP is highly sensitive for the detection of acute congestive heart failure. In addition NT-proBNP of <300 pg/ml effectively rules out acute congestive heart failure with 99% negative predictive value.      Single High Sensitivity Troponin T [[id number]]  (Abnormal) Collected: 09/11/23 1310    Specimen: Blood from Arm, Left Updated: 09/11/23 1343     HS Troponin T 40 ng/L     Narrative:      High Sensitive Troponin T Reference Range:  <10.0 ng/L- Negative Female for AMI  <15.0 ng/L- Negative Male for AMI  >=10 - Abnormal Female indicating possible myocardial injury.  >=15 - Abnormal Male indicating possible myocardial injury.   Clinicians would have to utilize clinical acumen, EKG, Troponin, and serial changes to determine if it is an Acute Myocardial Infarction or myocardial injury due to an underlying chronic condition.         CBC Auto Differential [[id number]]  (Abnormal) Collected: 09/11/23 1310    Specimen: Blood from Arm, Left Updated: 09/11/23 1322     WBC 10.97 10*3/mm3      RBC 5.35 10*6/mm3      Hemoglobin 12.1 g/dL      Hematocrit 43.4 %      MCV 81.1 fL      MCH 22.6 pg      MCHC 27.9 g/dL      RDW 17.6 %      RDW-SD 50.7 fl      MPV  9.9 fL      Platelets 263 10*3/mm3      Neutrophil % 63.7 %      Lymphocyte % 14.0 %      Monocyte % 8.2 %      Eosinophil % 13.0 %      Basophil % 0.6 %      Immature Grans % 0.5 %      Neutrophils, Absolute 6.97 10*3/mm3      Lymphocytes, Absolute 1.54 10*3/mm3      Monocytes, Absolute 0.90 10*3/mm3      Eosinophils, Absolute 1.43 10*3/mm3      Basophils, Absolute 0.07 10*3/mm3      Immature Grans, Absolute 0.06 10*3/mm3      nRBC 0.0 /100 WBC     Blood Culture - Blood, Arm, Right [[id number]] Collected: 09/11/23 1315    Specimen: Blood from Arm, Right Updated: 09/11/23 1318    Blood Culture - Blood, Arm, Left [[id number]] Collected: 09/11/23 1315    Specimen: Blood from Arm, Left Updated: 09/11/23 1318    Lactic Acid, Plasma [[id number]]  (Normal) Collected: 09/11/23 1315    Specimen: Blood Updated: 09/11/23 1341     Lactate 1.0 mmol/L     Blood Gas, Arterial -Obtain on: Current Settings; With Co-Ox Panel: Yes [[id number]]  (Abnormal) Collected: 09/11/23 1423    Specimen: Arterial Blood from Arm, Left Updated: 09/11/23 1428     pH, Arterial 7.225 pH units      pCO2, Arterial 62.5 mm Hg      pO2, Arterial 86.1 mm Hg      HCO3, Arterial 25.3 mmol/L      Base Excess, Arterial -3.3 mmol/L      O2 Saturation, Arterial 94.4 %      Hemoglobin, Blood Gas 12.5 g/dL      Carboxyhemoglobin 1.2 %      Methemoglobin 0.10 %      Oxyhemoglobin 93.2 %      FHHB 5.5 %      Site Arterial: left radial     Modality BiPap     FIO2 32 %      PO2/FIO2 269     Nael's Test Positive     Note 14/7     Lactate, Arterial --    Urinalysis With Culture If Indicated - Straight Cath [[id number]]  (Abnormal) Collected: 09/11/23 1434    Specimen: Urine from Straight Cath Updated: 09/11/23 1502     Color, UA Yellow     Appearance, UA Clear     pH, UA 5.5     Specific Gravity, UA 1.023     Glucose, UA >=1000 mg/dL (3+)     Ketones, UA Negative     Bilirubin, UA Negative     Blood, UA Trace     Protein, UA Trace     Leuk Esterase, UA Negative      Nitrite, UA Negative     Urobilinogen, UA 1.0 E.U./dL    Narrative:      In absence of clinical symptoms, the presence of pyuria, bacteria, and/or nitrites on the urinalysis result does not correlate with infection.    Urinalysis, Microscopic Only - Straight Cath [[id number]]  (Abnormal) Collected: 09/11/23 1434    Specimen: Urine from Straight Cath Updated: 09/11/23 1502     RBC, UA 13-20 /HPF      WBC, UA 0-2 /HPF      Comment: Urine culture not indicated.        Bacteria, UA None Seen /HPF      Squamous Epithelial Cells, UA 0-2 /HPF      Hyaline Casts, UA 3-6 /LPF      Methodology Automated Microscopy             Imaging:    XR Chest 1 View    Result Date: 9/11/2023  PROCEDURE: XR CHEST 1 VW  COMPARISON: [facility], CT, CT CHEST WO CONTRAST DIAGNOSTIC, 6/07/2023, 16:27.  [facility], CR, XR CHEST 1 VW, 8/27/2023, 0:33.  INDICATIONS: SOA Triage Protocol  FINDINGS:  Heart size within normal limits and similar to prior exam.  Chronic appearing left mid and lower lobe reticular markings unchanged from prior exam.  Pleural thickening versus trace effusion unchanged from prior exam noted on the left.  Right lung relatively clear.  No pneumothorax.  Underlying emphysematous changes noted most notable in the upper lobes.  Osseous structures are unremarkable.        Stable appearing chronic findings compared to previous radiograph, without convincing new abnormality.       MELANIA SHAIKH MD       Electronically Signed and Approved By: MELANIA SHAIKH MD on 9/11/2023 at 13:34                Differential Diagnosis and Discussion:    Dyspnea: Differential diagnosis includes but is not limited to metabolic acidosis, neurological disorders, psychogenic, asthma, pneumothorax, upper airway obstruction, COPD, pneumonia, noncardiogenic pulmonary edema, interstitial lung disease, anemia, congestive heart failure, and pulmonary embolism    All labs were reviewed and interpreted by me.  All X-rays  impressions were independently interpreted by me.  EKG was interpreted by me.    MDM     Patient with acute on chronic respiratory failure with hypoxia and hypercapnia.  Had saturations in the 70s and had to be placed on BiPAP in route.  BiPAP.  Was continued.  Has had improvement of his acid-base status and improvement in his mental status.  Continue BiPAP at this time.  Patient did confirm he did not want to be intubated if need be.  Currently does not need to be intubated and needs to be continued on BiPAP.  Will admit to the hospital further work-up and management.      Critical Care Note: Total Critical Care time of 33 minutes. Total critical care time documented does not include time spent on separately billed procedures for services of nurses or physician assistants. I personally saw and examined the patient. I have reviewed all diagnostic interpretations and treatment plans as written. I was present for the key portions of any procedures performed and the inclusive time noted in any critical care statement. Critical care time includes patient management by me, time spent at the patients bedside,  time to review lab and imaging results, discussing patient care, documentation in the medical record, and time spent with family or caregiver.    Patient Care Considerations:          Consultants/Shared Management Plan:    Hospitalist: I have discussed the case with Dr. Alaniz who agrees to accept the patient for admission.    Social Determinants of Health:          Disposition and Care Coordination:    Admit:   Through independent evaluation of the patient's history, physical, and imperical data, the patient meets criteria for observation/admission to the hospital.        Final diagnoses:   Acute respiratory failure with hypoxia and hypercapnia        ED Disposition       ED Disposition   Decision to Admit    Condition   --    Comment   --               This medical record created using voice recognition  software.             Aaron Jacob MD  09/11/23 5174

## 2023-09-11 NOTE — PLAN OF CARE
Problem: Adult Inpatient Plan of Care  Goal: Plan of Care Review  Outcome: Ongoing, Progressing  Flowsheets (Taken 9/11/2023 1907)  Progress: improving  Plan of Care Reviewed With: patient  Outcome Evaluation: Patient admitted to 4MTU from ED for respiratory failure with hypoxia and hypercapnia. Patient alert and oriented upon arrival and remainder of shift., eyelids droopy.  Patient is on bipap. No complaints of pain or discomfort at this time. Continuing with plan of care.   Goal Outcome Evaluation:  Plan of Care Reviewed With: patient        Progress: improving  Outcome Evaluation: Patient admitted to 4MTU from ED for respiratory failure with hypoxia and hypercapnia. Patient alert and oriented upon arrival and remainder of shift., eyelids droopy.  Patient is on bipap. No complaints of pain or discomfort at this time. Continuing with plan of care.

## 2023-09-11 NOTE — CONSULTS
Pulmonary / Critical Care Consult Note      Patient Name: Dilip Faulkner  : 1950  MRN: [id number]  Primary Care Physician:  Mallorie Roa APRN  Referring Physician: No ref. provider found  Date of admission: 2023    Subjective   Subjective     Reason for Consult/ Chief Complaint: Acute on chronic hypoxic and hypercapnic respiratory failure, COPD exacerbation    HPI:  Dilip Faulkner is a 73 y.o. male with past medical history of COPD on 2 L baseline, type 2 diabetes, hypertension who presented to the emergency department via EMS for evaluation of shortness of breath.  Per EMS, patient was found to have SPO2 around 60% on room air.  His home concentrator stopped working.  Patient received nebulizer treatment, steroids and magnesium and to hospital.  Patient was immediately placed on BiPAP.  In the emergency department, patient was found to be 97% on 70% FiO2 on BiPAP.  Initial ABG was 7.17/66/231, repeat ABG 7.22/62/86 on 30% FiO2.  Labs of note include normal lactic acid, WBC 10.97, hemoglobin 12.1, and proBNP 446.  CXR revealed stable chronic appearing left mid and lower lobe reticular markings unchanged from previous chest x-ray.  No acute abnormalities were noted.  Patient was admitted to hospitalist service for acute hypoxic and hypercapnic respiratory failure secondary to COPD exacerbation.  Pulmonology was then consulted for the above reason.  Upon exam, patient was noted to be resting comfortably in bed on BiPAP 14/7 with 32% FiO2 with SPO2 of 95%.  Patient advised he normally wears 2 L O2 at home home.  He denies any worsening cough, fever, chills.  No known sick contact exposure.    Review of Systems:  General: Denied complaints  HEENT: Denied complaints  Respiratory: Dyspnea, cough, otherwise denied complaints  Cardiovascular: Denied complaints  GI: Denied complaints  : Denied complaints  MSK: Denied complaints  Endocrine: Denied complaints  Hematologic: Denied complaints  Psychiatric:  Denied complaints  Neurologic: Denied complaints  Skin: Denied complaints    Personal History     Past Medical History:   Diagnosis Date    Asthma     COPD (chronic obstructive pulmonary disease)     Diabetes mellitus     Ex-smoker     QUIT 2021    Hernia, umbilical     Hypertension     On home O2     REPORTS WEARING 2L/NC PRN SOA       Past Surgical History:   Procedure Laterality Date    ABDOMINAL SURGERY         Family History: family history is not on file. Otherwise pertinent FHx was reviewed and not pertinent to current issue.    Social History:  reports that he quit smoking about 2 years ago. His smoking use included cigarettes. He started smoking about 58 years ago. He has a 84.00 pack-year smoking history. His smokeless tobacco use includes chew. He reports that he does not drink alcohol and does not use drugs.    Home Medications:  Fluticasone-Salmeterol, albuterol sulfate HFA, aspirin, atorvastatin, empagliflozin, insulin degludec, metFORMIN, metoprolol succinate XL, and pantoprazole    Allergies:  No Known Allergies    Objective    Objective     Vitals:   Temp:  [97.9 °F (36.6 °C)] 97.9 °F (36.6 °C)  Heart Rate:  [85-99] 85  Resp:  [30] 30  BP: (121-140)/() 132/71    Physical Exam:  Vital Signs Reviewed   General: Chronically ill-appearing male, lying in bed, NAD.    HEENT:  PERRL, EOMI.    Neck:  No JVD, no thyromegaly  Lymph: no axillary, cervical, supraclavicular lymphadenopathy noted bilaterally  Chest: Expiratory wheezing, diminished to auscultation bilaterally, no work of breathing noted on BiPAP 16/6  CV: RRR, no M/G/R, pulses 2+  Abd:  Soft, NT, ND, +BS  EXT:  no clubbing, no cyanosis, no edema  Neuro: Drowsy but answers questions appropriately, CN grossly intact, no focal deficits.  Skin: No rashes or lesions noted    Result Review    Result Review:  I have personally reviewed the results from the time of this admission to 9/11/2023 17:32 EDT and agree with these findings:  [x]   Laboratory  [x]  Microbiology  [x]  Radiology  [x]  EKG/Telemetry   [x]  Cardiology/Vascular   []  Pathology  [x]  Old records  []  Other:  Most notable findings include:     ABG 7.17/66/231 --> 7.22/62/86        Lab 09/11/23  1310 09/11/23  1309   WBC 10.97*  --    HEMOGLOBIN 12.1*  --    HEMATOCRIT 43.4  --    PLATELETS 263  --    SODIUM 138  --    SODIUM, ARTERIAL  --  137.1   POTASSIUM 5.0  --    CHLORIDE 102  --    CO2 26.9  --    BUN 15  --    CREATININE 1.00  --    GLUCOSE 160*  --    GLUCOSE, ARTERIAL  --  157*   CALCIUM 8.8  --    TOTAL PROTEIN 7.6  --    ALBUMIN 4.1  --    GLOBULIN 3.5  --          Assessment & Plan   Assessment / Plan     Active Hospital Problems:  Active Hospital Problems    Diagnosis     **Acute respiratory failure with hypoxia and hypercapnia      Impression:   Acute on chronic hypoxic and hypercapnic respiratory failure requiring NIPPV  Acute exacerbation of COPD  Respiratory acidosis  Hypertension  Type 2 diabetes mellitus    Plan:   -Currently on continuous NIPPV.  Settings adjusted to 16/6 32% FiO2.  -Recheck ABG around 8 PM  -RT  consulted for assistance with evaluating for home NIPPV  -Start azithromycin 500 mg IV x3 days for COPD exacerbation  -Sputum culture ordered  -Respiratory viral panel negative  -Discontinue Symbicort, start Brovana and Pulmicort, continue DuoNebs  -Start bronchopulmonary hygiene  -Encourage use of I-S and flutter valve when able  -Discontinue prednisone, start Solu-Medrol 40 mg IV every 8 in AM  -Encourage mobilization when able    Electronically signed by MONE Voss, 09/11/23, 5:32 PM EDT.  This patient was seen by both a physician and a NP. I, Leila Singh MD, spent >50% of time in accordance with split shared billing. This included personally reviewing all pertinent labs, imaging, microbiology and documentation. Also discussing the case with the patient and any available family, the admitting physician and any available  ancillary staff. Electronically signed by Leila Singh MD, 09/11/23, 6:38 PM EDT.

## 2023-09-11 NOTE — ED NOTES
SPOKE WITH PATIENT ABOUT POSSIBILITY OF BEING INTUBATED. PATIENT VERBALIZED THAT HE DOES NOT WANT TO BE INTUBATED. MD BURGER

## 2023-09-12 ENCOUNTER — APPOINTMENT (OUTPATIENT)
Dept: CT IMAGING | Facility: HOSPITAL | Age: 73
DRG: 190 | End: 2023-09-12
Payer: MEDICARE

## 2023-09-12 PROBLEM — I47.10 PSVT (PAROXYSMAL SUPRAVENTRICULAR TACHYCARDIA): Status: ACTIVE | Noted: 2023-09-12

## 2023-09-12 PROBLEM — I47.1 PSVT (PAROXYSMAL SUPRAVENTRICULAR TACHYCARDIA): Status: ACTIVE | Noted: 2023-09-12

## 2023-09-12 LAB
ALBUMIN SERPL-MCNC: 3.5 G/DL (ref 3.5–5.2)
ALBUMIN/GLOB SERPL: 0.9 G/DL
ALP SERPL-CCNC: 92 U/L (ref 39–117)
ALT SERPL W P-5'-P-CCNC: 8 U/L (ref 1–41)
ANION GAP SERPL CALCULATED.3IONS-SCNC: 12.6 MMOL/L (ref 5–15)
AST SERPL-CCNC: 10 U/L (ref 1–40)
BASOPHILS # BLD AUTO: 0.01 10*3/MM3 (ref 0–0.2)
BASOPHILS NFR BLD AUTO: 0.1 % (ref 0–1.5)
BILIRUB SERPL-MCNC: 0.3 MG/DL (ref 0–1.2)
BUN SERPL-MCNC: 24 MG/DL (ref 8–23)
BUN/CREAT SERPL: 23.1 (ref 7–25)
CALCIUM SPEC-SCNC: 9 MG/DL (ref 8.6–10.5)
CHLORIDE SERPL-SCNC: 101 MMOL/L (ref 98–107)
CO2 SERPL-SCNC: 22.4 MMOL/L (ref 22–29)
CREAT SERPL-MCNC: 1.04 MG/DL (ref 0.76–1.27)
DEPRECATED RDW RBC AUTO: 49.3 FL (ref 37–54)
EGFRCR SERPLBLD CKD-EPI 2021: 75.3 ML/MIN/1.73
EOSINOPHIL # BLD AUTO: 0 10*3/MM3 (ref 0–0.4)
EOSINOPHIL NFR BLD AUTO: 0 % (ref 0.3–6.2)
ERYTHROCYTE [DISTWIDTH] IN BLOOD BY AUTOMATED COUNT: 17 % (ref 12.3–15.4)
GLOBULIN UR ELPH-MCNC: 3.7 GM/DL
GLUCOSE BLDC GLUCOMTR-MCNC: 132 MG/DL (ref 70–99)
GLUCOSE BLDC GLUCOMTR-MCNC: 142 MG/DL (ref 70–99)
GLUCOSE BLDC GLUCOMTR-MCNC: 158 MG/DL (ref 70–99)
GLUCOSE BLDC GLUCOMTR-MCNC: 184 MG/DL (ref 70–99)
GLUCOSE BLDC GLUCOMTR-MCNC: 197 MG/DL (ref 70–99)
GLUCOSE SERPL-MCNC: 138 MG/DL (ref 65–99)
HCT VFR BLD AUTO: 40.9 % (ref 37.5–51)
HGB BLD-MCNC: 11 G/DL (ref 13–17.7)
IMM GRANULOCYTES # BLD AUTO: 0.05 10*3/MM3 (ref 0–0.05)
IMM GRANULOCYTES NFR BLD AUTO: 0.6 % (ref 0–0.5)
LYMPHOCYTES # BLD AUTO: 0.56 10*3/MM3 (ref 0.7–3.1)
LYMPHOCYTES NFR BLD AUTO: 6.4 % (ref 19.6–45.3)
MAGNESIUM SERPL-MCNC: 2.4 MG/DL (ref 1.6–2.4)
MCH RBC QN AUTO: 21.8 PG (ref 26.6–33)
MCHC RBC AUTO-ENTMCNC: 26.9 G/DL (ref 31.5–35.7)
MCV RBC AUTO: 81 FL (ref 79–97)
MONOCYTES # BLD AUTO: 0.35 10*3/MM3 (ref 0.1–0.9)
MONOCYTES NFR BLD AUTO: 4 % (ref 5–12)
NEUTROPHILS NFR BLD AUTO: 7.82 10*3/MM3 (ref 1.7–7)
NEUTROPHILS NFR BLD AUTO: 88.9 % (ref 42.7–76)
NRBC BLD AUTO-RTO: 0 /100 WBC (ref 0–0.2)
PHOSPHATE SERPL-MCNC: 3.8 MG/DL (ref 2.5–4.5)
PLATELET # BLD AUTO: 244 10*3/MM3 (ref 140–450)
PMV BLD AUTO: 10.4 FL (ref 6–12)
POTASSIUM SERPL-SCNC: 5 MMOL/L (ref 3.5–5.2)
PROCALCITONIN SERPL-MCNC: 0.08 NG/ML (ref 0–0.25)
PROT SERPL-MCNC: 7.2 G/DL (ref 6–8.5)
QT INTERVAL: 423 MS
QTC INTERVAL: 518 MS
RBC # BLD AUTO: 5.05 10*6/MM3 (ref 4.14–5.8)
SODIUM SERPL-SCNC: 136 MMOL/L (ref 136–145)
TROPONIN T SERPL HS-MCNC: 38 NG/L
WBC NRBC COR # BLD: 8.79 10*3/MM3 (ref 3.4–10.8)

## 2023-09-12 PROCEDURE — 63710000001 INSULIN REGULAR HUMAN PER 5 UNITS: Performed by: STUDENT IN AN ORGANIZED HEALTH CARE EDUCATION/TRAINING PROGRAM

## 2023-09-12 PROCEDURE — 97161 PT EVAL LOW COMPLEX 20 MIN: CPT

## 2023-09-12 PROCEDURE — 25010000002 HEPARIN (PORCINE) PER 1000 UNITS: Performed by: STUDENT IN AN ORGANIZED HEALTH CARE EDUCATION/TRAINING PROGRAM

## 2023-09-12 PROCEDURE — 25510000001 IOPAMIDOL PER 1 ML: Performed by: INTERNAL MEDICINE

## 2023-09-12 PROCEDURE — 94664 DEMO&/EVAL PT USE INHALER: CPT

## 2023-09-12 PROCEDURE — 84145 PROCALCITONIN (PCT): CPT | Performed by: INTERNAL MEDICINE

## 2023-09-12 PROCEDURE — 94799 UNLISTED PULMONARY SVC/PX: CPT

## 2023-09-12 PROCEDURE — 82948 REAGENT STRIP/BLOOD GLUCOSE: CPT

## 2023-09-12 PROCEDURE — 99222 1ST HOSP IP/OBS MODERATE 55: CPT | Performed by: INTERNAL MEDICINE

## 2023-09-12 PROCEDURE — 84484 ASSAY OF TROPONIN QUANT: CPT | Performed by: INTERNAL MEDICINE

## 2023-09-12 PROCEDURE — 94660 CPAP INITIATION&MGMT: CPT

## 2023-09-12 PROCEDURE — 93005 ELECTROCARDIOGRAM TRACING: CPT | Performed by: INTERNAL MEDICINE

## 2023-09-12 PROCEDURE — 25010000002 METHYLPREDNISOLONE PER 40 MG: Performed by: NURSE PRACTITIONER

## 2023-09-12 PROCEDURE — 25010000002 AZITHROMYCIN PER 500 MG: Performed by: NURSE PRACTITIONER

## 2023-09-12 PROCEDURE — 63710000001 INSULIN REGULAR HUMAN PER 5 UNITS: Performed by: INTERNAL MEDICINE

## 2023-09-12 PROCEDURE — 80053 COMPREHEN METABOLIC PANEL: CPT | Performed by: STUDENT IN AN ORGANIZED HEALTH CARE EDUCATION/TRAINING PROGRAM

## 2023-09-12 PROCEDURE — 74178 CT ABD&PLV WO CNTR FLWD CNTR: CPT

## 2023-09-12 PROCEDURE — 99233 SBSQ HOSP IP/OBS HIGH 50: CPT | Performed by: STUDENT IN AN ORGANIZED HEALTH CARE EDUCATION/TRAINING PROGRAM

## 2023-09-12 PROCEDURE — 84100 ASSAY OF PHOSPHORUS: CPT | Performed by: STUDENT IN AN ORGANIZED HEALTH CARE EDUCATION/TRAINING PROGRAM

## 2023-09-12 PROCEDURE — 85025 COMPLETE CBC W/AUTO DIFF WBC: CPT | Performed by: STUDENT IN AN ORGANIZED HEALTH CARE EDUCATION/TRAINING PROGRAM

## 2023-09-12 PROCEDURE — 93010 ELECTROCARDIOGRAM REPORT: CPT | Performed by: INTERNAL MEDICINE

## 2023-09-12 PROCEDURE — 83735 ASSAY OF MAGNESIUM: CPT | Performed by: STUDENT IN AN ORGANIZED HEALTH CARE EDUCATION/TRAINING PROGRAM

## 2023-09-12 RX ORDER — ACETAMINOPHEN 325 MG/1
650 TABLET ORAL EVERY 6 HOURS PRN
Status: DISCONTINUED | OUTPATIENT
Start: 2023-09-12 | End: 2023-09-14 | Stop reason: HOSPADM

## 2023-09-12 RX ORDER — ONDANSETRON 2 MG/ML
4 INJECTION INTRAMUSCULAR; INTRAVENOUS EVERY 6 HOURS PRN
Status: DISCONTINUED | OUTPATIENT
Start: 2023-09-12 | End: 2023-09-14 | Stop reason: HOSPADM

## 2023-09-12 RX ORDER — NICOTINE 21 MG/24HR
1 PATCH, TRANSDERMAL 24 HOURS TRANSDERMAL DAILY PRN
Status: DISCONTINUED | OUTPATIENT
Start: 2023-09-12 | End: 2023-09-14 | Stop reason: HOSPADM

## 2023-09-12 RX ORDER — CHOLECALCIFEROL (VITAMIN D3) 125 MCG
5 CAPSULE ORAL NIGHTLY PRN
Status: DISCONTINUED | OUTPATIENT
Start: 2023-09-12 | End: 2023-09-14 | Stop reason: HOSPADM

## 2023-09-12 RX ORDER — ALUMINA, MAGNESIA, AND SIMETHICONE 2400; 2400; 240 MG/30ML; MG/30ML; MG/30ML
15 SUSPENSION ORAL EVERY 6 HOURS PRN
Status: DISCONTINUED | OUTPATIENT
Start: 2023-09-12 | End: 2023-09-14 | Stop reason: HOSPADM

## 2023-09-12 RX ORDER — HYDROXYZINE HYDROCHLORIDE 25 MG/1
25 TABLET, FILM COATED ORAL 3 TIMES DAILY PRN
Status: DISCONTINUED | OUTPATIENT
Start: 2023-09-12 | End: 2023-09-14 | Stop reason: HOSPADM

## 2023-09-12 RX ORDER — LEVALBUTEROL INHALATION SOLUTION 1.25 MG/3ML
1.25 SOLUTION RESPIRATORY (INHALATION)
Status: DISCONTINUED | OUTPATIENT
Start: 2023-09-12 | End: 2023-09-14 | Stop reason: HOSPADM

## 2023-09-12 RX ORDER — ASPIRIN 81 MG/1
81 TABLET, CHEWABLE ORAL DAILY
Status: DISCONTINUED | OUTPATIENT
Start: 2023-09-12 | End: 2023-09-14 | Stop reason: HOSPADM

## 2023-09-12 RX ADMIN — HEPARIN SODIUM 5000 UNITS: 5000 INJECTION INTRAVENOUS; SUBCUTANEOUS at 05:27

## 2023-09-12 RX ADMIN — METHYLPREDNISOLONE SODIUM SUCCINATE 40 MG: 40 INJECTION INTRAMUSCULAR; INTRAVENOUS at 21:15

## 2023-09-12 RX ADMIN — INSULIN HUMAN 2 UNITS: 100 INJECTION, SOLUTION PARENTERAL at 00:03

## 2023-09-12 RX ADMIN — BUDESONIDE 0.5 MG: 0.5 INHALANT RESPIRATORY (INHALATION) at 07:44

## 2023-09-12 RX ADMIN — IPRATROPIUM BROMIDE AND ALBUTEROL SULFATE 3 ML: .5; 3 SOLUTION RESPIRATORY (INHALATION) at 07:44

## 2023-09-12 RX ADMIN — SENNOSIDES AND DOCUSATE SODIUM 2 TABLET: 50; 8.6 TABLET ORAL at 21:15

## 2023-09-12 RX ADMIN — METOPROLOL TARTRATE 25 MG: 25 TABLET, FILM COATED ORAL at 21:15

## 2023-09-12 RX ADMIN — IOPAMIDOL 100 ML: 755 INJECTION, SOLUTION INTRAVENOUS at 21:38

## 2023-09-12 RX ADMIN — INSULIN HUMAN 2 UNITS: 100 INJECTION, SOLUTION PARENTERAL at 21:15

## 2023-09-12 RX ADMIN — METHYLPREDNISOLONE SODIUM SUCCINATE 40 MG: 40 INJECTION INTRAMUSCULAR; INTRAVENOUS at 14:19

## 2023-09-12 RX ADMIN — HEPARIN SODIUM 5000 UNITS: 5000 INJECTION INTRAVENOUS; SUBCUTANEOUS at 21:15

## 2023-09-12 RX ADMIN — ASPIRIN 81 MG: 81 TABLET, CHEWABLE ORAL at 11:29

## 2023-09-12 RX ADMIN — ARFORMOTEROL TARTRATE 15 MCG: 15 SOLUTION RESPIRATORY (INHALATION) at 07:44

## 2023-09-12 RX ADMIN — Medication 10 ML: at 10:17

## 2023-09-12 RX ADMIN — INSULIN HUMAN 2 UNITS: 100 INJECTION, SOLUTION PARENTERAL at 11:58

## 2023-09-12 RX ADMIN — Medication 10 ML: at 21:16

## 2023-09-12 RX ADMIN — IPRATROPIUM BROMIDE AND ALBUTEROL SULFATE 3 ML: .5; 3 SOLUTION RESPIRATORY (INHALATION) at 02:52

## 2023-09-12 RX ADMIN — METOPROLOL TARTRATE 12.5 MG: 25 TABLET, FILM COATED ORAL at 11:29

## 2023-09-12 RX ADMIN — AZITHROMYCIN 500 MG: 500 INJECTION, POWDER, LYOPHILIZED, FOR SOLUTION INTRAVENOUS at 17:57

## 2023-09-12 RX ADMIN — HEPARIN SODIUM 5000 UNITS: 5000 INJECTION INTRAVENOUS; SUBCUTANEOUS at 14:19

## 2023-09-12 RX ADMIN — METHYLPREDNISOLONE SODIUM SUCCINATE 40 MG: 40 INJECTION INTRAMUSCULAR; INTRAVENOUS at 05:27

## 2023-09-12 RX ADMIN — LEVALBUTEROL HYDROCHLORIDE 1.25 MG: 1.25 SOLUTION RESPIRATORY (INHALATION) at 11:42

## 2023-09-12 NOTE — OUTREACH NOTE
Sepsis Week 2 Survey      Flowsheet Row Responses   [facility] patient discharged from? [facility]   Does the patient have one of the following disease processes/diagnoses(primary or secondary)? Sepsis   Week 2 attempt successful? No   Unsuccessful attempts Attempt 2   Revoke Readmitted            LYNDA FIELDS - Registered Nurse

## 2023-09-12 NOTE — CONSULTS
RT CM consuted to arrange NIPPV for patient with acute on chronic respiratory failure secondary to COPD.    Mr. Faulkner is well known to RT CM through multiple encounters for repeated hospitalizations for COPD.  Since Sept 2022, Mr. Faulkner has 8 previous admissions related to his COPD. Pulmonary follow up has been arranged numerous times and patient has canceled every pulmonary appointment made.  After several admissions and recommendations to use home NIPPV, Mr. Faulkner finally agreed to bilevel set up with nasal pillows with [employer] in November of 2022.      Mr. Faulkner has [employer] medicare advantage and was recently required to change all respiratory services to [facility] due to competitive bidding/[employer] contract with [facility].  New oxygen concentrator, POC and supplies were delivered yesterday per patient.     If patient is agreeable, he may benefit from nebulized triple therapy.  Patient has Medicare replacement and medicaid secondary so treatment should be 100% covered.    Use of home bilevel for chronic respiratory failure was discussed with Mr. Faulkner.  Patient states he has not been using the device and has no intentions to start doing so.  Risks of acute on chronic respiratory failure due to hypercarbia leading to need for intubation and mechanical ventilation was discussed with Mr. Faulkner again.  Patient continues to state he would not want intubation or mechanical ventilation but he does not wish to use bilevel or NIV at home either. Palliative consult will be placed for goals of care discussion.     RT CM will follow up once palliative consult completed if necessary.

## 2023-09-12 NOTE — PLAN OF CARE
Goal Outcome Evaluation:     Patient has been on continuous Bipap since coming up from ER; tolerating well.

## 2023-09-12 NOTE — PLAN OF CARE
Goal Outcome Evaluation:      Patient seen by respiratory  this shift r/t home bipap use. Palliative care ordered. He ambulated in  carreon with PT. He continues on iv antibiotics. Blood sugar elevated x 1, sliding scale insulin given.

## 2023-09-12 NOTE — CONSULTS
Cardiology Consult Note  [facility] 4TH FLOOR MEDICAL TELEMETRY UNIT          Patient Identification:  Dilip Faulkner      [id number]  73 y.o.        male  1950           Reason for Consultation: SVT    PCP: Jackeline Jaime APRN    History of Present Illness:     Patient is a 73-year-old with history of previous paroxysmal SVT during adenosine, essential hypertension, type 2 diabetes, and COPD on home oxygen therapy who presented with worsening shortness of breath and hypoxia satting in the 60% range.  Patient today developed intermittent SVT episodes in the 180s extremity spontaneously the patient himself did not notice any symptomatic problems.  He is now resting comfortably sitting up denies any chest pain has persistent shortness of breath problems.    Past History:  Past Medical History:   Diagnosis Date    Asthma     COPD (chronic obstructive pulmonary disease)     Diabetes mellitus     Ex-smoker     QUIT     Hernia, umbilical     Hypertension     On home O2     REPORTS WEARING 2L/NC PRN SOA     Past Surgical History:   Procedure Laterality Date    ABDOMINAL SURGERY       No Known Allergies  Social History     Socioeconomic History    Marital status: Single   Tobacco Use    Smoking status: Former     Packs/day: 1.50     Years: 56.00     Pack years: 84.00     Types: Cigarettes     Start date:      Quit date: 3/19/2021     Years since quittin.4    Smokeless tobacco: Current     Types: Chew   Vaping Use    Vaping Use: Never used   Substance and Sexual Activity    Alcohol use: Never    Drug use: Never    Sexual activity: Defer     History reviewed. No pertinent family history.    Medications:  Prior to Admission medications    Medication Sig Start Date End Date Taking? Authorizing Provider   albuterol sulfate  (90 Base) MCG/ACT inhaler Inhale 2 puffs Every 4 (Four) Hours As Needed for Wheezing. 23   Mallorie Roa APRN   aspirin 81 MG chewable tablet Chew 1 tablet Daily.  5/2/23   Mallorie Roa APRN   atorvastatin (LIPITOR) 10 MG tablet Take 1 tablet by mouth Daily for 180 days. 9/1/23 2/28/24  Mallorie Roa APRN   empagliflozin (JARDIANCE) 10 MG tablet tablet Take 1 tablet by mouth Daily.    Provider, MD Roosevelt   Fluticasone-Salmeterol (ADVAIR/WIXELA) 250-50 MCG/ACT DISKUS Inhale 1 puff 2 (Two) Times a Day for 180 days. 9/1/23 2/28/24  Mallorie Roa APRN   insulin degludec (Tresiba FlexTouch) 100 UNIT/ML solution pen-injector injection Inject 20 Units under the skin into the appropriate area as directed Daily. 7/27/23   Mallorie Roa APRN   metFORMIN (GLUCOPHAGE) 500 MG tablet Take 1 tablet by mouth 2 (Two) Times a Day With Meals for 30 days. 5/2/23 8/27/23  Mallorie Roa APRN   metoprolol succinate XL (TOPROL-XL) 25 MG 24 hr tablet Take 1 tablet by mouth Daily for 30 days. 5/2/23 8/27/23  Mallorie Roa APRN   pantoprazole (PROTONIX) 40 MG EC tablet Take 1 tablet by mouth Daily for 30 days. 8/30/23 9/29/23  Yung Singleton MD      Current medications:  arformoterol, 15 mcg, Nebulization, BID - RT  aspirin, 81 mg, Oral, Daily  azithromycin, 500 mg, Intravenous, Q24H  budesonide, 0.5 mg, Nebulization, BID - RT  heparin (porcine), 5,000 Units, Subcutaneous, Q8H  insulin regular, 2-7 Units, Subcutaneous, 4x Daily AC & at Bedtime  levalbuterol, 1.25 mg, Nebulization, 4x Daily - RT  methylPREDNISolone sodium succinate, 40 mg, Intravenous, Q8H  metoprolol tartrate, 12.5 mg, Oral, Q12H  senna-docusate sodium, 2 tablet, Oral, BID  sodium chloride, 10 mL, Intravenous, Q12H      Current IV drips:       Review of Systems   Constitutional: Negative for chills, fever and weight loss.   HENT:  Negative for congestion and nosebleeds.    Cardiovascular:  Negative for orthopnea and paroxysmal nocturnal dyspnea.   Respiratory:  Positive for shortness of breath. Negative for cough.    Endocrine: Negative for cold intolerance and heat intolerance.   Skin:  Negative for  "rash.   Musculoskeletal:  Negative for back pain and muscle weakness.   Gastrointestinal:  Negative for abdominal pain, nausea and vomiting.   Genitourinary:  Negative for dysuria and nocturia.   Neurological:  Negative for dizziness and light-headedness.   Psychiatric/Behavioral:  Negative for altered mental status and hallucinations.        Physical Exam    /56 (BP Location: Left arm, Patient Position: Sitting)   Pulse 89   Temp 100.2 °F (37.9 °C) (Oral)   Resp 16   Ht 182.9 cm (72\")   Wt 110 kg (243 lb 6.2 oz)   SpO2 93%   BMI 33.01 kg/m²  Body mass index is 33.01 kg/m².   Oxygen saturation   @FLOWAN(10::1)@ SpO2  Min: 92 %  Max: 100 %    General Appearance:   no acute distress  Alert and oriented x3  HENT:   lips not cyanotic  Atraumatic  Neck:  thyroid not enlarged  supple  Respiratory:  no respiratory distress  normal breath sounds  no rales  Cardiovascular:  no jugular venous distention  regular rhythm  apical impulse normal  S1 normal, S2 normal  no S3, no S4   no murmur  no rub, no thrill  no carotid bruit  pedal pulses normal  lower extremity edema: none    Gastrointestinal:   bowel sounds normal  non-tender  no hepatomegaly, no splenomegaly  Musculoskeletal:  no clubbing of fingers.   normocephalic, head atraumatic  Skin:   warm, dry  No rashes  Neuro/Psychiatric:  normal mood and affect  judgement and insight appropriate      Cardiographics:     ECG  (personally reviewed)      Results for orders placed during the hospital encounter of 03/28/23    Adult Transthoracic Echo Complete W/ Cont if Necessary Per Protocol    Interpretation Summary    Left ventricular ejection fraction appears to be 56 - 60%.    Left ventricular diastolic function is consistent with (grade I) impaired relaxation and age.    No significant valvular disease.    Mild left atrial enlargement.     Results for orders placed during the hospital encounter of 03/28/23    Stress Test With Myocardial Perfusion One " Day    Interpretation Summary    Left ventricular ejection fraction is normal (Calculated EF = 60%).    Patient was given Lexiscan per protocol.    Resting EKG showed normal sinus rhythm with right bundle branch block.  Stress electrocardiogram showed no ischemia.    Myocardial perfusion imaging shows a fixed inferior defect that is better with stress consistent with artifact.  No obvious reversible perfusion defects noted.    Feel this represents a normal Lexiscan Cardiolite with no obvious ischemia and normal ejection fraction.      No results found for this or any previous visit.      Cardiolite (Tc-99m Sestamibi) stress test   Lab Review:       CBC          8/28/2023    04:28 9/11/2023    13:10 9/12/2023    06:35   CBC   WBC 13.97  10.97  8.79    RBC 4.71  5.35  5.05    Hemoglobin 10.2  12.1  11.0    Hematocrit 37.2  43.4  40.9    MCV 79.0  81.1  81.0    MCH 21.7  22.6  21.8    MCHC 27.4  27.9  26.9    RDW 16.1  17.6  17.0    Platelets 198  263  244        CMP          8/28/2023    04:28 9/11/2023    13:09 9/11/2023    13:10 9/12/2023    06:35   CMP   Glucose 122  157  160  138    BUN 22   15  24    Creatinine 0.92   1.00  1.04    EGFR 87.3   79.0  75.3    Sodium 138  137.1  138  136    Potassium 4.1   5.0  5.0    Chloride 104   102  101    Calcium 8.5   8.8  9.0    Total Protein   7.6  7.2    Albumin   4.1  3.5    Globulin   3.5  3.7    Total Bilirubin   0.4  0.3    Alkaline Phosphatase   106  92    AST (SGOT)   13  10    ALT (SGPT)   9  8    Albumin/Globulin Ratio   1.2  0.9    BUN/Creatinine Ratio 23.9   15.0  23.1    Anion Gap 7.6   9.1  12.6         CARDIAC LABS:      Lab 09/12/23  0635 09/11/23  1310   PROBNP  --  446.1   HSTROP T 38* 40*      No results found for: DIGOXIN   Lab Results   Component Value Date    TSH 2.240 03/22/2019           Invalid input(s): LDLCALC  Lab Results   Component Value Date    POCTROP 0.02 05/20/2022     No components found for: DDIMERQUAN  Lab Results   Component Value Date     MG 2.4 09/12/2023             CARDIAC LABS:      Lab 09/12/23  0635 09/11/23  1310   PROBNP  --  446.1   HSTROP T 38* 40*        Imaging:  CXR  Stable appearing chronic findings compared to previous radiograph, without convincing new abnormality. \    Results for orders placed during the hospital encounter of 03/28/23    Adult Transthoracic Echo Complete W/ Cont if Necessary Per Protocol    Interpretation Summary    Left ventricular ejection fraction appears to be 56 - 60%.    Left ventricular diastolic function is consistent with (grade I) impaired relaxation and age.    No significant valvular disease.    Mild left atrial enlargement.           Assessment:    Acute respiratory failure with hypoxia and hypercapnia    PSVT (paroxysmal supraventricular tachycardia)    Essential hypertension      PSVT episodes terminated spontaneously previously adenosine sensitive suggesting likely reentry mechanism.  Patient has been restarted back on his metoprolol recommend titrate up to 25 a day.  In addition the patient should have sustained episodes would recommend a trial of adenosine again to see if this can terminate the episodes or not.  Electrolytes appear to be within normal limits we will continue to monitor to see if any recurrence    Plan:  1.  Increase metoprolol to 25 daily  2.  Continue to monitor on telemetry      Thank you for allowing us to share in Dilip [facility]ILL[facility].            Robby Perez MD   9/12/2023    13:36 EDT

## 2023-09-12 NOTE — PLAN OF CARE
Goal Outcome Evaluation:  Plan of Care Reviewed With: patient           Outcome Evaluation: Patient wore Bipap 16/6 32% last night. Patient is on 3 lpm NC tolerating well at this time. Respiratory Therapy will continue to monitor and make changes as needed.

## 2023-09-12 NOTE — PROGRESS NOTES
Pulmonary / Critical Care Progress Note      Patient Name: Dilip Faulkner  : 1950  MRN: [id number]  Primary Care Physician:  Jackeline Jaime APRN  Date of admission: 2023    Subjective   Subjective   Follow-up for acute on chronic hypoxic and hypercapnic respiratory failure, COPD exacerbation    No acute events overnight.  Wore NIPPV    This morning,  Sitting up on side of bed on 3 L nasal cannula  Reports feeling better  ABG improved  Dyspnea improved  Denies cough  No chest pain or hemoptysis  No fever or chills    Review of Systems  General: Denied complaints  HEENT: Denied complaints  Respiratory: Dyspnea, cough, otherwise denied complaints  Cardiovascular: Denied complaints  GI: Denied complaints  : Denied complaints  MSK: Denied complaints      Objective   Objective     Vitals:   Temp:  [97.5 °F (36.4 °C)-100.2 °F (37.9 °C)] 100.2 °F (37.9 °C)  Heart Rate:  [74-94] 89  Resp:  [16-28] 16  BP: (106-139)/(52-78) 106/56  Flow (L/min):  [3] 3    Physical Exam   Vital Signs Reviewed   General: Chronically ill-appearing male, sitting up in side of bed NAD.    HEENT:  PERRL, EOMI.    Neck:  No JVD, no thyromegaly  Lymph: no axillary, cervical, supraclavicular lymphadenopathy noted bilaterally  Chest: Diminished breath sounds on auscultation bilaterally, no work of breathing noted, on 2 L nasal cannula  CV: RRR, no M/G/R, pulses 2+  Abd:  Soft, NT, ND, +BS  EXT:  no clubbing, no cyanosis, no edema  Neuro:  A&Ox3, CN grossly intact, no focal deficits.  Skin: No rashes or lesions noted    Result Review    Result Review:  I have personally reviewed the results from the time of this admission to 2023 14:39 EDT and agree with these findings:  [x]  Laboratory  [x]  Microbiology  [x]  Radiology  [x]  EKG/Telemetry   []  Cardiology/Vascular   []  Pathology  []  Old records  []  Other:  Most notable findings include:   Respiratory viral panel negative     - CXR stable appearing chronic findings         Lab 09/12/23  0635 09/11/23  1310 09/11/23  1309   WBC 8.79 10.97*  --    HEMOGLOBIN 11.0* 12.1*  --    HEMATOCRIT 40.9 43.4  --    PLATELETS 244 263  --    SODIUM 136 138  --    SODIUM, ARTERIAL  --   --  137.1   POTASSIUM 5.0 5.0  --    CHLORIDE 101 102  --    CO2 22.4 26.9  --    BUN 24* 15  --    CREATININE 1.04 1.00  --    GLUCOSE 138* 160*  --    GLUCOSE, ARTERIAL  --   --  157*   CALCIUM 9.0 8.8  --    PHOSPHORUS 3.8  --   --    TOTAL PROTEIN 7.2 7.6  --    ALBUMIN 3.5 4.1  --    GLOBULIN 3.7 3.5  --      Assessment & Plan   Assessment / Plan     Active Hospital Problems:  Active Hospital Problems    Diagnosis     **Acute respiratory failure with hypoxia and hypercapnia     PSVT (paroxysmal supraventricular tachycardia)     Essential hypertension      Impression:  Acute on chronic hypoxic and hypercapnic respiratory failure requiring NIPPV  Acute exacerbation of COPD  Respiratory acidosis  Hypertension  Type 2 diabetes mellitus    Plan:  -Currently on 3 L nasal cannula, continue to wean to maintain SPO2 greater than 90%  -Continue NIPPV at night and as needed days.  Encourage use.  -ABG 7.299/55/87 --> NIPPV settings adjusted to 18/6 with rate of 22  -Recheck ABG in a.m.  -RT  consulted for assistance with evaluating for home NIPPV.  Per RTCM patient has home NIPPV currently does not use device and has no intentions to start using device.  Education provided on risk of acute on chronic respiratory failure due to hypercarbia.  -Continue azithromycin 500 mg IV x3 days for COPD exacerbation  -Sputum culture ordered  -Continue Brovana, Pulmicort and DuoNebs  -Continue bronchopulmonary hygiene  -Encourage use of I-S and flutter valve when able  -Continue Solu-Medrol 40 mg IV 3 times daily, may transition to prednisone tomorrow  -Encourage mobilization when able    DVT prophylaxis:  Medical DVT prophylaxis orders are present.    CODE STATUS:   Medical Intervention Limits: NO intubation (DNI)  Level Of  Support Discussed With: Patient  Code Status (Patient has no pulse and is not breathing): CPR (Attempt to Resuscitate)  Medical Interventions (Patient has pulse or is breathing): Limited Support      Electronically signed by MONE Voss, 09/12/23, 2:39 PM EDT.  This patient was seen by both a physician and a NP. I, Leila Singh MD, spent >50% of time in accordance with split shared billing. This included personally reviewing all pertinent labs, imaging, microbiology and documentation. Also discussing the case with the patient and any available family, the admitting physician and any available ancillary staff.   Electronically signed by Leila Singh MD, 09/12/23, 4:07 PM EDT.

## 2023-09-12 NOTE — PLAN OF CARE
Goal Outcome Evaluation:       Patient is alert and oriented x 4, on bipap the throughout the shift,no other complaint, plan of care ongoing.

## 2023-09-12 NOTE — PROGRESS NOTES
Respiratory Therapist Broncho-Pulmonary Hygiene Progress Note      Patient Name:  Dilip Faulkner  YOB: 1950    Dilip Faulkner meets the qualification for Level 2 of the Bronco-Pulmonary Hygiene Protocol. This was based on my daily patient assessment and includes review of chest x-ray results, cough ability and quality, oxygenation, secretions or risk for secretion development and patient mobility.     Broncho-Pulmonary Hygiene Assessment:    Level of Movement: Actively changing positions-requires assistance  Disoriented/Follows Commands    Breath Sounds: Clear to slightly diminished    Cough: Strong, effective    Chest X-Ray: Possible signs of consolidation and/or atelectasis or clear.     Sputum Productions: None or small amount of thin or watery secretions with effective cough    History and Physical: Home use of oxygen  and Chronic condition    SpO2 to Oxygen Need: greater than 92% on room air or  less than 3L nasal canula    Current SpO2 is: 96% on 3 lpm NC    Based on this information, I have completed the following interventions: Teach/Instruct patient on cough and deep breathe and Aerobika with bronchodialtor medication or TID      Electronically signed by Stacia Brar RRT, 09/12/23, 2:01 PM EDT.

## 2023-09-12 NOTE — PLAN OF CARE
Goal Outcome Evaluation:  Plan of Care Reviewed With: (P) patient           Outcome Evaluation: (P) Patient presents with significant balance and endurance imapirments that affect his gait and overall functional mobility. He is currently unable to safely ambulate independently and becomes SOB quickly when walking. Skilled physical therapy services are required to address these deficits.      Anticipated Discharge Disposition (PT): (P) inpatient rehabilitation facility

## 2023-09-12 NOTE — PROGRESS NOTES
[facility]   Hospitalist Progress Note    Date of admission: 9/11/2023  Patient Name: Dilip Faulkner  1950  Date: 9/12/2023      Subjective     Chief Complaint   Patient presents with    Shortness of Breath     EMS REPORTS PATIENTS OXYGEN SATS WERE 50S-60S ON HIS HOME O2 2L NC        Summary: 73 y.o. history of recurrent hospital admissions, COPD on 2 L baseline, nonadherence to NIPPV, proximal SVT requiring adenosine, morbid obesity presented with worsening shortness of air treating for COPD exacerbation and acute hypoxic hypercapnic respiratory failure requiring BiPAP on admission    Interval Followup: Less short of air today, non product cough, no fevers.  Denies hematuria.  Discussed rbc in urine with pt.  Denies any recent smoking.        Objective     Vitals:   Temp:  [97.5 °F (36.4 °C)-100.2 °F (37.9 °C)] 98.4 °F (36.9 °C)  Heart Rate:  [74-94] 84  Resp:  [16-28] 18  BP: (106-129)/(52-69) 122/57  Flow (L/min):  [3] 3    Physical Exam  Appears older than stated age, in bed, obese  Exp wheezing bilaterally, moderate aeration, conv dyspnea on NC  Rrr, no le pitting edema  Abd nt nd +BS  Generalized weakness  Poor insight    Result Review:  Vital signs, labs and recent relevant imaging reviewed.        acetaminophen    aluminum-magnesium hydroxide-simethicone    senna-docusate sodium **AND** polyethylene glycol **AND** bisacodyl **AND** bisacodyl    dextrose    dextrose    Diclofenac Sodium    glucagon (human recombinant)    hydrOXYzine    levalbuterol    melatonin    nicotine    nitroglycerin    ondansetron    sodium chloride    sodium chloride    sodium chloride    arformoterol, 15 mcg, Nebulization, BID - RT  aspirin, 81 mg, Oral, Daily  azithromycin, 500 mg, Intravenous, Q24H  budesonide, 0.5 mg, Nebulization, BID - RT  heparin (porcine), 5,000 Units, Subcutaneous, Q8H  insulin regular, 2-7 Units, Subcutaneous, 4x Daily AC & at Bedtime  levalbuterol, 1.25 mg, Nebulization, 4x Daily -  RT  methylPREDNISolone sodium succinate, 40 mg, Intravenous, Q8H  metoprolol tartrate, 25 mg, Oral, Q12H  senna-docusate sodium, 2 tablet, Oral, BID  sodium chloride, 10 mL, Intravenous, Q12H        XR Chest 1 View    Result Date: 9/11/2023    Stable appearing chronic findings compared to previous radiograph, without convincing new abnormality.       MELANIA SHAIKH MD       Electronically Signed and Approved By: MELANIA SHAIKH MD on 9/11/2023 at 13:34              Cxr personally reviewed chronic scarring/seen previously no acute infiltrate noted   Assessment / Plan     Assessment/Plan (clinically significant if listed here)  Acute hypoxemic hypercapnic respiratory failure requiring BiPAP  Acute COPD exacerbation baseline 2 L nasal cannula  PSVT  Microscopic Hematuria  Troponemia, suspect type 2 from demand ischemia/acute resp distress   Hypertension  DM2  HLD  Hx of 84 pack year smoking hx quit in 2021    Cont resp hygiene, steroids, nebulizers  Nippv with night and prn during day, 18/6 rate 22, encouraged adherence  Rt cm consult - pt declines nippv at home before/multiple times previously and again currently  Azithromycin x3d course for copd exacerbation, qtc 453, monitor given arrhythmias  Infectious studies negative thus far, flu/cov/rsv/rvp negative  Bph/resp hygiene  Incentive spirometry  Gi ppx while on steroids   Had psvt today, required adenosine preivously, uptitrate metoprolol, previously had echo 3/29/23 56%, g1dd, no sig valvular disease and mild LAE noted  Will also changes nebulizers to xopenex to minimize effect on tachycardia  Monitor k/mg/p closely  Ssi for dm2, monitor closely while on steroids  Hematuria on UA, previous UA have been negative, has extensive smoking hx  Ct ct abd pelv/with and without contrast, given acute resp illness will defer acute urology referral at this time and likely just plan for outpt f/u   D/w cardiology, appreciate assistance  Appreciate pulm assistance, rec's  reviewed  Pt/ot   Check a.m. CBC, BMP, magnesium, phosphorus  Continue hospital monitoring and treatment at current level of care - does not need upgraded at this time.          DVT prophylaxis:  Medical DVT prophylaxis orders are present.    Medical Intervention Limits: NO intubation (DNI)  Level Of Support Discussed With: Patient  Code Status (Patient has no pulse and is not breathing): CPR (Attempt to Resuscitate)  Medical Interventions (Patient has pulse or is breathing): Limited Support        CBC          8/28/2023    04:28 9/11/2023    13:10 9/12/2023    06:35   CBC   WBC 13.97  10.97  8.79    RBC 4.71  5.35  5.05    Hemoglobin 10.2  12.1  11.0    Hematocrit 37.2  43.4  40.9    MCV 79.0  81.1  81.0    MCH 21.7  22.6  21.8    MCHC 27.4  27.9  26.9    RDW 16.1  17.6  17.0    Platelets 198  263  244        CMP          8/28/2023    04:28 9/11/2023    13:09 9/11/2023    13:10 9/12/2023    06:35   CMP   Glucose 122  157  160  138    BUN 22   15  24    Creatinine 0.92   1.00  1.04    EGFR 87.3   79.0  75.3    Sodium 138  137.1  138  136    Potassium 4.1   5.0  5.0    Chloride 104   102  101    Calcium 8.5   8.8  9.0    Total Protein   7.6  7.2    Albumin   4.1  3.5    Globulin   3.5  3.7    Total Bilirubin   0.4  0.3    Alkaline Phosphatase   106  92    AST (SGOT)   13  10    ALT (SGPT)   9  8    Albumin/Globulin Ratio   1.2  0.9    BUN/Creatinine Ratio 23.9   15.0  23.1    Anion Gap 7.6   9.1  12.6

## 2023-09-12 NOTE — THERAPY EVALUATION
Acute Care - Physical Therapy Initial Evaluation   [place]     Patient Name: Dilip Faulkner  : 1950  MRN: [id number]  Today's Date: 2023   Admit date: 2023     Referring Physician: Danny Méndez MD     Surgery Date:* No surgery found *           Visit Dx:     ICD-10-CM ICD-9-CM   1. Acute respiratory failure with hypoxia and hypercapnia  J96.01 518.81    J96.02    2. Difficulty in walking  R26.2 719.7     Patient Active Problem List   Diagnosis    Chronic obstructive pulmonary disease with acute exacerbation    Acute on chronic respiratory failure with hypoxia and hypercapnia    Type 2 diabetes mellitus    Acute on chronic respiratory failure    Essential hypertension    Hyperlipidemia    Cough    Pneumonia due to infectious organism    COPD with acute exacerbation    Obesity (BMI 30-39.9)    Right bundle branch block    Acquired ptosis of eyelid, bilateral    Type 2 diabetes mellitus with hyperglycemia, with long-term current use of insulin    Acute exacerbation of chronic obstructive pulmonary disease (COPD)    Respiratory failure with hypoxia    Gastroesophageal reflux disease without esophagitis    COPD (chronic obstructive pulmonary disease)    Acute respiratory failure with hypoxia    Noncompliance with CPAP treatment    Unstable angina    COPD exacerbation    Acute respiratory failure with hypoxia and hypercapnia    Aspiration pneumonia of left lower lobe due to gastric secretions    Sepsis    PSVT (paroxysmal supraventricular tachycardia)     Past Medical History:   Diagnosis Date    Asthma     COPD (chronic obstructive pulmonary disease)     Diabetes mellitus     Ex-smoker     QUIT     Hernia, umbilical     Hypertension     On home O2     REPORTS WEARING 2L/NC PRN SOA     Past Surgical History:   Procedure Laterality Date    ABDOMINAL SURGERY       PT Assessment (last 12 hours)       PT Evaluation and Treatment       Row Name 23 1417          Physical Therapy Time and  Intention    Subjective Information complains of;weakness;fatigue;dyspnea (P)   -RH     Document Type evaluation (P)   -     Mode of Treatment individual therapy;physical therapy (P)   -     Patient Effort good (P)   -     Symptoms Noted During/After Treatment fatigue;shortness of breath (P)   -       Row Name 09/12/23 1417          General Information    Patient Profile Reviewed yes (P)   -     Patient Observations alert;cooperative;agree to therapy (P)   -RH     Prior Level of Function independent:;all household mobility;min assist:;community mobility;ADL's (P)   -RH     Equipment Currently Used at Home walker, rolling (P)   -     Existing Precautions/Restrictions fall (P)   -       Row Name 09/12/23 1417          Living Environment    Current Living Arrangements apartment (P)   -     Home Accessibility wheelchair accessible (P)   -     People in Home sibling(s) (P)   Sister  -     Primary Care Provided by self;other (see comments) (P)   Patient reports he is mostly independent, but his sister helps sometimes.  -       Row Name 09/12/23 1417          Home Use of Assistive/Adaptive Equipment    Equipment Currently Used at Home oxygen;walker, rolling (P)   -       Row Name 09/12/23 1417          Range of Motion (ROM)    Range of Motion bilateral lower extremities;ROM is WFL (P)   -       Row Name 09/12/23 1417          Strength (Manual Muscle Testing)    Strength (Manual Muscle Testing) bilateral lower extremities (P)   5/5  -       Row Name 09/12/23 1417          Bed Mobility    Bed Mobility bed mobility (all) activities (P)   -     All Activities, [place] (Bed Mobility) contact guard (P)   -     Assistive Device (Bed Mobility) bed rails (P)   -       Row Name 09/12/23 1417          Transfers    Transfers sit-stand transfer;stand-sit transfer (P)   -       Row Name 09/12/23 1417          Sit-Stand Transfer    Sit-Stand [place] (Transfers) contact guard (P)   -      Assistive Device (Sit-Stand Transfers) walker, front-wheeled (P)   -RH       Row Name 09/12/23 1417          Stand-Sit Transfer    Stand-Sit [place] (Transfers) contact guard (P)   -RH     Assistive Device (Stand-Sit Transfers) walker, front-wheeled (P)   -RH       Row Name 09/12/23 1417          Gait/Stairs (Locomotion)    Gait/Stairs Locomotion gait/ambulation assistive device (P)   -RH     [place] Level (Gait) contact guard (P)   -RH     Assistive Device (Gait) walker, front-wheeled (P)   -RH     Patient was able to Ambulate yes (P)   -RH     Distance in Feet (Gait) 80 (P)   -[facility] Name 09/12/23 1417          Safety Issues, Functional Mobility    Impairments Affecting Function (Mobility) balance;endurance/activity tolerance (P)   -RH       [facility] Name 09/12/23 1417          Balance    Balance Assessment standing dynamic balance (P)   -RH     Dynamic Standing Balance contact guard (P)   -RH     Position/Device Used, Standing Balance walker, front-wheeled (P)   -       Row Name 09/12/23 1417          Plan of Care Review    Plan of Care Reviewed With patient (P)   -RH     Outcome Evaluation Patient presents with significant balance and endurance imapirments that affect his gait and overall functional mobility. He is currently unable to safely ambulate independently and becomes SOB quickly when walking. Skilled physical therapy services are required to address these deficits. (P)   -RH       Row Name 09/12/23 1417          Vital Signs    Pre SpO2 (%) 97 (P)   -RH     O2 Delivery Pre Treatment nasal cannula (P)   3L  -RH     Intra SpO2 (%) 91 (P)   -RH     O2 Delivery Intra Treatment nasal cannula (P)   3L  -RH     Post SpO2 (%) 95 (P)   -RH     O2 Delivery Post Treatment nasal cannula (P)   3L  -RH       Row Name 09/12/23 1417          Therapy Assessment/Plan (PT)    Rehab Potential (PT) good, to achieve stated therapy goals (P)   -RH     Criteria for Skilled Interventions Met (PT) skilled treatment  is necessary (P)   -RH     Therapy Frequency (PT) daily (P)   -RH     Predicted Duration of Therapy Intervention (PT) 10 days (P)   -RH     Problem List (PT) problems related to;balance;other (see comments) (P)   endurance  -RH     Activity Limitations Related to Problem List (PT) unable to ambulate safely;unable to transfer safely;BADLs not performed adequately or safely;IADLs not performed adequately or safely;community activities not performed adequately or safely (P)   -       Row Name 09/12/23 1417          Therapy Plan Review/Discharge Plan (PT)    Therapy Plan Review (PT) evaluation/treatment results reviewed;care plan/treatment goals reviewed;participants included;patient;participants voiced agreement with care plan (P)   -       Row Name 09/12/23 1417          Physical Therapy Goals    Bed Mobility Goal Selection (PT) bed mobility, PT goal 1 (P)   -RH     Transfer Goal Selection (PT) transfer, PT goal 1 (P)   -RH     Gait Training Goal Selection (PT) gait training, PT goal 1 (P)   -RH     Balance Goal Selection (PT) balance, PT goal 1 (P)   -[facility] Name 09/12/23 1417          Bed Mobility Goal 1 (PT)    Activity/Assistive Device (Bed Mobility Goal 1, PT) bed mobility activities, all (P)   -RH     [place] Level/Cues Needed (Bed Mobility Goal 1, PT) independent (P)   -RH     Time Frame (Bed Mobility Goal 1, PT) long term goal (LTG);10 days (P)   -       Row Name 09/12/23 1417          Transfer Goal 1 (PT)    Activity/Assistive Device (Transfer Goal 1, PT) transfers, all (P)   -RH     [place] Level/Cues Needed (Transfer Goal 1, PT) independent (P)   -RH     Time Frame (Transfer Goal 1, PT) long term goal (LTG);10 days (P)   -       Row Name 09/12/23 1417          Gait Training Goal 1 (PT)    Activity/Assistive Device (Gait Training Goal 1, PT) gait (walking locomotion) (P)   -RH     [place] Level (Gait Training Goal 1, PT) independent (P)   -RH     Distance (Gait Training Goal 1,  PT) 150ft (P)   -     Time Frame (Gait Training Goal 1, PT) long term goal (LTG);10 days (P)   -       Row Name 09/12/23 1417          Balance Goal 1 (PT)    Activity/Assistive Device (Balance Goal 1, PT) standing, dynamic;walker, rolling (P)   -     [place] Level/Cues Needed (Balance Goal 1, PT) independent (P)   -RH     Time Frame (Balance Goal 1, PT) long term goal (LTG);10 days (P)   -               User Key  (r) = Recorded By, (t) = Taken By, (c) = Cosigned By      Initials Name Provider Type     Miguel Mitchell, PT Student PT Student                    Physical Therapy Education       Title: PT OT SLP Therapies (Done)       Topic: Physical Therapy (Done)       Point: Mobility training (Done)       Learning Progress Summary             Patient Acceptance, E,TB, VU by  at 9/12/2023 1425                         Point: Home exercise program (Done)       Learning Progress Summary             Patient Acceptance, E,TB, VU by  at 9/12/2023 1425                         Point: Body mechanics (Done)       Learning Progress Summary             Patient Acceptance, E,TB, VU by  at 9/12/2023 1425                         Point: Precautions (Done)       Learning Progress Summary             Patient Acceptance, E,TB, VU by  at 9/12/2023 1425                                         User Key       Initials Effective Dates Name Provider Type Discipline     08/16/23 -  Miguel Mitchell, PT Student PT Student PT                  PT Recommendation and Plan  Anticipated Discharge Disposition (PT): (P) inpatient rehabilitation facility  Planned Therapy Interventions (PT): (P) balance training, gait training, home exercise program, stair training, strengthening, stretching, transfer training  Therapy Frequency (PT): (P) daily  Plan of Care Reviewed With: (P) patient  Outcome Evaluation: (P) Patient presents with significant balance and endurance imapirments that affect his gait and overall  functional mobility. He is currently unable to safely ambulate independently and becomes SOB quickly when walking. Skilled physical therapy services are required to address these deficits.   Outcome Measures       Row Name 09/12/23 1400             How much help from another person do you currently need...    Turning from your back to your side while in flat bed without using bedrails? 4 (P)   -RH      Moving from lying on back to sitting on the side of a flat bed without bedrails? 4 (P)   -RH      Moving to and from a bed to a chair (including a wheelchair)? 3 (P)   -RH      Standing up from a chair using your arms (e.g., wheelchair, bedside chair)? 3 (P)   -RH      Climbing 3-5 steps with a railing? 2 (P)   -RH      To walk in hospital room? 3 (P)   -RH      AM-PAC 6 Clicks Score (PT) 19 (P)   -                User Key  (r) = Recorded By, (t) = Taken By, (c) = Cosigned By      Initials Name Provider Type     Miguel Mitchell, PT Student PT Student                     Time Calculation:    PT Charges       Row Name 09/12/23 1417             Time Calculation    PT Received On 09/12/23 (P)   -RH      PT Goal Re-Cert Due Date 09/21/23 (P)   -RH         Untimed Charges    PT Eval/Re-eval Minutes 25 (P)   -RH         Total Minutes    Untimed Charges Total Minutes 25 (P)   -RH       Total Minutes 25 (P)   -                User Key  (r) = Recorded By, (t) = Taken By, (c) = Cosigned By      Initials Name Provider Type     Migule Mitchell, PT Student PT Student                      PT G-Codes  AM-PAC 6 Clicks Score (PT): (P) 19    Miguel Mitchell, PT Student  9/12/2023

## 2023-09-13 LAB
ANION GAP SERPL CALCULATED.3IONS-SCNC: 10.6 MMOL/L (ref 5–15)
ARTERIAL PATENCY WRIST A: ABNORMAL
BASE EXCESS BLDA CALC-SCNC: 0.9 MMOL/L (ref -2–2)
BASOPHILS # BLD AUTO: 0 10*3/MM3 (ref 0–0.2)
BASOPHILS NFR BLD AUTO: 0 % (ref 0–1.5)
BDY SITE: ABNORMAL
BUN SERPL-MCNC: 33 MG/DL (ref 8–23)
BUN/CREAT SERPL: 31.1 (ref 7–25)
CALCIUM SPEC-SCNC: 8.7 MG/DL (ref 8.6–10.5)
CHLORIDE SERPL-SCNC: 101 MMOL/L (ref 98–107)
CHOLEST SERPL-MCNC: 82 MG/DL (ref 0–200)
CO2 SERPL-SCNC: 24.4 MMOL/L (ref 22–29)
COHGB MFR BLD: 0.4 % (ref 0–1.5)
CREAT SERPL-MCNC: 1.06 MG/DL (ref 0.76–1.27)
DEPRECATED RDW RBC AUTO: 48.5 FL (ref 37–54)
EGFRCR SERPLBLD CKD-EPI 2021: 73.6 ML/MIN/1.73
EOSINOPHIL # BLD AUTO: 0 10*3/MM3 (ref 0–0.4)
EOSINOPHIL NFR BLD AUTO: 0 % (ref 0.3–6.2)
ERYTHROCYTE [DISTWIDTH] IN BLOOD BY AUTOMATED COUNT: 17.8 % (ref 12.3–15.4)
FHHB: 6.1 % (ref 0–5)
GAS FLOW AIRWAY: 3 LPM
GLUCOSE BLDC GLUCOMTR-MCNC: 133 MG/DL (ref 70–99)
GLUCOSE BLDC GLUCOMTR-MCNC: 175 MG/DL (ref 70–99)
GLUCOSE BLDC GLUCOMTR-MCNC: 181 MG/DL (ref 70–99)
GLUCOSE BLDC GLUCOMTR-MCNC: 198 MG/DL (ref 70–99)
GLUCOSE SERPL-MCNC: 159 MG/DL (ref 65–99)
HCO3 BLDA-SCNC: 26.2 MMOL/L (ref 22–26)
HCT VFR BLD AUTO: 39.6 % (ref 37.5–51)
HDLC SERPL-MCNC: 40 MG/DL (ref 40–60)
HGB BLD-MCNC: 10.9 G/DL (ref 13–17.7)
HGB BLDA-MCNC: 11.8 G/DL (ref 13.8–16.4)
IMM GRANULOCYTES # BLD AUTO: 0.05 10*3/MM3 (ref 0–0.05)
IMM GRANULOCYTES NFR BLD AUTO: 0.6 % (ref 0–0.5)
INHALED O2 CONCENTRATION: 32 %
LDLC SERPL CALC-MCNC: 25 MG/DL (ref 0–100)
LDLC/HDLC SERPL: 0.63 {RATIO}
LYMPHOCYTES # BLD AUTO: 0.67 10*3/MM3 (ref 0.7–3.1)
LYMPHOCYTES NFR BLD AUTO: 7.5 % (ref 19.6–45.3)
MAGNESIUM SERPL-MCNC: 2.6 MG/DL (ref 1.6–2.4)
MCH RBC QN AUTO: 21.5 PG (ref 26.6–33)
MCHC RBC AUTO-ENTMCNC: 27.5 G/DL (ref 31.5–35.7)
MCV RBC AUTO: 78.3 FL (ref 79–97)
METHGB BLD QL: 0.3 % (ref 0–1.5)
MODALITY: ABNORMAL
MONOCYTES # BLD AUTO: 0.33 10*3/MM3 (ref 0.1–0.9)
MONOCYTES NFR BLD AUTO: 3.7 % (ref 5–12)
NEUTROPHILS NFR BLD AUTO: 7.89 10*3/MM3 (ref 1.7–7)
NEUTROPHILS NFR BLD AUTO: 88.2 % (ref 42.7–76)
NRBC BLD AUTO-RTO: 0 /100 WBC (ref 0–0.2)
OXYHGB MFR BLDV: 93.2 % (ref 94–99)
PCO2 BLDA: 44.5 MM HG (ref 35–45)
PH BLDA: 7.39 PH UNITS (ref 7.35–7.45)
PHOSPHATE SERPL-MCNC: 3.2 MG/DL (ref 2.5–4.5)
PLATELET # BLD AUTO: 235 10*3/MM3 (ref 140–450)
PMV BLD AUTO: 9.9 FL (ref 6–12)
PO2 BLD: 238 MM[HG] (ref 0–500)
PO2 BLDA: 76 MM HG (ref 80–100)
POTASSIUM SERPL-SCNC: 5.2 MMOL/L (ref 3.5–5.2)
RBC # BLD AUTO: 5.06 10*6/MM3 (ref 4.14–5.8)
SAO2 % BLDCOA: 93.9 % (ref 95–99)
SODIUM SERPL-SCNC: 136 MMOL/L (ref 136–145)
TRIGL SERPL-MCNC: 84 MG/DL (ref 0–150)
VLDLC SERPL-MCNC: 17 MG/DL (ref 5–40)
WBC NRBC COR # BLD: 8.94 10*3/MM3 (ref 3.4–10.8)

## 2023-09-13 PROCEDURE — 84100 ASSAY OF PHOSPHORUS: CPT | Performed by: INTERNAL MEDICINE

## 2023-09-13 PROCEDURE — 25010000002 METHYLPREDNISOLONE PER 40 MG: Performed by: NURSE PRACTITIONER

## 2023-09-13 PROCEDURE — 97530 THERAPEUTIC ACTIVITIES: CPT

## 2023-09-13 PROCEDURE — 99233 SBSQ HOSP IP/OBS HIGH 50: CPT | Performed by: STUDENT IN AN ORGANIZED HEALTH CARE EDUCATION/TRAINING PROGRAM

## 2023-09-13 PROCEDURE — 80061 LIPID PANEL: CPT | Performed by: INTERNAL MEDICINE

## 2023-09-13 PROCEDURE — 82805 BLOOD GASES W/O2 SATURATION: CPT | Performed by: NURSE PRACTITIONER

## 2023-09-13 PROCEDURE — 83735 ASSAY OF MAGNESIUM: CPT | Performed by: INTERNAL MEDICINE

## 2023-09-13 PROCEDURE — 94799 UNLISTED PULMONARY SVC/PX: CPT

## 2023-09-13 PROCEDURE — 99231 SBSQ HOSP IP/OBS SF/LOW 25: CPT | Performed by: INTERNAL MEDICINE

## 2023-09-13 PROCEDURE — 80048 BASIC METABOLIC PNL TOTAL CA: CPT | Performed by: INTERNAL MEDICINE

## 2023-09-13 PROCEDURE — 63710000001 INSULIN REGULAR HUMAN PER 5 UNITS: Performed by: INTERNAL MEDICINE

## 2023-09-13 PROCEDURE — 94660 CPAP INITIATION&MGMT: CPT

## 2023-09-13 PROCEDURE — 36600 WITHDRAWAL OF ARTERIAL BLOOD: CPT | Performed by: NURSE PRACTITIONER

## 2023-09-13 PROCEDURE — 82948 REAGENT STRIP/BLOOD GLUCOSE: CPT

## 2023-09-13 PROCEDURE — 94761 N-INVAS EAR/PLS OXIMETRY MLT: CPT

## 2023-09-13 PROCEDURE — 25010000002 HEPARIN (PORCINE) PER 1000 UNITS: Performed by: STUDENT IN AN ORGANIZED HEALTH CARE EDUCATION/TRAINING PROGRAM

## 2023-09-13 PROCEDURE — 94664 DEMO&/EVAL PT USE INHALER: CPT

## 2023-09-13 PROCEDURE — 83050 HGB METHEMOGLOBIN QUAN: CPT | Performed by: NURSE PRACTITIONER

## 2023-09-13 PROCEDURE — 82375 ASSAY CARBOXYHB QUANT: CPT | Performed by: NURSE PRACTITIONER

## 2023-09-13 PROCEDURE — 85025 COMPLETE CBC W/AUTO DIFF WBC: CPT | Performed by: INTERNAL MEDICINE

## 2023-09-13 PROCEDURE — 97165 OT EVAL LOW COMPLEX 30 MIN: CPT

## 2023-09-13 RX ORDER — AZITHROMYCIN 250 MG/1
500 TABLET, FILM COATED ORAL EVERY 24 HOURS
Status: COMPLETED | OUTPATIENT
Start: 2023-09-13 | End: 2023-09-13

## 2023-09-13 RX ORDER — PREDNISONE 20 MG/1
40 TABLET ORAL
Status: DISCONTINUED | OUTPATIENT
Start: 2023-09-14 | End: 2023-09-14 | Stop reason: HOSPADM

## 2023-09-13 RX ADMIN — LEVALBUTEROL HYDROCHLORIDE 1.25 MG: 1.25 SOLUTION RESPIRATORY (INHALATION) at 00:46

## 2023-09-13 RX ADMIN — ARFORMOTEROL TARTRATE 15 MCG: 15 SOLUTION RESPIRATORY (INHALATION) at 06:26

## 2023-09-13 RX ADMIN — SENNOSIDES AND DOCUSATE SODIUM 2 TABLET: 50; 8.6 TABLET ORAL at 21:37

## 2023-09-13 RX ADMIN — INSULIN HUMAN 2 UNITS: 100 INJECTION, SOLUTION PARENTERAL at 12:03

## 2023-09-13 RX ADMIN — ASPIRIN 81 MG: 81 TABLET, CHEWABLE ORAL at 09:49

## 2023-09-13 RX ADMIN — HEPARIN SODIUM 5000 UNITS: 5000 INJECTION INTRAVENOUS; SUBCUTANEOUS at 06:01

## 2023-09-13 RX ADMIN — LEVALBUTEROL HYDROCHLORIDE 1.25 MG: 1.25 SOLUTION RESPIRATORY (INHALATION) at 06:27

## 2023-09-13 RX ADMIN — METOPROLOL TARTRATE 25 MG: 25 TABLET, FILM COATED ORAL at 09:49

## 2023-09-13 RX ADMIN — BUDESONIDE 0.5 MG: 0.5 INHALANT RESPIRATORY (INHALATION) at 20:22

## 2023-09-13 RX ADMIN — METOPROLOL TARTRATE 25 MG: 25 TABLET, FILM COATED ORAL at 21:37

## 2023-09-13 RX ADMIN — LEVALBUTEROL HYDROCHLORIDE 1.25 MG: 1.25 SOLUTION RESPIRATORY (INHALATION) at 11:46

## 2023-09-13 RX ADMIN — LEVALBUTEROL HYDROCHLORIDE 1.25 MG: 1.25 SOLUTION RESPIRATORY (INHALATION) at 20:22

## 2023-09-13 RX ADMIN — SENNOSIDES AND DOCUSATE SODIUM 2 TABLET: 50; 8.6 TABLET ORAL at 09:49

## 2023-09-13 RX ADMIN — METHYLPREDNISOLONE SODIUM SUCCINATE 40 MG: 40 INJECTION INTRAMUSCULAR; INTRAVENOUS at 06:02

## 2023-09-13 RX ADMIN — ARFORMOTEROL TARTRATE 15 MCG: 15 SOLUTION RESPIRATORY (INHALATION) at 20:22

## 2023-09-13 RX ADMIN — HYDROXYZINE HYDROCHLORIDE 25 MG: 25 TABLET, FILM COATED ORAL at 21:37

## 2023-09-13 RX ADMIN — Medication 10 ML: at 09:49

## 2023-09-13 RX ADMIN — INSULIN HUMAN 2 UNITS: 100 INJECTION, SOLUTION PARENTERAL at 21:36

## 2023-09-13 RX ADMIN — AZITHROMYCIN DIHYDRATE 500 MG: 250 TABLET, FILM COATED ORAL at 18:04

## 2023-09-13 RX ADMIN — TRAZODONE HYDROCHLORIDE 150 MG: 100 TABLET ORAL at 21:36

## 2023-09-13 RX ADMIN — METHYLPREDNISOLONE SODIUM SUCCINATE 40 MG: 40 INJECTION INTRAMUSCULAR; INTRAVENOUS at 14:26

## 2023-09-13 RX ADMIN — Medication 10 ML: at 21:37

## 2023-09-13 RX ADMIN — INSULIN HUMAN 2 UNITS: 100 INJECTION, SOLUTION PARENTERAL at 08:08

## 2023-09-13 RX ADMIN — BUDESONIDE 0.5 MG: 0.5 INHALANT RESPIRATORY (INHALATION) at 06:27

## 2023-09-13 RX ADMIN — HEPARIN SODIUM 5000 UNITS: 5000 INJECTION INTRAVENOUS; SUBCUTANEOUS at 21:36

## 2023-09-13 RX ADMIN — HEPARIN SODIUM 5000 UNITS: 5000 INJECTION INTRAVENOUS; SUBCUTANEOUS at 14:26

## 2023-09-13 NOTE — CONSULTS
"Purpose of the visit was to evaluate for: goals of care/advanced care planning. Spoke with RN and patient and discussed palliative care, goals of care, resuscitation status, [facility], and [facility] .      Assessment: Patient is currently on 4mtu. Updated by primary rn regarding patients current condition. Patient is known to palliative care services from previous admissions. Patient sitting up on side of bed at time of visit. Patient is alert and oriented to person, place, time and situation. Introduced self and explained role. Discussed patients current condition. Provided education on Hospice services versus Palliative services. Patient states he \"doesn't want the hospice.\" Provided further education on [facility] services- patient reports he will \"think more about that.\" Discussed code status and provided education on CPR. Patient states he wants cpr at this time. Patient has living will on file. Provided palliative care contact information and encouraged to call with further questions/concerns.      Recommendations/Plan:  Further decisions to be made based on patients clinical course .    Tasks Completed: Emotional Support.    Palliative care will continue to follow to support and assist.    Tawnya KRUSE, RN, PN  Palliative Care      "

## 2023-09-13 NOTE — PLAN OF CARE
Goal Outcome Evaluation:  Plan of Care Reviewed With: patient        Progress: no change  Outcome Evaluation: Patient wore BIPAP for about 3 hours last night, is currently on 3L.

## 2023-09-13 NOTE — PROGRESS NOTES
CARDIOLOGY  INPATIENT PROGRESS NOTE                [facility] 4TH FLOOR MEDICAL TELEMETRY UNIT    9/13/2023      PATIENT IDENTIFICATION:   Name:  Dilip Faulkner      MRN:  [id number]     73 y.o.  male                 SUBJECTIVE:   Patient with no acute events overnight no new complaints this morning  OBJECTIVE:  Vitals:    09/13/23 0621 09/13/23 0626 09/13/23 0630 09/13/23 0750   BP:    123/61   BP Location:    Left arm   Patient Position:    Lying   Pulse: 63 63 62 70   Resp: 20 20 20 18   Temp:    97.3 °F (36.3 °C)   TempSrc:    Oral   SpO2: 93% 93% 93% 92%   Weight:       Height:               Body mass index is 33.01 kg/m².    Intake/Output Summary (Last 24 hours) at 9/13/2023 0939  Last data filed at 9/13/2023 0300  Gross per 24 hour   Intake 250 ml   Output 1400 ml   Net -1150 ml       Telemetry: Normal sinus rhythm no recurrent significant SVT episodes    Physical Exam  General Appearance:   no acute distress  Alert and oriented x3  HENT:   lips not cyanotic  Atraumatic  Neck:  No jvd   supple  Respiratory:  no respiratory distress  normal breath sounds  no rales  Cardiovascular:    regular rhythm  no S3, no S4   no murmur  no rub  lower extremity edema: none    Skin:   warm, dry  No rashes      No Known Allergies  Scheduled meds:  arformoterol, 15 mcg, Nebulization, BID - RT  aspirin, 81 mg, Oral, Daily  azithromycin, 500 mg, Oral, Q24H  budesonide, 0.5 mg, Nebulization, BID - RT  heparin (porcine), 5,000 Units, Subcutaneous, Q8H  insulin regular, 2-7 Units, Subcutaneous, 4x Daily AC & at Bedtime  levalbuterol, 1.25 mg, Nebulization, 4x Daily - RT  methylPREDNISolone sodium succinate, 40 mg, Intravenous, Q8H  metoprolol tartrate, 25 mg, Oral, Q12H  senna-docusate sodium, 2 tablet, Oral, BID  sodium chloride, 10 mL, Intravenous, Q12H      IV meds:                         Data Review:  CBC          9/11/2023    13:10 9/12/2023    06:35 9/13/2023    05:24   CBC   WBC 10.97  8.79  8.94    RBC 5.35   5.05  5.06    Hemoglobin 12.1  11.0  10.9    Hematocrit 43.4  40.9  39.6    MCV 81.1  81.0  78.3    MCH 22.6  21.8  21.5    MCHC 27.9  26.9  27.5    RDW 17.6  17.0  17.8    Platelets 263  244  235      CMP          9/11/2023    13:09 9/11/2023    13:10 9/12/2023    06:35 9/13/2023    05:24   CMP   Glucose 157  160  138  159    BUN  15  24  33    Creatinine  1.00  1.04  1.06    EGFR  79.0  75.3  73.6    Sodium 137.1  138  136  136    Potassium  5.0  5.0  5.2    Chloride  102  101  101    Calcium  8.8  9.0  8.7    Total Protein  7.6  7.2     Albumin  4.1  3.5     Globulin  3.5  3.7     Total Bilirubin  0.4  0.3     Alkaline Phosphatase  106  92     AST (SGOT)  13  10     ALT (SGPT)  9  8     Albumin/Globulin Ratio  1.2  0.9     BUN/Creatinine Ratio  15.0  23.1  31.1    Anion Gap  9.1  12.6  10.6       CARDIAC LABS:      Lab 09/12/23  0635 09/11/23  1310   PROBNP  --  446.1   HSTROP T 38* 40*        No results found for: DIGOXIN   Lab Results   Component Value Date    TSH 2.240 03/22/2019     Results from last 7 days   Lab Units 09/13/23  0524   CHOLESTEROL mg/dL 82   HDL CHOL mg/dL 40     Lab Results   Component Value Date    POCTROP 0.02 05/20/2022     Lab Results   Component Value Date    TROPONINT 38 (H) 09/12/2023   (  Lab Results   Component Value Date    MG 2.6 (H) 09/13/2023     Results for orders placed during the hospital encounter of 03/28/23    Adult Transthoracic Echo Complete W/ Cont if Necessary Per Protocol    Interpretation Summary    Left ventricular ejection fraction appears to be 56 - 60%.    Left ventricular diastolic function is consistent with (grade I) impaired relaxation and age.    No significant valvular disease.    Mild left atrial enlargement.           ASSESSMENT:    Acute respiratory failure with hypoxia and hypercapnia    PSVT (paroxysmal supraventricular tachycardia)    Essential hypertension        PLAN:  1.  Toprol 25 mg once a day          Robby Perez MD  9/13/2023    09:39  EDT

## 2023-09-13 NOTE — PROGRESS NOTES
Pulmonary / Critical Care Progress Note      Patient Name: Dilip Faulkner  : 1950  MRN: [id number]  Primary Care Physician:  Jackeline Jaime APRN  Date of admission: 2023    Subjective   Subjective   Follow-up for acute on chronic hypoxic and hypercapnic respiratory failure, COPD exacerbation    No acute events overnight.  Wore NIPPV 18/6    This morning,  Sitting up on side of bed on 3 L nasal cannula  Reports feeling better but continues to dislike BiPAP  ABG improved  Dyspnea improved  Denies cough  No chest pain or hemoptysis  No fever or chills    Review of Systems  General: Denied complaints  HEENT: Denied complaints  Respiratory: Dyspnea, cough, otherwise denied complaints  Cardiovascular: Denied complaints  GI: Denied complaints  : Denied complaints  MSK: Denied complaints    Objective   Objective     Vitals:   Temp:  [97.3 °F (36.3 °C)-100.2 °F (37.9 °C)] 97.3 °F (36.3 °C)  Heart Rate:  [] 70  Resp:  [16-24] 18  BP: (106-142)/(56-70) 123/61  Flow (L/min):  [3] 3    Physical Exam   Vital Signs Reviewed   General: Chronically ill-appearing male, sitting up on side of bed, NAD.    HEENT:  PERRL, EOMI.    Neck:  No JVD, no thyromegaly  Lymph: no axillary, cervical, supraclavicular lymphadenopathy noted bilaterally  Chest: Diminished to auscultation bilaterally, no work of breathing noted on 2 L nasal cannula  CV: RRR, no M/G/R, pulses 2+  Abd:  Soft, NT, ND, +BS  EXT:  no clubbing, no cyanosis, no edema  Neuro:  A&Ox3, CN grossly intact, no focal deficits.  Skin: No rashes or lesions noted    Result Review    Result Review:  I have personally reviewed the results from the time of this admission to 2023 10:53 EDT and agree with these findings:  [x]  Laboratory  [x]  Microbiology  [x]  Radiology  [x]  EKG/Telemetry   []  Cardiology/Vascular   []  Pathology  []  Old records  []  Other:  Most notable findings include:   Respiratory viral panel negative     - CXR stable appearing  chronic findings    3/29/2023 ECHO: EF 56 6% grade 1 diastolic dysfunction        Lab 09/13/23  0524 09/12/23  0635 09/11/23  1310 09/11/23  1309   WBC 8.94 8.79 10.97*  --    HEMOGLOBIN 10.9* 11.0* 12.1*  --    HEMATOCRIT 39.6 40.9 43.4  --    PLATELETS 235 244 263  --    SODIUM 136 136 138  --    SODIUM, ARTERIAL  --   --   --  137.1   POTASSIUM 5.2 5.0 5.0  --    CHLORIDE 101 101 102  --    CO2 24.4 22.4 26.9  --    BUN 33* 24* 15  --    CREATININE 1.06 1.04 1.00  --    GLUCOSE 159* 138* 160*  --    GLUCOSE, ARTERIAL  --   --   --  157*   CALCIUM 8.7 9.0 8.8  --    PHOSPHORUS 3.2 3.8  --   --    TOTAL PROTEIN  --  7.2 7.6  --    ALBUMIN  --  3.5 4.1  --    GLOBULIN  --  3.7 3.5  --      Assessment & Plan   Assessment / Plan     Active Hospital Problems:  Active Hospital Problems    Diagnosis    • **Acute respiratory failure with hypoxia and hypercapnia    • PSVT (paroxysmal supraventricular tachycardia)    • Essential hypertension      Impression:  Acute on chronic hypoxic and hypercapnic respiratory failure requiring NIPPV  Acute exacerbation of COPD  Respiratory acidosis  Hypertension  Type 2 diabetes mellitus with hyperglycemia    Plan:  -Currently on 2-3 L nasal cannula, continue to wean to maintain SPO2 greater than 90%  -Continue NIPPV at night and as needed days.  Encourage use.  -ABG improving with BiPAP 7.3 8/44/76 with NIPPV settings adjusted to 18/6 with rate of 22; encourage patient to use BiPAP at night with the settings  -Continue azithromycin 500 mg IV x3 days for COPD exacerbation  -Sputum culture ordered  -Continue Brovana, Pulmicort and DuoNebs  -Continue bronchopulmonary hygiene  -Encourage use of I-S and flutter valve when able  -Transitioned to prednisone 40 x 5 days total  -Encourage mobilization when able  -Palliative on board.  Appreciate assistance  -Cardiology on board.  Appreciate assistance  -RT  consulted for assistance with evaluating for home NIPPV.  Per RTCM patient has  home NIPPV currently does not use device and has no intentions to start using device.  Education provided on risk of acute on chronic respiratory failure due to hypercarbia.    DVT prophylaxis:  Medical DVT prophylaxis orders are present.    CODE STATUS:   Medical Intervention Limits: NO intubation (DNI)  Level Of Support Discussed With: Patient  Code Status (Patient has no pulse and is not breathing): CPR (Attempt to Resuscitate)  Medical Interventions (Patient has pulse or is breathing): Limited Support      Electronically signed by MONE Raines, 09/13/23, 10:54 AM EDT.  This patient was seen by both a physician and a NP. I, Leila Singh MD, spent >50% of time in accordance with split shared billing. This included personally reviewing all pertinent labs, imaging, microbiology and documentation. Also discussing the case with the patient and any available family, the admitting physician and any available ancillary staff.   Electronically signed by Leila Singh MD, 09/13/23, 5:41 PM EDT.

## 2023-09-13 NOTE — PROGRESS NOTES
[facility]   Hospitalist Progress Note    Date of admission: 9/11/2023  Patient Name: Dilip Faulkner  1950  Date: 9/13/2023      Subjective     Chief Complaint   Patient presents with    Shortness of Breath     EMS REPORTS PATIENTS OXYGEN SATS WERE 50S-60S ON HIS HOME O2 2L NC        Summary: 73 y.o. history of recurrent hospital admissions, COPD on 2 L baseline, nonadherence to NIPPV, proximal SVT requiring adenosine, morbid obesity presented with worsening shortness of air treating for COPD exacerbation and acute hypoxic hypercapnic respiratory failure requiring BiPAP on admission    Interval Followup: Less Short of air only wore BiPAP for about 3 hours overnight.  Still has complaints of feeling like it is smothering him which is the main reason that he feels like he cannot tolerate it despite multiple times to address this previously.  He is willing to try medicine for anxiety tonight so that he can attempt to wear the BiPAP.    Objective     Vitals:   Temp:  [97.3 °F (36.3 °C)-98.5 °F (36.9 °C)] 97.3 °F (36.3 °C)  Heart Rate:  [] 61  Resp:  [18-24] 18  BP: (123-142)/(61-70) 127/66  Flow (L/min):  [3] 3    Physical Exam  Appears older than stated age, in bed, obese  Increasing expiratory wheezing, mild conversational dyspnea on nasal cannula  Rrr, no le pitting edema  Abd nt nd +BS  Generalized weakness  Poor insight    Result Review:  Vital signs, labs and recent relevant imaging reviewed.        acetaminophen    aluminum-magnesium hydroxide-simethicone    senna-docusate sodium **AND** polyethylene glycol **AND** bisacodyl **AND** bisacodyl    dextrose    dextrose    Diclofenac Sodium    glucagon (human recombinant)    hydrOXYzine    levalbuterol    melatonin    nicotine    nitroglycerin    ondansetron    sodium chloride    sodium chloride    sodium chloride    arformoterol, 15 mcg, Nebulization, BID - RT  aspirin, 81 mg, Oral, Daily  azithromycin, 500 mg, Oral, Q24H  budesonide, 0.5 mg,  Nebulization, BID - RT  heparin (porcine), 5,000 Units, Subcutaneous, Q8H  insulin regular, 2-7 Units, Subcutaneous, 4x Daily AC & at Bedtime  levalbuterol, 1.25 mg, Nebulization, 4x Daily - RT  metoprolol tartrate, 25 mg, Oral, Q12H  [START ON 9/14/2023] predniSONE, 40 mg, Oral, Daily With Breakfast  senna-docusate sodium, 2 tablet, Oral, BID  sodium chloride, 10 mL, Intravenous, Q12H  traZODone, 150 mg, Oral, Nightly        CT Abdomen Pelvis With & Without Contrast    Result Date: 9/12/2023    1. No evidence for acute abnormality throughout the abdomen or pelvis. 2. No evidence for nephrolithiasis.  No evidence for abnormal renal mass. 3. Findings suggesting a potential hemangioma within the left lobe of the liver.  An area of focal fatty infiltration could potentially be considered as well.  This finding is incompletely evaluated.  Recommend correlation with follow-up nonemergent outpatient MRI of the liver with gadolinium contrast for better characterization.     SHRUTI CUTLER MD       Electronically Signed and Approved By: SHRUTI CUTLER MD on 9/12/2023 at 23:30             XR Chest 1 View    Result Date: 9/11/2023    Stable appearing chronic findings compared to previous radiograph, without convincing new abnormality.       MELANIA SHAIKH MD       Electronically Signed and Approved By: MELANIA SHAIKH MD on 9/11/2023 at 13:34              Cxr personally reviewed chronic scarring/seen previously no acute infiltrate noted   Assessment / Plan     Assessment/Plan (clinically significant if listed here)  Acute hypoxemic hypercapnic respiratory failure requiring BiPAP  Acute COPD exacerbation baseline 2 L nasal cannula  PSVT  Microscopic Hematuria  Troponemia, suspect type 2 from demand ischemia/acute resp distress   Hypertension  DM2  HLD  Hx of 84 pack year smoking hx quit in 2021  Possible left lobe of the liver hemangioma, outpatient MRI to follow-up    Cont resp hygiene, steroids, nebulizers change to p.o.  steroids tomorrow  Nippv with night and prn during day, 18/6 rate 22, encouraged adherence  Add trazodone 150 at tonight given hour before attempting NIPPV  Rt cm consult - pt declines nippv at home before/multiple times previously -if able to tolerate with above changes will reconsider use outpatient  Azithromycin x3d course for copd exacerbation  Infectious studies negative thus far, flu/cov/rsv/rvp negative  Bph/resp hygiene  Incentive spirometry  Gi ppx while on steroids   Had psvt yesterday, rates improving/doing better with adjusted beta-blocker dose, discussed with cardiology appreciate assistance continue on metoprolol succinate  Using Xopenex nebs to also minimize tachycardia  Monitor k/mg/p closely  Ssi for dm2, monitor closely while on steroids, blood glucose reasonable  Hematuria on UA, microscopic, no visual blood on urine today, CT abdomen pelvis without renal mass or nephrolithiasis.  Possible hemangioma in the left lobe of the liver needs outpatient MRI of the liver to follow-up  We will have patient follow-up with urology given smoking history   Appreciate pulm assistance, rec's reviewed  Pt/ot   Check a.m. CBC, BMP, magnesium, phosphorus  Continue hospital monitoring and treatment at current level of care - does not need upgraded at this time.    Hopefully home tomorrow if respiratory status stable/back on home oxygen and depending on cardiac monitoring/nippv tolerance          DVT prophylaxis:  Medical DVT prophylaxis orders are present.    Medical Intervention Limits: NO intubation (DNI)  Level Of Support Discussed With: Patient  Code Status (Patient has no pulse and is not breathing): CPR (Attempt to Resuscitate)  Medical Interventions (Patient has pulse or is breathing): Limited Support        CBC          9/11/2023    13:10 9/12/2023    06:35 9/13/2023    05:24   CBC   WBC 10.97  8.79  8.94    RBC 5.35  5.05  5.06    Hemoglobin 12.1  11.0  10.9    Hematocrit 43.4  40.9  39.6    MCV 81.1  81.0   78.3    MCH 22.6  21.8  21.5    MCHC 27.9  26.9  27.5    RDW 17.6  17.0  17.8    Platelets 263  244  235      CMP          9/11/2023    13:09 9/11/2023    13:10 9/12/2023    06:35 9/13/2023    05:24   CMP   Glucose 157  160  138  159    BUN  15  24  33    Creatinine  1.00  1.04  1.06    EGFR  79.0  75.3  73.6    Sodium 137.1  138  136  136    Potassium  5.0  5.0  5.2    Chloride  102  101  101    Calcium  8.8  9.0  8.7    Total Protein  7.6  7.2     Albumin  4.1  3.5     Globulin  3.5  3.7     Total Bilirubin  0.4  0.3     Alkaline Phosphatase  106  92     AST (SGOT)  13  10     ALT (SGPT)  9  8     Albumin/Globulin Ratio  1.2  0.9     BUN/Creatinine Ratio  15.0  23.1  31.1    Anion Gap  9.1  12.6  10.6

## 2023-09-13 NOTE — THERAPY EVALUATION
Patient Name: Dilip Faulkner  : 1950    MRN: [id number]                              Today's Date: 2023       Admit Date: 2023    Visit Dx:     ICD-10-CM ICD-9-CM   1. Acute respiratory failure with hypoxia and hypercapnia  J96.01 518.81    J96.02    2. Difficulty in walking  R26.2 719.7   3. Impaired mobility and ADLs  Z74.09 V49.89    Z78.9      Patient Active Problem List   Diagnosis    Chronic obstructive pulmonary disease with acute exacerbation    Acute on chronic respiratory failure with hypoxia and hypercapnia    Type 2 diabetes mellitus    Acute on chronic respiratory failure    Essential hypertension    Hyperlipidemia    Cough    Pneumonia due to infectious organism    COPD with acute exacerbation    Obesity (BMI 30-39.9)    Right bundle branch block    Acquired ptosis of eyelid, bilateral    Type 2 diabetes mellitus with hyperglycemia, with long-term current use of insulin    Acute exacerbation of chronic obstructive pulmonary disease (COPD)    Respiratory failure with hypoxia    Gastroesophageal reflux disease without esophagitis    COPD (chronic obstructive pulmonary disease)    Acute respiratory failure with hypoxia    Noncompliance with CPAP treatment    Unstable angina    COPD exacerbation    Acute respiratory failure with hypoxia and hypercapnia    Aspiration pneumonia of left lower lobe due to gastric secretions    Sepsis    PSVT (paroxysmal supraventricular tachycardia)     Past Medical History:   Diagnosis Date    Asthma     COPD (chronic obstructive pulmonary disease)     Diabetes mellitus     Ex-smoker     QUIT     Hernia, umbilical     Hypertension     On home O2     REPORTS WEARING 2L/NC PRN SOA     Past Surgical History:   Procedure Laterality Date    ABDOMINAL SURGERY        General Information       Row Name 23 1046          OT Time and Intention    Document Type evaluation  -AC     Mode of Treatment individual therapy;occupational therapy  -AC       Row Name  09/13/23 1046          General Information    Patient Profile Reviewed yes  -     Prior Level of Function --  Patient reports (I) with adls with use of shower chair and regular toilet. Patient state he uses a cane for funcitonal mobility and was on 2 L of supplementary O2.  -     Existing Precautions/Restrictions fall;oxygen therapy device and L/min  -     Barriers to Rehab none identified  -       Row Name 09/13/23 1046          Occupational Profile    Reason for Services/Referral (Occupational Profile) Pt. is a 73year old male admitted for the above diagnosis. Pt. referred to OT services to assess independence with ADLs and adl transfers/fx'l mobility. No previous OT services for current condition.  -       Row Name 09/13/23 1046          Living Environment    People in Home sibling(s)  -       Row Name 09/13/23 1046          Cognition    Orientation Status (Cognition) oriented x 4  -       Row Name 09/13/23 1046          Safety Issues, Functional Mobility    Impairments Affecting Function (Mobility) balance;endurance/activity tolerance;shortness of breath  -               User Key  (r) = Recorded By, (t) = Taken By, (c) = Cosigned By      Initials Name Provider Type     Dulce Piper, OT Occupational Therapist                     Mobility/ADL's       Row Name 09/13/23 1051          Bed Mobility    Bed Mobility bed mobility (all) activities  -     All Activities, [place] (Bed Mobility) standby assist  -     Assistive Device (Bed Mobility) head of bed elevated  -       Row Name 09/13/23 1051          Transfers    Transfers sit-stand transfer  -       Row Name 09/13/23 1051          Sit-Stand Transfer    Sit-Stand [place] (Transfers) contact guard  -       Row Name 09/13/23 1051          Functional Mobility    Functional Mobility- Ind. Level contact guard assist;1 person  -     Functional Mobility- Comment patient was CGA with hand held assist for side stepping x2 toward HOB.   -[facility] Name 09/13/23 1051          Activities of Daily Living    BADL Assessment/Intervention --  Patient is setup for upper body bathing/dressing, setup for grooming, setup for self-feeding, CGA for lower body bathing/dressing, CGA for toileting.  -               User Key  (r) = Recorded By, (t) = Taken By, (c) = Cosigned By      Initials Name Provider Type    Dulce Saleh OT Occupational Therapist                   Obj/Interventions       [facility] Name 09/13/23 1052          Sensory Assessment (Somatosensory)    Sensory Assessment (Somatosensory) sensation intact  -AC       Row Name 09/13/23 1052          Vision Assessment/Intervention    Visual Impairment/Limitations WFL  -[facility] 09/13/23 1052          Range of Motion Comprehensive    General Range of Motion bilateral upper extremity ROM WNL  -AC       Row Name 09/13/23 1052          Strength Comprehensive (MMT)    General Manual Muscle Testing (MMT) Assessment no strength deficits identified  -AC       Row Name 09/13/23 1052          Motor Skills    Motor Skills coordination;functional endurance  -     Coordination WFL  -     Functional Endurance fair plus for adls  -AC       Row Name 09/13/23 1052          Balance    Balance Assessment standing dynamic balance  -     Dynamic Standing Balance contact guard;1-person assist  -     Position/Device Used, Standing Balance supported  -               User Key  (r) = Recorded By, (t) = Taken By, (c) = Cosigned By      Initials Name Provider Type    Dulce Saleh OT Occupational Therapist                   Goals/Plan       [facility] Name 09/13/23 1055          Bed Mobility Goal 1 (OT)    Activity/Assistive Device (Bed Mobility Goal 1, OT) bed mobility activities, all  -AC     [place] Level/Cues Needed (Bed Mobility Goal 1, OT) modified independence  -AC     Time Frame (Bed Mobility Goal 1, OT) long term goal (LTG);10 days  -[facility] Name 09/13/23 1055          Transfer Goal 1 (OT)     Activity/Assistive Device (Transfer Goal 1, OT) transfers, all  -AC     [place] Level/Cues Needed (Transfer Goal 1, OT) modified independence  -AC     Time Frame (Transfer Goal 1, OT) long term goal (LTG);10 days  -AC       Row Name 09/13/23 1055          Bathing Goal 1 (OT)    Activity/Device (Bathing Goal 1, OT) bathing skills, all  -AC     [place] Level/Cues Needed (Bathing Goal 1, OT) modified independence  -AC     Time Frame (Bathing Goal 1, OT) long term goal (LTG);10 days  -AC       Row Name 09/13/23 1055          Dressing Goal 1 (OT)    Activity/Device (Dressing Goal 1, OT) dressing skills, all  -AC     [place]/Cues Needed (Dressing Goal 1, OT) modified independence  -AC     Time Frame (Dressing Goal 1, OT) long term goal (LTG);10 days  -       Row Name 09/13/23 1055          Toileting Goal 1 (OT)    Activity/Device (Toileting Goal 1, OT) toileting skills, all  -AC     [place] Level/Cues Needed (Toileting Goal 1, OT) modified independence  -AC     Time Frame (Toileting Goal 1, OT) long term goal (LTG);10 days  -       Row Name 09/13/23 1055          Grooming Goal 1 (OT)    Activity/Device (Grooming Goal 1, OT) grooming skills, all  -AC     [place] (Grooming Goal 1, OT) modified independence  -AC     Time Frame (Grooming Goal 1, OT) long term goal (LTG);10 days  -       Row Name 09/13/23 1055          Problem Specific Goal 1 (OT)    Problem Specific Goal 1 (OT) patient will demonstrate good activity tolerance for adls.  -AC     Time Frame (Problem Specific Goal 1, OT) long term goal (LTG);10 days  -AC       Row Name 09/13/23 1055          Therapy Assessment/Plan (OT)    Planned Therapy Interventions (OT) activity tolerance training;functional balance retraining;occupation/activity based interventions;ROM/therapeutic exercise;transfer/mobility retraining;patient/caregiver education/training;BADL retraining  -AC               User Key  (r) = Recorded By, (t) = Taken By, (c) =  Cosigned By      Initials Name Provider Type    Dulce Saleh OT Occupational Therapist                   Clinical Impression       Row Name 09/13/23 1054          Pain Assessment    Pretreatment Pain Rating 0/10 - no pain  -     Posttreatment Pain Rating 0/10 - no pain  -AC       Row Name 09/13/23 1054          Plan of Care Review    Plan of Care Reviewed With patient  -     Progress no change  -     Outcome Evaluation Patient presents with balance and endurance limitations that impede his/her ability to perform ADLS. The skills of a therapist are necessary to maximize independence with ADLs.  -AC       Row Name 09/13/23 1054          Therapy Assessment/Plan (OT)    Patient/Family Therapy Goal Statement (OT) Patient would like to maximize independence with adls.  -     Rehab Potential (OT) good, to achieve stated therapy goals  -     Criteria for Skilled Therapeutic Interventions Met (OT) yes;meets criteria;skilled treatment is necessary  -     Therapy Frequency (OT) 5 times/wk  -AC       Row Name 09/13/23 1054          Therapy Plan Review/Discharge Plan (OT)    Equipment Needs Upon Discharge (OT) walker, rolling;commode chair  -     Anticipated Discharge Disposition (OT) home with assist;home with home health  -[facility] 09/13/23 1054          Positioning and Restraints    Pre-Treatment Position in bed  -AC     Post Treatment Position bed  -AC     In Bed fowlers;call light within reach;exit alarm on;encouraged to call for assist  -               User Key  (r) = Recorded By, (t) = Taken By, (c) = Cosigned By      Initials Name Provider Type    Dulce Saleh OT Occupational Therapist                   Outcome Measures       Row Name 09/13/23 1056          How much help from another is currently needed...    Putting on and taking off regular lower body clothing? 3  -AC     Bathing (including washing, rinsing, and drying) 3  -AC     Toileting (which includes using toilet bed pan or  urinal) 3  -AC     Putting on and taking off regular upper body clothing 4  -AC     Taking care of personal grooming (such as brushing teeth) 4  -AC     Eating meals 4  -AC     AM-PAC 6 Clicks Score (OT) 21  -AC       Row Name 09/13/23 1056          Functional Assessment    Outcome Measure Options AM-PAC 6 Clicks Daily Activity (OT);Optimal Instrument  -AC       Row Name 09/13/23 1056          Optimal Instrument    Optimal Instrument Optimal - 3  -AC     Bending/Stooping 1  -AC     Standing 2  -AC     Reaching 1  -AC     From the list, choose the 3 activities you would most like to be able to do without any difficulty Bending/stooping;Standing;Reaching  -AC     Total Score Optimal - 3 4  -AC               User Key  (r) = Recorded By, (t) = Taken By, (c) = Cosigned By      Initials Name Provider Type    Dulce Saleh OT Occupational Therapist                    Occupational Therapy Education       Title: PT OT SLP Therapies (Done)       Topic: Occupational Therapy (Done)       Point: ADL training (Done)       Description:   Instruct learner(s) on proper safety adaptation and remediation techniques during self care or transfers.   Instruct in proper use of assistive devices.                  Learning Progress Summary             Patient Acceptance, E, VU by  at 9/13/2023 1057                         Point: Home exercise program (Done)       Description:   Instruct learner(s) on appropriate technique for monitoring, assisting and/or progressing therapeutic exercises/activities.                  Learning Progress Summary             Patient Acceptance, E, VU by  at 9/13/2023 1057                         Point: Precautions (Done)       Description:   Instruct learner(s) on prescribed precautions during self-care and functional transfers.                  Learning Progress Summary             Patient Acceptance, E, VU by  at 9/13/2023 1057                         Point: Body mechanics (Done)       Description:    Instruct learner(s) on proper positioning and spine alignment during self-care, functional mobility activities and/or exercises.                  Learning Progress Summary             Patient Acceptance, E, VU by  at 9/13/2023 1057                                         User Key       Initials Effective Dates Name Provider Type Discipline     06/16/21 -  Dulce Piper OT Occupational Therapist OT                  OT Recommendation and Plan  Planned Therapy Interventions (OT): activity tolerance training, functional balance retraining, occupation/activity based interventions, ROM/therapeutic exercise, transfer/mobility retraining, patient/caregiver education/training, BADL retraining  Therapy Frequency (OT): 5 times/wk  Plan of Care Review  Plan of Care Reviewed With: patient  Progress: no change  Outcome Evaluation: Patient presents with balance and endurance limitations that impede his/her ability to perform ADLS. The skills of a therapist are necessary to maximize independence with ADLs.     Time Calculation:   Evaluation Complexity (OT)  Review Occupational Profile/Medical/Therapy History Complexity: expanded/moderate complexity  Assessment, Occupational Performance/Identification of Deficit Complexity: 1-3 performance deficits  Clinical Decision Making Complexity (OT): problem focused assessment/low complexity  Overall Complexity of Evaluation (OT): low complexity     Time Calculation- OT       Row Name 09/13/23 1057             Time Calculation- OT    OT Received On 09/13/23  -      OT Goal Re-Cert Due Date 09/22/23  -         Untimed Charges    OT Eval/Re-eval Minutes 31  -AC         Total Minutes    Untimed Charges Total Minutes 31  -AC       Total Minutes 31  -AC                User Key  (r) = Recorded By, (t) = Taken By, (c) = Cosigned By      Initials Name Provider Type     Dulce Piper OT Occupational Therapist                  Therapy Charges for Today       Code Description Service Date  Service Provider Modifiers Qty    [id number] HC OT EVAL LOW COMPLEXITY 3 9/13/2023 Dulce Piper, OT GO 1                 Dulce Piper OT  9/13/2023

## 2023-09-13 NOTE — PLAN OF CARE
Goal Outcome Evaluation:  Plan of Care Reviewed With: patient        Progress: no change  Outcome Evaluation: Patient wore BiPAP for about 20 min last night then wanted it off and refused to wear it again.

## 2023-09-13 NOTE — CONSULTS
09/13/23 1327   Coping/Psychosocial   Additional Documentation Spiritual Care (Group)   Spiritual Care   Spiritual Care Source  initiative   Spiritual Care Follow-Up follow-up planned regularly for general support   Response to Spiritual Care thanks expressed;receptive of support   Spiritual Care Interventions supportive conversation provided   Spiritual Care Visit Type follow-up   Spiritual Care Request coping/stress of illness support;spiritual/moral support   Receptivity to Spiritual Care visit welcomed

## 2023-09-13 NOTE — PLAN OF CARE
Goal Outcome Evaluation:           Progress: no change  Outcome Evaluation: wore Bipap for about 5 minutes then took it off

## 2023-09-13 NOTE — THERAPY TREATMENT NOTE
Acute Care - Physical Therapy Treatment Note  [facility]     Patient Name: Dilip Faulkner  : 1950  MRN: [id number]  Today's Date: 2023      Visit Dx:     ICD-10-CM ICD-9-CM   1. Acute respiratory failure with hypoxia and hypercapnia  J96.01 518.81    J96.02    2. Difficulty in walking  R26.2 719.7   3. Impaired mobility and ADLs  Z74.09 V49.89    Z78.9      Patient Active Problem List   Diagnosis    Chronic obstructive pulmonary disease with acute exacerbation    Acute on chronic respiratory failure with hypoxia and hypercapnia    Type 2 diabetes mellitus    Acute on chronic respiratory failure    Essential hypertension    Hyperlipidemia    Cough    Pneumonia due to infectious organism    COPD with acute exacerbation    Obesity (BMI 30-39.9)    Right bundle branch block    Acquired ptosis of eyelid, bilateral    Type 2 diabetes mellitus with hyperglycemia, with long-term current use of insulin    Acute exacerbation of chronic obstructive pulmonary disease (COPD)    Respiratory failure with hypoxia    Gastroesophageal reflux disease without esophagitis    COPD (chronic obstructive pulmonary disease)    Acute respiratory failure with hypoxia    Noncompliance with CPAP treatment    Unstable angina    COPD exacerbation    Acute respiratory failure with hypoxia and hypercapnia    Aspiration pneumonia of left lower lobe due to gastric secretions    Sepsis    PSVT (paroxysmal supraventricular tachycardia)     Past Medical History:   Diagnosis Date    Asthma     COPD (chronic obstructive pulmonary disease)     Diabetes mellitus     Ex-smoker     QUIT     Hernia, umbilical     Hypertension     On home O2     REPORTS WEARING 2L/NC PRN SOA     Past Surgical History:   Procedure Laterality Date    ABDOMINAL SURGERY       PT Assessment (last 12 hours)       PT Evaluation and Treatment       Row Name 23 1500          Physical Therapy Time and Intention    Subjective Information no complaints  -DP      Document Type therapy note (daily note)  -DP     Mode of Treatment individual therapy;physical therapy  -DP     Patient Effort good  -DP     Symptoms Noted During/After Treatment none  -DP       Row Name 09/13/23 1500          Bed Mobility    Bed Mobility supine-sit  -DP     All Activities, [place] (Bed Mobility) standby assist  -DP     Assistive Device (Bed Mobility) head of bed elevated  -DP       Row Name 09/13/23 1500          Transfers    Transfers sit-stand transfer;stand-sit transfer  -DP       Row Name 09/13/23 1500          Sit-Stand Transfer    Sit-Stand [place] (Transfers) standby assist  -DP     Assistive Device (Sit-Stand Transfers) walker, front-wheeled  -DP       Row Name 09/13/23 1500          Stand-Sit Transfer    Stand-Sit [place] (Transfers) standby assist  -DP     Assistive Device (Stand-Sit Transfers) walker, front-wheeled  -DP       Row Name 09/13/23 1500          Gait/Stairs (Locomotion)    Gait/Stairs Locomotion gait/ambulation assistive device  -DP     [place] Level (Gait) contact guard  -DP     Assistive Device (Gait) walker, front-wheeled  -DP     Patient was able to Ambulate yes  -DP     Distance in Feet (Gait) 120  -DP       Row Name 09/13/23 1500          Balance    Balance Assessment standing dynamic balance  -DP     Dynamic Standing Balance contact guard  -DP     Position/Device Used, Standing Balance supported;walker, front-wheeled  -DP               User Key  (r) = Recorded By, (t) = Taken By, (c) = Cosigned By      Initials Name Provider Type    DP Elza Miguel, PT Physical Therapist                    Physical Therapy Education       Title: PT OT SLP Therapies (Done)       Topic: Physical Therapy (Done)       Point: Mobility training (Done)       Learning Progress Summary             Patient Acceptance, E, VU by AC at 9/13/2023 1057    Acceptance, E,TB, VU by  at 9/12/2023 1425                         Point: Home exercise program (Done)       Learning  Progress Summary             Patient Acceptance, E, VU by  at 9/13/2023 1057    Acceptance, E,TB, VU by  at 9/12/2023 1425                         Point: Body mechanics (Done)       Learning Progress Summary             Patient Acceptance, E, VU by  at 9/13/2023 1057    Acceptance, E,TB, VU by  at 9/12/2023 1425                         Point: Precautions (Done)       Learning Progress Summary             Patient Acceptance, E, VU by  at 9/13/2023 1057    Acceptance, E,TB, VU by  at 9/12/2023 1425                                         User Key       Initials Effective Dates Name Provider Type Discipline     06/16/21 -  Dulce Piper, OT Occupational Therapist OT     08/16/23 -  Miguel Mitchell, PT Student PT Student PT                  PT Recommendation and Plan         Outcome Measures       Row Name 09/13/23 1500 09/12/23 1400          How much help from another person do you currently need...    Turning from your back to your side while in flat bed without using bedrails? 4  -DP 4  -DP (r) RH (t) DP (c)     Moving from lying on back to sitting on the side of a flat bed without bedrails? 4  -DP 4  -DP (r) RH (t) DP (c)     Moving to and from a bed to a chair (including a wheelchair)? 3  -DP 3  -DP (r) RH (t) DP (c)     Standing up from a chair using your arms (e.g., wheelchair, bedside chair)? 3  -DP 3  -DP (r) RH (t) DP (c)     Climbing 3-5 steps with a railing? 3  -DP 2  -DP (r) RH (t) DP (c)     To walk in hospital room? 3  -DP 3  -DP (r) RH (t) DP (c)     AM-PAC 6 Clicks Score (PT) 20  -DP 19  -DP (r) RH (t)        Functional Assessment    Outcome Measure Options AM-PAC 6 Clicks Basic Mobility (PT)  -DP --               User Key  (r) = Recorded By, (t) = Taken By, (c) = Cosigned By      Initials Name Provider Type    DP Elza Miguel, PT Physical Therapist     Miguel Mitchell, PT Student PT Student                     Time Calculation:    PT Charges       Row Name 09/13/23  1554             Time Calculation    PT Received On 09/13/23  -DP         Timed Charges    42775 - Gait Training Minutes  5  -DP      70853 - PT Therapeutic Activity Minutes 10  -DP         Total Minutes    Timed Charges Total Minutes 15  -DP       Total Minutes 15  -DP                User Key  (r) = Recorded By, (t) = Taken By, (c) = Cosigned By      Initials Name Provider Type    DP Elza Miguel, PT Physical Therapist                  Therapy Charges for Today       Code Description Service Date Service Provider Modifiers Qty    [id number]  PT THERAPEUTIC ACT EA 15 MIN 9/13/2023 Elza Miguel, PT GP 1            PT G-Codes  Outcome Measure Options: AM-PAC 6 Clicks Basic Mobility (PT)  AM-PAC 6 Clicks Score (PT): 20  AM-PAC 6 Clicks Score (OT): 21    Elza Miguel PT  9/13/2023

## 2023-09-14 ENCOUNTER — READMISSION MANAGEMENT (OUTPATIENT)
Dept: CALL CENTER | Facility: HOSPITAL | Age: 73
End: 2023-09-14
Payer: MEDICARE

## 2023-09-14 VITALS
WEIGHT: 243.39 LBS | OXYGEN SATURATION: 97 % | DIASTOLIC BLOOD PRESSURE: 56 MMHG | SYSTOLIC BLOOD PRESSURE: 89 MMHG | HEART RATE: 66 BPM | RESPIRATION RATE: 18 BRPM | TEMPERATURE: 97.5 F | BODY MASS INDEX: 32.97 KG/M2 | HEIGHT: 72 IN

## 2023-09-14 LAB
ANION GAP SERPL CALCULATED.3IONS-SCNC: 9.8 MMOL/L (ref 5–15)
BASOPHILS # BLD AUTO: 0.01 10*3/MM3 (ref 0–0.2)
BASOPHILS NFR BLD AUTO: 0.1 % (ref 0–1.5)
BUN SERPL-MCNC: 34 MG/DL (ref 8–23)
BUN/CREAT SERPL: 31.2 (ref 7–25)
CALCIUM SPEC-SCNC: 9.2 MG/DL (ref 8.6–10.5)
CHLORIDE SERPL-SCNC: 101 MMOL/L (ref 98–107)
CO2 SERPL-SCNC: 28.2 MMOL/L (ref 22–29)
CREAT SERPL-MCNC: 1.09 MG/DL (ref 0.76–1.27)
DEPRECATED RDW RBC AUTO: 49.5 FL (ref 37–54)
EGFRCR SERPLBLD CKD-EPI 2021: 71.2 ML/MIN/1.73
EOSINOPHIL # BLD AUTO: 0.01 10*3/MM3 (ref 0–0.4)
EOSINOPHIL NFR BLD AUTO: 0.1 % (ref 0.3–6.2)
ERYTHROCYTE [DISTWIDTH] IN BLOOD BY AUTOMATED COUNT: 18 % (ref 12.3–15.4)
GLUCOSE BLDC GLUCOMTR-MCNC: 180 MG/DL (ref 70–99)
GLUCOSE BLDC GLUCOMTR-MCNC: 191 MG/DL (ref 70–99)
GLUCOSE SERPL-MCNC: 165 MG/DL (ref 65–99)
HCT VFR BLD AUTO: 43.2 % (ref 37.5–51)
HGB BLD-MCNC: 11.9 G/DL (ref 13–17.7)
IMM GRANULOCYTES # BLD AUTO: 0.06 10*3/MM3 (ref 0–0.05)
IMM GRANULOCYTES NFR BLD AUTO: 0.6 % (ref 0–0.5)
LYMPHOCYTES # BLD AUTO: 1.31 10*3/MM3 (ref 0.7–3.1)
LYMPHOCYTES NFR BLD AUTO: 14 % (ref 19.6–45.3)
MAGNESIUM SERPL-MCNC: 2.6 MG/DL (ref 1.6–2.4)
MCH RBC QN AUTO: 21.9 PG (ref 26.6–33)
MCHC RBC AUTO-ENTMCNC: 27.5 G/DL (ref 31.5–35.7)
MCV RBC AUTO: 79.4 FL (ref 79–97)
MONOCYTES # BLD AUTO: 0.8 10*3/MM3 (ref 0.1–0.9)
MONOCYTES NFR BLD AUTO: 8.5 % (ref 5–12)
NEUTROPHILS NFR BLD AUTO: 7.19 10*3/MM3 (ref 1.7–7)
NEUTROPHILS NFR BLD AUTO: 76.7 % (ref 42.7–76)
NRBC BLD AUTO-RTO: 0 /100 WBC (ref 0–0.2)
PHOSPHATE SERPL-MCNC: 3.1 MG/DL (ref 2.5–4.5)
PLATELET # BLD AUTO: 247 10*3/MM3 (ref 140–450)
PMV BLD AUTO: 10.1 FL (ref 6–12)
POTASSIUM SERPL-SCNC: 4.4 MMOL/L (ref 3.5–5.2)
RBC # BLD AUTO: 5.44 10*6/MM3 (ref 4.14–5.8)
SODIUM SERPL-SCNC: 139 MMOL/L (ref 136–145)
WBC NRBC COR # BLD: 9.38 10*3/MM3 (ref 3.4–10.8)

## 2023-09-14 PROCEDURE — 63710000001 PREDNISONE PER 1 MG: Performed by: INTERNAL MEDICINE

## 2023-09-14 PROCEDURE — 94664 DEMO&/EVAL PT USE INHALER: CPT

## 2023-09-14 PROCEDURE — 80048 BASIC METABOLIC PNL TOTAL CA: CPT | Performed by: INTERNAL MEDICINE

## 2023-09-14 PROCEDURE — 83735 ASSAY OF MAGNESIUM: CPT | Performed by: INTERNAL MEDICINE

## 2023-09-14 PROCEDURE — 63710000001 INSULIN REGULAR HUMAN PER 5 UNITS: Performed by: INTERNAL MEDICINE

## 2023-09-14 PROCEDURE — 99231 SBSQ HOSP IP/OBS SF/LOW 25: CPT | Performed by: INTERNAL MEDICINE

## 2023-09-14 PROCEDURE — 94799 UNLISTED PULMONARY SVC/PX: CPT

## 2023-09-14 PROCEDURE — 94660 CPAP INITIATION&MGMT: CPT

## 2023-09-14 PROCEDURE — 25010000002 HEPARIN (PORCINE) PER 1000 UNITS: Performed by: STUDENT IN AN ORGANIZED HEALTH CARE EDUCATION/TRAINING PROGRAM

## 2023-09-14 PROCEDURE — 85025 COMPLETE CBC W/AUTO DIFF WBC: CPT | Performed by: INTERNAL MEDICINE

## 2023-09-14 PROCEDURE — 82948 REAGENT STRIP/BLOOD GLUCOSE: CPT

## 2023-09-14 PROCEDURE — 94761 N-INVAS EAR/PLS OXIMETRY MLT: CPT

## 2023-09-14 PROCEDURE — 84100 ASSAY OF PHOSPHORUS: CPT | Performed by: INTERNAL MEDICINE

## 2023-09-14 RX ORDER — ARFORMOTEROL TARTRATE 15 UG/2ML
15 SOLUTION RESPIRATORY (INHALATION)
Qty: 360 ML | Refills: 0 | Status: SHIPPED | OUTPATIENT
Start: 2023-09-14 | End: 2023-12-13

## 2023-09-14 RX ORDER — PREDNISONE 20 MG/1
TABLET ORAL
Qty: 12 TABLET | Refills: 0 | Status: SHIPPED | OUTPATIENT
Start: 2023-09-15 | End: 2023-09-22

## 2023-09-14 RX ORDER — LEVALBUTEROL INHALATION SOLUTION 1.25 MG/3ML
1.25 SOLUTION RESPIRATORY (INHALATION) EVERY 6 HOURS PRN
Qty: 180 ML | Refills: 0 | Status: SHIPPED | OUTPATIENT
Start: 2023-09-14

## 2023-09-14 RX ORDER — BUDESONIDE 0.5 MG/2ML
0.5 INHALANT ORAL
Qty: 360 ML | Refills: 0 | Status: SHIPPED | OUTPATIENT
Start: 2023-09-14 | End: 2023-12-13

## 2023-09-14 RX ADMIN — HEPARIN SODIUM 5000 UNITS: 5000 INJECTION INTRAVENOUS; SUBCUTANEOUS at 13:11

## 2023-09-14 RX ADMIN — LEVALBUTEROL HYDROCHLORIDE 1.25 MG: 1.25 SOLUTION RESPIRATORY (INHALATION) at 08:10

## 2023-09-14 RX ADMIN — HEPARIN SODIUM 5000 UNITS: 5000 INJECTION INTRAVENOUS; SUBCUTANEOUS at 05:47

## 2023-09-14 RX ADMIN — ASPIRIN 81 MG: 81 TABLET, CHEWABLE ORAL at 08:09

## 2023-09-14 RX ADMIN — PREDNISONE 40 MG: 20 TABLET ORAL at 08:09

## 2023-09-14 RX ADMIN — LEVALBUTEROL HYDROCHLORIDE 1.25 MG: 1.25 SOLUTION RESPIRATORY (INHALATION) at 13:28

## 2023-09-14 RX ADMIN — BUDESONIDE 0.5 MG: 0.5 INHALANT RESPIRATORY (INHALATION) at 08:08

## 2023-09-14 RX ADMIN — INSULIN HUMAN 2 UNITS: 100 INJECTION, SOLUTION PARENTERAL at 13:12

## 2023-09-14 RX ADMIN — ARFORMOTEROL TARTRATE 15 MCG: 15 SOLUTION RESPIRATORY (INHALATION) at 08:08

## 2023-09-14 RX ADMIN — METOPROLOL TARTRATE 25 MG: 25 TABLET, FILM COATED ORAL at 08:09

## 2023-09-14 RX ADMIN — LEVALBUTEROL HYDROCHLORIDE 1.25 MG: 1.25 SOLUTION RESPIRATORY (INHALATION) at 01:22

## 2023-09-14 RX ADMIN — INSULIN HUMAN 2 UNITS: 100 INJECTION, SOLUTION PARENTERAL at 08:08

## 2023-09-14 RX ADMIN — Medication 10 ML: at 08:09

## 2023-09-14 NOTE — PLAN OF CARE
Goal Outcome Evaluation:  Plan of Care Reviewed With: patient        Progress: no change  Outcome Evaluation: Patient wore bipap last night for few hours. He is this morning on 3l nasal cannula his home o2

## 2023-09-14 NOTE — PROGRESS NOTES
Pulmonary / Critical Care Progress Note      Patient Name: Dilip Faulkner  : 1950  MRN: [id number]  Primary Care Physician:  Jackeline Jaime APRN  Date of admission: 2023    Subjective   Subjective   Follow-up for acute on chronic hypoxic and hypercapnic respiratory failure, COPD exacerbation    No acute events overnight.  Wore NIPPV 18/6 for 3 hours overnight    This morning,  Sitting up on side of bed on 3 L nasal cannula  Reports feeling well  Is agreeable currently to wear BiPAP at home  Dyspnea improved  Denies cough  No chest pain or hemoptysis  No fever or chills    Review of Systems  General: Denied complaints  HEENT: Denied complaints  Respiratory: Dyspnea, cough, otherwise denied complaints  Cardiovascular: Denied complaints  GI: Denied complaints  : Denied complaints  MSK: Denied complaints    Objective   Objective     Vitals:   Temp:  [97.3 °F (36.3 °C)-97.9 °F (36.6 °C)] 97.9 °F (36.6 °C)  Heart Rate:  [61-70] 68  Resp:  [18-23] 20  BP: (103-127)/(53-90) 114/53  Flow (L/min):  [3] 3    Physical Exam   Vital Signs Reviewed   General: Chronically Ill appearing male, lying in bed, NAD.    HEENT:  PERRL, EOMI.    Neck:  No JVD, no thyromegaly  Lymph: no axillary, cervical, supraclavicular lymphadenopathy noted bilaterally  Chest: Diminished to auscultation bilaterally, no work of breathing noted on 2 L nasal cannula  CV: RRR, no M/G/R, pulses 2+  Abd:  Soft, NT, ND, +BS  EXT:  no clubbing, no cyanosis, no edema  Neuro:  A&Ox3, CN grossly intact, no focal deficits.  Skin: No rashes or lesions noted    Result Review    Result Review:  I have personally reviewed the results from the time of this admission to 2023 06:51 EDT and agree with these findings:  [x]  Laboratory  [x]  Microbiology  [x]  Radiology  [x]  EKG/Telemetry   []  Cardiology/Vascular   []  Pathology  []  Old records  []  Other:  Most notable findings include:   Respiratory viral panel negative     - CXR stable  appearing chronic findings    3/29/2023 ECHO: EF 56 6% grade 1 diastolic dysfunction        Lab 09/14/23  0331 09/13/23  0524 09/12/23  0635 09/11/23  1310 09/11/23  1309   WBC 9.38 8.94 8.79 10.97*  --    HEMOGLOBIN 11.9* 10.9* 11.0* 12.1*  --    HEMATOCRIT 43.2 39.6 40.9 43.4  --    PLATELETS 247 235 244 263  --    SODIUM 139 136 136 138  --    SODIUM, ARTERIAL  --   --   --   --  137.1   POTASSIUM 4.4 5.2 5.0 5.0  --    CHLORIDE 101 101 101 102  --    CO2 28.2 24.4 22.4 26.9  --    BUN 34* 33* 24* 15  --    CREATININE 1.09 1.06 1.04 1.00  --    GLUCOSE 165* 159* 138* 160*  --    GLUCOSE, ARTERIAL  --   --   --   --  157*   CALCIUM 9.2 8.7 9.0 8.8  --    PHOSPHORUS 3.1 3.2 3.8  --   --    TOTAL PROTEIN  --   --  7.2 7.6  --    ALBUMIN  --   --  3.5 4.1  --    GLOBULIN  --   --  3.7 3.5  --      Assessment & Plan   Assessment / Plan     Active Hospital Problems:  Active Hospital Problems    Diagnosis     **Acute respiratory failure with hypoxia and hypercapnia     PSVT (paroxysmal supraventricular tachycardia)     Essential hypertension      Impression:  Acute on chronic hypoxic and hypercapnic respiratory failure requiring NIPPV  Acute exacerbation of COPD  Respiratory acidosis  Hypertension  Type 2 diabetes mellitus with hyperglycemia    Plan:  -Currently on 2-3 L nasal cannula, continue to wean to maintain SPO2 greater than 90%  -Continue NIPPV at night and as needed days.  Encourage use.  -ABG improving with BiPAP 7.3 8/44/76 with NIPPV settings adjusted to 18/6 with rate of 22; encourage patient to use BiPAP at night with the settings  -Continue azithromycin IV x3 days for COPD exacerbation  -Sputum culture pending  -Continue Brovana, Pulmicort and DuoNebs  -Continue bronchopulmonary hygiene  -Encourage use of I-S and flutter valve when able  -Transitioned to prednisone 40 x 5 days total  -Encourage mobilization when able  -Palliative on board.  Appreciate assistance  -Cardiology on board.  Appreciate  assistance  -PT/OT on board.  Appreciate assistance  -RT  consulted for assistance with evaluating for home NIPPV.   -Per Palliative care and RTCM notes, pt has no intention of changing his ways to prevent further hospitalizations, wants to remain CPR only code, but will not use home or hospital Bipap units.  -Ongoing discussion with patient regarding his need to wear BiPAP in the hospital and at home.  Patient continues to show some resistance, but more agreeable and understanding today.    DVT prophylaxis:  Medical DVT prophylaxis orders are present.    CODE STATUS:   Medical Intervention Limits: NO intubation (DNI)  Level Of Support Discussed With: Patient  Code Status (Patient has no pulse and is not breathing): CPR (Attempt to Resuscitate)  Medical Interventions (Patient has pulse or is breathing): Limited Support    Electronically signed by MONE Raines, 09/14/23, 6:51 AM EDT.  This patient was seen by both a physician and a NP. I, Leila Singh MD, spent >50% of time in accordance with split shared billing. This included personally reviewing all pertinent labs, imaging, microbiology and documentation. Also discussing the case with the patient and any available family, the admitting physician and any available ancillary staff.   Electronically signed by Leila Singh MD, 09/14/23, 2:08 PM EDT.

## 2023-09-14 NOTE — DISCHARGE INSTRUCTIONS
You have a spot on your liver that needs to be re-scanned to make sure it is harmless as an outpatient with an MRI of your abdomen. Please talk to your primary care doctor to help coordinate this.      We are sending you with nebulizer inhalers for yupelri brovana and budesonide - once these are available do not take your wixela medication at the same time as these.  These nebulizer medications will replace your wixela (if you are not at home and an inhaler dose is do you could use your wixela instead for one dose but do not take it at the same time as the nebulizer medications)

## 2023-09-14 NOTE — PROGRESS NOTES
CARDIOLOGY  INPATIENT PROGRESS NOTE                [facility] 4TH FLOOR MEDICAL TELEMETRY UNIT    9/14/2023      PATIENT IDENTIFICATION:   Name:  Dilip Faulkner      MRN:  [id number]     73 y.o.  male                 SUBJECTIVE:   Patient with no complaints this morning  OBJECTIVE:  Vitals:    09/14/23 0100 09/14/23 0122 09/14/23 0324 09/14/23 0702   BP:   114/53 94/54   BP Location:   Left arm Left arm   Patient Position:   Sitting Lying   Pulse:   68 64   Resp:  23 20 20   Temp: 97.3 °F (36.3 °C)  97.9 °F (36.6 °C) 97.3 °F (36.3 °C)   TempSrc: Axillary  Oral Oral   SpO2:  98% 94% 96%   Weight:       Height:               Body mass index is 33.01 kg/m².    Intake/Output Summary (Last 24 hours) at 9/14/2023 0851  Last data filed at 9/14/2023 0816  Gross per 24 hour   Intake --   Output 1125 ml   Net -1125 ml       Telemetry: Paroxysmal SVT episodes 160s discernible P waves    Physical Exam  General Appearance:   no acute distress  Alert and oriented x3  HENT:   lips not cyanotic  Atraumatic  Neck:  No jvd   supple  Respiratory:  no respiratory distress  normal breath sounds  no rales  Cardiovascular:    regular rhythm  no S3, no S4   no murmur  no rub  lower extremity edema: none    Skin:   warm, dry  No rashes      No Known Allergies  Scheduled meds:  arformoterol, 15 mcg, Nebulization, BID - RT  aspirin, 81 mg, Oral, Daily  budesonide, 0.5 mg, Nebulization, BID - RT  heparin (porcine), 5,000 Units, Subcutaneous, Q8H  insulin regular, 2-7 Units, Subcutaneous, 4x Daily AC & at Bedtime  levalbuterol, 1.25 mg, Nebulization, 4x Daily - RT  metoprolol tartrate, 25 mg, Oral, Q12H  predniSONE, 40 mg, Oral, Daily With Breakfast  senna-docusate sodium, 2 tablet, Oral, BID  sodium chloride, 10 mL, Intravenous, Q12H  traZODone, 150 mg, Oral, Nightly      IV meds:                         Data Review:  CBC          9/12/2023    06:35 9/13/2023    05:24 9/14/2023    03:31   CBC   WBC 8.79  8.94  9.38    RBC 5.05   5.06  5.44    Hemoglobin 11.0  10.9  11.9    Hematocrit 40.9  39.6  43.2    MCV 81.0  78.3  79.4    MCH 21.8  21.5  21.9    MCHC 26.9  27.5  27.5    RDW 17.0  17.8  18.0    Platelets 244  235  247      CMP          9/12/2023    06:35 9/13/2023    05:24 9/14/2023    03:31   CMP   Glucose 138  159  165    BUN 24  33  34    Creatinine 1.04  1.06  1.09    EGFR 75.3  73.6  71.2    Sodium 136  136  139    Potassium 5.0  5.2  4.4    Chloride 101  101  101    Calcium 9.0  8.7  9.2    Total Protein 7.2      Albumin 3.5      Globulin 3.7      Total Bilirubin 0.3      Alkaline Phosphatase 92      AST (SGOT) 10      ALT (SGPT) 8      Albumin/Globulin Ratio 0.9      BUN/Creatinine Ratio 23.1  31.1  31.2    Anion Gap 12.6  10.6  9.8       CARDIAC LABS:      Lab 09/12/23  0635 09/11/23  1310   PROBNP  --  446.1   HSTROP T 38* 40*        No results found for: DIGOXIN   Lab Results   Component Value Date    TSH 2.240 03/22/2019     Results from last 7 days   Lab Units 09/13/23  0524   CHOLESTEROL mg/dL 82   HDL CHOL mg/dL 40     Lab Results   Component Value Date    POCTROP 0.02 05/20/2022     Lab Results   Component Value Date    TROPONINT 38 (H) 09/12/2023   (  Lab Results   Component Value Date    MG 2.6 (H) 09/14/2023     Results for orders placed during the hospital encounter of 03/28/23    Adult Transthoracic Echo Complete W/ Cont if Necessary Per Protocol    Interpretation Summary    Left ventricular ejection fraction appears to be 56 - 60%.    Left ventricular diastolic function is consistent with (grade I) impaired relaxation and age.    No significant valvular disease.    Mild left atrial enlargement.           ASSESSMENT:    Acute respiratory failure with hypoxia and hypercapnia    PSVT (paroxysmal supraventricular tachycardia)    Essential hypertension        PLAN:  1.  Patient with recurrent SVT episode asymptomatic rate 165 we will continue with his Toprol 25 mg once a day dosing and          Robby Perez  MD  9/14/2023    08:51 EDT

## 2023-09-14 NOTE — OUTREACH NOTE
Prep Survey      Flowsheet Row Responses   [facility] patient discharged from? [facility]   Is LACE score < 7 ? No   Eligibility [facility]   H[facility]   Date of Admission 09/11/23   Date of Discharge 09/14/23   Discharge Disposition Home or Self Care   Discharge diagnosis acute resp failure/COPD   Does the patient have one of the following disease processes/diagnoses(primary or secondary)? COPD   Does the patient have Home health ordered? No   Is there a DME ordered? Yes   What DME was ordered? [employer] for oxygen   Prep survey completed? Yes            LYNDA FIELDS - Registered Nurse

## 2023-09-14 NOTE — CONSULTS
"RT CM informed arformoterol would need a PA.    PA submitted through [employer].  Key: [id number] - PA Case ID: [id number]    Awaiting determination.  \"Your information has been submitted to [employer]. [employer] will review the request and will issue a decision, typically within 3-7 days from your submission.\"     Discussion of the COPD zones (Green/Yellow/Red) was competed with emphasizes on what to do in the yellow and red zones. COPD action plan was reviewed with instruction on rescue and maintenance medications, the function of each and the importance of using them as prescribed.   "

## 2023-09-14 NOTE — CONSULTS
Use of bilevel at home was once again discussed with patient with pulmonologist present. At this time, Mr. Faulkner agrees to use his home bilevel for a minimum of four hours daily.       Home maintenance medications reviewed with patient. Mr. Faulkner states he is currently taking Wixela and takes nebulized albuterol as needed.  Mr. Faulkner would benefit from nebulized triple therapy at home. Order obtained for yupelri, brovana and budesonide and will be faxed to [facility]. Instruction was provided to patient to discontinue use of wixela when he receives shipment of nebulized medications from [facility].    The importance of follow up to pulmonary appointments was discussed with patient and barriers to getting to appointment addressed. Mr. Faulkner states he has no barriers to treatment, he cancels appointments out of fear of what he may find out. Fears of cancer discussed with patient and I encouraged patient to follow up because he already knows he has COPD and he trusts our pulmonologist to treat him as an inpatient, why not trust them to address any unknowns as well. Mr. Faulkner has follow up scheduled for 10/04. I reviewed appointment address and time with patient and he agrees to follow up this time.

## 2023-09-14 NOTE — DISCHARGE SUMMARY
[facility]         HOSPITALIST  DISCHARGE SUMMARY    Patient Name: Dilip Faulkner    : 1950    MRN: [id number]    Date of Admission: 2023  Date of Discharge:  23  Primary Care Physician: Jackeline Jaime APRN    Consults       Date and Time Order Name Status Description    2023 10:49 AM Inpatient Cardiology Consult      2023  3:35 PM Inpatient Pulmonology Consult Completed     2023  2:32 PM Inpatient Hospitalist Consult              Final Diagnosis:  Acute hypoxemic hypercapnic respiratory failure requiring BiPAP  Acute COPD exacerbation baseline 2 L nasal cannula  PSVT  Microscopic Hematuria  Troponemia, suspect type 2 from demand ischemia/acute resp distress   Hypertension  DM2  HLD  Hx of 84 pack year smoking hx quit in   Possible left lobe of the liver hemangioma, outpatient MRI to follow-up    Hospital Course     Hospital Course:73 y.o. history of recurrent hospital admissions, COPD on 2 L baseline, nonadherence to NIPPV, proximal SVT requiring adenosine, morbid obesity presented with worsening shortness of air treating for COPD exacerbation and acute hypoxic hypercapnic respiratory failure requiring BiPAP on admission.  Responded well to steroids and nebulizers and initially wore the bipap until he had improved enough to refuse to use it further.      Has wixela inhaler at home - yupelri, brovana and budesonide and will be faxed to [facility] and sent, will stop his wixela when using the nebulizers. Rt cm and pulmonology assisted.  Discharging with prednisone taper back on home oxygen.  PT declined to use bipap, anxiety medications to help him tolerate were offered/tried but continues to decline.  Patient unfortunately at high risk for readmission given his outpatient nonadherence.  He declined rehab or home health and opted to return home.      D/w cardiology given SVT episodes and adjusted metoprolol dosing initially in the hospital. Suspect steroids and  frequent nebulizations contributing to the initial svt episodes.  By time of discharge he had no further episodes and tele alarm was actually double counting but on manual review and patients rate was stable near 60s for 24hrs without further episodes. Pt will continue on metop succinate 25 daily for discharge.      Of note pt had microscopic hematuria on UA, no visual blood on urine, CT abdomen pelvis without renal mass or nephrolithiasis. Possible hemangioma in the left lobe of the liver needs outpatient MRI of the liver to follow-up. Urology referral given his smoking hx sent for further monitoring.    Patient is at extremely high risk for readmission but is stable for discharge and declining additional support measures. He was strongly encouraged to keep his multiple follow up appointments    Patient discharged in stable condition with close PCP copd clinic pulm urology and cards follow up.     Future Appointments   Date Time Provider Department Center   10/4/2023  3:45 PM Chandu Vail MD [facility]   11/2/2023  3:00 PM Jackeline Jaime APRN [facility]       Return precautions and follow up discussed and patient voiced agreement and understanding of treatment plan.     DISCHARGE Follow Up Recommendations for labs and diagnostics:   As above     CODE STATUS:  Code Status and Medical Interventions:   Ordered at: 09/11/23 1616     Medical Intervention Limits:    NO intubation (DNI)     Level Of Support Discussed With:    Patient     Code Status (Patient has no pulse and is not breathing):    CPR (Attempt to Resuscitate)     Medical Interventions (Patient has pulse or is breathing):    Limited Support           Day of Discharge     Vital Signs:  Temp:  [97.3 °F (36.3 °C)-97.9 °F (36.6 °C)] 97.5 °F (36.4 °C)  Heart Rate:  [57-68] 66  Resp:  [18-23] 18  BP: ()/(53-90) 89/56  Flow (L/min):  [3] 3    Physical Exam  Appears older than stated age, in bed, obese, conversant nad   No acute wheezing,  moderate aeration, on 2L NC home o2 symmetric chest rise  Rrr, no le pitting edema  Abd nt nd +BS  Generalized weakness  Poor insight         Discharge Details        Discharge Medications        New Medications        Instructions Start Date   arformoterol 15 MCG/2ML nebulizer solution  Commonly known as: BROVANA   15 mcg, Nebulization, 2 Times Daily - RT      budesonide 0.5 MG/2ML nebulizer solution  Commonly known as: PULMICORT   0.5 mg, Nebulization, 2 Times Daily - RT      levalbuterol 1.25 MG/3ML nebulizer solution  Commonly known as: XOPENEX   1.25 mg, Nebulization, Every 6 Hours PRN      predniSONE 20 MG tablet  Commonly known as: DELTASONE   Take 2 tablets by mouth Daily With Breakfast for 4 days, THEN 1 tablet Daily With Breakfast for 4 days.   Start Date: September 15, 2023     revefenacin 175 MCG/3ML nebulizer solution  Commonly known as: YUPELRI   175 mcg, Nebulization, Daily - RT             Continue These Medications        Instructions Start Date   albuterol sulfate  (90 Base) MCG/ACT inhaler  Commonly known as: PROVENTIL HFA;VENTOLIN HFA;PROAIR HFA   2 puffs, Inhalation, Every 4 Hours PRN      aspirin 81 MG chewable tablet   81 mg, Oral, Daily      atorvastatin 10 MG tablet  Commonly known as: LIPITOR   10 mg, Oral, Daily      empagliflozin 10 MG tablet tablet  Commonly known as: JARDIANCE   10 mg, Oral, Daily      Fluticasone-Salmeterol 250-50 MCG/ACT DISKUS  Commonly known as: ADVAIR/WIXELA   1 puff, Inhalation, 2 Times Daily      metFORMIN 500 MG tablet  Commonly known as: GLUCOPHAGE   500 mg, Oral, 2 Times Daily With Meals      metoprolol succinate XL 25 MG 24 hr tablet  Commonly known as: TOPROL-XL   25 mg, Oral, Every 24 Hours Scheduled      pantoprazole 40 MG EC tablet  Commonly known as: PROTONIX   40 mg, Oral, Daily      Tresiba FlexTouch 100 UNIT/ML solution pen-injector injection  Generic drug: insulin degludec   20 Units, Subcutaneous, Daily                 Discharge  Disposition:  Home or Self Care    Diet: patient counseled on dietary changes made during hospital and plans to  advance as tolerated     Discharge Activity: advance as tolerated      Additional Instructions for the Follow-ups that You Need to Schedule       Discharge Follow-up with PCP   As directed       Currently Documented PCP:    Jackeline Jaime APRN    PCP Phone Number:    [phone]     Follow Up Details: 3-5 days hospital f/u        Discharge Follow-up with Specified Provider: 1 month cardiology SVT f/u   As directed      To: 1 month cardiology SVT f/u        Discharge Follow-up with Specified Provider: 1 month pulmonology   As directed      To: 1 month pulmonology                Pertinent  and/or Most Recent Results       LAB RESULTS:      Lab 09/14/23 0331 09/13/23 0524 09/12/23  0635 09/11/23  1315 09/11/23  1310 09/11/23  1309   WBC 9.38 8.94 8.79  --  10.97*  --    HEMOGLOBIN 11.9* 10.9* 11.0*  --  12.1*  --    HEMATOCRIT 43.2 39.6 40.9  --  43.4  --    PLATELETS 247 235 244  --  263  --    NEUTROS ABS 7.19* 7.89* 7.82*  --  6.97  --    IMMATURE GRANS (ABS) 0.06* 0.05 0.05  --  0.06*  --    LYMPHS ABS 1.31 0.67* 0.56*  --  1.54  --    MONOS ABS 0.80 0.33 0.35  --  0.90  --    EOS ABS 0.01 0.00 0.00  --  1.43*  --    MCV 79.4 78.3* 81.0  --  81.1  --    PROCALCITONIN  --   --  0.08  --   --   --    LACTATE  --   --   --  1.0  --   --    LACTATE, ARTERIAL  --   --   --   --   --  0.92         Lab 09/14/23 0331 09/13/23 0524 09/12/23  0635 09/11/23  1310 09/11/23  1309   SODIUM 139 136 136 138  --    SODIUM, ARTERIAL  --   --   --   --  137.1   POTASSIUM 4.4 5.2 5.0 5.0  --    CHLORIDE 101 101 101 102  --    CO2 28.2 24.4 22.4 26.9  --    ANION GAP 9.8 10.6 12.6 9.1  --    BUN 34* 33* 24* 15  --    CREATININE 1.09 1.06 1.04 1.00  --    EGFR 71.2 73.6 75.3 79.0  --    GLUCOSE 165* 159* 138* 160*  --    GLUCOSE, ARTERIAL  --   --   --   --  157*   CALCIUM 9.2 8.7 9.0 8.8  --    IONIZED CALCIUM  --   --    --   --  1.14   MAGNESIUM 2.6* 2.6* 2.4  --   --    PHOSPHORUS 3.1 3.2 3.8  --   --          Lab 09/12/23  0635 09/11/23  1310   TOTAL PROTEIN 7.2 7.6   ALBUMIN 3.5 4.1   GLOBULIN 3.7 3.5   ALT (SGPT) 8 9   AST (SGOT) 10 13   BILIRUBIN 0.3 0.4   ALK PHOS 92 106         Lab 09/12/23  0635 09/11/23  1310   PROBNP  --  446.1   HSTROP T 38* 40*         Lab 09/13/23  0524   CHOLESTEROL 82   LDL CHOL 25   HDL CHOL 40   TRIGLYCERIDES 84             Lab 09/13/23  0617 09/11/23  2049 09/11/23  1423   PH, ARTERIAL 7.388 7.291* 7.225*   PCO2, ARTERIAL 44.5 55.8* 62.5*   PO2 ART 76.0* 87.9 86.1   O2 SATURATION ART 93.9* 95.1 94.4*   FIO2 32 32 32   HCO3 ART 26.2* 26.3* 25.3   BASE EXCESS ART 0.9 -1.1 -3.3*   CARBOXYHEMOGLOBIN 0.4 0.9 1.2     Brief Urine Lab Results  (Last result in the past 365 days)        Color   Clarity   Blood   Leuk Est   Nitrite   Protein   CREAT   Urine HCG        09/11/23 1434 Yellow   Clear   Trace   Negative   Negative   Trace                 Microbiology Results (last 10 days)       Procedure Component Value - Date/Time    Influenza Antigen, Rapid - Swab, Nasopharynx [[id number]]  (Normal) Collected: 09/11/23 1616    Lab Status: Final result Specimen: Swab from Nasopharynx Updated: 09/11/23 1653     Influenza A Ag, EIA Negative     Influenza B Ag, EIA Negative    Respiratory Panel PCR w/COVID-19(SARS-CoV-2) GUERA/ANIL/KYLEE/PAD/COR/MAD/ANA In-House, NP Swab in UTM/VTM, 3-4 HR TAT - Swab, Nasopharynx [[id number]]  (Normal) Collected: 09/11/23 1616    Lab Status: Final result Specimen: Swab from Nasopharynx Updated: 09/11/23 1718     ADENOVIRUS, PCR Not Detected     Coronavirus 229E Not Detected     Coronavirus HKU1 Not Detected     Coronavirus NL63 Not Detected     Coronavirus OC43 Not Detected     COVID19 Not Detected     Human Metapneumovirus Not Detected     Human Rhinovirus/Enterovirus Not Detected     Influenza A PCR Not Detected     Influenza B PCR Not Detected     Parainfluenza Virus 1 Not Detected      Parainfluenza Virus 2 Not Detected     Parainfluenza Virus 3 Not Detected     Parainfluenza Virus 4 Not Detected     RSV, PCR Not Detected     Bordetella pertussis pcr Not Detected     Bordetella parapertussis PCR Not Detected     Chlamydophila pneumoniae PCR Not Detected     Mycoplasma pneumo by PCR Not Detected    Narrative:      In the setting of a positive respiratory panel with a viral infection PLUS a negative procalcitonin without other underlying concern for bacterial infection, consider observing off antibiotics or discontinuation of antibiotics and continue supportive care. If the respiratory panel is positive for atypical bacterial infection (Bordetella pertussis, Chlamydophila pneumoniae, or Mycoplasma pneumoniae), consider antibiotic de-escalation to target atypical bacterial infection.    Blood Culture - Blood, Arm, Right [[id number]]  (Normal) Collected: 09/11/23 1315    Lab Status: Preliminary result Specimen: Blood from Arm, Right Updated: 09/14/23 1330     Blood Culture No growth at 3 days    Blood Culture - Blood, Arm, Left [[id number]]  (Normal) Collected: 09/11/23 1315    Lab Status: Preliminary result Specimen: Blood from Arm, Left Updated: 09/14/23 1330     Blood Culture No growth at 3 days            RADIOLOGY:    CT Abdomen Pelvis With & Without Contrast    Result Date: 9/12/2023  Impression:   1. No evidence for acute abnormality throughout the abdomen or pelvis. 2. No evidence for nephrolithiasis.  No evidence for abnormal renal mass. 3. Findings suggesting a potential hemangioma within the left lobe of the liver.  An area of focal fatty infiltration could potentially be considered as well.  This finding is incompletely evaluated.  Recommend correlation with follow-up nonemergent outpatient MRI of the liver with gadolinium contrast for better characterization.     SHRUTI CUTLER MD       Electronically Signed and Approved By: SHRUTI CUTLER MD on 9/12/2023 at 23:30             XR Chest 1  View    Result Date: 9/11/2023  Impression:   Stable appearing chronic findings compared to previous radiograph, without convincing new abnormality.       MELANIA SHAIKH MD       Electronically Signed and Approved By: MELANIA SHAIKH MD on 9/11/2023 at 13:34             XR Chest 1 View    Result Date: 8/27/2023  Impression:   No acute infiltrate is appreciated.  No significant interval change since 6/6/2023.  Probably no cardiac enlargement.    Please note that portions of this note were completed with a voice recognition program.  SEGUNDO MORA JR, MD       Electronically Signed and Approved By: SEGUNDO MORA JR, MD on 8/27/2023 at 2:17                       Results for orders placed during the hospital encounter of 03/28/23    Adult Transthoracic Echo Complete W/ Cont if Necessary Per Protocol    Interpretation Summary  •  Left ventricular ejection fraction appears to be 56 - 60%.  •  Left ventricular diastolic function is consistent with (grade I) impaired relaxation and age.  •  No significant valvular disease.  •  Mild left atrial enlargement.      Labs Pending at Discharge:  Pending Labs       Order Current Status    Blood Culture - Blood, Arm, Left Preliminary result    Blood Culture - Blood, Arm, Right Preliminary result              Time spent on Discharge including face to face service:  >40 minutes

## 2023-09-15 ENCOUNTER — TRANSITIONAL CARE MANAGEMENT TELEPHONE ENCOUNTER (OUTPATIENT)
Dept: CALL CENTER | Facility: HOSPITAL | Age: 73
End: 2023-09-15
Payer: MEDICARE

## 2023-09-15 NOTE — OUTREACH NOTE
Call Center TCM Note      Flowsheet Row Responses   [facility] patient discharged from? [facility]   Does the patient have one of the following disease processes/diagnoses(primary or secondary)? COPD   TCM attempt successful? Yes  [VR for Juana Garcia, ]   Call start time 1000   Unsuccessful attempts Attempt 1   Call end time 1007   General alerts for this patient Patient is followed by ACM   Discharge diagnosis acute resp failure/COPD   Person spoke with today (if not patient) and relationship sister Arias   Meds reviewed with patient/caregiver? Yes   Is the patient having any side effects they believe may be caused by any medication additions or changes? No   Does the patient have all medications ordered at discharge? Yes   Prescription comments  reports she set up new nebs and instructions for pt   Is the patient taking all medications as directed (includes completed medication regime)? Yes   Comments Hospital f/u on 9/19/23@0945   Does the patient have an appointment with their PCP within 7-14 days of discharge? Yes   Has home health visited the patient within 72 hours of discharge? N/A   DME comments Using home O2 at 2L but will not use nightly Bipap, education provided   Pulse Ox monitoring Intermittent   Pulse Ox device source Patient   O2 Sat comments 93% on 3 lpm per nc   O2 Sat: education provided Sat levels, When to seek care   Psychosocial issues? No   Did the patient receive a copy of their discharge instructions? Yes   Nursing interventions Reviewed instructions with patient   What is the patient's perception of their health status since discharge? Improving  [ reports she assists with pt meds and tries to reinforce importance of use, using O2 continously now but will not wear device at HS.]   Nursing Interventions Nurse provided patient education   If the patient is a current smoker, are they able to teach back resources for cessation? Not a smoker   Is the patient/caregiver  able to teach back the hierarchy of who to call/visit for symptoms/problems? PCP, Specialist, Home health nurse, Urgent Care, ED, 911 Yes   TCM call completed? Yes   Call end time 1007            Magaly Hoffman RN    9/15/2023, 10:17 EDT

## 2023-09-16 LAB
BACTERIA SPEC AEROBE CULT: NORMAL
BACTERIA SPEC AEROBE CULT: NORMAL

## 2023-09-18 LAB
QT INTERVAL: 373 MS
QTC INTERVAL: 480 MS

## 2023-09-22 ENCOUNTER — OFFICE VISIT (OUTPATIENT)
Dept: FAMILY MEDICINE CLINIC | Facility: CLINIC | Age: 73
End: 2023-09-22
Payer: MEDICARE

## 2023-09-22 VITALS
HEIGHT: 72 IN | DIASTOLIC BLOOD PRESSURE: 62 MMHG | OXYGEN SATURATION: 96 % | BODY MASS INDEX: 34.78 KG/M2 | WEIGHT: 256.8 LBS | HEART RATE: 60 BPM | SYSTOLIC BLOOD PRESSURE: 119 MMHG

## 2023-09-22 DIAGNOSIS — R93.5 ABNORMAL CT OF THE ABDOMEN: ICD-10-CM

## 2023-09-22 DIAGNOSIS — J44.9 CHRONIC OBSTRUCTIVE PULMONARY DISEASE, UNSPECIFIED COPD TYPE: ICD-10-CM

## 2023-09-22 DIAGNOSIS — Z09 HOSPITAL DISCHARGE FOLLOW-UP: Primary | ICD-10-CM

## 2023-09-22 NOTE — PROGRESS NOTES
"Transitional Care Follow Up Visit  Subjective     Dilip Faulkner is a 73 y.o. male who presents for a transitional care management visit.    Within 48 business hours after discharge our office contacted him via telephone to coordinate his care and needs.      I reviewed and discussed the details of that call along with the discharge summary, hospital problems, inpatient lab results, inpatient diagnostic studies, and consultation reports with Dilip.     Current outpatient and discharge medications have been reconciled for the patient.  Reviewed by: MONE Sheppard          9/14/2023     7:52 PM   Date of TCM Phone Call   Hospital [facility]   Date of Admission 9/11/2023   Date of Discharge 9/14/2023   Discharge Disposition Home or Self Care     Risk for Readmission (LACE) Score: 13 (9/14/2023  6:00 AM)      History of Present Illness  Patient is a 73-year-old male who presents today for hospital follow-up.  He was admitted on 9/11/23 for COPD exacerbation and SVT.  He was discharged home on 9/14/23 reports he is much improved. He finished the prednisone. He is wearing 2L oxygen at home.  He is noncompliant with CPAP at home.  He has a follow up scheduled with cardiology and COPD clinic. He has a pending referral to urology. MRI abdomen to be ordered today.        Course During Hospital Stay:      Admission date: 9/11/23  Discharge date: 9/14/23    Patient presented to ER with worsening shortness of air treating for COPD exacerbation and acute hypoxic hypercapnic respiratory failure requiring BiPAP on admission. Per discharge summary: \"Responded well to steroids and nebulizers and initially wore the bipap until he had improved enough to refuse to use it further.   He declined rehab or home health and opted to return home(...)SVT episodes and adjusted metoprolol dosing initially in the hospital. Suspect steroids and frequent nebulizations contributing to the initial svt episodes.  By time of discharge he had no " "further episodes and tele alarm was actually double counting but on manual review and patients rate was stable near 60s for 24hrs without further episodes. Pt will continue on metop succinate 25 daily for discharge.     (...)Of note pt had microscopic hematuria on UA, no visual blood on urine, CT abdomen pelvis without renal mass or nephrolithiasis. Possible hemangioma in the left lobe of the liver needs outpatient MRI of the liver to follow-up. Urology referral given his smoking hx sent for further monitoring\" Per Danny Méndez MD.        The following portions of the patient's history were reviewed and updated as appropriate: allergies, current medications, past family history, past medical history, past social history, past surgical history, and problem list.    Review of Systems   Constitutional: Negative.    HENT: Negative.     Eyes: Negative.    Respiratory:  Positive for wheezing.    Cardiovascular: Negative.    Gastrointestinal: Negative.    Endocrine: Negative.    Genitourinary: Negative.    Musculoskeletal: Negative.    Skin: Negative.    Allergic/Immunologic: Negative.    Neurological: Negative.    Hematological: Negative.    Psychiatric/Behavioral: Negative.       Objective   Physical Exam  Vitals reviewed.   Constitutional:       General: He is not in acute distress.     Appearance: He is not ill-appearing.   HENT:      Head: Normocephalic and atraumatic.   Eyes:      Conjunctiva/sclera: Conjunctivae normal.   Cardiovascular:      Rate and Rhythm: Normal rate and regular rhythm.      Heart sounds: Normal heart sounds.   Pulmonary:      Effort: Pulmonary effort is normal. No respiratory distress.      Breath sounds: Wheezing present.   Chest:      Chest wall: No tenderness.   Skin:     General: Skin is warm and dry.   Neurological:      General: No focal deficit present.      Mental Status: He is alert and oriented to person, place, and time.   Psychiatric:         Mood and Affect: Mood normal.         " Behavior: Behavior normal.         Thought Content: Thought content normal.         Judgment: Judgment normal.       Assessment & Plan   Problems Addressed this Visit          Pulmonary and Pneumonias    COPD (chronic obstructive pulmonary disease)     Other Visit Diagnoses       Hospital discharge follow-up    -  Primary    Reports doing well at home postdischarge from hospital.  Patient reports he has all resources he needs.  Patient refused home health    Abnormal CT of the abdomen        MRI ordered as recommended by radiologist due to possible liver hemangioma    Relevant Orders    MRI Abdomen With & Without Contrast          Diagnoses         Codes Comments    Hospital discharge follow-up    -  Primary ICD-10-CM: Z09  ICD-9-CM: V67.59 Reports doing well at home postdischarge from hospital.  Patient reports he has all resources he needs.  Patient refused home health    Abnormal CT of the abdomen     ICD-10-CM: R93.5  ICD-9-CM: 793.6 MRI ordered as recommended by radiologist due to possible liver hemangioma    Chronic obstructive pulmonary disease, unspecified COPD type     ICD-10-CM: J44.9  ICD-9-CM: 496 Continue at home oxygen at 2 L, continue on medications as prescribed at discharge, follow-up COPD clinic as scheduled

## 2023-09-27 ENCOUNTER — PATIENT OUTREACH (OUTPATIENT)
Dept: CASE MANAGEMENT | Facility: OTHER | Age: 73
End: 2023-09-27
Payer: MEDICARE

## 2023-09-27 RX ORDER — ARFORMOTEROL TARTRATE 15 UG/2ML
15 SOLUTION RESPIRATORY (INHALATION)
Qty: 360 ML | Refills: 0 | Status: SHIPPED | OUTPATIENT
Start: 2023-09-27 | End: 2023-12-26

## 2023-09-27 RX ORDER — BUDESONIDE 0.5 MG/2ML
0.5 INHALANT ORAL
Qty: 360 ML | Refills: 0 | Status: SHIPPED | OUTPATIENT
Start: 2023-09-27 | End: 2023-12-26

## 2023-09-27 NOTE — OUTREACH NOTE
AMBULATORY CASE MANAGEMENT NOTE    Name and Relationship of Patient/Support Person: Dilip Faulkner - Self    Patient Outreach    HRCM outreach made with patient, he was recently in the hospital from 9/11-9/14 for respiratory failure, and COPD exacerbation. Several new nebulizer medications added, they completed a PA for Jillian. He said he has the new nebulizer medications, has been using them, and denies any need for education regarding them.     Education Documentation  No documentation found.      Mallorie MILLER  Ambulatory Case Management  9/27/2023, 10:44 EDT

## 2023-10-06 RX ORDER — ALBUTEROL SULFATE 90 UG/1
AEROSOL, METERED RESPIRATORY (INHALATION)
Qty: 18 G | Refills: 0 | Status: SHIPPED | OUTPATIENT
Start: 2023-10-06

## 2023-10-10 ENCOUNTER — PATIENT OUTREACH (OUTPATIENT)
Dept: CASE MANAGEMENT | Facility: OTHER | Age: 73
End: 2023-10-10
Payer: MEDICARE

## 2023-10-10 NOTE — OUTREACH NOTE
AMBULATORY CASE MANAGEMENT NOTE    Name and Relationship of Patient/Support Person: Dilip Faulkner - Self    Patient Outreach    HRCM outreach made, he said he's doing alright, breathing is about the same. Verified he is using his   Oxygen and taking the nebulizer medications as directed. His Pulmonary appointment was rescheduled for 10/5, however it looks like he was a no show. Asked him what happened and he said he didn't know he had an appointment. Talked about importance of seeing [facility], agreeable to reschedule. Attempted to contact Pulmonary, left message for someone to call back with a later day appointment.   Will check back in tomorrow about appointment then call Dliip back.   Education Documentation  No documentation found.      Mallorie MILLER  Ambulatory Case Management  10/10/2023, 16:21 EDT

## 2023-10-11 ENCOUNTER — PATIENT OUTREACH (OUTPATIENT)
Dept: CASE MANAGEMENT | Facility: OTHER | Age: 73
End: 2023-10-11
Payer: MEDICARE

## 2023-10-11 NOTE — OUTREACH NOTE
AMBULATORY CASE MANAGEMENT NOTE    Name and Relationship of Patient/Support Person:  - Self    Patient Outreach    Rescheduled pulmonary visit. Talked about patient having [employer] which is out of network, he said he wasn't aware. Advised to call the  number on the back of his card and see if he has out-of-network coverage, and talk with them about his options. He said he would get his sister to help. Will check in with him next week.  Education Documentation  No documentation found.      Mallorie MILLER  Ambulatory Case Management  10/11/2023, 10:14 EDT

## 2023-10-18 RX ORDER — INSULIN DEGLUDEC INJECTION 100 U/ML
INJECTION, SOLUTION SUBCUTANEOUS
Qty: 6 ML | Refills: 2 | Status: SHIPPED | OUTPATIENT
Start: 2023-10-18

## 2023-10-19 RX ORDER — ALBUTEROL SULFATE 90 UG/1
AEROSOL, METERED RESPIRATORY (INHALATION)
Qty: 18 G | Refills: 0 | Status: SHIPPED | OUTPATIENT
Start: 2023-10-19

## 2023-10-27 RX ORDER — ARFORMOTEROL TARTRATE 15 UG/2ML
SOLUTION RESPIRATORY (INHALATION)
Refills: 11 | OUTPATIENT
Start: 2023-10-27

## 2023-10-27 RX ORDER — BUDESONIDE 0.5 MG/2ML
INHALANT ORAL
Qty: 60 EACH | Refills: 11 | OUTPATIENT
Start: 2023-10-27

## 2023-11-02 ENCOUNTER — OFFICE VISIT (OUTPATIENT)
Dept: FAMILY MEDICINE CLINIC | Facility: CLINIC | Age: 73
End: 2023-11-02
Payer: MEDICARE

## 2023-11-02 VITALS
HEART RATE: 75 BPM | DIASTOLIC BLOOD PRESSURE: 82 MMHG | HEIGHT: 72 IN | OXYGEN SATURATION: 93 % | WEIGHT: 246 LBS | BODY MASS INDEX: 33.32 KG/M2 | SYSTOLIC BLOOD PRESSURE: 100 MMHG

## 2023-11-02 DIAGNOSIS — E11.65 TYPE 2 DIABETES MELLITUS WITH HYPERGLYCEMIA, WITH LONG-TERM CURRENT USE OF INSULIN: ICD-10-CM

## 2023-11-02 DIAGNOSIS — Z79.4 TYPE 2 DIABETES MELLITUS WITH HYPERGLYCEMIA, WITH LONG-TERM CURRENT USE OF INSULIN: ICD-10-CM

## 2023-11-02 DIAGNOSIS — K21.9 GASTROESOPHAGEAL REFLUX DISEASE WITHOUT ESOPHAGITIS: ICD-10-CM

## 2023-11-02 DIAGNOSIS — I10 ESSENTIAL HYPERTENSION: ICD-10-CM

## 2023-11-02 DIAGNOSIS — Z76.89 ENCOUNTER TO ESTABLISH CARE: Primary | ICD-10-CM

## 2023-11-02 DIAGNOSIS — I20.0 UNSTABLE ANGINA: ICD-10-CM

## 2023-11-02 DIAGNOSIS — Z23 NEEDS FLU SHOT: ICD-10-CM

## 2023-11-02 DIAGNOSIS — Z13.29 SCREENING FOR THYROID DISORDER: ICD-10-CM

## 2023-11-02 DIAGNOSIS — M25.531 RIGHT WRIST PAIN: ICD-10-CM

## 2023-11-02 DIAGNOSIS — E78.5 HYPERLIPIDEMIA, UNSPECIFIED HYPERLIPIDEMIA TYPE: ICD-10-CM

## 2023-11-02 DIAGNOSIS — J44.9 CHRONIC OBSTRUCTIVE PULMONARY DISEASE, UNSPECIFIED COPD TYPE: ICD-10-CM

## 2023-11-02 PROBLEM — J96.01 ACUTE RESPIRATORY FAILURE WITH HYPOXIA: Status: RESOLVED | Noted: 2022-11-05 | Resolved: 2023-11-02

## 2023-11-02 PROBLEM — J96.21 ACUTE ON CHRONIC RESPIRATORY FAILURE WITH HYPOXIA AND HYPERCAPNIA: Status: RESOLVED | Noted: 2021-07-23 | Resolved: 2023-11-02

## 2023-11-02 PROBLEM — J96.20 ACUTE ON CHRONIC RESPIRATORY FAILURE: Status: RESOLVED | Noted: 2021-10-22 | Resolved: 2023-11-02

## 2023-11-02 PROBLEM — J96.02 ACUTE RESPIRATORY FAILURE WITH HYPOXIA AND HYPERCAPNIA: Status: RESOLVED | Noted: 2023-06-06 | Resolved: 2023-11-02

## 2023-11-02 PROBLEM — J96.22 ACUTE ON CHRONIC RESPIRATORY FAILURE WITH HYPOXIA AND HYPERCAPNIA: Status: RESOLVED | Noted: 2021-07-23 | Resolved: 2023-11-02

## 2023-11-02 PROBLEM — J96.91 RESPIRATORY FAILURE WITH HYPOXIA: Status: RESOLVED | Noted: 2022-08-22 | Resolved: 2023-11-02

## 2023-11-02 PROBLEM — J96.01 ACUTE RESPIRATORY FAILURE WITH HYPOXIA AND HYPERCAPNIA: Status: RESOLVED | Noted: 2023-06-06 | Resolved: 2023-11-02

## 2023-11-02 RX ORDER — ASPIRIN 81 MG/1
81 TABLET, CHEWABLE ORAL DAILY
Qty: 90 TABLET | Refills: 1 | Status: SHIPPED | OUTPATIENT
Start: 2023-11-02

## 2023-11-02 RX ORDER — INSULIN DEGLUDEC INJECTION 100 U/ML
20 INJECTION, SOLUTION SUBCUTANEOUS DAILY
Qty: 6 ML | Refills: 2 | Status: SHIPPED | OUTPATIENT
Start: 2023-11-02

## 2023-11-02 RX ORDER — OMEPRAZOLE 20 MG/1
20 CAPSULE, DELAYED RELEASE ORAL DAILY
Qty: 90 CAPSULE | Refills: 1 | Status: SHIPPED | OUTPATIENT
Start: 2023-11-02

## 2023-11-02 RX ORDER — FLUTICASONE PROPIONATE AND SALMETEROL 250; 50 UG/1; UG/1
1 POWDER RESPIRATORY (INHALATION) 2 TIMES DAILY
Qty: 1 EACH | Refills: 5 | Status: SHIPPED | OUTPATIENT
Start: 2023-11-02 | End: 2024-04-30

## 2023-11-02 RX ORDER — OMEPRAZOLE 20 MG/1
20 CAPSULE, DELAYED RELEASE ORAL DAILY
COMMUNITY
End: 2023-11-02 | Stop reason: SDUPTHER

## 2023-11-02 NOTE — PROGRESS NOTES
Chief Complaint  Establish care, DM, HLD, COPD, GERD, Obesity    SUBJECTIVE  Dilip Faulkner presents to [facility]     History of Present Illness  Patient is a 73-year-old male presents today to establish care.  He is chronic condition management of diabetes mellitus type 2, hyperlipidemia, COPD, GERD, obesity.  Patient uses cane for ambulation.    Diabetes mellitus-patient is currently on metformin Jardiance and Tresiba 20 units.  Patient reports average blood sugars at home are 114 150.  Last A1c was 6.8.  Due updated A1c.  Patient is also due for microalbumin orders be placed today.  Patient is due diabetic foot exam, to be done in office today.  Patient is due diabetic eye exam and he reports he will get it done externally.    Hyperlipidemia-patient is currently on Lipitor 10 mg.  Patient reports doing well medication.  Denies any adverse effects.  Updated lipid panel ordered at today's visit.    COPD-patient is currently established with pulmonology.  Patient missed last few appointments.  Patient currently takes multiple nebulizer and inhalers.  Patient reports he feels well controlled currently.  Patient is oxygen saturations are 93% in office today.    GERD-patient is currently omeprazole 20 mg daily.  Patient reports this controls her reflux well.  Denies Apetex medication.  Requesting refills at this time.    Obesity-patient BMI in office today is 33.  Wife reports she believes he has lost some weight.  Patient reports he does not drink adequate water.  Stands that he needs to lose weight.    His wife reports that he started having left forearm down to his finger swelling.  Wife reports that it is mostly resolved.  Patient reports that it was painful when it was swollen.  Has not had gout in the past.    Is due flu vaccine.  He is agreeable have done in office today.  Patient is also due pneumococcal vaccine and explained the risks and benefits of getting.  Advised  patient to get this at his pharmacy when he goes to  his refills.    He also has a history of stable angina and PSVT.  Patient is currently on aspirin 81 mg daily.     Patient is due labs. Orders placed at today's visit. Discussed with patient that these are fasting labs.     No other questions or concerns today.      Past Medical History:   Diagnosis Date    Asthma     COPD (chronic obstructive pulmonary disease)     Diabetes mellitus     Ex-smoker     QUIT 2021    Hernia, umbilical     Hypertension     On home O2     REPORTS WEARING 2L/NC PRN SOA      History reviewed. No pertinent family history.   Past Surgical History:   Procedure Laterality Date    ABDOMINAL SURGERY          Current Outpatient Medications:     albuterol sulfate  (90 Base) MCG/ACT inhaler, Inhale 2 puffs every 4 hours as needed for wheezing, Disp: 18 g, Rfl: 0    arformoterol (BROVANA) 15 MCG/2ML nebulizer solution, Take 2 mL by nebulization 2 (Two) Times a Day for 90 days., Disp: 360 mL, Rfl: 0    aspirin 81 MG chewable tablet, Chew 1 tablet Daily., Disp: 90 tablet, Rfl: 1    atorvastatin (LIPITOR) 10 MG tablet, Take 1 tablet by mouth Daily for 180 days., Disp: 90 tablet, Rfl: 1    budesonide (PULMICORT) 0.5 MG/2ML nebulizer solution, Take 2 mL by nebulization 2 (Two) Times a Day for 90 days., Disp: 360 mL, Rfl: 0    empagliflozin (JARDIANCE) 10 MG tablet tablet, Take 1 tablet by mouth Daily., Disp: , Rfl:     Fluticasone-Salmeterol (ADVAIR/WIXELA) 250-50 MCG/ACT DISKUS, Inhale 1 puff 2 (Two) Times a Day for 180 days., Disp: 1 each, Rfl: 5    insulin degludec (Tresiba FlexTouch) 100 UNIT/ML solution pen-injector injection, Inject 20 Units under the skin into the appropriate area as directed Daily., Disp: 6 mL, Rfl: 2    levalbuterol (XOPENEX) 1.25 MG/3ML nebulizer solution, Take 1 ampule by nebulization Every 6 (Six) Hours As Needed for Shortness of Air or Wheezing., Disp: 180 mL, Rfl: 0    metFORMIN (GLUCOPHAGE) 500 MG tablet,  "Take 1 tablet by mouth 2 (Two) Times a Day With Meals for 30 days., Disp: 60 tablet, Rfl: 5    omeprazole (priLOSEC) 20 MG capsule, Take 1 capsule by mouth Daily., Disp: 90 capsule, Rfl: 1    revefenacin (YUPELRI) 175 MCG/3ML nebulizer solution, Take 3 mL by nebulization Daily for 90 days., Disp: 270 mL, Rfl: 0    OBJECTIVE  Vital Signs:   /82   Pulse 75   Ht 182.9 cm (72\")   Wt 112 kg (246 lb)   SpO2 93%   BMI 33.36 kg/m²    Estimated body mass index is 33.36 kg/m² as calculated from the following:    Height as of this encounter: 182.9 cm (72\").    Weight as of this encounter: 112 kg (246 lb).     Wt Readings from Last 3 Encounters:   11/02/23 112 kg (246 lb)   09/22/23 116 kg (256 lb 12.8 oz)   09/11/23 110 kg (243 lb 6.2 oz)     BP Readings from Last 3 Encounters:   11/02/23 100/82   09/22/23 119/62   09/14/23 (!) 89/56       Physical Exam  Vitals reviewed.   Constitutional:       General: He is not in acute distress.     Appearance: He is not ill-appearing.   HENT:      Head: Normocephalic and atraumatic.   Eyes:      Conjunctiva/sclera: Conjunctivae normal.   Cardiovascular:      Rate and Rhythm: Normal rate and regular rhythm.      Pulses:           Dorsalis pedis pulses are 2+ on the right side and 2+ on the left side.      Heart sounds: Normal heart sounds.   Pulmonary:      Effort: Pulmonary effort is normal. No respiratory distress.      Breath sounds: Wheezing present.   Musculoskeletal:      Cervical back: Normal range of motion.   Feet:      Right foot:      Protective Sensation: 5 sites tested.  5 sites sensed.      Skin integrity: Skin integrity normal. No ulcer or blister.      Toenail Condition: Right toenails are long.      Left foot:      Protective Sensation: 5 sites tested.  5 sites sensed.      Skin integrity: Skin integrity normal. No ulcer or blister.      Toenail Condition: Left toenails are long.      Comments: Diabetic Foot Exam Performed and Monofilament Test Performed   "   Skin:     General: Skin is warm and dry.   Neurological:      General: No focal deficit present.      Mental Status: He is alert and oriented to person, place, and time. Mental status is at baseline.      Motor: Weakness present.      Gait: Gait abnormal.   Psychiatric:         Mood and Affect: Mood normal.         Behavior: Behavior normal.         Thought Content: Thought content normal.         Judgment: Judgment normal.          Result Review    Common labs          9/12/2023    06:35 9/13/2023    05:24 9/14/2023    03:31   Common Labs   Glucose 138  159  165    BUN 24  33  34    Creatinine 1.04  1.06  1.09    Sodium 136  136  139    Potassium 5.0  5.2  4.4    Chloride 101  101  101    Calcium 9.0  8.7  9.2    Albumin 3.5      Total Bilirubin 0.3      Alkaline Phosphatase 92      AST (SGOT) 10      ALT (SGPT) 8      WBC 8.79  8.94  9.38    Hemoglobin 11.0  10.9  11.9    Hematocrit 40.9  39.6  43.2    Platelets 244  235  247    Total Cholesterol  82     Triglycerides  84     HDL Cholesterol  40     LDL Cholesterol   25         CT Abdomen Pelvis With & Without Contrast    Result Date: 9/12/2023    1. No evidence for acute abnormality throughout the abdomen or pelvis. 2. No evidence for nephrolithiasis.  No evidence for abnormal renal mass. 3. Findings suggesting a potential hemangioma within the left lobe of the liver.  An area of focal fatty infiltration could potentially be considered as well.  This finding is incompletely evaluated.  Recommend correlation with follow-up nonemergent outpatient MRI of the liver with gadolinium contrast for better characterization.     SHRUTI CUTLER MD       Electronically Signed and Approved By: SHRUTI CUTLER MD on 9/12/2023 at 23:30             XR Chest 1 View    Result Date: 9/11/2023    Stable appearing chronic findings compared to previous radiograph, without convincing new abnormality.       MELANIA SHAIKH MD       Electronically Signed and Approved By: MELANIA SHAIKH  MD on 9/11/2023 at 13:34             XR Chest 1 View    Result Date: 8/27/2023    No acute infiltrate is appreciated.  No significant interval change since 6/6/2023.  Probably no cardiac enlargement.    Please note that portions of this note were completed with a voice recognition program.  SEGUNDO MORA JR, MD       Electronically Signed and Approved By: SEGUNDO MORA JR, MD on 8/27/2023 at 2:17                 The above data has been reviewed by MONE Sheppard 11/02/2023 14:31 EDT.          Patient Care Team:  Jackeline Jaime APRN as PCP - General (Nurse Practitioner)  Rosalba Edwards APRN (Nurse Practitioner)  Mallorie Haywood, YESENIA as Ambulatory  ([facility])  Chandu Vail MD as Consulting Physician (Pulmonary Disease)            ASSESSMENT & PLAN    Diagnoses and all orders for this visit:    1. Encounter to establish care (Primary)  Comments:  medical history and medications reviewed  Orders:  -     Comprehensive Metabolic Panel; Future  -     CBC & Differential; Future    2. Chronic obstructive pulmonary disease, unspecified COPD type  Comments:  refilles inhaler, explained the neeed to follow up with pulmonology  Orders:  -     Fluticasone-Salmeterol (ADVAIR/WIXELA) 250-50 MCG/ACT DISKUS; Inhale 1 puff 2 (Two) Times a Day for 180 days.  Dispense: 1 each; Refill: 5    3. Type 2 diabetes mellitus with hyperglycemia, with long-term current use of insulin  Comments:  stable on tresiba, metformin, jardiance,continue, updated a1c, microalbumin ordered  Orders:  -     Comprehensive Metabolic Panel; Future  -     Hemoglobin A1c; Future  -     Microalbumin / Creatinine Urine Ratio - Urine, Clean Catch; Future  -     insulin degludec (Tresiba FlexTouch) 100 UNIT/ML solution pen-injector injection; Inject 20 Units under the skin into the appropriate area as directed Daily.  Dispense: 6 mL; Refill: 2    4. Essential hypertension  Comments:  stable off medication  Orders:  -      Comprehensive Metabolic Panel; Future  -     CBC & Differential; Future  -     Lipid Panel; Future    5. Hyperlipidemia, unspecified hyperlipidemia type  Comments:  continue lipitor 10 mg, updated lipid panel ordered  Orders:  -     Lipid Panel; Future  -     aspirin 81 MG chewable tablet; Chew 1 tablet Daily.  Dispense: 90 tablet; Refill: 1    6. Screening for thyroid disorder  -     TSH+Free T4; Future    7. Unstable angina  Comments:  continue aspirin as prescribed,  Orders:  -     aspirin 81 MG chewable tablet; Chew 1 tablet Daily.  Dispense: 90 tablet; Refill: 1    8. Gastroesophageal reflux disease without esophagitis  Comments:  stable on omeprazole 20 mg, continue, refilled  Orders:  -     omeprazole (priLOSEC) 20 MG capsule; Take 1 capsule by mouth Daily.  Dispense: 90 capsule; Refill: 1    9. Right wrist pain  Comments:  resolving on its own, add on uric acid level to determine if gout  Orders:  -     Uric acid; Future    10. Needs flu shot  Comments:  vaccine given in office today  Orders:  -     Fluzone High-Dose 65+yrs (5880-6634)         Tobacco Use: High Risk (11/2/2023)    Patient History     Smoking Tobacco Use: Former     Smokeless Tobacco Use: Current     Passive Exposure: Not on file       Follow Up     Return in about 3 months (around 2/2/2024) for Next scheduled follow up.        Patient was given instructions and counseling regarding his condition or for health maintenance advice. Please see specific information pulled into the AVS if appropriate.   I have reviewed information obtained and documented by others and I have confirmed the accuracy of this documented note.    MONE Sheppard

## 2023-11-13 ENCOUNTER — PATIENT OUTREACH (OUTPATIENT)
Dept: CASE MANAGEMENT | Facility: OTHER | Age: 73
End: 2023-11-13
Payer: MEDICARE

## 2023-11-13 NOTE — OUTREACH NOTE
AMBULATORY CASE MANAGEMENT NOTE    Name and Relationship of Patient/Support Person:  -     Patient Outreach  HRCM outreach made, patient states he's doing well. Reminded him about labs provider ordered that he needs to do. Patient missed his Pulmonology and Cardiology appointment that was 11/1 and 11/2, patient states his vehicle was broke down.  Patient had appointment with new provider 11/2, his DM, Cholesterol, and HTN was noted as stable, but PCP said he needs to follow up with Pulmonology.   Re-scheduled Pulmonology and he has no showed with them 11 times, if he misses this, they will not reschedule. Cardiology rescheduled, patient is aware and has both offices telephone number if he needs to reschedule.    Discharged from [facility].  SDOH updated and reviewed with the patient during this program:  Financial Resource Strain: Low Risk  (11/13/2023)    Overall Financial Resource Strain (CARDIA)     Difficulty of Paying Living Expenses: Not hard at all      Physical Activity: Inactive (9/12/2023)    Exercise Vital Sign     Days of Exercise per Week: 0 days     Minutes of Exercise per Session: 0 min      Food Insecurity: No Food Insecurity (9/12/2023)    Hunger Vital Sign     Worried About Running Out of Food in the Last Year: Never true     Ran Out of Food in the Last Year: Never true      Social Connections: Socially Isolated (11/13/2023)    Social Connection and Isolation Panel [NHANES]     Frequency of Communication with Friends and Family: More than three times a week     Frequency of Social Gatherings with Friends and Family: Once a week     Attends Hinduism Services: Never     Active Member of Clubs or Organizations: No     Attends Club or Organization Meetings: Never     Marital Status: Never       Transportation Needs: Unmet Transportation Needs (11/13/2023)    PRAPARE - Transportation     Lack of Transportation (Medical): Yes     Lack of Transportation (Non-Medical): Yes      Housing Stability: Not At  Risk (9/12/2023)    Housing Stability     Current Living Arrangements: apartment     Potentially Unsafe Housing Conditions: none      Stress: No Stress Concern Present (11/13/2023)    [employer] - Occupational Stress Questionnaire     Feeling of Stress : Not at all         Education Documentation  No documentation found.        Mallorie MILLER  Ambulatory Case Management    11/13/2023, 16:10 EST

## 2023-11-19 NOTE — PROGRESS NOTES
Primary Care Provider  Jackeline Jaime APRN   Referring Provider  Danny Méndez MD      Patient Complaint  Hospital Follow Up Visit (Acute Resp. Failure) and Shortness of Breath (On exertion)      Subjective          Dilip Faulkner presents to [facility]      History of Presenting Illness  Dilip Faulkner is a 73 y.o. male with history of chronic hypoxic respiratory failure, COPD, dyspnea on exertion, and tobacco abuse in remission, here for hospital follow-up.    Patient was hospitalized at [facility] 9/11/2023  through 9/14/2023 for acute hypoxic and hypercapnic respiratory failure requiring BIPAP, and COPD exacerbation.  He presented to the ED with worsening shortness of breath.  He normally is on baseline 2 L oxygen.  He was found to be acidotic on ABGs and was placed on BIPAP with subsequent improvement in breathing.  Pulmonology and RT  were consulted and patient was started on Brovana, Pulmicort, and Yupelri.  After improvement in respiratory status, patient began declining BIPAP therapy.  He was instructed to complete prednisone taper at home and continue with scheduled nebulizer medications.  His neb treatments were sent to [facility].  He continued to decline BIPAP, declined anxiety medications to help him tolerate mask.  He declined home health or rehab and was discharged home.  He is high risk for readmission without using NIPPV.    Patient states he is doing well since being discharged from the hospital.  He is new to our outpatient clinic.  He is accompanied today by his sister.  He continues to use Brovana, Pulmicort, and Yupelri daily.  He also has albuterol to use as needed.  He continues to use 2 L supplemental oxygen.  Patient denies any fevers or chills.  He is not using a BiPAP at home as was recommended at his hospital discharge, reviewed the signs of high carbon oxide with patient today, including confusion and  excessive sleepiness.  He is a former smoker, quit 2 years ago, 84 pack years.  He does use smokeless tobacco.  Patient denies any productive cough, hemoptysis, swollen lymph nodes, weight loss, or night sweats.  Overall, patient is doing well and has no additional concerns at this time.  Patient is able to perform ADLs without difficulty.  I have personally reviewed the review of systems, past family, social, medical and surgical histories; and agree with their findings.      Review of Systems    Review of Systems   Constitutional:  Negative for activity change, chills, fatigue, fever, unexpected weight gain and unexpected weight loss.   HENT:  Negative for congestion, ear discharge, ear pain, mouth sores, postnasal drip, rhinorrhea, sinus pressure, sore throat, swollen glands and trouble swallowing.    Eyes:  Negative for blurred vision, pain, discharge, itching and visual disturbance.   Respiratory:  Positive for shortness of breath (with exertion). Negative for apnea, cough, chest tightness, wheezing and stridor.    Cardiovascular:  Negative for chest pain, palpitations and leg swelling.   Gastrointestinal:  Negative for abdominal distention, abdominal pain, constipation, diarrhea, nausea, vomiting, GERD and indigestion.   Musculoskeletal:  Negative for arthralgias, joint swelling and myalgias.   Skin:  Negative for color change.   Neurological:  Negative for dizziness, weakness, light-headedness and headache.      Sleep: Negative for Excessive daytime sleepiness  Negative for morning headaches  Negative for Snoring      Family History   Problem Relation Age of Onset    Asthma Father     Asthma Sister     Asthma Brother     Cancer Maternal Aunt         Social History     Socioeconomic History    Marital status: Single   Tobacco Use    Smoking status: Former     Packs/day: 1.50     Years: 56.00     Additional pack years: 0.00     Total pack years: 84.00     Types: Cigarettes     Start date: 1965     Quit date:  3/19/2021     Years since quittin.6    Smokeless tobacco: Current     Types: Chew   Vaping Use    Vaping Use: Never used   Substance and Sexual Activity    Alcohol use: Never    Drug use: Never    Sexual activity: Defer        Past Medical History:   Diagnosis Date    Asthma     COPD (chronic obstructive pulmonary disease)     Diabetes mellitus     Ex-smoker     QUIT     Hernia, umbilical     Hypertension     On home O2     REPORTS WEARING 2L/NC PRN SOA        Immunization History   Administered Date(s) Administered    Fluzone High-Dose 65+yrs 10/08/2022, 2023       No Known Allergies       Current Outpatient Medications:     albuterol sulfate  (90 Base) MCG/ACT inhaler, Inhale 2 puffs every 4 hours as needed for wheezing, Disp: 18 g, Rfl: 0    arformoterol (BROVANA) 15 MCG/2ML nebulizer solution, Take 2 mL by nebulization 2 (Two) Times a Day for 90 days., Disp: 360 mL, Rfl: 0    aspirin 81 MG chewable tablet, Chew 1 tablet Daily., Disp: 90 tablet, Rfl: 1    atorvastatin (LIPITOR) 10 MG tablet, Take 1 tablet by mouth Daily for 180 days., Disp: 90 tablet, Rfl: 1    B-D ULTRAFINE III SHORT PEN 31G X 8 MM misc, , Disp: , Rfl:     budesonide (PULMICORT) 0.5 MG/2ML nebulizer solution, Take 2 mL by nebulization 2 (Two) Times a Day for 90 days., Disp: 360 mL, Rfl: 0    empagliflozin (JARDIANCE) 10 MG tablet tablet, Take 1 tablet by mouth Daily., Disp: , Rfl:     Fluticasone-Salmeterol (ADVAIR/WIXELA) 250-50 MCG/ACT DISKUS, Inhale 1 puff 2 (Two) Times a Day for 180 days. (Patient taking differently: Inhale 1 puff 2 (Two) Times a Day. As needed), Disp: 1 each, Rfl: 5    insulin degludec (Tresiba FlexTouch) 100 UNIT/ML solution pen-injector injection, Inject 20 Units under the skin into the appropriate area as directed Daily., Disp: 6 mL, Rfl: 2    levalbuterol (XOPENEX) 1.25 MG/3ML nebulizer solution, Take 1 ampule by nebulization Every 6 (Six) Hours As Needed for Shortness of Air or Wheezing., Disp: 180  "mL, Rfl: 0    omeprazole (priLOSEC) 20 MG capsule, Take 1 capsule by mouth Daily., Disp: 90 capsule, Rfl: 1    revefenacin (YUPELRI) 175 MCG/3ML nebulizer solution, Take 3 mL by nebulization Daily for 90 days., Disp: 270 mL, Rfl: 0    metFORMIN (GLUCOPHAGE) 500 MG tablet, Take 1 tablet by mouth 2 (Two) Times a Day With Meals for 30 days., Disp: 60 tablet, Rfl: 5     Objective     Vital Signs:   /63 (BP Location: Left arm, Patient Position: Sitting, Cuff Size: Large Adult)   Pulse 58   Temp 96.9 °F (36.1 °C) (Tympanic)   Resp 18   Ht 182.9 cm (72\")   Wt 109 kg (240 lb)   SpO2 100% Comment: room air/2L Nasal cannula prn  BMI 32.55 kg/m²     Physical Exam  Constitutional:       General: He is not in acute distress.     Appearance: Normal appearance. He is obese. He is not ill-appearing.   HENT:      Right Ear: Tympanic membrane and ear canal normal.      Left Ear: Tympanic membrane and ear canal normal.      Nose: Nose normal.      Mouth/Throat:      Mouth: Mucous membranes are moist.      Pharynx: Oropharynx is clear.   Eyes:      Extraocular Movements: Extraocular movements intact.      Conjunctiva/sclera: Conjunctivae normal.      Pupils: Pupils are equal, round, and reactive to light.   Cardiovascular:      Rate and Rhythm: Normal rate and regular rhythm.      Pulses: Normal pulses.      Heart sounds: Normal heart sounds.   Pulmonary:      Effort: Pulmonary effort is normal. No respiratory distress.      Breath sounds: No stridor. Wheezing (scattered, clears with cough) present. No rhonchi or rales.   Abdominal:      General: Bowel sounds are normal.      Palpations: Abdomen is soft.   Musculoskeletal:         General: No swelling. Normal range of motion.      Cervical back: Normal range of motion and neck supple.      Right lower leg: No edema.      Left lower leg: No edema.   Skin:     General: Skin is warm and dry.   Neurological:      General: No focal deficit present.      Mental Status: He is " alert and oriented to person, place, and time.      Motor: No weakness.   Psychiatric:         Mood and Affect: Mood normal.         Behavior: Behavior normal.        Result Review :   I have personally reviewed patient's labs and images.          Diagnoses and all orders for this visit:    1. Chronic respiratory failure with hypoxia and hypercapnia (Primary)  -     Complete PFT - Pre & Post Bronchodilator; Future  -     albuterol sulfate  (90 Base) MCG/ACT inhaler; Inhale 2 puffs every 4 hours as needed for wheezing  Dispense: 18 g; Refill: 0    2. Chronic obstructive pulmonary disease, unspecified COPD type  -     Complete PFT - Pre & Post Bronchodilator; Future  -     albuterol sulfate  (90 Base) MCG/ACT inhaler; Inhale 2 puffs every 4 hours as needed for wheezing  Dispense: 18 g; Refill: 0    3. Pulmonary emphysema, unspecified emphysema type  -     Complete PFT - Pre & Post Bronchodilator; Future  -     albuterol sulfate  (90 Base) MCG/ACT inhaler; Inhale 2 puffs every 4 hours as needed for wheezing  Dispense: 18 g; Refill: 0    4. Tobacco abuse, in remission  -     CT Chest Low Dose Wo; Future    5. Obesity (BMI 30-39.9)    6. Nicotine dependence, cigarettes, in remission  -     CT Chest Low Dose Wo; Future       Impression and Plan    -PFTs ordered today  CXR 9/11/2023 showed chronic pleural thickening, underling emphysematous changes  -Will order annual LDCT screening at today's visit to be done June 2024  -Continue wearing supplemental oxygen 2 L to keep O2 saturation at or greater than 89%  -Continue using Brovana and Pulmicort twice daily, reminded patient to rinse mouth after each use  -Continue using Yupelri once daily as prescribed  -Continue using albuterol rescue inhaler as needed, refills sent to patient's pharmacy  -Follow up with myself in 2 months after PFTs, may return sooner if needed    Smoking status: Reviewed  Vaccination status: Patient reports he is up-to-date with his  flu vaccine, declines pneumonia and COVID vaccines.  Patient is advised to continue to follow CDC recommendations such as social distancing wearing a mask and washing hands for at least 20 seconds.  Medications personally reviewed    Follow Up   No follow-ups on file.  Patient was given instructions and counseling regarding his condition or for health maintenance advice. Please see specific information pulled into the AVS if appropriate.

## 2023-11-20 ENCOUNTER — OFFICE VISIT (OUTPATIENT)
Dept: PULMONOLOGY | Facility: CLINIC | Age: 73
End: 2023-11-20
Payer: MEDICARE

## 2023-11-20 VITALS
OXYGEN SATURATION: 100 % | BODY MASS INDEX: 32.51 KG/M2 | DIASTOLIC BLOOD PRESSURE: 63 MMHG | HEART RATE: 58 BPM | SYSTOLIC BLOOD PRESSURE: 144 MMHG | RESPIRATION RATE: 18 BRPM | TEMPERATURE: 96.9 F | WEIGHT: 240 LBS | HEIGHT: 72 IN

## 2023-11-20 DIAGNOSIS — J44.9 CHRONIC OBSTRUCTIVE PULMONARY DISEASE, UNSPECIFIED COPD TYPE: ICD-10-CM

## 2023-11-20 DIAGNOSIS — F17.211 NICOTINE DEPENDENCE, CIGARETTES, IN REMISSION: ICD-10-CM

## 2023-11-20 DIAGNOSIS — J96.12 CHRONIC RESPIRATORY FAILURE WITH HYPOXIA AND HYPERCAPNIA: Primary | ICD-10-CM

## 2023-11-20 DIAGNOSIS — F17.201 TOBACCO ABUSE, IN REMISSION: ICD-10-CM

## 2023-11-20 DIAGNOSIS — J43.9 PULMONARY EMPHYSEMA, UNSPECIFIED EMPHYSEMA TYPE: ICD-10-CM

## 2023-11-20 DIAGNOSIS — E66.9 OBESITY (BMI 30-39.9): ICD-10-CM

## 2023-11-20 DIAGNOSIS — J96.11 CHRONIC RESPIRATORY FAILURE WITH HYPOXIA AND HYPERCAPNIA: Primary | ICD-10-CM

## 2023-11-20 RX ORDER — PEN NEEDLE, DIABETIC 31 GX5/16"
NEEDLE, DISPOSABLE MISCELLANEOUS
COMMUNITY
Start: 2023-11-13

## 2023-11-20 RX ORDER — ALBUTEROL SULFATE 90 UG/1
AEROSOL, METERED RESPIRATORY (INHALATION)
Qty: 18 G | Refills: 0 | Status: SHIPPED | OUTPATIENT
Start: 2023-11-20

## 2023-12-04 ENCOUNTER — TELEPHONE (OUTPATIENT)
Dept: FAMILY MEDICINE CLINIC | Facility: CLINIC | Age: 73
End: 2023-12-04
Payer: MEDICARE

## 2023-12-07 DIAGNOSIS — J96.12 CHRONIC RESPIRATORY FAILURE WITH HYPOXIA AND HYPERCAPNIA: ICD-10-CM

## 2023-12-07 DIAGNOSIS — J43.9 PULMONARY EMPHYSEMA, UNSPECIFIED EMPHYSEMA TYPE: ICD-10-CM

## 2023-12-07 DIAGNOSIS — J96.11 CHRONIC RESPIRATORY FAILURE WITH HYPOXIA AND HYPERCAPNIA: ICD-10-CM

## 2023-12-07 DIAGNOSIS — J44.9 CHRONIC OBSTRUCTIVE PULMONARY DISEASE, UNSPECIFIED COPD TYPE: ICD-10-CM

## 2023-12-07 RX ORDER — ALBUTEROL SULFATE 90 UG/1
AEROSOL, METERED RESPIRATORY (INHALATION)
Qty: 8.5 G | Refills: 5 | Status: SHIPPED | OUTPATIENT
Start: 2023-12-07

## 2023-12-18 ENCOUNTER — TELEPHONE (OUTPATIENT)
Dept: FAMILY MEDICINE CLINIC | Facility: CLINIC | Age: 73
End: 2023-12-18
Payer: MEDICARE

## 2024-01-02 ENCOUNTER — TELEPHONE (OUTPATIENT)
Dept: FAMILY MEDICINE CLINIC | Facility: CLINIC | Age: 74
End: 2024-01-02
Payer: MEDICARE

## 2024-01-16 NOTE — PROGRESS NOTES
Primary Care Provider  Jackeline Jaime APRN   Referring Provider  No ref. provider found      Patient Complaint  COPD, Chronic respiratory failure with hypoxia and hypercapnia, Follow-up (2 Month ), Shortness of Breath (With ambulation ), Cough (Productive, green ), and Wheezing      Subjective          Dilip Faulkner presents to [facility] PULMONARY & CRITICAL CARE MEDICINE      History of Presenting Illness  Dilip Faulkner is a 73 y.o. male with history of chronic hypoxic respiratory failure, COPD, dyspnea on exertion, and tobacco abuse in remission, here for 2-month follow-up.    Patient was last seen for hospital follow-up as a new patient.  He was hospitalized in September 2023 for hypoxic and hypercapnic respiratory failure requiring BiPAP and COPD exacerbation.  Patient states he is doing well since his last visit.  He is accompanied today by his sister.  Patient had pulmonary function testing scheduled for 12/6/2023 but was a no-show, states he did not realize he had an appointment.  He has not used any antibiotics or steroids for his lungs recently.  Patient continues to have dyspnea with exertion and occasional wheezing.  He uses Brovana, Pulmicort, and Yupelri daily.  He also has albuterol to use as needed.  He continues to wear 2 L supplemental oxygen as needed at home.  Patient denies any fevers or chills.  He is not using a BiPAP at home as was recommended at his hospital discharge, reviewed the signs of high carbon dioxide with patient today, including confusion and excessive sleepiness.  Patient was previously offered anxiety medication to help him tolerate wearing the mask, but declined.  He is a former smoker, quit 2 years ago, 84 pack years.  He does use chewing tobacco.  Patient denies any productive cough, hemoptysis, swollen lymph nodes, weight loss, or night sweats.  Overall, patient is doing well and has no additional concerns at this time.  Patient is able to perform  ADLs without difficulty.  I have personally reviewed the review of systems, past family, social, medical and surgical histories; and agree with their findings.      Review of Systems    Review of Systems   Constitutional:  Negative for activity change, chills, fatigue, fever, unexpected weight gain and unexpected weight loss.   HENT:  Negative for congestion, ear discharge, ear pain, mouth sores, postnasal drip, rhinorrhea, sinus pressure, sore throat, swollen glands and trouble swallowing.    Eyes:  Negative for blurred vision, pain, discharge, itching and visual disturbance.   Respiratory:  Positive for shortness of breath (with exertion). Negative for apnea, cough, chest tightness, wheezing and stridor.    Cardiovascular:  Negative for chest pain, palpitations and leg swelling.   Gastrointestinal:  Negative for abdominal distention, abdominal pain, constipation, diarrhea, nausea, vomiting, GERD and indigestion.   Musculoskeletal:  Negative for arthralgias, joint swelling and myalgias.   Skin:  Negative for color change.   Neurological:  Negative for dizziness, weakness, light-headedness and headache.      Sleep: Negative for Excessive daytime sleepiness  Negative for morning headaches  Negative for Snoring      Family History   Problem Relation Age of Onset    Asthma Father     Asthma Sister     Asthma Brother     Cancer Maternal Aunt         Social History     Socioeconomic History    Marital status: Single   Tobacco Use    Smoking status: Former     Packs/day: 1.50     Years: 56.00     Additional pack years: 0.00     Total pack years: 84.00     Types: Cigarettes     Start date:      Quit date: 3/19/2021     Years since quittin.8     Passive exposure: Past    Smokeless tobacco: Current     Types: Chew   Vaping Use    Vaping Use: Never used   Substance and Sexual Activity    Alcohol use: Never    Drug use: Never    Sexual activity: Defer        Past Medical History:   Diagnosis Date    Asthma     COPD  "(chronic obstructive pulmonary disease)     Diabetes mellitus     Ex-smoker     QUIT 2021    Hernia, umbilical     Hypertension     On home O2     REPORTS WEARING 2L/NC PRN SOA        Immunization History   Administered Date(s) Administered    Fluzone High-Dose 65+yrs 10/08/2022, 11/02/2023       No Known Allergies       Current Outpatient Medications:     albuterol sulfate  (90 Base) MCG/ACT inhaler, INHALE 2 PUFFS BY MOUTH EVERY 4 HOURS AS NEEDED FOR WHEEZING, Disp: 8.5 g, Rfl: 5    aspirin 81 MG chewable tablet, Chew 1 tablet Daily., Disp: 90 tablet, Rfl: 1    atorvastatin (LIPITOR) 10 MG tablet, Take 1 tablet by mouth Daily for 180 days., Disp: 90 tablet, Rfl: 1    B-D ULTRAFINE III SHORT PEN 31G X 8 MM misc, , Disp: , Rfl:     budesonide (PULMICORT) 0.5 MG/2ML nebulizer solution, Take 2 mL by nebulization 2 (Two) Times a Day for 90 days., Disp: 360 mL, Rfl: 0    empagliflozin (JARDIANCE) 10 MG tablet tablet, Take 1 tablet by mouth Daily., Disp: 30 tablet, Rfl: 1    Fluticasone-Salmeterol (ADVAIR/WIXELA) 250-50 MCG/ACT DISKUS, Inhale 1 puff 2 (Two) Times a Day for 180 days. (Patient taking differently: Inhale 1 puff 2 (Two) Times a Day. As needed), Disp: 1 each, Rfl: 5    insulin degludec (Tresiba FlexTouch) 100 UNIT/ML solution pen-injector injection, Inject 20 Units under the skin into the appropriate area as directed Daily., Disp: 6 mL, Rfl: 2    levalbuterol (XOPENEX) 1.25 MG/3ML nebulizer solution, Take 1 ampule by nebulization Every 6 (Six) Hours As Needed for Shortness of Air or Wheezing., Disp: 180 mL, Rfl: 0    metFORMIN (GLUCOPHAGE) 500 MG tablet, Take 1 tablet by mouth 2 (Two) Times a Day With Meals for 30 days., Disp: 60 tablet, Rfl: 5    omeprazole (priLOSEC) 20 MG capsule, Take 1 capsule by mouth Daily., Disp: 90 capsule, Rfl: 1     Objective     Vital Signs:   /67 (BP Location: Right arm, Patient Position: Sitting, Cuff Size: Large Adult)   Pulse 74   Resp 18   Ht 182.9 cm (72\")   " Wt 109 kg (239 lb 6.4 oz)   SpO2 97% Comment: Room air. Uses 2 L as needed  BMI 32.47 kg/m²     Physical Exam  Constitutional:       General: He is not in acute distress.     Appearance: Normal appearance. He is obese. He is not ill-appearing.   HENT:      Right Ear: Tympanic membrane and ear canal normal.      Left Ear: Tympanic membrane and ear canal normal.      Nose: Nose normal.      Mouth/Throat:      Mouth: Mucous membranes are moist.      Pharynx: Oropharynx is clear.   Eyes:      Extraocular Movements: Extraocular movements intact.      Conjunctiva/sclera: Conjunctivae normal.      Pupils: Pupils are equal, round, and reactive to light.   Cardiovascular:      Rate and Rhythm: Normal rate and regular rhythm.      Pulses: Normal pulses.      Heart sounds: Normal heart sounds.   Pulmonary:      Effort: Pulmonary effort is normal. No respiratory distress.      Breath sounds: No stridor. Wheezing (scattered, clears with cough) present. No rhonchi or rales.   Abdominal:      General: Bowel sounds are normal.      Palpations: Abdomen is soft.   Musculoskeletal:         General: No swelling. Normal range of motion.      Cervical back: Normal range of motion and neck supple.      Right lower leg: No edema.      Left lower leg: No edema.   Skin:     General: Skin is warm and dry.   Neurological:      General: No focal deficit present.      Mental Status: He is alert and oriented to person, place, and time.      Motor: No weakness.   Psychiatric:         Mood and Affect: Mood normal.         Behavior: Behavior normal.        Result Review :   I have personally reviewed patient's labs and images.  I also reviewed my last office note 11/20/2023.        Diagnoses and all orders for this visit:    1. Chronic respiratory failure with hypoxia and hypercapnia (Primary)  -     Complete PFT - Pre & Post Bronchodilator; Future    2. Chronic obstructive pulmonary disease, unspecified COPD type    3. Pulmonary emphysema,  unspecified emphysema type    4. Arm swelling  -     Duplex Venous Upper Extremity - Bilateral CAR; Future    5. Pain of left hand  -     Duplex Venous Upper Extremity - Bilateral CAR; Future    6. Tobacco abuse, in remission    7. Obesity (BMI 30-39.9)      Impression and Plan    -PFTs ordered last visit, no show for scheduled appointment, reordered today  -Venous duplex scans ordered of upper extremities today as patient reports intermittent swelling of hands with associated pain  -CXR 9/11/2023 showed chronic pleural thickening, underling emphysematous changes  -Annual LDCT ordered last visit to be done June 2024  -Continue wearing supplemental oxygen 2 L as needed to keep O2 saturation at or greater than 89%  -Continue using Brovana and Pulmicort twice daily, reminded patient to rinse mouth after each use  -Continue using Yupelri once daily as prescribed  -Continue using albuterol rescue inhaler as needed  -Follow up with myself after PFTs completed    Smoking status: Reviewed  Vaccination status: Patient reports he is up-to-date with his flu vaccine, declines pneumonia and COVID vaccines.  Patient is advised to continue to follow CDC recommendations such as social distancing wearing a mask and washing hands for at least 20 seconds.  Medications personally reviewed    Follow Up   No follow-ups on file.  Patient was given instructions and counseling regarding his condition or for health maintenance advice. Please see specific information pulled into the AVS if appropriate.

## 2024-01-22 ENCOUNTER — OFFICE VISIT (OUTPATIENT)
Dept: PULMONOLOGY | Facility: CLINIC | Age: 74
End: 2024-01-22
Payer: MEDICARE

## 2024-01-22 VITALS
WEIGHT: 239.4 LBS | OXYGEN SATURATION: 97 % | BODY MASS INDEX: 32.43 KG/M2 | SYSTOLIC BLOOD PRESSURE: 136 MMHG | RESPIRATION RATE: 18 BRPM | HEIGHT: 72 IN | DIASTOLIC BLOOD PRESSURE: 67 MMHG | HEART RATE: 74 BPM

## 2024-01-22 DIAGNOSIS — F17.201 TOBACCO ABUSE, IN REMISSION: ICD-10-CM

## 2024-01-22 DIAGNOSIS — J96.11 CHRONIC RESPIRATORY FAILURE WITH HYPOXIA AND HYPERCAPNIA: Primary | ICD-10-CM

## 2024-01-22 DIAGNOSIS — M79.89 ARM SWELLING: ICD-10-CM

## 2024-01-22 DIAGNOSIS — J96.12 CHRONIC RESPIRATORY FAILURE WITH HYPOXIA AND HYPERCAPNIA: Primary | ICD-10-CM

## 2024-01-22 DIAGNOSIS — E66.9 OBESITY (BMI 30-39.9): ICD-10-CM

## 2024-01-22 DIAGNOSIS — J43.9 PULMONARY EMPHYSEMA, UNSPECIFIED EMPHYSEMA TYPE: ICD-10-CM

## 2024-01-22 DIAGNOSIS — M79.642 PAIN OF LEFT HAND: ICD-10-CM

## 2024-01-22 DIAGNOSIS — J44.9 CHRONIC OBSTRUCTIVE PULMONARY DISEASE, UNSPECIFIED COPD TYPE: ICD-10-CM

## 2024-01-22 PROCEDURE — 3078F DIAST BP <80 MM HG: CPT

## 2024-01-22 PROCEDURE — 3075F SYST BP GE 130 - 139MM HG: CPT

## 2024-01-22 PROCEDURE — 1160F RVW MEDS BY RX/DR IN RCRD: CPT

## 2024-01-22 PROCEDURE — 99214 OFFICE O/P EST MOD 30 MIN: CPT

## 2024-01-22 PROCEDURE — 1159F MED LIST DOCD IN RCRD: CPT

## 2024-02-06 ENCOUNTER — TELEPHONE (OUTPATIENT)
Dept: FAMILY MEDICINE CLINIC | Facility: CLINIC | Age: 74
End: 2024-02-06
Payer: MEDICARE

## 2024-02-06 NOTE — TELEPHONE ENCOUNTER
OK FOR HUB TO RELAY:    Tried calling patient, unable to lvm regarding overdue labs that are still pending from 11/02/2023.    Just a friendly reminder that you have active lab orders pending from your last office visit. These orders are in your chart. Simply go to any [facility] at your convenience to have these completed - no appointment is needed. These are fasting labs (nothing to eat or drink after midnight the night before your test); however, you may drink a cup of black coffee or all the water you would like on the morning of your lab work.    Thank you!

## 2024-02-15 ENCOUNTER — LAB (OUTPATIENT)
Dept: LAB | Facility: HOSPITAL | Age: 74
End: 2024-02-15
Payer: MEDICARE

## 2024-02-15 ENCOUNTER — OFFICE VISIT (OUTPATIENT)
Dept: FAMILY MEDICINE CLINIC | Facility: CLINIC | Age: 74
End: 2024-02-15
Payer: MEDICARE

## 2024-02-15 VITALS
SYSTOLIC BLOOD PRESSURE: 126 MMHG | HEART RATE: 65 BPM | OXYGEN SATURATION: 96 % | WEIGHT: 235.6 LBS | DIASTOLIC BLOOD PRESSURE: 57 MMHG | HEIGHT: 72 IN | BODY MASS INDEX: 31.91 KG/M2

## 2024-02-15 DIAGNOSIS — E78.5 HYPERLIPIDEMIA, UNSPECIFIED HYPERLIPIDEMIA TYPE: ICD-10-CM

## 2024-02-15 DIAGNOSIS — Z00.00 ENCOUNTER FOR SUBSEQUENT ANNUAL WELLNESS VISIT (AWV) IN MEDICARE PATIENT: Primary | ICD-10-CM

## 2024-02-15 DIAGNOSIS — M25.531 RIGHT WRIST PAIN: ICD-10-CM

## 2024-02-15 DIAGNOSIS — E11.65 TYPE 2 DIABETES MELLITUS WITH HYPERGLYCEMIA, WITH LONG-TERM CURRENT USE OF INSULIN: ICD-10-CM

## 2024-02-15 DIAGNOSIS — I10 ESSENTIAL HYPERTENSION: ICD-10-CM

## 2024-02-15 DIAGNOSIS — Z79.4 TYPE 2 DIABETES MELLITUS WITH HYPERGLYCEMIA, WITH LONG-TERM CURRENT USE OF INSULIN: ICD-10-CM

## 2024-02-15 DIAGNOSIS — Z76.89 ENCOUNTER TO ESTABLISH CARE: ICD-10-CM

## 2024-02-15 DIAGNOSIS — Z13.29 SCREENING FOR THYROID DISORDER: ICD-10-CM

## 2024-02-15 LAB
ALBUMIN SERPL-MCNC: 4.1 G/DL (ref 3.5–5.2)
ALBUMIN UR-MCNC: 1.4 MG/DL
ALBUMIN/GLOB SERPL: 1.3 G/DL
ALP SERPL-CCNC: 107 U/L (ref 39–117)
ALT SERPL W P-5'-P-CCNC: 10 U/L (ref 1–41)
ANION GAP SERPL CALCULATED.3IONS-SCNC: 9 MMOL/L (ref 5–15)
AST SERPL-CCNC: 13 U/L (ref 1–40)
BASOPHILS # BLD AUTO: 0.07 10*3/MM3 (ref 0–0.2)
BASOPHILS NFR BLD AUTO: 0.9 % (ref 0–1.5)
BILIRUB SERPL-MCNC: 0.2 MG/DL (ref 0–1.2)
BUN SERPL-MCNC: 15 MG/DL (ref 8–23)
BUN/CREAT SERPL: 13.4 (ref 7–25)
CALCIUM SPEC-SCNC: 9 MG/DL (ref 8.6–10.5)
CHLORIDE SERPL-SCNC: 105 MMOL/L (ref 98–107)
CHOLEST SERPL-MCNC: 68 MG/DL (ref 0–200)
CO2 SERPL-SCNC: 26 MMOL/L (ref 22–29)
CREAT SERPL-MCNC: 1.12 MG/DL (ref 0.76–1.27)
CREAT UR-MCNC: 72.5 MG/DL
DEPRECATED RDW RBC AUTO: 45.3 FL (ref 37–54)
EGFRCR SERPLBLD CKD-EPI 2021: 68.9 ML/MIN/1.73
EOSINOPHIL # BLD AUTO: 0.87 10*3/MM3 (ref 0–0.4)
EOSINOPHIL NFR BLD AUTO: 11.4 % (ref 0.3–6.2)
ERYTHROCYTE [DISTWIDTH] IN BLOOD BY AUTOMATED COUNT: 16.2 % (ref 12.3–15.4)
EXPIRATION DATE: ABNORMAL
GLOBULIN UR ELPH-MCNC: 3.1 GM/DL
GLUCOSE SERPL-MCNC: 90 MG/DL (ref 65–99)
HBA1C MFR BLD: 6.3 % (ref 4.5–5.7)
HCT VFR BLD AUTO: 40.9 % (ref 37.5–51)
HDLC SERPL-MCNC: 33 MG/DL (ref 40–60)
HGB BLD-MCNC: 12.1 G/DL (ref 13–17.7)
IMM GRANULOCYTES # BLD AUTO: 0.03 10*3/MM3 (ref 0–0.05)
IMM GRANULOCYTES NFR BLD AUTO: 0.4 % (ref 0–0.5)
LDLC SERPL CALC-MCNC: 15 MG/DL (ref 0–100)
LDLC/HDLC SERPL: 0.41 {RATIO}
LYMPHOCYTES # BLD AUTO: 1.59 10*3/MM3 (ref 0.7–3.1)
LYMPHOCYTES NFR BLD AUTO: 20.8 % (ref 19.6–45.3)
Lab: ABNORMAL
MCH RBC QN AUTO: 23 PG (ref 26.6–33)
MCHC RBC AUTO-ENTMCNC: 29.6 G/DL (ref 31.5–35.7)
MCV RBC AUTO: 77.8 FL (ref 79–97)
MICROALBUMIN/CREAT UR: 19.3 MG/G (ref 0–29)
MONOCYTES # BLD AUTO: 0.59 10*3/MM3 (ref 0.1–0.9)
MONOCYTES NFR BLD AUTO: 7.7 % (ref 5–12)
NEUTROPHILS NFR BLD AUTO: 4.49 10*3/MM3 (ref 1.7–7)
NEUTROPHILS NFR BLD AUTO: 58.8 % (ref 42.7–76)
NRBC BLD AUTO-RTO: 0 /100 WBC (ref 0–0.2)
PLATELET # BLD AUTO: 200 10*3/MM3 (ref 140–450)
PMV BLD AUTO: 10.5 FL (ref 6–12)
POTASSIUM SERPL-SCNC: 4.4 MMOL/L (ref 3.5–5.2)
PROT SERPL-MCNC: 7.2 G/DL (ref 6–8.5)
RBC # BLD AUTO: 5.26 10*6/MM3 (ref 4.14–5.8)
SODIUM SERPL-SCNC: 140 MMOL/L (ref 136–145)
T4 FREE SERPL-MCNC: 0.97 NG/DL (ref 0.93–1.7)
TRIGL SERPL-MCNC: 107 MG/DL (ref 0–150)
TSH SERPL DL<=0.05 MIU/L-ACNC: 4.17 UIU/ML (ref 0.27–4.2)
URATE SERPL-MCNC: 4.2 MG/DL (ref 3.4–7)
VLDLC SERPL-MCNC: 20 MG/DL (ref 5–40)
WBC NRBC COR # BLD AUTO: 7.64 10*3/MM3 (ref 3.4–10.8)

## 2024-02-15 PROCEDURE — 80061 LIPID PANEL: CPT

## 2024-02-15 PROCEDURE — 85025 COMPLETE CBC W/AUTO DIFF WBC: CPT

## 2024-02-15 PROCEDURE — 84550 ASSAY OF BLOOD/URIC ACID: CPT

## 2024-02-15 PROCEDURE — 82043 UR ALBUMIN QUANTITATIVE: CPT

## 2024-02-15 PROCEDURE — 84443 ASSAY THYROID STIM HORMONE: CPT

## 2024-02-15 PROCEDURE — 84439 ASSAY OF FREE THYROXINE: CPT

## 2024-02-15 PROCEDURE — 36415 COLL VENOUS BLD VENIPUNCTURE: CPT

## 2024-02-15 PROCEDURE — 82570 ASSAY OF URINE CREATININE: CPT

## 2024-02-15 PROCEDURE — 80053 COMPREHEN METABOLIC PANEL: CPT

## 2024-02-15 RX ORDER — ATORVASTATIN CALCIUM 10 MG/1
10 TABLET, FILM COATED ORAL DAILY
Qty: 90 TABLET | Refills: 1 | Status: SHIPPED | OUTPATIENT
Start: 2024-02-15 | End: 2024-08-13

## 2024-02-15 RX ORDER — INSULIN DEGLUDEC INJECTION 100 U/ML
20 INJECTION, SOLUTION SUBCUTANEOUS DAILY
Qty: 6 ML | Refills: 2 | Status: SHIPPED | OUTPATIENT
Start: 2024-02-15

## 2024-03-07 NOTE — PROGRESS NOTES
Primary Care Provider  Jackeline Jaime APRN   Referring Provider  No ref. provider found      Patient Complaint  COPD, Follow-up (2 MONTH FOLLOW-UP), and Shortness of Breath      Subjective          Dilip Faulkner presents to [facility] PULMONARY & CRITICAL CARE MEDICINE      History of Presenting Illness  Dilip Faulkner is a 73 y.o. male with history of chronic hypoxic respiratory failure, COPD, dyspnea on exertion, and tobacco abuse in remission, here for 2-month follow-up.    Patient reports he is doing okay since his last visit.  His last hospitalization for his breathing was in September 2023 for hypoxic and hypercapnic respiratory failure requiring BiPAP and COPD exacerbation.  He has not needed to be hospitalized since then.  Patient is accompanied today by his sister.  He had pulmonary function testing and venous duplex scan scheduled for 2/22/2023 but was a no-show, did not realize he had an appointment.  This is the second time he has missed an appointment for PFTs.  He has not used any antibiotics or steroids for his lungs recently.  Patient continues to have mild dyspnea with exertion, states it has been a little worse than usual the past few days, unsure if this is due to allergies or developing COPD exacerbation.  He uses Advair twice daily.  He also has albuterol to use as needed.  These medications generally work well to control his respiratory symptoms.  He continues to wear 2 L supplemental oxygen as needed at home.  He is not using a BiPAP at home as was recommended after his last hospitalization, reviewed the signs of high carbon dioxide with patient today, including confusion and excessive sleepiness.  Patient was previously offered anxiety medication to help him tolerate wearing the mask, but declined.  He is a former smoker, quit 2 years ago, 84 pack years.  He does use chewing tobacco.  Patient denies any hemoptysis, swollen lymph nodes, or night sweats.  Overall,  patient is doing well and has no additional concerns at this time.  Patient is able to perform ADLs without difficulty.  I have personally reviewed the review of systems, past family, social, medical and surgical histories; and agree with their findings.      Review of Systems    Review of Systems   Constitutional:  Negative for activity change, chills, fatigue, fever, unexpected weight gain and unexpected weight loss.   HENT:  Negative for congestion, ear discharge, ear pain, mouth sores, postnasal drip, rhinorrhea, sinus pressure, sore throat, swollen glands and trouble swallowing.    Eyes:  Negative for blurred vision, pain, discharge, itching and visual disturbance.   Respiratory:  Positive for cough (productive of green sputum) and shortness of breath (with exertion). Negative for apnea, chest tightness, wheezing and stridor.    Cardiovascular:  Negative for chest pain, palpitations and leg swelling.   Gastrointestinal:  Negative for abdominal distention, abdominal pain, constipation, diarrhea, nausea, vomiting, GERD and indigestion.   Musculoskeletal:  Negative for arthralgias, joint swelling and myalgias.   Skin:  Negative for color change.   Neurological:  Negative for dizziness, weakness, light-headedness and headache.      Sleep: Negative for Excessive daytime sleepiness  Negative for morning headaches  Negative for Snoring      Family History   Problem Relation Age of Onset    Asthma Father     Asthma Sister     Asthma Brother     Cancer Maternal Aunt         Social History     Socioeconomic History    Marital status: Single   Tobacco Use    Smoking status: Former     Current packs/day: 0.00     Average packs/day: 1.5 packs/day for 56.2 years (84.3 ttl pk-yrs)     Types: Cigarettes     Start date:      Quit date: 3/19/2021     Years since quittin.9     Passive exposure: Past    Smokeless tobacco: Current     Types: Chew   Vaping Use    Vaping status: Never Used   Substance and Sexual Activity     Alcohol use: Never    Drug use: Never    Sexual activity: Defer        Past Medical History:   Diagnosis Date    Asthma     COPD (chronic obstructive pulmonary disease)     Diabetes mellitus     Ex-smoker     QUIT 2021    Hernia, umbilical     Hypertension     On home O2     REPORTS WEARING 2L/NC PRN SOA        Immunization History   Administered Date(s) Administered    Fluzone High-Dose 65+yrs 10/08/2022, 11/02/2023       No Known Allergies       Current Outpatient Medications:     albuterol sulfate  (90 Base) MCG/ACT inhaler, INHALE 2 PUFFS BY MOUTH EVERY 4 HOURS AS NEEDED FOR WHEEZING, Disp: 8.5 g, Rfl: 5    aspirin 81 MG chewable tablet, Chew 1 tablet Daily., Disp: 90 tablet, Rfl: 1    atorvastatin (LIPITOR) 10 MG tablet, Take 1 tablet by mouth Daily for 180 days., Disp: 90 tablet, Rfl: 1    B-D ULTRAFINE III SHORT PEN 31G X 8 MM misc, , Disp: , Rfl:     empagliflozin (JARDIANCE) 10 MG tablet tablet, Take 1 tablet by mouth Daily., Disp: 90 tablet, Rfl: 0    Fluticasone-Salmeterol (ADVAIR/WIXELA) 250-50 MCG/ACT DISKUS, Inhale 1 puff 2 (Two) Times a Day for 180 days. (Patient taking differently: Inhale 1 puff 2 (Two) Times a Day. As needed), Disp: 1 each, Rfl: 5    insulin degludec (Tresiba FlexTouch) 100 UNIT/ML solution pen-injector injection, Inject 20 Units under the skin into the appropriate area as directed Daily., Disp: 6 mL, Rfl: 2    levalbuterol (XOPENEX) 1.25 MG/3ML nebulizer solution, Take 1 ampule by nebulization Every 6 (Six) Hours As Needed for Shortness of Air or Wheezing., Disp: 180 mL, Rfl: 0    metFORMIN (GLUCOPHAGE) 500 MG tablet, Take 1 tablet by mouth 2 (Two) Times a Day With Meals., Disp: 60 tablet, Rfl: 2    omeprazole (priLOSEC) 20 MG capsule, Take 1 capsule by mouth Daily., Disp: 90 capsule, Rfl: 1    azithromycin (ZITHROMAX) 250 MG tablet, Take 2 by mouth today then 1 daily for 4 days, Disp: 6 tablet, Rfl: 0    budesonide (PULMICORT) 0.5 MG/2ML nebulizer solution, Take 2 mL by  "nebulization 2 (Two) Times a Day for 90 days., Disp: 360 mL, Rfl: 0    predniSONE (DELTASONE) 20 MG tablet, Take 2 tablets by mouth Daily for 5 days., Disp: 10 tablet, Rfl: 0     Objective     Vital Signs:   BP 97/58 (BP Location: Right arm, Patient Position: Sitting, Cuff Size: Large Adult)   Pulse 78   Temp 96.4 °F (35.8 °C) (Tympanic)   Resp 18   Ht 182.9 cm (72\")   Wt 101 kg (221 lb 12.8 oz)   SpO2 94% Comment: ROOM AIR/ 2L OXYGEN  BMI 30.08 kg/m²     Physical Exam  Constitutional:       General: He is not in acute distress.     Appearance: Normal appearance. He is obese. He is not ill-appearing.   HENT:      Right Ear: Tympanic membrane and ear canal normal.      Left Ear: Tympanic membrane and ear canal normal.      Nose: Nose normal.      Mouth/Throat:      Mouth: Mucous membranes are moist.      Pharynx: Oropharynx is clear.   Eyes:      Extraocular Movements: Extraocular movements intact.      Conjunctiva/sclera: Conjunctivae normal.      Pupils: Pupils are equal, round, and reactive to light.   Cardiovascular:      Rate and Rhythm: Normal rate and regular rhythm.      Pulses: Normal pulses.      Heart sounds: Normal heart sounds.   Pulmonary:      Effort: Pulmonary effort is normal. No respiratory distress.      Breath sounds: No stridor. No wheezing, rhonchi or rales.      Comments: Diminished throughout  Abdominal:      General: Bowel sounds are normal.      Palpations: Abdomen is soft.   Musculoskeletal:         General: No swelling. Normal range of motion.      Cervical back: Normal range of motion and neck supple.      Right lower leg: No edema.      Left lower leg: No edema.   Skin:     General: Skin is warm and dry.   Neurological:      General: No focal deficit present.      Mental Status: He is alert and oriented to person, place, and time.      Motor: No weakness.   Psychiatric:         Mood and Affect: Mood normal.         Behavior: Behavior normal.        Result Review :   I have " personally reviewed patient's labs and images.  I also reviewed my last office note 1/22/2024.        Diagnoses and all orders for this visit:    1. Chronic respiratory failure with hypoxia and hypercapnia (Primary)    2. Chronic obstructive pulmonary disease, unspecified COPD type    3. Pulmonary emphysema, unspecified emphysema type    4. Tobacco abuse, in remission    5. Obesity (BMI 30-39.9)    Other orders  -     predniSONE (DELTASONE) 20 MG tablet; Take 2 tablets by mouth Daily for 5 days.  Dispense: 10 tablet; Refill: 0  -     azithromycin (ZITHROMAX) 250 MG tablet; Take 2 by mouth today then 1 daily for 4 days  Dispense: 6 tablet; Refill: 0      Impression and Plan    -Azithromycin and prednisone prescribed today for what seems to be early signs of COPD exacerbation.  Instructed patient to take if his symptoms worsen.  -PFTs ordered last visit, no show twice in the past, patient's sister provided with scheduling phone number to reschedule  -Venous duplex scans of upper extremities ordered last visit for intermittent swelling of hands with associated pain, no-show for appointment  -CXR 9/11/2023 showed chronic pleural thickening, underling emphysematous changes  -Annual LDCT ordered last visit, scheduled for 6/19/2024.  I reminded patient of this appointment.  -Continue wearing supplemental oxygen 2 L as needed to keep O2 saturation at or greater than 89%  -Continue using Advair twice daily, reminded patient to rinse mouth after each use  -Continue using albuterol rescue inhaler as needed  -Follow up with Dr. Singh or myself in 3 months after annual LDCT, hopefully will have pulmonary function testing done by then as well    Smoking status: Reviewed  Vaccination status: Patient reports he is up-to-date with his flu vaccine, declines pneumonia and COVID vaccines.  Patient is advised to continue to follow CDC recommendations such as social distancing wearing a mask and washing hands for at least 20  seconds.  Medications personally reviewed    Follow Up   No follow-ups on file.  Patient was given instructions and counseling regarding his condition or for health maintenance advice. Please see specific information pulled into the AVS if appropriate.

## 2024-03-14 ENCOUNTER — OFFICE VISIT (OUTPATIENT)
Dept: PULMONOLOGY | Facility: CLINIC | Age: 74
End: 2024-03-14
Payer: MEDICARE

## 2024-03-14 VITALS
OXYGEN SATURATION: 94 % | DIASTOLIC BLOOD PRESSURE: 58 MMHG | SYSTOLIC BLOOD PRESSURE: 97 MMHG | BODY MASS INDEX: 30.04 KG/M2 | HEART RATE: 78 BPM | WEIGHT: 221.8 LBS | TEMPERATURE: 96.4 F | RESPIRATION RATE: 18 BRPM | HEIGHT: 72 IN

## 2024-03-14 DIAGNOSIS — J43.9 PULMONARY EMPHYSEMA, UNSPECIFIED EMPHYSEMA TYPE: ICD-10-CM

## 2024-03-14 DIAGNOSIS — J44.9 CHRONIC OBSTRUCTIVE PULMONARY DISEASE, UNSPECIFIED COPD TYPE: ICD-10-CM

## 2024-03-14 DIAGNOSIS — J96.12 CHRONIC RESPIRATORY FAILURE WITH HYPOXIA AND HYPERCAPNIA: Primary | ICD-10-CM

## 2024-03-14 DIAGNOSIS — J96.11 CHRONIC RESPIRATORY FAILURE WITH HYPOXIA AND HYPERCAPNIA: Primary | ICD-10-CM

## 2024-03-14 DIAGNOSIS — F17.201 TOBACCO ABUSE, IN REMISSION: ICD-10-CM

## 2024-03-14 DIAGNOSIS — E66.9 OBESITY (BMI 30-39.9): ICD-10-CM

## 2024-03-14 RX ORDER — PREDNISONE 20 MG/1
40 TABLET ORAL DAILY
Qty: 10 TABLET | Refills: 0 | Status: SHIPPED | OUTPATIENT
Start: 2024-03-14 | End: 2024-03-19

## 2024-03-14 RX ORDER — AZITHROMYCIN 250 MG/1
TABLET, FILM COATED ORAL
Qty: 6 TABLET | Refills: 0 | Status: SHIPPED | OUTPATIENT
Start: 2024-03-14

## 2024-03-25 ENCOUNTER — APPOINTMENT (OUTPATIENT)
Dept: GENERAL RADIOLOGY | Facility: HOSPITAL | Age: 74
End: 2024-03-25
Payer: MEDICARE

## 2024-03-25 ENCOUNTER — APPOINTMENT (OUTPATIENT)
Dept: CT IMAGING | Facility: HOSPITAL | Age: 74
End: 2024-03-25
Payer: MEDICARE

## 2024-03-25 ENCOUNTER — HOSPITAL ENCOUNTER (OUTPATIENT)
Facility: HOSPITAL | Age: 74
Setting detail: OBSERVATION
LOS: 1 days | Discharge: HOME | End: 2024-03-27
Attending: EMERGENCY MEDICINE | Admitting: HOSPITALIST
Payer: MEDICARE

## 2024-03-25 DIAGNOSIS — R09.02 HYPOXIA: ICD-10-CM

## 2024-03-25 DIAGNOSIS — A41.9 SEPSIS, DUE TO UNSPECIFIED ORGANISM, UNSPECIFIED WHETHER ACUTE ORGAN DYSFUNCTION PRESENT: ICD-10-CM

## 2024-03-25 DIAGNOSIS — Z78.9 IMPAIRED MOBILITY AND ADLS: ICD-10-CM

## 2024-03-25 DIAGNOSIS — R26.2 DIFFICULTY WALKING: ICD-10-CM

## 2024-03-25 DIAGNOSIS — Z74.09 IMPAIRED MOBILITY AND ADLS: ICD-10-CM

## 2024-03-25 DIAGNOSIS — J44.1 ACUTE EXACERBATION OF CHRONIC OBSTRUCTIVE PULMONARY DISEASE (COPD): Primary | ICD-10-CM

## 2024-03-25 LAB
ALBUMIN SERPL-MCNC: 3.6 G/DL (ref 3.5–5.2)
ALBUMIN/GLOB SERPL: 1.1 G/DL
ALP SERPL-CCNC: 104 U/L (ref 39–117)
ALT SERPL W P-5'-P-CCNC: 11 U/L (ref 1–41)
ANION GAP SERPL CALCULATED.3IONS-SCNC: 9.7 MMOL/L (ref 5–15)
AST SERPL-CCNC: 18 U/L (ref 1–40)
B PARAPERT DNA SPEC QL NAA+PROBE: NOT DETECTED
B PERT DNA SPEC QL NAA+PROBE: NOT DETECTED
BASE EXCESS BLDA CALC-SCNC: -0.5 MMOL/L (ref -2–2)
BASOPHILS # BLD AUTO: 0.07 10*3/MM3 (ref 0–0.2)
BASOPHILS NFR BLD AUTO: 0.3 % (ref 0–1.5)
BDY SITE: ABNORMAL
BILIRUB SERPL-MCNC: 0.4 MG/DL (ref 0–1.2)
BILIRUB UR QL STRIP: NEGATIVE
BUN SERPL-MCNC: 21 MG/DL (ref 8–23)
BUN/CREAT SERPL: 20.6 (ref 7–25)
C PNEUM DNA NPH QL NAA+NON-PROBE: NOT DETECTED
CALCIUM SPEC-SCNC: 8.7 MG/DL (ref 8.6–10.5)
CHLORIDE SERPL-SCNC: 103 MMOL/L (ref 98–107)
CLARITY UR: CLEAR
CO2 SERPL-SCNC: 25.3 MMOL/L (ref 22–29)
COHGB MFR BLD: 1.3 % (ref 0–1.5)
COLOR UR: YELLOW
CREAT SERPL-MCNC: 1.02 MG/DL (ref 0.76–1.27)
D-LACTATE SERPL-SCNC: 1.7 MMOL/L (ref 0.5–2)
DEPRECATED RDW RBC AUTO: 47.4 FL (ref 37–54)
EGFRCR SERPLBLD CKD-EPI 2021: 77.1 ML/MIN/1.73
EOSINOPHIL # BLD AUTO: 0.69 10*3/MM3 (ref 0–0.4)
EOSINOPHIL NFR BLD AUTO: 3.2 % (ref 0.3–6.2)
ERYTHROCYTE [DISTWIDTH] IN BLOOD BY AUTOMATED COUNT: 17 % (ref 12.3–15.4)
FERRITIN SERPL-MCNC: 36.41 NG/ML (ref 30–400)
FHHB: 9.8 % (ref 0–5)
FLUAV SUBTYP SPEC NAA+PROBE: NOT DETECTED
FLUBV RNA ISLT QL NAA+PROBE: NOT DETECTED
FOLATE SERPL-MCNC: 9.7 NG/ML (ref 4.78–24.2)
GAS FLOW AIRWAY: 0 LPM
GEN 5 2HR TROPONIN T REFLEX: 46 NG/L
GLOBULIN UR ELPH-MCNC: 3.3 GM/DL
GLUCOSE BLDC GLUCOMTR-MCNC: 199 MG/DL (ref 70–99)
GLUCOSE SERPL-MCNC: 155 MG/DL (ref 65–99)
GLUCOSE UR STRIP-MCNC: ABNORMAL MG/DL
HADV DNA SPEC NAA+PROBE: NOT DETECTED
HCO3 BLDA-SCNC: 24.7 MMOL/L (ref 22–26)
HCOV 229E RNA SPEC QL NAA+PROBE: NOT DETECTED
HCOV HKU1 RNA SPEC QL NAA+PROBE: NOT DETECTED
HCOV NL63 RNA SPEC QL NAA+PROBE: NOT DETECTED
HCOV OC43 RNA SPEC QL NAA+PROBE: NOT DETECTED
HCT VFR BLD AUTO: 43.1 % (ref 37.5–51)
HGB BLD-MCNC: 12.4 G/DL (ref 13–17.7)
HGB BLDA-MCNC: 13.5 G/DL (ref 13.8–16.4)
HGB UR QL STRIP.AUTO: NEGATIVE
HMPV RNA NPH QL NAA+NON-PROBE: NOT DETECTED
HOLD SPECIMEN: NORMAL
HOLD SPECIMEN: NORMAL
HPIV1 RNA ISLT QL NAA+PROBE: NOT DETECTED
HPIV2 RNA SPEC QL NAA+PROBE: NOT DETECTED
HPIV3 RNA NPH QL NAA+PROBE: NOT DETECTED
HPIV4 P GENE NPH QL NAA+PROBE: NOT DETECTED
IMM GRANULOCYTES # BLD AUTO: 0.18 10*3/MM3 (ref 0–0.05)
IMM GRANULOCYTES NFR BLD AUTO: 0.8 % (ref 0–0.5)
INHALED O2 CONCENTRATION: 21 %
IRON 24H UR-MRATE: 14 MCG/DL (ref 59–158)
IRON SATN MFR SERPL: 3 % (ref 20–50)
KETONES UR QL STRIP: ABNORMAL
L PNEUMO1 AG UR QL IA: NEGATIVE
LEUKOCYTE ESTERASE UR QL STRIP.AUTO: NEGATIVE
LYMPHOCYTES # BLD AUTO: 1.29 10*3/MM3 (ref 0.7–3.1)
LYMPHOCYTES NFR BLD AUTO: 6 % (ref 19.6–45.3)
M PNEUMO IGG SER IA-ACNC: NOT DETECTED
MAGNESIUM SERPL-MCNC: 2 MG/DL (ref 1.6–2.4)
MCH RBC QN AUTO: 22.6 PG (ref 26.6–33)
MCHC RBC AUTO-ENTMCNC: 28.8 G/DL (ref 31.5–35.7)
MCV RBC AUTO: 78.6 FL (ref 79–97)
METHGB BLD QL: 0.3 % (ref 0–1.5)
MODALITY: ABNORMAL
MONOCYTES # BLD AUTO: 1.22 10*3/MM3 (ref 0.1–0.9)
MONOCYTES NFR BLD AUTO: 5.7 % (ref 5–12)
NEUTROPHILS NFR BLD AUTO: 18.03 10*3/MM3 (ref 1.7–7)
NEUTROPHILS NFR BLD AUTO: 84 % (ref 42.7–76)
NITRITE UR QL STRIP: NEGATIVE
NRBC BLD AUTO-RTO: 0 /100 WBC (ref 0–0.2)
NT-PROBNP SERPL-MCNC: 262.9 PG/ML (ref 0–900)
OXYHGB MFR BLDV: 88.6 % (ref 94–99)
PCO2 BLDA: 42.5 MM HG (ref 35–45)
PH BLDA: 7.38 PH UNITS (ref 7.35–7.45)
PH UR STRIP.AUTO: 5.5 [PH] (ref 5–8)
PHOSPHATE SERPL-MCNC: 2.7 MG/DL (ref 2.5–4.5)
PLATELET # BLD AUTO: 173 10*3/MM3 (ref 140–450)
PMV BLD AUTO: 10.8 FL (ref 6–12)
PO2 BLD: 282 MM[HG] (ref 0–500)
PO2 BLDA: 59.3 MM HG (ref 80–100)
POTASSIUM SERPL-SCNC: 4.8 MMOL/L (ref 3.5–5.2)
PROCALCITONIN SERPL-MCNC: 0.16 NG/ML (ref 0–0.25)
PROT SERPL-MCNC: 6.9 G/DL (ref 6–8.5)
PROT UR QL STRIP: NEGATIVE
RBC # BLD AUTO: 5.48 10*6/MM3 (ref 4.14–5.8)
RETICS # AUTO: 0.09 10*6/MM3 (ref 0.02–0.13)
RETICS/RBC NFR AUTO: 1.68 % (ref 0.7–1.9)
RHINOVIRUS RNA SPEC NAA+PROBE: NOT DETECTED
RSV RNA NPH QL NAA+NON-PROBE: NOT DETECTED
S PNEUM AG SPEC QL LA: NEGATIVE
SAO2 % BLDCOA: 90 % (ref 95–99)
SARS-COV-2 RNA RESP QL NAA+PROBE: NOT DETECTED
SODIUM SERPL-SCNC: 138 MMOL/L (ref 136–145)
SP GR UR STRIP: 1.03 (ref 1–1.03)
TIBC SERPL-MCNC: 408 MCG/DL (ref 298–536)
TRANSFERRIN SERPL-MCNC: 274 MG/DL (ref 200–360)
TROPONIN T DELTA: -13 NG/L
TROPONIN T SERPL HS-MCNC: 59 NG/L
UROBILINOGEN UR QL STRIP: ABNORMAL
VIT B12 BLD-MCNC: 301 PG/ML (ref 211–946)
WBC NRBC COR # BLD AUTO: 21.48 10*3/MM3 (ref 3.4–10.8)
WHOLE BLOOD HOLD COAG: NORMAL
WHOLE BLOOD HOLD SPECIMEN: NORMAL

## 2024-03-25 PROCEDURE — 84466 ASSAY OF TRANSFERRIN: CPT | Performed by: HOSPITALIST

## 2024-03-25 PROCEDURE — 94799 UNLISTED PULMONARY SVC/PX: CPT

## 2024-03-25 PROCEDURE — 93005 ELECTROCARDIOGRAM TRACING: CPT | Performed by: EMERGENCY MEDICINE

## 2024-03-25 PROCEDURE — 81003 URINALYSIS AUTO W/O SCOPE: CPT | Performed by: HOSPITALIST

## 2024-03-25 PROCEDURE — 96374 THER/PROPH/DIAG INJ IV PUSH: CPT

## 2024-03-25 PROCEDURE — 82746 ASSAY OF FOLIC ACID SERUM: CPT | Performed by: HOSPITALIST

## 2024-03-25 PROCEDURE — 93010 ELECTROCARDIOGRAM REPORT: CPT | Performed by: INTERNAL MEDICINE

## 2024-03-25 PROCEDURE — 71250 CT THORAX DX C-: CPT

## 2024-03-25 PROCEDURE — 82728 ASSAY OF FERRITIN: CPT | Performed by: HOSPITALIST

## 2024-03-25 PROCEDURE — 82805 BLOOD GASES W/O2 SATURATION: CPT | Performed by: EMERGENCY MEDICINE

## 2024-03-25 PROCEDURE — 80053 COMPREHEN METABOLIC PANEL: CPT | Performed by: EMERGENCY MEDICINE

## 2024-03-25 PROCEDURE — 25010000002 CEFTRIAXONE PER 250 MG: Performed by: EMERGENCY MEDICINE

## 2024-03-25 PROCEDURE — G0378 HOSPITAL OBSERVATION PER HR: HCPCS

## 2024-03-25 PROCEDURE — 82375 ASSAY CARBOXYHB QUANT: CPT | Performed by: EMERGENCY MEDICINE

## 2024-03-25 PROCEDURE — 94640 AIRWAY INHALATION TREATMENT: CPT

## 2024-03-25 PROCEDURE — 63710000001 INSULIN LISPRO (HUMAN) PER 5 UNITS: Performed by: HOSPITALIST

## 2024-03-25 PROCEDURE — 82607 VITAMIN B-12: CPT | Performed by: HOSPITALIST

## 2024-03-25 PROCEDURE — 99223 1ST HOSP IP/OBS HIGH 75: CPT | Performed by: HOSPITALIST

## 2024-03-25 PROCEDURE — 94761 N-INVAS EAR/PLS OXIMETRY MLT: CPT

## 2024-03-25 PROCEDURE — 99291 CRITICAL CARE FIRST HOUR: CPT

## 2024-03-25 PROCEDURE — 85025 COMPLETE CBC W/AUTO DIFF WBC: CPT | Performed by: EMERGENCY MEDICINE

## 2024-03-25 PROCEDURE — 84100 ASSAY OF PHOSPHORUS: CPT | Performed by: HOSPITALIST

## 2024-03-25 PROCEDURE — 25010000002 ENOXAPARIN PER 10 MG: Performed by: HOSPITALIST

## 2024-03-25 PROCEDURE — 83050 HGB METHEMOGLOBIN QUAN: CPT | Performed by: EMERGENCY MEDICINE

## 2024-03-25 PROCEDURE — 87449 NOS EACH ORGANISM AG IA: CPT | Performed by: HOSPITALIST

## 2024-03-25 PROCEDURE — 85045 AUTOMATED RETICULOCYTE COUNT: CPT | Performed by: HOSPITALIST

## 2024-03-25 PROCEDURE — 83735 ASSAY OF MAGNESIUM: CPT | Performed by: HOSPITALIST

## 2024-03-25 PROCEDURE — 84484 ASSAY OF TROPONIN QUANT: CPT | Performed by: EMERGENCY MEDICINE

## 2024-03-25 PROCEDURE — 0202U NFCT DS 22 TRGT SARS-COV-2: CPT | Performed by: HOSPITALIST

## 2024-03-25 PROCEDURE — 82948 REAGENT STRIP/BLOOD GLUCOSE: CPT

## 2024-03-25 PROCEDURE — 99285 EMERGENCY DEPT VISIT HI MDM: CPT

## 2024-03-25 PROCEDURE — 71045 X-RAY EXAM CHEST 1 VIEW: CPT

## 2024-03-25 PROCEDURE — 83880 ASSAY OF NATRIURETIC PEPTIDE: CPT | Performed by: EMERGENCY MEDICINE

## 2024-03-25 PROCEDURE — 84145 PROCALCITONIN (PCT): CPT | Performed by: HOSPITALIST

## 2024-03-25 PROCEDURE — 36415 COLL VENOUS BLD VENIPUNCTURE: CPT

## 2024-03-25 PROCEDURE — 25810000003 SODIUM CHLORIDE 0.9 % SOLUTION: Performed by: EMERGENCY MEDICINE

## 2024-03-25 PROCEDURE — 87040 BLOOD CULTURE FOR BACTERIA: CPT | Performed by: EMERGENCY MEDICINE

## 2024-03-25 PROCEDURE — 25010000002 METHYLPREDNISOLONE PER 125 MG: Performed by: EMERGENCY MEDICINE

## 2024-03-25 PROCEDURE — 96372 THER/PROPH/DIAG INJ SC/IM: CPT

## 2024-03-25 PROCEDURE — 36600 WITHDRAWAL OF ARTERIAL BLOOD: CPT | Performed by: EMERGENCY MEDICINE

## 2024-03-25 PROCEDURE — 83540 ASSAY OF IRON: CPT | Performed by: HOSPITALIST

## 2024-03-25 PROCEDURE — 83605 ASSAY OF LACTIC ACID: CPT | Performed by: EMERGENCY MEDICINE

## 2024-03-25 PROCEDURE — 63710000001 INSULIN DETEMIR PER 5 UNITS: Performed by: HOSPITALIST

## 2024-03-25 RX ORDER — IBUPROFEN 600 MG/1
1 TABLET ORAL
Status: DISCONTINUED | OUTPATIENT
Start: 2024-03-25 | End: 2024-03-27 | Stop reason: HOSPADM

## 2024-03-25 RX ORDER — NICOTINE POLACRILEX 4 MG
15 LOZENGE BUCCAL
Status: DISCONTINUED | OUTPATIENT
Start: 2024-03-25 | End: 2024-03-27 | Stop reason: HOSPADM

## 2024-03-25 RX ORDER — ACETAMINOPHEN 650 MG/1
650 SUPPOSITORY RECTAL EVERY 4 HOURS PRN
Status: DISCONTINUED | OUTPATIENT
Start: 2024-03-25 | End: 2024-03-27 | Stop reason: HOSPADM

## 2024-03-25 RX ORDER — METHYLPREDNISOLONE SODIUM SUCCINATE 40 MG/ML
40 INJECTION, POWDER, LYOPHILIZED, FOR SOLUTION INTRAMUSCULAR; INTRAVENOUS EVERY 12 HOURS
Status: DISCONTINUED | OUTPATIENT
Start: 2024-03-26 | End: 2024-03-27

## 2024-03-25 RX ORDER — ONDANSETRON 4 MG/1
4 TABLET, ORALLY DISINTEGRATING ORAL EVERY 6 HOURS PRN
Status: DISCONTINUED | OUTPATIENT
Start: 2024-03-25 | End: 2024-03-27 | Stop reason: HOSPADM

## 2024-03-25 RX ORDER — SODIUM CHLORIDE 0.9 % (FLUSH) 0.9 %
10 SYRINGE (ML) INJECTION AS NEEDED
Status: DISCONTINUED | OUTPATIENT
Start: 2024-03-25 | End: 2024-03-27 | Stop reason: HOSPADM

## 2024-03-25 RX ORDER — SODIUM CHLORIDE 0.9 % (FLUSH) 0.9 %
10 SYRINGE (ML) INJECTION EVERY 12 HOURS SCHEDULED
Status: DISCONTINUED | OUTPATIENT
Start: 2024-03-25 | End: 2024-03-27 | Stop reason: HOSPADM

## 2024-03-25 RX ORDER — DEXTROSE MONOHYDRATE 25 G/50ML
25 INJECTION, SOLUTION INTRAVENOUS
Status: DISCONTINUED | OUTPATIENT
Start: 2024-03-25 | End: 2024-03-27 | Stop reason: HOSPADM

## 2024-03-25 RX ORDER — BISACODYL 5 MG/1
5 TABLET, DELAYED RELEASE ORAL DAILY PRN
Status: DISCONTINUED | OUTPATIENT
Start: 2024-03-25 | End: 2024-03-27 | Stop reason: HOSPADM

## 2024-03-25 RX ORDER — ATORVASTATIN CALCIUM 20 MG/1
10 TABLET, FILM COATED ORAL DAILY
Qty: 141 TABLET | Refills: 0 | Status: DISCONTINUED | OUTPATIENT
Start: 2024-03-26 | End: 2024-03-27 | Stop reason: HOSPADM

## 2024-03-25 RX ORDER — INSULIN LISPRO 100 [IU]/ML
3-14 INJECTION, SOLUTION INTRAVENOUS; SUBCUTANEOUS
Status: DISCONTINUED | OUTPATIENT
Start: 2024-03-25 | End: 2024-03-27 | Stop reason: HOSPADM

## 2024-03-25 RX ORDER — METHYLPREDNISOLONE SODIUM SUCCINATE 125 MG/2ML
125 INJECTION, POWDER, LYOPHILIZED, FOR SOLUTION INTRAMUSCULAR; INTRAVENOUS ONCE
Status: COMPLETED | OUTPATIENT
Start: 2024-03-25 | End: 2024-03-25

## 2024-03-25 RX ORDER — ASPIRIN 81 MG/1
81 TABLET, CHEWABLE ORAL DAILY
Status: DISCONTINUED | OUTPATIENT
Start: 2024-03-26 | End: 2024-03-27 | Stop reason: HOSPADM

## 2024-03-25 RX ORDER — ONDANSETRON 2 MG/ML
4 INJECTION INTRAMUSCULAR; INTRAVENOUS EVERY 6 HOURS PRN
Status: DISCONTINUED | OUTPATIENT
Start: 2024-03-25 | End: 2024-03-27 | Stop reason: HOSPADM

## 2024-03-25 RX ORDER — ENOXAPARIN SODIUM 100 MG/ML
40 INJECTION SUBCUTANEOUS DAILY
Status: DISCONTINUED | OUTPATIENT
Start: 2024-03-25 | End: 2024-03-27 | Stop reason: HOSPADM

## 2024-03-25 RX ORDER — AMOXICILLIN 250 MG
2 CAPSULE ORAL 2 TIMES DAILY
Status: DISCONTINUED | OUTPATIENT
Start: 2024-03-25 | End: 2024-03-27 | Stop reason: HOSPADM

## 2024-03-25 RX ORDER — PANTOPRAZOLE SODIUM 40 MG/1
40 TABLET, DELAYED RELEASE ORAL
Status: DISCONTINUED | OUTPATIENT
Start: 2024-03-26 | End: 2024-03-27 | Stop reason: HOSPADM

## 2024-03-25 RX ORDER — POLYETHYLENE GLYCOL 3350 17 G/17G
17 POWDER, FOR SOLUTION ORAL DAILY PRN
Status: DISCONTINUED | OUTPATIENT
Start: 2024-03-25 | End: 2024-03-27 | Stop reason: HOSPADM

## 2024-03-25 RX ORDER — CHOLECALCIFEROL (VITAMIN D3) 125 MCG
5 CAPSULE ORAL NIGHTLY PRN
Status: DISCONTINUED | OUTPATIENT
Start: 2024-03-25 | End: 2024-03-27 | Stop reason: HOSPADM

## 2024-03-25 RX ORDER — ACETAMINOPHEN 160 MG/5ML
650 SOLUTION ORAL EVERY 4 HOURS PRN
Status: DISCONTINUED | OUTPATIENT
Start: 2024-03-25 | End: 2024-03-27 | Stop reason: HOSPADM

## 2024-03-25 RX ORDER — IPRATROPIUM BROMIDE AND ALBUTEROL SULFATE 2.5; .5 MG/3ML; MG/3ML
3 SOLUTION RESPIRATORY (INHALATION) ONCE
Status: COMPLETED | OUTPATIENT
Start: 2024-03-25 | End: 2024-03-25

## 2024-03-25 RX ORDER — SODIUM CHLORIDE 9 MG/ML
40 INJECTION, SOLUTION INTRAVENOUS AS NEEDED
Status: DISCONTINUED | OUTPATIENT
Start: 2024-03-25 | End: 2024-03-27 | Stop reason: HOSPADM

## 2024-03-25 RX ORDER — ACETAMINOPHEN 325 MG/1
650 TABLET ORAL EVERY 4 HOURS PRN
Status: DISCONTINUED | OUTPATIENT
Start: 2024-03-25 | End: 2024-03-27 | Stop reason: HOSPADM

## 2024-03-25 RX ORDER — BISACODYL 10 MG
10 SUPPOSITORY, RECTAL RECTAL DAILY PRN
Status: DISCONTINUED | OUTPATIENT
Start: 2024-03-25 | End: 2024-03-27 | Stop reason: HOSPADM

## 2024-03-25 RX ADMIN — IPRATROPIUM BROMIDE AND ALBUTEROL SULFATE 3 ML: .5; 3 SOLUTION RESPIRATORY (INHALATION) at 17:16

## 2024-03-25 RX ADMIN — METHYLPREDNISOLONE SODIUM SUCCINATE 125 MG: 125 INJECTION INTRAMUSCULAR; INTRAVENOUS at 17:43

## 2024-03-25 RX ADMIN — Medication 10 ML: at 20:25

## 2024-03-25 RX ADMIN — ENOXAPARIN SODIUM 40 MG: 100 INJECTION SUBCUTANEOUS at 20:24

## 2024-03-25 RX ADMIN — INSULIN LISPRO 3 UNITS: 100 INJECTION, SOLUTION INTRAVENOUS; SUBCUTANEOUS at 20:25

## 2024-03-25 RX ADMIN — INSULIN DETEMIR 20 UNITS: 100 INJECTION, SOLUTION SUBCUTANEOUS at 20:24

## 2024-03-25 RX ADMIN — SODIUM CHLORIDE 1000 ML: 9 INJECTION, SOLUTION INTRAVENOUS at 19:05

## 2024-03-25 RX ADMIN — CEFTRIAXONE SODIUM 1000 MG: 1 INJECTION, POWDER, FOR SOLUTION INTRAMUSCULAR; INTRAVENOUS at 17:43

## 2024-03-25 NOTE — ED PROVIDER NOTES
Time: 1:36 PM EDT  Date of encounter:  3/25/2024  Independent Historian/Clinical History and Information was obtained by:   Patient    History is limited by: N/A    Chief Complaint: Shortness of breath      History of Present Illness:  Patient is a 73 y.o. year old male who presents to the emergency department for evaluation of shortness of breath.  Patient wears 2 L nasal cannula at baseline with a history of COPD.  Presents to the emergency department complaining of increased shortness of breath.    HPI    Patient Care Team  Primary Care Provider: Jackeline Jaime APRN    Past Medical History:     No Known Allergies  Past Medical History:   Diagnosis Date    Asthma     COPD (chronic obstructive pulmonary disease)     Diabetes mellitus     Ex-smoker     QUIT 2021    Hernia, umbilical     Hypertension     On home O2     REPORTS WEARING 2L/NC PRN SOA     Past Surgical History:   Procedure Laterality Date    ABDOMINAL SURGERY       Family History   Problem Relation Age of Onset    Asthma Father     Asthma Sister     Asthma Brother     Cancer Maternal Aunt        Home Medications:  Prior to Admission medications    Medication Sig Start Date End Date Taking? Authorizing Provider   albuterol sulfate  (90 Base) MCG/ACT inhaler INHALE 2 PUFFS BY MOUTH EVERY 4 HOURS AS NEEDED FOR WHEEZING 12/7/23   Nelly Jamie APRN   aspirin 81 MG chewable tablet Chew 1 tablet Daily. 11/2/23   Jackeline Jaime APRN   atorvastatin (LIPITOR) 10 MG tablet Take 1 tablet by mouth Daily for 180 days. 2/15/24 8/13/24  Jackeline Jaime APRN   azithromycin (ZITHROMAX) 250 MG tablet Take 2 by mouth today then 1 daily for 4 days 3/14/24   Nelly Jaime APRN   B-D ULTRAFINE III SHORT PEN 31G X 8 MM misc  11/13/23   Provider, MD Roosevelt   budesonide (PULMICORT) 0.5 MG/2ML nebulizer solution Take 2 mL by nebulization 2 (Two) Times a Day for 90 days. 9/27/23 2/15/24  Leila Singh MD   empagliflozin (JARDIANCE) 10 MG tablet tablet Take 1 tablet  by mouth Daily. 2/15/24   Jackeline Jaime APRN   Fluticasone-Salmeterol (ADVAIR/WIXELA) 250-50 MCG/ACT DISKUS Inhale 1 puff 2 (Two) Times a Day for 180 days.  Patient taking differently: Inhale 1 puff 2 (Two) Times a Day. As needed 11/2/23 4/30/24  Jackeline Jaime APRN   insulin degludec (Tresiba FlexTouch) 100 UNIT/ML solution pen-injector injection Inject 20 Units under the skin into the appropriate area as directed Daily. 2/15/24   Jackeline Jaime APRN   levalbuterol (XOPENEX) 1.25 MG/3ML nebulizer solution Take 1 ampule by nebulization Every 6 (Six) Hours As Needed for Shortness of Air or Wheezing. 9/14/23   Danny Méndez MD   metFORMIN (GLUCOPHAGE) 500 MG tablet Take 1 tablet by mouth 2 (Two) Times a Day With Meals. 2/15/24   Jackeline Jaime APRN   omeprazole (priLOSEC) 20 MG capsule Take 1 capsule by mouth Daily. 11/2/23   Jackeline Jaime APRN        Social History:   Social History     Tobacco Use    Smoking status: Former     Current packs/day: 0.00     Average packs/day: 1.5 packs/day for 56.2 years (84.3 ttl pk-yrs)     Types: Cigarettes     Start date: 1965     Quit date: 3/19/2021     Years since quitting: 3.0     Passive exposure: Past    Smokeless tobacco: Current     Types: Chew   Vaping Use    Vaping status: Never Used   Substance Use Topics    Alcohol use: Never    Drug use: Never         Review of Systems:  Review of Systems   Constitutional:  Negative for chills and fever.   HENT:  Negative for congestion, rhinorrhea and sore throat.    Eyes:  Negative for pain and visual disturbance.   Respiratory:  Negative for apnea, cough, chest tightness and shortness of breath.    Cardiovascular:  Negative for chest pain and palpitations.   Gastrointestinal:  Negative for abdominal pain, diarrhea, nausea and vomiting.   Genitourinary:  Negative for difficulty urinating and dysuria.   Musculoskeletal:  Negative for joint swelling and myalgias.   Skin:  Negative for color change.   Neurological:  Negative  "for seizures and headaches.   Psychiatric/Behavioral: Negative.     All other systems reviewed and are negative.       Physical Exam:  /73 (BP Location: Right arm, Patient Position: Lying)   Pulse 81   Temp 99 °F (37.2 °C) (Oral)   Resp 18   Ht 182.9 cm (72\")   Wt 101 kg (222 lb 10.6 oz)   SpO2 92%   BMI 30.20 kg/m²     Physical Exam  Vitals and nursing note reviewed.   Constitutional:       General: He is not in acute distress.     Appearance: Normal appearance. He is not toxic-appearing.   HENT:      Head: Normocephalic and atraumatic.      Jaw: There is normal jaw occlusion.   Eyes:      General: Lids are normal.      Extraocular Movements: Extraocular movements intact.      Conjunctiva/sclera: Conjunctivae normal.      Pupils: Pupils are equal, round, and reactive to light.   Cardiovascular:      Rate and Rhythm: Normal rate and regular rhythm.      Pulses: Normal pulses.      Heart sounds: Normal heart sounds.   Pulmonary:      Effort: Pulmonary effort is normal. No respiratory distress.      Breath sounds: Wheezing present. No rhonchi.   Abdominal:      General: Abdomen is flat.      Palpations: Abdomen is soft.      Tenderness: There is no abdominal tenderness. There is no guarding or rebound.   Musculoskeletal:         General: Normal range of motion.      Cervical back: Normal range of motion and neck supple.      Right lower leg: No edema.      Left lower leg: No edema.   Skin:     General: Skin is warm and dry.   Neurological:      Mental Status: He is alert and oriented to person, place, and time. Mental status is at baseline.   Psychiatric:         Mood and Affect: Mood normal.                  Procedures:  Procedures      Medical Decision Making:      Comorbidities that affect care:    COPD, Diabetes    External Notes reviewed:    Previous Clinic Note: Family medicine office visit for general medical management      The following orders were placed and all results were independently " analyzed by me:  Orders Placed This Encounter   Procedures    Blood Culture - Blood,    Blood Culture - Blood,    Respiratory Panel PCR w/COVID-19(SARS-CoV-2) [facility]/ANIL/KYLEE/PAD/COR/ANA In-House, NP Swab in UTM/VTM, 2 HR TAT - Swab, Nasopharynx    S. Pneumo Ag Urine or CSF - Urine, Urine, Clean Catch    Legionella Antigen, Urine - Urine, Urine, Clean Catch    Respiratory Culture - Sputum, Throat    XR Chest 1 View    [place] Draw    Comprehensive Metabolic Panel    BNP    Single High Sensitivity Troponin T    CBC Auto Differential    High Sensitivity Troponin T 2Hr    Blood Gas, Arterial -With Co-Ox Panel: Yes    Lactic Acid, Plasma    Procalcitonin    Urinalysis With Culture If Indicated - Urine, Clean Catch    Diet: Regular/House; Texture: Regular (IDDSI 7); Fluid Consistency: Thin (IDDSI 0)    Undress & Gown    Continuous Pulse Oximetry    Vital Signs    Inpatient Hospitalist Consult    Oxygen Therapy- Nasal Cannula; Titrate 1-6 LPM Per SpO2; 90 - 95%    ECG 12 Lead ED Triage Standing Order; SOA    Insert Peripheral IV    Inpatient Admission    CBC & Differential    Green Top (Gel)    Lavender Top    Gold Top - SST    Light Blue Top       Medications Given in the Emergency Department:  Medications   sodium chloride 0.9 % flush 10 mL (has no administration in time range)   cefTRIAXone (ROCEPHIN) 1000 mg/100 mL 0.9% NS (MBP) (has no administration in time range)   AZITHROMYCIN 500 MG/250 ML 0.9% NS IVPB (vial-mate) (has no administration in time range)   methylPREDNISolone sodium succinate (SOLU-Medrol) injection 125 mg (has no administration in time range)   sodium chloride 0.9 % bolus 1,000 mL (has no administration in time range)   ipratropium-albuterol (DUO-NEB) nebulizer solution 3 mL (3 mL Nebulization Given 3/25/24 1716)        ED Course:         Labs:    Lab Results (last 24 hours)       Procedure Component Value Units Date/Time    CBC & Differential [[id number]]  (Abnormal) Collected: 03/25/24 7932     Specimen: Blood from Arm, Right Updated: 03/25/24 1312    Narrative:      The following orders were created for panel order CBC & Differential.  Procedure                               Abnormality         Status                     ---------                               -----------         ------                     CBC Auto Differential[[id number]]        Abnormal            Final result                 Please view results for these tests on the individual orders.    Comprehensive Metabolic Panel [[id number]]  (Abnormal) Collected: 03/25/24 1303    Specimen: Blood from Arm, Right Updated: 03/25/24 1336     Glucose 155 mg/dL      BUN 21 mg/dL      Creatinine 1.02 mg/dL      Sodium 138 mmol/L      Potassium 4.8 mmol/L      Chloride 103 mmol/L      CO2 25.3 mmol/L      Calcium 8.7 mg/dL      Total Protein 6.9 g/dL      Albumin 3.6 g/dL      ALT (SGPT) 11 U/L      AST (SGOT) 18 U/L      Alkaline Phosphatase 104 U/L      Total Bilirubin 0.4 mg/dL      Globulin 3.3 gm/dL      A/G Ratio 1.1 g/dL      BUN/Creatinine Ratio 20.6     Anion Gap 9.7 mmol/L      eGFR 77.1 mL/min/1.73     Narrative:      GFR Normal >60  Chronic Kidney Disease <60  Kidney Failure <15    The GFR formula is only valid for adults with stable renal function between ages 18 and 70.    BNP [[id number]]  (Normal) Collected: 03/25/24 1303    Specimen: Blood from Arm, Right Updated: 03/25/24 1332     proBNP 262.9 pg/mL     Narrative:      This assay is used as an aid in the diagnosis of individuals suspected of having heart failure. It can be used as an aid in the diagnosis of acute decompensated heart failure (ADHF) in patients presenting with signs and symptoms of ADHF to the emergency department (ED). In addition, NT-proBNP of <300 pg/mL indicates ADHF is not likely.    Age Range Result Interpretation  NT-proBNP Concentration (pg/mL:      <50             Positive            >450                   Gray                 300-450                    Negative              <300    50-75           Positive            >900                  Gray                300-900                  Negative            <300      >75             Positive            >1800                  Gray                300-1800                  Negative            <300    Single High Sensitivity Troponin T [[id number]]  (Abnormal) Collected: 03/25/24 1303    Specimen: Blood from Arm, Right Updated: 03/25/24 1435     HS Troponin T 59 ng/L     Narrative:      High Sensitive Troponin T Reference Range:  <14.0 ng/L- Negative Female for AMI  <22.0 ng/L- Negative Male for AMI  >=14 - Abnormal Female indicating possible myocardial injury.  >=22 - Abnormal Male indicating possible myocardial injury.   Clinicians would have to utilize clinical acumen, EKG, Troponin, and serial changes to determine if it is an Acute Myocardial Infarction or myocardial injury due to an underlying chronic condition.         CBC Auto Differential [[id number]]  (Abnormal) Collected: 03/25/24 1303    Specimen: Blood from Arm, Right Updated: 03/25/24 1312     WBC 21.48 10*3/mm3      RBC 5.48 10*6/mm3      Hemoglobin 12.4 g/dL      Hematocrit 43.1 %      MCV 78.6 fL      MCH 22.6 pg      MCHC 28.8 g/dL      RDW 17.0 %      RDW-SD 47.4 fl      MPV 10.8 fL      Platelets 173 10*3/mm3      Neutrophil % 84.0 %      Lymphocyte % 6.0 %      Monocyte % 5.7 %      Eosinophil % 3.2 %      Basophil % 0.3 %      Immature Grans % 0.8 %      Neutrophils, Absolute 18.03 10*3/mm3      Lymphocytes, Absolute 1.29 10*3/mm3      Monocytes, Absolute 1.22 10*3/mm3      Eosinophils, Absolute 0.69 10*3/mm3      Basophils, Absolute 0.07 10*3/mm3      Immature Grans, Absolute 0.18 10*3/mm3      nRBC 0.0 /100 WBC     Blood Gas, Arterial -With Co-Ox Panel: Yes [[id number]]  (Abnormal) Collected: 03/25/24 1521    Specimen: Arterial Blood from Arm, Left Updated: 03/25/24 1523     pH, Arterial 7.382 pH units      pCO2, Arterial 42.5 mm Hg      pO2, Arterial 59.3 mm Hg      " HCO3, Arterial 24.7 mmol/L      Base Excess, Arterial -0.5 mmol/L      O2 Saturation, Arterial 90.0 %      Hemoglobin, Blood Gas 13.5 g/dL      Carboxyhemoglobin 1.3 %      Methemoglobin 0.30 %      Oxyhemoglobin 88.6 %      FHHB 9.8 %      Site Arterial: left radial     Modality Room Air     FIO2 21 %      Flow Rate 0 lpm      PO2/FIO2 282    High Sensitivity Troponin T 2Hr [[id number]]  (Abnormal) Collected: 03/25/24 1622    Specimen: Blood from Arm, Left Updated: 03/25/24 1645     HS Troponin T 46 ng/L      Troponin T Delta -13 ng/L     Narrative:      High Sensitive Troponin T Reference Range:  <14.0 ng/L- Negative Female for AMI  <22.0 ng/L- Negative Male for AMI  >=14 - Abnormal Female indicating possible myocardial injury.  >=22 - Abnormal Male indicating possible myocardial injury.   Clinicians would have to utilize clinical acumen, EKG, Troponin, and serial changes to determine if it is an Acute Myocardial Infarction or myocardial injury due to an underlying chronic condition.         Procalcitonin [[id number]]  (Normal) Collected: 03/25/24 1622    Specimen: Blood from Arm, Left Updated: 03/25/24 1722     Procalcitonin 0.16 ng/mL     Narrative:      As a Marker for Sepsis (Non-Neonates):    1. <0.5 ng/mL represents a low risk of severe sepsis and/or septic shock.  2. >2 ng/mL represents a high risk of severe sepsis and/or septic shock.    As a Marker for Lower Respiratory Tract Infections that require antibiotic therapy:    PCT on Admission    Antibiotic Therapy       6-12 Hrs later    >0.5                Strongly Recommended  >0.25 - <0.5        Recommended  0.1 - 0.25          Discouraged              Remeasure/reassess PCT  <0.1                Strongly Discouraged     Remeasure/reassess PCT    As 28 day mortality risk marker: \"Change in Procalcitonin Result\" (>80% or <=80%) if Day 0 (or Day 1) and Day 4 values are available. Refer to http://www.yubacks-pct-calculator.com    Change in PCT <=80%  A decrease " of PCT levels below or equal to 80% defines a positive change in PCT test result representing a higher risk for 28-day all-cause mortality of patients diagnosed with severe sepsis for septic shock.    Change in PCT >80%  A decrease of PCT levels of more than 80% defines a negative change in PCT result representing a lower risk for 28-day all-cause mortality of patients diagnosed with severe sepsis or septic shock.    This test is Prognostic not Diagnostic, if elevated correlate with clinical findings before administering antibiotic treatment.                 Imaging:    XR Chest 1 View    Result Date: 3/25/2024   DATE OF EXAM: 3/25/2024 1:38 PM  PROCEDURE: XR CHEST 1 VW-  INDICATIONS: SOA Triage Protocol  COMPARISON: Chest radiograph 9/11/2023, CT abdomen pelvis 9/12/2023  TECHNIQUE: Portable chest radiograph.  FINDINGS: Heart size normal. Chronic interstitial scarring in the lung bases left greater than right similar to the prior study. Negative for pneumothorax. No significant effusion. Osseous structures grossly intact. Aortic arch atherosclerosis.       1. No acute process. 2. Chronic interstitial scarring at the lung bases left greater than right, unchanged.  Electronically Signed By-Mohsen Rudd MD On:3/25/2024 1:43 PM         Differential Diagnosis and Discussion:    Dyspnea: Differential diagnosis includes but is not limited to metabolic acidosis, neurological disorders, psychogenic, asthma, pneumothorax, upper airway obstruction, COPD, pneumonia, noncardiogenic pulmonary edema, interstitial lung disease, anemia, congestive heart failure, and pulmonary embolism    All labs were reviewed and interpreted by me.  All X-rays impressions were independently interpreted by me.    MDM  Number of Diagnoses or Management Options  Acute exacerbation of chronic obstructive pulmonary disease (COPD)  Hypoxia  Sepsis, due to unspecified organism, unspecified whether acute organ dysfunction present  Diagnosis management  comments: In summary this is a 73-year-old male patient with a history of COPD on 2 L nasal cannula at baseline who presents to the emergency department complaining of increased shortness of breath and is requiring additional supplemental oxygen to maintain acceptable oxygen levels.  CBC independently reviewed and interpreted by me and does reveal significant leukocytosis of 21,000.  CMP independently reviewed by me and shows no critical abnormalities.  Chest x-ray independent reviewed and interpreted by me is unremarkable for acute pathology, specifically no pneumonia.  Patient does meet criteria for sepsis and I do believe he likely has a pneumonia that has not appeared yet on chest x-ray as he is slowly developing fever in the emergency department as well.  He was given antibiotics and IV fluids in the emergency department.  Patient case has been discussed with the hospitalist team who will admit to the hospital for further evaluation and continuation of treatment.             Sepsis criteria was met in the emergency department and the Sepsis protocol (including antibiotic administration) was initiated.      SIRS criteria considered:   1.  Temperature > 100.4 or <96.8    2.  Heart Rate > 90    3.  Respiratory Rate > 22    4.  WBC > 12K or <4K.             Severe Sepsis:     Respiratory: Mechanical Ventilation or Bipap  Hypotension: SBP > 90 or MAP < 65  Renal: Creatinine > 2  Metabolic: Lactic Acid > 2  Hematologic: Platelets < 100K or INR > 1.5  Hepatic: BILI  >  2  CNS: Sudden AMS     Septic Shock:     Severe Sepsis + Persistent hypotension or Lactic Acid > 4     Normal saline bolus, Antibiotics, and final disposition was based on these definitions.        Sepsis was recognized at 1645    Antibiotics were ordered.     30 cc/kg bolus was not indicated.       Patient did not receive the recommended 30ml/kg fluid bolus for sepsis because it would be harmful or detrimental to the patient.    The patient has  patient's blood pressure responded to fluid given.   The patient was ordered 1 L of fluids.    Total Critical Care time of 40 minutes. Total critical care time documented does not include time spent on separately billed procedures for services of nurses or physician assistants. I personally saw and examined the patient. I have reviewed all diagnostic interpretations and treatment plans as written. I was present for the key portions of any procedures performed and the inclusive time noted in any critical care statement. Critical care time includes patient management by me, time spent at the patients bedside,  time to review lab and imaging results, discussing patient care, documentation in the medical record, and time spent with family or caregiver.    Patient Care Considerations:    CONSULT: I considered consulting pulmonology, however no emergent indication      Consultants/Shared Management Plan:    Hospitalist: I have discussed the case with Dr. Ortega who agrees to accept the patient for admission.    Social Determinants of Health:    Patient is independent, reliable, and has access to care.       Disposition and Care Coordination:    Admit:   Through independent evaluation of the patient's history, physical, and imperical data, the patient meets criteria for inpatient admission to the hospital.        Final diagnoses:   Acute exacerbation of chronic obstructive pulmonary disease (COPD)   Hypoxia   Sepsis, due to unspecified organism, unspecified whether acute organ dysfunction present        ED Disposition       ED Disposition   Decision to Admit    Condition   --    Comment   Level of Care: Progressive Care [20]   Diagnosis: Sepsis [[id number]]   Admitting Physician: NELSON ORTEGA [[id number]]   Attending Physician: NELSON ORTEGA [[id number]]   Certification: I Certify That Inpatient Hospital Services Are Medically Necessary For Greater Than 2 Midnights                 This medical record created using voice recognition  software.             Jonathan Sanchez MD  03/25/24 1736

## 2024-03-25 NOTE — H&P
[facility]   HOSPITALIST HISTORY AND PHYSICAL  Date: 3/25/2024   Patient Name: Dilip Faulkner  : 1950  MRN: [id number]  Primary Care Physician:  Jackeline Jaime APRN  Date of admission: 3/25/2024    Subjective shortness of breath  Subjective   Chief Complaint: Shortness of breath    HPI: Patient is a 73-year-old man who has a history of recurrent hospital admissions, he has COPD and is on 2 L of oxygen, patient will not use his NIV, has SVT that is required adenosine in the past, morbid obesity, he presents with worsening shortness of breath.      Patient will not use his BiPAP because he believes it will kill him.  I told him there are other brands in [place].    On arrival to the ED, patient's temperature 98.1, pulse of 84, respiratory rate of 20, blood pressure 105/67, and he is requiring 2 L of oxygen saturating 92%.    Patient's chest x-ray shows: Chronic interstitial scarring in the lung bases left greater than the right unchanged.    Patient's AB.382/42.5/59.3/24.7.  Patient's troponin is flat.  Delta T is -13.  Patient's white blood cell count is 21.48, hemoglobin is 12.4.  Lymphocytes are 6.0.    Patient has recently been on steroids.  His procalcitonin is 0.16.  Lactic is 1.7.  Personal History     Past Medical History:  Past Medical History:   Diagnosis Date    Asthma     COPD (chronic obstructive pulmonary disease)     Diabetes mellitus     Ex-smoker     QUIT     Hernia, umbilical     Hypertension     On home O2     REPORTS WEARING 2L/NC PRN SOA       Past Surgical History:  Past Surgical History:   Procedure Laterality Date    ABDOMINAL SURGERY         Family History:   Family History   Problem Relation Age of Onset    Asthma Father     Asthma Sister     Asthma Brother     Cancer Maternal Aunt        Social History:   Social History     Socioeconomic History    Marital status: Single   Tobacco Use    Smoking status: Former     Current packs/day: 0.00     Average packs/day: 1.5  packs/day for 56.2 years (84.3 ttl pk-yrs)     Types: Cigarettes     Start date: 1965     Quit date: 3/19/2021     Years since quitting: 3.0     Passive exposure: Past    Smokeless tobacco: Current     Types: Chew   Vaping Use    Vaping status: Never Used   Substance and Sexual Activity    Alcohol use: Never    Drug use: Never    Sexual activity: Defer       Home Medications:  Fluticasone-Salmeterol, albuterol sulfate HFA, aspirin, atorvastatin, empagliflozin, insulin degludec, metFORMIN, and omeprazole    Allergies:  No Known Allergies    Review of Systems   All systems were reviewed and negative except for: sob    Objective   Objective     Vitals:   Temp:  [98.1 °F (36.7 °C)-99 °F (37.2 °C)] 99 °F (37.2 °C)  Heart Rate:  [81-84] 81  Resp:  [18-20] 18  BP: (105-119)/(67-73) 119/73  Flow (L/min):  [2-3] 2    Physical Exam    Constitutional: Awake, alert, no acute distress   Eyes: Pupils equal, sclerae anicteric, no conjunctival injection   HENT: NCAT, mucous membranes moist   Neck: Supple, no thyromegaly, no lymphadenopathy, trachea midline   Respiratory: Wheezing   Cardiovascular: RRR, no murmurs, rubs, or gallops, palpable pedal pulses bilaterally   Gastrointestinal: Positive bowel sounds, soft, nontender, nondistended   Musculoskeletal: No bilateral ankle edema, no clubbing or cyanosis to extremities   Psychiatric: Appropriate affect, cooperative   Neurologic: Oriented x 3, strength symmetric in all extremities, Cranial Nerves grossly intact to confrontation, speech clear   Skin: No rashes     Result Review    Result Review:  I have personally reviewed the results from the time of this admission to 3/25/2024 18:32 EDT and agree with these findings:  [x]  Laboratory  []  Microbiology  [x]  Radiology  []  EKG/Telemetry   [x]  Cardiology/Vascular   []  Pathology  [x]  Old records  []  Other:      Assessment & Plan   Assessment / Plan   #1 COPD exacerbation   -chest Xray: Chronic changes.  Will get chest  CT.  -History of frequent COPD hospitalizations or requiring intubation: Yes  -On Home O2 or nebulizer: Yes.  On 2 L of oxygen.  Patient is on his home oxygen level.  -BMI: 30.20  -Started on azithromycin, DuoNeb, Brovana, Pulmicort, Mucinex.  Complete respiratory panel ordered.  RT consulted for bronchopulmonary hygiene.  -IV steroids  -Pulmonary hygiene    #2 NED  -Noncompliant with CPAP.    #3 hypertension  -Does not look like patient has picked up any blood pressure medicine recently.  His blood pressure is not high currently.  Used to be on lisinopril and a beta-blocker.    #4 diabetes type 2 insulin-dependent-continue basal; insulin sliding scale.  Hold metformin.  Hold Jardiance.    #5 hyperlipidemia-aspirin and Lipitor    #6 GERD-continue PPI    #7 leukocytosis-patient has recently been on steroids.    DVT prophylaxis:  Medical DVT prophylaxis orders are signed and held.          CODE STATUS:    Level Of Support Discussed With: Patient  Code Status (Patient has no pulse and is not breathing): CPR (Attempt to Resuscitate)  Medical Interventions (Patient has pulse or is breathing): Full Support      Admission Status:  I believe this patient meets inpatient status.    Electronically signed by Driss Miguel DO, 03/25/24, 6:06 PM EDT.

## 2024-03-26 LAB
ANION GAP SERPL CALCULATED.3IONS-SCNC: 11.7 MMOL/L (ref 5–15)
BASOPHILS # BLD AUTO: 0.02 10*3/MM3 (ref 0–0.2)
BASOPHILS NFR BLD AUTO: 0.1 % (ref 0–1.5)
BUN SERPL-MCNC: 22 MG/DL (ref 8–23)
BUN/CREAT SERPL: 22.9 (ref 7–25)
CALCIUM SPEC-SCNC: 8.4 MG/DL (ref 8.6–10.5)
CHLORIDE SERPL-SCNC: 102 MMOL/L (ref 98–107)
CO2 SERPL-SCNC: 23.3 MMOL/L (ref 22–29)
CREAT SERPL-MCNC: 0.96 MG/DL (ref 0.76–1.27)
DEPRECATED RDW RBC AUTO: 47.2 FL (ref 37–54)
EGFRCR SERPLBLD CKD-EPI 2021: 82.9 ML/MIN/1.73
EOSINOPHIL # BLD AUTO: 0 10*3/MM3 (ref 0–0.4)
EOSINOPHIL NFR BLD AUTO: 0 % (ref 0.3–6.2)
ERYTHROCYTE [DISTWIDTH] IN BLOOD BY AUTOMATED COUNT: 16.6 % (ref 12.3–15.4)
GLUCOSE BLDC GLUCOMTR-MCNC: 127 MG/DL (ref 70–99)
GLUCOSE BLDC GLUCOMTR-MCNC: 185 MG/DL (ref 70–99)
GLUCOSE BLDC GLUCOMTR-MCNC: 209 MG/DL (ref 70–99)
GLUCOSE BLDC GLUCOMTR-MCNC: 238 MG/DL (ref 70–99)
GLUCOSE SERPL-MCNC: 207 MG/DL (ref 65–99)
HCT VFR BLD AUTO: 41.2 % (ref 37.5–51)
HGB BLD-MCNC: 11.6 G/DL (ref 13–17.7)
IMM GRANULOCYTES # BLD AUTO: 0.17 10*3/MM3 (ref 0–0.05)
IMM GRANULOCYTES NFR BLD AUTO: 1.1 % (ref 0–0.5)
LYMPHOCYTES # BLD AUTO: 0.78 10*3/MM3 (ref 0.7–3.1)
LYMPHOCYTES NFR BLD AUTO: 4.9 % (ref 19.6–45.3)
MAGNESIUM SERPL-MCNC: 2.1 MG/DL (ref 1.6–2.4)
MCH RBC QN AUTO: 22.3 PG (ref 26.6–33)
MCHC RBC AUTO-ENTMCNC: 28.2 G/DL (ref 31.5–35.7)
MCV RBC AUTO: 79.1 FL (ref 79–97)
MONOCYTES # BLD AUTO: 0.12 10*3/MM3 (ref 0.1–0.9)
MONOCYTES NFR BLD AUTO: 0.7 % (ref 5–12)
NEUTROPHILS NFR BLD AUTO: 14.94 10*3/MM3 (ref 1.7–7)
NEUTROPHILS NFR BLD AUTO: 93.2 % (ref 42.7–76)
NRBC BLD AUTO-RTO: 0 /100 WBC (ref 0–0.2)
PHOSPHATE SERPL-MCNC: 2.5 MG/DL (ref 2.5–4.5)
PLATELET # BLD AUTO: 175 10*3/MM3 (ref 140–450)
PMV BLD AUTO: 10.8 FL (ref 6–12)
POTASSIUM SERPL-SCNC: 4.2 MMOL/L (ref 3.5–5.2)
RBC # BLD AUTO: 5.21 10*6/MM3 (ref 4.14–5.8)
SODIUM SERPL-SCNC: 137 MMOL/L (ref 136–145)
WBC NRBC COR # BLD AUTO: 16.03 10*3/MM3 (ref 3.4–10.8)

## 2024-03-26 PROCEDURE — 97165 OT EVAL LOW COMPLEX 30 MIN: CPT

## 2024-03-26 PROCEDURE — 25010000002 AZITHROMYCIN PER 500 MG: Performed by: HOSPITALIST

## 2024-03-26 PROCEDURE — 97161 PT EVAL LOW COMPLEX 20 MIN: CPT

## 2024-03-26 PROCEDURE — 96372 THER/PROPH/DIAG INJ SC/IM: CPT

## 2024-03-26 PROCEDURE — 96376 TX/PRO/DX INJ SAME DRUG ADON: CPT

## 2024-03-26 PROCEDURE — 94799 UNLISTED PULMONARY SVC/PX: CPT

## 2024-03-26 PROCEDURE — 25010000002 METHYLPREDNISOLONE PER 40 MG: Performed by: INTERNAL MEDICINE

## 2024-03-26 PROCEDURE — 85025 COMPLETE CBC W/AUTO DIFF WBC: CPT | Performed by: HOSPITALIST

## 2024-03-26 PROCEDURE — 94761 N-INVAS EAR/PLS OXIMETRY MLT: CPT

## 2024-03-26 PROCEDURE — 82948 REAGENT STRIP/BLOOD GLUCOSE: CPT

## 2024-03-26 PROCEDURE — G0378 HOSPITAL OBSERVATION PER HR: HCPCS

## 2024-03-26 PROCEDURE — 99233 SBSQ HOSP IP/OBS HIGH 50: CPT | Performed by: INTERNAL MEDICINE

## 2024-03-26 PROCEDURE — 25010000002 METHYLPREDNISOLONE PER 40 MG: Performed by: HOSPITALIST

## 2024-03-26 PROCEDURE — 63710000001 INSULIN LISPRO (HUMAN) PER 5 UNITS: Performed by: HOSPITALIST

## 2024-03-26 PROCEDURE — 84100 ASSAY OF PHOSPHORUS: CPT | Performed by: HOSPITALIST

## 2024-03-26 PROCEDURE — 25810000003 SODIUM CHLORIDE 0.9 % SOLUTION: Performed by: HOSPITALIST

## 2024-03-26 PROCEDURE — 63710000001 INSULIN DETEMIR PER 5 UNITS: Performed by: HOSPITALIST

## 2024-03-26 PROCEDURE — 25010000002 ENOXAPARIN PER 10 MG: Performed by: HOSPITALIST

## 2024-03-26 PROCEDURE — 82948 REAGENT STRIP/BLOOD GLUCOSE: CPT | Performed by: HOSPITALIST

## 2024-03-26 PROCEDURE — 83735 ASSAY OF MAGNESIUM: CPT | Performed by: HOSPITALIST

## 2024-03-26 PROCEDURE — 80048 BASIC METABOLIC PNL TOTAL CA: CPT | Performed by: HOSPITALIST

## 2024-03-26 RX ORDER — BUDESONIDE 0.5 MG/2ML
0.5 INHALANT ORAL
Status: DISCONTINUED | OUTPATIENT
Start: 2024-03-26 | End: 2024-03-27 | Stop reason: HOSPADM

## 2024-03-26 RX ORDER — ARFORMOTEROL TARTRATE 15 UG/2ML
15 SOLUTION RESPIRATORY (INHALATION)
Status: DISCONTINUED | OUTPATIENT
Start: 2024-03-26 | End: 2024-03-27 | Stop reason: HOSPADM

## 2024-03-26 RX ORDER — IPRATROPIUM BROMIDE AND ALBUTEROL SULFATE 2.5; .5 MG/3ML; MG/3ML
3 SOLUTION RESPIRATORY (INHALATION) EVERY 4 HOURS PRN
Status: DISCONTINUED | OUTPATIENT
Start: 2024-03-26 | End: 2024-03-27 | Stop reason: HOSPADM

## 2024-03-26 RX ORDER — AZITHROMYCIN 250 MG/1
250 TABLET, FILM COATED ORAL
Qty: 4 TABLET | Refills: 0 | Status: DISCONTINUED | OUTPATIENT
Start: 2024-03-27 | End: 2024-03-27 | Stop reason: HOSPADM

## 2024-03-26 RX ADMIN — METHYLPREDNISOLONE SODIUM SUCCINATE 40 MG: 40 INJECTION INTRAMUSCULAR; INTRAVENOUS at 12:03

## 2024-03-26 RX ADMIN — ARFORMOTEROL TARTRATE 15 MCG: 15 SOLUTION RESPIRATORY (INHALATION) at 19:20

## 2024-03-26 RX ADMIN — ARFORMOTEROL TARTRATE 15 MCG: 15 SOLUTION RESPIRATORY (INHALATION) at 11:32

## 2024-03-26 RX ADMIN — ENOXAPARIN SODIUM 40 MG: 100 INJECTION SUBCUTANEOUS at 08:10

## 2024-03-26 RX ADMIN — BUDESONIDE 0.5 MG: 0.5 INHALANT RESPIRATORY (INHALATION) at 19:20

## 2024-03-26 RX ADMIN — BUDESONIDE 0.5 MG: 0.5 INHALANT RESPIRATORY (INHALATION) at 11:32

## 2024-03-26 RX ADMIN — PANTOPRAZOLE SODIUM 40 MG: 40 TABLET, DELAYED RELEASE ORAL at 06:18

## 2024-03-26 RX ADMIN — INSULIN LISPRO 5 UNITS: 100 INJECTION, SOLUTION INTRAVENOUS; SUBCUTANEOUS at 08:10

## 2024-03-26 RX ADMIN — ASPIRIN 81 MG: 81 TABLET, CHEWABLE ORAL at 08:16

## 2024-03-26 RX ADMIN — AZITHROMYCIN MONOHYDRATE 500 MG: 500 INJECTION, POWDER, LYOPHILIZED, FOR SOLUTION INTRAVENOUS at 03:23

## 2024-03-26 RX ADMIN — ATORVASTATIN CALCIUM 10 MG: 20 TABLET, FILM COATED ORAL at 08:16

## 2024-03-26 RX ADMIN — Medication 10 ML: at 08:16

## 2024-03-26 RX ADMIN — METHYLPREDNISOLONE SODIUM SUCCINATE 40 MG: 40 INJECTION INTRAMUSCULAR; INTRAVENOUS at 00:45

## 2024-03-26 RX ADMIN — INSULIN DETEMIR 20 UNITS: 100 INJECTION, SOLUTION SUBCUTANEOUS at 21:44

## 2024-03-26 RX ADMIN — INSULIN LISPRO 5 UNITS: 100 INJECTION, SOLUTION INTRAVENOUS; SUBCUTANEOUS at 21:44

## 2024-03-26 RX ADMIN — Medication 10 ML: at 21:44

## 2024-03-26 RX ADMIN — INSULIN LISPRO 3 UNITS: 100 INJECTION, SOLUTION INTRAVENOUS; SUBCUTANEOUS at 16:50

## 2024-03-26 NOTE — PROGRESS NOTES
[facility]   Hospitalist Progress Note  Date: 3/26/2024  Patient Name: Dilip Faulkner  : 1950  MRN: [id number]  Date of admission: 3/25/2024      Subjective   Subjective     Chief Complaint: Shortness of air    Summary:   Patient is a 73-year-old man who has a history of recurrent hospital admissions, he has COPD and is on 2 L of oxygen, patient will not use his NIV, has SVT that is required adenosine in the past, morbid obesity, he presents with worsening shortness of breath.       Patient will not use his BiPAP because he believes it will kill him.  I told him there are other brands in [place].     On arrival to the ED, patient's temperature 98.1, pulse of 84, respiratory rate of 20, blood pressure 105/67, and he is requiring 2 L of oxygen saturating 92%.     Patient's chest x-ray shows: Chronic interstitial scarring in the lung bases left greater than the right unchanged.     Patient's AB.382/42.5/59.3/24.7.  Patient's troponin is flat.  Delta T is -13.  Patient's white blood cell count is 21.48, hemoglobin is 12.4.  Lymphocytes are 6.0.     Patient has recently been on steroids.  His procalcitonin is 0.16.  Lactic is 1.7.  Interval Followup:   Remains on 2 L.  At his baseline.  Other vital signs stable  Shortness of air is better but  Cough better  Denies any chest pain  Overall feels better  Review of Systems   All systems were reviewed and negative except for: Summary and interval follow-up    Objective   Objective     Vitals:   Temp:  [97.2 °F (36.2 °C)-98.4 °F (36.9 °C)] 97.3 °F (36.3 °C)  Heart Rate:  [71-89] 74  Resp:  [18] 18  BP: (114-134)/(72-78) 132/72  Flow (L/min):  [2] 2  Physical Exam    Constitutional: Awake, alert, no acute distress   Eyes: Bilateral ptosis congenital per patient, pupils equal, sclerae anicteric, no conjunctival injection   HENT: NCAT, mucous membranes moist   Neck: Supple, no thyromegaly, no lymphadenopathy, trachea midline   Respiratory: Occasional wheezing,  clear to auscultation bilaterally, nonlabored respirations    Cardiovascular: RRR, no murmurs, rubs, or gallops, palpable pedal pulses bilaterally   Gastrointestinal: Positive bowel sounds, soft, nontender, nondistended   Musculoskeletal: No bilateral ankle edema, no clubbing or cyanosis to extremities   Psychiatric: Appropriate affect, cooperative   Neurologic: Oriented x 3, strength symmetric in all extremities, Cranial Nerves grossly intact to confrontation, speech clear   Skin: No rashes     Result Review    Result Review:  I have personally reviewed the results for the past 24 hours and agree with these findings:  [x]  Laboratory  []  Microbiology  [x]  Radiology  [x]  EKG/Telemetry normal sinus rhythm 85, right bundle branch block  []  Cardiology/Vascular   []  Pathology  [x]  Old records  [x]  Other: Medications    Assessment & Plan   Assessment / Plan     Assessment:  COPD exacerbation.  Obstructive sleep apnea noncompliant.  Chronic hypoxic respiratory failure on 2 L.  Diabetes mellitus.  Hypertension.  SVT.  Leukocytosis.  Patient has been on steroids.  Improving.  Right bundle block  Chronic ptosis bilateral eyes.  Bibasilar atelectasis versus fibrosis left more than right.    Plan:  Continue supplemental oxygen keep sats more than 90%.  IV steroids.  P.o. Zithromax wean  DuoNeb, Pulmicort and brovana.  Bronchodilator and bronchopulmonary hygiene protocol.  Patient denies any history of myasthenia gravis.  Has chronic ptosis of both eyes.  Sliding-scale insulin.  PT OT.  CT chest noted atelectasis versus fibrosis left more than right lower lobe  Pro-Rigoberto and lactate negative.  Strep and Legionella antigen negative.  Respiratory panel PCR negative  DC telemetry  Discussed plan with RN.  Discharge home in a.m. if remains stable    DVT prophylaxis:  Medical DVT prophylaxis orders are present.        CODE STATUS:   Level Of Support Discussed With: Patient  Code Status (Patient has no pulse and is not  breathing): CPR (Attempt to Resuscitate)  Medical Interventions (Patient has pulse or is breathing): Full Support      Part of this note may be an electronic transcription/translation of spoken language to printed text using the Dragon Dictation System.     Electronically signed by Sigifredo Mendes MD, 03/26/24, 6:09 PM EDT.

## 2024-03-26 NOTE — PROGRESS NOTES
Respiratory Therapist Broncho-Pulmonary Hygiene Progress Note      Patient Name:  Dilip Faulkner  YOB: 1950    Dilip Faulkner meets the qualification for Level 1 of the Bronco-Pulmonary Hygiene Protocol. This was based on my daily patient assessment and includes review of chest x-ray results, cough ability and quality, oxygenation, secretions or risk for secretion development and patient mobility.     Broncho-Pulmonary Hygiene Assessment:    Level of Movement: Actively changing positions without assistance  Alert/ oriented/ cooperative    Breath Sounds: Diminished and/or coarse rhonchi    Cough: Strong, effective    Chest X-Ray: Possible signs of consolidation and/or atelectasis or clear.     Sputum Productions: None or small amount of thin or watery secretions with effective cough    History and Physical: Home use of oxygen     SpO2 to Oxygen Need: greater than 92% on room air or  less than 3L nasal canula    Current SpO2 is: 93 on 2L    Based on this information, I have completed the following interventions: Teach/Instruct patient on cough and deep breathe      Electronically signed by Disha Romano RRT, 03/26/24, 3:44 AM EDT.

## 2024-03-26 NOTE — PLAN OF CARE
Goal Outcome Evaluation:         Patient alert and oriented x4 and can make needs known, patient arrived from ED on stretcher, patient ambulated from stretcher to bed, patient had large bowel movement, patient on Continuous Pulse ox on 2 liters of oxygen with SPO2 at 94. Patient denies pain.

## 2024-03-26 NOTE — PLAN OF CARE
Goal Outcome Evaluation:  Plan of Care Reviewed With: patient        Progress: no change  Outcome Evaluation: Patient safe to discharge home when medically appropriate. patient may benefit from cardio/pulmonary rehab in order to increase safety and decrease risk for return to hospital.  Discharge OT services at this time.      Anticipated Discharge Disposition (OT): home with outpatient therapy services, home, other (see comments) (cardio/pulmonary rehab)

## 2024-03-26 NOTE — THERAPY EVALUATION
Acute Care - Physical Therapy Initial Evaluation   [place]     Patient Name: Dilip Faulkner  : 1950  MRN: [id number]  Today's Date: 3/26/2024      Visit Dx:     ICD-10-CM ICD-9-CM   1. Acute exacerbation of chronic obstructive pulmonary disease (COPD)  J44.1 491.21   2. Hypoxia  R09.02 799.02   3. Sepsis, due to unspecified organism, unspecified whether acute organ dysfunction present  A41.9 038.9     995.91   4. Impaired mobility and ADLs  Z74.09 V49.89    Z78.9    5. Difficulty walking  R26.2 719.7     Patient Active Problem List   Diagnosis    Chronic obstructive pulmonary disease with acute exacerbation    Type 2 diabetes mellitus    Essential hypertension    Hyperlipidemia    Cough    Pneumonia due to infectious organism    COPD with acute exacerbation    Obesity (BMI 30-39.9)    Right bundle branch block    Acquired ptosis of eyelid, bilateral    Type 2 diabetes mellitus with hyperglycemia, with long-term current use of insulin    Acute exacerbation of chronic obstructive pulmonary disease (COPD)    Gastroesophageal reflux disease without esophagitis    COPD (chronic obstructive pulmonary disease)    Noncompliance with CPAP treatment    Unstable angina    COPD exacerbation    Aspiration pneumonia of left lower lobe due to gastric secretions    Sepsis    PSVT (paroxysmal supraventricular tachycardia)     Past Medical History:   Diagnosis Date    Asthma     COPD (chronic obstructive pulmonary disease)     Diabetes mellitus     Ex-smoker     QUIT     Hernia, umbilical     Hypertension     On home O2     REPORTS WEARING 2L/NC PRN SOA     Past Surgical History:   Procedure Laterality Date    ABDOMINAL SURGERY       PT Assessment (Last 12 Hours)       PT Evaluation and Treatment       Row Name 24 1500          Physical Therapy Time and Intention    Subjective Information no complaints  -DP     Document Type evaluation  -DP     Mode of Treatment individual therapy;physical therapy  -DP      Patient Effort good  -DP       Row Name 03/26/24 1500          General Information    Patient Profile Reviewed yes  -DP     Patient Observations alert;cooperative  -DP     Prior Level of Function independent:;gait;transfer;bed mobility;ADL's  -DP     Equipment Currently Used at Home cane, straight;oxygen  -DP     Existing Precautions/Restrictions fall  -DP     Barriers to Rehab none identified  -DP       Row Name 03/26/24 1500          Living Environment    Current Living Arrangements apartment  -DP     People in Home sibling(s)  -DP     Primary Care Provided by self  -DP       Row Name 03/26/24 1500          Cognition    Orientation Status (Cognition) oriented x 3  -DP       Row Name 03/26/24 1500          Range of Motion (ROM)    Range of Motion bilateral lower extremities;ROM is WFL  -DP       Row Name 03/26/24 1500          Strength Comprehensive (MMT)    General Manual Muscle Testing (MMT) Assessment lower extremity strength deficits identified  -DP     Comment, General Manual Muscle Testing (MMT) Assessment BLE: 4/5  -DP       Row Name 03/26/24 1500          Bed Mobility    Bed Mobility supine-sit-supine  -DP     Supine-Sit-Supine [place] (Bed Mobility) standby assist  -DP       Row Name 03/26/24 1500          Transfers    Transfers sit-stand transfer  -DP       Row Name 03/26/24 1500          Sit-Stand Transfer    Sit-Stand [place] (Transfers) standby assist  -DP       Row Name 03/26/24 1500          Gait/Stairs (Locomotion)    Gait/Stairs Locomotion gait/ambulation assistive device  -DP     [place] Level (Gait) contact guard  -DP     Assistive Device (Gait) walker, front-wheeled  -DP     Patient was able to Ambulate yes  -DP     Distance in Feet (Gait) 30  -DP       Row Name 03/26/24 1500          Balance    Balance Assessment standing dynamic balance  -DP     Dynamic Standing Balance standby assist  -DP     Position/Device Used, Standing Balance unsupported  -DP       Row Name 03/26/24 1500           Plan of Care Review    Plan of Care Reviewed With patient  -DP     Outcome Evaluation Pt presents with decreased strength, transfers and functional mobility. Will benefit from inpatient PT services and continued PT services upon discharge.  -DP       Row Name 03/26/24 1500          Therapy Assessment/Plan (PT)    Rehab Potential (PT) good, to achieve stated therapy goals  -DP     Criteria for Skilled Interventions Met (PT) yes;meets criteria  -DP     Therapy Frequency (PT) daily  -DP     Predicted Duration of Therapy Intervention (PT) 10 days  -DP     Problem List (PT) problems related to;mobility  -DP     Activity Limitations Related to Problem List (PT) unable to transfer safely;unable to ambulate safely  -DP       Row Name 03/26/24 1500          PT Evaluation Complexity    History, PT Evaluation Complexity no personal factors and/or comorbidities  -DP     Examination of Body Systems (PT Eval Complexity) total of 4 or more elements  -DP     Clinical Presentation (PT Evaluation Complexity) stable  -DP     Clinical Decision Making (PT Evaluation Complexity) low complexity  -DP     Overall Complexity (PT Evaluation Complexity) low complexity  -DP       Row Name 03/26/24 1500          Physical Therapy Goals    Gait Training Goal Selection (PT) gait training, PT goal 1  -DP       Row Name 03/26/24 1500          Gait Training Goal 1 (PT)    Activity/Assistive Device (Gait Training Goal 1, PT) assistive device use;walker, rolling  -DP     [place] Level (Gait Training Goal 1, PT) supervision required  -DP     Distance (Gait Training Goal 1, PT) 200  -DP     Time Frame (Gait Training Goal 1, PT) 10 days  -DP               User Key  (r) = Recorded By, (t) = Taken By, (c) = Cosigned By      Initials Name Provider Type    Elza Nicole, PT Physical Therapist                      PT Recommendation and Plan  Anticipated Discharge Disposition (PT): home with home health  Planned Therapy Interventions (PT):  balance training, bed mobility training, gait training, strengthening, transfer training  Therapy Frequency (PT): daily  Plan of Care Reviewed With: patient  Outcome Evaluation: Pt presents with decreased strength, transfers and functional mobility. Will benefit from inpatient PT services and continued PT services upon discharge.   Outcome Measures       Row Name 03/26/24 1500             How much help from another person do you currently need...    Turning from your back to your side while in flat bed without using bedrails? 4  -DP      Moving from lying on back to sitting on the side of a flat bed without bedrails? 4  -DP      Moving to and from a bed to a chair (including a wheelchair)? 3  -DP      Standing up from a chair using your arms (e.g., wheelchair, bedside chair)? 3  -DP      Climbing 3-5 steps with a railing? 3  -DP      To walk in hospital room? 3  -DP      AM-PAC 6 Clicks Score (PT) 20  -DP      Highest Level of Mobility Goal 6 --> Walk 10 steps or more  -DP         Functional Assessment    Outcome Measure Options AM-PAC 6 Clicks Basic Mobility (PT)  -DP                User Key  (r) = Recorded By, (t) = Taken By, (c) = Cosigned By      Initials Name Provider Type    Elza Nicole, PT Physical Therapist                     Time Calculation:    PT Charges       Row Name 03/26/24 1521             Time Calculation    PT Received On 03/26/24  -DP      PT Goal Re-Cert Due Date 04/04/24  -DP         Untimed Charges    PT Eval/Re-eval Minutes 40  -DP         Total Minutes    Untimed Charges Total Minutes 40  -DP       Total Minutes 40  -DP                User Key  (r) = Recorded By, (t) = Taken By, (c) = Cosigned By      Initials Name Provider Type    Elza Nicole, PT Physical Therapist                      PT G-Codes  Outcome Measure Options: AM-PAC 6 Clicks Basic Mobility (PT)  AM-PAC 6 Clicks Score (PT): 20  AM-PAC 6 Clicks Score (OT): 21    Elza Miguel PT  3/26/2024

## 2024-03-26 NOTE — THERAPY EVALUATION
Patient Name: Dilip Faulkner  : 1950    MRN: [id number]                              Today's Date: 3/26/2024       Admit Date: 3/25/2024    Visit Dx:     ICD-10-CM ICD-9-CM   1. Acute exacerbation of chronic obstructive pulmonary disease (COPD)  J44.1 491.21   2. Hypoxia  R09.02 799.02   3. Sepsis, due to unspecified organism, unspecified whether acute organ dysfunction present  A41.9 038.9     995.91   4. Impaired mobility and ADLs  Z74.09 V49.89    Z78.9      Patient Active Problem List   Diagnosis    Chronic obstructive pulmonary disease with acute exacerbation    Type 2 diabetes mellitus    Essential hypertension    Hyperlipidemia    Cough    Pneumonia due to infectious organism    COPD with acute exacerbation    Obesity (BMI 30-39.9)    Right bundle branch block    Acquired ptosis of eyelid, bilateral    Type 2 diabetes mellitus with hyperglycemia, with long-term current use of insulin    Acute exacerbation of chronic obstructive pulmonary disease (COPD)    Gastroesophageal reflux disease without esophagitis    COPD (chronic obstructive pulmonary disease)    Noncompliance with CPAP treatment    Unstable angina    COPD exacerbation    Aspiration pneumonia of left lower lobe due to gastric secretions    Sepsis    PSVT (paroxysmal supraventricular tachycardia)     Past Medical History:   Diagnosis Date    Asthma     COPD (chronic obstructive pulmonary disease)     Diabetes mellitus     Ex-smoker     QUIT     Hernia, umbilical     Hypertension     On home O2     REPORTS WEARING 2L/NC PRN SOA     Past Surgical History:   Procedure Laterality Date    ABDOMINAL SURGERY        General Information       Row Name 24 1310          OT Time and Intention    Document Type evaluation  -AC     Mode of Treatment individual therapy;occupational therapy  -AC       Row Name 24 1310          General Information    Patient Profile Reviewed yes  -AC     Prior Level of Function --  patient reports (I) with  adls and uses a cane for functional mobility, O2 prn and has a shower chair for bathing.  Pt. drives [place].  -     Existing Precautions/Restrictions fall;oxygen therapy device and L/min  -     Barriers to Rehab none identified  -       Row Name 03/26/24 1310          Occupational Profile    Reason for Services/Referral (Occupational Profile) Pt. is a 73 year old male admitted for the above diagnosis. Pt. referred to OT services to assess independence with ADLs and adl transfers/fx'l mobility. No previous OT services for current condition.  -       Row Name 03/26/24 1310          Living Environment    People in Home sibling(s)  -       Row Name 03/26/24 1310          Cognition    Orientation Status (Cognition) oriented x 3  -       Row Name 03/26/24 1310          Safety Issues, Functional Mobility    Impairments Affecting Function (Mobility) endurance/activity tolerance;shortness of breath;balance  -               User Key  (r) = Recorded By, (t) = Taken By, (c) = Cosigned By      Initials Name Provider Type     Dulce Piper, OT Occupational Therapist                     Mobility/ADL's       Row Name 03/26/24 1323          Bed Mobility    Bed Mobility bed mobility (all) activities  -     All Activities, [place] (Bed Mobility) independent  -     Assistive Device (Bed Mobility) head of bed elevated;bed rails  -       Row Name 03/26/24 1323          Transfers    Transfers sit-stand transfer  -       Row Name 03/26/24 1323          Sit-Stand Transfer    Sit-Stand [place] (Transfers) standby assist  -       Row Name 03/26/24 1323          Functional Mobility    Functional Mobility- Ind. Level standby assist  -       Row Name 03/26/24 1323          Activities of Daily Living    BADL Assessment/Intervention --  patient is sba for upper body bathing/dressing, sba for grooming while standing, sba for toileting, setup for self-feeding.  -               User Key  (r) = Recorded By, (t)  = Taken By, (c) = Cosigned By      Initials Name Provider Type    AC Dulce Piper OT Occupational Therapist                   Obj/Interventions       Row Name 03/26/24 1323          Sensory Assessment (Somatosensory)    Sensory Assessment (Somatosensory) UE sensation intact  -AC       Row Name 03/26/24 1323          Vision Assessment/Intervention    Visual Impairment/Limitations WFL  -[facility] 03/26/24 1323          Range of Motion Comprehensive    General Range of Motion bilateral upper extremity ROM WNL  -AC       Row Name 03/26/24 1323          Strength Comprehensive (MMT)    General Manual Muscle Testing (MMT) Assessment no strength deficits identified  -[facility] Name 03/26/24 1323          Motor Skills    Motor Skills coordination;functional endurance  -     Coordination WFL  -     Functional Endurance fair/fair +  -AC       Row Name 03/26/24 1323          Balance    Balance Assessment standing dynamic balance  -     Dynamic Standing Balance standby assist  -     Position/Device Used, Standing Balance unsupported  -               User Key  (r) = Recorded By, (t) = Taken By, (c) = Cosigned By      Initials Name Provider Type    Dluce Saleh OT Occupational Therapist                   Goals/Plan    No documentation.                  Clinical Impression       Row Name 03/26/24 1324          Pain Assessment    Pretreatment Pain Rating 0/10 - no pain  -     Posttreatment Pain Rating 0/10 - no pain  -AC       Row Name 03/26/24 1324          Plan of Care Review    Plan of Care Reviewed With patient  -     Progress no change  -     Outcome Evaluation Patient safe to discharge home when medically appropriate. patient may benefit from cardio/pulmonary rehab in order to increase safety and decrease risk for return to hospital.  Discharge OT services at this time.  -       Row Name 03/26/24 1324          Therapy Assessment/Plan (OT)    Patient/Family Therapy Goal Statement (OT) NA  -AC      Rehab Potential (OT) --  NA  -AC     Criteria for Skilled Therapeutic Interventions Met (OT) no;does not meet criteria for skilled intervention  -AC     Therapy Frequency (OT) evaluation only  -       Row Name 03/26/24 1324          Therapy Plan Review/Discharge Plan (OT)    Equipment Needs Upon Discharge (OT) walker, rolling;commode chair  -AC     Anticipated Discharge Disposition (OT) home with outpatient therapy services;home;other (see comments)  cardio/pulmonary rehab  -       Row Name 03/26/24 1324          Positioning and Restraints    Pre-Treatment Position in bed  -AC     Post Treatment Position bed  -AC     In Bed fowlers;call light within reach;encouraged to call for assist;exit alarm on  -AC               User Key  (r) = Recorded By, (t) = Taken By, (c) = Cosigned By      Initials Name Provider Type    AC Dulce Piper, MC Occupational Therapist                   Outcome Measures       Row Name 03/26/24 1336          How much help from another is currently needed...    Putting on and taking off regular lower body clothing? 3  -AC     Bathing (including washing, rinsing, and drying) 3  -AC     Toileting (which includes using toilet bed pan or urinal) 3  -AC     Putting on and taking off regular upper body clothing 4  -AC     Taking care of personal grooming (such as brushing teeth) 4  -AC     Eating meals 4  -AC     AM-PAC 6 Clicks Score (OT) 21  -AC       Row Name 03/26/24 0816          How much help from another person do you currently need...    Turning from your back to your side while in flat bed without using bedrails? 4  -AP     Moving from lying on back to sitting on the side of a flat bed without bedrails? 4  -AP     Moving to and from a bed to a chair (including a wheelchair)? 3  -AP     Standing up from a chair using your arms (e.g., wheelchair, bedside chair)? 3  -AP     Climbing 3-5 steps with a railing? 3  -AP     To walk in hospital room? 3  -AP     AM-PAC 6 Clicks Score (PT) 20   -AP     Highest Level of Mobility Goal 6 --> Walk 10 steps or more  -AP       Row Name 03/26/24 1336          Functional Assessment    Outcome Measure Options AM-PAC 6 Clicks Daily Activity (OT);Optimal Instrument  -AC       Row Name 03/26/24 1336          Optimal Instrument    Optimal Instrument Optimal - 3  -AC     Bending/Stooping 1  -AC     Standing 2  -AC     Reaching 1  -AC     From the list, choose the 3 activities you would most like to be able to do without any difficulty Bending/stooping;Standing;Reaching  -AC     Total Score Optimal - 3 4  -AC               User Key  (r) = Recorded By, (t) = Taken By, (c) = Cosigned By      Initials Name Provider Type    AC Dulce Piper OT Occupational Therapist    AP Wen Middleton, RN Registered Nurse                    Occupational Therapy Education       Title: PT OT SLP Therapies (Done)       Topic: Occupational Therapy (Done)       Point: ADL training (Done)       Description:   Instruct learner(s) on proper safety adaptation and remediation techniques during self care or transfers.   Instruct in proper use of assistive devices.                  Learning Progress Summary             Patient Acceptance, E, VU by  at 3/26/2024 1337                         Point: Home exercise program (Done)       Description:   Instruct learner(s) on appropriate technique for monitoring, assisting and/or progressing therapeutic exercises/activities.                  Learning Progress Summary             Patient Acceptance, E, VU by  at 3/26/2024 1337                         Point: Precautions (Done)       Description:   Instruct learner(s) on prescribed precautions during self-care and functional transfers.                  Learning Progress Summary             Patient Acceptance, E, VU by  at 3/26/2024 1337                         Point: Body mechanics (Done)       Description:   Instruct learner(s) on proper positioning and spine alignment during self-care, functional  mobility activities and/or exercises.                  Learning Progress Summary             Patient Acceptance, E, VU by  at 3/26/2024 1337                                         User Key       Initials Effective Dates Name Provider Type Discipline     06/16/21 -  Dulce Piper OT Occupational Therapist OT                  OT Recommendation and Plan  Therapy Frequency (OT): evaluation only  Plan of Care Review  Plan of Care Reviewed With: patient  Progress: no change  Outcome Evaluation: Patient safe to discharge home when medically appropriate. patient may benefit from cardio/pulmonary rehab in order to increase safety and decrease risk for return to hospital.  Discharge OT services at this time.     Time Calculation:   Evaluation Complexity (OT)  Review Occupational Profile/Medical/Therapy History Complexity: brief/low complexity  Assessment, Occupational Performance/Identification of Deficit Complexity: 1-3 performance deficits  Clinical Decision Making Complexity (OT): problem focused assessment/low complexity  Overall Complexity of Evaluation (OT): low complexity     Time Calculation- OT       Row Name 03/26/24 1338             Time Calculation- OT    OT Received On 03/26/24  -AC         Untimed Charges    OT Eval/Re-eval Minutes 28  -AC         Total Minutes    Untimed Charges Total Minutes 28  -AC       Total Minutes 28  -AC                User Key  (r) = Recorded By, (t) = Taken By, (c) = Cosigned By      Initials Name Provider Type     Dulce Piper OT Occupational Therapist                  Therapy Charges for Today       Code Description Service Date Service Provider Modifiers Qty    [id number] HC OT EVAL LOW COMPLEXITY 2 3/26/2024 Dulce Piper OT GO 1                 Dulce Piper OT  3/26/2024

## 2024-03-26 NOTE — PLAN OF CARE
Goal Outcome Evaluation:  Plan of Care Reviewed With: patient           Outcome Evaluation: Patient alert and oriented x 4. No complaints of pain or discomfort. Up to bedside independently. Continuing plan of care.

## 2024-03-27 ENCOUNTER — READMISSION MANAGEMENT (OUTPATIENT)
Dept: CALL CENTER | Facility: HOSPITAL | Age: 74
End: 2024-03-27
Payer: MEDICARE

## 2024-03-27 VITALS
HEIGHT: 73 IN | HEART RATE: 69 BPM | OXYGEN SATURATION: 91 % | BODY MASS INDEX: 29.6 KG/M2 | DIASTOLIC BLOOD PRESSURE: 64 MMHG | SYSTOLIC BLOOD PRESSURE: 105 MMHG | TEMPERATURE: 98.3 F | WEIGHT: 223.33 LBS | RESPIRATION RATE: 20 BRPM

## 2024-03-27 PROBLEM — A41.9 SEPSIS: Status: RESOLVED | Noted: 2023-08-27 | Resolved: 2024-03-27

## 2024-03-27 PROBLEM — J44.1 COPD EXACERBATION: Status: RESOLVED | Noted: 2023-05-08 | Resolved: 2024-03-27

## 2024-03-27 LAB
ANION GAP SERPL CALCULATED.3IONS-SCNC: 10.2 MMOL/L (ref 5–15)
BASOPHILS # BLD AUTO: 0.02 10*3/MM3 (ref 0–0.2)
BASOPHILS NFR BLD AUTO: 0.1 % (ref 0–1.5)
BUN SERPL-MCNC: 22 MG/DL (ref 8–23)
BUN/CREAT SERPL: 21.2 (ref 7–25)
CALCIUM SPEC-SCNC: 8.6 MG/DL (ref 8.6–10.5)
CHLORIDE SERPL-SCNC: 104 MMOL/L (ref 98–107)
CO2 SERPL-SCNC: 23.8 MMOL/L (ref 22–29)
CREAT SERPL-MCNC: 1.04 MG/DL (ref 0.76–1.27)
DEPRECATED RDW RBC AUTO: 47.5 FL (ref 37–54)
EGFRCR SERPLBLD CKD-EPI 2021: 75.3 ML/MIN/1.73
EOSINOPHIL # BLD AUTO: 0 10*3/MM3 (ref 0–0.4)
EOSINOPHIL NFR BLD AUTO: 0 % (ref 0.3–6.2)
ERYTHROCYTE [DISTWIDTH] IN BLOOD BY AUTOMATED COUNT: 16.9 % (ref 12.3–15.4)
GLUCOSE BLDC GLUCOMTR-MCNC: 180 MG/DL (ref 70–99)
GLUCOSE BLDC GLUCOMTR-MCNC: 181 MG/DL (ref 70–99)
GLUCOSE SERPL-MCNC: 153 MG/DL (ref 65–99)
HCT VFR BLD AUTO: 41 % (ref 37.5–51)
HGB BLD-MCNC: 12 G/DL (ref 13–17.7)
IMM GRANULOCYTES # BLD AUTO: 0.18 10*3/MM3 (ref 0–0.05)
IMM GRANULOCYTES NFR BLD AUTO: 0.9 % (ref 0–0.5)
LYMPHOCYTES # BLD AUTO: 0.69 10*3/MM3 (ref 0.7–3.1)
LYMPHOCYTES NFR BLD AUTO: 3.5 % (ref 19.6–45.3)
MAGNESIUM SERPL-MCNC: 2.3 MG/DL (ref 1.6–2.4)
MCH RBC QN AUTO: 22.9 PG (ref 26.6–33)
MCHC RBC AUTO-ENTMCNC: 29.3 G/DL (ref 31.5–35.7)
MCV RBC AUTO: 78.2 FL (ref 79–97)
MONOCYTES # BLD AUTO: 0.5 10*3/MM3 (ref 0.1–0.9)
MONOCYTES NFR BLD AUTO: 2.5 % (ref 5–12)
NEUTROPHILS NFR BLD AUTO: 18.58 10*3/MM3 (ref 1.7–7)
NEUTROPHILS NFR BLD AUTO: 93 % (ref 42.7–76)
NRBC BLD AUTO-RTO: 0 /100 WBC (ref 0–0.2)
PHOSPHATE SERPL-MCNC: 2.4 MG/DL (ref 2.5–4.5)
PLATELET # BLD AUTO: 194 10*3/MM3 (ref 140–450)
PMV BLD AUTO: 11.3 FL (ref 6–12)
POTASSIUM SERPL-SCNC: 4.6 MMOL/L (ref 3.5–5.2)
RBC # BLD AUTO: 5.24 10*6/MM3 (ref 4.14–5.8)
SODIUM SERPL-SCNC: 138 MMOL/L (ref 136–145)
WBC NRBC COR # BLD AUTO: 19.97 10*3/MM3 (ref 3.4–10.8)

## 2024-03-27 PROCEDURE — 82948 REAGENT STRIP/BLOOD GLUCOSE: CPT

## 2024-03-27 PROCEDURE — 82948 REAGENT STRIP/BLOOD GLUCOSE: CPT | Performed by: HOSPITALIST

## 2024-03-27 PROCEDURE — 94799 UNLISTED PULMONARY SVC/PX: CPT

## 2024-03-27 PROCEDURE — 63710000001 INSULIN LISPRO (HUMAN) PER 5 UNITS: Performed by: HOSPITALIST

## 2024-03-27 PROCEDURE — 94664 DEMO&/EVAL PT USE INHALER: CPT

## 2024-03-27 PROCEDURE — 25010000002 ENOXAPARIN PER 10 MG: Performed by: HOSPITALIST

## 2024-03-27 PROCEDURE — 85025 COMPLETE CBC W/AUTO DIFF WBC: CPT | Performed by: HOSPITALIST

## 2024-03-27 PROCEDURE — 84100 ASSAY OF PHOSPHORUS: CPT | Performed by: HOSPITALIST

## 2024-03-27 PROCEDURE — 99239 HOSP IP/OBS DSCHRG MGMT >30: CPT | Performed by: INTERNAL MEDICINE

## 2024-03-27 PROCEDURE — 25010000002 METHYLPREDNISOLONE PER 40 MG: Performed by: INTERNAL MEDICINE

## 2024-03-27 PROCEDURE — 83735 ASSAY OF MAGNESIUM: CPT | Performed by: HOSPITALIST

## 2024-03-27 PROCEDURE — 80048 BASIC METABOLIC PNL TOTAL CA: CPT | Performed by: HOSPITALIST

## 2024-03-27 PROCEDURE — 96372 THER/PROPH/DIAG INJ SC/IM: CPT

## 2024-03-27 PROCEDURE — 94761 N-INVAS EAR/PLS OXIMETRY MLT: CPT

## 2024-03-27 PROCEDURE — G0378 HOSPITAL OBSERVATION PER HR: HCPCS

## 2024-03-27 PROCEDURE — 96376 TX/PRO/DX INJ SAME DRUG ADON: CPT

## 2024-03-27 RX ORDER — AZITHROMYCIN 250 MG/1
TABLET, FILM COATED ORAL
Qty: 3 TABLET | Refills: 0 | Status: SHIPPED | OUTPATIENT
Start: 2024-03-28 | End: 2024-04-03

## 2024-03-27 RX ORDER — PREDNISONE 20 MG/1
40 TABLET ORAL
Qty: 6 TABLET | Refills: 0 | Status: SHIPPED | OUTPATIENT
Start: 2024-03-28 | End: 2024-03-31

## 2024-03-27 RX ORDER — PREDNISONE 20 MG/1
40 TABLET ORAL
Status: DISCONTINUED | OUTPATIENT
Start: 2024-03-28 | End: 2024-03-27 | Stop reason: HOSPADM

## 2024-03-27 RX ADMIN — ASPIRIN 81 MG: 81 TABLET, CHEWABLE ORAL at 09:03

## 2024-03-27 RX ADMIN — ARFORMOTEROL TARTRATE 15 MCG: 15 SOLUTION RESPIRATORY (INHALATION) at 07:39

## 2024-03-27 RX ADMIN — METHYLPREDNISOLONE SODIUM SUCCINATE 40 MG: 40 INJECTION INTRAMUSCULAR; INTRAVENOUS at 00:37

## 2024-03-27 RX ADMIN — BUDESONIDE 0.5 MG: 0.5 INHALANT RESPIRATORY (INHALATION) at 07:39

## 2024-03-27 RX ADMIN — INSULIN LISPRO 3 UNITS: 100 INJECTION, SOLUTION INTRAVENOUS; SUBCUTANEOUS at 12:09

## 2024-03-27 RX ADMIN — ENOXAPARIN SODIUM 40 MG: 100 INJECTION SUBCUTANEOUS at 09:03

## 2024-03-27 RX ADMIN — AZITHROMYCIN DIHYDRATE 250 MG: 250 TABLET ORAL at 09:03

## 2024-03-27 RX ADMIN — Medication 10 ML: at 09:03

## 2024-03-27 RX ADMIN — ATORVASTATIN CALCIUM 10 MG: 20 TABLET, FILM COATED ORAL at 09:03

## 2024-03-27 RX ADMIN — INSULIN LISPRO 3 UNITS: 100 INJECTION, SOLUTION INTRAVENOUS; SUBCUTANEOUS at 09:03

## 2024-03-27 NOTE — PLAN OF CARE
Problem: Adult Inpatient Plan of Care  Goal: Plan of Care Review  Outcome: Ongoing, Progressing  Flowsheets (Taken 3/27/2024 0637)  Progress: no change  Plan of Care Reviewed With: patient  Outcome Evaluation: Patient alert and oriented x 4. Patient remains on 2 L nasal cannula. No complaints of pain or discomfort.   Goal Outcome Evaluation:  Plan of Care Reviewed With: patient        Progress: no change  Outcome Evaluation: Patient alert and oriented x 4. Patient remains on 2 L nasal cannula. No complaints of pain or discomfort.

## 2024-03-27 NOTE — DISCHARGE SUMMARY
[facility]        HOSPITALIST  DISCHARGE SUMMARY    Patient Name: Dilip Faulkner  : 1950  MRN: [id number]    Date of Admission: 3/25/2024  Date of Discharge:  3/27/2024  Primary Care Physician: Jackeline Jaime APRN    Consults       Date and Time Order Name Status Description    3/25/2024  4:45 PM Inpatient Hospitalist Consult              Active and Resolved Hospital Problems:  Active Hospital Problems   No active problems to display.      Resolved Hospital Problems    Diagnosis POA    **Sepsis [A41.9] Yes    COPD exacerbation [J44.1] Yes   COPD exacerbation.  Improved  Obstructive sleep apnea noncompliant.    Chronic hypoxic respiratory failure on 2 L.  Diabetes mellitus.  Hypertension.  SVT.  Leukocytosis.  Patient has been on steroids.  Improving.  Right bundle block  Chronic ptosis bilateral eyes.  Bibasilar atelectasis versus fibrosis left more than right.    Hospital Course     Hospital Course:  Dilip Faulkner is a 73 y.o. male who has a history of recurrent hospital admissions, he has COPD and is on 2 L of oxygen, patient will not use his NIV, has SVT that is required adenosine in the past, morbid obesity, he presents with worsening shortness of breath.       Patient will not use his BiPAP because he believes it will kill him.  I told him there are other brands in [employer].     On arrival to the ED, patient's temperature 98.1, pulse of 84, respiratory rate of 20, blood pressure 105/67, and he is requiring 2 L of oxygen saturating 92%.     Patient's chest x-ray shows: Chronic interstitial scarring in the lung bases left greater than the right unchanged.     Patient's AB.382/42.5/59.3/24.7.  Patient's troponin is flat.  Delta T is -13.  Patient's white blood cell count is 21.48, hemoglobin is 12.4.  Lymphocytes are 6.0.     Patient has recently been on steroids.  His procalcitonin is 0.16.  Lactic is 1.7.  Interval Followup:   Remains on 2 L.  At his baseline.  Other vital  signs stable  Shortness of air is better  Cough better  Denies any chest pain  Overall feels better.  Wants to go home.      DISCHARGE Follow Up Recommendations for labs and diagnostics: Follow with PCP.  Has CBC checked by PCP in 3 days to monitor white cell count.      Day of Discharge     Vital Signs:  Temp:  [97.3 °F (36.3 °C)-99.1 °F (37.3 °C)] 97.3 °F (36.3 °C)  Heart Rate:  [66-98] 71  Resp:  [18-22] 21  BP: (115-132)/(66-79) 115/67  Flow (L/min):  [2] 2    Physical Exam:   Constitutional: Awake, alert, no acute distress              Eyes: Bilateral ptosis congenital per patient, pupils equal, sclerae anicteric, no conjunctival injection              HENT: NCAT, mucous membranes moist              Neck: Supple, no thyromegaly, no lymphadenopathy, trachea midline              Respiratory: No wheezing, clear to auscultation bilaterally, nonlabored respirations               Cardiovascular: RRR, no murmurs, rubs, or gallops, palpable pedal pulses bilaterally              Gastrointestinal: Positive bowel sounds, soft, nontender, nondistended              Musculoskeletal: No bilateral ankle edema, no clubbing or cyanosis to extremities              Psychiatric: Appropriate affect, cooperative              Neurologic: Oriented x 3, strength symmetric in all extremities, Cranial Nerves grossly intact to confrontation, speech clear              Skin: No rashes     Discharge Details        Discharge Medications        New Medications        Instructions Start Date   azithromycin 250 MG tablet  Commonly known as: ZITHROMAX   Indications: Worsening of Chronic Obstructive Lung Disease   Start Date: March 28, 2024     predniSONE 20 MG tablet  Commonly known as: DELTASONE   40 mg, Oral, Daily With Breakfast   Start Date: March 28, 2024            Continue These Medications        Instructions Start Date   albuterol sulfate  (90 Base) MCG/ACT inhaler  Commonly known as: PROVENTIL HFA;VENTOLIN HFA;PROAIR HFA   INHALE  2 PUFFS BY MOUTH EVERY 4 HOURS AS NEEDED FOR WHEEZING      aspirin 81 MG chewable tablet   81 mg, Oral, Daily      atorvastatin 10 MG tablet  Commonly known as: LIPITOR   10 mg, Oral, Daily      empagliflozin 10 MG tablet tablet  Commonly known as: JARDIANCE   10 mg, Oral, Daily      Fluticasone-Salmeterol 250-50 MCG/ACT DISKUS  Commonly known as: ADVAIR/WIXELA   1 puff, Inhalation, 2 Times Daily      metFORMIN 500 MG tablet  Commonly known as: GLUCOPHAGE   500 mg, Oral, 2 Times Daily With Meals      omeprazole 20 MG capsule  Commonly known as: priLOSEC   20 mg, Oral, Daily      Tresiba FlexTouch 100 UNIT/ML solution pen-injector injection  Generic drug: insulin degludec   20 Units, Subcutaneous, Daily               No Known Allergies    Discharge Disposition:  Home or Self Care    Diet:  Diet Instructions       Diet: Diabetic Diets; Consistent Carbohydrate; Thin (IDDSI 0)      Discharge Diet: Diabetic Diets    Diabetic Diet: Consistent Carbohydrate    Fluid Consistency: Thin (IDDSI 0)            Discharge Activity:   Activity Instructions       Activity as Tolerated              CODE STATUS:  Code Status and Medical Interventions:   Ordered at: 03/25/24 1939     Level Of Support Discussed With:    Patient     Code Status (Patient has no pulse and is not breathing):    CPR (Attempt to Resuscitate)     Medical Interventions (Patient has pulse or is breathing):    Full Support         Future Appointments   Date Time Provider Department [place]   6/19/2024  1:30 PM [address]   6/25/2024  1:00 PM Nelly Jaime APRN [facility]   8/15/2024  2:45 PM [facility]       Additional Instructions for the Follow-ups that You Need to Schedule       Discharge Follow-up with PCP   As directed       Currently Documented PCP:    Jackeline Jaime APRN    PCP Phone Number:    [phone]     Follow Up Details: 1 week                Pertinent  and/or Most Recent Results     PROCEDURES:    * Cannot find OR case *     LAB RESULTS:      Lab 03/27/24  0439 03/26/24  0503 03/25/24  1740 03/25/24  1622 03/25/24  1303   WBC 19.97* 16.03*  --   --  21.48*   HEMOGLOBIN 12.0* 11.6*  --   --  12.4*   HEMATOCRIT 41.0 41.2  --   --  43.1   PLATELETS 194 175  --   --  173   NEUTROS ABS 18.58* 14.94*  --   --  18.03*   IMMATURE GRANS (ABS) 0.18* 0.17*  --   --  0.18*   LYMPHS ABS 0.69* 0.78  --   --  1.29   MONOS ABS 0.50 0.12  --   --  1.22*   EOS ABS 0.00 0.00  --   --  0.69*   MCV 78.2* 79.1  --   --  78.6*   PROCALCITONIN  --   --   --  0.16  --    LACTATE  --   --  1.7  --   --          Lab 03/27/24  0439 03/26/24  0503 03/25/24  1303   SODIUM 138 137 138   POTASSIUM 4.6 4.2 4.8   CHLORIDE 104 102 103   CO2 23.8 23.3 25.3   ANION GAP 10.2 11.7 9.7   BUN 22 22 21   CREATININE 1.04 0.96 1.02   EGFR 75.3 82.9 77.1   GLUCOSE 153* 207* 155*   CALCIUM 8.6 8.4* 8.7   MAGNESIUM 2.3 2.1 2.0   PHOSPHORUS 2.4* 2.5 2.7         Lab 03/25/24  1303   TOTAL PROTEIN 6.9   ALBUMIN 3.6   GLOBULIN 3.3   ALT (SGPT) 11   AST (SGOT) 18   BILIRUBIN 0.4   ALK PHOS 104         Lab 03/25/24  1622 03/25/24  1303   PROBNP  --  262.9   HSTROP T 46* 59*             Lab 03/25/24  1622 03/25/24  1303   IRON 14*  --    IRON SATURATION (TSAT) 3*  --    TIBC 408  --    TRANSFERRIN 274  --    FERRITIN 36.41  --    FOLATE  --  9.70   VITAMIN B 12  --  301         Lab 03/25/24  1521   PH, ARTERIAL 7.382   PCO2, ARTERIAL 42.5   PO2 ART 59.3*   O2 SATURATION ART 90.0*   FIO2 21   HCO3 ART 24.7   BASE EXCESS ART -0.5   CARBOXYHEMOGLOBIN 1.3     Brief Urine Lab Results  (Last result in the past 365 days)        Color   Clarity   Blood   Leuk Est   Nitrite   Protein   CREAT   Urine HCG        03/25/24 1742 Yellow   Clear   Negative   Negative   Negative   Negative                 Microbiology Results (last 10 days)       Procedure Component Value - Date/Time    Respiratory Panel PCR w/COVID-19(SARS-CoV-2) GUERA/ANIL/KYLEE/PAD/COR/ANA In-House, NP Swab in  UTM/VTM, 2 HR TAT - Swab, Nasopharynx [[id number]]  (Normal) Collected: 03/25/24 1742    Lab Status: Final result Specimen: Swab from Nasopharynx Updated: 03/25/24 1859     ADENOVIRUS, PCR Not Detected     Coronavirus 229E Not Detected     Coronavirus HKU1 Not Detected     Coronavirus NL63 Not Detected     Coronavirus OC43 Not Detected     COVID19 Not Detected     Human Metapneumovirus Not Detected     Human Rhinovirus/Enterovirus Not Detected     Influenza A PCR Not Detected     Influenza B PCR Not Detected     Parainfluenza Virus 1 Not Detected     Parainfluenza Virus 2 Not Detected     Parainfluenza Virus 3 Not Detected     Parainfluenza Virus 4 Not Detected     RSV, PCR Not Detected     Bordetella pertussis pcr Not Detected     Bordetella parapertussis PCR Not Detected     Chlamydophila pneumoniae PCR Not Detected     Mycoplasma pneumo by PCR Not Detected    Narrative:      In the setting of a positive respiratory panel with a viral infection PLUS a negative procalcitonin without other underlying concern for bacterial infection, consider observing off antibiotics or discontinuation of antibiotics and continue supportive care. If the respiratory panel is positive for atypical bacterial infection (Bordetella pertussis, Chlamydophila pneumoniae, or Mycoplasma pneumoniae), consider antibiotic de-escalation to target atypical bacterial infection.    S. Pneumo Ag Urine or CSF - Urine, Urine, Clean Catch [[id number]]  (Normal) Collected: 03/25/24 1742    Lab Status: Final result Specimen: Urine, Clean Catch Updated: 03/25/24 1819     Strep Pneumo Ag Negative    Legionella Antigen, Urine - Urine, Urine, Clean Catch [[id number]]  (Normal) Collected: 03/25/24 1742    Lab Status: Final result Specimen: Urine, Clean Catch Updated: 03/25/24 1818     LEGIONELLA ANTIGEN, URINE Negative    Blood Culture - Blood, Arm, Left [[id number]]  (Normal) Collected: 03/25/24 1740    Lab Status: Preliminary result Specimen: Blood from Arm,  Left Updated: 03/26/24 1800     Blood Culture No growth at 24 hours    Blood Culture - Blood, Arm, Right [[id number]]  (Normal) Collected: 03/25/24 1740    Lab Status: Preliminary result Specimen: Blood from Arm, Right Updated: 03/26/24 1800     Blood Culture No growth at 24 hours            CT Chest Without Contrast Diagnostic    Result Date: 3/25/2024  Impression: No acute infiltrate is seen. Atelectasis and/or fibrosis is (are) suggested bilaterally, greater on the left than the right. The findings may be related to chronic interstitial lung disease. Please correlate clinically.    Electronically Signed By-Aaron Rodriguez MD On:3/25/2024 10:52 PM      XR Chest 1 View    Result Date: 3/25/2024  Impression:  1. No acute process. 2. Chronic interstitial scarring at the lung bases left greater than right, unchanged.  Electronically Signed By-Mohsen Rudd MD On:3/25/2024 1:43 PM               Results for orders placed during the hospital encounter of 03/28/23    Adult Transthoracic Echo Complete W/ Cont if Necessary Per Protocol    Interpretation Summary    Left ventricular ejection fraction appears to be 56 - 60%.    Left ventricular diastolic function is consistent with (grade I) impaired relaxation and age.    No significant valvular disease.    Mild left atrial enlargement.      Imaging Results (All)       Procedure Component Value Units Date/Time    CT Chest Without Contrast Diagnostic [[id number]] Collected: 03/25/24 2246     Updated: 03/25/24 2254    Narrative:      CT CHEST WO CONTRAST DIAGNOSTIC-     Date of Exam: 3/25/2024 9:52 PM     Indication: Better visualization (? of); J44.1-Chronic obstructive  pulmonary disease (COPD) with (acute) exacerbation; R09.02-Hypoxemia;  A41.9-Sepsis, unspecified organism.     Comparisons: 3/25/2024; 6/7/2023.     Technique:   Axial CT images were obtained of the chest without contrast  administration.  Reconstructed coronal and sagittal images were also  obtained. Automated  exposure control and iterative construction methods  were used.     Findings:  Atelectasis and/or fibrosis is (are) seen bilaterally, especially in the  lung bases, greater on the left than the right. Probably no acute  infiltrate. Similar findings were seen previously. There is motion  artifact on the study. There is a small to moderate hiatal hernia.  Surgical clips are seen near the gastroesophageal junction, as before.  There is diffuse asymmetric thickening of the left-sided pleura, which  is predominantly fat attenuation, seen previously. To a lesser extent,  similar findings are seen contralaterally. No pleural effusion. No  pericardial effusion. No pneumothorax is seen. The pulmonary arteries  are prominent. Chronic pulmonary arterial hypertension may be present.  There are coronary artery calcifications. Atherosclerotic changes  involve the aortic arch. No thyroid enlargement. Probably no enlarged  mediastinal lymph nodes by nonenhanced CT. There may be a small to  moderate-sized scattered reactive lymph mediastinal nodes. Degenerative  changes are seen throughout the imaged spine. No acute fracture or  aggressive osseous lesion is seen. The motion artifact limits the  two-dimensional reformations. Grossly, no acute findings are seen within  the partially imaged upper abdomen. No aneurysmal dilatation of the  thoracic aorta is suggested. Grossly, no suspicious lung nodule is seen.  The degree of motion artifact may obscure subtle findings. Mild  subglottic narrowing of the trachea is possible, as seen previously.       Impression:      No acute infiltrate is seen. Atelectasis and/or fibrosis is (are)  suggested bilaterally, greater on the left than the right. The findings  may be related to chronic interstitial lung disease. Please correlate  clinically.           Electronically Signed By-Aaron Rodriguez MD On:3/25/2024 10:52 PM       XR Chest 1 View [[id number]] Collected: 03/25/24 1342     Updated: 03/25/24  1345    Narrative:         DATE OF EXAM:   3/25/2024 1:38 PM     PROCEDURE:   XR CHEST 1 VW-     INDICATIONS:   SOA Triage Protocol     COMPARISON:  Chest radiograph 9/11/2023, CT abdomen pelvis 9/12/2023     TECHNIQUE:   Portable chest radiograph.     FINDINGS:   Heart size normal. Chronic interstitial scarring in the lung bases left  greater than right similar to the prior study. Negative for  pneumothorax. No significant effusion. Osseous structures grossly  intact. Aortic arch atherosclerosis.       Impression:         1. No acute process.  2. Chronic interstitial scarring at the lung bases left greater than  right, unchanged.     Electronically Signed By-Mohsen Rudd MD On:3/25/2024 1:43 PM                Labs Pending at Discharge:  Pending Labs       Order Current Status    Blood Culture - Blood, Arm, Left Preliminary result    Blood Culture - Blood, Arm, Right Preliminary result                Time spent on Discharge including face to face service: 31 minutes  Part of this note may be an electronic transcription/translation of spoken language to printed text using the Dragon Dictation System.     TElectronically signed by Sigifredo Mendes MD, 03/27/24, 2:33 PM EDT.

## 2024-03-27 NOTE — SIGNIFICANT NOTE
03/27/24 1100   Coping/Psychosocial   Observed Emotional State calm   Verbalized Emotional State hopefulness;relief   Trust Relationship/Rapport empathic listening provided   Involvement in Care interacting with patient   Additional Documentation Spiritual Care (Group)   Spiritual Care   Use of Spiritual Resources non-Yazdanism use of spiritual care   Spiritual Care Source  initiative   Spiritual Care Follow-Up follow-up, none required as presently assessed   Response to Spiritual Care receptive of support   Spiritual Care Interventions supportive conversation provided   Spiritual Care Visit Type initial   Receptivity to Spiritual Care visit welcomed

## 2024-03-27 NOTE — SIGNIFICANT NOTE
03/27/24 1433   Physical Therapy Time and Intention   Session Not Performed patient/family declined treatment   Comment, Session Not Performed Pt reports MD was in earlier and told him he was being discharged.  No orders in computer as of yet.  Pt declined.

## 2024-03-27 NOTE — PLAN OF CARE
Goal Outcome Evaluation:           Progress: no change  Outcome Evaluation: No acute changes throughout this shift, vss, on 2l nc, remains alert and oriented, no complaints at this time, anticipating dc this afternoon.

## 2024-03-27 NOTE — OUTREACH NOTE
Prep Survey      Flowsheet Row Responses   [facility] patient discharged from? [facility]   Is LACE score < 7 ? Yes   Eligibility [facility]   Date of Admission 03/25/24   Date of Discharge 03/27/24   Discharge Disposition Home or Self Care   Discharge diagnosis sepsis, COPD exacerbation   Does the patient have one of the following disease processes/diagnoses(primary or secondary)? Sepsis   Does the patient have Home health ordered? No   Is there a DME ordered? No   Prep survey completed? Yes            Yolande BECKHAM - Registered Nurse

## 2024-03-28 ENCOUNTER — TRANSITIONAL CARE MANAGEMENT TELEPHONE ENCOUNTER (OUTPATIENT)
Dept: CALL CENTER | Facility: HOSPITAL | Age: 74
End: 2024-03-28
Payer: MEDICARE

## 2024-03-28 DIAGNOSIS — J96.12 CHRONIC RESPIRATORY FAILURE WITH HYPOXIA AND HYPERCAPNIA: ICD-10-CM

## 2024-03-28 DIAGNOSIS — J44.9 CHRONIC OBSTRUCTIVE PULMONARY DISEASE, UNSPECIFIED COPD TYPE: ICD-10-CM

## 2024-03-28 DIAGNOSIS — J43.9 PULMONARY EMPHYSEMA, UNSPECIFIED EMPHYSEMA TYPE: ICD-10-CM

## 2024-03-28 DIAGNOSIS — J96.11 CHRONIC RESPIRATORY FAILURE WITH HYPOXIA AND HYPERCAPNIA: ICD-10-CM

## 2024-03-28 RX ORDER — ALBUTEROL SULFATE 90 UG/1
AEROSOL, METERED RESPIRATORY (INHALATION)
Qty: 8.5 G | Refills: 5 | Status: SHIPPED | OUTPATIENT
Start: 2024-03-28

## 2024-03-28 NOTE — OUTREACH NOTE
Call Center TCM Note      Flowsheet Row Responses   [facility] patient discharged from? [facility]   Does the patient have one of the following disease processes/diagnoses(primary or secondary)? Sepsis   TCM attempt successful? Yes  [verbal release for Alicia Sandoval, sister]   Call start time 0954   Call end time 0957   Discharge diagnosis sepsis, COPD exacerbation   Meds reviewed with patient/caregiver? Yes   Is the patient having any side effects they believe may be caused by any medication additions or changes? No   Does the patient have all medications related to this admission filled (includes all antibiotics, inhalers, nebulizers,steroids,etc.) Yes   Is the patient taking all medications as directed (includes completed medication regime)? Yes   Medication comments Taking atbs and steroids as ordered   Comments Hospital f/u on 4/3/24@0945am   Does the patient have an appointment with their PCP within 7-14 days of discharge? Yes   Has home health visited the patient within 72 hours of discharge? N/A   DME comments Uses O2 at 2 lpm,  reports sats 95% on 2 lpm--encouraged use of Bipap but he reports he will not use   Psychosocial issues? No   Did the patient receive a copy of their discharge instructions? Yes   Nursing interventions Reviewed instructions with patient   What is the patient's perception of their health status since discharge? Improving  [Pt reports he is doing better--denies any worsening SOA, Pt brief and voices no questions today.  Encouraged compliance with meds, tx's,O2, bipap and f/u.]   Nursing interventions Nurse provided patient education   Is the patient/caregiver able to teach back TIME? T emperature - higher or lower than normal, I nfection - may have signs and symptoms of an infection, M ental Decline - confused, sleepy, difficult to arouse, E xtremely Ill - severe pain, discomfort, shortness of breath  [reviewed]   Nursing interventions Nurse provided patient education   Is the  patient/caregiver able to teach back signs and symptoms of worsening condition: Fever, Shortness of breath/rapid respiratory rate, Altered mental status(confusion/coma)   Is the patient/caregiver able to teach back the hierarchy of who to call/visit for symptoms/problems? PCP, Specialist, Home health nurse, Urgent Care, ED, 911 Yes   TCM call completed? Yes   Call end time [id number]            Magaly Hoffman RN    3/28/2024, 09:59 EDT

## 2024-03-29 LAB
QT INTERVAL: 392 MS
QTC INTERVAL: 466 MS

## 2024-03-30 LAB
BACTERIA SPEC AEROBE CULT: NORMAL
BACTERIA SPEC AEROBE CULT: NORMAL

## 2024-04-03 ENCOUNTER — OFFICE VISIT (OUTPATIENT)
Dept: FAMILY MEDICINE CLINIC | Facility: CLINIC | Age: 74
End: 2024-04-03
Payer: MEDICARE

## 2024-04-03 ENCOUNTER — LAB (OUTPATIENT)
Dept: LAB | Facility: HOSPITAL | Age: 74
End: 2024-04-03
Payer: MEDICARE

## 2024-04-03 VITALS
HEIGHT: 73 IN | HEART RATE: 66 BPM | BODY MASS INDEX: 32.02 KG/M2 | WEIGHT: 241.6 LBS | SYSTOLIC BLOOD PRESSURE: 106 MMHG | OXYGEN SATURATION: 96 % | DIASTOLIC BLOOD PRESSURE: 50 MMHG

## 2024-04-03 DIAGNOSIS — Z09 HOSPITAL DISCHARGE FOLLOW-UP: Primary | ICD-10-CM

## 2024-04-03 DIAGNOSIS — Z79.4 TYPE 2 DIABETES MELLITUS WITH HYPERGLYCEMIA, WITH LONG-TERM CURRENT USE OF INSULIN: ICD-10-CM

## 2024-04-03 DIAGNOSIS — J44.1 COPD WITH EXACERBATION: ICD-10-CM

## 2024-04-03 DIAGNOSIS — Z09 HOSPITAL DISCHARGE FOLLOW-UP: ICD-10-CM

## 2024-04-03 DIAGNOSIS — E11.65 TYPE 2 DIABETES MELLITUS WITH HYPERGLYCEMIA, WITH LONG-TERM CURRENT USE OF INSULIN: ICD-10-CM

## 2024-04-03 LAB
BASOPHILS # BLD AUTO: 0.04 10*3/MM3 (ref 0–0.2)
BASOPHILS NFR BLD AUTO: 0.3 % (ref 0–1.5)
DEPRECATED RDW RBC AUTO: 44.3 FL (ref 37–54)
EOSINOPHIL # BLD AUTO: 0.44 10*3/MM3 (ref 0–0.4)
EOSINOPHIL NFR BLD AUTO: 3.5 % (ref 0.3–6.2)
ERYTHROCYTE [DISTWIDTH] IN BLOOD BY AUTOMATED COUNT: 15.6 % (ref 12.3–15.4)
HCT VFR BLD AUTO: 40.6 % (ref 37.5–51)
HGB BLD-MCNC: 11.8 G/DL (ref 13–17.7)
IMM GRANULOCYTES # BLD AUTO: 0.23 10*3/MM3 (ref 0–0.05)
IMM GRANULOCYTES NFR BLD AUTO: 1.8 % (ref 0–0.5)
LYMPHOCYTES # BLD AUTO: 2.76 10*3/MM3 (ref 0.7–3.1)
LYMPHOCYTES NFR BLD AUTO: 22 % (ref 19.6–45.3)
MCH RBC QN AUTO: 23 PG (ref 26.6–33)
MCHC RBC AUTO-ENTMCNC: 29.1 G/DL (ref 31.5–35.7)
MCV RBC AUTO: 79.3 FL (ref 79–97)
MONOCYTES # BLD AUTO: 0.8 10*3/MM3 (ref 0.1–0.9)
MONOCYTES NFR BLD AUTO: 6.4 % (ref 5–12)
NEUTROPHILS NFR BLD AUTO: 66 % (ref 42.7–76)
NEUTROPHILS NFR BLD AUTO: 8.26 10*3/MM3 (ref 1.7–7)
NRBC BLD AUTO-RTO: 0 /100 WBC (ref 0–0.2)
PLATELET # BLD AUTO: 183 10*3/MM3 (ref 140–450)
PMV BLD AUTO: 11.3 FL (ref 6–12)
RBC # BLD AUTO: 5.12 10*6/MM3 (ref 4.14–5.8)
WBC NRBC COR # BLD AUTO: 12.53 10*3/MM3 (ref 3.4–10.8)

## 2024-04-03 PROCEDURE — 85025 COMPLETE CBC W/AUTO DIFF WBC: CPT

## 2024-04-03 PROCEDURE — 36415 COLL VENOUS BLD VENIPUNCTURE: CPT

## 2024-04-03 RX ORDER — BUDESONIDE, GLYCOPYRROLATE, AND FORMOTEROL FUMARATE 160; 9; 4.8 UG/1; UG/1; UG/1
2 AEROSOL, METERED RESPIRATORY (INHALATION) 2 TIMES DAILY
Qty: 10.7 G | Refills: 1 | COMMUNITY
Start: 2024-04-03

## 2024-04-03 RX ORDER — ALBUTEROL SULFATE AND BUDESONIDE 90; 80 UG/1; UG/1
2 AEROSOL, METERED RESPIRATORY (INHALATION) EVERY 4 HOURS PRN
Qty: 10.7 G | Refills: 0 | COMMUNITY
Start: 2024-04-03

## 2024-04-03 NOTE — PROGRESS NOTES
"Transitional Care Follow Up Visit  Subjective     Dilip Faulkner is a 73 y.o. male who presents for a transitional care management visit.    Within 48 business hours after discharge our office contacted him via telephone to coordinate his care and needs.      I reviewed and discussed the details of that call along with the discharge summary, hospital problems, inpatient lab results, inpatient diagnostic studies, and consultation reports with Dilip.     Current outpatient and discharge medications have been reconciled for the patient.  Reviewed by: MONE Sheppard          3/27/2024     5:36 PM   Date of TCM Phone Call   [facility]   Date of Admission 3/25/2024   Date of Discharge 3/27/2024   Discharge Disposition Home or Self Care     Risk for Readmission (LACE) Score: 5 (3/27/2024  6:00 AM)      History of Present Illness  [facility] summary: \"Dilip Faulkner is a 73 y.o. male who has a history of recurrent hospital admissions, he has COPD and is on 2 L of oxygen, patient will not use his NIV, has SVT that is required adenosine in the past, morbid obesity, he presents with worsening shortness of breath.  Patient will not use his BiPAP because he believes it will kill him.  I told him there are other brands in [employer]. On arrival to the ED, patient's temperature 98.1, pulse of 84, respiratory rate of 20, blood pressure 105/67, and he is requiring 2 L of oxygen saturating 92%. Patient's chest x-ray shows: Chronic interstitial scarring in the lung bases left greater than the right unchanged\"    Recommended CBC for follow up on WBC.     He finished all of his azithromycin as prescribed. Overall he is felling better. Upon review of meds he is not taking Advair BID as prescribed, only using PRN.        Course During Hospital Stay:  3/25/24-3/27/24     The following portions of the patient's history were reviewed and updated as appropriate: allergies, current medications, past family history, " past medical history, past social history, past surgical history, and problem list.    Review of Systems    Objective   Physical Exam  Vitals reviewed.   Constitutional:       General: He is not in acute distress.     Appearance: He is not ill-appearing.   HENT:      Head: Normocephalic and atraumatic.   Eyes:      Conjunctiva/sclera: Conjunctivae normal.   Cardiovascular:      Rate and Rhythm: Normal rate and regular rhythm.      Heart sounds: Normal heart sounds.   Pulmonary:      Effort: Pulmonary effort is normal.      Breath sounds: No wheezing or rhonchi.      Comments: Diminished breath sounds throughout  Musculoskeletal:      Cervical back: Normal range of motion.   Skin:     General: Skin is warm.   Neurological:      Mental Status: He is alert and oriented to person, place, and time.   Psychiatric:         Mood and Affect: Mood normal.         Behavior: Behavior normal.         Thought Content: Thought content normal.         Judgment: Judgment normal.         Assessment & Plan   Diagnoses and all orders for this visit:    1. Hospital discharge follow-up (Primary)  Comments:  doing well, repeat CBC, med changes as ordered  Orders:  -     CBC w AUTO Differential; Future    2. COPD with exacerbation  Comments:  change adviar to breztri, change albulterol to airsupra with spacer given, samples given in office, encouraged bipap but refuses, repeat CBC, 2 month follow up  Orders:  -     CBC w AUTO Differential; Future  -     Budeson-Glycopyrrol-Formoterol (Breztri Aerosphere) 160-9-4.8 MCG/ACT aerosol inhaler; Inhale 2 puffs 2 (Two) Times a Day.  Dispense: 10.7 g; Refill: 1  -     Albuterol-Budesonide (Airsupra) 90-80 MCG/ACT aerosol; Inhale 2 puffs Every 4 (Four) Hours As Needed (wheezing, soa).  Dispense: 10.7 g; Refill: 0    3. Type 2 diabetes mellitus with hyperglycemia, with long-term current use of insulin  Comments:  refilled insulin needles  Orders:  -     Insulin Pen Needle 32G X 4 MM misc; Use 1 each  Daily.  Dispense: 100 each; Refill: 3

## 2024-04-17 RX ORDER — BUDESONIDE 0.5 MG/2ML
INHALANT ORAL
Qty: 60 EACH | Refills: 11 | Status: SHIPPED | OUTPATIENT
Start: 2024-04-17

## 2024-04-17 RX ORDER — ARFORMOTEROL TARTRATE 15 UG/2ML
SOLUTION RESPIRATORY (INHALATION)
Qty: 1 ML | Refills: 11 | Status: SHIPPED | OUTPATIENT
Start: 2024-04-17

## 2024-05-03 DIAGNOSIS — E11.65 TYPE 2 DIABETES MELLITUS WITH HYPERGLYCEMIA, WITH LONG-TERM CURRENT USE OF INSULIN: ICD-10-CM

## 2024-05-03 DIAGNOSIS — Z79.4 TYPE 2 DIABETES MELLITUS WITH HYPERGLYCEMIA, WITH LONG-TERM CURRENT USE OF INSULIN: ICD-10-CM

## 2024-05-03 DIAGNOSIS — K21.9 GASTROESOPHAGEAL REFLUX DISEASE WITHOUT ESOPHAGITIS: ICD-10-CM

## 2024-05-03 DIAGNOSIS — J44.9 CHRONIC OBSTRUCTIVE PULMONARY DISEASE, UNSPECIFIED COPD TYPE: ICD-10-CM

## 2024-05-03 RX ORDER — INSULIN DEGLUDEC 100 U/ML
INJECTION, SOLUTION SUBCUTANEOUS
Qty: 6 ML | Refills: 2 | Status: SHIPPED | OUTPATIENT
Start: 2024-05-03

## 2024-05-03 RX ORDER — OMEPRAZOLE 20 MG/1
20 CAPSULE, DELAYED RELEASE ORAL DAILY
Qty: 90 CAPSULE | Refills: 1 | Status: SHIPPED | OUTPATIENT
Start: 2024-05-03

## 2024-05-03 RX ORDER — FLUTICASONE PROPIONATE AND SALMETEROL 250; 50 UG/1; UG/1
1 POWDER RESPIRATORY (INHALATION) 2 TIMES DAILY
Qty: 60 EACH | Refills: 0 | OUTPATIENT
Start: 2024-05-03

## 2024-05-16 DIAGNOSIS — E11.65 TYPE 2 DIABETES MELLITUS WITH HYPERGLYCEMIA, WITH LONG-TERM CURRENT USE OF INSULIN: ICD-10-CM

## 2024-05-16 DIAGNOSIS — Z79.4 TYPE 2 DIABETES MELLITUS WITH HYPERGLYCEMIA, WITH LONG-TERM CURRENT USE OF INSULIN: ICD-10-CM

## 2024-05-22 ENCOUNTER — HOSPITAL ENCOUNTER (EMERGENCY)
Facility: HOSPITAL | Age: 74
Discharge: HOME | End: 2024-05-22
Attending: EMERGENCY MEDICINE
Payer: MEDICARE

## 2024-05-22 ENCOUNTER — APPOINTMENT (OUTPATIENT)
Dept: GENERAL RADIOLOGY | Facility: HOSPITAL | Age: 74
End: 2024-05-22
Payer: MEDICARE

## 2024-05-22 VITALS
BODY MASS INDEX: 32.82 KG/M2 | WEIGHT: 242.29 LBS | SYSTOLIC BLOOD PRESSURE: 125 MMHG | RESPIRATION RATE: 18 BRPM | HEART RATE: 77 BPM | HEIGHT: 72 IN | TEMPERATURE: 98.7 F | OXYGEN SATURATION: 92 % | DIASTOLIC BLOOD PRESSURE: 77 MMHG

## 2024-05-22 DIAGNOSIS — J44.1 COPD EXACERBATION: Primary | ICD-10-CM

## 2024-05-22 LAB
ALBUMIN SERPL-MCNC: 3.4 G/DL (ref 3.5–5.2)
ALBUMIN/GLOB SERPL: 1 G/DL
ALP SERPL-CCNC: 100 U/L (ref 39–117)
ALT SERPL W P-5'-P-CCNC: 7 U/L (ref 1–41)
ANION GAP SERPL CALCULATED.3IONS-SCNC: 11.3 MMOL/L (ref 5–15)
ARTERIAL PATENCY WRIST A: POSITIVE
AST SERPL-CCNC: 14 U/L (ref 1–40)
BASE EXCESS BLDA CALC-SCNC: -2.2 MMOL/L (ref -2–2)
BASOPHILS # BLD AUTO: 0.07 10*3/MM3 (ref 0–0.2)
BASOPHILS NFR BLD AUTO: 0.8 % (ref 0–1.5)
BDY SITE: ABNORMAL
BILIRUB SERPL-MCNC: 0.3 MG/DL (ref 0–1.2)
BUN SERPL-MCNC: 18 MG/DL (ref 8–23)
BUN/CREAT SERPL: 18.2 (ref 7–25)
CALCIUM SPEC-SCNC: 8.5 MG/DL (ref 8.6–10.5)
CHLORIDE SERPL-SCNC: 102 MMOL/L (ref 98–107)
CO2 SERPL-SCNC: 22.7 MMOL/L (ref 22–29)
COHGB MFR BLD: 1.3 % (ref 0–1.5)
CREAT SERPL-MCNC: 0.99 MG/DL (ref 0.76–1.27)
DEPRECATED RDW RBC AUTO: 50.5 FL (ref 37–54)
EGFRCR SERPLBLD CKD-EPI 2021: 79.4 ML/MIN/1.73
EOSINOPHIL # BLD AUTO: 0.73 10*3/MM3 (ref 0–0.4)
EOSINOPHIL NFR BLD AUTO: 8.5 % (ref 0.3–6.2)
ERYTHROCYTE [DISTWIDTH] IN BLOOD BY AUTOMATED COUNT: 17.2 % (ref 12.3–15.4)
FHHB: 9.1 % (ref 0–5)
FLUAV SUBTYP SPEC NAA+PROBE: NOT DETECTED
FLUBV RNA ISLT QL NAA+PROBE: NOT DETECTED
GAS FLOW AIRWAY: 2 LPM
GLOBULIN UR ELPH-MCNC: 3.5 GM/DL
GLUCOSE SERPL-MCNC: 101 MG/DL (ref 65–99)
HCO3 BLDA-SCNC: 23.6 MMOL/L (ref 22–26)
HCT VFR BLD AUTO: 44.4 % (ref 37.5–51)
HGB BLD-MCNC: 12.8 G/DL (ref 13–17.7)
HGB BLDA-MCNC: 13.8 G/DL (ref 13.8–16.4)
HOLD SPECIMEN: NORMAL
HOLD SPECIMEN: NORMAL
IMM GRANULOCYTES # BLD AUTO: 0.02 10*3/MM3 (ref 0–0.05)
IMM GRANULOCYTES NFR BLD AUTO: 0.2 % (ref 0–0.5)
LYMPHOCYTES # BLD AUTO: 1.86 10*3/MM3 (ref 0.7–3.1)
LYMPHOCYTES NFR BLD AUTO: 21.7 % (ref 19.6–45.3)
MCH RBC QN AUTO: 23.7 PG (ref 26.6–33)
MCHC RBC AUTO-ENTMCNC: 28.8 G/DL (ref 31.5–35.7)
MCV RBC AUTO: 82.1 FL (ref 79–97)
METHGB BLD QL: 0.3 % (ref 0–1.5)
MODALITY: ABNORMAL
MONOCYTES # BLD AUTO: 0.94 10*3/MM3 (ref 0.1–0.9)
MONOCYTES NFR BLD AUTO: 11 % (ref 5–12)
NEUTROPHILS NFR BLD AUTO: 4.95 10*3/MM3 (ref 1.7–7)
NEUTROPHILS NFR BLD AUTO: 57.8 % (ref 42.7–76)
NRBC BLD AUTO-RTO: 0 /100 WBC (ref 0–0.2)
NT-PROBNP SERPL-MCNC: 100 PG/ML (ref 0–1800)
OXYHGB MFR BLDV: 89.3 % (ref 94–99)
PCO2 BLDA: 44.4 MM HG (ref 35–45)
PH BLDA: 7.34 PH UNITS (ref 7.35–7.45)
PLATELET # BLD AUTO: 163 10*3/MM3 (ref 140–450)
PMV BLD AUTO: 9.9 FL (ref 6–12)
PO2 BLDA: 60.8 MM HG (ref 80–100)
POTASSIUM SERPL-SCNC: 4.8 MMOL/L (ref 3.5–5.2)
PROT SERPL-MCNC: 6.9 G/DL (ref 6–8.5)
RBC # BLD AUTO: 5.41 10*6/MM3 (ref 4.14–5.8)
RSV RNA NPH QL NAA+NON-PROBE: NOT DETECTED
SAO2 % BLDCOA: 90.8 % (ref 95–99)
SARS-COV-2 RNA RESP QL NAA+PROBE: NOT DETECTED
SODIUM SERPL-SCNC: 136 MMOL/L (ref 136–145)
TROPONIN T SERPL HS-MCNC: 50 NG/L
WBC NRBC COR # BLD AUTO: 8.57 10*3/MM3 (ref 3.4–10.8)
WHOLE BLOOD HOLD COAG: NORMAL
WHOLE BLOOD HOLD SPECIMEN: NORMAL

## 2024-05-22 PROCEDURE — 87637 SARSCOV2&INF A&B&RSV AMP PRB: CPT | Performed by: EMERGENCY MEDICINE

## 2024-05-22 PROCEDURE — 84484 ASSAY OF TROPONIN QUANT: CPT | Performed by: EMERGENCY MEDICINE

## 2024-05-22 PROCEDURE — 93005 ELECTROCARDIOGRAM TRACING: CPT | Performed by: EMERGENCY MEDICINE

## 2024-05-22 PROCEDURE — 96374 THER/PROPH/DIAG INJ IV PUSH: CPT

## 2024-05-22 PROCEDURE — 71045 X-RAY EXAM CHEST 1 VIEW: CPT

## 2024-05-22 PROCEDURE — 82375 ASSAY CARBOXYHB QUANT: CPT | Performed by: EMERGENCY MEDICINE

## 2024-05-22 PROCEDURE — 25010000002 METHYLPREDNISOLONE PER 125 MG: Performed by: EMERGENCY MEDICINE

## 2024-05-22 PROCEDURE — 99284 EMERGENCY DEPT VISIT MOD MDM: CPT

## 2024-05-22 PROCEDURE — 83880 ASSAY OF NATRIURETIC PEPTIDE: CPT | Performed by: EMERGENCY MEDICINE

## 2024-05-22 PROCEDURE — 93010 ELECTROCARDIOGRAM REPORT: CPT | Performed by: INTERNAL MEDICINE

## 2024-05-22 PROCEDURE — 36415 COLL VENOUS BLD VENIPUNCTURE: CPT

## 2024-05-22 PROCEDURE — 93005 ELECTROCARDIOGRAM TRACING: CPT

## 2024-05-22 PROCEDURE — 85025 COMPLETE CBC W/AUTO DIFF WBC: CPT

## 2024-05-22 PROCEDURE — 83050 HGB METHEMOGLOBIN QUAN: CPT | Performed by: EMERGENCY MEDICINE

## 2024-05-22 PROCEDURE — 36600 WITHDRAWAL OF ARTERIAL BLOOD: CPT | Performed by: EMERGENCY MEDICINE

## 2024-05-22 PROCEDURE — 82805 BLOOD GASES W/O2 SATURATION: CPT | Performed by: EMERGENCY MEDICINE

## 2024-05-22 PROCEDURE — 80053 COMPREHEN METABOLIC PANEL: CPT | Performed by: EMERGENCY MEDICINE

## 2024-05-22 RX ORDER — AZITHROMYCIN 250 MG/1
TABLET, FILM COATED ORAL
Qty: 6 TABLET | Refills: 0 | Status: SHIPPED | OUTPATIENT
Start: 2024-05-22 | End: 2024-06-04

## 2024-05-22 RX ORDER — PREDNISONE 50 MG/1
50 TABLET ORAL DAILY
Qty: 5 TABLET | Refills: 0 | Status: SHIPPED | OUTPATIENT
Start: 2024-05-22 | End: 2024-06-04

## 2024-05-22 RX ORDER — SODIUM CHLORIDE 0.9 % (FLUSH) 0.9 %
10 SYRINGE (ML) INJECTION AS NEEDED
Status: DISCONTINUED | OUTPATIENT
Start: 2024-05-22 | End: 2024-05-22 | Stop reason: HOSPADM

## 2024-05-22 RX ADMIN — METHYLPREDNISOLONE SODIUM SUCCINATE 125 MG: 125 INJECTION INTRAMUSCULAR; INTRAVENOUS at 14:23

## 2024-05-22 NOTE — ED PROVIDER NOTES
Time: 1:00 PM EDT  Date of encounter:  5/22/2024  Independent Historian/Clinical History and Information was obtained by:   Patient    History is limited by: N/A    Chief Complaint: Short of breath      History of Present Illness:  Patient is a 74 y.o. year old male who presents to the emergency department for evaluation of shortness of breath that started earlier today.  Patient has history of COPD.  Patient is a former smoker.  Patient is oxygen dependent and wears 2 L per nasal cannula at home.  Patient denies fever.  Patient was given a breathing treatment rapid movements and states his breathing is better now at time of exam    HPI    Patient Care Team  Primary Care Provider: Jackeline Jaime APRN    Past Medical History:     No Known Allergies  Past Medical History:   Diagnosis Date    Asthma     COPD (chronic obstructive pulmonary disease)     Diabetes mellitus     Ex-smoker     QUIT 2021    Hernia, umbilical     Hypertension     On home O2     REPORTS WEARING 2L/NC PRN SOA     Past Surgical History:   Procedure Laterality Date    ABDOMINAL SURGERY       Family History   Problem Relation Age of Onset    Asthma Father     Asthma Sister     Asthma Brother     Cancer Maternal Aunt        Home Medications:  Prior to Admission medications    Medication Sig Start Date End Date Taking? Authorizing Provider   albuterol sulfate  (90 Base) MCG/ACT inhaler INHALE 2 PUFFS BY MOUTH EVERY 4 HOURS AS NEEDED FOR WHEEZING 3/28/24   Nelly Jaime APRN   Albuterol-Budesonide (Airsupra) 90-80 MCG/ACT aerosol Inhale 2 puffs Every 4 (Four) Hours As Needed (wheezing, soa). 4/3/24   Jackeline Jaime APRN   arformoterol (BROVANA) 15 MCG/2ML nebulizer solution USE 1 VIAL  IN  NEBULIZER TWICE  DAILY - Morning and Evening 4/17/24   Leila Singh MD   aspirin 81 MG chewable tablet Chew 1 tablet Daily. 11/2/23   Jackeline Jaime APRN   atorvastatin (LIPITOR) 10 MG tablet Take 1 tablet by mouth Daily for 180 days. 2/15/24 8/13/24   "Jackeline Jaime APRN   Budeson-Glycopyrrol-Formoterol (Breztri Aerosphere) 160-9-4.8 MCG/ACT aerosol inhaler Inhale 2 puffs 2 (Two) Times a Day. 4/3/24   Jackeline Jaime APRN   budesonide (PULMICORT) 0.5 MG/2ML nebulizer solution USE 1 VIAL  IN  NEBULIZER TWICE  DAILY - Rinse mouth after treatment 4/17/24   Leila Singh MD   empagliflozin (JARDIANCE) 10 MG tablet tablet Take 1 tablet by mouth Daily. 2/15/24   Jackeline Jaime APRN   Insulin Pen Needle 32G X 4 MM misc Use 1 each Daily. 4/3/24   Jackeline Jaime APRN   metFORMIN (GLUCOPHAGE) 500 MG tablet TAKE 1 TABLET BY MOUTH TWICE DAILY WITH MEALS 5/16/24   Jackeline Jaime APRN   omeprazole (priLOSEC) 20 MG capsule TAKE 1 CAPSULE BY MOUTH DAILY 5/3/24   Jackeline Jaime APRN   Tresiba FlexTouch 100 UNIT/ML solution pen-injector injection ADMINISTER 20 UNITS UNDER THE SKIN DAILY AS DIRECTED 5/3/24   Jackeline Jaime APRN        Social History:   Social History     Tobacco Use    Smoking status: Former     Current packs/day: 0.00     Average packs/day: 1.5 packs/day for 56.2 years (84.3 ttl pk-yrs)     Types: Cigarettes     Start date: 1965     Quit date: 3/19/2021     Years since quitting: 3.1     Passive exposure: Past    Smokeless tobacco: Current     Types: Chew   Vaping Use    Vaping status: Never Used   Substance Use Topics    Alcohol use: Never    Drug use: Never         Review of Systems:  Review of Systems     Physical Exam:  /72 (BP Location: Left arm, Patient Position: Lying)   Pulse 74   Temp 98.7 °F (37.1 °C) (Oral)   Resp 25   Ht 182.9 cm (72\")   Wt 110 kg (242 lb 4.6 oz)   SpO2 97%   BMI 32.86 kg/m²     Physical Exam   ***          Procedures:  Procedures      Medical Decision Making:      Comorbidities that affect care:    {Comorbidities that affect care:93872}    External Notes reviewed:    {External Note review (Optional):97853}      The following orders were placed and all results were independently analyzed by me:  Orders Placed This " Encounter   Procedures    COVID-19, FLU A/B, RSV PCR 1 HR TAT - Swab, Nasopharynx    XR Chest 1 View    [place] Draw    Comprehensive Metabolic Panel    BNP    Single High Sensitivity Troponin T    CBC Auto Differential    NPO Diet NPO Type: Strict NPO    Undress & Gown    Continuous Pulse Oximetry    Vital Signs    Oxygen Therapy- Nasal Cannula; Titrate 1-6 LPM Per SpO2; 90 - 95%    ECG 12 Lead ED Triage Standing Order; SOA    Insert Peripheral IV    CBC & Differential    Green Top (Gel)    Lavender Top    Gold Top - SST    Light Blue Top       Medications Given in the Emergency Department:  Medications   sodium chloride 0.9 % flush 10 mL (has no administration in time range)        ED Course:         Labs:    Lab Results (last 24 hours)       Procedure Component Value Units Date/Time    CBC & Differential [[id number]]  (Abnormal) Collected: 05/22/24 1236    Specimen: Blood Updated: 05/22/24 1243    Narrative:      The following orders were created for panel order CBC & Differential.  Procedure                               Abnormality         Status                     ---------                               -----------         ------                     CBC Auto Differential[[id number]]        Abnormal            Final result                 Please view results for these tests on the individual orders.    Comprehensive Metabolic Panel [[id number]] Collected: 05/22/24 1236    Specimen: Blood Updated: 05/22/24 1240    BNP [[id number]] Collected: 05/22/24 1236    Specimen: Blood Updated: 05/22/24 1240    Single High Sensitivity Troponin T [[id number]] Collected: 05/22/24 1236    Specimen: Blood Updated: 05/22/24 1240    CBC Auto Differential [[id number]]  (Abnormal) Collected: 05/22/24 1236    Specimen: Blood Updated: 05/22/24 1243     WBC 8.57 10*3/mm3      RBC 5.41 10*6/mm3      Hemoglobin 12.8 g/dL      Hematocrit 44.4 %      MCV 82.1 fL      MCH 23.7 pg      MCHC 28.8 g/dL      RDW 17.2 %      RDW-SD 50.5 fl      MPV  9.9 fL      Platelets 163 10*3/mm3      Neutrophil % 57.8 %      Lymphocyte % 21.7 %      Monocyte % 11.0 %      Eosinophil % 8.5 %      Basophil % 0.8 %      Immature Grans % 0.2 %      Neutrophils, Absolute 4.95 10*3/mm3      Lymphocytes, Absolute 1.86 10*3/mm3      Monocytes, Absolute 0.94 10*3/mm3      Eosinophils, Absolute 0.73 10*3/mm3      Basophils, Absolute 0.07 10*3/mm3      Immature Grans, Absolute 0.02 10*3/mm3      nRBC 0.0 /100 WBC              Imaging:    No Radiology Exams Resulted Within Past 24 Hours      Differential Diagnosis and Discussion:    {Differentials:88434}    {Independent Review of (Optional):69526}    MDM     {Critical Care:77098}    {SEPSIS RECOGNITION:87495}    Patient Care Considerations:    {Considerations (Optional):66639}      Consultants/Shared Management Plan:    {Shared Management Plan (Optional):92687}    Social Determinants of Health:    {Social Determinants of Health (Optional):62676}      Disposition and Care Coordination:    {Admission consideration:65738}    {Discharge (Optional):81843}    Final diagnoses:   None        ED Disposition       None            This medical record created using voice recognition software.           care.       Disposition and Care Coordination:    Discharged: The patient is suitable and stable for discharge with no need for consideration of admission.    I have explained the patient´s condition, diagnoses and treatment plan based on the information available to me at this time. I have answered questions and addressed any concerns. The patient has a good  understanding of the patient´s diagnosis, condition, and treatment plan as can be expected at this point. The vital signs have been stable. The patient´s condition is stable and appropriate for discharge from the emergency department.      The patient will pursue further outpatient evaluation with the primary care physician or other designated or consulting physician as outlined in the discharge instructions. They are agreeable to this plan of care and follow-up instructions have been explained in detail. The patient has received these instructions in written format and has expressed an understanding of the discharge instructions. The patient is aware that any significant change in condition or worsening of symptoms should prompt an immediate return to this or the closest emergency department or call to 911.  I have explained discharge medications and the need for follow up with the patient/caretakers. This was also printed in the discharge instructions. Patient was discharged with the following medications and follow up:      Medication List        New Prescriptions      azithromycin 250 MG tablet  Commonly known as: Zithromax Z-Farrukh  Take 2 tablets by mouth on day 1, then 1 tablet daily on days 2-5     predniSONE 50 MG tablet  Commonly known as: DELTASONE  Take 1 tablet by mouth Daily.               Where to Get Your Medications        These medications were sent to [id number] - [place], [place] - [address] AT [facility] [phone] [facility] [phone] [address]  [address] [place] [address] [address]      Phone:  [phone]   azithromycin 250 MG tablet  predniSONE 50 MG tablet      Jackeline Jaime, APRN  [address] [address]  [phone]    Schedule an appointment as soon as possible for a visit         Final diagnoses:   COPD exacerbation        ED Disposition       ED Disposition   Discharge    Condition   Stable    Comment   --               This medical record created using voice recognition software.             Wilton Zimmerman DO  05/30/24 [id number]

## 2024-05-23 LAB
QT INTERVAL: 432 MS
QTC INTERVAL: 475 MS

## 2024-05-25 DIAGNOSIS — Z79.4 TYPE 2 DIABETES MELLITUS WITH HYPERGLYCEMIA, WITH LONG-TERM CURRENT USE OF INSULIN: ICD-10-CM

## 2024-05-25 DIAGNOSIS — E11.65 TYPE 2 DIABETES MELLITUS WITH HYPERGLYCEMIA, WITH LONG-TERM CURRENT USE OF INSULIN: ICD-10-CM

## 2024-05-28 RX ORDER — EMPAGLIFLOZIN 10 MG/1
10 TABLET, FILM COATED ORAL DAILY
Qty: 90 TABLET | Refills: 0 | Status: SHIPPED | OUTPATIENT
Start: 2024-05-28

## 2024-05-31 DIAGNOSIS — E78.5 HYPERLIPIDEMIA, UNSPECIFIED HYPERLIPIDEMIA TYPE: ICD-10-CM

## 2024-05-31 DIAGNOSIS — I20.0 UNSTABLE ANGINA: ICD-10-CM

## 2024-05-31 RX ORDER — ASPIRIN 81 MG/1
81 TABLET, CHEWABLE ORAL DAILY
Qty: 90 TABLET | Refills: 1 | Status: SHIPPED | OUTPATIENT
Start: 2024-05-31

## 2024-06-04 ENCOUNTER — OFFICE VISIT (OUTPATIENT)
Dept: FAMILY MEDICINE CLINIC | Facility: CLINIC | Age: 74
End: 2024-06-04
Payer: MEDICARE

## 2024-06-04 VITALS
BODY MASS INDEX: 33.46 KG/M2 | SYSTOLIC BLOOD PRESSURE: 123 MMHG | HEIGHT: 72 IN | HEART RATE: 75 BPM | OXYGEN SATURATION: 90 % | DIASTOLIC BLOOD PRESSURE: 74 MMHG | WEIGHT: 247 LBS

## 2024-06-04 DIAGNOSIS — J44.9 CHRONIC OBSTRUCTIVE PULMONARY DISEASE, UNSPECIFIED COPD TYPE: ICD-10-CM

## 2024-06-04 PROCEDURE — 1159F MED LIST DOCD IN RCRD: CPT

## 2024-06-04 PROCEDURE — 1160F RVW MEDS BY RX/DR IN RCRD: CPT

## 2024-06-04 PROCEDURE — 99213 OFFICE O/P EST LOW 20 MIN: CPT

## 2024-06-04 PROCEDURE — 3044F HG A1C LEVEL LT 7.0%: CPT

## 2024-06-04 PROCEDURE — 3078F DIAST BP <80 MM HG: CPT

## 2024-06-04 PROCEDURE — 3074F SYST BP LT 130 MM HG: CPT

## 2024-06-04 PROCEDURE — 1126F AMNT PAIN NOTED NONE PRSNT: CPT

## 2024-06-04 RX ORDER — ALBUTEROL SULFATE AND BUDESONIDE 90; 80 UG/1; UG/1
2 AEROSOL, METERED RESPIRATORY (INHALATION) EVERY 4 HOURS PRN
Qty: 10.7 G | Refills: 1 | COMMUNITY
Start: 2024-06-04

## 2024-06-04 RX ORDER — BUDESONIDE, GLYCOPYRROLATE, AND FORMOTEROL FUMARATE 160; 9; 4.8 UG/1; UG/1; UG/1
2 AEROSOL, METERED RESPIRATORY (INHALATION) 2 TIMES DAILY
Qty: 10.7 G | Refills: 1 | COMMUNITY
Start: 2024-06-04

## 2024-06-04 NOTE — PROGRESS NOTES
Chief Complaint  COPD    SUBJECTIVE  Dilip Faulkner presents to [facility]    History of Present Illness  Patient is a 74-year-old male who presents today for 2-month follow-up on COPD.  At last appointment patient was changed to Breztri daily maintenance inhaler and air super rescue inhaler.  Patient reports since starting these he noticed improvement in his work of breathing.  He is still on 2 L nasal cannula at home.  Patient has normal oxygen in office today.    Past Medical History:   Diagnosis Date    Asthma     COPD (chronic obstructive pulmonary disease)     Diabetes mellitus     Ex-smoker     QUIT 2021    Hernia, umbilical     Hypertension     On home O2     REPORTS WEARING 2L/NC PRN SOA      Family History   Problem Relation Age of Onset    Asthma Father     Asthma Sister     Asthma Brother     Cancer Maternal Aunt       Past Surgical History:   Procedure Laterality Date    ABDOMINAL SURGERY          Current Outpatient Medications:     Albuterol-Budesonide (Airsupra) 90-80 MCG/ACT aerosol, Inhale 2 puffs Every 4 (Four) Hours As Needed (wheezing, soa)., Disp: 10.7 g, Rfl: 1    aspirin 81 MG chewable tablet, CHEW AND SWALLOW 1 TABLET BY MOUTH DAILY, Disp: 90 tablet, Rfl: 1    atorvastatin (LIPITOR) 10 MG tablet, Take 1 tablet by mouth Daily for 180 days., Disp: 90 tablet, Rfl: 1    Budeson-Glycopyrrol-Formoterol (Breztri Aerosphere) 160-9-4.8 MCG/ACT aerosol inhaler, Inhale 2 puffs 2 (Two) Times a Day., Disp: 10.7 g, Rfl: 1    Insulin Pen Needle 32G X 4 MM misc, Use 1 each Daily., Disp: 100 each, Rfl: 3    Jardiance 10 MG tablet tablet, TAKE 1 TABLET BY MOUTH DAILY, Disp: 90 tablet, Rfl: 0    metFORMIN (GLUCOPHAGE) 500 MG tablet, TAKE 1 TABLET BY MOUTH TWICE DAILY WITH MEALS, Disp: 60 tablet, Rfl: 0    omeprazole (priLOSEC) 20 MG capsule, TAKE 1 CAPSULE BY MOUTH DAILY, Disp: 90 capsule, Rfl: 1    Tresiba FlexTouch 100 UNIT/ML solution pen-injector injection, ADMINISTER 20  "UNITS UNDER THE SKIN DAILY AS DIRECTED, Disp: 6 mL, Rfl: 2    arformoterol (BROVANA) 15 MCG/2ML nebulizer solution, USE 1 VIAL  IN  NEBULIZER TWICE  DAILY - Morning and Evening, Disp: 1 mL, Rfl: 11    OBJECTIVE  Vital Signs:   /74 (BP Location: Left arm, Patient Position: Sitting, Cuff Size: Large Adult)   Pulse 75   Ht 182.9 cm (72\")   Wt 112 kg (247 lb)   SpO2 90%   BMI 33.50 kg/m²    Estimated body mass index is 33.5 kg/m² as calculated from the following:    Height as of this encounter: 182.9 cm (72\").    Weight as of this encounter: 112 kg (247 lb).     Wt Readings from Last 3 Encounters:   06/04/24 112 kg (247 lb)   05/22/24 110 kg (242 lb 4.6 oz)   04/03/24 110 kg (241 lb 9.6 oz)     BP Readings from Last 3 Encounters:   06/04/24 123/74   05/22/24 125/77   04/03/24 106/50       Physical Exam  Vitals reviewed.   Constitutional:       General: He is not in acute distress.     Appearance: He is not ill-appearing.   HENT:      Head: Normocephalic and atraumatic.   Eyes:      Conjunctiva/sclera: Conjunctivae normal.   Cardiovascular:      Rate and Rhythm: Normal rate and regular rhythm.      Heart sounds: Normal heart sounds.   Pulmonary:      Effort: Pulmonary effort is normal. No respiratory distress.      Breath sounds: Normal breath sounds. No wheezing or rhonchi.   Skin:     General: Skin is warm and dry.   Neurological:      Mental Status: He is alert and oriented to person, place, and time.   Psychiatric:         Mood and Affect: Mood normal.         Behavior: Behavior normal.         Thought Content: Thought content normal.         Judgment: Judgment normal.          Result Review    Common labs          3/27/2024    04:39 4/3/2024    14:29 5/22/2024    12:36   Common Labs   Glucose 153   101    BUN 22   18    Creatinine 1.04   0.99    Sodium 138   136    Potassium 4.6   4.8    Chloride 104   102    Calcium 8.6   8.5    Albumin   3.4    Total Bilirubin   0.3    Alkaline Phosphatase   100    AST " (SGOT)   14    ALT (SGPT)   7    WBC 19.97  12.53  8.57    Hemoglobin 12.0  11.8  12.8    Hematocrit 41.0  40.6  44.4    Platelets 194  183  163        XR Chest 1 View    Result Date: 5/22/2024  1. Hazy bibasilar airspace opacities favored to represent atelectasis. Probable trace left-sided pleural effusion.  Electronically Signed By-Marco Plunkett MD On:5/22/2024 1:00 PM      CT Chest Without Contrast Diagnostic    Result Date: 3/25/2024  No acute infiltrate is seen. Atelectasis and/or fibrosis is (are) suggested bilaterally, greater on the left than the right. The findings may be related to chronic interstitial lung disease. Please correlate clinically.    Electronically Signed By-Aaron Rodriguez MD On:3/25/2024 10:52 PM      XR Chest 1 View    Result Date: 3/25/2024   1. No acute process. 2. Chronic interstitial scarring at the lung bases left greater than right, unchanged.  Electronically Signed By-Mohsen Rudd MD On:3/25/2024 1:43 PM          The above data has been reviewed by MONE Sheppard 06/04/2024 14:42 EDT.          Patient Care Team:  Jackeline Jaime APRN as PCP - General (Nurse Practitioner)  Rosalba Edwards APRN (Nurse Practitioner)  Chandu Vail MD as Consulting Physician (Pulmonary Disease)  Lorena Campbell APRN as Consulting Physician (Cardiology)  Nelly Jaime APRN as Nurse Practitioner (Pulmonary Disease)           ASSESSMENT & PLAN    Diagnoses and all orders for this visit:    1. Chronic obstructive pulmonary disease, unspecified COPD type  Comments:  Stable on restrainer super, continue, refill sent, follow-up in August  Orders:  -     Budeson-Glycopyrrol-Formoterol (Breztri Aerosphere) 160-9-4.8 MCG/ACT aerosol inhaler; Inhale 2 puffs 2 (Two) Times a Day.  Dispense: 10.7 g; Refill: 1  -     Albuterol-Budesonide (Airsupra) 90-80 MCG/ACT aerosol; Inhale 2 puffs Every 4 (Four) Hours As Needed (wheezing, soa).  Dispense: 10.7 g; Refill: 1         Tobacco Use: High Risk  (6/4/2024)    Patient History     Smoking Tobacco Use: Former     Smokeless Tobacco Use: Current     Passive Exposure: Past       Follow Up     Return in about 2 months (around 8/15/2024) for Next scheduled follow up.      Patient was given instructions and counseling regarding his condition or for health maintenance advice. Please see specific information pulled into the AVS if appropriate.   I have reviewed information obtained and documented by others and I have confirmed the accuracy of this documented note.    Jackeline Jaime, APRN

## 2024-06-08 ENCOUNTER — APPOINTMENT (OUTPATIENT)
Dept: GENERAL RADIOLOGY | Facility: HOSPITAL | Age: 74
End: 2024-06-08
Payer: MEDICARE

## 2024-06-08 ENCOUNTER — HOSPITAL ENCOUNTER (EMERGENCY)
Facility: HOSPITAL | Age: 74
Discharge: HOME | End: 2024-06-08
Attending: EMERGENCY MEDICINE | Admitting: EMERGENCY MEDICINE
Payer: MEDICARE

## 2024-06-08 VITALS
HEIGHT: 73 IN | OXYGEN SATURATION: 93 % | SYSTOLIC BLOOD PRESSURE: 121 MMHG | HEART RATE: 70 BPM | TEMPERATURE: 98.9 F | RESPIRATION RATE: 16 BRPM | WEIGHT: 242.06 LBS | DIASTOLIC BLOOD PRESSURE: 76 MMHG | BODY MASS INDEX: 32.08 KG/M2

## 2024-06-08 DIAGNOSIS — J20.9 ACUTE BRONCHITIS, UNSPECIFIED ORGANISM: ICD-10-CM

## 2024-06-08 DIAGNOSIS — J44.1 COPD EXACERBATION: Primary | ICD-10-CM

## 2024-06-08 LAB
ALBUMIN SERPL-MCNC: 3.8 G/DL (ref 3.5–5.2)
ALBUMIN/GLOB SERPL: 1.2 G/DL
ALP SERPL-CCNC: 89 U/L (ref 39–117)
ALT SERPL W P-5'-P-CCNC: 14 U/L (ref 1–41)
ANION GAP SERPL CALCULATED.3IONS-SCNC: 10 MMOL/L (ref 5–15)
AST SERPL-CCNC: 13 U/L (ref 1–40)
BASOPHILS # BLD AUTO: 0.07 10*3/MM3 (ref 0–0.2)
BASOPHILS NFR BLD AUTO: 0.7 % (ref 0–1.5)
BILIRUB SERPL-MCNC: 0.2 MG/DL (ref 0–1.2)
BUN SERPL-MCNC: 12 MG/DL (ref 8–23)
BUN/CREAT SERPL: 9.9 (ref 7–25)
CALCIUM SPEC-SCNC: 8.8 MG/DL (ref 8.6–10.5)
CHLORIDE SERPL-SCNC: 101 MMOL/L (ref 98–107)
CO2 SERPL-SCNC: 28 MMOL/L (ref 22–29)
CREAT SERPL-MCNC: 1.21 MG/DL (ref 0.76–1.27)
DEPRECATED RDW RBC AUTO: 51.2 FL (ref 37–54)
EGFRCR SERPLBLD CKD-EPI 2021: 62.4 ML/MIN/1.73
EOSINOPHIL # BLD AUTO: 0.68 10*3/MM3 (ref 0–0.4)
EOSINOPHIL NFR BLD AUTO: 7.2 % (ref 0.3–6.2)
ERYTHROCYTE [DISTWIDTH] IN BLOOD BY AUTOMATED COUNT: 18 % (ref 12.3–15.4)
FLUAV SUBTYP SPEC NAA+PROBE: NOT DETECTED
FLUBV RNA ISLT QL NAA+PROBE: NOT DETECTED
GLOBULIN UR ELPH-MCNC: 3.1 GM/DL
GLUCOSE SERPL-MCNC: 112 MG/DL (ref 65–99)
HCT VFR BLD AUTO: 43.4 % (ref 37.5–51)
HGB BLD-MCNC: 12.4 G/DL (ref 13–17.7)
HOLD SPECIMEN: NORMAL
HOLD SPECIMEN: NORMAL
IMM GRANULOCYTES # BLD AUTO: 0.08 10*3/MM3 (ref 0–0.05)
IMM GRANULOCYTES NFR BLD AUTO: 0.9 % (ref 0–0.5)
LYMPHOCYTES # BLD AUTO: 1.57 10*3/MM3 (ref 0.7–3.1)
LYMPHOCYTES NFR BLD AUTO: 16.7 % (ref 19.6–45.3)
MCH RBC QN AUTO: 23.2 PG (ref 26.6–33)
MCHC RBC AUTO-ENTMCNC: 28.6 G/DL (ref 31.5–35.7)
MCV RBC AUTO: 81.3 FL (ref 79–97)
MONOCYTES # BLD AUTO: 0.57 10*3/MM3 (ref 0.1–0.9)
MONOCYTES NFR BLD AUTO: 6.1 % (ref 5–12)
NEUTROPHILS NFR BLD AUTO: 6.42 10*3/MM3 (ref 1.7–7)
NEUTROPHILS NFR BLD AUTO: 68.4 % (ref 42.7–76)
NRBC BLD AUTO-RTO: 0 /100 WBC (ref 0–0.2)
NT-PROBNP SERPL-MCNC: 596 PG/ML (ref 0–1800)
PLATELET # BLD AUTO: 219 10*3/MM3 (ref 140–450)
PMV BLD AUTO: 9.5 FL (ref 6–12)
POTASSIUM SERPL-SCNC: 4.9 MMOL/L (ref 3.5–5.2)
PROT SERPL-MCNC: 6.9 G/DL (ref 6–8.5)
RBC # BLD AUTO: 5.34 10*6/MM3 (ref 4.14–5.8)
RSV RNA NPH QL NAA+NON-PROBE: NOT DETECTED
SARS-COV-2 RNA RESP QL NAA+PROBE: NOT DETECTED
SODIUM SERPL-SCNC: 139 MMOL/L (ref 136–145)
TROPONIN T SERPL HS-MCNC: 50 NG/L
WBC NRBC COR # BLD AUTO: 9.39 10*3/MM3 (ref 3.4–10.8)
WHOLE BLOOD HOLD COAG: NORMAL
WHOLE BLOOD HOLD SPECIMEN: NORMAL

## 2024-06-08 PROCEDURE — 94664 DEMO&/EVAL PT USE INHALER: CPT

## 2024-06-08 PROCEDURE — 96374 THER/PROPH/DIAG INJ IV PUSH: CPT

## 2024-06-08 PROCEDURE — 94799 UNLISTED PULMONARY SVC/PX: CPT

## 2024-06-08 PROCEDURE — 93005 ELECTROCARDIOGRAM TRACING: CPT | Performed by: EMERGENCY MEDICINE

## 2024-06-08 PROCEDURE — 93010 ELECTROCARDIOGRAM REPORT: CPT | Performed by: INTERNAL MEDICINE

## 2024-06-08 PROCEDURE — 84484 ASSAY OF TROPONIN QUANT: CPT | Performed by: EMERGENCY MEDICINE

## 2024-06-08 PROCEDURE — 25010000002 METHYLPREDNISOLONE PER 125 MG: Performed by: EMERGENCY MEDICINE

## 2024-06-08 PROCEDURE — 94640 AIRWAY INHALATION TREATMENT: CPT

## 2024-06-08 PROCEDURE — 83880 ASSAY OF NATRIURETIC PEPTIDE: CPT | Performed by: EMERGENCY MEDICINE

## 2024-06-08 PROCEDURE — 93005 ELECTROCARDIOGRAM TRACING: CPT

## 2024-06-08 PROCEDURE — 80053 COMPREHEN METABOLIC PANEL: CPT | Performed by: EMERGENCY MEDICINE

## 2024-06-08 PROCEDURE — 71045 X-RAY EXAM CHEST 1 VIEW: CPT

## 2024-06-08 PROCEDURE — 87637 SARSCOV2&INF A&B&RSV AMP PRB: CPT | Performed by: EMERGENCY MEDICINE

## 2024-06-08 PROCEDURE — 99284 EMERGENCY DEPT VISIT MOD MDM: CPT

## 2024-06-08 PROCEDURE — 85025 COMPLETE CBC W/AUTO DIFF WBC: CPT | Performed by: EMERGENCY MEDICINE

## 2024-06-08 RX ORDER — SODIUM CHLORIDE 0.9 % (FLUSH) 0.9 %
10 SYRINGE (ML) INJECTION AS NEEDED
Status: DISCONTINUED | OUTPATIENT
Start: 2024-06-08 | End: 2024-06-08 | Stop reason: HOSPADM

## 2024-06-08 RX ORDER — AZITHROMYCIN 250 MG/1
TABLET, FILM COATED ORAL
Qty: 6 TABLET | Refills: 0 | Status: SHIPPED | OUTPATIENT
Start: 2024-06-08

## 2024-06-08 RX ORDER — IPRATROPIUM BROMIDE AND ALBUTEROL SULFATE 2.5; .5 MG/3ML; MG/3ML
3 SOLUTION RESPIRATORY (INHALATION)
Status: COMPLETED | OUTPATIENT
Start: 2024-06-08 | End: 2024-06-08

## 2024-06-08 RX ORDER — PREDNISONE 20 MG/1
TABLET ORAL
Qty: 10 TABLET | Refills: 0 | Status: SHIPPED | OUTPATIENT
Start: 2024-06-08

## 2024-06-08 RX ADMIN — IPRATROPIUM BROMIDE AND ALBUTEROL SULFATE 3 ML: .5; 3 SOLUTION RESPIRATORY (INHALATION) at 15:55

## 2024-06-08 RX ADMIN — IPRATROPIUM BROMIDE AND ALBUTEROL SULFATE 3 ML: .5; 3 SOLUTION RESPIRATORY (INHALATION) at 15:45

## 2024-06-08 RX ADMIN — METHYLPREDNISOLONE SODIUM SUCCINATE 125 MG: 125 INJECTION INTRAMUSCULAR; INTRAVENOUS at 16:03

## 2024-06-08 RX ADMIN — IPRATROPIUM BROMIDE AND ALBUTEROL SULFATE 3 ML: .5; 3 SOLUTION RESPIRATORY (INHALATION) at 15:50

## 2024-06-08 NOTE — ED PROVIDER NOTES
Time: 3:04 PM EDT  Date of encounter:  6/8/2024  Independent Historian/Clinical History and Information was obtained by:   Patient    History is limited by: N/A    Chief Complaint: Shortness of breath and wheezing      History of Present Illness:  Patient is a 74 y.o. year old male who presents to the emergency department for evaluation of shortness of breath and wheezing.  This patient presents to the emergency department with a productive cough for the last several days.  In addition he has a history of asthma and apparently is on home O2.  The patient was sent in via EMS after he developed worsening shortness of breath.  It was reported the patient's oxygen saturations were 80% via EMS however the patient was given DuoNebs and currently he is at 93% oxygen saturation without supplemental oxygen.  Currently has no complaints and feels much better.  The patient is had no fever or chest pain.    HPI    Patient Care Team  Primary Care Provider: Jackeline Jaime APRN    Past Medical History:     No Known Allergies  Past Medical History:   Diagnosis Date    Asthma     COPD (chronic obstructive pulmonary disease)     Diabetes mellitus     Ex-smoker     QUIT 2021    Hernia, umbilical     Hypertension     On home O2     REPORTS WEARING 2L/NC PRN SOA     Past Surgical History:   Procedure Laterality Date    ABDOMINAL SURGERY       Family History   Problem Relation Age of Onset    Asthma Father     Asthma Sister     Asthma Brother     Cancer Maternal Aunt        Home Medications:  Prior to Admission medications    Medication Sig Start Date End Date Taking? Authorizing Provider   Albuterol-Budesonide (Airsupra) 90-80 MCG/ACT aerosol Inhale 2 puffs Every 4 (Four) Hours As Needed (wheezing, soa). 6/4/24   Jackeline Jaime APRN   arformoterol (BROVANA) 15 MCG/2ML nebulizer solution USE 1 VIAL  IN  NEBULIZER TWICE  DAILY - Morning and Evening 4/17/24   Leila Singh MD   aspirin 81 MG chewable tablet CHEW AND SWALLOW 1 TABLET BY  MOUTH DAILY 5/31/24   Jackeline Jaime APRN   atorvastatin (LIPITOR) 10 MG tablet Take 1 tablet by mouth Daily for 180 days. 2/15/24 8/13/24  Jackeline Jaime APRN   Budeson-Glycopyrrol-Formoterol (Breztri Aerosphere) 160-9-4.8 MCG/ACT aerosol inhaler Inhale 2 puffs 2 (Two) Times a Day. 6/4/24   Jackeline Jaime APRN   Insulin Pen Needle 32G X 4 MM misc Use 1 each Daily. 4/3/24   Jackeline Jaime APRN   Jardiance 10 MG tablet tablet TAKE 1 TABLET BY MOUTH DAILY 5/28/24   Jackeline Jaime APRN   metFORMIN (GLUCOPHAGE) 500 MG tablet TAKE 1 TABLET BY MOUTH TWICE DAILY WITH MEALS 5/16/24   Jackeline Jaime APRN   omeprazole (priLOSEC) 20 MG capsule TAKE 1 CAPSULE BY MOUTH DAILY 5/3/24   Jackeline Jaime APRN   Tresiba FlexTouch 100 UNIT/ML solution pen-injector injection ADMINISTER 20 UNITS UNDER THE SKIN DAILY AS DIRECTED 5/3/24   Jackeline Jaime APRN        Social History:   Social History     Tobacco Use    Smoking status: Former     Current packs/day: 0.00     Average packs/day: 1.5 packs/day for 56.2 years (84.3 ttl pk-yrs)     Types: Cigarettes     Start date: 1965     Quit date: 3/19/2021     Years since quitting: 3.2     Passive exposure: Past    Smokeless tobacco: Current     Types: Chew   Vaping Use    Vaping status: Never Used   Substance Use Topics    Alcohol use: Never    Drug use: Never         Review of Systems:  Review of Systems   Constitutional:  Negative for chills and fever.   HENT:  Negative for congestion, ear pain and sore throat.    Eyes:  Negative for pain.   Respiratory:  Positive for cough, shortness of breath and wheezing. Negative for chest tightness.    Cardiovascular:  Negative for chest pain.   Gastrointestinal:  Negative for abdominal pain, diarrhea, nausea and vomiting.   Genitourinary:  Negative for flank pain and hematuria.   Musculoskeletal:  Negative for joint swelling.   Skin:  Negative for pallor.   Neurological:  Negative for seizures and headaches.   All other systems reviewed and are  "negative.       Physical Exam:  /76   Pulse 70   Temp 98.9 °F (37.2 °C)   Resp 16   Ht 185.4 cm (73\")   Wt 110 kg (242 lb 1 oz)   SpO2 93%   BMI 31.94 kg/m²     Physical Exam  Vitals and nursing note reviewed.   Constitutional:       General: He is not in acute distress.     Appearance: Normal appearance. He is not toxic-appearing.   HENT:      Head: Normocephalic and atraumatic.      Mouth/Throat:      Mouth: Mucous membranes are moist.   Eyes:      General: No scleral icterus.  Cardiovascular:      Rate and Rhythm: Normal rate and regular rhythm.      Pulses: Normal pulses.      Heart sounds: Normal heart sounds.   Pulmonary:      Effort: Pulmonary effort is normal. No respiratory distress.      Breath sounds: Examination of the right-middle field reveals wheezing and rhonchi. Examination of the left-middle field reveals wheezing and rhonchi. Wheezing and rhonchi present.   Abdominal:      General: Abdomen is flat.      Palpations: Abdomen is soft.      Tenderness: There is no abdominal tenderness.   Musculoskeletal:         General: Normal range of motion.      Cervical back: Normal range of motion and neck supple.   Skin:     General: Skin is warm and dry.   Neurological:      Mental Status: He is alert and oriented to person, place, and time. Mental status is at baseline.                  Procedures:  Procedures      Medical Decision Making:      Comorbidities that affect care:    Asthma    External Notes reviewed:    Previous Clinic Note: Office visit for COPD      The following orders were placed and all results were independently analyzed by me:  Orders Placed This Encounter   Procedures    COVID PRE-OP / PRE-PROCEDURE SCREENING ORDER (NO ISOLATION) - Swab, Nasopharynx    COVID-19, FLU A/B, RSV PCR 1 HR TAT - Swab, Nasopharynx    XR Chest 1 View    Wink Draw    Comprehensive Metabolic Panel    BNP    Single High Sensitivity Troponin T    CBC Auto Differential    Undress & Gown    Continuous " Pulse Oximetry    Vital Signs    ECG 12 Lead ED Triage Standing Order; SOA    CBC & Differential    Green Top (Gel)    Lavender Top    Gold Top - SST    Light Blue Top       Medications Given in the Emergency Department:  Medications   ipratropium-albuterol (DUO-NEB) nebulizer solution 3 mL (3 mL Nebulization Given 6/8/24 1555)   methylPREDNISolone sodium succinate (SOLU-Medrol) 125 mg in sterile water (preservative free) 2 mL (125 mg Intravenous Given 6/8/24 1603)        ED Course:       EKG: Sinus rhythm rate of 75 bpm  Right bundle branch block  Normal axis  No acute ischemic changes.    Labs:    Lab Results (last 24 hours)       Procedure Component Value Units Date/Time    CBC & Differential [[id number]]  (Abnormal) Collected: 06/08/24 1345    Specimen: Blood Updated: 06/08/24 1351    Narrative:      The following orders were created for panel order CBC & Differential.  Procedure                               Abnormality         Status                     ---------                               -----------         ------                     CBC Auto Differential[[id number]]        Abnormal            Final result                 Please view results for these tests on the individual orders.    Comprehensive Metabolic Panel [[id number]]  (Abnormal) Collected: 06/08/24 1345    Specimen: Blood Updated: 06/08/24 1431     Glucose 112 mg/dL      BUN 12 mg/dL      Creatinine 1.21 mg/dL      Sodium 139 mmol/L      Potassium 4.9 mmol/L      Chloride 101 mmol/L      CO2 28.0 mmol/L      Calcium 8.8 mg/dL      Total Protein 6.9 g/dL      Albumin 3.8 g/dL      ALT (SGPT) 14 U/L      AST (SGOT) 13 U/L      Alkaline Phosphatase 89 U/L      Total Bilirubin 0.2 mg/dL      Globulin 3.1 gm/dL      A/G Ratio 1.2 g/dL      BUN/Creatinine Ratio 9.9     Anion Gap 10.0 mmol/L      eGFR 62.4 mL/min/1.73     Narrative:      GFR Normal >60  Chronic Kidney Disease <60  Kidney Failure <15    The GFR formula is only valid for adults with stable  renal function between ages 18 and 70.    BNP [[id number]]  (Normal) Collected: 06/08/24 1345    Specimen: Blood Updated: 06/08/24 1429     proBNP 596.0 pg/mL     Narrative:      This assay is used as an aid in the diagnosis of individuals suspected of having heart failure. It can be used as an aid in the diagnosis of acute decompensated heart failure (ADHF) in patients presenting with signs and symptoms of ADHF to the emergency department (ED). In addition, NT-proBNP of <300 pg/mL indicates ADHF is not likely.    Age Range Result Interpretation  NT-proBNP Concentration (pg/mL:      <50             Positive            >450                   Gray                 300-450                    Negative             <300    50-75           Positive            >900                  Gray                300-900                  Negative            <300      >75             Positive            >1800                  Gray                300-1800                  Negative            <300    Single High Sensitivity Troponin T [[id number]]  (Abnormal) Collected: 06/08/24 1345    Specimen: Blood Updated: 06/08/24 1431     HS Troponin T 50 ng/L     Narrative:      High Sensitive Troponin T Reference Range:  <14.0 ng/L- Negative Female for AMI  <22.0 ng/L- Negative Male for AMI  >=14 - Abnormal Female indicating possible myocardial injury.  >=22 - Abnormal Male indicating possible myocardial injury.   Clinicians would have to utilize clinical acumen, EKG, Troponin, and serial changes to determine if it is an Acute Myocardial Infarction or myocardial injury due to an underlying chronic condition.         CBC Auto Differential [[id number]]  (Abnormal) Collected: 06/08/24 1345    Specimen: Blood Updated: 06/08/24 1351     WBC 9.39 10*3/mm3      RBC 5.34 10*6/mm3      Hemoglobin 12.4 g/dL      Hematocrit 43.4 %      MCV 81.3 fL      MCH 23.2 pg      MCHC 28.6 g/dL      RDW 18.0 %      RDW-SD 51.2 fl      MPV 9.5 fL      Platelets 219  10*3/mm3      Neutrophil % 68.4 %      Lymphocyte % 16.7 %      Monocyte % 6.1 %      Eosinophil % 7.2 %      Basophil % 0.7 %      Immature Grans % 0.9 %      Neutrophils, Absolute 6.42 10*3/mm3      Lymphocytes, Absolute 1.57 10*3/mm3      Monocytes, Absolute 0.57 10*3/mm3      Eosinophils, Absolute 0.68 10*3/mm3      Basophils, Absolute 0.07 10*3/mm3      Immature Grans, Absolute 0.08 10*3/mm3      nRBC 0.0 /100 WBC     COVID PRE-OP / PRE-PROCEDURE SCREENING ORDER (NO ISOLATION) - Swab, Nasopharynx [[id number]]  (Normal) Collected: 06/08/24 1345    Specimen: Swab from Nasopharynx Updated: 06/08/24 1429    Narrative:      The following orders were created for panel order COVID PRE-OP / PRE-PROCEDURE SCREENING ORDER (NO ISOLATION) - Swab, Nasopharynx.  Procedure                               Abnormality         Status                     ---------                               -----------         ------                     COVID-19, FLU A/B, RSV P...[[id number]]  Normal              Final result                 Please view results for these tests on the individual orders.    COVID-19, FLU A/B, RSV PCR 1 HR TAT - Swab, Nasopharynx [[id number]]  (Normal) Collected: 06/08/24 1345    Specimen: Swab from Nasopharynx Updated: 06/08/24 1429     COVID19 Not Detected     Influenza A PCR Not Detected     Influenza B PCR Not Detected     RSV, PCR Not Detected    Narrative:      Fact sheet for providers: https://www.fda.gov/media/008422/download    Fact sheet for patients: https://www.fda.gov/media/828527/download    Test performed by PCR.             Imaging:    XR Chest 1 View    Result Date: 6/8/2024  XR CHEST 1 VW Date of Exam: 6/8/2024 1:35 PM EDT Indication: SOA Triage Protocol Comparison: 5/22/2024. 3/25/2024. 8/27/2023.. 10/5/2022. Findings: The heart and pulmonary vasculature are within normal limits. There are chronic appearing changes in the lung fields. No evidence of pneumothorax or large pleural effusion.      Impression: No active disease. Electronically Signed: Nicholas Cantu MD  6/8/2024 2:00 PM EDT  Workstation ID: [id number]       Differential Diagnosis and Discussion:    Dyspnea: Differential diagnosis includes but is not limited to metabolic acidosis, neurological disorders, psychogenic, asthma, pneumothorax, upper airway obstruction, COPD, pneumonia, noncardiogenic pulmonary edema, interstitial lung disease, anemia, congestive heart failure, and pulmonary embolism    All labs were reviewed and interpreted by me.  EKG was interpreted by me.    MDM     Amount and/or Complexity of Data Reviewed  Clinical lab tests: reviewed  Tests in the radiology section of CPT®: reviewed  Tests in the medicine section of CPT®: reviewed                 Patient Care Considerations:    CT CHEST: I considered ordering a CT scan of the chest, however the patient is currently much improved and is in no distress.  He currently has no signs of pulmonary embolism.      Consultants/Shared Management Plan:    None    Social Determinants of Health:    Patient is independent, reliable, and has access to care.       Disposition and Care Coordination:    Discharged: I considered escalation of care by admitting this patient to the hospital, however patient is improved, his vital signs are stable, his ancillary studies are unremarkable at this point.  The patient has a chronically elevated troponin which is unchanged.    I have explained discharge medications and the need for follow up with the patient/caretakers. This was also printed in the discharge instructions. Patient was discharged with the following medications and follow up:      Medication List        New Prescriptions      azithromycin 250 MG tablet  Commonly known as: Zithromax Z-Farrukh  Take 2 tablets by mouth on day 1, then 1 tablet daily on days 2-5     predniSONE 20 MG tablet  Commonly known as: DELTASONE  Take 2 p.o. daily for 5 days.               Where to Get Your Medications         These medications were sent to [id number] - [place], [place] - [address] AT [facility] [phone]  - [phone] FX  [address], [facility] [address]      Phone: [phone]   azithromycin 250 MG tablet  predniSONE 20 MG tablet      Jackeline Jaime, APRN  [address] [id number] [phone]    In 2 days         Final diagnoses:   COPD exacerbation   Acute bronchitis, unspecified organism        ED Disposition       ED Disposition   Discharge    Condition   Stable    Comment   --               This medical record created using voice recognition software.             Khoa Rodgers,   06/08/24 4931

## 2024-06-08 NOTE — DISCHARGE INSTRUCTIONS
Drink plenty of fluids.  Continue current home breathing treatments.  Take medications as directed.  Return for worsening symptoms.  Follow-up with your doctor in 2 days if no better.

## 2024-06-11 LAB
QT INTERVAL: 423 MS
QTC INTERVAL: 472 MS

## 2024-06-14 DIAGNOSIS — E11.65 TYPE 2 DIABETES MELLITUS WITH HYPERGLYCEMIA, WITH LONG-TERM CURRENT USE OF INSULIN: ICD-10-CM

## 2024-06-14 DIAGNOSIS — Z79.4 TYPE 2 DIABETES MELLITUS WITH HYPERGLYCEMIA, WITH LONG-TERM CURRENT USE OF INSULIN: ICD-10-CM

## 2024-07-31 DIAGNOSIS — J96.11 CHRONIC RESPIRATORY FAILURE WITH HYPOXIA AND HYPERCAPNIA: ICD-10-CM

## 2024-07-31 DIAGNOSIS — J96.12 CHRONIC RESPIRATORY FAILURE WITH HYPOXIA AND HYPERCAPNIA: ICD-10-CM

## 2024-07-31 DIAGNOSIS — J44.9 CHRONIC OBSTRUCTIVE PULMONARY DISEASE, UNSPECIFIED COPD TYPE: ICD-10-CM

## 2024-07-31 DIAGNOSIS — J43.9 PULMONARY EMPHYSEMA, UNSPECIFIED EMPHYSEMA TYPE: ICD-10-CM

## 2024-07-31 RX ORDER — ALBUTEROL SULFATE 90 UG/1
AEROSOL, METERED RESPIRATORY (INHALATION)
Qty: 8.5 G | Refills: 5 | OUTPATIENT
Start: 2024-07-31

## 2024-08-01 DIAGNOSIS — Z79.4 TYPE 2 DIABETES MELLITUS WITH HYPERGLYCEMIA, WITH LONG-TERM CURRENT USE OF INSULIN: ICD-10-CM

## 2024-08-01 DIAGNOSIS — E11.65 TYPE 2 DIABETES MELLITUS WITH HYPERGLYCEMIA, WITH LONG-TERM CURRENT USE OF INSULIN: ICD-10-CM

## 2024-08-01 RX ORDER — INSULIN DEGLUDEC 100 U/ML
INJECTION, SOLUTION SUBCUTANEOUS
Qty: 6 ML | Refills: 2 | Status: SHIPPED | OUTPATIENT
Start: 2024-08-01

## 2024-08-13 DIAGNOSIS — E78.5 HYPERLIPIDEMIA, UNSPECIFIED HYPERLIPIDEMIA TYPE: ICD-10-CM

## 2024-08-13 RX ORDER — ATORVASTATIN CALCIUM 10 MG/1
10 TABLET, FILM COATED ORAL DAILY
Qty: 90 TABLET | Refills: 1 | Status: SHIPPED | OUTPATIENT
Start: 2024-08-13

## 2024-08-14 ENCOUNTER — HOSPITAL ENCOUNTER (INPATIENT)
Facility: HOSPITAL | Age: 74
LOS: 1 days | Discharge: HOME | DRG: 190 | End: 2024-08-16
Attending: EMERGENCY MEDICINE | Admitting: STUDENT IN AN ORGANIZED HEALTH CARE EDUCATION/TRAINING PROGRAM
Payer: MEDICARE

## 2024-08-14 DIAGNOSIS — J96.01 ACUTE RESPIRATORY FAILURE WITH HYPOXIA: ICD-10-CM

## 2024-08-14 DIAGNOSIS — R26.2 DIFFICULTY WALKING: ICD-10-CM

## 2024-08-14 DIAGNOSIS — J44.1 ACUTE EXACERBATION OF CHRONIC OBSTRUCTIVE PULMONARY DISEASE (COPD): Primary | ICD-10-CM

## 2024-08-14 DIAGNOSIS — J44.1 COPD EXACERBATION: ICD-10-CM

## 2024-08-14 DIAGNOSIS — J44.1 CHRONIC OBSTRUCTIVE PULMONARY DISEASE WITH ACUTE EXACERBATION: ICD-10-CM

## 2024-08-14 PROCEDURE — 36415 COLL VENOUS BLD VENIPUNCTURE: CPT

## 2024-08-14 PROCEDURE — 93005 ELECTROCARDIOGRAM TRACING: CPT | Performed by: EMERGENCY MEDICINE

## 2024-08-14 PROCEDURE — 93010 ELECTROCARDIOGRAM REPORT: CPT | Performed by: INTERNAL MEDICINE

## 2024-08-14 PROCEDURE — 82803 BLOOD GASES ANY COMBINATION: CPT | Performed by: EMERGENCY MEDICINE

## 2024-08-14 PROCEDURE — 36600 WITHDRAWAL OF ARTERIAL BLOOD: CPT | Performed by: EMERGENCY MEDICINE

## 2024-08-14 PROCEDURE — 99285 EMERGENCY DEPT VISIT HI MDM: CPT

## 2024-08-14 RX ORDER — IPRATROPIUM BROMIDE AND ALBUTEROL SULFATE 2.5; .5 MG/3ML; MG/3ML
3 SOLUTION RESPIRATORY (INHALATION)
Status: COMPLETED | OUTPATIENT
Start: 2024-08-15 | End: 2024-08-15

## 2024-08-15 ENCOUNTER — APPOINTMENT (OUTPATIENT)
Dept: GENERAL RADIOLOGY | Facility: HOSPITAL | Age: 74
DRG: 190 | End: 2024-08-15
Payer: MEDICARE

## 2024-08-15 PROBLEM — J96.92 RESPIRATORY FAILURE WITH HYPOXIA AND HYPERCAPNIA: Status: ACTIVE | Noted: 2022-08-22

## 2024-08-15 PROBLEM — J44.1 COPD EXACERBATION: Status: ACTIVE | Noted: 2024-08-15

## 2024-08-15 LAB
ALBUMIN SERPL-MCNC: 3.6 G/DL (ref 3.5–5.2)
ALBUMIN/GLOB SERPL: 1.1 G/DL
ALP SERPL-CCNC: 89 U/L (ref 39–117)
ALT SERPL W P-5'-P-CCNC: 11 U/L (ref 1–41)
ANION GAP SERPL CALCULATED.3IONS-SCNC: 13.8 MMOL/L (ref 5–15)
ANION GAP SERPL CALCULATED.3IONS-SCNC: 8.9 MMOL/L (ref 5–15)
ARTERIAL PATENCY WRIST A: POSITIVE
AST SERPL-CCNC: 19 U/L (ref 1–40)
ATMOSPHERIC PRESS: 743.3 MMHG
B PARAPERT DNA SPEC QL NAA+PROBE: NOT DETECTED
B PERT DNA SPEC QL NAA+PROBE: NOT DETECTED
BASE EXCESS BLDA CALC-SCNC: 2.9 MMOL/L (ref -2–2)
BASOPHILS # BLD AUTO: 0.01 10*3/MM3 (ref 0–0.2)
BASOPHILS # BLD AUTO: 0.05 10*3/MM3 (ref 0–0.2)
BASOPHILS NFR BLD AUTO: 0.1 % (ref 0–1.5)
BASOPHILS NFR BLD AUTO: 0.6 % (ref 0–1.5)
BDY SITE: ABNORMAL
BILIRUB SERPL-MCNC: 0.3 MG/DL (ref 0–1.2)
BUN SERPL-MCNC: 20 MG/DL (ref 8–23)
BUN SERPL-MCNC: 22 MG/DL (ref 8–23)
BUN/CREAT SERPL: 19.8 (ref 7–25)
BUN/CREAT SERPL: 21 (ref 7–25)
C PNEUM DNA NPH QL NAA+NON-PROBE: NOT DETECTED
CALCIUM SPEC-SCNC: 8.2 MG/DL (ref 8.6–10.5)
CALCIUM SPEC-SCNC: 8.3 MG/DL (ref 8.6–10.5)
CHLORIDE SERPL-SCNC: 100 MMOL/L (ref 98–107)
CHLORIDE SERPL-SCNC: 104 MMOL/L (ref 98–107)
CO2 SERPL-SCNC: 21.2 MMOL/L (ref 22–29)
CO2 SERPL-SCNC: 28.1 MMOL/L (ref 22–29)
CREAT SERPL-MCNC: 1.01 MG/DL (ref 0.76–1.27)
CREAT SERPL-MCNC: 1.05 MG/DL (ref 0.76–1.27)
CRP SERPL-MCNC: <0.3 MG/DL (ref 0–0.5)
D-LACTATE SERPL-SCNC: 1.1 MMOL/L (ref 0.5–2)
DEPRECATED RDW RBC AUTO: 49.5 FL (ref 37–54)
DEPRECATED RDW RBC AUTO: 50.8 FL (ref 37–54)
EGFRCR SERPLBLD CKD-EPI 2021: 74 ML/MIN/1.73
EGFRCR SERPLBLD CKD-EPI 2021: 77.6 ML/MIN/1.73
EOSINOPHIL # BLD AUTO: 0.01 10*3/MM3 (ref 0–0.4)
EOSINOPHIL # BLD AUTO: 0.65 10*3/MM3 (ref 0–0.4)
EOSINOPHIL NFR BLD AUTO: 0.1 % (ref 0.3–6.2)
EOSINOPHIL NFR BLD AUTO: 7.8 % (ref 0.3–6.2)
ERYTHROCYTE [DISTWIDTH] IN BLOOD BY AUTOMATED COUNT: 16.2 % (ref 12.3–15.4)
ERYTHROCYTE [DISTWIDTH] IN BLOOD BY AUTOMATED COUNT: 18 % (ref 12.3–15.4)
ERYTHROCYTE [SEDIMENTATION RATE] IN BLOOD: 38 MM/HR (ref 0–20)
FLUAV SUBTYP SPEC NAA+PROBE: NOT DETECTED
FLUAV SUBTYP SPEC NAA+PROBE: NOT DETECTED
FLUBV RNA ISLT QL NAA+PROBE: NOT DETECTED
FLUBV RNA ISLT QL NAA+PROBE: NOT DETECTED
GLOBULIN UR ELPH-MCNC: 3.2 GM/DL
GLUCOSE BLDC GLUCOMTR-MCNC: 164 MG/DL (ref 70–99)
GLUCOSE BLDC GLUCOMTR-MCNC: 191 MG/DL (ref 70–99)
GLUCOSE BLDC GLUCOMTR-MCNC: 215 MG/DL (ref 70–99)
GLUCOSE BLDC GLUCOMTR-MCNC: 272 MG/DL (ref 70–99)
GLUCOSE SERPL-MCNC: 122 MG/DL (ref 65–99)
GLUCOSE SERPL-MCNC: 194 MG/DL (ref 65–99)
HADV DNA SPEC NAA+PROBE: NOT DETECTED
HCO3 BLDA-SCNC: 30.3 MMOL/L (ref 22–26)
HCOV 229E RNA SPEC QL NAA+PROBE: NOT DETECTED
HCOV HKU1 RNA SPEC QL NAA+PROBE: NOT DETECTED
HCOV NL63 RNA SPEC QL NAA+PROBE: NOT DETECTED
HCOV OC43 RNA SPEC QL NAA+PROBE: NOT DETECTED
HCT VFR BLD AUTO: 43.4 % (ref 37.5–51)
HCT VFR BLD AUTO: 44.4 % (ref 37.5–51)
HCT VFR BLD CALC: 42 % (ref 38–51)
HEMODILUTION: NO
HGB BLD-MCNC: 12.1 G/DL (ref 13–17.7)
HGB BLD-MCNC: 12.1 G/DL (ref 13–17.7)
HGB BLDA-MCNC: 14.2 G/DL (ref 12–18)
HMPV RNA NPH QL NAA+NON-PROBE: NOT DETECTED
HPIV1 RNA ISLT QL NAA+PROBE: NOT DETECTED
HPIV2 RNA SPEC QL NAA+PROBE: NOT DETECTED
HPIV3 RNA NPH QL NAA+PROBE: NOT DETECTED
HPIV4 P GENE NPH QL NAA+PROBE: NOT DETECTED
IMM GRANULOCYTES # BLD AUTO: 0.02 10*3/MM3 (ref 0–0.05)
IMM GRANULOCYTES # BLD AUTO: 0.03 10*3/MM3 (ref 0–0.05)
IMM GRANULOCYTES NFR BLD AUTO: 0.3 % (ref 0–0.5)
IMM GRANULOCYTES NFR BLD AUTO: 0.4 % (ref 0–0.5)
INHALED O2 CONCENTRATION: 40 %
INR PPP: 0.96 (ref 0.86–1.15)
LYMPHOCYTES # BLD AUTO: 0.67 10*3/MM3 (ref 0.7–3.1)
LYMPHOCYTES # BLD AUTO: 1.88 10*3/MM3 (ref 0.7–3.1)
LYMPHOCYTES NFR BLD AUTO: 22.5 % (ref 19.6–45.3)
LYMPHOCYTES NFR BLD AUTO: 8.8 % (ref 19.6–45.3)
M PNEUMO IGG SER IA-ACNC: NOT DETECTED
MAGNESIUM SERPL-MCNC: 2.2 MG/DL (ref 1.6–2.4)
MCH RBC QN AUTO: 22.8 PG (ref 26.6–33)
MCH RBC QN AUTO: 24.1 PG (ref 26.6–33)
MCHC RBC AUTO-ENTMCNC: 27.3 G/DL (ref 31.5–35.7)
MCHC RBC AUTO-ENTMCNC: 27.9 G/DL (ref 31.5–35.7)
MCV RBC AUTO: 83.6 FL (ref 79–97)
MCV RBC AUTO: 86.3 FL (ref 79–97)
MODALITY: ABNORMAL
MONOCYTES # BLD AUTO: 0.07 10*3/MM3 (ref 0.1–0.9)
MONOCYTES # BLD AUTO: 0.68 10*3/MM3 (ref 0.1–0.9)
MONOCYTES NFR BLD AUTO: 0.9 % (ref 5–12)
MONOCYTES NFR BLD AUTO: 8.1 % (ref 5–12)
NEUTROPHILS NFR BLD AUTO: 5.08 10*3/MM3 (ref 1.7–7)
NEUTROPHILS NFR BLD AUTO: 6.86 10*3/MM3 (ref 1.7–7)
NEUTROPHILS NFR BLD AUTO: 60.6 % (ref 42.7–76)
NEUTROPHILS NFR BLD AUTO: 89.8 % (ref 42.7–76)
NRBC BLD AUTO-RTO: 0 /100 WBC (ref 0–0.2)
NRBC BLD AUTO-RTO: 0 /100 WBC (ref 0–0.2)
NT-PROBNP SERPL-MCNC: 117.9 PG/ML (ref 0–1800)
PAW @ PEAK INSP FLOW SETTING VENT: 14 CMH2O
PCO2 BLDA: 56.6 MM HG (ref 35–45)
PEEP RESPIRATORY: 7 CM[H2O]
PH BLDA: 7.34 PH UNITS (ref 7.35–7.45)
PHOSPHATE SERPL-MCNC: 2.5 MG/DL (ref 2.5–4.5)
PLATELET # BLD AUTO: 148 10*3/MM3 (ref 140–450)
PLATELET # BLD AUTO: 161 10*3/MM3 (ref 140–450)
PMV BLD AUTO: 10.6 FL (ref 6–12)
PMV BLD AUTO: 10.9 FL (ref 6–12)
PO2 BLD: 248 MM[HG] (ref 0–500)
PO2 BLDA: 99.2 MM HG (ref 80–100)
POTASSIUM SERPL-SCNC: 4.7 MMOL/L (ref 3.5–5.2)
POTASSIUM SERPL-SCNC: 4.7 MMOL/L (ref 3.5–5.2)
PROCALCITONIN SERPL-MCNC: 0.05 NG/ML (ref 0–0.25)
PROT SERPL-MCNC: 6.8 G/DL (ref 6–8.5)
PROTHROMBIN TIME: 13 SECONDS (ref 11.8–14.9)
QT INTERVAL: 413 MS
QTC INTERVAL: 429 MS
RBC # BLD AUTO: 5.03 10*6/MM3 (ref 4.14–5.8)
RBC # BLD AUTO: 5.31 10*6/MM3 (ref 4.14–5.8)
RESPIRATORY RATE: 4
RHINOVIRUS RNA SPEC NAA+PROBE: NOT DETECTED
RSV RNA NPH QL NAA+NON-PROBE: NOT DETECTED
RSV RNA NPH QL NAA+NON-PROBE: NOT DETECTED
SAO2 % BLDCOA: 97 % (ref 95–99)
SARS-COV-2 RNA RESP QL NAA+PROBE: NOT DETECTED
SARS-COV-2 RNA RESP QL NAA+PROBE: NOT DETECTED
SODIUM SERPL-SCNC: 135 MMOL/L (ref 136–145)
SODIUM SERPL-SCNC: 141 MMOL/L (ref 136–145)
TROPONIN T SERPL HS-MCNC: 26 NG/L
WBC NRBC COR # BLD AUTO: 7.64 10*3/MM3 (ref 3.4–10.8)
WBC NRBC COR # BLD AUTO: 8.37 10*3/MM3 (ref 3.4–10.8)

## 2024-08-15 PROCEDURE — 63710000001 INSULIN GLARGINE PER 5 UNITS: Performed by: STUDENT IN AN ORGANIZED HEALTH CARE EDUCATION/TRAINING PROGRAM

## 2024-08-15 PROCEDURE — 87637 SARSCOV2&INF A&B&RSV AMP PRB: CPT | Performed by: EMERGENCY MEDICINE

## 2024-08-15 PROCEDURE — 86140 C-REACTIVE PROTEIN: CPT

## 2024-08-15 PROCEDURE — 83735 ASSAY OF MAGNESIUM: CPT

## 2024-08-15 PROCEDURE — 94799 UNLISTED PULMONARY SVC/PX: CPT

## 2024-08-15 PROCEDURE — 82948 REAGENT STRIP/BLOOD GLUCOSE: CPT

## 2024-08-15 PROCEDURE — 94640 AIRWAY INHALATION TREATMENT: CPT

## 2024-08-15 PROCEDURE — 94664 DEMO&/EVAL PT USE INHALER: CPT

## 2024-08-15 PROCEDURE — 94761 N-INVAS EAR/PLS OXIMETRY MLT: CPT

## 2024-08-15 PROCEDURE — 85652 RBC SED RATE AUTOMATED: CPT

## 2024-08-15 PROCEDURE — 25010000002 METHYLPREDNISOLONE PER 40 MG: Performed by: FAMILY MEDICINE

## 2024-08-15 PROCEDURE — 83605 ASSAY OF LACTIC ACID: CPT | Performed by: EMERGENCY MEDICINE

## 2024-08-15 PROCEDURE — 85610 PROTHROMBIN TIME: CPT | Performed by: EMERGENCY MEDICINE

## 2024-08-15 PROCEDURE — 71045 X-RAY EXAM CHEST 1 VIEW: CPT

## 2024-08-15 PROCEDURE — 82785 ASSAY OF IGE: CPT

## 2024-08-15 PROCEDURE — 99223 1ST HOSP IP/OBS HIGH 75: CPT | Performed by: STUDENT IN AN ORGANIZED HEALTH CARE EDUCATION/TRAINING PROGRAM

## 2024-08-15 PROCEDURE — 85025 COMPLETE CBC W/AUTO DIFF WBC: CPT | Performed by: STUDENT IN AN ORGANIZED HEALTH CARE EDUCATION/TRAINING PROGRAM

## 2024-08-15 PROCEDURE — 84145 PROCALCITONIN (PCT): CPT

## 2024-08-15 PROCEDURE — 85025 COMPLETE CBC W/AUTO DIFF WBC: CPT | Performed by: EMERGENCY MEDICINE

## 2024-08-15 PROCEDURE — 25010000002 HEPARIN (PORCINE) PER 1000 UNITS: Performed by: STUDENT IN AN ORGANIZED HEALTH CARE EDUCATION/TRAINING PROGRAM

## 2024-08-15 PROCEDURE — 87040 BLOOD CULTURE FOR BACTERIA: CPT | Performed by: EMERGENCY MEDICINE

## 2024-08-15 PROCEDURE — 63710000001 INSULIN REGULAR HUMAN PER 5 UNITS: Performed by: STUDENT IN AN ORGANIZED HEALTH CARE EDUCATION/TRAINING PROGRAM

## 2024-08-15 PROCEDURE — 82948 REAGENT STRIP/BLOOD GLUCOSE: CPT | Performed by: STUDENT IN AN ORGANIZED HEALTH CARE EDUCATION/TRAINING PROGRAM

## 2024-08-15 PROCEDURE — 83880 ASSAY OF NATRIURETIC PEPTIDE: CPT | Performed by: EMERGENCY MEDICINE

## 2024-08-15 PROCEDURE — 0202U NFCT DS 22 TRGT SARS-COV-2: CPT | Performed by: FAMILY MEDICINE

## 2024-08-15 PROCEDURE — 63710000001 INSULIN REGULAR HUMAN PER 5 UNITS: Performed by: FAMILY MEDICINE

## 2024-08-15 PROCEDURE — 80053 COMPREHEN METABOLIC PANEL: CPT | Performed by: EMERGENCY MEDICINE

## 2024-08-15 PROCEDURE — 84484 ASSAY OF TROPONIN QUANT: CPT | Performed by: EMERGENCY MEDICINE

## 2024-08-15 PROCEDURE — 94760 N-INVAS EAR/PLS OXIMETRY 1: CPT

## 2024-08-15 PROCEDURE — 84100 ASSAY OF PHOSPHORUS: CPT

## 2024-08-15 PROCEDURE — 94660 CPAP INITIATION&MGMT: CPT

## 2024-08-15 RX ORDER — SODIUM CHLORIDE 0.9 % (FLUSH) 0.9 %
10 SYRINGE (ML) INJECTION AS NEEDED
Status: DISCONTINUED | OUTPATIENT
Start: 2024-08-15 | End: 2024-08-16 | Stop reason: HOSPADM

## 2024-08-15 RX ORDER — BUDESONIDE 0.5 MG/2ML
0.5 INHALANT ORAL
Status: DISCONTINUED | OUTPATIENT
Start: 2024-08-15 | End: 2024-08-16 | Stop reason: HOSPADM

## 2024-08-15 RX ORDER — ONDANSETRON 2 MG/ML
4 INJECTION INTRAMUSCULAR; INTRAVENOUS EVERY 6 HOURS PRN
Status: DISCONTINUED | OUTPATIENT
Start: 2024-08-15 | End: 2024-08-16 | Stop reason: HOSPADM

## 2024-08-15 RX ORDER — IPRATROPIUM BROMIDE AND ALBUTEROL SULFATE 2.5; .5 MG/3ML; MG/3ML
3 SOLUTION RESPIRATORY (INHALATION) EVERY 4 HOURS PRN
Status: DISCONTINUED | OUTPATIENT
Start: 2024-08-15 | End: 2024-08-16 | Stop reason: HOSPADM

## 2024-08-15 RX ORDER — ATORVASTATIN CALCIUM 10 MG/1
10 TABLET, FILM COATED ORAL NIGHTLY
Status: DISCONTINUED | OUTPATIENT
Start: 2024-08-15 | End: 2024-08-16 | Stop reason: HOSPADM

## 2024-08-15 RX ORDER — AMOXICILLIN 250 MG
2 CAPSULE ORAL 2 TIMES DAILY PRN
Status: DISCONTINUED | OUTPATIENT
Start: 2024-08-15 | End: 2024-08-16 | Stop reason: HOSPADM

## 2024-08-15 RX ORDER — DEXTROSE MONOHYDRATE 25 G/50ML
25 INJECTION, SOLUTION INTRAVENOUS
Status: DISCONTINUED | OUTPATIENT
Start: 2024-08-15 | End: 2024-08-16 | Stop reason: HOSPADM

## 2024-08-15 RX ORDER — IPRATROPIUM BROMIDE AND ALBUTEROL SULFATE 2.5; .5 MG/3ML; MG/3ML
3 SOLUTION RESPIRATORY (INHALATION)
Status: DISCONTINUED | OUTPATIENT
Start: 2024-08-15 | End: 2024-08-16 | Stop reason: HOSPADM

## 2024-08-15 RX ORDER — SODIUM CHLORIDE 9 MG/ML
40 INJECTION, SOLUTION INTRAVENOUS AS NEEDED
Status: DISCONTINUED | OUTPATIENT
Start: 2024-08-15 | End: 2024-08-16 | Stop reason: HOSPADM

## 2024-08-15 RX ORDER — ASPIRIN 81 MG/1
81 TABLET, CHEWABLE ORAL DAILY
Status: DISCONTINUED | OUTPATIENT
Start: 2024-08-15 | End: 2024-08-16 | Stop reason: HOSPADM

## 2024-08-15 RX ORDER — SODIUM CHLORIDE 0.9 % (FLUSH) 0.9 %
10 SYRINGE (ML) INJECTION EVERY 12 HOURS SCHEDULED
Status: DISCONTINUED | OUTPATIENT
Start: 2024-08-15 | End: 2024-08-16 | Stop reason: HOSPADM

## 2024-08-15 RX ORDER — BISACODYL 5 MG/1
5 TABLET, DELAYED RELEASE ORAL DAILY PRN
Status: DISCONTINUED | OUTPATIENT
Start: 2024-08-15 | End: 2024-08-16 | Stop reason: HOSPADM

## 2024-08-15 RX ORDER — POLYETHYLENE GLYCOL 3350 17 G/17G
17 POWDER, FOR SOLUTION ORAL DAILY PRN
Status: DISCONTINUED | OUTPATIENT
Start: 2024-08-15 | End: 2024-08-16 | Stop reason: HOSPADM

## 2024-08-15 RX ORDER — BISACODYL 10 MG
10 SUPPOSITORY, RECTAL RECTAL DAILY PRN
Status: DISCONTINUED | OUTPATIENT
Start: 2024-08-15 | End: 2024-08-16 | Stop reason: HOSPADM

## 2024-08-15 RX ORDER — HEPARIN SODIUM 5000 [USP'U]/ML
5000 INJECTION, SOLUTION INTRAVENOUS; SUBCUTANEOUS EVERY 12 HOURS SCHEDULED
Status: DISCONTINUED | OUTPATIENT
Start: 2024-08-15 | End: 2024-08-16 | Stop reason: HOSPADM

## 2024-08-15 RX ORDER — ONDANSETRON 4 MG/1
4 TABLET, ORALLY DISINTEGRATING ORAL EVERY 6 HOURS PRN
Status: DISCONTINUED | OUTPATIENT
Start: 2024-08-15 | End: 2024-08-16 | Stop reason: HOSPADM

## 2024-08-15 RX ORDER — NICOTINE POLACRILEX 4 MG
15 LOZENGE BUCCAL
Status: DISCONTINUED | OUTPATIENT
Start: 2024-08-15 | End: 2024-08-16 | Stop reason: HOSPADM

## 2024-08-15 RX ORDER — ARFORMOTEROL TARTRATE 15 UG/2ML
15 SOLUTION RESPIRATORY (INHALATION)
Status: DISCONTINUED | OUTPATIENT
Start: 2024-08-15 | End: 2024-08-16 | Stop reason: HOSPADM

## 2024-08-15 RX ORDER — IBUPROFEN 600 MG/1
1 TABLET ORAL
Status: DISCONTINUED | OUTPATIENT
Start: 2024-08-15 | End: 2024-08-16 | Stop reason: HOSPADM

## 2024-08-15 RX ADMIN — HEPARIN SODIUM 5000 UNITS: 5000 INJECTION INTRAVENOUS; SUBCUTANEOUS at 08:21

## 2024-08-15 RX ADMIN — Medication 10 ML: at 21:31

## 2024-08-15 RX ADMIN — IPRATROPIUM BROMIDE AND ALBUTEROL SULFATE 3 ML: .5; 3 SOLUTION RESPIRATORY (INHALATION) at 19:17

## 2024-08-15 RX ADMIN — ARFORMOTEROL TARTRATE 15 MCG: 15 SOLUTION RESPIRATORY (INHALATION) at 09:43

## 2024-08-15 RX ADMIN — INSULIN HUMAN 6 UNITS: 100 INJECTION, SOLUTION PARENTERAL at 12:10

## 2024-08-15 RX ADMIN — INSULIN HUMAN 2 UNITS: 100 INJECTION, SOLUTION PARENTERAL at 21:29

## 2024-08-15 RX ADMIN — WATER 40 MG: 1 INJECTION INTRAMUSCULAR; INTRAVENOUS; SUBCUTANEOUS at 08:37

## 2024-08-15 RX ADMIN — IPRATROPIUM BROMIDE AND ALBUTEROL SULFATE 3 ML: .5; 3 SOLUTION RESPIRATORY (INHALATION) at 00:02

## 2024-08-15 RX ADMIN — IPRATROPIUM BROMIDE AND ALBUTEROL SULFATE 3 ML: .5; 3 SOLUTION RESPIRATORY (INHALATION) at 06:21

## 2024-08-15 RX ADMIN — DOXYCYCLINE 100 MG: 100 INJECTION, POWDER, LYOPHILIZED, FOR SOLUTION INTRAVENOUS at 08:21

## 2024-08-15 RX ADMIN — IPRATROPIUM BROMIDE AND ALBUTEROL SULFATE 3 ML: .5; 3 SOLUTION RESPIRATORY (INHALATION) at 13:13

## 2024-08-15 RX ADMIN — WATER 40 MG: 1 INJECTION INTRAMUSCULAR; INTRAVENOUS; SUBCUTANEOUS at 23:05

## 2024-08-15 RX ADMIN — WATER 40 MG: 1 INJECTION INTRAMUSCULAR; INTRAVENOUS; SUBCUTANEOUS at 16:12

## 2024-08-15 RX ADMIN — BUDESONIDE 0.5 MG: 0.5 SUSPENSION RESPIRATORY (INHALATION) at 09:43

## 2024-08-15 RX ADMIN — IPRATROPIUM BROMIDE AND ALBUTEROL SULFATE 3 ML: .5; 3 SOLUTION RESPIRATORY (INHALATION) at 00:07

## 2024-08-15 RX ADMIN — Medication 10 ML: at 08:22

## 2024-08-15 RX ADMIN — ASPIRIN 81 MG: 81 TABLET, CHEWABLE ORAL at 16:12

## 2024-08-15 RX ADMIN — DOXYCYCLINE 100 MG: 100 INJECTION, POWDER, LYOPHILIZED, FOR SOLUTION INTRAVENOUS at 21:31

## 2024-08-15 RX ADMIN — HEPARIN SODIUM 5000 UNITS: 5000 INJECTION INTRAVENOUS; SUBCUTANEOUS at 21:30

## 2024-08-15 RX ADMIN — INSULIN GLARGINE 20 UNITS: 100 INJECTION, SOLUTION SUBCUTANEOUS at 08:21

## 2024-08-15 RX ADMIN — ATORVASTATIN CALCIUM 10 MG: 10 TABLET, FILM COATED ORAL at 21:30

## 2024-08-15 RX ADMIN — INSULIN HUMAN 3 UNITS: 100 INJECTION, SOLUTION PARENTERAL at 17:09

## 2024-08-15 RX ADMIN — BUDESONIDE 0.5 MG: 0.5 SUSPENSION RESPIRATORY (INHALATION) at 19:17

## 2024-08-15 RX ADMIN — INSULIN HUMAN 2 UNITS: 100 INJECTION, SOLUTION PARENTERAL at 06:36

## 2024-08-15 RX ADMIN — ARFORMOTEROL TARTRATE 15 MCG: 15 SOLUTION RESPIRATORY (INHALATION) at 19:17

## 2024-08-15 NOTE — PLAN OF CARE
Goal Outcome Evaluation:  Plan of Care Reviewed With: patient        Progress: no change  Outcome Evaluation: Patient came up from ER on a Bipap 14/7 early this morning. Came off Bipap shortly after getting to the room. Currently on 2L home oxygen resting comfortably. Bipap on stamdby at bedside.

## 2024-08-15 NOTE — ED TRIAGE NOTES
Arrived from home via EMS for SOA.  Patient 84% on room air.    EMS gave 125 mg of solumedrol, duoneb tx, and placed on CPAP.  Started to give mag but pressures soft so discontinued.    95% on CPAP 5 PEEP and 100% FiO2    Hx of COPD and asthma

## 2024-08-15 NOTE — ED PROVIDER NOTES
Time: 11:47 PM EDT  Date of encounter:  8/14/2024  Independent Historian/Clinical History and Information was obtained by:   Patient and EMS    History is limited by:  Respiratory distress, Acuity of Condition    Chief Complaint: Respiratory distress      History of Present Illness:  Patient is a 74 y.o. year old male who presents to the emergency department for evaluation of respiratory distress    Patient reports increased shortness of breath x 2 days.  EMS reports that the patient was hypoxic in tripod position struggling to breathe at the time of their arrival.  He stated he was only able to speak in one-word sentences.  EMS treated the patient with nebulizer treatment Solu-Medrol and CPAP therapy.  They report that his oxygen saturation on room air was 84% at the time of arrival patient is significantly improved on CPAP.  EMS attempted treatment with IV magnesium but the patient had a brief episode of hypotension which was ultimately treated with IV fluids after magnesium was stopped.    Patient Care Team  Primary Care Provider: Jackeline Jaime APRN    Past Medical History:     No Known Allergies  Past Medical History:   Diagnosis Date    Asthma     COPD (chronic obstructive pulmonary disease)     Diabetes mellitus     Ex-smoker     QUIT 2021    Hernia, umbilical     Hypertension     On home O2     REPORTS WEARING 2L/NC PRN SOA     Past Surgical History:   Procedure Laterality Date    ABDOMINAL SURGERY       Family History   Problem Relation Age of Onset    Asthma Father     Asthma Sister     Asthma Brother     Cancer Maternal Aunt        Home Medications:  Prior to Admission medications    Medication Sig Start Date End Date Taking? Authorizing Provider   Albuterol-Budesonide (Airsupra) 90-80 MCG/ACT aerosol Inhale 2 puffs Every 4 (Four) Hours As Needed (wheezing, soa). 6/4/24   Jackeline Jaime APRN   arformoterol (BROVANA) 15 MCG/2ML nebulizer solution USE 1 VIAL  IN  NEBULIZER TWICE  DAILY - Morning and  Evening 4/17/24   Leila Singh MD   aspirin 81 MG chewable tablet CHEW AND SWALLOW 1 TABLET BY MOUTH DAILY 5/31/24   Jackeline Jaime APRN   atorvastatin (LIPITOR) 10 MG tablet TAKE 1 TABLET BY MOUTH DAILY 8/13/24   Jackeline Jaime APRN   azithromycin (Zithromax Z-Farrukh) 250 MG tablet Take 2 tablets by mouth on day 1, then 1 tablet daily on days 2-5 6/8/24   Khoa Rodgers DO   Budeson-Glycopyrrol-Formoterol (Breztri Aerosphere) 160-9-4.8 MCG/ACT aerosol inhaler Inhale 2 puffs 2 (Two) Times a Day. 6/4/24   Jackeline Jaime APRN   Insulin Pen Needle 32G X 4 MM misc Use 1 each Daily. 4/3/24   Jackeline Jaime APRN   Jardiance 10 MG tablet tablet TAKE 1 TABLET BY MOUTH DAILY 5/28/24   Jackeline Jaime APRN   metFORMIN (GLUCOPHAGE) 500 MG tablet TAKE 1 TABLET BY MOUTH TWICE DAILY WITH MEALS 6/14/24   Jackeline Jaime APRN   omeprazole (priLOSEC) 20 MG capsule TAKE 1 CAPSULE BY MOUTH DAILY 5/3/24   Jackeline Jaime APRN   predniSONE (DELTASONE) 20 MG tablet Take 2 p.o. daily for 5 days. 6/8/24   Khoa Rodgers DO   Tresiba FlexTouch 100 UNIT/ML solution pen-injector injection ADMINISTER 20 UNITS UNDER THE SKIN DAILY AS DIRECTED 8/1/24   Jackeline Jaime APRN        Social History:   Social History     Tobacco Use    Smoking status: Former     Current packs/day: 0.00     Average packs/day: 1.5 packs/day for 56.2 years (84.3 ttl pk-yrs)     Types: Cigarettes     Start date: 1965     Quit date: 3/19/2021     Years since quitting: 3.4     Passive exposure: Past    Smokeless tobacco: Current     Types: Chew   Vaping Use    Vaping status: Never Used   Substance Use Topics    Alcohol use: Never    Drug use: Never         Review of Systems:  Review of Systems   Constitutional:  Negative for chills and fever.   HENT:  Negative for congestion, ear pain and sore throat.    Eyes:  Negative for pain.   Respiratory:  Positive for cough and shortness of breath. Negative for chest tightness.    Cardiovascular:  Negative for chest pain.  "  Gastrointestinal:  Negative for abdominal pain, diarrhea, nausea and vomiting.   Genitourinary:  Negative for flank pain and hematuria.   Musculoskeletal:  Negative for joint swelling.   Skin:  Negative for pallor.   Neurological:  Negative for seizures and headaches.   All other systems reviewed and are negative.       Physical Exam:  /68 (BP Location: Right arm, Patient Position: Lying)   Pulse 74   Temp 97.7 °F (36.5 °C) (Axillary)   Resp 22   Ht 185.4 cm (73\")   Wt 110 kg (242 lb 4.6 oz)   SpO2 93%   BMI 31.97 kg/m²     Physical Exam  Vitals and nursing note reviewed.   Constitutional:       General: He is in acute distress.      Appearance: He is not toxic-appearing.   HENT:      Head: Normocephalic and atraumatic.      Jaw: There is normal jaw occlusion.   Eyes:      General: Lids are normal.      Extraocular Movements: Extraocular movements intact.      Conjunctiva/sclera: Conjunctivae normal.      Pupils: Pupils are equal, round, and reactive to light.   Cardiovascular:      Rate and Rhythm: Regular rhythm. Tachycardia present.      Pulses: Normal pulses.      Heart sounds: Normal heart sounds.   Pulmonary:      Effort: Respiratory distress present.      Breath sounds: Wheezing and rhonchi present.   Abdominal:      General: Abdomen is flat.      Palpations: Abdomen is soft.      Tenderness: There is no abdominal tenderness. There is no guarding or rebound.   Musculoskeletal:         General: Normal range of motion.      Cervical back: Normal range of motion and neck supple.      Right lower leg: Edema present.      Left lower leg: Edema present.   Skin:     General: Skin is warm and dry.   Neurological:      Mental Status: He is alert and oriented to person, place, and time. Mental status is at baseline.   Psychiatric:         Mood and Affect: Mood normal.            Procedures:  Procedures      Medical Decision Making:      Comorbidities that affect care:    Asthma, COPD, hypertension, " ex-smoker, diabetes, oxygen dependent    External Notes reviewed:    Previous Clinic Note: Outpatient PCP visit 6/4/2024 COPD      The following orders were placed and all results were independently analyzed by me:  Orders Placed This Encounter   Procedures    COVID-19, FLU A/B, RSV PCR 1 HR TAT - Swab, Nasopharynx    Blood Culture - Blood,    Blood Culture - Blood,    XR Chest 1 View    Comprehensive Metabolic Panel    Protime-INR    BNP    Blood Gas, Arterial -    Single High Sensitivity Troponin T    Lactic Acid, Plasma    CBC Auto Differential    Basic Metabolic Panel    CBC Auto Differential    CBC Auto Differential    NPO Diet NPO Type: Strict NPO    Vital Signs    Intake & Output    Weigh Patient    Oral Care    Maintain IV Access    Telemetry - Place Orders & Notify Provider of Results When Patient Experiences Acute Chest Pain, Dysrhythmia or Respiratory Distress    Continuous Pulse Oximetry    Up in Chair    Notify Provider (With Default Parameters)    Reason for COPD Admission: New Oxygen Requirements; Indicate COPD Diagnosis For Problem List: COPD exacerbation    Tobacco Cessation Education    Respiratory Treatment Education (MDI / Spacer / Nebulizer)    COPD Education    Cough / Deep Breathe    Code Status and Medical Interventions: CPR (Attempt to Resuscitate); Full Support    Hospitalist (on-call MD unless specified)    Document Pulse Oximetry - On Room Air / Home O2 Level    Oscillating Positive Expiratory Pressure (OPEP)    POC Glucose Q6H    ECG 12 Lead Dyspnea    Insert Peripheral IV    Inpatient Admission    Inpatient Admission    CBC & Differential    CBC & Differential       Medications Given in the Emergency Department:  Medications   sodium chloride 0.9 % flush 10 mL (has no administration in time range)   sodium chloride 0.9 % flush 10 mL (has no administration in time range)   sodium chloride 0.9 % infusion 40 mL (has no administration in time range)   sennosides-docusate (PERICOLACE) 8.6-50  MG per tablet 2 tablet (has no administration in time range)     And   polyethylene glycol (MIRALAX) packet 17 g (has no administration in time range)     And   bisacodyl (DULCOLAX) EC tablet 5 mg (has no administration in time range)     And   bisacodyl (DULCOLAX) suppository 10 mg (has no administration in time range)   ondansetron ODT (ZOFRAN-ODT) disintegrating tablet 4 mg (has no administration in time range)     Or   ondansetron (ZOFRAN) injection 4 mg (has no administration in time range)   heparin (porcine) 5000 UNIT/ML injection 5,000 Units (has no administration in time range)   ipratropium-albuterol (DUO-NEB) nebulizer solution 3 mL (has no administration in time range)   ipratropium-albuterol (DUO-NEB) nebulizer solution 3 mL (has no administration in time range)   methylPREDNISolone sodium succinate (SOLU-Medrol) 62.5 mg in sterile water (preservative free) 1 mL (has no administration in time range)   doxycycline (VIBRAMYCIN) 100 mg in sodium chloride 0.9 % 100 mL IVPB-VTB (has no administration in time range)   dextrose (GLUTOSE) oral gel 15 g (has no administration in time range)   dextrose (D50W) (25 g/50 mL) IV injection 25 g (has no administration in time range)   glucagon (GLUCAGEN) injection 1 mg (has no administration in time range)   insulin regular (humuLIN R,novoLIN R) injection 2-9 Units (has no administration in time range)   insulin glargine (LANTUS, SEMGLEE) injection 20 Units (has no administration in time range)   ipratropium-albuterol (DUO-NEB) nebulizer solution 3 mL (3 mL Nebulization Given 8/15/24 0007)        ED Course:    ED Course as of 08/15/24 0553   Wed Aug 14, 2024   8774 My interpretation of EKG: Sinus rhythm 65, no acute ischemia [JS]   u Aug 15, 2024   0108 Discussed patient's workup and presentation with the hospitalist who agrees to admission.  The patient's airway is intact, vital signs, and respiratory status are safe for admission at this time. [JS]      ED Course  User Index  [JS] Roel Saldivar MD       Labs:    Lab Results (last 24 hours)       Procedure Component Value Units Date/Time    Blood Gas, Arterial - [[id number]]  (Abnormal) Collected: 08/14/24 2359    Specimen: Arterial Blood Updated: 08/15/24 0003     Site Right Radial     Nael's Test Positive     pH, Arterial 7.336 pH units      pCO2, Arterial 56.6 mm Hg      pO2, Arterial 99.2 mm Hg      HCO3, Arterial 30.3 mmol/L      Base Excess, Arterial 2.9 mmol/L      Comment: Serial Number: [id number]:  [id number]        O2 Saturation, Arterial 97.0 %      Hemoglobin, Blood Gas 14.2 g/dL      Hematocrit, Blood Gas 42.0 %      Barometric Pressure for Blood Gas 743.3000 mmHg      Modality BiPap     FIO2 40 %      PEEP 7     PIP 14 cmH2O      Hemodilution No     Respiratory Rate 4     PO2/FIO2 248    COVID-19, FLU A/B, RSV PCR 1 HR TAT - Swab, Nasopharynx [[id number]]  (Normal) Collected: 08/15/24 0005    Specimen: Swab from Nasopharynx Updated: 08/15/24 0128     COVID19 Not Detected     Influenza A PCR Not Detected     Influenza B PCR Not Detected     RSV, PCR Not Detected    Narrative:      Fact sheet for providers: https://www.fda.gov/media/540378/download    Fact sheet for patients: https://www.fda.gov/media/133583/download    Test performed by PCR.    CBC & Differential [[id number]]  (Abnormal) Collected: 08/15/24 0021    Specimen: Blood Updated: 08/15/24 0039    Narrative:      The following orders were created for panel order CBC & Differential.  Procedure                               Abnormality         Status                     ---------                               -----------         ------                     CBC Auto Differential[[id number]]        Abnormal            Final result                 Please view results for these tests on the individual orders.    Comprehensive Metabolic Panel [[id number]]  (Abnormal) Collected: 08/15/24 0021    Specimen: Blood Updated: 08/15/24 0100     Glucose 122 mg/dL      BUN 20  mg/dL      Creatinine 1.01 mg/dL      Sodium 141 mmol/L      Potassium 4.7 mmol/L      Comment: Slight hemolysis detected by analyzer. Result may be falsely elevated.        Chloride 104 mmol/L      CO2 28.1 mmol/L      Calcium 8.2 mg/dL      Total Protein 6.8 g/dL      Albumin 3.6 g/dL      ALT (SGPT) 11 U/L      AST (SGOT) 19 U/L      Alkaline Phosphatase 89 U/L      Total Bilirubin 0.3 mg/dL      Globulin 3.2 gm/dL      A/G Ratio 1.1 g/dL      BUN/Creatinine Ratio 19.8     Anion Gap 8.9 mmol/L      eGFR 77.6 mL/min/1.73     Narrative:      GFR Normal >60  Chronic Kidney Disease <60  Kidney Failure <15    The GFR formula is only valid for adults with stable renal function between ages 18 and 70.    Protime-INR [[id number]]  (Normal) Collected: 08/15/24 0021    Specimen: Blood Updated: 08/15/24 0050     Protime 13.0 Seconds      INR 0.96    Narrative:      Suggested Therapeutic Ranges For Oral Anticoagulant Therapy:  Level of Therapy                      INR Target Range  Standard Dose                            2.0-3.0  High Dose                                2.5-3.5  Patients not receiving anticoagulant  Therapy Normal Range                     0.86-1.15    BNP [[id number]]  (Normal) Collected: 08/15/24 0021    Specimen: Blood Updated: 08/15/24 0057     proBNP 117.9 pg/mL     Narrative:      This assay is used as an aid in the diagnosis of individuals suspected of having heart failure. It can be used as an aid in the diagnosis of acute decompensated heart failure (ADHF) in patients presenting with signs and symptoms of ADHF to the emergency department (ED). In addition, NT-proBNP of <300 pg/mL indicates ADHF is not likely.    Age Range Result Interpretation  NT-proBNP Concentration (pg/mL:      <50             Positive            >450                   Gray                 300-450                    Negative             <300    50-75           Positive            >900                  Gray                300-900                   Negative            <300      >75             Positive            >1800                  Gray                300-1800                  Negative            <300    Single High Sensitivity Troponin T [[id number]]  (Abnormal) Collected: 08/15/24 0021    Specimen: Blood Updated: 08/15/24 0100     HS Troponin T 26 ng/L     Narrative:      High Sensitive Troponin T Reference Range:  <14.0 ng/L- Negative Female for AMI  <22.0 ng/L- Negative Male for AMI  >=14 - Abnormal Female indicating possible myocardial injury.  >=22 - Abnormal Male indicating possible myocardial injury.   Clinicians would have to utilize clinical acumen, EKG, Troponin, and serial changes to determine if it is an Acute Myocardial Infarction or myocardial injury due to an underlying chronic condition.         Blood Culture - Blood, Arm, Right [[id number]] Collected: 08/15/24 0021    Specimen: Blood from Arm, Right Updated: 08/15/24 0034    Lactic Acid, Plasma [[id number]]  (Normal) Collected: 08/15/24 0021    Specimen: Blood Updated: 08/15/24 0057     Lactate 1.1 mmol/L     CBC Auto Differential [[id number]]  (Abnormal) Collected: 08/15/24 0021    Specimen: Blood Updated: 08/15/24 0039     WBC 8.37 10*3/mm3      RBC 5.31 10*6/mm3      Hemoglobin 12.1 g/dL      Hematocrit 44.4 %      MCV 83.6 fL      MCH 22.8 pg      MCHC 27.3 g/dL      RDW 16.2 %      RDW-SD 49.5 fl      MPV 10.6 fL      Platelets 161 10*3/mm3      Neutrophil % 60.6 %      Lymphocyte % 22.5 %      Monocyte % 8.1 %      Eosinophil % 7.8 %      Basophil % 0.6 %      Immature Grans % 0.4 %      Neutrophils, Absolute 5.08 10*3/mm3      Lymphocytes, Absolute 1.88 10*3/mm3      Monocytes, Absolute 0.68 10*3/mm3      Eosinophils, Absolute 0.65 10*3/mm3      Basophils, Absolute 0.05 10*3/mm3      Immature Grans, Absolute 0.03 10*3/mm3      nRBC 0.0 /100 WBC     Blood Culture - Blood, Arm, Left [[id number]] Collected: 08/15/24 0115    Specimen: Blood from Arm, Left Updated: 08/15/24  0120             Imaging:    XR Chest 1 View    Result Date: 8/15/2024  XR CHEST 1 VW-  Date of exam: 8/15/2024, 12:40 A.M.  Indications: Dyspnea; SOA/SOB/shortness of air/shortness of breath.  Comparisons: 6/8/2024; 3/25/2024; 6/7/2023.  FINDINGS: A single AP (or PA) upright portable chest radiograph was performed. No cardiac enlargement is seen. No acute infiltrate is appreciated. No pleural effusion or pneumothorax is identified. Chronic blunting involves the left lateral costophrenic sulcus with chronic volume loss on the left, as before. External artifacts obscure detail. There is emphysematous contour of the lungs. The thoracic aorta is atherosclerotic. No significant interval change is seen since the prior study (or studies).       No acute infiltrate is appreciated.    Portions of this note were completed with a voice recognition program.   Electronically Signed By-Aaron Rodriguez MD On:8/15/2024 12:51 AM         Differential Diagnosis and Discussion:    Dyspnea: Differential diagnosis includes but is not limited to metabolic acidosis, neurological disorders, psychogenic, asthma, pneumothorax, upper airway obstruction, COPD, pneumonia, noncardiogenic pulmonary edema, interstitial lung disease, anemia, congestive heart failure, and pulmonary embolism    All labs were reviewed and interpreted by me.  All X-rays impressions were independently interpreted by me.  EKG was interpreted by me.    [facility]       Critical Care Note: Total Critical Care time of 35 minutes. Total critical care time documented does not include time spent on separately billed procedures for services of nurses or physician assistants. I personally saw and examined the patient. I have reviewed all diagnostic interpretations and treatment plans as written. I was present for the key portions of any procedures performed and the inclusive time noted in any critical care statement. Critical care time includes patient management by me, time spent at  the patients bedside,  time to review lab and imaging results, discussing patient care, documentation in the medical record, and time spent with family or caregiver.    Patient was placed on the cardiac monitor after given nebulized albuterol.  They were monitored for ventricular ectopy, arrhythmia, tachycardia, hypoxia, and changes in blood pressure.  Patient was rechecked several times throughout their stay.        Patient Care Considerations:    SEPSIS IS NOT PRESENT IN THE EMERGENCY DEPARTMENT: Patient meets SIRS criteria in the emergency department however the patient does not have a known source of bacterial infection to confirm the diagnosis of sepsis.        Consultants/Shared Management Plan:    Hospitalist: I have discussed the case with the hospitalist who agrees to accept the patient for admission.    Social Determinants of Health:    Patient is independent, reliable, and has access to care.       Disposition and Care Coordination:    Admit:   Through independent evaluation of the patient's history, physical, and imperical data, the patient meets criteria for inpatient admission to the hospital.        Final diagnoses:   Acute exacerbation of chronic obstructive pulmonary disease (COPD)   Acute respiratory failure with hypoxia        ED Disposition       ED Disposition   Decision to Admit    Condition   --    Comment   Level of Care: Telemetry [5]   Diagnosis: COPD exacerbation [103736]   Certification: I Certify That Inpatient Hospital Services Are Medically Necessary For Greater Than 2 Midnights                 This medical record created using voice recognition software.             Roel Saldivar MD  08/15/24 0583

## 2024-08-15 NOTE — PROGRESS NOTES
Respiratory Therapist Broncho-Pulmonary Hygiene Progress Note      Patient Name:  Dilip Faulkner  YOB: 1950    Dilip Faulkner meets the qualification for Level 1 of the Bronco-Pulmonary Hygiene Protocol. This was based on my daily patient assessment and includes review of chest x-ray results, cough ability and quality, oxygenation, secretions or risk for secretion development and patient mobility.     Broncho-Pulmonary Hygiene Assessment:    Level of Movement: Actively changing positions without assistance  Alert/ oriented/ cooperative    Breath Sounds: Bilateral localized decreased air exchange and/or coarse rhonchi    Cough: Strong, effective    Chest X-Ray: Possible signs of consolidation and/or atelectasis or clear.     Sputum Productions: None or small amount of thin or watery secretions with effective cough    History and Physical: Home use of oxygen  and Chronic condition    SpO2 to Oxygen Need: greater than 92% on room air or  less than 3L nasal canula    Current SpO2 is: 95% on 2L home O2    Based on this information, I have completed the following interventions: Teach/Instruct patient on cough and deep breathe      Electronically signed by Zaira Phan RRT, 08/15/24, 11:07 AM EDT.

## 2024-08-15 NOTE — PROGRESS NOTES
Reviewed hospitalization plan, all questions answered, pulmonary consulted for further evaluation, clinical course will dictate further management, discussed with nurse at bedside.    Electronically signed by Oralia Hardy MD, 8/15/2024, 15:00 EDT.    Portions of this documentation were transcribed electronically from a voice recognition software.  I confirm all data accurately represents the service(s) I performed at today's visit.

## 2024-08-15 NOTE — CONSULTS
COPD Education    Pulmonologist:  Samantha  Last outpatient pulmonary visit:  3.14.24  Last Hospital stay for COPD? 3.25.24  COPD related ED visits in past year: 6    Age: 74 y.o.  Sex: male  BMI:Body mass index is 31.97 kg/m².     Past Medical Hx:  chronic respiratory failure, Asthma COPD overlap, DM, HTN     Smoking Hx: Social History    Tobacco Use      Smoking status: Former        Packs/day: 0.00        Years: 1.5 packs/day for 56.2 years (84.3 ttl pk-yrs)        Types: Cigarettes        Start date: 1965        Quit date: 3/19/2021        Years since quitting: 3.4        Passive exposure: Past      Smokeless tobacco: Current        Types: Chew    Social History     Substance and Sexual Activity   Drug Use Never      Home Equipment Used: O2 NEB BIPAP DME: [employer]    Daily symptoms: SOA w/exertion, nonproductive cough, wheeze    Airway Clearance methods utilized: OPEP, IS    Have you ever attended Pulmonary Rehab?  no    Last PFT:  ordered multiple times; patient has failed to complete testing    Have you ever had a sleep study/PSG? no    EOSINOPHILS ABSOLUTE:       Last Arterial Blood Gas:   Lab Results   Component Value Date    PHART 7.336 (L) 08/14/2024    MZS8XHV 56.6 (H) 08/14/2024    PO2ART 99.2 08/14/2024    BFP3CLQ 30.3 (H) 08/14/2024    BASEEXCESS 2.9 (H) 08/14/2024    Q2JNLRMO 97.0 08/14/2024            Home Medications:  Medications Prior to Admission   Medication Sig Dispense Refill Last Dose    Albuterol-Budesonide (Airsupra) 90-80 MCG/ACT aerosol Inhale 2 puffs Every 4 (Four) Hours As Needed (wheezing, soa). 10.7 g 1     arformoterol (BROVANA) 15 MCG/2ML nebulizer solution USE 1 VIAL  IN  NEBULIZER TWICE  DAILY - Morning and Evening 1 mL 11     aspirin 81 MG chewable tablet CHEW AND SWALLOW 1 TABLET BY MOUTH DAILY 90 tablet 1     atorvastatin (LIPITOR) 10 MG tablet TAKE 1 TABLET BY MOUTH DAILY 90 tablet 1     azithromycin (Zithromax Z-Farrukh) 250 MG tablet Take 2 tablets by mouth on day 1, then 1 tablet  daily on days 2-5 6 tablet 0     Budeson-Glycopyrrol-Formoterol (Breztri Aerosphere) 160-9-4.8 MCG/ACT aerosol inhaler Inhale 2 puffs 2 (Two) Times a Day. 10.7 g 1     Insulin Pen Needle 32G X 4 MM misc Use 1 each Daily. 100 each 3     Jardiance 10 MG tablet tablet TAKE 1 TABLET BY MOUTH DAILY 90 tablet 0     metFORMIN (GLUCOPHAGE) 500 MG tablet TAKE 1 TABLET BY MOUTH TWICE DAILY WITH MEALS 180 tablet 0     omeprazole (priLOSEC) 20 MG capsule TAKE 1 CAPSULE BY MOUTH DAILY 90 capsule 1     predniSONE (DELTASONE) 20 MG tablet Take 2 p.o. daily for 5 days. 10 tablet 0     Tresiba FlexTouch 100 UNIT/ML solution pen-injector injection ADMINISTER 20 UNITS UNDER THE SKIN DAILY AS DIRECTED 6 mL 2      Patient is well known to me from multiple previous admissions and history of non adherence with NIPPV for chronic respiratory failure & COPD maintenance and treatment plan. Patients medication dispense history reviewed. Breztri & Airsupra last dispensed in June, Arformoterol & Budesonide last dispensed in April.  I am not confident patient has been taking any maintenance for his COPD. [facility] still has his home stationary and portable oxygen concentrator.      Mr. Faulkner was hypoxic in tripod position struggling to breathe at the time EMS arrival. Patient was started on Bilevel support once in the ER, acute on chronic hypercapnia noted on ABG, pCO2 56.6.      Mr. Faulkner needs noninvasive ventilation for acute on chronic hypercapnic respiratory failure (CO2 56.6) secondary to COPD. Patient was started on bilevel in the home in November of 2022 and has failed treatment requiring multiple admissions/ED visits since then. Bilevel ST, ASV and Bilevel with VAPS have also been considered and ruled out due to the severity of patient's disease. The patient requires a volume assured mode of ventilation that is not available on less costly bilevel devices. A noninvasive ventilator is necessary to deliver targeted  tidal/minute ventilation and provide a back up rate to ensure adequate ventilation and prevent hypoventilation. Auto adjusting EPAP is essential to treat underlying NED. Alarms and battery backup (only available on noninvasive ventilator) are necessary for patient safety. A noninvasive mechanical ventilator is imperative to decrease hospitalization and improve survival. Failure to follow recommended guidelines will place the patient at increased risk of unplanned expensive rehospitalization, acute on chronic respiratory failure and mortality.    COPD and chronic respiratory failure management discussed with Mr. Faulkner. Patient states he was using an inhaler twice daily but could not verify the name of the inhaler. I asked patient if he was using a rescue inhaler or nebulizer between his twice daily inhaler and he states he was not. The differences in maintenance and rescue treatments and the importance of using each as prescribeddiscussed again. Mr. Faulkner once again declined set up NIV.  He states he still has a bipap and does not and will not wear it. Patient was offered a trial of NIV with nasal pillows while he was hospitalized, but patient declined this as well.     Mr. Faulkner will need refill of Airsupra and Breztri on discharge.     Jennifer Traore, RRT   RT   08/15/24  09:47 EDT

## 2024-08-15 NOTE — H&P
Patient Care Team:  Jackeline Jaime APRN as PCP - General (Nurse Practitioner)  Rosalba Edwards APRN (Nurse Practitioner)  Chandu Vail MD as Consulting Physician (Pulmonary Disease)  Lorena Campbell APRN as Consulting Physician (Cardiology)  Nelly Jaime APRN as Nurse Practitioner (Pulmonary Disease)    Chief complaint shortness of breath    Subjective     Patient is a 74 y.o. male presents with shortness of breath.  This has been going on for 2 days.  Patient was found to be in respiratory distress by EMS.  He was hypoxic to 84% on room air at time of arrival.  They placed a CPAP.  They also retreating with IV magnesium however the patient became hypotensive and therefore her magnesium was stopped.  He was given a nebulizer in the emergency department    Review of Systems   Pertinent items are noted in HPI    History  Past Medical History:   Diagnosis Date    Asthma     COPD (chronic obstructive pulmonary disease)     Diabetes mellitus     Ex-smoker     QUIT 2021    Hernia, umbilical     Hypertension     On home O2     REPORTS WEARING 2L/NC PRN SOA     Past Surgical History:   Procedure Laterality Date    ABDOMINAL SURGERY       Family History   Problem Relation Age of Onset    Asthma Father     Asthma Sister     Asthma Brother     Cancer Maternal Aunt      Social History     Tobacco Use    Smoking status: Former     Current packs/day: 0.00     Average packs/day: 1.5 packs/day for 56.2 years (84.3 ttl pk-yrs)     Types: Cigarettes     Start date: 1965     Quit date: 3/19/2021     Years since quitting: 3.4     Passive exposure: Past    Smokeless tobacco: Current     Types: Chew   Vaping Use    Vaping status: Never Used   Substance Use Topics    Alcohol use: Never    Drug use: Never     Medications Prior to Admission   Medication Sig Dispense Refill Last Dose    Albuterol-Budesonide (Airsupra) 90-80 MCG/ACT aerosol Inhale 2 puffs Every 4 (Four) Hours As Needed (wheezing, soa). 10.7 g 1      arformoterol (BROVANA) 15 MCG/2ML nebulizer solution USE 1 VIAL  IN  NEBULIZER TWICE  DAILY - Morning and Evening 1 mL 11     aspirin 81 MG chewable tablet CHEW AND SWALLOW 1 TABLET BY MOUTH DAILY 90 tablet 1     atorvastatin (LIPITOR) 10 MG tablet TAKE 1 TABLET BY MOUTH DAILY 90 tablet 1     azithromycin (Zithromax Z-Farrukh) 250 MG tablet Take 2 tablets by mouth on day 1, then 1 tablet daily on days 2-5 6 tablet 0     Budeson-Glycopyrrol-Formoterol (Breztri Aerosphere) 160-9-4.8 MCG/ACT aerosol inhaler Inhale 2 puffs 2 (Two) Times a Day. 10.7 g 1     Insulin Pen Needle 32G X 4 MM misc Use 1 each Daily. 100 each 3     Jardiance 10 MG tablet tablet TAKE 1 TABLET BY MOUTH DAILY 90 tablet 0     metFORMIN (GLUCOPHAGE) 500 MG tablet TAKE 1 TABLET BY MOUTH TWICE DAILY WITH MEALS 180 tablet 0     omeprazole (priLOSEC) 20 MG capsule TAKE 1 CAPSULE BY MOUTH DAILY 90 capsule 1     predniSONE (DELTASONE) 20 MG tablet Take 2 p.o. daily for 5 days. 10 tablet 0     Tresiba FlexTouch 100 UNIT/ML solution pen-injector injection ADMINISTER 20 UNITS UNDER THE SKIN DAILY AS DIRECTED 6 mL 2      Allergies:  Patient has no known allergies.    Objective     Vital Signs  Temp:  [97.8 °F (36.6 °C)] 97.8 °F (36.6 °C)  Heart Rate:  [58-66] 58  Resp:  [12-21] 16  BP: (120)/(64) 120/64    Physical Exam:      General Appearance:  Alert, cooperative, in no acute distress   Head:  Normocephalic, without obvious abnormality, atraumatic   Eyes:  Lids and lashes normal, conjunctivae and sclerae normal, no icterus, no pallor, corneas clear, PERRLA   Ears:  Ears appear intact with no abnormalities noted   Throat:  No oral lesions, no thrush, oral mucosa moist   Neck:  No adenopathy, supple, trachea midline, no thyromegaly, no carotid bruit, no JVD   Back:  No kyphosis present, no scoliosis present, no skin lesions, erythema or scars, no tenderness to percussion or palpation, range of motion normal   Lungs:  Clear to auscultation, respirations regular,  even and unlabored    Heart:  Regular rhythm and normal rate, normal S1 and S2, no murmur, no gallop, no rub, no click   Chest Wall:  No abnormalities observed   Abdomen:  Normal bowel sounds, no masses, no organomegaly, soft non-tender, non-distended, no guarding, no rebound tenderness   Rectal:  Deferred   Extremities:  Moves all extremities well, no edema, no cyanosis, no redness   Pulses:  Pulses palpable and equal bilaterally   Skin:  No bleeding, bruising or rash   Lymph nodes:  No palpable adenopathy   Neurologic:  Cranial nerves 2 - 12 grossly intact, sensation intact, DTR present and equal bilaterally       Results Review:    I reviewed the patient's new clinical results.  I reviewed the patient's new imaging results and agree with the interpretation.  I reviewed the patient's other test results and agree with the interpretation  I personally viewed and interpreted the patient's EKG/Telemetry data    Assessment & Plan       COPD exacerbation    Essential hypertension    Hyperlipidemia    Type 2 diabetes mellitus with hyperglycemia, with long-term current use of insulin    Respiratory failure with hypoxia and hypercapnia      Admit to telemetry  DuoNebs scheduled and as needed  Solu-Medrol  Doxy  Insulin sliding scale with home dose of basal insulin given  Bipap with goal SpO2 88-94%  NPO while on bipap  Full Code          Robby Astorga MD  08/15/24  01:11 EDT

## 2024-08-15 NOTE — CONSULTS
Pulmonary / Critical Care Consult Note      Patient Name: Dilip Faulkner  : 1950  MRN: [id number]  Primary Care Physician:  Jackeline Jaime APRN  Referring Physician: No ref. provider found  Date of admission: 2024    Subjective   Subjective     Reason for Consult/ Chief Complaint: Severe breathlessness    HPI:  Dilip Faulkner is a 74 y.o. male with a history of asthma COPD and tobacco abuse in remission on home oxygen who presented with respiratory distress and hypoxemia as noted by the EMS patient was transported to the ER where he was noted to have respiratory distress with wheeze and chest congestion he was placed on CPAP and subsequently admitted he has used his CPAP all night and is presently off of it  Patient feels much improved and denies any chest pain fever or hemoptysis patient complains of fatigue and weakness  Rest of the review of systems is negative      Personal History     Past Medical History:   Diagnosis Date    Asthma     COPD (chronic obstructive pulmonary disease)     Diabetes mellitus     Ex-smoker     QUIT     Hernia, umbilical     Hypertension     On home O2     REPORTS WEARING 2L/NC PRN SOA       Past Surgical History:   Procedure Laterality Date    ABDOMINAL SURGERY         Family History: family history includes Asthma in his brother, father, and sister; Cancer in his maternal aunt. Otherwise pertinent FHx was reviewed and not pertinent to current issue.    Social History:  reports that he quit smoking about 3 years ago. His smoking use included cigarettes. He started smoking about 59 years ago. He has a 84.3 pack-year smoking history. He has been exposed to tobacco smoke. His smokeless tobacco use includes chew. He reports that he does not drink alcohol and does not use drugs.    Home Medications:  Albuterol-Budesonide, Budeson-Glycopyrrol-Formoterol, arformoterol, aspirin, atorvastatin, empagliflozin, insulin degludec, metFORMIN, omeprazole, and  predniSONE    Allergies:  No Known Allergies    Objective    Objective     Vitals:   Temp:  [97.7 °F (36.5 °C)-97.8 °F (36.6 °C)] 97.7 °F (36.5 °C)  Heart Rate:  [58-87] 87  Resp:  [12-22] 22  BP: ()/(51-94) 113/74  Flow (L/min):  [2-10] 2    Physical Exam:  Vital Signs Reviewed   WDWN, Alert, NAD.    HEENT:  PERRL, EOMI.  OP, nares clear, no sinus tenderness  Neck:  Supple, no JVD, no thyromegaly  Lymph: no axillary, cervical, supraclavicular lymphadenopathy noted bilaterally  Chest: Vesicular breathing with prolonged expiration  Bilateral scattered rhonchi with wheeze audible expiration is prolonged , tympanic to percussion bilaterally, no work of breathing noted  CV: RRR, no MGR, pulses 2+, equal.  Abd:  Soft, NT, ND, + BS, no HSM  EXT:  no clubbing, no cyanosis, no edema, no joint tenderness  Neuro:  A&Ox3, CN grossly intact, no focal deficits.  Skin: No rashes or lesions noted      Result Review    Result Review:  I have personally reviewed the results from the time of this admission to 8/15/2024 11:21 EDT and agree with these findings:  [x]  Laboratory  [x]  Microbiology  [x]  Radiology  [x]  EKG/Telemetry   []  Cardiology/Vascular   []  Pathology  []  Old records  []  Other:      Assessment & Plan   Assessment / Plan     Active Hospital Problems:  Active Hospital Problems    Diagnosis     **COPD exacerbation     Respiratory failure with hypoxia and hypercapnia     Hyperlipidemia     Essential hypertension     Type 2 diabetes mellitus with hyperglycemia, with long-term current use of insulin          Impression: Acute on chronic respiratory failure with hypoxia and hypercapnia  COPD exacerbation  Hypertension  Diabetes mellitus  Tobacco abuse in remission    Plan:   Agree with IV steroids along with IV doxycycline  Continue nebulizer treatments  BiPAP nightly and as needed patient does not use home BiPAP because of intolerance  Continue monitoring of pulse ox  Chest percussion and postural drainage along  with flutter valve and incentive spirometry  Blood glucose monitoring  DVT prophylaxis    Electronically signed by Chandu Vail MD, 08/15/24, 11:21 AM EDT.

## 2024-08-15 NOTE — PLAN OF CARE
Goal Outcome Evaluation:  Plan of Care Reviewed With: patient            Patient alert and able to make needs known. Remains on 2 liters via NC with sats at 94%. Continues on steroids to aid in breathing. Blind in right eye, droopy and closed throughout shift. Denies pain or discomfort at this time. Denies wants or needs. Frequently used items with in easy reach.    Flory Maradiaga RN

## 2024-08-16 ENCOUNTER — READMISSION MANAGEMENT (OUTPATIENT)
Dept: CALL CENTER | Facility: HOSPITAL | Age: 74
End: 2024-08-16
Payer: MEDICARE

## 2024-08-16 VITALS
RESPIRATION RATE: 20 BRPM | SYSTOLIC BLOOD PRESSURE: 125 MMHG | OXYGEN SATURATION: 96 % | TEMPERATURE: 97.7 F | BODY MASS INDEX: 32.11 KG/M2 | DIASTOLIC BLOOD PRESSURE: 70 MMHG | WEIGHT: 242.29 LBS | HEIGHT: 73 IN | HEART RATE: 86 BPM

## 2024-08-16 LAB
ANION GAP SERPL CALCULATED.3IONS-SCNC: 13.8 MMOL/L (ref 5–15)
BASOPHILS # BLD AUTO: 0.02 10*3/MM3 (ref 0–0.2)
BASOPHILS NFR BLD AUTO: 0.1 % (ref 0–1.5)
BUN SERPL-MCNC: 24 MG/DL (ref 8–23)
BUN/CREAT SERPL: 18 (ref 7–25)
CALCIUM SPEC-SCNC: 8.9 MG/DL (ref 8.6–10.5)
CHLORIDE SERPL-SCNC: 101 MMOL/L (ref 98–107)
CO2 SERPL-SCNC: 22.2 MMOL/L (ref 22–29)
CREAT SERPL-MCNC: 1.33 MG/DL (ref 0.76–1.27)
DEPRECATED RDW RBC AUTO: 50.8 FL (ref 37–54)
EGFRCR SERPLBLD CKD-EPI 2021: 55.7 ML/MIN/1.73
EOSINOPHIL # BLD AUTO: 0 10*3/MM3 (ref 0–0.4)
EOSINOPHIL NFR BLD AUTO: 0 % (ref 0.3–6.2)
ERYTHROCYTE [DISTWIDTH] IN BLOOD BY AUTOMATED COUNT: 19.6 % (ref 12.3–15.4)
GLUCOSE BLDC GLUCOMTR-MCNC: 153 MG/DL (ref 70–99)
GLUCOSE BLDC GLUCOMTR-MCNC: 183 MG/DL (ref 70–99)
GLUCOSE SERPL-MCNC: 210 MG/DL (ref 65–99)
HCT VFR BLD AUTO: 38.4 % (ref 37.5–51)
HGB BLD-MCNC: 11.6 G/DL (ref 13–17.7)
IMM GRANULOCYTES # BLD AUTO: 0.12 10*3/MM3 (ref 0–0.05)
IMM GRANULOCYTES NFR BLD AUTO: 0.7 % (ref 0–0.5)
LYMPHOCYTES # BLD AUTO: 0.88 10*3/MM3 (ref 0.7–3.1)
LYMPHOCYTES NFR BLD AUTO: 5.3 % (ref 19.6–45.3)
MCH RBC QN AUTO: 25.9 PG (ref 26.6–33)
MCHC RBC AUTO-ENTMCNC: 30.2 G/DL (ref 31.5–35.7)
MCV RBC AUTO: 85.7 FL (ref 79–97)
MONOCYTES # BLD AUTO: 0.59 10*3/MM3 (ref 0.1–0.9)
MONOCYTES NFR BLD AUTO: 3.6 % (ref 5–12)
NEUTROPHILS NFR BLD AUTO: 14.87 10*3/MM3 (ref 1.7–7)
NEUTROPHILS NFR BLD AUTO: 90.3 % (ref 42.7–76)
NRBC BLD AUTO-RTO: 0 /100 WBC (ref 0–0.2)
PLATELET # BLD AUTO: 145 10*3/MM3 (ref 140–450)
PMV BLD AUTO: 10.6 FL (ref 6–12)
POTASSIUM SERPL-SCNC: 4.7 MMOL/L (ref 3.5–5.2)
RBC # BLD AUTO: 4.48 10*6/MM3 (ref 4.14–5.8)
SODIUM SERPL-SCNC: 137 MMOL/L (ref 136–145)
WBC NRBC COR # BLD AUTO: 16.48 10*3/MM3 (ref 3.4–10.8)

## 2024-08-16 PROCEDURE — 63710000001 INSULIN GLARGINE PER 5 UNITS: Performed by: STUDENT IN AN ORGANIZED HEALTH CARE EDUCATION/TRAINING PROGRAM

## 2024-08-16 PROCEDURE — 85025 COMPLETE CBC W/AUTO DIFF WBC: CPT | Performed by: STUDENT IN AN ORGANIZED HEALTH CARE EDUCATION/TRAINING PROGRAM

## 2024-08-16 PROCEDURE — 94799 UNLISTED PULMONARY SVC/PX: CPT

## 2024-08-16 PROCEDURE — 99239 HOSP IP/OBS DSCHRG MGMT >30: CPT | Performed by: FAMILY MEDICINE

## 2024-08-16 PROCEDURE — 63710000001 PREDNISONE PER 1 MG: Performed by: FAMILY MEDICINE

## 2024-08-16 PROCEDURE — 80048 BASIC METABOLIC PNL TOTAL CA: CPT | Performed by: STUDENT IN AN ORGANIZED HEALTH CARE EDUCATION/TRAINING PROGRAM

## 2024-08-16 PROCEDURE — 82948 REAGENT STRIP/BLOOD GLUCOSE: CPT | Performed by: FAMILY MEDICINE

## 2024-08-16 PROCEDURE — 63710000001 INSULIN REGULAR HUMAN PER 5 UNITS: Performed by: FAMILY MEDICINE

## 2024-08-16 PROCEDURE — 94664 DEMO&/EVAL PT USE INHALER: CPT

## 2024-08-16 PROCEDURE — 94761 N-INVAS EAR/PLS OXIMETRY MLT: CPT

## 2024-08-16 PROCEDURE — 97161 PT EVAL LOW COMPLEX 20 MIN: CPT

## 2024-08-16 PROCEDURE — 25010000002 HEPARIN (PORCINE) PER 1000 UNITS: Performed by: STUDENT IN AN ORGANIZED HEALTH CARE EDUCATION/TRAINING PROGRAM

## 2024-08-16 RX ORDER — DOXYCYCLINE HYCLATE 100 MG/1
100 CAPSULE ORAL 2 TIMES DAILY
Qty: 14 CAPSULE | Refills: 0 | Status: SHIPPED | OUTPATIENT
Start: 2024-08-16 | End: 2024-08-23

## 2024-08-16 RX ORDER — PREDNISONE 20 MG/1
40 TABLET ORAL
Status: DISCONTINUED | OUTPATIENT
Start: 2024-08-16 | End: 2024-08-16 | Stop reason: HOSPADM

## 2024-08-16 RX ORDER — PREDNISONE 20 MG/1
40 TABLET ORAL
Qty: 8 TABLET | Refills: 0 | Status: SHIPPED | OUTPATIENT
Start: 2024-08-17 | End: 2024-08-21

## 2024-08-16 RX ADMIN — BUDESONIDE 0.5 MG: 0.5 SUSPENSION RESPIRATORY (INHALATION) at 06:49

## 2024-08-16 RX ADMIN — ASPIRIN 81 MG: 81 TABLET, CHEWABLE ORAL at 08:04

## 2024-08-16 RX ADMIN — PREDNISONE 40 MG: 20 TABLET ORAL at 08:03

## 2024-08-16 RX ADMIN — IPRATROPIUM BROMIDE AND ALBUTEROL SULFATE 3 ML: .5; 3 SOLUTION RESPIRATORY (INHALATION) at 06:49

## 2024-08-16 RX ADMIN — IPRATROPIUM BROMIDE AND ALBUTEROL SULFATE 3 ML: .5; 3 SOLUTION RESPIRATORY (INHALATION) at 01:07

## 2024-08-16 RX ADMIN — INSULIN HUMAN 2 UNITS: 100 INJECTION, SOLUTION PARENTERAL at 08:03

## 2024-08-16 RX ADMIN — INSULIN GLARGINE 20 UNITS: 100 INJECTION, SOLUTION SUBCUTANEOUS at 08:03

## 2024-08-16 RX ADMIN — ARFORMOTEROL TARTRATE 15 MCG: 15 SOLUTION RESPIRATORY (INHALATION) at 06:49

## 2024-08-16 RX ADMIN — Medication 10 ML: at 08:04

## 2024-08-16 RX ADMIN — DOXYCYCLINE 100 MG: 100 INJECTION, POWDER, LYOPHILIZED, FOR SOLUTION INTRAVENOUS at 08:03

## 2024-08-16 RX ADMIN — INSULIN HUMAN 2 UNITS: 100 INJECTION, SOLUTION PARENTERAL at 11:59

## 2024-08-16 RX ADMIN — IPRATROPIUM BROMIDE AND ALBUTEROL SULFATE 3 ML: .5; 3 SOLUTION RESPIRATORY (INHALATION) at 13:08

## 2024-08-16 RX ADMIN — HEPARIN SODIUM 5000 UNITS: 5000 INJECTION INTRAVENOUS; SUBCUTANEOUS at 08:03

## 2024-08-16 NOTE — OUTREACH NOTE
Prep Survey      Flowsheet Row Responses   [facility] patient discharged from? [facility]   Is LACE score < 7 ? No   Eligibility [facility]   Date of Admission 08/14/24   Date of Discharge 08/16/24   Discharge Disposition Home or Self Care   Discharge diagnosis COPD exacerbation   Does the patient have one of the following disease processes/diagnoses(primary or secondary)? COPD   Prep survey completed? Yes            Krissy MILLER - Registered Nurse

## 2024-08-16 NOTE — PLAN OF CARE
Goal Outcome Evaluation:              Outcome Evaluation: Patient A&Ox4 able to make needs known. Remains on 2 liters NC. Continues on steroids to aid in breathing. Denies pain or discomfort at this time. Will continue with plan of care

## 2024-08-16 NOTE — PLAN OF CARE
Goal Outcome Evaluation:  Plan of Care Reviewed With: patient        Progress: no change  Outcome Evaluation: Patient is AO x4, able to make needs known. Patient is currently on 2L NC, no signs of respiratory distress noted. Patient refused to be sent home with oxygen. Patient stated he only lived a block away and that he didn't need it for the ride home. MD aware. No complaints of pain or discomfort at this time. No acute changes throughout shift. Continue with current plan of care. Plan to discharge home today.

## 2024-08-16 NOTE — PROGRESS NOTES
Pulmonary / Critical Care Progress Note      Patient Name: Dilip Faulkner  : 1950  MRN: [id number]  Primary Care Physician:  Jackeline Jaime, MONE  Date of admission: 2024    Subjective   Subjective   Follow-up for severe breathlessness, COPD exacerbation    No acute events overnight.    This morning,  On room air  Sitting on edge of bed getting ready to work with physical therapy  Reports feeling well today  Denies any chest pain or chest tightness  Denies cough  No fever or chills  Eager to go      Objective   Objective     Vitals:   Temp:  [97.3 °F (36.3 °C)-98.6 °F (37 °C)] 97.7 °F (36.5 °C)  Heart Rate:  [83-96] 92  Resp:  [18-22] 20  BP: (104-152)/(66-75) 104/73  Flow (L/min):  [2] 2  Physical Exam   Vital Signs Reviewed   General: WDWN, Alert, NAD.  Chronically ill-appearing male, sitting on edge of bed  HEENT:  PERRL, EOMI.  OP, nares clear, no sinus tenderness  Neck:  Supple, no JVD, no thyromegaly  Chest:  good aeration, occasional bilateral rhonchi, no work of breathing noted  CV: RRR, no MGR, pulses 2+, equal.  Abd:  Soft, NT, ND, + BS, no HSM, obese  EXT:  no clubbing, no cyanosis, no edema  Neuro:  A&Ox3, CN grossly intact, no focal deficits.  Skin: No rashes or lesions noted      Result Review    Result Review:  I have personally reviewed the results from the time of this admission to 2024 10:37 EDT and agree with these findings:  [x]  Laboratory  [x]  Microbiology  [x]  Radiology  []  EKG/Telemetry   []  Cardiology/Vascular   []  Pathology  []  Old records  []  Other:  Most notable findings include:         Lab 24  0545 08/15/24  0656 08/15/24  0021   WBC 16.48* 7.64 8.37   HEMOGLOBIN 11.6* 12.1* 12.1*   HEMATOCRIT 38.4 43.4 44.4   PLATELETS 145 148 161   SODIUM 137 135* 141   POTASSIUM 4.7 4.7 4.7   CHLORIDE 101 100 104   CO2 22.2 21.2* 28.1   BUN 24* 22 20   CREATININE 1.33* 1.05 1.01   GLUCOSE 210* 194* 122*   CALCIUM 8.9 8.3* 8.2*   PHOSPHORUS  --  2.5  --    TOTAL  PROTEIN  --   --  6.8   ALBUMIN  --   --  3.6   GLOBULIN  --   --  3.2       Assessment & Plan   Assessment / Plan     Active Hospital Problems:  Active Hospital Problems    Diagnosis     **COPD exacerbation     Respiratory failure with hypoxia and hypercapnia     Hyperlipidemia     Essential hypertension     Type 2 diabetes mellitus with hyperglycemia, with long-term current use of insulin    Impression:  Acute on chronic respiratory failure with hypoxia and hypercapnia  COPD exacerbation  Hypertension  Diabetes mellitus  Tobacco abuse in remission    Plan:  -On room air.  Maintain SpO2 greater than 90%  -RT  on board we appreciate assistance  -Patient with high absolute eosinophil count.  Will check IgE levels.  Patient may benefit from Dupixent outpatient  -Continue Brovana, Pulmicort, DuoNebs  -Continue prednisone 40 mg oral daily for total of 5 days  -Continue doxycycline for COPD exacerbation for a total of 5 days  -Continue bronchopulmonary bronchodilator protocols.  Encourage I-S and flutter  -Encourage activity as tolerated  -Patient likely home in the next day or so  -Encouraged to use his BiPAP and compliance     Mr. Faulkner needs noninvasive ventilation for acute on chronic hypercapnic respiratory failure (CO2 56.6) secondary to COPD. Patient was started on bilevel in the home in November of 2022 and has failed treatment requiring multiple admissions/ED visits since then. Bilevel ST, ASV and Bilevel with VAPS have also been considered and ruled out due to the severity of patient's disease. The patient requires a volume assured mode of ventilation that is not available on less costly bilevel devices. A noninvasive ventilator is necessary to deliver targeted tidal/minute ventilation and provide a back up rate to ensure adequate ventilation and prevent hypoventilation. Auto adjusting EPAP is essential to treat underlying NED. Alarms and battery backup (only available on noninvasive ventilator)  are necessary for patient safety. A noninvasive mechanical ventilator is imperative to decrease hospitalization and improve survival. Failure to follow recommended guidelines will place the patient at increased risk of unplanned expensive rehospitalization, acute on chronic respiratory failure and mortality.       VTE Prophylaxis:  Pharmacologic VTE prophylaxis orders are present.    CODE STATUS:   Code Status (Patient has no pulse and is not breathing): CPR (Attempt to Resuscitate)  Medical Interventions (Patient has pulse or is breathing): Full Support    Labs, managing, and medications personally reviewed  Discussed with primary and patient    Electronically signed by MONE Raines, 08/16/24, 10:37 AM EDT.  This visit was performed by both a physician and an APC.  I personally evaluated and examined the patient.  I performed all aspects of MDM as documented.  I have reviewed and confirmed the accuracy of the patient's history as documented in this note and I have reexamined the patient and the results are consistent with the previously documented exam.  I have updated the documentation as necessary. Electronically signed by Chandu Vail MD, 08/16/24, 11:40 AM EDT.

## 2024-08-16 NOTE — CASE MANAGEMENT/SOCIAL WORK
"The importance of taking daily maintenance was stressed to patient. Patient was advised to take his Breztri 2puffs twice daily every day and use rescue inhaler or nebulizer every 4 hours as needed.  Patient has refills available at his pharmacy and was advised to refill when needed. Mr. Faulkner states \"I know it\".    "

## 2024-08-16 NOTE — PLAN OF CARE
Goal Outcome Evaluation:  Plan of Care Reviewed With: patient           Outcome Evaluation: Pt demonstrates independence with bed mobility, transfers, and ambulation. Pt does not need inpatient PT services at this time and can return home upon discharge.      Anticipated Discharge Disposition (PT): home

## 2024-08-16 NOTE — DISCHARGE SUMMARY
[facility]         HOSPITALIST  DISCHARGE SUMMARY    Patient Name: Dilip Faulkner  : 1950  MRN: [id number]    Date of Admission: 2024  Date of Discharge:  2024    Primary Care Physician: Jackeline Jaime APRN    Consults       Date and Time Order Name Status Description    8/15/2024  6:57 AM Inpatient Pulmonology Consult      8/15/2024  1:03 AM Hospitalist (on-call MD unless specified)              Active and Resolved Hospital Problems:    COPD exacerbation    Essential hypertension    Hyperlipidemia    Type 2 diabetes mellitus with hyperglycemia, with long-term current use of insulin    Respiratory failure with hypoxia and hypercapnia      Active Hospital Problems    Diagnosis POA   • **COPD exacerbation [J44.1] Unknown   • Respiratory failure with hypoxia and hypercapnia [J96.91, J96.92] Yes   • Hyperlipidemia [E78.5] Yes   • Essential hypertension [I10] Yes   • Type 2 diabetes mellitus with hyperglycemia, with long-term current use of insulin [E11.65, Z79.4] Not Applicable      Resolved Hospital Problems   No resolved problems to display.       Hospital Course     Hospital Course:  74-year-old male with history of COPD, asthma, diabetes, hypertension, chronic hypoxemia requiring home oxygen, ex-smoker, medical noncompliance, dyslipidemia, GERD without esophagitis, hospitalized on 8/15/2024 for chief complaint of shortness of breath symptoms, found to be in respiratory distress by EMS, hypoxemic on room air, placed on CPAP, hospitalized for further monitoring and management, pulmonary consulted, patient's breathing rapidly and unexpectedly improved by hospital day 2, his white blood cell count did increase to 16,000 due to steroids.  As of 2024 he felt well, discharged in hemodynamically stable addition to continue steroids, neb treatments, and antibiotics to follow-up with the COPD clinic as outpatient.  High risk for readmission due to medical noncompliance.      Day of  Discharge     Vital Signs:  Temp:  [97.7 °F (36.5 °C)-98.6 °F (37 °C)] 97.7 °F (36.5 °C)  Heart Rate:  [83-96] 86  Resp:  [18-22] 20  BP: (104-152)/(66-75) 125/70  Flow (L/min):  [2] 2  Review of systems:  All systems reviewed negative except for shortness of breath with exertional activity    Physical exam:   Constitutional: Awake, alert, no acute distress   Eyes: Pupils equal, sclerae anicteric, no conjunctival injection   HENT: NCAT, mucous membranes moist   Neck: Supple, no thyromegaly, no lymphadenopathy, trachea midline   Respiratory: Diminished to auscultation bilaterally, nonlabored respirations    Cardiovascular: RRR, no murmurs, rubs, or gallops, palpable pedal pulses bilaterally   Gastrointestinal: Positive bowel sounds, soft, nontender, nondistended   Musculoskeletal: No bilateral ankle edema, no clubbing or cyanosis to extremities   Psychiatric: Appropriate affect, cooperative   Neurologic: Oriented x 3, strength symmetric in all extremities, Cranial Nerves grossly intact to confrontation, speech clear   Skin: No rashes visible on exposed skin      Discharge Details        Discharge Medications        New Medications        Instructions Start Date   doxycycline 100 MG capsule  Commonly known as: VIBRAMYCIN   100 mg, Oral, 2 Times Daily      predniSONE 20 MG tablet  Commonly known as: DELTASONE   40 mg, Oral, Daily With Breakfast   Start Date: August 17, 2024            Changes to Medications        Instructions Start Date   arformoterol 15 MCG/2ML nebulizer solution  Commonly known as: BROVANA  What changed: See the new instructions.   USE 1 VIAL  IN  NEBULIZER TWICE  DAILY - Morning and Evening      Tresiba FlexTouch 100 UNIT/ML solution pen-injector injection  Generic drug: insulin degludec  What changed: See the new instructions.   ADMINISTER 20 UNITS UNDER THE SKIN DAILY AS DIRECTED             Continue These Medications        Instructions Start Date   Airsupra 90-80 MCG/ACT aerosol  Generic drug:  Albuterol-Budesonide   2 puffs, Inhalation, Every 4 Hours PRN      aspirin 81 MG chewable tablet   81 mg, Oral, Daily      atorvastatin 10 MG tablet  Commonly known as: LIPITOR   10 mg, Oral, Daily      Breztri Aerosphere 160-9-4.8 MCG/ACT aerosol inhaler  Generic drug: Budeson-Glycopyrrol-Formoterol   2 puffs, Inhalation, 2 Times Daily      Jardiance 10 MG tablet tablet  Generic drug: empagliflozin   10 mg, Oral, Daily      metFORMIN 500 MG tablet  Commonly known as: GLUCOPHAGE   500 mg, Oral, 2 Times Daily With Meals      omeprazole 20 MG capsule  Commonly known as: priLOSEC   20 mg, Oral, Daily               No Known Allergies    Discharge Disposition:  Home or Self Care    Diet:  Hospital:  Diet Order   Procedures   • Diet: Cardiac, Diabetic; Healthy Heart (2-3 Na+); Consistent Carbohydrate; Fluid Consistency: Thin (IDDSI 0)       Discharge Activity: as tolerates      CODE STATUS:  Code Status and Medical Interventions: CPR (Attempt to Resuscitate); Full Support   Ordered at: 08/15/24 0450     Code Status (Patient has no pulse and is not breathing):    CPR (Attempt to Resuscitate)     Medical Interventions (Patient has pulse or is breathing):    Full Support         Future Appointments   Date Time Provider Department Center   8/26/2024 11:30 AM Vivien Jacobs APRN [facility] COPD [facility]       Additional Instructions for the Follow-ups that You Need to Schedule       Discharge Follow-up with PCP   As directed       Currently Documented PCP:    Jackeline Jaime APRN    PCP Phone Number:    [phone]     Follow Up Details: 3 to 7 days                Pertinent  and/or Most Recent Results     PROCEDURES:   XR Chest 1 View    Result Date: 8/15/2024  XR CHEST 1 VW-  Date of exam: 8/15/2024, 12:40 A.M.  Indications: Dyspnea; SOA/SOB/shortness of air/shortness of breath.  Comparisons: 6/8/2024; 3/25/2024; 6/7/2023.  FINDINGS: A single AP (or PA) upright portable chest radiograph was performed. No cardiac enlargement is seen.  No acute infiltrate is appreciated. No pleural effusion or pneumothorax is identified. Chronic blunting involves the left lateral costophrenic sulcus with chronic volume loss on the left, as before. External artifacts obscure detail. There is emphysematous contour of the lungs. The thoracic aorta is atherosclerotic. No significant interval change is seen since the prior study (or studies).       No acute infiltrate is appreciated.    Portions of this note were completed with a voice recognition program.   Electronically Signed By-Aaron Rodriguez MD On:8/15/2024 12:51 AM         LAB RESULTS:      Lab 08/16/24  0545 08/15/24  0656 08/15/24  0021   WBC 16.48* 7.64 8.37   HEMOGLOBIN 11.6* 12.1* 12.1*   HEMATOCRIT 38.4 43.4 44.4   PLATELETS 145 148 161   NEUTROS ABS 14.87* 6.86 5.08   IMMATURE GRANS (ABS) 0.12* 0.02 0.03   LYMPHS ABS 0.88 0.67* 1.88   MONOS ABS 0.59 0.07* 0.68   EOS ABS 0.00 0.01 0.65*   MCV 85.7 86.3 83.6   SED RATE  --  38*  --    CRP  --  <0.30  --    PROCALCITONIN  --  0.05  --    LACTATE  --   --  1.1   PROTIME  --   --  13.0         Lab 08/16/24  0545 08/15/24  0656 08/15/24  0021   SODIUM 137 135* 141   POTASSIUM 4.7 4.7 4.7   CHLORIDE 101 100 104   CO2 22.2 21.2* 28.1   ANION GAP 13.8 13.8 8.9   BUN 24* 22 20   CREATININE 1.33* 1.05 1.01   EGFR 55.7* 74.0 77.6   GLUCOSE 210* 194* 122*   CALCIUM 8.9 8.3* 8.2*   MAGNESIUM  --  2.2  --    PHOSPHORUS  --  2.5  --          Lab 08/15/24  0021   TOTAL PROTEIN 6.8   ALBUMIN 3.6   GLOBULIN 3.2   ALT (SGPT) 11   AST (SGOT) 19   BILIRUBIN 0.3   ALK PHOS 89         Lab 08/15/24  0021   PROBNP 117.9   HSTROP T 26*   PROTIME 13.0   INR 0.96                 Lab 08/14/24  2359   PH, ARTERIAL 7.336*   PCO2, ARTERIAL 56.6*   PO2 ART 99.2   O2 SATURATION ART 97.0   FIO2 40   HCO3 ART 30.3*   BASE EXCESS ART 2.9*     Brief Urine Lab Results  (Last result in the past 365 days)        Color   Clarity   Blood   Leuk Est   Nitrite   Protein   CREAT   Urine HCG         03/25/24 1742 Yellow   Clear   Negative   Negative   Negative   Negative                 Microbiology Results (last 10 days)       Procedure Component Value - Date/Time    Respiratory Panel PCR w/COVID-19(SARS-CoV-2) GUERA/ANIL/KYLEE/PAD/COR/ANA In-House, NP Swab in UTM/VTM, 2 HR TAT - Swab, Nasopharynx [[id number]]  (Normal) Collected: 08/15/24 1210    Lab Status: Final result Specimen: Swab from Nasopharynx Updated: 08/15/24 1317     ADENOVIRUS, PCR Not Detected     Coronavirus 229E Not Detected     Coronavirus HKU1 Not Detected     Coronavirus NL63 Not Detected     Coronavirus OC43 Not Detected     COVID19 Not Detected     Human Metapneumovirus Not Detected     Human Rhinovirus/Enterovirus Not Detected     Influenza A PCR Not Detected     Influenza B PCR Not Detected     Parainfluenza Virus 1 Not Detected     Parainfluenza Virus 2 Not Detected     Parainfluenza Virus 3 Not Detected     Parainfluenza Virus 4 Not Detected     RSV, PCR Not Detected     Bordetella pertussis pcr Not Detected     Bordetella parapertussis PCR Not Detected     Chlamydophila pneumoniae PCR Not Detected     Mycoplasma pneumo by PCR Not Detected    Narrative:      In the setting of a positive respiratory panel with a viral infection PLUS a negative procalcitonin without other underlying concern for bacterial infection, consider observing off antibiotics or discontinuation of antibiotics and continue supportive care. If the respiratory panel is positive for atypical bacterial infection (Bordetella pertussis, Chlamydophila pneumoniae, or Mycoplasma pneumoniae), consider antibiotic de-escalation to target atypical bacterial infection.    Blood Culture - Blood, Arm, Left [[id number]]  (Normal) Collected: 08/15/24 0115    Lab Status: Preliminary result Specimen: Blood from Arm, Left Updated: 08/16/24 0130     Blood Culture No growth at 24 hours    Blood Culture - Blood, Arm, Right [[id number]]  (Normal) Collected: 08/15/24 0021    Lab Status:  Preliminary result Specimen: Blood from Arm, Right Updated: 08/16/24 0045     Blood Culture No growth at 24 hours    COVID-19, FLU A/B, RSV PCR 1 HR TAT - Swab, Nasopharynx [[id number]]  (Normal) Collected: 08/15/24 0005    Lab Status: Final result Specimen: Swab from Nasopharynx Updated: 08/15/24 0128     COVID19 Not Detected     Influenza A PCR Not Detected     Influenza B PCR Not Detected     RSV, PCR Not Detected    Narrative:      Fact sheet for providers: https://www.fda.gov/media/033950/download    Fact sheet for patients: https://www.fda.gov/media/647767/download    Test performed by PCR.            XR Chest 1 View    Result Date: 8/15/2024  Impression: No acute infiltrate is appreciated.    Portions of this note were completed with a voice recognition program.   Electronically Signed By-Aaron Rodriguez MD On:8/15/2024 12:51 AM               Results for orders placed during the hospital encounter of 03/28/23    Adult Transthoracic Echo Complete W/ Cont if Necessary Per Protocol    Interpretation Summary  •  Left ventricular ejection fraction appears to be 56 - 60%.  •  Left ventricular diastolic function is consistent with (grade I) impaired relaxation and age.  •  No significant valvular disease.  •  Mild left atrial enlargement.      Labs Pending at Discharge:  Pending Labs       Order Current Status    IgE In process    Blood Culture - Blood, Arm, Left Preliminary result    Blood Culture - Blood, Arm, Right Preliminary result              Time spent on Discharge including face to face service:  33 minutes    Electronically signed by Oralia Hardy MD, 08/16/24, 12:51 PM EDT.    Portions of this documentation were transcribed electronically from a voice recognition software.  I confirm all data accurately represents the service(s) I performed at today's visit.

## 2024-08-16 NOTE — THERAPY EVALUATION
Acute Care - Physical Therapy Initial Evaluation   [facility]     Patient Name: Dilip Faulkner  : 1950  MRN: [id number]  Today's Date: 2024      Visit Dx:     ICD-10-CM ICD-9-CM   1. Acute exacerbation of chronic obstructive pulmonary disease (COPD)  J44.1 491.21   2. Acute respiratory failure with hypoxia  J96.01 518.81   3. COPD exacerbation  J44.1 491.21   4. Difficulty walking  R26.2 719.7     Patient Active Problem List   Diagnosis    Chronic obstructive pulmonary disease with acute exacerbation    Type 2 diabetes mellitus    Essential hypertension    Hyperlipidemia    Cough    Pneumonia due to infectious organism    COPD with acute exacerbation    Obesity (BMI 30-39.9)    Right bundle branch block    Acquired ptosis of eyelid, bilateral    Type 2 diabetes mellitus with hyperglycemia, with long-term current use of insulin    Acute exacerbation of chronic obstructive pulmonary disease (COPD)    Respiratory failure with hypoxia and hypercapnia    Gastroesophageal reflux disease without esophagitis    COPD (chronic obstructive pulmonary disease)    Noncompliance with CPAP treatment    Unstable angina    Aspiration pneumonia of left lower lobe due to gastric secretions    PSVT (paroxysmal supraventricular tachycardia)    COPD exacerbation     Past Medical History:   Diagnosis Date    Asthma     COPD (chronic obstructive pulmonary disease)     Diabetes mellitus     Ex-smoker     QUIT     Hernia, umbilical     Hypertension     On home O2     REPORTS WEARING 2L/NC PRN SOA     Past Surgical History:   Procedure Laterality Date    ABDOMINAL SURGERY       PT Assessment (Last 12 Hours)       PT Evaluation and Treatment       Row Name 24 1213          Physical Therapy Time and Intention    Subjective Information no complaints  -DP     Document Type evaluation  -DP     Mode of Treatment individual therapy;physical therapy  -DP     Patient Effort good  -DP       Row Name 24 1213          General  Information    Patient Profile Reviewed yes  -DP     Patient Observations alert;cooperative;agree to therapy  -DP     Prior Level of Function independent:;gait;transfer;bed mobility;ADL's  -DP     Equipment Currently Used at Home cane, straight;other (see comments)  PRN for community ambulation  -DP     Barriers to Rehab none identified  -DP       Row Name 08/16/24 1213          Living Environment    Current Living Arrangements apartment  -DP     Home Accessibility stairs to enter home  -DP     People in Home sibling(s)  -DP     Name(s) of People in Home lives with sisters  -DP     Primary Care Provided by self  -DP       Row Name 08/16/24 1213          Home Main Entrance    Number of Stairs, Main Entrance none  -DP       Row Name 08/16/24 1213          Pain    Pretreatment Pain Rating 0/10 - no pain  -DP       Row Name 08/16/24 1213          Range of Motion (ROM)    Range of Motion bilateral lower extremities;ROM is WFL  -DP       Row Name 08/16/24 1213          Strength (Manual Muscle Testing)    Strength (Manual Muscle Testing) bilateral lower extremities  5/5  -DP       Row Name 08/16/24 1213          Bed Mobility    Bed Mobility supine-sit  -DP     Supine-Sit Logan (Bed Mobility) independent  -DP     Assistive Device (Bed Mobility) head of bed elevated  -DP       Row Name 08/16/24 1213          Transfers    Transfers sit-stand transfer  -DP       Row Name 08/16/24 1213          Sit-Stand Transfer    Sit-Stand Logan (Transfers) independent  -DP     Assistive Device (Sit-Stand Transfers) other (see comments)  No AD  -DP       Row Name 08/16/24 1213          Gait/Stairs (Locomotion)    Gait/Stairs Locomotion gait/ambulation independence  -DP     Logan Level (Gait) independent  -DP     Assistive Device (Gait) other (see comments)  no AD  -DP     Patient was able to Ambulate yes  -DP     Distance in Feet (Gait) 250  -DP     Pattern (Gait) step-through  -DP       Row Name 08/16/24 1213           Balance    Balance Assessment standing dynamic balance  -DP     Dynamic Standing Balance independent  -DP     Position/Device Used, Standing Balance unsupported  -DP       Row Name 08/16/24 1213          Plan of Care Review    Plan of Care Reviewed With patient  -DP     Outcome Evaluation Pt demonstrates independence with bed mobility, transfers, and ambulation. Pt does not need inpatient PT services at this time and can return home upon discharge.  -DP       Row Name 08/16/24 1213          Positioning and Restraints    Pre-Treatment Position in bed  -DP     Post Treatment Position bed  -DP     In Bed supine;call light within reach;encouraged to call for assist  -DP       Row Name 08/16/24 1213          Therapy Assessment/Plan (PT)    Rehab Potential (PT) good, to achieve stated therapy goals  -DP     Therapy Frequency (PT) evaluation only  -DP       Row Name 08/16/24 1213          PT Evaluation Complexity    History, PT Evaluation Complexity no personal factors and/or comorbidities  -DP     Examination of Body Systems (PT Eval Complexity) total of 3 or more elements  -DP     Clinical Presentation (PT Evaluation Complexity) stable  -DP     Clinical Decision Making (PT Evaluation Complexity) low complexity  -DP     Overall Complexity (PT Evaluation Complexity) low complexity  -DP       Row Name 08/16/24 1213          Therapy Plan Review/Discharge Plan (PT)    Therapy Plan Review (PT) evaluation/treatment results reviewed;patient  -DP       Row Name 08/16/24 1213          Physical Therapy Goals    Problem Specific Goal Selection (PT) problem specific goal 1, PT  -DP       Row Name 08/16/24 1213          Problem Specific Goal 1 (PT)    Problem Specific Goal 1 (PT) Pt will be able to complete initial PT evaluation.  -DP     Time Frame (Problem Specific Goal 1, PT) 1 day  -DP     Progress/Outcome (Problem Specific Goal 1, PT) goal met  -DP               User Key  (r) = Recorded By, (t) = Taken By, (c) = Cosigned By       Initials Name Provider Type    Elza Nicole PT Physical Therapist                      PT Recommendation and Plan  Anticipated Discharge Disposition (PT): home  Therapy Frequency (PT): evaluation only  Plan of Care Reviewed With: patient  Outcome Evaluation: Pt demonstrates independence with bed mobility, transfers, and ambulation. Pt does not need inpatient PT services at this time and can return home upon discharge.   Outcome Measures       Row Name 08/16/24 1236             How much help from another person do you currently need...    Turning from your back to your side while in flat bed without using bedrails? 4  -DP      Moving from lying on back to sitting on the side of a flat bed without bedrails? 4  -DP      Moving to and from a bed to a chair (including a wheelchair)? 4  -DP      Standing up from a chair using your arms (e.g., wheelchair, bedside chair)? 4  -DP      Climbing 3-5 steps with a railing? 3  -DP      To walk in hospital room? 4  -DP      AM-PAC 6 Clicks Score (PT) 23  -DP      Highest Level of Mobility Goal 7 --> Walk 25 feet or more  -DP                User Key  (r) = Recorded By, (t) = Taken By, (c) = Cosigned By      Initials Name Provider Type    Elza Nicole, PT Physical Therapist                     Time Calculation:    PT Charges       Row Name 08/16/24 1237             Time Calculation    PT Received On 08/16/24  -DP         Untimed Charges    PT Eval/Re-eval Minutes 40  -DP         Total Minutes    Untimed Charges Total Minutes 40  -DP       Total Minutes 40  -DP                User Key  (r) = Recorded By, (t) = Taken By, (c) = Cosigned By      Initials Name Provider Type    Elza Nicole PT Physical Therapist                  Therapy Charges for Today       Code Description Service Date Service Provider Modifiers Qty    89643693416 HC PT EVAL LOW COMPLEXITY 3 8/16/2024 Elza Miguel, PT GP 1            PT G-Codes  AM-PAC 6 Clicks Score (PT): 23    Elza Miguel  PT  8/16/2024

## 2024-08-19 ENCOUNTER — TRANSITIONAL CARE MANAGEMENT TELEPHONE ENCOUNTER (OUTPATIENT)
Dept: CALL CENTER | Facility: HOSPITAL | Age: 74
End: 2024-08-19
Payer: MEDICARE

## 2024-08-19 NOTE — OUTREACH NOTE
Call Center TCM Note      Flowsheet Row Responses   [facility] patient discharged from? [facility]   Does the patient have one of the following disease processes/diagnoses(primary or secondary)? COPD   TCM attempt successful? Yes  [VR for [place], ]   Call start time 1314   Unsuccessful attempts Attempt 1   Call end time 1318   Discharge diagnosis COPD exacerbation   Meds reviewed with patient/caregiver? Yes   Is the patient having any side effects they believe may be caused by any medication additions or changes? No   Does the patient have all medications ordered at discharge? Yes   Is the patient taking all medications as directed (includes completed medication regime)? Yes   Medication comments Taking atbs and steroids as ordered   Comments Hospital f/u 8/20/24@0945am, Pulmonary 8/26/24@1130am   Does the patient have an appointment with their PCP within 7-14 days of discharge? Yes   Has home health visited the patient within 72 hours of discharge? N/A   DME comments Uses O2 at 2 lpm per nc,  reports sats 95% on 2 lpm, encouraged compliance with bipap but pt reports he will NOT use.   Pulse Ox monitoring Intermittent   Pulse Ox device source Patient   O2 Sat: education provided Sat levels, When to seek care   Psychosocial issues? No   Did the patient receive a copy of their discharge instructions? Yes   Nursing interventions Reviewed instructions with patient   What is the patient's perception of their health status since discharge? Improving  [Reports he is doing better, taking meds, nebs and using O2 as ordered.  Pt discusses need for an inhaler and will discuss at f/u tomorrow.]   Nursing Interventions Nurse provided patient education   If the patient is a current smoker, are they able to teach back resources for cessation? Not a smoker   Is the patient/caregiver able to teach back the hierarchy of who to call/visit for symptoms/problems? PCP, Specialist, Home health nurse, Urgent Care, ED, 911 Yes    Is the patient able to teach back COPD zones? No   Patient reports what zone on this call? Green Zone   Green Zone Reports doing well, Breathing without shortness of breath   Green Zone interventions: Take daily medications, Use oxygen as prescribed   TCM call completed? Yes   Call end time 1318            Magaly Hoffman RN    8/19/2024, 13:21 EDT

## 2024-08-20 ENCOUNTER — OFFICE VISIT (OUTPATIENT)
Dept: FAMILY MEDICINE CLINIC | Facility: CLINIC | Age: 74
End: 2024-08-20
Payer: MEDICARE

## 2024-08-20 VITALS
HEIGHT: 73 IN | SYSTOLIC BLOOD PRESSURE: 119 MMHG | BODY MASS INDEX: 33.03 KG/M2 | DIASTOLIC BLOOD PRESSURE: 66 MMHG | HEART RATE: 73 BPM | OXYGEN SATURATION: 93 % | WEIGHT: 249.2 LBS

## 2024-08-20 DIAGNOSIS — Z79.4 TYPE 2 DIABETES MELLITUS WITH HYPERGLYCEMIA, WITH LONG-TERM CURRENT USE OF INSULIN: ICD-10-CM

## 2024-08-20 DIAGNOSIS — E11.65 TYPE 2 DIABETES MELLITUS WITH HYPERGLYCEMIA, WITH LONG-TERM CURRENT USE OF INSULIN: ICD-10-CM

## 2024-08-20 DIAGNOSIS — J44.1 COPD WITH EXACERBATION: Primary | ICD-10-CM

## 2024-08-20 LAB
BACTERIA SPEC AEROBE CULT: NORMAL
BACTERIA SPEC AEROBE CULT: NORMAL
EXPIRATION DATE: ABNORMAL
HBA1C MFR BLD: 7 % (ref 4.5–5.7)
IGE SERPL-ACNC: 78 IU/ML (ref 6–495)
Lab: ABNORMAL

## 2024-08-20 PROCEDURE — 3074F SYST BP LT 130 MM HG: CPT

## 2024-08-20 PROCEDURE — 3051F HG A1C>EQUAL 7.0%<8.0%: CPT

## 2024-08-20 PROCEDURE — 3078F DIAST BP <80 MM HG: CPT

## 2024-08-20 PROCEDURE — 83036 HEMOGLOBIN GLYCOSYLATED A1C: CPT

## 2024-08-20 PROCEDURE — 1126F AMNT PAIN NOTED NONE PRSNT: CPT

## 2024-08-20 PROCEDURE — 1111F DSCHRG MED/CURRENT MED MERGE: CPT

## 2024-08-20 PROCEDURE — 99495 TRANSJ CARE MGMT MOD F2F 14D: CPT

## 2024-08-20 RX ORDER — ALBUTEROL SULFATE AND BUDESONIDE 90; 80 UG/1; UG/1
2 AEROSOL, METERED RESPIRATORY (INHALATION) EVERY 4 HOURS PRN
Qty: 10.7 G | Refills: 1 | COMMUNITY
Start: 2024-08-20

## 2024-08-20 NOTE — PROGRESS NOTES
Transitional Care Follow Up Visit  Subjective     Dilip Faulkner is a 74 y.o. male who presents for a transitional care management visit.    Within 48 business hours after discharge our office contacted him via telephone to coordinate his care and needs.      I reviewed and discussed the details of that call along with the discharge summary, hospital problems, inpatient lab results, inpatient diagnostic studies, and consultation reports with Dilip.     Current outpatient and discharge medications have been reconciled for the patient.  Reviewed by: MONE Sheppard          8/16/2024     3:07 PM   Date of TCM Phone Call   [facility]   Date of Admission 8/14/2024   Date of Discharge 8/16/2024   Discharge Disposition Home or Self Care     Risk for Readmission (LACE) Score: 11 (8/16/2024  6:00 AM)      History of Present Illness  Patient is a 74-year-old male who presents today for hospital follow-up for COPD exacerbation.  He is not compliant with medications and CPAP.    Course During Hospital Stay:  74-year-old male with history of COPD, asthma, diabetes, hypertension, chronic hypoxemia requiring home oxygen, ex-smoker, medical noncompliance, dyslipidemia, GERD without esophagitis, hospitalized on 8/15/2024 for chief complaint of shortness of breath symptoms, found to be in respiratory distress by EMS, hypoxemic on room air, placed on CPAP, hospitalized for further monitoring and management, pulmonary consulted, patient's breathing rapidly and unexpectedly improved by hospital day 2, his white blood cell count did increase to 16,000 due to steroids.  As of 8/16/2024 he felt well, discharged in hemodynamically stable addition to continue steroids, neb treatments, and antibiotics to follow-up with the COPD clinic as outpatient.  High risk for readmission due to medical noncompliance.    He is on his last day of ABX and steroids., He needs refills on his rescue inhaler. He has an appt with  "pulmonology on 8/26/24.               The following portions of the patient's history were reviewed and updated as appropriate: allergies, current medications, past family history, past medical history, past social history, past surgical history, and problem list.    Review of Systems   Respiratory:  Positive for wheezing. Negative for shortness of breath.    All other systems reviewed and are negative.      Objective   /66 (BP Location: Left arm, Patient Position: Sitting, Cuff Size: Large Adult)   Pulse 73   Ht 185.4 cm (73\")   Wt 113 kg (249 lb 3.2 oz)   SpO2 93%   BMI 32.88 kg/m²   Physical Exam  Vitals reviewed.   Constitutional:       General: He is not in acute distress.  HENT:      Head: Normocephalic and atraumatic.   Eyes:      Conjunctiva/sclera: Conjunctivae normal.   Cardiovascular:      Rate and Rhythm: Normal rate and regular rhythm.      Heart sounds: Normal heart sounds.   Pulmonary:      Effort: Pulmonary effort is normal. No tachypnea, accessory muscle usage or respiratory distress.      Breath sounds: No decreased breath sounds.   Skin:     General: Skin is warm and dry.   Neurological:      Mental Status: He is alert and oriented to person, place, and time.   Psychiatric:         Mood and Affect: Mood normal.         Behavior: Behavior normal.         Thought Content: Thought content normal.         Judgment: Judgment normal.         Assessment & Plan   Problems Addressed this Visit       Type 2 diabetes mellitus with hyperglycemia, with long-term current use of insulin    Relevant Orders    POC Glycosylated Hemoglobin (Hb A1C) (Completed)     Other Visit Diagnoses       COPD with exacerbation    -  Primary    doing ok since dc home, finish all ABX and steroids, follow up with pulmonology for management    Relevant Medications    Albuterol-Budesonide (Airsupra) 90-80 MCG/ACT aerosol          Diagnoses         Codes Comments    COPD with exacerbation    -  Primary ICD-10-CM: " J44.1  ICD-9-CM: 491.21 doing ok since dc home, finish all ABX and steroids, follow up with pulmonology for management    Type 2 diabetes mellitus with hyperglycemia, with long-term current use of insulin     ICD-10-CM: E11.65, Z79.4  ICD-9-CM: 250.00, 790.29, V58.67 updated a1c in office today, stable at 7.0, continue metformin and diabetic diet

## 2024-08-26 NOTE — PROGRESS NOTES
Primary Care Provider  Jackeline Jaime APRN   Referring Provider  Oralia FRANCE MD    Patient Complaint  Hospital Follow Up Visit (-)      Subjective       History of Presenting Illness  Dilip Faulkner is a pleasant 74 y.o. male who presents to [facility] for hospital follow-up.  Patient is here with his spouse.  Patient was at [facility]  through 2024 for COPD exacerbation.  Hospital course includes the followin-year-old male with history of COPD, asthma, diabetes, hypertension, chronic hypoxemia requiring home oxygen, ex-smoker, medical noncompliance, dyslipidemia, GERD without esophagitis, hospitalized on 8/15/2024 for chief complaint of shortness of breath symptoms, found to be in respiratory distress by EMS, hypoxemic on room air, placed on CPAP, hospitalized for further monitoring and management, pulmonary consulted, patient's breathing rapidly and unexpectedly improved by hospital day 2, his white blood cell count did increase to 16,000 due to steroids. As of 2024 he felt well, discharged in hemodynamically stable addition to continue steroids, neb treatments, and antibiotics to follow-up with the COPD clinic as outpatient. High risk for readmission due to medical noncompliance.     Patient does have chronic respiratory failure with hypoxia and hypercapnia requiring BiPAP however he does not want to use it.  He states he does have it at home but he has not used it he states he cannot stand the mask on his face and he will not use it.  He also has O2 at 2 L supplemental oxygen however he does not want to carry the POC.  He does continue on Breztri and has also been using Brovana.  He has Airsupra but ran out of it and has been using Primatene OTC.  Patient does not have a rescue nebulizer at home.  He does have shortness of air with exertional activity of moderate severity but improves with rest.  He does not want  to use his oxygen when this happens.  He does use his Airsupra for shortness of air and this works well for him he states.  Patient has been getting his Airsupra and Breztri from his PCP.    At present patient denies coughing, wheezing, headaches, chest pain, weight loss or hemoptysis. Patient denies fevers, chills and night sweats. Dilip Faulkner is able to perform ADLs without difficulties.He is a former smoker, quit 2 years ago, 84 pack years. He does use chewing tobacco.     I have personally reviewed the review of systems, past family, social, medical and surgical histories; and agree with their findings.      Review of Systems   Constitutional: Negative.    HENT: Negative.     Respiratory:  Positive for shortness of breath.    Cardiovascular: Negative.    Musculoskeletal: Negative.    Neurological: Negative.    Psychiatric/Behavioral: Negative.           Family History   Problem Relation Age of Onset    Asthma Father     Asthma Sister     Asthma Brother     Cancer Maternal Aunt         Social History     Socioeconomic History    Marital status: Single   Tobacco Use    Smoking status: Former     Current packs/day: 0.00     Average packs/day: 1.5 packs/day for 56.2 years (84.3 ttl pk-yrs)     Types: Cigarettes     Start date: 1965     Quit date: 3/19/2021     Years since quitting: 3.4     Passive exposure: Past    Smokeless tobacco: Current     Types: Chew   Vaping Use    Vaping status: Never Used   Substance and Sexual Activity    Alcohol use: Never    Drug use: Never    Sexual activity: Defer        Past Medical History:   Diagnosis Date    Asthma     COPD (chronic obstructive pulmonary disease)     Diabetes mellitus     Ex-smoker     QUIT 2021    Hernia, umbilical     Hypertension     On home O2     REPORTS WEARING 2L/NC PRN SOA        Immunization History   Administered Date(s) Administered    Fluzone High-Dose 65+yrs 10/08/2022, 11/02/2023       No Known Allergies       Current Outpatient Medications:      "Albuterol-Budesonide (Airsupra) 90-80 MCG/ACT aerosol, Inhale 2 puffs Every 4 (Four) Hours As Needed (wheezing, soa)., Disp: 10.7 g, Rfl: 1    aspirin 81 MG chewable tablet, CHEW AND SWALLOW 1 TABLET BY MOUTH DAILY, Disp: 90 tablet, Rfl: 1    atorvastatin (LIPITOR) 10 MG tablet, TAKE 1 TABLET BY MOUTH DAILY, Disp: 90 tablet, Rfl: 1    Budeson-Glycopyrrol-Formoterol (Breztri Aerosphere) 160-9-4.8 MCG/ACT aerosol inhaler, Inhale 2 puffs 2 (Two) Times a Day., Disp: 10.7 g, Rfl: 1    Jardiance 10 MG tablet tablet, TAKE 1 TABLET BY MOUTH DAILY, Disp: 90 tablet, Rfl: 0    metFORMIN (GLUCOPHAGE) 500 MG tablet, TAKE 1 TABLET BY MOUTH TWICE DAILY WITH MEALS, Disp: 180 tablet, Rfl: 0    omeprazole (priLOSEC) 20 MG capsule, TAKE 1 CAPSULE BY MOUTH DAILY, Disp: 90 capsule, Rfl: 1    Tresiba FlexTouch 100 UNIT/ML solution pen-injector injection, ADMINISTER 20 UNITS UNDER THE SKIN DAILY AS DIRECTED (Patient taking differently: Inject 20 Units under the skin into the appropriate area as directed Daily.), Disp: 6 mL, Rfl: 2    albuterol (PROVENTIL) (2.5 MG/3ML) 0.083% nebulizer solution, Take 2.5 mg by nebulization Every 4 (Four) Hours As Needed for Wheezing or Shortness of Air., Disp: 360 mL, Rfl: 3         Vital Signs   /59 (BP Location: Right arm, Patient Position: Sitting, Cuff Size: Large Adult)   Pulse 66   Temp 97.9 °F (36.6 °C)   Resp 16   Ht 185.4 cm (73\")   Wt 112 kg (246 lb)   SpO2 92% Comment: room air  BMI 32.46 kg/m²       Objective     Physical Exam  Vitals reviewed.   Constitutional:       General: He is not in acute distress.     Appearance: Normal appearance. He is obese. He is not ill-appearing.   HENT:      Head: Normocephalic and atraumatic.      Nose: Nose normal.      Mouth/Throat:      Mouth: Mucous membranes are moist.      Pharynx: Oropharynx is clear.   Eyes:      Extraocular Movements: Extraocular movements intact.      Conjunctiva/sclera: Conjunctivae normal.      Pupils: Pupils are equal, " round, and reactive to light.   Cardiovascular:      Rate and Rhythm: Normal rate and regular rhythm.      Pulses: Normal pulses.      Heart sounds: Normal heart sounds.   Pulmonary:      Effort: Pulmonary effort is normal. No respiratory distress.      Breath sounds: Normal breath sounds. No stridor. No wheezing, rhonchi or rales.   Abdominal:      General: Bowel sounds are normal.   Musculoskeletal:         General: Normal range of motion.      Cervical back: Normal range of motion and neck supple.   Skin:     General: Skin is warm and dry.   Neurological:      Mental Status: He is alert and oriented to person, place, and time.   Psychiatric:         Behavior: Behavior normal.         Results Review  I have personally reviewed the prior office notes, hospital records, labs, and diagnostics.  XR Chest 1 View [[id number]] (Order [id number])  Order  Status: Final result     Study Notes     Tracey Root on 8/15/2024 12:45 AM EDT   Shortness of breath     Appointment Information    PACS Images     Radiology Images  Study Result    Narrative & Impression   XR CHEST 1 VW-     Date of exam: 8/15/2024, 12:40 A.M.     Indications: Dyspnea; SOA/SOB/shortness of air/shortness of breath.     Comparisons: 6/8/2024; 3/25/2024; 6/7/2023.     FINDINGS:  A single AP (or PA) upright portable chest radiograph was performed. No  cardiac enlargement is seen. No acute infiltrate is appreciated. No  pleural effusion or pneumothorax is identified. Chronic blunting  involves the left lateral costophrenic sulcus with chronic volume loss  on the left, as before. External artifacts obscure detail. There is  emphysematous contour of the lungs. The thoracic aorta is  atherosclerotic. No significant interval change is seen since the prior  study (or studies).        IMPRESSION:  No acute infiltrate is appreciated.           Portions of this note were completed with a voice recognition program.        Electronically Signed By-Aaron Rodriguez MD  On:8/15/2024 12:51 AM   CT Chest Without Contrast Diagnostic [[id number]] (Order [id number])  Order  Status: Final result     Appointment Information    PACS Images     Radiology Images  Study Result    Narrative & Impression   CT CHEST WO CONTRAST DIAGNOSTIC-     Date of Exam: 3/25/2024 9:52 PM     Indication: Better visualization (? of); J44.1-Chronic obstructive  pulmonary disease (COPD) with (acute) exacerbation; R09.02-Hypoxemia;  A41.9-Sepsis, unspecified organism.     Comparisons: 3/25/2024; 6/7/2023.     Technique:   Axial CT images were obtained of the chest without contrast  administration.  Reconstructed coronal and sagittal images were also  obtained. Automated exposure control and iterative construction methods  were used.     Findings:  Atelectasis and/or fibrosis is (are) seen bilaterally, especially in the  lung bases, greater on the left than the right. Probably no acute  infiltrate. Similar findings were seen previously. There is motion  artifact on the study. There is a small to moderate hiatal hernia.  Surgical clips are seen near the gastroesophageal junction, as before.  There is diffuse asymmetric thickening of the left-sided pleura, which  is predominantly fat attenuation, seen previously. To a lesser extent,  similar findings are seen contralaterally. No pleural effusion. No  pericardial effusion. No pneumothorax is seen. The pulmonary arteries  are prominent. Chronic pulmonary arterial hypertension may be present.  There are coronary artery calcifications. Atherosclerotic changes  involve the aortic arch. No thyroid enlargement. Probably no enlarged  mediastinal lymph nodes by nonenhanced CT. There may be a small to  moderate-sized scattered reactive lymph mediastinal nodes. Degenerative  changes are seen throughout the imaged spine. No acute fracture or  aggressive osseous lesion is seen. The motion artifact limits the  two-dimensional reformations. Grossly, no acute findings are seen  within  the partially imaged upper abdomen. No aneurysmal dilatation of the  thoracic aorta is suggested. Grossly, no suspicious lung nodule is seen.  The degree of motion artifact may obscure subtle findings. Mild  subglottic narrowing of the trachea is possible, as seen previously.     IMPRESSION:  No acute infiltrate is seen. Atelectasis and/or fibrosis is (are)  suggested bilaterally, greater on the left than the right. The findings  may be related to chronic interstitial lung disease. Please correlate  clinically.           Electronically Signed By-Aaron Rodriguez MD On:3/25/2024 10:52 PM     Assessment         Patient Active Problem List   Diagnosis    Chronic obstructive pulmonary disease with acute exacerbation    Type 2 diabetes mellitus    Essential hypertension    Hyperlipidemia    Cough    Pneumonia due to infectious organism    COPD with acute exacerbation    Obesity (BMI 30-39.9)    Right bundle branch block    Acquired ptosis of eyelid, bilateral    Type 2 diabetes mellitus with hyperglycemia, with long-term current use of insulin    Acute exacerbation of chronic obstructive pulmonary disease (COPD)    Respiratory failure with hypoxia and hypercapnia    Gastroesophageal reflux disease without esophagitis    COPD (chronic obstructive pulmonary disease)    Noncompliance with CPAP treatment    Unstable angina    Aspiration pneumonia of left lower lobe due to gastric secretions    PSVT (paroxysmal supraventricular tachycardia)    COPD exacerbation        Plan     Diagnoses and all orders for this visit:    1. Chronic obstructive pulmonary disease with acute exacerbation (Primary)  Comments:  Discontinuing Brovon at this time patient to use Breztri 2 puffs twice daily, Airsupra and albuterol nebulizer as needed for shortness of air or wheeze  Orders:  -     albuterol (PROVENTIL) (2.5 MG/3ML) 0.083% nebulizer solution; Take 2.5 mg by nebulization Every 4 (Four) Hours As Needed for Wheezing or Shortness of  Air.  Dispense: 360 mL; Refill: 3    2. Chronic respiratory failure with hypoxia and hypercapnia  Comments:  Stressed to patient portance of using BiPAP machine and oxygen patient refusing  Orders:  -     albuterol (PROVENTIL) (2.5 MG/3ML) 0.083% nebulizer solution; Take 2.5 mg by nebulization Every 4 (Four) Hours As Needed for Wheezing or Shortness of Air.  Dispense: 360 mL; Refill: 3    3. Pulmonary emphysema, unspecified emphysema type  -     albuterol (PROVENTIL) (2.5 MG/3ML) 0.083% nebulizer solution; Take 2.5 mg by nebulization Every 4 (Four) Hours As Needed for Wheezing or Shortness of Air.  Dispense: 360 mL; Refill: 3    4. Shortness of breath  Comments:  Sample of Airsupra provided to patient in office today albuterol nebulizer sent to pharmacy    5. Patient noncompliance  Comments:  Stressed to patient portance of compliance with treatment regimen for his chronic respiratory failure hypoxia hypercapnia with O2 use and use of BiPAP  Orders:  -     albuterol (PROVENTIL) (2.5 MG/3ML) 0.083% nebulizer solution; Take 2.5 mg by nebulization Every 4 (Four) Hours As Needed for Wheezing or Shortness of Air.  Dispense: 360 mL; Refill: 3    6. Tobacco abuse, in remission  -     albuterol (PROVENTIL) (2.5 MG/3ML) 0.083% nebulizer solution; Take 2.5 mg by nebulization Every 4 (Four) Hours As Needed for Wheezing or Shortness of Air.  Dispense: 360 mL; Refill: 3               Smoking status:  reports that he quit smoking about 3 years ago. His smoking use included cigarettes. He started smoking about 59 years ago. He has a 84.3 pack-year smoking history. He has been exposed to tobacco smoke. His smokeless tobacco use includes chew.  Vaccination status: Reviewed  Immunization History   Administered Date(s) Administered    Fluzone High-Dose 65+yrs 10/08/2022, 11/02/2023      Medications personally reviewed    Follow Up  Return in about 2 months (around 10/27/2024) for with Dr. Singh or Nelly Jaime NP.    Patient was given  instructions and counseling regarding his condition or for health maintenance advice. Please see specific information pulled into the AVS if appropriate.     I spent 25 minutes caring for Dilip Faulkner on this date of service. This time includes time spent by me in the following activities:preparing for the visit, reviewing tests, obtaining and/or reviewing a separately obtained history, performing a medically appropriate examination and/or evaluation, counseling and educating the patient/family/caregiver, ordering medications, tests, or procedures, documenting information in the medical record, independently interpreting results and communicating that information with the patient/family/caregiver and answered questions family members, discuss medications.

## 2024-08-27 ENCOUNTER — OFFICE VISIT (OUTPATIENT)
Dept: PULMONOLOGY | Facility: CLINIC | Age: 74
End: 2024-08-27
Payer: MEDICARE

## 2024-08-27 ENCOUNTER — READMISSION MANAGEMENT (OUTPATIENT)
Dept: CALL CENTER | Facility: HOSPITAL | Age: 74
End: 2024-08-27
Payer: MEDICARE

## 2024-08-27 VITALS
HEIGHT: 73 IN | SYSTOLIC BLOOD PRESSURE: 112 MMHG | OXYGEN SATURATION: 92 % | WEIGHT: 246 LBS | BODY MASS INDEX: 32.6 KG/M2 | HEART RATE: 66 BPM | DIASTOLIC BLOOD PRESSURE: 59 MMHG | TEMPERATURE: 97.9 F | RESPIRATION RATE: 16 BRPM

## 2024-08-27 DIAGNOSIS — J44.1 CHRONIC OBSTRUCTIVE PULMONARY DISEASE WITH ACUTE EXACERBATION: Primary | ICD-10-CM

## 2024-08-27 DIAGNOSIS — J96.12 CHRONIC RESPIRATORY FAILURE WITH HYPOXIA AND HYPERCAPNIA: ICD-10-CM

## 2024-08-27 DIAGNOSIS — F17.201 TOBACCO ABUSE, IN REMISSION: ICD-10-CM

## 2024-08-27 DIAGNOSIS — Z91.199 PATIENT NONCOMPLIANCE: ICD-10-CM

## 2024-08-27 DIAGNOSIS — J43.9 PULMONARY EMPHYSEMA, UNSPECIFIED EMPHYSEMA TYPE: ICD-10-CM

## 2024-08-27 DIAGNOSIS — R06.02 SHORTNESS OF BREATH: ICD-10-CM

## 2024-08-27 DIAGNOSIS — J96.11 CHRONIC RESPIRATORY FAILURE WITH HYPOXIA AND HYPERCAPNIA: ICD-10-CM

## 2024-08-27 PROCEDURE — 99214 OFFICE O/P EST MOD 30 MIN: CPT | Performed by: NURSE PRACTITIONER

## 2024-08-27 PROCEDURE — 1160F RVW MEDS BY RX/DR IN RCRD: CPT | Performed by: NURSE PRACTITIONER

## 2024-08-27 PROCEDURE — 3074F SYST BP LT 130 MM HG: CPT | Performed by: NURSE PRACTITIONER

## 2024-08-27 PROCEDURE — 3078F DIAST BP <80 MM HG: CPT | Performed by: NURSE PRACTITIONER

## 2024-08-27 PROCEDURE — 1159F MED LIST DOCD IN RCRD: CPT | Performed by: NURSE PRACTITIONER

## 2024-08-27 RX ORDER — ALBUTEROL SULFATE 0.83 MG/ML
2.5 SOLUTION RESPIRATORY (INHALATION) EVERY 4 HOURS PRN
Qty: 360 ML | Refills: 3 | Status: SHIPPED | OUTPATIENT
Start: 2024-08-27

## 2024-08-27 RX ORDER — ALBUTEROL SULFATE AND BUDESONIDE 90; 80 UG/1; UG/1
90 AEROSOL, METERED RESPIRATORY (INHALATION) EVERY 4 HOURS PRN
Qty: 1 G | Refills: 0 | COMMUNITY
Start: 2024-08-27 | End: 2024-08-28

## 2024-08-27 NOTE — OUTREACH NOTE
"      COPD/PN Week 2 Survey      Flowsheet Row Responses   [facility] patient discharged from? [facility]   Does the patient have one of the following disease processes/diagnoses(primary or secondary)? COPD   Week 2 attempt successful? Yes   Call start time 0946   Call end time 0947   Discharge diagnosis COPD exacerbation   Meds reviewed with patient/caregiver? Yes   Is the patient taking all medications as directed (includes completed medication regime)? Yes   Does the patient have a primary care provider?  Yes   Has the patient kept scheduled appointments due by today? Yes  [Pulm apt 8/27/24,  PCP apt since hosp dc]   Pulse Ox monitoring Intermittent   Pulse Ox device source Patient   O2 Sat comments Pulse ox reads \"in the 90's\"   O2 Sat: education provided Sat levels, Monitoring frequency, When to seek care   What is the patient's perception of their health status since discharge? Improving   Is the patient able to teach back COPD zones? Yes   Week 2 call completed? Yes   Graduated Yes   Graduated/Revoked comments Pt states he is doing well,  no issues during call,  pt has seen his PCP since hosp dc ,  pt has a pulm apt on 8/27/24   Call end time 0947            Blanca  - Registered Nurse  "

## 2024-08-30 DIAGNOSIS — Z79.4 TYPE 2 DIABETES MELLITUS WITH HYPERGLYCEMIA, WITH LONG-TERM CURRENT USE OF INSULIN: ICD-10-CM

## 2024-08-30 DIAGNOSIS — E11.65 TYPE 2 DIABETES MELLITUS WITH HYPERGLYCEMIA, WITH LONG-TERM CURRENT USE OF INSULIN: ICD-10-CM

## 2024-08-30 RX ORDER — EMPAGLIFLOZIN 10 MG/1
10 TABLET, FILM COATED ORAL DAILY
Qty: 90 TABLET | Refills: 0 | Status: SHIPPED | OUTPATIENT
Start: 2024-08-30

## 2024-09-24 DIAGNOSIS — Z79.4 TYPE 2 DIABETES MELLITUS WITH HYPERGLYCEMIA, WITH LONG-TERM CURRENT USE OF INSULIN: ICD-10-CM

## 2024-09-24 DIAGNOSIS — E11.65 TYPE 2 DIABETES MELLITUS WITH HYPERGLYCEMIA, WITH LONG-TERM CURRENT USE OF INSULIN: ICD-10-CM

## 2024-10-08 ENCOUNTER — HOSPITAL ENCOUNTER (INPATIENT)
Facility: HOSPITAL | Age: 74
LOS: 3 days | Discharge: HOME | DRG: 190 | End: 2024-10-12
Attending: EMERGENCY MEDICINE | Admitting: INTERNAL MEDICINE
Payer: MEDICARE

## 2024-10-08 ENCOUNTER — APPOINTMENT (OUTPATIENT)
Dept: GENERAL RADIOLOGY | Facility: HOSPITAL | Age: 74
DRG: 190 | End: 2024-10-08
Payer: MEDICARE

## 2024-10-08 DIAGNOSIS — J44.1 COPD EXACERBATION: Primary | ICD-10-CM

## 2024-10-08 DIAGNOSIS — J44.9 CHRONIC OBSTRUCTIVE PULMONARY DISEASE, UNSPECIFIED COPD TYPE: ICD-10-CM

## 2024-10-08 DIAGNOSIS — R26.2 DIFFICULTY IN WALKING: ICD-10-CM

## 2024-10-08 LAB
ALBUMIN SERPL-MCNC: 3.8 G/DL (ref 3.5–5.2)
ALBUMIN/GLOB SERPL: 1.1 G/DL
ALP SERPL-CCNC: 94 U/L (ref 39–117)
ALT SERPL W P-5'-P-CCNC: 10 U/L (ref 1–41)
ANION GAP SERPL CALCULATED.3IONS-SCNC: 8.1 MMOL/L (ref 5–15)
ARTERIAL PATENCY WRIST A: ABNORMAL
AST SERPL-CCNC: 16 U/L (ref 1–40)
ATMOSPHERIC PRESS: 744 MMHG
BASE EXCESS BLDA CALC-SCNC: 3.4 MMOL/L (ref -2–2)
BASOPHILS # BLD AUTO: 0.06 10*3/MM3 (ref 0–0.2)
BASOPHILS NFR BLD AUTO: 0.7 % (ref 0–1.5)
BDY SITE: ABNORMAL
BILIRUB SERPL-MCNC: 0.3 MG/DL (ref 0–1.2)
BUN SERPL-MCNC: 18 MG/DL (ref 8–23)
BUN/CREAT SERPL: 16.5 (ref 7–25)
CALCIUM SPEC-SCNC: 9.1 MG/DL (ref 8.6–10.5)
CHLORIDE SERPL-SCNC: 104 MMOL/L (ref 98–107)
CO2 SERPL-SCNC: 30.9 MMOL/L (ref 22–29)
CREAT SERPL-MCNC: 1.09 MG/DL (ref 0.76–1.27)
DEPRECATED RDW RBC AUTO: 51.8 FL (ref 37–54)
EGFRCR SERPLBLD CKD-EPI 2021: 70.8 ML/MIN/1.73
EOSINOPHIL # BLD AUTO: 0.61 10*3/MM3 (ref 0–0.4)
EOSINOPHIL NFR BLD AUTO: 7 % (ref 0.3–6.2)
ERYTHROCYTE [DISTWIDTH] IN BLOOD BY AUTOMATED COUNT: 17.2 % (ref 12.3–15.4)
FLUAV SUBTYP SPEC NAA+PROBE: NOT DETECTED
FLUBV RNA ISLT QL NAA+PROBE: NOT DETECTED
GAS FLOW AIRWAY: 4 LPM
GLOBULIN UR ELPH-MCNC: 3.5 GM/DL
GLUCOSE BLDC GLUCOMTR-MCNC: 140 MG/DL (ref 70–99)
GLUCOSE SERPL-MCNC: 125 MG/DL (ref 65–99)
HCO3 BLDA-SCNC: 32.6 MMOL/L (ref 22–26)
HCT VFR BLD AUTO: 47.3 % (ref 37.5–51)
HCT VFR BLD CALC: 45 % (ref 38–51)
HEMODILUTION: NO
HGB BLD-MCNC: 13 G/DL (ref 13–17.7)
HGB BLDA-MCNC: 15.4 G/DL (ref 12–18)
HOLD SPECIMEN: NORMAL
HOLD SPECIMEN: NORMAL
IMM GRANULOCYTES # BLD AUTO: 0.04 10*3/MM3 (ref 0–0.05)
IMM GRANULOCYTES NFR BLD AUTO: 0.5 % (ref 0–0.5)
LYMPHOCYTES # BLD AUTO: 1.5 10*3/MM3 (ref 0.7–3.1)
LYMPHOCYTES NFR BLD AUTO: 17.1 % (ref 19.6–45.3)
Lab: ABNORMAL
MCH RBC QN AUTO: 23.3 PG (ref 26.6–33)
MCHC RBC AUTO-ENTMCNC: 27.5 G/DL (ref 31.5–35.7)
MCV RBC AUTO: 84.9 FL (ref 79–97)
MODALITY: ABNORMAL
MONOCYTES # BLD AUTO: 0.97 10*3/MM3 (ref 0.1–0.9)
MONOCYTES NFR BLD AUTO: 11.1 % (ref 5–12)
NEUTROPHILS NFR BLD AUTO: 5.58 10*3/MM3 (ref 1.7–7)
NEUTROPHILS NFR BLD AUTO: 63.6 % (ref 42.7–76)
NOTIFIED WHO: ABNORMAL
NRBC BLD AUTO-RTO: 0 /100 WBC (ref 0–0.2)
NT-PROBNP SERPL-MCNC: 114.6 PG/ML (ref 0–1800)
PCO2 BLDA: 69 MM HG (ref 35–45)
PH BLDA: 7.28 PH UNITS (ref 7.35–7.45)
PLATELET # BLD AUTO: 162 10*3/MM3 (ref 140–450)
PMV BLD AUTO: 10.5 FL (ref 6–12)
PO2 BLDA: 102.5 MM HG (ref 80–100)
POTASSIUM SERPL-SCNC: 4.5 MMOL/L (ref 3.5–5.2)
PROT SERPL-MCNC: 7.3 G/DL (ref 6–8.5)
QT INTERVAL: 446 MS
QTC INTERVAL: 481 MS
RBC # BLD AUTO: 5.57 10*6/MM3 (ref 4.14–5.8)
READ BACK: YES
RSV RNA NPH QL NAA+NON-PROBE: NOT DETECTED
SAO2 % BLDCOA: 96.7 % (ref 95–99)
SARS-COV-2 RNA RESP QL NAA+PROBE: NOT DETECTED
SODIUM SERPL-SCNC: 143 MMOL/L (ref 136–145)
TROPONIN T SERPL HS-MCNC: 50 NG/L
WBC NRBC COR # BLD AUTO: 8.76 10*3/MM3 (ref 3.4–10.8)
WHOLE BLOOD HOLD COAG: NORMAL
WHOLE BLOOD HOLD SPECIMEN: NORMAL

## 2024-10-08 PROCEDURE — 25010000002 ENOXAPARIN PER 10 MG: Performed by: INTERNAL MEDICINE

## 2024-10-08 PROCEDURE — 94799 UNLISTED PULMONARY SVC/PX: CPT

## 2024-10-08 PROCEDURE — 36600 WITHDRAWAL OF ARTERIAL BLOOD: CPT | Performed by: EMERGENCY MEDICINE

## 2024-10-08 PROCEDURE — 94660 CPAP INITIATION&MGMT: CPT

## 2024-10-08 PROCEDURE — 25010000002 METHYLPREDNISOLONE PER 125 MG: Performed by: EMERGENCY MEDICINE

## 2024-10-08 PROCEDURE — G0378 HOSPITAL OBSERVATION PER HR: HCPCS

## 2024-10-08 PROCEDURE — 82948 REAGENT STRIP/BLOOD GLUCOSE: CPT

## 2024-10-08 PROCEDURE — 99222 1ST HOSP IP/OBS MODERATE 55: CPT | Performed by: INTERNAL MEDICINE

## 2024-10-08 PROCEDURE — 25010000002 METHYLPREDNISOLONE PER 40 MG: Performed by: INTERNAL MEDICINE

## 2024-10-08 PROCEDURE — 80053 COMPREHEN METABOLIC PANEL: CPT

## 2024-10-08 PROCEDURE — 83880 ASSAY OF NATRIURETIC PEPTIDE: CPT

## 2024-10-08 PROCEDURE — 71045 X-RAY EXAM CHEST 1 VIEW: CPT

## 2024-10-08 PROCEDURE — 99291 CRITICAL CARE FIRST HOUR: CPT

## 2024-10-08 PROCEDURE — 85025 COMPLETE CBC W/AUTO DIFF WBC: CPT

## 2024-10-08 PROCEDURE — 93005 ELECTROCARDIOGRAM TRACING: CPT | Performed by: EMERGENCY MEDICINE

## 2024-10-08 PROCEDURE — 82803 BLOOD GASES ANY COMBINATION: CPT | Performed by: EMERGENCY MEDICINE

## 2024-10-08 PROCEDURE — 84484 ASSAY OF TROPONIN QUANT: CPT

## 2024-10-08 PROCEDURE — 87637 SARSCOV2&INF A&B&RSV AMP PRB: CPT

## 2024-10-08 PROCEDURE — 63710000001 INSULIN GLARGINE PER 5 UNITS: Performed by: INTERNAL MEDICINE

## 2024-10-08 PROCEDURE — 94640 AIRWAY INHALATION TREATMENT: CPT

## 2024-10-08 PROCEDURE — 93005 ELECTROCARDIOGRAM TRACING: CPT

## 2024-10-08 RX ORDER — SODIUM CHLORIDE 0.9 % (FLUSH) 0.9 %
10 SYRINGE (ML) INJECTION AS NEEDED
Status: DISCONTINUED | OUTPATIENT
Start: 2024-10-08 | End: 2024-10-12 | Stop reason: HOSPADM

## 2024-10-08 RX ORDER — BISACODYL 5 MG/1
5 TABLET, DELAYED RELEASE ORAL DAILY PRN
Status: DISCONTINUED | OUTPATIENT
Start: 2024-10-08 | End: 2024-10-12 | Stop reason: HOSPADM

## 2024-10-08 RX ORDER — ALBUTEROL SULFATE 0.83 MG/ML
2.5 SOLUTION RESPIRATORY (INHALATION) EVERY 4 HOURS PRN
Status: DISCONTINUED | OUTPATIENT
Start: 2024-10-08 | End: 2024-10-12 | Stop reason: HOSPADM

## 2024-10-08 RX ORDER — ATORVASTATIN CALCIUM 10 MG/1
10 TABLET, FILM COATED ORAL DAILY
Status: DISCONTINUED | OUTPATIENT
Start: 2024-10-08 | End: 2024-10-09

## 2024-10-08 RX ORDER — AZITHROMYCIN 250 MG/1
250 TABLET, FILM COATED ORAL DAILY
Status: COMPLETED | OUTPATIENT
Start: 2024-10-09 | End: 2024-10-12

## 2024-10-08 RX ORDER — ENOXAPARIN SODIUM 100 MG/ML
40 INJECTION SUBCUTANEOUS DAILY
Status: DISCONTINUED | OUTPATIENT
Start: 2024-10-08 | End: 2024-10-12 | Stop reason: HOSPADM

## 2024-10-08 RX ORDER — ALBUTEROL SULFATE 0.83 MG/ML
7.5 SOLUTION RESPIRATORY (INHALATION) CONTINUOUS
Status: DISCONTINUED | OUTPATIENT
Start: 2024-10-08 | End: 2024-10-08

## 2024-10-08 RX ORDER — AMOXICILLIN 250 MG
2 CAPSULE ORAL 2 TIMES DAILY PRN
Status: DISCONTINUED | OUTPATIENT
Start: 2024-10-08 | End: 2024-10-12 | Stop reason: HOSPADM

## 2024-10-08 RX ORDER — PANTOPRAZOLE SODIUM 40 MG/1
40 TABLET, DELAYED RELEASE ORAL
Status: DISCONTINUED | OUTPATIENT
Start: 2024-10-09 | End: 2024-10-12 | Stop reason: HOSPADM

## 2024-10-08 RX ORDER — CALCIUM CARBONATE 500 MG/1
2 TABLET, CHEWABLE ORAL 2 TIMES DAILY PRN
Status: DISCONTINUED | OUTPATIENT
Start: 2024-10-08 | End: 2024-10-12 | Stop reason: HOSPADM

## 2024-10-08 RX ORDER — ONDANSETRON 4 MG/1
4 TABLET, ORALLY DISINTEGRATING ORAL EVERY 6 HOURS PRN
Status: DISCONTINUED | OUTPATIENT
Start: 2024-10-08 | End: 2024-10-12 | Stop reason: HOSPADM

## 2024-10-08 RX ORDER — AZITHROMYCIN 250 MG/1
500 TABLET, FILM COATED ORAL DAILY
Status: COMPLETED | OUTPATIENT
Start: 2024-10-08 | End: 2024-10-08

## 2024-10-08 RX ORDER — ASPIRIN 81 MG/1
81 TABLET, CHEWABLE ORAL DAILY
Status: DISCONTINUED | OUTPATIENT
Start: 2024-10-08 | End: 2024-10-12 | Stop reason: HOSPADM

## 2024-10-08 RX ORDER — BISACODYL 10 MG
10 SUPPOSITORY, RECTAL RECTAL DAILY PRN
Status: DISCONTINUED | OUTPATIENT
Start: 2024-10-08 | End: 2024-10-12 | Stop reason: HOSPADM

## 2024-10-08 RX ORDER — POLYETHYLENE GLYCOL 3350 17 G/17G
17 POWDER, FOR SOLUTION ORAL DAILY PRN
Status: DISCONTINUED | OUTPATIENT
Start: 2024-10-08 | End: 2024-10-12 | Stop reason: HOSPADM

## 2024-10-08 RX ORDER — SODIUM CHLORIDE 0.9 % (FLUSH) 0.9 %
10 SYRINGE (ML) INJECTION EVERY 12 HOURS SCHEDULED
Status: DISCONTINUED | OUTPATIENT
Start: 2024-10-08 | End: 2024-10-12 | Stop reason: HOSPADM

## 2024-10-08 RX ADMIN — ATORVASTATIN CALCIUM 10 MG: 10 TABLET, FILM COATED ORAL at 20:29

## 2024-10-08 RX ADMIN — ASPIRIN 81 MG: 81 TABLET, CHEWABLE ORAL at 20:29

## 2024-10-08 RX ADMIN — METHYLPREDNISOLONE SODIUM SUCCINATE 125 MG: 125 INJECTION INTRAMUSCULAR; INTRAVENOUS at 13:49

## 2024-10-08 RX ADMIN — ENOXAPARIN SODIUM 40 MG: 100 INJECTION SUBCUTANEOUS at 20:29

## 2024-10-08 RX ADMIN — METFORMIN HYDROCHLORIDE 500 MG: 500 TABLET, FILM COATED ORAL at 20:34

## 2024-10-08 RX ADMIN — ALBUTEROL SULFATE 7.5 MG: 2.5 SOLUTION RESPIRATORY (INHALATION) at 14:35

## 2024-10-08 RX ADMIN — INSULIN GLARGINE 15 UNITS: 100 INJECTION, SOLUTION SUBCUTANEOUS at 20:29

## 2024-10-08 RX ADMIN — Medication 10 ML: at 20:29

## 2024-10-08 RX ADMIN — AZITHROMYCIN DIHYDRATE 500 MG: 250 TABLET ORAL at 20:29

## 2024-10-08 RX ADMIN — WATER 40 MG: 1 INJECTION INTRAMUSCULAR; INTRAVENOUS; SUBCUTANEOUS at 20:28

## 2024-10-08 RX ADMIN — EMPAGLIFLOZIN 10 MG: 10 TABLET, FILM COATED ORAL at 20:29

## 2024-10-08 RX ADMIN — Medication 10 ML: at 13:50

## 2024-10-08 NOTE — ED PROVIDER NOTES
Time: 12:32 PM EDT  Date of encounter:  10/8/2024  Independent Historian/Clinical History and Information was obtained by:   Patient    History is limited by: N/A    Chief Complaint: SOA      History of Present Illness:  Patient is a 74 y.o. year old male who presents to the emergency department for evaluation of difficulty breathing.  Patient states that he started experiencing shortness of air on Saturday.  Has a history of lung disease and states he took all his medicines with no improvement.  Denies fever.  Denies nausea vomiting.  Denies chest pain.  Patient also reports a productive cough with no hemoptysis.      Patient Care Team  Primary Care Provider: Provider, No Known    Past Medical History:     No Known Allergies  Past Medical History:   Diagnosis Date    Asthma     COPD (chronic obstructive pulmonary disease)     Diabetes mellitus     Ex-smoker     QUIT 2021    Hernia, umbilical     Hypertension     On home O2     REPORTS WEARING 2L/NC PRN SOA     Past Surgical History:   Procedure Laterality Date    ABDOMINAL SURGERY       Family History   Problem Relation Age of Onset    Asthma Father     Asthma Sister     Asthma Brother     Cancer Maternal Aunt        Home Medications:  Prior to Admission medications    Medication Sig Start Date End Date Taking? Authorizing Provider   albuterol (PROVENTIL) (2.5 MG/3ML) 0.083% nebulizer solution Take 2.5 mg by nebulization Every 4 (Four) Hours As Needed for Wheezing or Shortness of Air. 8/27/24  Yes Vivien Jacobs APRN   aspirin 81 MG chewable tablet CHEW AND SWALLOW 1 TABLET BY MOUTH DAILY 5/31/24  Yes Jackeline Jaime APRN   atorvastatin (LIPITOR) 10 MG tablet TAKE 1 TABLET BY MOUTH DAILY 8/13/24  Yes Jackeline Jaime APRN   Jardiance 10 MG tablet tablet TAKE 1 TABLET BY MOUTH DAILY 8/30/24  Yes Jackeline Jaime APRN   metFORMIN (GLUCOPHAGE) 500 MG tablet TAKE 1 TABLET BY MOUTH TWICE DAILY WITH MEALS 9/24/24  Yes Jackeline Jaime APRN   omeprazole (priLOSEC) 20 MG  capsule TAKE 1 CAPSULE BY MOUTH DAILY 5/3/24  Yes Jackeline Jaime APRN   Albuterol-Budesonide (Airsupra) 90-80 MCG/ACT aerosol Inhale 2 puffs Every 4 (Four) Hours As Needed (wheezing, soa). 8/20/24   Jackeline Jaime APRN   Budeson-Glycopyrrol-Formoterol (Breztri Aerosphere) 160-9-4.8 MCG/ACT aerosol inhaler Inhale 2 puffs 2 (Two) Times a Day. 6/4/24   Jackeline Jaime APRN   Tresiba FlexTouch 100 UNIT/ML solution pen-injector injection ADMINISTER 20 UNITS UNDER THE SKIN DAILY AS DIRECTED  Patient taking differently: Inject 20 Units under the skin into the appropriate area as directed Daily. 8/1/24   Jackeline Jaime APRN        Social History:   Social History     Tobacco Use    Smoking status: Former     Current packs/day: 0.00     Average packs/day: 1.5 packs/day for 56.2 years (84.3 ttl pk-yrs)     Types: Cigarettes     Start date: 1965     Quit date: 3/19/2021     Years since quitting: 3.5     Passive exposure: Past    Smokeless tobacco: Current     Types: Chew   Vaping Use    Vaping status: Never Used   Substance Use Topics    Alcohol use: Never    Drug use: Never         Review of Systems:  Review of Systems   Constitutional:  Negative for chills and fever.   HENT:  Negative for congestion, rhinorrhea and sore throat.    Eyes:  Negative for pain and visual disturbance.   Respiratory:  Positive for cough and shortness of breath. Negative for apnea and chest tightness.    Cardiovascular:  Negative for chest pain and palpitations.   Gastrointestinal:  Negative for abdominal pain, diarrhea, nausea and vomiting.   Genitourinary:  Negative for difficulty urinating and dysuria.   Musculoskeletal:  Negative for joint swelling and myalgias.   Skin:  Negative for color change.   Neurological:  Negative for seizures and headaches.   Psychiatric/Behavioral: Negative.     All other systems reviewed and are negative.       Physical Exam:  /85 (BP Location: Right arm, Patient Position: Lying)   Pulse 81   Temp 97.6 °F  "(36.4 °C) (Oral)   Resp 22   Ht 185.4 cm (73\")   Wt 113 kg (248 lb 0.3 oz)   SpO2 (!) 87%   BMI 32.72 kg/m²     Physical Exam  Vitals and nursing note reviewed.   Constitutional:       General: He is not in acute distress.     Appearance: Normal appearance. He is not toxic-appearing.   HENT:      Head: Normocephalic and atraumatic.      Jaw: There is normal jaw occlusion.   Eyes:      General: Lids are normal.      Extraocular Movements: Extraocular movements intact.      Conjunctiva/sclera: Conjunctivae normal.      Pupils: Pupils are equal, round, and reactive to light.   Cardiovascular:      Rate and Rhythm: Normal rate and regular rhythm.      Pulses: Normal pulses.      Heart sounds: Normal heart sounds.   Pulmonary:      Effort: Pulmonary effort is normal. No respiratory distress.      Breath sounds: Normal breath sounds. Wheezing present. No rhonchi.   Abdominal:      General: Abdomen is flat. There is no distension.      Palpations: Abdomen is soft.      Tenderness: There is no abdominal tenderness. There is no guarding or rebound.   Musculoskeletal:         General: Normal range of motion.      Cervical back: Normal range of motion and neck supple.      Right lower leg: No edema.      Left lower leg: No edema.   Skin:     General: Skin is warm and dry.      Coloration: Skin is not cyanotic.   Neurological:      Mental Status: He is alert and oriented to person, place, and time. Mental status is at baseline.   Psychiatric:         Attention and Perception: Attention and perception normal.         Mood and Affect: Mood normal.                  Procedures:  Procedures      Medical Decision Making:      Comorbidities that affect care:    COPD    External Notes reviewed:    Hospital Discharge Summary: Patient was recently discharged for COPD exacerbation      The following orders were placed and all results were independently analyzed by me:  Orders Placed This Encounter   Procedures    COVID-19, FLU A/B, " RSV PCR 1 HR TAT - Swab, Nasopharynx    XR Chest 1 View    [place] Draw    Comprehensive Metabolic Panel    BNP    Single High Sensitivity Troponin T    CBC Auto Differential    Blood Gas, Arterial -    NPO Diet NPO Type: Strict NPO    Undress & Gown    Vital Signs    Continuous Pulse Oximetry    Code Status and Medical Interventions: CPR (Attempt to Resuscitate); Full Support    Inpatient Hospitalist Consult    Oxygen Therapy- Nasal Cannula; Titrate 1-6 LPM Per SpO2; 90 - 95%    NIPPV - Provider Settings    ECG 12 Lead ED Triage Standing Order; SOA    Insert Peripheral IV    Initiate Observation Status    CBC & Differential    Green Top (Gel)    Lavender Top    Gold Top - SST    Light Blue Top       Medications Given in the Emergency Department:  Medications   sodium chloride 0.9 % flush 10 mL (10 mL Intravenous Given 10/8/24 1350)   albuterol (PROVENTIL) nebulizer solution 0.083% 2.5 mg/3mL (7.5 mg Nebulization New Bag 10/8/24 1435)   methylPREDNISolone sodium succinate (SOLU-Medrol) 125 mg in sterile water (preservative free) 2 mL (125 mg Intravenous Given 10/8/24 1349)        ED Course:    ED Course as of 10/08/24 1632   Tue Oct 08, 2024   1231 -- PROVIDER IN TRIAGE NOTE ---    The patient was evaluated by Radha munoz APRN, in triage. Orders were placed and the patient is currently awaiting disposition.    [CB]      ED Course User Index  [CB] Radha Mcleod APRN       Labs:    Lab Results (last 24 hours)       Procedure Component Value Units Date/Time    CBC & Differential [[id number]]  (Abnormal) Collected: 10/08/24 1131    Specimen: Blood from Arm, Right Updated: 10/08/24 1137    Narrative:      The following orders were created for panel order CBC & Differential.  Procedure                               Abnormality         Status                     ---------                               -----------         ------                     CBC Auto Differential[[id number]]        Abnormal             Final result                 Please view results for these tests on the individual orders.    Comprehensive Metabolic Panel [[id number]]  (Abnormal) Collected: 10/08/24 1131    Specimen: Blood from Arm, Right Updated: 10/08/24 1158     Glucose 125 mg/dL      BUN 18 mg/dL      Creatinine 1.09 mg/dL      Sodium 143 mmol/L      Potassium 4.5 mmol/L      Chloride 104 mmol/L      CO2 30.9 mmol/L      Calcium 9.1 mg/dL      Total Protein 7.3 g/dL      Albumin 3.8 g/dL      ALT (SGPT) 10 U/L      AST (SGOT) 16 U/L      Alkaline Phosphatase 94 U/L      Total Bilirubin 0.3 mg/dL      Globulin 3.5 gm/dL      A/G Ratio 1.1 g/dL      BUN/Creatinine Ratio 16.5     Anion Gap 8.1 mmol/L      eGFR 70.8 mL/min/1.73     Narrative:      GFR Normal >60  Chronic Kidney Disease <60  Kidney Failure <15    The GFR formula is only valid for adults with stable renal function between ages 18 and 70.    BNP [[id number]]  (Normal) Collected: 10/08/24 1131    Specimen: Blood from Arm, Right Updated: 10/08/24 1154     proBNP 114.6 pg/mL     Narrative:      This assay is used as an aid in the diagnosis of individuals suspected of having heart failure. It can be used as an aid in the diagnosis of acute decompensated heart failure (ADHF) in patients presenting with signs and symptoms of ADHF to the emergency department (ED). In addition, NT-proBNP of <300 pg/mL indicates ADHF is not likely.    Age Range Result Interpretation  NT-proBNP Concentration (pg/mL:      <50             Positive            >450                   Gray                 300-450                    Negative             <300    50-75           Positive            >900                  Gray                300-900                  Negative            <300      >75             Positive            >1800                  Gray                300-1800                  Negative            <300    Single High Sensitivity Troponin T [[id number]]  (Abnormal) Collected: 10/08/24 1131    Specimen:  Blood from Arm, Right Updated: 10/08/24 1158     HS Troponin T 50 ng/L     Narrative:      High Sensitive Troponin T Reference Range:  <14.0 ng/L- Negative Female for AMI  <22.0 ng/L- Negative Male for AMI  >=14 - Abnormal Female indicating possible myocardial injury.  >=22 - Abnormal Male indicating possible myocardial injury.   Clinicians would have to utilize clinical acumen, EKG, Troponin, and serial changes to determine if it is an Acute Myocardial Infarction or myocardial injury due to an underlying chronic condition.         CBC Auto Differential [[id number]]  (Abnormal) Collected: 10/08/24 1131    Specimen: Blood from Arm, Right Updated: 10/08/24 1137     WBC 8.76 10*3/mm3      RBC 5.57 10*6/mm3      Hemoglobin 13.0 g/dL      Hematocrit 47.3 %      MCV 84.9 fL      MCH 23.3 pg      MCHC 27.5 g/dL      RDW 17.2 %      RDW-SD 51.8 fl      MPV 10.5 fL      Platelets 162 10*3/mm3      Neutrophil % 63.6 %      Lymphocyte % 17.1 %      Monocyte % 11.1 %      Eosinophil % 7.0 %      Basophil % 0.7 %      Immature Grans % 0.5 %      Neutrophils, Absolute 5.58 10*3/mm3      Lymphocytes, Absolute 1.50 10*3/mm3      Monocytes, Absolute 0.97 10*3/mm3      Eosinophils, Absolute 0.61 10*3/mm3      Basophils, Absolute 0.06 10*3/mm3      Immature Grans, Absolute 0.04 10*3/mm3      nRBC 0.0 /100 WBC     COVID-19, FLU A/B, RSV PCR 1 HR TAT - Swab, Nasopharynx [[id number]]  (Normal) Collected: 10/08/24 1349    Specimen: Swab from Nasopharynx Updated: 10/08/24 1500     COVID19 Not Detected     Influenza A PCR Not Detected     Influenza B PCR Not Detected     RSV, PCR Not Detected    Narrative:      Fact sheet for providers: https://www.fda.gov/media/382117/download    Fact sheet for patients: https://www.fda.gov/media/670607/download    Test performed by PCR.    Blood Gas, Arterial - [[id number]]  (Abnormal) Collected: 10/08/24 1417    Specimen: Arterial Blood Updated: 10/08/24 1422     Site Right Radial     Nael's Test N/A      pH, Arterial 7.283 pH units      pCO2, Arterial 69.0 mm Hg      pO2, Arterial 102.5 mm Hg      HCO3, Arterial 32.6 mmol/L      Base Excess, Arterial 3.4 mmol/L      Comment: Serial Number: [id number]:  [id number]        O2 Saturation, Arterial 96.7 %      Hemoglobin, Blood Gas 15.4 g/dL      Hematocrit, Blood Gas 45.0 %      Barometric Pressure for Blood Gas 744.0000 mmHg      Modality Cannula     Flow Rate 4.0000 lpm      Notified Who Dr Cross     Read Back Yes     Notified Time --     Hemodilution No             Imaging:    XR Chest 1 View    Result Date: 10/8/2024  XR CHEST 1 VW Date of Exam: 10/8/2024 11:08 AM EDT Indication: SOA Triage Protocol Comparison: August 15, 2024 Findings: The heart is not enlarged. There is some suggested volume loss left lung with probable underlying scarring in the lingula/left lower lobe. Emphysematous change suggested. The chest is similar to prior chest x-ray.     Impression: 1.Chronic appearing changes to the left hemithorax. Electronically Signed: Remberto Linda MD  10/8/2024 11:13 AM EDT  Workstation ID: [id number]       Differential Diagnosis and Discussion:    Dyspnea: Differential diagnosis includes but is not limited to metabolic acidosis, neurological disorders, psychogenic, asthma, pneumothorax, upper airway obstruction, COPD, pneumonia, noncardiogenic pulmonary edema, interstitial lung disease, anemia, congestive heart failure, and pulmonary embolism    All labs were reviewed and interpreted by me.  All X-rays impressions were independently interpreted by me.  EKG was interpreted by me.    MDM       The patient´s CBC that was reviewed and interpreted by me shows no abnormalities of critical concern. Of note, there is no anemia requiring a blood transfusion and the platelet count is acceptable.  The patient´s CMP that was reviewed and interpretted by me shows no abnormalities of critical concern. Of note, the patient´s sodium and potassium are acceptable. The patient´s  liver enzymes are unremarkable. The patient´s renal function (creatinine) is preserved. The patient has a normal anion gap.  ABG shows a pH of 7.2 and a pCO2 of 69.  Chest x-ray is negative for acute infiltrate.  Patient was placed on BiPAP.  Patient was also given an hour-long breathing treatment.    The patient presents with dyspnea.  Patient was placed on the cardiac monitor after given albuterol and placed on BiPAP.  They were monitored for ventricular ectopy, arrhythmia, tachycardia, hypoxia, and changes in blood pressure.  Continuous pulse oximetry was placed in waveform with corresponding oxygen saturation was assessed throughout their stay in the emergency department.  Patient was rechecked several times in the ED for decline in mental status and worsening distress.    Total Critical Care time of 45 minutes. Total critical care time documented does not include time spent on separately billed procedures for services of nurses or physician assistants. I personally saw and examined the patient. I have reviewed all diagnostic interpretations and treatment plans as written. I was present for the key portions of any procedures performed and the inclusive time noted in any critical care statement. Critical care time includes patient management by me, time spent at the patients bedside,  time to review lab and imaging results, discussing patient care, documentation in the medical record, and time spent with family or caregiver.          Patient Care Considerations:    None      Consultants/Shared Management Plan:    Case was discussed with Dr. Mota who agrees with admission.    Social Determinants of Health:    Patient is independent, reliable, and has access to care.       Disposition and Care Coordination:    Admit:   Through independent evaluation of the patient's history, physical, and imperical data, the patient meets criteria for inpatient admission to the hospital.        Final diagnoses:   COPD  exacerbation        ED Disposition       ED Disposition   Decision to Admit    Condition   --    Comment   Level of Care: Med/Surg [1]   Diagnosis: COPD exacerbation [465791]   Admitting Physician: NICOLE FRANKLIN [[id number]]   Attending Physician: NICOLE FRANKLIN [[id number]]                 This medical record created using voice recognition software.             Devon Moyer MD  10/08/24 [id number]

## 2024-10-08 NOTE — H&P
[facility]  Date: 10/8/2024   Patient Name: Dilip Faulkner  : 1950  MRN: [id number]  Primary Care Physician:  Marilin, No Known  Date of admission: 10/8/2024    Subjective   Subjective     Chief Complaint: SOA    HPI:    Dilip Faulkner is a 74 y.o. male with a past medical history of COPD on home oxygen who presented with shortness of breath.  He patient started experiencing shortness of breath 3 days ago and is worsened since.  He has a history of COPD and is on oxygen at home.  Patient reports taking all his medications as prescribed without improvement.  He denies any fever.  He denies any nausea or vomiting, or chest pain.  He also reports a cough but unsure if it is productive.  He denies any hemoptysis.      Personal History     Past Medical History:  Past Medical History:   Diagnosis Date    Asthma     COPD (chronic obstructive pulmonary disease)     Diabetes mellitus     Ex-smoker     QUIT     Hernia, umbilical     Hypertension     On home O2     REPORTS WEARING 2L/NC PRN SOA       Past Surgical History:  Past Surgical History:   Procedure Laterality Date    ABDOMINAL SURGERY         Family History:   Family History   Problem Relation Age of Onset    Asthma Father     Asthma Sister     Asthma Brother     Cancer Maternal Aunt        Social History:   Social History     Socioeconomic History    Marital status: Single   Tobacco Use    Smoking status: Former     Current packs/day: 0.00     Average packs/day: 1.5 packs/day for 56.2 years (84.3 ttl pk-yrs)     Types: Cigarettes     Start date:      Quit date: 3/19/2021     Years since quitting: 3.5     Passive exposure: Past    Smokeless tobacco: Current     Types: Chew   Vaping Use    Vaping status: Never Used   Substance and Sexual Activity    Alcohol use: Never    Drug use: Never    Sexual activity: Defer       Home Medications:  Albuterol-Budesonide, Budeson-Glycopyrrol-Formoterol, albuterol,  aspirin, atorvastatin, empagliflozin, insulin degludec, metFORMIN, and omeprazole    Allergies:  No Known Allergies    Objective   Objective     Vitals:   Temp:  [97.6 °F (36.4 °C)] 97.6 °F (36.4 °C)  Heart Rate:  [68-75] 71  Resp:  [20-25] 22  BP: ()/(62-93) 134/78  Flow (L/min):  [4] 4    Physical Exam    Constitutional: Awake, alert, no acute distress   Eyes: Pupils equal, sclerae anicteric, no conjunctival injection   HENT: NCAT, mucous membranes moist   Neck: Supple, no thyromegaly, no lymphadenopathy, trachea midline   Respiratory: mild expiratory weeze   Cardiovascular: RRR, no murmurs, rubs, or gallops, palpable pedal pulses bilaterally   Gastrointestinal: Positive bowel sounds, soft, nontender, nondistended   Musculoskeletal: No bilateral ankle edema, no clubbing or cyanosis to extremities   Psychiatric: Appropriate affect, cooperative   Neurologic: Oriented x 3, strength symmetric in all extremities, Cranial Nerves grossly intact to confrontation, speech clear   Skin: No rashes     Result Review    Result Review:  I have personally reviewed the results from the time of this admission to 10/8/2024 15:34 EDT and agree with these findings:  [x]  Laboratory  [x]  Microbiology  [x]  Radiology  []  EKG/Telemetry   []  Cardiology/Vascular   []  Pathology  []  Old records  []  Other:      Assessment & Plan   Assessment / Plan     Assessment/Plan:     1.  COPD exacerbation.  Chest x-ray clear.  No concern for infection.    Start scheduled nebulizers, inhaled steroids.  Start Solu-Medrol 40 mg every 8 hours.  Start azithromycin.  Continue home O2.  Expect improvement quickly.        VTE Prophylaxis:  No VTE prophylaxis order currently exists.        CODE STATUS:         Admission Status:  I believe this patient meets obs status.    Electronically signed by Gato Mota DO, 10/08/24, 3:34 PM EDT.

## 2024-10-09 LAB
ANION GAP SERPL CALCULATED.3IONS-SCNC: 9.8 MMOL/L (ref 5–15)
ARTERIAL PATENCY WRIST A: POSITIVE
ATMOSPHERIC PRESS: 742.3 MMHG
BASE EXCESS BLDA CALC-SCNC: 3.8 MMOL/L (ref -2–2)
BASOPHILS # BLD AUTO: 0 10*3/MM3 (ref 0–0.2)
BASOPHILS NFR BLD AUTO: 0 % (ref 0–1.5)
BDY SITE: ABNORMAL
BUN SERPL-MCNC: 23 MG/DL (ref 8–23)
BUN/CREAT SERPL: 21.9 (ref 7–25)
CALCIUM SPEC-SCNC: 9.1 MG/DL (ref 8.6–10.5)
CHLORIDE SERPL-SCNC: 101 MMOL/L (ref 98–107)
CO2 SERPL-SCNC: 30.2 MMOL/L (ref 22–29)
CREAT SERPL-MCNC: 1.05 MG/DL (ref 0.76–1.27)
DEPRECATED RDW RBC AUTO: 49.6 FL (ref 37–54)
EGFRCR SERPLBLD CKD-EPI 2021: 74 ML/MIN/1.73
EOSINOPHIL # BLD AUTO: 0 10*3/MM3 (ref 0–0.4)
EOSINOPHIL NFR BLD AUTO: 0 % (ref 0.3–6.2)
ERYTHROCYTE [DISTWIDTH] IN BLOOD BY AUTOMATED COUNT: 16.3 % (ref 12.3–15.4)
GAS FLOW AIRWAY: 3.5 LPM
GEN 5 2HR TROPONIN T REFLEX: 27 NG/L
GLUCOSE BLDC GLUCOMTR-MCNC: 155 MG/DL (ref 70–99)
GLUCOSE BLDC GLUCOMTR-MCNC: 238 MG/DL (ref 70–99)
GLUCOSE SERPL-MCNC: 146 MG/DL (ref 65–99)
HCO3 BLDA-SCNC: 30.9 MMOL/L (ref 22–26)
HCT VFR BLD AUTO: 45.2 % (ref 37.5–51)
HCT VFR BLD CALC: 42 % (ref 38–51)
HEMODILUTION: NO
HGB BLD-MCNC: 12.7 G/DL (ref 13–17.7)
HGB BLDA-MCNC: 14.3 G/DL (ref 12–18)
IMM GRANULOCYTES # BLD AUTO: 0.02 10*3/MM3 (ref 0–0.05)
IMM GRANULOCYTES NFR BLD AUTO: 0.3 % (ref 0–0.5)
INHALED O2 CONCENTRATION: 32 %
LYMPHOCYTES # BLD AUTO: 0.63 10*3/MM3 (ref 0.7–3.1)
LYMPHOCYTES NFR BLD AUTO: 10.4 % (ref 19.6–45.3)
MCH RBC QN AUTO: 23.5 PG (ref 26.6–33)
MCHC RBC AUTO-ENTMCNC: 28.1 G/DL (ref 31.5–35.7)
MCV RBC AUTO: 83.7 FL (ref 79–97)
MODALITY: ABNORMAL
MONOCYTES # BLD AUTO: 0.07 10*3/MM3 (ref 0.1–0.9)
MONOCYTES NFR BLD AUTO: 1.2 % (ref 5–12)
NEUTROPHILS NFR BLD AUTO: 5.34 10*3/MM3 (ref 1.7–7)
NEUTROPHILS NFR BLD AUTO: 88.1 % (ref 42.7–76)
NRBC BLD AUTO-RTO: 0 /100 WBC (ref 0–0.2)
PCO2 BLDA: 56 MM HG (ref 35–45)
PH BLDA: 7.35 PH UNITS (ref 7.35–7.45)
PLATELET # BLD AUTO: 163 10*3/MM3 (ref 140–450)
PMV BLD AUTO: 10.9 FL (ref 6–12)
PO2 BLD: 187 MM[HG] (ref 0–500)
PO2 BLDA: 59.8 MM HG (ref 80–100)
POTASSIUM SERPL-SCNC: 4.5 MMOL/L (ref 3.5–5.2)
RBC # BLD AUTO: 5.4 10*6/MM3 (ref 4.14–5.8)
SAO2 % BLDCOA: 88.5 % (ref 95–99)
SODIUM SERPL-SCNC: 141 MMOL/L (ref 136–145)
TROPONIN T DELTA: -1 NG/L
TROPONIN T SERPL HS-MCNC: 28 NG/L
WBC NRBC COR # BLD AUTO: 6.06 10*3/MM3 (ref 3.4–10.8)

## 2024-10-09 PROCEDURE — 25010000002 METHYLPREDNISOLONE PER 40 MG: Performed by: INTERNAL MEDICINE

## 2024-10-09 PROCEDURE — 63710000001 INSULIN GLARGINE PER 5 UNITS: Performed by: INTERNAL MEDICINE

## 2024-10-09 PROCEDURE — 25010000002 ENOXAPARIN PER 10 MG: Performed by: INTERNAL MEDICINE

## 2024-10-09 PROCEDURE — 94799 UNLISTED PULMONARY SVC/PX: CPT

## 2024-10-09 PROCEDURE — 36600 WITHDRAWAL OF ARTERIAL BLOOD: CPT | Performed by: STUDENT IN AN ORGANIZED HEALTH CARE EDUCATION/TRAINING PROGRAM

## 2024-10-09 PROCEDURE — 99232 SBSQ HOSP IP/OBS MODERATE 35: CPT | Performed by: STUDENT IN AN ORGANIZED HEALTH CARE EDUCATION/TRAINING PROGRAM

## 2024-10-09 PROCEDURE — 82803 BLOOD GASES ANY COMBINATION: CPT | Performed by: STUDENT IN AN ORGANIZED HEALTH CARE EDUCATION/TRAINING PROGRAM

## 2024-10-09 PROCEDURE — 94761 N-INVAS EAR/PLS OXIMETRY MLT: CPT

## 2024-10-09 PROCEDURE — 63710000001 INSULIN LISPRO (HUMAN) PER 5 UNITS: Performed by: STUDENT IN AN ORGANIZED HEALTH CARE EDUCATION/TRAINING PROGRAM

## 2024-10-09 PROCEDURE — 84484 ASSAY OF TROPONIN QUANT: CPT | Performed by: STUDENT IN AN ORGANIZED HEALTH CARE EDUCATION/TRAINING PROGRAM

## 2024-10-09 PROCEDURE — 82948 REAGENT STRIP/BLOOD GLUCOSE: CPT

## 2024-10-09 PROCEDURE — 85025 COMPLETE CBC W/AUTO DIFF WBC: CPT | Performed by: INTERNAL MEDICINE

## 2024-10-09 PROCEDURE — 94664 DEMO&/EVAL PT USE INHALER: CPT

## 2024-10-09 PROCEDURE — 80048 BASIC METABOLIC PNL TOTAL CA: CPT | Performed by: INTERNAL MEDICINE

## 2024-10-09 PROCEDURE — 25010000002 METHYLPREDNISOLONE PER 40 MG: Performed by: STUDENT IN AN ORGANIZED HEALTH CARE EDUCATION/TRAINING PROGRAM

## 2024-10-09 RX ORDER — NICOTINE POLACRILEX 4 MG
15 LOZENGE BUCCAL
Status: DISCONTINUED | OUTPATIENT
Start: 2024-10-09 | End: 2024-10-12 | Stop reason: HOSPADM

## 2024-10-09 RX ORDER — IBUPROFEN 600 MG/1
1 TABLET ORAL
Status: DISCONTINUED | OUTPATIENT
Start: 2024-10-09 | End: 2024-10-12 | Stop reason: HOSPADM

## 2024-10-09 RX ORDER — METHYLPREDNISOLONE SODIUM SUCCINATE 40 MG/ML
40 INJECTION, POWDER, LYOPHILIZED, FOR SOLUTION INTRAMUSCULAR; INTRAVENOUS
Status: DISCONTINUED | OUTPATIENT
Start: 2024-10-09 | End: 2024-10-11

## 2024-10-09 RX ORDER — IPRATROPIUM BROMIDE AND ALBUTEROL SULFATE 2.5; .5 MG/3ML; MG/3ML
3 SOLUTION RESPIRATORY (INHALATION)
Status: DISCONTINUED | OUTPATIENT
Start: 2024-10-09 | End: 2024-10-12 | Stop reason: HOSPADM

## 2024-10-09 RX ORDER — DEXTROSE MONOHYDRATE 25 G/50ML
25 INJECTION, SOLUTION INTRAVENOUS
Status: DISCONTINUED | OUTPATIENT
Start: 2024-10-09 | End: 2024-10-12 | Stop reason: HOSPADM

## 2024-10-09 RX ORDER — ATORVASTATIN CALCIUM 10 MG/1
10 TABLET, FILM COATED ORAL NIGHTLY
Status: DISCONTINUED | OUTPATIENT
Start: 2024-10-09 | End: 2024-10-12 | Stop reason: HOSPADM

## 2024-10-09 RX ORDER — INSULIN LISPRO 100 [IU]/ML
2-7 INJECTION, SOLUTION INTRAVENOUS; SUBCUTANEOUS
Status: DISCONTINUED | OUTPATIENT
Start: 2024-10-09 | End: 2024-10-12 | Stop reason: HOSPADM

## 2024-10-09 RX ADMIN — IPRATROPIUM BROMIDE AND ALBUTEROL SULFATE 3 ML: .5; 3 SOLUTION RESPIRATORY (INHALATION) at 23:29

## 2024-10-09 RX ADMIN — INSULIN LISPRO 3 UNITS: 100 INJECTION, SOLUTION INTRAVENOUS; SUBCUTANEOUS at 17:34

## 2024-10-09 RX ADMIN — ASPIRIN 81 MG: 81 TABLET, CHEWABLE ORAL at 08:31

## 2024-10-09 RX ADMIN — AZITHROMYCIN DIHYDRATE 250 MG: 250 TABLET ORAL at 08:26

## 2024-10-09 RX ADMIN — Medication 10 ML: at 08:27

## 2024-10-09 RX ADMIN — METHYLPREDNISOLONE SODIUM SUCCINATE 40 MG: 40 INJECTION, POWDER, FOR SOLUTION INTRAMUSCULAR; INTRAVENOUS at 17:35

## 2024-10-09 RX ADMIN — METHYLPREDNISOLONE SODIUM SUCCINATE 40 MG: 40 INJECTION INTRAMUSCULAR; INTRAVENOUS at 08:27

## 2024-10-09 RX ADMIN — INSULIN GLARGINE 15 UNITS: 100 INJECTION, SOLUTION SUBCUTANEOUS at 21:16

## 2024-10-09 RX ADMIN — IPRATROPIUM BROMIDE AND ALBUTEROL SULFATE 3 ML: .5; 3 SOLUTION RESPIRATORY (INHALATION) at 18:30

## 2024-10-09 RX ADMIN — IPRATROPIUM BROMIDE AND ALBUTEROL SULFATE 3 ML: .5; 3 SOLUTION RESPIRATORY (INHALATION) at 14:48

## 2024-10-09 RX ADMIN — ATORVASTATIN CALCIUM 10 MG: 10 TABLET, FILM COATED ORAL at 21:16

## 2024-10-09 RX ADMIN — ENOXAPARIN SODIUM 40 MG: 100 INJECTION SUBCUTANEOUS at 08:27

## 2024-10-09 RX ADMIN — METFORMIN HYDROCHLORIDE 500 MG: 500 TABLET, FILM COATED ORAL at 17:35

## 2024-10-09 RX ADMIN — WATER 40 MG: 1 INJECTION INTRAMUSCULAR; INTRAVENOUS; SUBCUTANEOUS at 03:32

## 2024-10-09 RX ADMIN — EMPAGLIFLOZIN 10 MG: 10 TABLET, FILM COATED ORAL at 08:26

## 2024-10-09 RX ADMIN — Medication 10 ML: at 21:15

## 2024-10-09 RX ADMIN — METFORMIN HYDROCHLORIDE 500 MG: 500 TABLET, FILM COATED ORAL at 08:26

## 2024-10-09 RX ADMIN — PANTOPRAZOLE SODIUM 40 MG: 40 TABLET, DELAYED RELEASE ORAL at 05:11

## 2024-10-09 NOTE — CASE MANAGEMENT/SOCIAL WORK
Mr. Faulkner has pmhx of COPD, asthma, chronic hypercapnic respiratory failure, diabetes, hypertension, chronic hypoxemia requiring home oxygen, ex-smoker, medical noncompliance, dyslipidemia, GERD. This is patient's fourth COPD admission in the past year. Mr. Faulkner has home O2, nebulizer and a bipap at home. He is nonadherent with his bipap and oxygen use.  He is currently on Breztri for maintenance and Airsupra for rescue.  He did recently follow up with pulmonary services after his previous admission last August. Mr. Faulkner does have chronically elevated Eosinophil level and would likely benefit from biologic.  Unfortunately, patient has failed to complete pulmonary function testing necessary for insurance prior authorization.

## 2024-10-09 NOTE — PROGRESS NOTES
[facility]   Hospitalist Progress Note  Date: 10/9/2024  Patient Name: Dilip Faulkner  : 1950  MRN: [id number]  Date of admission: 10/8/2024      Subjective   Subjective     Chief Complaint: Shortness of breath    Summary: 74-year-old male past medical history of COPD on 2 L nasal cannula, type 2 diabetes, hypertension who presented to the ER due to worsening exertional dyspnea for 3 days.  Upon arrival patient had diffuse wheezing and concern for COPD exacerbation.  He was treated with steroids and nebulizers and BiPAP also ordered for hypercapnic respiratory failure.  Patient refused BiPAP in the ER and was subsequent admitted to the hospitalist service    Interval Followup: No events overnight.  Patient continues to refuse BiPAP overnight but has not had any somnolence.  Subjectively he does state he feels better from breathing standpoint.        Objective   Objective     Vitals:   Temp:  [97.3 °F (36.3 °C)-98.2 °F (36.8 °C)] 97.9 °F (36.6 °C)  Heart Rate:  [57-81] 57  Resp:  [18-22] 20  BP: (114-134)/(56-85) 121/72  Flow (L/min):  [3.5-4] 3.5  Physical Exam    Constitutional: Awake, alert, no acute distress   Respiratory: Clear to auscultation bilaterally, nonlabored respirations    Cardiovascular: RRR, no murmurs, rubs, or gallops, palpable pedal pulses bilaterally   Gastrointestinal: Positive bowel sounds, soft, nontender, nondistended   Neurologic: Oriented x 3, strength symmetric in all extremities, Cranial Nerves grossly intact to confrontation, speech clear   Skin: No rashes     Result Review    Result Review:  I have personally reviewed the following results and agree with these findings:  [x]  Laboratory  [x]  Microbiology  [x]  Radiology  [x]  EKG/Telemetry   []  Cardiology/Vascular   []  Pathology  [x]  Old records  []  Other:    Assessment & Plan   Assessment / Plan     Assessment/Plan:  Acute hypercapnic respiratory failure secondary to COPD exacerbation  Acute COPD  exacerbation  Elevated troponin likely secondary to demand ischemia due to above process  Hypertension  Hyperlipidemia  Type 2 diabetes    Plan  - Remains admitted to the hospital service patient was counseled extensively on the need for BiPAP use given his hypercapnic respiratory failure but continues to refuse  - Repeat ABG  - Continue scheduled bronchodilators, steroids and azithromycin for total 5 days  - Troponin elevation is likely nonischemic in nature as EKG personally viewed did not show any acute ST elevations or depressions  - Continue home Antivert tensive  - Continue home statin  - Basal bolus insulin regimen to continue sliding scale insulin added.  Glucose appears well-controlled at this time     Discussed plan with RN.    Dispo: Patient remains admitted for COPD exacerbation with hypercapnia.  PT eval pending to assess discharge needs    VTE Prophylaxis:  Pharmacologic VTE prophylaxis orders are present.        CODE STATUS:   Level Of Support Discussed With: Patient  Code Status (Patient has no pulse and is not breathing): CPR (Attempt to Resuscitate)  Medical Interventions (Patient has pulse or is breathing): Full Support        Electronically signed by Ammon Blair MD, 10/09/24, 1:46 PM EDT.

## 2024-10-09 NOTE — PROGRESS NOTES
RT EQUIPMENT DEVICE RELATED - SKIN ASSESSMENT    RT Medical Equipment/Device:     Nasal Cannula    Skin Assessment:      Cheek:  Intact  Ears:  Intact  Nares:  Intact    Device Skin Pressure Protection:  Positioning supports utilized    Nurse Notification:  Martha Ma, RRT

## 2024-10-09 NOTE — PAYOR COMM NOTE
"AUTHORIZATION REQUEST for EMERGENCY DEPARTMENT ADMISSION        PATIENT INFORMATION  Name:             Dilip Faulkner  MRN#:            [id number]        ADMISSION INFORMATION  CLASS:          Inpatient   DOS:               10/8/2024        ADMISSION DIAGNOSIS AND HOSPITAL PROBLEMS  ADMISSION DIAGNOSIS  COPD exacerbation [J44.1]  COPD with acute exacerbation [J44.1]        ADMITTING PROVIDER INFORMATION  Name/NPI       Ammon Blair MD [[id number]]  Phone:            Phone: [phone]        RENDERING FACILITY  Name:             [facility]   NPI:                 [id number]  TID:                 [id number]  Address:        [address] [place] [address]        CASE MANAGEMENT CONTACT INFORMATION  Name:             DAMIAN Miller  Phone:            [phone]  Fax:                [phone]      Date of Birth   1950    Social Security Number       Address   [address] [place] [address]    Home Phone   [phone]    MRN   [id number]       Episcopal   None    Marital Status   Single                            Admission Date   10/8/24    Admission Type   Emergency    Admitting Provider   Gato Mota DO    Attending Provider   Ammon Blair MD    Department, Room/Bed   [address], [facility]/       Discharge Date       Discharge Disposition       Discharge Destination                                 Attending Provider: Ammon Blair MD    Allergies: No Known Allergies    Isolation: None   Infection: None   Code Status: CPR    Ht: 185.4 cm (73\")   Wt: 106 kg (233 lb 11 oz)    Admission Cmt: None   Principal Problem: COPD with acute exacerbation [J44.1]                   Active Insurance as of 10/8/2024       Primary Coverage       Payor Plan Insurance Group Employer/Plan Group    HUMANA MEDICARE REPLACEMENT HUMANA MED ADV SNP HMO [id number]       Payor Plan Address Payor Plan Phone Number Payor Plan Fax Number Effective Dates    [address] [address] " [phone]  2022 - None Entered    [facility] [id number]         Subscriber Name Subscriber Birth Date Member ID       DILIP LAWS 1950 [id number]               Secondary Coverage       Payor Plan Insurance Group Employer/Plan Group    [facility] MEDICAID [id number]       Payor Plan Address Payor Plan Phone Number Payor Plan Fax Number Effective Dates    [address] [address] [phone]  2022 - None Entered    [facility] [id number]         Subscriber Name Subscriber Birth Date Member ID       DILIP LAWS 1950 [id number]                     Emergency Contacts        (Rel.) Home Phone Work Phone Mobile Phone    Juana Sandoval (Sister) [phone] -- [phone]                 History & Physical        Gato Mota, DO at 10/08/24 1534           [facility] ERIST HISTORY AND PHYSICAL  Date: 10/8/2024   Patient Name: Dilip Laws  : 1950  MRN: [id number]  Primary Care Physician:  Provider, No Known  Date of admission: 10/8/2024    Subjective  Subjective     Chief Complaint: SOA    HPI:    Dilip Laws is a 74 y.o. male with a past medical history of COPD on home oxygen who presented with shortness of breath.  He patient started experiencing shortness of breath 3 days ago and is worsened since.  He has a history of COPD and is on oxygen at home.  Patient reports taking all his medications as prescribed without improvement.  He denies any fever.  He denies any nausea or vomiting, or chest pain.  He also reports a cough but unsure if it is productive.  He denies any hemoptysis.      Personal History     Past Medical History:  Past Medical History:   Diagnosis Date    Asthma     COPD (chronic obstructive pulmonary disease)     Diabetes mellitus     Ex-smoker     QUIT     Hernia, umbilical     Hypertension     On home O2     REPORTS WEARING 2L/NC PRN SOA       Past Surgical History:  Past Surgical History:   Procedure Laterality Date    ABDOMINAL  SURGERY         Family History:   Family History   Problem Relation Age of Onset    Asthma Father     Asthma Sister     Asthma Brother     Cancer Maternal Aunt        Social History:   Social History     Socioeconomic History    Marital status: Single   Tobacco Use    Smoking status: Former     Current packs/day: 0.00     Average packs/day: 1.5 packs/day for 56.2 years (84.3 ttl pk-yrs)     Types: Cigarettes     Start date: 1965     Quit date: 3/19/2021     Years since quitting: 3.5     Passive exposure: Past    Smokeless tobacco: Current     Types: Chew   Vaping Use    Vaping status: Never Used   Substance and Sexual Activity    Alcohol use: Never    Drug use: Never    Sexual activity: Defer       Home Medications:  Albuterol-Budesonide, Budeson-Glycopyrrol-Formoterol, albuterol, aspirin, atorvastatin, empagliflozin, insulin degludec, metFORMIN, and omeprazole    Allergies:  No Known Allergies    Objective  Objective     Vitals:   Temp:  [97.6 °F (36.4 °C)] 97.6 °F (36.4 °C)  Heart Rate:  [68-75] 71  Resp:  [20-25] 22  BP: ()/(62-93) 134/78  Flow (L/min):  [4] 4    Physical Exam    Constitutional: Awake, alert, no acute distress   Eyes: Pupils equal, sclerae anicteric, no conjunctival injection   HENT: NCAT, mucous membranes moist   Neck: Supple, no thyromegaly, no lymphadenopathy, trachea midline   Respiratory: mild expiratory weeze   Cardiovascular: RRR, no murmurs, rubs, or gallops, palpable pedal pulses bilaterally   Gastrointestinal: Positive bowel sounds, soft, nontender, nondistended   Musculoskeletal: No bilateral ankle edema, no clubbing or cyanosis to extremities   Psychiatric: Appropriate affect, cooperative   Neurologic: Oriented x 3, strength symmetric in all extremities, Cranial Nerves grossly intact to confrontation, speech clear   Skin: No rashes     Result Review   Result Review:  I have personally reviewed the results from the time of this admission to 10/8/2024 15:34 EDT and agree with  these findings:  [x]  Laboratory  [x]  Microbiology  [x]  Radiology  []  EKG/Telemetry   []  Cardiology/Vascular   []  Pathology  []  Old records  []  Other:      Assessment & Plan  Assessment / Plan     Assessment/Plan:     1.  COPD exacerbation.  Chest x-ray clear.  No concern for infection.    Start scheduled nebulizers, inhaled steroids.  Start Solu-Medrol 40 mg every 8 hours.  Start azithromycin.  Continue home O2.  Expect improvement quickly.        VTE Prophylaxis:  No VTE prophylaxis order currently exists.        CODE STATUS:         Admission Status:  I believe this patient meets obs status.    Electronically signed by Gato Mota DO, 10/08/24, 3:34 PM EDT.             Electronically signed by Gato Mota DO at 10/08/24 1536          Emergency Department Notes        Devon Moyer MD at 10/08/24 1232          Time: 12:32 PM EDT  Date of encounter:  10/8/2024  Independent Historian/Clinical History and Information was obtained by:   Patient    History is limited by: N/A    Chief Complaint: SOA      History of Present Illness:  Patient is a 74 y.o. year old male who presents to the emergency department for evaluation of difficulty breathing.  Patient states that he started experiencing shortness of air on Saturday.  Has a history of lung disease and states he took all his medicines with no improvement.  Denies fever.  Denies nausea vomiting.  Denies chest pain.  Patient also reports a productive cough with no hemoptysis.      Patient Care Team  Primary Care Provider: Provider, No Known    Past Medical History:     No Known Allergies  Past Medical History:   Diagnosis Date    Asthma     COPD (chronic obstructive pulmonary disease)     Diabetes mellitus     Ex-smoker     QUIT 2021    Hernia, umbilical     Hypertension     On home O2     REPORTS WEARING 2L/NC PRN SOA     Past Surgical History:   Procedure Laterality Date    ABDOMINAL SURGERY       Family History   Problem Relation Age of Onset     Asthma Father     Asthma Sister     Asthma Brother     Cancer Maternal Aunt        Home Medications:  Prior to Admission medications    Medication Sig Start Date End Date Taking? Authorizing Provider   albuterol (PROVENTIL) (2.5 MG/3ML) 0.083% nebulizer solution Take 2.5 mg by nebulization Every 4 (Four) Hours As Needed for Wheezing or Shortness of Air. 8/27/24  Yes Vivien Jacobs APRN   aspirin 81 MG chewable tablet CHEW AND SWALLOW 1 TABLET BY MOUTH DAILY 5/31/24  Yes Jackeline Jaime APRN   atorvastatin (LIPITOR) 10 MG tablet TAKE 1 TABLET BY MOUTH DAILY 8/13/24  Yes Jackeline Jaime APRN   Jardiance 10 MG tablet tablet TAKE 1 TABLET BY MOUTH DAILY 8/30/24  Yes Jackeline Jaime APRN   metFORMIN (GLUCOPHAGE) 500 MG tablet TAKE 1 TABLET BY MOUTH TWICE DAILY WITH MEALS 9/24/24  Yes Jackeline Jaime APRN   omeprazole (priLOSEC) 20 MG capsule TAKE 1 CAPSULE BY MOUTH DAILY 5/3/24  Yes Jackeline Jaime APRN   Albuterol-Budesonide (Airsupra) 90-80 MCG/ACT aerosol Inhale 2 puffs Every 4 (Four) Hours As Needed (wheezing, soa). 8/20/24   Jackeline Jaime APRN   Budeson-Glycopyrrol-Formoterol (Breztri Aerosphere) 160-9-4.8 MCG/ACT aerosol inhaler Inhale 2 puffs 2 (Two) Times a Day. 6/4/24   Jackeline Jaime APRN   Tresiba FlexTouch 100 UNIT/ML solution pen-injector injection ADMINISTER 20 UNITS UNDER THE SKIN DAILY AS DIRECTED  Patient taking differently: Inject 20 Units under the skin into the appropriate area as directed Daily. 8/1/24   Jackeline Jaime APRN        Social History:   Social History     Tobacco Use    Smoking status: Former     Current packs/day: 0.00     Average packs/day: 1.5 packs/day for 56.2 years (84.3 ttl pk-yrs)     Types: Cigarettes     Start date: 1965     Quit date: 3/19/2021     Years since quitting: 3.5     Passive exposure: Past    Smokeless tobacco: Current     Types: Chew   Vaping Use    Vaping status: Never Used   Substance Use Topics    Alcohol use: Never    Drug use: Never         Review of  "Systems:  Review of Systems   Constitutional:  Negative for chills and fever.   HENT:  Negative for congestion, rhinorrhea and sore throat.    Eyes:  Negative for pain and visual disturbance.   Respiratory:  Positive for cough and shortness of breath. Negative for apnea and chest tightness.    Cardiovascular:  Negative for chest pain and palpitations.   Gastrointestinal:  Negative for abdominal pain, diarrhea, nausea and vomiting.   Genitourinary:  Negative for difficulty urinating and dysuria.   Musculoskeletal:  Negative for joint swelling and myalgias.   Skin:  Negative for color change.   Neurological:  Negative for seizures and headaches.   Psychiatric/Behavioral: Negative.     All other systems reviewed and are negative.       Physical Exam:  /85 (BP Location: Right arm, Patient Position: Lying)   Pulse 81   Temp 97.6 °F (36.4 °C) (Oral)   Resp 22   Ht 185.4 cm (73\")   Wt 113 kg (248 lb 0.3 oz)   SpO2 (!) 87%   BMI 32.72 kg/m²     Physical Exam  Vitals and nursing note reviewed.   Constitutional:       General: He is not in acute distress.     Appearance: Normal appearance. He is not toxic-appearing.   HENT:      Head: Normocephalic and atraumatic.      Jaw: There is normal jaw occlusion.   Eyes:      General: Lids are normal.      Extraocular Movements: Extraocular movements intact.      Conjunctiva/sclera: Conjunctivae normal.      Pupils: Pupils are equal, round, and reactive to light.   Cardiovascular:      Rate and Rhythm: Normal rate and regular rhythm.      Pulses: Normal pulses.      Heart sounds: Normal heart sounds.   Pulmonary:      Effort: Pulmonary effort is normal. No respiratory distress.      Breath sounds: Normal breath sounds. Wheezing present. No rhonchi.   Abdominal:      General: Abdomen is flat. There is no distension.      Palpations: Abdomen is soft.      Tenderness: There is no abdominal tenderness. There is no guarding or rebound.   Musculoskeletal:         General: " Normal range of motion.      Cervical back: Normal range of motion and neck supple.      Right lower leg: No edema.      Left lower leg: No edema.   Skin:     General: Skin is warm and dry.      Coloration: Skin is not cyanotic.   Neurological:      Mental Status: He is alert and oriented to person, place, and time. Mental status is at baseline.   Psychiatric:         Attention and Perception: Attention and perception normal.         Mood and Affect: Mood normal.                  Procedures:  Procedures      Medical Decision Making:      Comorbidities that affect care:    COPD    External Notes reviewed:    Hospital Discharge Summary: Patient was recently discharged for COPD exacerbation      The following orders were placed and all results were independently analyzed by me:  Orders Placed This Encounter   Procedures    COVID-19, FLU A/B, RSV PCR 1 HR TAT - Swab, Nasopharynx    XR Chest 1 View    [facility] Draw    Comprehensive Metabolic Panel    BNP    Single High Sensitivity Troponin T    CBC Auto Differential    Blood Gas, Arterial -    NPO Diet NPO Type: Strict NPO    Undress & Gown    Vital Signs    Continuous Pulse Oximetry    Code Status and Medical Interventions: CPR (Attempt to Resuscitate); Full Support    Inpatient Hospitalist Consult    Oxygen Therapy- Nasal Cannula; Titrate 1-6 LPM Per SpO2; 90 - 95%    NIPPV - Provider Settings    ECG 12 Lead ED Triage Standing Order; SOA    Insert Peripheral IV    Initiate Observation Status    CBC & Differential    Green Top (Gel)    Lavender Top    Gold Top - SST    Light Blue Top       Medications Given in the Emergency Department:  Medications   sodium chloride 0.9 % flush 10 mL (10 mL Intravenous Given 10/8/24 1350)   albuterol (PROVENTIL) nebulizer solution 0.083% 2.5 mg/3mL (7.5 mg Nebulization New Bag 10/8/24 1435)   methylPREDNISolone sodium succinate (SOLU-Medrol) 125 mg in sterile water (preservative free) 2 mL (125 mg Intravenous Given 10/8/24 1349)         ED Course:    ED Course as of 10/08/24 1632   Tue Oct 08, 2024   1231 -- PROVIDER IN TRIAGE NOTE ---    The patient was evaluated by me, MONE Dudley, in triage. Orders were placed and the patient is currently awaiting disposition.    [CB]      ED Course User Index  [CB] Radha Mcleod APRN       Labs:    Lab Results (last 24 hours)       Procedure Component Value Units Date/Time    CBC & Differential [[id number]]  (Abnormal) Collected: 10/08/24 1131    Specimen: Blood from Arm, Right Updated: 10/08/24 1137    Narrative:      The following orders were created for panel order CBC & Differential.  Procedure                               Abnormality         Status                     ---------                               -----------         ------                     CBC Auto Differential[[id number]]        Abnormal            Final result                 Please view results for these tests on the individual orders.    Comprehensive Metabolic Panel [[id number]]  (Abnormal) Collected: 10/08/24 1131    Specimen: Blood from Arm, Right Updated: 10/08/24 1158     Glucose 125 mg/dL      BUN 18 mg/dL      Creatinine 1.09 mg/dL      Sodium 143 mmol/L      Potassium 4.5 mmol/L      Chloride 104 mmol/L      CO2 30.9 mmol/L      Calcium 9.1 mg/dL      Total Protein 7.3 g/dL      Albumin 3.8 g/dL      ALT (SGPT) 10 U/L      AST (SGOT) 16 U/L      Alkaline Phosphatase 94 U/L      Total Bilirubin 0.3 mg/dL      Globulin 3.5 gm/dL      A/G Ratio 1.1 g/dL      BUN/Creatinine Ratio 16.5     Anion Gap 8.1 mmol/L      eGFR 70.8 mL/min/1.73     Narrative:      GFR Normal >60  Chronic Kidney Disease <60  Kidney Failure <15    The GFR formula is only valid for adults with stable renal function between ages 18 and 70.    BNP [[id number]]  (Normal) Collected: 10/08/24 1131    Specimen: Blood from Arm, Right Updated: 10/08/24 1154     proBNP 114.6 pg/mL     Narrative:      This assay is used as an aid in the diagnosis of  individuals suspected of having heart failure. It can be used as an aid in the diagnosis of acute decompensated heart failure (ADHF) in patients presenting with signs and symptoms of ADHF to the emergency department (ED). In addition, NT-proBNP of <300 pg/mL indicates ADHF is not likely.    Age Range Result Interpretation  NT-proBNP Concentration (pg/mL:      <50             Positive            >450                   Gray                 300-450                    Negative             <300    50-75           Positive            >900                  Gray                300-900                  Negative            <300      >75             Positive            >1800                  Gray                300-1800                  Negative            <300    Single High Sensitivity Troponin T [[id number]]  (Abnormal) Collected: 10/08/24 1131    Specimen: Blood from Arm, Right Updated: 10/08/24 1158     HS Troponin T 50 ng/L     Narrative:      High Sensitive Troponin T Reference Range:  <14.0 ng/L- Negative Female for AMI  <22.0 ng/L- Negative Male for AMI  >=14 - Abnormal Female indicating possible myocardial injury.  >=22 - Abnormal Male indicating possible myocardial injury.   Clinicians would have to utilize clinical acumen, EKG, Troponin, and serial changes to determine if it is an Acute Myocardial Infarction or myocardial injury due to an underlying chronic condition.         CBC Auto Differential [[id number]]  (Abnormal) Collected: 10/08/24 1131    Specimen: Blood from Arm, Right Updated: 10/08/24 1137     WBC 8.76 10*3/mm3      RBC 5.57 10*6/mm3      Hemoglobin 13.0 g/dL      Hematocrit 47.3 %      MCV 84.9 fL      MCH 23.3 pg      MCHC 27.5 g/dL      RDW 17.2 %      RDW-SD 51.8 fl      MPV 10.5 fL      Platelets 162 10*3/mm3      Neutrophil % 63.6 %      Lymphocyte % 17.1 %      Monocyte % 11.1 %      Eosinophil % 7.0 %      Basophil % 0.7 %      Immature Grans % 0.5 %      Neutrophils, Absolute 5.58 10*3/mm3       Lymphocytes, Absolute 1.50 10*3/mm3      Monocytes, Absolute 0.97 10*3/mm3      Eosinophils, Absolute 0.61 10*3/mm3      Basophils, Absolute 0.06 10*3/mm3      Immature Grans, Absolute 0.04 10*3/mm3      nRBC 0.0 /100 WBC     COVID-19, FLU A/B, RSV PCR 1 HR TAT - Swab, Nasopharynx [[id number]]  (Normal) Collected: 10/08/24 1349    Specimen: Swab from Nasopharynx Updated: 10/08/24 1500     COVID19 Not Detected     Influenza A PCR Not Detected     Influenza B PCR Not Detected     RSV, PCR Not Detected    Narrative:      Fact sheet for providers: https://www.fda.gov/media/103561/download    Fact sheet for patients: https://www.fda.gov/media/559928/download    Test performed by PCR.    Blood Gas, Arterial - [[id number]]  (Abnormal) Collected: 10/08/24 1417    Specimen: Arterial Blood Updated: 10/08/24 1422     Site Right Radial     Nael's Test N/A     pH, Arterial 7.283 pH units      pCO2, Arterial 69.0 mm Hg      pO2, Arterial 102.5 mm Hg      HCO3, Arterial 32.6 mmol/L      Base Excess, Arterial 3.4 mmol/L      Comment: Serial Number: [id number]:  [id number]        O2 Saturation, Arterial 96.7 %      Hemoglobin, Blood Gas 15.4 g/dL      Hematocrit, Blood Gas 45.0 %      Barometric Pressure for Blood Gas 744.0000 mmHg      Modality Cannula     Flow Rate 4.0000 lpm      Notified Who Dr Cross     Read Back Yes     Notified Time --     Hemodilution No             Imaging:    XR Chest 1 View    Result Date: 10/8/2024  XR CHEST 1 VW Date of Exam: 10/8/2024 11:08 AM EDT Indication: SOA Triage Protocol Comparison: August 15, 2024 Findings: The heart is not enlarged. There is some suggested volume loss left lung with probable underlying scarring in the lingula/left lower lobe. Emphysematous change suggested. The chest is similar to prior chest x-ray.     Impression: 1.Chronic appearing changes to the left hemithorax. Electronically Signed: Remberto Linda MD  10/8/2024 11:13 AM EDT  Workstation ID: [id number]       Differential  Diagnosis and Discussion:    Dyspnea: Differential diagnosis includes but is not limited to metabolic acidosis, neurological disorders, psychogenic, asthma, pneumothorax, upper airway obstruction, COPD, pneumonia, noncardiogenic pulmonary edema, interstitial lung disease, anemia, congestive heart failure, and pulmonary embolism    All labs were reviewed and interpreted by me.  All X-rays impressions were independently interpreted by me.  EKG was interpreted by me.    MDM       The patient´s CBC that was reviewed and interpreted by me shows no abnormalities of critical concern. Of note, there is no anemia requiring a blood transfusion and the platelet count is acceptable.  The patient´s CMP that was reviewed and interpretted by me shows no abnormalities of critical concern. Of note, the patient´s sodium and potassium are acceptable. The patient´s liver enzymes are unremarkable. The patient´s renal function (creatinine) is preserved. The patient has a normal anion gap.  ABG shows a pH of 7.2 and a pCO2 of 69.  Chest x-ray is negative for acute infiltrate.  Patient was placed on BiPAP.  Patient was also given an hour-long breathing treatment.    The patient presents with dyspnea.  Patient was placed on the cardiac monitor after given albuterol and placed on BiPAP.  They were monitored for ventricular ectopy, arrhythmia, tachycardia, hypoxia, and changes in blood pressure.  Continuous pulse oximetry was placed in waveform with corresponding oxygen saturation was assessed throughout their stay in the emergency department.  Patient was rechecked several times in the ED for decline in mental status and worsening distress.    Total Critical Care time of 45 minutes. Total critical care time documented does not include time spent on separately billed procedures for services of nurses or physician assistants. I personally saw and examined the patient. I have reviewed all diagnostic interpretations and treatment plans as  written. I was present for the key portions of any procedures performed and the inclusive time noted in any critical care statement. Critical care time includes patient management by me, time spent at the patients bedside,  time to review lab and imaging results, discussing patient care, documentation in the medical record, and time spent with family or caregiver.          Patient Care Considerations:    None      Consultants/Shared Management Plan:    Case was discussed with Dr. Franklin who agrees with admission.    Social Determinants of Health:    Patient is independent, reliable, and has access to care.       Disposition and Care Coordination:    Admit:   Through independent evaluation of the patient's history, physical, and imperical data, the patient meets criteria for inpatient admission to the hospital.        Final diagnoses:   COPD exacerbation        ED Disposition       ED Disposition   Decision to Admit    Condition   --    Comment   Level of Care: Med/Surg [1]   Diagnosis: COPD exacerbation [109588]   Admitting Physician: NICOLE FRANKLIN [[id number]]   Attending Physician: NICOLE FRANKLIN [[id number]]                 This medical record created using voice recognition software.             Devon Moyer MD  10/08/24 1633      Electronically signed by Devon Moyer MD at 10/08/24 1633       Physician Progress Notes (last 24 hours)  Notes from 10/08/24 1135 through 10/09/24 1135   No notes of this type exist for this encounter.       Consult Notes (last 24 hours)  Notes from 10/08/24 1135 through 10/09/24 1135   No notes of this type exist for this encounter.

## 2024-10-10 LAB
ANION GAP SERPL CALCULATED.3IONS-SCNC: 13.2 MMOL/L (ref 5–15)
BASOPHILS # BLD AUTO: 0.01 10*3/MM3 (ref 0–0.2)
BASOPHILS NFR BLD AUTO: 0.1 % (ref 0–1.5)
BUN SERPL-MCNC: 27 MG/DL (ref 8–23)
BUN/CREAT SERPL: 19.7 (ref 7–25)
CALCIUM SPEC-SCNC: 9.5 MG/DL (ref 8.6–10.5)
CHLORIDE SERPL-SCNC: 103 MMOL/L (ref 98–107)
CO2 SERPL-SCNC: 25.8 MMOL/L (ref 22–29)
CREAT SERPL-MCNC: 1.37 MG/DL (ref 0.76–1.27)
DEPRECATED RDW RBC AUTO: 49.6 FL (ref 37–54)
EGFRCR SERPLBLD CKD-EPI 2021: 53.8 ML/MIN/1.73
EOSINOPHIL # BLD AUTO: 0 10*3/MM3 (ref 0–0.4)
EOSINOPHIL NFR BLD AUTO: 0 % (ref 0.3–6.2)
ERYTHROCYTE [DISTWIDTH] IN BLOOD BY AUTOMATED COUNT: 16.6 % (ref 12.3–15.4)
GLUCOSE BLDC GLUCOMTR-MCNC: 117 MG/DL (ref 70–99)
GLUCOSE BLDC GLUCOMTR-MCNC: 152 MG/DL (ref 70–99)
GLUCOSE BLDC GLUCOMTR-MCNC: 197 MG/DL (ref 70–99)
GLUCOSE BLDC GLUCOMTR-MCNC: 202 MG/DL (ref 70–99)
GLUCOSE SERPL-MCNC: 124 MG/DL (ref 65–99)
HCT VFR BLD AUTO: 41.6 % (ref 37.5–51)
HGB BLD-MCNC: 11.4 G/DL (ref 13–17.7)
IMM GRANULOCYTES # BLD AUTO: 0.06 10*3/MM3 (ref 0–0.05)
IMM GRANULOCYTES NFR BLD AUTO: 0.4 % (ref 0–0.5)
LYMPHOCYTES # BLD AUTO: 0.9 10*3/MM3 (ref 0.7–3.1)
LYMPHOCYTES NFR BLD AUTO: 6.7 % (ref 19.6–45.3)
MAGNESIUM SERPL-MCNC: 2 MG/DL (ref 1.6–2.4)
MCH RBC QN AUTO: 22.7 PG (ref 26.6–33)
MCHC RBC AUTO-ENTMCNC: 27.4 G/DL (ref 31.5–35.7)
MCV RBC AUTO: 82.9 FL (ref 79–97)
MONOCYTES # BLD AUTO: 0.93 10*3/MM3 (ref 0.1–0.9)
MONOCYTES NFR BLD AUTO: 6.9 % (ref 5–12)
NEUTROPHILS NFR BLD AUTO: 11.57 10*3/MM3 (ref 1.7–7)
NEUTROPHILS NFR BLD AUTO: 85.9 % (ref 42.7–76)
NRBC BLD AUTO-RTO: 0 /100 WBC (ref 0–0.2)
PLATELET # BLD AUTO: 171 10*3/MM3 (ref 140–450)
PMV BLD AUTO: 11.3 FL (ref 6–12)
POTASSIUM SERPL-SCNC: 3.9 MMOL/L (ref 3.5–5.2)
RBC # BLD AUTO: 5.02 10*6/MM3 (ref 4.14–5.8)
SODIUM SERPL-SCNC: 142 MMOL/L (ref 136–145)
WBC NRBC COR # BLD AUTO: 13.47 10*3/MM3 (ref 3.4–10.8)

## 2024-10-10 PROCEDURE — 25010000002 ENOXAPARIN PER 10 MG: Performed by: INTERNAL MEDICINE

## 2024-10-10 PROCEDURE — 94761 N-INVAS EAR/PLS OXIMETRY MLT: CPT

## 2024-10-10 PROCEDURE — 94664 DEMO&/EVAL PT USE INHALER: CPT

## 2024-10-10 PROCEDURE — 99232 SBSQ HOSP IP/OBS MODERATE 35: CPT | Performed by: STUDENT IN AN ORGANIZED HEALTH CARE EDUCATION/TRAINING PROGRAM

## 2024-10-10 PROCEDURE — 85025 COMPLETE CBC W/AUTO DIFF WBC: CPT | Performed by: STUDENT IN AN ORGANIZED HEALTH CARE EDUCATION/TRAINING PROGRAM

## 2024-10-10 PROCEDURE — 25010000002 METHYLPREDNISOLONE PER 40 MG: Performed by: STUDENT IN AN ORGANIZED HEALTH CARE EDUCATION/TRAINING PROGRAM

## 2024-10-10 PROCEDURE — 80048 BASIC METABOLIC PNL TOTAL CA: CPT | Performed by: STUDENT IN AN ORGANIZED HEALTH CARE EDUCATION/TRAINING PROGRAM

## 2024-10-10 PROCEDURE — 94799 UNLISTED PULMONARY SVC/PX: CPT

## 2024-10-10 PROCEDURE — 63710000001 INSULIN LISPRO (HUMAN) PER 5 UNITS: Performed by: STUDENT IN AN ORGANIZED HEALTH CARE EDUCATION/TRAINING PROGRAM

## 2024-10-10 PROCEDURE — 97161 PT EVAL LOW COMPLEX 20 MIN: CPT

## 2024-10-10 PROCEDURE — 82948 REAGENT STRIP/BLOOD GLUCOSE: CPT | Performed by: STUDENT IN AN ORGANIZED HEALTH CARE EDUCATION/TRAINING PROGRAM

## 2024-10-10 PROCEDURE — 82948 REAGENT STRIP/BLOOD GLUCOSE: CPT

## 2024-10-10 PROCEDURE — 63710000001 INSULIN GLARGINE PER 5 UNITS: Performed by: INTERNAL MEDICINE

## 2024-10-10 PROCEDURE — 83735 ASSAY OF MAGNESIUM: CPT | Performed by: STUDENT IN AN ORGANIZED HEALTH CARE EDUCATION/TRAINING PROGRAM

## 2024-10-10 RX ADMIN — ATORVASTATIN CALCIUM 10 MG: 10 TABLET, FILM COATED ORAL at 21:51

## 2024-10-10 RX ADMIN — AZITHROMYCIN DIHYDRATE 250 MG: 250 TABLET ORAL at 08:43

## 2024-10-10 RX ADMIN — METHYLPREDNISOLONE SODIUM SUCCINATE 40 MG: 40 INJECTION, POWDER, FOR SOLUTION INTRAMUSCULAR; INTRAVENOUS at 08:43

## 2024-10-10 RX ADMIN — ENOXAPARIN SODIUM 40 MG: 100 INJECTION SUBCUTANEOUS at 08:43

## 2024-10-10 RX ADMIN — METFORMIN HYDROCHLORIDE 500 MG: 500 TABLET, FILM COATED ORAL at 08:42

## 2024-10-10 RX ADMIN — IPRATROPIUM BROMIDE AND ALBUTEROL SULFATE 3 ML: .5; 3 SOLUTION RESPIRATORY (INHALATION) at 23:46

## 2024-10-10 RX ADMIN — METHYLPREDNISOLONE SODIUM SUCCINATE 40 MG: 40 INJECTION, POWDER, FOR SOLUTION INTRAMUSCULAR; INTRAVENOUS at 17:49

## 2024-10-10 RX ADMIN — Medication 10 ML: at 21:51

## 2024-10-10 RX ADMIN — EMPAGLIFLOZIN 10 MG: 10 TABLET, FILM COATED ORAL at 08:43

## 2024-10-10 RX ADMIN — ASPIRIN 81 MG: 81 TABLET, CHEWABLE ORAL at 08:43

## 2024-10-10 RX ADMIN — IPRATROPIUM BROMIDE AND ALBUTEROL SULFATE 3 ML: .5; 3 SOLUTION RESPIRATORY (INHALATION) at 03:03

## 2024-10-10 RX ADMIN — IPRATROPIUM BROMIDE AND ALBUTEROL SULFATE 3 ML: .5; 3 SOLUTION RESPIRATORY (INHALATION) at 20:10

## 2024-10-10 RX ADMIN — IPRATROPIUM BROMIDE AND ALBUTEROL SULFATE 3 ML: .5; 3 SOLUTION RESPIRATORY (INHALATION) at 14:45

## 2024-10-10 RX ADMIN — INSULIN LISPRO 3 UNITS: 100 INJECTION, SOLUTION INTRAVENOUS; SUBCUTANEOUS at 12:01

## 2024-10-10 RX ADMIN — INSULIN GLARGINE 15 UNITS: 100 INJECTION, SOLUTION SUBCUTANEOUS at 21:51

## 2024-10-10 RX ADMIN — INSULIN LISPRO 2 UNITS: 100 INJECTION, SOLUTION INTRAVENOUS; SUBCUTANEOUS at 17:49

## 2024-10-10 RX ADMIN — Medication 10 ML: at 08:43

## 2024-10-10 RX ADMIN — METFORMIN HYDROCHLORIDE 500 MG: 500 TABLET, FILM COATED ORAL at 17:49

## 2024-10-10 RX ADMIN — PANTOPRAZOLE SODIUM 40 MG: 40 TABLET, DELAYED RELEASE ORAL at 05:14

## 2024-10-10 RX ADMIN — IPRATROPIUM BROMIDE AND ALBUTEROL SULFATE 3 ML: .5; 3 SOLUTION RESPIRATORY (INHALATION) at 10:49

## 2024-10-10 RX ADMIN — IPRATROPIUM BROMIDE AND ALBUTEROL SULFATE 3 ML: .5; 3 SOLUTION RESPIRATORY (INHALATION) at 06:55

## 2024-10-10 NOTE — THERAPY EVALUATION
Acute Care - Physical Therapy Initial Evaluation   [place]     Patient Name: Dilip Faulkner  : 1950  MRN: [id number]  Today's Date: 10/10/2024      Visit Dx:     ICD-10-CM ICD-9-CM   1. COPD exacerbation  J44.1 491.21   2. Chronic obstructive pulmonary disease, unspecified COPD type  J44.9 496   3. Difficulty in walking  R26.2 719.7     Patient Active Problem List   Diagnosis    Chronic obstructive pulmonary disease with acute exacerbation    Type 2 diabetes mellitus    Essential hypertension    Hyperlipidemia    Cough    Pneumonia due to infectious organism    COPD with acute exacerbation    Obesity (BMI 30-39.9)    Right bundle branch block    Acquired ptosis of eyelid, bilateral    Type 2 diabetes mellitus with hyperglycemia, with long-term current use of insulin    Acute exacerbation of chronic obstructive pulmonary disease (COPD)    Respiratory failure with hypoxia and hypercapnia    Gastroesophageal reflux disease without esophagitis    COPD (chronic obstructive pulmonary disease)    Noncompliance with CPAP treatment    Unstable angina    Aspiration pneumonia of left lower lobe due to gastric secretions    PSVT (paroxysmal supraventricular tachycardia)    COPD exacerbation     Past Medical History:   Diagnosis Date    Asthma     COPD (chronic obstructive pulmonary disease)     Diabetes mellitus     Ex-smoker     QUIT     Hernia, umbilical     Hypertension     On home O2     REPORTS WEARING 2L/NC PRN SOA     Past Surgical History:   Procedure Laterality Date    ABDOMINAL SURGERY       PT Assessment (Last 12 Hours)       PT Evaluation and Treatment       Row Name 10/10/24 1224          Physical Therapy Time and Intention    Subjective Information complains of;weakness;fatigue;dizziness (P)   -KL     Document Type evaluation (P)   -KL     Mode of Treatment individual therapy;physical therapy (P)   -KL     Total Minutes, Physical Therapy 30 (P)   -KL     Patient Effort fair (P)   -KL     Symptoms  Noted During/After Treatment fatigue (P)   -       Row Name 10/10/24 1224          General Information    Patient Profile Reviewed yes (P)   -KL     Patient Observations cooperative;agree to therapy;lethargic (P)   -KL     Prior Level of Function independent: (P)   -KL     Equipment Currently Used at Home cane, straight;other (see comments) (P)   -KL     Existing Precautions/Restrictions no known precautions/restrictions (P)   -KL     Risks Reviewed patient: (P)   -KL     Benefits Reviewed patient: (P)   -KL     Barriers to Rehab none identified (P)   -       Row Name 10/10/24 1224          Previous Level of Function/Home Environm    Bathing/Grooming, Premorbid Functional Level independent (P)   -KL     Dressing, Premorbid Functional Level independent (P)   -KL     Eating/Feeding, Premorbid Functional Level independent (P)   -KL     Toileting, Premorbid Functional Level independent (P)   -KL     BADLs, Premorbid Functional Level independent (P)   -KL     IADLs, Premorbid Functional Level independent (P)   -KL     Bed Mobility, Premorbid Functional Level independent (P)   -KL     Transfers, Premorbid Functional Level independent (P)   -KL     Household Ambulation, Premorbid Functional Level uses device or equipment (P)   -KL     Stairs, Premorbid Functional Level independent (P)   -KL     Community Ambulation, Premorbid Functional Level uses device or equipment (P)   -       Row Name 10/10/24 1224          Living Environment    Current Living Arrangements apartment (P)   -KL     People in Home sibling(s) (P)   -KL     Primary Care Provided by self (P)   -       Row Name 10/10/24 1224          Home Use of Assistive/Adaptive Equipment    Equipment Currently Used at Home oxygen;bipap;cane, straight (P)   -       Row Name 10/10/24 1224          Range of Motion (ROM)    Range of Motion ROM is WFL (P)   -       Row Name 10/10/24 1224          Strength (Manual Muscle Testing)    Strength (Manual Muscle Testing)  other (see comments) (P)   Pt scored 5/5 for bilateral hip flex, knee flex, knee ext, ankle DF MMTs  -       Row Name 10/10/24 1224          Bed Mobility    Bed Mobility bed mobility (all) activities (P)   -KL     All Activities, [place] (Bed Mobility) contact guard (P)   -       Row Name 10/10/24 1224          Transfers    Transfers sit-stand transfer;stand-sit transfer (P)   -KL     Maintains Weight-bearing Status (Transfers) able to maintain (P)   -KL       Row Name 10/10/24 1224          Sit-Stand Transfer    Sit-Stand [place] (Transfers) contact guard (P)   -KL     Assistive Device (Sit-Stand Transfers) walker, front-wheeled (P)   -       Row Name 10/10/24 1224          Stand-Sit Transfer    Stand-Sit [place] (Transfers) contact guard (P)   -KL     Assistive Device (Stand-Sit Transfers) walker, front-wheeled (P)   -       Row Name 10/10/24 1224          Gait/Stairs (Locomotion)    Gait/Stairs Locomotion gait/ambulation assistive device (P)   -KL     [place] Level (Gait) contact guard (P)   -KL     Assistive Device (Gait) walker, front-wheeled (P)   -KL     Patient was able to Ambulate yes (P)   -KL     Distance in Feet (Gait) 40 (P)   -KL     Pattern (Gait) 2-point (P)   -KL     Deviations/Abnormal Patterns (Gait) stride length decreased;weight shifting decreased;gait speed decreased (P)   -       Row Name 10/10/24 1224          Safety Issues, Functional Mobility    Impairments Affecting Function (Mobility) balance;cognition;coordination;motor control;pain;range of motion (ROM);shortness of breath;strength (P)   -KL     Cognitive Impairments, Mobility Safety/Performance attention;impulsivity;judgment (P)   -       Row Name 10/10/24 1224          Balance    Balance Assessment standing dynamic balance (P)   -KL     Dynamic Standing Balance contact guard (P)   -KL     Position/Device Used, Standing Balance walker, front-wheeled (P)   -KL     Balance Interventions standing (P)   -KL        Row Name 10/10/24 1224          Plan of Care Review    Plan of Care Reviewed With patient (P)   -KL     Progress improving (P)   -KL     Outcome Evaluation Pt presents to treatment with complaints of confusion and weakness in BLE. Was able to ambulate with minimal difficulty and slight complaints of fatigue. Pt would continue to benefit from the use of skilled physical therapy to address BLE strength and muscular endurance deficits required for ambulation. (P)   -       Row Name 10/10/24 1224          Positioning and Restraints    Pre-Treatment Position in bed (P)   -KL     Post Treatment Position bed (P)   -KL     In Bed supine (P)   -       Row Name 10/10/24 1224          Therapy Assessment/Plan (PT)    Rehab Potential (PT) good, to achieve stated therapy goals (P)   -     Criteria for Skilled Interventions Met (PT) yes (P)   -KL     Therapy Frequency (PT) daily (P)   -KL     Predicted Duration of Therapy Intervention (PT) 10 days (P)   -KL     Problem List (PT) problems related to;balance;cognition;coordination;mobility;range of motion (ROM);strength;pain;postural control (P)   -KL     Activity Limitations Related to Problem List (PT) unable to ambulate safely (P)   -       Row Name 10/10/24 1224          Therapy Plan Review/Discharge Plan (PT)    Therapy Plan Review (PT) evaluation/treatment results reviewed (P)   -       Row Name 10/10/24 1224          Physical Therapy Goals    Transfer Goal Selection (PT) transfer, PT goal 1 (P)   -KL     Gait Training Goal Selection (PT) gait training, PT goal 1 (P)   -KL     Strength Goal Selection (PT) strength, PT goal 1 (P)   -       Row Name 10/10/24 1224          Transfer Goal 1 (PT)    Activity/Assistive Device (Transfer Goal 1, PT) transfers, all (P)   -KL     [place] Level/Cues Needed (Transfer Goal 1, PT) independent (P)   -KL     Time Frame (Transfer Goal 1, PT) long term goal (LTG);10 days (P)   -       Row Name 10/10/24 1224          Gait  Training Goal 1 (PT)    Activity/Assistive Device (Gait Training Goal 1, PT) assistive device use (P)   -     [place] Level (Gait Training Goal 1, PT) independent (P)   -KL     Distance (Gait Training Goal 1, PT) 250 ft (P)   -     Time Frame (Gait Training Goal 1, PT) long term goal (LTG);10 days (P)   -       Row Name 10/10/24 1224          Strength Goal 1 (PT)    Strength Goal 1 (PT) Pt will achieve score of 5/5 for bilateral hip flex MMTs (P)   -     Time Frame (Strength Goal 1, PT) long term goal (LTG);10 days (P)   -               User Key  (r) = Recorded By, (t) = Taken By, (c) = Cosigned By      Initials Name Provider Type    Yoselin Allison, PT Student PT Student                    Physical Therapy Education       Title: PT OT SLP Therapies (Done)       Topic: Physical Therapy (Done)       Point: Mobility training (Done)       Learning Progress Summary             Patient Acceptance, E,TB, VU by  at 10/10/2024 1228                         Point: Home exercise program (Done)       Learning Progress Summary             Patient Acceptance, E,TB, VU by  at 10/10/2024 1228                         Point: Body mechanics (Done)       Learning Progress Summary             Patient Acceptance, E,TB, VU by  at 10/10/2024 1228                         Point: Precautions (Done)       Learning Progress Summary             Patient Acceptance, E,TB, VU by  at 10/10/2024 1228                                         User Key       Initials Effective Dates Name Provider Type [facility]    GLYNN 08/27/24 -  Yoselin Martinez, PT Student PT Student PT                  PT Recommendation and Plan  Anticipated Discharge Disposition (PT): (P) home with home health  Planned Therapy Interventions (PT): (P) balance training, bed mobility training, gait training, home exercise program, postural re-education, ROM (range of motion), stair training, strengthening, stretching, transfer training  Therapy Frequency (PT): (P)  daily  Plan of Care Reviewed With: (P) patient  Progress: (P) improving  Outcome Evaluation: (P) Pt presents to treatment with complaints of confusion and weakness in BLE. Was able to ambulate with minimal difficulty and slight complaints of fatigue. Pt would continue to benefit from the use of skilled physical therapy to address BLE strength and muscular endurance deficits required for ambulation.   Outcome Measures       Row Name 10/10/24 1200             How much help from another person do you currently need...    Turning from your back to your side while in flat bed without using bedrails? 4 (P)   -KL      Moving from lying on back to sitting on the side of a flat bed without bedrails? 4 (P)   -KL      Moving to and from a bed to a chair (including a wheelchair)? 3 (P)   -KL      Standing up from a chair using your arms (e.g., wheelchair, bedside chair)? 3 (P)   -KL      Climbing 3-5 steps with a railing? 2 (P)   -KL      To walk in hospital room? 3 (P)   -KL      AM-PAC 6 Clicks Score (PT) 19 (P)   -KL      Highest Level of Mobility Goal 6 --> Walk 10 steps or more (P)   -KL         Functional Assessment    Outcome Measure Options AM-PAC 6 Clicks Basic Mobility (PT) (P)   -                User Key  (r) = Recorded By, (t) = Taken By, (c) = Cosigned By      Initials Name Provider Type    Yoselin Allison, PT Student PT Student                     Time Calculation:    PT Charges       Row Name 10/10/24 1224             Time Calculation    PT Received On 10/10/24 (P)   -      PT Goal Re-Cert Due Date 10/19/24 (P)   -KL         Time Calculation- PT    Total Timed Code Minutes- PT 30 minute(s) (P)   -KL         Untimed Charges    PT Eval/Re-eval Minutes 30 (P)   -KL         Total Minutes    Untimed Charges Total Minutes 30 (P)   -KL       Total Minutes 30 (P)   -                User Key  (r) = Recorded By, (t) = Taken By, (c) = Cosigned By      Initials Name Provider Type    Yoselin Allison, PT Student PT  Student                  Therapy Charges for Today       Code Description Service Date Service Provider Modifiers Qty    [id number] HC PT EVAL LOW COMPLEXITY 3 10/10/2024 Yoselin Martinez, PT Student GP 1            PT G-Codes  Outcome Measure Options: (P) AM-PAC 6 Clicks Basic Mobility (PT)  AM-PAC 6 Clicks Score (PT): (P) 19    Yoselin Martinez PT Student  10/10/2024

## 2024-10-10 NOTE — SIGNIFICANT NOTE
10/09/24 1215   Coping/Psychosocial   Observed Emotional State calm;cooperative   Verbalized Emotional State relief;hopefulness   Trust Relationship/Rapport empathic listening provided   Involvement in Care interacting with patient   Additional Documentation Spiritual Care (Group)   Spiritual Care   Use of Spiritual Resources non-Shinto use of spiritual care   Spiritual Care Source  initiative   Spiritual Care Follow-Up follow-up, none required as presently assessed   Response to Spiritual Care receptive of support   Spiritual Care Interventions supportive conversation provided   Spiritual Care Visit Type initial   Receptivity to Spiritual Care visit welcomed

## 2024-10-10 NOTE — PROGRESS NOTES
Respiratory Therapist Broncho-Pulmonary Hygiene Progress Note      Patient Name:  Dilip Faulkner  YOB: 1950    Dilip Faulkner meets the qualification for Level 1 of the Bronco-Pulmonary Hygiene Protocol. This was based on my daily patient assessment and includes review of chest x-ray results, cough ability and quality, oxygenation, secretions or risk for secretion development and patient mobility.     Broncho-Pulmonary Hygiene Assessment:    Level of Movement: Actively changing positions without assistance  Alert/ oriented/ cooperative    Breath Sounds: Clear to slightly diminished w/ expiratory wheezing    Cough: Strong, effective    Chest X-Ray: Possible signs of consolidation and/or atelectasis or clear.     Sputum Productions: None or small amount of thin or watery secretions with effective cough    History and Physical: New onset of bronchitis or existing chronic pulmonary conditions.  **(not in an exacerbation)    SpO2 to Oxygen Need: greater than 92% on room air or  less than 3L nasal canula    Current SpO2 is: 92% on 2lpm (home O2 level)    Based on this information, I have completed the following interventions: Teach/Instruct patient on cough and deep breathe      Electronically signed by Sujata Crane RRT, 10/10/24, 8:17 AM EDT.

## 2024-10-10 NOTE — PROGRESS NOTES
[facility]   Hospitalist Progress Note  Date: 10/10/2024  Patient Name: Dilip Faulkner  : 1950  MRN: [id number]  Date of admission: 10/8/2024      Subjective   Subjective     Chief Complaint: Shortness of breath    Summary: 74-year-old male past medical history of COPD on 2 L nasal cannula, type 2 diabetes, hypertension who presented to the ER due to worsening exertional dyspnea for 3 days.  Upon arrival patient had diffuse wheezing and concern for COPD exacerbation.  He was treated with steroids and nebulizers and BiPAP also ordered for hypercapnic respiratory failure.  Patient refused BiPAP in the ER and was subsequent admitted to the hospitalist service.  He has remained on steroids and scheduled nebulizers with slow improvement in his breathing.    Interval Followup: No events overnight.  Patient continues to refuse BiPAP overnight despite frequent counseling.  Patient ambulated around the hallway with PT with doubt need for oxygen today however is significantly dyspneic after and saturating 93%.  Denies having any worsening chest pain or cough.  Continues to deny BiPAP        Objective   Objective     Vitals:   Temp:  [97.3 °F (36.3 °C)-98.2 °F (36.8 °C)] 97.7 °F (36.5 °C)  Heart Rate:  [57-92] 82  Resp:  [18-22] 18  BP: (110-122)/(46-72) 111/57  Flow (L/min):  [3.5] 3.5  Physical Exam    Constitutional: Awake, alert, no acute distress   Respiratory: Clear to auscultation bilaterally, nonlabored respirations    Cardiovascular: RRR, no murmurs, rubs, or gallops, palpable pedal pulses bilaterally   Gastrointestinal: Positive bowel sounds, soft, nontender, nondistended   Neurologic: Oriented x 3, strength symmetric in all extremities, Cranial Nerves grossly intact to confrontation, speech clear   Skin: No rashes     Result Review    Result Review:  I have personally reviewed the following results and agree with these findings:  [x]  Laboratory  [x]  Microbiology  [x]  Radiology  [x]  EKG/Telemetry    []  Cardiology/Vascular   []  Pathology  [x]  Old records  []  Other:    Assessment & Plan   Assessment / Plan     Assessment/Plan:  Acute hypercapnic respiratory failure secondary to COPD exacerbation  Acute COPD exacerbation  Elevated troponin likely secondary to demand ischemia due to above process  Hypertension  Hyperlipidemia  Type 2 diabetes    Plan  - Remains admitted to the hospital service patient was counseled extensively on the need for BiPAP use given his hypercapnic respiratory failure but continues to refuse.  - Patient remains significantly dyspneic despite IV steroids, continue scheduled bronchodilators, steroids and azithromycin for total 5 days  - Troponin elevation is likely nonischemic in nature as EKG personally viewed did not show any acute ST elevations or depressions, 2 additional troponins actually showed a downtrend.  No concern for cardiac ischemia at this time  - Continue home antihypertensive  - Continue home statin  - Basal bolus insulin regimen with correction insulin shows good control.  Will continue current regimen  Discussed plan with RN.    Dispo: Patient remains admitted for COPD exacerbation with hypercapnia.  PT eval completed, appreciate recommendations for discharge needs.  Remains admitted due to significant dyspnea requiring IV steroids    VTE Prophylaxis:  Pharmacologic VTE prophylaxis orders are present.        CODE STATUS:   Level Of Support Discussed With: Patient  Code Status (Patient has no pulse and is not breathing): CPR (Attempt to Resuscitate)  Medical Interventions (Patient has pulse or is breathing): Full Support        Electronically signed by Ammon Blair MD, 10/09/24, 1:46 PM EDT.

## 2024-10-10 NOTE — CASE MANAGEMENT/SOCIAL WORK
Home COPD management discussed with Mr. Faulkner. Patient states he is mostly adherent with home oxygen use and little else. Mr. Faulkner has been advised on numerous encounters to wear his bipap a minimum of 4 hours/day but patient states he simply cannot tolerate it. Mr. Faulkner does say he takes a nebulizer treatment twice/day but cannot tell me the name of what he is nebulizing. Patient does have vision challenges. [facility] [place] pharmacy does confirm patient last ordered nebulizer brovana and budesonide in  of this year and the order is now . He will need a new order. Patient also states he is need of another rescue MDI.

## 2024-10-10 NOTE — PROGRESS NOTES
RT EQUIPMENT DEVICE RELATED - SKIN ASSESSMENT    RT Medical Equipment/Device:     NIV Mask:  Under-the-nose   size:  C    Skin Assessment:      Cheek:  Intact  Chin:  Intact  Neck:  Intact  Nose:  Intact  Mouth:  Intact    Device Skin Pressure Protection:  Positioning supports utilized    Nurse Notification:  No Patient has been refusing NIV    Sujata Crane, RRT

## 2024-10-11 ENCOUNTER — APPOINTMENT (OUTPATIENT)
Dept: CT IMAGING | Facility: HOSPITAL | Age: 74
DRG: 190 | End: 2024-10-11
Payer: MEDICARE

## 2024-10-11 LAB
ANION GAP SERPL CALCULATED.3IONS-SCNC: 15.4 MMOL/L (ref 5–15)
ARTERIAL PATENCY WRIST A: POSITIVE
ATMOSPHERIC PRESS: 748.9 MMHG
BASE EXCESS BLDA CALC-SCNC: 4.9 MMOL/L (ref -2–2)
BASOPHILS # BLD AUTO: 0.02 10*3/MM3 (ref 0–0.2)
BASOPHILS NFR BLD AUTO: 0.1 % (ref 0–1.5)
BDY SITE: ABNORMAL
BUN SERPL-MCNC: 28 MG/DL (ref 8–23)
BUN/CREAT SERPL: 20.3 (ref 7–25)
CALCIUM SPEC-SCNC: 9.5 MG/DL (ref 8.6–10.5)
CHLORIDE SERPL-SCNC: 100 MMOL/L (ref 98–107)
CO2 SERPL-SCNC: 23.6 MMOL/L (ref 22–29)
CREAT SERPL-MCNC: 1.38 MG/DL (ref 0.76–1.27)
DEPRECATED RDW RBC AUTO: 49.2 FL (ref 37–54)
EGFRCR SERPLBLD CKD-EPI 2021: 53.3 ML/MIN/1.73
EOSINOPHIL # BLD AUTO: 0 10*3/MM3 (ref 0–0.4)
EOSINOPHIL NFR BLD AUTO: 0 % (ref 0.3–6.2)
ERYTHROCYTE [DISTWIDTH] IN BLOOD BY AUTOMATED COUNT: 16.7 % (ref 12.3–15.4)
GLUCOSE BLDC GLUCOMTR-MCNC: 115 MG/DL (ref 70–99)
GLUCOSE BLDC GLUCOMTR-MCNC: 126 MG/DL (ref 70–99)
GLUCOSE BLDC GLUCOMTR-MCNC: 141 MG/DL (ref 70–99)
GLUCOSE SERPL-MCNC: 92 MG/DL (ref 65–99)
HCO3 BLDA-SCNC: 30.5 MMOL/L (ref 22–26)
HCT VFR BLD AUTO: 42.5 % (ref 37.5–51)
HCT VFR BLD CALC: 41 % (ref 38–51)
HEMODILUTION: NO
HGB BLD-MCNC: 12 G/DL (ref 13–17.7)
HGB BLDA-MCNC: 14.1 G/DL (ref 12–18)
IMM GRANULOCYTES # BLD AUTO: 0.1 10*3/MM3 (ref 0–0.05)
IMM GRANULOCYTES NFR BLD AUTO: 0.7 % (ref 0–0.5)
INHALED O2 CONCENTRATION: 30 %
LYMPHOCYTES # BLD AUTO: 1.29 10*3/MM3 (ref 0.7–3.1)
LYMPHOCYTES NFR BLD AUTO: 9.4 % (ref 19.6–45.3)
MAGNESIUM SERPL-MCNC: 1.8 MG/DL (ref 1.6–2.4)
MCH RBC QN AUTO: 22.9 PG (ref 26.6–33)
MCHC RBC AUTO-ENTMCNC: 28.2 G/DL (ref 31.5–35.7)
MCV RBC AUTO: 81.3 FL (ref 79–97)
MODALITY: ABNORMAL
MONOCYTES # BLD AUTO: 0.87 10*3/MM3 (ref 0.1–0.9)
MONOCYTES NFR BLD AUTO: 6.3 % (ref 5–12)
NEUTROPHILS NFR BLD AUTO: 11.49 10*3/MM3 (ref 1.7–7)
NEUTROPHILS NFR BLD AUTO: 83.5 % (ref 42.7–76)
NRBC BLD AUTO-RTO: 0 /100 WBC (ref 0–0.2)
PCO2 BLDA: 47.5 MM HG (ref 35–45)
PH BLDA: 7.42 PH UNITS (ref 7.35–7.45)
PLATELET # BLD AUTO: 166 10*3/MM3 (ref 140–450)
PMV BLD AUTO: 10.4 FL (ref 6–12)
PO2 BLD: 260 MM[HG] (ref 0–500)
PO2 BLDA: 78 MM HG (ref 80–100)
POTASSIUM SERPL-SCNC: 4.6 MMOL/L (ref 3.5–5.2)
RBC # BLD AUTO: 5.23 10*6/MM3 (ref 4.14–5.8)
SAO2 % BLDCOA: 95.4 % (ref 95–99)
SODIUM SERPL-SCNC: 139 MMOL/L (ref 136–145)
WBC NRBC COR # BLD AUTO: 13.77 10*3/MM3 (ref 3.4–10.8)

## 2024-10-11 PROCEDURE — 36600 WITHDRAWAL OF ARTERIAL BLOOD: CPT

## 2024-10-11 PROCEDURE — 25010000002 LORAZEPAM PER 2 MG: Performed by: FAMILY MEDICINE

## 2024-10-11 PROCEDURE — 99232 SBSQ HOSP IP/OBS MODERATE 35: CPT | Performed by: STUDENT IN AN ORGANIZED HEALTH CARE EDUCATION/TRAINING PROGRAM

## 2024-10-11 PROCEDURE — 82948 REAGENT STRIP/BLOOD GLUCOSE: CPT

## 2024-10-11 PROCEDURE — 94799 UNLISTED PULMONARY SVC/PX: CPT

## 2024-10-11 PROCEDURE — 83735 ASSAY OF MAGNESIUM: CPT | Performed by: STUDENT IN AN ORGANIZED HEALTH CARE EDUCATION/TRAINING PROGRAM

## 2024-10-11 PROCEDURE — 94761 N-INVAS EAR/PLS OXIMETRY MLT: CPT

## 2024-10-11 PROCEDURE — 80048 BASIC METABOLIC PNL TOTAL CA: CPT | Performed by: STUDENT IN AN ORGANIZED HEALTH CARE EDUCATION/TRAINING PROGRAM

## 2024-10-11 PROCEDURE — 94664 DEMO&/EVAL PT USE INHALER: CPT

## 2024-10-11 PROCEDURE — 82948 REAGENT STRIP/BLOOD GLUCOSE: CPT | Performed by: STUDENT IN AN ORGANIZED HEALTH CARE EDUCATION/TRAINING PROGRAM

## 2024-10-11 PROCEDURE — 85025 COMPLETE CBC W/AUTO DIFF WBC: CPT | Performed by: STUDENT IN AN ORGANIZED HEALTH CARE EDUCATION/TRAINING PROGRAM

## 2024-10-11 PROCEDURE — 63710000001 INSULIN GLARGINE PER 5 UNITS: Performed by: INTERNAL MEDICINE

## 2024-10-11 PROCEDURE — 82803 BLOOD GASES ANY COMBINATION: CPT

## 2024-10-11 PROCEDURE — 25010000002 ENOXAPARIN PER 10 MG: Performed by: INTERNAL MEDICINE

## 2024-10-11 PROCEDURE — 70450 CT HEAD/BRAIN W/O DYE: CPT

## 2024-10-11 PROCEDURE — 25010000002 HALOPERIDOL LACTATE PER 5 MG: Performed by: FAMILY MEDICINE

## 2024-10-11 RX ORDER — HALOPERIDOL 5 MG/ML
5 INJECTION INTRAMUSCULAR ONCE
Status: COMPLETED | OUTPATIENT
Start: 2024-10-11 | End: 2024-10-11

## 2024-10-11 RX ORDER — LORAZEPAM 2 MG/ML
1 INJECTION INTRAMUSCULAR ONCE
Status: COMPLETED | OUTPATIENT
Start: 2024-10-11 | End: 2024-10-11

## 2024-10-11 RX ORDER — PREDNISONE 20 MG/1
40 TABLET ORAL
Status: DISCONTINUED | OUTPATIENT
Start: 2024-10-12 | End: 2024-10-12 | Stop reason: HOSPADM

## 2024-10-11 RX ADMIN — INSULIN GLARGINE 15 UNITS: 100 INJECTION, SOLUTION SUBCUTANEOUS at 21:04

## 2024-10-11 RX ADMIN — ASPIRIN 81 MG: 81 TABLET, CHEWABLE ORAL at 11:07

## 2024-10-11 RX ADMIN — IPRATROPIUM BROMIDE AND ALBUTEROL SULFATE 3 ML: .5; 3 SOLUTION RESPIRATORY (INHALATION) at 15:29

## 2024-10-11 RX ADMIN — HALOPERIDOL LACTATE 5 MG: 5 INJECTION, SOLUTION INTRAMUSCULAR at 03:00

## 2024-10-11 RX ADMIN — IPRATROPIUM BROMIDE AND ALBUTEROL SULFATE 3 ML: .5; 3 SOLUTION RESPIRATORY (INHALATION) at 23:47

## 2024-10-11 RX ADMIN — LORAZEPAM 1 MG: 2 INJECTION INTRAMUSCULAR; INTRAVENOUS at 01:13

## 2024-10-11 RX ADMIN — ENOXAPARIN SODIUM 40 MG: 100 INJECTION SUBCUTANEOUS at 11:07

## 2024-10-11 RX ADMIN — Medication 10 ML: at 21:04

## 2024-10-11 RX ADMIN — IPRATROPIUM BROMIDE AND ALBUTEROL SULFATE 3 ML: .5; 3 SOLUTION RESPIRATORY (INHALATION) at 11:43

## 2024-10-11 RX ADMIN — EMPAGLIFLOZIN 10 MG: 10 TABLET, FILM COATED ORAL at 11:07

## 2024-10-11 RX ADMIN — IPRATROPIUM BROMIDE AND ALBUTEROL SULFATE 3 ML: .5; 3 SOLUTION RESPIRATORY (INHALATION) at 06:54

## 2024-10-11 RX ADMIN — IPRATROPIUM BROMIDE AND ALBUTEROL SULFATE 3 ML: .5; 3 SOLUTION RESPIRATORY (INHALATION) at 18:20

## 2024-10-11 RX ADMIN — IPRATROPIUM BROMIDE AND ALBUTEROL SULFATE 3 ML: .5; 3 SOLUTION RESPIRATORY (INHALATION) at 04:04

## 2024-10-11 RX ADMIN — Medication 10 ML: at 11:08

## 2024-10-11 RX ADMIN — AZITHROMYCIN DIHYDRATE 250 MG: 250 TABLET ORAL at 11:07

## 2024-10-11 RX ADMIN — METFORMIN HYDROCHLORIDE 500 MG: 500 TABLET, FILM COATED ORAL at 18:27

## 2024-10-11 NOTE — NURSING NOTE
Patient confused, agitated, and hallucinating. Refusing to stay in the bed or chair, pulling at lines, equipment, and IV's. Placed in 2 point restraints per MD order, family notified.

## 2024-10-11 NOTE — NURSING NOTE
Discontinued safety sitter at 1700. Patient appears to be resting in bed. Arouses to voice, follows commands, calm, cooperative, redirectable. No new issues at this time.

## 2024-10-11 NOTE — PROGRESS NOTES
RT EQUIPMENT DEVICE RELATED - SKIN ASSESSMENT    RT Medical Equipment/Device:     NIV Mask:  Under-the-nose   size:  C    Skin Assessment:      Cheek:  Intact  Chin:  Intact  Nose:  Intact    Device Skin Pressure Protection:  Positioning supports utilized    Nurse Notification:  Martha Hinson, RRT

## 2024-10-11 NOTE — PROGRESS NOTES
[facility]   Hospitalist Progress Note  Date: 10/11/2024  Patient Name: Dilip Faulkner  : 1950  MRN: [id number]  Date of admission: 10/8/2024      Subjective   Subjective     Chief Complaint: Shortness of breath    Summary: 74-year-old male past medical history of COPD on 2 L nasal cannula, type 2 diabetes, hypertension who presented to the ER due to worsening exertional dyspnea for 3 days.  Upon arrival patient had diffuse wheezing and concern for COPD exacerbation.  He was treated with steroids and nebulizers and BiPAP also ordered for hypercapnic respiratory failure.  Patient refused BiPAP in the ER and was subsequent admitted to the hospitalist service.  He has remained on steroids and scheduled nebulizers with slow improvement in his breathing.    Interval Followup: No events overnight.  Patient had significant agitation last night and was given Haldol and Ativan.  Significantly lethargic this morning, evaluated immediately and had stat ABG ordered that did not show extensive CO2 retention or acidosis.  Patient placed on BiPAP with eventual improvement in his lethargy although intermittently still lethargic according to nursing staff.  Patient denies focal complaints however is confused without focal deficits        Objective   Objective     Vitals:   Temp:  [97.5 °F (36.4 °C)-98.4 °F (36.9 °C)] 97.5 °F (36.4 °C)  Heart Rate:  [73-97] 73  Resp:  [18-20] 18  BP: (113-152)/(46-85) 124/67  Flow (L/min):  [2] 2  Physical Exam    Constitutional: Lethargic but arousable, follows simple commands   Respiratory: Clear to auscultation bilaterally, nonlabored respirations    Cardiovascular: RRR, no murmurs, rubs, or gallops, palpable pedal pulses bilaterally   Gastrointestinal: Positive bowel sounds, soft, nontender, nondistended   Neurologic: Oriented x 1 (person), moves all extremities spontaneously, Cranial Nerves grossly intact to confrontation, speech clear   Skin: No rashes     Result Review    Result  Review:  I have personally reviewed the following results and agree with these findings:  [x]  Laboratory  [x]  Microbiology  [x]  Radiology  [x]  EKG/Telemetry   []  Cardiology/Vascular   []  Pathology  [x]  Old records  []  Other:    Assessment & Plan   Assessment / Plan     Assessment/Plan:  Acute hypercapnic respiratory failure secondary to COPD exacerbation  Acute metabolic encephalopathy likely secondary to steroid induced delirium versus hospital induced delirium  Acute COPD exacerbation  Elevated troponin likely secondary to demand ischemia due to above process  Hypertension  Hyperlipidemia  Type 2 diabetes    Plan  - Remains admitted to the hospital service patient was counseled extensively on the need for BiPAP use given his hypercapnic respiratory failure but continues to refuse.  - Patient is on his home level of oxygen and appears to be in no respiratory distress at this time.  Given possible steroid delirium I have reduced his Solu-Medrol dosing and transition him to oral prednisone starting tomorrow.  Would avoid further sedating medications at this time as he was particularly lethargic this morning.  I do not feel strongly that he needs any further workup or his encephalopathy unless he is still not responding well by the evening  - Troponin elevation is likely nonischemic in nature as EKG personally viewed did not show any acute ST elevations or depressions, 2 additional troponins actually showed a downtrend.  No concern for cardiac ischemia at this time  - Continue home antihypertensive  - Continue home statin  - Basal bolus insulin regimen with correction insulin shows good control.  Will continue current regimen  Discussed plan with RN.    Dispo: Patient remains admitted for COPD exacerbation with hypercapnia.  PT eval completed, advising home health care discharge.  Case management consulted.  Pending improvement in his encephalopathy prior to discharge    VTE Prophylaxis:  Pharmacologic VTE  prophylaxis orders are present.        CODE STATUS:   Level Of Support Discussed With: Patient  Code Status (Patient has no pulse and is not breathing): CPR (Attempt to Resuscitate)  Medical Interventions (Patient has pulse or is breathing): Full Support        Electronically signed by Ammon Blair MD, 10/09/24, 1:46 PM EDT.

## 2024-10-11 NOTE — PROGRESS NOTES
RT EQUIPMENT DEVICE RELATED - SKIN ASSESSMENT    RT Medical Equipment/Device:     NIV Mask:  Under-the-nose   size:  C    Skin Assessment:      Cheek:  Intact  Chin:  Intact  Neck:  Intact  Nose:  Intact  Mouth:  Intact    Device Skin Pressure Protection:  Pressure points protected    Nurse Notification:  Martha Root, RRT

## 2024-10-11 NOTE — SIGNIFICANT NOTE
10/11/24 1214   Physical Therapy Time and Intention   Session Not Performed other (see comments)  (excuse per nsg; pt's status changed overnight)

## 2024-10-12 ENCOUNTER — READMISSION MANAGEMENT (OUTPATIENT)
Dept: CALL CENTER | Facility: HOSPITAL | Age: 74
End: 2024-10-12
Payer: MEDICARE

## 2024-10-12 VITALS
RESPIRATION RATE: 20 BRPM | DIASTOLIC BLOOD PRESSURE: 69 MMHG | SYSTOLIC BLOOD PRESSURE: 118 MMHG | OXYGEN SATURATION: 91 % | TEMPERATURE: 97.5 F | HEART RATE: 77 BPM | HEIGHT: 73 IN | BODY MASS INDEX: 30.97 KG/M2 | WEIGHT: 233.69 LBS

## 2024-10-12 LAB
ANION GAP SERPL CALCULATED.3IONS-SCNC: 9.7 MMOL/L (ref 5–15)
BASOPHILS # BLD AUTO: 0.03 10*3/MM3 (ref 0–0.2)
BASOPHILS NFR BLD AUTO: 0.3 % (ref 0–1.5)
BUN SERPL-MCNC: 32 MG/DL (ref 8–23)
BUN/CREAT SERPL: 27.1 (ref 7–25)
CALCIUM SPEC-SCNC: 8.9 MG/DL (ref 8.6–10.5)
CHLORIDE SERPL-SCNC: 101 MMOL/L (ref 98–107)
CO2 SERPL-SCNC: 26.3 MMOL/L (ref 22–29)
CREAT SERPL-MCNC: 1.18 MG/DL (ref 0.76–1.27)
DEPRECATED RDW RBC AUTO: 52.5 FL (ref 37–54)
EGFRCR SERPLBLD CKD-EPI 2021: 64.3 ML/MIN/1.73
EOSINOPHIL # BLD AUTO: 0.26 10*3/MM3 (ref 0–0.4)
EOSINOPHIL NFR BLD AUTO: 3 % (ref 0.3–6.2)
ERYTHROCYTE [DISTWIDTH] IN BLOOD BY AUTOMATED COUNT: 17.2 % (ref 12.3–15.4)
GLUCOSE BLDC GLUCOMTR-MCNC: 121 MG/DL (ref 70–99)
GLUCOSE BLDC GLUCOMTR-MCNC: 125 MG/DL (ref 70–99)
GLUCOSE BLDC GLUCOMTR-MCNC: 180 MG/DL (ref 70–99)
GLUCOSE SERPL-MCNC: 115 MG/DL (ref 65–99)
HCT VFR BLD AUTO: 44.7 % (ref 37.5–51)
HGB BLD-MCNC: 12.5 G/DL (ref 13–17.7)
IMM GRANULOCYTES # BLD AUTO: 0.04 10*3/MM3 (ref 0–0.05)
IMM GRANULOCYTES NFR BLD AUTO: 0.5 % (ref 0–0.5)
LYMPHOCYTES # BLD AUTO: 1.55 10*3/MM3 (ref 0.7–3.1)
LYMPHOCYTES NFR BLD AUTO: 17.8 % (ref 19.6–45.3)
MAGNESIUM SERPL-MCNC: 2 MG/DL (ref 1.6–2.4)
MCH RBC QN AUTO: 23.6 PG (ref 26.6–33)
MCHC RBC AUTO-ENTMCNC: 28 G/DL (ref 31.5–35.7)
MCV RBC AUTO: 84.5 FL (ref 79–97)
MONOCYTES # BLD AUTO: 0.93 10*3/MM3 (ref 0.1–0.9)
MONOCYTES NFR BLD AUTO: 10.7 % (ref 5–12)
NEUTROPHILS NFR BLD AUTO: 5.89 10*3/MM3 (ref 1.7–7)
NEUTROPHILS NFR BLD AUTO: 67.7 % (ref 42.7–76)
NRBC BLD AUTO-RTO: 0 /100 WBC (ref 0–0.2)
PLATELET # BLD AUTO: 137 10*3/MM3 (ref 140–450)
PMV BLD AUTO: 10.7 FL (ref 6–12)
POTASSIUM SERPL-SCNC: 4.1 MMOL/L (ref 3.5–5.2)
RBC # BLD AUTO: 5.29 10*6/MM3 (ref 4.14–5.8)
SODIUM SERPL-SCNC: 137 MMOL/L (ref 136–145)
WBC NRBC COR # BLD AUTO: 8.7 10*3/MM3 (ref 3.4–10.8)

## 2024-10-12 PROCEDURE — 82948 REAGENT STRIP/BLOOD GLUCOSE: CPT

## 2024-10-12 PROCEDURE — 85025 COMPLETE CBC W/AUTO DIFF WBC: CPT | Performed by: STUDENT IN AN ORGANIZED HEALTH CARE EDUCATION/TRAINING PROGRAM

## 2024-10-12 PROCEDURE — 63710000001 PREDNISONE PER 1 MG: Performed by: STUDENT IN AN ORGANIZED HEALTH CARE EDUCATION/TRAINING PROGRAM

## 2024-10-12 PROCEDURE — 94660 CPAP INITIATION&MGMT: CPT

## 2024-10-12 PROCEDURE — 94799 UNLISTED PULMONARY SVC/PX: CPT

## 2024-10-12 PROCEDURE — 99239 HOSP IP/OBS DSCHRG MGMT >30: CPT | Performed by: STUDENT IN AN ORGANIZED HEALTH CARE EDUCATION/TRAINING PROGRAM

## 2024-10-12 PROCEDURE — 25010000002 ENOXAPARIN PER 10 MG: Performed by: INTERNAL MEDICINE

## 2024-10-12 PROCEDURE — 94761 N-INVAS EAR/PLS OXIMETRY MLT: CPT

## 2024-10-12 PROCEDURE — 83735 ASSAY OF MAGNESIUM: CPT | Performed by: STUDENT IN AN ORGANIZED HEALTH CARE EDUCATION/TRAINING PROGRAM

## 2024-10-12 PROCEDURE — 82948 REAGENT STRIP/BLOOD GLUCOSE: CPT | Performed by: STUDENT IN AN ORGANIZED HEALTH CARE EDUCATION/TRAINING PROGRAM

## 2024-10-12 PROCEDURE — 63710000001 INSULIN LISPRO (HUMAN) PER 5 UNITS: Performed by: STUDENT IN AN ORGANIZED HEALTH CARE EDUCATION/TRAINING PROGRAM

## 2024-10-12 PROCEDURE — 80048 BASIC METABOLIC PNL TOTAL CA: CPT | Performed by: STUDENT IN AN ORGANIZED HEALTH CARE EDUCATION/TRAINING PROGRAM

## 2024-10-12 RX ORDER — PREDNISONE 20 MG/1
40 TABLET ORAL
Qty: 4 TABLET | Refills: 0 | Status: SHIPPED | OUTPATIENT
Start: 2024-10-13 | End: 2024-10-15

## 2024-10-12 RX ADMIN — ASPIRIN 81 MG: 81 TABLET, CHEWABLE ORAL at 08:23

## 2024-10-12 RX ADMIN — AZITHROMYCIN DIHYDRATE 250 MG: 250 TABLET ORAL at 08:23

## 2024-10-12 RX ADMIN — METFORMIN HYDROCHLORIDE 500 MG: 500 TABLET, FILM COATED ORAL at 08:22

## 2024-10-12 RX ADMIN — IPRATROPIUM BROMIDE AND ALBUTEROL SULFATE 3 ML: .5; 3 SOLUTION RESPIRATORY (INHALATION) at 07:15

## 2024-10-12 RX ADMIN — PREDNISONE 40 MG: 20 TABLET ORAL at 08:23

## 2024-10-12 RX ADMIN — IPRATROPIUM BROMIDE AND ALBUTEROL SULFATE 3 ML: .5; 3 SOLUTION RESPIRATORY (INHALATION) at 11:21

## 2024-10-12 RX ADMIN — IPRATROPIUM BROMIDE AND ALBUTEROL SULFATE 3 ML: .5; 3 SOLUTION RESPIRATORY (INHALATION) at 03:28

## 2024-10-12 RX ADMIN — INSULIN LISPRO 2 UNITS: 100 INJECTION, SOLUTION INTRAVENOUS; SUBCUTANEOUS at 11:33

## 2024-10-12 RX ADMIN — Medication 10 ML: at 08:23

## 2024-10-12 RX ADMIN — ENOXAPARIN SODIUM 40 MG: 100 INJECTION SUBCUTANEOUS at 08:23

## 2024-10-12 RX ADMIN — PANTOPRAZOLE SODIUM 40 MG: 40 TABLET, DELAYED RELEASE ORAL at 05:51

## 2024-10-12 RX ADMIN — EMPAGLIFLOZIN 10 MG: 10 TABLET, FILM COATED ORAL at 08:23

## 2024-10-12 NOTE — PROGRESS NOTES
RT EQUIPMENT DEVICE RELATED - SKIN ASSESSMENT    RT Medical Equipment/Device:     NIV Mask:  Under-the-nose   size:  C    Skin Assessment:      Cheek:  Intact  Forehead:  Intact  Nose:  Intact  Lips:  Intact  Mouth:  Intact    Device Skin Pressure Protection:  Positioning supports utilized    Nurse Notification:  Martha Guo, RRT

## 2024-10-12 NOTE — PLAN OF CARE
Problem: Noninvasive Ventilation Acute  Goal: Effective Unassisted Ventilation and Oxygenation  Outcome: Progressing   Goal Outcome Evaluation:      Patient has tolerated BIPAP 14/7 rate 4, fio2 .30 tonight.                                      
"Goal Outcome Evaluation:      Patient has been refusing to use NIV.  Patient states that he \"does not like\" and \"can not breathe\" with the BiPap.  Patient has not worn NIV since 10/8.                                          "
Goal Outcome Evaluation:                                              Problem: Noninvasive Ventilation Acute  Goal: Effective Unassisted Ventilation and Oxygenation  Outcome: Resolved for upgrade, new template will be applied     Problem: Adult Inpatient Plan of Care  Goal: Plan of Care Review  Outcome: Resolved for upgrade, new template will be applied  Goal: Patient-Specific Goal (Individualized)  Outcome: Resolved for upgrade, new template will be applied  Goal: Absence of Hospital-Acquired Illness or Injury  Outcome: Resolved for upgrade, new template will be applied  Goal: Optimal Comfort and Wellbeing  Outcome: Resolved for upgrade, new template will be applied  Goal: Readiness for Transition of Care  Outcome: Resolved for upgrade, new template will be applied     Problem: Skin Injury Risk Increased  Goal: Skin Health and Integrity  Outcome: Resolved for upgrade, new template will be applied     Problem: COPD (Chronic Obstructive Pulmonary Disease) Comorbidity  Goal: Maintenance of COPD Symptom Control  Outcome: Resolved for upgrade, new template will be applied     Problem: Diabetes Comorbidity  Goal: Blood Glucose Level Within Targeted Range  Outcome: Resolved for upgrade, new template will be applied     Problem: Fall Injury Risk  Goal: Absence of Fall and Fall-Related Injury  Outcome: Resolved for upgrade, new template will be applied     Problem: Restraint, Nonviolent  Goal: Absence of Harm or Injury  Outcome: Resolved for upgrade, new template will be applied     
Goal Outcome Evaluation:            Patient was able to wear and tolerate the BIPAP for about 4 hours last night.  Patient is currently on a 2lpm nasal cannula and doing well.  Encouraged patient to wear BIPAP as needed and tolerated.                                
Goal Outcome Evaluation:         Patient had been adamently refusing NIV tonight.  Patient is on 3.5L NC, sats of 93%, lung sounds diminished and has not experienced any issues tonight.  Patient educated on importance of wearing NIV.                                      
Goal Outcome Evaluation:      Patient wished to remain on NC overnight. Tolerated well.                                         
Goal Outcome Evaluation:   Patient refused to wear Bipap tonight. He remained on a 2L nasal cannula.                                            
Goal Outcome Evaluation:  Plan of Care Reviewed With: (P) patient        Progress: (P) improving  Outcome Evaluation: (P) Pt presents to treatment with complaints of confusion and weakness in BLE. Was able to ambulate with minimal difficulty and slight complaints of fatigue. Pt would continue to benefit from the use of skilled physical therapy to address BLE strength and muscular endurance deficits required for ambulation.      Anticipated Discharge Disposition (PT): (P) home with home health                        
Goal Outcome Evaluation:  Plan of Care Reviewed With: patient        Patient alert and oriented x4 throughout shift. Patient remains on 2L per NC. Patient to be discharged home via personal vehicle. Blood glucose monitored. No complaints of pain throughout shift. No new issues at this time.                              
Goal Outcome Evaluation:  Plan of Care Reviewed With: patient        Progress: declining  Outcome Evaluation: Patient remains confused, unable to answer orientation questions this shift. Remains on 2L NC. Head CT obtained this shift. Blood glucose monitored. No new issues at this time.                               
Goal Outcome Evaluation:  Plan of Care Reviewed With: patient        Progress: improving  Outcome Evaluation: Patient alert and oriented x3-4 rested in bed throughout the day with no complaints of pain or discomfort. Patient is very implusive and stands up on his own to urinate depsite being told to use the call button numerous times. Worked with PT and ambulated well down the hallway. Patient stated that he fell onto his knees in the floor today after dropping his remote and trying to pick it up. MD and  notified. Falls precautions in place and safe report completed. Patient titrated down to his baseline oxygen of 2 liters and maintaining sats of 90% or above. Blood sugar monitored per order and supplemental insulin provided. No concerns at this time, continue plan of care                               
Goal Outcome Evaluation:  Plan of Care Reviewed With: patient        Progress: no change  Outcome Evaluation: Patient refused bipap last night. Unit is on standby in the room. Currently on room air.                               
Goal Outcome Evaluation:  Plan of Care Reviewed With: patient        Progress: no change  Outcome Evaluation: Patient refused to wear bipap last night. Last ABG shows elevated co2 at 69, pH 7.28. Tried to place patient on bipap this morning, he is still refusing. Unit is on standby, RN is aware.                               
Goal Outcome Evaluation:  Plan of Care Reviewed With: patient        Progress: no change  Outcome Evaluation: Patient stable this shift, continued to refuse bipap. ABG's somewhat improved this afternoon.                               
Goal Outcome Evaluation:  Plan of Care Reviewed With: patient        Progress: no change  Outcome Evaluation: pt admitted to 4NT at shift change from ED. two-person skin assessment performed with YESENIA Guardado. pt alert and oriented x4. has had no c/o pain/discomfort this shift. pt refusing to wear bipap this shift, despite education, stating that he cannot sleep with it on and he does not wear one at home. no new issues/needs at this time.                               
Goal Outcome Evaluation:  Plan of Care Reviewed With: patient        Progress: no change  Outcome Evaluation: pt alert and oriented x4. vss on 3.5 liters humidified nasal cannula. pt continues to refuse bipap, despite teaching. pt has had no c/o pain/discomfort this shift. no new issues/needs at this time.                               
no nausea/no vomiting/no diarrhea/no constipation/no abdominal pain

## 2024-10-12 NOTE — DISCHARGE SUMMARY
[facility]         HOSPITALIST  DISCHARGE SUMMARY    Patient Name: Dilip Faulkner  : 1950  MRN: [id number]    Date of Admission: 10/8/2024  Date of Discharge: 10/12/2024  Primary Care Physician: Provider, No Known    Consults       Date and Time Order Name Status Description    10/8/2024  2:21 PM Inpatient Hospitalist Consult              Active and Resolved Hospital Problems:  Active Hospital Problems    Diagnosis POA    **COPD with acute exacerbation [J44.1] Yes    COPD exacerbation [J44.1] Yes      Resolved Hospital Problems   No resolved problems to display.       Hospital Course     Hospital Course:  Dilip Faulkner is a 74 y.o. male past medical history of COPD on 2 L nasal cannula, type 2 diabetes, hypertension who presented to the ER due to worsening exertional dyspnea for 3 days. Upon arrival patient had diffuse wheezing and concern for COPD exacerbation. He was treated with steroids and nebulizers and BiPAP also ordered for hypercapnic respiratory failure. Patient refused BiPAP in the ER and was subsequent admitted to the hospitalist service. He has remained on steroids and scheduled nebulizers with slow improvement in his breathing.  His hospital course was complicated by an episode of agitation likely secondary to steroid induced delirium that was treated with Haldol and Ativan.  He subsequently had significant lethargy for 24 hours and was evaluated a head CT that was negative.  He had improvement after medications wore out of his system and eventually returned to his baseline mental status and was requesting discharge home.  He remained stable on his home level of oxygen without any exertional dyspnea when ambulating with nursing and he was discharged home in stable condition with instructions of follow-up with pulmonary as outpatient.        DISCHARGE Follow Up Recommendations for labs and diagnostics: Follow-up with PCP within 1 week.  Follow-up with pulmonary in 1 to 2  weeks.  Please have repeat chest imaging done as outpatient      Day of Discharge     Vital Signs:  Temp:  [97.5 °F (36.4 °C)-98.2 °F (36.8 °C)] 97.5 °F (36.4 °C)  Heart Rate:  [69-85] 77  Resp:  [17-26] 20  BP: (101-118)/(60-82) 118/69  Flow (L/min) (Oxygen Therapy):  [2] 2  Physical Exam: Constitutional: Awake, alert, no acute distress              Respiratory: Clear to auscultation bilaterally, nonlabored respirations               Cardiovascular: RRR, no murmurs, rubs, or gallops, palpable pedal pulses bilaterally              Gastrointestinal: Positive bowel sounds, soft, nontender, nondistended              Neurologic: Oriented x 3, strength symmetric in all extremities, Cranial Nerves grossly intact to confrontation, speech clear              Skin: No rashes          Discharge Details        Discharge Medications        New Medications        Instructions Start Date   predniSONE 20 MG tablet  Commonly known as: DELTASONE   40 mg, Oral, Daily With Breakfast   Start Date: October 13, 2024            Changes to Medications        Instructions Start Date   Tresiba FlexTouch 100 UNIT/ML solution pen-injector injection  Generic drug: insulin degludec  What changed: See the new instructions.   ADMINISTER 20 UNITS UNDER THE SKIN DAILY AS DIRECTED             Continue These Medications        Instructions Start Date   Airsupra 90-80 MCG/ACT aerosol  Generic drug: Albuterol-Budesonide   2 puffs, Inhalation, Every 4 Hours PRN      albuterol (2.5 MG/3ML) 0.083% nebulizer solution  Commonly known as: PROVENTIL   2.5 mg, Nebulization, Every 4 Hours PRN      aspirin 81 MG chewable tablet   81 mg, Oral, Daily      atorvastatin 10 MG tablet  Commonly known as: LIPITOR   10 mg, Oral, Daily      Breztri Aerosphere 160-9-4.8 MCG/ACT aerosol inhaler  Generic drug: Budeson-Glycopyrrol-Formoterol   2 puffs, Inhalation, 2 Times Daily      Jardiance 10 MG tablet tablet  Generic drug: empagliflozin   10 mg, Oral, Daily      metFORMIN  500 MG tablet  Commonly known as: GLUCOPHAGE   500 mg, Oral, 2 Times Daily With Meals      omeprazole 20 MG capsule  Commonly known as: priLOSEC   20 mg, Oral, Daily               No Known Allergies    Discharge Disposition:  Home or Self Care    Diet:  Hospital:  Diet Order   Procedures    Diet: Regular/House; Fluid Consistency: Thin (IDDSI 0)       Discharge Activity:   Activity Instructions       Activity as Tolerated              CODE STATUS:  Code Status and Medical Interventions: CPR (Attempt to Resuscitate); Full Support   Ordered at: 10/08/24 1537     Level Of Support Discussed With:    Patient     Code Status (Patient has no pulse and is not breathing):    CPR (Attempt to Resuscitate)     Medical Interventions (Patient has pulse or is breathing):    Full Support         Future Appointments   Date Time Provider Department Center   10/29/2024  1:30 PM Nelly Jaime APRN [facility]   2/20/2025  1:45 PM Jackeline Jaime APRN [facility]       Additional Instructions for the Follow-ups that You Need to Schedule       Discharge Follow-up with PCP   As directed       Currently Documented PCP:    Provider, No Known    PCP Phone Number:    None     Follow Up Details: Follow up with PCP within 1 week. Return to the ED if symptoms worsen or recur.                Pertinent  and/or Most Recent Results     PROCEDURES:   None    LAB RESULTS:      Lab 10/12/24  0517 10/11/24  0452 10/10/24  0539 10/09/24  0510 10/08/24  1131   WBC 8.70 13.77* 13.47* 6.06 8.76   HEMOGLOBIN 12.5* 12.0* 11.4* 12.7* 13.0   HEMATOCRIT 44.7 42.5 41.6 45.2 47.3   PLATELETS 137* 166 171 163 162   NEUTROS ABS 5.89 11.49* 11.57* 5.34 5.58   IMMATURE GRANS (ABS) 0.04 0.10* 0.06* 0.02 0.04   LYMPHS ABS 1.55 1.29 0.90 0.63* 1.50   MONOS ABS 0.93* 0.87 0.93* 0.07* 0.97*   EOS ABS 0.26 0.00 0.00 0.00 0.61*   MCV 84.5 81.3 82.9 83.7 84.9         Lab 10/12/24  0517 10/11/24  0452 10/10/24  0539 10/09/24  0510 10/08/24  1131   SODIUM 137 139 142  141 143   POTASSIUM 4.1 4.6 3.9 4.5 4.5   CHLORIDE 101 100 103 101 104   CO2 26.3 23.6 25.8 30.2* 30.9*   ANION GAP 9.7 15.4* 13.2 9.8 8.1   BUN 32* 28* 27* 23 18   CREATININE 1.18 1.38* 1.37* 1.05 1.09   EGFR 64.3 53.3* 53.8* 74.0 70.8   GLUCOSE 115* 92 124* 146* 125*   CALCIUM 8.9 9.5 9.5 9.1 9.1   MAGNESIUM 2.0 1.8 2.0  --   --          Lab 10/08/24  1131   TOTAL PROTEIN 7.3   ALBUMIN 3.8   GLOBULIN 3.5   ALT (SGPT) 10   AST (SGOT) 16   BILIRUBIN 0.3   ALK PHOS 94         Lab 10/09/24  1414 10/09/24  0510 10/08/24  1131   PROBNP  --   --  114.6   HSTROP T 27* 28* 50*                 Lab 10/11/24  0850 10/09/24  1458 10/08/24  1417   PH, ARTERIAL 7.416 7.351 7.283*   PCO2, ARTERIAL 47.5* 56.0* 69.0*   PO2 ART 78.0* 59.8* 102.5*   O2 SATURATION ART 95.4 88.5* 96.7   FIO2 30 32  --    HCO3 ART 30.5* 30.9* 32.6*   BASE EXCESS ART 4.9* 3.8* 3.4*     Brief Urine Lab Results  (Last result in the past 365 days)        Color   Clarity   Blood   Leuk Est   Nitrite   Protein   CREAT   Urine HCG        03/25/24 1742 Yellow   Clear   Negative   Negative   Negative   Negative                 Microbiology Results (last 10 days)       Procedure Component Value - Date/Time    COVID-19, FLU A/B, RSV PCR 1 HR TAT - Swab, Nasopharynx [[id number]]  (Normal) Collected: 10/08/24 1349    Lab Status: Final result Specimen: Swab from Nasopharynx Updated: 10/08/24 1500     COVID19 Not Detected     Influenza A PCR Not Detected     Influenza B PCR Not Detected     RSV, PCR Not Detected    Narrative:      Fact sheet for providers: https://www.fda.gov/media/998903/download    Fact sheet for patients: https://www.fda.gov/media/014230/download    Test performed by PCR.            CT Head Without Contrast    Result Date: 10/11/2024  Impression: Impression: No acute intracranial finding. Electronically Signed: Ebenezer Marlow  10/11/2024 3:28 PM EDT  Workstation ID: [id number]    XR Chest 1 View    Result Date: 10/8/2024  Impression: Impression:  1.Chronic appearing changes to the left hemithorax. Electronically Signed: Remberto Linda MD  10/8/2024 11:13 AM EDT  Workstation ID: [id number]             Results for orders placed during the hospital encounter of 03/28/23    Adult Transthoracic Echo Complete W/ Cont if Necessary Per Protocol    Interpretation Summary    Left ventricular ejection fraction appears to be 56 - 60%.    Left ventricular diastolic function is consistent with (grade I) impaired relaxation and age.    No significant valvular disease.    Mild left atrial enlargement.      Labs Pending at Discharge:        Time spent on Discharge including face to face service: 35 minutes    Electronically signed by Ammon Blair MD, 10/12/24, 1:10 PM EDT.

## 2024-10-13 NOTE — OUTREACH NOTE
Prep Survey      Flowsheet Row Responses   [facility] patient discharged from? [facility]   Is LACE score < 7 ? No   Eligibility Readm Mgmt   Discharge diagnosis *COPD with acute exacerbation   Does the patient have one of the following disease processes/diagnoses(primary or secondary)? COPD   Does the patient have Home health ordered? No   Is there a DME ordered? No   Prep survey completed? Yes            RONY RINALDI - Registered Nurse

## 2024-10-15 ENCOUNTER — READMISSION MANAGEMENT (OUTPATIENT)
Dept: CALL CENTER | Facility: HOSPITAL | Age: 74
End: 2024-10-15
Payer: MEDICARE

## 2024-10-15 NOTE — OUTREACH NOTE
COPD/PN Week 1 Survey      Flowsheet Row Responses   [facility] patient discharged from? [facility]   Does the patient have one of the following disease processes/diagnoses(primary or secondary)? COPD   Week 1 attempt successful? Yes   Call start time 1818   Call end time 1820   Discharge diagnosis *COPD with acute exacerbation   Person spoke with today (if not patient) and relationship pt   Meds reviewed with patient/caregiver? Yes   Is the patient having any side effects they believe may be caused by any medication additions or changes? No   Does the patient have all medications ordered at discharge? Yes   Is the patient taking all medications as directed (includes completed medication regime)? Yes   Does the patient have a primary care provider?  No   Does the patient have an appointment with their PCP or specialist within 7 days of discharge? No   Comments regarding PCP Pt declined number to find a PCP   Nursing Interventions Educated patient on importance of making appointment, Advised patient to make appointment   Has the patient kept scheduled appointments due by today? N/A   Psychosocial issues? No   Did the patient receive a copy of their discharge instructions? Yes   Nursing interventions Reviewed instructions with patient   What is the patient's perception of their health status since discharge? Improving   Nursing Interventions Nurse provided patient education   Is the patient/caregiver able to teach back the hierarchy of who to call/visit for symptoms/problems? PCP, Specialist, Home health nurse, Urgent Care, ED, 911 Yes   Is the patient able to teach back COPD zones? Yes   Nursing interventions Education provided on various zones   Patient reports what zone on this call? Green Zone   Green Zone Reports doing well, Breathing without shortness of breath, Appetite is good, Slept well last night, Usual activity and exercise level   Green Zone interventions: Take daily medications   Week 1 call completed?  Yes   Is the patient interested in additional calls from an ambulatory ? No   Would this patient benefit from a Referral to [facility] Social Work? No   Wrap up additional comments Pt denies any SOB at this time. Reviewed AVS/meds with pt. Pt shares sister takes care of appt, and he declined phone number to find a PCP.   Call end time 1820            Terrie BECKHAM - Registered Nurse

## 2024-10-22 ENCOUNTER — HOSPITAL ENCOUNTER (EMERGENCY)
Facility: HOSPITAL | Age: 74
Discharge: HOME | End: 2024-10-22
Attending: EMERGENCY MEDICINE | Admitting: EMERGENCY MEDICINE
Payer: MEDICARE

## 2024-10-22 ENCOUNTER — APPOINTMENT (OUTPATIENT)
Dept: GENERAL RADIOLOGY | Facility: HOSPITAL | Age: 74
End: 2024-10-22
Payer: MEDICARE

## 2024-10-22 VITALS
HEART RATE: 78 BPM | SYSTOLIC BLOOD PRESSURE: 134 MMHG | RESPIRATION RATE: 20 BRPM | HEIGHT: 73 IN | BODY MASS INDEX: 33.02 KG/M2 | DIASTOLIC BLOOD PRESSURE: 62 MMHG | WEIGHT: 249.12 LBS | TEMPERATURE: 98 F | OXYGEN SATURATION: 94 %

## 2024-10-22 DIAGNOSIS — J44.1 COPD WITH ACUTE EXACERBATION: Primary | ICD-10-CM

## 2024-10-22 LAB
ALBUMIN SERPL-MCNC: 3.7 G/DL (ref 3.5–5.2)
ALBUMIN/GLOB SERPL: 1.3 G/DL
ALP SERPL-CCNC: 74 U/L (ref 39–117)
ALT SERPL W P-5'-P-CCNC: 11 U/L (ref 1–41)
ANION GAP SERPL CALCULATED.3IONS-SCNC: 6.7 MMOL/L (ref 5–15)
ARTERIAL PATENCY WRIST A: POSITIVE
AST SERPL-CCNC: 10 U/L (ref 1–40)
ATMOSPHERIC PRESS: 747.1 MMHG
BASE EXCESS BLDA CALC-SCNC: 2.2 MMOL/L (ref -2–2)
BASOPHILS # BLD AUTO: 0.06 10*3/MM3 (ref 0–0.2)
BASOPHILS NFR BLD AUTO: 0.7 % (ref 0–1.5)
BDY SITE: ABNORMAL
BILIRUB SERPL-MCNC: 0.3 MG/DL (ref 0–1.2)
BUN SERPL-MCNC: 25 MG/DL (ref 8–23)
BUN/CREAT SERPL: 24 (ref 7–25)
CA-I BLDA-SCNC: 1.2 MMOL/L (ref 1.13–1.32)
CALCIUM SPEC-SCNC: 8.5 MG/DL (ref 8.6–10.5)
CHLORIDE BLDA-SCNC: 104 MMOL/L (ref 98–107)
CHLORIDE SERPL-SCNC: 107 MMOL/L (ref 98–107)
CO2 SERPL-SCNC: 30.3 MMOL/L (ref 22–29)
CREAT SERPL-MCNC: 1.04 MG/DL (ref 0.76–1.27)
D-LACTATE SERPL-SCNC: 1 MMOL/L
DEPRECATED RDW RBC AUTO: 53.8 FL (ref 37–54)
EGFRCR SERPLBLD CKD-EPI 2021: 74.9 ML/MIN/1.73
EOSINOPHIL # BLD AUTO: 0.82 10*3/MM3 (ref 0–0.4)
EOSINOPHIL NFR BLD AUTO: 9.5 % (ref 0.3–6.2)
ERYTHROCYTE [DISTWIDTH] IN BLOOD BY AUTOMATED COUNT: 17.7 % (ref 12.3–15.4)
FLUAV SUBTYP SPEC NAA+PROBE: NOT DETECTED
FLUBV RNA ISLT QL NAA+PROBE: NOT DETECTED
GAS FLOW AIRWAY: 2 LPM
GLOBULIN UR ELPH-MCNC: 2.9 GM/DL
GLUCOSE BLDC GLUCOMTR-MCNC: 134 MG/DL (ref 70–99)
GLUCOSE SERPL-MCNC: 125 MG/DL (ref 65–99)
HCO3 BLDA-SCNC: 29.6 MMOL/L (ref 22–26)
HCT VFR BLD AUTO: 44.9 % (ref 37.5–51)
HCT VFR BLD CALC: 39 % (ref 38–51)
HEMODILUTION: NO
HGB BLD-MCNC: 12.4 G/DL (ref 13–17.7)
HGB BLDA-MCNC: 13.2 G/DL (ref 12–18)
HOLD SPECIMEN: NORMAL
IMM GRANULOCYTES # BLD AUTO: 0.09 10*3/MM3 (ref 0–0.05)
IMM GRANULOCYTES NFR BLD AUTO: 1 % (ref 0–0.5)
INHALED O2 CONCENTRATION: 28 %
LYMPHOCYTES # BLD AUTO: 1.36 10*3/MM3 (ref 0.7–3.1)
LYMPHOCYTES NFR BLD AUTO: 15.7 % (ref 19.6–45.3)
MCH RBC QN AUTO: 23.4 PG (ref 26.6–33)
MCHC RBC AUTO-ENTMCNC: 27.6 G/DL (ref 31.5–35.7)
MCV RBC AUTO: 84.9 FL (ref 79–97)
MODALITY: ABNORMAL
MONOCYTES # BLD AUTO: 0.66 10*3/MM3 (ref 0.1–0.9)
MONOCYTES NFR BLD AUTO: 7.6 % (ref 5–12)
NEUTROPHILS NFR BLD AUTO: 5.68 10*3/MM3 (ref 1.7–7)
NEUTROPHILS NFR BLD AUTO: 65.5 % (ref 42.7–76)
NRBC BLD AUTO-RTO: 0 /100 WBC (ref 0–0.2)
NT-PROBNP SERPL-MCNC: 504.4 PG/ML (ref 0–1800)
PCO2 BLDA: 57 MM HG (ref 35–45)
PH BLDA: 7.32 PH UNITS (ref 7.35–7.45)
PLATELET # BLD AUTO: 177 10*3/MM3 (ref 140–450)
PMV BLD AUTO: 10.8 FL (ref 6–12)
PO2 BLD: 328 MM[HG] (ref 0–500)
PO2 BLDA: 91.7 MM HG (ref 80–100)
POTASSIUM BLDA-SCNC: 4.7 MMOL/L (ref 3.5–5)
POTASSIUM SERPL-SCNC: 4.7 MMOL/L (ref 3.5–5.2)
PROT SERPL-MCNC: 6.6 G/DL (ref 6–8.5)
QT INTERVAL: 422 MS
QTC INTERVAL: 452 MS
RBC # BLD AUTO: 5.29 10*6/MM3 (ref 4.14–5.8)
RSV RNA NPH QL NAA+NON-PROBE: NOT DETECTED
SAO2 % BLDCOA: 96.2 % (ref 95–99)
SARS-COV-2 RNA RESP QL NAA+PROBE: NOT DETECTED
SODIUM BLD-SCNC: 142 MMOL/L (ref 131–143)
SODIUM SERPL-SCNC: 144 MMOL/L (ref 136–145)
TROPONIN T SERPL HS-MCNC: 33 NG/L
TROPONIN T SERPL HS-MCNC: 37 NG/L
WBC NRBC COR # BLD AUTO: 8.67 10*3/MM3 (ref 3.4–10.8)
WHOLE BLOOD HOLD COAG: NORMAL
WHOLE BLOOD HOLD SPECIMEN: NORMAL

## 2024-10-22 PROCEDURE — 93005 ELECTROCARDIOGRAM TRACING: CPT | Performed by: EMERGENCY MEDICINE

## 2024-10-22 PROCEDURE — 80051 ELECTROLYTE PANEL: CPT

## 2024-10-22 PROCEDURE — 36600 WITHDRAWAL OF ARTERIAL BLOOD: CPT

## 2024-10-22 PROCEDURE — 82803 BLOOD GASES ANY COMBINATION: CPT

## 2024-10-22 PROCEDURE — 71045 X-RAY EXAM CHEST 1 VIEW: CPT

## 2024-10-22 PROCEDURE — 93005 ELECTROCARDIOGRAM TRACING: CPT

## 2024-10-22 PROCEDURE — 94640 AIRWAY INHALATION TREATMENT: CPT

## 2024-10-22 PROCEDURE — 25010000002 METHYLPREDNISOLONE PER 125 MG: Performed by: EMERGENCY MEDICINE

## 2024-10-22 PROCEDURE — 94799 UNLISTED PULMONARY SVC/PX: CPT

## 2024-10-22 PROCEDURE — 85025 COMPLETE CBC W/AUTO DIFF WBC: CPT | Performed by: EMERGENCY MEDICINE

## 2024-10-22 PROCEDURE — 96374 THER/PROPH/DIAG INJ IV PUSH: CPT

## 2024-10-22 PROCEDURE — 36415 COLL VENOUS BLD VENIPUNCTURE: CPT

## 2024-10-22 PROCEDURE — 82948 REAGENT STRIP/BLOOD GLUCOSE: CPT

## 2024-10-22 PROCEDURE — 80053 COMPREHEN METABOLIC PANEL: CPT | Performed by: EMERGENCY MEDICINE

## 2024-10-22 PROCEDURE — 83605 ASSAY OF LACTIC ACID: CPT

## 2024-10-22 PROCEDURE — 84484 ASSAY OF TROPONIN QUANT: CPT | Performed by: EMERGENCY MEDICINE

## 2024-10-22 PROCEDURE — 99284 EMERGENCY DEPT VISIT MOD MDM: CPT

## 2024-10-22 PROCEDURE — 83880 ASSAY OF NATRIURETIC PEPTIDE: CPT | Performed by: EMERGENCY MEDICINE

## 2024-10-22 PROCEDURE — 82330 ASSAY OF CALCIUM: CPT

## 2024-10-22 PROCEDURE — 87637 SARSCOV2&INF A&B&RSV AMP PRB: CPT | Performed by: EMERGENCY MEDICINE

## 2024-10-22 RX ORDER — IPRATROPIUM BROMIDE AND ALBUTEROL SULFATE 2.5; .5 MG/3ML; MG/3ML
3 SOLUTION RESPIRATORY (INHALATION) ONCE
Status: COMPLETED | OUTPATIENT
Start: 2024-10-22 | End: 2024-10-22

## 2024-10-22 RX ORDER — METHYLPREDNISOLONE SODIUM SUCCINATE 125 MG/2ML
125 INJECTION, POWDER, LYOPHILIZED, FOR SOLUTION INTRAMUSCULAR; INTRAVENOUS ONCE
Status: COMPLETED | OUTPATIENT
Start: 2024-10-22 | End: 2024-10-22

## 2024-10-22 RX ORDER — PREDNISONE 20 MG/1
40 TABLET ORAL DAILY
Qty: 10 TABLET | Refills: 0 | Status: SHIPPED | OUTPATIENT
Start: 2024-10-22 | End: 2024-10-27

## 2024-10-22 RX ORDER — SODIUM CHLORIDE 0.9 % (FLUSH) 0.9 %
10 SYRINGE (ML) INJECTION AS NEEDED
Status: DISCONTINUED | OUTPATIENT
Start: 2024-10-22 | End: 2024-10-22 | Stop reason: HOSPADM

## 2024-10-22 RX ADMIN — METHYLPREDNISOLONE SODIUM SUCCINATE 125 MG: 125 INJECTION, POWDER, FOR SOLUTION INTRAMUSCULAR; INTRAVENOUS at 04:36

## 2024-10-22 RX ADMIN — IPRATROPIUM BROMIDE AND ALBUTEROL SULFATE 3 ML: .5; 3 SOLUTION RESPIRATORY (INHALATION) at 04:31

## 2024-10-22 NOTE — ED PROVIDER NOTES
Time: 4:34 AM EDT  Date of encounter:  10/22/2024  Independent Historian/Clinical History and Information was obtained by:   Patient    History is limited by: N/A    Chief Complaint: shortness of breath      History of Present Illness:  Patient is a 74 y.o. year old male who presents to the emergency department for evaluation of Shortness of breath.  Patient states inside his evening.  He report history of COPD and similar episodes in the past.  He denies cough.  No fever or chills.  On route here he had 2 DuoNeb treatments by EMS.  He currently reports that he has improvement in symptoms.  No other complaints.  At baseline patient is on 2 L nasal cannula.      Patient Care Team  Primary Care Provider: Provider, No Known    Past Medical History:     No Known Allergies  Past Medical History:   Diagnosis Date    Asthma     COPD (chronic obstructive pulmonary disease)     Diabetes mellitus     Ex-smoker     QUIT 2021    Hernia, umbilical     Hypertension     On home O2     REPORTS WEARING 2L/NC PRN SOA     Past Surgical History:   Procedure Laterality Date    ABDOMINAL SURGERY       Family History   Problem Relation Age of Onset    Asthma Father     Asthma Sister     Asthma Brother     Cancer Maternal Aunt        Home Medications:  Prior to Admission medications    Medication Sig Start Date End Date Taking? Authorizing Provider   albuterol (PROVENTIL) (2.5 MG/3ML) 0.083% nebulizer solution Take 2.5 mg by nebulization Every 4 (Four) Hours As Needed for Wheezing or Shortness of Air. 8/27/24   Vivien Jacobs APRN   Albuterol-Budesonide (Airsupra) 90-80 MCG/ACT aerosol Inhale 2 puffs Every 4 (Four) Hours As Needed (wheezing, soa). 8/20/24   Jackeline Jaime APRN   aspirin 81 MG chewable tablet CHEW AND SWALLOW 1 TABLET BY MOUTH DAILY 5/31/24   Jackeline Jaime APRN   atorvastatin (LIPITOR) 10 MG tablet TAKE 1 TABLET BY MOUTH DAILY 8/13/24   Jackeline Jaime APRN   Budeson-Glycopyrrol-Formoterol (Breztri Aerosphere) 160-9-4.8  "MCG/ACT aerosol inhaler Inhale 2 puffs 2 (Two) Times a Day. 6/4/24   Jackeline Jaime APRN   Jardiance 10 MG tablet tablet TAKE 1 TABLET BY MOUTH DAILY 8/30/24   Jackeline Jaime APRN   metFORMIN (GLUCOPHAGE) 500 MG tablet TAKE 1 TABLET BY MOUTH TWICE DAILY WITH MEALS 9/24/24   Jackeline Jaime APRN   omeprazole (priLOSEC) 20 MG capsule TAKE 1 CAPSULE BY MOUTH DAILY 5/3/24   Jackeline Jaime APRN   Tresiba FlexTouch 100 UNIT/ML solution pen-injector injection ADMINISTER 20 UNITS UNDER THE SKIN DAILY AS DIRECTED  Patient taking differently: Inject 20 Units under the skin into the appropriate area as directed Daily. 8/1/24   Jackeline Jaime APRN        Social History:   Social History     Tobacco Use    Smoking status: Former     Current packs/day: 0.00     Average packs/day: 1.5 packs/day for 56.2 years (84.3 ttl pk-yrs)     Types: Cigarettes     Start date: 1965     Quit date: 3/19/2021     Years since quitting: 3.5     Passive exposure: Past    Smokeless tobacco: Current     Types: Chew   Vaping Use    Vaping status: Never Used   Substance Use Topics    Alcohol use: Never    Drug use: Never         Review of Systems:  Review of Systems   Constitutional:  Negative for chills and fever.   HENT:  Negative for congestion, ear pain and sore throat.    Eyes:  Negative for pain.   Respiratory:  Positive for shortness of breath. Negative for cough and chest tightness.    Cardiovascular:  Negative for chest pain.   Gastrointestinal:  Negative for abdominal pain, diarrhea, nausea and vomiting.   Genitourinary:  Negative for flank pain and hematuria.   Musculoskeletal:  Negative for joint swelling.   Skin:  Negative for pallor.   Neurological:  Negative for seizures and headaches.   All other systems reviewed and are negative.       Physical Exam:  /61   Pulse 70   Temp 98 °F (36.7 °C) (Oral)   Resp 20   Ht 185.4 cm (73\")   Wt 113 kg (249 lb 1.9 oz)   SpO2 94%   BMI 32.87 kg/m²     Physical Exam  Constitutional:      "  Appearance: Normal appearance.   HENT:      Head: Normocephalic and atraumatic.      Nose: Nose normal.      Mouth/Throat:      Mouth: Mucous membranes are moist.   Eyes:      Extraocular Movements: Extraocular movements intact.      Conjunctiva/sclera: Conjunctivae normal.      Pupils: Pupils are equal, round, and reactive to light.   Cardiovascular:      Rate and Rhythm: Normal rate and regular rhythm.      Pulses: Normal pulses.      Heart sounds: Normal heart sounds.   Pulmonary:      Effort: Pulmonary effort is normal.      Breath sounds: Wheezing present.   Abdominal:      General: There is no distension.      Palpations: Abdomen is soft.      Tenderness: There is no abdominal tenderness.   Musculoskeletal:         General: Normal range of motion.      Cervical back: Normal range of motion.   Skin:     General: Skin is warm and dry.      Capillary Refill: Capillary refill takes less than 2 seconds.   Neurological:      General: No focal deficit present.      Mental Status: He is alert and oriented to person, place, and time. Mental status is at baseline.   Psychiatric:         Mood and Affect: Mood normal.         Behavior: Behavior normal.                  Procedures:  Procedures      Medical Decision Making:      Comorbidities that affect care:    COPD, Diabetes, Hypertension    External Notes reviewed:    Hospital Discharge Summary: Patient was admitted on 10/8/2024 and discharged on 10/12/2024 for COPD with acute exacerbation      The following orders were placed and all results were independently analyzed by me:  Orders Placed This Encounter   Procedures    COVID-19, FLU A/B, RSV PCR 1 HR TAT - Swab, Nasopharynx    XR Chest 1 View    Wiggins Draw    Comprehensive Metabolic Panel    BNP    Single High Sensitivity Troponin T    CBC Auto Differential    Arterial Blood Gas,H&H,Lytes,Lactate    High Sensitivity Troponin T    Blood Gas, Arterial -    High Sensitivity Troponin T 2Hr    NPO Diet NPO Type: Strict  NPO    Undress & Gown    Continuous Pulse Oximetry    Vital Signs    Oxygen Therapy- Nasal Cannula; Titrate 1-6 LPM Per SpO2; 90 - 95%    POC Glucose Once    POC Lactate    POC Electrolyte Panel    ECG 12 Lead ED Triage Standing Order; SOA    Insert Peripheral IV    CBC & Differential    Green Top (Gel)    Lavender Top    Gold Top - SST    Light Blue Top    Extra Tubes    Gray Top       Medications Given in the Emergency Department:  Medications   sodium chloride 0.9 % flush 10 mL (has no administration in time range)   ipratropium-albuterol (DUO-NEB) nebulizer solution 3 mL (3 mL Nebulization Given 10/22/24 0431)   ipratropium-albuterol (DUO-NEB) nebulizer solution 3 mL (3 mL Nebulization Given 10/22/24 0431)   methylPREDNISolone sodium succinate (SOLU-Medrol) injection 125 mg (125 mg Intravenous Given 10/22/24 0436)        ED Course:    ED Course as of 10/22/24 0633   Tue Oct 22, 2024   0437 ECG 12 Lead ED Triage Standing Order; SOA  Sinus rhythm rate of 69.  Right bundle branch block.  No acute ST elevation.  Normal [place] and QTc.  No significant change.  EKG interpreted by me [LD]      ED Course User Index  [LD] Milo Pereira MD       Labs:    Lab Results (last 24 hours)       Procedure Component Value Units Date/Time    CBC & Differential [[id number]]  (Abnormal) Collected: 10/22/24 0330    Specimen: Blood Updated: 10/22/24 0341    Narrative:      The following orders were created for panel order CBC & Differential.  Procedure                               Abnormality         Status                     ---------                               -----------         ------                     CBC Auto Differential[[id number]]        Abnormal            Final result                 Please view results for these tests on the individual orders.    Comprehensive Metabolic Panel [[id number]]  (Abnormal) Collected: 10/22/24 0330    Specimen: Blood Updated: 10/22/24 0400     Glucose 125 mg/dL      BUN 25 mg/dL       Creatinine 1.04 mg/dL      Sodium 144 mmol/L      Potassium 4.7 mmol/L      Chloride 107 mmol/L      CO2 30.3 mmol/L      Calcium 8.5 mg/dL      Total Protein 6.6 g/dL      Albumin 3.7 g/dL      ALT (SGPT) 11 U/L      AST (SGOT) 10 U/L      Alkaline Phosphatase 74 U/L      Total Bilirubin 0.3 mg/dL      Globulin 2.9 gm/dL      A/G Ratio 1.3 g/dL      BUN/Creatinine Ratio 24.0     Anion Gap 6.7 mmol/L      eGFR 74.9 mL/min/1.73     Narrative:      GFR Normal >60  Chronic Kidney Disease <60  Kidney Failure <15    The GFR formula is only valid for adults with stable renal function between ages 18 and 70.    BNP [[id number]]  (Normal) Collected: 10/22/24 0330    Specimen: Blood Updated: 10/22/24 0358     proBNP 504.4 pg/mL     Narrative:      This assay is used as an aid in the diagnosis of individuals suspected of having heart failure. It can be used as an aid in the diagnosis of acute decompensated heart failure (ADHF) in patients presenting with signs and symptoms of ADHF to the emergency department (ED). In addition, NT-proBNP of <300 pg/mL indicates ADHF is not likely.    Age Range Result Interpretation  NT-proBNP Concentration (pg/mL:      <50             Positive            >450                   Gray                 300-450                    Negative             <300    50-75           Positive            >900                  Gray                300-900                  Negative            <300      >75             Positive            >1800                  Gray                300-1800                  Negative            <300    Single High Sensitivity Troponin T [[id number]]  (Abnormal) Collected: 10/22/24 0330    Specimen: Blood Updated: 10/22/24 0400     HS Troponin T 37 ng/L     Narrative:      High Sensitive Troponin T Reference Range:  <14.0 ng/L- Negative Female for AMI  <22.0 ng/L- Negative Male for AMI  >=14 - Abnormal Female indicating possible myocardial injury.  >=22 - Abnormal Male indicating  possible myocardial injury.   Clinicians would have to utilize clinical acumen, EKG, Troponin, and serial changes to determine if it is an Acute Myocardial Infarction or myocardial injury due to an underlying chronic condition.         CBC Auto Differential [[id number]]  (Abnormal) Collected: 10/22/24 0330    Specimen: Blood Updated: 10/22/24 0341     WBC 8.67 10*3/mm3      RBC 5.29 10*6/mm3      Hemoglobin 12.4 g/dL      Hematocrit 44.9 %      MCV 84.9 fL      MCH 23.4 pg      MCHC 27.6 g/dL      RDW 17.7 %      RDW-SD 53.8 fl      MPV 10.8 fL      Platelets 177 10*3/mm3      Neutrophil % 65.5 %      Lymphocyte % 15.7 %      Monocyte % 7.6 %      Eosinophil % 9.5 %      Basophil % 0.7 %      Immature Grans % 1.0 %      Neutrophils, Absolute 5.68 10*3/mm3      Lymphocytes, Absolute 1.36 10*3/mm3      Monocytes, Absolute 0.66 10*3/mm3      Eosinophils, Absolute 0.82 10*3/mm3      Basophils, Absolute 0.06 10*3/mm3      Immature Grans, Absolute 0.09 10*3/mm3      nRBC 0.0 /100 WBC     COVID-19, FLU A/B, RSV PCR 1 HR TAT - Swab, Nasopharynx [[id number]]  (Normal) Collected: 10/22/24 0330    Specimen: Swab from Nasopharynx Updated: 10/22/24 0415     COVID19 Not Detected     Influenza A PCR Not Detected     Influenza B PCR Not Detected     RSV, PCR Not Detected    Narrative:      Fact sheet for providers: https://www.fda.gov/media/026689/download    Fact sheet for patients: https://www.fda.gov/media/398166/download    Test performed by PCR.    High Sensitivity Troponin T [[id number]]  (Abnormal) Collected: 10/22/24 0440    Specimen: Blood Updated: 10/22/24 0506     HS Troponin T 33 ng/L     Narrative:      High Sensitive Troponin T Reference Range:  <14.0 ng/L- Negative Female for AMI  <22.0 ng/L- Negative Male for AMI  >=14 - Abnormal Female indicating possible myocardial injury.  >=22 - Abnormal Male indicating possible myocardial injury.   Clinicians would have to utilize clinical acumen, EKG, Troponin, and serial changes  to determine if it is an Acute Myocardial Infarction or myocardial injury due to an underlying chronic condition.         POC Glucose Once [[id number]]  (Abnormal) Collected: 10/22/24 0442    Specimen: Arterial Blood Updated: 10/22/24 0445     Glucose 134 mg/dL      Comment: Serial Number: [id number]:  [id number]       Blood Gas, Arterial - [[id number]]  (Abnormal) Collected: 10/22/24 0442    Specimen: Arterial Blood Updated: 10/22/24 0445     Site Right Radial     Nael's Test Positive     pH, Arterial 7.323 pH units      pCO2, Arterial 57.0 mm Hg      pO2, Arterial 91.7 mm Hg      HCO3, Arterial 29.6 mmol/L      Base Excess, Arterial 2.2 mmol/L      Comment: Serial Number: [id number]:  [id number]        O2 Saturation, Arterial 96.2 %      Hemoglobin, Blood Gas 13.2 g/dL      Hematocrit, Blood Gas 39.0 %      Barometric Pressure for Blood Gas 747.1000 mmHg      Modality Cannula     FIO2 28 %      Flow Rate 2.0000 lpm      Hemodilution No     PO2/FIO2 328    POC Lactate [[id number]]  (Normal) Collected: 10/22/24 0442    Specimen: Arterial Blood Updated: 10/22/24 0445     Lactate 1.0 mmol/L      Comment: Serial Number: [id number]:  [id number]       POC Electrolyte Panel [[id number]]  (Normal) Collected: 10/22/24 0442    Specimen: Arterial Blood Updated: 10/22/24 0445     Sodium 142 mmol/L      POC Potassium 4.7 mmol/L      Chloride 104 mmol/L      Ionized Calcium 1.20 mmol/L      Comment: Serial Number: [id number]:  [id number]                Imaging:    XR Chest 1 View    Result Date: 10/22/2024  XR CHEST 1 VW-  Date of exam: 10/22/2024, 3:18 A.M.  Indication:  SOA/SOB/shortness of air/shortness of breath.  Comparison: 10/8/2024.  FINDINGS: A single AP (or PA) upright portable chest radiograph was performed. Probably no cardiac enlargement is seen. No acute infiltrate is appreciated. No pleural effusion or pneumothorax is identified. Chronic scarring and volume loss are identified bilaterally, greater on the left than the  right, predominantly in the lung bases and in the left pulmonary apex. There is slight leftward shift of mediastinal structures. The thoracic aorta is atherosclerotic. External artifacts obscure detail. No significant interval change is seen since the prior study (or studies).       No acute infiltrate is appreciated.    Portions of this note were completed with a voice recognition program.   Electronically Signed By-Aaron Rodriguez MD On:10/22/2024 3:29 AM         Differential Diagnosis and Discussion:    Dyspnea: Differential diagnosis includes but is not limited to metabolic acidosis, neurological disorders, psychogenic, asthma, pneumothorax, upper airway obstruction, COPD, pneumonia, noncardiogenic pulmonary edema, interstitial lung disease, anemia, congestive heart failure, and pulmonary embolism    All labs were reviewed and interpreted by me.  All X-rays impressions were independently interpreted by me.  EKG was interpreted by me.    MDM  Number of Diagnoses or Management Options  Diagnosis management comments: Patient is afebrile nontoxic-appearing.  Vital signs are stable.  At time of evaluation is lying in bed in no acute distress.  Patient states he had significant improvement in his symptoms after nebulizer treatment.  Patient given additional treatment in the emergency department.  Labs showed no significant abnormality.  Blood gases showed mildly low pH of 7.32 with a mildly elevated pCO2 57.  Patient was monitored in emergency department.  On reevaluation he states he feels well feel comfortable going home.  Will give short course of steroids.  Recommend close follow-up with his primary care provider.  Discussed return precautions, discharge instructions and answered all his questions.       Amount and/or Complexity of Data Reviewed  Clinical lab tests: reviewed  Tests in the radiology section of CPT®: reviewed  Review and summarize past medical records: yes  Independent visualization of images,  tracings, or specimens: yes    Risk of Complications, Morbidity, and/or Mortality  Presenting problems: moderate  Management options: moderate                       Patient Care Considerations:    SEPSIS was considered but is NOT present in the emergency department as SIRS criteria is not present.      Consultants/Shared Management Plan:    None    Social Determinants of Health:    Patient is independent, reliable, and has access to care.       Disposition and Care Coordination:    Discharged: The patient is suitable and stable for discharge with no need for consideration of admission.    I have explained the patient´s condition, diagnoses and treatment plan based on the information available to me at this time. I have answered questions and addressed any concerns. The patient has a good  understanding of the patient´s diagnosis, condition, and treatment plan as can be expected at this point. The vital signs have been stable. The patient´s condition is stable and appropriate for discharge from the emergency department.      The patient will pursue further outpatient evaluation with the primary care physician or other designated or consulting physician as outlined in the discharge instructions. They are agreeable to this plan of care and follow-up instructions have been explained in detail. The patient has received these instructions in written format and has expressed an understanding of the discharge instructions. The patient is aware that any significant change in condition or worsening of symptoms should prompt an immediate return to this or the closest emergency department or call to 911.  I have explained discharge medications and the need for follow up with the patient/caretakers. This was also printed in the discharge instructions. Patient was discharged with the following medications and follow up:      Medication List        Changed      Tresiba FlexTouch 100 UNIT/ML solution pen-injector injection  Generic  drug: insulin degludec  ADMINISTER 20 UNITS UNDER THE SKIN DAILY AS DIRECTED  What changed: See the new instructions.           No follow-up provider specified.     Final diagnoses:   COPD with acute exacerbation        ED Disposition       ED Disposition   Discharge    Condition   Stable    Comment   --               This medical record created using voice recognition software.             Milo Pereira MD  10/22/24 0669

## 2024-10-23 ENCOUNTER — READMISSION MANAGEMENT (OUTPATIENT)
Dept: CALL CENTER | Facility: HOSPITAL | Age: 74
End: 2024-10-23
Payer: MEDICARE

## 2024-10-23 NOTE — OUTREACH NOTE
COPD/PN Week 2 Survey      Flowsheet Row Responses   [facility] patient discharged from? [facility]   Does the patient have one of the following disease processes/diagnoses(primary or secondary)? COPD   Week 2 attempt successful? Yes   Call start time 1631   Call end time 1636   Discharge diagnosis *COPD with acute exacerbation   Meds reviewed with patient/caregiver? Yes   Is the patient taking all medications as directed (includes completed medication regime)? Yes   Does the patient have a primary care provider?  No   Comments regarding PCP Pt declined number to find a PCP   Has the patient kept scheduled appointments due by today? N/A   Comments Patient reports his Pulmonary appt 10-29-24.   DME comments Pt reports that he wears his home O2.   Comments Patient reports that today he has no congestion, wheezing, cough or exacerbation of SOA despite his recent ER visit. Patient denies any issues at this time. Patient reports that he lives with his sister who helps him.   What is the patient's perception of their health status since discharge? Improving   Patient reports what zone on this call? Green Zone   Green Zone Reports doing well, Usual activity and exercise level, Breathing without shortness of breath   Green Zone interventions: Take daily medications, Use oxygen as prescribed   Week 2 call completed? Yes   Call end time 1636            Naomy ABDULLAHI - Registered Nurse   No

## 2024-10-27 LAB
QT INTERVAL: 446 MS
QTC INTERVAL: 481 MS

## 2024-10-28 NOTE — PROGRESS NOTES
Primary Care Provider  Jackeline Jaime APRN   Referring Provider  No ref. provider found      Patient Complaint  Follow-up (2-3 Months) and Chronic respiratory failure with hypoxia and hypercapnia      Subjective          Dilip Faulkner presents to [facility] PULMONARY & CRITICAL CARE MEDICINE      History of Presenting Illness  Dilip Faulkner is a 74 y.o. male patient with chronic hypoxic respiratory failure, COPD, dyspnea on exertion, and tobacco use in remission, here for 2 month/hospital follow-up.    Patient was hospitalized at [facility] 10/8/2024 through 10/12/2024 for COPD exacerbation.  Patient presented to the ED with worsening dyspnea for 3 days.  Patient was found to have diffuse wheezing upon exam.  He was treated with IV steroids and scheduled nebulizer treatments as well as BiPAP therapy.  Patient refused BiPAP in the ED and was admitted for further care.  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  Patient had subsequent lethargy for 24 hours, was evaluated by head CT which was negative.  Patient's mental status improved after medications got out of his system, with return to baseline.  He was discharged home in stable condition on his baseline supplemental oxygen.    Patient states he is doing okay since his last visit, reports having completed the prednisone that was prescribed at discharge.  Patient has had several ED visits and hospitalizations this year for COPD exacerbation, likely due to less than ideal adherence to medications and therapies.  Patient is accompanied today by his sister.  He had pulmonary function testing and venous duplex scan scheduled in the past but unable to make appointment.  Patient continues to have mild dyspnea with exertion that improves with rest.  He continues to use Breztri daily as well as his albuterol inhaler and albuterol  nebulizer treatments as needed.  These medications generally work well to control his respiratory symptoms.  Patient wears 2 L supplemental oxygen as needed at home.  He is not using a BiPAP at home as was recommended after hospitalization, reviewed the signs of high carbon dioxide with patient today, including confusion and lethargy.  Patient was previously offered anxiety medication to help him tolerate wearing BiPAP, but declined.  He is a former smoker, quit 3 years ago, 84 pack years.  He does use chewing tobacco.  Patient denies any hemoptysis, swollen lymph nodes, or night sweats.  Overall, patient is doing well and has no additional concerns at this time.  Patient is able to perform ADLs without difficulty.  I have personally reviewed the review of systems, past family, social, medical and surgical histories; and agree with their findings.      Review of Systems    Review of Systems   Constitutional:  Negative for activity change, chills, fatigue, fever, unexpected weight gain and unexpected weight loss.   HENT:  Negative for congestion, ear discharge, ear pain, mouth sores, postnasal drip, rhinorrhea, sinus pressure, sore throat, swollen glands and trouble swallowing.    Eyes:  Negative for blurred vision, pain, discharge, itching and visual disturbance.   Respiratory:  Positive for cough (productive of green sputum) and shortness of breath (with exertion). Negative for apnea, chest tightness, wheezing and stridor.    Cardiovascular:  Negative for chest pain, palpitations and leg swelling.   Gastrointestinal:  Negative for abdominal distention, abdominal pain, constipation, diarrhea, nausea, vomiting, GERD and indigestion.   Musculoskeletal:  Negative for arthralgias, joint swelling and myalgias.   Skin:  Negative for color change.   Neurological:  Negative for dizziness, weakness, light-headedness and headache.      Sleep: Negative for Excessive daytime sleepiness  Negative for morning headaches  Negative  for Snoring      Family History   Problem Relation Age of Onset    Asthma Father     Asthma Sister     Asthma Brother     Cancer Maternal Aunt         Social History     Socioeconomic History    Marital status: Single   Tobacco Use    Smoking status: Former     Current packs/day: 0.00     Average packs/day: 1.5 packs/day for 56.2 years (84.3 ttl pk-yrs)     Types: Cigarettes     Start date: 1965     Quit date: 3/19/2021     Years since quitting: 3.6     Passive exposure: Past    Smokeless tobacco: Current     Types: Chew   Vaping Use    Vaping status: Never Used   Substance and Sexual Activity    Alcohol use: Never    Drug use: Never    Sexual activity: Defer        Past Medical History:   Diagnosis Date    Asthma     COPD (chronic obstructive pulmonary disease)     Diabetes mellitus     Ex-smoker     QUIT 2021    Hernia, umbilical     Hypertension     On home O2     REPORTS WEARING 2L/NC PRN SOA        Immunization History   Administered Date(s) Administered    Fluzone High-Dose 65+YRS 10/29/2024    Fluzone High-Dose 65+yrs 10/08/2022, 11/02/2023       No Known Allergies       Current Outpatient Medications:     albuterol (PROVENTIL) (2.5 MG/3ML) 0.083% nebulizer solution, Take 2.5 mg by nebulization Every 4 (Four) Hours As Needed for Wheezing or Shortness of Air., Disp: 360 mL, Rfl: 3    Albuterol-Budesonide (Airsupra) 90-80 MCG/ACT aerosol, Inhale 2 puffs Every 4 (Four) Hours As Needed (wheezing, soa)., Disp: 10.7 g, Rfl: 1    aspirin 81 MG chewable tablet, CHEW AND SWALLOW 1 TABLET BY MOUTH DAILY, Disp: 90 tablet, Rfl: 1    atorvastatin (LIPITOR) 10 MG tablet, TAKE 1 TABLET BY MOUTH DAILY, Disp: 90 tablet, Rfl: 1    Budeson-Glycopyrrol-Formoterol (Breztri Aerosphere) 160-9-4.8 MCG/ACT aerosol inhaler, Inhale 2 puffs 2 (Two) Times a Day., Disp: 10.7 g, Rfl: 1    Jardiance 10 MG tablet tablet, TAKE 1 TABLET BY MOUTH DAILY, Disp: 90 tablet, Rfl: 0    metFORMIN (GLUCOPHAGE) 500 MG tablet, TAKE 1 TABLET BY MOUTH  "TWICE DAILY WITH MEALS, Disp: 180 tablet, Rfl: 1    omeprazole (priLOSEC) 20 MG capsule, TAKE 1 CAPSULE BY MOUTH DAILY, Disp: 90 capsule, Rfl: 1    Tresiba FlexTouch 100 UNIT/ML solution pen-injector injection, ADMINISTER 20 UNITS UNDER THE SKIN DAILY AS DIRECTED (Patient taking differently: Inject 20 Units under the skin into the appropriate area as directed Daily.), Disp: 6 mL, Rfl: 2    albuterol sulfate  (90 Base) MCG/ACT inhaler, Inhale 2 puffs Every 4 (Four) Hours As Needed for Wheezing., Disp: 18 g, Rfl: 11     Objective     Vital Signs:   /68 (BP Location: Left arm, Patient Position: Sitting, Cuff Size: Adult)   Pulse 80   Temp 98.2 °F (36.8 °C) (Temporal)   Resp 18   Ht 185.4 cm (73\")   Wt 111 kg (245 lb)   SpO2 91% Comment: Room air  BMI 32.32 kg/m²     Physical Exam  Constitutional:       General: He is not in acute distress.     Appearance: Normal appearance. He is obese. He is not ill-appearing.   HENT:      Right Ear: Tympanic membrane and ear canal normal.      Left Ear: Tympanic membrane and ear canal normal.      Nose: Nose normal.      Mouth/Throat:      Mouth: Mucous membranes are moist.      Pharynx: Oropharynx is clear.   Eyes:      Extraocular Movements: Extraocular movements intact.      Conjunctiva/sclera: Conjunctivae normal.      Pupils: Pupils are equal, round, and reactive to light.   Cardiovascular:      Rate and Rhythm: Normal rate and regular rhythm.      Pulses: Normal pulses.      Heart sounds: Normal heart sounds.   Pulmonary:      Effort: Pulmonary effort is normal. No respiratory distress.      Breath sounds: No stridor. No wheezing, rhonchi or rales.      Comments: Diminished throughout  Abdominal:      General: Bowel sounds are normal.      Palpations: Abdomen is soft.   Musculoskeletal:         General: No swelling. Normal range of motion.      Cervical back: Normal range of motion and neck supple.      Right lower leg: No edema.      Left lower leg: No " edema.   Skin:     General: Skin is warm and dry.   Neurological:      General: No focal deficit present.      Mental Status: He is alert and oriented to person, place, and time.      Motor: No weakness.   Psychiatric:         Mood and Affect: Mood normal.         Behavior: Behavior normal.        Result Review :   I have personally reviewed patient's labs and images.  I also reviewed Dr. Blair's discharge summary 10/12/2024.        Diagnoses and all orders for this visit:    1. Chronic respiratory failure with hypoxia and hypercapnia (Primary)  -     Complete PFT - Pre & Post Bronchodilator; Future  -     albuterol sulfate  (90 Base) MCG/ACT inhaler; Inhale 2 puffs Every 4 (Four) Hours As Needed for Wheezing.  Dispense: 18 g; Refill: 11    2. Chronic obstructive pulmonary disease with acute exacerbation  -     Complete PFT - Pre & Post Bronchodilator; Future  -     albuterol sulfate  (90 Base) MCG/ACT inhaler; Inhale 2 puffs Every 4 (Four) Hours As Needed for Wheezing.  Dispense: 18 g; Refill: 11    3. Pulmonary emphysema, unspecified emphysema type  -     Complete PFT - Pre & Post Bronchodilator; Future  -     albuterol sulfate  (90 Base) MCG/ACT inhaler; Inhale 2 puffs Every 4 (Four) Hours As Needed for Wheezing.  Dispense: 18 g; Refill: 11    4. Patient noncompliance    5. Tobacco abuse, in remission  -     CT Chest Low Dose Wo; Future    6. Personal history of nicotine dependence  -     CT Chest Low Dose Wo; Future    7. Obesity (BMI 30-39.9)    8. Encounter for immunization  -     Fluzone High-Dose 65+yrs ([id number])      Impression and Plan    -PFTs ordered at a previous visit visit, no showed twice in the past, will assist patient with rescheduling today  -Venous duplex scans of upper extremities ordered last visit for intermittent swelling of hands with associated pain, no-show for appointment  -CT chest 3/25/2024 showed no acute infiltrates.  Atelectasis and/or fibrosis suggested  bilaterally, greater on the left than the right.  No suspicious nodules or masses.  -Patient qualifies for annual lung cancer screening program, will order LDCT to be done March 2025  -Stressed the importance of adherence to BiPAP therapy, patient continues to be unable to tolerate.  Reviewed the symptoms of hypercapnia, including confusion and somnolence  -Continue wearing 2 L supplemental oxygen as needed to keep O2 saturation between 89 and 92%  -Continue using Breztri twice daily, reminded patient to rinse mouth after each use  -Continue using albuterol inhaler or albuterol nebulizer treatments as needed  -Follow up with MD in 3 months    Smoking status: Reviewed  Vaccination status: Patient declines pneumonia vaccine, flu vaccine administered in clinic today.  Patient is advised to continue to follow CDC recommendations such as social distancing wearing a mask and washing hands for at least 20 seconds.  Medications personally reviewed    Follow Up   No follow-ups on file.  Patient was given instructions and counseling regarding his condition or for health maintenance advice. Please see specific information pulled into the AVS if appropriate.

## 2024-10-29 ENCOUNTER — OFFICE VISIT (OUTPATIENT)
Dept: PULMONOLOGY | Facility: CLINIC | Age: 74
End: 2024-10-29
Payer: MEDICARE

## 2024-10-29 VITALS
TEMPERATURE: 98.2 F | RESPIRATION RATE: 18 BRPM | HEIGHT: 73 IN | DIASTOLIC BLOOD PRESSURE: 68 MMHG | OXYGEN SATURATION: 91 % | BODY MASS INDEX: 32.47 KG/M2 | SYSTOLIC BLOOD PRESSURE: 134 MMHG | HEART RATE: 80 BPM | WEIGHT: 245 LBS

## 2024-10-29 DIAGNOSIS — E66.9 OBESITY (BMI 30-39.9): ICD-10-CM

## 2024-10-29 DIAGNOSIS — Z23 ENCOUNTER FOR IMMUNIZATION: ICD-10-CM

## 2024-10-29 DIAGNOSIS — J43.9 PULMONARY EMPHYSEMA, UNSPECIFIED EMPHYSEMA TYPE: ICD-10-CM

## 2024-10-29 DIAGNOSIS — Z91.199 PATIENT NONCOMPLIANCE: ICD-10-CM

## 2024-10-29 DIAGNOSIS — F17.201 TOBACCO ABUSE, IN REMISSION: ICD-10-CM

## 2024-10-29 DIAGNOSIS — J44.1 CHRONIC OBSTRUCTIVE PULMONARY DISEASE WITH ACUTE EXACERBATION: ICD-10-CM

## 2024-10-29 DIAGNOSIS — K21.9 GASTROESOPHAGEAL REFLUX DISEASE WITHOUT ESOPHAGITIS: ICD-10-CM

## 2024-10-29 DIAGNOSIS — J96.12 CHRONIC RESPIRATORY FAILURE WITH HYPOXIA AND HYPERCAPNIA: Primary | ICD-10-CM

## 2024-10-29 DIAGNOSIS — J96.11 CHRONIC RESPIRATORY FAILURE WITH HYPOXIA AND HYPERCAPNIA: Primary | ICD-10-CM

## 2024-10-29 DIAGNOSIS — Z87.891 PERSONAL HISTORY OF NICOTINE DEPENDENCE: ICD-10-CM

## 2024-10-29 RX ORDER — ALBUTEROL SULFATE 90 UG/1
2 INHALANT RESPIRATORY (INHALATION) EVERY 4 HOURS PRN
Qty: 18 G | Refills: 11 | Status: SHIPPED | OUTPATIENT
Start: 2024-10-29

## 2024-10-30 LAB
QT INTERVAL: 422 MS
QTC INTERVAL: 452 MS

## 2024-10-31 ENCOUNTER — READMISSION MANAGEMENT (OUTPATIENT)
Dept: CALL CENTER | Facility: HOSPITAL | Age: 74
End: 2024-10-31
Payer: MEDICARE

## 2024-10-31 NOTE — OUTREACH NOTE
COPD/PN Week 3 Survey      Flowsheet Row Responses   [facility] facility patient discharged from? [facility]   Does the patient have one of the following disease processes/diagnoses(primary or secondary)? COPD   Week 3 attempt successful? No   Unsuccessful attempts Attempt 1  [attempted pt and sister]            STEVE JOHNSON - Registered Nurse

## 2024-11-06 ENCOUNTER — READMISSION MANAGEMENT (OUTPATIENT)
Dept: CALL CENTER | Facility: HOSPITAL | Age: 74
End: 2024-11-06
Payer: MEDICARE

## 2024-11-06 ENCOUNTER — APPOINTMENT (OUTPATIENT)
Dept: GENERAL RADIOLOGY | Facility: HOSPITAL | Age: 74
DRG: 193 | End: 2024-11-06
Payer: MEDICARE

## 2024-11-06 ENCOUNTER — HOSPITAL ENCOUNTER (INPATIENT)
Facility: HOSPITAL | Age: 74
LOS: 3 days | Discharge: HOME | DRG: 193 | End: 2024-11-09
Attending: EMERGENCY MEDICINE | Admitting: FAMILY MEDICINE
Payer: MEDICARE

## 2024-11-06 DIAGNOSIS — J44.1 COPD EXACERBATION: ICD-10-CM

## 2024-11-06 DIAGNOSIS — J96.01 ACUTE RESPIRATORY FAILURE WITH HYPOXIA: ICD-10-CM

## 2024-11-06 DIAGNOSIS — J96.21 ACUTE ON CHRONIC RESPIRATORY FAILURE WITH HYPOXIA: ICD-10-CM

## 2024-11-06 DIAGNOSIS — J44.1 ACUTE EXACERBATION OF CHRONIC OBSTRUCTIVE PULMONARY DISEASE (COPD): ICD-10-CM

## 2024-11-06 DIAGNOSIS — J18.9 PNEUMONIA OF LEFT LOWER LOBE DUE TO INFECTIOUS ORGANISM: Primary | ICD-10-CM

## 2024-11-06 DIAGNOSIS — R26.2 DIFFICULTY WALKING: ICD-10-CM

## 2024-11-06 LAB
ALBUMIN SERPL-MCNC: 3.7 G/DL (ref 3.5–5.2)
ALBUMIN/GLOB SERPL: 1.2 G/DL
ALP SERPL-CCNC: 82 U/L (ref 39–117)
ALT SERPL W P-5'-P-CCNC: 17 U/L (ref 1–41)
ANION GAP SERPL CALCULATED.3IONS-SCNC: 8.9 MMOL/L (ref 5–15)
AST SERPL-CCNC: 17 U/L (ref 1–40)
BASOPHILS # BLD AUTO: 0.05 10*3/MM3 (ref 0–0.2)
BASOPHILS NFR BLD AUTO: 0.4 % (ref 0–1.5)
BILIRUB SERPL-MCNC: 0.6 MG/DL (ref 0–1.2)
BUN SERPL-MCNC: 22 MG/DL (ref 8–23)
BUN/CREAT SERPL: 17.6 (ref 7–25)
CALCIUM SPEC-SCNC: 8.5 MG/DL (ref 8.6–10.5)
CHLORIDE SERPL-SCNC: 101 MMOL/L (ref 98–107)
CO2 SERPL-SCNC: 28.1 MMOL/L (ref 22–29)
CREAT SERPL-MCNC: 1.25 MG/DL (ref 0.76–1.27)
DEPRECATED RDW RBC AUTO: 53.4 FL (ref 37–54)
EGFRCR SERPLBLD CKD-EPI 2021: 60 ML/MIN/1.73
EOSINOPHIL # BLD AUTO: 0.3 10*3/MM3 (ref 0–0.4)
EOSINOPHIL NFR BLD AUTO: 2.6 % (ref 0.3–6.2)
ERYTHROCYTE [DISTWIDTH] IN BLOOD BY AUTOMATED COUNT: 18.7 % (ref 12.3–15.4)
GLOBULIN UR ELPH-MCNC: 3.1 GM/DL
GLUCOSE SERPL-MCNC: 141 MG/DL (ref 65–99)
HCT VFR BLD AUTO: 41.9 % (ref 37.5–51)
HGB BLD-MCNC: 12.1 G/DL (ref 13–17.7)
HOLD SPECIMEN: NORMAL
HOLD SPECIMEN: NORMAL
IMM GRANULOCYTES # BLD AUTO: 0.07 10*3/MM3 (ref 0–0.05)
IMM GRANULOCYTES NFR BLD AUTO: 0.6 % (ref 0–0.5)
LYMPHOCYTES # BLD AUTO: 1 10*3/MM3 (ref 0.7–3.1)
LYMPHOCYTES NFR BLD AUTO: 8.5 % (ref 19.6–45.3)
MCH RBC QN AUTO: 23.4 PG (ref 26.6–33)
MCHC RBC AUTO-ENTMCNC: 28.9 G/DL (ref 31.5–35.7)
MCV RBC AUTO: 80.9 FL (ref 79–97)
MONOCYTES # BLD AUTO: 0.97 10*3/MM3 (ref 0.1–0.9)
MONOCYTES NFR BLD AUTO: 8.3 % (ref 5–12)
NEUTROPHILS NFR BLD AUTO: 79.6 % (ref 42.7–76)
NEUTROPHILS NFR BLD AUTO: 9.32 10*3/MM3 (ref 1.7–7)
NRBC BLD AUTO-RTO: 0 /100 WBC (ref 0–0.2)
NT-PROBNP SERPL-MCNC: 224.7 PG/ML (ref 0–1800)
PLATELET # BLD AUTO: 139 10*3/MM3 (ref 140–450)
PMV BLD AUTO: 10.8 FL (ref 6–12)
POTASSIUM SERPL-SCNC: 4.7 MMOL/L (ref 3.5–5.2)
PROT SERPL-MCNC: 6.8 G/DL (ref 6–8.5)
RBC # BLD AUTO: 5.18 10*6/MM3 (ref 4.14–5.8)
SODIUM SERPL-SCNC: 138 MMOL/L (ref 136–145)
TROPONIN T SERPL HS-MCNC: 64 NG/L
WBC NRBC COR # BLD AUTO: 11.71 10*3/MM3 (ref 3.4–10.8)
WHOLE BLOOD HOLD COAG: NORMAL
WHOLE BLOOD HOLD SPECIMEN: NORMAL

## 2024-11-06 PROCEDURE — 93005 ELECTROCARDIOGRAM TRACING: CPT | Performed by: EMERGENCY MEDICINE

## 2024-11-06 PROCEDURE — 94640 AIRWAY INHALATION TREATMENT: CPT

## 2024-11-06 PROCEDURE — 71045 X-RAY EXAM CHEST 1 VIEW: CPT

## 2024-11-06 PROCEDURE — 80053 COMPREHEN METABOLIC PANEL: CPT | Performed by: EMERGENCY MEDICINE

## 2024-11-06 PROCEDURE — 25010000002 METHYLPREDNISOLONE PER 125 MG: Performed by: EMERGENCY MEDICINE

## 2024-11-06 PROCEDURE — 94799 UNLISTED PULMONARY SVC/PX: CPT

## 2024-11-06 PROCEDURE — 99285 EMERGENCY DEPT VISIT HI MDM: CPT

## 2024-11-06 PROCEDURE — 83880 ASSAY OF NATRIURETIC PEPTIDE: CPT | Performed by: EMERGENCY MEDICINE

## 2024-11-06 PROCEDURE — 84484 ASSAY OF TROPONIN QUANT: CPT | Performed by: EMERGENCY MEDICINE

## 2024-11-06 PROCEDURE — 85025 COMPLETE CBC W/AUTO DIFF WBC: CPT | Performed by: EMERGENCY MEDICINE

## 2024-11-06 RX ORDER — IPRATROPIUM BROMIDE AND ALBUTEROL SULFATE 2.5; .5 MG/3ML; MG/3ML
3 SOLUTION RESPIRATORY (INHALATION)
Status: COMPLETED | OUTPATIENT
Start: 2024-11-06 | End: 2024-11-06

## 2024-11-06 RX ORDER — METHYLPREDNISOLONE SODIUM SUCCINATE 125 MG/2ML
125 INJECTION, POWDER, LYOPHILIZED, FOR SOLUTION INTRAMUSCULAR; INTRAVENOUS ONCE
Status: COMPLETED | OUTPATIENT
Start: 2024-11-06 | End: 2024-11-06

## 2024-11-06 RX ORDER — SODIUM CHLORIDE 0.9 % (FLUSH) 0.9 %
10 SYRINGE (ML) INJECTION AS NEEDED
Status: DISCONTINUED | OUTPATIENT
Start: 2024-11-06 | End: 2024-11-09 | Stop reason: HOSPADM

## 2024-11-06 RX ADMIN — IPRATROPIUM BROMIDE AND ALBUTEROL SULFATE 3 ML: .5; 3 SOLUTION RESPIRATORY (INHALATION) at 22:06

## 2024-11-06 RX ADMIN — METHYLPREDNISOLONE SODIUM SUCCINATE 125 MG: 125 INJECTION, POWDER, FOR SOLUTION INTRAMUSCULAR; INTRAVENOUS at 22:36

## 2024-11-06 RX ADMIN — IPRATROPIUM BROMIDE AND ALBUTEROL SULFATE 3 ML: .5; 3 SOLUTION RESPIRATORY (INHALATION) at 22:05

## 2024-11-06 RX ADMIN — IPRATROPIUM BROMIDE AND ALBUTEROL SULFATE 3 ML: .5; 3 SOLUTION RESPIRATORY (INHALATION) at 22:04

## 2024-11-06 NOTE — OUTREACH NOTE
"COPD/PN Week 3 Survey      Flowsheet Row Responses   [facility] facility patient discharged from? [facility]   Does the patient have one of the following disease processes/diagnoses(primary or secondary)? COPD   Week 3 attempt successful? Yes   Call start time 1224   Revoke Decline to participate  [Someone answered, and said \"Stop calling.\" Phone disconnected.]   Call end time 1225   Call end time 1225            Emani MILLER - Registered Nurse  "

## 2024-11-07 LAB
D-LACTATE SERPL-SCNC: 1.2 MMOL/L (ref 0.5–2)
GEN 5 2HR TROPONIN T REFLEX: 61 NG/L
GLUCOSE BLDC GLUCOMTR-MCNC: 201 MG/DL (ref 70–99)
GLUCOSE BLDC GLUCOMTR-MCNC: 228 MG/DL (ref 70–99)
GLUCOSE BLDC GLUCOMTR-MCNC: 260 MG/DL (ref 70–99)
GLUCOSE BLDC GLUCOMTR-MCNC: 288 MG/DL (ref 70–99)
QT INTERVAL: 401 MS
QTC INTERVAL: 451 MS
TROPONIN T DELTA: -3 NG/L
WHOLE BLOOD HOLD SPECIMEN: NORMAL

## 2024-11-07 PROCEDURE — 25010000002 ENOXAPARIN PER 10 MG: Performed by: FAMILY MEDICINE

## 2024-11-07 PROCEDURE — 25010000002 METHYLPREDNISOLONE PER 125 MG: Performed by: FAMILY MEDICINE

## 2024-11-07 PROCEDURE — 25010000002 CEFEPIME PER 500 MG: Performed by: EMERGENCY MEDICINE

## 2024-11-07 PROCEDURE — 82948 REAGENT STRIP/BLOOD GLUCOSE: CPT | Performed by: FAMILY MEDICINE

## 2024-11-07 PROCEDURE — 83605 ASSAY OF LACTIC ACID: CPT | Performed by: EMERGENCY MEDICINE

## 2024-11-07 PROCEDURE — 94799 UNLISTED PULMONARY SVC/PX: CPT

## 2024-11-07 PROCEDURE — 36415 COLL VENOUS BLD VENIPUNCTURE: CPT | Performed by: EMERGENCY MEDICINE

## 2024-11-07 PROCEDURE — 94664 DEMO&/EVAL PT USE INHALER: CPT

## 2024-11-07 PROCEDURE — 25010000002 VANCOMYCIN 5 G RECONSTITUTED SOLUTION: Performed by: EMERGENCY MEDICINE

## 2024-11-07 PROCEDURE — 82948 REAGENT STRIP/BLOOD GLUCOSE: CPT

## 2024-11-07 PROCEDURE — 94660 CPAP INITIATION&MGMT: CPT

## 2024-11-07 PROCEDURE — 63710000001 INSULIN LISPRO (HUMAN) PER 5 UNITS: Performed by: FAMILY MEDICINE

## 2024-11-07 PROCEDURE — 99223 1ST HOSP IP/OBS HIGH 75: CPT | Performed by: FAMILY MEDICINE

## 2024-11-07 PROCEDURE — 87040 BLOOD CULTURE FOR BACTERIA: CPT | Performed by: EMERGENCY MEDICINE

## 2024-11-07 PROCEDURE — 25810000003 SODIUM CHLORIDE 0.9 % SOLUTION: Performed by: EMERGENCY MEDICINE

## 2024-11-07 PROCEDURE — 94760 N-INVAS EAR/PLS OXIMETRY 1: CPT

## 2024-11-07 PROCEDURE — 63710000001 INSULIN GLARGINE PER 5 UNITS: Performed by: FAMILY MEDICINE

## 2024-11-07 PROCEDURE — 84484 ASSAY OF TROPONIN QUANT: CPT | Performed by: EMERGENCY MEDICINE

## 2024-11-07 PROCEDURE — 36415 COLL VENOUS BLD VENIPUNCTURE: CPT

## 2024-11-07 RX ORDER — AMOXICILLIN 250 MG
2 CAPSULE ORAL 2 TIMES DAILY PRN
Status: DISCONTINUED | OUTPATIENT
Start: 2024-11-07 | End: 2024-11-09 | Stop reason: HOSPADM

## 2024-11-07 RX ORDER — POLYETHYLENE GLYCOL 3350 17 G/17G
17 POWDER, FOR SOLUTION ORAL DAILY PRN
Status: DISCONTINUED | OUTPATIENT
Start: 2024-11-07 | End: 2024-11-09 | Stop reason: HOSPADM

## 2024-11-07 RX ORDER — GUAIFENESIN/DEXTROMETHORPHAN 100-10MG/5
10 SYRUP ORAL EVERY 6 HOURS PRN
Status: DISCONTINUED | OUTPATIENT
Start: 2024-11-07 | End: 2024-11-09 | Stop reason: HOSPADM

## 2024-11-07 RX ORDER — GUAIFENESIN 600 MG/1
600 TABLET, EXTENDED RELEASE ORAL EVERY 12 HOURS SCHEDULED
Status: DISCONTINUED | OUTPATIENT
Start: 2024-11-07 | End: 2024-11-09 | Stop reason: HOSPADM

## 2024-11-07 RX ORDER — BISACODYL 10 MG
10 SUPPOSITORY, RECTAL RECTAL DAILY PRN
Status: DISCONTINUED | OUTPATIENT
Start: 2024-11-07 | End: 2024-11-09 | Stop reason: HOSPADM

## 2024-11-07 RX ORDER — DOXYCYCLINE 100 MG/1
100 CAPSULE ORAL EVERY 12 HOURS SCHEDULED
Status: DISCONTINUED | OUTPATIENT
Start: 2024-11-07 | End: 2024-11-09 | Stop reason: HOSPADM

## 2024-11-07 RX ORDER — IPRATROPIUM BROMIDE AND ALBUTEROL SULFATE 2.5; .5 MG/3ML; MG/3ML
3 SOLUTION RESPIRATORY (INHALATION) EVERY 4 HOURS PRN
Status: DISCONTINUED | OUTPATIENT
Start: 2024-11-07 | End: 2024-11-07

## 2024-11-07 RX ORDER — BUDESONIDE 0.5 MG/2ML
0.5 INHALANT ORAL
Status: DISCONTINUED | OUTPATIENT
Start: 2024-11-07 | End: 2024-11-09 | Stop reason: HOSPADM

## 2024-11-07 RX ORDER — PANTOPRAZOLE SODIUM 40 MG/1
40 TABLET, DELAYED RELEASE ORAL
Status: DISCONTINUED | OUTPATIENT
Start: 2024-11-08 | End: 2024-11-09 | Stop reason: HOSPADM

## 2024-11-07 RX ORDER — ATORVASTATIN CALCIUM 10 MG/1
10 TABLET, FILM COATED ORAL DAILY
Status: DISCONTINUED | OUTPATIENT
Start: 2024-11-08 | End: 2024-11-09 | Stop reason: HOSPADM

## 2024-11-07 RX ORDER — SODIUM CHLORIDE 0.9 % (FLUSH) 0.9 %
10 SYRINGE (ML) INJECTION EVERY 12 HOURS SCHEDULED
Status: DISCONTINUED | OUTPATIENT
Start: 2024-11-07 | End: 2024-11-09 | Stop reason: HOSPADM

## 2024-11-07 RX ORDER — PREDNISONE 20 MG/1
40 TABLET ORAL
Status: DISCONTINUED | OUTPATIENT
Start: 2024-11-08 | End: 2024-11-09 | Stop reason: HOSPADM

## 2024-11-07 RX ORDER — ARFORMOTEROL TARTRATE 15 UG/2ML
15 SOLUTION RESPIRATORY (INHALATION)
Status: DISCONTINUED | OUTPATIENT
Start: 2024-11-07 | End: 2024-11-09 | Stop reason: HOSPADM

## 2024-11-07 RX ORDER — SODIUM CHLORIDE 0.9 % (FLUSH) 0.9 %
10 SYRINGE (ML) INJECTION AS NEEDED
Status: DISCONTINUED | OUTPATIENT
Start: 2024-11-07 | End: 2024-11-09 | Stop reason: HOSPADM

## 2024-11-07 RX ORDER — ASPIRIN 81 MG/1
81 TABLET, CHEWABLE ORAL DAILY
Status: DISCONTINUED | OUTPATIENT
Start: 2024-11-08 | End: 2024-11-09 | Stop reason: HOSPADM

## 2024-11-07 RX ORDER — ONDANSETRON 2 MG/ML
4 INJECTION INTRAMUSCULAR; INTRAVENOUS EVERY 6 HOURS PRN
Status: DISCONTINUED | OUTPATIENT
Start: 2024-11-07 | End: 2024-11-09 | Stop reason: HOSPADM

## 2024-11-07 RX ORDER — ENOXAPARIN SODIUM 100 MG/ML
40 INJECTION SUBCUTANEOUS DAILY
Status: DISCONTINUED | OUTPATIENT
Start: 2024-11-07 | End: 2024-11-09 | Stop reason: HOSPADM

## 2024-11-07 RX ORDER — IBUPROFEN 600 MG/1
1 TABLET ORAL
Status: DISCONTINUED | OUTPATIENT
Start: 2024-11-07 | End: 2024-11-09 | Stop reason: HOSPADM

## 2024-11-07 RX ORDER — IPRATROPIUM BROMIDE AND ALBUTEROL SULFATE 2.5; .5 MG/3ML; MG/3ML
3 SOLUTION RESPIRATORY (INHALATION)
Status: DISCONTINUED | OUTPATIENT
Start: 2024-11-07 | End: 2024-11-07

## 2024-11-07 RX ORDER — INSULIN LISPRO 100 [IU]/ML
2-9 INJECTION, SOLUTION INTRAVENOUS; SUBCUTANEOUS
Status: DISCONTINUED | OUTPATIENT
Start: 2024-11-07 | End: 2024-11-09 | Stop reason: HOSPADM

## 2024-11-07 RX ORDER — DEXTROSE MONOHYDRATE 25 G/50ML
25 INJECTION, SOLUTION INTRAVENOUS
Status: DISCONTINUED | OUTPATIENT
Start: 2024-11-07 | End: 2024-11-09 | Stop reason: HOSPADM

## 2024-11-07 RX ORDER — NICOTINE POLACRILEX 4 MG
15 LOZENGE BUCCAL
Status: DISCONTINUED | OUTPATIENT
Start: 2024-11-07 | End: 2024-11-09 | Stop reason: HOSPADM

## 2024-11-07 RX ORDER — BISACODYL 5 MG/1
5 TABLET, DELAYED RELEASE ORAL DAILY PRN
Status: DISCONTINUED | OUTPATIENT
Start: 2024-11-07 | End: 2024-11-09 | Stop reason: HOSPADM

## 2024-11-07 RX ORDER — METHYLPREDNISOLONE SODIUM SUCCINATE 125 MG/2ML
60 INJECTION, POWDER, LYOPHILIZED, FOR SOLUTION INTRAMUSCULAR; INTRAVENOUS EVERY 8 HOURS
Status: DISCONTINUED | OUTPATIENT
Start: 2024-11-07 | End: 2024-11-07

## 2024-11-07 RX ORDER — IPRATROPIUM BROMIDE AND ALBUTEROL SULFATE 2.5; .5 MG/3ML; MG/3ML
3 SOLUTION RESPIRATORY (INHALATION)
Status: DISCONTINUED | OUTPATIENT
Start: 2024-11-07 | End: 2024-11-09 | Stop reason: HOSPADM

## 2024-11-07 RX ORDER — ALBUTEROL SULFATE 0.83 MG/ML
2.5 SOLUTION RESPIRATORY (INHALATION)
Status: DISCONTINUED | OUTPATIENT
Start: 2024-11-07 | End: 2024-11-09 | Stop reason: HOSPADM

## 2024-11-07 RX ORDER — SODIUM CHLORIDE 9 MG/ML
40 INJECTION, SOLUTION INTRAVENOUS AS NEEDED
Status: DISCONTINUED | OUTPATIENT
Start: 2024-11-07 | End: 2024-11-09 | Stop reason: HOSPADM

## 2024-11-07 RX ADMIN — INSULIN LISPRO 6 UNITS: 100 INJECTION, SOLUTION INTRAVENOUS; SUBCUTANEOUS at 21:32

## 2024-11-07 RX ADMIN — METHYLPREDNISOLONE SODIUM SUCCINATE 60 MG: 125 INJECTION, POWDER, FOR SOLUTION INTRAMUSCULAR; INTRAVENOUS at 12:43

## 2024-11-07 RX ADMIN — SODIUM CHLORIDE 1000 ML: 9 INJECTION, SOLUTION INTRAVENOUS at 00:22

## 2024-11-07 RX ADMIN — DOXYCYCLINE 100 MG: 100 CAPSULE ORAL at 08:19

## 2024-11-07 RX ADMIN — BUDESONIDE 0.5 MG: 0.5 SUSPENSION RESPIRATORY (INHALATION) at 19:17

## 2024-11-07 RX ADMIN — IPRATROPIUM BROMIDE AND ALBUTEROL SULFATE 3 ML: .5; 3 SOLUTION RESPIRATORY (INHALATION) at 04:02

## 2024-11-07 RX ADMIN — Medication 10 ML: at 08:20

## 2024-11-07 RX ADMIN — CEFEPIME 2000 MG: 2 INJECTION, POWDER, FOR SOLUTION INTRAVENOUS at 00:22

## 2024-11-07 RX ADMIN — INSULIN LISPRO 4 UNITS: 100 INJECTION, SOLUTION INTRAVENOUS; SUBCUTANEOUS at 08:20

## 2024-11-07 RX ADMIN — ARFORMOTEROL TARTRATE 15 MCG: 15 SOLUTION RESPIRATORY (INHALATION) at 10:55

## 2024-11-07 RX ADMIN — ENOXAPARIN SODIUM 40 MG: 100 INJECTION SUBCUTANEOUS at 08:19

## 2024-11-07 RX ADMIN — GUAIFENESIN 600 MG: 600 TABLET ORAL at 21:25

## 2024-11-07 RX ADMIN — INSULIN LISPRO 6 UNITS: 100 INJECTION, SOLUTION INTRAVENOUS; SUBCUTANEOUS at 18:12

## 2024-11-07 RX ADMIN — ARFORMOTEROL TARTRATE 15 MCG: 15 SOLUTION RESPIRATORY (INHALATION) at 19:17

## 2024-11-07 RX ADMIN — INSULIN LISPRO 4 UNITS: 100 INJECTION, SOLUTION INTRAVENOUS; SUBCUTANEOUS at 12:42

## 2024-11-07 RX ADMIN — VANCOMYCIN HYDROCHLORIDE 2250 MG: 5 INJECTION, POWDER, LYOPHILIZED, FOR SOLUTION INTRAVENOUS at 01:20

## 2024-11-07 RX ADMIN — IPRATROPIUM BROMIDE AND ALBUTEROL SULFATE 3 ML: .5; 3 SOLUTION RESPIRATORY (INHALATION) at 07:18

## 2024-11-07 RX ADMIN — IPRATROPIUM BROMIDE AND ALBUTEROL SULFATE 3 ML: .5; 3 SOLUTION RESPIRATORY (INHALATION) at 19:17

## 2024-11-07 RX ADMIN — Medication 10 ML: at 21:25

## 2024-11-07 RX ADMIN — DOXYCYCLINE 100 MG: 100 CAPSULE ORAL at 21:25

## 2024-11-07 RX ADMIN — METHYLPREDNISOLONE SODIUM SUCCINATE 60 MG: 125 INJECTION, POWDER, FOR SOLUTION INTRAMUSCULAR; INTRAVENOUS at 18:12

## 2024-11-07 RX ADMIN — IPRATROPIUM BROMIDE AND ALBUTEROL SULFATE 3 ML: .5; 3 SOLUTION RESPIRATORY (INHALATION) at 14:06

## 2024-11-07 RX ADMIN — GUAIFENESIN 600 MG: 600 TABLET ORAL at 08:19

## 2024-11-07 RX ADMIN — IPRATROPIUM BROMIDE AND ALBUTEROL SULFATE 3 ML: .5; 3 SOLUTION RESPIRATORY (INHALATION) at 10:55

## 2024-11-07 RX ADMIN — INSULIN GLARGINE 10 UNITS: 100 INJECTION, SOLUTION SUBCUTANEOUS at 21:24

## 2024-11-07 RX ADMIN — BUDESONIDE 0.5 MG: 0.5 SUSPENSION RESPIRATORY (INHALATION) at 10:54

## 2024-11-07 NOTE — H&P
[facility]   HISTORY AND PHYSICAL    Patient Name: Dilip Faulkner  : 1950  MRN: [id number]  Primary Care Physician:  Jackeline Jaime APRN  Date of admission: 2024    Subjective   Subjective     Chief Complaint: Shortness of breath    HPI:    Dilip Faulkner is a 74 y.o. male with past medical history of diabetes, hypertension, COPD on home oxygen 2 L, tobacco use in remission, asthma, obesity, and GERD presented to the ED for evaluation of shortness of breath.  Patient states that for the last 2 days he has been having worsening shortness of breath to where he is symptomatic even at rest.  This evening his sister noticed that he was struggling to catch his breath so she called EMS to bring him to the ED for evaluation.  Patient denied any fevers or chills.  Patient was discharged from this hospital 3 weeks ago with similar complaints.  In the ED patient is was hypoxic on arrival requiring oxygen supplementation by nasal cannula at 4.5 L.  Labs showed mild leukocytosis with elevated but flat troponin but normal lactic acid.  Chest x-ray showed bilateral lower lobe thickening showing possible pneumonia.  When seen patient was tachypneic but denied any recent fevers, chills, headaches, focal weakness, chest pain, palpitation,   abdominal pain, nausea, vomiting, diarrhea, constipation, dysuria, hematuria, hematochezia, melena, or anxiety.  Patient admitted for further evaluation and treatment.        Review of Systems   All systems were reviewed and negative except for: As per HPI    Personal History     Past Medical History:   Diagnosis Date   • Asthma    • COPD (chronic obstructive pulmonary disease)    • Diabetes mellitus    • Ex-smoker     QUIT    • Hernia, umbilical    • Hypertension    • On home O2     REPORTS WEARING 2L/NC PRN SOA       Past Surgical History:   Procedure Laterality Date   • ABDOMINAL SURGERY         Family History: family history includes Asthma in his brother, father,  and sister; Cancer in his maternal aunt. Otherwise pertinent FHx was reviewed and not pertinent to current issue.    Social History:  reports that he quit smoking about 3 years ago. His smoking use included cigarettes. He started smoking about 59 years ago. He has a 84.3 pack-year smoking history. He has been exposed to tobacco smoke. His smokeless tobacco use includes chew. He reports that he does not drink alcohol and does not use drugs.    Home Medications:  Albuterol-Budesonide, Budeson-Glycopyrrol-Formoterol, albuterol, albuterol sulfate HFA, aspirin, atorvastatin, empagliflozin, insulin degludec, metFORMIN, and omeprazole      Allergies:  No Known Allergies    Objective   Objective     Vitals:   Temp:  [98 °F (36.7 °C)-99.6 °F (37.6 °C)] 98 °F (36.7 °C)  Heart Rate:  [76-83] 82  Resp:  [18-24] 18  BP: (108-119)/(53-66) 119/62  Flow (L/min) (Oxygen Therapy):  [4-4.5] 4.5  Physical Exam    Constitutional: Awake, alert   Eyes: PERRLA, sclerae anicteric, no conjunctival injection   HENT: NCAT, mucous membranes moist   Neck: Supple, no thyromegaly, no lymphadenopathy, trachea midline   Respiratory: Tachypnea, wheezing, Rales, on nasal cannula   Cardiovascular: RRR, no murmurs, rubs, or gallops, palpable pedal pulses bilaterally   Gastrointestinal: Positive bowel sounds, soft, nontender, nondistended   Musculoskeletal: No bilateral ankle edema, no clubbing or cyanosis to extremities   Psychiatric: Appropriate affect, cooperative   Neurologic: Oriented x 3, strength symmetric in all extremities, Cranial Nerves grossly intact to confrontation, speech clear   Skin: No rashes     Result Review    Result Review:  I have personally reviewed the results from the time of this admission to 11/7/2024 03:25 EST and agree with these findings:  [x]  Laboratory list / accordion  []  Microbiology  [x]  Radiology  [x]  EKG/Telemetry   []  Cardiology/Vascular   []  Pathology  []  Old records  []  Other:  Most notable findings  include: Hypercapnia on ABG, hyperglycemia, elevated flat troponin, possible pneumonia on x-ray      Assessment & Plan   Assessment / Plan     Brief Patient Summary:  Dilip Faulkner is a 74 y.o. male with past medical history of diabetes, hypertension, COPD on home oxygen 2 L, tobacco use in remission, asthma, obesity, and GERD presented to the ED for evaluation of shortness of breath.     Active Hospital Problems:  Active Hospital Problems    Diagnosis    • **Acute on chronic respiratory failure with hypoxia    • Acute respiratory failure with hypoxia    • Pneumonia of left lower lobe due to infectious organism    • Cough    • Type 2 diabetes mellitus    • Essential hypertension      Plan:     Acute on chronic respiratory failure with hypoxia and hypercapnia  -Admit to telemetry  -Imaging reviewed  -Supplemental oxygen as needed, wean down as tolerated  -ABG reviewed  -BiPAP  -IV Solu-Medrol  -DuoNeb 3 times daily and as needed  -Empiric antibiotics  -Mucinex, Tessalon Perles  -Incentive spirometry  -Adjust home meds if warranted  -Consult pulmonology if warranted  -Supportive care    Diabetes  -Insulin sliding scale  -Levemir at bedtime  -Titrate as needed    HTN  -Currently well controlled  -PRN BP meds  -Resume home meds when available  -Titrate if needed    Tobacco use in remission  Obesity    GI ppx  DVT ppx      VTE Prophylaxis:  Pharmacologic VTE prophylaxis orders are present.        CODE STATUS:    Level Of Support Discussed With: Patient  Code Status (Patient has no pulse and is not breathing): CPR (Attempt to Resuscitate)  Medical Interventions (Patient has pulse or is breathing): Full Support    Admission Status:  I believe this patient meets inpatient status.      Electronically signed by Oni Leong MD, 11/07/24, 3:25 AM EST.

## 2024-11-07 NOTE — PLAN OF CARE
Goal Outcome Evaluation:              Outcome Evaluation: Patient alert and oriented x4, VSS. Patient currently on 4.5L NC. Patient denies any pain or needs at this time. Will continue to monitor plan of care.

## 2024-11-07 NOTE — PROGRESS NOTES
RT EQUIPMENT DEVICE RELATED - SKIN ASSESSMENT    RT Medical Equipment/Device:     NIV Mask:  Under-the-nose   size:  b    Skin Assessment:      Cheek:  Intact  Chin:  Intact  Nares:  Intact  Neck:  Intact  Mouth:  Intact    Device Skin Pressure Protection:  Pressure points protected    Nurse Notification:  Martha Root, RRT

## 2024-11-07 NOTE — PROGRESS NOTES
Patient seen and evaluated this afternoon sitting up in bed appears to be resting fairly comfortably.  Patient indicates that shortness of breath is improving that he is feeling better overall.  Patient endorsing normally productive cough.  Afebrile overnight.  Heart rate within normal limits.  Blood pressure within normal limits.  Requiring 4 L nasal cannula keep sats greater than 90%.  Patient's home O2 is usually at 2 L nasal cannula.  Blood sugars trending up.  Started on Brovana, Pulmicort and increased frequency of scheduled DuoNebs.  Started on bronchopulmonary hygiene protocol.  Continued on systemic steroids and doxycycline.  Restarting home meds as appropriate.  Patient likely be able to return home once respiratory function improves closer to baseline.

## 2024-11-07 NOTE — PROGRESS NOTES
Respiratory Therapist Broncho-Pulmonary Hygiene Progress Note      Patient Name:  Dilip Faulkner  YOB: 1950    Dilip Faulkner meets the qualification for Level 2 of the Bronco-Pulmonary Hygiene Protocol. This was based on my daily patient assessment and includes review of chest x-ray results, cough ability and quality, oxygenation, secretions or risk for secretion development and patient mobility.     Broncho-Pulmonary Hygiene Assessment:    Level of Movement: Actively changing positions without assistance  Alert/ oriented/ cooperative    Breath Sounds: Diminished and/or coarse rhonchi    Cough: Ineffective, weak, frequent    Chest X-Ray: Mild consolidation and/or atelectasis.    Sputum Productions: None or small amount of thin or watery secretions with effective cough    History and Physical: Chronic condition    SpO2 to Oxygen Need: greater than 92% on 4-6L nasal canula    Current SpO2 is: 95 on 4L    Based on this information, I have completed the following interventions: Aerobika with bronchodialtor medication or TID      Electronically signed by Thalia Root RRT, 11/07/24, 11:15 AM EST.

## 2024-11-07 NOTE — ED PROVIDER NOTES
Time: 9:29 PM EST  Date of encounter:  11/6/2024  Independent Historian/Clinical History and Information was obtained by:   Patient    History is limited by: N/A    Chief Complaint: Shortness of breath      History of Present Illness:  Patient is a 74 y.o. year old male who presents to the emergency department for evaluation of shortness of breath    Patient describes increasing shortness of breath and cough over the past 2 to 3 days.  He typically uses 2 L of supplemental oxygen via nasal cannula but is requiring 4 L today.  He EMS reports that his oxygen saturation was 88% on their arrival.  He has mild weakness and fatigue.  No nausea or vomiting.  No reported fever.  He reports he was recently hospitalized for COPD.      Patient Care Team  Primary Care Provider: Jackeline Jaime APRN    Past Medical History:     No Known Allergies  Past Medical History:   Diagnosis Date    Asthma     COPD (chronic obstructive pulmonary disease)     Diabetes mellitus     Ex-smoker     QUIT 2021    Hernia, umbilical     Hypertension     On home O2     REPORTS WEARING 2L/NC PRN SOA     Past Surgical History:   Procedure Laterality Date    ABDOMINAL SURGERY       Family History   Problem Relation Age of Onset    Asthma Father     Asthma Sister     Asthma Brother     Cancer Maternal Aunt        Home Medications:  Prior to Admission medications    Medication Sig Start Date End Date Taking? Authorizing Provider   albuterol (PROVENTIL) (2.5 MG/3ML) 0.083% nebulizer solution Take 2.5 mg by nebulization Every 4 (Four) Hours As Needed for Wheezing or Shortness of Air. 8/27/24   Vivien Jacobs APRN   albuterol sulfate  (90 Base) MCG/ACT inhaler Inhale 2 puffs Every 4 (Four) Hours As Needed for Wheezing. 10/29/24   Nelly Jaime APRN   Albuterol-Budesonide (Airsupra) 90-80 MCG/ACT aerosol Inhale 2 puffs Every 4 (Four) Hours As Needed (wheezing, soa). 8/20/24   Jackeline Jaime APRN   aspirin 81 MG chewable tablet CHEW AND SWALLOW 1  TABLET BY MOUTH DAILY 5/31/24   Jackeline Jaime APRN   atorvastatin (LIPITOR) 10 MG tablet TAKE 1 TABLET BY MOUTH DAILY 8/13/24   Jackeline Jaime APRN   Budeson-Glycopyrrol-Formoterol (Breztri Aerosphere) 160-9-4.8 MCG/ACT aerosol inhaler Inhale 2 puffs 2 (Two) Times a Day. 6/4/24   Jackeline Jaime APRN   Jardiance 10 MG tablet tablet TAKE 1 TABLET BY MOUTH DAILY 8/30/24   Jackeline Jaime APRN   metFORMIN (GLUCOPHAGE) 500 MG tablet TAKE 1 TABLET BY MOUTH TWICE DAILY WITH MEALS 9/24/24   Jackeline Jaime APRN   omeprazole (priLOSEC) 20 MG capsule TAKE 1 CAPSULE BY MOUTH DAILY 10/30/24   Jackeline Jaime APRN   Tresiba FlexTouch 100 UNIT/ML solution pen-injector injection ADMINISTER 20 UNITS UNDER THE SKIN DAILY AS DIRECTED  Patient taking differently: Inject 20 Units under the skin into the appropriate area as directed Daily. 8/1/24   Jackeline Jaime APRN        Social History:   Social History     Tobacco Use    Smoking status: Former     Current packs/day: 0.00     Average packs/day: 1.5 packs/day for 56.2 years (84.3 ttl pk-yrs)     Types: Cigarettes     Start date: 1965     Quit date: 3/19/2021     Years since quitting: 3.6     Passive exposure: Past    Smokeless tobacco: Current     Types: Chew   Vaping Use    Vaping status: Never Used   Substance Use Topics    Alcohol use: Never    Drug use: Never         Review of Systems:  Review of Systems   Constitutional:  Positive for fatigue. Negative for chills and fever.   HENT:  Negative for congestion, ear pain and sore throat.    Eyes:  Negative for pain.   Respiratory:  Positive for cough and shortness of breath. Negative for chest tightness.    Cardiovascular:  Negative for chest pain.   Gastrointestinal:  Negative for abdominal pain, diarrhea, nausea and vomiting.   Genitourinary:  Negative for flank pain and hematuria.   Musculoskeletal:  Negative for joint swelling.   Skin:  Negative for pallor.   Neurological:  Negative for seizures and headaches.   All other  systems reviewed and are negative.       Physical Exam:  /66 (BP Location: Right arm, Patient Position: Lying)   Pulse 72   Temp 97.5 °F (36.4 °C) (Oral)   Resp 22   Wt 114 kg (250 lb 10.6 oz)   SpO2 96%   BMI 33.07 kg/m²     Physical Exam  Vitals and nursing note reviewed.   Constitutional:       General: He is not in acute distress.     Appearance: Normal appearance. He is not toxic-appearing.   HENT:      Head: Normocephalic and atraumatic.      Jaw: There is normal jaw occlusion.   Eyes:      General: Lids are normal.      Extraocular Movements: Extraocular movements intact.      Conjunctiva/sclera: Conjunctivae normal.      Pupils: Pupils are equal, round, and reactive to light.   Cardiovascular:      Rate and Rhythm: Normal rate and regular rhythm.      Pulses: Normal pulses.      Heart sounds: Normal heart sounds.   Pulmonary:      Effort: Pulmonary effort is normal. No respiratory distress.      Breath sounds: Examination of the right-lower field reveals decreased breath sounds. Examination of the left-lower field reveals decreased breath sounds. Decreased breath sounds present. No wheezing or rhonchi.   Abdominal:      General: Abdomen is flat.      Palpations: Abdomen is soft.      Tenderness: There is no abdominal tenderness. There is no guarding or rebound.   Musculoskeletal:         General: Normal range of motion.      Cervical back: Normal range of motion and neck supple.      Right lower leg: No edema.      Left lower leg: No edema.   Skin:     General: Skin is warm and dry.   Neurological:      Mental Status: He is alert and oriented to person, place, and time. Mental status is at baseline.   Psychiatric:         Mood and Affect: Mood normal.                Procedures:  Procedures      Medical Decision Making:      Comorbidities that affect care:    COPD, asthma, diabetes, history of smoking, home oxygen use, hypertension    External Notes reviewed:    Previous Clinic Note: Outpatient  pulmonary visit for chronic respiratory failure and hypoxia 10/29/2024      The following orders were placed and all results were independently analyzed by me:  Orders Placed This Encounter   Procedures    Blood Culture - Blood,    Blood Culture - Blood,    XR Chest 1 View    [place] Draw    Comprehensive Metabolic Panel    BNP    Single High Sensitivity Troponin T    CBC Auto Differential    High Sensitivity Troponin T 2Hr    Lactic Acid, Plasma    CBC (No Diff)    Basic Metabolic Panel    Diet: Cardiac; Healthy Heart (2-3 Na+); Fluid Consistency: Thin (IDDSI 0)    Undress & Gown    Continuous Pulse Oximetry    Vital Signs    Reason for COPD Admission: New Oxygen Requirements; Indicate COPD Diagnosis For Problem List: Acute on chronic respiratory failure with hypoxia    Tobacco Cessation Education    Respiratory Treatment Education (MDI / Spacer / Nebulizer)    COPD Education    Cough / Deep Breathe    Vital Signs    Intake & Output    Weigh Patient    Oral Care    Saline Lock & Maintain IV Access    Activity - Ad Narda    Code Status and Medical Interventions: CPR (Attempt to Resuscitate); Full Support    Hospitalist (on-call MD unless specified)    PT Consult: Eval & Treat Functional Mobility Below Baseline    Oxygen Therapy- Nasal Cannula; Titrate 1-6 LPM Per SpO2; 90 - 95%    Document Pulse Oximetry - On Room Air / Home O2 Level    Oscillating Positive Expiratory Pressure (OPEP)    Incentive Spirometry    Oxygen Therapy- Nasal Cannula; Titrate 1-6 LPM Per SpO2; 88 - 92%    NIPPV-IPPV / BIPAP / CPAP    POC Glucose 4x Daily Before Meals & at Bedtime    ECG 12 Lead ED Triage Standing Order; SOA    Insert Peripheral IV    Insert Peripheral IV    Inpatient Admission    CBC & Differential    Green Top (Gel)    Lavender Top    Gold Top - SST    Light Blue Top    Extra Tubes    Lavender Top       Medications Given in the Emergency Department:  Medications   sodium chloride 0.9 % flush 10 mL (has no administration in time  range)   dextrose (GLUTOSE) oral gel 15 g (has no administration in time range)   dextrose (D50W) (25 g/50 mL) IV injection 25 g (has no administration in time range)   glucagon (GLUCAGEN) injection 1 mg (has no administration in time range)   Insulin Lispro (humaLOG) injection 2-9 Units (has no administration in time range)   sodium chloride 0.9 % flush 10 mL (has no administration in time range)   sodium chloride 0.9 % flush 10 mL (has no administration in time range)   sodium chloride 0.9 % infusion 40 mL (has no administration in time range)   Enoxaparin Sodium (LOVENOX) syringe 40 mg (has no administration in time range)   ipratropium-albuterol (DUO-NEB) nebulizer solution 3 mL (3 mL Nebulization Given 11/7/24 0402)   ipratropium-albuterol (DUO-NEB) nebulizer solution 3 mL (has no administration in time range)   sennosides-docusate (PERICOLACE) 8.6-50 MG per tablet 2 tablet (has no administration in time range)     And   polyethylene glycol (MIRALAX) packet 17 g (has no administration in time range)     And   bisacodyl (DULCOLAX) EC tablet 5 mg (has no administration in time range)     And   bisacodyl (DULCOLAX) suppository 10 mg (has no administration in time range)   ondansetron (ZOFRAN) injection 4 mg (has no administration in time range)   guaiFENesin (MUCINEX) 12 hr tablet 600 mg (has no administration in time range)   guaiFENesin-dextromethorphan (ROBITUSSIN DM) 100-10 MG/5ML syrup 10 mL (has no administration in time range)   doxycycline (MONODOX) capsule 100 mg (has no administration in time range)   methylPREDNISolone sodium succinate (SOLU-Medrol) injection 125 mg (125 mg Intravenous Given 11/6/24 2236)   ipratropium-albuterol (DUO-NEB) nebulizer solution 3 mL (3 mL Nebulization Given 11/6/24 2206)   cefepime 2000 mg IVPB in 100 mL NS (VTB) (0 mg Intravenous Stopped 11/7/24 0052)   vancomycin 2250 mg/500 mL 0.9% NS IVPB ([facility]) (2,250 mg Intravenous New Bag 11/7/24 0120)   sodium chloride 0.9 % bolus  1,000 mL (1,000 mL Intravenous New Bag 11/7/24 0022)        ED Course:    ED Course as of 11/07/24 0650   Wed Nov 06, 2024   5107 Discussed patient's workup and presentation with the hospitalist who agrees to admission.  The patient's airway is intact, vital signs, and respiratory status are safe for admission at this time.   [JS]      ED Course User Index  [JS] Roel Saldivar MD       Labs:    Lab Results (last 24 hours)       Procedure Component Value Units Date/Time    CBC & Differential [[id number]]  (Abnormal) Collected: 11/06/24 2122    Specimen: Blood from Arm, Right Updated: 11/06/24 2145    Narrative:      The following orders were created for panel order CBC & Differential.  Procedure                               Abnormality         Status                     ---------                               -----------         ------                     CBC Auto Differential[[id number]]        Abnormal            Final result               Scan Slide[[id number]]                                                                    Please view results for these tests on the individual orders.    CBC Auto Differential [[id number]]  (Abnormal) Collected: 11/06/24 2122    Specimen: Blood from Arm, Right Updated: 11/06/24 2144     WBC 11.71 10*3/mm3      RBC 5.18 10*6/mm3      Hemoglobin 12.1 g/dL      Hematocrit 41.9 %      MCV 80.9 fL      MCH 23.4 pg      MCHC 28.9 g/dL      RDW 18.7 %      RDW-SD 53.4 fl      MPV 10.8 fL      Platelets 139 10*3/mm3      Neutrophil % 79.6 %      Lymphocyte % 8.5 %      Monocyte % 8.3 %      Eosinophil % 2.6 %      Basophil % 0.4 %      Immature Grans % 0.6 %      Neutrophils, Absolute 9.32 10*3/mm3      Lymphocytes, Absolute 1.00 10*3/mm3      Monocytes, Absolute 0.97 10*3/mm3      Eosinophils, Absolute 0.30 10*3/mm3      Basophils, Absolute 0.05 10*3/mm3      Immature Grans, Absolute 0.07 10*3/mm3      nRBC 0.0 /100 WBC     Comprehensive Metabolic Panel [[id number]]  (Abnormal) Collected:  11/06/24 2208    Specimen: Blood from Arm, Right Updated: 11/06/24 2240     Glucose 141 mg/dL      BUN 22 mg/dL      Creatinine 1.25 mg/dL      Sodium 138 mmol/L      Potassium 4.7 mmol/L      Chloride 101 mmol/L      CO2 28.1 mmol/L      Calcium 8.5 mg/dL      Total Protein 6.8 g/dL      Albumin 3.7 g/dL      ALT (SGPT) 17 U/L      AST (SGOT) 17 U/L      Alkaline Phosphatase 82 U/L      Total Bilirubin 0.6 mg/dL      Globulin 3.1 gm/dL      A/G Ratio 1.2 g/dL      BUN/Creatinine Ratio 17.6     Anion Gap 8.9 mmol/L      eGFR 60.0 mL/min/1.73     Narrative:      GFR Normal >60  Chronic Kidney Disease <60  Kidney Failure <15    The GFR formula is only valid for adults with stable renal function between ages 18 and 70.    BNP [[id number]]  (Normal) Collected: 11/06/24 2208    Specimen: Blood from Arm, Right Updated: 11/06/24 2238     proBNP 224.7 pg/mL     Narrative:      This assay is used as an aid in the diagnosis of individuals suspected of having heart failure. It can be used as an aid in the diagnosis of acute decompensated heart failure (ADHF) in patients presenting with signs and symptoms of ADHF to the emergency department (ED). In addition, NT-proBNP of <300 pg/mL indicates ADHF is not likely.    Age Range Result Interpretation  NT-proBNP Concentration (pg/mL:      <50             Positive            >450                   Gray                 300-450                    Negative             <300    50-75           Positive            >900                  Gray                300-900                  Negative            <300      >75             Positive            >1800                  Gray                300-1800                  Negative            <300    Single High Sensitivity Troponin T [[id number]]  (Abnormal) Collected: 11/06/24 2208    Specimen: Blood from Arm, Right Updated: 11/06/24 2255     HS Troponin T 64 ng/L     Narrative:      High Sensitive Troponin T Reference Range:  <14.0 ng/L- Negative  Female for AMI  <22.0 ng/L- Negative Male for AMI  >=14 - Abnormal Female indicating possible myocardial injury.  >=22 - Abnormal Male indicating possible myocardial injury.   Clinicians would have to utilize clinical acumen, EKG, Troponin, and serial changes to determine if it is an Acute Myocardial Infarction or myocardial injury due to an underlying chronic condition.         High Sensitivity Troponin T 2Hr [[id number]]  (Abnormal) Collected: 11/07/24 0001    Specimen: Blood from Hand, Right Updated: 11/07/24 0040     HS Troponin T 61 ng/L      Troponin T Delta -3 ng/L     Narrative:      High Sensitive Troponin T Reference Range:  <14.0 ng/L- Negative Female for AMI  <22.0 ng/L- Negative Male for AMI  >=14 - Abnormal Female indicating possible myocardial injury.  >=22 - Abnormal Male indicating possible myocardial injury.   Clinicians would have to utilize clinical acumen, EKG, Troponin, and serial changes to determine if it is an Acute Myocardial Infarction or myocardial injury due to an underlying chronic condition.         Blood Culture - Blood, Arm, Left [[id number]] Collected: 11/07/24 0001    Specimen: Blood from Arm, Left Updated: 11/07/24 0008    Blood Culture - Blood, Hand, Right [[id number]] Collected: 11/07/24 0001    Specimen: Blood from Hand, Right Updated: 11/07/24 0008    Lactic Acid, Plasma [[id number]]  (Normal) Collected: 11/07/24 0001    Specimen: Blood from Hand, Right Updated: 11/07/24 0027     Lactate 1.2 mmol/L              Imaging:    XR Chest 1 View    Result Date: 11/6/2024  XR CHEST 1 VW Date of Exam: 11/6/2024 10:26 PM EST Indication: SOA Triage Protocol Comparison: Chest radiograph performed on October 22, 2024 Findings: Patient's rotation to the left limits evaluation. Bilateral lower lobe interstitial thickening is again visualized and may be on the basis of scarring. There is suggestion of acute left lower lobe airspace disease. Cardiomediastinal silhouette is within normal limits.  There are no acute osseous abnormalities.     Impression: Bilateral lower lobe interstitial thickening is again visualized and may be secondary to scarring. There is suggestion of acute left lower lobe airspace disease compatible with pneumonia. Electronically Signed: Dl Irving MD  11/6/2024 10:39 PM EST  Workstation ID: [id number]       Differential Diagnosis and Discussion:    Dyspnea: Differential diagnosis includes but is not limited to metabolic acidosis, neurological disorders, psychogenic, asthma, pneumothorax, upper airway obstruction, COPD, pneumonia, noncardiogenic pulmonary edema, interstitial lung disease, anemia, congestive heart failure, and pulmonary embolism    All labs were reviewed and interpreted by me.  All X-rays impressions were independently interpreted by me.  EKG was interpreted by me.    MDM                 Patient Care Considerations:    SEPSIS was considered but is NOT present in the emergency department as SIRS criteria is not present.      Consultants/Shared Management Plan:    Hospitalist: I have discussed the case with the hospitalist who agrees to accept the patient for admission.    Social Determinants of Health:    Patient is independent, reliable, and has access to care.       Disposition and Care Coordination:    Admit:   Through independent evaluation of the patient's history, physical, and imperical data, the patient meets criteria for inpatient admission to the hospital.        Final diagnoses:   Pneumonia of left lower lobe due to infectious organism   Acute respiratory failure with hypoxia   Acute exacerbation of chronic obstructive pulmonary disease (COPD)        ED Disposition       ED Disposition   Decision to Admit    Condition   --    Comment   Level of Care: Remote Telemetry [26]   Diagnosis: Acute on chronic respiratory failure with hypoxia [[id number]]   Admitting Physician: RYAN FISH [[id number]]   Certification: I Certify That Inpatient Hospital Services Are  Medically Necessary For Greater Than 2 Midnights                 This medical record created using voice recognition software.             Roel Saldivar MD  11/07/24 3081

## 2024-11-07 NOTE — PLAN OF CARE
Goal Outcome Evaluation:  Plan of Care Reviewed With: patient           Outcome Evaluation: Patient wearing 4l humidifed nasal cannuila. Bipap is at bedside, patient did not wear overnight. No concerns at the time of this note.

## 2024-11-07 NOTE — ED NOTES
Patient arrived VIA EMS. Patient c/o difficulty breathing since 1300. Patient states he took a breathing treatment at home. EMS gave 150 of NS. EMS states patient O2 at home was 88%.

## 2024-11-07 NOTE — CASE MANAGEMENT/SOCIAL WORK
"Mr Faulkner has frequent admissions for asthma/COPD. Last admission, 10/22/24.  Patient did follow up with the pulmonary clinic 10/29.  Patient states he is adherent with daily Breztri and as needed Albuterol.  Patient has a bipap, but adamantly refuses to use.  Peripheral Eosinophilia has been noted on several admissions; patient would benefit from Dupixent to reduce exacerbations and possibly improve lung fuction.    \"Among patients with COPD who had type 2 inflammation as indicated by elevated blood eosinophil counts, those who received dupilumab had fewer exacerbations, better lung function and quality of life, and less severe respiratory symptoms than those who received placebo\"    Published May 21, 2023  N Engl J Med 2023;389:205-214  DOI: [id number]  VOL. 389 NO. 3  Copyright © 2023             "

## 2024-11-07 NOTE — SIGNIFICANT NOTE
11/07/24 1445   OTHER   Discipline physical therapist   Rehab Time/Intention   Session Not Performed patient unavailable for evaluation  (pt was on the BSC; PCA present in the room)

## 2024-11-07 NOTE — PLAN OF CARE
Goal Outcome Evaluation:  Plan of Care Reviewed With: patient        Progress: no change  Outcome Evaluation: VSS, Bi pap at bedside, no needs or pain noted this shift, will continue with plan of care

## 2024-11-08 LAB
ANION GAP SERPL CALCULATED.3IONS-SCNC: 8.9 MMOL/L (ref 5–15)
BUN SERPL-MCNC: 29 MG/DL (ref 8–23)
BUN/CREAT SERPL: 26.9 (ref 7–25)
CALCIUM SPEC-SCNC: 7.7 MG/DL (ref 8.6–10.5)
CHLORIDE SERPL-SCNC: 102 MMOL/L (ref 98–107)
CO2 SERPL-SCNC: 25.1 MMOL/L (ref 22–29)
CREAT SERPL-MCNC: 1.08 MG/DL (ref 0.76–1.27)
DEPRECATED RDW RBC AUTO: 52.6 FL (ref 37–54)
EGFRCR SERPLBLD CKD-EPI 2021: 71.6 ML/MIN/1.73
ERYTHROCYTE [DISTWIDTH] IN BLOOD BY AUTOMATED COUNT: 18.4 % (ref 12.3–15.4)
GLUCOSE BLDC GLUCOMTR-MCNC: 211 MG/DL (ref 70–99)
GLUCOSE BLDC GLUCOMTR-MCNC: 225 MG/DL (ref 70–99)
GLUCOSE BLDC GLUCOMTR-MCNC: 251 MG/DL (ref 70–99)
GLUCOSE BLDC GLUCOMTR-MCNC: 322 MG/DL (ref 70–99)
GLUCOSE SERPL-MCNC: 299 MG/DL (ref 65–99)
HCT VFR BLD AUTO: 38.1 % (ref 37.5–51)
HGB BLD-MCNC: 11 G/DL (ref 13–17.7)
MCH RBC QN AUTO: 23.6 PG (ref 26.6–33)
MCHC RBC AUTO-ENTMCNC: 28.9 G/DL (ref 31.5–35.7)
MCV RBC AUTO: 81.8 FL (ref 79–97)
PLATELET # BLD AUTO: 151 10*3/MM3 (ref 140–450)
PMV BLD AUTO: 10.9 FL (ref 6–12)
POTASSIUM SERPL-SCNC: 4.2 MMOL/L (ref 3.5–5.2)
PROCALCITONIN SERPL-MCNC: 0.08 NG/ML (ref 0–0.25)
RBC # BLD AUTO: 4.66 10*6/MM3 (ref 4.14–5.8)
SODIUM SERPL-SCNC: 136 MMOL/L (ref 136–145)
WBC NRBC COR # BLD AUTO: 14.52 10*3/MM3 (ref 3.4–10.8)

## 2024-11-08 PROCEDURE — 80048 BASIC METABOLIC PNL TOTAL CA: CPT | Performed by: FAMILY MEDICINE

## 2024-11-08 PROCEDURE — 82948 REAGENT STRIP/BLOOD GLUCOSE: CPT

## 2024-11-08 PROCEDURE — 85027 COMPLETE CBC AUTOMATED: CPT | Performed by: FAMILY MEDICINE

## 2024-11-08 PROCEDURE — 63710000001 INSULIN GLARGINE PER 5 UNITS: Performed by: FAMILY MEDICINE

## 2024-11-08 PROCEDURE — 94799 UNLISTED PULMONARY SVC/PX: CPT

## 2024-11-08 PROCEDURE — 97161 PT EVAL LOW COMPLEX 20 MIN: CPT

## 2024-11-08 PROCEDURE — 63710000001 INSULIN LISPRO (HUMAN) PER 5 UNITS: Performed by: FAMILY MEDICINE

## 2024-11-08 PROCEDURE — 94664 DEMO&/EVAL PT USE INHALER: CPT

## 2024-11-08 PROCEDURE — 63710000001 PREDNISONE PER 1 MG: Performed by: FAMILY MEDICINE

## 2024-11-08 PROCEDURE — 25010000002 ENOXAPARIN PER 10 MG: Performed by: FAMILY MEDICINE

## 2024-11-08 PROCEDURE — 84145 PROCALCITONIN (PCT): CPT | Performed by: FAMILY MEDICINE

## 2024-11-08 PROCEDURE — 94760 N-INVAS EAR/PLS OXIMETRY 1: CPT

## 2024-11-08 PROCEDURE — 82948 REAGENT STRIP/BLOOD GLUCOSE: CPT | Performed by: FAMILY MEDICINE

## 2024-11-08 PROCEDURE — 99233 SBSQ HOSP IP/OBS HIGH 50: CPT | Performed by: FAMILY MEDICINE

## 2024-11-08 RX ORDER — ACETAMINOPHEN 500 MG
1000 TABLET ORAL EVERY 6 HOURS PRN
Status: DISCONTINUED | OUTPATIENT
Start: 2024-11-08 | End: 2024-11-09 | Stop reason: HOSPADM

## 2024-11-08 RX ADMIN — ASPIRIN 81 MG: 81 TABLET, CHEWABLE ORAL at 09:11

## 2024-11-08 RX ADMIN — IPRATROPIUM BROMIDE AND ALBUTEROL SULFATE 3 ML: .5; 3 SOLUTION RESPIRATORY (INHALATION) at 15:07

## 2024-11-08 RX ADMIN — INSULIN GLARGINE 15 UNITS: 100 INJECTION, SOLUTION SUBCUTANEOUS at 21:26

## 2024-11-08 RX ADMIN — ENOXAPARIN SODIUM 40 MG: 100 INJECTION SUBCUTANEOUS at 09:05

## 2024-11-08 RX ADMIN — ATORVASTATIN CALCIUM 10 MG: 10 TABLET, FILM COATED ORAL at 09:04

## 2024-11-08 RX ADMIN — PREDNISONE 40 MG: 20 TABLET ORAL at 09:04

## 2024-11-08 RX ADMIN — BUDESONIDE 0.5 MG: 0.5 SUSPENSION RESPIRATORY (INHALATION) at 18:35

## 2024-11-08 RX ADMIN — ARFORMOTEROL TARTRATE 15 MCG: 15 SOLUTION RESPIRATORY (INHALATION) at 07:04

## 2024-11-08 RX ADMIN — DOXYCYCLINE 100 MG: 100 CAPSULE ORAL at 09:04

## 2024-11-08 RX ADMIN — ACETAMINOPHEN 1000 MG: 500 TABLET ORAL at 03:36

## 2024-11-08 RX ADMIN — BUDESONIDE 0.5 MG: 0.5 SUSPENSION RESPIRATORY (INHALATION) at 07:04

## 2024-11-08 RX ADMIN — EMPAGLIFLOZIN 10 MG: 10 TABLET, FILM COATED ORAL at 09:04

## 2024-11-08 RX ADMIN — INSULIN LISPRO 7 UNITS: 100 INJECTION, SOLUTION INTRAVENOUS; SUBCUTANEOUS at 09:05

## 2024-11-08 RX ADMIN — Medication 10 ML: at 21:26

## 2024-11-08 RX ADMIN — INSULIN LISPRO 4 UNITS: 100 INJECTION, SOLUTION INTRAVENOUS; SUBCUTANEOUS at 17:58

## 2024-11-08 RX ADMIN — INSULIN LISPRO 6 UNITS: 100 INJECTION, SOLUTION INTRAVENOUS; SUBCUTANEOUS at 21:26

## 2024-11-08 RX ADMIN — Medication 10 ML: at 09:11

## 2024-11-08 RX ADMIN — INSULIN LISPRO 4 UNITS: 100 INJECTION, SOLUTION INTRAVENOUS; SUBCUTANEOUS at 12:09

## 2024-11-08 RX ADMIN — IPRATROPIUM BROMIDE AND ALBUTEROL SULFATE 3 ML: .5; 3 SOLUTION RESPIRATORY (INHALATION) at 07:04

## 2024-11-08 RX ADMIN — IPRATROPIUM BROMIDE AND ALBUTEROL SULFATE 3 ML: .5; 3 SOLUTION RESPIRATORY (INHALATION) at 11:28

## 2024-11-08 RX ADMIN — DOXYCYCLINE 100 MG: 100 CAPSULE ORAL at 21:26

## 2024-11-08 RX ADMIN — IPRATROPIUM BROMIDE AND ALBUTEROL SULFATE 3 ML: .5; 3 SOLUTION RESPIRATORY (INHALATION) at 23:30

## 2024-11-08 RX ADMIN — IPRATROPIUM BROMIDE AND ALBUTEROL SULFATE 3 ML: .5; 3 SOLUTION RESPIRATORY (INHALATION) at 18:35

## 2024-11-08 RX ADMIN — GUAIFENESIN 600 MG: 600 TABLET ORAL at 09:04

## 2024-11-08 RX ADMIN — ARFORMOTEROL TARTRATE 15 MCG: 15 SOLUTION RESPIRATORY (INHALATION) at 18:35

## 2024-11-08 RX ADMIN — INSULIN GLARGINE 15 UNITS: 100 INJECTION, SOLUTION SUBCUTANEOUS at 09:04

## 2024-11-08 RX ADMIN — GUAIFENESIN 600 MG: 600 TABLET ORAL at 21:26

## 2024-11-08 RX ADMIN — PANTOPRAZOLE SODIUM 40 MG: 40 TABLET, DELAYED RELEASE ORAL at 05:10

## 2024-11-08 NOTE — PLAN OF CARE
Goal Outcome Evaluation:              Outcome Evaluation: Patient alert and oriented x4, VSS. Pt tolerating 4.5L NC. Patient medicated for pain x1 per the MAR. Patient denies any needs at this time. Will continue to monitor plan of care.

## 2024-11-08 NOTE — PLAN OF CARE
Goal Outcome Evaluation:  Plan of Care Reviewed With: patient        Progress: no change  Outcome Evaluation: Pt reported he did not want or need physical therapy while here at the hospital or when returning home. He would benefit from home health at the very least if agreeable. He presents with decreased strength, endurance, and balance. He was encouraged to continue getting up with nursing staff to ambulate while here. He will be discharged from PT caseload.    Anticipated Discharge Disposition (PT): home with home health

## 2024-11-08 NOTE — THERAPY EVALUATION
Acute Care - Physical Therapy Initial Evaluation   [place]     Patient Name: Dilip Faulkner  : 1950  MRN: [id number]  Today's Date: 2024      Visit Dx:     ICD-10-CM ICD-9-CM   1. Pneumonia of left lower lobe due to infectious organism  J18.9 486   2. Acute respiratory failure with hypoxia  J96.01 518.81   3. Acute exacerbation of chronic obstructive pulmonary disease (COPD)  J44.1 491.21   4. Difficulty walking  R26.2 719.7     Patient Active Problem List   Diagnosis    Chronic obstructive pulmonary disease with acute exacerbation    Type 2 diabetes mellitus    Essential hypertension    Hyperlipidemia    Cough    Pneumonia of left lower lobe due to infectious organism    COPD with acute exacerbation    Obesity (BMI 30-39.9)    Right bundle branch block    Acquired ptosis of eyelid, bilateral    Type 2 diabetes mellitus with hyperglycemia, with long-term current use of insulin    Acute exacerbation of chronic obstructive pulmonary disease (COPD)    Acute respiratory failure with hypoxia    Respiratory failure with hypoxia and hypercapnia    Gastroesophageal reflux disease without esophagitis    COPD (chronic obstructive pulmonary disease)    Noncompliance with CPAP treatment    Unstable angina    Aspiration pneumonia of left lower lobe due to gastric secretions    PSVT (paroxysmal supraventricular tachycardia)    COPD exacerbation    Acute on chronic respiratory failure with hypoxia     Past Medical History:   Diagnosis Date    Asthma     COPD (chronic obstructive pulmonary disease)     Diabetes mellitus     Ex-smoker     QUIT     Hernia, umbilical     Hypertension     On home O2     REPORTS WEARING 2L/NC PRN SOA     Past Surgical History:   Procedure Laterality Date    ABDOMINAL SURGERY       PT Assessment (Last 12 Hours)       PT Evaluation and Treatment       Row Name 24 1200          Physical Therapy Time and Intention    Subjective Information no complaints  -CS     Document Type  evaluation  -CS     Mode of Treatment individual therapy;physical therapy  -CS     Patient Effort adequate  -CS     Symptoms Noted During/After Treatment none  -CS       Row Name 11/08/24 1200          General Information    Patient Profile Reviewed yes  -CS     Patient Observations alert;poorly cooperative  -CS     Prior Level of Function independent:;all household mobility;gait;transfer;bed mobility;ADL's  -CS     Equipment Currently Used at Home cane, straight  O2 at home as needed he reported. Used a STC for ambulation  -CS     Existing Precautions/Restrictions fall;oxygen therapy device and L/min  4L home O2  -CS     Barriers to Rehab none identified  -CS       Row Name 11/08/24 1200          Living Environment    Current Living Arrangements home  -CS     Home Accessibility stairs to enter home;stairs within home  -CS     People in Home sibling(s)  -CS     Primary Care Provided by self  -CS       Row Name 11/08/24 1200          Home Main Entrance    Number of Stairs, Main Entrance none  -CS       Row Name 11/08/24 1200          Stairs Within Home, Primary    Number of Stairs, Within Home, Primary none  -CS       Row Name 11/08/24 1200          Pain    Pretreatment Pain Rating 0/10 - no pain  -CS     Posttreatment Pain Rating 0/10 - no pain  -CS       Row Name 11/08/24 1200          Cognition    Orientation Status (Cognition) oriented to;person;place  -CS       Row Name 11/08/24 1200          Range of Motion Comprehensive    General Range of Motion bilateral lower extremity ROM WFL  -CS       Row Name 11/08/24 1200          Strength Comprehensive (MMT)    General Manual Muscle Testing (MMT) Assessment lower extremity strength deficits identified  -CS     Comment, General Manual Muscle Testing (MMT) Assessment BLEs assessed at 4-/5  -CS       Row Name 11/08/24 1200          Bed Mobility    Bed Mobility bed mobility (all) activities  -CS     All Activities, [place] (Bed Mobility) modified independence  -CS      Bed Mobility, Safety Issues decreased use of arms for pushing/pulling;decreased use of legs for bridging/pushing  -     Assistive Device (Bed Mobility) bed rails  -       Row Name 11/08/24 1200          Transfers    Transfers sit-stand transfer;stand-sit transfer  -CS       Row Name 11/08/24 1200          Sit-Stand Transfer    Sit-Stand Mobile (Transfers) supervision;standby assist  -CS     Assistive Device (Sit-Stand Transfers) walker, front-wheeled  -CS       Row Name 11/08/24 1200          Stand-Sit Transfer    Stand-Sit Mobile (Transfers) supervision;standby assist  -CS     Assistive Device (Stand-Sit Transfers) walker, front-wheeled  -CS       Row Name 11/08/24 1200          Gait/Stairs (Locomotion)    Gait/Stairs Locomotion gait/ambulation assistive device  -     Mobile Level (Gait) supervision;standby assist  -CS     Assistive Device (Gait) walker, front-wheeled  -CS     Patient was able to Ambulate yes  -CS     Distance in Feet (Gait) 10  -CS     Pattern (Gait) step-through  -CS     Deviations/Abnormal Patterns (Gait) gait speed decreased;stride length decreased  -CS       Row Name 11/08/24 1200          Safety Issues/Impairments Affecting Functional Mobility    Safety Issues Affecting Function (Mobility) awareness of need for assistance  -     Impairments Affecting Function (Mobility) balance;endurance/activity tolerance  -       Row Name 11/08/24 1200          Balance    Balance Assessment standing dynamic balance  -CS     Dynamic Standing Balance standby assist  -CS     Position/Device Used, Standing Balance walker, front-wheeled  -CS       Row Name 11/08/24 1200          Plan of Care Review    Plan of Care Reviewed With patient  -CS     Progress no change  -CS     Outcome Evaluation Pt reported he did not want or need physical therapy while here at the hospital or when returning home. He would benefit from home health at the very least if agreeable. He presents with  decreased strength, endurance, and balance. He was encouraged to continue getting up with nursing staff to ambulate while here. He will be discharged from PT caseload.  -CS       Row Name 11/08/24 1200          Positioning and Restraints    Pre-Treatment Position in bed  -CS     Post Treatment Position bed  -CS     In Bed fowlers;call light within reach;encouraged to call for assist;exit alarm on  -CS       Row Name 11/08/24 1200          Therapy Assessment/Plan (PT)    Criteria for Skilled Interventions Met (PT) patient/family refuse skilled intervention at this time  -CS     Therapy Frequency (PT) evaluation only  -CS       Row Name 11/08/24 1200          PT Evaluation Complexity    History, PT Evaluation Complexity 1-2 personal factors and/or comorbidities  -CS     Examination of Body Systems (PT Eval Complexity) total of 4 or more elements  -CS     Clinical Presentation (PT Evaluation Complexity) stable  -CS     Clinical Decision Making (PT Evaluation Complexity) low complexity  -CS     Overall Complexity (PT Evaluation Complexity) low complexity  -CS       Row Name 11/08/24 1200          Therapy Plan Review/Discharge Plan (PT)    Therapy Plan Review (PT) evaluation/treatment results reviewed;patient  -CS       Row Name 11/08/24 1200          Physical Therapy Goals    Problem Specific Goal Selection (PT) problem specific goal 1, PT  -CS       Row Name 11/08/24 1200          Problem Specific Goal 1 (PT)    Problem Specific Goal 1 (PT) Complete physical therapy evaluation  -CS     Time Frame (Problem Specific Goal 1, PT) by discharge  -CS     Progress/Outcome (Problem Specific Goal 1, PT) goal met  -CS               User Key  (r) = Recorded By, (t) = Taken By, (c) = Cosigned By      Initials Name Provider Type    Sarai Moy, PT Physical Therapist                    Physical Therapy Education        No education to display                  PT Recommendation and Plan  Anticipated Discharge Disposition (PT):  home with home health  Therapy Frequency (PT): evaluation only  Plan of Care Reviewed With: patient  Progress: no change  Outcome Evaluation: Pt reported he did not want or need physical therapy while here at the hospital or when returning home. He would benefit from home health at the very least if agreeable. He presents with decreased strength, endurance, and balance. He was encouraged to continue getting up with nursing staff to ambulate while here. He will be discharged from PT caseload.   Outcome Measures       Row Name 11/08/24 1200             How much help from another person do you currently need...    Turning from your back to your side while in flat bed without using bedrails? 4  -CS      Moving from lying on back to sitting on the side of a flat bed without bedrails? 4  -CS      Moving to and from a bed to a chair (including a wheelchair)? 3  -CS      Standing up from a chair using your arms (e.g., wheelchair, bedside chair)? 4  -CS      Climbing 3-5 steps with a railing? 3  -CS      To walk in hospital room? 3  -CS      AM-PAC 6 Clicks Score (PT) 21  -CS         Functional Assessment    Outcome Measure Options AM-PAC 6 Clicks Basic Mobility (PT)  -CS                User Key  (r) = Recorded By, (t) = Taken By, (c) = Cosigned By      Initials Name Provider Type    Sarai Moy PT Physical Therapist                     Time Calculation:    PT Charges       Row Name 11/08/24 1216             Time Calculation    PT Received On 11/08/24  -CS         Untimed Charges    PT Eval/Re-eval Minutes 32  -CS         Total Minutes    Untimed Charges Total Minutes 32  -CS       Total Minutes 32  -CS                User Key  (r) = Recorded By, (t) = Taken By, (c) = Cosigned By      Initials Name Provider Type    Sarai Moy PT Physical Therapist                  Therapy Charges for Today       Code Description Service Date Service Provider Modifiers Qty    24867662491 HC PT EVAL LOW COMPLEXITY 3 11/8/2024  Sarai Mayfield, PT GP 1            PT G-Codes  Outcome Measure Options: AM-PAC 6 Clicks Basic Mobility (PT)  AM-PAC 6 Clicks Score (PT): 21    Sarai Mayfield, PT  11/8/2024

## 2024-11-08 NOTE — PLAN OF CARE
Goal Outcome Evaluation:  Plan of Care Reviewed With: patient           Outcome Evaluation: Patient alert and oriented. Vital signs stable. Received medication per MAR. Blood sugar managed. No needs noted this shift. Will continue with plan of care.

## 2024-11-09 ENCOUNTER — READMISSION MANAGEMENT (OUTPATIENT)
Dept: CALL CENTER | Facility: HOSPITAL | Age: 74
End: 2024-11-09
Payer: MEDICARE

## 2024-11-09 VITALS
DIASTOLIC BLOOD PRESSURE: 69 MMHG | HEIGHT: 73 IN | BODY MASS INDEX: 33.22 KG/M2 | SYSTOLIC BLOOD PRESSURE: 152 MMHG | OXYGEN SATURATION: 97 % | RESPIRATION RATE: 20 BRPM | TEMPERATURE: 97.6 F | WEIGHT: 250.66 LBS | HEART RATE: 68 BPM

## 2024-11-09 LAB
DEPRECATED RDW RBC AUTO: 54.2 FL (ref 37–54)
ERYTHROCYTE [DISTWIDTH] IN BLOOD BY AUTOMATED COUNT: 18.3 % (ref 12.3–15.4)
GLUCOSE BLDC GLUCOMTR-MCNC: 135 MG/DL (ref 70–99)
GLUCOSE BLDC GLUCOMTR-MCNC: 177 MG/DL (ref 70–99)
HCT VFR BLD AUTO: 37.7 % (ref 37.5–51)
HGB BLD-MCNC: 10.7 G/DL (ref 13–17.7)
HOLD SPECIMEN: NORMAL
MCH RBC QN AUTO: 23.5 PG (ref 26.6–33)
MCHC RBC AUTO-ENTMCNC: 28.4 G/DL (ref 31.5–35.7)
MCV RBC AUTO: 82.7 FL (ref 79–97)
PLATELET # BLD AUTO: 154 10*3/MM3 (ref 140–450)
PMV BLD AUTO: 10.8 FL (ref 6–12)
RBC # BLD AUTO: 4.56 10*6/MM3 (ref 4.14–5.8)
WBC NRBC COR # BLD AUTO: 12.44 10*3/MM3 (ref 3.4–10.8)

## 2024-11-09 PROCEDURE — 63710000001 INSULIN LISPRO (HUMAN) PER 5 UNITS: Performed by: FAMILY MEDICINE

## 2024-11-09 PROCEDURE — 63710000001 PREDNISONE PER 1 MG: Performed by: FAMILY MEDICINE

## 2024-11-09 PROCEDURE — 82948 REAGENT STRIP/BLOOD GLUCOSE: CPT | Performed by: FAMILY MEDICINE

## 2024-11-09 PROCEDURE — 85027 COMPLETE CBC AUTOMATED: CPT | Performed by: FAMILY MEDICINE

## 2024-11-09 PROCEDURE — 94799 UNLISTED PULMONARY SVC/PX: CPT

## 2024-11-09 PROCEDURE — 63710000001 INSULIN GLARGINE PER 5 UNITS: Performed by: FAMILY MEDICINE

## 2024-11-09 PROCEDURE — 25010000002 ENOXAPARIN PER 10 MG: Performed by: FAMILY MEDICINE

## 2024-11-09 PROCEDURE — 94760 N-INVAS EAR/PLS OXIMETRY 1: CPT

## 2024-11-09 PROCEDURE — 94664 DEMO&/EVAL PT USE INHALER: CPT

## 2024-11-09 PROCEDURE — 99239 HOSP IP/OBS DSCHRG MGMT >30: CPT | Performed by: FAMILY MEDICINE

## 2024-11-09 RX ORDER — GUAIFENESIN 600 MG/1
600 TABLET, EXTENDED RELEASE ORAL EVERY 12 HOURS SCHEDULED
Qty: 60 TABLET | Refills: 0 | Status: SHIPPED | OUTPATIENT
Start: 2024-11-09 | End: 2024-12-09

## 2024-11-09 RX ORDER — DOXYCYCLINE 100 MG/1
100 CAPSULE ORAL EVERY 12 HOURS SCHEDULED
Qty: 9 CAPSULE | Refills: 0 | Status: SHIPPED | OUTPATIENT
Start: 2024-11-09 | End: 2024-11-14

## 2024-11-09 RX ORDER — PREDNISONE 20 MG/1
40 TABLET ORAL
Qty: 4 TABLET | Refills: 0 | Status: SHIPPED | OUTPATIENT
Start: 2024-11-10 | End: 2024-11-12

## 2024-11-09 RX ADMIN — DOXYCYCLINE 100 MG: 100 CAPSULE ORAL at 08:25

## 2024-11-09 RX ADMIN — INSULIN LISPRO 2 UNITS: 100 INJECTION, SOLUTION INTRAVENOUS; SUBCUTANEOUS at 08:24

## 2024-11-09 RX ADMIN — ENOXAPARIN SODIUM 40 MG: 100 INJECTION SUBCUTANEOUS at 08:24

## 2024-11-09 RX ADMIN — ASPIRIN 81 MG: 81 TABLET, CHEWABLE ORAL at 08:25

## 2024-11-09 RX ADMIN — ATORVASTATIN CALCIUM 10 MG: 10 TABLET, FILM COATED ORAL at 08:24

## 2024-11-09 RX ADMIN — PREDNISONE 40 MG: 20 TABLET ORAL at 08:24

## 2024-11-09 RX ADMIN — BUDESONIDE 0.5 MG: 0.5 SUSPENSION RESPIRATORY (INHALATION) at 07:43

## 2024-11-09 RX ADMIN — EMPAGLIFLOZIN 10 MG: 10 TABLET, FILM COATED ORAL at 08:24

## 2024-11-09 RX ADMIN — GUAIFENESIN 600 MG: 600 TABLET ORAL at 08:24

## 2024-11-09 RX ADMIN — INSULIN GLARGINE 15 UNITS: 100 INJECTION, SOLUTION SUBCUTANEOUS at 08:24

## 2024-11-09 RX ADMIN — ARFORMOTEROL TARTRATE 15 MCG: 15 SOLUTION RESPIRATORY (INHALATION) at 07:43

## 2024-11-09 RX ADMIN — Medication 10 ML: at 08:25

## 2024-11-09 RX ADMIN — IPRATROPIUM BROMIDE AND ALBUTEROL SULFATE 3 ML: .5; 3 SOLUTION RESPIRATORY (INHALATION) at 07:43

## 2024-11-09 RX ADMIN — PANTOPRAZOLE SODIUM 40 MG: 40 TABLET, DELAYED RELEASE ORAL at 05:53

## 2024-11-09 RX ADMIN — IPRATROPIUM BROMIDE AND ALBUTEROL SULFATE 3 ML: .5; 3 SOLUTION RESPIRATORY (INHALATION) at 11:26

## 2024-11-09 NOTE — DISCHARGE SUMMARY
[facility]         HOSPITALIST  DISCHARGE SUMMARY    Patient Name: Dilip Faulkner  : 1950  MRN: [id number]    Date of Admission: 2024  Date of Discharge: 2024  Primary Care Physician: Jackeline Jaime APRN    Consults       Date and Time Order Name Status Description    2024 11:20 PM Hospitalist (on-call MD unless specified)              Active and Resolved Hospital Problems:  Acute on chronic hypoxic respiratory failure  COPD with acute exacerbation  Community-acquired pneumonia unspecified organism  Insulin-dependent diabetes mellitus with hyperglycemia likely due to steroids  Active Hospital Problems    Diagnosis POA    **Acute on chronic respiratory failure with hypoxia [J96.21] Yes    Acute respiratory failure with hypoxia [J96.01] Yes    Pneumonia of left lower lobe due to infectious organism [J18.9] Yes    Cough [R05.9] Yes    Type 2 diabetes mellitus [E11.9] Yes    Essential hypertension [I10] Yes      Resolved Hospital Problems   No resolved problems to display.       Hospital Course     Hospital Course:  Dilip Faulkner is a 74 y.o. male  with past medical history of diabetes, hypertension, COPD on home oxygen 2 L, tobacco use in remission, asthma, obesity, and GERD presented to the ED for evaluation of shortness of breath.  Patient states that for the last 2 days he has been having worsening shortness of breath to where he is symptomatic even at rest.  This evening his sister noticed that he was struggling to catch his breath so she called EMS to bring him to the ED for evaluation.  Patient denied any fevers or chills.  Patient was discharged from this hospital 3 weeks ago with similar complaints.  In the ED patient is was hypoxic on arrival requiring oxygen supplementation by nasal cannula at 4.5 L.  Labs showed mild leukocytosis with elevated but flat troponin but normal lactic acid.  Chest x-ray showed bilateral lower lobe thickening showing possible  pneumonia.  Patient admitted started on empiric antibiotics systemic steroids and inhaled bronchodilators.  Blood cultures negative.  COVID flu RSV negative.  Respiratory function slowly improved able to be weaned back to his home 2 L O2.  Patient up walking around the room comfortably on 2 L nasal cannula.  Patient seen and evaluated on day of discharge and thought stable for discharge home to complete course of doxycycline and a burst of prednisone continue home inhaler regimen.  Patient to follow-up with primary care provider and COPD clinic as an outpatient.        DISCHARGE Follow Up Recommendations for labs and diagnostics: As above      Day of Discharge     Vital Signs:  Temp:  [97.5 °F (36.4 °C)-98.1 °F (36.7 °C)] 97.6 °F (36.4 °C)  Heart Rate:  [68-84] 68  Resp:  [18-22] 20  BP: (110-152)/(56-82) 152/69  Flow (L/min) (Oxygen Therapy):  [2-4] 2  Physical Exam:   Gen. well-developed appearing stated age in no acute distress  HEENT: Normocephalic atraumatic moist membranes  no scleral icterus no conjunctival injection  Cardiovascular: regular rate and rhythm no murmurs rubs or gallops S1-S2, no lower extremity edema appreciated  Pulmonary: Scant expiratory wheezes bilaterally no rales or rhonchi symmetric chest expansion, unlabored, no conversational dyspnea appreciated  Gastrointestinal: Soft nontender nondistended positive bowel sounds all 4 quadrants no rebound or guarding  Musculoskeletal: No clubbing cyanosis, warm and well-perfused, calves soft symmetric nontender bilaterally  Skin: Clean dry without rashs  Neuro: Cranial nerves II through XII intact grossly no sensorimotor deficits appreciated bilateral upper and lower extremities  Psych: Patient is calm cooperative and appropriate with exam not responding to internal stimuli  : No Dias catheter no bladder distention no suprapubic tenderness      Discharge Details        Discharge Medications        New Medications        Instructions Start Date    doxycycline 100 MG capsule  Commonly known as: MONODOX   100 mg, Oral, Every 12 Hours Scheduled      guaiFENesin 600 MG 12 hr tablet  Commonly known as: MUCINEX   600 mg, Oral, Every 12 Hours Scheduled      predniSONE 20 MG tablet  Commonly known as: DELTASONE   40 mg, Oral, Daily With Breakfast   Start Date: November 10, 2024            Changes to Medications        Instructions Start Date   Tresiba FlexTouch 100 UNIT/ML solution pen-injector injection  Generic drug: insulin degludec  What changed: See the new instructions.   ADMINISTER 20 UNITS UNDER THE SKIN DAILY AS DIRECTED             Continue These Medications        Instructions Start Date   Airsupra 90-80 MCG/ACT aerosol  Generic drug: Albuterol-Budesonide   2 puffs, Inhalation, Every 4 Hours PRN      albuterol sulfate  (90 Base) MCG/ACT inhaler  Commonly known as: PROVENTIL HFA;VENTOLIN HFA;PROAIR HFA   2 puffs, Inhalation, Every 4 Hours PRN      aspirin 81 MG chewable tablet   81 mg, Oral, Daily      atorvastatin 10 MG tablet  Commonly known as: LIPITOR   10 mg, Oral, Daily      Breztri Aerosphere 160-9-4.8 MCG/ACT aerosol inhaler  Generic drug: Budeson-Glycopyrrol-Formoterol   2 puffs, Inhalation, 2 Times Daily      Jardiance 10 MG tablet tablet  Generic drug: empagliflozin   10 mg, Oral, Daily      metFORMIN 500 MG tablet  Commonly known as: GLUCOPHAGE   500 mg, Oral, 2 Times Daily With Meals      omeprazole 20 MG capsule  Commonly known as: priLOSEC   20 mg, Oral, Daily               No Known Allergies    Discharge Disposition:  Home or Self Care    Diet:  Hospital:  Diet Order   Procedures    Diet: Cardiac; Healthy Heart (2-3 Na+); Fluid Consistency: Thin (IDDSI 0)       Discharge Activity:   Activity Instructions       Gradually Increase Activity Until at Pre-Hospitalization Level              CODE STATUS:  Code Status and Medical Interventions: CPR (Attempt to Resuscitate); Full Support   Ordered at: 11/07/24 0216     Level Of Support  Discussed With:    Patient     Code Status (Patient has no pulse and is not breathing):    CPR (Attempt to Resuscitate)     Medical Interventions (Patient has pulse or is breathing):    Full Support         Future Appointments   Date Time Provider Department Center   1/7/2025  1:30 PM [facility] PULM LAB ROOM 1 [facility] PFT [facility]   2/4/2025  3:00 PM Leila Singh MD [facility] PCC ETW [facility]   2/20/2025  1:45 PM Jackeline Jaime APRN [facility] PC CSPRG [facility]   3/28/2025  2:00 PM [facility] HOSPITAL CT 2 [facility]       Additional Instructions for the Follow-ups that You Need to Schedule       Discharge Follow-up with PCP   As directed       Currently Documented PCP:    Jackeline Jaime APRN    PCP Phone Number:    [phone]     Follow Up Details: Hospital discharge follow up 1-2 weeks                Pertinent  and/or Most Recent Results     PROCEDURES:   None    LAB RESULTS:      Lab 11/09/24  0500 11/08/24 0310 11/07/24  0001 11/06/24 2122   WBC 12.44* 14.52*  --  11.71*   HEMOGLOBIN 10.7* 11.0*  --  12.1*   HEMATOCRIT 37.7 38.1  --  41.9   PLATELETS 154 151  --  139*   NEUTROS ABS  --   --   --  9.32*   IMMATURE GRANS (ABS)  --   --   --  0.07*   LYMPHS ABS  --   --   --  1.00   MONOS ABS  --   --   --  0.97*   EOS ABS  --   --   --  0.30   MCV 82.7 81.8  --  80.9   PROCALCITONIN  --  0.08  --   --    LACTATE  --   --  1.2  --          Lab 11/08/24  0310 11/06/24 2208   SODIUM 136 138   POTASSIUM 4.2 4.7   CHLORIDE 102 101   CO2 25.1 28.1   ANION GAP 8.9 8.9   BUN 29* 22   CREATININE 1.08 1.25   EGFR 71.6 60.0*   GLUCOSE 299* 141*   CALCIUM 7.7* 8.5*         Lab 11/06/24 2208   TOTAL PROTEIN 6.8   ALBUMIN 3.7   GLOBULIN 3.1   ALT (SGPT) 17   AST (SGOT) 17   BILIRUBIN 0.6   ALK PHOS 82         Lab 11/07/24  0001 11/06/24 2208   PROBNP  --  224.7   HSTROP T 61* 64*                 Brief Urine Lab Results  (Last result in the past 365 days)        Color   Clarity   Blood   Leuk Est   Nitrite   Protein   CREAT   Urine HCG        03/25/24  1742 Yellow   Clear   Negative   Negative   Negative   Negative                 Microbiology Results (last 10 days)       Procedure Component Value - Date/Time    Blood Culture - Blood, Arm, Left [[id number]]  (Normal) Collected: 11/07/24 0001    Lab Status: Preliminary result Specimen: Blood from Arm, Left Updated: 11/09/24 0015     Blood Culture No growth at 2 days    Blood Culture - Blood, Hand, Right [[id number]]  (Normal) Collected: 11/07/24 0001    Lab Status: Preliminary result Specimen: Blood from Hand, Right Updated: 11/09/24 0015     Blood Culture No growth at 2 days            XR Chest 1 View    Result Date: 11/6/2024  Impression: Impression: Bilateral lower lobe interstitial thickening is again visualized and may be secondary to scarring. There is suggestion of acute left lower lobe airspace disease compatible with pneumonia. Electronically Signed: Dl Irving MD  11/6/2024 10:39 PM EST  Workstation ID: [id number]    XR Chest 1 View    Result Date: 10/22/2024  Impression: No acute infiltrate is appreciated.    Portions of this note were completed with a voice recognition program.   Electronically Signed By-Aaron Rodriguez MD On:10/22/2024 3:29 AM      CT Head Without Contrast    Result Date: 10/11/2024  Impression: Impression: No acute intracranial finding. Electronically Signed: Ebenezer Marlow  10/11/2024 3:28 PM EDT  Workstation ID: [id number]             Results for orders placed during the hospital encounter of 03/28/23    Adult Transthoracic Echo Complete W/ Cont if Necessary Per Protocol    Interpretation Summary    Left ventricular ejection fraction appears to be 56 - 60%.    Left ventricular diastolic function is consistent with (grade I) impaired relaxation and age.    No significant valvular disease.    Mild left atrial enlargement.      Labs Pending at Discharge:  Pending Labs       Order Current Status    Blood Culture - Blood, Arm, Left Preliminary result    Blood Culture - Blood, Hand, Right  Preliminary result              Time spent on Discharge including face to face service: Greater than 35 minutes    Electronically signed by Danny Dye MD, 11/09/24, 11:43 AM EST.

## 2024-11-09 NOTE — OUTREACH NOTE
Prep Survey      Flowsheet Row Responses   [facility] patient discharged from? [facility]   Is LACE score < 7 ? No   Eligibility [facility]   Date of Admission 11/06/24   Date of Discharge 11/09/24   Discharge Disposition Home or Self Care   Discharge diagnosis Acute on chronic respiratory failure with hypoxia  [Pneumonia of left lower lobe due to infectious organism]   Does the patient have one of the following disease processes/diagnoses(primary or secondary)? COPD   Does the patient have Home health ordered? No   Is there a DME ordered? No   Comments regarding appointments Ambulatory Referral to Pulmonary/COPD Clinic   Medication alerts for this patient see AVS   Prep survey completed? Yes            Alia RIOS - Registered Nurse

## 2024-11-09 NOTE — PROGRESS NOTES
[facility]   Hospitalist Progress Note  Date: 2024  Patient Name: Dilip Faulkner  : 1950  MRN: [id number]  Date of admission: 2024      Subjective   Subjective     Chief Complaint: Follow-up shortness of breath    Summary:Dilip Faulkner is a 74 y.o. male with past medical history of diabetes, hypertension, COPD on home oxygen 2 L, tobacco use in remission, asthma, obesity, and GERD presented to the ED for evaluation of shortness of breath.  Patient states that for the last 2 days he has been having worsening shortness of breath to where he is symptomatic even at rest.  This evening his sister noticed that he was struggling to catch his breath so she called EMS to bring him to the ED for evaluation.  Patient denied any fevers or chills.  Patient was discharged from this hospital 3 weeks ago with similar complaints.  In the ED patient is was hypoxic on arrival requiring oxygen supplementation by nasal cannula at 4.5 L.  Labs showed mild leukocytosis with elevated but flat troponin but normal lactic acid.  Chest x-ray showed bilateral lower lobe thickening showing possible pneumonia.  Patient admitted started on empiric antibiotics systemic steroids and inhaled bronchodilators.  Patient planning to return home with home health once respiratory status improves    Interval Followup: Patient seen and evaluated this afternoon sitting up in bed appears to be resting fairly comfortably.  Patient indicates shortness of breath somewhat improved.  Continues to endorse minimally productive cough.  Afebrile overnight.  Sinus rhythm 70s to 90s on telemetry review.  Blood pressure within normal limits.  Still requiring 4 L nasal cannula keep sats greater than 90%.  Blood sugars trending up.  Pro-Rigoberto within normal limits.    Review of Systems  Constitutional: Negative for fatigue and fever.   HENT: Negative for sore throat and trouble swallowing.    Eyes: Negative for pain and discharge.   Respiratory:  Positive for cough and shortness of breath.    Cardiovascular: Negative for chest pain and palpitations.   Gastrointestinal: Negative for abdominal pain, nausea and vomiting.   Endocrine: Negative for cold intolerance and heat intolerance.   Genitourinary: Negative for difficulty urinating and dysuria.   Musculoskeletal: Negative for back pain and neck stiffness.   Skin: Negative for color change and rash.   Neurological: Negative for syncope and headaches.   Hematological: Negative for adenopathy.   Psychiatric/Behavioral: Negative for confusion and hallucinations.    Objective   Objective     Vitals:   Temp:  [97.5 °F (36.4 °C)-98.3 °F (36.8 °C)] 97.6 °F (36.4 °C)  Heart Rate:  [73-94] 79  Resp:  [14-22] 20  BP: (117-136)/(51-71) 124/64  Flow (L/min) (Oxygen Therapy):  [4] 4  Physical Exam   General: well-developed appearing stated age in no acute distress  HEENT: Normocephalic atraumatic moist membranes pupils equal round reactive light, no scleral icterus no conjunctival injection  Cardiovascular: regular rate and rhythm no murmurs rubs or gallops S1-S2, no lower extremity edema appreciated  Pulmonary: Scant expiratory wheezes, prolonged expiratory phase, no rales or rhonchi symmetric chest expansion, unlabored, no conversational dyspnea appreciated  Gastrointestinal: Soft nontender nondistended positive bowel sounds all 4 quadrants no rebound or guarding  Musculoskeletal: No clubbing cyanosis, warm and well-perfused, calves soft symmetric nontender bilaterally  Skin: Clean dry without rashes  Neuro: Cranial nerves II through XII intact grossly no sensorimotor deficits appreciated bilateral upper and lower extremities  Psych: Patient is calm cooperative and appropriate with exam not responding to internal stimuli  : No Dias catheter no bladder distention no suprapubic tenderness    Result Review    Result Review:  I have personally reviewed these results and agree with these findings:  [x]  Laboratory  LAB  RESULTS:      Lab 11/08/24 0310 11/07/24  0001 11/06/24 2122   WBC 14.52*  --  11.71*   HEMOGLOBIN 11.0*  --  12.1*   HEMATOCRIT 38.1  --  41.9   PLATELETS 151  --  139*   NEUTROS ABS  --   --  9.32*   IMMATURE GRANS (ABS)  --   --  0.07*   LYMPHS ABS  --   --  1.00   MONOS ABS  --   --  0.97*   EOS ABS  --   --  0.30   MCV 81.8  --  80.9   PROCALCITONIN 0.08  --   --    LACTATE  --  1.2  --          Lab 11/08/24 0310 11/06/24  2208   SODIUM 136 138   POTASSIUM 4.2 4.7   CHLORIDE 102 101   CO2 25.1 28.1   ANION GAP 8.9 8.9   BUN 29* 22   CREATININE 1.08 1.25   EGFR 71.6 60.0*   GLUCOSE 299* 141*   CALCIUM 7.7* 8.5*         Lab 11/06/24 2208   TOTAL PROTEIN 6.8   ALBUMIN 3.7   GLOBULIN 3.1   ALT (SGPT) 17   AST (SGOT) 17   BILIRUBIN 0.6   ALK PHOS 82         Lab 11/07/24  0001 11/06/24 2208   PROBNP  --  224.7   HSTROP T 61* 64*                 Brief Urine Lab Results  (Last result in the past 365 days)        Color   Clarity   Blood   Leuk Est   Nitrite   Protein   CREAT   Urine HCG        03/25/24 1742 Yellow   Clear   Negative   Negative   Negative   Negative                 Microbiology Results (last 10 days)       Procedure Component Value - Date/Time    Blood Culture - Blood, Arm, Left [[id number]]  (Normal) Collected: 11/07/24 0001    Lab Status: Preliminary result Specimen: Blood from Arm, Left Updated: 11/08/24 0015     Blood Culture No growth at 24 hours    Blood Culture - Blood, Hand, Right [[id number]]  (Normal) Collected: 11/07/24 0001    Lab Status: Preliminary result Specimen: Blood from Hand, Right Updated: 11/08/24 0015     Blood Culture No growth at 24 hours            [x]  Microbiology  [x]  Radiology  XR Chest 1 View    Result Date: 11/6/2024  Impression: Bilateral lower lobe interstitial thickening is again visualized and may be secondary to scarring. There is suggestion of acute left lower lobe airspace disease compatible with pneumonia. Electronically Signed: Dl Irving MD  11/6/2024  10:39 PM EST  Workstation ID: [id number]     [x]  EKG/Telemetry   []  Cardiology/Vascular   []  Pathology  []  Old records  [x]  Other:  Scheduled Meds:arformoterol, 15 mcg, Nebulization, BID - RT  aspirin, 81 mg, Oral, Daily  atorvastatin, 10 mg, Oral, Daily  budesonide, 0.5 mg, Nebulization, BID - RT  doxycycline, 100 mg, Oral, Q12H  empagliflozin, 10 mg, Oral, Daily  enoxaparin, 40 mg, Subcutaneous, Daily  guaiFENesin, 600 mg, Oral, Q12H  insulin glargine, 15 Units, Subcutaneous, Q12H  insulin lispro, 2-9 Units, Subcutaneous, 4x Daily AC & at Bedtime  ipratropium-albuterol, 3 mL, Nebulization, Q4H While Awake - RT  pantoprazole, 40 mg, Oral, Q AM  predniSONE, 40 mg, Oral, Daily With Breakfast  sodium chloride, 10 mL, Intravenous, Q12H      Continuous Infusions:   PRN Meds:.  acetaminophen    albuterol    senna-docusate sodium **AND** polyethylene glycol **AND** bisacodyl **AND** bisacodyl    dextrose    dextrose    glucagon (human recombinant)    guaiFENesin-dextromethorphan    ondansetron    sodium chloride    sodium chloride    sodium chloride      Assessment & Plan   Assessment / Plan     Assessment/Plan:  Acute on chronic hypoxic respiratory failure  COPD with acute exacerbation  Community-acquired pneumonia unspecified organism  Insulin-dependent diabetes mellitus with hyperglycemia likely due to steroids          Patient admitted for further evaluation and treatment  Continue supplemental oxygen titrate to keep sats greater than 90%  Continue Brovana, Pulmicort and DuoNeb scheduled  Continue prednisone 40 mg daily complete 5-day course  Continue doxycycline empirically  Continue bronchopulmonary hygiene protocol  Continue home Jardiance  Continue Lantus twice daily and titrate as needed  Continue sliding scale insulin  Further inpatient was recommendations pending clinical course     Discussed plan with bedside RN.    Disposition: Home with home health once respiratory function improves closer to baseline.   Patient to follow-up with COPD clinic on discharge.    VTE Prophylaxis:  Pharmacologic VTE prophylaxis orders are present.        CODE STATUS:   Level Of Support Discussed With: Patient  Code Status (Patient has no pulse and is not breathing): CPR (Attempt to Resuscitate)  Medical Interventions (Patient has pulse or is breathing): Full Support

## 2024-11-09 NOTE — PLAN OF CARE
Went over discharge instructions with patient, verbalized understanding, verbalized understanding to  medication from pharmacy, removed IV, site free from redness or bleeding, all personal belongings returned, returned tele box mx 38 to monitor room , cab called and patient waiting at entrance A    Problem: Adult Inpatient Plan of Care  Goal: Plan of Care Review  Outcome: Adequate for Care Transition  Goal: Patient-Specific Goal (Individualized)  Outcome: Adequate for Care Transition  Goal: Absence of Hospital-Acquired Illness or Injury  Outcome: Adequate for Care Transition  Intervention: Identify and Manage Fall Risk  Recent Flowsheet Documentation  Taken 11/9/2024 1146 by Ninfa Harper RN  Safety Promotion/Fall Prevention:   safety round/check completed   room organization consistent   nonskid shoes/slippers when out of bed   fall prevention program maintained  Taken 11/9/2024 0936 by Ninfa Harper RN  Safety Promotion/Fall Prevention:   safety round/check completed   room organization consistent   nonskid shoes/slippers when out of bed   fall prevention program maintained  Taken 11/9/2024 0825 by Ninfa Harper RN  Safety Promotion/Fall Prevention:   safety round/check completed   room organization consistent   nonskid shoes/slippers when out of bed   fall prevention program maintained  Taken 11/9/2024 0720 by Ninfa Harper RN  Safety Promotion/Fall Prevention:   safety round/check completed   room organization consistent   nonskid shoes/slippers when out of bed   fall prevention program maintained  Intervention: Prevent Skin Injury  Recent Flowsheet Documentation  Taken 11/9/2024 1146 by Ninfa Harper RN  Body Position: position changed independently  Taken 11/9/2024 0936 by Ninfa Harper RN  Body Position: sitting up in bed  Taken 11/9/2024 0825 by Ninfa Harper RN  Body Position: sitting up in bed  Skin Protection: incontinence pads utilized  Taken 11/9/2024 0720 by Ninfa Harper  RN  Body Position: sitting up in bed  Intervention: Prevent Infection  Recent Flowsheet Documentation  Taken 11/9/2024 1146 by Ninfa Harper RN  Infection Prevention:   single patient room provided   rest/sleep promoted   personal protective equipment utilized   hand hygiene promoted  Taken 11/9/2024 0936 by Ninfa Harper RN  Infection Prevention:   hand hygiene promoted   personal protective equipment utilized   rest/sleep promoted   single patient room provided  Taken 11/9/2024 0825 by Ninfa Harper RN  Infection Prevention:   hand hygiene promoted   personal protective equipment utilized   rest/sleep promoted   single patient room provided  Taken 11/9/2024 0720 by Ninfa Harper RN  Infection Prevention:   hand hygiene promoted   personal protective equipment utilized   rest/sleep promoted   single patient room provided  Goal: Optimal Comfort and Wellbeing  Outcome: Adequate for Care Transition  Intervention: Provide Person-Centered Care  Recent Flowsheet Documentation  Taken 11/9/2024 0825 by Ninfa Harper RN  Trust Relationship/Rapport:   care explained   choices provided   thoughts/feelings acknowledged   reassurance provided  Goal: Readiness for Transition of Care  Outcome: Adequate for Care Transition     Problem: Skin Injury Risk Increased  Goal: Skin Health and Integrity  Outcome: Adequate for Care Transition  Intervention: Optimize Skin Protection  Recent Flowsheet Documentation  Taken 11/9/2024 1146 by Ninfa Harper RN  Activity Management: up in chair  Head of Bed (HOB) Positioning: HOB elevated  Taken 11/9/2024 0936 by Ninfa Harper RN  Activity Management: activity encouraged  Head of Bed (HOB) Positioning: HOB at 60 degrees  Taken 11/9/2024 0825 by Ninfa Harper RN  Activity Management: activity encouraged  Pressure Reduction Techniques: frequent weight shift encouraged  Head of Bed (HOB) Positioning: HOB at 60-90 degrees  Pressure Reduction Devices: pressure-redistributing  mattress utilized  Skin Protection: incontinence pads utilized  Taken 11/9/2024 0720 by Ninfa Harper, RN  Activity Management: activity encouraged  Head of Bed (HOB) Positioning: HOB at 45 degrees     Problem: Noninvasive Ventilation Acute  Goal: Effective Unassisted Ventilation and Oxygenation  Outcome: Adequate for Care Transition  Intervention: Monitor and Manage Noninvasive Ventilation  Recent Flowsheet Documentation  Taken 11/9/2024 0832 by Ninfa Harper RN  Airway/Ventilation Management: airway patency maintained

## 2024-11-09 NOTE — PLAN OF CARE
Goal Outcome Evaluation:  Plan of Care Reviewed With: patient        Progress: improving  Outcome Evaluation: Alert and oriented x4. VSS. Medicated per MAR. Blood glucose monitored. No complaint of pain this shift. O2 therapy via nasal canula titrated down to 2L. No additional needs at this time. Plan of care ongoing.

## 2024-11-11 ENCOUNTER — TRANSITIONAL CARE MANAGEMENT TELEPHONE ENCOUNTER (OUTPATIENT)
Dept: CALL CENTER | Facility: HOSPITAL | Age: 74
End: 2024-11-11
Payer: MEDICARE

## 2024-11-11 NOTE — OUTREACH NOTE
Call Center TCM Note      Flowsheet Row Responses   [facility] patient discharged from? [facility]   Does the patient have one of the following disease processes/diagnoses(primary or secondary)? COPD   TCM attempt successful? Yes   Call start time 0938   Call end time 0942   Discharge diagnosis Acute on chronic respiratory failure with hypoxia, COPD with acute exacerbation  Community-acquired pneumonia unspecified organism   Person spoke with today (if not patient) and relationship Patient   Meds reviewed with patient/caregiver? Yes  [New: doxycycline, mucinexf, prednisone]   Does the patient have all medications ordered at discharge? No   What is keeping the patient from filling the prescriptions? --  [Patient states that he need to  meds from [facility]]   Nursing Interventions Nurse provided patient education   Is the patient taking all medications as directed (includes completed medication regime)? No   What is preventing the patient from taking all medications as directed? Other  [See above]   Nursing Interventions Nurse provided patient education  [Informed of need for patient to get meds picked up today and take as prescribed.]   Comments PCP Jackeline SHORE. Hospital follow up scheduled for 11/14  1pm with PCP   Does the patient have an appointment with their PCP within 7-14 days of discharge? No   Nursing Interventions Assisted patient with making appointment per protocol, Routed TCM call to PCP office   Has home health visited the patient within 72 hours of discharge? N/A   DME comments Patient wearing home O22L   Pulse Ox monitoring None   Psychosocial issues? No   Did the patient receive a copy of their discharge instructions? Yes   Nursing interventions Reviewed instructions with patient   What is the patient's perception of their health status since discharge? Improving   Nursing Interventions Nurse provided patient education   Is the patient/caregiver able to teach back the hierarchy of who  to call/visit for symptoms/problems? PCP, Specialist, Home health nurse, Urgent Care, ED, 911 Yes   Is the patient/caregiver able to teach back signs and symptoms of worsening condition: Shortness of breath   Is the patient/caregiver able to teach back importance of completing antibiotic course of treatment? No  [Patient has not picked up antibiotic from pharmacy since discharge and has gap in treatment from discharge Saturday to today.]   TCM call completed? Yes   Call end time 0942   Would this patient benefit from a Referral to [facility] Social Work? No   Is the patient interested in additional calls from an ambulatory ? No            Yudelka Guillaume RN    11/11/2024, 09:46 EST

## 2024-11-12 LAB
BACTERIA SPEC AEROBE CULT: NORMAL
BACTERIA SPEC AEROBE CULT: NORMAL

## 2024-11-18 ENCOUNTER — OFFICE VISIT (OUTPATIENT)
Dept: PULMONOLOGY | Facility: CLINIC | Age: 74
End: 2024-11-18
Payer: MEDICARE

## 2024-11-18 VITALS
HEART RATE: 75 BPM | TEMPERATURE: 96.8 F | OXYGEN SATURATION: 93 % | WEIGHT: 250 LBS | RESPIRATION RATE: 16 BRPM | DIASTOLIC BLOOD PRESSURE: 64 MMHG | HEIGHT: 73 IN | SYSTOLIC BLOOD PRESSURE: 106 MMHG | BODY MASS INDEX: 33.13 KG/M2

## 2024-11-18 DIAGNOSIS — J96.11 CHRONIC RESPIRATORY FAILURE WITH HYPOXIA AND HYPERCAPNIA: ICD-10-CM

## 2024-11-18 DIAGNOSIS — J96.21 ACUTE ON CHRONIC RESPIRATORY FAILURE WITH HYPOXIA: ICD-10-CM

## 2024-11-18 DIAGNOSIS — J44.1 CHRONIC OBSTRUCTIVE PULMONARY DISEASE WITH ACUTE EXACERBATION: ICD-10-CM

## 2024-11-18 DIAGNOSIS — J18.9 COMMUNITY ACQUIRED PNEUMONIA, UNSPECIFIED LATERALITY: Primary | ICD-10-CM

## 2024-11-18 DIAGNOSIS — J43.9 PULMONARY EMPHYSEMA, UNSPECIFIED EMPHYSEMA TYPE: ICD-10-CM

## 2024-11-18 DIAGNOSIS — J96.12 CHRONIC RESPIRATORY FAILURE WITH HYPOXIA AND HYPERCAPNIA: ICD-10-CM

## 2024-11-18 PROCEDURE — 99214 OFFICE O/P EST MOD 30 MIN: CPT | Performed by: NURSE PRACTITIONER

## 2024-11-18 PROCEDURE — 3078F DIAST BP <80 MM HG: CPT | Performed by: NURSE PRACTITIONER

## 2024-11-18 PROCEDURE — 1160F RVW MEDS BY RX/DR IN RCRD: CPT | Performed by: NURSE PRACTITIONER

## 2024-11-18 PROCEDURE — 3074F SYST BP LT 130 MM HG: CPT | Performed by: NURSE PRACTITIONER

## 2024-11-18 PROCEDURE — 1159F MED LIST DOCD IN RCRD: CPT | Performed by: NURSE PRACTITIONER

## 2024-11-18 RX ORDER — ALBUTEROL SULFATE 90 UG/1
2 INHALANT RESPIRATORY (INHALATION) EVERY 4 HOURS PRN
Qty: 18 G | Refills: 11 | Status: SHIPPED | OUTPATIENT
Start: 2024-11-18

## 2024-11-18 NOTE — PROGRESS NOTES
Primary Care Provider  Jackeline Jaime APRN   Referring Provider  Danny Dye MD    Patient Complaint  Hospital Follow Up Visit, COPD, and Cough (Productive cough)      Subjective       History of Presenting Illness  Dilip Faulkner is a pleasant 74 y.o. male who presents to [facility] for hospital follow-up.  Patient here with spouse. Patient was at [facility] 11/6 through 11/9/2024 for acute on chronic hypoxic respiratory failure, COPD with acute exacerbation, community-acquired pneumonia.  Hospital course includes the following:Dilip Faulkner is a 74 y.o. male  with past medical history of diabetes, hypertension, COPD on home oxygen 2 L, tobacco use in remission, asthma, obesity, and GERD presented to the ED for evaluation of shortness of breath.  Patient states that for the last 2 days he has been having worsening shortness of breath to where he is symptomatic even at rest.  This evening his sister noticed that he was struggling to catch his breath so she called EMS to bring him to the ED for evaluation.  Patient denied any fevers or chills.  Patient was discharged from this hospital 3 weeks ago with similar complaints.  In the ED patient is was hypoxic on arrival requiring oxygen supplementation by nasal cannula at 4.5 L.  Labs showed mild leukocytosis with elevated but flat troponin but normal lactic acid.  Chest x-ray showed bilateral lower lobe thickening showing possible pneumonia.  Patient admitted started on empiric antibiotics systemic steroids and inhaled bronchodilators.  Blood cultures negative.  COVID flu RSV negative.  Respiratory function slowly improved able to be weaned back to his home 2 L O2.  Patient up walking around the room comfortably on 2 L nasal cannula.  Patient seen and evaluated on day of discharge and thought stable for discharge home to complete course of doxycycline and a burst of prednisone continue home inhaler  regimen.  Patient to follow-up with primary care provider and COPD clinic as an outpatient.       Patient states doing well now that he is at home.  He does have oxygen at home but he is not using it regularly.  He does have shortness of air with exertional activities.  He is only been using his albuterol and needing a refill on it today.  He states he was not able to tolerate the Breztri it burned his throat too bad.  He does have a productive cough with some green sputum at times.  He states he did complete his antibiotic and steroid that he was sent home from the hospital on.  Patient denies wheezing, headaches, chest pain, weight loss or hemoptysis. Patient denies fevers, chills and night sweats. Dilip Faulkner is able to perform ADLs without difficulties.    I have personally reviewed the review of systems, past family, social, medical and surgical histories; and agree with their findings.      Review of Systems   Constitutional: Negative.    HENT: Negative.     Respiratory:  Positive for cough and shortness of breath.    Cardiovascular: Negative.    Musculoskeletal: Negative.    Neurological: Negative.    Psychiatric/Behavioral: Negative.           Family History   Problem Relation Age of Onset    Asthma Father     Asthma Sister     Asthma Brother     Cancer Maternal Aunt         Social History     Socioeconomic History    Marital status: Single   Tobacco Use    Smoking status: Former     Current packs/day: 0.00     Average packs/day: 1.5 packs/day for 56.2 years (84.3 ttl pk-yrs)     Types: Cigarettes     Start date: 1965     Quit date: 3/19/2021     Years since quitting: 3.6     Passive exposure: Past    Smokeless tobacco: Current     Types: Chew   Vaping Use    Vaping status: Never Used   Substance and Sexual Activity    Alcohol use: Never    Drug use: Never    Sexual activity: Defer        Past Medical History:   Diagnosis Date    Asthma     COPD (chronic obstructive pulmonary disease)     Diabetes  "mellitus     Ex-smoker     QUIT 2021    Hernia, umbilical     Hypertension     On home O2     REPORTS WEARING 2L/NC PRN SOA        Immunization History   Administered Date(s) Administered    Fluzone High-Dose 65+YRS 10/29/2024    Fluzone High-Dose 65+yrs 10/08/2022, 11/02/2023       No Known Allergies       Current Outpatient Medications:     albuterol sulfate  (90 Base) MCG/ACT inhaler, Inhale 2 puffs Every 4 (Four) Hours As Needed for Wheezing., Disp: 18 g, Rfl: 11    Albuterol-Budesonide (Airsupra) 90-80 MCG/ACT aerosol, Inhale 2 puffs Every 4 (Four) Hours As Needed (wheezing, soa)., Disp: 10.7 g, Rfl: 1    aspirin 81 MG chewable tablet, CHEW AND SWALLOW 1 TABLET BY MOUTH DAILY, Disp: 90 tablet, Rfl: 1    atorvastatin (LIPITOR) 10 MG tablet, TAKE 1 TABLET BY MOUTH DAILY, Disp: 90 tablet, Rfl: 1    guaiFENesin (MUCINEX) 600 MG 12 hr tablet, Take 1 tablet by mouth Every 12 (Twelve) Hours for 30 days., Disp: 60 tablet, Rfl: 0    Jardiance 10 MG tablet tablet, TAKE 1 TABLET BY MOUTH DAILY, Disp: 90 tablet, Rfl: 0    metFORMIN (GLUCOPHAGE) 500 MG tablet, TAKE 1 TABLET BY MOUTH TWICE DAILY WITH MEALS, Disp: 180 tablet, Rfl: 1    omeprazole (priLOSEC) 20 MG capsule, TAKE 1 CAPSULE BY MOUTH DAILY, Disp: 90 capsule, Rfl: 1    Tresiba FlexTouch 100 UNIT/ML solution pen-injector injection, ADMINISTER 20 UNITS UNDER THE SKIN DAILY AS DIRECTED (Patient taking differently: Inject 20 Units under the skin into the appropriate area as directed Daily.), Disp: 6 mL, Rfl: 2    Fluticasone-Umeclidin-Vilant (TRELEGY ELLIPTA) 200-62.5-25 MCG/ACT inhaler, Inhale 1 puff Daily., Disp: 1 each, Rfl: 11         Vital Signs   /64 (BP Location: Right arm, Patient Position: Sitting, Cuff Size: Adult)   Pulse 75   Temp 96.8 °F (36 °C)   Resp 16   Ht 185.4 cm (73\")   Wt 113 kg (250 lb)   SpO2 93% Comment: room air  BMI 32.98 kg/m²       Objective     Physical Exam  Vitals reviewed.   Constitutional:       General: He is not in " acute distress.     Appearance: Normal appearance. He is obese. He is not ill-appearing.   HENT:      Head: Normocephalic and atraumatic.      Nose: Nose normal.      Mouth/Throat:      Mouth: Mucous membranes are moist.      Pharynx: Oropharynx is clear.   Eyes:      Extraocular Movements: Extraocular movements intact.      Conjunctiva/sclera: Conjunctivae normal.      Pupils: Pupils are equal, round, and reactive to light.   Cardiovascular:      Rate and Rhythm: Normal rate and regular rhythm.      Pulses: Normal pulses.      Heart sounds: Normal heart sounds.   Pulmonary:      Effort: Pulmonary effort is normal. No respiratory distress.      Breath sounds: Normal breath sounds. No stridor. No wheezing, rhonchi or rales.   Abdominal:      General: Bowel sounds are normal.   Musculoskeletal:         General: Normal range of motion.      Cervical back: Normal range of motion and neck supple.   Skin:     General: Skin is warm and dry.   Neurological:      Mental Status: He is alert and oriented to person, place, and time.   Psychiatric:         Behavior: Behavior normal.         Results Review  I have personally reviewed the prior office notes, hospital records, labs, and diagnostics.    XR Chest 1 View [[id number]] (Order [id number])  Order  Status: Final result     Study Notes     Renate West on 11/6/2024 10:33 PM EST   Short of air     Appointment Information    PACS Images     Radiology Images  Study Result    Narrative & Impression   XR CHEST 1 VW     Date of Exam: 11/6/2024 10:26 PM EST     Indication: SOA Triage Protocol     Comparison: Chest radiograph performed on October 22, 2024     Findings:  Patient's rotation to the left limits evaluation. Bilateral lower lobe interstitial thickening is again visualized and may be on the basis of scarring. There is suggestion of acute left lower lobe airspace disease. Cardiomediastinal silhouette is within   normal limits. There are no acute osseous abnormalities.      IMPRESSION:  Impression:  Bilateral lower lobe interstitial thickening is again visualized and may be secondary to scarring. There is suggestion of acute left lower lobe airspace disease compatible with pneumonia.        Electronically Signed: Dl Irving MD    11/6/2024 10:39 PM EST    Workstation ID: [id number]         Assessment         Patient Active Problem List   Diagnosis    Chronic obstructive pulmonary disease with acute exacerbation    Type 2 diabetes mellitus    Essential hypertension    Hyperlipidemia    Cough    Pneumonia of left lower lobe due to infectious organism    COPD with acute exacerbation    Obesity (BMI 30-39.9)    Right bundle branch block    Acquired ptosis of eyelid, bilateral    Type 2 diabetes mellitus with hyperglycemia, with long-term current use of insulin    Acute exacerbation of chronic obstructive pulmonary disease (COPD)    Acute respiratory failure with hypoxia    Respiratory failure with hypoxia and hypercapnia    Gastroesophageal reflux disease without esophagitis    COPD (chronic obstructive pulmonary disease)    Noncompliance with CPAP treatment    Unstable angina    Aspiration pneumonia of left lower lobe due to gastric secretions    PSVT (paroxysmal supraventricular tachycardia)    COPD exacerbation    Acute on chronic respiratory failure with hypoxia        Plan     Diagnoses and all orders for this visit:    1. Community acquired pneumonia, unspecified laterality (Primary)  Comments:  followup CXR ordered to ensure resolution of pneumonia  Orders:  -     XR Chest 2 View; Future    2. Acute on chronic respiratory failure with hypoxia  Comments:  Continue with O2 at 2 L  Orders:  -     XR Chest 2 View; Future  -     Fluticasone-Umeclidin-Vilant (TRELEGY ELLIPTA) 200-62.5-25 MCG/ACT inhaler; Inhale 1 puff Daily.  Dispense: 1 each; Refill: 11    3. Chronic obstructive pulmonary disease with acute exacerbation  -     XR Chest 2 View; Future  -      Fluticasone-Umeclidin-Vilant (TRELEGY ELLIPTA) 200-62.5-25 MCG/ACT inhaler; Inhale 1 puff Daily.  Dispense: 1 each; Refill: 11  -     albuterol sulfate  (90 Base) MCG/ACT inhaler; Inhale 2 puffs Every 4 (Four) Hours As Needed for Wheezing.  Dispense: 18 g; Refill: 11    4. Chronic respiratory failure with hypoxia and hypercapnia  -     albuterol sulfate  (90 Base) MCG/ACT inhaler; Inhale 2 puffs Every 4 (Four) Hours As Needed for Wheezing.  Dispense: 18 g; Refill: 11    5. Pulmonary emphysema, unspecified emphysema type  -     albuterol sulfate  (90 Base) MCG/ACT inhaler; Inhale 2 puffs Every 4 (Four) Hours As Needed for Wheezing.  Dispense: 18 g; Refill: 11       4.  Patient will get a follow-up CXR to ensure resolution of the pneumonia.  Discontinuing Breztri at this time and putting patient on Trelegy 200 with instructions for him to rinse his mouth out after each use to prevent oral thrush.  Prescription sent to his pharmacy.  Encourage patient to use oxygen as prescribed titrating it to keep his sats above 90%.      Smoking status:  reports that he quit smoking about 3 years ago. His smoking use included cigarettes. He started smoking about 59 years ago. He has a 84.3 pack-year smoking history. He has been exposed to tobacco smoke. His smokeless tobacco use includes chew.    Vaccination status: Reviewed  Immunization History   Administered Date(s) Administered    Fluzone High-Dose 65+YRS 10/29/2024    Fluzone High-Dose 65+yrs 10/08/2022, 11/02/2023        Medications personally reviewed    Follow Up  Return for Next scheduled follow up.    Patient was given instructions and counseling regarding his condition or for health maintenance advice. Please see specific information pulled into the AVS if appropriate.     I spent 15 minutes caring for Dilip Faulkner on this date of service. This time includes time spent by me in the following activities:preparing for the visit, reviewing tests,  obtaining and/or reviewing a separately obtained history, performing a medically appropriate examination and/or evaluation, counseling and educating the patient/family/caregiver, ordering medications, tests, or procedures, documenting information in the medical record, independently interpreting results and communicating that information with the patient/family/caregiver and answered questions family members, discuss medications.

## 2024-11-27 DIAGNOSIS — E11.65 TYPE 2 DIABETES MELLITUS WITH HYPERGLYCEMIA, WITH LONG-TERM CURRENT USE OF INSULIN: ICD-10-CM

## 2024-11-27 DIAGNOSIS — Z79.4 TYPE 2 DIABETES MELLITUS WITH HYPERGLYCEMIA, WITH LONG-TERM CURRENT USE OF INSULIN: ICD-10-CM

## 2024-12-03 RX ORDER — EMPAGLIFLOZIN 10 MG/1
10 TABLET, FILM COATED ORAL DAILY
Qty: 90 TABLET | Refills: 0 | Status: SHIPPED | OUTPATIENT
Start: 2024-12-03

## 2024-12-04 DIAGNOSIS — E78.5 HYPERLIPIDEMIA, UNSPECIFIED HYPERLIPIDEMIA TYPE: ICD-10-CM

## 2024-12-04 DIAGNOSIS — I20.0 UNSTABLE ANGINA: ICD-10-CM

## 2024-12-04 RX ORDER — ASPIRIN 81 MG/1
81 TABLET, CHEWABLE ORAL DAILY
Qty: 90 TABLET | Refills: 1 | Status: SHIPPED | OUTPATIENT
Start: 2024-12-04

## 2024-12-17 ENCOUNTER — TELEPHONE (OUTPATIENT)
Dept: FAMILY MEDICINE CLINIC | Facility: CLINIC | Age: 74
End: 2024-12-17
Payer: MEDICARE

## 2024-12-17 DIAGNOSIS — Z79.4 TYPE 2 DIABETES MELLITUS WITH HYPERGLYCEMIA, WITH LONG-TERM CURRENT USE OF INSULIN: Primary | ICD-10-CM

## 2024-12-17 DIAGNOSIS — E11.65 TYPE 2 DIABETES MELLITUS WITH HYPERGLYCEMIA, WITH LONG-TERM CURRENT USE OF INSULIN: Primary | ICD-10-CM

## 2024-12-17 NOTE — TELEPHONE ENCOUNTER
Caller: Juana Sandoval    Relationship: Emergency Contact    Best call back number: [phone]     What medication are you requesting: PEN NOODLES FOR INSULIN     If a prescription is needed, what is your preferred pharmacy and phone number: [id number] - [place], [place] - [address] AT [facility] [phone] [facility] [phone]      Additional notes: PLEASE CALL AND ADVISE

## 2024-12-18 RX ORDER — BLOOD SUGAR DIAGNOSTIC
1 STRIP MISCELLANEOUS DAILY
Qty: 100 EACH | Refills: 3 | Status: SHIPPED | OUTPATIENT
Start: 2024-12-18

## 2024-12-18 NOTE — TELEPHONE ENCOUNTER
Spoke with pts sister, okay per verbal release, making her aware of pen needles being sent in to pharmacy

## 2025-02-04 ENCOUNTER — OFFICE VISIT (OUTPATIENT)
Dept: PULMONOLOGY | Facility: CLINIC | Age: 75
End: 2025-02-04
Payer: MEDICARE

## 2025-02-04 VITALS
RESPIRATION RATE: 18 BRPM | DIASTOLIC BLOOD PRESSURE: 86 MMHG | HEIGHT: 73 IN | HEART RATE: 74 BPM | BODY MASS INDEX: 32.47 KG/M2 | WEIGHT: 245 LBS | SYSTOLIC BLOOD PRESSURE: 123 MMHG | TEMPERATURE: 96.4 F | OXYGEN SATURATION: 89 %

## 2025-02-04 DIAGNOSIS — J96.11 CHRONIC RESPIRATORY FAILURE WITH HYPOXIA AND HYPERCAPNIA: ICD-10-CM

## 2025-02-04 DIAGNOSIS — J43.9 PULMONARY EMPHYSEMA, UNSPECIFIED EMPHYSEMA TYPE: ICD-10-CM

## 2025-02-04 DIAGNOSIS — J96.12 CHRONIC RESPIRATORY FAILURE WITH HYPOXIA AND HYPERCAPNIA: ICD-10-CM

## 2025-02-04 DIAGNOSIS — E66.9 OBESITY (BMI 30-39.9): Primary | ICD-10-CM

## 2025-02-04 DIAGNOSIS — J44.1 CHRONIC OBSTRUCTIVE PULMONARY DISEASE WITH ACUTE EXACERBATION: ICD-10-CM

## 2025-02-04 RX ORDER — ALBUTEROL SULFATE 90 UG/1
2 INHALANT RESPIRATORY (INHALATION) EVERY 4 HOURS PRN
Qty: 18 G | Refills: 11 | Status: SHIPPED | OUTPATIENT
Start: 2025-02-04

## 2025-02-04 RX ORDER — ALBUTEROL SULFATE 0.83 MG/ML
2.5 SOLUTION RESPIRATORY (INHALATION) 4 TIMES DAILY PRN
Qty: 1 EACH | Refills: 5 | Status: SHIPPED | OUTPATIENT
Start: 2025-02-04

## 2025-02-04 NOTE — PROGRESS NOTES
Primary Care Provider  Jackeline Jaime APRN   Referring Provider  No ref. provider found      Patient Complaint  Follow-up (3 Months); Community acquired pneumonia, unspecified laterality; and Shortness of Breath      Subjective          Dilip Faulkner presents to [facility]      History of Presenting Illness  Dilip Faulkner is a 74 y.o. male with history of COPD here for follow-up.    Patient is here today with his wife.  He reports feeling well.  From a pulmonary standpoint he has no new complaints.  He has a chronic cough that is mostly nonproductive.  Sometimes it has clear/yellow sputum.  He has oxygen which he uses on and off.  His current regimen includes Trelegy 200 mg daily with as needed albuterol rescue inhaler which she uses a few times a week.  He remains a former smoker, quit in 2021.  No fever, chills, hemoptysis at this time. I have personally reviewed the review of systems, past family, social, medical and surgical histories; and agree with their findings.    Review of Systems  Constitutional:  No fever. No chills. No weakness.  ENT:  No sore throat, URI symptoms.   Cardiovascular:  No chest pain. No palpitations. No lower extremity edema.  Respiratory:  No shortness of breath, +chronic cough, pleuritic chest pain. No hemoptysis. +chronic dyspnea.   GI:  Normal appetite. No nausea, vomiting, diarrhea. No melena.  Musculoskeletal:  No arthralgias or myalgias.  Neurologic:  No weakness    Family History   Problem Relation Age of Onset    Asthma Father     Asthma Sister     Asthma Brother     Cancer Maternal Aunt         Social History     Socioeconomic History    Marital status: Single   Tobacco Use    Smoking status: Former     Current packs/day: 0.00     Average packs/day: 1.5 packs/day for 56.2 years (84.3 ttl pk-yrs)     Types: Cigarettes     Start date: 1965     Quit date: 3/19/2021     Years since quitting: 3.8     Passive exposure: Past     Smokeless tobacco: Current     Types: Chew   Vaping Use    Vaping status: Never Used   Substance and Sexual Activity    Alcohol use: Never    Drug use: Never    Sexual activity: Defer        Past Medical History:   Diagnosis Date    Asthma     COPD (chronic obstructive pulmonary disease)     Diabetes mellitus     Ex-smoker     QUIT 2021    Hernia, umbilical     Hypertension     On home O2     REPORTS WEARING 2L/NC PRN SOA        Immunization History   Administered Date(s) Administered    Fluzone High-Dose 65+YRS 10/29/2024    Fluzone High-Dose 65+yrs 10/08/2022, 11/02/2023       No Known Allergies       Current Outpatient Medications:     albuterol sulfate  (90 Base) MCG/ACT inhaler, Inhale 2 puffs Every 4 (Four) Hours As Needed for Wheezing., Disp: 18 g, Rfl: 11    Albuterol-Budesonide (Airsupra) 90-80 MCG/ACT aerosol, Inhale 2 puffs Every 4 (Four) Hours As Needed (wheezing, soa)., Disp: 10.7 g, Rfl: 1    aspirin 81 MG chewable tablet, CHEW AND SWALLOW 1 TABLET BY MOUTH DAILY, Disp: 90 tablet, Rfl: 1    atorvastatin (LIPITOR) 10 MG tablet, TAKE 1 TABLET BY MOUTH DAILY, Disp: 90 tablet, Rfl: 1    Fluticasone-Umeclidin-Vilant (TRELEGY ELLIPTA) 200-62.5-25 MCG/ACT inhaler, Inhale 1 puff Daily., Disp: 1 each, Rfl: 11    Insulin Pen Needle (Pen Needles) 32G X 5 MM misc, Use 1 each Daily., Disp: 100 each, Rfl: 3    Jardiance 10 MG tablet tablet, TAKE 1 TABLET BY MOUTH DAILY, Disp: 90 tablet, Rfl: 0    metFORMIN (GLUCOPHAGE) 500 MG tablet, TAKE 1 TABLET BY MOUTH TWICE DAILY WITH MEALS, Disp: 180 tablet, Rfl: 1    omeprazole (priLOSEC) 20 MG capsule, TAKE 1 CAPSULE BY MOUTH DAILY, Disp: 90 capsule, Rfl: 1    Tresiba FlexTouch 100 UNIT/ML solution pen-injector injection, ADMINISTER 20 UNITS UNDER THE SKIN DAILY AS DIRECTED (Patient taking differently: Inject 20 Units under the skin into the appropriate area as directed Daily.), Disp: 6 mL, Rfl: 2     Objective     Vital Signs:   /86 (BP Location: Right arm,  "Patient Position: Sitting, Cuff Size: Adult)   Pulse 74   Temp 96.4 °F (35.8 °C) (Temporal)   Resp 18   Ht 185.4 cm (73\")   Wt 111 kg (245 lb)   SpO2 (!) 89% Comment: Room air  BMI 32.32 kg/m²     Physical Exam  Vital Signs Reviewed   WDWN, Alert, NAD.    HEENT:  EOMI.  nares clear  Neck:  Supple, no JVD, no thyromegaly  Chest:  clear to auscultation bilaterally, no wheezes, crackles; no work of breathing noted  CV: RRR, no M/G/R, pulses 2+, equal.  Abd:  Soft, NT, ND, +BS  EXT:  no clubbing, no cyanosis, no edema  Neuro:  A&Ox3, CN grossly intact, no focal deficits.  Skin: No rashes or lesions noted       Result Review :   I have personally reviewed patient's labs and images.              Patient Active Problem List   Diagnosis    Chronic obstructive pulmonary disease with acute exacerbation    Type 2 diabetes mellitus    Essential hypertension    Hyperlipidemia    Cough    Pneumonia of left lower lobe due to infectious organism    COPD with acute exacerbation    Obesity (BMI 30-39.9)    Right bundle branch block    Acquired ptosis of eyelid, bilateral    Type 2 diabetes mellitus with hyperglycemia, with long-term current use of insulin    Acute exacerbation of chronic obstructive pulmonary disease (COPD)    Acute respiratory failure with hypoxia    Respiratory failure with hypoxia and hypercapnia    Gastroesophageal reflux disease without esophagitis    COPD (chronic obstructive pulmonary disease)    Noncompliance with CPAP treatment    Unstable angina    Aspiration pneumonia of left lower lobe due to gastric secretions    PSVT (paroxysmal supraventricular tachycardia)    COPD exacerbation    Acute on chronic respiratory failure with hypoxia       Impression and Plan    COPD  Chronic hypoxic respiratory failure requiring supplemental oxygen  History of tobacco use in remission  Obesity, BMI 32.3    -PFTs ordered at a previous visit visit, no showed twice in the past  -Venous duplex scans of upper " extremities ordered last visit for intermittent swelling of hands with associated pain, no-show for appointment  -CT chest 3/25/2024 showed no acute infiltrates.  Atelectasis and/or fibrosis suggested bilaterally, greater on the left than the right.  No suspicious nodules or masses.  -Patient qualifies for annual lung cancer screening program, LDCT ordered for March 2025  -Stressed the importance of adherence to BiPAP therapy, patient continues to be unable to tolerate  -Continue wearing 2 L supplemental oxygen as needed to keep O2 saturation between 89 and 92%  -Continue using Trelegy 200mg QD; reminded patient to rinse mouth after each use  -Continue using albuterol inhaler or albuterol nebulizer treatments as needed    Smoking status: Reviewed  Vaccination status: Patient is up-to-date with vaccinations at this  Medications personally reviewed    Follow Up   No follow-ups on file.  Patient was given instructions and counseling regarding his condition or for health maintenance advice. Please see specific information pulled into the AVS if appropriate.

## 2025-02-13 ENCOUNTER — HOSPITAL ENCOUNTER (EMERGENCY)
Facility: HOSPITAL | Age: 75
Discharge: HOME | End: 2025-02-14
Attending: EMERGENCY MEDICINE
Payer: MEDICARE

## 2025-02-13 ENCOUNTER — APPOINTMENT (OUTPATIENT)
Dept: GENERAL RADIOLOGY | Facility: HOSPITAL | Age: 75
End: 2025-02-13
Payer: MEDICARE

## 2025-02-13 DIAGNOSIS — J44.1 ACUTE EXACERBATION OF CHRONIC OBSTRUCTIVE PULMONARY DISEASE (COPD): Primary | ICD-10-CM

## 2025-02-13 LAB
ALBUMIN SERPL-MCNC: 3.6 G/DL (ref 3.5–5.2)
ALBUMIN/GLOB SERPL: 1.2 G/DL
ALP SERPL-CCNC: 105 U/L (ref 39–117)
ALT SERPL W P-5'-P-CCNC: 12 U/L (ref 1–41)
ANION GAP SERPL CALCULATED.3IONS-SCNC: 8.9 MMOL/L (ref 5–15)
AST SERPL-CCNC: 15 U/L (ref 1–40)
BASOPHILS # BLD AUTO: 0.06 10*3/MM3 (ref 0–0.2)
BASOPHILS NFR BLD AUTO: 0.6 % (ref 0–1.5)
BILIRUB SERPL-MCNC: 0.2 MG/DL (ref 0–1.2)
BUN SERPL-MCNC: 17 MG/DL (ref 8–23)
BUN/CREAT SERPL: 12.7 (ref 7–25)
CALCIUM SPEC-SCNC: 8.6 MG/DL (ref 8.6–10.5)
CHLORIDE SERPL-SCNC: 105 MMOL/L (ref 98–107)
CO2 SERPL-SCNC: 26.1 MMOL/L (ref 22–29)
CREAT SERPL-MCNC: 1.34 MG/DL (ref 0.76–1.27)
DEPRECATED RDW RBC AUTO: 48.8 FL (ref 37–54)
EGFRCR SERPLBLD CKD-EPI 2021: 55.2 ML/MIN/1.73
EOSINOPHIL # BLD AUTO: 1.27 10*3/MM3 (ref 0–0.4)
EOSINOPHIL NFR BLD AUTO: 12.5 % (ref 0.3–6.2)
ERYTHROCYTE [DISTWIDTH] IN BLOOD BY AUTOMATED COUNT: 16.2 % (ref 12.3–15.4)
GEN 5 1HR TROPONIN T REFLEX: 31 NG/L
GLOBULIN UR ELPH-MCNC: 3.1 GM/DL
GLUCOSE SERPL-MCNC: 128 MG/DL (ref 65–99)
HCT VFR BLD AUTO: 43.1 % (ref 37.5–51)
HGB BLD-MCNC: 12.2 G/DL (ref 13–17.7)
HOLD SPECIMEN: NORMAL
HOLD SPECIMEN: NORMAL
IMM GRANULOCYTES # BLD AUTO: 0.03 10*3/MM3 (ref 0–0.05)
IMM GRANULOCYTES NFR BLD AUTO: 0.3 % (ref 0–0.5)
LYMPHOCYTES # BLD AUTO: 1.9 10*3/MM3 (ref 0.7–3.1)
LYMPHOCYTES NFR BLD AUTO: 18.6 % (ref 19.6–45.3)
MCH RBC QN AUTO: 23.6 PG (ref 26.6–33)
MCHC RBC AUTO-ENTMCNC: 28.3 G/DL (ref 31.5–35.7)
MCV RBC AUTO: 83.2 FL (ref 79–97)
MONOCYTES # BLD AUTO: 0.88 10*3/MM3 (ref 0.1–0.9)
MONOCYTES NFR BLD AUTO: 8.6 % (ref 5–12)
NEUTROPHILS NFR BLD AUTO: 59.4 % (ref 42.7–76)
NEUTROPHILS NFR BLD AUTO: 6.05 10*3/MM3 (ref 1.7–7)
NRBC BLD AUTO-RTO: 0 /100 WBC (ref 0–0.2)
NT-PROBNP SERPL-MCNC: 185.2 PG/ML (ref 0–1800)
PLATELET # BLD AUTO: 188 10*3/MM3 (ref 140–450)
PMV BLD AUTO: 10.2 FL (ref 6–12)
POTASSIUM SERPL-SCNC: 4.4 MMOL/L (ref 3.5–5.2)
PROT SERPL-MCNC: 6.7 G/DL (ref 6–8.5)
RBC # BLD AUTO: 5.18 10*6/MM3 (ref 4.14–5.8)
SODIUM SERPL-SCNC: 140 MMOL/L (ref 136–145)
TROPONIN T % DELTA: -23
TROPONIN T NUMERIC DELTA: -9 NG/L
TROPONIN T SERPL HS-MCNC: 40 NG/L
WBC NRBC COR # BLD AUTO: 10.19 10*3/MM3 (ref 3.4–10.8)
WHOLE BLOOD HOLD COAG: NORMAL
WHOLE BLOOD HOLD SPECIMEN: NORMAL

## 2025-02-13 PROCEDURE — 93005 ELECTROCARDIOGRAM TRACING: CPT | Performed by: EMERGENCY MEDICINE

## 2025-02-13 PROCEDURE — 71045 X-RAY EXAM CHEST 1 VIEW: CPT

## 2025-02-13 PROCEDURE — 93010 ELECTROCARDIOGRAM REPORT: CPT | Performed by: STUDENT IN AN ORGANIZED HEALTH CARE EDUCATION/TRAINING PROGRAM

## 2025-02-13 PROCEDURE — 99284 EMERGENCY DEPT VISIT MOD MDM: CPT

## 2025-02-13 PROCEDURE — 85025 COMPLETE CBC W/AUTO DIFF WBC: CPT

## 2025-02-13 PROCEDURE — 36415 COLL VENOUS BLD VENIPUNCTURE: CPT

## 2025-02-13 PROCEDURE — 93005 ELECTROCARDIOGRAM TRACING: CPT

## 2025-02-13 PROCEDURE — 84484 ASSAY OF TROPONIN QUANT: CPT

## 2025-02-13 PROCEDURE — 80053 COMPREHEN METABOLIC PANEL: CPT

## 2025-02-13 PROCEDURE — 83880 ASSAY OF NATRIURETIC PEPTIDE: CPT

## 2025-02-13 RX ORDER — SODIUM CHLORIDE 0.9 % (FLUSH) 0.9 %
10 SYRINGE (ML) INJECTION AS NEEDED
Status: DISCONTINUED | OUTPATIENT
Start: 2025-02-13 | End: 2025-02-14 | Stop reason: HOSPADM

## 2025-02-14 VITALS
WEIGHT: 257 LBS | SYSTOLIC BLOOD PRESSURE: 110 MMHG | DIASTOLIC BLOOD PRESSURE: 66 MMHG | HEART RATE: 70 BPM | BODY MASS INDEX: 34.06 KG/M2 | OXYGEN SATURATION: 91 % | TEMPERATURE: 98 F | HEIGHT: 73 IN | RESPIRATION RATE: 18 BRPM

## 2025-02-14 PROCEDURE — 96374 THER/PROPH/DIAG INJ IV PUSH: CPT

## 2025-02-14 PROCEDURE — 94799 UNLISTED PULMONARY SVC/PX: CPT

## 2025-02-14 PROCEDURE — 25010000002 METHYLPREDNISOLONE PER 125 MG: Performed by: EMERGENCY MEDICINE

## 2025-02-14 PROCEDURE — 94640 AIRWAY INHALATION TREATMENT: CPT

## 2025-02-14 RX ORDER — METHYLPREDNISOLONE SODIUM SUCCINATE 125 MG/2ML
125 INJECTION, POWDER, LYOPHILIZED, FOR SOLUTION INTRAMUSCULAR; INTRAVENOUS ONCE
Status: COMPLETED | OUTPATIENT
Start: 2025-02-14 | End: 2025-02-14

## 2025-02-14 RX ORDER — IPRATROPIUM BROMIDE AND ALBUTEROL SULFATE 2.5; .5 MG/3ML; MG/3ML
3 SOLUTION RESPIRATORY (INHALATION)
Status: COMPLETED | OUTPATIENT
Start: 2025-02-14 | End: 2025-02-14

## 2025-02-14 RX ORDER — PREDNISONE 50 MG/1
50 TABLET ORAL DAILY
Qty: 4 TABLET | Refills: 0 | Status: SHIPPED | OUTPATIENT
Start: 2025-02-14

## 2025-02-14 RX ADMIN — IPRATROPIUM BROMIDE AND ALBUTEROL SULFATE 3 ML: .5; 3 SOLUTION RESPIRATORY (INHALATION) at 01:34

## 2025-02-14 RX ADMIN — METHYLPREDNISOLONE SODIUM SUCCINATE 125 MG: 125 INJECTION, POWDER, FOR SOLUTION INTRAMUSCULAR; INTRAVENOUS at 01:25

## 2025-02-14 RX ADMIN — IPRATROPIUM BROMIDE AND ALBUTEROL SULFATE 3 ML: .5; 3 SOLUTION RESPIRATORY (INHALATION) at 01:38

## 2025-02-14 RX ADMIN — IPRATROPIUM BROMIDE AND ALBUTEROL SULFATE 3 ML: .5; 3 SOLUTION RESPIRATORY (INHALATION) at 01:29

## 2025-02-14 NOTE — ED PROVIDER NOTES
Time: 1:11 AM EST  Date of encounter:  2/13/2025  Independent Historian/Clinical History and Information was obtained by:   Patient    History is limited by: N/A    Chief Complaint: Shortness of breath      History of Present Illness:  Patient is a 74 y.o. year old male who presents to the emergency department for evaluation of shortness of breath    Patient describes increasing shortness of breath over the past 24 hours with a minimally productive cough.  He denies any fevers or chills.  He denies sick contacts.  States he feels improved at rest but more symptomatic with even minimal exertion.  He denies any significant leg swelling or pain.  He denies chest pain.  He continues to use his inhalers at home with only temporary/minimal relief.  He is not requiring any additional home oxygen but continues to use his normal 2 L.      Patient Care Team  Primary Care Provider: Jackeline Jaime APRN    Past Medical History:     No Known Allergies  Past Medical History:   Diagnosis Date    Asthma     COPD (chronic obstructive pulmonary disease)     Diabetes mellitus     Ex-smoker     QUIT 2021    Hernia, umbilical     Hypertension     On home O2     REPORTS WEARING 2L/NC PRN SOA     Past Surgical History:   Procedure Laterality Date    ABDOMINAL SURGERY       Family History   Problem Relation Age of Onset    Asthma Father     Asthma Sister     Asthma Brother     Cancer Maternal Aunt        Home Medications:  Prior to Admission medications    Medication Sig Start Date End Date Taking? Authorizing Provider   albuterol (PROVENTIL) (2.5 MG/3ML) 0.083% nebulizer solution Take 2.5 mg by nebulization 4 (Four) Times a Day As Needed for Wheezing. 2/4/25   Leila Singh MD   albuterol sulfate  (90 Base) MCG/ACT inhaler Inhale 2 puffs Every 4 (Four) Hours As Needed for Wheezing. 2/4/25   Leila Singh MD   Albuterol-Budesonide (Airsupra) 90-80 MCG/ACT aerosol Inhale 2 puffs Every 4 (Four) Hours As Needed (wheezing, soa). 8/20/24    Jackeline Jaime APRN   aspirin 81 MG chewable tablet CHEW AND SWALLOW 1 TABLET BY MOUTH DAILY 12/4/24   Jackeline Jaime APRN   atorvastatin (LIPITOR) 10 MG tablet TAKE 1 TABLET BY MOUTH DAILY 8/13/24   Jackeline Jaime APRN   Fluticasone-Umeclidin-Vilant (TRELEGY ELLIPTA) 200-62.5-25 MCG/ACT inhaler Inhale 1 puff Daily. 11/18/24   Vivien Jacobs APRN   Insulin Pen Needle (Pen Needles) 32G X 5 MM misc Use 1 each Daily. 12/18/24   Jackeline Jaime APRN   Jardiance 10 MG tablet tablet TAKE 1 TABLET BY MOUTH DAILY 12/3/24   Jackeline Jaime APRN   metFORMIN (GLUCOPHAGE) 500 MG tablet TAKE 1 TABLET BY MOUTH TWICE DAILY WITH MEALS 9/24/24   Jackeline Jaime APRN   omeprazole (priLOSEC) 20 MG capsule TAKE 1 CAPSULE BY MOUTH DAILY 10/30/24   Jackeline Jaime APRN   Tresiba FlexTouch 100 UNIT/ML solution pen-injector injection ADMINISTER 20 UNITS UNDER THE SKIN DAILY AS DIRECTED  Patient taking differently: Inject 20 Units under the skin into the appropriate area as directed Daily. 8/1/24   Jackeline Jaime APRN        Social History:   Social History     Tobacco Use    Smoking status: Former     Current packs/day: 0.00     Average packs/day: 1.5 packs/day for 56.2 years (84.3 ttl pk-yrs)     Types: Cigarettes     Start date: 1965     Quit date: 3/19/2021     Years since quitting: 3.9     Passive exposure: Past    Smokeless tobacco: Current     Types: Chew   Vaping Use    Vaping status: Never Used   Substance Use Topics    Alcohol use: Never    Drug use: Never         Review of Systems:  Review of Systems   Constitutional:  Negative for chills and fever.   HENT:  Negative for congestion, ear pain and sore throat.    Eyes:  Negative for pain.   Respiratory:  Positive for cough and shortness of breath. Negative for chest tightness.    Cardiovascular:  Negative for chest pain.   Gastrointestinal:  Negative for abdominal pain, diarrhea, nausea and vomiting.   Genitourinary:  Negative for flank pain and hematuria.   Musculoskeletal:   "Negative for joint swelling.   Skin:  Positive for wound. Negative for pallor.        Ecchymosis right upper extremity   Neurological:  Negative for seizures and headaches.   All other systems reviewed and are negative.       Physical Exam:  /66 (BP Location: Left arm, Patient Position: Lying)   Pulse 70   Temp 98 °F (36.7 °C) (Oral)   Resp 18   Ht 185.4 cm (73\")   Wt 117 kg (257 lb)   SpO2 91%   BMI 33.91 kg/m²     Physical Exam  Vitals and nursing note reviewed.   Constitutional:       General: He is not in acute distress.     Appearance: Normal appearance. He is not toxic-appearing.   HENT:      Head: Normocephalic and atraumatic.      Jaw: There is normal jaw occlusion.   Eyes:      General: Lids are normal.      Extraocular Movements: Extraocular movements intact.      Conjunctiva/sclera: Conjunctivae normal.      Pupils: Pupils are equal, round, and reactive to light.   Cardiovascular:      Rate and Rhythm: Normal rate and regular rhythm.      Pulses: Normal pulses.      Heart sounds: Normal heart sounds.   Pulmonary:      Effort: Pulmonary effort is normal. No respiratory distress.      Breath sounds: Examination of the right-upper field reveals wheezing. Examination of the left-upper field reveals wheezing. Examination of the right-middle field reveals wheezing. Examination of the left-middle field reveals wheezing. Examination of the right-lower field reveals wheezing. Examination of the left-lower field reveals wheezing. Wheezing present. No rhonchi.   Abdominal:      General: Abdomen is flat.      Palpations: Abdomen is soft.      Tenderness: There is no abdominal tenderness. There is no guarding or rebound.   Musculoskeletal:         General: Normal range of motion.      Cervical back: Normal range of motion and neck supple.      Right lower leg: No edema.      Left lower leg: No edema.   Skin:     General: Skin is warm and dry.      Comments: Small area of subacute ecchymosis over the right " distal bicep.   Neurological:      Mental Status: He is alert and oriented to person, place, and time. Mental status is at baseline.   Psychiatric:         Mood and Affect: Mood normal.              Medical Decision Making:      Comorbidities that affect care:    COPD, hypertension, former smoker, home O2, diabetes, asthma    External Notes reviewed:    Previous Clinic Note: Outpatient pulmonology visit 2/4/2025      The following orders were placed and all results were independently analyzed by me:  Orders Placed This Encounter   Procedures    XR Chest 1 View    [place] Draw    Comprehensive Metabolic Panel    BNP    High Sensitivity Troponin T    CBC Auto Differential    High Sensitivity Troponin T 1Hr    Undress & Gown    Continuous Pulse Oximetry    Vital Signs    ECG 12 Lead ED Triage Standing Order; SOA    CBC & Differential    Green Top (Gel)    Lavender Top    Gold Top - SST    Light Blue Top       Medications Given in the Emergency Department:  Medications   ipratropium-albuterol (DUO-NEB) nebulizer solution 3 mL (3 mL Nebulization Given 2/14/25 0138)   methylPREDNISolone sodium succinate (SOLU-Medrol) injection 125 mg (125 mg Intravenous Given 2/14/25 0125)        ED Course:    ED Course as of 02/14/25 0653   Fri Feb 14, 2025 0225 Patient symptomatically proved with treatment in the Emergency Department.  He is comfortable plan for discharge home.  We discussed return precautions including worsening symptoms or any additional concerns. [JS]      ED Course User Index  [JS] Roel Saldivar MD       Labs:    Lab Results (last 24 hours)       Procedure Component Value Units Date/Time    CBC & Differential [[id number]]  (Abnormal) Collected: 02/13/25 2157    Specimen: Blood Updated: 02/13/25 2202    Narrative:      The following orders were created for panel order CBC & Differential.  Procedure                               Abnormality         Status                     ---------                                -----------         ------                     CBC Auto Differential[[id number]]        Abnormal            Final result                 Please view results for these tests on the individual orders.    Comprehensive Metabolic Panel [[id number]]  (Abnormal) Collected: 02/13/25 2157    Specimen: Blood Updated: 02/13/25 2228     Glucose 128 mg/dL      BUN 17 mg/dL      Creatinine 1.34 mg/dL      Sodium 140 mmol/L      Potassium 4.4 mmol/L      Chloride 105 mmol/L      CO2 26.1 mmol/L      Calcium 8.6 mg/dL      Total Protein 6.7 g/dL      Albumin 3.6 g/dL      ALT (SGPT) 12 U/L      AST (SGOT) 15 U/L      Alkaline Phosphatase 105 U/L      Total Bilirubin 0.2 mg/dL      Globulin 3.1 gm/dL      A/G Ratio 1.2 g/dL      BUN/Creatinine Ratio 12.7     Anion Gap 8.9 mmol/L      eGFR 55.2 mL/min/1.73     Narrative:      GFR Categories in Chronic Kidney Disease (CKD)      GFR Category          GFR (mL/min/1.73)    Interpretation  G1                     90 or greater         Normal or high (1)  G2                      60-89                Mild decrease (1)  G3a                   45-59                Mild to moderate decrease  G3b                   30-44                Moderate to severe decrease  G4                    15-29                Severe decrease  G5                    14 or less           Kidney failure          (1)In the absence of evidence of kidney disease, neither GFR category G1 or G2 fulfill the criteria for CKD.    eGFR calculation 2021 CKD-EPI creatinine equation, which does not include race as a factor    BNP [[id number]]  (Normal) Collected: 02/13/25 2157    Specimen: Blood Updated: 02/13/25 2226     proBNP 185.2 pg/mL     Narrative:      This assay is used as an aid in the diagnosis of individuals suspected of having heart failure. It can be used as an aid in the diagnosis of acute decompensated heart failure (ADHF) in patients presenting with signs and symptoms of ADHF to the emergency department (ED). In  addition, NT-proBNP of <300 pg/mL indicates ADHF is not likely.    Age Range Result Interpretation  NT-proBNP Concentration (pg/mL:      <50             Positive            >450                   Gray                 300-450                    Negative             <300    50-75           Positive            >900                  Gray                300-900                  Negative            <300      >75             Positive            >1800                  Gray                300-1800                  Negative            <300    High Sensitivity Troponin T [[id number]]  (Abnormal) Collected: 02/13/25 2157    Specimen: Blood Updated: 02/13/25 2228     HS Troponin T 40 ng/L     Narrative:      High Sensitive Troponin T Reference Range:  <14.0 ng/L- Negative Female for AMI  <22.0 ng/L- Negative Male for AMI  >=14 - Abnormal Female indicating possible myocardial injury.  >=22 - Abnormal Male indicating possible myocardial injury.   Clinicians would have to utilize clinical acumen, EKG, Troponin, and serial changes to determine if it is an Acute Myocardial Infarction or myocardial injury due to an underlying chronic condition.         CBC Auto Differential [[id number]]  (Abnormal) Collected: 02/13/25 2157    Specimen: Blood Updated: 02/13/25 2202     WBC 10.19 10*3/mm3      RBC 5.18 10*6/mm3      Hemoglobin 12.2 g/dL      Hematocrit 43.1 %      MCV 83.2 fL      MCH 23.6 pg      MCHC 28.3 g/dL      RDW 16.2 %      RDW-SD 48.8 fl      MPV 10.2 fL      Platelets 188 10*3/mm3      Neutrophil % 59.4 %      Lymphocyte % 18.6 %      Monocyte % 8.6 %      Eosinophil % 12.5 %      Basophil % 0.6 %      Immature Grans % 0.3 %      Neutrophils, Absolute 6.05 10*3/mm3      Lymphocytes, Absolute 1.90 10*3/mm3      Monocytes, Absolute 0.88 10*3/mm3      Eosinophils, Absolute 1.27 10*3/mm3      Basophils, Absolute 0.06 10*3/mm3      Immature Grans, Absolute 0.03 10*3/mm3      nRBC 0.0 /100 WBC     High Sensitivity Troponin T 1Hr  [[id number]]  (Abnormal) Collected: 02/13/25 2255    Specimen: Blood Updated: 02/13/25 2316     HS Troponin T 31 ng/L      Troponin T Numeric Delta -9 ng/L      Troponin T % Delta -23    Narrative:      High Sensitive Troponin T Reference Range:  <14.0 ng/L- Negative Female for AMI  <22.0 ng/L- Negative Male for AMI  >=14 - Abnormal Female indicating possible myocardial injury.  >=22 - Abnormal Male indicating possible myocardial injury.   Clinicians would have to utilize clinical acumen, EKG, Troponin, and serial changes to determine if it is an Acute Myocardial Infarction or myocardial injury due to an underlying chronic condition.                  Imaging:    XR Chest 1 View    Result Date: 2/13/2025  XR CHEST 1 VW Date of Exam: 2/13/2025 9:43 PM EST Indication: SOA Triage Protocol Comparison: 11/6/2024 Findings: Cardiomediastinal silhouette is unremarkable. Similar left basilar airspace disease and left effusion. Right lung is clear. No pneumothorax. No acute osseous abnormality identified.     Impression: No significant change Electronically Signed: Brigido Matias MD  2/13/2025 9:58 PM EST  Workstation ID: [id number]       Differential Diagnosis and Discussion:    Dyspnea: Differential diagnosis includes but is not limited to metabolic acidosis, neurological disorders, psychogenic, asthma, pneumothorax, upper airway obstruction, COPD, pneumonia, noncardiogenic pulmonary edema, interstitial lung disease, anemia, congestive heart failure, and pulmonary embolism    PROCEDURES:    Labs were collected in the emergency department and all labs were reviewed and interpreted by me.  X-ray were performed in the emergency department and all X-ray impressions were independently interpreted by me.  An EKG was performed and the EKG was interpreted by me.    ECG 12 Lead ED Triage Standing Order; SOA   Preliminary Result   HEART RATE=73  bpm   RR Ukepkwvq=253  ms   UT Fpbodvde=058  ms   P Horizontal Axis=-41  deg   P Front Axis=29   deg   QRSD Interval=91  ms   QT [place]=169  ms   NFqR=879  ms   QRS Axis=9  deg   T Wave Axis=31  deg   - OTHERWISE NORMAL ECG -   Sinus rhythm   Low voltage, precordial leads   Minimal ST elevation, lateral leads   Date and Time of Study:2025-02-13 21:33:21          Procedures    MDM                   Patient Care Considerations:    ANTIBIOTICS: I considered prescribing antibiotics as an outpatient however no bacterial focus of infection was found.      Consultants/Shared Management Plan:    None    Social Determinants of Health:    Patient is independent, reliable, and has access to care.       Disposition and Care Coordination:    Discharged: The patient is suitable and stable for discharge with no need for consideration of admission.    I have explained the patient´s condition, diagnoses and treatment plan based on the information available to me at this time. I have answered questions and addressed any concerns. The patient has a good  understanding of the patient´s diagnosis, condition, and treatment plan as can be expected at this point. The vital signs have been stable. The patient´s condition is stable and appropriate for discharge from the emergency department.      The patient will pursue further outpatient evaluation with the primary care physician or other designated or consulting physician as outlined in the discharge instructions. They are agreeable to this plan of care and follow-up instructions have been explained in detail. The patient has received these instructions in written format and has expressed an understanding of the discharge instructions. The patient is aware that any significant change in condition or worsening of symptoms should prompt an immediate return to this or the closest emergency department or call to 911.  I have explained discharge medications and the need for follow up with the patient/caretakers. This was also printed in the discharge instructions. Patient was discharged  with the following medications and follow up:      Medication List        New Prescriptions      predniSONE 50 MG tablet  Commonly known as: DELTASONE  Take 1 tablet by mouth Daily.            Changed      Tresiba FlexTouch 100 UNIT/ML solution pen-injector injection  Generic drug: insulin degludec  ADMINISTER 20 UNITS UNDER THE SKIN DAILY AS DIRECTED  What changed: See the new instructions.               Where to Get Your Medications        These medications [place] [facility] [place] [address] - [phone]  - [phone] FX  [address], [facility] [address]      Phone: [phone]   predniSONE 50 MG tablet      Jackeline Jaime, APRN  [address] [address]  [phone]    Schedule an appointment as soon as possible for a visit          Final diagnoses:   Acute exacerbation of chronic obstructive pulmonary disease (COPD)        ED Disposition       ED Disposition   Discharge    Condition   Stable    Comment   --               This medical record created using voice recognition software.             Roel Saldivar MD  02/14/25 0685

## 2025-02-14 NOTE — ED NOTES
EMS states pt c/o Sob starting today. Pt wears 2L NC baseline at home. Pt met EMS outside with no O2. Pt states he has a bruise on right inner arm that he would like checked out because it was swollen and painful but does not remember where it came from

## 2025-02-15 LAB
QT INTERVAL: 408 MS
QTC INTERVAL: 449 MS

## 2025-03-06 DIAGNOSIS — E11.65 TYPE 2 DIABETES MELLITUS WITH HYPERGLYCEMIA, WITH LONG-TERM CURRENT USE OF INSULIN: ICD-10-CM

## 2025-03-06 DIAGNOSIS — Z79.4 TYPE 2 DIABETES MELLITUS WITH HYPERGLYCEMIA, WITH LONG-TERM CURRENT USE OF INSULIN: ICD-10-CM

## 2025-03-06 RX ORDER — EMPAGLIFLOZIN 10 MG/1
10 TABLET, FILM COATED ORAL DAILY
Qty: 90 TABLET | Refills: 0 | Status: SHIPPED | OUTPATIENT
Start: 2025-03-06

## 2025-03-25 ENCOUNTER — OFFICE VISIT (OUTPATIENT)
Dept: FAMILY MEDICINE CLINIC | Facility: CLINIC | Age: 75
End: 2025-03-25
Payer: MEDICARE

## 2025-03-25 ENCOUNTER — LAB (OUTPATIENT)
Dept: LAB | Facility: HOSPITAL | Age: 75
End: 2025-03-25
Payer: MEDICARE

## 2025-03-25 VITALS
HEIGHT: 73 IN | OXYGEN SATURATION: 91 % | WEIGHT: 253 LBS | DIASTOLIC BLOOD PRESSURE: 67 MMHG | BODY MASS INDEX: 33.53 KG/M2 | SYSTOLIC BLOOD PRESSURE: 112 MMHG | HEART RATE: 83 BPM

## 2025-03-25 DIAGNOSIS — Z00.00 MEDICARE ANNUAL WELLNESS VISIT, SUBSEQUENT: Primary | ICD-10-CM

## 2025-03-25 DIAGNOSIS — J44.9 CHRONIC OBSTRUCTIVE PULMONARY DISEASE, UNSPECIFIED COPD TYPE: ICD-10-CM

## 2025-03-25 DIAGNOSIS — E11.65 TYPE 2 DIABETES MELLITUS WITH HYPERGLYCEMIA, WITH LONG-TERM CURRENT USE OF INSULIN: ICD-10-CM

## 2025-03-25 DIAGNOSIS — Z79.4 TYPE 2 DIABETES MELLITUS WITH HYPERGLYCEMIA, WITH LONG-TERM CURRENT USE OF INSULIN: ICD-10-CM

## 2025-03-25 DIAGNOSIS — Z00.00 MEDICARE ANNUAL WELLNESS VISIT, SUBSEQUENT: ICD-10-CM

## 2025-03-25 DIAGNOSIS — K21.9 GASTROESOPHAGEAL REFLUX DISEASE WITHOUT ESOPHAGITIS: ICD-10-CM

## 2025-03-25 LAB
ALBUMIN SERPL-MCNC: 3.7 G/DL (ref 3.5–5.2)
ALBUMIN UR-MCNC: 1.3 MG/DL
ALBUMIN/GLOB SERPL: 1.3 G/DL
ALP SERPL-CCNC: 99 U/L (ref 39–117)
ALT SERPL W P-5'-P-CCNC: 11 U/L (ref 1–41)
ANION GAP SERPL CALCULATED.3IONS-SCNC: 11 MMOL/L (ref 5–15)
AST SERPL-CCNC: 15 U/L (ref 1–40)
BASOPHILS # BLD AUTO: 0.08 10*3/MM3 (ref 0–0.2)
BASOPHILS NFR BLD AUTO: 0.7 % (ref 0–1.5)
BILIRUB SERPL-MCNC: 0.3 MG/DL (ref 0–1.2)
BUN SERPL-MCNC: 22 MG/DL (ref 8–23)
BUN/CREAT SERPL: 19.6 (ref 7–25)
CALCIUM SPEC-SCNC: 8.6 MG/DL (ref 8.6–10.5)
CHLORIDE SERPL-SCNC: 103 MMOL/L (ref 98–107)
CHOLEST SERPL-MCNC: 63 MG/DL (ref 0–200)
CO2 SERPL-SCNC: 26 MMOL/L (ref 22–29)
CREAT SERPL-MCNC: 1.12 MG/DL (ref 0.76–1.27)
CREAT UR-MCNC: 100.2 MG/DL
DEPRECATED RDW RBC AUTO: 42.5 FL (ref 37–54)
EGFRCR SERPLBLD CKD-EPI 2021: 68.5 ML/MIN/1.73
EOSINOPHIL # BLD AUTO: 1.37 10*3/MM3 (ref 0–0.4)
EOSINOPHIL NFR BLD AUTO: 12.1 % (ref 0.3–6.2)
ERYTHROCYTE [DISTWIDTH] IN BLOOD BY AUTOMATED COUNT: 14.8 % (ref 12.3–15.4)
EXPIRATION DATE: ABNORMAL
GLOBULIN UR ELPH-MCNC: 2.8 GM/DL
GLUCOSE SERPL-MCNC: 86 MG/DL (ref 65–99)
HBA1C MFR BLD: 7.4 % (ref 4.5–5.7)
HCT VFR BLD AUTO: 41.4 % (ref 37.5–51)
HDLC SERPL-MCNC: 36 MG/DL (ref 40–60)
HGB BLD-MCNC: 12.6 G/DL (ref 13–17.7)
IMM GRANULOCYTES # BLD AUTO: 0.05 10*3/MM3 (ref 0–0.05)
IMM GRANULOCYTES NFR BLD AUTO: 0.4 % (ref 0–0.5)
LDLC SERPL CALC-MCNC: 13 MG/DL (ref 0–100)
LDLC/HDLC SERPL: 0.44 {RATIO}
LYMPHOCYTES # BLD AUTO: 1.48 10*3/MM3 (ref 0.7–3.1)
LYMPHOCYTES NFR BLD AUTO: 13 % (ref 19.6–45.3)
Lab: ABNORMAL
MCH RBC QN AUTO: 24.1 PG (ref 26.6–33)
MCHC RBC AUTO-ENTMCNC: 30.4 G/DL (ref 31.5–35.7)
MCV RBC AUTO: 79.2 FL (ref 79–97)
MICROALBUMIN/CREAT UR: 13 MG/G (ref 0–29)
MONOCYTES # BLD AUTO: 0.91 10*3/MM3 (ref 0.1–0.9)
MONOCYTES NFR BLD AUTO: 8 % (ref 5–12)
NEUTROPHILS NFR BLD AUTO: 65.8 % (ref 42.7–76)
NEUTROPHILS NFR BLD AUTO: 7.47 10*3/MM3 (ref 1.7–7)
NRBC BLD AUTO-RTO: 0 /100 WBC (ref 0–0.2)
PLATELET # BLD AUTO: 171 10*3/MM3 (ref 140–450)
PMV BLD AUTO: 10.9 FL (ref 6–12)
POTASSIUM SERPL-SCNC: 4.4 MMOL/L (ref 3.5–5.2)
PROT SERPL-MCNC: 6.5 G/DL (ref 6–8.5)
RBC # BLD AUTO: 5.23 10*6/MM3 (ref 4.14–5.8)
SODIUM SERPL-SCNC: 140 MMOL/L (ref 136–145)
T4 FREE SERPL-MCNC: 0.86 NG/DL (ref 0.92–1.68)
TRIGL SERPL-MCNC: 56 MG/DL (ref 0–150)
TSH SERPL DL<=0.05 MIU/L-ACNC: 3.92 UIU/ML (ref 0.27–4.2)
VLDLC SERPL-MCNC: 14 MG/DL (ref 5–40)
WBC NRBC COR # BLD AUTO: 11.36 10*3/MM3 (ref 3.4–10.8)

## 2025-03-25 PROCEDURE — 82043 UR ALBUMIN QUANTITATIVE: CPT

## 2025-03-25 PROCEDURE — 80053 COMPREHEN METABOLIC PANEL: CPT

## 2025-03-25 PROCEDURE — 80061 LIPID PANEL: CPT

## 2025-03-25 PROCEDURE — 36415 COLL VENOUS BLD VENIPUNCTURE: CPT

## 2025-03-25 PROCEDURE — 84439 ASSAY OF FREE THYROXINE: CPT

## 2025-03-25 PROCEDURE — 85025 COMPLETE CBC W/AUTO DIFF WBC: CPT

## 2025-03-25 PROCEDURE — 82570 ASSAY OF URINE CREATININE: CPT

## 2025-03-25 PROCEDURE — 84443 ASSAY THYROID STIM HORMONE: CPT

## 2025-03-25 RX ORDER — OMEPRAZOLE 20 MG/1
20 CAPSULE, DELAYED RELEASE ORAL DAILY
Qty: 90 CAPSULE | Refills: 1 | Status: SHIPPED | OUTPATIENT
Start: 2025-03-25

## 2025-03-25 RX ORDER — INSULIN DEGLUDEC 100 U/ML
20 INJECTION, SOLUTION SUBCUTANEOUS DAILY
Qty: 18 ML | Refills: 0 | Status: SHIPPED | OUTPATIENT
Start: 2025-03-25 | End: 2025-06-23

## 2025-03-25 RX ORDER — BLOOD SUGAR DIAGNOSTIC
1 STRIP MISCELLANEOUS DAILY
Qty: 100 EACH | Refills: 3 | Status: SHIPPED | OUTPATIENT
Start: 2025-03-25

## 2025-03-25 NOTE — PROGRESS NOTES
Subjective   The ABCs of the Annual Wellness Visit  Medicare Wellness Visit      Dilip Faulkner is a 74 y.o. patient who presents for a Medicare Wellness Visit.    The following portions of the patient's history were reviewed and   updated as appropriate: He  has a past medical history of Asthma, COPD (chronic obstructive pulmonary disease), Diabetes mellitus, Ex-smoker, Hernia, umbilical, Hypertension, and On home O2.  He does not have any pertinent problems on file.  He  has a past surgical history that includes Abdominal surgery.  His family history includes Asthma in his brother, father, and sister; Cancer in his maternal aunt.  He  reports that he quit smoking about 4 years ago. His smoking use included cigarettes. He started smoking about 60 years ago. He has a 84.3 pack-year smoking history. He has been exposed to tobacco smoke. His smokeless tobacco use includes chew. He reports that he does not drink alcohol and does not use drugs.  Current Outpatient Medications   Medication Sig Dispense Refill    albuterol (PROVENTIL) (2.5 MG/3ML) 0.083% nebulizer solution Take 2.5 mg by nebulization 4 (Four) Times a Day As Needed for Wheezing. 1 each 5    albuterol sulfate  (90 Base) MCG/ACT inhaler Inhale 2 puffs Every 4 (Four) Hours As Needed for Wheezing. 18 g 11    Albuterol-Budesonide (Airsupra) 90-80 MCG/ACT aerosol Inhale 2 puffs Every 4 (Four) Hours As Needed (wheezing, soa). 10.7 g 1    aspirin 81 MG chewable tablet CHEW AND SWALLOW 1 TABLET BY MOUTH DAILY 90 tablet 1    atorvastatin (LIPITOR) 10 MG tablet TAKE 1 TABLET BY MOUTH DAILY 90 tablet 1    Fluticasone-Umeclidin-Vilant (TRELEGY ELLIPTA) 200-62.5-25 MCG/ACT inhaler Inhale 1 puff Daily. 1 each 11    insulin degludec (Tresiba FlexTouch) 100 UNIT/ML solution pen-injector injection Inject 20 Units under the skin into the appropriate area as directed Daily for 90 days. 18 mL 0    Insulin Pen Needle (Pen Needles) 32G X 5 MM misc Use 1 each Daily. 100  each 3    Jardiance 10 MG tablet tablet TAKE 1 TABLET BY MOUTH DAILY 90 tablet 0    metFORMIN (GLUCOPHAGE) 500 MG tablet TAKE 1 TABLET BY MOUTH TWICE DAILY WITH MEALS 180 tablet 1    omeprazole (priLOSEC) 20 MG capsule Take 1 capsule by mouth Daily. 90 capsule 1     No current facility-administered medications for this visit.     Current Outpatient Medications on File Prior to Visit   Medication Sig    albuterol (PROVENTIL) (2.5 MG/3ML) 0.083% nebulizer solution Take 2.5 mg by nebulization 4 (Four) Times a Day As Needed for Wheezing.    albuterol sulfate  (90 Base) MCG/ACT inhaler Inhale 2 puffs Every 4 (Four) Hours As Needed for Wheezing.    Albuterol-Budesonide (Airsupra) 90-80 MCG/ACT aerosol Inhale 2 puffs Every 4 (Four) Hours As Needed (wheezing, soa).    aspirin 81 MG chewable tablet CHEW AND SWALLOW 1 TABLET BY MOUTH DAILY    atorvastatin (LIPITOR) 10 MG tablet TAKE 1 TABLET BY MOUTH DAILY    Fluticasone-Umeclidin-Vilant (TRELEGY ELLIPTA) 200-62.5-25 MCG/ACT inhaler Inhale 1 puff Daily.    Jardiance 10 MG tablet tablet TAKE 1 TABLET BY MOUTH DAILY    metFORMIN (GLUCOPHAGE) 500 MG tablet TAKE 1 TABLET BY MOUTH TWICE DAILY WITH MEALS    [DISCONTINUED] predniSONE (DELTASONE) 50 MG tablet Take 1 tablet by mouth Daily. (Patient not taking: Reported on 3/25/2025)     No current facility-administered medications on file prior to visit.     He has no known allergies..    Compared to one year ago, the patient's physical   health is the same.  Compared to one year ago, the patient's mental   health is the same.    Recent Hospitalizations:  This patient has had a [facility] admission record on file within the last 365 days.  Current Medical Providers:  Patient Care Team:  Jackeline Jaime APRN as PCP - General (Nurse Practitioner)  Rosalba Edwards APRN (Nurse Practitioner)  Chandu Vail MD as Consulting Physician (Pulmonary Disease)  Lorena Campbell APRN as Consulting Physician  (Cardiology)  Nelly Jaime APRN as Nurse Practitioner (Pulmonary Disease)  Vivien Jacobs APRN as Nurse Practitioner (Pulmonary Disease)    Outpatient Medications Prior to Visit   Medication Sig Dispense Refill    albuterol (PROVENTIL) (2.5 MG/3ML) 0.083% nebulizer solution Take 2.5 mg by nebulization 4 (Four) Times a Day As Needed for Wheezing. 1 each 5    albuterol sulfate  (90 Base) MCG/ACT inhaler Inhale 2 puffs Every 4 (Four) Hours As Needed for Wheezing. 18 g 11    Albuterol-Budesonide (Airsupra) 90-80 MCG/ACT aerosol Inhale 2 puffs Every 4 (Four) Hours As Needed (wheezing, soa). 10.7 g 1    aspirin 81 MG chewable tablet CHEW AND SWALLOW 1 TABLET BY MOUTH DAILY 90 tablet 1    atorvastatin (LIPITOR) 10 MG tablet TAKE 1 TABLET BY MOUTH DAILY 90 tablet 1    Fluticasone-Umeclidin-Vilant (TRELEGY ELLIPTA) 200-62.5-25 MCG/ACT inhaler Inhale 1 puff Daily. 1 each 11    Jardiance 10 MG tablet tablet TAKE 1 TABLET BY MOUTH DAILY 90 tablet 0    metFORMIN (GLUCOPHAGE) 500 MG tablet TAKE 1 TABLET BY MOUTH TWICE DAILY WITH MEALS 180 tablet 1    Insulin Pen Needle (Pen Needles) 32G X 5 MM misc Use 1 each Daily. 100 each 3    omeprazole (priLOSEC) 20 MG capsule TAKE 1 CAPSULE BY MOUTH DAILY 90 capsule 1    Tresiba FlexTouch 100 UNIT/ML solution pen-injector injection ADMINISTER 20 UNITS UNDER THE SKIN DAILY AS DIRECTED (Patient taking differently: Inject 20 Units under the skin into the appropriate area as directed Daily.) 6 mL 2    predniSONE (DELTASONE) 50 MG tablet Take 1 tablet by mouth Daily. (Patient not taking: Reported on 3/25/2025) 4 tablet 0     No facility-administered medications prior to visit.     No opioid medication identified on active medication list. I have reviewed chart for other potential  high risk medication/s and harmful drug interactions in the elderly.      Aspirin is on active medication list. Aspirin use is indicated based on review of current medical condition/s. Pros and cons of this  "therapy have been discussed today. Benefits of this medication outweigh potential harm.  Patient has been encouraged to continue taking this medication.  .      Patient Active Problem List   Diagnosis    Chronic obstructive pulmonary disease with acute exacerbation    Type 2 diabetes mellitus    Essential hypertension    Hyperlipidemia    Cough    Pneumonia of left lower lobe due to infectious organism    COPD with acute exacerbation    Obesity (BMI 30-39.9)    Right bundle branch block    Acquired ptosis of eyelid, bilateral    Type 2 diabetes mellitus with hyperglycemia, with long-term current use of insulin    Acute exacerbation of chronic obstructive pulmonary disease (COPD)    Acute respiratory failure with hypoxia    Respiratory failure with hypoxia and hypercapnia    Gastroesophageal reflux disease without esophagitis    COPD (chronic obstructive pulmonary disease)    Noncompliance with CPAP treatment    Unstable angina    Aspiration pneumonia of left lower lobe due to gastric secretions    PSVT (paroxysmal supraventricular tachycardia)    COPD exacerbation    Acute on chronic respiratory failure with hypoxia     Advance Care Planning Advance Directive is on file.  ACP discussion was held with the patient during this visit. Patient has an advance directive in EMR which is still valid.             Objective   Vitals:    03/25/25 1353   BP: 112/67   BP Location: Left arm   Patient Position: Sitting   Cuff Size: Large Adult   Pulse: 83   SpO2: 91%   Weight: 115 kg (253 lb)   Height: 185.4 cm (73\")   PainSc: 0-No pain       Estimated body mass index is 33.38 kg/m² as calculated from the following:    Height as of this encounter: 185.4 cm (73\").    Weight as of this encounter: 115 kg (253 lb).                Does the patient have evidence of cognitive impairment? No  Lab Results   Component Value Date    TRIG 56 03/25/2025    HDL 36 (L) 03/25/2025    LDL 13 03/25/2025    VLDL 14 03/25/2025    HGBA1C 7.4 (A) " 2025                                                                                                Health  Risk Assessment    Smoking Status:  Social History     Tobacco Use   Smoking Status Former    Current packs/day: 0.00    Average packs/day: 1.5 packs/day for 56.2 years (84.3 ttl pk-yrs)    Types: Cigarettes    Start date:     Quit date: 3/19/2021    Years since quittin.0    Passive exposure: Past   Smokeless Tobacco Current    Types: Chew     Alcohol Consumption:  Social History     Substance and Sexual Activity   Alcohol Use Never       Fall Risk Screen  STEADI Fall Risk Assessment was completed, and patient is at HIGH risk for falls. Assessment completed on:3/25/2025    Depression Screening   Little interest or pleasure in doing things? Not at all   Feeling down, depressed, or hopeless? Not at all   PHQ-2 Total Score 0      Health Habits and Functional and Cognitive Screening:      3/25/2025     1:00 PM   Functional & Cognitive Status   Do you have difficulty preparing food and eating? No   Do you have difficulty bathing yourself, getting dressed or grooming yourself? No   Do you have difficulty using the toilet? No   Do you have difficulty moving around from place to place? No   Do you have trouble with steps or getting out of a bed or a chair? Yes   Current Diet Unhealthy Diet   Dental Exam Up to date   Eye Exam Not up to date   Exercise (times per week) 0 times per week   Current Exercises Include Other   Do you need help using the phone?  No   Are you deaf or do you have serious difficulty hearing?  No   Do you need help to go to places out of walking distance? Yes   Do you need help shopping? No   Do you need help preparing meals?  No   Do you need help with housework?  No   Do you need help with laundry? No   Do you need help taking your medications? No   Do you need help managing money? No   Do you ever drive or ride in a car without wearing a seat belt? No   Have you felt unusual  stress, anger or loneliness in the last month? No   Who do you live with? Sibling   If you need help, do you have trouble finding someone available to you? No   Have you been bothered in the last four weeks by sexual problems? No   Do you have difficulty concentrating, remembering or making decisions? No           Age-appropriate Screening Schedule:  Refer to the list below for future screening recommendations based on patient's age, sex and/or medical conditions. Orders for these recommended tests are listed in the plan section. The patient has been provided with a written plan.    Health Maintenance List  Health Maintenance   Topic Date Due    Pneumococcal Vaccine 50+ (1 of 2 - PCV) Never done    ANNUAL WELLNESS VISIT  02/15/2025    LUNG CANCER SCREENING  03/25/2025    COVID-19 Vaccine (1 - 2024-25 season) 04/26/2025 (Originally 9/1/2024)    DIABETIC EYE EXAM  05/13/2025 (Originally 4/27/1960)    TDAP/TD VACCINES (1 - Tdap) 08/20/2025 (Originally [birth year]/27/1968)    COLORECTAL CANCER SCREENING  12/25/2025 (Originally 4/27/1994)    RSV Vaccine - Adults (1 - 1-dose 75+ series) 03/06/2026 (Originally 4/27/2024)    ZOSTER VACCINE (1 of 2) 03/06/2026 (Originally 4/27/1999)    HEMOGLOBIN A1C  09/25/2025    LIPID PANEL  03/25/2026    URINE MICROALBUMIN-CREATININE RATIO (uACR)  03/25/2026    HEPATITIS C SCREENING  Completed    INFLUENZA VACCINE  Completed    AAA SCREEN ONCE  Completed                                                                                                                                                CMS Preventative Services Quick Reference  Risk Factors Identified During Encounter  Fall Risk-High or Moderate: Discussed Fall Prevention in the home    The above risks/problems have been discussed with the patient.  Pertinent information has been shared with the patient in the After Visit Summary.  An After Visit Summary and PPPS were made available to the patient.    Follow Up:   Next Medicare Wellness  "visit to be scheduled in 1 year.         Additional E&M Note during same encounter follows:  Patient has additional, significant, and separately identifiable condition(s)/problem(s) that require work above and beyond the Medicare Wellness Visit     Chief Complaint  Medicare Wellness-subsequent    Subjective   HPI  Dilip is also being seen today for additional medical problem/s.    Diabetes- he is due updated A1c, to be done in office. He is on Jardiance, metformin, Tresiba 20 units. He has been drinking full sugar sodas and not watching his carb intake per his sister who is with him today.     He is seeing pulmonology for his COPD management. He feels inhalers are working well for him. He has a follow up next week.     Review of Systems   Respiratory:  Positive for cough.    All other systems reviewed and are negative.             Objective   Vital Signs:  /67 (BP Location: Left arm, Patient Position: Sitting, Cuff Size: Large Adult)   Pulse 83   Ht 185.4 cm (73\")   Wt 115 kg (253 lb)   SpO2 91%   BMI 33.38 kg/m²   Physical Exam  Vitals reviewed.   Constitutional:       General: He is not in acute distress.     Appearance: He is obese. He is not ill-appearing.   HENT:      Head: Normocephalic and atraumatic.   Eyes:      Conjunctiva/sclera: Conjunctivae normal.   Cardiovascular:      Rate and Rhythm: Normal rate and regular rhythm.      Heart sounds: Normal heart sounds.   Pulmonary:      Effort: Pulmonary effort is normal.      Breath sounds: Wheezing present.   Musculoskeletal:      Cervical back: Normal range of motion.   Neurological:      Mental Status: He is alert and oriented to person, place, and time.   Psychiatric:         Mood and Affect: Mood normal.         Behavior: Behavior normal.         Thought Content: Thought content normal.         Judgment: Judgment normal.         The following data was reviewed by: MONE Sheppard on 03/25/2025:  Data reviewed : Consultant notes " pulmonology  Common labs          11/9/2024    05:00 2/13/2025    21:57 3/25/2025    14:54 3/25/2025    14:58 3/25/2025    14:59   Common Labs   Glucose  128  86      BUN  17  22      Creatinine  1.34  1.12      Sodium  140  140      Potassium  4.4  4.4      Chloride  105  103      Calcium  8.6  8.6      Albumin  3.6  3.7      Total Bilirubin  0.2  0.3      Alkaline Phosphatase  105  99      AST (SGOT)  15  15      ALT (SGPT)  12  11      WBC 12.44  10.19  11.36      Hemoglobin 10.7  12.2  12.6      Hematocrit 37.7  43.1  41.4      Platelets 154  188  171      Total Cholesterol   63      Triglycerides   56      HDL Cholesterol   36      LDL Cholesterol    13      Hemoglobin A1C     7.4    Microalbumin, Urine    1.3       A1C Last 3 Results          8/20/2024    09:56 3/25/2025    14:59   HGBA1C Last 3 Results   Hemoglobin A1C 7  7.4           Assessment and Plan Additional age appropriate preventative wellness advice topics were discussed during today's preventative wellness exam(some topics already addressed during AWV portion of the note above):    Physical Activity: Advised cardiovascular activity 150 minutes per week as tolerated. (example brisk walk for 30 minutes, 5 days a week).     Nutrition: Discussed nutrition plan with patient. Information shared in after visit summary. Goal is for a well balanced diet to enhance overall health.     Healthy Weight: Discussed current and goal BMI with patient. Steps to attain this goal discussed. Information shared in after visit summary.     Injury Prevention Discussion:  Information shared in after visit summary.           Medicare annual wellness visit, subsequent  care gaps addressed   Orders:    Microalbumin / Creatinine Urine Ratio - Urine, Clean Catch; Future    Comprehensive Metabolic Panel; Future    CBC & Differential; Future    Lipid Panel; Future    TSH+Free T4; Future    POC Glycosylated Hemoglobin (Hb A1C)    Type 2 diabetes mellitus with hyperglycemia, with  long-term current use of insulin    a1c slightly higher at 7.4, continue metformin, jardiance, and tresiba, work on stricter diet control   Orders:    Microalbumin / Creatinine Urine Ratio - Urine, Clean Catch; Future    POC Glycosylated Hemoglobin (Hb A1C)    insulin degludec (Tresiba FlexTouch) 100 UNIT/ML solution pen-injector injection; Inject 20 Units under the skin into the appropriate area as directed Daily for 90 days.    Insulin Pen Needle (Pen Needles) 32G X 5 MM misc; Use 1 each Daily.    Gastroesophageal reflux disease without esophagitis  stable on omeprazole 20 mg, continue, refilled   Orders:    omeprazole (priLOSEC) 20 MG capsule; Take 1 capsule by mouth Daily.    Chronic obstructive pulmonary disease, unspecified COPD type      continue to follow up with pulmonology for management             Follow Up   Return in about 6 months (around 9/25/2025) for Next scheduled follow up.  Patient was given instructions and counseling regarding his condition or for health maintenance advice. Please see specific information pulled into the AVS if appropriate.

## 2025-03-25 NOTE — ASSESSMENT & PLAN NOTE
Orders:    Microalbumin / Creatinine Urine Ratio - Urine, Clean Catch; Future    POC Glycosylated Hemoglobin (Hb A1C)    insulin degludec (Tresiba FlexTouch) 100 UNIT/ML solution pen-injector injection; Inject 20 Units under the skin into the appropriate area as directed Daily for 90 days.    Insulin Pen Needle (Pen Needles) 32G X 5 MM misc; Use 1 each Daily.

## 2025-03-25 NOTE — PROGRESS NOTES
" Subjective   The ABCs of the Annual Wellness Visit  Medicare Wellness Visit      Dilip Faulkner is a 74 y.o. patient who presents for a Medicare Wellness Visit.    The following portions of the patient's history were reviewed and   updated as appropriate: {history reviewed:20406::\"allergies\",\"current medications\",\"past family history\",\"past medical history\",\"past social history\",\"past surgical history\",\"problem list\"}.    Compared to one year ago, the patient's physical   health is {better worse same:93638}.  Compared to one year ago, the patient's mental   health is {better worse same:52552}.    Recent Hospitalizations:  This patient has had a [facility] admission record on file within the last 365 days.  Current Medical Providers:  Patient Care Team:  Jackeline Jaime APRN as PCP - General (Nurse Practitioner)  Rosalba Edwards APRN (Nurse Practitioner)  Chandu Vail MD as Consulting Physician (Pulmonary Disease)  Lorena Campbell APRN as Consulting Physician (Cardiology)  Nelly Jaime APRN as Nurse Practitioner (Pulmonary Disease)  Vivien Jacobs APRN as Nurse Practitioner (Pulmonary Disease)    Outpatient Medications Prior to Visit   Medication Sig Dispense Refill    albuterol (PROVENTIL) (2.5 MG/3ML) 0.083% nebulizer solution Take 2.5 mg by nebulization 4 (Four) Times a Day As Needed for Wheezing. 1 each 5    albuterol sulfate  (90 Base) MCG/ACT inhaler Inhale 2 puffs Every 4 (Four) Hours As Needed for Wheezing. 18 g 11    Albuterol-Budesonide (Airsupra) 90-80 MCG/ACT aerosol Inhale 2 puffs Every 4 (Four) Hours As Needed (wheezing, soa). 10.7 g 1    aspirin 81 MG chewable tablet CHEW AND SWALLOW 1 TABLET BY MOUTH DAILY 90 tablet 1    atorvastatin (LIPITOR) 10 MG tablet TAKE 1 TABLET BY MOUTH DAILY 90 tablet 1    Fluticasone-Umeclidin-Vilant (TRELEGY ELLIPTA) 200-62.5-25 MCG/ACT inhaler Inhale 1 puff Daily. 1 each 11    Jardiance 10 MG tablet tablet TAKE 1 TABLET BY MOUTH DAILY 90 " tablet 0    metFORMIN (GLUCOPHAGE) 500 MG tablet TAKE 1 TABLET BY MOUTH TWICE DAILY WITH MEALS 180 tablet 1    Insulin Pen Needle (Pen Needles) 32G X 5 MM misc Use 1 each Daily. 100 each 3    omeprazole (priLOSEC) 20 MG capsule TAKE 1 CAPSULE BY MOUTH DAILY 90 capsule 1    Tresiba FlexTouch 100 UNIT/ML solution pen-injector injection ADMINISTER 20 UNITS UNDER THE SKIN DAILY AS DIRECTED (Patient taking differently: Inject 20 Units under the skin into the appropriate area as directed Daily.) 6 mL 2    predniSONE (DELTASONE) 50 MG tablet Take 1 tablet by mouth Daily. (Patient not taking: Reported on 3/25/2025) 4 tablet 0     No facility-administered medications prior to visit.     No opioid medication identified on active medication list. I have reviewed chart for other potential  high risk medication/s and harmful drug interactions in the elderly.      Aspirin is on active medication list. {ASPIRIN ON MEDICATION LIST INDICATED/NOT INDICATED:36040}.      Patient Active Problem List   Diagnosis    Chronic obstructive pulmonary disease with acute exacerbation    Type 2 diabetes mellitus    Essential hypertension    Hyperlipidemia    Cough    Pneumonia of left lower lobe due to infectious organism    COPD with acute exacerbation    Obesity (BMI 30-39.9)    Right bundle branch block    Acquired ptosis of eyelid, bilateral    Type 2 diabetes mellitus with hyperglycemia, with long-term current use of insulin    Acute exacerbation of chronic obstructive pulmonary disease (COPD)    Acute respiratory failure with hypoxia    Respiratory failure with hypoxia and hypercapnia    Gastroesophageal reflux disease without esophagitis    COPD (chronic obstructive pulmonary disease)    Noncompliance with CPAP treatment    Unstable angina    Aspiration pneumonia of left lower lobe due to gastric secretions    PSVT (paroxysmal supraventricular tachycardia)    COPD exacerbation    Acute on chronic respiratory failure with hypoxia  "    Advance Care Planning {Advance Care Planning Hyperlink:23}Advance Directive is on file.  {ACP Discussion, Advance Directive in EMR:87818}            Objective   Vitals:    25 1353   BP: 112/67   BP Location: Left arm   Patient Position: Sitting   Cuff Size: Large Adult   Pulse: 83   SpO2: 91%   Weight: 115 kg (253 lb)   Height: 185.4 cm (73\")   PainSc: 0-No pain       Estimated body mass index is 33.38 kg/m² as calculated from the following:    Height as of this encounter: 185.4 cm (73\").    Weight as of this encounter: 115 kg (253 lb).         {Help Text;  If you change Medicare Wellness reason for visit to a Welcome to Medicare reason for visit after the encounter has started, please add SmartPhrase .GAITBALANCE to your note for proper gait and balance documentation. This text will disappear after the note is signed:08855}       Does the patient have evidence of cognitive impairment? {Yes/No:05284}  Lab Results   Component Value Date    HGBA1C 7.4 (A) 2025                                                                                               Health  Risk Assessment    Smoking Status:  Social History     Tobacco Use   Smoking Status Former    Current packs/day: 0.00    Average packs/day: 1.5 packs/day for 56.2 years (84.3 ttl pk-yrs)    Types: Cigarettes    Start date:     Quit date: 3/19/2021    Years since quittin.0    Passive exposure: Past   Smokeless Tobacco Current    Types: Chew     Alcohol Consumption:  Social History     Substance and Sexual Activity   Alcohol Use Never       Fall Risk Screen{Jump to Steadi Fall Risk Flowsheet:23}  STEADI Fall Risk Assessment was completed, and patient is at HIGH risk for falls. Assessment completed on:3/25/2025    Depression Screening{Jump to PHQ SmartForm:23}   Little interest or pleasure in doing things? Not at all   Feeling down, depressed, or hopeless? Not at all   PHQ-2 Total Score 0      Health Habits and Functional and Cognitive " Screening:      3/25/2025     1:00 PM   Functional & Cognitive Status   Do you have difficulty preparing food and eating? No   Do you have difficulty bathing yourself, getting dressed or grooming yourself? No   Do you have difficulty using the toilet? No   Do you have difficulty moving around from place to place? No   Do you have trouble with steps or getting out of a bed or a chair? Yes   Current Diet Unhealthy Diet   Dental Exam Up to date   Eye Exam Not up to date   Exercise (times per week) 0 times per week   Current Exercises Include Other   Do you need help using the phone?  No   Are you deaf or do you have serious difficulty hearing?  No   Do you need help to go to places out of walking distance? Yes   Do you need help shopping? No   Do you need help preparing meals?  No   Do you need help with housework?  No   Do you need help with laundry? No   Do you need help taking your medications? No   Do you need help managing money? No   Do you ever drive or ride in a car without wearing a seat belt? No   Have you felt unusual stress, anger or loneliness in the last month? No   Who do you live with? Sibling   If you need help, do you have trouble finding someone available to you? No   Have you been bothered in the last four weeks by sexual problems? No   Do you have difficulty concentrating, remembering or making decisions? No           Age-appropriate Screening Schedule:  Refer to the list below for future screening recommendations based on patient's age, sex and/or medical conditions. Orders for these recommended tests are listed in the plan section. The patient has been provided with a written plan.    Health Maintenance List  Health Maintenance   Topic Date Due    Pneumococcal Vaccine 50+ (1 of 2 - PCV) Never done    ANNUAL WELLNESS VISIT  02/15/2025    LIPID PANEL  02/15/2025    URINE MICROALBUMIN-CREATININE RATIO (uACR)  02/15/2025    LUNG CANCER SCREENING  03/25/2025    COVID-19 Vaccine (1 - 2024-25 season)  04/26/2025 (Originally 9/1/2024)    DIABETIC EYE EXAM  05/13/2025 (Originally 4/27/1960)    TDAP/TD VACCINES (1 - Tdap) 08/20/2025 (Originally 4/27/1968)    COLORECTAL CANCER SCREENING  12/25/2025 (Originally 4/27/1994)    RSV Vaccine - Adults (1 - 1-dose 75+ series) 03/06/2026 (Originally 4/27/2024)    ZOSTER VACCINE (1 of 2) 03/06/2026 (Originally 4/27/1999)    HEMOGLOBIN A1C  09/25/2025    HEPATITIS C SCREENING  Completed    INFLUENZA VACCINE  Completed    AAA SCREEN ONCE  Completed                                                                                                                                                CMS Preventative Services Quick Reference  Risk Factors Identified During Encounter  {Medicare Wellness Risk Factors:80590}    The above risks/problems have been discussed with the patient.  Pertinent information has been shared with the patient in the After Visit Summary.  An After Visit Summary and PPPS were made available to the patient.    Follow Up:{Wrapup  Review (Popup)  Advance Care Planning  Labs  CC  Problem List  Visit Diagnosis  Medications  Result Review  Imaging  Health Maintenance  Quality  BestPractice  SmartSets  SnapShot  Encounters  Notes  Media  Procedures :23}   Next Medicare Wellness visit to be scheduled in 1 year.     Assessment & Plan  Type 2 diabetes mellitus with hyperglycemia, with long-term current use of insulin  {Diabetes (Optional):9043372886}    Orders:    Microalbumin / Creatinine Urine Ratio - Urine, Clean Catch; Future    POC Glycosylated Hemoglobin (Hb A1C)    insulin degludec (Tresiba FlexTouch) 100 UNIT/ML solution pen-injector injection; Inject 20 Units under the skin into the appropriate area as directed Daily for 90 days.    Insulin Pen Needle (Pen Needles) 32G X 5 MM misc; Use 1 each Daily.    Type 2 diabetes mellitus with hyperglycemia, with long-term current use of insulin  {Diabetes (Optional):4872491920}    Orders:    Microalbumin  / Creatinine Urine Ratio - Urine, Clean Catch; Future    POC Glycosylated Hemoglobin (Hb A1C)    insulin degludec (Tresiba FlexTouch) 100 UNIT/ML solution pen-injector injection; Inject 20 Units under the skin into the appropriate area as directed Daily for 90 days.    Insulin Pen Needle (Pen Needles) 32G X 5 MM misc; Use 1 each Daily.    Gastroesophageal reflux disease without esophagitis    Orders:    omeprazole (priLOSEC) 20 MG capsule; Take 1 capsule by mouth Daily.    Medicare annual wellness visit, subsequent    Orders:    Microalbumin / Creatinine Urine Ratio - Urine, Clean Catch; Future    Comprehensive Metabolic Panel; Future    CBC & Differential; Future    Lipid Panel; Future    TSH+Free T4; Future    POC Glycosylated Hemoglobin (Hb A1C)    Need for pneumococcal vaccination    Orders:    Pneumococcal Conjugate Vaccine 20-Valent (PCV20)         Follow Up:{Wrapup  Review (Popup)  Advance Care Planning  Labs  CC  Problem List  Visit Diagnosis  Medications  Result Review  Imaging  Health Maintenance  Quality  BestPractice  SmartSets  SnapShot  Encounters  Notes  Media  Procedures :23}   No follow-ups on file.

## 2025-03-26 NOTE — ASSESSMENT & PLAN NOTE
stable on omeprazole 20 mg, continue, refilled   Orders:    omeprazole (priLOSEC) 20 MG capsule; Take 1 capsule by mouth Daily.

## 2025-03-26 NOTE — ASSESSMENT & PLAN NOTE
a1c slightly higher at 7.4, continue metformin, jardiance, and tresiba, work on stricter diet control   Orders:    Microalbumin / Creatinine Urine Ratio - Urine, Clean Catch; Future    POC Glycosylated Hemoglobin (Hb A1C)    insulin degludec (Tresiba FlexTouch) 100 UNIT/ML solution pen-injector injection; Inject 20 Units under the skin into the appropriate area as directed Daily for 90 days.    Insulin Pen Needle (Pen Needles) 32G X 5 MM misc; Use 1 each Daily.

## 2025-03-28 ENCOUNTER — HOSPITAL ENCOUNTER (OUTPATIENT)
Dept: RESPIRATORY THERAPY | Facility: HOSPITAL | Age: 75
Discharge: HOME | End: 2025-03-28
Payer: MEDICARE

## 2025-03-28 ENCOUNTER — HOSPITAL ENCOUNTER (OUTPATIENT)
Dept: CT IMAGING | Facility: HOSPITAL | Age: 75
Discharge: HOME | End: 2025-03-28
Payer: MEDICARE

## 2025-03-28 DIAGNOSIS — Z87.891 PERSONAL HISTORY OF NICOTINE DEPENDENCE: ICD-10-CM

## 2025-03-28 DIAGNOSIS — J44.1 CHRONIC OBSTRUCTIVE PULMONARY DISEASE WITH ACUTE EXACERBATION: ICD-10-CM

## 2025-03-28 DIAGNOSIS — F17.201 TOBACCO ABUSE, IN REMISSION: ICD-10-CM

## 2025-03-28 DIAGNOSIS — J43.9 PULMONARY EMPHYSEMA, UNSPECIFIED EMPHYSEMA TYPE: ICD-10-CM

## 2025-03-28 DIAGNOSIS — J96.12 CHRONIC RESPIRATORY FAILURE WITH HYPOXIA AND HYPERCAPNIA: ICD-10-CM

## 2025-03-28 DIAGNOSIS — J96.11 CHRONIC RESPIRATORY FAILURE WITH HYPOXIA AND HYPERCAPNIA: ICD-10-CM

## 2025-03-28 PROCEDURE — 94726 PLETHYSMOGRAPHY LUNG VOLUMES: CPT

## 2025-03-28 PROCEDURE — 94060 EVALUATION OF WHEEZING: CPT

## 2025-03-28 PROCEDURE — 71271 CT THORAX LUNG CANCER SCR C-: CPT

## 2025-03-28 PROCEDURE — 94729 DIFFUSING CAPACITY: CPT

## 2025-03-28 RX ORDER — ALBUTEROL SULFATE 0.83 MG/ML
2.5 SOLUTION RESPIRATORY (INHALATION) ONCE
Status: COMPLETED | OUTPATIENT
Start: 2025-03-28 | End: 2025-03-28

## 2025-03-28 RX ADMIN — ALBUTEROL SULFATE 2.5 MG: 2.5 SOLUTION RESPIRATORY (INHALATION) at 13:47

## 2025-04-21 DIAGNOSIS — E78.5 HYPERLIPIDEMIA, UNSPECIFIED HYPERLIPIDEMIA TYPE: ICD-10-CM

## 2025-04-22 RX ORDER — ATORVASTATIN CALCIUM 10 MG/1
10 TABLET, FILM COATED ORAL DAILY
Qty: 90 TABLET | Refills: 1 | Status: SHIPPED | OUTPATIENT
Start: 2025-04-22

## 2025-05-12 NOTE — OUTREACH NOTE
"COPD/PN Week 1 Survey    Flowsheet Row Responses   [facility] patient discharged from? [facility]   Does the patient have one of the following disease processes/diagnoses(primary or secondary)? COPD   Week 1 attempt successful? Yes   Call start time 0844   Call end time 0846   Discharge diagnosis Acute on chronic hypoxemic and hypercapnic respiratory failure   Meds reviewed with patient/caregiver? Yes   Is the patient having any side effects they believe may be caused by any medication additions or changes? No   Does the patient have all medications ordered at discharge? Yes   Is the patient taking all medications as directed (includes completed medication regime)? Yes   Comments regarding appointments Pulm 11-17-22   Does the patient have a primary care provider?  Yes   Does the patient have an appointment with their PCP or specialist within 7 days of discharge? Yes   Comments regarding PCP Patient states he has a fu with his PCP \"sometime this month\"    Has the patient kept scheduled appointments due by today? N/A  [11/17/22 pulm ]   Has home health visited the patient within 72 hours of discharge? N/A   Pulse Ox monitoring Intermittent   O2 Sat comments 95% RA   O2 Sat: education provided Sat levels, Other, Monitoring frequency, When to seek care   O2 Sat education comments sats remaining under 90% call 911/go to ED   Psychosocial issues? No   Did the patient receive a copy of their discharge instructions? Yes   Nursing interventions Reviewed instructions with patient   What is the patient's perception of their health status since discharge? Improving   Nursing Interventions Nurse provided patient education   If the patient is a current smoker, are they able to teach back resources for cessation? [id number]  [pt dips]   Is the patient/caregiver able to teach back the hierarchy of who to call/visit for symptoms/problems? PCP, Specialist, Home health nurse, Urgent Care, ED, 911 Yes   Is the patient/caregiver " Patient had surgery at [facility] a couple of weeks ago.  He still has swelling.  Also, he has questions regarding medication that he had to stop before surgery.   able to teach back signs and symptoms of worsening condition: Fever/chills, Chest pain, Shortness of breath   Week 1 call completed? Yes   Wrap up additional comments Call brief-pt states he is fine.           MEME JHA - Registered Nurse

## 2025-06-22 DIAGNOSIS — Z79.4 TYPE 2 DIABETES MELLITUS WITH HYPERGLYCEMIA, WITH LONG-TERM CURRENT USE OF INSULIN: ICD-10-CM

## 2025-06-22 DIAGNOSIS — E11.65 TYPE 2 DIABETES MELLITUS WITH HYPERGLYCEMIA, WITH LONG-TERM CURRENT USE OF INSULIN: ICD-10-CM

## 2025-06-24 RX ORDER — INSULIN DEGLUDEC 100 U/ML
20 INJECTION, SOLUTION SUBCUTANEOUS DAILY
Qty: 15 ML | Refills: 0 | Status: SHIPPED | OUTPATIENT
Start: 2025-06-24

## 2025-07-02 DIAGNOSIS — Z79.4 TYPE 2 DIABETES MELLITUS WITH HYPERGLYCEMIA, WITH LONG-TERM CURRENT USE OF INSULIN: ICD-10-CM

## 2025-07-02 DIAGNOSIS — E11.65 TYPE 2 DIABETES MELLITUS WITH HYPERGLYCEMIA, WITH LONG-TERM CURRENT USE OF INSULIN: ICD-10-CM

## 2025-07-02 NOTE — TELEPHONE ENCOUNTER
Caller: Juana Sandoval    Relationship: Emergency Contact    Best call back number: [phone]    Requested Prescriptions:   Requested Prescriptions     Pending Prescriptions Disp Refills    empagliflozin (Jardiance) 10 MG tablet tablet 90 tablet 0     Sig: Take 1 tablet by mouth Daily.        Pharmacy where request should be sent: [facility] [place] - [address] [facility] [address] - [phone] [facility] [phone] FX     Last office visit with prescribing clinician: 3/25/2025   Last telemedicine visit with prescribing clinician: Visit date not found   Next office visit with prescribing clinician: 7/10/2025     Additional details provided by patient:     Does the patient have less than a 3 day supply:  [x] Yes  [] No        Mary Hardy Rep   07/02/25 15:12 EDT

## 2025-07-10 ENCOUNTER — OFFICE VISIT (OUTPATIENT)
Dept: FAMILY MEDICINE CLINIC | Facility: CLINIC | Age: 75
End: 2025-07-10
Payer: MEDICARE

## 2025-07-10 VITALS
DIASTOLIC BLOOD PRESSURE: 64 MMHG | OXYGEN SATURATION: 91 % | BODY MASS INDEX: 32.34 KG/M2 | SYSTOLIC BLOOD PRESSURE: 115 MMHG | WEIGHT: 244 LBS | HEIGHT: 73 IN | HEART RATE: 78 BPM

## 2025-07-10 DIAGNOSIS — Z23 NEED FOR PNEUMOCOCCAL VACCINATION: ICD-10-CM

## 2025-07-10 DIAGNOSIS — J44.9 CHRONIC OBSTRUCTIVE PULMONARY DISEASE, UNSPECIFIED COPD TYPE: ICD-10-CM

## 2025-07-10 DIAGNOSIS — E11.65 TYPE 2 DIABETES MELLITUS WITH HYPERGLYCEMIA, WITH LONG-TERM CURRENT USE OF INSULIN: Primary | ICD-10-CM

## 2025-07-10 DIAGNOSIS — Z79.4 TYPE 2 DIABETES MELLITUS WITH HYPERGLYCEMIA, WITH LONG-TERM CURRENT USE OF INSULIN: Primary | ICD-10-CM

## 2025-07-10 LAB — HBA1C MFR BLD: 6.3 % (ref 4.8–5.6)

## 2025-07-10 PROCEDURE — 83036 HEMOGLOBIN GLYCOSYLATED A1C: CPT

## 2025-07-10 RX ORDER — ACYCLOVIR 800 MG/1
1 TABLET ORAL
Qty: 6 EACH | Refills: 2 | Status: SHIPPED | OUTPATIENT
Start: 2025-07-10

## 2025-07-10 RX ORDER — KETOROLAC TROMETHAMINE 30 MG/ML
1 INJECTION, SOLUTION INTRAMUSCULAR; INTRAVENOUS CONTINUOUS
Qty: 1 EACH | Refills: 0 | Status: SHIPPED | OUTPATIENT
Start: 2025-07-10

## 2025-07-10 NOTE — PROGRESS NOTES
Chief Complaint  COPD and Diabetes    SUBJECTIVE  Dilip Faulkner presents to [facility]    History of Present Illness  Patient here for follow up on diabetes and COPD.     Diabetes- he is due updated A1c, to be done in office. He is on Jardiance, metformin, Tresiba 20 units. He has been drinking full sugar sodas and not watching his carb intake per his sister who is with him today. His last A1c in March was rising at 7.4, up from 7.0. they would like to explore CGM. Does not check his blood glucose at home. Denies any open foot wounds.      He is seeing pulmonology for his COPD management. He feels inhalers are working well for him. He has a follow up 7/28/25. He is due pneumonia vaccine and is agreeable to have done in office. He continues to smoke cigarettes, still noncompliant with BiPAP. Still utilizing 2L nasal cannula at home O2. He refused to wear into office, states it is in the car. O2 in office 91% with rest, 88% with ambulation.          Past Medical History:   Diagnosis Date    Asthma     COPD (chronic obstructive pulmonary disease)     Diabetes mellitus     Ex-smoker     QUIT 2021    Hernia, umbilical     Hypertension     On home O2     REPORTS WEARING 2L/NC PRN SOA      Family History   Problem Relation Age of Onset    Asthma Father     Asthma Sister     Asthma Brother     Cancer Maternal Aunt       Past Surgical History:   Procedure Laterality Date    ABDOMINAL SURGERY          Current Outpatient Medications:     albuterol (PROVENTIL) (2.5 MG/3ML) 0.083% nebulizer solution, Take 2.5 mg by nebulization 4 (Four) Times a Day As Needed for Wheezing., Disp: 1 each, Rfl: 5    albuterol sulfate  (90 Base) MCG/ACT inhaler, Inhale 2 puffs Every 4 (Four) Hours As Needed for Wheezing., Disp: 18 g, Rfl: 11    aspirin 81 MG chewable tablet, CHEW AND SWALLOW 1 TABLET BY MOUTH DAILY, Disp: 90 tablet, Rfl: 1    atorvastatin (LIPITOR) 10 MG tablet, TAKE ONE TABLET BY MOUTH  "DAILY, Disp: 90 tablet, Rfl: 1    empagliflozin (Jardiance) 10 MG tablet tablet, Take 1 tablet by mouth Daily., Disp: 90 tablet, Rfl: 0    Fluticasone-Umeclidin-Vilant (TRELEGY ELLIPTA) 200-62.5-25 MCG/ACT inhaler, Inhale 1 puff Daily., Disp: 1 each, Rfl: 11    Insulin Pen Needle (Pen Needles) 32G X 5 MM misc, Use 1 each Daily., Disp: 100 each, Rfl: 3    metFORMIN (GLUCOPHAGE) 500 MG tablet, TAKE 1 TABLET BY MOUTH TWICE DAILY WITH MEALS, Disp: 180 tablet, Rfl: 1    omeprazole (priLOSEC) 20 MG capsule, Take 1 capsule by mouth Daily., Disp: 90 capsule, Rfl: 1    Tresiba FlexTouch 100 UNIT/ML solution pen-injector injection, Inject 20 Units under the skin into the appropriate area as directed Daily, Disp: 15 mL, Rfl: 0    Albuterol-Budesonide (Airsupra) 90-80 MCG/ACT aerosol, Inhale 2 puffs Every 4 (Four) Hours As Needed (wheezing, soa). (Patient not taking: Reported on 7/10/2025), Disp: 10.7 g, Rfl: 1    Continuous Glucose  (FreeStyle Radha 3 [employer] [place] [employer]) device, Use 1 each Continuous., Disp: 1 each, Rfl: 0    Continuous Glucose Sensor (FreeStyle Radha 3 Sensor) misc, Use 1 each Every 14 (Fourteen) Days., Disp: 6 each, Rfl: 2    OBJECTIVE  Vital Signs:   /64 (BP Location: Left arm, Patient Position: Sitting, Cuff Size: Large Adult)   Pulse 78   Ht 185.4 cm (73\")   Wt 111 kg (244 lb)   SpO2 91%   BMI 32.19 kg/m²    Estimated body mass index is 32.19 kg/m² as calculated from the following:    Height as of this encounter: 185.4 cm (73\").    Weight as of this encounter: 111 kg (244 lb).     Wt Readings from Last 3 Encounters:   07/10/25 111 kg (244 lb)   03/25/25 115 kg (253 lb)   02/13/25 117 kg (257 lb)     BP Readings from Last 3 Encounters:   07/10/25 115/64   03/25/25 112/67   02/14/25 110/66       Physical Exam  Vitals reviewed.   Constitutional:       General: He is not in acute distress.     Appearance: He is not ill-appearing.   HENT:      Head: Normocephalic and atraumatic.   Eyes:      " Conjunctiva/sclera: Conjunctivae normal.   Cardiovascular:      Rate and Rhythm: Normal rate and regular rhythm.      Heart sounds: Normal heart sounds.   Pulmonary:      Effort: Pulmonary effort is normal.      Breath sounds: Wheezing present.      Comments: 2L NC  Musculoskeletal:      Cervical back: Normal range of motion.   Neurological:      Mental Status: He is alert and oriented to person, place, and time.   Psychiatric:         Mood and Affect: Mood normal.         Behavior: Behavior normal.          Result Review    Common labs          11/9/2024    05:00 2/13/2025    21:57 3/25/2025    14:54 3/25/2025    14:58 3/25/2025    14:59   Common Labs   Glucose  128  86      BUN  17  22      Creatinine  1.34  1.12      Sodium  140  140      Potassium  4.4  4.4      Chloride  105  103      Calcium  8.6  8.6      Albumin  3.6  3.7      Total Bilirubin  0.2  0.3      Alkaline Phosphatase  105  99      AST (SGOT)  15  15      ALT (SGPT)  12  11      WBC 12.44  10.19  11.36      Hemoglobin 10.7  12.2  12.6      Hematocrit 37.7  43.1  41.4      Platelets 154  188  171      Total Cholesterol   63      Triglycerides   56      HDL Cholesterol   36      LDL Cholesterol    13      Hemoglobin A1C     7.4    Microalbumin, Urine    1.3         CT Chest Low Dose Cancer Screening WO  Result Date: 4/2/2025  Impression: Emphysematous changes. Improving irregular densities in the left lung suspected to be postinfectious/inflammatory. Small hiatal hernia. Mild mediastinal adenopathy, not significantly changed in the interval. Recommendation: Continue annual screening with LDCT Lung Rads Assessment: Lung-RADS L2 - Benign appearance or <1% chance of malignancy. Electronically Signed: Chirag Lopez MD  4/2/2025 1:58 PM EDT  Workstation ID: [id number]        The above data has been reviewed by MONE Sheppard 07/10/2025 13:16 EDT.          Patient Care Team:  Jackeline Jaime APRN as PCP - General (Nurse Practitioner)  Rosalba Edwards  MONE (Nurse Practitioner)  Chandu Vail MD as Consulting Physician (Pulmonary Disease)  Lorena Campbell APRN as Consulting Physician (Cardiology)  Nelly Jaime APRN as Nurse Practitioner (Pulmonary Disease)  Vivien Jacobs APRN as Nurse Practitioner (Pulmonary Disease)           ASSESSMENT & PLAN    Diagnoses and all orders for this visit:    1. Type 2 diabetes mellitus with hyperglycemia, with long-term current use of insulin (Primary)  Comments:  a1c drawn in office, will call with results, wokr on diet, will try PA for CGM.  Orders:  -     Hemoglobin A1c; Future  -     Hemoglobin A1c  -     Continuous Glucose  (FreeStyle Radha 3 [employer] [place]) device; Use 1 each Continuous.  Dispense: 1 each; Refill: 0  -     Continuous Glucose Sensor (FreeStyle Radha 3 Sensor) misc; Use 1 each Every 14 (Fourteen) Days.  Dispense: 6 each; Refill: 2    2. Chronic obstructive pulmonary disease, unspecified COPD type  Comments:  stable on current treatment,advised oxygen compliance  Orders:  -     Pneumococcal Conjugate Vaccine 21 (18+ yrs)    3. Need for pneumococcal vaccination  -     Pneumococcal Conjugate Vaccine 21 (18+ yrs)         Tobacco Use: High Risk (7/10/2025)    Patient History     Smoking Tobacco Use: Former     Smokeless Tobacco Use: Current     Passive Exposure: Past       Follow Up     Return in about 3 months (around 9/26/2025) for Next scheduled follow up.      Patient was given instructions and counseling regarding his condition or for health maintenance advice. Please see specific information pulled into the AVS if appropriate.   I have reviewed information obtained and documented by others and I have confirmed the accuracy of this documented note.    MONE Sheppard

## 2025-07-16 ENCOUNTER — RESULTS FOLLOW-UP (OUTPATIENT)
Dept: FAMILY MEDICINE CLINIC | Facility: CLINIC | Age: 75
End: 2025-07-16
Payer: MEDICARE